# Patient Record
Sex: FEMALE | Race: WHITE | NOT HISPANIC OR LATINO | Employment: OTHER | URBAN - METROPOLITAN AREA
[De-identification: names, ages, dates, MRNs, and addresses within clinical notes are randomized per-mention and may not be internally consistent; named-entity substitution may affect disease eponyms.]

---

## 2019-03-14 ENCOUNTER — TELEPHONE (OUTPATIENT)
Dept: SCHEDULING | Facility: IMAGING CENTER | Age: 84
End: 2019-03-14

## 2019-06-27 ENCOUNTER — OFFICE VISIT (OUTPATIENT)
Dept: MEDICAL GROUP | Facility: MEDICAL CENTER | Age: 84
End: 2019-06-27
Payer: MEDICARE

## 2019-06-27 VITALS
BODY MASS INDEX: 28.53 KG/M2 | HEIGHT: 63 IN | WEIGHT: 161 LBS | HEART RATE: 70 BPM | RESPIRATION RATE: 16 BRPM | DIASTOLIC BLOOD PRESSURE: 80 MMHG | OXYGEN SATURATION: 93 % | TEMPERATURE: 98.1 F | SYSTOLIC BLOOD PRESSURE: 122 MMHG

## 2019-06-27 DIAGNOSIS — Z86.73 HISTORY OF TIA (TRANSIENT ISCHEMIC ATTACK): ICD-10-CM

## 2019-06-27 DIAGNOSIS — H35.00 RETINOPATHY: ICD-10-CM

## 2019-06-27 DIAGNOSIS — M54.41 CHRONIC MIDLINE LOW BACK PAIN WITH RIGHT-SIDED SCIATICA: ICD-10-CM

## 2019-06-27 DIAGNOSIS — I10 ESSENTIAL HYPERTENSION: ICD-10-CM

## 2019-06-27 DIAGNOSIS — Z95.0 PACEMAKER: ICD-10-CM

## 2019-06-27 DIAGNOSIS — G89.29 CHRONIC MIDLINE LOW BACK PAIN WITH RIGHT-SIDED SCIATICA: ICD-10-CM

## 2019-06-27 DIAGNOSIS — R20.2 PARESTHESIAS: ICD-10-CM

## 2019-06-27 DIAGNOSIS — S32.000S COMPRESSION FRACTURE OF LUMBAR VERTEBRA, SEQUELA: ICD-10-CM

## 2019-06-27 DIAGNOSIS — E55.9 VITAMIN D INSUFFICIENCY: ICD-10-CM

## 2019-06-27 DIAGNOSIS — R29.898 WEAKNESS OF BOTH LOWER EXTREMITIES: ICD-10-CM

## 2019-06-27 DIAGNOSIS — R73.03 PRE-DIABETES: ICD-10-CM

## 2019-06-27 DIAGNOSIS — E78.5 DYSLIPIDEMIA: ICD-10-CM

## 2019-06-27 DIAGNOSIS — Z76.89 ENCOUNTER TO ESTABLISH CARE: ICD-10-CM

## 2019-06-27 DIAGNOSIS — M79.89 LEG SWELLING: ICD-10-CM

## 2019-06-27 PROCEDURE — 99204 OFFICE O/P NEW MOD 45 MIN: CPT | Performed by: NURSE PRACTITIONER

## 2019-06-27 RX ORDER — ACETAMINOPHEN 325 MG/1
650 TABLET ORAL EVERY 4 HOURS PRN
Status: ON HOLD | COMMUNITY
End: 2020-02-26

## 2019-06-27 RX ORDER — EZETIMIBE 10 MG/1
10 TABLET ORAL DAILY
COMMUNITY
End: 2019-06-27 | Stop reason: SDUPTHER

## 2019-06-27 RX ORDER — CLOPIDOGREL BISULFATE 75 MG/1
75 TABLET ORAL DAILY
Qty: 90 TAB | Refills: 3 | Status: ON HOLD | OUTPATIENT
Start: 2019-06-27 | End: 2020-02-26 | Stop reason: SDUPTHER

## 2019-06-27 RX ORDER — NEBIVOLOL 5 MG/1
5 TABLET ORAL DAILY
COMMUNITY
End: 2019-06-27 | Stop reason: SDUPTHER

## 2019-06-27 RX ORDER — EZETIMIBE 10 MG/1
10 TABLET ORAL DAILY
Qty: 90 TAB | Refills: 3 | Status: SHIPPED | OUTPATIENT
Start: 2019-06-27 | End: 2019-07-09

## 2019-06-27 RX ORDER — LOSARTAN POTASSIUM 100 MG/1
100 TABLET ORAL DAILY
COMMUNITY
End: 2019-06-27 | Stop reason: SDUPTHER

## 2019-06-27 RX ORDER — CHOLECALCIFEROL (VITAMIN D3) 25 MCG
TABLET,CHEWABLE ORAL
COMMUNITY
End: 2019-07-09

## 2019-06-27 RX ORDER — CLOPIDOGREL BISULFATE 75 MG/1
75 TABLET ORAL DAILY
COMMUNITY
End: 2019-06-27 | Stop reason: SDUPTHER

## 2019-06-27 RX ORDER — FUROSEMIDE 40 MG/1
40 TABLET ORAL DAILY
Status: ON HOLD | COMMUNITY
End: 2020-02-25

## 2019-06-27 RX ORDER — NEBIVOLOL 5 MG/1
5 TABLET ORAL DAILY
Qty: 90 TAB | Refills: 3 | Status: SHIPPED | OUTPATIENT
Start: 2019-06-27 | End: 2019-08-29

## 2019-06-27 RX ORDER — MULTIVIT WITH MINERALS/LUTEIN
1 TABLET ORAL DAILY
COMMUNITY
End: 2020-03-30 | Stop reason: CLARIF

## 2019-06-27 RX ORDER — LOSARTAN POTASSIUM 100 MG/1
100 TABLET ORAL DAILY
Qty: 90 TAB | Refills: 3 | Status: SHIPPED | OUTPATIENT
Start: 2019-06-27 | End: 2019-08-29

## 2019-06-27 NOTE — LETTER
[employer]  DEMAR Castelan.P.R.N.  [address] [facility] [address]  [place] [address]  Fax: [phone]   Authorization for Release/Disclosure of   Protected Health Information   Name: VAN SEWELL : 1933 SSN: [id number]   Address: [address] [place] [address] [address] [place] [address] Phone:    [phone] (home)    I authorize the entity listed below to release/disclose the PHI below to:   [employer]/Joana Hawk A.P.R.N. and DEMAR Castelan.P.R.N.   Provider or Entity Name:  Dr. Hang Whalen   Address   City, State, Zip  [place], [place] Phone:  [phone]    Fax:         Reason for request: continuity of care   Information to be released:    [  ] LAST COLONOSCOPY,  including any PATH REPORT and follow-up  [  ] LAST FIT/COLOGUARD RESULT [  ] LAST DEXA  [  ] LAST MAMMOGRAM  [  ] LAST PAP  [  ] LAST LABS [  ] RETINA EXAM REPORT  [  ] IMMUNIZATION RECORDS  [  x] Release all info      [  ] Check here and initial the line next to each item to release ALL health information INCLUDING  _____ Care and treatment for drug and / or alcohol abuse  _____ HIV testing, infection status, or AIDS  _____ Genetic Testing    DATES OF SERVICE OR TIME PERIOD TO BE DISCLOSED: _____________  I understand and acknowledge that:  * This Authorization may be revoked at any time by you in writing, except if your health information has already been used or disclosed.  * Your health information that will be used or disclosed as a result of you signing this authorization could be re-disclosed by the recipient. If this occurs, your re-disclosed health information may no longer be protected by State or Federal laws.  * You may refuse to sign this Authorization. Your refusal will not affect your ability to obtain treatment.  * This Authorization becomes effective upon signing and will  on (date) __________.      If no date is indicated, this Authorization will  one (1) year from the signature date.    Name:  Krystle Tavarez    Signature:   Date:     6/27/2019       PLEASE FAX REQUESTED RECORDS BACK TO: [phone]

## 2019-06-27 NOTE — LETTER
[facility]  AFSHAN CastelanPEriREriN.  [address] [facility] [address]  [place] [address]  Fax: [phone]   Authorization for Release/Disclosure of   Protected Health Information   Name: VAN SEWELL : 1933 SSN: [id number]   Address: [address] [place] [address] [place] [address] Phone:    [phone] (home)    I authorize the entity listed below to release/disclose the PHI below to:   [facility]/DEMAR Castelan.P.R.YONIS and EREN CastelanRJIA   Provider or Entity Name:  [facility] ([facility]   Address   City, State, [facility]   Phone:      Fax:     Reason for request: continuity of care   Information to be released:    [  ] LAST COLONOSCOPY,  including any PATH REPORT and follow-up  [  ] LAST FIT/COLOGUARD RESULT [  ] LAST DEXA  [  ] LAST MAMMOGRAM  [  ] LAST PAP  [  ] LAST LABS [  ] RETINA EXAM REPORT  [  ] IMMUNIZATION RECORDS  [ x ] Release all info  Hx of pacemaker placed      [  ] Check here and initial the line next to each item to release ALL health information INCLUDING  _____ Care and treatment for drug and / or alcohol abuse  _____ HIV testing, infection status, or AIDS  _____ Genetic Testing    DATES OF SERVICE OR TIME PERIOD TO BE DISCLOSED: _____________  I understand and acknowledge that:  * This Authorization may be revoked at any time by you in writing, except if your health information has already been used or disclosed.  * Your health information that will be used or disclosed as a result of you signing this authorization could be re-disclosed by the recipient. If this occurs, your re-disclosed health information may no longer be protected by State or Federal laws.  * You may refuse to sign this Authorization. Your refusal will not affect your ability to obtain treatment.  * This Authorization becomes effective upon signing and will  on (date) __________.      If no date is indicated, this Authorization will  one (1) year from the signature date.       Name: Krystle Tavarez    Signature:   Date:     6/27/2019       PLEASE FAX REQUESTED RECORDS BACK TO: [phone]

## 2019-06-27 NOTE — LETTER
[employer]  Joana Hawk A.P.R.N.  [address] [place] [facility] [address]  [place] [address]  Fax: [phone]   Authorization for Release/Disclosure of   Protected Health Information   Name: VAN SEWELL : 1933 SSN: [id number]   Address: [address] [place] [address] [address] [place] [address] Phone:    [phone] (home)    I authorize the entity listed below to release/disclose the PHI below to:   [employer]/Joana Hawk, A.P.R.N. and Joana Hawk A.P.R.N.   Provider or Entity Name:  Dr. Taylor (cardiology)   Address   [facility]   Phone:  [phone]    Fax:     Reason for request: continuity of care   Information to be released:    [  ] LAST COLONOSCOPY,  including any PATH REPORT and follow-up  [  ] LAST FIT/COLOGUARD RESULT [  ] LAST DEXA  [  ] LAST MAMMOGRAM  [  ] LAST PAP  [  ] LAST LABS [  ] RETINA EXAM REPORT  [  ] IMMUNIZATION RECORDS  [x  ] Release all info      [  ] Check here and initial the line next to each item to release ALL health information INCLUDING  _____ Care and treatment for drug and / or alcohol abuse  _____ HIV testing, infection status, or AIDS  _____ Genetic Testing    DATES OF SERVICE OR TIME PERIOD TO BE DISCLOSED: _____________  I understand and acknowledge that:  * This Authorization may be revoked at any time by you in writing, except if your health information has already been used or disclosed.  * Your health information that will be used or disclosed as a result of you signing this authorization could be re-disclosed by the recipient. If this occurs, your re-disclosed health information may no longer be protected by State or Federal laws.  * You may refuse to sign this Authorization. Your refusal will not affect your ability to obtain treatment.  * This Authorization becomes effective upon signing and will  on (date) __________.      If no date is indicated, this Authorization will  one (1) year from the signature date.    Name: Van  Daysi    Signature:   Date:     6/27/2019       PLEASE FAX REQUESTED RECORDS BACK TO: [phone]

## 2019-06-27 NOTE — PROGRESS NOTES
"CC: establish care/ back pain      Krystle Tavarez is a 85 y.o. female here to establish care and to discuss the evaluation and management of:    Here with her daughter today.     Patient here today to establish care. Former PCP    Medical history significant for hyperlipidemia, pacemaker, hypertension, retinopathy, pre diabetes       1. Chronic midline low back pain with right-sided sciatica  2. Compression fracture of lumbar vertebra, sequela  3. Weakness of both lower extremities  4. Paresthesias  Has been bothering her for about 5 years. Started after when she started taking her Zetia. Starts nagging aching, something \"stealing her energy.\" States she was told to exercise, this made it worse. Has had cortisone injections about 3 different times. It would help but the pain would come back.  Feels shaky on her feet, \"felt like walking on water.\"  Feels like she is getting weaker. States on March 27, 2019 she woke up and could not stand up.  Was told she had fractures her vertebrae in her back- they gave her liquid cement. Using a walker now and can't stand by her self. Needs to hold on to items in order to stand up. Needs to sit after standing for about 5 minutes. Sitting her back pain is 2/10, laying 2/10, standing it goes to an 8/10. Starts on her right heel and travels upwards. Feels like her leg weighs too much when she walks, feels her right side is weaker than the left. Wants referral to neurosurgeon.     5. Pacemaker  Has [facility], placed August 2015. States [facility] had it checked the end of April-was told she had \"8 more years of life.\"  Mentions that her \"heart stopped for 45 seconds during her knee and hip surgeries.\" Will be establishing with Dr. Sewell.     6. Dyslipidemia  On Zetia for this although feels as if it has some effect on her weakness/energy level.    7. Essential hypertension  Taking Losartan and Bystolic for this.     8. Retinopathy  Followed by " "a eye care specialist. Needs referral.    9. Leg swelling  Taking lasix for this. Mainly in her feet. Not wearing compression stockings.    10. Pre-diabetes  States has been told she is pre-diabetic. Not on any medications.    11. History of TIA   States on Plavix for this.    12. Vitamin D insufficiency    Reports having a Dexa scan about 10 years ago and it was \"normal.\"        ROS:  Denies any Headache, Blurred Vision, Confusion, Chest pain,  Shortness of breath,  Abdominal pain, Changes of bowel or bladder, Hematuria, Hematochezia, Lower ext. edema, Fevers, Nights sweats, Weight Changes, Focal weakness or numbness.  And all other systems are negative. Back pain, ankle swelling, weakness, using walker.      Current Outpatient Medications:   •  furosemide (LASIX) 40 MG Tab, Take 40 mg by mouth every day., Disp: , Rfl:   •  acetaminophen (TYLENOL) 325 MG Tab, Take 650 mg by mouth every four hours as needed., Disp: , Rfl:   •  Ascorbic Acid (VITAMIN C) 1000 MG Tab, Take  by mouth., Disp: , Rfl:   •  nebivolol (BYSTOLIC) 5 MG Tab tablet, Take 1 Tab by mouth every day., Disp: 90 Tab, Rfl: 3  •  losartan (COZAAR) 100 MG Tab, Take 1 Tab by mouth every day., Disp: 90 Tab, Rfl: 3  •  clopidogrel (PLAVIX) 75 MG Tab, Take 1 Tab by mouth every day., Disp: 90 Tab, Rfl: 3  •  hydrochlorothiazide (MICROZIDE) 12.5 MG capsule, Take 1 Cap by mouth every day., Disp: 30 Cap, Rfl: 5    Allergies   Allergen Reactions   • Sulfa Drugs        Past Medical History:   Diagnosis Date   • Hyperlipidemia    • Hypertension    • Pacemaker 2015   • TIA (transient ischemic attack)     on Plavix     Past Surgical History:   Procedure Laterality Date   • PACEMAKER INSERTION  2015   • HIP REPLACEMENT, TOTAL Right 2009   • CATARACT EXTRACTION WITH IOL Bilateral 2003   • BASAL CELL EXCISION      nose and hand   • BUNIONECTOMY Left    • CHOLECYSTECTOMY     • KNEE REPLACEMENT, TOTAL Right      Family History   Problem Relation Age of Onset   • Diabetes " "Mother    • Stroke Mother    • Heart Attack Father 74     Social History     Socioeconomic History   • Marital status:      Spouse name: Not on file   • Number of children: Not on file   • Years of education: Not on file   • Highest education level: Not on file   Occupational History   • Not on file   Social Needs   • Financial resource strain: Not on file   • Food insecurity:     Worry: Not on file     Inability: Not on file   • Transportation needs:     Medical: Not on file     Non-medical: Not on file   Tobacco Use   • Smoking status: Never Smoker   • Smokeless tobacco: Never Used   Substance and Sexual Activity   • Alcohol use: No   • Drug use: No   • Sexual activity: Not on file   Lifestyle   • Physical activity:     Days per week: Not on file     Minutes per session: Not on file   • Stress: Not on file   Relationships   • Social connections:     Talks on phone: Not on file     Gets together: Not on file     Attends [employer] service: Not on file     Active member of club or organization: Not on file     Attends meetings of clubs or organizations: Not on file     Relationship status: Not on file   • Intimate partner violence:     Fear of current or ex partner: Not on file     Emotionally abused: Not on file     Physically abused: Not on file     Forced sexual activity: Not on file   Other Topics Concern   • Not on file   Social History Narrative   • Not on file       Objective:     Vitals: /80   Pulse 70   Temp 36.7 °C (98.1 °F)   Resp 16   Ht 1.6 m (5' 3\")   Wt 73 kg (161 lb)   SpO2 93%   BMI 28.52 kg/m²      General: Alert, pleasant, NAD, using walker  HEENT:  Normocephalic.  Neck supple.  No thyromegaly or masses palpated. No cervical or supraclavicular lymphadenopathy.  Heart:  Regular rate and rhythm.  S1 and S2 normal.  No murmurs appreciated.    CHEST: pacemaker in MARYCARMEN chest.   Respiratory:  Normal respiratory effort.  Clear to auscultation bilaterally.    Skin:  Warm, dry, no " rashes  Musculoskeletal:  Gait is antalgic, kyphotic, using walker, cautioned, Moves all extremities well.  Extremities:  +BLE edema, primarily feet  Neurological: No tremors, LE weakness, using walker  Psych:  Affect/mood is normal, judgement is good, memory is intact, grooming is appropriate.      Assessment and Plan.   85 y.o. female to establish care and discuss the following    1. Chronic midline low back pain with right-sided sciatica  - REFERRAL TO NEUROSURGERY  - CT-LSPINE W/O PLUS RECONS; Future    2. Compression fracture of lumbar vertebra, sequela  - CT-LSPINE W/O PLUS RECONS; Future    3. Weakness of both lower extremities  - REFERRAL TO NEUROSURGERY    4. Paresthesias  - Comp Metabolic Panel; Future  - VITAMIN B12; Future    5. Pacemaker  Has upcoming appointment with cardiology.     6. Dyslipidemia  - Lipid Profile; Future    7. Essential hypertension  Chronic. 122/80 in clinic. No chest pain, shortness or breath or dizziness. +moderate LE edema/feet mainly.  - TSH WITH REFLEX TO FT4; Future  - MICROALBUMIN CREAT RATIO URINE; Future    8. Retinopathy  - REFERRAL TO OPHTHALMOLOGY    9. Leg swelling  - CBC WITHOUT DIFFERENTIAL; Future    10. Pre-diabetes  - HEMOGLOBIN A1C; Future    11. History of TIA (transient ischemic attack)  On plavix for this.     12. Vitamin D insufficiency  - VITAMIN D,25 HYDROXY; Future    13. Encounter to establish care  Requesting records.    Other orders  - furosemide (LASIX) 40 MG Tab; Take 40 mg by mouth every day.  - acetaminophen (TYLENOL) 325 MG Tab; Take 650 mg by mouth every four hours as needed.  - Ascorbic Acid (VITAMIN C) 1000 MG Tab; Take  by mouth.  - nebivolol (BYSTOLIC) 5 MG Tab tablet; Take 1 Tab by mouth every day.  Dispense: 90 Tab; Refill: 3  - losartan (COZAAR) 100 MG Tab; Take 1 Tab by mouth every day.  Dispense: 90 Tab; Refill: 3  - clopidogrel (PLAVIX) 75 MG Tab; Take 1 Tab by mouth every day.  Dispense: 90 Tab; Refill: 3    Patient was seen for a total  of 45 minutes face-to-face, with more than half of the time spent counseling or coordinating care regarding the above mentioned problems.     Return in about 4 weeks (around 7/25/2019).          Joana MCQUEEN.

## 2019-06-27 NOTE — LETTER
[employer]  AFSHAN CastelanP.R.N.  [address] [facility] [address]  [place] [address]  Fax: [phone]   Authorization for Release/Disclosure of   Protected Health Information   Name: VAN SEWELL : 1933 SSN: [id number]   Address: [address] [place] [address]   [place] [address] Phone:    [phone] (home)    I authorize the entity listed below to release/disclose the PHI below to:   [employer]/Joana Hawk A.P.R.N. and DEMAR Castelan.P.R.N.   Provider or Entity Name:  Dr. Hubbard (orthopedic surgeon)   Address   City, State, Zip   Phone:  [phone]    Fax:  [phone]   Reason for request: continuity of care   Information to be released:    [  ] LAST COLONOSCOPY,  including any PATH REPORT and follow-up  [  ] LAST FIT/COLOGUARD RESULT [  ] LAST DEXA  [  ] LAST MAMMOGRAM  [  ] LAST PAP  [  ] LAST LABS [  ] RETINA EXAM REPORT  [  ] IMMUNIZATION RECORDS  [ x ] Release all info      [  ] Check here and initial the line next to each item to release ALL health information INCLUDING  _____ Care and treatment for drug and / or alcohol abuse  _____ HIV testing, infection status, or AIDS  _____ Genetic Testing    DATES OF SERVICE OR TIME PERIOD TO BE DISCLOSED: _____________  I understand and acknowledge that:  * This Authorization may be revoked at any time by you in writing, except if your health information has already been used or disclosed.  * Your health information that will be used or disclosed as a result of you signing this authorization could be re-disclosed by the recipient. If this occurs, your re-disclosed health information may no longer be protected by State or Federal laws.  * You may refuse to sign this Authorization. Your refusal will not affect your ability to obtain treatment.  * This Authorization becomes effective upon signing and will  on (date) __________.      If no date is indicated, this Authorization will  one (1) year from the signature date.       Name: Krystle Tavarez    Signature:   Date:     6/27/2019       PLEASE FAX REQUESTED RECORDS BACK TO: [phone]

## 2019-06-27 NOTE — LETTER
[employer]  DEMAR Castelan.P.R.N.  [address] [place] [facility] [address]  [place] [address]  Fax: [phone]   Authorization for Release/Disclosure of   Protected Health Information   Name: VAN SEWELL : 1933 SSN: [id number]   Address: [address] [place] [address] [address] [place] [address] Phone:    [phone] (home)    I authorize the entity listed below to release/disclose the PHI below to:   [employer]/Joana Hawk A.P.R.N. and DEMAR Castelan.P.R.N.   Provider or Entity Name:  Dr. Kelly (Podiatrist)   Address   [facility]   Phone:  [phone]      Fax:  [phone]     Reason for request: continuity of care   Information to be released:    [  ] LAST COLONOSCOPY,  including any PATH REPORT and follow-up  [  ] LAST FIT/COLOGUARD RESULT [  ] LAST DEXA  [  ] LAST MAMMOGRAM  [  ] LAST PAP  [  ] LAST LABS [  ] RETINA EXAM REPORT  [  ] IMMUNIZATION RECORDS  [ x ] Release all info      [  ] Check here and initial the line next to each item to release ALL health information INCLUDING  _____ Care and treatment for drug and / or alcohol abuse  _____ HIV testing, infection status, or AIDS  _____ Genetic Testing    DATES OF SERVICE OR TIME PERIOD TO BE DISCLOSED: _____________  I understand and acknowledge that:  * This Authorization may be revoked at any time by you in writing, except if your health information has already been used or disclosed.  * Your health information that will be used or disclosed as a result of you signing this authorization could be re-disclosed by the recipient. If this occurs, your re-disclosed health information may no longer be protected by State or Federal laws.  * You may refuse to sign this Authorization. Your refusal will not affect your ability to obtain treatment.  * This Authorization becomes effective upon signing and will  on (date) __________.      If no date is indicated, this Authorization will  one (1) year from the signature date.    Name:  Krystle Tavarez    Signature:   Date:     6/27/2019       PLEASE FAX REQUESTED RECORDS BACK TO: [phone]

## 2019-06-27 NOTE — LETTER
[employer]  Joana Hawk A.P.R.N.  [address] [facility] [address]  [place] [address]  Fax: [phone]   Authorization for Release/Disclosure of   Protected Health Information   Name: VAN SEWELL : 1933 SSN: [id number]   Address: [address] [place] [address] [place] [address] Phone:    [phone] (home)    I authorize the entity listed below to release/disclose the PHI below to:   [employer]/Joana Hawk, A.P.R.N. and Joana Hawk A.P.R.N.   Provider or Entity Name:  Dr. Mehta (neurology)   Address   [facility]   Phone:  [phone]    Fax:     Reason for request: continuity of care   Information to be released:    [  ] LAST COLONOSCOPY,  including any PATH REPORT and follow-up  [  ] LAST FIT/COLOGUARD RESULT [  ] LAST DEXA  [  ] LAST MAMMOGRAM  [  ] LAST PAP  [  ] LAST LABS [  ] RETINA EXAM REPORT  [  ] IMMUNIZATION RECORDS  [  ] Release all info      [  ] Check here and initial the line next to each item to release ALL health information INCLUDING  _____ Care and treatment for drug and / or alcohol abuse  _____ HIV testing, infection status, or AIDS  _____ Genetic Testing    DATES OF SERVICE OR TIME PERIOD TO BE DISCLOSED: _____________  I understand and acknowledge that:  * This Authorization may be revoked at any time by you in writing, except if your health information has already been used or disclosed.  * Your health information that will be used or disclosed as a result of you signing this authorization could be re-disclosed by the recipient. If this occurs, your re-disclosed health information may no longer be protected by State or Federal laws.  * You may refuse to sign this Authorization. Your refusal will not affect your ability to obtain treatment.  * This Authorization becomes effective upon signing and will  on (date) __________.      If no date is indicated, this Authorization will  one (1) year from the signature date.    Name: Van  Daysi    Signature:   Date:     6/27/2019       PLEASE FAX REQUESTED RECORDS BACK TO: [phone]

## 2019-06-27 NOTE — LETTER
[employer]  Joana Hawk A.P.R.N.  [address] [place] [facility] [address]  [place] [address]  Fax: [phone]   Authorization for Release/Disclosure of   Protected Health Information   Name: VAN SEWELL : 1933 SSN: [id number]   Address: [address] [place] [address] [address] [place] [address] Phone:    [phone] (home)    I authorize the entity listed below to release/disclose the PHI below to:   [employer]/Joana Hawk, A.P.R.N. and DEMAR Castelan.P.R.N.   Provider or Entity Name:  DR. Bismark Alvarez (Ophthalmology)   Address   [facility]   Phone:  [phone]    Fax:     Reason for request: continuity of care   Information to be released:    [  ] LAST COLONOSCOPY,  including any PATH REPORT and follow-up  [  ] LAST FIT/COLOGUARD RESULT [  ] LAST DEXA  [  ] LAST MAMMOGRAM  [  ] LAST PAP  [  ] LAST LABS [x  ] RETINA EXAM REPORT  [  ] IMMUNIZATION RECORDS  [ x ] Release all info      [  ] Check here and initial the line next to each item to release ALL health information INCLUDING  _____ Care and treatment for drug and / or alcohol abuse  _____ HIV testing, infection status, or AIDS  _____ Genetic Testing    DATES OF SERVICE OR TIME PERIOD TO BE DISCLOSED: _____________  I understand and acknowledge that:  * This Authorization may be revoked at any time by you in writing, except if your health information has already been used or disclosed.  * Your health information that will be used or disclosed as a result of you signing this authorization could be re-disclosed by the recipient. If this occurs, your re-disclosed health information may no longer be protected by State or Federal laws.  * You may refuse to sign this Authorization. Your refusal will not affect your ability to obtain treatment.  * This Authorization becomes effective upon signing and will  on (date) __________.      If no date is indicated, this Authorization will  one (1) year from the signature date.    Name: Van  Daysi    Signature:   Date:     6/27/2019       PLEASE FAX REQUESTED RECORDS BACK TO: [phone]

## 2019-07-03 ENCOUNTER — TELEPHONE (OUTPATIENT)
Dept: CARDIOLOGY | Facility: MEDICAL CENTER | Age: 84
End: 2019-07-03

## 2019-07-05 ENCOUNTER — HOSPITAL ENCOUNTER (OUTPATIENT)
Dept: LAB | Facility: MEDICAL CENTER | Age: 84
End: 2019-07-05
Attending: NURSE PRACTITIONER
Payer: MEDICARE

## 2019-07-05 ENCOUNTER — HOSPITAL ENCOUNTER (OUTPATIENT)
Dept: RADIOLOGY | Facility: MEDICAL CENTER | Age: 84
End: 2019-07-05
Attending: NURSE PRACTITIONER
Payer: MEDICARE

## 2019-07-05 DIAGNOSIS — G89.29 CHRONIC MIDLINE LOW BACK PAIN WITH RIGHT-SIDED SCIATICA: ICD-10-CM

## 2019-07-05 DIAGNOSIS — M54.41 CHRONIC MIDLINE LOW BACK PAIN WITH RIGHT-SIDED SCIATICA: ICD-10-CM

## 2019-07-05 DIAGNOSIS — R20.2 PARESTHESIAS: ICD-10-CM

## 2019-07-05 DIAGNOSIS — R73.03 PRE-DIABETES: ICD-10-CM

## 2019-07-05 DIAGNOSIS — E55.9 VITAMIN D INSUFFICIENCY: ICD-10-CM

## 2019-07-05 DIAGNOSIS — S32.000S COMPRESSION FRACTURE OF LUMBAR VERTEBRA, SEQUELA: ICD-10-CM

## 2019-07-05 DIAGNOSIS — I10 ESSENTIAL HYPERTENSION: ICD-10-CM

## 2019-07-05 DIAGNOSIS — M79.89 LEG SWELLING: ICD-10-CM

## 2019-07-05 DIAGNOSIS — E78.5 DYSLIPIDEMIA: ICD-10-CM

## 2019-07-05 LAB
25(OH)D3 SERPL-MCNC: 35 NG/ML (ref 30–100)
ALBUMIN SERPL BCP-MCNC: 3.9 G/DL (ref 3.2–4.9)
ALBUMIN/GLOB SERPL: 1.3 G/DL
ALP SERPL-CCNC: 80 U/L (ref 30–99)
ALT SERPL-CCNC: 13 U/L (ref 2–50)
ANION GAP SERPL CALC-SCNC: 6 MMOL/L (ref 0–11.9)
AST SERPL-CCNC: 15 U/L (ref 12–45)
BILIRUB SERPL-MCNC: 1 MG/DL (ref 0.1–1.5)
BUN SERPL-MCNC: 14 MG/DL (ref 8–22)
CALCIUM SERPL-MCNC: 10 MG/DL (ref 8.5–10.5)
CHLORIDE SERPL-SCNC: 97 MMOL/L (ref 96–112)
CHOLEST SERPL-MCNC: 190 MG/DL (ref 100–199)
CO2 SERPL-SCNC: 28 MMOL/L (ref 20–33)
CREAT SERPL-MCNC: 0.65 MG/DL (ref 0.5–1.4)
CREAT UR-MCNC: 41.1 MG/DL
ERYTHROCYTE [DISTWIDTH] IN BLOOD BY AUTOMATED COUNT: 48 FL (ref 35.9–50)
EST. AVERAGE GLUCOSE BLD GHB EST-MCNC: 151 MG/DL
FASTING STATUS PATIENT QL REPORTED: NORMAL
GLOBULIN SER CALC-MCNC: 3.1 G/DL (ref 1.9–3.5)
GLUCOSE SERPL-MCNC: 139 MG/DL (ref 65–99)
HBA1C MFR BLD: 6.9 % (ref 0–5.6)
HCT VFR BLD AUTO: 40.8 % (ref 37–47)
HDLC SERPL-MCNC: 51 MG/DL
HGB BLD-MCNC: 13.7 G/DL (ref 12–16)
LDLC SERPL CALC-MCNC: 114 MG/DL
MCH RBC QN AUTO: 30.9 PG (ref 27–33)
MCHC RBC AUTO-ENTMCNC: 33.6 G/DL (ref 33.6–35)
MCV RBC AUTO: 92.1 FL (ref 81.4–97.8)
MICROALBUMIN UR-MCNC: <0.7 MG/DL
MICROALBUMIN/CREAT UR: NORMAL MG/G (ref 0–30)
PLATELET # BLD AUTO: 265 K/UL (ref 164–446)
PMV BLD AUTO: 9.5 FL (ref 9–12.9)
POTASSIUM SERPL-SCNC: 4.7 MMOL/L (ref 3.6–5.5)
PROT SERPL-MCNC: 7 G/DL (ref 6–8.2)
RBC # BLD AUTO: 4.43 M/UL (ref 4.2–5.4)
SODIUM SERPL-SCNC: 131 MMOL/L (ref 135–145)
TRIGL SERPL-MCNC: 124 MG/DL (ref 0–149)
TSH SERPL DL<=0.005 MIU/L-ACNC: 2.19 UIU/ML (ref 0.38–5.33)
VIT B12 SERPL-MCNC: 1477 PG/ML (ref 211–911)
WBC # BLD AUTO: 7 K/UL (ref 4.8–10.8)

## 2019-07-05 PROCEDURE — 82306 VITAMIN D 25 HYDROXY: CPT

## 2019-07-05 PROCEDURE — 82043 UR ALBUMIN QUANTITATIVE: CPT

## 2019-07-05 PROCEDURE — 83036 HEMOGLOBIN GLYCOSYLATED A1C: CPT | Mod: GA

## 2019-07-05 PROCEDURE — 80053 COMPREHEN METABOLIC PANEL: CPT

## 2019-07-05 PROCEDURE — 82607 VITAMIN B-12: CPT

## 2019-07-05 PROCEDURE — 85027 COMPLETE CBC AUTOMATED: CPT

## 2019-07-05 PROCEDURE — 82570 ASSAY OF URINE CREATININE: CPT

## 2019-07-05 PROCEDURE — 72131 CT LUMBAR SPINE W/O DYE: CPT

## 2019-07-05 PROCEDURE — 36415 COLL VENOUS BLD VENIPUNCTURE: CPT

## 2019-07-05 PROCEDURE — 84443 ASSAY THYROID STIM HORMONE: CPT

## 2019-07-05 PROCEDURE — 80061 LIPID PANEL: CPT

## 2019-07-08 ENCOUNTER — TELEPHONE (OUTPATIENT)
Dept: CARDIOLOGY | Facility: MEDICAL CENTER | Age: 84
End: 2019-07-08

## 2019-07-09 ENCOUNTER — NON-PROVIDER VISIT (OUTPATIENT)
Dept: CARDIOLOGY | Facility: MEDICAL CENTER | Age: 84
End: 2019-07-09
Payer: MEDICARE

## 2019-07-09 ENCOUNTER — OFFICE VISIT (OUTPATIENT)
Dept: CARDIOLOGY | Facility: MEDICAL CENTER | Age: 84
End: 2019-07-09
Payer: MEDICARE

## 2019-07-09 VITALS
OXYGEN SATURATION: 94 % | BODY MASS INDEX: 28.35 KG/M2 | DIASTOLIC BLOOD PRESSURE: 66 MMHG | HEIGHT: 63 IN | SYSTOLIC BLOOD PRESSURE: 150 MMHG | WEIGHT: 160 LBS | HEART RATE: 68 BPM

## 2019-07-09 DIAGNOSIS — E78.2 MIXED HYPERLIPIDEMIA: ICD-10-CM

## 2019-07-09 DIAGNOSIS — I10 ESSENTIAL HYPERTENSION, BENIGN: ICD-10-CM

## 2019-07-09 DIAGNOSIS — G89.29 CHRONIC LOW BACK PAIN WITHOUT SCIATICA, UNSPECIFIED BACK PAIN LATERALITY: ICD-10-CM

## 2019-07-09 DIAGNOSIS — R60.0 LOCALIZED EDEMA: ICD-10-CM

## 2019-07-09 DIAGNOSIS — Z95.0 CARDIAC PACEMAKER IN SITU: ICD-10-CM

## 2019-07-09 DIAGNOSIS — M54.50 CHRONIC LOW BACK PAIN WITHOUT SCIATICA, UNSPECIFIED BACK PAIN LATERALITY: ICD-10-CM

## 2019-07-09 PROBLEM — E78.5 HYPERLIPIDEMIA: Status: ACTIVE | Noted: 2019-07-09

## 2019-07-09 PROCEDURE — 99203 OFFICE O/P NEW LOW 30 MIN: CPT | Mod: 25 | Performed by: INTERNAL MEDICINE

## 2019-07-09 PROCEDURE — 93280 PM DEVICE PROGR EVAL DUAL: CPT | Performed by: INTERNAL MEDICINE

## 2019-07-09 RX ORDER — HYDROCHLOROTHIAZIDE 12.5 MG/1
12.5 CAPSULE, GELATIN COATED ORAL DAILY
Qty: 30 CAP | Refills: 5 | Status: SHIPPED | OUTPATIENT
Start: 2019-07-09 | End: 2019-08-29

## 2019-07-09 ASSESSMENT — ENCOUNTER SYMPTOMS
DIZZINESS: 1
CARDIOVASCULAR NEGATIVE: 1
GASTROINTESTINAL NEGATIVE: 1
RESPIRATORY NEGATIVE: 1
BACK PAIN: 1
DEPRESSION: 0
CONSTITUTIONAL NEGATIVE: 1

## 2019-07-09 NOTE — PROGRESS NOTES
Chief Complaint   Patient presents with   • Establish Care   • Pacemaker Check/Dysfunction     [facility]       Subjective:   Krystle Tavarez is a 85 y.o. female who is self-referred today to establish care.  She has a history of a permanent pacemaker placed about 4 years ago.  The pacemaker was interrogated today and is functioning normally.  She has history of hypertension and hyperlipidemia.  There is no history of known myocardial infarction, exertional chest pain.  She recalls that she had an echo a few months ago by her doctor in [place] and we will try to obtain a copy of that.    Family history: Father  of a heart attack at 74 mother  at 76 from complications of diabetes and stroke.  There is no family history of premature coronary disease.    Social history: , non-smoker, not physically active    Past Medical History:   Diagnosis Date   • Hyperlipidemia    • Hypertension    • Pacemaker    • TIA (transient ischemic attack)     on Plavix     Past Surgical History:   Procedure Laterality Date   • PACEMAKER INSERTION     • HIP REPLACEMENT, TOTAL Right    • CATARACT EXTRACTION WITH IOL Bilateral    • BASAL CELL EXCISION      nose and hand   • BUNIONECTOMY Left    • CHOLECYSTECTOMY     • KNEE REPLACEMENT, TOTAL Right      History reviewed. No pertinent family history.  Social History     Social History   • Marital status:      Spouse name: N/A   • Number of children: N/A   • Years of education: N/A     Occupational History   • Not on file.     Social History Main Topics   • Smoking status: Never Smoker   • Smokeless tobacco: Never Used   • Alcohol use No   • Drug use: No   • Sexual activity: Not on file     Other Topics Concern   • Not on file     Social History Narrative   • No narrative on file     Allergies   Allergen Reactions   • Sulfa Drugs      Outpatient Encounter Prescriptions as of 2019   Medication Sig Dispense Refill   • hydrochlorothiazide (MICROZIDE) 12.5 MG  "capsule Take 1 Cap by mouth every day. 30 Cap 5   • furosemide (LASIX) 40 MG Tab Take 40 mg by mouth every day.     • acetaminophen (TYLENOL) 325 MG Tab Take 650 mg by mouth every four hours as needed.     • Ascorbic Acid (VITAMIN C) 1000 MG Tab Take  by mouth.     • nebivolol (BYSTOLIC) 5 MG Tab tablet Take 1 Tab by mouth every day. 90 Tab 3   • losartan (COZAAR) 100 MG Tab Take 1 Tab by mouth every day. 90 Tab 3   • clopidogrel (PLAVIX) 75 MG Tab Take 1 Tab by mouth every day. 90 Tab 3   • [DISCONTINUED] Cyanocobalamin (B-12) 1000 MCG Cap Take  by mouth.     • [DISCONTINUED] ezetimibe (ZETIA) 10 MG Tab Take 1 Tab by mouth every day. 90 Tab 3     No facility-administered encounter medications on file as of 7/9/2019.      Review of Systems   Constitutional: Negative.    HENT: Negative.    Respiratory: Negative.    Cardiovascular: Negative.    Gastrointestinal: Negative.    Musculoskeletal: Positive for back pain.   Skin: Negative.    Neurological: Positive for dizziness.        Difficulty with balance   Endo/Heme/Allergies: Negative.    Psychiatric/Behavioral: Negative for depression.        Objective:   /66 (BP Location: Left arm, Patient Position: Sitting, BP Cuff Size: Adult)   Pulse 68   Ht 1.6 m (5' 3\")   Wt 72.6 kg (160 lb)   SpO2 94%   BMI 28.34 kg/m²     Physical Exam   Constitutional: She is oriented to person, place, and time. No distress.   Uses a walker.   HENT:   Head: Normocephalic and atraumatic.   Eyes: Pupils are equal, round, and reactive to light. No scleral icterus.   Neck: No JVD present.   Cardiovascular: Normal rate and regular rhythm.    No murmur heard.  Pulmonary/Chest: No respiratory distress. She has no wheezes.   Pacemaker present in left upper chest without evidence   of erosion, drainage,or erythema.   Abdominal: Soft. She exhibits no distension. There is no tenderness.   Musculoskeletal: She exhibits edema.   Kyphosis is present.  Edema of the feet bilaterally "   Neurological: She is alert and oriented to person, place, and time.   Skin: Skin is warm.   Psychiatric: She has a normal mood and affect.       Assessment:     1. Cardiac pacemaker in situ     2. Essential hypertension, benign     3. Mixed hyperlipidemia         Medical Decision Making:  Today's Assessment / Status / Plan:   Status post permanent pacemaker pacemaker is functioning normally.  No PAF.    Hypertension: Not under good control.  Pressure difficult to hear but confirmation by palpation reveals a blood pressure 162 systolic in the left arm.  She also has mild edema.  The patient has quite a bit of her medicine left at home so we will add HCTZ 12.5 mg to her current regimen.  When she runs out of her losartan then could combine it to make losartan/HCTZ for convenience sake.    Also, she has 2 months of Bystolic at home which is causing about 50 some dollars a month.  Would recommend changing this to carvedilol when she runs low on her Bystolic.    Return in about 4 to 6 weeks to reassess her blood pressure and her edema.  At that time if she is tolerating the medications and her pressures controlled change of her losartan and HCTZ to combination for convenience sake and then consider changing her from Bystolic to carvedilol.    She is also on B12 supplement.  Her B12 level is high so she can stop this.  I also request that she stop her Zetia.    Dizziness: Patient episode of dizziness about 3 and half years ago was seen in the ER.  There was concern about a TIA and she was placed on clopidogrel by her primary care physician.  She still complains of continued without balance problems.

## 2019-08-08 ENCOUNTER — OFFICE VISIT (OUTPATIENT)
Dept: MEDICAL GROUP | Facility: MEDICAL CENTER | Age: 84
End: 2019-08-08
Payer: MEDICARE

## 2019-08-08 VITALS
BODY MASS INDEX: 28.88 KG/M2 | WEIGHT: 163 LBS | OXYGEN SATURATION: 91 % | HEART RATE: 70 BPM | HEIGHT: 63 IN | TEMPERATURE: 98.2 F | DIASTOLIC BLOOD PRESSURE: 80 MMHG | SYSTOLIC BLOOD PRESSURE: 112 MMHG | RESPIRATION RATE: 16 BRPM

## 2019-08-08 DIAGNOSIS — R60.0 LOCALIZED EDEMA: ICD-10-CM

## 2019-08-08 DIAGNOSIS — E78.5 DYSLIPIDEMIA: ICD-10-CM

## 2019-08-08 DIAGNOSIS — R73.09 ELEVATED HEMOGLOBIN A1C: ICD-10-CM

## 2019-08-08 DIAGNOSIS — R29.898 WEAKNESS OF BOTH LOWER EXTREMITIES: ICD-10-CM

## 2019-08-08 DIAGNOSIS — M54.41 CHRONIC MIDLINE LOW BACK PAIN WITH RIGHT-SIDED SCIATICA: ICD-10-CM

## 2019-08-08 DIAGNOSIS — I10 ESSENTIAL HYPERTENSION, BENIGN: ICD-10-CM

## 2019-08-08 DIAGNOSIS — G89.29 CHRONIC MIDLINE LOW BACK PAIN WITH RIGHT-SIDED SCIATICA: ICD-10-CM

## 2019-08-08 PROCEDURE — 99214 OFFICE O/P EST MOD 30 MIN: CPT | Performed by: NURSE PRACTITIONER

## 2019-08-08 ASSESSMENT — PATIENT HEALTH QUESTIONNAIRE - PHQ9: CLINICAL INTERPRETATION OF PHQ2 SCORE: 0

## 2019-08-08 NOTE — LETTER
[facility]  AFSHAN CastelanP.R.N.  [address] [facility] [address]  [place] [address]  Fax: [phone]   Authorization for Release/Disclosure of   Protected Health Information   Name: VAN SEWELL : 1933 SSN: [id number]   Address: [address] [place] [address] [address] [place] [address] Phone:    [phone] (home)    I authorize the entity listed below to release/disclose the PHI below to:   [facility]/Joana Hawk A.P.R.N. and DEMAR Castelan.P.R.N.   Provider or Entity Name:  [facility]   City, State, Zip   Phone:      Fax:     Reason for request: continuity of care   Information to be released:    [  ] LAST COLONOSCOPY,  including any PATH REPORT and follow-up  [  ] LAST FIT/COLOGUARD RESULT [  ] LAST DEXA  [  ] LAST MAMMOGRAM  [  ] LAST PAP  [  ] LAST LABS [  ] RETINA EXAM REPORT  [  ] IMMUNIZATION RECORDS  [ x ] Release all info      [  ] Check here and initial the line next to each item to release ALL health information INCLUDING  _____ Care and treatment for drug and / or alcohol abuse  _____ HIV testing, infection status, or AIDS  _____ Genetic Testing    DATES OF SERVICE OR TIME PERIOD TO BE DISCLOSED: _____________  I understand and acknowledge that:  * This Authorization may be revoked at any time by you in writing, except if your health information has already been used or disclosed.  * Your health information that will be used or disclosed as a result of you signing this authorization could be re-disclosed by the recipient. If this occurs, your re-disclosed health information may no longer be protected by State or Federal laws.  * You may refuse to sign this Authorization. Your refusal will not affect your ability to obtain treatment.  * This Authorization becomes effective upon signing and will  on (date) __________.      If no date is indicated, this Authorization will  one (1) year from the signature date.    Name: Van Sewell    Signature:   Date:     8/8/2019       PLEASE FAX REQUESTED RECORDS BACK TO: [phone]

## 2019-08-08 NOTE — PROGRESS NOTES
cc:  Follow up labs/elevated A1c        Subjective:     HPI:     Krystle Tavarez is a 86 y.o. female here to discuss the evaluation and management of:    Elevated A1c   Patient presents today to review her labs.  Patient did have a elevated A1c at 6.9% on her most recent labs.  Have discussed with the patient that based on having A1c over 6.4% would indicate diabetes.  Prior to starting any medications patient is interested in working on dietary modifications.  Patient does have a daughter who is willing to help her with these dietary medications.    Blood pressure/pacemaker  Patient states that she did see cardiology to establish care and discuss blood pressure and her pacemaker.  Her blood pressure was elevated during her visit with cardiology.  Her blood pressure today is 112/80 in the clinic.  Denies any chest pain, shortness of breath or dizziness.  She does have lower extremity edema, primarily in her feet and ankles.  HCTZ was added due to her leg swelling.  Per reviewing cardiology notes if she is tolerating the HCTZ they will likely change her to losartan/HCTZ combination pill.  There is also mention that there is consideration in changing her from Bystolic to carvedilol.    Cholesterol   Patient states that she is able to follow-up with cardiology.  At this time she is no longer taking the Zetia per cardiology.       Back pain  Patient states that she was able to follow up for a preliminary visit with the pain specialist and she was given gabapentin however she stopped taking it as it was making her loopy.        ROS:  Denies any Headache, Blurred Vision, Confusion, Chest pain,  Shortness of breath,  Abdominal pain, Changes of bowel or bladder, Lower ext edema, Fevers, Nights sweats, Weight Changes, Focal weakness or numbness.  And all other systems are negative.leg swelling, back pain, LE weakness        Current Outpatient Medications:   •  hydrochlorothiazide (MICROZIDE) 12.5 MG capsule, Take 1 Cap by mouth  every day., Disp: 30 Cap, Rfl: 5  •  furosemide (LASIX) 40 MG Tab, Take 40 mg by mouth every day., Disp: , Rfl:   •  acetaminophen (TYLENOL) 325 MG Tab, Take 650 mg by mouth every four hours as needed., Disp: , Rfl:   •  Ascorbic Acid (VITAMIN C) 1000 MG Tab, Take  by mouth., Disp: , Rfl:   •  nebivolol (BYSTOLIC) 5 MG Tab tablet, Take 1 Tab by mouth every day., Disp: 90 Tab, Rfl: 3  •  losartan (COZAAR) 100 MG Tab, Take 1 Tab by mouth every day., Disp: 90 Tab, Rfl: 3  •  clopidogrel (PLAVIX) 75 MG Tab, Take 1 Tab by mouth every day., Disp: 90 Tab, Rfl: 3    Allergies   Allergen Reactions   • Sulfa Drugs        Past Medical History:   Diagnosis Date   • Hyperlipidemia    • Hypertension    • Pacemaker 2015   • TIA (transient ischemic attack)     on Plavix     Past Surgical History:   Procedure Laterality Date   • PACEMAKER INSERTION  2015   • HIP REPLACEMENT, TOTAL Right 2009   • CATARACT EXTRACTION WITH IOL Bilateral 2003   • BASAL CELL EXCISION      nose and hand   • BUNIONECTOMY Left    • CHOLECYSTECTOMY     • KNEE REPLACEMENT, TOTAL Right      Family History   Problem Relation Age of Onset   • Diabetes Mother    • Stroke Mother    • Heart Attack Father 74     Social History     Socioeconomic History   • Marital status:      Spouse name: Not on file   • Number of children: Not on file   • Years of education: Not on file   • Highest education level: Not on file   Occupational History   • Not on file   Social Needs   • Financial resource strain: Not on file   • Food insecurity:     Worry: Not on file     Inability: Not on file   • Transportation needs:     Medical: Not on file     Non-medical: Not on file   Tobacco Use   • Smoking status: Never Smoker   • Smokeless tobacco: Never Used   Substance and Sexual Activity   • Alcohol use: No   • Drug use: No   • Sexual activity: Not on file   Lifestyle   • Physical activity:     Days per week: Not on file     Minutes per session: Not on file   • Stress: Not on file  "  Relationships   • Social connections:     Talks on phone: Not on file     Gets together: Not on file     Attends Confucianism service: Not on file     Active member of club or organization: Not on file     Attends meetings of clubs or organizations: Not on file     Relationship status: Not on file   • Intimate partner violence:     Fear of current or ex partner: Not on file     Emotionally abused: Not on file     Physically abused: Not on file     Forced sexual activity: Not on file   Other Topics Concern   • Not on file   Social History Narrative   • Not on file       Objective:     Vitals: /80   Pulse 70   Temp 36.8 °C (98.2 °F)   Resp 16   Ht 1.6 m (5' 3\")   Wt 73.9 kg (163 lb)   SpO2 91%   BMI 28.87 kg/m²    General: Alert, pleasant, NAD, using walker  HEENT: Normocephalic.    Skin: Warm, dry, no rashes.  Extremities: BLE edema  Neurological: No tremors, weakness in LE, kyphotic, using walker.  Psych:  Affect/mood is normal, judgement is good, memory is intact, grooming is appropriate.    Assessment/Plan:     Diagnoses and all orders for this visit:    Elevated hemoglobin A1c  A1c in clinic today 6.9%.  Have discussed with patient and her daughter in detail regarding this number and dietary modifications she can implement in order to help prevent further increase in this number.  We will hold on starting any medications at this time.  Follow-up 3 months repeat A1c.    Essential hypertension, benign  Stable today in clinic.  Denies any chest pain, shortness of breath or dizziness.  Does have moderate lower extremity edema, primarily in her ankles and feet.  Recently started on HCTZ.  Followed by cardiology for this    Localized edema  Chronic.  Recommend DASH diet, elevation of legs and compression stockings.  Recently started on HCTZ.    Dyslipidemia  Last lipid panel total cholesterol 190, 2 glycerides 124, HDL 51, .  Patient was recently stopped on the Zetia per cardiology.  Routine " follow-up.    Chronic midline low back pain with right-sided sciatica  Weakness of both lower extremities  Chronic.  Will be requesting records from the pain specialist.   Did not tolerate gabapentin.  Continue to follow-up with neurosurgery.  USe walker to help prevent fall with injury.      Return in about 3 months (around 11/8/2019).          Joana MCQUEEN.

## 2019-08-13 PROBLEM — R73.09 ELEVATED HEMOGLOBIN A1C: Chronic | Status: ACTIVE | Noted: 2019-08-13

## 2019-08-13 PROBLEM — Z86.73 HISTORY OF TIA (TRANSIENT ISCHEMIC ATTACK): Status: ACTIVE | Noted: 2019-08-13

## 2019-08-13 PROBLEM — M48.56XS: Status: ACTIVE | Noted: 2019-08-13

## 2019-08-13 PROBLEM — R29.898 WEAKNESS OF BOTH LOWER EXTREMITIES: Status: ACTIVE | Noted: 2019-08-13

## 2019-08-13 PROBLEM — M54.41 CHRONIC MIDLINE LOW BACK PAIN WITH RIGHT-SIDED SCIATICA: Chronic | Status: ACTIVE | Noted: 2019-07-09

## 2019-08-13 PROBLEM — G89.29 CHRONIC MIDLINE LOW BACK PAIN WITH RIGHT-SIDED SCIATICA: Chronic | Status: ACTIVE | Noted: 2019-07-09

## 2019-08-13 PROBLEM — R79.89 LOW VITAMIN D LEVEL: Status: ACTIVE | Noted: 2019-08-13

## 2019-08-13 PROBLEM — R73.09 ELEVATED HEMOGLOBIN A1C: Status: ACTIVE | Noted: 2019-08-13

## 2019-08-13 PROBLEM — Z95.0 CARDIAC PACEMAKER IN SITU: Chronic | Status: ACTIVE | Noted: 2019-07-09

## 2019-08-13 PROBLEM — I10 ESSENTIAL HYPERTENSION, BENIGN: Chronic | Status: ACTIVE | Noted: 2019-07-09

## 2019-08-13 PROBLEM — M54.41 CHRONIC MIDLINE LOW BACK PAIN WITH RIGHT-SIDED SCIATICA: Status: ACTIVE | Noted: 2019-07-09

## 2019-08-13 PROBLEM — G89.29 CHRONIC MIDLINE LOW BACK PAIN WITH RIGHT-SIDED SCIATICA: Status: ACTIVE | Noted: 2019-07-09

## 2019-08-13 PROBLEM — H35.00 RETINOPATHY: Status: ACTIVE | Noted: 2019-08-13

## 2019-08-21 NOTE — OR NURSING
Verbal permission received from patient to send pre admit paperwork (for appointment on 8/26/19) with family member. Paperwork and instructions given to Carri Packer.

## 2019-08-23 ENCOUNTER — TELEPHONE (OUTPATIENT)
Dept: CARDIOLOGY | Facility: MEDICAL CENTER | Age: 84
End: 2019-08-23

## 2019-08-23 NOTE — TELEPHONE ENCOUNTER
contact patient   Received: Yesterday   Message Contents   Yajaira Larios, Andrea Ass't  JENNIFER Brewer,   I spoke with this patient this morning when I had to reschedule her appt.  She has some questions in regards to a procedure she is having done - whether it is ok to have the procedure I think.  With Lanette out it looks like you have been assigned [facility]'s patients.     Thank you - I believe you will be talking with her or her daughter.  I rescheduled the appt for next week.   Thank you,   Yajaira LOZADA        S/w pt who explains that she wants to make sure that a myelogram is safe to have with her pacemaker. Explained to pt that myelography uses X-rays and not MRI so it should be safe to proceed. Pt appreciated.

## 2019-08-26 DIAGNOSIS — Z01.812 PRE-OPERATIVE LABORATORY EXAMINATION: ICD-10-CM

## 2019-08-26 LAB
INR PPP: 0.96 (ref 0.87–1.13)
PLATELET # BLD AUTO: 252 K/UL (ref 164–446)
PROTHROMBIN TIME: 13 SEC (ref 12–14.6)

## 2019-08-26 PROCEDURE — 85049 AUTOMATED PLATELET COUNT: CPT

## 2019-08-26 PROCEDURE — 36415 COLL VENOUS BLD VENIPUNCTURE: CPT

## 2019-08-26 PROCEDURE — 85610 PROTHROMBIN TIME: CPT

## 2019-08-29 ENCOUNTER — OFFICE VISIT (OUTPATIENT)
Dept: CARDIOLOGY | Facility: MEDICAL CENTER | Age: 84
End: 2019-08-29
Payer: MEDICARE

## 2019-08-29 VITALS
HEART RATE: 81 BPM | DIASTOLIC BLOOD PRESSURE: 72 MMHG | HEIGHT: 63 IN | SYSTOLIC BLOOD PRESSURE: 156 MMHG | BODY MASS INDEX: 29.02 KG/M2 | OXYGEN SATURATION: 95 % | WEIGHT: 163.8 LBS

## 2019-08-29 DIAGNOSIS — R60.0 LOCALIZED EDEMA: ICD-10-CM

## 2019-08-29 DIAGNOSIS — Z86.73 HISTORY OF TIA (TRANSIENT ISCHEMIC ATTACK): ICD-10-CM

## 2019-08-29 DIAGNOSIS — E78.2 MIXED HYPERLIPIDEMIA: ICD-10-CM

## 2019-08-29 DIAGNOSIS — Z95.0 CARDIAC PACEMAKER IN SITU: Chronic | ICD-10-CM

## 2019-08-29 DIAGNOSIS — I10 ESSENTIAL HYPERTENSION, BENIGN: Chronic | ICD-10-CM

## 2019-08-29 PROCEDURE — 99214 OFFICE O/P EST MOD 30 MIN: CPT | Performed by: NURSE PRACTITIONER

## 2019-08-29 RX ORDER — LOSARTAN POTASSIUM AND HYDROCHLOROTHIAZIDE 25; 100 MG/1; MG/1
1 TABLET ORAL DAILY
Qty: 90 TAB | Refills: 3 | Status: ON HOLD | OUTPATIENT
Start: 2019-08-29 | End: 2020-03-16

## 2019-08-29 RX ORDER — CARVEDILOL 3.12 MG/1
3.12 TABLET ORAL 2 TIMES DAILY WITH MEALS
Qty: 180 TAB | Refills: 3 | Status: SHIPPED | OUTPATIENT
Start: 2019-08-29 | End: 2020-02-06

## 2019-08-29 ASSESSMENT — ENCOUNTER SYMPTOMS
SHORTNESS OF BREATH: 0
MYALGIAS: 0
ABDOMINAL PAIN: 0
FEVER: 0
BACK PAIN: 1
PALPITATIONS: 0
COUGH: 0
PND: 0
CLAUDICATION: 0
ORTHOPNEA: 0

## 2019-08-29 NOTE — PATIENT INSTRUCTIONS
We will change your BP medications to:    1. Losartan-HCTZ (100/25) once in the morning-combination pill from losartan and HCTZ    2.Carvedilol 3.125 mg twice a day (AM and PM)-this is changed from Bystolic    Take your medications after you eat breakfast and dinner.    Stay hydrated.    Use tylenol for pain, if becomes more painful-call Dr. Kimble's office for pain medications.

## 2019-08-29 NOTE — PROGRESS NOTES
Chief Complaint   Patient presents with   • Congestive Heart Failure     Subjective:   Krystle Tavarez is a 86 y.o. female who presents today for 6 week follow up on BP check.    She is a patient of Dr. Sewell in our office.    Hx of HLD (diet controlled), HTN, LE edema, ? TIA now on plavix, PPM placement, and chronic back and leg pain.    She presents today with her daughter. 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    She tells me she continues with back pain and she notices her BP is always up when she is in pain. She is pending a myelogram and then see Dr. Kimble to decide if she needs back surgery or not. She takes tylenol for pain, it helps but not all the time.    She has good readings of her BP when she isn't in pain.    She has moderate amount of LE edema with venous insufficiency in her legs.    She uses a walker for mobility. She gets off balance with her back pain sometimes.    Past Medical History:   Diagnosis Date   • Arthritis    • Breath shortness     with exertion    • Cataract     bilat IOL    • Dental disorder     full upper, partial lower    • Heart burn    • Hemorrhagic disorder (HCC)     on Plavix    • High cholesterol     diet controlled    • Hyperlipidemia    • Hypertension    • Pacemaker 2015   • Pain 08/2019    lower back, right leg   • Pre-diabetes    • TIA (transient ischemic attack)     on Plavix   • Urinary incontinence      Past Surgical History:   Procedure Laterality Date   • PACEMAKER INSERTION  2015   • HIP REPLACEMENT, TOTAL Right 2009   • KNEE REPLACEMENT, TOTAL Right 2004   • CATARACT EXTRACTION WITH IOL Bilateral 2003   • CHOLECYSTECTOMY  2002   • BASAL CELL EXCISION      nose and hand   • BUNIONECTOMY Left      Family History   Problem Relation Age of Onset   • Diabetes Mother    • Stroke Mother    • Heart Attack Father 74     Social History     Socioeconomic History   • Marital status:      Spouse name: Not on file   • Number of children: Not on file   •  Years of education: Not on file   • Highest education level: Not on file   Occupational History   • Not on file   Social Needs   • Financial resource strain: Not on file   • Food insecurity:     Worry: Not on file     Inability: Not on file   • Transportation needs:     Medical: Not on file     Non-medical: Not on file   Tobacco Use   • Smoking status: Never Smoker   • Smokeless tobacco: Never Used   Substance and Sexual Activity   • Alcohol use: No   • Drug use: No   • Sexual activity: Not on file   Lifestyle   • Physical activity:     Days per week: Not on file     Minutes per session: Not on file   • Stress: Not on file   Relationships   • Social connections:     Talks on phone: Not on file     Gets together: Not on file     Attends Zoroastrian service: Not on file     Active member of club or organization: Not on file     Attends meetings of clubs or organizations: Not on file     Relationship status: Not on file   • Intimate partner violence:     Fear of current or ex partner: Not on file     Emotionally abused: Not on file     Physically abused: Not on file     Forced sexual activity: Not on file   Other Topics Concern   • Not on file   Social History Narrative   • Not on file     Allergies   Allergen Reactions   • Sulfa Drugs      Nausea      Outpatient Encounter Medications as of 8/29/2019   Medication Sig Dispense Refill   • losartan-hydrochlorothiazide (HYZAAR) 100-25 MG per tablet Take 1 Tab by mouth every day. 90 Tab 3   • carvedilol (COREG) 3.125 MG Tab Take 1 Tab by mouth 2 times a day, with meals. 180 Tab 3   • furosemide (LASIX) 40 MG Tab Take 40 mg by mouth every day.     • acetaminophen (TYLENOL) 325 MG Tab Take 650 mg by mouth every four hours as needed.     • Ascorbic Acid (VITAMIN C) 1000 MG Tab Take  by mouth.     • clopidogrel (PLAVIX) 75 MG Tab Take 1 Tab by mouth every day. 90 Tab 3   • [DISCONTINUED] hydrochlorothiazide (MICROZIDE) 12.5 MG capsule Take 1 Cap by mouth every day. 30 Cap 5   •  "[DISCONTINUED] nebivolol (BYSTOLIC) 5 MG Tab tablet Take 1 Tab by mouth every day. 90 Tab 3   • [DISCONTINUED] losartan (COZAAR) 100 MG Tab Take 1 Tab by mouth every day. 90 Tab 3     No facility-administered encounter medications on file as of 8/29/2019.      Review of Systems   Constitutional: Negative for fever and malaise/fatigue.   Respiratory: Negative for cough and shortness of breath.    Cardiovascular: Positive for leg swelling. Negative for chest pain, palpitations, orthopnea, claudication and PND.   Gastrointestinal: Negative for abdominal pain.   Musculoskeletal: Positive for back pain. Negative for myalgias.        Objective:   /72 (BP Location: Left arm, Patient Position: Sitting, BP Cuff Size: Adult)   Pulse 81   Ht 1.6 m (5' 3\")   Wt 74.3 kg (163 lb 12.8 oz)   SpO2 95%   BMI 29.02 kg/m²     Physical Exam   Constitutional: She is oriented to person, place, and time. She appears well-developed and well-nourished.   HENT:   Head: Normocephalic and atraumatic.   Eyes: EOM are normal.   Neck: No JVD present.   Cardiovascular: Normal rate, regular rhythm, normal heart sounds and intact distal pulses.   Pulmonary/Chest: Breath sounds normal.   Musculoskeletal: She exhibits edema.   Walker for mobility; LE edema 1+ pitting with venous insufficiency   Neurological: She is alert and oriented to person, place, and time.   Skin: Skin is warm and dry.   Nursing note and vitals reviewed.      Assessment:     1. Cardiac pacemaker in situ     2. Essential hypertension, benign     3. History of TIA (transient ischemic attack)     4. Mixed hyperlipidemia     5. Localized edema       Medical Decision Making:  Today's Assessment / Status / Plan:     1. PPM  -good device checks in the past  -next device check is January 2020  -no mode switching    2. Back pain  -recommend treat pain to improve BP control  -she will call PCP and Dr. Kimble's office to review her pain management    3. HTN  -change from losartan " and HCTZ to combination losartan/HCTZ 100-25 (which is a slight increase in HCTZ)  -change from bystolic to coreg 3.125 mg BID  -follow BP closely at home, 2 hours after AM medications  -call if doesn't remain controlled, SBP goal 110-140's    4. HLD  -off zetia  -follow labs per PCP  -no med management at this time    5. Edema  -increase diuretic slightly as above  -compression stockings to LE, hydrate well, and limit salt in diet, elevate legs at home when able  -call if worsens    FU in clinic in 4 months with RS with PPM check    Patient verbalizes understanding and agrees with the plan of care.     Collaborating MD: Tay KUMAR

## 2019-09-03 ENCOUNTER — TELEPHONE (OUTPATIENT)
Dept: MEDICAL GROUP | Facility: MEDICAL CENTER | Age: 84
End: 2019-09-03

## 2019-09-03 NOTE — TELEPHONE ENCOUNTER
1. Caller Name: April Hopson                      Call Back Number: [phone]    2. Message: Nurse called we need to have pt hold Plavix for 2 days she is having Mylogram tomorrow and should have been instructed to hold it,  Please advise pt , we may need to reschedule if she is not holding.... Please advise pt or Emiliana  If you could.    3. Patient approves office to leave a detailed voicemail/MyChart message: yes

## 2019-09-04 ENCOUNTER — APPOINTMENT (OUTPATIENT)
Dept: RADIOLOGY | Facility: MEDICAL CENTER | Age: 84
End: 2019-09-04
Attending: NEUROLOGICAL SURGERY
Payer: MEDICARE

## 2019-09-04 ENCOUNTER — HOSPITAL ENCOUNTER (OUTPATIENT)
Facility: MEDICAL CENTER | Age: 84
End: 2019-09-04
Attending: NEUROLOGICAL SURGERY | Admitting: NEUROLOGICAL SURGERY
Payer: MEDICARE

## 2019-09-04 VITALS
SYSTOLIC BLOOD PRESSURE: 173 MMHG | RESPIRATION RATE: 18 BRPM | HEART RATE: 66 BPM | HEIGHT: 63 IN | OXYGEN SATURATION: 94 % | WEIGHT: 161.6 LBS | DIASTOLIC BLOOD PRESSURE: 70 MMHG | TEMPERATURE: 97.5 F | BODY MASS INDEX: 28.63 KG/M2

## 2019-09-04 DIAGNOSIS — M51.36 DEGENERATION OF LUMBAR INTERVERTEBRAL DISC: ICD-10-CM

## 2019-09-04 PROCEDURE — 700117 HCHG RX CONTRAST REV CODE 255: Performed by: NEUROLOGICAL SURGERY

## 2019-09-04 PROCEDURE — 160002 HCHG RECOVERY MINUTES (STAT)

## 2019-09-04 PROCEDURE — 72132 CT LUMBAR SPINE W/DYE: CPT

## 2019-09-04 PROCEDURE — 72110 X-RAY EXAM L-2 SPINE 4/>VWS: CPT

## 2019-09-04 PROCEDURE — 62284 INJECTION FOR MYELOGRAM: CPT

## 2019-09-04 RX ADMIN — IOHEXOL 10 ML: 180 INJECTION INTRAVENOUS at 13:00

## 2019-09-04 NOTE — OR NURSING
1421 patient arrived from cath lab s/p myelogram, patient awake, denies pain, denies nausea. Bandaid lower back clean dry intact and soft. bp high will monitor.   1445 patient provided with water tolerating well.  1550 criteria met to discharge patient home.   1600 discharge instructions given to patient, patient verbalize understanding of the orders, reviewed activity, worsening symptoms, medications, follow up.   1600 patient escorted via w/c with all her personal belongings.

## 2019-09-04 NOTE — DISCHARGE INSTRUCTIONS
Myelography is, in general, a safe procedure with only minor side effects. The following instructions have been written in order to assist you in preventing side effects and to improve your safety.     1. Do not drive a motorized vehicle for at least 18 hours following your myelogram. A friend or family member should be available to take you home following your discharge.   2. Resume your usual diet.  3. Drink plenty of fluids.  4. Do not drink alcohol for 24 hours following your myelogram.  5. If headaches develop, a mild analgesic such as Tylenol can be taken.   6. No strenuous activity should be undertaken for at least 24 hours following the myelogram.  7. When you are in bed or laying down at home, your head should be elevated at least 30 degrees (on at least two pillows) for 24 hours following the myelogram.  8. You should spend tonight in the same house with a responsible person in case any difficulties should arise.   9. If any questions arise, call the radiologist at [phone] or your Physicians office. You can also call the [employer] HOTLINE open 24 hours/day, 7 days/week and speak to a nurse at [phone], or toll free at [phone].  10. You should call 911 if you develop problems with breathing or chest pain.    FOR PROBLEMS CALL DHARA Pink AT: [phone]    I acknowledge receipt and understanding of these Home Care instructions.  ACTIVITY: Rest and take it easy for the first 24 hours.  A responsible adult is recommended to remain with you during that time.  It is normal to feel sleepy.  We encourage you to not do anything that requires balance, judgment or coordination.    MILD FLU-LIKE SYMPTOMS ARE NORMAL. YOU MAY EXPERIENCE GENERALIZED MUSCLE ACHES, THROAT IRRITATION, HEADACHE AND/OR SOME NAUSEA.    FOR 24 HOURS DO NOT:  Drive, operate machinery or run household appliances.  Drink beer or alcoholic beverages.   Make important decisions or sign legal documents.    SPECIAL INSTRUCTIONS: Keep  dressing clean dry intact for 24 hours, remove dressing after 24 hours.     DIET: To avoid nausea, slowly advance diet as tolerated, avoiding spicy or greasy foods for the first day.  Add more substantial food to your diet according to your physician's instructions.  Babies can be fed formula or breast milk as soon as they are hungry.  INCREASE FLUIDS AND FIBER TO AVOID CONSTIPATION.    FOLLOW-UP APPOINTMENT:  A follow-up appointment should be arranged with your doctor in [phone]; call to schedule.    You should CALL YOUR PHYSICIAN if you develop:  Fever greater than 101 degrees F.  Pain not relieved by medication, or persistent nausea or vomiting.  Excessive bleeding (blood soaking through dressing) or unexpected drainage from the wound.  Extreme redness or swelling around the incision site, drainage of pus or foul smelling drainage.  Inability to urinate or empty your bladder within 8 hours.  Problems with breathing or chest pain.    You should call 911 if you develop problems with breathing or chest pain.  If you are unable to contact your doctor or surgical center, you should go to the nearest emergency room or urgent care center.  Physician's telephone #: [phone]    If any questions arise, call your doctor.  If your doctor is not available, please feel free to call the Surgical Center at [phone].  The Center is open Monday through Friday from 7AM to 7PM.  You can also call the [facility] HOTLINE open 24 hours/day, 7 days/week and speak to a nurse at [phone], or toll free at [phone].    A registered nurse may call you a few days after your surgery to see how you are doing after your procedure.    MEDICATIONS: Resume taking daily medication.  Take prescribed pain medication with food.  If no medication is prescribed, you may take non-aspirin pain medication if needed.  PAIN MEDICATION CAN BE VERY CONSTIPATING.  Take a stool softener or laxative such as senokot, pericolace, or milk of magnesia if  needed.    If your physician has prescribed pain medication that includes Acetaminophen (Tylenol), do not take additional Acetaminophen (Tylenol) while taking the prescribed medication.    Depression / Suicide Risk    As you are discharged from this [facility] facility, it is important to learn how to keep safe from harming yourself.    Recognize the warning signs:  · Abrupt changes in personality, positive or negative- including increase in energy   · Giving away possessions  · Change in eating patterns- significant weight changes-  positive or negative  · Change in sleeping patterns- unable to sleep or sleeping all the time   · Unwillingness or inability to communicate  · Depression  · Unusual sadness, discouragement and loneliness  · Talk of wanting to die  · Neglect of personal appearance   · Rebelliousness- reckless behavior  · Withdrawal from people/activities they love  · Confusion- inability to concentrate     If you or a loved one observes any of these behaviors or has concerns about self-harm, here's what you can do:  · Talk about it- your feelings and reasons for harming yourself  · Remove any means that you might use to hurt yourself (examples: pills, rope, extension cords, firearm)  · Get professional help from the community (Mental Health, Substance Abuse, psychological counseling)  · Do not be alone:Call your Safe Contact- someone whom you trust who will be there for you.  · Call your local CRISIS HOTLINE 545-3496 or 715-055-5377  · Call your local Children's Mobile Crisis Response Team [place] (031) 653-8095 or www.[employer]  · Call the toll free National Suicide Prevention Hotlines   · National Suicide Prevention Lifeline 245-145-ZXDM (0832)  National Hope Line Network 800-SUICIDE (405-4087)  Myelogram  A myelogram is a test to check problems with your spinal canal, including the spinal cord, nerve roots, and spinal lining. The canal is a tunnel-like structure in your spine that  surrounds your spinal cord. A myelogram uses a dye injected into the spinal canal with the guidance of imaging, usually by fluoroscopy (real time X-ray), X-ray, or computed tomography (CT). Pictures are then taken of your spinal canal.  How do I get ready for a myelogram?  · Don’t eat the morning of the test. But you can drink water or other clear fluids.  · If told to, stop taking medicines before the test.  · Arrange for someone to drive you home.    What to tell the person performing your study:  Tell the healthcare provider if you:  · Are pregnant or think you may be  · Have any bleeding problems  · Take blood thinners (anticoagulants) or other medicines. These include aspirin, certain antipsychotic medicines, and antidepressants. You may be told to stop taking these one or more days before your test.   · Have had back surgery or low-back pain  · Have any allergies  What happens during a myelogram?  · You will change into a hospital gown.  ·    · Your lower back will be cleaned, covered with drapes, and injected with a numbing medicine.  · Your doctor will advance a thin needle under guidance, usually using using x-ray guidance ( fluoroscopy) into your spinal canal space.  · Your doctor will inject contrast fluid (dye) into your spinal canal. The doctor may take out a small amount of spinal fluid.  · Additional X-rays will be taken.  · If you need a CT test, it will follow the X-rays.    What happens after a myelogram?  · Take it easy for the rest of the day, as advised.  · Avoid physical activity, or bending over for 1 to 2 days after the procedure, or as directed by your healthcare provider.  · Lie down with your head raised if you get a headache, or if instructed to do so.  · Drink plenty of water.  · Your provider will discuss the test results with you at a follow-up appointment.    What are the risks of a myelogram?  · Small risks of pain, bleeding or infection at the injection site or within or around the  spinal canal  · Headache  · Injury to a nerve or the spinal cord at the injection site  · X-ray radiation exposure (generally considered to be low risk and safe)    When should I call my healthcare provider?  Call your healthcare provider right away if:  · You have a headache that lasts 2 days or more  · Fever of 100.4 °F (38°C) or chills  · You have lasting pain in your back, or tingling in your groin or legs  · Or, whatever your healthcare provider told you to report based on your medical condition

## 2019-10-15 ENCOUNTER — HOSPITAL ENCOUNTER (OUTPATIENT)
Dept: RADIOLOGY | Facility: MEDICAL CENTER | Age: 84
End: 2019-10-15
Attending: NURSE PRACTITIONER
Payer: MEDICARE

## 2019-10-15 DIAGNOSIS — M25.559 ARTHRALGIA OF HIP, UNSPECIFIED LATERALITY: ICD-10-CM

## 2019-10-15 PROCEDURE — 73522 X-RAY EXAM HIPS BI 3-4 VIEWS: CPT

## 2019-10-23 ENCOUNTER — TELEPHONE (OUTPATIENT)
Dept: CARDIOLOGY | Facility: MEDICAL CENTER | Age: 84
End: 2019-10-23

## 2019-10-23 NOTE — TELEPHONE ENCOUNTER
[facility] [place] [facility] requesting clearance for a Right L5-S1 Transforaminal Epidural Steroid Injection on 11/14/19.  He will need to stop Plavix x 7 days prior to procedure.  Please advise if ok to clear and if she can come off of Plavix.

## 2019-11-12 ENCOUNTER — OFFICE VISIT (OUTPATIENT)
Dept: MEDICAL GROUP | Facility: MEDICAL CENTER | Age: 84
End: 2019-11-12
Payer: MEDICARE

## 2019-11-12 VITALS
HEIGHT: 63 IN | BODY MASS INDEX: 28.88 KG/M2 | WEIGHT: 163 LBS | OXYGEN SATURATION: 93 % | SYSTOLIC BLOOD PRESSURE: 120 MMHG | HEART RATE: 79 BPM | DIASTOLIC BLOOD PRESSURE: 80 MMHG | TEMPERATURE: 98.2 F | RESPIRATION RATE: 16 BRPM

## 2019-11-12 DIAGNOSIS — R10.9 ABDOMINAL DISCOMFORT: ICD-10-CM

## 2019-11-12 DIAGNOSIS — Z23 NEED FOR VACCINATION: ICD-10-CM

## 2019-11-12 DIAGNOSIS — R19.5 CHANGE IN STOOL: ICD-10-CM

## 2019-11-12 DIAGNOSIS — Z78.0 POSTMENOPAUSAL: ICD-10-CM

## 2019-11-12 DIAGNOSIS — M47.816 LUMBAR SPONDYLOSIS: ICD-10-CM

## 2019-11-12 DIAGNOSIS — M48.062 SPINAL STENOSIS OF LUMBAR REGION WITH NEUROGENIC CLAUDICATION: ICD-10-CM

## 2019-11-12 DIAGNOSIS — G89.29 CHRONIC MIDLINE LOW BACK PAIN WITH RIGHT-SIDED SCIATICA: Chronic | ICD-10-CM

## 2019-11-12 DIAGNOSIS — R29.898 WEAKNESS OF BOTH LOWER EXTREMITIES: ICD-10-CM

## 2019-11-12 DIAGNOSIS — M48.061 FORAMINAL STENOSIS OF LUMBAR REGION: ICD-10-CM

## 2019-11-12 DIAGNOSIS — M54.41 CHRONIC MIDLINE LOW BACK PAIN WITH RIGHT-SIDED SCIATICA: Chronic | ICD-10-CM

## 2019-11-12 PROCEDURE — G0008 ADMIN INFLUENZA VIRUS VAC: HCPCS | Performed by: NURSE PRACTITIONER

## 2019-11-12 PROCEDURE — 99214 OFFICE O/P EST MOD 30 MIN: CPT | Mod: 25 | Performed by: NURSE PRACTITIONER

## 2019-11-12 PROCEDURE — 90662 IIV NO PRSV INCREASED AG IM: CPT | Performed by: NURSE PRACTITIONER

## 2019-11-12 RX ORDER — TRAMADOL HYDROCHLORIDE 50 MG/1
50 TABLET ORAL EVERY 8 HOURS PRN
Qty: 60 TAB | Refills: 0 | Status: SHIPPED | OUTPATIENT
Start: 2019-11-12 | End: 2019-12-02

## 2019-11-12 NOTE — PROGRESS NOTES
cc: Follow-up back pain    Subjective:     HPI:     Krystle Tavarez is a 86 y.o. female here to discuss the evaluation and management of:    She is here with her daughter today.    Patient is here today as she is following up regarding her chronic back pain and lower extremity weakness.  She is following up with neurosurgery, Dr. Kimble for this. Had myelogram.  Have reviewed this again with the patient.  She is scheduled on the 20th for an epidural.  She is also scheduled to have a nerve and vein study.  She is unsure of when this will happen and is requesting for us to try and find out the details.  Have reviewed the notes from the last neuro visit.  She states that sometimes Tylenol is not very helpful.  Having difficulties with ambulation as this causes her a great deal of pain.  This is upsetting to her.  She likes to be active.  Gabapentin she did not tolerate.  When she is up and moving around her pain is an 8 to a 10.  She feels it she is getting weaker.  Leaning over is very painful.  Her right leg is more numb than it used to be.      Abdominal discomfort/diarrhea  Patient states his been having some abdominal discomfort.  States when she lays down and applies some pressure is somewhat uncomfortable.  Has had some diarrhea as well.  States that she can eat something that will have to immediately go to the bathroom.  No blood or mucus.  Not watery.  It is loose in nature.  She occasionally is cramps.  Has had some incontinence at times it try to get to the bathroom.  This started at the beginning of this year.     ROS:  Denies any Headache, Blurred Vision, Confusion, Chest pain,  Shortness of breath,  Abdominal pain, Changes of bowel or bladder, Lower ext edema, Fevers, Nights sweats, Weight Changes, Focal weakness or numbness.  And all other systems are negative.        Current Outpatient Medications:   •  tramadol (ULTRAM) 50 MG Tab, Take 1 Tab by mouth every 8 hours as needed for Moderate Pain for up to  20 days., Disp: 60 Tab, Rfl: 0  •  losartan-hydrochlorothiazide (HYZAAR) 100-25 MG per tablet, Take 1 Tab by mouth every day., Disp: 90 Tab, Rfl: 3  •  carvedilol (COREG) 3.125 MG Tab, Take 1 Tab by mouth 2 times a day, with meals., Disp: 180 Tab, Rfl: 3  •  furosemide (LASIX) 40 MG Tab, Take 40 mg by mouth every day., Disp: , Rfl:   •  acetaminophen (TYLENOL) 325 MG Tab, Take 650 mg by mouth every four hours as needed., Disp: , Rfl:   •  Ascorbic Acid (VITAMIN C) 1000 MG Tab, Take  by mouth., Disp: , Rfl:   •  clopidogrel (PLAVIX) 75 MG Tab, Take 1 Tab by mouth every day., Disp: 90 Tab, Rfl: 3    Allergies   Allergen Reactions   • Sulfa Drugs      Nausea        Past Medical History:   Diagnosis Date   • Arthritis    • Breath shortness     with exertion    • Cataract     bilat IOL    • Dental disorder     full upper, partial lower    • Heart burn    • Hemorrhagic disorder (HCC)     on Plavix    • High cholesterol     diet controlled    • Hyperlipidemia    • Hypertension    • Pacemaker 2015   • Pain 08/2019    lower back, right leg   • Pre-diabetes    • TIA (transient ischemic attack)     on Plavix   • Urinary incontinence      Past Surgical History:   Procedure Laterality Date   • PACEMAKER INSERTION  2015   • HIP REPLACEMENT, TOTAL Right 2009   • KNEE REPLACEMENT, TOTAL Right 2004   • CATARACT EXTRACTION WITH IOL Bilateral 2003   • CHOLECYSTECTOMY  2002   • BASAL CELL EXCISION      nose and hand   • BUNIONECTOMY Left      Family History   Problem Relation Age of Onset   • Diabetes Mother    • Stroke Mother    • Heart Attack Father 74     Social History     Socioeconomic History   • Marital status:      Spouse name: Not on file   • Number of children: Not on file   • Years of education: Not on file   • Highest education level: Not on file   Occupational History   • Not on file   Social Needs   • Financial resource strain: Not on file   • Food insecurity:     Worry: Not on file     Inability: Not on file   •  "Transportation needs:     Medical: Not on file     Non-medical: Not on file   Tobacco Use   • Smoking status: Never Smoker   • Smokeless tobacco: Never Used   Substance and Sexual Activity   • Alcohol use: No   • Drug use: No   • Sexual activity: Not on file   Lifestyle   • Physical activity:     Days per week: Not on file     Minutes per session: Not on file   • Stress: Not on file   Relationships   • Social connections:     Talks on phone: Not on file     Gets together: Not on file     Attends [employer] service: Not on file     Active member of club or organization: Not on file     Attends meetings of clubs or organizations: Not on file     Relationship status: Not on file   • Intimate partner violence:     Fear of current or ex partner: Not on file     Emotionally abused: Not on file     Physically abused: Not on file     Forced sexual activity: Not on file   Other Topics Concern   • Not on file   Social History Narrative   • Not on file       Objective:     Vitals: /80   Pulse 79   Temp 36.8 °C (98.2 °F)   Resp 16   Ht 1.6 m (5' 3\")   Wt 73.9 kg (163 lb)   SpO2 93%   BMI 28.87 kg/m²    General: Alert, pleasant, NAD, using a walker  HEENT: Normocephalic.   Skin: Warm, dry, no rashes.  Extremities: Right leg with trace edema no discoloration  Neurological: No tremors, weakness to right lower extremity, reports numbness, using a walker, antalgic gait, uneven.  Psych:  Affect/mood is normal, judgement is good, memory is intact, grooming is appropriate.    Assessment/Plan:     Diagnoses and all orders for this visit:    1. Chronic midline low back pain with right-sided sciatica  Foraminal stenosis of lumbar region  Chronic.  Being followed by neurosurgery for this.  Contemplating surgery.  Has upcoming EMG.  Having difficulty with her pain with walking.  Will trial low-dose tramadol.  Have discussed starting off with half of a tablet.  Have cautioned regarding side effects.  Daughter and patient do " understand risks and benefits.  Have asked medical assistant to call in follow-up regarding patient's upcoming appointments.  Will notify patient.  -     tramadol (ULTRAM) 50 MG Tab; Take 1 Tab by mouth every 8 hours as needed for Moderate Pain for up to 20 days.    2. Foraminal stenosis of lumbar region  3. Spinal stenosis of lumbar region with neurogenic claudication  4. Lumbar spondylosis  Following up with neurosurgery at this time.  Has upcoming diagnostic studies.    5. Weakness of both lower extremities  Likely secondary to the stenosis in her lumbar spine.  Following up with neurology for this.  Using a walker.  No recent falls.    6. Abdominal discomfort  No abdominal pain on palpation.  No weight loss, nausea, vomiting, black or bloody or mucus stools.    7. Change in stool  Have discussed to possible involvement from her disruption in her lumbar spine due to stenosis.  No mucus, no blood.  Having some loose stools.  We will continue to monitor.  Emergent precautions discussed.    8. Postmenopausal  - DS-BONE DENSITY STUDY (DEXA); Future    9. Need for vaccination  - INFLUENZA VACCINE, HIGH DOSE (65+ ONLY)        Return if symptoms worsen or fail to improve.        Joana MCQUEEN.

## 2019-11-18 PROBLEM — M48.062 SPINAL STENOSIS OF LUMBAR REGION WITH NEUROGENIC CLAUDICATION: Status: ACTIVE | Noted: 2019-11-18

## 2019-11-18 PROBLEM — M47.816 LUMBAR SPONDYLOSIS: Status: ACTIVE | Noted: 2019-11-18

## 2019-11-18 PROBLEM — M48.061 FORAMINAL STENOSIS OF LUMBAR REGION: Status: ACTIVE | Noted: 2019-11-18

## 2019-12-02 ENCOUNTER — HOSPITAL ENCOUNTER (OUTPATIENT)
Dept: RADIOLOGY | Facility: MEDICAL CENTER | Age: 84
End: 2019-12-02
Attending: NURSE PRACTITIONER
Payer: MEDICARE

## 2019-12-02 DIAGNOSIS — Z78.0 POSTMENOPAUSAL: ICD-10-CM

## 2019-12-02 PROCEDURE — 77080 DXA BONE DENSITY AXIAL: CPT

## 2019-12-30 DIAGNOSIS — I10 ESSENTIAL HYPERTENSION, BENIGN: ICD-10-CM

## 2019-12-30 DIAGNOSIS — E55.9 VITAMIN D DEFICIENCY: ICD-10-CM

## 2019-12-30 DIAGNOSIS — R73.09 ELEVATED HEMOGLOBIN A1C: ICD-10-CM

## 2020-01-06 ENCOUNTER — HOSPITAL ENCOUNTER (OUTPATIENT)
Dept: LAB | Facility: MEDICAL CENTER | Age: 85
End: 2020-01-06
Attending: NURSE PRACTITIONER
Payer: MEDICARE

## 2020-01-06 DIAGNOSIS — E55.9 VITAMIN D DEFICIENCY: ICD-10-CM

## 2020-01-06 DIAGNOSIS — I10 ESSENTIAL HYPERTENSION, BENIGN: ICD-10-CM

## 2020-01-06 DIAGNOSIS — R73.09 ELEVATED HEMOGLOBIN A1C: ICD-10-CM

## 2020-01-06 LAB
ALBUMIN SERPL BCP-MCNC: 4 G/DL (ref 3.2–4.9)
ALBUMIN/GLOB SERPL: 1.2 G/DL
ALP SERPL-CCNC: 71 U/L (ref 30–99)
ALT SERPL-CCNC: 14 U/L (ref 2–50)
ANION GAP SERPL CALC-SCNC: 12 MMOL/L (ref 0–11.9)
AST SERPL-CCNC: 14 U/L (ref 12–45)
BILIRUB SERPL-MCNC: 0.7 MG/DL (ref 0.1–1.5)
BUN SERPL-MCNC: 24 MG/DL (ref 8–22)
CALCIUM SERPL-MCNC: 9.8 MG/DL (ref 8.5–10.5)
CHLORIDE SERPL-SCNC: 96 MMOL/L (ref 96–112)
CO2 SERPL-SCNC: 27 MMOL/L (ref 20–33)
CREAT SERPL-MCNC: 0.83 MG/DL (ref 0.5–1.4)
CREAT UR-MCNC: 66.6 MG/DL
ERYTHROCYTE [DISTWIDTH] IN BLOOD BY AUTOMATED COUNT: 45.2 FL (ref 35.9–50)
EST. AVERAGE GLUCOSE BLD GHB EST-MCNC: 151 MG/DL
GLOBULIN SER CALC-MCNC: 3.4 G/DL (ref 1.9–3.5)
GLUCOSE SERPL-MCNC: 132 MG/DL (ref 65–99)
HBA1C MFR BLD: 6.9 % (ref 0–5.6)
HCT VFR BLD AUTO: 42.8 % (ref 37–47)
HGB BLD-MCNC: 14.1 G/DL (ref 12–16)
MCH RBC QN AUTO: 31.1 PG (ref 27–33)
MCHC RBC AUTO-ENTMCNC: 32.9 G/DL (ref 33.6–35)
MCV RBC AUTO: 94.3 FL (ref 81.4–97.8)
MICROALBUMIN UR-MCNC: <0.7 MG/DL
MICROALBUMIN/CREAT UR: NORMAL MG/G (ref 0–30)
PLATELET # BLD AUTO: 267 K/UL (ref 164–446)
PMV BLD AUTO: 9.8 FL (ref 9–12.9)
POTASSIUM SERPL-SCNC: 4.1 MMOL/L (ref 3.6–5.5)
PROT SERPL-MCNC: 7.4 G/DL (ref 6–8.2)
RBC # BLD AUTO: 4.54 M/UL (ref 4.2–5.4)
SODIUM SERPL-SCNC: 135 MMOL/L (ref 135–145)
WBC # BLD AUTO: 6.6 K/UL (ref 4.8–10.8)

## 2020-01-06 PROCEDURE — 82306 VITAMIN D 25 HYDROXY: CPT

## 2020-01-06 PROCEDURE — 36415 COLL VENOUS BLD VENIPUNCTURE: CPT

## 2020-01-06 PROCEDURE — 82043 UR ALBUMIN QUANTITATIVE: CPT

## 2020-01-06 PROCEDURE — 83036 HEMOGLOBIN GLYCOSYLATED A1C: CPT

## 2020-01-06 PROCEDURE — 80053 COMPREHEN METABOLIC PANEL: CPT

## 2020-01-06 PROCEDURE — 85027 COMPLETE CBC AUTOMATED: CPT

## 2020-01-06 PROCEDURE — 82570 ASSAY OF URINE CREATININE: CPT

## 2020-01-06 PROCEDURE — 84443 ASSAY THYROID STIM HORMONE: CPT

## 2020-01-07 ENCOUNTER — TELEPHONE (OUTPATIENT)
Dept: CARDIOLOGY | Facility: MEDICAL CENTER | Age: 85
End: 2020-01-07

## 2020-01-07 LAB
25(OH)D3 SERPL-MCNC: 73 NG/ML (ref 30–100)
TSH SERPL DL<=0.005 MIU/L-ACNC: 2.32 UIU/ML (ref 0.38–5.33)

## 2020-01-07 NOTE — TELEPHONE ENCOUNTER
Received clearance request from [facility] for a L5 - S1 laminectomy, medial facetectomy, and foraminotomy surgery.  Surgeon also requesting to hold Plavix 7-9 days prior to surgery.      Patient has PPM.    Routed to KYLIE Harmon for consideration.    Form placed on top shelf of [facility]'s spot (behind telephone).

## 2020-01-08 ENCOUNTER — OFFICE VISIT (OUTPATIENT)
Dept: MEDICAL GROUP | Facility: MEDICAL CENTER | Age: 85
End: 2020-01-08
Payer: MEDICARE

## 2020-01-08 VITALS
HEIGHT: 63 IN | SYSTOLIC BLOOD PRESSURE: 124 MMHG | BODY MASS INDEX: 28.7 KG/M2 | OXYGEN SATURATION: 94 % | HEART RATE: 77 BPM | DIASTOLIC BLOOD PRESSURE: 68 MMHG | WEIGHT: 162 LBS | TEMPERATURE: 97.8 F

## 2020-01-08 DIAGNOSIS — Z01.818 PRE-OPERATIVE CLEARANCE: ICD-10-CM

## 2020-01-08 DIAGNOSIS — M48.062 SPINAL STENOSIS OF LUMBAR REGION WITH NEUROGENIC CLAUDICATION: ICD-10-CM

## 2020-01-08 DIAGNOSIS — Z95.0 CARDIAC PACEMAKER IN SITU: Chronic | ICD-10-CM

## 2020-01-08 DIAGNOSIS — Z00.00 MEDICARE ANNUAL WELLNESS VISIT, INITIAL: ICD-10-CM

## 2020-01-08 DIAGNOSIS — R29.898 WEAKNESS OF BOTH LOWER EXTREMITIES: ICD-10-CM

## 2020-01-08 DIAGNOSIS — E11.9 TYPE 2 DIABETES MELLITUS WITHOUT COMPLICATION, WITHOUT LONG-TERM CURRENT USE OF INSULIN (HCC): ICD-10-CM

## 2020-01-08 DIAGNOSIS — M48.061 FORAMINAL STENOSIS OF LUMBAR REGION: ICD-10-CM

## 2020-01-08 DIAGNOSIS — Z91.81 AT HIGH RISK FOR FALLS: ICD-10-CM

## 2020-01-08 DIAGNOSIS — E78.5 DYSLIPIDEMIA: ICD-10-CM

## 2020-01-08 DIAGNOSIS — G89.29 CHRONIC MIDLINE LOW BACK PAIN WITH RIGHT-SIDED SCIATICA: Chronic | ICD-10-CM

## 2020-01-08 DIAGNOSIS — M54.41 CHRONIC MIDLINE LOW BACK PAIN WITH RIGHT-SIDED SCIATICA: Chronic | ICD-10-CM

## 2020-01-08 DIAGNOSIS — Z86.73 HISTORY OF TIA (TRANSIENT ISCHEMIC ATTACK): ICD-10-CM

## 2020-01-08 DIAGNOSIS — D22.30 ATYPICAL NEVUS OF FACE: ICD-10-CM

## 2020-01-08 DIAGNOSIS — I10 ESSENTIAL HYPERTENSION, BENIGN: Chronic | ICD-10-CM

## 2020-01-08 PROCEDURE — 99213 OFFICE O/P EST LOW 20 MIN: CPT | Mod: 25 | Performed by: NURSE PRACTITIONER

## 2020-01-08 PROCEDURE — G0439 PPPS, SUBSEQ VISIT: HCPCS | Performed by: NURSE PRACTITIONER

## 2020-01-08 ASSESSMENT — ACTIVITIES OF DAILY LIVING (ADL): BATHING_REQUIRES_ASSISTANCE: 1

## 2020-01-08 ASSESSMENT — PATIENT HEALTH QUESTIONNAIRE - PHQ9
CLINICAL INTERPRETATION OF PHQ2 SCORE: 1
SUM OF ALL RESPONSES TO PHQ QUESTIONS 1-9: 1
5. POOR APPETITE OR OVEREATING: 0 - NOT AT ALL

## 2020-01-08 NOTE — TELEPHONE ENCOUNTER
JAYLEN Leon 7 minutes ago (10:48 AM)     Got a little click-happy with the send button.  I will let her know she needs to come in.  Thanks!    Routing comment       You  JAYLEN Leon 8 minutes ago (10:48 AM)     The 10/23 request was for a steroid injection in the same area.  The request received yesterday is for actual surgery.    Routing comment       JAYLEN Leon  You 12 minutes ago (10:44 AM)     It looks like this was done on October 23rd by Marilu Diaz and Dr. Sewell (SEE NOTES). I don't know where the letter is, but she was supposed to see Dr. Sewell in December/January, so this apt needs to be made prior to scheduling. She had some issues last time I saw her. SC      Called Krystle and notified her that she needs to be seen by Dr. Sewell.  Patient verbalized understanding and was transferred to scheduling.

## 2020-01-10 ENCOUNTER — TELEPHONE (OUTPATIENT)
Dept: CARDIOLOGY | Facility: MEDICAL CENTER | Age: 85
End: 2020-01-10

## 2020-01-10 ENCOUNTER — NON-PROVIDER VISIT (OUTPATIENT)
Dept: CARDIOLOGY | Facility: MEDICAL CENTER | Age: 85
End: 2020-01-10
Payer: MEDICARE

## 2020-01-10 DIAGNOSIS — Z95.0 CARDIAC PACEMAKER IN SITU: Chronic | ICD-10-CM

## 2020-01-10 PROCEDURE — 93280 PM DEVICE PROGR EVAL DUAL: CPT | Performed by: INTERNAL MEDICINE

## 2020-01-10 NOTE — TELEPHONE ENCOUNTER
Fyi: routine pmc-wnl  PAF: <1%, longest episode > 7 hours 12-2-2019, on Plavix (recently resumed after epidural)  Has f/u 2-5-2020 w/you for surgical clx  thx JH

## 2020-01-16 PROBLEM — R73.09 ELEVATED HEMOGLOBIN A1C: Chronic | Status: RESOLVED | Noted: 2019-08-13 | Resolved: 2020-01-16

## 2020-01-16 PROBLEM — E78.5 DYSLIPIDEMIA: Chronic | Status: ACTIVE | Noted: 2019-07-09

## 2020-01-16 PROBLEM — E11.9 TYPE 2 DIABETES MELLITUS WITHOUT COMPLICATION, WITHOUT LONG-TERM CURRENT USE OF INSULIN (HCC): Status: ACTIVE | Noted: 2020-01-16

## 2020-01-16 PROBLEM — E11.9 TYPE 2 DIABETES MELLITUS WITHOUT COMPLICATION, WITHOUT LONG-TERM CURRENT USE OF INSULIN (HCC): Chronic | Status: ACTIVE | Noted: 2020-01-16

## 2020-01-16 PROBLEM — Z86.73 HISTORY OF TIA (TRANSIENT ISCHEMIC ATTACK): Chronic | Status: ACTIVE | Noted: 2019-08-13

## 2020-02-05 ENCOUNTER — TELEPHONE (OUTPATIENT)
Dept: CARDIOLOGY | Facility: MEDICAL CENTER | Age: 85
End: 2020-02-05

## 2020-02-05 ENCOUNTER — OFFICE VISIT (OUTPATIENT)
Dept: CARDIOLOGY | Facility: MEDICAL CENTER | Age: 85
End: 2020-02-05
Payer: MEDICARE

## 2020-02-05 VITALS
BODY MASS INDEX: 29.41 KG/M2 | SYSTOLIC BLOOD PRESSURE: 168 MMHG | WEIGHT: 166 LBS | HEIGHT: 63 IN | OXYGEN SATURATION: 95 % | HEART RATE: 76 BPM | DIASTOLIC BLOOD PRESSURE: 80 MMHG

## 2020-02-05 DIAGNOSIS — I10 ESSENTIAL HYPERTENSION, BENIGN: Chronic | ICD-10-CM

## 2020-02-05 DIAGNOSIS — I48.0 PAF (PAROXYSMAL ATRIAL FIBRILLATION) (HCC): ICD-10-CM

## 2020-02-05 DIAGNOSIS — Z95.0 CARDIAC PACEMAKER IN SITU: Chronic | ICD-10-CM

## 2020-02-05 PROCEDURE — 99214 OFFICE O/P EST MOD 30 MIN: CPT | Performed by: INTERNAL MEDICINE

## 2020-02-05 ASSESSMENT — ENCOUNTER SYMPTOMS
PALPITATIONS: 0
RESPIRATORY NEGATIVE: 1
DEPRESSION: 0
DIZZINESS: 1
CARDIOVASCULAR NEGATIVE: 1
CONSTITUTIONAL NEGATIVE: 1
GASTROINTESTINAL NEGATIVE: 1
BACK PAIN: 1

## 2020-02-05 NOTE — OR NURSING
Verbal consent given by patient to send home pre admit paperwork for pre admit appointment on 2/14/2020 with family member.

## 2020-02-06 ENCOUNTER — TELEPHONE (OUTPATIENT)
Dept: CARDIOLOGY | Facility: MEDICAL CENTER | Age: 85
End: 2020-02-06

## 2020-02-06 RX ORDER — CARVEDILOL 6.25 MG/1
6.25 TABLET ORAL 2 TIMES DAILY WITH MEALS
Qty: 180 TAB | Refills: 3 | Status: SHIPPED | OUTPATIENT
Start: 2020-02-06 | End: 2020-02-29

## 2020-02-06 NOTE — TELEPHONE ENCOUNTER
----- Message from Du Sewell M.D. sent at 2/5/2020  5:07 PM PST -----  Regarding: Blood pressure.  The patient was seen in the office yesterday.  We were talking about pacemaker and preoperative clearance.  I neglected to mention that her pressure is still too high.  Recommend increasing her carvedilol from 3.125 mg twice daily to 6.25 mg twice daily.  She is scheduled return in mid March and will check pressure at that time.

## 2020-02-06 NOTE — PROGRESS NOTES
Chief Complaint   Patient presents with   • Hypertension   • Hyperlipidemia       Subjective:   Krystle Tavarez is a 86 y.o. female who presents today for follow-up of her permanent pacemaker and preoperative evaluation prior to anticipated lumbar surgery.  Her pacemaker was interrogated and is functioning normally however she was found to have a 7-hour episode of atrial fibrillation for which she was asymptomatic.  She has not been to physical active because of her back, but denies any chest pain or chest pressure.  She has a history of hypertension that is been under good control.  She is also on furosemide for edema.  When I evaluated her last July HCTZ was added to her regimen.  Last visit with nurse practitioner she was changed from Bystolic to low-dose carvedilol.    Past Medical History:   Diagnosis Date   • Arthritis    • Breath shortness     with exertion    • Cataract     bilat IOL    • Dental disorder     full upper, partial lower    • Heart burn    • Hemorrhagic disorder (HCC)     on Plavix    • High cholesterol     diet controlled    • Hyperlipidemia    • Hypertension    • Pacemaker 2015   • Pain 08/2019    lower back, right leg   • Pre-diabetes    • TIA (transient ischemic attack)     on Plavix   • Urinary incontinence      Past Surgical History:   Procedure Laterality Date   • PACEMAKER INSERTION  2015   • HIP REPLACEMENT, TOTAL Right 2009   • KNEE REPLACEMENT, TOTAL Right 2004   • CATARACT EXTRACTION WITH IOL Bilateral 2003   • CHOLECYSTECTOMY  2002   • BASAL CELL EXCISION      nose and hand   • BUNIONECTOMY Left      Family History   Problem Relation Age of Onset   • Diabetes Mother    • Stroke Mother    • Heart Attack Father 74   • No Known Problems Maternal Grandmother    • No Known Problems Maternal Grandfather    • No Known Problems Paternal Grandmother    • No Known Problems Paternal Grandfather      Social History     Socioeconomic History   • Marital status:      Spouse name: Not on  file   • Number of children: Not on file   • Years of education: Not on file   • Highest education level: Not on file   Occupational History   • Not on file   Social Needs   • Financial resource strain: Not on file   • Food insecurity:     Worry: Not on file     Inability: Not on file   • Transportation needs:     Medical: Not on file     Non-medical: Not on file   Tobacco Use   • Smoking status: Never Smoker   • Smokeless tobacco: Never Used   Substance and Sexual Activity   • Alcohol use: No   • Drug use: No   • Sexual activity: Not on file   Lifestyle   • Physical activity:     Days per week: Not on file     Minutes per session: Not on file   • Stress: Not on file   Relationships   • Social connections:     Talks on phone: Not on file     Gets together: Not on file     Attends [employer] service: Not on file     Active member of club or organization: Not on file     Attends meetings of clubs or organizations: Not on file     Relationship status: Not on file   • Intimate partner violence:     Fear of current or ex partner: Not on file     Emotionally abused: Not on file     Physically abused: Not on file     Forced sexual activity: Not on file   Other Topics Concern   • Not on file   Social History Narrative   • Not on file     Allergies   Allergen Reactions   • Sulfa Drugs      Nausea      Outpatient Encounter Medications as of 2/5/2020   Medication Sig Dispense Refill   • Calcium-Magnesium-Vitamin D (CALCIUM 500 PO) Take  by mouth.     • vitamin D (CHOLECALCIFEROL) 1000 UNIT Tab Take 1,000 Units by mouth.     • losartan-hydrochlorothiazide (HYZAAR) 100-25 MG per tablet Take 1 Tab by mouth every day. 90 Tab 3   • carvedilol (COREG) 3.125 MG Tab Take 1 Tab by mouth 2 times a day, with meals. 180 Tab 3   • furosemide (LASIX) 40 MG Tab Take 40 mg by mouth every day.     • acetaminophen (TYLENOL) 325 MG Tab Take 650 mg by mouth every four hours as needed.     • Ascorbic Acid (VITAMIN C) 1000 MG Tab Take  by mouth.    "  • clopidogrel (PLAVIX) 75 MG Tab Take 1 Tab by mouth every day. 90 Tab 3     No facility-administered encounter medications on file as of 2/5/2020.      Review of Systems   Constitutional: Negative.    HENT: Negative.    Respiratory: Negative.    Cardiovascular: Negative.  Negative for chest pain and palpitations.   Gastrointestinal: Negative.    Musculoskeletal: Positive for back pain.   Skin: Negative.    Neurological: Positive for dizziness.        Difficulty with balance   Endo/Heme/Allergies: Negative.    Psychiatric/Behavioral: Negative for depression.        Objective:   BP (!) 168/80 (BP Location: Left arm, Patient Position: Sitting, BP Cuff Size: Adult)   Pulse 76   Ht 1.6 m (5' 3\")   Wt 75.3 kg (166 lb)   SpO2 95%   BMI 29.41 kg/m²     Physical Exam   Constitutional: She is oriented to person, place, and time. No distress.   Uses a walker.   HENT:   Head: Normocephalic and atraumatic.   Eyes: Pupils are equal, round, and reactive to light. No scleral icterus.   Neck: No JVD present.   Cardiovascular: Normal rate and regular rhythm.   No murmur heard.  Pulmonary/Chest: No respiratory distress. She has no wheezes.   Abdominal: Soft. She exhibits no distension. There is no tenderness.   Musculoskeletal:         General: No edema.      Comments: Kyphosis is present.     Neurological: She is alert and oriented to person, place, and time.   Skin: Skin is warm.   Psychiatric: She has a normal mood and affect.       Assessment:     1. Cardiac pacemaker in situ     2. PAF (paroxysmal atrial fibrillation) ([facility])     3. Essential hypertension, benign         Medical Decision Making:  Today's Assessment / Status / Plan:   Status post permanent pacemaker: Pacemaker is functioning normally.    PAF: Patient has 7-hour episode of atrial fibrillation for which she was asymptomatic.  She is scheduled for back surgery in about 2 weeks.  Once she has recovered surgery would recommend she return the office and be started " on anticoagulation at that time.  This was discussed with both the patient and her daughter today.    Hypertension: Blood pressure mildly elevated.  Recommend increasing carvedilol to 6.25 mg twice daily.  Edema: Resolved on diuretic.    Return mid March and institute anticoagulation at that time provided her back is healed sufficiently.  Recheck blood pressure at that time.

## 2020-02-06 NOTE — TELEPHONE ENCOUNTER
Pt seen in clinic today, per Dr Sewell, please send clearance to Dr Latrell Kimble for pt's upcoming back surgery, low risk, hold Plavix x 7 days prior to procedure.     Clearance letter drafted w/ Dr Sewell recommendations faxed to [facility] Neuro[facility]-Dr Latrell Kimble, F#[phone]

## 2020-02-14 ENCOUNTER — HOSPITAL ENCOUNTER (OUTPATIENT)
Dept: RADIOLOGY | Facility: MEDICAL CENTER | Age: 85
End: 2020-02-14
Attending: NEUROLOGICAL SURGERY
Payer: MEDICARE

## 2020-02-14 DIAGNOSIS — Z01.810 PRE-OPERATIVE CARDIOVASCULAR EXAMINATION: ICD-10-CM

## 2020-02-14 DIAGNOSIS — Z01.811 PRE-OPERATIVE RESPIRATORY EXAMINATION: ICD-10-CM

## 2020-02-14 DIAGNOSIS — Z01.812 PRE-OPERATIVE LABORATORY EXAMINATION: ICD-10-CM

## 2020-02-14 LAB
ANION GAP SERPL CALC-SCNC: 8 MMOL/L (ref 0–11.9)
APPEARANCE UR: CLEAR
APTT PPP: 32.1 SEC (ref 24.7–36)
BASOPHILS # BLD AUTO: 0.4 % (ref 0–1.8)
BASOPHILS # BLD: 0.03 K/UL (ref 0–0.12)
BILIRUB UR QL STRIP.AUTO: NEGATIVE
BUN SERPL-MCNC: 23 MG/DL (ref 8–22)
CALCIUM SERPL-MCNC: 9.8 MG/DL (ref 8.5–10.5)
CHLORIDE SERPL-SCNC: 89 MMOL/L (ref 96–112)
CO2 SERPL-SCNC: 28 MMOL/L (ref 20–33)
COLOR UR: YELLOW
CREAT SERPL-MCNC: 0.8 MG/DL (ref 0.5–1.4)
EKG IMPRESSION: NORMAL
EOSINOPHIL # BLD AUTO: 0.12 K/UL (ref 0–0.51)
EOSINOPHIL NFR BLD: 1.5 % (ref 0–6.9)
ERYTHROCYTE [DISTWIDTH] IN BLOOD BY AUTOMATED COUNT: 42 FL (ref 35.9–50)
GLUCOSE SERPL-MCNC: 107 MG/DL (ref 65–99)
GLUCOSE UR STRIP.AUTO-MCNC: NEGATIVE MG/DL
HCT VFR BLD AUTO: 39.3 % (ref 37–47)
HGB BLD-MCNC: 13.7 G/DL (ref 12–16)
IMM GRANULOCYTES # BLD AUTO: 0.04 K/UL (ref 0–0.11)
IMM GRANULOCYTES NFR BLD AUTO: 0.5 % (ref 0–0.9)
INR PPP: 0.9 (ref 0.87–1.13)
KETONES UR STRIP.AUTO-MCNC: NEGATIVE MG/DL
LEUKOCYTE ESTERASE UR QL STRIP.AUTO: NEGATIVE
LYMPHOCYTES # BLD AUTO: 2.62 K/UL (ref 1–4.8)
LYMPHOCYTES NFR BLD: 32.9 % (ref 22–41)
MCH RBC QN AUTO: 31.1 PG (ref 27–33)
MCHC RBC AUTO-ENTMCNC: 34.9 G/DL (ref 33.6–35)
MCV RBC AUTO: 89.1 FL (ref 81.4–97.8)
MICRO URNS: NORMAL
MONOCYTES # BLD AUTO: 0.74 K/UL (ref 0–0.85)
MONOCYTES NFR BLD AUTO: 9.3 % (ref 0–13.4)
NEUTROPHILS # BLD AUTO: 4.42 K/UL (ref 2–7.15)
NEUTROPHILS NFR BLD: 55.4 % (ref 44–72)
NITRITE UR QL STRIP.AUTO: NEGATIVE
NRBC # BLD AUTO: 0 K/UL
NRBC BLD-RTO: 0 /100 WBC
PH UR STRIP.AUTO: 7.5 [PH] (ref 5–8)
PLATELET # BLD AUTO: 279 K/UL (ref 164–446)
PMV BLD AUTO: 9.4 FL (ref 9–12.9)
POTASSIUM SERPL-SCNC: 4.8 MMOL/L (ref 3.6–5.5)
PROT UR QL STRIP: NEGATIVE MG/DL
PROTHROMBIN TIME: 12.3 SEC (ref 12–14.6)
RBC # BLD AUTO: 4.41 M/UL (ref 4.2–5.4)
RBC UR QL AUTO: NEGATIVE
SODIUM SERPL-SCNC: 125 MMOL/L (ref 135–145)
SP GR UR STRIP.AUTO: 1.02
UROBILINOGEN UR STRIP.AUTO-MCNC: 0.2 MG/DL
WBC # BLD AUTO: 8 K/UL (ref 4.8–10.8)

## 2020-02-14 PROCEDURE — 93010 ELECTROCARDIOGRAM REPORT: CPT | Performed by: INTERNAL MEDICINE

## 2020-02-14 PROCEDURE — 93005 ELECTROCARDIOGRAM TRACING: CPT

## 2020-02-14 PROCEDURE — 81003 URINALYSIS AUTO W/O SCOPE: CPT

## 2020-02-14 PROCEDURE — 36415 COLL VENOUS BLD VENIPUNCTURE: CPT

## 2020-02-14 PROCEDURE — 85610 PROTHROMBIN TIME: CPT

## 2020-02-14 PROCEDURE — 71046 X-RAY EXAM CHEST 2 VIEWS: CPT

## 2020-02-14 PROCEDURE — 85025 COMPLETE CBC W/AUTO DIFF WBC: CPT

## 2020-02-14 PROCEDURE — 80048 BASIC METABOLIC PNL TOTAL CA: CPT

## 2020-02-14 PROCEDURE — 85730 THROMBOPLASTIN TIME PARTIAL: CPT

## 2020-02-18 NOTE — OR NURSING
Preadmit note:  Called Dr. Kimble to update on pt's preop test results 2/18/2020 @ 5307.  Preop test results faxed to Dr. Kimble 2/18/2020 @ 0334.

## 2020-02-25 ENCOUNTER — HOSPITAL ENCOUNTER (OUTPATIENT)
Facility: MEDICAL CENTER | Age: 85
End: 2020-02-26
Attending: NEUROLOGICAL SURGERY | Admitting: NEUROLOGICAL SURGERY
Payer: MEDICARE

## 2020-02-25 ENCOUNTER — ANESTHESIA (OUTPATIENT)
Dept: SURGERY | Facility: MEDICAL CENTER | Age: 85
End: 2020-02-25
Payer: MEDICARE

## 2020-02-25 ENCOUNTER — APPOINTMENT (OUTPATIENT)
Dept: RADIOLOGY | Facility: MEDICAL CENTER | Age: 85
End: 2020-02-25
Attending: NEUROLOGICAL SURGERY
Payer: MEDICARE

## 2020-02-25 ENCOUNTER — ANESTHESIA EVENT (OUTPATIENT)
Dept: SURGERY | Facility: MEDICAL CENTER | Age: 85
End: 2020-02-25
Payer: MEDICARE

## 2020-02-25 DIAGNOSIS — G89.18 ACUTE POSTOPERATIVE PAIN: ICD-10-CM

## 2020-02-25 LAB
APTT PPP: 30.5 SEC (ref 24.7–36)
INR PPP: 0.95 (ref 0.87–1.13)
PROTHROMBIN TIME: 12.9 SEC (ref 12–14.6)

## 2020-02-25 PROCEDURE — 700111 HCHG RX REV CODE 636 W/ 250 OVERRIDE (IP): Performed by: ANESTHESIOLOGY

## 2020-02-25 PROCEDURE — A9270 NON-COVERED ITEM OR SERVICE: HCPCS | Performed by: ANESTHESIOLOGY

## 2020-02-25 PROCEDURE — 160035 HCHG PACU - 1ST 60 MINS PHASE I: Performed by: NEUROLOGICAL SURGERY

## 2020-02-25 PROCEDURE — 85610 PROTHROMBIN TIME: CPT

## 2020-02-25 PROCEDURE — 501838 HCHG SUTURE GENERAL: Performed by: NEUROLOGICAL SURGERY

## 2020-02-25 PROCEDURE — 700101 HCHG RX REV CODE 250: Performed by: NEUROLOGICAL SURGERY

## 2020-02-25 PROCEDURE — 160048 HCHG OR STATISTICAL LEVEL 1-5: Performed by: NEUROLOGICAL SURGERY

## 2020-02-25 PROCEDURE — 110454 HCHG SHELL REV 250: Performed by: NEUROLOGICAL SURGERY

## 2020-02-25 PROCEDURE — 160036 HCHG PACU - EA ADDL 30 MINS PHASE I: Performed by: NEUROLOGICAL SURGERY

## 2020-02-25 PROCEDURE — 700105 HCHG RX REV CODE 258: Performed by: NEUROLOGICAL SURGERY

## 2020-02-25 PROCEDURE — 160002 HCHG RECOVERY MINUTES (STAT): Performed by: NEUROLOGICAL SURGERY

## 2020-02-25 PROCEDURE — 500864 HCHG NEEDLE, SPINAL 18G: Performed by: NEUROLOGICAL SURGERY

## 2020-02-25 PROCEDURE — 700102 HCHG RX REV CODE 250 W/ 637 OVERRIDE(OP): Performed by: ANESTHESIOLOGY

## 2020-02-25 PROCEDURE — 160009 HCHG ANES TIME/MIN: Performed by: NEUROLOGICAL SURGERY

## 2020-02-25 PROCEDURE — 500367 HCHG DRAIN KIT, HEMOVAC: Performed by: NEUROLOGICAL SURGERY

## 2020-02-25 PROCEDURE — 700105 HCHG RX REV CODE 258: Performed by: ANESTHESIOLOGY

## 2020-02-25 PROCEDURE — 160041 HCHG SURGERY MINUTES - EA ADDL 1 MIN LEVEL 4: Performed by: NEUROLOGICAL SURGERY

## 2020-02-25 PROCEDURE — 700101 HCHG RX REV CODE 250: Performed by: ANESTHESIOLOGY

## 2020-02-25 PROCEDURE — 160029 HCHG SURGERY MINUTES - 1ST 30 MINS LEVEL 4: Performed by: NEUROLOGICAL SURGERY

## 2020-02-25 PROCEDURE — 72020 X-RAY EXAM OF SPINE 1 VIEW: CPT

## 2020-02-25 PROCEDURE — 85730 THROMBOPLASTIN TIME PARTIAL: CPT

## 2020-02-25 PROCEDURE — G0378 HOSPITAL OBSERVATION PER HR: HCPCS

## 2020-02-25 DEVICE — GRAFT DURAMATRIX ONLAY PLUS 1IN X 3IN OR 2.75CM X 7.5CM: Type: IMPLANTABLE DEVICE | Status: FUNCTIONAL

## 2020-02-25 RX ORDER — BUPIVACAINE HYDROCHLORIDE AND EPINEPHRINE 2.5; 5 MG/ML; UG/ML
INJECTION, SOLUTION EPIDURAL; INFILTRATION; INTRACAUDAL; PERINEURAL
Status: DISCONTINUED | OUTPATIENT
Start: 2020-02-25 | End: 2020-02-25 | Stop reason: HOSPADM

## 2020-02-25 RX ORDER — CEFAZOLIN SODIUM 2 G/100ML
2 INJECTION, SOLUTION INTRAVENOUS EVERY 8 HOURS
Status: COMPLETED | OUTPATIENT
Start: 2020-02-25 | End: 2020-02-26

## 2020-02-25 RX ORDER — SODIUM CHLORIDE, SODIUM LACTATE, POTASSIUM CHLORIDE, CALCIUM CHLORIDE 600; 310; 30; 20 MG/100ML; MG/100ML; MG/100ML; MG/100ML
INJECTION, SOLUTION INTRAVENOUS CONTINUOUS
Status: ACTIVE | OUTPATIENT
Start: 2020-02-25 | End: 2020-02-26

## 2020-02-25 RX ORDER — HYDROMORPHONE HYDROCHLORIDE 1 MG/ML
0.5 INJECTION, SOLUTION INTRAMUSCULAR; INTRAVENOUS; SUBCUTANEOUS
Status: DISCONTINUED | OUTPATIENT
Start: 2020-02-25 | End: 2020-02-26 | Stop reason: HOSPADM

## 2020-02-25 RX ORDER — DOCUSATE SODIUM 100 MG/1
100 CAPSULE, LIQUID FILLED ORAL 2 TIMES DAILY
Status: DISCONTINUED | OUTPATIENT
Start: 2020-02-25 | End: 2020-02-26 | Stop reason: HOSPADM

## 2020-02-25 RX ORDER — SODIUM CHLORIDE AND POTASSIUM CHLORIDE 150; 900 MG/100ML; MG/100ML
INJECTION, SOLUTION INTRAVENOUS CONTINUOUS
Status: DISCONTINUED | OUTPATIENT
Start: 2020-02-25 | End: 2020-02-26 | Stop reason: HOSPADM

## 2020-02-25 RX ORDER — HYDRALAZINE HYDROCHLORIDE 20 MG/ML
10 INJECTION INTRAMUSCULAR; INTRAVENOUS
Status: DISCONTINUED | OUTPATIENT
Start: 2020-02-25 | End: 2020-02-26 | Stop reason: HOSPADM

## 2020-02-25 RX ORDER — ONDANSETRON 2 MG/ML
4 INJECTION INTRAMUSCULAR; INTRAVENOUS
Status: DISCONTINUED | OUTPATIENT
Start: 2020-02-25 | End: 2020-02-25 | Stop reason: HOSPADM

## 2020-02-25 RX ORDER — LIDOCAINE HYDROCHLORIDE 20 MG/ML
INJECTION, SOLUTION EPIDURAL; INFILTRATION; INTRACAUDAL; PERINEURAL PRN
Status: DISCONTINUED | OUTPATIENT
Start: 2020-02-25 | End: 2020-02-25 | Stop reason: SURG

## 2020-02-25 RX ORDER — DIPHENHYDRAMINE HYDROCHLORIDE 50 MG/ML
12.5 INJECTION INTRAMUSCULAR; INTRAVENOUS
Status: DISCONTINUED | OUTPATIENT
Start: 2020-02-25 | End: 2020-02-25 | Stop reason: HOSPADM

## 2020-02-25 RX ORDER — BISACODYL 10 MG
10 SUPPOSITORY, RECTAL RECTAL
Status: DISCONTINUED | OUTPATIENT
Start: 2020-02-25 | End: 2020-02-26 | Stop reason: HOSPADM

## 2020-02-25 RX ORDER — BACITRACIN 50000 [IU]/1
INJECTION, POWDER, FOR SOLUTION INTRAMUSCULAR
Status: DISCONTINUED | OUTPATIENT
Start: 2020-02-25 | End: 2020-02-25 | Stop reason: HOSPADM

## 2020-02-25 RX ORDER — HYDRALAZINE HYDROCHLORIDE 20 MG/ML
INJECTION INTRAMUSCULAR; INTRAVENOUS PRN
Status: DISCONTINUED | OUTPATIENT
Start: 2020-02-25 | End: 2020-02-25 | Stop reason: SURG

## 2020-02-25 RX ORDER — POLYETHYLENE GLYCOL 3350 17 G/17G
1 POWDER, FOR SOLUTION ORAL 2 TIMES DAILY PRN
Status: DISCONTINUED | OUTPATIENT
Start: 2020-02-25 | End: 2020-02-26 | Stop reason: HOSPADM

## 2020-02-25 RX ORDER — OXYCODONE HCL 5 MG/5 ML
5 SOLUTION, ORAL ORAL
Status: COMPLETED | OUTPATIENT
Start: 2020-02-25 | End: 2020-02-25

## 2020-02-25 RX ORDER — DIPHENHYDRAMINE HCL 25 MG
25 TABLET ORAL EVERY 6 HOURS PRN
Status: DISCONTINUED | OUTPATIENT
Start: 2020-02-25 | End: 2020-02-26 | Stop reason: HOSPADM

## 2020-02-25 RX ORDER — MEPERIDINE HYDROCHLORIDE 25 MG/ML
6.25 INJECTION INTRAMUSCULAR; INTRAVENOUS; SUBCUTANEOUS
Status: DISCONTINUED | OUTPATIENT
Start: 2020-02-25 | End: 2020-02-25 | Stop reason: HOSPADM

## 2020-02-25 RX ORDER — SODIUM CHLORIDE, SODIUM GLUCONATE, SODIUM ACETATE, POTASSIUM CHLORIDE AND MAGNESIUM CHLORIDE 526; 502; 368; 37; 30 MG/100ML; MG/100ML; MG/100ML; MG/100ML; MG/100ML
INJECTION, SOLUTION INTRAVENOUS
Status: DISCONTINUED | OUTPATIENT
Start: 2020-02-25 | End: 2020-02-25 | Stop reason: SURG

## 2020-02-25 RX ORDER — HYDRALAZINE HYDROCHLORIDE 20 MG/ML
5 INJECTION INTRAMUSCULAR; INTRAVENOUS
Status: DISCONTINUED | OUTPATIENT
Start: 2020-02-25 | End: 2020-02-25 | Stop reason: HOSPADM

## 2020-02-25 RX ORDER — SODIUM CHLORIDE, SODIUM LACTATE, POTASSIUM CHLORIDE, CALCIUM CHLORIDE 600; 310; 30; 20 MG/100ML; MG/100ML; MG/100ML; MG/100ML
INJECTION, SOLUTION INTRAVENOUS CONTINUOUS
Status: DISCONTINUED | OUTPATIENT
Start: 2020-02-25 | End: 2020-02-25 | Stop reason: HOSPADM

## 2020-02-25 RX ORDER — ONDANSETRON 2 MG/ML
4 INJECTION INTRAMUSCULAR; INTRAVENOUS EVERY 4 HOURS PRN
Status: DISCONTINUED | OUTPATIENT
Start: 2020-02-25 | End: 2020-02-26 | Stop reason: HOSPADM

## 2020-02-25 RX ORDER — CEFAZOLIN SODIUM 1 G/3ML
INJECTION, POWDER, FOR SOLUTION INTRAMUSCULAR; INTRAVENOUS PRN
Status: DISCONTINUED | OUTPATIENT
Start: 2020-02-25 | End: 2020-02-25 | Stop reason: SURG

## 2020-02-25 RX ORDER — LOSARTAN POTASSIUM 50 MG/1
100 TABLET ORAL DAILY
Status: DISCONTINUED | OUTPATIENT
Start: 2020-02-26 | End: 2020-02-26 | Stop reason: HOSPADM

## 2020-02-25 RX ORDER — HALOPERIDOL 5 MG/ML
1 INJECTION INTRAMUSCULAR
Status: DISCONTINUED | OUTPATIENT
Start: 2020-02-25 | End: 2020-02-25 | Stop reason: HOSPADM

## 2020-02-25 RX ORDER — ENEMA 19; 7 G/133ML; G/133ML
1 ENEMA RECTAL
Status: DISCONTINUED | OUTPATIENT
Start: 2020-02-25 | End: 2020-02-26 | Stop reason: HOSPADM

## 2020-02-25 RX ORDER — ONDANSETRON 2 MG/ML
INJECTION INTRAMUSCULAR; INTRAVENOUS PRN
Status: DISCONTINUED | OUTPATIENT
Start: 2020-02-25 | End: 2020-02-25 | Stop reason: SURG

## 2020-02-25 RX ORDER — LORAZEPAM 2 MG/ML
0.5 INJECTION INTRAMUSCULAR
Status: DISCONTINUED | OUTPATIENT
Start: 2020-02-25 | End: 2020-02-25 | Stop reason: HOSPADM

## 2020-02-25 RX ORDER — HYDROMORPHONE HYDROCHLORIDE 1 MG/ML
0.4 INJECTION, SOLUTION INTRAMUSCULAR; INTRAVENOUS; SUBCUTANEOUS
Status: DISCONTINUED | OUTPATIENT
Start: 2020-02-25 | End: 2020-02-25 | Stop reason: HOSPADM

## 2020-02-25 RX ORDER — DEXAMETHASONE SODIUM PHOSPHATE 4 MG/ML
INJECTION, SOLUTION INTRA-ARTICULAR; INTRALESIONAL; INTRAMUSCULAR; INTRAVENOUS; SOFT TISSUE PRN
Status: DISCONTINUED | OUTPATIENT
Start: 2020-02-25 | End: 2020-02-25 | Stop reason: SURG

## 2020-02-25 RX ORDER — MAGNESIUM SULFATE HEPTAHYDRATE 500 MG/ML
INJECTION, SOLUTION INTRAMUSCULAR; INTRAVENOUS PRN
Status: DISCONTINUED | OUTPATIENT
Start: 2020-02-25 | End: 2020-02-25 | Stop reason: SURG

## 2020-02-25 RX ORDER — DIPHENHYDRAMINE HYDROCHLORIDE 50 MG/ML
25 INJECTION INTRAMUSCULAR; INTRAVENOUS EVERY 6 HOURS PRN
Status: DISCONTINUED | OUTPATIENT
Start: 2020-02-25 | End: 2020-02-26 | Stop reason: HOSPADM

## 2020-02-25 RX ORDER — CARVEDILOL 6.25 MG/1
6.25 TABLET ORAL 2 TIMES DAILY WITH MEALS
Status: DISCONTINUED | OUTPATIENT
Start: 2020-02-26 | End: 2020-02-26 | Stop reason: HOSPADM

## 2020-02-25 RX ORDER — HYDROMORPHONE HYDROCHLORIDE 1 MG/ML
0.5 INJECTION, SOLUTION INTRAMUSCULAR; INTRAVENOUS; SUBCUTANEOUS
Status: DISCONTINUED | OUTPATIENT
Start: 2020-02-25 | End: 2020-02-25 | Stop reason: HOSPADM

## 2020-02-25 RX ORDER — OXYCODONE HYDROCHLORIDE AND ACETAMINOPHEN 5; 325 MG/1; MG/1
1-2 TABLET ORAL EVERY 4 HOURS PRN
Status: DISCONTINUED | OUTPATIENT
Start: 2020-02-25 | End: 2020-02-26 | Stop reason: HOSPADM

## 2020-02-25 RX ORDER — HYDROMORPHONE HYDROCHLORIDE 1 MG/ML
0.2 INJECTION, SOLUTION INTRAMUSCULAR; INTRAVENOUS; SUBCUTANEOUS
Status: DISCONTINUED | OUTPATIENT
Start: 2020-02-25 | End: 2020-02-25 | Stop reason: HOSPADM

## 2020-02-25 RX ORDER — OXYCODONE HCL 5 MG/5 ML
10 SOLUTION, ORAL ORAL
Status: COMPLETED | OUTPATIENT
Start: 2020-02-25 | End: 2020-02-25

## 2020-02-25 RX ORDER — AMOXICILLIN 250 MG
1 CAPSULE ORAL
Status: DISCONTINUED | OUTPATIENT
Start: 2020-02-25 | End: 2020-02-26 | Stop reason: HOSPADM

## 2020-02-25 RX ORDER — TIZANIDINE 4 MG/1
2 TABLET ORAL 3 TIMES DAILY PRN
Status: DISCONTINUED | OUTPATIENT
Start: 2020-02-25 | End: 2020-02-26 | Stop reason: HOSPADM

## 2020-02-25 RX ORDER — DEXMEDETOMIDINE HYDROCHLORIDE 100 UG/ML
INJECTION, SOLUTION INTRAVENOUS PRN
Status: DISCONTINUED | OUTPATIENT
Start: 2020-02-25 | End: 2020-02-25 | Stop reason: SURG

## 2020-02-25 RX ORDER — ONDANSETRON 4 MG/1
4 TABLET, ORALLY DISINTEGRATING ORAL EVERY 4 HOURS PRN
Status: DISCONTINUED | OUTPATIENT
Start: 2020-02-25 | End: 2020-02-26 | Stop reason: HOSPADM

## 2020-02-25 RX ADMIN — LIDOCAINE HYDROCHLORIDE 60 MG: 20 INJECTION, SOLUTION EPIDURAL; INFILTRATION; INTRACAUDAL at 18:33

## 2020-02-25 RX ADMIN — FENTANYL CITRATE 50 MCG: 50 INJECTION, SOLUTION INTRAMUSCULAR; INTRAVENOUS at 18:33

## 2020-02-25 RX ADMIN — PROPOFOL 50 MG: 10 INJECTION, EMULSION INTRAVENOUS at 18:35

## 2020-02-25 RX ADMIN — OXYCODONE HYDROCHLORIDE 10 MG: 5 SOLUTION ORAL at 20:19

## 2020-02-25 RX ADMIN — ONDANSETRON 4 MG: 2 INJECTION INTRAMUSCULAR; INTRAVENOUS at 19:24

## 2020-02-25 RX ADMIN — FENTANYL CITRATE 25 MCG: 0.05 INJECTION, SOLUTION INTRAMUSCULAR; INTRAVENOUS at 20:43

## 2020-02-25 RX ADMIN — DEXMEDETOMIDINE HYDROCHLORIDE 50 MCG: 100 INJECTION, SOLUTION INTRAVENOUS at 18:33

## 2020-02-25 RX ADMIN — SODIUM CHLORIDE, SODIUM GLUCONATE, SODIUM ACETATE, POTASSIUM CHLORIDE AND MAGNESIUM CHLORIDE: 526; 502; 368; 37; 30 INJECTION, SOLUTION INTRAVENOUS at 19:24

## 2020-02-25 RX ADMIN — SUGAMMADEX 200 MG: 100 INJECTION, SOLUTION INTRAVENOUS at 19:24

## 2020-02-25 RX ADMIN — PHENYLEPHRINE HYDROCHLORIDE 20 MCG/MIN: 10 INJECTION INTRAVENOUS at 19:00

## 2020-02-25 RX ADMIN — FENTANYL CITRATE 25 MCG: 0.05 INJECTION, SOLUTION INTRAMUSCULAR; INTRAVENOUS at 20:21

## 2020-02-25 RX ADMIN — FENTANYL CITRATE 50 MCG: 50 INJECTION, SOLUTION INTRAMUSCULAR; INTRAVENOUS at 18:53

## 2020-02-25 RX ADMIN — CEFAZOLIN 2 G: 330 INJECTION, POWDER, FOR SOLUTION INTRAMUSCULAR; INTRAVENOUS at 18:33

## 2020-02-25 RX ADMIN — SODIUM CHLORIDE, POTASSIUM CHLORIDE, SODIUM LACTATE AND CALCIUM CHLORIDE: 600; 310; 30; 20 INJECTION, SOLUTION INTRAVENOUS at 18:30

## 2020-02-25 RX ADMIN — HYDRALAZINE HYDROCHLORIDE 10 MG: 20 INJECTION INTRAMUSCULAR; INTRAVENOUS at 18:52

## 2020-02-25 RX ADMIN — DEXAMETHASONE SODIUM PHOSPHATE 8 MG: 4 INJECTION, SOLUTION INTRA-ARTICULAR; INTRALESIONAL; INTRAMUSCULAR; INTRAVENOUS; SOFT TISSUE at 18:45

## 2020-02-25 RX ADMIN — FENTANYL CITRATE 50 MCG: 50 INJECTION, SOLUTION INTRAMUSCULAR; INTRAVENOUS at 18:36

## 2020-02-25 RX ADMIN — PROPOFOL 100 MG: 10 INJECTION, EMULSION INTRAVENOUS at 18:33

## 2020-02-25 RX ADMIN — MAGNESIUM SULFATE HEPTAHYDRATE 2 G: 500 INJECTION, SOLUTION INTRAMUSCULAR; INTRAVENOUS at 19:03

## 2020-02-25 RX ADMIN — PROPOFOL 50 MG: 10 INJECTION, EMULSION INTRAVENOUS at 18:44

## 2020-02-25 ASSESSMENT — COGNITIVE AND FUNCTIONAL STATUS - GENERAL
DAILY ACTIVITIY SCORE: 22
TOILETING: A LITTLE
MOVING FROM LYING ON BACK TO SITTING ON SIDE OF FLAT BED: A LITTLE
DRESSING REGULAR LOWER BODY CLOTHING: A LITTLE
MOVING TO AND FROM BED TO CHAIR: A LITTLE
MOBILITY SCORE: 18
WALKING IN HOSPITAL ROOM: A LITTLE
SUGGESTED CMS G CODE MODIFIER DAILY ACTIVITY: CJ
SUGGESTED CMS G CODE MODIFIER MOBILITY: CK
CLIMB 3 TO 5 STEPS WITH RAILING: A LITTLE
TURNING FROM BACK TO SIDE WHILE IN FLAT BAD: A LITTLE
STANDING UP FROM CHAIR USING ARMS: A LITTLE

## 2020-02-25 ASSESSMENT — LIFESTYLE VARIABLES
ALCOHOL_USE: NO
TOTAL SCORE: 0
HOW MANY TIMES IN THE PAST YEAR HAVE YOU HAD 5 OR MORE DRINKS IN A DAY: 0
CONSUMPTION TOTAL: NEGATIVE
EVER HAD A DRINK FIRST THING IN THE MORNING TO STEADY YOUR NERVES TO GET RID OF A HANGOVER: NO
HAVE PEOPLE ANNOYED YOU BY CRITICIZING YOUR DRINKING: NO
EVER_SMOKED: NEVER
TOTAL SCORE: 0
AVERAGE NUMBER OF DAYS PER WEEK YOU HAVE A DRINK CONTAINING ALCOHOL: 0
TOTAL SCORE: 0
HAVE YOU EVER FELT YOU SHOULD CUT DOWN ON YOUR DRINKING: NO
EVER FELT BAD OR GUILTY ABOUT YOUR DRINKING: NO
ON A TYPICAL DAY WHEN YOU DRINK ALCOHOL HOW MANY DRINKS DO YOU HAVE: 0
DOES PATIENT WANT TO STOP DRINKING: NO

## 2020-02-25 ASSESSMENT — PAIN SCALES - GENERAL: PAIN_LEVEL: 0

## 2020-02-25 ASSESSMENT — PATIENT HEALTH QUESTIONNAIRE - PHQ9
SUM OF ALL RESPONSES TO PHQ9 QUESTIONS 1 AND 2: 0
1. LITTLE INTEREST OR PLEASURE IN DOING THINGS: NOT AT ALL
2. FEELING DOWN, DEPRESSED, IRRITABLE, OR HOPELESS: NOT AT ALL

## 2020-02-26 VITALS
HEIGHT: 63 IN | WEIGHT: 170.42 LBS | SYSTOLIC BLOOD PRESSURE: 173 MMHG | TEMPERATURE: 97.2 F | OXYGEN SATURATION: 94 % | HEART RATE: 83 BPM | RESPIRATION RATE: 16 BRPM | DIASTOLIC BLOOD PRESSURE: 79 MMHG | BODY MASS INDEX: 30.2 KG/M2

## 2020-02-26 PROCEDURE — 97161 PT EVAL LOW COMPLEX 20 MIN: CPT

## 2020-02-26 PROCEDURE — 700102 HCHG RX REV CODE 250 W/ 637 OVERRIDE(OP): Performed by: NURSE PRACTITIONER

## 2020-02-26 PROCEDURE — 96365 THER/PROPH/DIAG IV INF INIT: CPT

## 2020-02-26 PROCEDURE — 97165 OT EVAL LOW COMPLEX 30 MIN: CPT

## 2020-02-26 PROCEDURE — 700112 HCHG RX REV CODE 229: Performed by: NURSE PRACTITIONER

## 2020-02-26 PROCEDURE — G0378 HOSPITAL OBSERVATION PER HR: HCPCS

## 2020-02-26 PROCEDURE — 700101 HCHG RX REV CODE 250: Performed by: NURSE PRACTITIONER

## 2020-02-26 PROCEDURE — A9270 NON-COVERED ITEM OR SERVICE: HCPCS | Performed by: NURSE PRACTITIONER

## 2020-02-26 PROCEDURE — 700111 HCHG RX REV CODE 636 W/ 250 OVERRIDE (IP): Performed by: NURSE PRACTITIONER

## 2020-02-26 RX ORDER — HYDROCHLOROTHIAZIDE 25 MG/1
25 TABLET ORAL
Status: DISCONTINUED | OUTPATIENT
Start: 2020-02-26 | End: 2020-02-26 | Stop reason: HOSPADM

## 2020-02-26 RX ORDER — TIZANIDINE 2 MG/1
2 TABLET ORAL 3 TIMES DAILY PRN
Qty: 50 TAB | Refills: 0 | Status: SHIPPED | OUTPATIENT
Start: 2020-02-26 | End: 2020-02-29

## 2020-02-26 RX ORDER — OXYCODONE HYDROCHLORIDE AND ACETAMINOPHEN 5; 325 MG/1; MG/1
1-2 TABLET ORAL EVERY 4 HOURS PRN
Qty: 56 TAB | Refills: 0 | Status: SHIPPED | OUTPATIENT
Start: 2020-02-26 | End: 2020-02-29

## 2020-02-26 RX ORDER — CLOPIDOGREL BISULFATE 75 MG/1
75 TABLET ORAL DAILY
Qty: 1 TAB | Refills: 0
Start: 2020-02-26 | End: 2020-02-29

## 2020-02-26 RX ADMIN — CEFAZOLIN 2 G: 10 INJECTION, POWDER, FOR SOLUTION INTRAVENOUS at 08:38

## 2020-02-26 RX ADMIN — LOSARTAN POTASSIUM 100 MG: 50 TABLET, FILM COATED ORAL at 05:21

## 2020-02-26 RX ADMIN — DOCUSATE SODIUM 100 MG: 100 CAPSULE, LIQUID FILLED ORAL at 05:21

## 2020-02-26 RX ADMIN — OXYCODONE HYDROCHLORIDE AND ACETAMINOPHEN 1 TABLET: 5; 325 TABLET ORAL at 14:51

## 2020-02-26 RX ADMIN — POTASSIUM CHLORIDE AND SODIUM CHLORIDE: 900; 150 INJECTION, SOLUTION INTRAVENOUS at 02:00

## 2020-02-26 RX ADMIN — CARVEDILOL 6.25 MG: 6.25 TABLET, FILM COATED ORAL at 08:37

## 2020-02-26 RX ADMIN — CEFAZOLIN 2 G: 10 INJECTION, POWDER, FOR SOLUTION INTRAVENOUS at 02:00

## 2020-02-26 RX ADMIN — HYDROCHLOROTHIAZIDE 25 MG: 25 TABLET ORAL at 05:21

## 2020-02-26 ASSESSMENT — COGNITIVE AND FUNCTIONAL STATUS - GENERAL
MOBILITY SCORE: 18
DRESSING REGULAR LOWER BODY CLOTHING: A LITTLE
CLIMB 3 TO 5 STEPS WITH RAILING: A LITTLE
DAILY ACTIVITIY SCORE: 21
DRESSING REGULAR UPPER BODY CLOTHING: A LITTLE
SUGGESTED CMS G CODE MODIFIER DAILY ACTIVITY: CJ
TURNING FROM BACK TO SIDE WHILE IN FLAT BAD: A LITTLE
SUGGESTED CMS G CODE MODIFIER MOBILITY: CK
MOVING TO AND FROM BED TO CHAIR: A LITTLE
MOVING FROM LYING ON BACK TO SITTING ON SIDE OF FLAT BED: A LITTLE
WALKING IN HOSPITAL ROOM: A LITTLE
STANDING UP FROM CHAIR USING ARMS: A LITTLE
HELP NEEDED FOR BATHING: A LITTLE

## 2020-02-26 ASSESSMENT — ACTIVITIES OF DAILY LIVING (ADL): TOILETING: INDEPENDENT

## 2020-02-26 ASSESSMENT — GAIT ASSESSMENTS
DISTANCE (FEET): 75
ASSISTIVE DEVICE: FRONT WHEEL WALKER
GAIT LEVEL OF ASSIST: SUPERVISED
DEVIATION: TRENDELENBERG

## 2020-02-26 NOTE — DISCHARGE INSTRUCTIONS
Discharge Instructions  Ambulate as much as comfortable   Ok to shower & allow incision to become wet 48 hrs after drain removed, pat surgical dressing dry   Remove surgical dressing 5 days post op & leave open to air- no dressing   No Aspirin for 1 week after surgery   May resume Plavix 7 days post op   No driving for 2 weeks/ No driving while on narcotic medication   Over the counter stool softeners daily while on narcotics   No lifting greater than 15 pounds, no repetitive bending or twisting   Follow up at [facility] 2 weeks after surgery    Discharged to home by car with relative. Discharged via wheelchair, hospital escort: Yes.  Special equipment needed: Not Applicable    Be sure to schedule a follow-up appointment with your primary care doctor or any specialists as instructed.     Discharge Plan:   Influenza Vaccine Indication: Not indicated: Previously immunized this influenza season and > 8 years of age    I understand that a diet low in cholesterol, fat, and sodium is recommended for good health. Unless I have been given specific instructions below for another diet, I accept this instruction as my diet prescription.   Other diet: Regular    Special Instructions: Discharge instructions for the Orthopedic Patient    Follow up with Primary Care Physician within 2 weeks of discharge to home, regarding:  Review of medications and diagnostic testing.  Surveillance for medical complications.  Workup and treatment of osteoporosis, if appropriate.     -Is this a Hip/Knee/Shoulder Joint Replacement patient? No    -Is this patient being discharged with medication to prevent blood clots?  No    · Is patient discharged on Warfarin / Coumadin?   No     Depression / Suicide Risk    As you are discharged from this [facility] facility, it is important to learn how to keep safe from harming yourself.    Recognize the warning signs:  · Abrupt changes in personality, positive or negative- including increase in  energy   · Giving away possessions  · Change in eating patterns- significant weight changes-  positive or negative  · Change in sleeping patterns- unable to sleep or sleeping all the time   · Unwillingness or inability to communicate  · Depression  · Unusual sadness, discouragement and loneliness  · Talk of wanting to die  · Neglect of personal appearance   · Rebelliousness- reckless behavior  · Withdrawal from people/activities they love  · Confusion- inability to concentrate     If you or a loved one observes any of these behaviors or has concerns about self-harm, here's what you can do:  · Talk about it- your feelings and reasons for harming yourself  · Remove any means that you might use to hurt yourself (examples: pills, rope, extension cords, firearm)  · Get professional help from the community (Mental Health, Substance Abuse, psychological counseling)  · Do not be alone:Call your Safe Contact- someone whom you trust who will be there for you.  · Call your local CRISIS HOTLINE 683-5755 or 801-937-2437  · Call your local Children's Mobile Crisis Response Team [place] (717) 856-2929 or [employer]  · Call the toll free National Suicide Prevention Hotlines   · National Suicide Prevention Lifeline 241-190-QKPN (7580)  · National Hope Line Network 800-SUICIDE (635-2808)  Acetaminophen; Oxycodone tablets  What is this medicine?  ACETAMINOPHEN; OXYCODONE (a set a EARLINE juan fen; ox i KOE done) is a pain reliever. It is used to treat moderate to severe pain.  This medicine may be used for other purposes; ask your health care provider or pharmacist if you have questions.  COMMON BRAND NAME(S): Endocet, Magnacet, Narvox, Percocet, Perloxx, Primalev, Primlev, Roxicet, Xolox  What should I tell my health care provider before I take this medicine?  They need to know if you have any of these conditions:  -brain tumor  -Crohn's disease, inflammatory bowel disease, or ulcerative colitis  -drug abuse or  addiction  -head injury  -heart or circulation problems  -if you often drink alcohol  -kidney disease or problems going to the bathroom  -liver disease  -lung disease, asthma, or breathing problems  -an unusual or allergic reaction to acetaminophen, oxycodone, other opioid analgesics, other medicines, foods, dyes, or preservatives  -pregnant or trying to get pregnant  -breast-feeding  How should I use this medicine?  Take this medicine by mouth with a full glass of water. Follow the directions on the prescription label. You can take it with or without food. If it upsets your stomach, take it with food. Take your medicine at regular intervals. Do not take it more often than directed.  A special MedGuide will be given to you by the pharmacist with each prescription and refill. Be sure to read this information carefully each time.  Talk to your pediatrician regarding the use of this medicine in children. Special care may be needed.  Overdosage: If you think you have taken too much of this medicine contact a poison control center or emergency room at once.  NOTE: This medicine is only for you. Do not share this medicine with others.  What if I miss a dose?  If you miss a dose, take it as soon as you can. If it is almost time for your next dose, take only that dose. Do not take double or extra doses.  What may interact with this medicine?  This medicine may interact with the following medications:  -alcohol  -antihistamines for allergy, cough and cold  -antiviral medicines for HIV or AIDS  -atropine  -certain antibiotics like clarithromycin, erythromycin, linezolid, rifampin  -certain medicines for anxiety or sleep  -certain medicines for bladder problems like oxybutynin, tolterodine  -certain medicines for depression like amitriptyline, fluoxetine, sertraline  -certain medicines for fungal infections like ketoconazole, itraconazole, voriconazole  -certain medicines for migraine headache like almotriptan, eletriptan,  frovatriptan, naratriptan, rizatriptan, sumatriptan, zolmitriptan  -certain medicines for nausea or vomiting like dolasetron, ondansetron, palonosetron  -certain medicines for Parkinson's disease like benztropine, trihexyphenidyl  -certain medicines for seizures like phenobarbital, phenytoin, primidone  -certain medicines for stomach problems like dicyclomine, hyoscyamine  -certain medicines for travel sickness like scopolamine  -diuretics  -general anesthetics like halothane, isoflurane, methoxyflurane, propofol  -ipratropium  -local anesthetics like lidocaine, pramoxine, tetracaine  -MAOIs like Carbex, Eldepryl, Marplan, Nardil, and Parnate  -medicines that relax muscles for surgery  -methylene blue  -nilotinib  -other medicines with acetaminophen  -other narcotic medicines for pain or cough  -phenothiazines like chlorpromazine, mesoridazine, prochlorperazine, thioridazine  This list may not describe all possible interactions. Give your health care provider a list of all the medicines, herbs, non-prescription drugs, or dietary supplements you use. Also tell them if you smoke, drink alcohol, or use illegal drugs. Some items may interact with your medicine.  What should I watch for while using this medicine?  Tell your doctor or health care professional if your pain does not go away, if it gets worse, or if you have new or a different type of pain. You may develop tolerance to the medicine. Tolerance means that you will need a higher dose of the medication for pain relief. Tolerance is normal and is expected if you take this medicine for a long time.  Do not suddenly stop taking your medicine because you may develop a severe reaction. Your body becomes used to the medicine. This does NOT mean you are addicted. Addiction is a behavior related to getting and using a drug for a non-medical reason. If you have pain, you have a medical reason to take pain medicine. Your doctor will tell you how much medicine to take. If  your doctor wants you to stop the medicine, the dose will be slowly lowered over time to avoid any side effects.  There are different types of narcotic medicines (opiates). If you take more than one type at the same time or if you are taking another medicine that also causes drowsiness, you may have more side effects. Give your health care provider a list of all medicines you use. Your doctor will tell you how much medicine to take. Do not take more medicine than directed. Call emergency for help if you have problems breathing or unusual sleepiness.  Do not take other medicines that contain acetaminophen with this medicine. Always read labels carefully. If you have questions, ask your doctor or pharmacist.  If you take too much acetaminophen get medical help right away. Too much acetaminophen can be very dangerous and cause liver damage. Even if you do not have symptoms, it is important to get help right away.  You may get drowsy or dizzy. Do not drive, use machinery, or do anything that needs mental alertness until you know how this medicine affects you. Do not stand or sit up quickly, especially if you are an older patient. This reduces the risk of dizzy or fainting spells. Alcohol may interfere with the effect of this medicine. Avoid alcoholic drinks.  The medicine will cause constipation. Try to have a bowel movement at least every 2 to 3 days. If you do not have a bowel movement for 3 days, call your doctor or health care professional.  Your mouth may get dry. Chewing sugarless gum or sucking hard candy, and drinking plenty or water may help. Contact your doctor if the problem does not go away or is severe.  What side effects may I notice from receiving this medicine?  Side effects that you should report to your doctor or health care professional as soon as possible:  -allergic reactions like skin rash, itching or hives, swelling of the face, lips, or tongue  -breathing problems  -confusion  -redness,  blistering, peeling or loosening of the skin, including inside the mouth  -signs and symptoms of liver injury like dark yellow or brown urine; general ill feeling or flu-like symptoms; light-colored stools; loss of appetite; nausea; right upper belly pain; unusually weak or tired; yellowing of the eyes or skin  -signs and symptoms of low blood pressure like dizziness; feeling faint or lightheaded, falls; unusually weak or tired  -trouble passing urine or change in the amount of urine  Side effects that usually do not require medical attention (report to your doctor or health care professional if they continue or are bothersome):  -constipation  -dry mouth  -nausea, vomiting  -tiredness  This list may not describe all possible side effects. Call your doctor for medical advice about side effects. You may report side effects to FDA at 4-470-FDA-6861.  Where should I keep my medicine?  Keep out of the reach of children. This medicine can be abused. Keep your medicine in a safe place to protect it from theft. Do not share this medicine with anyone. Selling or giving away this medicine is dangerous and against the law.  This medicine may cause accidental overdose and death if it taken by other adults, children, or pets. Mix any unused medicine with a substance like cat litter or coffee grounds. Then throw the medicine away in a sealed container like a sealed bag or a coffee can with a lid. Do not use the medicine after the expiration date.  Store at room temperature between 20 and 25 degrees C (68 and 77 degrees F).  NOTE: This sheet is a summary. It may not cover all possible information. If you have questions about this medicine, talk to your doctor, pharmacist, or health care provider.  © 2018 Elsevier/Gold Standard (2016-09-12 21:48:12)    Tizanidine tablets or capsules  What is this medicine?  TIZANIDINE (elizabeth acevedo) helps to relieve muscle spasms. It may be used to help in the treatment of multiple sclerosis and  spinal cord injury.  This medicine may be used for other purposes; ask your health care provider or pharmacist if you have questions.  COMMON BRAND NAME(S): Zanaflex  What should I tell my health care provider before I take this medicine?  They need to know if you have any of these conditions:  -kidney disease  -liver disease  -low blood pressure  -mental disorder  -an unusual or allergic reaction to tizanidine, other medicines, lactose (tablets only), foods, dyes, or preservatives  -pregnant or trying to get pregnant  -breast-feeding  How should I use this medicine?  Take this medicine by mouth with a full glass of water. Take this medicine on an empty stomach, at least 30 minutes before or 2 hours after food. Do not take with food unless you talk with your doctor. Follow the directions on the prescription label. Take your medicine at regular intervals. Do not take your medicine more often than directed. Do not stop taking except on your doctor's advice. Suddenly stopping the medicine can be very dangerous.  Talk to your pediatrician regarding the use of this medicine in children.  Patients over 65 years old may have a stronger reaction and need a smaller dose.  Overdosage: If you think you have taken too much of this medicine contact a poison control center or emergency room at once.  NOTE: This medicine is only for you. Do not share this medicine with others.  What if I miss a dose?  If you miss a dose, take it as soon as you can. If it is almost time for your next dose, take only that dose. Do not take double or extra doses.  What may interact with this medicine?  Do not take this medicine with any of the following medications:  -ciprofloxacin  -cisapride  -dofetilide  -dronedarone  -fluvoxamine  -narcotic medicines for cough  -pimozide  -thiabendazole  -thioridazine  -ziprasidone  This medicine may also interact with the following medications:  -acyclovir  -alcohol  -antihistamines for allergy, cough and  cold  -baclofen  -certain antibiotics like levofloxacin, ofloxacin  -certain medicines for anxiety or sleep  -certain medicines for blood pressure, heart disease, irregular heart beat  -certain medicines for depression like amitriptyline, fluoxetine, sertraline  -certain medicines for seizures like phenobarbital, primidone  -certain medicines for stomach problems like cimetidine, famotidine  -female hormones, like estrogens or progestins and birth control pills, patches, rings, or injections  -general anesthetics like halothane, isoflurane, methoxyflurane, propofol  -local anesthetics like lidocaine, pramoxine, tetracaine  -medicines that relax muscles for surgery  -narcotic medicines for pain  -other medicines that prolong the QT interval (cause an abnormal heart rhythm)  -phenothiazines like chlorpromazine, mesoridazine, prochlorperazine  -ticlopidine  -zileuton  This list may not describe all possible interactions. Give your health care provider a list of all the medicines, herbs, non-prescription drugs, or dietary supplements you use. Also tell them if you smoke, drink alcohol, or use illegal drugs. Some items may interact with your medicine.  What should I watch for while using this medicine?  Tell your doctor or health care professional if your symptoms do not start to get better or if they get worse.  You may get drowsy or dizzy. Do not drive, use machinery, or do anything that needs mental alertness until you know how this medicine affects you. Do not stand or sit up quickly, especially if you are an older patient. This reduces the risk of dizzy or fainting spells. Alcohol may interfere with the effect of this medicine. Avoid alcoholic drinks.  If you are taking another medicine that also causes drowsiness, you may have more side effects. Give your health care provider a list of all medicines you use. Your doctor will tell you how much medicine to take. Do not take more medicine than directed. Call emergency  for help if you have problems breathing or unusual sleepiness.  Your mouth may get dry. Chewing sugarless gum or sucking hard candy, and drinking plenty of water may help. Contact your doctor if the problem does not go away or is severe.  What side effects may I notice from receiving this medicine?  Side effects that you should report to your doctor or health care professional as soon as possible:  -allergic reactions like skin rash, itching or hives, swelling of the face, lips, or tongue  -breathing problems  -hallucinations  -signs and symptoms of liver injury like dark yellow or brown urine; general ill feeling or flu-like symptoms; light-colored stools; loss of appetite; nausea; right upper quadrant belly pain; unusually weak or tired; yellowing of the eyes or skin  -signs and symptoms of low blood pressure like dizziness; feeling faint or lightheaded, falls; unusually weak or tired  -unusually slow heartbeat  -unusually weak or tired  Side effects that usually do not require medical attention (report to your doctor or health care professional if they continue or are bothersome):  -blurred vision  -constipation  -dizziness  -dry mouth  -tiredness  This list may not describe all possible side effects. Call your doctor for medical advice about side effects. You may report side effects to FDA at 0-939-FDA-9223.  Where should I keep my medicine?  Keep out of the reach of children.  Store at room temperature between 15 and 30 degrees C (59 and 86 degrees F). Throw away any unused medicine after the expiration date.  NOTE: This sheet is a summary. It may not cover all possible information. If you have questions about this medicine, talk to your doctor, pharmacist, or health care provider.  © 2018 Elsevier/Gold Standard (2016-09-27 13:52:12)

## 2020-02-26 NOTE — CARE PLAN
Problem: Venous Thromboembolism (VTW)/Deep Vein Thrombosis (DVT) Prevention:  Goal: Patient will participate in Venous Thrombosis (VTE)/Deep Vein Thrombosis (DVT)Prevention Measures  Outcome: PROGRESSING AS EXPECTED    Patient wearing SCDs while in bed and chair and is ambulating short distances with no issues.    Problem: Discharge Barriers/Planning  Goal: Patient's continuum of care needs will be met  Outcome: PROGRESSING AS EXPECTED    Patient  will be discharged today based upon PT/OT evals and if drainage into drain is less than 50mL in 8 hours.

## 2020-02-26 NOTE — ANESTHESIA PROCEDURE NOTES
Airway  Date/Time: 2/25/2020 6:35 PM  Performed by: Amaury Lemus M.D.  Authorized by: Amaury Lemus M.D.     Location:  OR  Urgency:  Elective  Difficult Airway: No    Indications for Airway Management:  Anesthesia  Spontaneous Ventilation: absent    Sedation Level:  Deep  Preoxygenated: Yes    Patient Position:  Sniffing  Mask Difficulty Assessment:  1 - vent by mask  Final Airway Type:  Endotracheal airway  Final Endotracheal Airway:  ETT  Cuffed: Yes    Technique Used for Successful ETT Placement:  Direct laryngoscopy  Insertion Site:  Oral  Blade Type:  Salas  Laryngoscope Blade/Videolaryngoscope Blade Size:  2  ETT Size (mm):  7.0  Measured from:  Lips  ETT to Lips (cm):  20  Placement Verified by: auscultation and capnometry    Cormack-Lehane Classification:  Grade I - full view of glottis  Number of Attempts at Approach:  1

## 2020-02-26 NOTE — OR NURSING
"Pt incontinent of urine. Voided remainder in bedpan. \" my bladder can't wait'  Linen  And gown changed. Pt tolerated well.  "

## 2020-02-26 NOTE — ANESTHESIA TIME REPORT
Anesthesia Start and Stop Event Times     Date Time Event    2/25/2020 1753 Ready for Procedure     1830 Anesthesia Start     1945 Anesthesia Stop        Responsible Staff  02/25/20    Name Role Begin End    Amaury Lemus M.D. Anesth 1830 1945        Preop Diagnosis (Free Text):  Pre-op Diagnosis     SPINAL STENOSIS OF LUMBAR REGION        Preop Diagnosis (Codes):    Post op Diagnosis  Lumbar spinal stenosis  lumbar spinal stenosis, L5-S1    Premium Reason  A. 3PM - 7AM    Comments:

## 2020-02-26 NOTE — ANESTHESIA PREPROCEDURE EVALUATION
Pacemaker for episode of sinus pause vs severe bradycardia during a previous surgery. Pacemaker is stable. Cardiology noted that she has had episodes of afib and will likely start anticoagulation post-operatively. Hx of possible TIA for which she takes Plavix (off for one week). Prior GA without issue. Denies MI/CHF/CVA/smoking/DM/CKD.    Relevant Problems   NEURO   (+) History of TIA (transient ischemic attack)      CARDIAC   (+) Cardiac pacemaker in situ   (+) Essential hypertension, benign   (+) PAF (paroxysmal atrial fibrillation) (HCC)      ENDO   (+) Type 2 diabetes mellitus without complication, without long-term current use of insulin (HCC)       Physical Exam    Airway   Mallampati: II  TM distance: >3 FB  Neck ROM: full       Cardiovascular - normal exam  Rhythm: regular  Rate: normal  (-) murmur     Dental   (+) upper dentures, lower dentures         Pulmonary - normal exam  Breath sounds clear to auscultation     Abdominal    Neurological - normal exam                 Anesthesia Plan    ASA 3   ASA physical status 3 criteria: implanted pacemaker    Plan - general       Airway plan will be ETT        Induction: intravenous    Postoperative Plan: Postoperative administration of opioids is intended.    Pertinent diagnostic labs and testing reviewed    Informed Consent:    Anesthetic plan and risks discussed with patient.    Use of blood products discussed with: patient whom consented to blood products.

## 2020-02-26 NOTE — PROGRESS NOTES
2 RN skin check complete.   Devices in place: nasal cannula, SCDs, and hemovac.  Skin assessed under devices.  Dressing to lower back covering surgical incision. Dressing is clean, dry, and intact. Hemovac in place. Minimal sanguineous drainage in hemovac. Skin otherwise clean, dry, and intact.   The following interventions in place: Waffle cushion, patient turns self, pillows in place for support and positioning.

## 2020-02-26 NOTE — THERAPY
"Physical Therapy Evaluation completed.   Bed Mobility:  Supine to Sit: Minimal Assist (HOB flat, used rail, has rail at home, max VC for log roll)  Transfers: Sit to Stand: Supervised  Gait: Level Of Assist: Supervised with Front-Wheel Walker       Plan of Care: Patient with no further skilled PT needs in the acute care setting at this time  Discharge Recommendations: Equipment: patient has/uses 4WW. Post-acute therapy: Recommend outpatient physical therapy services to address higher level deficits.    See \"Rehab Therapy-Acute\" Patient Summary Report for complete documentation.    Patient is a pleasant 87 YO female s/p L4-5 lami and L4-S1 decompression with Dr. Kimble on 2/26. She ambulated approximately 75ft x2 in unit with FWW and supervision. Provided patient education regarding spinal precautions for pain management, lifting restriction, log roll, standing posture, and safe techniques for sitting given hx of falls when attempting to sit. Patient with some demonstration of understanding; patient's daughter at bedside and able to reinforce education. Recommend outpatient PT following medical clearance. Patient will not be actively followed for physical therapy services at this time, however may be seen if requested by physician for 1 more visit within 30 days to address any discharge or equipment needs.  ?      "

## 2020-02-26 NOTE — PROGRESS NOTES
Neurosurgery Progress Note    Subjective:  Pt doing well, she has not had any pain med, back a bit sore, legs good. Up to br & eating    Exam:  BANDAR HILARIO's strong, dressing c&d  On O2    BP  Min: 103/58  Max: 152/74  Pulse  Av.2  Min: 63  Max: 86  Resp  Av.1  Min: 10  Max: 23  Temp  Av.1 °C (96.9 °F)  Min: 35.8 °C (96.5 °F)  Max: 36.2 °C (97.2 °F)  SpO2  Av.6 %  Min: 92 %  Max: 98 %    No data recorded        Recent Labs     20  0839   SODIUM 130*     Recent Labs     20  1430   APTT 30.5   INR 0.95           Intake/Output       20 0700 - 20 0659 20 0700 - 20 0659      9540-9725 0604-0035 Total 0-0659 Total       Intake    P.O.  --  150 150  --  -- --    P.O. -- 150 150 -- -- --    I.V.  --  1350 1350  --  -- --    Volume (mL) (electrolyte-A (PLASMALYTE-A) infusion) -- 50 50 -- -- --    Volume (mL) (lactated ringers infusion) -- 1300 1300 -- -- --    Other  --  10 10  --  -- --    Medications (PO/Enteral Liquids) -- 10 10 -- -- --    Total Intake -- 1510 1510 -- -- --       Output    Urine  --  -- --  --  -- --    Number of Times Voided -- 5 x 5 x -- -- --    Drains  --  110 110  --  -- --    Output (mL) (Closed/Suction Drain 1 Back Hemovac 10 Fr.) -- 110 110 -- -- --    Blood  --  30 30  --  -- --    Est. Blood Loss -- 30 30 -- -- --    Total Output -- 140 140 -- -- --       Net I/O     -- 1370 1370 -- -- --            Intake/Output Summary (Last 24 hours) at 2020 0801  Last data filed at 2020 0500  Gross per 24 hour   Intake 1510 ml   Output 140 ml   Net 1370 ml            • hydroCHLOROthiazide  25 mg Q DAY   • carvedilol  6.25 mg BID WITH MEALS   • losartan  100 mg DAILY   • Pharmacy Consult Request  1 Each PHARMACY TO DOSE   • MD ALERT...DO NOT ADMINISTER NSAIDS or ASPIRIN unless ORDERED By Neurosurgery  1 Each PRN   • docusate sodium  100 mg BID   • senna-docusate  1 Tab Q24HRS PRN   • polyethylene glycol/lytes  1 Packet BID PRN   •  magnesium hydroxide  30 mL QDAY PRN   • bisacodyl  10 mg Q24HRS PRN   • fleet  1 Each Once PRN   • 0.9 % NaCl with KCl 20 mEq 1,000 mL   Continuous   • HYDROmorphone  0.5 mg Q3HRS PRN   • ceFAZolin  2 g Q8HR   • diphenhydrAMINE  25 mg Q6HRS PRN    Or   • diphenhydrAMINE  25 mg Q6HRS PRN   • ondansetron  4 mg Q4HRS PRN   • ondansetron  4 mg Q4HRS PRN   • tizanidine  2 mg TID PRN   • hydrALAZINE  10 mg Q HOUR PRN   • benzocaine-menthol  1 Lozenge Q2HRS PRN   • oxyCODONE-acetaminophen  1-2 Tab Q4HRS PRN       Assessment and Plan:  POD # 1 L5 Laminectomy/ diskectomy  NM as above  Pt to increase activity today with therapies  Check drain at noon & hopefully dc  Possible dc home later today  Prophylactic anticoagulation: no         Start date/time: no need

## 2020-02-26 NOTE — OR NURSING
Daughter updated.   Prolonged wait for beds. Room availability unknown.   House supervisor is working on it.Pt is stable. Comfortable.    Daughter elects to go home and return in morning.  This nurse will update her on room ( if assigned) and   Patient condition.

## 2020-02-26 NOTE — ANESTHESIA PROCEDURE NOTES
Arterial Line  Performed by: Amaury Lemus M.D.  Authorized by: Amaury Lemus M.D.     Start Time:  2/25/2020 6:39 PM  End Time:  2/25/2020 6:40 PM  Localization: surface landmarks    Patient Location:  OR  Indication: continuous blood pressure monitoring    Catheter Size:  20 G  Seldinger Technique?: Yes    Laterality:  Left  Site:  Radial artery  Line Secured:  Antimicrobial disc, tape and transparent dressing  Events: patient tolerated procedure well with no complications     A-line placed after induction of anesthesia

## 2020-02-26 NOTE — THERAPY
"Occupational Therapy Evaluation completed.   Functional Status:    87 y/o female s/p L5-S1 laminectomy and fusion, no brace. Pt doing well at this time, required Min A to maintain spinal precautions with bed mobility. Once at EOB, demonstrated LB dressing supv, Min A for UB dressing from daughter, STS supv, ambulated to bathroom with FWW and completed toilet xfer supv. Reviewed spinal precautions in relation to ADL with patient, she was receptive. No further acute OT needs at this time.     Plan of Care: Patient with no further skilled OT needs in the acute care setting at this time  Discharge Recommendations:  Equipment: No Equipment Needed. Post-acute therapy Currently anticipate no further skilled therapy needs once patient is discharged from the inpatient setting.    See \"Rehab Therapy-Acute\" Patient Summary Report for complete documentation.    "

## 2020-02-26 NOTE — CARE PLAN
Problem: Communication  Goal: The ability to communicate needs accurately and effectively will improve  Outcome: MET     Pt uses call light as necessary to contact medical staff     Problem: Safety  Goal: Will remain free from injury  Outcome: MET  Goal: Will remain free from falls  Outcome: MET    Bed locked in lowest position with top two siderails up. Call light within reach. Treaded socks on. Educated patient to call for help before getting up

## 2020-02-26 NOTE — PROGRESS NOTES
Patient's Hemovac  Checked and drained. Drain had 20 ml's of sanguinous drainage.  Drain  Reapplied as indicated.  Patient tolerated. No S/S of distress noted.

## 2020-02-26 NOTE — OP REPORT
DATE OF SERVICE:  02/25/2020    PREOPERATIVE DIAGNOSIS:  Lumbar stenosis with lumbar radiculopathy.    POSTOPERATIVE DIAGNOSIS:  Lumbar stenosis with lumbar radiculopathy.    PROCEDURES:  L5, partial L4 laminectomy with medial facetectomy and bilateral   foraminotomy and decompression of L4, L5, and S1 nerve roots.    SURGEON:  Latrell Kimble MD.    ASSISTANT:  KYLIE Velarde.    ANESTHESIA:  General anesthetic.    ANESTHESIOLOGIST:  Amaury Lemus MD.    PREPARATION:  Routine.    INDICATIONS:  Patient has progressive low back pain and radiculopathy, which   is inhibiting her activity.  Physical therapy has made her worse.  The   indications and possible complications of this procedure were explained to the   patient and informed consent was obtained.  Assistant, KYLIE Velarde, was required for keeping the wound clear as we were doing a   decompression of blood, retracting the dura so we could undercut the medial   facets and decompress the severe lateral recess stenosis at L4-L5 and L5-S1,   assisted with placement of retractors, placement of drain, and closure at the   end.    DESCRIPTION OF PROCEDURE:  Patient was brought to the operating room, and   following induction, she was intubated and placed under general anesthetic,   rolled prone onto the operating room table.  All pressure points were padded.    Sequential stockings were in place.  Back was prepped and draped in sterile   fashion.  Proposed incision site was injected with Marcaine 0.5% with   epinephrine.  Midline incision was made and carried down to subcutaneous   tissue.  Subperiosteal dissection was carried out at L4-L5 and L5-S1.    Retractors were placed to maintain exposure.  Spinous processes of L4 and L5   were identified with intraoperative x-ray.  We removed L5 and a portion of L4   down to the lamina and then drilled the junction of the lamina facet to a thin   shelf and removed the center portion of the bone with  Leksell and   subsequently Kerrisons.  L5 and L4 were somewhat subluxed on each other, so we   had to carry our decompression superiorly to the pedicle of L4 bilaterally   and undercut the facet joint, and at L4-L5, there was significant lateral   recess stenosis at the L4-L5 disk space.  Decompression of the L5 nerve root   as it went around the pedicle of L5 with bilateral foraminotomies was   subsequently performed.  We then decompressed the medial facet at L5-S1,   decompressing the S1 nerve root bilaterally with #3 and #4 Kerrisons.  The   area was copiously irrigated.  Meticulous hemostasis was obtained.  A piece of   Duragen was placed over the thecal sac, so the thin thecal sac would not be   irritated by a drain.  We then closed the fascia with #1 Vicryl suture,   subcutaneous tissue with 2-0 Vicryl, and subcuticular with 3-0 Vicryl, skin   with Steri-Strips.  All sponge and needle counts were correct.  Estimated   blood loss was approximately 60 mL.       ____________________________________     AMBAR NIÑO MD    JKM / NTS    DD:  02/25/2020 19:39:15  DT:  02/25/2020 20:57:39    D#:  [id number]  Job#:  [id number]    cc: Joana ESPINAL

## 2020-02-26 NOTE — ANESTHESIA POSTPROCEDURE EVALUATION
Patient: Krystle Tavarez    Procedure Summary     Date:  02/25/20 Room / Location:  [facility] OR 05 / SURGERY [facility]    Anesthesia Start:  1830 Anesthesia Stop:  1945    Procedures:       LAMINECTOMY, SPINE, LUMBAR, WITH DISCECTOMY- L5-S1, MEDIAL FACETECTOMY (N/A )      FORAMINOTOMY, SPINE (N/A ) Diagnosis:  (SPINAL STENOSIS OF LUMBAR REGION)    Surgeon:  Latrell Kimble M.D. Responsible Provider:  Amaury Lemus M.D.    Anesthesia Type:  general ASA Status:  3          Final Anesthesia Type: general  Last vitals  /68       Temp 97.2       Pulse 64      Resp 16       SpO2 97         Anesthesia Post Evaluation    Patient location during evaluation: PACU  Patient participation: complete - patient participated  Level of consciousness: awake and alert  Pain score: 0    Airway patency: patent  Anesthetic complications: no  Cardiovascular status: hemodynamically stable  Respiratory status: acceptable  Hydration status: euvolemic    PONV: none

## 2020-02-26 NOTE — OR SURGEON
Immediate Post OP Note    PreOp Diagnosis: Lumbar stenosis without neurogenic claudication    PostOp Diagnosis: same    Procedure(s):  LAMINECTOMY, SPINE, LUMBAR, WITH DISCECTOMY- L5-S1, MEDIAL FACETECTOMY - Wound Class: Clean with Drain  FORAMINOTOMY, SPINE - Wound Class: Clean with Drain    Surgeon(s):  Latrell Kimble M.D.    Anesthesiologist/Type of Anesthesia:  Anesthesiologist: Amaury Lemus M.D./General    Surgical Staff:  Assistant: JAYLEN Schmitt  Circulator: John Baca R.N.  Scrub Person: Jodie Verdugo    Specimens removed if any:  * No specimens in log *    Estimated Blood Loss: 50cc    Findings: lateral recess stenosis at L45 and L5-S1    Complications: none        2/25/2020 7:39 PM Latrell Kimble M.D.

## 2020-02-26 NOTE — OR NURSING
"Report to Falguni FLORENCE  L5-S1 lumbar laminectomy/discectomy by Dr Kimble.  mepilex to back/ C/D/I. Hemovac. 50 cc bloody output    VSS. 94% 2l NC. BECERRA. Strong. equal. Denies numbness and tingling in all extremities.     Incontinent of urine. States \" my bladder can't wait\"H has voided in PACU. Ambulates w/ walker at home due to poor balance. Fell 2 months ago. Yellow socks on. Moderate fall risk per assessment.    rec'd oxycodone 10 mg PO 2021. Fentanyl 50 mcgs IV. Pain has been the shoulders. Likely from positioning in surgery. Pain tolerable.    PMHX given .18 g Left hand patent.. tolerating water. No nausea.    All questions answered.    O2 tank is full prior transport   "

## 2020-02-29 ENCOUNTER — APPOINTMENT (OUTPATIENT)
Dept: RADIOLOGY | Facility: MEDICAL CENTER | Age: 85
DRG: 552 | End: 2020-02-29
Attending: EMERGENCY MEDICINE
Payer: MEDICARE

## 2020-02-29 ENCOUNTER — HOSPITAL ENCOUNTER (INPATIENT)
Facility: MEDICAL CENTER | Age: 85
LOS: 4 days | DRG: 552 | End: 2020-03-04
Attending: EMERGENCY MEDICINE | Admitting: HOSPITALIST
Payer: MEDICARE

## 2020-02-29 DIAGNOSIS — M54.50 ACUTE EXACERBATION OF CHRONIC LOW BACK PAIN: ICD-10-CM

## 2020-02-29 DIAGNOSIS — G89.29 ACUTE EXACERBATION OF CHRONIC LOW BACK PAIN: ICD-10-CM

## 2020-02-29 DIAGNOSIS — G89.18 ACUTE POST-OPERATIVE PAIN: ICD-10-CM

## 2020-02-29 PROBLEM — K59.00 CN (CONSTIPATION): Status: ACTIVE | Noted: 2020-02-29

## 2020-02-29 LAB
ANION GAP SERPL CALC-SCNC: 7 MMOL/L (ref 0–11.9)
BASOPHILS # BLD AUTO: 0.2 % (ref 0–1.8)
BASOPHILS # BLD: 0.02 K/UL (ref 0–0.12)
BUN SERPL-MCNC: 18 MG/DL (ref 8–22)
CALCIUM SERPL-MCNC: 9 MG/DL (ref 8.5–10.5)
CHLORIDE SERPL-SCNC: 97 MMOL/L (ref 96–112)
CO2 SERPL-SCNC: 29 MMOL/L (ref 20–33)
CREAT SERPL-MCNC: 0.79 MG/DL (ref 0.5–1.4)
EOSINOPHIL # BLD AUTO: 0.18 K/UL (ref 0–0.51)
EOSINOPHIL NFR BLD: 2.2 % (ref 0–6.9)
ERYTHROCYTE [DISTWIDTH] IN BLOOD BY AUTOMATED COUNT: 44.6 FL (ref 35.9–50)
GLUCOSE SERPL-MCNC: 166 MG/DL (ref 65–99)
HCT VFR BLD AUTO: 34.1 % (ref 37–47)
HGB BLD-MCNC: 11.9 G/DL (ref 12–16)
IMM GRANULOCYTES # BLD AUTO: 0.04 K/UL (ref 0–0.11)
IMM GRANULOCYTES NFR BLD AUTO: 0.5 % (ref 0–0.9)
LYMPHOCYTES # BLD AUTO: 1.75 K/UL (ref 1–4.8)
LYMPHOCYTES NFR BLD: 20.9 % (ref 22–41)
MCH RBC QN AUTO: 31.5 PG (ref 27–33)
MCHC RBC AUTO-ENTMCNC: 34.9 G/DL (ref 33.6–35)
MCV RBC AUTO: 90.2 FL (ref 81.4–97.8)
MONOCYTES # BLD AUTO: 0.62 K/UL (ref 0–0.85)
MONOCYTES NFR BLD AUTO: 7.4 % (ref 0–13.4)
NEUTROPHILS # BLD AUTO: 5.76 K/UL (ref 2–7.15)
NEUTROPHILS NFR BLD: 68.8 % (ref 44–72)
NRBC # BLD AUTO: 0 K/UL
NRBC BLD-RTO: 0 /100 WBC
PLATELET # BLD AUTO: 214 K/UL (ref 164–446)
PMV BLD AUTO: 9.2 FL (ref 9–12.9)
POTASSIUM SERPL-SCNC: 3.6 MMOL/L (ref 3.6–5.5)
RBC # BLD AUTO: 3.78 M/UL (ref 4.2–5.4)
SODIUM SERPL-SCNC: 133 MMOL/L (ref 135–145)
WBC # BLD AUTO: 8.4 K/UL (ref 4.8–10.8)

## 2020-02-29 PROCEDURE — 700102 HCHG RX REV CODE 250 W/ 637 OVERRIDE(OP): Performed by: STUDENT IN AN ORGANIZED HEALTH CARE EDUCATION/TRAINING PROGRAM

## 2020-02-29 PROCEDURE — 99285 EMERGENCY DEPT VISIT HI MDM: CPT

## 2020-02-29 PROCEDURE — 36415 COLL VENOUS BLD VENIPUNCTURE: CPT

## 2020-02-29 PROCEDURE — 770006 HCHG ROOM/CARE - MED/SURG/GYN SEMI*

## 2020-02-29 PROCEDURE — 80048 BASIC METABOLIC PNL TOTAL CA: CPT

## 2020-02-29 PROCEDURE — 99223 1ST HOSP IP/OBS HIGH 75: CPT | Mod: GC | Performed by: HOSPITALIST

## 2020-02-29 PROCEDURE — 85025 COMPLETE CBC W/AUTO DIFF WBC: CPT

## 2020-02-29 PROCEDURE — A9270 NON-COVERED ITEM OR SERVICE: HCPCS | Performed by: STUDENT IN AN ORGANIZED HEALTH CARE EDUCATION/TRAINING PROGRAM

## 2020-02-29 PROCEDURE — 72131 CT LUMBAR SPINE W/O DYE: CPT

## 2020-02-29 RX ORDER — IBUPROFEN 200 MG
500 CAPSULE ORAL 2 TIMES DAILY
Status: ON HOLD | COMMUNITY
End: 2020-03-16

## 2020-02-29 RX ORDER — CARVEDILOL 6.25 MG/1
6.25 TABLET ORAL 2 TIMES DAILY WITH MEALS
Status: DISCONTINUED | OUTPATIENT
Start: 2020-02-29 | End: 2020-03-04 | Stop reason: HOSPADM

## 2020-02-29 RX ORDER — TIZANIDINE 2 MG/1
2 TABLET ORAL 3 TIMES DAILY
Status: ON HOLD | COMMUNITY
End: 2020-03-16

## 2020-02-29 RX ORDER — LOSARTAN POTASSIUM 50 MG/1
100 TABLET ORAL
Status: DISCONTINUED | OUTPATIENT
Start: 2020-03-01 | End: 2020-03-04 | Stop reason: HOSPADM

## 2020-02-29 RX ORDER — LABETALOL HYDROCHLORIDE 5 MG/ML
10 INJECTION, SOLUTION INTRAVENOUS EVERY 4 HOURS PRN
Status: DISCONTINUED | OUTPATIENT
Start: 2020-02-29 | End: 2020-03-04 | Stop reason: HOSPADM

## 2020-02-29 RX ORDER — POLYETHYLENE GLYCOL 3350 17 G/17G
1 POWDER, FOR SOLUTION ORAL
Status: DISCONTINUED | OUTPATIENT
Start: 2020-02-29 | End: 2020-03-01

## 2020-02-29 RX ORDER — BISACODYL 10 MG
10 SUPPOSITORY, RECTAL RECTAL
Status: DISCONTINUED | OUTPATIENT
Start: 2020-02-29 | End: 2020-03-01

## 2020-02-29 RX ORDER — CARVEDILOL 6.25 MG/1
6.25 TABLET ORAL 2 TIMES DAILY WITH MEALS
Status: ON HOLD | COMMUNITY
End: 2020-03-16

## 2020-02-29 RX ORDER — IBUPROFEN 200 MG
500 CAPSULE ORAL 2 TIMES DAILY
Status: DISCONTINUED | OUTPATIENT
Start: 2020-02-29 | End: 2020-03-04 | Stop reason: HOSPADM

## 2020-02-29 RX ORDER — ACETAMINOPHEN 325 MG/1
650 TABLET ORAL EVERY 6 HOURS PRN
Status: DISCONTINUED | OUTPATIENT
Start: 2020-02-29 | End: 2020-03-04 | Stop reason: HOSPADM

## 2020-02-29 RX ORDER — VITAMIN B COMPLEX
1000 TABLET ORAL EVERY EVENING
Status: DISCONTINUED | OUTPATIENT
Start: 2020-02-29 | End: 2020-03-04 | Stop reason: HOSPADM

## 2020-02-29 RX ORDER — TIZANIDINE 4 MG/1
2 TABLET ORAL 3 TIMES DAILY
Status: DISCONTINUED | OUTPATIENT
Start: 2020-02-29 | End: 2020-03-04 | Stop reason: HOSPADM

## 2020-02-29 RX ORDER — OXYCODONE HYDROCHLORIDE AND ACETAMINOPHEN 5; 325 MG/1; MG/1
1-2 TABLET ORAL EVERY 4 HOURS PRN
Status: ON HOLD | COMMUNITY
End: 2020-03-16

## 2020-02-29 RX ORDER — CLOPIDOGREL BISULFATE 75 MG/1
75 TABLET ORAL DAILY
Status: DISCONTINUED | OUTPATIENT
Start: 2020-02-29 | End: 2020-02-29

## 2020-02-29 RX ORDER — CLOPIDOGREL BISULFATE 75 MG/1
75 TABLET ORAL DAILY
Status: ON HOLD | COMMUNITY
End: 2020-03-04

## 2020-02-29 RX ORDER — AMOXICILLIN 250 MG
2 CAPSULE ORAL 2 TIMES DAILY
Status: DISCONTINUED | OUTPATIENT
Start: 2020-02-29 | End: 2020-03-01

## 2020-02-29 RX ORDER — OXYCODONE HYDROCHLORIDE AND ACETAMINOPHEN 5; 325 MG/1; MG/1
1-2 TABLET ORAL EVERY 6 HOURS PRN
Status: DISCONTINUED | OUTPATIENT
Start: 2020-02-29 | End: 2020-03-04 | Stop reason: HOSPADM

## 2020-02-29 RX ORDER — LOSARTAN POTASSIUM AND HYDROCHLOROTHIAZIDE 25; 100 MG/1; MG/1
1 TABLET ORAL DAILY
Status: DISCONTINUED | OUTPATIENT
Start: 2020-02-29 | End: 2020-02-29

## 2020-02-29 RX ORDER — ASCORBIC ACID 500 MG
1000 TABLET ORAL DAILY
Status: DISCONTINUED | OUTPATIENT
Start: 2020-02-29 | End: 2020-03-04 | Stop reason: HOSPADM

## 2020-02-29 RX ORDER — HYDROCHLOROTHIAZIDE 25 MG/1
25 TABLET ORAL
Status: DISCONTINUED | OUTPATIENT
Start: 2020-03-01 | End: 2020-03-04 | Stop reason: HOSPADM

## 2020-02-29 RX ADMIN — MELATONIN 1000 UNITS: at 17:33

## 2020-02-29 RX ADMIN — POLYETHYLENE GLYCOL 3350 1 PACKET: 17 POWDER, FOR SOLUTION ORAL at 17:41

## 2020-02-29 RX ADMIN — SENNOSIDES AND DOCUSATE SODIUM 2 TABLET: 8.6; 5 TABLET ORAL at 17:33

## 2020-02-29 RX ADMIN — OXYCODONE HYDROCHLORIDE AND ACETAMINOPHEN 1000 MG: 500 TABLET ORAL at 17:33

## 2020-02-29 RX ADMIN — TIZANIDINE 2 MG: 4 TABLET ORAL at 15:04

## 2020-02-29 RX ADMIN — TIZANIDINE 2 MG: 4 TABLET ORAL at 17:32

## 2020-02-29 RX ADMIN — CALCIUM 500 MG: 500 TABLET ORAL at 17:33

## 2020-02-29 ASSESSMENT — ENCOUNTER SYMPTOMS
SORE THROAT: 0
ORTHOPNEA: 0
CHILLS: 0
DEPRESSION: 0
COUGH: 0
WEAKNESS: 1
FEVER: 0
SENSORY CHANGE: 0
BACK PAIN: 1
NAUSEA: 0
EYE PAIN: 0
FOCAL WEAKNESS: 0
ABDOMINAL PAIN: 0

## 2020-02-29 ASSESSMENT — COGNITIVE AND FUNCTIONAL STATUS - GENERAL
STANDING UP FROM CHAIR USING ARMS: A LITTLE
WALKING IN HOSPITAL ROOM: A LOT
MOVING TO AND FROM BED TO CHAIR: A LITTLE
CLIMB 3 TO 5 STEPS WITH RAILING: A LITTLE
SUGGESTED CMS G CODE MODIFIER DAILY ACTIVITY: CK
DAILY ACTIVITIY SCORE: 18
DRESSING REGULAR LOWER BODY CLOTHING: A LITTLE
SUGGESTED CMS G CODE MODIFIER MOBILITY: CK
MOBILITY SCORE: 17
MOVING FROM LYING ON BACK TO SITTING ON SIDE OF FLAT BED: A LITTLE
PERSONAL GROOMING: A LITTLE
HELP NEEDED FOR BATHING: A LITTLE
DRESSING REGULAR UPPER BODY CLOTHING: A LITTLE
TOILETING: A LOT
TURNING FROM BACK TO SIDE WHILE IN FLAT BAD: A LITTLE

## 2020-02-29 ASSESSMENT — FIBROSIS 4 INDEX
FIB4 SCORE: 1.15
FIB4 SCORE: 1.15

## 2020-02-29 NOTE — ED PROVIDER NOTES
ED Provider Note    Scribed for Suzy Haynes M.D. by Stacy Salmeron. 2/29/2020, 10:54 AM.    Primary care provider: JAYLEN Castelan  Means of arrival: EMS  History obtained from: Patient  History limited by: None     CHIEF COMPLAINT  Chief Complaint   Patient presents with   • Back Pain     Decompression procedure on Tuesday. Dc Wednesday. Patient with increasing back pain        HPI  Krystle Tavarez is a 86 y.o. female who presents to the Emergency Department for back pain onset 3 days ago. The patient had a lumbar decompression procedure done 4 days ago and she was discharged the next day. She endorses worsening back pain since the procedure. The pain is alleviated by laying flat. She reports associated constipation, and extremity weakness. She has difficulty holding herself up and feels like her balance is off. The patient denies fever or vomiting. Her neurosurgeon is Dr. Kimble.       REVIEW OF SYSTEMS  Pertinent positives include back pain, weakness, diarrhea. Pertinent negatives include no fever or vomiting. As above, all other systems reviewed and are negative.   See HPI for further details.     PAST MEDICAL HISTORY  Past Medical History:   Diagnosis Date   • Arthritis     knees, hips   • Breath shortness     with exertion    • Cataract     bilat IOL    • Dental disorder     full upper, partial lower    • Heart burn    • Hemorrhagic disorder (HCC)     on Plavix    • High cholesterol     diet controlled    • Hyperlipidemia    • Hypertension    • Pacemaker 2015   • Pain 08/2019    lower back, right leg   • Pre-diabetes    • TIA (transient ischemic attack)     on Plavix   • Urinary incontinence        SURGICAL HISTORY  Past Surgical History:   Procedure Laterality Date   • PB LAMINOTOMY,LUMBAR DISK,1 INTRSP N/A 2/25/2020    Procedure: LAMINECTOMY, SPINE, LUMBAR, WITH DISCECTOMY- L5-S1, MEDIAL FACETECTOMY;  Surgeon: Latrell Kimble M.D.;  Location: SURGERY [facility];  Service: Neurosurgery  "  • FORAMINOTOMY N/A 2/25/2020    Procedure: FORAMINOTOMY, SPINE;  Surgeon: Latrell Kimble M.D.;  Location: SURGERY [facility];  Service: Neurosurgery   • PACEMAKER INSERTION  2015   • HIP REPLACEMENT, TOTAL Right 2009   • KNEE REPLACEMENT, TOTAL Right 2004   • CATARACT EXTRACTION WITH IOL Bilateral 2003   • CHOLECYSTECTOMY  2002   • BASAL CELL EXCISION      nose and hand   • BUNIONECTOMY Left        SOCIAL HISTORY  Social History     Tobacco Use   • Smoking status: Never Smoker   • Smokeless tobacco: Never Used   Substance Use Topics   • Alcohol use: No   • Drug use: No      Social History     Substance and Sexual Activity   Drug Use No       FAMILY HISTORY  Family History   Problem Relation Age of Onset   • Diabetes Mother    • Stroke Mother    • Heart Attack Father 74   • No Known Problems Maternal Grandmother    • No Known Problems Maternal Grandfather    • No Known Problems Paternal Grandmother    • No Known Problems Paternal Grandfather        CURRENT MEDICATIONS  Home Medications     Reviewed by Harry Medina (Pharmacy Tech) on 02/29/20 at 1129  Med List Status: Complete   Medication Last Dose Status   Ascorbic Acid (VITAMIN C) 1000 MG Tab 2/28/2020 Active   calcium carbonate (OS-MARGARITA 500) 1250 (500 Ca) MG Tab 2/28/2020 Active   carvedilol (COREG) 6.25 MG Tab 2/28/2020 Active   clopidogrel (PLAVIX) 75 MG Tab 2/14/2020 Active   losartan-hydrochlorothiazide (HYZAAR) 100-25 MG per tablet 2/28/2020 Active   oxyCODONE-acetaminophen (PERCOCET) 5-325 MG Tab 2/29/2020 Active   tizanidine (ZANAFLEX) 2 MG tablet 2/28/2020 Active   vitamin D (CHOLECALCIFEROL) 1000 UNIT Tab 2/28/2020 Active                ALLERGIES  Allergies   Allergen Reactions   • Sulfa Drugs Nausea     Nausea        PHYSICAL EXAM  VITAL SIGNS: BP (!) 175/63   Pulse 69   Ht 1.6 m (5' 3\")   Wt 77.1 kg (170 lb)   LMP  (LMP Unknown)   SpO2 96%   BMI 30.11 kg/m²   Vitals reviewed.    Consitutional: Well-developed, well-nourished. Negative " for: distress.  HENT: Normocephalic, right external ear normal, left external ear normal, oropharynx clear and moist.  Eyes: Conjunctivae normal, extraocular movements normal. Negative for: discharge in right and left eye, icterus.  Neck: Range of motion normal, supple. Negative for cervical adenopathy.  Cardiovascular: Normal rate, regular rhythm, heart sounds normal, intact distal pulses. Negative for: murmur, rub, gallop.  Pulmonary/Chest Wall: Effort normal, breath sounds normal. Negative for: respiratory distress, wheezes, rales, rhonchi.   Abdominal: Soft, bowel sounds normal. Negative for: distention, tenderness, rebound, guarding.  Musculoskeletal:Limited evaluation due to pain with touching skin. Normal range of motion. Negative for edema.  Neurological: Alert and oriented x3. No focal deficits. 4/5 strength to bilateral lower extremity EHL EF. Sensation intact to bilateral feet.   Skin: Warm, dry. Negative for rash.  Psych: Anxious, jumpy      DIAGNOSTIC STUDIES / PROCEDURES    RADIOLOGY  CT-LSPINE W/O PLUS RECONS   Final Result         1. No acute fracture appreciated in the lumbar spine      2. Postsurgical changes from posterior decompression L4-5.      3. Mild retrolisthesis of L2-3 and anterolisthesis of L5-S1 are unchanged.      4. Old T12 compression fracture status post cement augmentation, similar to prior.           The radiologist's interpretation of all radiological studies have been reviewed by me.    COURSE & MEDICAL DECISION MAKING  Nursing notes, VS, PMSFHx reviewed in chart.      10:54 AM Patient seen and examined at bedside. The patient presents with back pain following decompression proceudre and the differential diagnosis includes but is not limited to postoperative complication or pain/debility. Ordered CT-Lspine.     11:55 AM - I discussed the patient's case and the above findings with Dr. Matthews ([facility]) who agrees to evaluate the patient for admission.  She may need  rehab/PT OT.    2:51 PM I discussed the patient's case and the above findings with Dr. Head (Neurosurgery) who will have Dr. Kimble evaluate the patient.        DISPOSITION:  Patient will be hospitalized by [facility] Medicine in stable condition.      FINAL IMPRESSION  1. Acute post-operative pain    2. Acute exacerbation of chronic low back pain          Stacy CRAIN (Scribe), am scribing for, and in the presence of, Suzy Haynes M.D..    Electronically signed by: Stacy Salmeron (Scribe), 2/29/2020    ISuzy M.D. personally performed the services described in this documentation, as scribed by Stacy Salmeron in my presence, and it is both accurate and complete.  C  The note accurately reflects work and decisions made by me.  Suzy Haynes M.D.  2/29/2020  4:33 PM

## 2020-02-29 NOTE — ED TRIAGE NOTES
Chief Complaint   Patient presents with   • Back Pain     Decompression procedure on Tuesday. Dc Wednesday. Patient with increasing back pain      Patient SPO2 found to be low PTA. Patient placed on O2.

## 2020-02-29 NOTE — SENIOR ADMIT NOTE
Senior Admit Note    Patient: Krystle Tavarez.  MRN: [id number].                               Chief complaint: post-operative back pain    Problem list:   # lumbar stenosis without neurogenic claudication s/p L5-S1 laminectomy with discectomy - repeat CT noncon L-spine today reviewed by Neurosurgery - no acute findings.    # acute hypoxic respiratory failure secondary - initially saturating 85% on room air.  Now at 97% on 2LNC.    # paroxysmal atrial fibrillation - CHADSVASC - 4-6 (possible history of TIA history, on home Plavix)  # constipation - last bowel movement on 2/23/20, likely secondary to opiate use.  # hypertension  # hyperlipidemia    Plan:   - Admit to Neurology as inpatient.   - Neurosurgery, Dr. Kingsley, on board.  Recommending acute rehab versus extended care facility.    - q4 hours neuro checks.   - Continuous pulse ox.   - PT/OT.  - Consulted Physiatry.  - Patient to follow up with Cardiology outpatient, who are planning on starting patient on anticoagulation.    - Continue or hold home medications as appropriate.  Specifically hold home Plavix until seven days post surgery per Neurosurgery.   - Aggressive bowel regimen.  - SCDs for DVT prophylaxis.    For complete details, please refer to H&P by intern.     Prudence Zhao M.D.

## 2020-02-29 NOTE — ED NOTES
Report called to William. Patient given a turkey sandwich. Updated patient and family on available bed.

## 2020-02-29 NOTE — ASSESSMENT & PLAN NOTE
Pt s/p L5-S1 laminectomy, postop day 7, admitted with complaints of worsening low back pain.  No swelling/ discharge from the surgical site.  No fever/ chills.  Vitals stable on admit.  C/o Generalized lower extremity weakness, worsened by pain.  No focal motor or sensory deficits on examination. CT lumbar spine : No acute abnormalities. Postsurgical changes from posterior decompression L4-5.  -adequate pain control  -fall/aspiration/seizure precautions  -Neuro checks Q4H  -Accepted to rehab.  Bed pending.

## 2020-02-29 NOTE — H&P
"History & Physical Note    Date of Admission: 2/29/2020  Admission Status: Inpatient  [facility]: [facility]  Attending: Dr Marrufo  Senior Resident: Dr. Zhao  Intern: Dr. Cortes  Contact Number: [phone]    Chief Complaint: Increasing back pain     History of Present Illness (HPI):  87 yo F with history of chronic low back pain s/p L5-S1 laminectomy [2/25], POD4 presented with worsening low back pain for the past 2 days.  Patient complains of generalized lower extremity weakness aggravated by back pain. No focal weakness/sensory changes.  No change in bowel /bladder habits.  No fever/ chills.  Denies swelling / purulent discharge from the surgical site.  No history of chest pain /dizziness.  Had transient difficulty breathing this morning. No abdominal pain,  Nausea or  vomiting.     In ER , VITAL SIGNS: BP (!) 175/63   Pulse 69   Ht 1.6 m (5' 3\")   Wt 77.1 kg (170 lb)   LMP  (LMP Unknown)   SpO2 96%   BMI 30.11 kg/m² .  Patient admitted for further evaluation of worsening postop pain.    Review of Systems:   Review of Systems   Constitutional: Negative for chills and fever.   HENT: Negative for ear pain and sore throat.    Eyes: Negative for pain.   Respiratory: Negative for cough.    Cardiovascular: Negative for chest pain and orthopnea.   Gastrointestinal: Negative for abdominal pain and nausea.   Genitourinary: Negative for dysuria and urgency.   Musculoskeletal: Positive for back pain.        Chronic LBP, s/p laminectomy, worsening pain for past 2 days   Skin: Negative for rash.   Neurological: Positive for weakness. Negative for sensory change and focal weakness.        Generalized lower extremity weakness    Psychiatric/Behavioral: Negative for depression.       Past Medical History:   Past Medical History was reviewed with patient.   has a past medical history of Arthritis, Breath shortness, Cataract, Dental disorder, Heart burn, Hemorrhagic disorder (HCC), High cholesterol, " Hyperlipidemia, Hypertension, Pacemaker (2015), Pain (08/2019), Pre-diabetes, TIA (transient ischemic attack), and Urinary incontinence.    Past Surgical History: Past Surgical History was reviewed with patient.   has a past surgical history that includes hip replacement, total (Right, 2009); knee replacement, total (Right, 2004); bunionectomy (Left); basal cell excision; cataract extraction with iol (Bilateral, 2003); pacemaker insertion (2015); cholecystectomy (2002); pr laminotomy,lumbar disk,1 intrsp (N/A, 2/25/2020); and foraminotomy (N/A, 2/25/2020).    Medications: Medications have been reviewed with patient.  Prior to Admission Medications   Prescriptions Last Dose Informant Patient Reported? Taking?   Ascorbic Acid (VITAMIN C) 1000 MG Tab 2/28/2020 at am Family Member Yes No   Sig: Take 1 Tab by mouth every day.   calcium carbonate (OS-MARGARITA 500) 1250 (500 Ca) MG Tab 2/28/2020 at pm Family Member Yes Yes   Sig: Take 500 mg by mouth 2 Times a Day.   carvedilol (COREG) 6.25 MG Tab 2/28/2020 at pm Family Member Yes Yes   Sig: Take 6.25 mg by mouth 2 times a day, with meals.   clopidogrel (PLAVIX) 75 MG Tab 2/14/2020 at pm Family Member Yes Yes   Sig: Take 75 mg by mouth every day.   losartan-hydrochlorothiazide (HYZAAR) 100-25 MG per tablet 2/28/2020 at am Family Member No No   Sig: Take 1 Tab by mouth every day.   oxyCODONE-acetaminophen (PERCOCET) 5-325 MG Tab 2/29/2020 at 0800 Family Member Yes Yes   Sig: Take 1-2 Tabs by mouth every four hours as needed for Severe Pain.   tizanidine (ZANAFLEX) 2 MG tablet 2/28/2020 at pm Family Member Yes Yes   Sig: Take 2 mg by mouth 3 times a day.   vitamin D (CHOLECALCIFEROL) 1000 UNIT Tab 2/28/2020 at pm Family Member Yes No   Sig: Take 1,000 Units by mouth every evening.      Facility-Administered Medications: None        Allergies: Allergies have been not reviewed with patient.  Allergies   Allergen Reactions   • Sulfa Drugs Nausea     Nausea        Family History:    family history includes Diabetes in her mother; Heart Attack (age of onset: 74) in her father; No Known Problems in her maternal grandfather, maternal grandmother, paternal grandfather, and paternal grandmother; Stroke in her mother.  Sister-breast cancer. Brother- lung cancer    Social History:   Tobacco: denies smoking  Alcohol: denies alcohol use   Recreational drugs (illegal and prescription):  none   Employment: none  Activity Level: walks with walker   Living situation:  Lives with daughter  Recent travel:  Travelled from [place] to [place]  Primary Care Provider: not reviewed JAYLEN Castelan  Other (stressors, spirituality, exposures):  none  Physical Exam:   Vitals:  Pulse:  [69-84] 84  BP: (164-176)/(57-64) 172/58  SpO2:  [85 %-96 %] 92 %    Physical Exam  Constitutional:       General: She is not in acute distress.     Appearance: Normal appearance.   HENT:      Head: Normocephalic and atraumatic.      Nose: No rhinorrhea.      Mouth/Throat:      Mouth: Mucous membranes are moist.   Eyes:      Extraocular Movements: Extraocular movements intact.   Neck:      Musculoskeletal: Normal range of motion.   Cardiovascular:      Rate and Rhythm: Normal rate.      Heart sounds: No murmur.   Pulmonary:      Effort: Pulmonary effort is normal.      Breath sounds: No wheezing or rales.   Abdominal:      Palpations: Abdomen is soft.      Tenderness: There is no abdominal tenderness.   Musculoskeletal:         General: No deformity.      Comments: Trace bilateral pedal edema   Skin:     General: Skin is warm.      Comments: Band aid applied over lower lumbar spine, dried blood +, no discharge/ erythema/ swelling   Neurological:      General: No focal deficit present.      Mental Status: She is alert and oriented to person, place, and time.      Comments: Sensation grossly normal  MS 5/5 bilateral upper extremities   bilateral lower extremities: 5/5 -   Psychiatric:         Mood and Affect: Mood normal.            Imaging:   CT-LSPINE W/O PLUS RECONS   Final Result         1. No acute fracture appreciated in the lumbar spine      2. Postsurgical changes from posterior decompression L4-5.      3. Mild retrolisthesis of L2-3 and anterolisthesis of L5-S1 are unchanged.      4. Old T12 compression fracture status post cement augmentation, similar to prior.              Previous Data Review: reviewed    Post-op pain  Assessment & Plan  Pt s/p L5-S1 laminectomy, postop day 4, admitted with complaints of worsening low back pain.  No swelling/ discharge from the surgical site.  No fever/ chills.  Vitals stable on admit.  C/o Generalized lower extremity weakness, worsened by pain.  No focal motor or sensory deficits on examination. CT lumbar spine : No acute abnormalities. Postsurgical changes from posterior decompression L4-5.  -admit to neurology  -adequate pain control  -fall/aspiration/seizure precautions  -Neuro checks Q4H  -PT/OT eval  -physiatry referral    PAF (paroxysmal atrial fibrillation) (HCC)- (present on admission)  Assessment & Plan  Pt has h/o paroxysmal Afib. [id number] score : 6. Patient is not on any anticoagulation due to immediate post op period  - on Coreg 6.25 mg bID  - follow up post discharge    CN (constipation)  Assessment & Plan  Likely drug induced. Last BM 5 days ago  - scheduled bowel regimen       Please note that this dictation was created using voice recognition software. I have made every reasonable attempt to correct obvious errors, but there may be errors of grammar and possibly content that I did not discover before finalizing the note.

## 2020-02-29 NOTE — ASSESSMENT & PLAN NOTE
Pt has h/o paroxysmal Afib. [id number] score : 6. Patient is not on any anticoagulation due to immediate post op period.  - on Coreg 6.25 mg twice daily.    - Patient and Cardiology have plans to follow up anticoagulation talks outpatient.

## 2020-02-29 NOTE — ED NOTES
Pharmacy Medication Reconciliation        Medication reconciliation has been completed by interviewing patient with medication list and consent to do so with family/visitors in the room. Reviewed medication list with patient and returned to patient family at bedside  Allergies were reviewed and updated   No ORAL antibiotics within the past 14 days  [facility]

## 2020-03-01 LAB
ANION GAP SERPL CALC-SCNC: 7 MMOL/L (ref 0–11.9)
BASOPHILS # BLD AUTO: 0.4 % (ref 0–1.8)
BASOPHILS # BLD: 0.03 K/UL (ref 0–0.12)
BUN SERPL-MCNC: 17 MG/DL (ref 8–22)
CALCIUM SERPL-MCNC: 9.1 MG/DL (ref 8.5–10.5)
CHLORIDE SERPL-SCNC: 95 MMOL/L (ref 96–112)
CO2 SERPL-SCNC: 29 MMOL/L (ref 20–33)
CREAT SERPL-MCNC: 0.63 MG/DL (ref 0.5–1.4)
EOSINOPHIL # BLD AUTO: 0.16 K/UL (ref 0–0.51)
EOSINOPHIL NFR BLD: 2 % (ref 0–6.9)
ERYTHROCYTE [DISTWIDTH] IN BLOOD BY AUTOMATED COUNT: 44.6 FL (ref 35.9–50)
GLUCOSE SERPL-MCNC: 155 MG/DL (ref 65–99)
HCT VFR BLD AUTO: 33.9 % (ref 37–47)
HGB BLD-MCNC: 11.4 G/DL (ref 12–16)
IMM GRANULOCYTES # BLD AUTO: 0.05 K/UL (ref 0–0.11)
IMM GRANULOCYTES NFR BLD AUTO: 0.6 % (ref 0–0.9)
LYMPHOCYTES # BLD AUTO: 2.23 K/UL (ref 1–4.8)
LYMPHOCYTES NFR BLD: 28.2 % (ref 22–41)
MCH RBC QN AUTO: 30.6 PG (ref 27–33)
MCHC RBC AUTO-ENTMCNC: 33.6 G/DL (ref 33.6–35)
MCV RBC AUTO: 91.1 FL (ref 81.4–97.8)
MONOCYTES # BLD AUTO: 0.66 K/UL (ref 0–0.85)
MONOCYTES NFR BLD AUTO: 8.3 % (ref 0–13.4)
NEUTROPHILS # BLD AUTO: 4.78 K/UL (ref 2–7.15)
NEUTROPHILS NFR BLD: 60.5 % (ref 44–72)
NRBC # BLD AUTO: 0 K/UL
NRBC BLD-RTO: 0 /100 WBC
PLATELET # BLD AUTO: 224 K/UL (ref 164–446)
PMV BLD AUTO: 9.2 FL (ref 9–12.9)
POTASSIUM SERPL-SCNC: 4.1 MMOL/L (ref 3.6–5.5)
RBC # BLD AUTO: 3.72 M/UL (ref 4.2–5.4)
SODIUM SERPL-SCNC: 131 MMOL/L (ref 135–145)
WBC # BLD AUTO: 7.9 K/UL (ref 4.8–10.8)

## 2020-03-01 PROCEDURE — 99232 SBSQ HOSP IP/OBS MODERATE 35: CPT | Mod: GC | Performed by: HOSPITALIST

## 2020-03-01 PROCEDURE — 97161 PT EVAL LOW COMPLEX 20 MIN: CPT

## 2020-03-01 PROCEDURE — 700102 HCHG RX REV CODE 250 W/ 637 OVERRIDE(OP): Performed by: STUDENT IN AN ORGANIZED HEALTH CARE EDUCATION/TRAINING PROGRAM

## 2020-03-01 PROCEDURE — 700112 HCHG RX REV CODE 229: Performed by: NURSE PRACTITIONER

## 2020-03-01 PROCEDURE — 85025 COMPLETE CBC W/AUTO DIFF WBC: CPT

## 2020-03-01 PROCEDURE — 80048 BASIC METABOLIC PNL TOTAL CA: CPT

## 2020-03-01 PROCEDURE — 36415 COLL VENOUS BLD VENIPUNCTURE: CPT

## 2020-03-01 PROCEDURE — A9270 NON-COVERED ITEM OR SERVICE: HCPCS | Performed by: NURSE PRACTITIONER

## 2020-03-01 PROCEDURE — 770006 HCHG ROOM/CARE - MED/SURG/GYN SEMI*

## 2020-03-01 PROCEDURE — A9270 NON-COVERED ITEM OR SERVICE: HCPCS | Performed by: STUDENT IN AN ORGANIZED HEALTH CARE EDUCATION/TRAINING PROGRAM

## 2020-03-01 PROCEDURE — 97166 OT EVAL MOD COMPLEX 45 MIN: CPT

## 2020-03-01 RX ORDER — AMOXICILLIN 250 MG
2 CAPSULE ORAL 2 TIMES DAILY
Status: DISCONTINUED | OUTPATIENT
Start: 2020-03-01 | End: 2020-03-01

## 2020-03-01 RX ORDER — DOCUSATE SODIUM 100 MG/1
100 CAPSULE, LIQUID FILLED ORAL 2 TIMES DAILY
Status: DISCONTINUED | OUTPATIENT
Start: 2020-03-01 | End: 2020-03-04 | Stop reason: HOSPADM

## 2020-03-01 RX ORDER — POTASSIUM CHLORIDE 20 MEQ/1
40 TABLET, EXTENDED RELEASE ORAL ONCE
Status: COMPLETED | OUTPATIENT
Start: 2020-03-01 | End: 2020-03-01

## 2020-03-01 RX ORDER — POLYETHYLENE GLYCOL 3350 17 G/17G
1 POWDER, FOR SOLUTION ORAL DAILY
Status: DISCONTINUED | OUTPATIENT
Start: 2020-03-02 | End: 2020-03-01

## 2020-03-01 RX ORDER — BISACODYL 10 MG
10 SUPPOSITORY, RECTAL RECTAL
Status: DISCONTINUED | OUTPATIENT
Start: 2020-03-01 | End: 2020-03-04 | Stop reason: HOSPADM

## 2020-03-01 RX ORDER — POLYETHYLENE GLYCOL 3350 17 G/17G
1 POWDER, FOR SOLUTION ORAL DAILY
Status: DISCONTINUED | OUTPATIENT
Start: 2020-03-01 | End: 2020-03-04 | Stop reason: HOSPADM

## 2020-03-01 RX ORDER — ENEMA 19; 7 G/133ML; G/133ML
1 ENEMA RECTAL
Status: DISCONTINUED | OUTPATIENT
Start: 2020-03-01 | End: 2020-03-04 | Stop reason: HOSPADM

## 2020-03-01 RX ORDER — AMOXICILLIN 250 MG
2 CAPSULE ORAL 2 TIMES DAILY
Status: DISCONTINUED | OUTPATIENT
Start: 2020-03-01 | End: 2020-03-04 | Stop reason: HOSPADM

## 2020-03-01 RX ORDER — BISACODYL 10 MG
10 SUPPOSITORY, RECTAL RECTAL
Status: DISCONTINUED | OUTPATIENT
Start: 2020-03-01 | End: 2020-03-01

## 2020-03-01 RX ADMIN — SENNOSIDES AND DOCUSATE SODIUM 2 TABLET: 8.6; 5 TABLET ORAL at 20:19

## 2020-03-01 RX ADMIN — ACETAMINOPHEN 650 MG: 325 TABLET, FILM COATED ORAL at 18:11

## 2020-03-01 RX ADMIN — HYDROCHLOROTHIAZIDE 25 MG: 25 TABLET ORAL at 05:32

## 2020-03-01 RX ADMIN — MAGNESIUM HYDROXIDE 30 ML: 400 SUSPENSION ORAL at 05:35

## 2020-03-01 RX ADMIN — ACETAMINOPHEN 650 MG: 325 TABLET, FILM COATED ORAL at 05:28

## 2020-03-01 RX ADMIN — POTASSIUM CHLORIDE 40 MEQ: 1500 TABLET, EXTENDED RELEASE ORAL at 08:43

## 2020-03-01 RX ADMIN — LOSARTAN POTASSIUM 100 MG: 50 TABLET, FILM COATED ORAL at 05:32

## 2020-03-01 RX ADMIN — TIZANIDINE 2 MG: 4 TABLET ORAL at 05:29

## 2020-03-01 RX ADMIN — CARVEDILOL 6.25 MG: 6.25 TABLET, FILM COATED ORAL at 05:33

## 2020-03-01 RX ADMIN — POLYETHYLENE GLYCOL 3350 1 PACKET: 17 POWDER, FOR SOLUTION ORAL at 05:36

## 2020-03-01 RX ADMIN — MELATONIN 1000 UNITS: at 18:11

## 2020-03-01 RX ADMIN — SENNOSIDES AND DOCUSATE SODIUM 2 TABLET: 8.6; 5 TABLET ORAL at 05:29

## 2020-03-01 RX ADMIN — OXYCODONE HYDROCHLORIDE AND ACETAMINOPHEN 1000 MG: 500 TABLET ORAL at 05:34

## 2020-03-01 RX ADMIN — MAGNESIUM HYDROXIDE 30 ML: 400 SUSPENSION ORAL at 08:43

## 2020-03-01 RX ADMIN — MAGNESIUM HYDROXIDE 30 ML: 400 SUSPENSION ORAL at 20:20

## 2020-03-01 RX ADMIN — CALCIUM 500 MG: 500 TABLET ORAL at 05:31

## 2020-03-01 RX ADMIN — DOCUSATE SODIUM 100 MG: 100 CAPSULE, LIQUID FILLED ORAL at 18:13

## 2020-03-01 RX ADMIN — TIZANIDINE 2 MG: 4 TABLET ORAL at 12:49

## 2020-03-01 RX ADMIN — TIZANIDINE 2 MG: 4 TABLET ORAL at 18:11

## 2020-03-01 RX ADMIN — CALCIUM 500 MG: 500 TABLET ORAL at 18:11

## 2020-03-01 ASSESSMENT — ENCOUNTER SYMPTOMS
FOCAL WEAKNESS: 0
ORTHOPNEA: 0
SENSORY CHANGE: 0
EYE PAIN: 0
COUGH: 0
SHORTNESS OF BREATH: 0
BACK PAIN: 1
CHILLS: 0
ABDOMINAL PAIN: 0
PALPITATIONS: 0
DEPRESSION: 0
FEVER: 0
CONSTIPATION: 1

## 2020-03-01 ASSESSMENT — COGNITIVE AND FUNCTIONAL STATUS - GENERAL
DAILY ACTIVITIY SCORE: 17
STANDING UP FROM CHAIR USING ARMS: A LITTLE
MOVING TO AND FROM BED TO CHAIR: UNABLE
SUGGESTED CMS G CODE MODIFIER MOBILITY: CL
DRESSING REGULAR LOWER BODY CLOTHING: A LOT
TOILETING: A LITTLE
MOBILITY SCORE: 12
MOVING FROM LYING ON BACK TO SITTING ON SIDE OF FLAT BED: UNABLE
CLIMB 3 TO 5 STEPS WITH RAILING: A LITTLE
HELP NEEDED FOR BATHING: A LOT
TURNING FROM BACK TO SIDE WHILE IN FLAT BAD: UNABLE
DRESSING REGULAR UPPER BODY CLOTHING: A LITTLE
PERSONAL GROOMING: A LITTLE
WALKING IN HOSPITAL ROOM: A LITTLE
SUGGESTED CMS G CODE MODIFIER DAILY ACTIVITY: CK

## 2020-03-01 ASSESSMENT — GAIT ASSESSMENTS
GAIT LEVEL OF ASSIST: MINIMAL ASSIST
DEVIATION: BRADYKINETIC
DISTANCE (FEET): 25
ASSISTIVE DEVICE: FRONT WHEEL WALKER

## 2020-03-01 ASSESSMENT — ACTIVITIES OF DAILY LIVING (ADL): TOILETING: INDEPENDENT

## 2020-03-01 NOTE — PROGRESS NOTES
Daily Progress Note:     Date of Service: 3/1/2020  Primary Team: [facility]   Attending: JD Marrufo M.D.   Senior Resident: Dr. Zhao  Intern: Dr. Cortes  Contact:  [phone]    Chief Complaint: Increasing back pain     Subjective:   -No acute events overnight, POD 5  -Pain well-controlled with medications  -No fevers /chills, no discharge/ swelling at the surgical site  -No bowel movement despite trying stool softeners, will try enema if patient continues to have constipation  -No chest pain or shortness of breath  -Oral intake adequate    Medically stable except for constipation  PT/OT eval pending    Consultants/Specialty:  Physiatry    Review of Systems:    Review of Systems   Constitutional: Negative for chills and fever.   HENT: Negative for ear pain.    Eyes: Negative for pain.   Respiratory: Negative for cough and shortness of breath.    Cardiovascular: Negative for chest pain, palpitations and orthopnea.   Gastrointestinal: Positive for constipation. Negative for abdominal pain.   Genitourinary: Negative for dysuria.   Musculoskeletal: Positive for back pain.   Skin: Negative for itching.   Neurological: Negative for sensory change and focal weakness.   Psychiatric/Behavioral: Negative for depression.       Objective Data:   Physical Exam:   Vitals:   Temp:  [36.2 °C (97.2 °F)-37.1 °C (98.7 °F)] 36.8 °C (98.2 °F)  Pulse:  [67-84] 69  Resp:  [15-17] 16  BP: (111-176)/(34-64) 133/57  SpO2:  [92 %-94 %] 94 %    Physical Exam  Constitutional:       General: She is not in acute distress.  HENT:      Head: Normocephalic.      Nose: No rhinorrhea.      Mouth/Throat:      Mouth: Mucous membranes are moist.   Eyes:      Extraocular Movements: Extraocular movements intact.   Neck:      Musculoskeletal: No neck rigidity.   Cardiovascular:      Rate and Rhythm: Normal rate.      Heart sounds: No murmur.   Pulmonary:      Effort: Pulmonary effort is normal. No respiratory distress.      Breath  sounds: No rales.   Abdominal:      General: Bowel sounds are normal.      Tenderness: There is no abdominal tenderness.   Musculoskeletal:         General: No swelling.   Skin:     General: Skin is warm.   Neurological:      General: No focal deficit present.      Mental Status: She is alert and oriented to person, place, and time.      Motor: Weakness present.      Comments: MS bilateral UE 5/5        Bilateral LE 5/5 -           Labs:   Recent Labs     02/29/20  1716 03/01/20  0601   WBC 8.4 7.9   RBC 3.78* 3.72*   HEMOGLOBIN 11.9* 11.4*   HEMATOCRIT 34.1* 33.9*   MCV 90.2 91.1   MCH 31.5 30.6   RDW 44.6 44.6   PLATELETCT 214 224   MPV 9.2 9.2   NEUTSPOLYS 68.80 60.50   LYMPHOCYTES 20.90* 28.20   MONOCYTES 7.40 8.30   EOSINOPHILS 2.20 2.00   BASOPHILS 0.20 0.40      Recent Labs     02/29/20  1716 03/01/20  0601   SODIUM 133* 131*   POTASSIUM 3.6 4.1   CHLORIDE 97 95*   CO2 29 29   GLUCOSE 166* 155*   BUN 18 17      Recent Labs     02/29/20  1716 03/01/20  0601   CREATININE 0.79 0.63        Imaging:   CT-LSPINE W/O PLUS RECONS   Final Result         1. No acute fracture appreciated in the lumbar spine      2. Postsurgical changes from posterior decompression L4-5.      3. Mild retrolisthesis of L2-3 and anterolisthesis of L5-S1 are unchanged.      4. Old T12 compression fracture status post cement augmentation, similar to prior.              Post-op pain  Assessment & Plan  Pt s/p L5-S1 laminectomy, postop day 5, admitted with complaints of worsening low back pain.  No swelling/ discharge from the surgical site.  No fever/ chills.  Vitals stable on admit.  C/o Generalized lower extremity weakness, worsened by pain.  No focal motor or sensory deficits on examination. CT lumbar spine : No acute abnormalities. Postsurgical changes from posterior decompression L4-5.  -adequate pain control  -fall/aspiration/seizure precautions  -Neuro checks Q4H  -PT/OT eval  -physiatry referral    PAF (paroxysmal atrial fibrillation)  (HCC)- (present on admission)  Assessment & Plan  Pt has h/o paroxysmal Afib. [id number] score : 6. Patient is not on any anticoagulation due to immediate post op period  - on Coreg 6.25 mg bID  - follow up post discharge    CN (constipation)  Assessment & Plan  Likely drug induced. Last BM a week ago  - scheduled bowel regimen  - enema if constipation persists      Please note that this dictation was created using voice recognition software. I have made every reasonable attempt to correct obvious errors, but there may be errors of grammar and possibly content that I did not discover before finalizing the note.

## 2020-03-01 NOTE — PROGRESS NOTES
Paged [facility] on call regarding impaction. Unable to give fleet enema.     Per Dr. Cortes, give mineral oil enema.

## 2020-03-01 NOTE — PROGRESS NOTES
Pt arrived on unit via gurney. Transferred to bed, oriented to room. Educated patient on use of call light and need to call for assistance. All questions answered, pt resting in bed with family at bedside.

## 2020-03-01 NOTE — PROGRESS NOTES
2 RN skin check with William    Bilateral pink and blanching heels, mypelix applied  No pink/redness noted on sacrum  Lower back- incision with small amount of dried blood on steri strips

## 2020-03-01 NOTE — THERAPY
"Physical Therapy Evaluation completed.   Bed Mobility:  Supine to Sit: Minimal Assist  Transfers: Sit to Stand: Minimal Assist  Gait: Level Of Assist: Minimal Assist with Front-Wheel Walker       Plan of Care: Will benefit from Physical Therapy 4 times per week  Discharge Recommendations: Equipment: Will Continue to Assess for Equipment Needs. Recommend post-acute placement for continued physical therapy services prior to discharge home. Recommend PMR consult.     See \"Rehab Therapy-Acute\" Patient Summary Report for complete documentation.     Pt is an 85yo female admitted with c/o post-op pain and difficulty with mobility s/p lumbar decompression on 2/25. Pt was DC'd home with daughter. Daughter present during evaluation; reports is able to provide 24/7 assist. Pt required cueing to verbalize and demonstrate post-op spinal precautions. Had increased difficulty with log roll for bed mobility; required min-mod A. Was able to complete STS with min A and ambulate to/from bathroom with min A for safety and FWW. At this time recommend postacute intensive therapy prior to return home to maximize safety and functional independece. Recommend PMR consult. Will continue to follow in acute setting.   "

## 2020-03-01 NOTE — CARE PLAN
Problem: Pain Management  Goal: Pain level will decrease to patient's comfort goal  Outcome: PROGRESSING AS EXPECTED   Pt not complaining of high pain levels. States her pain is around a 2, no intervention required.     Problem: Bowel/Gastric:  Goal: Normal bowel function is maintained or improved  Outcome: PROGRESSING SLOWER THAN EXPECTED  Goal: Will not experience complications related to bowel motility  Outcome: PROGRESSING SLOWER THAN EXPECTED   Pt states she has not had a BM since 2/23. All stool softeners provided.

## 2020-03-01 NOTE — PROGRESS NOTES
Neurosurgery Progress Note    Subjective:  No acute events. Pt c/o feeling bloated and needed to have BM. Pain controlled with medication    Exam:  A&O x3. Fluent Speech.   4 PERRL, EOMI.  Strength:  Bilateral IP, DF, PF 5/5.   Sensation: Intact throughout.   Incision: dressing c/d/i with steristrips intact  Eating, drinking. Denies n/v.  Voiding. -BM. Last reported BM 2020  Pain controlled.   Ambulatory.     BP  Min: 111/34  Max: 176/64  Pulse  Av.6  Min: 67  Max: 84  Resp  Av.2  Min: 15  Max: 17  Temp  Av.6 °C (97.8 °F)  Min: 36.2 °C (97.2 °F)  Max: 37.1 °C (98.7 °F)  SpO2  Av.4 %  Min: 92 %  Max: 94 %    No data recorded    Recent Labs     20  17120  0601   WBC 8.4 7.9   RBC 3.78* 3.72*   HEMOGLOBIN 11.9* 11.4*   HEMATOCRIT 34.1* 33.9*   MCV 90.2 91.1   MCH 31.5 30.6   MCHC 34.9 33.6   RDW 44.6 44.6   PLATELETCT 214 224   MPV 9.2 9.2     Recent Labs     20  17120  0601   SODIUM 133* 131*   POTASSIUM 3.6 4.1   CHLORIDE 97 95*   CO2 29 29   GLUCOSE 166* 155*   BUN 18 17   CREATININE 0.79 0.63   CALCIUM 9.0 9.1               Intake/Output       20 - 2059 20 - 20 0659       Total  Total       Intake    Total Intake -- -- -- -- -- --       Output    Urine  --  -- --  --  -- --    Number of Times Voided -- 2 x 2 x 1 x -- 1 x    Stool  --  -- --  --  -- --    Number of Times Stooled -- -- -- 0 x -- 0 x    Total Output -- -- -- -- -- --       Net I/O     -- -- -- -- -- --          No intake or output data in the 24 hours ending 20 1213         • magnesium hydroxide  30 mL BID    And   • senna-docusate  2 Tab BID    And   • [START ON 3/2/2020] polyethylene glycol/lytes  1 Packet DAILY    And   • bisacodyl  10 mg QDAY PRN   • Fleet Enema  1 Enema Once PRN   • acetaminophen  650 mg Q6HRS PRN   • labetalol  10 mg Q4HRS PRN   • ascorbic acid  1,000 mg DAILY   • calcium carbonate  500 mg BID   •  carvedilol  6.25 mg BID WITH MEALS   • tizanidine  2 mg TID   • vitamin D  1,000 Units Q EVENING   • oxyCODONE-acetaminophen  1-2 Tab Q6HRS PRN   • losartan  100 mg Q DAY   • hydroCHLOROthiazide  25 mg Q DAY       Assessment and Plan:  Hospital day #1  POD #na    Plan:   Stable neuro   PT/OT evals pending  Bowel protocol- escalate as needed  Colace BID added, prune juice, miralax today  Medical management  Possible placement

## 2020-03-01 NOTE — CARE PLAN
Problem: Communication  Goal: The ability to communicate needs accurately and effectively will improve  Outcome: PROGRESSING AS EXPECTED  Note: Encouraged use of call light, assessed needs, encouraged pt to voice feelings.        Problem: Safety  Goal: Will remain free from falls  Outcome: PROGRESSING AS EXPECTED  Note: Bed alarm on, walker out of sight, nonskid socks on, call light within reach, personal belongings within reach, toileting offered.

## 2020-03-02 LAB
ANION GAP SERPL CALC-SCNC: 8 MMOL/L (ref 0–11.9)
BUN SERPL-MCNC: 22 MG/DL (ref 8–22)
CALCIUM SERPL-MCNC: 9.5 MG/DL (ref 8.5–10.5)
CHLORIDE SERPL-SCNC: 95 MMOL/L (ref 96–112)
CO2 SERPL-SCNC: 29 MMOL/L (ref 20–33)
CREAT SERPL-MCNC: 0.71 MG/DL (ref 0.5–1.4)
ERYTHROCYTE [DISTWIDTH] IN BLOOD BY AUTOMATED COUNT: 45.1 FL (ref 35.9–50)
GLUCOSE BLD-MCNC: 210 MG/DL (ref 65–99)
GLUCOSE SERPL-MCNC: 142 MG/DL (ref 65–99)
HCT VFR BLD AUTO: 37.4 % (ref 37–47)
HGB BLD-MCNC: 12.6 G/DL (ref 12–16)
MCH RBC QN AUTO: 30.9 PG (ref 27–33)
MCHC RBC AUTO-ENTMCNC: 33.7 G/DL (ref 33.6–35)
MCV RBC AUTO: 91.7 FL (ref 81.4–97.8)
PLATELET # BLD AUTO: 235 K/UL (ref 164–446)
PMV BLD AUTO: 9.5 FL (ref 9–12.9)
POTASSIUM SERPL-SCNC: 4.6 MMOL/L (ref 3.6–5.5)
RBC # BLD AUTO: 4.08 M/UL (ref 4.2–5.4)
SODIUM SERPL-SCNC: 132 MMOL/L (ref 135–145)
WBC # BLD AUTO: 8 K/UL (ref 4.8–10.8)

## 2020-03-02 PROCEDURE — A9270 NON-COVERED ITEM OR SERVICE: HCPCS | Performed by: STUDENT IN AN ORGANIZED HEALTH CARE EDUCATION/TRAINING PROGRAM

## 2020-03-02 PROCEDURE — 85027 COMPLETE CBC AUTOMATED: CPT

## 2020-03-02 PROCEDURE — 36415 COLL VENOUS BLD VENIPUNCTURE: CPT

## 2020-03-02 PROCEDURE — 99222 1ST HOSP IP/OBS MODERATE 55: CPT | Mod: 25 | Performed by: PHYSICAL MEDICINE & REHABILITATION

## 2020-03-02 PROCEDURE — 99232 SBSQ HOSP IP/OBS MODERATE 35: CPT | Mod: GC | Performed by: HOSPITALIST

## 2020-03-02 PROCEDURE — 82962 GLUCOSE BLOOD TEST: CPT

## 2020-03-02 PROCEDURE — 80048 BASIC METABOLIC PNL TOTAL CA: CPT

## 2020-03-02 PROCEDURE — 99358 PROLONG SERVICE W/O CONTACT: CPT | Performed by: PHYSICAL MEDICINE & REHABILITATION

## 2020-03-02 PROCEDURE — 700102 HCHG RX REV CODE 250 W/ 637 OVERRIDE(OP): Performed by: STUDENT IN AN ORGANIZED HEALTH CARE EDUCATION/TRAINING PROGRAM

## 2020-03-02 PROCEDURE — 770006 HCHG ROOM/CARE - MED/SURG/GYN SEMI*

## 2020-03-02 RX ADMIN — CALCIUM 500 MG: 500 TABLET ORAL at 16:30

## 2020-03-02 RX ADMIN — MELATONIN 1000 UNITS: at 16:27

## 2020-03-02 RX ADMIN — TIZANIDINE 2 MG: 4 TABLET ORAL at 05:29

## 2020-03-02 RX ADMIN — OXYCODONE HYDROCHLORIDE AND ACETAMINOPHEN 1000 MG: 500 TABLET ORAL at 05:29

## 2020-03-02 RX ADMIN — CARVEDILOL 6.25 MG: 6.25 TABLET, FILM COATED ORAL at 07:32

## 2020-03-02 RX ADMIN — TIZANIDINE 2 MG: 4 TABLET ORAL at 12:09

## 2020-03-02 RX ADMIN — HYDROCHLOROTHIAZIDE 25 MG: 25 TABLET ORAL at 05:29

## 2020-03-02 RX ADMIN — CALCIUM 500 MG: 500 TABLET ORAL at 05:29

## 2020-03-02 RX ADMIN — TIZANIDINE 2 MG: 4 TABLET ORAL at 16:27

## 2020-03-02 RX ADMIN — CARVEDILOL 6.25 MG: 6.25 TABLET, FILM COATED ORAL at 16:27

## 2020-03-02 RX ADMIN — LOSARTAN POTASSIUM 100 MG: 50 TABLET, FILM COATED ORAL at 05:29

## 2020-03-02 ASSESSMENT — ENCOUNTER SYMPTOMS
VOMITING: 0
MYALGIAS: 0
CHILLS: 0
SENSORY CHANGE: 0
COUGH: 0
FEVER: 0
ORTHOPNEA: 0
FOCAL WEAKNESS: 0
ABDOMINAL PAIN: 0
DEPRESSION: 0
SHORTNESS OF BREATH: 0
EYE PAIN: 0
PALPITATIONS: 0

## 2020-03-02 NOTE — CARE PLAN
Problem: Communication  Goal: The ability to communicate needs accurately and effectively will improve  Outcome: PROGRESSING AS EXPECTED  Note: Encouraged use of call light, assessed needs, encouraged pt to voice feelings.        Problem: Safety  Goal: Will remain free from falls  Outcome: PROGRESSING AS EXPECTED  Note: Bed alarm on, walker out of sight, nonskid socks on, call light within reach, personal belongings within reach, toileting offered.        Problem: Bowel/Gastric:  Goal: Normal bowel function is maintained or improved  Outcome: PROGRESSING SLOWER THAN EXPECTED  Note: All medications administered as ordered. Providing frequent toileting.

## 2020-03-02 NOTE — PROGRESS NOTES
Pt up and ambulated to bathroom +loose BM. Sitting up in chair for breakfast. On 0.5L NC.  connected. Will wean off.

## 2020-03-02 NOTE — CARE PLAN
Problem: Bowel/Gastric:  Goal: Will not experience complications related to bowel motility  Outcome: PROGRESSING SLOWER THAN EXPECTED  Intervention: Assess baseline bowel pattern  Note: Pt was given an enema by a previous shift due to constipation. Now pt has frequent/urgent diarrhea.      Problem: Pain Management  Goal: Pain level will decrease to patient's comfort goal  Outcome: MET  Intervention: Follow pain managment plan developed in collaboration with patient and Interdisciplinary Team  Note: Pt is not complaining of pain.

## 2020-03-02 NOTE — CONSULTS
Physical Medicine and Rehabilitation Consultation         Initial Consult      Initial Consultation Date: 3/2/2020  Consulting provider: Dr. Suzy Haynes   Reason for consultation: assess for acute inpatient rehab appropriateness  LOS: 2 Day(s)      Chief complaint:        HPI:   The patient is a 86 y.o. left hand dominant female with a past medical history of paroxysmal A. fib, chronic constipation, GERD, hypertension, hyperlipidemia, borderline diabetes on diet control, history of plantar fasciitis, urinary incontinence for about 2 years (gradual worsening);  who presented on 2/29/2020 10:17 AM with worsening pain after laminectomy on February 25, 2020.  Currently pain significantly improved with tizanidine, not requiring any Percocet as needed.      The patient currently reports:  -Feeling much better overall, feels comfortable and safe to return home with daughter and son-in-law, who are available to help anytime during the day; reports that she has very accessible home  -Pain: Tolerating, she likes to stay active despite the pain; because the pain was getting worse before the surgery  -Recalls recently moving from [place] about 10 months ago, because she gave up driving for safety sake  -Bowel: Chronic constipation, multiple bowel movements yesterday with docusate/MiraLAX  -Bladder: Chronic urinary incontinence of 2 years, slightly worsening; reports intact sensation, but more difficulty controlling her voiding  -Weakness: Denies focal weakness, but does have some right upper extremity more than left upper extremity weakness; denies significant lower extremity weakness  -States that there is a have her on nasal cannula oxygen because she desaturates a little bit when she falls asleep, denies using home oxygen  -Denies fever, chills, nausea, vomiting, shortness of breath        ROS:  Pertinent positives are listed in HPI, all other systems reviewed and are negative      Social Hx:  Pre-morbidly, this  patient lived in:   2 story home (lives on the first floor)  With 1 steps to enter  Lives with family, either her daughter or son-in-law is available at all times  She moved from [place] in May 2019, reports being very sociable and keeps an active lifestyle        Current level of function: The patient was evaluated by:  Acute care PT. Currently requiring minimal assist for mobility/transfers.  Acute care OT. Currently requiring moderate assist for ADLs.      PMH:  Past Medical History:   Diagnosis Date   • Arthritis     knees, hips   • Breath shortness     with exertion    • Cataract     bilat IOL    • Dental disorder     full upper, partial lower    • Heart burn    • Hemorrhagic disorder (HCC)     on Plavix    • High cholesterol     diet controlled    • Hyperlipidemia    • Hypertension    • Pacemaker 2015   • Pain 08/2019    lower back, right leg   • Pre-diabetes    • TIA (transient ischemic attack)     on Plavix   • Urinary incontinence          PSH:  Past Surgical History:   Procedure Laterality Date   • PB LAMINOTOMY,LUMBAR DISK,1 INTRSP N/A 2/25/2020    Procedure: LAMINECTOMY, SPINE, LUMBAR, WITH DISCECTOMY- L5-S1, MEDIAL FACETECTOMY;  Surgeon: Latrell Kimble M.D.;  Location: SURGERY [facility];  Service: Neurosurgery   • FORAMINOTOMY N/A 2/25/2020    Procedure: FORAMINOTOMY, SPINE;  Surgeon: Latrell Kimble M.D.;  Location: SURGERY [facility];  Service: Neurosurgery   • PACEMAKER INSERTION  2015   • HIP REPLACEMENT, TOTAL Right 2009   • KNEE REPLACEMENT, TOTAL Right 2004   • CATARACT EXTRACTION WITH IOL Bilateral 2003   • CHOLECYSTECTOMY  2002   • BASAL CELL EXCISION      nose and hand   • BUNIONECTOMY Left          FHX: Reviewed.   Family History   Problem Relation Age of Onset   • Diabetes Mother    • Stroke Mother    • Heart Attack Father 74   • No Known Problems Maternal Grandmother    • No Known Problems Maternal Grandfather    • No Known Problems Paternal Grandmother    • No Known Problems Paternal  "Grandfather          Medications:  Current Facility-Administered Medications   Medication Dose   • magnesium hydroxide (MILK OF MAGNESIA) suspension 30 mL  30 mL    And   • senna-docusate (PERICOLACE or SENOKOT S) 8.6-50 MG per tablet 2 Tab  2 Tab    And   • bisacodyl (DULCOLAX) suppository 10 mg  10 mg   • fleet enema 133 mL  1 Enema   • docusate sodium (COLACE) capsule 100 mg  100 mg   • polyethylene glycol/lytes (MIRALAX) PACKET 1 Packet  1 Packet   • acetaminophen (TYLENOL) tablet 650 mg  650 mg   • labetalol (NORMODYNE/TRANDATE) injection 10 mg  10 mg   • ascorbic acid tablet 1,000 mg  1,000 mg   • calcium carbonate (OS-MARGARITA 500) tablet 500 mg  500 mg   • carvedilol (COREG) tablet 6.25 mg  6.25 mg   • tizanidine (ZANAFLEX) tablet 2 mg  2 mg   • vitamin D (cholecalciferol) tablet 1,000 Units  1,000 Units   • oxyCODONE-acetaminophen (PERCOCET) 5-325 MG per tablet 1-2 Tab  1-2 Tab   • losartan (COZAAR) tablet 100 mg  100 mg   • hydroCHLOROthiazide (HYDRODIURIL) tablet 25 mg  25 mg       Allergies:  Allergies   Allergen Reactions   • Sulfa Drugs Nausea     Nausea          Physical Exam:  Vitals: /41   Pulse 62   Temp 36.4 °C (97.5 °F) (Temporal)   Resp 14   Ht 1.6 m (5' 3\")   Wt 74 kg (163 lb 2.3 oz)   SpO2 93%       Gen: pleasant  Head: no visible head lesions or abrasion  Eyes/ Nose/ Mouth: moist mucous membranes  Cardio: Well perfused extremities, no peripheral edema  Pulm: with normal respiratory effort, on nasal cannula oxygen  Abd: Soft NTND, active bowel sounds  Skin: No visible rashes  Ext: No atrophy of muscles, no joint contractures of extremities   Psych: answers questions appropriately follows commands, calm affect    Neuro:   -Speech: fluent, no aphasia or dysarthria  -Hearing and visual acuity functional and grossly intact  -Face symmetric, tongue protrusion midline  -Shoulder shrugs equal and symmetric       Motor:  -Bilateral upper extremities: 5-/5 with arm abduction, elbow flexion, " elbow extension, wrist extension, finger flexion  -Bilateral lower extremities: 5-/5 with hip flexion, knee extension, ankle dorsiflexion, toe extension, plantarflexion      Sensory:   -intact to light touch in all 4 extremities, except for numbness to light touch over right heel  -Mild hyperalgesia to bilateral lower extremities during muscle testing resistance  -intact to proprioception at bilateral great toes      Reflexes:  -Negative clonus bilateral ankles        Labs: Reviewed and significant for improvement in anemia, overall reassuring  Recent Labs     02/29/20 1716 03/01/20  0601 03/02/20  0508   RBC 3.78* 3.72* 4.08*   HEMOGLOBIN 11.9* 11.4* 12.6   HEMATOCRIT 34.1* 33.9* 37.4   PLATELETCT 214 224 235     Recent Labs     02/29/20 1716 03/01/20 0601 03/02/20  0508   SODIUM 133* 131* 132*   POTASSIUM 3.6 4.1 4.6   CHLORIDE 97 95* 95*   CO2 29 29 29   GLUCOSE 166* 155* 142*   BUN 18 17 22   CREATININE 0.79 0.63 0.71   CALCIUM 9.0 9.1 9.5     Recent Results (from the past 24 hour(s))   Basic Metabolic Panel    Collection Time: 03/02/20  5:08 AM   Result Value Ref Range    Sodium 132 (L) 135 - 145 mmol/L    Potassium 4.6 3.6 - 5.5 mmol/L    Chloride 95 (L) 96 - 112 mmol/L    Co2 29 20 - 33 mmol/L    Glucose 142 (H) 65 - 99 mg/dL    Bun 22 8 - 22 mg/dL    Creatinine 0.71 0.50 - 1.40 mg/dL    Calcium 9.5 8.5 - 10.5 mg/dL    Anion Gap 8.0 0.0 - 11.9   CBC WITHOUT DIFFERENTIAL    Collection Time: 03/02/20  5:08 AM   Result Value Ref Range    WBC 8.0 4.8 - 10.8 K/uL    RBC 4.08 (L) 4.20 - 5.40 M/uL    Hemoglobin 12.6 12.0 - 16.0 g/dL    Hematocrit 37.4 37.0 - 47.0 %    MCV 91.7 81.4 - 97.8 fL    MCH 30.9 27.0 - 33.0 pg    MCHC 33.7 33.6 - 35.0 g/dL    RDW 45.1 35.9 - 50.0 fL    Platelet Count 235 164 - 446 K/uL    MPV 9.5 9.0 - 12.9 fL   ESTIMATED GFR    Collection Time: 03/02/20  5:08 AM   Result Value Ref Range    GFR If African American >60 >60 mL/min/1.73 m 2    GFR If Non African American >60 >60 mL/min/1.73  m 2       IMAGING:  Ct-lspine W/o Plus Recons  Result Date: 2/29/2020 2/29/2020 1:33 PM HISTORY/REASON FOR EXAM:  L/S-spine canal stenosis TECHNIQUE/EXAM DESCRIPTION AND NUMBER OF VIEWS: CT lumbar spine without contrast, with reconstructions. The study was performed on a helical multidetector CT scanner. Thin-section helical scanning was performed from T12-L1 to the sacrum. Sagittal and coronal multiplanar reconstructions were generated from the axial images. Low dose optimization technique was utilized for this CT exam including automated exposure control and adjustment of the mA and/or kV according to patient size. COMPARISON: Lumbar myelogram 9/4/2019. FINDINGS: Mild retrolisthesis of L2-3. Mild anterolisthesis of L5-S1. Old T12 compression fracture status post cement augmentation. Postsurgical change from posterior decompression at L4-5. There is no fracture or dislocation. The thoracolumbar junction appears intact. Vascular calcification. The prevertebral and paraspinous soft tissues are unremarkable. Moderate degenerative change of the lumbar spine. The sacrum and sacroiliac joints show no particular abnormality.     1. No acute fracture appreciated in the lumbar spine 2. Postsurgical changes from posterior decompression L4-5. 3. Mild retrolisthesis of L2-3 and anterolisthesis of L5-S1 are unchanged. 4. Old T12 compression fracture status post cement augmentation, similar to prior.         ASSESSMENT:  Patient is a 86 y.o. female admitted for additional pain management after L5/S1 laminectomy on 2/25/20 for right foot numbness and bilateral lower extremity weakness/balance impairment per ED note, now improving in both pain control and strength/balance with change in medication and additional time. Continued numbness of her right posterior heel and chronic urinary incontinence.      # Rehabilitation: Patient with impaired ADLs and mobility   - Patient is a good candidate for inpatient rehab based on needs for  PT and OT  - Patient will also benefit from family training  - Patient has a good discharge situation which will be home with daughter & son in law   - Barriers to transfer include: bed availability, anticipate bed availability on Wed/Thu   - [facility] Code / Diagnosis to Support: 04.111 Paraplegia, Incomplete      #Neuro: Lumbar stenosis and right lower extremity radiculopathy, causing B/L weakness and right foot numbness, complicated by neurogenic bladder/urinary incontinence, now s/p L5/S1 laminectomy 2/25/20 and admitted to hospital for additional pain control.  History and exam consistent with lumbar radiculopathy, affecting her right lower extremity sensation, bilateral lower extremity strength, neurogenic bladder, and possibly neurogenic bowel. Of note, patient recalls that she had dizziness/shakiness about 1 week after she had a pacemaker placed (about 4-5 years ago).  This episode lasted about 1 hour, went to the ER was told it may have been either vertigo or TIA.  -Reviewed importance of core strengthening       #Neurogenic bowel: Multiple 3/1/2020  -docusate   -miralax      #Neurogenic Bladder: Urinary incontinence, approximately 2 years, reports gradual worsening recently  -Consider timed voids  -Monitor PVR      #CV: Hypertension, hyperlipidemia, paroxysmal A. fib, pacemaker  -coreg  -hctz  -losartan   -plavix reportedly held for spine surgery; restart per primary team when medically cleared (patient said her cardiologist estimated restarting about 12 days post-op)      #DM2: Last A1c 6.9%, currently being managed by diet and exercise      #Pain: Currently well controlled; not using any of the PRN Percocet  -tizanidine      #Supp: Vit D, Vit C      #GI: GERD on Tums       #DVT: SCDs      #Code: DNR/DNI          Discussed with pt, summarized hospitalization and care, options for next step of care  Also discussed with family, summarized hospitalization and care, options for next step of care  Labs reviewed    Imaging personally reviewed.  CT of lumbar spine from 2/29/2020 showing old T12 compression fracture status post cementation and retrolisthesis of L2 on L3 as well as mild anterolisthesis of L5 on S1.  Also with multiple degenerative changes of the lumbar spine, including bony spurs.  Discussed with rehab team about recommendations   Performed chart review on 3/2/20 from 4:03-4:25, then from 4:39-4:53, to look further into her previous medical history:   Cardiology visit, 8/29/19: on Plavix  PCP visit, 11/12/19: on Plavix, planning ongoing NSGY eval/procedure  PCP visit, 1/8/20: DM2, A1C 6.9% diet managed  Neurosurgery H&P, 2/18/20: “pain radiating across her low back… pain radiating down her right posterior lateral thigh and calf, with numbness in the foot… Feels unsteady on her feet.”  History of right hip replacement in 2009.    Thank you for allowing us to participate in the care of this patient.         Discussed with patient about recommendations for and plan for rehabilitation as follows. Patient with multiple co-morbidities(including but not limited to paroxysmal A. Fib on Plavix/PPM, diabetes, hyperlipidemia, hypertension, neurogenic bowel, neurogenic bladder/urinary incontinence, and pain management); with functional deficits in mobility/self-care, and Moderate de-conditioning.     Pre-morbidly, this patient lived in a 2 level home with 1 steps to enter (lives on the first floor, very accessible), with family. The patient was evaluated by acute care Physical Therapy and Occupational Therapy; currently requiring minimal assistance for mobility and moderate assistance for ADLs.    Very Good candidate for an acute inpatient rehabilitation program with a coordinated program of care at an intensity and frequency not available at a lower level of care.     Note: if patient continues to progress while waiting for medical clearance, and no longer requires 2 of of 3 therapy services (PT/OT/SLP) at a CGA/Minimal  assistance level, patient will no longer need acute inpatient rehabilitation.    This recommendation is substantiated by the patient's current medical condition with intervention and assessment of medical issues requiring an acute level of care for patient's safety and maximum outcome. A coordinated program of care will be provided by an interdisciplinary team including physical therapy, occupational therapy, hospitalist, physiatry, rehab nursing and rehab psychology. Rehab goals include improved mobility, self-care management, strength and conditioning/endurance, pain management, bowel and bladder management, mood and affect, and safety with independent home management including caregiver training.     Estimated length of stay is approximately 10 days. Rehab potential: Very Good. Disposition: to pre-morbid independent living setting with supportive care of patient's family. We will continue to follow with you in anticipation of discharge to acute inpatient rehabilitation when medically stable to do so at the [facility] cretion of her  attending physician. Thank you for allowing us to participate in her care. Please call with any questions regarding this recommendation.          Du Troncoso MD  Physical Medicine and Rehabilitation   3/2/2020

## 2020-03-02 NOTE — CARE PLAN
Problem: Communication  Goal: The ability to communicate needs accurately and effectively will improve  Outcome: PROGRESSING AS EXPECTED  Plan of care discussed with pt, questions and concerns addressed   Problem: Safety  Goal: Will remain free from falls  Outcome: PROGRESSING AS EXPECTED  Fall precautions in place, pt calls appropriately, call light within reach     Problem: Infection  Goal: Will remain free from infection  Outcome: PROGRESSING AS EXPECTED  Hand hygiene in use

## 2020-03-02 NOTE — PROGRESS NOTES
Daily Progress Note:     Date of Service: 3/2/2020  Primary Team: [facility] Team   Attending: JD Marrufo M.D.   Senior Resident: Dr. Zhao  Intern: Dr. Cortes  Contact:  [phone]    Chief Complaint:   Worsening low back pain    Patient admitted for evaluation of postop pain, status post L5-S1 laminectomy    Subjective:   -POD 6, no acute overnight events  -Patient had multiple bowel movements yesterday, received stool softeners + enema  -Patient reports significant improvement of back pain  -No abdominal pain, nausea or vomiting  -No fever/ chills.  No chest pain or difficulty breathing  -PT /OT: Recommends postacute placement/PMR consult    Dispo: Medically stable, Rehab referral pending    Consultants/Specialty:  Physiatry    Review of Systems:    Review of Systems   Constitutional: Positive for malaise/fatigue. Negative for chills and fever.   HENT: Negative for ear pain.    Eyes: Negative for pain.   Respiratory: Negative for cough and shortness of breath.    Cardiovascular: Negative for chest pain, palpitations and orthopnea.   Gastrointestinal: Negative for abdominal pain and vomiting.   Genitourinary: Negative for dysuria.   Musculoskeletal: Negative for myalgias.   Neurological: Negative for sensory change and focal weakness.   Psychiatric/Behavioral: Negative for depression.       Objective Data:   Physical Exam:   Vitals:   Temp:  [36.2 °C (97.1 °F)-36.4 °C (97.5 °F)] 36.4 °C (97.5 °F)  Pulse:  [62-65] 62  Resp:  [14-18] 14  BP: (111-155)/(41-72) 111/41  SpO2:  [91 %-94 %] 93 %    Physical Exam  Constitutional:       General: She is not in acute distress.  HENT:      Head: Normocephalic.      Nose: No rhinorrhea.      Mouth/Throat:      Mouth: Mucous membranes are moist.   Eyes:      Extraocular Movements: Extraocular movements intact.   Neck:      Musculoskeletal: Normal range of motion.   Cardiovascular:      Rate and Rhythm: Normal rate.      Heart sounds: No murmur.   Pulmonary:       Effort: Pulmonary effort is normal. No respiratory distress.   Abdominal:      General: Bowel sounds are normal.      Palpations: Abdomen is soft.      Tenderness: There is no abdominal tenderness.   Musculoskeletal:         General: No deformity.   Skin:     General: Skin is warm.   Neurological:      General: No focal deficit present.      Mental Status: She is alert and oriented to person, place, and time.      Comments: MS bilateral UE 5/5        Bilateral LE 5/5 -        Psychiatric:         Mood and Affect: Mood normal.           Labs:   Recent Labs     02/29/20 1716 03/01/20  0601 03/02/20  0508   WBC 8.4 7.9 8.0   RBC 3.78* 3.72* 4.08*   HEMOGLOBIN 11.9* 11.4* 12.6   HEMATOCRIT 34.1* 33.9* 37.4   MCV 90.2 91.1 91.7   MCH 31.5 30.6 30.9   RDW 44.6 44.6 45.1   PLATELETCT 214 224 235   MPV 9.2 9.2 9.5   NEUTSPOLYS 68.80 60.50  --    LYMPHOCYTES 20.90* 28.20  --    MONOCYTES 7.40 8.30  --    EOSINOPHILS 2.20 2.00  --    BASOPHILS 0.20 0.40  --       Recent Labs     02/29/20 1716 03/01/20  0601 03/02/20  0508   SODIUM 133* 131* 132*   POTASSIUM 3.6 4.1 4.6   CHLORIDE 97 95* 95*   CO2 29 29 29   GLUCOSE 166* 155* 142*   BUN 18 17 22      Recent Labs     02/29/20 1716 03/01/20  0601 03/02/20  0508   CREATININE 0.79 0.63 0.71        Imaging:   CT-LSPINE W/O PLUS RECONS   Final Result         1. No acute fracture appreciated in the lumbar spine      2. Postsurgical changes from posterior decompression L4-5.      3. Mild retrolisthesis of L2-3 and anterolisthesis of L5-S1 are unchanged.      4. Old T12 compression fracture status post cement augmentation, similar to prior.              Post-op pain  Assessment & Plan  Pt s/p L5-S1 laminectomy, postop day 5, admitted with complaints of worsening low back pain.  No swelling/ discharge from the surgical site.  No fever/ chills.  Vitals stable on admit.  C/o Generalized lower extremity weakness, worsened by pain.  No focal motor or sensory deficits on examination. CT  lumbar spine : No acute abnormalities. Postsurgical changes from posterior decompression L4-5.  -adequate pain control  -fall/aspiration/seizure precautions  -Neuro checks Q4H  -PT/OT :PT /OT: Recommends postacute placement/PMR consult  -physiatry referral pending    PAF (paroxysmal atrial fibrillation) (HCC)- (present on admission)  Assessment & Plan  Pt has h/o paroxysmal Afib. [id number] score : 6. Patient is not on any anticoagulation due to immediate post op period  - on Coreg 6.25 mg bID  - follow up post discharge    CN (constipation)  Assessment & Plan  Resolved    - scheduled bowel regimen, hold for loose stools        Please note that this dictation was created using voice recognition software. I have made every reasonable attempt to correct obvious errors, but there may be errors of grammar and possibly content that I did not discover before finalizing the note.

## 2020-03-02 NOTE — THERAPY
"Occupational Therapy Evaluation completed.   Functional Status:  Oralia supine>sit, Oralia sit>stand, Oralia toilet txf, Oralia UB dress, ModA LB dress, SPV standing groom at sink, ModA sit>supine via log roll  Plan of Care: Will benefit from Occupational Therapy 4 times per week  Discharge Recommendations:  Equipment: Will Continue to Assess for Equipment Needs. Post-acute therapy Recommend post-acute placement for additional occupational therapy services prior to discharge home.    See \"Rehab Therapy-Acute\" Patient Summary Report for complete documentation.    Pt is 87yo female re-admitted with worsening low back pain after DC to home following L5-S1 laminectomy with no brace ordered, currently POD#5 from surgery. Pt presents to OT eval reporting no pain, pt's daughter explained pt was having trouble getting out of a chair at home and was refusing to mobilize with family. Pt agreeable to OT evaluation, required assist for log roll, cues for body mechanics to stand, and assist with functional transfers. DC to post-acute placement would be best to optimize pts recovery via assist with adherence to spinal precautions as well as therapy to increase pts activity tolerance and functional independence. Acute OT to follow while in-house.     "

## 2020-03-03 ENCOUNTER — APPOINTMENT (OUTPATIENT)
Dept: RADIOLOGY | Facility: MEDICAL CENTER | Age: 85
DRG: 552 | End: 2020-03-03
Attending: STUDENT IN AN ORGANIZED HEALTH CARE EDUCATION/TRAINING PROGRAM
Payer: MEDICARE

## 2020-03-03 PROBLEM — I82.409 DVT (DEEP VENOUS THROMBOSIS) (HCC): Status: ACTIVE | Noted: 2020-03-03

## 2020-03-03 PROCEDURE — A9270 NON-COVERED ITEM OR SERVICE: HCPCS | Performed by: STUDENT IN AN ORGANIZED HEALTH CARE EDUCATION/TRAINING PROGRAM

## 2020-03-03 PROCEDURE — 97116 GAIT TRAINING THERAPY: CPT

## 2020-03-03 PROCEDURE — 700101 HCHG RX REV CODE 250: Performed by: STUDENT IN AN ORGANIZED HEALTH CARE EDUCATION/TRAINING PROGRAM

## 2020-03-03 PROCEDURE — 99232 SBSQ HOSP IP/OBS MODERATE 35: CPT | Mod: GC | Performed by: HOSPITALIST

## 2020-03-03 PROCEDURE — 700102 HCHG RX REV CODE 250 W/ 637 OVERRIDE(OP): Performed by: STUDENT IN AN ORGANIZED HEALTH CARE EDUCATION/TRAINING PROGRAM

## 2020-03-03 PROCEDURE — 97530 THERAPEUTIC ACTIVITIES: CPT

## 2020-03-03 PROCEDURE — 93971 EXTREMITY STUDY: CPT | Mod: RT

## 2020-03-03 PROCEDURE — 770006 HCHG ROOM/CARE - MED/SURG/GYN SEMI*

## 2020-03-03 RX ADMIN — LABETALOL HYDROCHLORIDE 10 MG: 5 INJECTION, SOLUTION INTRAVENOUS at 17:03

## 2020-03-03 RX ADMIN — CALCIUM 500 MG: 500 TABLET ORAL at 17:03

## 2020-03-03 RX ADMIN — OXYCODONE HYDROCHLORIDE AND ACETAMINOPHEN 1000 MG: 500 TABLET ORAL at 05:10

## 2020-03-03 RX ADMIN — TIZANIDINE 2 MG: 4 TABLET ORAL at 05:10

## 2020-03-03 RX ADMIN — CARVEDILOL 6.25 MG: 6.25 TABLET, FILM COATED ORAL at 16:50

## 2020-03-03 RX ADMIN — HYDROCHLOROTHIAZIDE 25 MG: 25 TABLET ORAL at 05:11

## 2020-03-03 RX ADMIN — LOSARTAN POTASSIUM 100 MG: 50 TABLET, FILM COATED ORAL at 05:10

## 2020-03-03 RX ADMIN — SENNOSIDES AND DOCUSATE SODIUM 2 TABLET: 8.6; 5 TABLET ORAL at 05:11

## 2020-03-03 RX ADMIN — MELATONIN 1000 UNITS: at 17:03

## 2020-03-03 RX ADMIN — TIZANIDINE 2 MG: 4 TABLET ORAL at 12:36

## 2020-03-03 RX ADMIN — TIZANIDINE 2 MG: 4 TABLET ORAL at 17:03

## 2020-03-03 RX ADMIN — ACETAMINOPHEN 650 MG: 325 TABLET, FILM COATED ORAL at 15:59

## 2020-03-03 RX ADMIN — CARVEDILOL 6.25 MG: 6.25 TABLET, FILM COATED ORAL at 08:49

## 2020-03-03 RX ADMIN — CALCIUM 500 MG: 500 TABLET ORAL at 05:11

## 2020-03-03 ASSESSMENT — ENCOUNTER SYMPTOMS
ABDOMINAL PAIN: 0
CHILLS: 0
COUGH: 0
SHORTNESS OF BREATH: 0
FOCAL WEAKNESS: 0
PALPITATIONS: 0
FEVER: 0
SENSORY CHANGE: 0
EYE PAIN: 0
DEPRESSION: 0
ORTHOPNEA: 0
BACK PAIN: 1

## 2020-03-03 ASSESSMENT — GAIT ASSESSMENTS
GAIT LEVEL OF ASSIST: MINIMAL ASSIST
ASSISTIVE DEVICE: FRONT WHEEL WALKER
DISTANCE (FEET): 150

## 2020-03-03 ASSESSMENT — COGNITIVE AND FUNCTIONAL STATUS - GENERAL
MOBILITY SCORE: 13
MOVING TO AND FROM BED TO CHAIR: UNABLE
CLIMB 3 TO 5 STEPS WITH RAILING: A LITTLE
STANDING UP FROM CHAIR USING ARMS: A LITTLE
MOVING FROM LYING ON BACK TO SITTING ON SIDE OF FLAT BED: A LOT
TURNING FROM BACK TO SIDE WHILE IN FLAT BAD: UNABLE
WALKING IN HOSPITAL ROOM: A LITTLE
SUGGESTED CMS G CODE MODIFIER MOBILITY: CL

## 2020-03-03 NOTE — ASSESSMENT & PLAN NOTE
- Patient with palpable cord in right lower extremity near popliteal fossa.    - Will investigate with venous Doppler ultrasound.

## 2020-03-03 NOTE — THERAPY
"Physical Therapy Treatment completed.   Bed Mobility:  Supine to Sit: Minimal Assist (HOB flat, with rail, instructed log roll)  Transfers: Sit to Stand: Minimal Assist  Gait: Level Of Assist: Minimal Assist with Front-Wheel Walker       Plan of Care: Will benefit from Physical Therapy 4 times per week  Discharge Recommendations: Equipment: Will Continue to Assess for Equipment Needs. Post-acute therapy: Recommend post-acute placement for continued physical therapy services prior to discharge home.       See \"Rehab Therapy-Acute\" Patient Summary Report for complete documentation.     Patient presented today with impaired cognition and limited ability to adhere to spinal precautions, impaired balance, functional weakness, and decreased activity tolerance. She demonstrated no recall of spinal precautions or log roll despite receiving prior education. She required min A for log roll to get out of bed and once she was up ambulated approximately 150ft x2 with FWW and min A for safety. She demonstrated Trendelenburg gait pattern and forward flexed posture, somewhat improved with verbal cueing but she reverted to prior gait pattern after a few steps. Patient reported burning sensation in LLE that became constant following ambulation, but no exacerbation of symptoms with increased neural tension and no overt tenderness in calf or heat in calf, RN aware. Continue to recommend post acute placement prior to return home as daughter in room reported patient needs to be able to perform mobility at mod I level prior to home. Will continue to follow.  "

## 2020-03-03 NOTE — PROGRESS NOTES
Attempted to wean off of oxygen pt desaturated to 88% so she was placed back on 0.5L NC. Incentive Spirometer was provided to patient and she demonstrated proper use of is. Pt encouraged to use 5-10x per hour.

## 2020-03-03 NOTE — PROGRESS NOTES
Daily Progress Note:     Date of Service: 3/3/2020  Primary Team: [facility] Team   Attending: JD Marrufo M.D.   Senior Resident: Dr. Zhao  Intern: Dr. Cortes  Contact:  [phone]    Interval problems:  Post operative pain and deconditioning requiring rehab placement.   Palpable cord near right popliteal fossa.      Subjective:   No acute events overnight.  POD #7.  No acute complaints this morning.  Patient with palpable cord in right lower extremity near popliteal fossa.  Will investigate with venous Doppler ultrasound.  Otherwise neurologically stable.  Pending transfer to acute care rehab.     Consultants/Specialty:  Physiatry    Review of Systems:    Review of Systems   Constitutional: Negative for chills and fever.   HENT: Negative for ear pain.    Eyes: Negative for pain.   Respiratory: Negative for cough and shortness of breath.    Cardiovascular: Negative for chest pain, palpitations and orthopnea.   Gastrointestinal: Negative for abdominal pain.   Genitourinary: Negative for dysuria.   Musculoskeletal: Positive for back pain (controlled, improving).   Skin: Negative for itching.   Neurological: Negative for sensory change and focal weakness.   Psychiatric/Behavioral: Negative for depression.       Objective Data:   Physical Exam:   Vitals:   Temp:  [36.2 °C (97.1 °F)-36.6 °C (97.9 °F)] 36.2 °C (97.2 °F)  Pulse:  [67-73] 67  Resp:  [16-18] 16  BP: (128-168)/(43-58) 143/57  SpO2:  [91 %-96 %] 91 %    Physical Exam  Constitutional:       General: She is not in acute distress.  HENT:      Head: Normocephalic.      Nose: No rhinorrhea.      Mouth/Throat:      Mouth: Mucous membranes are moist.   Eyes:      Extraocular Movements: Extraocular movements intact.   Neck:      Musculoskeletal: No neck rigidity.   Cardiovascular:      Rate and Rhythm: Normal rate.      Heart sounds: No murmur.   Pulmonary:      Effort: Pulmonary effort is normal. No respiratory distress.      Breath sounds: No  rales.   Abdominal:      General: Bowel sounds are normal.      Tenderness: There is no abdominal tenderness.   Musculoskeletal:         General: No swelling.      Comments: Palpable cord present near right popliteal fossa.    Skin:     General: Skin is warm.   Neurological:      General: No focal deficit present.      Mental Status: She is alert and oriented to person, place, and time.      Motor: Weakness (overall fatigue, subjective) present.      Comments: Strength bilateral UE 5/5, bilateral LE 5/5.  Sensation intact.          Labs:   Recent Labs     02/29/20 1716 03/01/20  0601 03/02/20  0508   WBC 8.4 7.9 8.0   RBC 3.78* 3.72* 4.08*   HEMOGLOBIN 11.9* 11.4* 12.6   HEMATOCRIT 34.1* 33.9* 37.4   MCV 90.2 91.1 91.7   MCH 31.5 30.6 30.9   RDW 44.6 44.6 45.1   PLATELETCT 214 224 235   MPV 9.2 9.2 9.5   NEUTSPOLYS 68.80 60.50  --    LYMPHOCYTES 20.90* 28.20  --    MONOCYTES 7.40 8.30  --    EOSINOPHILS 2.20 2.00  --    BASOPHILS 0.20 0.40  --       Recent Labs     02/29/20 1716 03/01/20  0601 03/02/20  0508   SODIUM 133* 131* 132*   POTASSIUM 3.6 4.1 4.6   CHLORIDE 97 95* 95*   CO2 29 29 29   GLUCOSE 166* 155* 142*   BUN 18 17 22      Recent Labs     02/29/20 1716 03/01/20  0601 03/02/20  0508   CREATININE 0.79 0.63 0.71        Imaging:   CT-LSPINE W/O PLUS RECONS   Final Result         1. No acute fracture appreciated in the lumbar spine      2. Postsurgical changes from posterior decompression L4-5.      3. Mild retrolisthesis of L2-3 and anterolisthesis of L5-S1 are unchanged.      4. Old T12 compression fracture status post cement augmentation, similar to prior.         US-EXTREMITY VENOUS LOWER UNILAT RIGHT    (Results Pending)        Post-op pain  Assessment & Plan  Pt s/p L5-S1 laminectomy, postop day 5, admitted with complaints of worsening low back pain.  No swelling/ discharge from the surgical site.  No fever/ chills.  Vitals stable on admit.  C/o Generalized lower extremity weakness, worsened by  pain.  No focal motor or sensory deficits on examination. CT lumbar spine : No acute abnormalities. Postsurgical changes from posterior decompression L4-5.  -adequate pain control  -fall/aspiration/seizure precautions  -Neuro checks Q4H  -Accepted to rehab.  Bed pending.      PAF (paroxysmal atrial fibrillation) (HCC)- (present on admission)  Assessment & Plan  Pt has h/o paroxysmal Afib. [id number] score : 6. Patient is not on any anticoagulation due to immediate post op period.  - on Coreg 6.25 mg twice daily.    - Patient and Cardiology have plans to follow up anticoagulation talks outpatient.     concern for right lower extremity DVT  Assessment & Plan  - Patient with palpable cord in right lower extremity near popliteal fossa.    - Will investigate with venous Doppler ultrasound.      constipation  Assessment & Plan  Resolved.    Scheduled bowel regimen, hold for loose stools.

## 2020-03-03 NOTE — CARE PLAN
Problem: Safety  Goal: Will remain free from falls  Outcome: PROGRESSING AS EXPECTED  Intervention: Implement fall precautions  Flowsheets (Taken 3/3/2020 1103)  Bed Alarm: Yes - Alarm On  Environmental Precautions:   Treaded Slipper Socks on Patient   Personal Belongings, Wastebasket, Call Bell etc. in Easy Reach   Transferred to Stronger Side   Report Given to Other Health Care Providers Regarding Fall Risk   Bed in Low Position   Communication Sign for Patients & Families   Mobility Assessed & Appropriate Sign Placed  Bedrails: Bedrails Closest to Bathroom Down  Note: Pt calls appropriately     Problem: Knowledge Deficit  Goal: Knowledge of disease process/condition, treatment plan, diagnostic tests, and medications will improve  Outcome: PROGRESSING AS EXPECTED  Intervention: Explain information regarding disease process/condition, treatment plan, diagnostic tests, and medications and document in education  Note: Pt updated on POC

## 2020-03-03 NOTE — PROGRESS NOTES
Neurosurgery Progress Note    Subjective:  No acute events. Pain controlled with medication- mostly to hips and lateral leg.      Exam:  A&O x3. Fluent Speech.   BLE- 5/5, incision- c/d    BP  Min: 128/43  Max: 168/58  Pulse  Av  Min: 67  Max: 73  Resp  Av.5  Min: 16  Max: 18  Temp  Av.4 °C (97.5 °F)  Min: 36.2 °C (97.1 °F)  Max: 36.6 °C (97.9 °F)  SpO2  Av %  Min: 91 %  Max: 96 %    No data recorded    Recent Labs     20  0601 20  0508   WBC 8.4 7.9 8.0   RBC 3.78* 3.72* 4.08*   HEMOGLOBIN 11.9* 11.4* 12.6   HEMATOCRIT 34.1* 33.9* 37.4   MCV 90.2 91.1 91.7   MCH 31.5 30.6 30.9   MCHC 34.9 33.6 33.7   RDW 44.6 44.6 45.1   PLATELETCT 214 224 235   MPV 9.2 9.2 9.5     Recent Labs     20  0601 20  0508   SODIUM 133* 131* 132*   POTASSIUM 3.6 4.1 4.6   CHLORIDE 97 95* 95*   CO2 29 29 29   GLUCOSE 166* 155* 142*   BUN 18 17 22   CREATININE 0.79 0.63 0.71   CALCIUM 9.0 9.1 9.5               Intake/Output       20 - 20 - 20 0659       Total  Total       Intake    Total Intake -- -- -- -- -- --       Output    Urine  --  -- --  --  -- --    Number of Times Voided 2 x 1 x 3 x -- -- --    Stool  --  -- --  --  -- --    Number of Times Stooled -- -- -- 1 x -- 1 x    Total Output -- -- -- -- -- --       Net I/O     -- -- -- -- -- --          No intake or output data in the 24 hours ending 20 0942         • magnesium hydroxide  30 mL BID    And   • senna-docusate  2 Tab BID    And   • bisacodyl  10 mg QDAY PRN   • Fleet Enema  1 Enema Once PRN   • docusate sodium  100 mg BID   • polyethylene glycol/lytes  1 Packet DAILY   • acetaminophen  650 mg Q6HRS PRN   • labetalol  10 mg Q4HRS PRN   • ascorbic acid  1,000 mg DAILY   • calcium carbonate  500 mg BID   • carvedilol  6.25 mg BID WITH MEALS   • tizanidine  2 mg TID   • vitamin D  1,000 Units Q EVENING   •  oxyCODONE-acetaminophen  1-2 Tab Q6HRS PRN   • losartan  100 mg Q DAY   • hydroCHLOROthiazide  25 mg Q DAY       Assessment and Plan:  Hospital day #4  POD #na    Plan:   Stable neuro   PT/OT- likely needs rehab  Dispo per IM- clear from [facility] perspective    Discharge Instructions:  Ambulate as much as comfortable  Ok to shower, pat incision dry, leave open to air- no dressing   Over the counter stool softeners daily while on narcotics  No lifting greater than 15 pounds, no repetitive bending or twisting  Follow up at [facility] 2 weeks after surgery if home

## 2020-03-03 NOTE — PROGRESS NOTES
3063-0571: Aox4. Neuro checks as ordered, see flowsheet. VSS on RA sating 90-95. C/o pain to right posterior knee; slightly warmer than left on assessment; no redness. Awaiting RLE US. Continent b/b, up to the toilet w/ SBA rolling walker. x1 BM this shift. Skin per flowsheet. Safety maintained, bed alarm set. Family at the bedside. WCTM.     1545: Pt c/o pain to right heel. Blanchable redness and boggy integrity noted on assessment. Heel float boots in place bilaterally. Tylenol PRN given. WCTM.    1645: /85. IVP labetolol given as ordered SBP>180. [facility] resident paged ([phone]) WCTM    0407: BP rechecked 171/86. WCTM.

## 2020-03-03 NOTE — CARE PLAN
Problem: Communication  Goal: The ability to communicate needs accurately and effectively will improve  Outcome: PROGRESSING AS EXPECTED  Intervention: [place] patient and significant other/support system to call light to alert staff of needs  Flowsheets (Taken 3/3/2020 0101)  Oriented to:: All of the Following : Location of Bathroom, Visiting Policy, Unit Routine, Call Light and Bedside Controls, Bedside Rail Policy, Smoking Policy, Rights and Responsibilities, Bedside Report, and Patient Education Notebook  Note: Pt A&Ox4, calls appropriately using call light and is able to make needs known to staff. Pt oriented to room and call light. Hourly rounding in place.        Problem: Safety  Goal: Will remain free from falls  Outcome: PROGRESSING AS EXPECTED  Intervention: Implement fall precautions  Flowsheets (Taken 3/2/2020 2000)  Environmental Precautions:   Personal Belongings, Wastebasket, Call Bell etc. in Easy Reach   Treaded Slipper Socks on Patient   Transferred to Stronger Side   Report Given to Other Health Care Providers Regarding Fall Risk   Bed in Low Position   Communication Sign for Patients & Families   Mobility Assessed & Appropriate Sign Placed  Note: Pt is a moderate fall risk. Fall precautions in place. Bed locked and in lowest position. Bed alarm on, call light within reach, non skid socks in place.

## 2020-03-03 NOTE — DISCHARGE PLANNING
Dr. Troncoso is recommending [facility].  Will discuss this case further with the Admissions Team meeting for a possible admission.

## 2020-03-03 NOTE — DISCHARGE PLANNING
Spoke with Krystle and S.I.L. regarding [facility] and they are agreeable.  They are aware that we have requested updated PT/OT evals to assess current function.

## 2020-03-04 ENCOUNTER — APPOINTMENT (OUTPATIENT)
Dept: RADIOLOGY | Facility: REHABILITATION | Age: 85
DRG: 560 | End: 2020-03-04
Attending: PHYSICAL MEDICINE & REHABILITATION
Payer: MEDICARE

## 2020-03-04 ENCOUNTER — HOSPITAL ENCOUNTER (INPATIENT)
Facility: REHABILITATION | Age: 85
LOS: 12 days | DRG: 560 | End: 2020-03-16
Attending: PHYSICAL MEDICINE & REHABILITATION | Admitting: PHYSICAL MEDICINE & REHABILITATION
Payer: MEDICARE

## 2020-03-04 VITALS
TEMPERATURE: 98 F | DIASTOLIC BLOOD PRESSURE: 58 MMHG | SYSTOLIC BLOOD PRESSURE: 144 MMHG | WEIGHT: 163.14 LBS | RESPIRATION RATE: 16 BRPM | BODY MASS INDEX: 28.91 KG/M2 | HEART RATE: 71 BPM | OXYGEN SATURATION: 92 % | HEIGHT: 63 IN

## 2020-03-04 DIAGNOSIS — M79.2 NEUROPATHIC PAIN: ICD-10-CM

## 2020-03-04 DIAGNOSIS — R29.898 WEAKNESS OF BOTH LOWER EXTREMITIES: ICD-10-CM

## 2020-03-04 PROBLEM — I95.1 ORTHOSTATIC HYPOTENSION: Status: ACTIVE | Noted: 2020-03-04

## 2020-03-04 PROBLEM — K59.2 NEUROGENIC BOWEL: Status: ACTIVE | Noted: 2020-03-04

## 2020-03-04 PROBLEM — G82.22 ACUTE INCOMPLETE PARAPLEGIA (HCC): Status: ACTIVE | Noted: 2020-03-04

## 2020-03-04 PROBLEM — N31.9 NEUROGENIC BLADDER: Status: ACTIVE | Noted: 2020-03-04

## 2020-03-04 PROCEDURE — A9270 NON-COVERED ITEM OR SERVICE: HCPCS | Performed by: STUDENT IN AN ORGANIZED HEALTH CARE EDUCATION/TRAINING PROGRAM

## 2020-03-04 PROCEDURE — 770010 HCHG ROOM/CARE - REHAB SEMI PRIVAT*

## 2020-03-04 PROCEDURE — 700111 HCHG RX REV CODE 636 W/ 250 OVERRIDE (IP): Performed by: PHYSICAL MEDICINE & REHABILITATION

## 2020-03-04 PROCEDURE — 99239 HOSP IP/OBS DSCHRG MGMT >30: CPT | Mod: GC | Performed by: HOSPITALIST

## 2020-03-04 PROCEDURE — 94760 N-INVAS EAR/PLS OXIMETRY 1: CPT

## 2020-03-04 PROCEDURE — 700112 HCHG RX REV CODE 229: Performed by: PHYSICAL MEDICINE & REHABILITATION

## 2020-03-04 PROCEDURE — 700102 HCHG RX REV CODE 250 W/ 637 OVERRIDE(OP): Performed by: STUDENT IN AN ORGANIZED HEALTH CARE EDUCATION/TRAINING PROGRAM

## 2020-03-04 PROCEDURE — 99223 1ST HOSP IP/OBS HIGH 75: CPT | Mod: AI | Performed by: PHYSICAL MEDICINE & REHABILITATION

## 2020-03-04 PROCEDURE — 74018 RADEX ABDOMEN 1 VIEW: CPT

## 2020-03-04 PROCEDURE — 700102 HCHG RX REV CODE 250 W/ 637 OVERRIDE(OP): Performed by: PHYSICAL MEDICINE & REHABILITATION

## 2020-03-04 PROCEDURE — A9270 NON-COVERED ITEM OR SERVICE: HCPCS | Performed by: PHYSICAL MEDICINE & REHABILITATION

## 2020-03-04 RX ORDER — ASCORBIC ACID 500 MG
1000 TABLET ORAL DAILY
Status: DISCONTINUED | OUTPATIENT
Start: 2020-03-05 | End: 2020-03-16 | Stop reason: HOSPADM

## 2020-03-04 RX ORDER — DOCUSATE SODIUM 100 MG/1
100 CAPSULE, LIQUID FILLED ORAL 2 TIMES DAILY
Status: CANCELLED | OUTPATIENT
Start: 2020-03-04

## 2020-03-04 RX ORDER — LOSARTAN POTASSIUM 25 MG/1
100 TABLET ORAL
Status: DISCONTINUED | OUTPATIENT
Start: 2020-03-05 | End: 2020-03-16 | Stop reason: HOSPADM

## 2020-03-04 RX ORDER — ALUMINA, MAGNESIA, AND SIMETHICONE 2400; 2400; 240 MG/30ML; MG/30ML; MG/30ML
20 SUSPENSION ORAL
Status: DISCONTINUED | OUTPATIENT
Start: 2020-03-04 | End: 2020-03-16 | Stop reason: HOSPADM

## 2020-03-04 RX ORDER — VITAMIN B COMPLEX
1000 TABLET ORAL EVERY EVENING
Status: CANCELLED | OUTPATIENT
Start: 2020-03-04

## 2020-03-04 RX ORDER — CALCIUM CARBONATE 500 MG/1
500 TABLET, CHEWABLE ORAL 2 TIMES DAILY
Status: DISCONTINUED | OUTPATIENT
Start: 2020-03-04 | End: 2020-03-16 | Stop reason: HOSPADM

## 2020-03-04 RX ORDER — ECHINACEA PURPUREA EXTRACT 125 MG
2 TABLET ORAL PRN
Status: DISCONTINUED | OUTPATIENT
Start: 2020-03-04 | End: 2020-03-16 | Stop reason: HOSPADM

## 2020-03-04 RX ORDER — TRAZODONE HYDROCHLORIDE 50 MG/1
50 TABLET ORAL
Status: DISCONTINUED | OUTPATIENT
Start: 2020-03-04 | End: 2020-03-16 | Stop reason: HOSPADM

## 2020-03-04 RX ORDER — ASCORBIC ACID 500 MG
1000 TABLET ORAL DAILY
Status: CANCELLED | OUTPATIENT
Start: 2020-03-05

## 2020-03-04 RX ORDER — ONDANSETRON 4 MG/1
4 TABLET, ORALLY DISINTEGRATING ORAL 4 TIMES DAILY PRN
Status: DISCONTINUED | OUTPATIENT
Start: 2020-03-04 | End: 2020-03-16 | Stop reason: HOSPADM

## 2020-03-04 RX ORDER — HYDROCHLOROTHIAZIDE 25 MG/1
25 TABLET ORAL
Status: CANCELLED | OUTPATIENT
Start: 2020-03-05

## 2020-03-04 RX ORDER — SENNOSIDES A AND B 8.6 MG/1
17.2 TABLET, FILM COATED ORAL
Status: DISCONTINUED | OUTPATIENT
Start: 2020-03-04 | End: 2020-03-10

## 2020-03-04 RX ORDER — OXYCODONE HYDROCHLORIDE 5 MG/1
5 TABLET ORAL
Status: DISCONTINUED | OUTPATIENT
Start: 2020-03-04 | End: 2020-03-16 | Stop reason: HOSPADM

## 2020-03-04 RX ORDER — AMOXICILLIN 250 MG
2 CAPSULE ORAL 2 TIMES DAILY
Qty: 30 TAB | Refills: 0
Start: 2020-03-04 | End: 2020-03-04

## 2020-03-04 RX ORDER — TIZANIDINE 4 MG/1
2 TABLET ORAL 3 TIMES DAILY
Status: DISCONTINUED | OUTPATIENT
Start: 2020-03-04 | End: 2020-03-11

## 2020-03-04 RX ORDER — POLYVINYL ALCOHOL 14 MG/ML
1 SOLUTION/ DROPS OPHTHALMIC PRN
Status: DISCONTINUED | OUTPATIENT
Start: 2020-03-04 | End: 2020-03-16 | Stop reason: HOSPADM

## 2020-03-04 RX ORDER — CARVEDILOL 3.12 MG/1
6.25 TABLET ORAL 2 TIMES DAILY WITH MEALS
Status: DISCONTINUED | OUTPATIENT
Start: 2020-03-04 | End: 2020-03-16 | Stop reason: HOSPADM

## 2020-03-04 RX ORDER — OXYCODONE HYDROCHLORIDE 10 MG/1
10 TABLET ORAL
Status: DISCONTINUED | OUTPATIENT
Start: 2020-03-04 | End: 2020-03-16 | Stop reason: HOSPADM

## 2020-03-04 RX ORDER — DOCUSATE SODIUM 100 MG/1
200 CAPSULE, LIQUID FILLED ORAL 2 TIMES DAILY
Status: DISCONTINUED | OUTPATIENT
Start: 2020-03-04 | End: 2020-03-10

## 2020-03-04 RX ORDER — HYDROCHLOROTHIAZIDE 25 MG/1
25 TABLET ORAL
Status: DISCONTINUED | OUTPATIENT
Start: 2020-03-05 | End: 2020-03-07

## 2020-03-04 RX ORDER — LOSARTAN POTASSIUM 50 MG/1
100 TABLET ORAL
Status: CANCELLED | OUTPATIENT
Start: 2020-03-05

## 2020-03-04 RX ORDER — POLYETHYLENE GLYCOL 3350 17 G/17G
1 POWDER, FOR SOLUTION ORAL DAILY
Status: CANCELLED | OUTPATIENT
Start: 2020-03-05

## 2020-03-04 RX ORDER — VITAMIN B COMPLEX
2000 TABLET ORAL EVERY EVENING
Status: DISCONTINUED | OUTPATIENT
Start: 2020-03-04 | End: 2020-03-05

## 2020-03-04 RX ORDER — DOCUSATE SODIUM 100 MG/1
100 CAPSULE, LIQUID FILLED ORAL 2 TIMES DAILY
Status: DISCONTINUED | OUTPATIENT
Start: 2020-03-04 | End: 2020-03-04

## 2020-03-04 RX ORDER — BISACODYL 10 MG/1
10 SUPPOSITORY RECTAL
Status: DISCONTINUED | OUTPATIENT
Start: 2020-03-04 | End: 2020-03-04

## 2020-03-04 RX ORDER — ACETAMINOPHEN 500 MG
1000 TABLET ORAL 3 TIMES DAILY
Status: COMPLETED | OUTPATIENT
Start: 2020-03-04 | End: 2020-03-09

## 2020-03-04 RX ORDER — VITAMIN B COMPLEX
1000 TABLET ORAL EVERY EVENING
Status: DISCONTINUED | OUTPATIENT
Start: 2020-03-04 | End: 2020-03-04

## 2020-03-04 RX ORDER — CARVEDILOL 6.25 MG/1
6.25 TABLET ORAL 2 TIMES DAILY WITH MEALS
Status: CANCELLED | OUTPATIENT
Start: 2020-03-04

## 2020-03-04 RX ORDER — ONDANSETRON 2 MG/ML
4 INJECTION INTRAMUSCULAR; INTRAVENOUS 4 TIMES DAILY PRN
Status: DISCONTINUED | OUTPATIENT
Start: 2020-03-04 | End: 2020-03-16 | Stop reason: HOSPADM

## 2020-03-04 RX ORDER — POLYETHYLENE GLYCOL 3350 17 G/17G
1 POWDER, FOR SOLUTION ORAL DAILY
Status: DISCONTINUED | OUTPATIENT
Start: 2020-03-05 | End: 2020-03-16 | Stop reason: HOSPADM

## 2020-03-04 RX ORDER — IBUPROFEN 200 MG
500 CAPSULE ORAL 2 TIMES DAILY
Status: CANCELLED | OUTPATIENT
Start: 2020-03-04

## 2020-03-04 RX ORDER — ACETAMINOPHEN 325 MG/1
650 TABLET ORAL EVERY 4 HOURS PRN
Status: DISCONTINUED | OUTPATIENT
Start: 2020-03-04 | End: 2020-03-16 | Stop reason: HOSPADM

## 2020-03-04 RX ORDER — TIZANIDINE 4 MG/1
2 TABLET ORAL 3 TIMES DAILY
Status: CANCELLED | OUTPATIENT
Start: 2020-03-04

## 2020-03-04 RX ORDER — IBUPROFEN 200 MG
500 CAPSULE ORAL 2 TIMES DAILY
Status: DISCONTINUED | OUTPATIENT
Start: 2020-03-04 | End: 2020-03-04

## 2020-03-04 RX ADMIN — TIZANIDINE 2 MG: 4 TABLET ORAL at 05:07

## 2020-03-04 RX ADMIN — OXYCODONE HYDROCHLORIDE AND ACETAMINOPHEN 1000 MG: 500 TABLET ORAL at 05:06

## 2020-03-04 RX ADMIN — LOSARTAN POTASSIUM 100 MG: 50 TABLET, FILM COATED ORAL at 05:06

## 2020-03-04 RX ADMIN — ACETAMINOPHEN 1000 MG: 500 TABLET, FILM COATED ORAL at 20:10

## 2020-03-04 RX ADMIN — DOCUSATE SODIUM 200 MG: 100 CAPSULE, LIQUID FILLED ORAL at 20:11

## 2020-03-04 RX ADMIN — ACETAMINOPHEN 650 MG: 325 TABLET, FILM COATED ORAL at 01:30

## 2020-03-04 RX ADMIN — TIZANIDINE 2 MG: 4 TABLET ORAL at 14:38

## 2020-03-04 RX ADMIN — TIZANIDINE 2 MG: 4 TABLET ORAL at 20:10

## 2020-03-04 RX ADMIN — ACETAMINOPHEN 1000 MG: 500 TABLET, FILM COATED ORAL at 14:39

## 2020-03-04 RX ADMIN — MELATONIN 2000 UNITS: at 20:11

## 2020-03-04 RX ADMIN — DOCUSATE SODIUM 200 MG: 100 CAPSULE, LIQUID FILLED ORAL at 14:39

## 2020-03-04 RX ADMIN — ACETAMINOPHEN 650 MG: 325 TABLET, FILM COATED ORAL at 07:52

## 2020-03-04 RX ADMIN — STANDARDIZED SENNA CONCENTRATE 17.2 MG: 8.6 TABLET ORAL at 14:39

## 2020-03-04 RX ADMIN — CARVEDILOL 6.25 MG: 6.25 TABLET, FILM COATED ORAL at 07:52

## 2020-03-04 RX ADMIN — CALCIUM CARBONATE (ANTACID) CHEW TAB 500 MG 500 MG: 500 CHEW TAB at 20:11

## 2020-03-04 RX ADMIN — CALCIUM 500 MG: 500 TABLET ORAL at 05:12

## 2020-03-04 RX ADMIN — HYDROCHLOROTHIAZIDE 25 MG: 25 TABLET ORAL at 05:06

## 2020-03-04 ASSESSMENT — PATIENT HEALTH QUESTIONNAIRE - PHQ9
2. FEELING DOWN, DEPRESSED, IRRITABLE, OR HOPELESS: NOT AT ALL
SUM OF ALL RESPONSES TO PHQ9 QUESTIONS 1 AND 2: 0
1. LITTLE INTEREST OR PLEASURE IN DOING THINGS: NOT AT ALL

## 2020-03-04 ASSESSMENT — LIFESTYLE VARIABLES
AVERAGE NUMBER OF DAYS PER WEEK YOU HAVE A DRINK CONTAINING ALCOHOL: 0
TOTAL SCORE: 0
EVER HAD A DRINK FIRST THING IN THE MORNING TO STEADY YOUR NERVES TO GET RID OF A HANGOVER: NO
ALCOHOL_USE: NO
TOTAL SCORE: 0
TOTAL SCORE: 0
HAVE PEOPLE ANNOYED YOU BY CRITICIZING YOUR DRINKING: NO
CONSUMPTION TOTAL: NEGATIVE
HAVE YOU EVER FELT YOU SHOULD CUT DOWN ON YOUR DRINKING: NO
HOW MANY TIMES IN THE PAST YEAR HAVE YOU HAD 5 OR MORE DRINKS IN A DAY: 0
EVER_SMOKED: NEVER
ON A TYPICAL DAY WHEN YOU DRINK ALCOHOL HOW MANY DRINKS DO YOU HAVE: 0
EVER FELT BAD OR GUILTY ABOUT YOUR DRINKING: NO

## 2020-03-04 ASSESSMENT — FIBROSIS 4 INDEX: FIB4 SCORE: 1.37

## 2020-03-04 NOTE — PROGRESS NOTES
2000 Pt requesting to sleep, pt refusing assessment at this time. Pt comfortable and sleeping. Vital signs stable. Denies pain.     0100 Pt ambulated to bathroom. Assessment completed. Pt complains of pain. Medicated per MAR.

## 2020-03-04 NOTE — CARE PLAN
Problem: Safety  Goal: Will remain free from falls  Outcome: PROGRESSING AS EXPECTED  Note: Bed alarm on, wheels locked, in lowest position. Call light within reach. Non skid socks applied.      Problem: Discharge Barriers/Planning  Goal: Patient's continuum of care needs will be met  Outcome: PROGRESSING AS EXPECTED  Note: Pt updated on plan of care. Care coordination involved.

## 2020-03-04 NOTE — FLOWSHEET NOTE
03/04/20 1324   Vital Signs   Pulse 71   Respiration 18   Pulse Oximetry 91 %   $ Pulse Oximetry (Spot Check) Yes   Respiratory Assessment   Level of Consciousness Alert   Oxygen   O2 Delivery Device None - Room Air   Smoking History   Have you ever smoked Never

## 2020-03-04 NOTE — DISCHARGE PLANNING
Anticipated Discharge Disposition:   [facility]    Action:   RN CM prepared COBRA for patient transfer to [facility] at 11 am today.  RN CM notified patient, patient's daughter, patient's nurse, and charge nurse of transfer to Rehab Rehab.    Barriers to Discharge:   None    Plan:   RN DIANA provided contact information to patient.  RN CM placed completed COBRA st front of patient's chart.

## 2020-03-04 NOTE — FLOWSHEET NOTE
03/04/20 1317   Patient History   Pulmonary Diagnosis no   Procedures Relevant to Respiratory Status L5-S1 laminectomy and discectomy   Home O2 No   Nocturnal CPAP No   Home Treatments/Frequency No   Protocol Pathways   Protocol Pathways Hyperinflation Protocol   Hyperinflation Protocol   Hyperinflation Protocol Indications   (incomplete paraplegia)   Hyperinflation Protocol Goals/Outcome   (incomplete paraplegia, follow until discharge)

## 2020-03-04 NOTE — DISCHARGE PLANNING
U/S to R/O DVT was negative.  Will discuss this case further with the Admissions team this am.  Anticipate an admission pending bed availability.

## 2020-03-04 NOTE — PROGRESS NOTES
Updated pt on POC, verbalizes understanding that she would like to go to Rehab. Spoke to Jared. 11 pt will be leaving    Called report at Rehab to Geno

## 2020-03-04 NOTE — DISCHARGE INSTRUCTIONS
Discharge Instructions    Discharged to other by medical transportation with escort. Discharged via wheelchair, hospital escort: Yes.  Special equipment needed: Not Applicable    Be sure to schedule a follow-up appointment with your primary care doctor or any specialists as instructed.     Discharge Plan:   Diet Plan: Discussed  Activity Level: Discussed  Confirmed Follow up Appointment: Appointment Scheduled  Confirmed Symptoms Management: Discussed  Medication Reconciliation Updated: Yes  Influenza Vaccine Indication: Not indicated: Previously immunized this influenza season and > 8 years of age    I understand that a diet low in cholesterol, fat, and sodium is recommended for good health. Unless I have been given specific instructions below for another diet, I accept this instruction as my diet prescription.   Other diet: Regular    Special Instructions: None    · Is patient discharged on Warfarin / Coumadin?   No     Depression / Suicide Risk    As you are [facility] facility, it is important to learn how to keep safe from harming yourself.    Recognize the warning signs:  · Abrupt changes in personality, positive or negative- including increase in energy   · Giving away possessions  · Change in eating patterns- significant weight changes-  positive or negative  · Change in sleeping patterns- unable to sleep or sleeping all the time   · Unwillingness or inability to communicate  · Depression  · Unusual sadness, discouragement and loneliness  · Talk of wanting to die  · Neglect of personal appearance   · Rebelliousness- reckless behavior  · Withdrawal from people/activities they love  · Confusion- inability to concentrate     If you or a loved one observes any of these behaviors or has concerns about self-harm, here's what you can do:  · Talk about it- your feelings and reasons for harming yourself  · Remove any means that you might use to hurt yourself (examples: pills, rope, extension  cords, firearm)  · Get professional help from the community (Mental Health, Substance Abuse, psychological counseling)  · Do not be alone:Call your Safe Contact- someone whom you trust who will be there for you.  · Call your local CRISIS HOTLINE 591-9173 or 111-206-5268  · Call your local Children's Mobile Crisis Response Team [place] (509) 445-3934 or www.[employer]  · Call the toll free National Suicide Prevention Hotlines   · National Suicide Prevention Lifeline 617-209-ZQLF (2289)  · National Hope Line Network 800-SUICIDE (758-5966)      Fall Prevention in Hospitals, Adult  WHAT ARE SOME SAFETY TIPS FOR PREVENTING FALLS?  If you or a loved one has to stay in the hospital, talk with the health care providers about the risk of falling. Find out which medicines or treatments can cause dizziness or affect balance. Make a plan with the health care providers to prevent falls. The plan may include these points:  · Ask for help moving around, especially after surgery or when feeling unwell.  · Have support available when getting up and moving around.  · Wear nonskid footwear.  · Use the safety straps on wheelchairs.  · Ask for help to get objects that are out of reach.  · Wear eyeglasses.  · Remove all clutter from the floor and the sides of the bed.  · Keep equipment and wires securely out of the way.  · Keep the bed locked in the low position.  · Keep the side rails up on the bed.  · Keep the nurse call button within reach.  · Keep the door open when no one else is in the room.  · Have someone stay in the hospital with you or your loved one.  · Ask for a bed alarm if you are not able to stay with your loved one who is at risk for getting up without help.  · Ask if sleeping pills or other medicines that alter mental status are necessary.  WHAT INCREASES THE RISK FOR FALLS?  Certain conditions and treatments may increase a patient's risk for falls in a hospital. These include:  · Being in an unfamiliar  environment.  · Being on bed rest.  · Having surgery.  · Taking certain medicines, such as sleeping pills.  · Having tubes in place, such as IV lines or catheters.  Additional risk factors for falls in a hospital include:  · Having vision problems.  · Having a change in thinking, feeling, or behavior (altered mental status).  · Having trouble with balance.  · Needing to use the toilet frequently.  · Having fallen in the past three months.  · Having low blood pressure when standing up quickly (orthostatic hypotension).  WHAT DOES THE HOSPITAL STAFF DO TO HELP PREVENT ME OR MY LOVED ONE FROM FALLING?  Hospitals have systems in place to prevent falls and accidents. Talk with the hospital staff about:  · Doing an assessment to discuss fall risks and create a personalized plan to prevent falls.  · Checking in regularly to see if help is needed for moving around and to assess any changes in fall risk.  · Knowing where the nurse call button is and how to use it. Use this to call a nursing care provider any time.  · Keeping personal items within reach. This includes eyeglasses, phones, and other electronic devices.  · Following general safety guidelines when moving around.  · Keeping the area around the bed free from clutter.  · Removing unnecessary equipment or tubes to reduce the risk of tripping.  · Using safety equipment, such as:  ¨ Walkers, crutches, and other walking devices for support.  ¨ Safety rails on the bed.  ¨ Safety straps in the bed.  ¨ A bed that can be lowered and locked to prevent movement.  ¨ Handrails in the bathroom.  ¨ Nonskid socks and shoes.  ¨ Locking mechanisms to secure equipment in place.  ¨ Lifting and transfer equipment.  WHAT CAN I DO TO HELP PREVENT A FALL?  · Talk with health care providers about fall prevention.  · Have a personalized fall prevention plan in place.  · Do not try to move around if you feel off balance or ill.  · Change position slowly.  · Sit on the side of the bed before  standing up.  · Sit down and call for help if you feel dizzy or unsteady when standing.  · Keep the hospital room clear of clutter.  WHEN SHOULD I ASK FOR HELP?  Ask for help whenever you:  · Are not sure if you are able to move around safely.  · Feel dizzy or unsteady.  · Are not comfortable helping your loved one move around or use the bathroom.  If you or a loved one falls, tell the hospital staff. This is important.  This information is not intended to replace advice given to you by your health care provider. Make sure you discuss any questions you have with your health care provider.  Document Released: 12/15/2001 Document Revised: 05/16/2017 Document Reviewed: 09/29/2016  Elsevier Interactive Patient Education © 2017 [employer]

## 2020-03-04 NOTE — PROGRESS NOTES
Pt discharged with escort to Rehab. Copy of instructions given to pt, rehab. Personal belongings taken, CNA and RN verified.

## 2020-03-04 NOTE — CARE PLAN
Problem: Safety  Goal: Will remain free from injury  Outcome: PROGRESSING AS EXPECTED  Note: Treaded socks on, bed in lowest position     Problem: Knowledge Deficit  Goal: Knowledge of disease process/condition, treatment plan, diagnostic tests, and medications will improve  Outcome: PROGRESSING AS EXPECTED  Note: Educated on POC, verbalizes understanding

## 2020-03-04 NOTE — H&P
REHABILITATION HISTORY AND PHYSICAL/POST ADMISSION EVALUATION    3/4/2020  Krystle Tavarez  Room/bed info not found  Admission  No admission date for patient encounter.    PRIMARY REHAB DIAGNOSIS:    This patient is a 86 y.o. female admitted for acute inpatient rehabilitation with reason for admission/[facility] code of 04.111 Paraplegia, Incomplete due to the etiologic diagnosis of Acute incomplete paraplegia (HCC).    HPI:  The patient is a 86 y.o. left hand dominant female with a past medical history of paroxysmal atrial fibrillation, right total hip, GERD, hypertension, hyperlipidemia, borderline diabetes, plantar fasciitis who was admitted to [facility] on 2/29 with worsening pain, history of worsening urinary retention and constipation.  Patient is status post L4-L5 laminectomy on 2/25 by Dr. Kimble.  Original surgery was done because of right foot numbness and bilateral lower extremity weakness and balance impairment.    Her pain was managed with addition of tizanidine and Percocet as needed.    Bladder: Patient has history of worsening urinary incontinence.  No documented PVRs.    Bowel: Worsening constipation over the past year, required multiple laxatives and stool softeners to have a BM.    During hospitalization she was requiring 2 L of nasal cannula supplementary oxygen secondary to oxygen desaturation at night.  She was not on oxygen at home.    Patient was evaluated by physiatry and Physical Therapy and Occupational Therapy and determined to be appropriate for acute inpatient rehab and was admitted to [facility] on 3/4    Pre-mobidly, the patient lived in a two level home with family.  With this acute therapeutic intervention, this patient hopes to improve her functional status, and return to independent living with the supportive care of family.        REVIEW OF SYSTEMS:     Gen: No fatigue, confusion, significant weight loss  Eyes: no blurry vision, double vision or  pain in eyes  ENT: no changes in hearing, runny nose, nose bleeds, sinus pain  CV: No CP, palpitations,   Lungs: no shortness of breath, changes in secretions, changes in cough, pain with coughing  Abd: No abd pain, nausea, vomiting, pain with eating  : no blood in urine, suprapubic pain  Ext: No swelling in legs, asymmetric atrophy  Neuro: no changes in strength or sensation  Skin: no new ulcers/skin breakdown appreciated by patient  Mood: No changes in mood today, increase in depression or anxiety  Heme: no bruising, or bleeding  Rest of 14 point review of systems is negative      PMH:  Past Medical History:   Diagnosis Date   • Arthritis     knees, hips   • Breath shortness     with exertion    • Cataract     bilat IOL    • Dental disorder     full upper, partial lower    • Heart burn    • Hemorrhagic disorder (HCC)     on Plavix    • High cholesterol     diet controlled    • Hyperlipidemia    • Hypertension    • Pacemaker 2015   • Pain 08/2019    lower back, right leg   • Pre-diabetes    • TIA (transient ischemic attack)     on Plavix   • Urinary incontinence    Right total hip    PSH:  Past Surgical History:   Procedure Laterality Date   • PB LAMINOTOMY,LUMBAR DISK,1 INTRSP N/A 2/25/2020    Procedure: LAMINECTOMY, SPINE, LUMBAR, WITH DISCECTOMY- L5-S1, MEDIAL FACETECTOMY;  Surgeon: Latrell Kimble M.D.;  Location: SURGERY [facility];  Service: Neurosurgery   • FORAMINOTOMY N/A 2/25/2020    Procedure: FORAMINOTOMY, SPINE;  Surgeon: Latrell Kimble M.D.;  Location: SURGERY [facility];  Service: Neurosurgery   • PACEMAKER INSERTION  2015   • HIP REPLACEMENT, TOTAL Right 2009   • KNEE REPLACEMENT, TOTAL Right 2004   • CATARACT EXTRACTION WITH IOL Bilateral 2003   • CHOLECYSTECTOMY  2002   • BASAL CELL EXCISION      nose and hand   • BUNIONECTOMY Left        FAMILY HISTORY:  Her mother had diabetes, father had an MI at 74 rest of family history is noncontributory    MEDICATIONS:  No current facility-administered  "medications for this encounter.        ALLERGIES:  Sulfa drugs    PSYCHOSOCIAL HISTORY:  Living Site:  Home  Living With:  family  Caregiver's availability:  24/7  Number of stairs:  1 stair to enter, lives on the first floor  Substance use history:  denies    LEVEL OF FUNCTION PRIOR TO DISABILTY:  Independent    LEVEL OF FUNCTION PRIOR TO ADMISSION to [facility]:  Physical Therapy Treatment completed.   Bed Mobility:  Supine to Sit: Minimal Assist (HOB flat, with rail, instructed log roll)  Transfers: Sit to Stand: Minimal Assist  Gait: Level Of Assist: Minimal Assist with Front-Wheel Walker         Occupational Therapy Evaluation completed.   Functional Status:  Oralia supine>sit, Oralia sit>stand, Oralia toilet txf, Oralia UB dress, ModA LB dress, SPV standing groom at sink, ModA sit>supine via log roll  Plan of Care: Will benefit from Occupational Therapy 4 times per week      CURRENT LEVEL OF FUNCTION:   Same as level of function prior to admission to [facility]    PHYSICAL EXAM:     VITAL SIGNS:   height is 1.6 m (5' 3\") and weight is 75.3 kg (166 lb). Her oral temperature is 36.6 °C (97.8 °F). Her blood pressure is 102/48 and her pulse is 70. Her respiration is 18.     GENERAL: No apparent distress  HEENT: Normocephalic/atraumatic, EOMI and PERRL  CARDIAC: Regular rate and rhythm, normal S1, S2   LUNGS: Clear to auscultation bilaterally.  Cough force is strong enough to independently clear secretions.  There is no sign of accessory muscle use.    ABDOMINAL: bowel sounds present, soft, nontender and nondistended    EXTREMITIES: no contractures, spasticity, or edema.  No calf tenderness bilaterally, 2+ bilateral DP/PT pulses.  Tender to palpation over the right peds anserine bursa and medial hamstring    SKIN:  There is no sign of pressure injury or skin breakdown.  The surgical incision is clean, dry and intact, without signs of dehiscence.      NEURO:    Mental status: answers " questions appropriately follows commands  Speech/language: fluent, no aphasia or dysarthria    CRANIAL NERVES:  2,3: visual acuity grossly intact, PERRL  3,4,6: EOMI bilaterally, no nystagmus or diplopia  5: intact in all branches  7: no facial asymmetry  8: hearing grossly intact  9,10: symmetric palate elevation  11: SCM/Trapezius strength 5/5 bilaterally  12: tongue protrudes midline    Motor:   C5 - Elbow flexors:  Bilateral -  5/5  C6 - Wrist extensors:  Bilateral -  5/5  C7 - Elbow extensors:  Bilateral -  5/5  C8 - Finger flexors:  Bilateral -  5/5  T1 -  Finger abductors:  Bilateral -  5/5    Motor Exam Lower Extremities    ? Myotome R L   Hip flexion L2 4 4   Knee extension L3 4 4   Ankle dorsiflexion L4 4 4   Toe extension L5 4 3   Ankle plantarflexion S1 4 4           Sensory: With light touch/pin prick, last normal sensory level is   L2    Anorectal examination: Sensation in the S4-5 dermatomes is present and Voluntary anal contraction is present       DTRs: 1+ in bilateral biceps, brachioradialis, patellar, and Achilles tendons  Babinski: neg bilaterally  Bautista: neg bilaterally    Tone: no spasticity noted    RADIOLOGY:  CT of L-spine 2/29:  IMPRESSION:        1. No acute fracture appreciated in the lumbar spine     2. Postsurgical changes from posterior decompression L4-5.     3. Mild retrolisthesis of L2-3 and anterolisthesis of L5-S1 are unchanged.     4. Old T12 compression fracture status post cement augmentation, similar to prior.    LABS:  Recent Labs     03/02/20  0508   SODIUM 132*   POTASSIUM 4.6   CHLORIDE 95*   CO2 29   GLUCOSE 142*   BUN 22   CREATININE 0.71   CALCIUM 9.5     Recent Labs     03/02/20  0508   WBC 8.0   RBC 4.08*   HEMOGLOBIN 12.6   HEMATOCRIT 37.4   MCV 91.7   MCH 30.9   MCHC 33.7   RDW 45.1   PLATELETCT 235   MPV 9.5             IMPAIRMENTS:   ADLs/IADLs  Mobility      RELEVANT CHANGES SINCE PREADMISSION EVALUATION:    Status unchanged    The patient's rehabilitation  potential is Very Good  The patient's medical prognosis is good    PLAN:   Discussion and Recommendations:   1. The patient requires an acute inpatient rehabilitation program with a coordinated program of care at an intensity and frequency not available at a lower level of care. This recommendation is substantiated by the patient's medical physicians who recommend that the patient's intervention and assessment of medical issues needs to be done at an acute level of care for patient's safety and maximum outcome.   2. A coordinated program of care will be supplied by an interdisciplinary team of physical therapy, occupational therapy, rehab physician, rehab nursing, and, if needed, speech therapy and rehab psychology. Rehab team presents a patient-specific rehabilitation and education program concentrating on prevention of future problems related to accessibility, mobility, skin, bowel, bladder, sexuality, and psychosocial and medical/surgical problems.   3. Need for Rehabilitation Physician: The rehab physician will be evaluating the patient on a multi-weekly basis to help coordinate the program of care. The rehab physician communicates between medical physicians, therapists, and nurses to maximize the patient's potential outcome. Specific areas in which the rehab physician will be providing daily assessment include the following:   A. Assessing the patient's heart rate and blood pressure response (vitals monitoring) to activity and making adjustments in medications or conservative measures as needed.   B. The rehab physician will be assessing the frequency at which the program can be increased to allow the patient to reach optimal functional outcome.   C. The rehab physician will also provide assessments in daily skin care, especially in light of patient's impairments in mobility.   D. The rehab physician will provide special expertise in understanding how to work with functional impairment and recommend appropriate  interventions, compensatory techniques, and education that will facilitate the patient's outcome.   4. Rehab R.N.   The rehab RN will be working with patient to carry over in room mobility and activities of daily living when the patient is not in 3 hours of skilled therapy. Rehab nursing will be working in conjunction with rehab physician to address all the medical issues above and continue to assess laboratory work and discuss abnormalities with the treating physicians, assess vitals, and response to activity, and discuss and report abnormalities with the rehab physician. Rehab RN will also continue daily skin care, supervise bladder/bowel program, instruct in medication administration, and ensure patient safety.     5. Therapies to treat at intensity and frequency of (may change after completion of evaluation by all therapeutic disciplines):       PT:  Physical therapy to address mobility, transfer, gait training and evaluation for adaptive equipment needs 1.5hour/day at least 5 days/week for the duration of the ELOS (see below)       OT:  Occupational therapy to address ADLs, self-care, home management training, functional mobility/transfers and assistive device evaluation, and community re-integration 1.5hour/day at least 5 days/week for the duration of the ELOS (see below).     6. Medical management / Rehabilitation Issues/ Adverse Potential as part of rehabilitation plan       L2 AIS D spinal cord injury: L4-L5 laminectomy by Dr. Kimble on 2/25 to address lumbar stenosis with neurogenic claudication  - The patient will begin a comprehensive interdisciplinary rehabilitation program.  -Throughout the rehabilitation hospital stay, we will continue to work on educating the patient regarding the complications of spinal cord injury including, but not limited to, neurogenic bowel, neurogenic bladder, sexual health, and protection principles, and the long-term sequela of spinal cord injury. We will also work with our  colleagues in physical and occupational therapy on optimal equipment management in anticipation of a community discharge.  -No need for brace at this time  -We will need to follow-up with Dr. Kimble as outpatient      Neurogenic bladder: History of worsening urinary incontinence  -Start voiding trial, if can't void in 6 hours or prn check pvr and if >500cc then ICP,if able to void then check PVR, if PVR is >200cc then ICP  -Educate importance of appropriate management of neurogenic bladder, and appropriate management can result and renal failure    Neurogenic bowel: History significant constipation, worsening over the past year  - Start patient on bowel program with senna 2 tablets q. noon, Colace 200 mg twice daily, Dulcolax suppository, if rectal tone begins to fade will consider transitioning to lower motor neuron bowel program  -MiraLAX daily    Potential for orthostatic hypotension  -Check postural blood pressures, goal systolic blood pressure greater than 100 to maintain good perfusion and improve neurologic recovery    Hypertension:  -Coreg 6.25 mg twice daily  -Hydrochlorothiazide 25 mg daily  -Losartan 100 mg daily    Paroxysmal atrial fibrillation: Plavix was held for spine surgery, cardiology recommending restarting approximately 12 days postop  - Restart Plavix on 3/8    Skin  Due to the sensory impairments following spinal cord injury, skin protection principles are of the utmost importance.     Plan to institute an every two-hour turning pattern overnight while the patient is in bed. When the patient is out of bed, an every 15 minute repositioning or weight shifting pattern will be utilized in order to help train the patient for long-term skin protection. We will also discuss skin monitoring and its importance with the patient and the caregivers.    Pain:  #Neuropathic pain: Hyperalgesia over L3 dermatome  -Start gabapentin 200 mg 3 times a day    #Postoperative pain:  - Tizanidine 2 mg 3 times daily,  monitor for orthostatic hypotension  - Tylenol 1000 mg 3 times daily, given high dose will check LFTs in a.m.  -Oxycodone 5-10 mg every 3 hours as needed pain      Vitamin deficiency  In the posttraumatic setting, there is a high likelihood of vitamin D deficiency. We will plan to check a vitamin D level on admission and supplement as necessary to improve fusion and wound healing.  -Cholecalciferol 2000 units daily    Nutrition  With the assistance of our dietitian team, we will work to optimize nutrition for wound healing and recovery from spinal cord injury. We will also discussed long-term dietary needs following a spinal injury in order to prevent excessive weight gain in the future.  -We will place patient on ADA diet    Mood  Given the abrupt and significant change in the patient's level of function, we will consult our rehabilitation psychologist to evaluate the patient and provide psychological support as necessary. We may also consider the use of an antidepressant medication if needed to help improve the patient's mood.    DVT prophylaxis  In the setting of a traumatic spinal cord injury, the patient is at high risk of deep venous thrombosis. Because of this we have elected to pursue prophylactic anticoagulation utilizing Lovenox 40 mg daily.  This medication may be necessary for up to 3 months depending on the functional status and clinical situation of the patient as the injury evolves.      Estimated discharge: 7-10 days    Verified CODE STATUS as DNR/DNI on admission    I completed medication reconciliation on admission and did not identify any clinically significant medication issues    Admission care coordination time today was 75 min, and entire time spent in face-to-face contact was >50% in counseling and coordination of care as detailed in A/P above.        GOALS/EXPECTED LEVEL OF FUNCTION BASED ON CURRENT MEDICAL AND FUNCTIONAL STATUS (may change based on patient's medical status and rate of  impairment recovery):  Transfers:   Modified Independent  Mobility/Gait:   Modified Independent  ADL's:   Modified Independent    DISPOSITION: Discharge to pre-morbid independent living setting with the supportive care of patient's family.      Jose White D.O.  3/4/2020

## 2020-03-04 NOTE — PREADMISSION SCREENING NOTE
Pre-Admission Screening Form    Patient Information:   Name: Krystle Tavarez     MRN: [id number]       : 1933      Age: 86 y.o.   Gender: female      Race: White [7]       Marital Status:  [5]  Family Contact: Ana M Packer        Relationship: Daughter [2]  Home Phone:            Cell Phone: [phone]  Advanced Directives: Yes  Code Status:  DNAR/DNI  Current Attending Provider: JD Marrufo M.D.  Referring Physician: Dr. Haynes   Physiatrist Consult: Dr. Troncoso    Referral Date: 20  Primary Payor Source:  MEDICARE  Secondary Payor Source:  [facility]    Medical Information:   Date of Admission to Acute Care [place]2020  Room Number: S194/01  Rehabilitation Diagnosis: 04.111 Paraplegia, Incomplete  Immunization History   Administered Date(s) Administered   • Influenza Vaccine Adult HD 2019     Allergies   Allergen Reactions   • Sulfa Drugs Nausea     Nausea      Past Medical History:   Diagnosis Date   • Arthritis     knees, hips   • Breath shortness     with exertion    • Cataract     bilat IOL    • Dental disorder     full upper, partial lower    • Heart burn    • Hemorrhagic disorder (HCC)     on Plavix    • High cholesterol     diet controlled    • Hyperlipidemia    • Hypertension    • Pacemaker    • Pain 2019    lower back, right leg   • Pre-diabetes    • TIA (transient ischemic attack)     on Plavix   • Urinary incontinence      Past Surgical History:   Procedure Laterality Date   • PB LAMINOTOMY,LUMBAR DISK,1 INTRSP N/A 2020    Procedure: LAMINECTOMY, SPINE, LUMBAR, WITH DISCECTOMY- L5-S1, MEDIAL FACETECTOMY;  Surgeon: Latrell Kimble M.D.;  Location: [facility];  Service: Neurosurgery   • FORAMINOTOMY N/A 2020    Procedure: FORAMINOTOMY, SPINE;  Surgeon: Latrell Kimble M.D.;  Location: [facility];  Service: Neurosurgery   • PACEMAKER INSERTION     • HIP REPLACEMENT, TOTAL Right    • KNEE REPLACEMENT, TOTAL Right    •  CATARACT EXTRACTION WITH IOL Bilateral 2003   • CHOLECYSTECTOMY  2002   • BASAL CELL EXCISION      nose and hand   • BUNIONECTOMY Left        History Leading to Admission, Conditions that Caused the Need for Rehab (CMS):     Dr. Zhao H&P:  Chief complaint: post-operative back pain     Problem list:   # lumbar stenosis without neurogenic claudication s/p L5-S1 laminectomy with discectomy - repeat CT noncon L-spine today reviewed by Neurosurgery - no acute findings.    # acute hypoxic respiratory failure secondary - initially saturating 85% on room air.  Now at 97% on 2LNC.    # paroxysmal atrial fibrillation - CHADSVASC - 4-6 (possible history of TIA history, on home Plavix)  # constipation - last bowel movement on 2/23/20, likely secondary to opiate use.  # hypertension  # hyperlipidemia     Plan:   - Admit to Neurology as inpatient.   - Neurosurgery, Dr. Kingsley, on board.  Recommending acute rehab versus extended care facility.    - q4 hours neuro checks.   - Continuous pulse ox.   - PT/OT.  - Consulted Physiatry.  - Patient to follow up with Cardiology outpatient, who are planning on starting patient on anticoagulation.    - Continue or hold home medications as appropriate.  Specifically hold home Plavix until seven days post surgery per Neurosurgery.   - Aggressive bowel regimen.  - SCDs for DVT prophylaxis.    Du Troncoso M.D.   Physician   Physical Medicine & Rehab   Consults   Signed   Date of Service:  3/2/2020  4:54 PM            Consult Orders   IP CONSULT FOR PHYSIATRY [[id number]] ordered by Suzy Haynes M.D. at 03/02/20 1042   IP Consult For Physiatry [[id number]] ordered by Cassie Cortes M.D. at 02/29/20 1539          Expand All Collapse All      []Hide copied text    []Hover for details                                                  Physical Medicine and Rehabilitation Consultation                                                                                      Initial  Consult        Initial Consultation Date: 3/2/2020  Consulting provider: Dr. Suzy Haynes   Reason for consultation: assess for acute inpatient rehab appropriateness  LOS: 2 Day(s)        Chief complaint:         HPI:   The patient is a 86 y.o. left hand dominant female with a past medical history of paroxysmal A. fib, chronic constipation, GERD, hypertension, hyperlipidemia, borderline diabetes on diet control, history of plantar fasciitis, urinary incontinence for about 2 years (gradual worsening);  who presented on 2/29/2020 10:17 AM with worsening pain after laminectomy on February 25, 2020.  Currently pain significantly improved with tizanidine, not requiring any Percocet as needed.        The patient currently reports:  -Feeling much better overall, feels comfortable and safe to return home with daughter and son-in-law, who are available to help anytime during the day; reports that she has very accessible home  -Pain: Tolerating, she likes to stay active despite the pain; because the pain was getting worse before the surgery  -Recalls recently moving from [place] about 10 months ago, because she gave up driving for safety sake  -Bowel: Chronic constipation, multiple bowel movements yesterday with docusate/MiraLAX  -Bladder: Chronic urinary incontinence of 2 years, slightly worsening; reports intact sensation, but more difficulty controlling her voiding  -Weakness: Denies focal weakness, but does have some right upper extremity more than left upper extremity weakness; denies significant lower extremity weakness  -States that there is a have her on nasal cannula oxygen because she desaturates a little bit when she falls asleep, denies using home oxygen  -Denies fever, chills, nausea, vomiting, shortness of breath           ROS:  Pertinent positives are listed in HPI, all other systems reviewed and are negative        Social Hx:  Pre-morbidly, this patient lived in:   2 [place] home (lives on the first  floor)  With 1 steps to enter  Lives with family, either her daughter or son-in-law is available at all times  She moved from [place] in May 2019, reports being very sociable and keeps an active lifestyle           Current level of function: The patient was evaluated by:  Acute care PT. Currently requiring minimal assist for mobility/transfers.  Acute care OT. Currently requiring moderate assist for ADLs.        PMH:  Past Medical History        Past Medical History:   Diagnosis Date   • Arthritis       knees, hips   • Breath shortness       with exertion    • Cataract       bilat IOL    • Dental disorder       full upper, partial lower    • Heart burn     • Hemorrhagic disorder (HCC)       on Plavix    • High cholesterol       diet controlled    • Hyperlipidemia     • Hypertension     • Pacemaker 2015   • Pain 08/2019     lower back, right leg   • Pre-diabetes     • TIA (transient ischemic attack)       on Plavix   • Urinary incontinence                 PSH:  Past Surgical History         Past Surgical History:   Procedure Laterality Date   • PB LAMINOTOMY,LUMBAR DISK,1 INTRSP N/A 2/25/2020     Procedure: LAMINECTOMY, SPINE, LUMBAR, WITH DISCECTOMY- L5-S1, MEDIAL FACETECTOMY;  Surgeon: Latrell Kimble M.D.;  Location: [facility];  Service: Neurosurgery   • FORAMINOTOMY N/A 2/25/2020     Procedure: FORAMINOTOMY, SPINE;  Surgeon: Latrell Kimble M.D.;  Location: [facility];  Service: Neurosurgery   • PACEMAKER INSERTION   2015   • HIP REPLACEMENT, TOTAL Right 2009   • KNEE REPLACEMENT, TOTAL Right 2004   • CATARACT EXTRACTION WITH IOL Bilateral 2003   • CHOLECYSTECTOMY   2002   • BASAL CELL EXCISION         nose and hand   • BUNIONECTOMY Left                 FHX: Reviewed.   Family History         Family History   Problem Relation Age of Onset   • Diabetes Mother     • Stroke Mother     • Heart Attack Father 74   • No Known Problems Maternal Grandmother     • No Known Problems Maternal Grandfather  "    • No Known Problems Paternal Grandmother     • No Known Problems Paternal Grandfather                 Medications:       Current Facility-Administered Medications   Medication Dose   • magnesium hydroxide (MILK OF MAGNESIA) suspension 30 mL  30 mL     And   • senna-docusate (PERICOLACE or SENOKOT S) 8.6-50 MG per tablet 2 Tab  2 Tab     And   • bisacodyl (DULCOLAX) suppository 10 mg  10 mg   • fleet enema 133 mL  1 Enema   • docusate sodium (COLACE) capsule 100 mg  100 mg   • polyethylene glycol/lytes (MIRALAX) PACKET 1 Packet  1 Packet   • acetaminophen (TYLENOL) tablet 650 mg  650 mg   • labetalol (NORMODYNE/TRANDATE) injection 10 mg  10 mg   • ascorbic acid tablet 1,000 mg  1,000 mg   • calcium carbonate (OS-MARGARITA 500) tablet 500 mg  500 mg   • carvedilol (COREG) tablet 6.25 mg  6.25 mg   • tizanidine (ZANAFLEX) tablet 2 mg  2 mg   • vitamin D (cholecalciferol) tablet 1,000 Units  1,000 Units   • oxyCODONE-acetaminophen (PERCOCET) 5-325 MG per tablet 1-2 Tab  1-2 Tab   • losartan (COZAAR) tablet 100 mg  100 mg   • hydroCHLOROthiazide (HYDRODIURIL) tablet 25 mg  25 mg         Allergies:        Allergies   Allergen Reactions   • Sulfa Drugs Nausea       Nausea             Physical Exam:  Vitals: /41   Pulse 62   Temp 36.4 °C (97.5 °F) (Temporal)   Resp 14   Ht 1.6 m (5' 3\")   Wt 74 kg (163 lb 2.3 oz)   SpO2 93%         Gen: pleasant  Head: no visible head lesions or abrasion  Eyes/ Nose/ Mouth: moist mucous membranes  Cardio: Well perfused extremities, no peripheral edema  Pulm: with normal respiratory effort, on nasal cannula oxygen  Abd: Soft NTND, active bowel sounds  Skin: No visible rashes  Ext: No atrophy of muscles, no joint contractures of extremities   Psych: answers questions appropriately follows commands, calm affect     Neuro:   -Speech: fluent, no aphasia or dysarthria  -Hearing and visual acuity functional and grossly intact  -Face symmetric, tongue protrusion midline  -Shoulder shrugs " equal and symmetric         Motor:  -Bilateral upper extremities: 5-/5 with arm abduction, elbow flexion, elbow extension, wrist extension, finger flexion  -Bilateral lower extremities: 5-/5 with hip flexion, knee extension, ankle dorsiflexion, toe extension, plantarflexion        Sensory:   -intact to light touch in all 4 extremities, except for numbness to light touch over right heel  -Mild hyperalgesia to bilateral lower extremities during muscle testing resistance  -intact to proprioception at bilateral great toes        Reflexes:  -Negative clonus bilateral ankles           Labs: Reviewed and significant for improvement in anemia, overall reassuring        Recent Labs     02/29/20 1716 03/01/20  0601 03/02/20  0508   RBC 3.78* 3.72* 4.08*   HEMOGLOBIN 11.9* 11.4* 12.6   HEMATOCRIT 34.1* 33.9* 37.4   PLATELETCT 214 224 235            Recent Labs     02/29/20 1716 03/01/20  0601 03/02/20  0508   SODIUM 133* 131* 132*   POTASSIUM 3.6 4.1 4.6   CHLORIDE 97 95* 95*   CO2 29 29 29   GLUCOSE 166* 155* 142*   BUN 18 17 22   CREATININE 0.79 0.63 0.71   CALCIUM 9.0 9.1 9.5      Recent Results         Recent Results (from the past 24 hour(s))   Basic Metabolic Panel     Collection Time: 03/02/20  5:08 AM   Result Value Ref Range     Sodium 132 (L) 135 - 145 mmol/L     Potassium 4.6 3.6 - 5.5 mmol/L     Chloride 95 (L) 96 - 112 mmol/L     Co2 29 20 - 33 mmol/L     Glucose 142 (H) 65 - 99 mg/dL     Bun 22 8 - 22 mg/dL     Creatinine 0.71 0.50 - 1.40 mg/dL     Calcium 9.5 8.5 - 10.5 mg/dL     Anion Gap 8.0 0.0 - 11.9   CBC WITHOUT DIFFERENTIAL     Collection Time: 03/02/20  5:08 AM   Result Value Ref Range     WBC 8.0 4.8 - 10.8 K/uL     RBC 4.08 (L) 4.20 - 5.40 M/uL     Hemoglobin 12.6 12.0 - 16.0 g/dL     Hematocrit 37.4 37.0 - 47.0 %     MCV 91.7 81.4 - 97.8 fL     MCH 30.9 27.0 - 33.0 pg     MCHC 33.7 33.6 - 35.0 g/dL     RDW 45.1 35.9 - 50.0 fL     Platelet Count 235 164 - 446 K/uL     MPV 9.5 9.0 - 12.9 fL    ESTIMATED GFR     Collection Time: 03/02/20  5:08 AM   Result Value Ref Range     GFR If African American >60 >60 mL/min/1.73 m 2     GFR If Non African American >60 >60 mL/min/1.73 m 2            IMAGING:  Ct-lspine W/o Plus Recons  Result Date: 2/29/2020 2/29/2020 1:33 PM HISTORY/REASON FOR EXAM:  L/S-spine canal stenosis TECHNIQUE/EXAM DESCRIPTION AND NUMBER OF VIEWS: CT lumbar spine without contrast, with reconstructions. The study was performed on a helical multidetector CT scanner. Thin-section helical scanning was performed from T12-L1 to the sacrum. Sagittal and coronal multiplanar reconstructions were generated from the axial images. Low dose optimization technique was utilized for this CT exam including automated exposure control and adjustment of the mA and/or kV according to patient size. COMPARISON: Lumbar myelogram 9/4/2019. FINDINGS: Mild retrolisthesis of L2-3. Mild anterolisthesis of L5-S1. Old T12 compression fracture status post cement augmentation. Postsurgical change from posterior decompression at L4-5. There is no fracture or dislocation. The thoracolumbar junction appears intact. Vascular calcification. The prevertebral and paraspinous soft tissues are unremarkable. Moderate degenerative change of the lumbar spine. The sacrum and sacroiliac joints show no particular abnormality.      1. No acute fracture appreciated in the lumbar spine 2. Postsurgical changes from posterior decompression L4-5. 3. Mild retrolisthesis of L2-3 and anterolisthesis of L5-S1 are unchanged. 4. Old T12 compression fracture status post cement augmentation, similar to prior.            ASSESSMENT:  Patient is a 86 y.o. female admitted for additional pain management after L5/S1 laminectomy on 2/25/20 for right foot numbness and bilateral lower extremity weakness/balance impairment per ED note, now improving in both pain control and strength/balance with change in medication and additional time. Continued numbness of  her right posterior heel and chronic urinary incontinence.        # Rehabilitation: Patient with impaired ADLs and mobility   - Patient is a good candidate for inpatient rehab based on needs for PT and OT  - Patient will also benefit from family training  - Patient has a good discharge situation which will be home with daughter & son in law   - Barriers to transfer include: bed availability, anticipate bed availability on Wed/Thu   - [facility] Code / Diagnosis to Support: 04.111 Paraplegia, Incomplete        #Neuro: Lumbar stenosis and right lower extremity radiculopathy, causing B/L weakness and right foot numbness, complicated by neurogenic bladder/urinary incontinence, now s/p L5/S1 laminectomy 2/25/20 and admitted to hospital for additional pain control.  History and exam consistent with lumbar radiculopathy, affecting her right lower extremity sensation, bilateral lower extremity strength, neurogenic bladder, and possibly neurogenic bowel. Of note, patient recalls that she had dizziness/shakiness about 1 week after she had a pacemaker placed (about 4-5 years ago).  This episode lasted about 1 hour, went to the ER was told it may have been either vertigo or TIA.  -Reviewed importance of core strengthening        #Neurogenic bowel: Multiple 3/1/2020  -docusate   -miralax        #Neurogenic Bladder: Urinary incontinence, approximately 2 years, reports gradual worsening recently  -Consider timed voids  -Monitor PVR        #CV: Hypertension, hyperlipidemia, paroxysmal A. fib, pacemaker  -coreg  -hctz  -losartan   -plavix reportedly held for spine surgery; restart per primary team when medically cleared (patient said her cardiologist estimated restarting about 12 days post-op)        #DM2: Last A1c 6.9%, currently being managed by diet and exercise        #Pain: Currently well controlled; not using any of the PRN Percocet  -tizanidine        #Supp: Vit D, Vit C        #GI: GERD on Tums        #DVT: SCDs       #Code:  DNR/DNI              Discussed with pt, summarized hospitalization and care, options for next step of care  Also discussed with family, summarized hospitalization and care, options for next step of care  Labs reviewed   Imaging personally reviewed.  CT of lumbar spine from 2/29/2020 showing old T12 compression fracture status post cementation and retrolisthesis of L2 on L3 as well as mild anterolisthesis of L5 on S1.  Also with multiple degenerative changes of the lumbar spine, including bony spurs.  Discussed with rehab team about recommendations   Performed chart review on 3/2/20 from 4:03-4:25, then from 4:39-4:53, to look further into her previous medical history:   Cardiology visit, 8/29/19: on Plavix  PCP visit, 11/12/19: on Plavix, planning ongoing NSGY eval/procedure  PCP visit, 1/8/20: DM2, A1C 6.9% diet managed  Neurosurgery H&P, 2/18/20: “pain radiating across her low back… pain radiating down her right posterior lateral thigh and calf, with numbness in the foot… Feels unsteady on her feet.”  History of right hip replacement in 2009.     Thank you for allowing us to participate in the care of this patient.            Discussed with patient about recommendations for and plan for rehabilitation as follows. Patient with multiple co-morbidities(including but not limited to paroxysmal A. Fib on Plavix/PPM, diabetes, hyperlipidemia, hypertension, neurogenic bowel, neurogenic bladder/urinary incontinence, and pain management); with functional deficits in mobility/self-care, and Moderate de-conditioning.      Pre-morbidly, this patient lived in a 2 level home with 1 steps to enter (lives on the first floor, very accessible), with family. The patient was evaluated by acute care Physical Therapy and Occupational Therapy; currently requiring minimal assistance for mobility and moderate assistance for ADLs.     Very Good candidate for an acute inpatient rehabilitation program with a coordinated program of care at an  intensity and frequency not available at a lower level of care.      Note: if patient continues to progress while waiting for medical clearance, and no longer requires 2 of of 3 therapy services (PT/OT/SLP) at a CGA/Minimal assistance level, patient will no longer need acute inpatient rehabilitation.     This recommendation is substantiated by the patient's current medical condition with intervention and assessment of medical issues requiring an acute level of care for patient's safety and maximum outcome. A coordinated program of care will be provided by an interdisciplinary team including physical therapy, occupational therapy, hospitalist, physiatry, rehab nursing and rehab psychology. Rehab goals include improved mobility, self-care management, strength and conditioning/endurance, pain management, bowel and bladder management, mood and affect, and safety with independent home management including caregiver training.      Estimated length of stay is approximately 10 days. Rehab potential: Very Good. Disposition: to pre-morbid independent living setting with supportive care of patient's family. We will continue to follow with you in anticipation of discharge to acute inpatient rehabilitation when medically stable to do so at the [facility] cretion of her  attending physician. Thank you for allowing us to participate in her care. Please call with any questions regarding this recommendation.              Du Troncoso MD  Physical Medicine and Rehabilitation   3/2/2020                 L-Spine CT 02-29-20:     1. No acute fracture appreciated in the lumbar spine     2. Postsurgical changes from posterior decompression L4-5.     3. Mild retrolisthesis of L2-3 and anterolisthesis of L5-S1 are unchanged.     4. Old T12 compression fracture status post cement augmentation, similar to prior.    Prudence Zhao M.D.   Resident   Hospital Medicine   Progress Notes   Attested   Date of Service:  3/3/2020 10:55 AM                Attestation signed by JD Marrufo M.D. at 3/3/2020 11:39 AM   I saw and examined the patient and reviewed the case by the resident physician Dr. Zhao. I reviewed the resident's note and agree with the documented findings and plan of care except as noted in my comments below.     Additional attending comments:  Back pain and strength improving. Planning on IRF when bed available. She does have a cord in the right popliteal fossa -----> r/o DVT. See orders.     JD Marrufo MD, FACP, [facility]  Attending Physician  Professor of Medicine & Academic Hospitalist            []Hide copied text    []Hover for details  Daily Progress Note:      Date of Service: 3/3/2020  Primary Team: [facility] IM Gray Team   Attending: JD Marrufo M.D.   Senior Resident: Dr. Zhao  Intern: Dr. Cortes  Contact:  [phone]     Interval problems:  Post operative pain and deconditioning requiring rehab placement.   Palpable cord near right popliteal fossa.       Subjective:   No acute events overnight.  POD #7.  No acute complaints this morning.  Patient with palpable cord in right lower extremity near popliteal fossa.  Will investigate with venous Doppler ultrasound.  Otherwise neurologically stable.  Pending transfer to acute care rehab.      Consultants/Specialty:  Physiatry     Review of Systems:    Review of Systems   Constitutional: Negative for chills and fever.   HENT: Negative for ear pain.    Eyes: Negative for pain.   Respiratory: Negative for cough and shortness of breath.    Cardiovascular: Negative for chest pain, palpitations and orthopnea.   Gastrointestinal: Negative for abdominal pain.   Genitourinary: Negative for dysuria.   Musculoskeletal: Positive for back pain (controlled, improving).   Skin: Negative for itching.   Neurological: Negative for sensory change and focal weakness.   Psychiatric/Behavioral: Negative for depression.         Objective Data:   Physical Exam:   Vitals:   Temp:  [36.2 °C  (97.1 °F)-36.6 °C (97.9 °F)] 36.2 °C (97.2 °F)  Pulse:  [67-73] 67  Resp:  [16-18] 16  BP: (128-168)/(43-58) 143/57  SpO2:  [91 %-96 %] 91 %     Physical Exam  Constitutional:       General: She is not in acute distress.  HENT:      Head: Normocephalic.      Nose: No rhinorrhea.      Mouth/Throat:      Mouth: Mucous membranes are moist.   Eyes:      Extraocular Movements: Extraocular movements intact.   Neck:      Musculoskeletal: No neck rigidity.   Cardiovascular:      Rate and Rhythm: Normal rate.      Heart sounds: No murmur.   Pulmonary:      Effort: Pulmonary effort is normal. No respiratory distress.      Breath sounds: No rales.   Abdominal:      General: Bowel sounds are normal.      Tenderness: There is no abdominal tenderness.   Musculoskeletal:         General: No swelling.      Comments: Palpable cord present near right popliteal fossa.    Skin:     General: Skin is warm.   Neurological:      General: No focal deficit present.      Mental Status: She is alert and oriented to person, place, and time.      Motor: Weakness (overall fatigue, subjective) present.      Comments: Strength bilateral UE 5/5, bilateral LE 5/5.  Sensation intact.           Labs:   Recent Labs     02/29/20 1716 03/01/20  0601 03/02/20  0508   WBC 8.4 7.9 8.0   RBC 3.78* 3.72* 4.08*   HEMOGLOBIN 11.9* 11.4* 12.6   HEMATOCRIT 34.1* 33.9* 37.4   MCV 90.2 91.1 91.7   MCH 31.5 30.6 30.9   RDW 44.6 44.6 45.1   PLATELETCT 214 224 235   MPV 9.2 9.2 9.5   NEUTSPOLYS 68.80 60.50  --    LYMPHOCYTES 20.90* 28.20  --    MONOCYTES 7.40 8.30  --    EOSINOPHILS 2.20 2.00  --    BASOPHILS 0.20 0.40  --             Recent Labs     02/29/20 1716 03/01/20  0601 03/02/20  0508   SODIUM 133* 131* 132*   POTASSIUM 3.6 4.1 4.6   CHLORIDE 97 95* 95*   CO2 29 29 29   GLUCOSE 166* 155* 142*   BUN 18 17 22            Recent Labs     02/29/20 1716 03/01/20  0601 03/02/20  0508   CREATININE 0.79 0.63 0.71         Imaging:   CT-LSPINE W/O PLUS RECONS   Final  Result           1. No acute fracture appreciated in the lumbar spine       2. Postsurgical changes from posterior decompression L4-5.       3. Mild retrolisthesis of L2-3 and anterolisthesis of L5-S1 are unchanged.       4. Old T12 compression fracture status post cement augmentation, similar to prior.           US-EXTREMITY VENOUS LOWER UNILAT RIGHT    (Results Pending)         Post-op pain  Assessment & Plan  Pt s/p L5-S1 laminectomy, postop day 5, admitted with complaints of worsening low back pain.  No swelling/ discharge from the surgical site.  No fever/ chills.  Vitals stable on admit.  C/o Generalized lower extremity weakness, worsened by pain.  No focal motor or sensory deficits on examination. CT lumbar spine : No acute abnormalities. Postsurgical changes from posterior decompression L4-5.  -adequate pain control  -fall/aspiration/seizure precautions  -Neuro checks Q4H  -Accepted to rehab.  Bed pending.       PAF (paroxysmal atrial fibrillation) ([facility])- (present on admission)  Assessment & Plan  Pt has h/o paroxysmal Afib. [id number] score : 6. Patient is not on any anticoagulation due to immediate post op period.  - on Coreg 6.25 mg twice daily.    - Patient and Cardiology have plans to follow up anticoagulation talks outpatient.      concern for right lower extremity DVT  Assessment & Plan  - Patient with palpable cord in right lower extremity near popliteal fossa.    - Will investigate with venous Doppler ultrasound.       constipation  Assessment & Plan  Resolved.    Scheduled bowel regimen, hold for loose stools.          Cosigned by: JD Marrufo M.D. at 3/3/2020 11:39 AM     Co-morbidities: See PMH  Potential Risk - Complications: Contractures, Deep Vein Thrombosis, Incontinence, Malnutrition, Pain, Paralysis, Pneumonia, Pressure Ulcer and Urinary Tract Infection  Level of Risk: High    Ongoing Medical Management Needed (Medical/Nursing Needs):   Patient Active Problem List    Diagnosis  "Date Noted   • Post-op pain 02/29/2020     Priority: High   • PAF (paroxysmal atrial fibrillation) ([facility]) 02/05/2020     Priority: High   • Cardiac pacemaker in situ 07/09/2019     Priority: High   • concern for right lower extremity DVT 03/03/2020     Priority: Medium   • History of TIA (transient ischemic attack) 08/13/2019     Priority: Medium   • Essential hypertension, benign 07/09/2019     Priority: Medium   • Dyslipidemia 07/09/2019     Priority: Medium   • Localized edema 07/09/2019     Priority: Medium   • constipation 02/29/2020     Priority: Low   • Retinopathy 08/13/2019     Priority: Low   • Low vitamin D level 08/13/2019     Priority: Low   • Weakness of both lower extremities 08/13/2019     Priority: Low   • Compression fracture of lumbar vertebrae, non-traumatic, sequela 08/13/2019     Priority: Low   • Chronic midline low back pain with right-sided sciatica 07/09/2019     Priority: Low   • Type 2 diabetes mellitus without complication, without long-term current use of insulin ([facility]) 01/16/2020   • Foraminal stenosis of lumbar region 11/18/2019   • Spinal stenosis of lumbar region with neurogenic claudication 11/18/2019   • Lumbar spondylosis 11/18/2019     A & O    Current Vital Signs:   Temperature: 36.7 °C (98 °F) Pulse: 71 Respiration: 16 Blood Pressure : 144/58  Weight: 74 kg (163 lb 2.3 oz) Height: 160 cm (5' 3\")  Pulse Oximetry: 92 % O2 (LPM): 0      Completed Laboratory Reports:  Recent Labs     03/02/20  0508 03/02/20  2059   WBC 8.0  --    HEMOGLOBIN 12.6  --    HEMATOCRIT 37.4  --    PLATELETCT 235  --    SODIUM 132*  --    POTASSIUM 4.6  --    BUN 22  --    CREATININE 0.71  --    GLUCOSE 142*  --    POCGLUCOSE  --  210*     Additional Labs: Not Applicable    Prior Living Situation:   Housing / Facility: 2 Story House  Steps Into Home: 0  Steps In Home: 13(stays downstairs)  Lives with - Patient's Self Care Capacity: Adult Children  Equipment Owned: 4-Wheel Walker, Grab Bar(s) In Tub / " Shower, Tub / Shower Seat, Grab Bar(s) By Toilet, Bed Side Commode    Prior Level of Function / Living Situation:   Physical Therapy: Prior Services: None  Housing / Facility: 2 Story House  Steps Into Home: 0  Steps In Home: 13(stays downstairs)  Bathroom Set up: Walk In Shower, Shower Chair, Grab Bars  Equipment Owned: 4-Wheel Walker, Grab Bar(s) In Tub / Shower, Tub / Shower Seat, Grab Bar(s) By Toilet, Bed Side Commode  Lives with - Patient's Self Care Capacity: Adult Children  Bed Mobility: Independent  Transfer Status: Independent  Ambulation: Independent  Distance Ambulation (Feet): (community distances)  Assistive Devices Used: 4-Wheel Walker  Stairs: Independent  Current Level of Function:   Level Of Assist: Minimal Assist  Assistive Device: Front Wheel Walker  Distance (Feet): 150  Deviation: Trendelenberg, Step To, Decreased Heel Strike, Decreased Toe Off  # of Stairs Climbed: 0  Weight Bearing Status: no restrictions  Skilled Intervention: Compensatory Strategies, Sequencing, Tactile Cuing, Verbal Cuing  Comments: patient with forward flexed posture and R pelvic drop with gait, able to temporarily improve with cueing but reverts to prior gait pattern after a few steps  Supine to Sit: Minimal Assist(HOB flat, with rail, instructed log roll)  Sit to Supine: (NT, left in chair)  Scooting: Supervised(seated)  Rolling: Minimum Assist to Lt.  Skilled Intervention: Compensatory Strategies, Facilitation, Sequencing, Tactile Cuing, Verbal Cuing  Comments: patient with no recall of log roll  Sit to Stand: Minimal Assist  Bed, Chair, Wheelchair Transfer: Minimal Assist  Toilet Transfers: Minimal Assist  Transfer Method: Other (Comments)(stand step)  Skilled Intervention: Compensatory Strategies, Facilitation, Sequencing, Tactile Cuing, Verbal Cuing  Sitting in Chair: left in chair  Sitting Edge of Bed: 5 min  Standin min  Occupational Therapy:   Self Feeding: Independent  Grooming / Hygiene:  Independent  Bathing: Independent  Dressing: Independent  Toileting: Independent  Medication Management: Requires Assist  Laundry: Requires Assist  Kitchen Mobility: Independent  Finances: Requires Assist  Home Management: Requires Assist  Shopping: Requires Assist  Prior Level Of Mobility: Supervision With Device in Community, Supervision With Device in Home  Driving / Transportation: Relatives / Others Provide Transportation  Prior Services: None  Housing / Facility: 2 Story House  Occupation (Pre-Hospital Vocational): Retired Due To Age  Current Level of Function:   Upper Body Dressing: Minimal Assist  Lower Body Dressing: Moderate Assist  Toileting: Supervision  Speech Language Pathology:      Rehabilitation Prognosis/Potential: Good  Estimated Length of Stay: 10-14 days    Nursing:   Orientation : Oriented x 4  Continent    Scope/Intensity of Services Recommended:  Physical Therapy: 1.5 hr / day  5 days / week. Therapeutic Interventions Required: Maximize Endurance, Mobility, Strength and Safety  Occupational Therapy: 1.5 hr / day 5 days / week. Therapeutic Interventions Required: Maximize Self Care, ADLs, IADLs and Energy Conservation  Rehabilitation Nursin/7. Therapeutic Interventions Required: Monitor Pain, Skin, Vital Signs, Intake and Output, Labs, Safety and Family Training  Rehabilitation Physician: 3 - 5 days / week. Therapeutic Interventions Required: Medical Management  Respiratory Care: Pulmonary Toileting. Therapeutic Interventions Required: Pulmonary Toileting and O2 Weaning    Rehabilitation Goals and Plan (Expected frequency & duration of treatment in the IRF):   Return to the Community, Modified Independent Level of Care and Family Able to Provide 24/7 Assistance  Anticipated Date of Rehabilitation Admission: 20  Patient/Family oriented IRF level of care/facility/plan: Yes  Patient/Family willing to participate in IRF care/facility/plan: Yes  Patient able to tolerate IRF level of care  proposed: Yes  Patient has potential to benefit IRF level of care proposed: Yes  Comments: Not Applicable    Special Needs or Precautions - Medical Necessity:  Safety Concerns/Precautions:  Fall Risk / High Risk for Falls, Balance and Bed / Chair Alarm  Pain Management  Fall Risk;Spinal / Back Precautions   IV Site: Peripheral  Requires Oxygen  Current Medications:    Current Facility-Administered Medications Ordered in Epic   Medication Dose Route Frequency Provider Last Rate Last Dose   • magnesium hydroxide (MILK OF MAGNESIA) suspension 30 mL  30 mL Oral BID Cassie Cortes M.D.   Stopped at 03/02/20 0600    And   • senna-docusate (PERICOLACE or SENOKOT S) 8.6-50 MG per tablet 2 Tab  2 Tab Oral BID Cassie Cortes M.D.   Stopped at 03/03/20 1800    And   • bisacodyl (DULCOLAX) suppository 10 mg  10 mg Rectal QDAY PRN Cassie Cortes M.D.       • fleet enema 133 mL  1 Enema Rectal Once PRN Cassie Cortes M.D.       • docusate sodium (COLACE) capsule 100 mg  100 mg Oral BID Sylvie Moreno, A.P.R.N.   Stopped at 03/02/20 0600   • polyethylene glycol/lytes (MIRALAX) PACKET 1 Packet  1 Packet Oral DAILY Sylvie Moreno, A.P.R.N.   Stopped at 03/02/20 0600   • acetaminophen (TYLENOL) tablet 650 mg  650 mg Oral Q6HRS PRN Cassie Cortes M.D.   650 mg at 03/04/20 0752   • labetalol (NORMODYNE/TRANDATE) injection 10 mg  10 mg Intravenous Q4HRS PRN Cassie Cortes M.D.   10 mg at 03/03/20 1703   • ascorbic acid tablet 1,000 mg  1,000 mg Oral DAILY Cassie Cortes M.D.   1,000 mg at 03/04/20 0506   • calcium carbonate (OS-MARGARITA 500) tablet 500 mg  500 mg Oral BID Cassie Cortes M.D.   500 mg at 03/04/20 0512   • carvedilol (COREG) tablet 6.25 mg  6.25 mg Oral BID WITH MEALS Cassie Cortes M.D.   6.25 mg at 03/04/20 0752   • tizanidine (ZANAFLEX) tablet 2 mg  2 mg Oral TID Cassie Cortes M.D.   2 mg at 03/04/20 0507   • vitamin D (cholecalciferol) tablet 1,000 Units  1,000 Units Oral Q EVENING Cassie  FELIX Cortes   1,000 Units at 03/03/20 1703   • oxyCODONE-acetaminophen (PERCOCET) 5-325 MG per tablet 1-2 Tab  1-2 Tab Oral Q6HRS PRN Cassie Cortes M.D.       • losartan (COZAAR) tablet 100 mg  100 mg Oral Q DAY Cassie Cortes M.D.   100 mg at 03/04/20 0506   • hydroCHLOROthiazide (HYDRODIURIL) tablet 25 mg  25 mg Oral Q DAY Cassie Cortes M.D.   25 mg at 03/04/20 0506     No current [facility]-ordered outpatient medications on file.     Diet:   DIET ORDERS (From admission to next 24h)     Start     Ordered    03/01/20 1219  Dietary Comment  ONCE     Comments:  Prune juice x 2 to bedside please    03/01/20 1218    02/29/20 1324  Diet Order Regular  ALL MEALS     Question:  Diet:  Answer:  Regular    02/29/20 1324                Anticipated Discharge Destination / Patient/Family Goal:  Destination: Home with Assistance Support System: Adult kids & Grandkids  Anticipated home health services: OT and PT  Previously used HH service/ provider: Not Applicable  Anticipated DME Needs: Oxygen, Walker and Life Line  Outpatient Services: OT and PT  Alternative resources to address additional identified needs:     Pre-Screen Completed: 3/4/2020 9:29 AM Jared Blas L.P.N.

## 2020-03-04 NOTE — FLOWSHEET NOTE
"   03/04/20 1300   Incentive Spirometry Treatment   Height 1.6 m (5' 2.99\")   Predicted Inspiratory Capacity 1650   60% of predicted IS capacity 990 mL   40% of predicted IS capacity 660 mL   Incentive Spirometer Volume 1500 mL   Incentive Spirometer Next Change Date 04/01/20     "

## 2020-03-04 NOTE — DISCHARGE PLANNING
Dr. White has accepted Krystle.  Transport has been arranged for 1100.  Olga (CM) & Margo (BSN) are aware.

## 2020-03-04 NOTE — DISCHARGE SUMMARY
Discharge Summary    CHIEF COMPLAINT ON ADMISSION  Chief Complaint   Patient presents with   • Back Pain     Decompression procedure on Tuesday. Dc Wednesday. Patient with increasing back pain        Reason for Admission  EMS     Admission Date  2/29/2020    CODE STATUS  DNAR/DNI    HPI & HOSPITAL COURSE  This is a 86 y.o. female with past medical history lumbar stenosis who was admitted for pain control and need for rehab placement on postop day 4 after L5-S1 laminectomy with discectomy.   Repeat CT Noncon L-spine showed no acute findings.  Patient evaluated by neurosurgery, Dr. Head, who recommended acute rehab placement.  Patient's pain was controlled on minimal medications.  She did have severe constipation, which significantly improved with bowel regimen.  Patient was evaluated by PT/OT, who recommended acute rehab placement.  Patient was evaluated by physiatry, Dr. Troncoso, who accepted patient to acute rehab.  Patient is discharged in stable condition to [facility].  She is to follow-up with with acute rehab PCP.  She is also to follow-up with her outpatient PCP in 3 weeks.      Of note, patient does have a history of paroxysmal atrial fibrillation - chadsvasc 4-6.  She was discussing plans of starting anticoagulation with her cardiologist, Dr. Sewell, with whom she has a repeat outpatient follow-up on 3/13/2020.      Also of note, patient did have a palpable cord in her right popliteal fossa.  Venous doppler ultrasound revealed no DVT, however.    The patient met 2-midnight criteria for an inpatient stay at the time of discharge.    Discharge Date  3/4/2020    FOLLOW UP ITEMS POST DISCHARGE  - Patient to follow up with acute rehab PCP.    - Patient to follow up with outpatient PCP in 3 weeks.    - Patient to follow up with outpatient Cardiology re: starting anticoagulation for paroxysmal atrial fibrillation.        DISCHARGE DIAGNOSES  Active Problems:    PAF (paroxysmal atrial fibrillation) ([facility]) POA: Yes     Post-op pain POA: Unknown    concern for right lower extremity DVT POA: Unknown    constipation POA: Unknown  Resolved Problems:    * No resolved hospital problems. *      FOLLOW UP  Future Appointments   Date Time Provider Department Center   3/11/2020 11:00 AM Joana Hawk A.P.R.NEri [address]   3/13/2020  3:40 PM Du Sewell M.D. [facility] None     JAYLEN Castelan  [address]  [facility] [address]  [phone]    In 3 weeks        MEDICATIONS ON DISCHARGE     Medication List      CONTINUE taking these medications      Instructions   calcium carbonate 1250 (500 Ca) MG Tabs  Commonly known as:  OS-MARGARITA 500   Take 500 mg by mouth 2 Times a Day.  Dose:  500 mg     carvedilol 6.25 MG Tabs  Commonly known as:  COREG   Take 6.25 mg by mouth 2 times a day, with meals.  Dose:  6.25 mg     losartan-hydrochlorothiazide 100-25 MG per tablet  Commonly known as:  HYZAAR   Take 1 Tab by mouth every day.  Dose:  1 Tab     oxyCODONE-acetaminophen 5-325 MG Tabs  Commonly known as:  PERCOCET   Take 1-2 Tabs by mouth every four hours as needed for Severe Pain.  Dose:  1-2 Tab     tizanidine 2 MG tablet  Commonly known as:  ZANAFLEX   Take 2 mg by mouth 3 times a day.  Dose:  2 mg     Vitamin C 1000 MG Tabs   Take 1 Tab by mouth every day.  Dose:  1 Tab     vitamin D 1000 Unit (25 mcg) Tabs  Commonly known as:  cholecalciferol   Take 1,000 Units by mouth every evening.  Dose:  1,000 Units        STOP taking these medications    clopidogrel 75 MG Tabs  Commonly known as:  PLAVIX            Allergies  Allergies   Allergen Reactions   • Sulfa Drugs Nausea     Nausea        CONSULTATIONS  Neurosurgery - Dr. Head  PT/OT  Physiatry - Dr. Troncoos    LABORATORY  Lab Results   Component Value Date    SODIUM 132 (L) 03/02/2020    POTASSIUM 4.6 03/02/2020    CHLORIDE 95 (L) 03/02/2020    CO2 29 03/02/2020    GLUCOSE 142 (H) 03/02/2020    BUN 22 03/02/2020    CREATININE 0.71 03/02/2020        Lab  Results   Component Value Date    WBC 8.0 03/02/2020    HEMOGLOBIN 12.6 03/02/2020    HEMATOCRIT 37.4 03/02/2020    PLATELETCT 235 03/02/2020        Total time of the discharge: 38 minutes.

## 2020-03-05 LAB
25(OH)D3 SERPL-MCNC: 54 NG/ML (ref 30–100)
ALBUMIN SERPL BCP-MCNC: 3.3 G/DL (ref 3.2–4.9)
ALBUMIN/GLOB SERPL: 1.1 G/DL
ALP SERPL-CCNC: 55 U/L (ref 30–99)
ALT SERPL-CCNC: 9 U/L (ref 2–50)
ANION GAP SERPL CALC-SCNC: 7 MMOL/L (ref 0–11.9)
AST SERPL-CCNC: 13 U/L (ref 12–45)
BASOPHILS # BLD AUTO: 0.4 % (ref 0–1.8)
BASOPHILS # BLD: 0.03 K/UL (ref 0–0.12)
BILIRUB SERPL-MCNC: 0.7 MG/DL (ref 0.1–1.5)
BUN SERPL-MCNC: 22 MG/DL (ref 8–22)
CALCIUM SERPL-MCNC: 9 MG/DL (ref 8.5–10.5)
CHLORIDE SERPL-SCNC: 95 MMOL/L (ref 96–112)
CHOLEST SERPL-MCNC: 173 MG/DL (ref 100–199)
CO2 SERPL-SCNC: 29 MMOL/L (ref 20–33)
CREAT SERPL-MCNC: 0.75 MG/DL (ref 0.5–1.4)
EOSINOPHIL # BLD AUTO: 0.3 K/UL (ref 0–0.51)
EOSINOPHIL NFR BLD: 4.3 % (ref 0–6.9)
ERYTHROCYTE [DISTWIDTH] IN BLOOD BY AUTOMATED COUNT: 44.7 FL (ref 35.9–50)
EST. AVERAGE GLUCOSE BLD GHB EST-MCNC: 157 MG/DL
GLOBULIN SER CALC-MCNC: 3 G/DL (ref 1.9–3.5)
GLUCOSE SERPL-MCNC: 140 MG/DL (ref 65–99)
HBA1C MFR BLD: 7.1 % (ref 0–5.6)
HCT VFR BLD AUTO: 34.4 % (ref 37–47)
HDLC SERPL-MCNC: 43 MG/DL
HGB BLD-MCNC: 11.9 G/DL (ref 12–16)
IMM GRANULOCYTES # BLD AUTO: 0.06 K/UL (ref 0–0.11)
IMM GRANULOCYTES NFR BLD AUTO: 0.9 % (ref 0–0.9)
LDLC SERPL CALC-MCNC: 108 MG/DL
LYMPHOCYTES # BLD AUTO: 2.36 K/UL (ref 1–4.8)
LYMPHOCYTES NFR BLD: 33.6 % (ref 22–41)
MAGNESIUM SERPL-MCNC: 1.9 MG/DL (ref 1.5–2.5)
MCH RBC QN AUTO: 31.6 PG (ref 27–33)
MCHC RBC AUTO-ENTMCNC: 34.6 G/DL (ref 33.6–35)
MCV RBC AUTO: 91.2 FL (ref 81.4–97.8)
MONOCYTES # BLD AUTO: 0.57 K/UL (ref 0–0.85)
MONOCYTES NFR BLD AUTO: 8.1 % (ref 0–13.4)
NEUTROPHILS # BLD AUTO: 3.71 K/UL (ref 2–7.15)
NEUTROPHILS NFR BLD: 52.7 % (ref 44–72)
NRBC # BLD AUTO: 0 K/UL
NRBC BLD-RTO: 0 /100 WBC
PHOSPHATE SERPL-MCNC: 3.6 MG/DL (ref 2.5–4.5)
PLATELET # BLD AUTO: 284 K/UL (ref 164–446)
PMV BLD AUTO: 9.5 FL (ref 9–12.9)
POTASSIUM SERPL-SCNC: 3.5 MMOL/L (ref 3.6–5.5)
PROT SERPL-MCNC: 6.3 G/DL (ref 6–8.2)
RBC # BLD AUTO: 3.77 M/UL (ref 4.2–5.4)
SODIUM SERPL-SCNC: 131 MMOL/L (ref 135–145)
TRIGL SERPL-MCNC: 109 MG/DL (ref 0–149)
TSH SERPL DL<=0.005 MIU/L-ACNC: 1.68 UIU/ML (ref 0.38–5.33)
WBC # BLD AUTO: 7 K/UL (ref 4.8–10.8)

## 2020-03-05 PROCEDURE — 99233 SBSQ HOSP IP/OBS HIGH 50: CPT | Performed by: PHYSICAL MEDICINE & REHABILITATION

## 2020-03-05 PROCEDURE — A9270 NON-COVERED ITEM OR SERVICE: HCPCS | Performed by: PHYSICAL MEDICINE & REHABILITATION

## 2020-03-05 PROCEDURE — 84443 ASSAY THYROID STIM HORMONE: CPT

## 2020-03-05 PROCEDURE — 83735 ASSAY OF MAGNESIUM: CPT

## 2020-03-05 PROCEDURE — 97535 SELF CARE MNGMENT TRAINING: CPT

## 2020-03-05 PROCEDURE — 97530 THERAPEUTIC ACTIVITIES: CPT

## 2020-03-05 PROCEDURE — 770010 HCHG ROOM/CARE - REHAB SEMI PRIVAT*

## 2020-03-05 PROCEDURE — 82306 VITAMIN D 25 HYDROXY: CPT

## 2020-03-05 PROCEDURE — 94760 N-INVAS EAR/PLS OXIMETRY 1: CPT

## 2020-03-05 PROCEDURE — 700102 HCHG RX REV CODE 250 W/ 637 OVERRIDE(OP): Performed by: PHYSICAL MEDICINE & REHABILITATION

## 2020-03-05 PROCEDURE — 36415 COLL VENOUS BLD VENIPUNCTURE: CPT

## 2020-03-05 PROCEDURE — 97166 OT EVAL MOD COMPLEX 45 MIN: CPT

## 2020-03-05 PROCEDURE — 700111 HCHG RX REV CODE 636 W/ 250 OVERRIDE (IP): Performed by: PHYSICAL MEDICINE & REHABILITATION

## 2020-03-05 PROCEDURE — 80053 COMPREHEN METABOLIC PANEL: CPT

## 2020-03-05 PROCEDURE — 97162 PT EVAL MOD COMPLEX 30 MIN: CPT

## 2020-03-05 PROCEDURE — 80061 LIPID PANEL: CPT

## 2020-03-05 PROCEDURE — 83036 HEMOGLOBIN GLYCOSYLATED A1C: CPT

## 2020-03-05 PROCEDURE — 97110 THERAPEUTIC EXERCISES: CPT

## 2020-03-05 PROCEDURE — 85025 COMPLETE CBC W/AUTO DIFF WBC: CPT

## 2020-03-05 PROCEDURE — 84100 ASSAY OF PHOSPHORUS: CPT

## 2020-03-05 RX ORDER — GABAPENTIN 100 MG/1
200 CAPSULE ORAL 2 TIMES DAILY
Status: DISCONTINUED | OUTPATIENT
Start: 2020-03-05 | End: 2020-03-06

## 2020-03-05 RX ADMIN — ACETAMINOPHEN 1000 MG: 500 TABLET, FILM COATED ORAL at 10:09

## 2020-03-05 RX ADMIN — GABAPENTIN 200 MG: 100 CAPSULE ORAL at 12:25

## 2020-03-05 RX ADMIN — GABAPENTIN 200 MG: 100 CAPSULE ORAL at 21:33

## 2020-03-05 RX ADMIN — TIZANIDINE 2 MG: 4 TABLET ORAL at 21:34

## 2020-03-05 RX ADMIN — CALCIUM CARBONATE (ANTACID) CHEW TAB 500 MG 500 MG: 500 CHEW TAB at 08:11

## 2020-03-05 RX ADMIN — ACETAMINOPHEN 1000 MG: 500 TABLET, FILM COATED ORAL at 15:28

## 2020-03-05 RX ADMIN — OXYCODONE HYDROCHLORIDE AND ACETAMINOPHEN 1000 MG: 500 TABLET ORAL at 10:09

## 2020-03-05 RX ADMIN — TIZANIDINE 2 MG: 4 TABLET ORAL at 15:27

## 2020-03-05 RX ADMIN — CARVEDILOL 6.25 MG: 3.12 TABLET, FILM COATED ORAL at 17:44

## 2020-03-05 RX ADMIN — HYDROCHLOROTHIAZIDE 25 MG: 25 TABLET ORAL at 04:02

## 2020-03-05 RX ADMIN — TIZANIDINE 2 MG: 4 TABLET ORAL at 10:11

## 2020-03-05 RX ADMIN — CARVEDILOL 6.25 MG: 3.12 TABLET, FILM COATED ORAL at 10:08

## 2020-03-05 RX ADMIN — ACETAMINOPHEN 1000 MG: 500 TABLET, FILM COATED ORAL at 21:32

## 2020-03-05 RX ADMIN — ENOXAPARIN SODIUM 40 MG: 100 INJECTION SUBCUTANEOUS at 21:35

## 2020-03-05 RX ADMIN — CALCIUM CARBONATE (ANTACID) CHEW TAB 500 MG 500 MG: 500 CHEW TAB at 21:35

## 2020-03-05 RX ADMIN — LOSARTAN POTASSIUM 100 MG: 25 TABLET, FILM COATED ORAL at 04:02

## 2020-03-05 ASSESSMENT — ACTIVITIES OF DAILY LIVING (ADL): TOILETING: INDEPENDENT

## 2020-03-05 ASSESSMENT — BRIEF INTERVIEW FOR MENTAL STATUS (BIMS)
WHAT YEAR IS IT: CORRECT
INITIAL REPETITION OF BED BLUE SOCK - FIRST ATTEMPT: 3
ASKED TO RECALL BED: YES, NO CUE REQUIRED
ASKED TO RECALL SOCK: YES, NO CUE REQUIRED
WHAT MONTH IS IT: ACCURATE WITHIN 5 DAYS
WHAT DAY OF THE WEEK IS IT: CORRECT
ASKED TO RECALL BLUE: YES, NO CUE REQUIRED
BIMS SUMMARY SCORE: 15

## 2020-03-05 NOTE — THERAPY
"Recreational Therapy   Initial Evaluation     Patient Name: Krystle Tavarez  Age:  86 y.o., Sex:  female  Medical Record #: [id number]  Today's Date: 3/5/2020     Subjective    \"Stay at home\" when asked what she does for her leisure time.    Objective       03/05/20 0901   Leisure History   Leisure Interests Reading;Other (Comments)   Leisure Comments scrabble and card games, socializing with peers   Pre-Morbid Leisure Lifestyle Individual;Group;Active;Other (Comments)  (recent move to [place] and is interested in senior activities. )   Prior Living Arrangements   Lives with - Patient's Self Care Capacity Adult Children   Steps Into Home 2   Steps In Home 20   Ambulation Other (Comments)  (limited community distance)   Assistive Devices Used Single Point Cane   Driving / Transportation Relatives / Others Provide Transportation   Functional Ability Status - Physical   Endurance Low   Right  Strong   Left  Strong   Right Arm Strong   Left Arm Strong   Right Leg Weak   Left Leg Weak   Upper Extremity Gross Motor Uses Both Arms / Hands   Lower Extremetiy Gross Motor Uses Both Legs   Fine Motor Manipulates Small Objects   Functional Ability Status - Cognitive   Attention Span Remains on Task   Comprehension Follows Three Step Commands   Judgment Able to Make Independent Decisions   Functional Ability Status - Emotional    Affect Appropriate   Mood Appropriate   Behavior Appropriate   Leisure Competence Measure   Leisure Awareness Independent   Leisure Attitude Independent   Leisure Skills Modified Independent   Cultural / Social Behaviors Independent   Interpersonal Skills Independent   Community Integration Skills Cueing Assist   Social Contact Independent   Community Participation Cueing Assist   Clinical Impression   Clinical Impression Impaired Gross Motor Leisure Functioning;Impaired Endurance;Impaired Group Interaction Skills;Impaired Community Skills;Impaired Relaxation and Coping Skills;Impaired Leisure Skills "   Current Discharge Plan   Current Discharge Plan Return to Prior Living Situation   Benefit    Benefit Patient would Benefit from Inpatient Recreational Therapy to Maximize Independent Leisure Functioning    Interdisciplinary Plan of Care Collaboration   Patient Position at End of Therapy Seated;Other (Comments)  (in main gym for next session)   Treatment Time   Total Time Spent (mins) 30   Procedural Tracking   Procedural Tracking Community Re-Integration;Leisure Skills Awareness;Cognitive Skills Training       Assessment  Patient is 86 y.o. female with a diagnosis of Acute incomplete paraplegia.  Additional factors influencing patient status / progress (ie: cognitive factors, co-morbidities, social support, etc): paroxysmal atrial fibrillation, right total hip, GERD, hypertension, hyperlipidemia, borderline diabetes, plantar fasciitis .      Pt reporting an active leisure lifestyle when she lived in [place] however limited participation in leisure activities following her move to [place]. Pt was provided information on group senior leisure activities in the area upon her discharge.     Plan  Recommend Recreational Therapy 30-60 minutes per day 3-4 days per week for 2 weeks for the following treatments:  Community Re-Integration, Community Skills Development, Leisure Skills Awareness, Leisure Skills Development, Gross Motor Functional Leisure Skills and Group Treatment    Goals:  Long term and short term goals have been discussed with patient and they are in agreement.    Recreation Therapy Problems     Problem: Recreation Therapy     Dates: Start: 03/05/20       Description:     Goal: STG-Within one week, patient will demonstrate leisure problem solving     Dates: Start: 03/05/20       Description: By reporting on two leisure activities she would like to participate in either during session of during her free time and any perceived barriers to her participation.            Goal: STG-Within one week, patient will  demonstrate a standing tolerance     Dates: Start: 03/05/20       Description: By duel tasking while duel tasking at a CGA for 3 minutes x3 without LOB.            Goal: LTG-By discharge, patient will demonstrate leisure problem solving     Dates: Start: 03/05/20       Description: By reporting on two leisure activities she would like to participate in following discharge and any perceived barriers to her participation.            Goal: LTG-By discharge, patient will demonstrate a standing tolerance     Dates: Start: 03/05/20       Description: By duel tasking while duel tasking at a CGA for 5 minutes x3 without LOB.

## 2020-03-05 NOTE — CARE PLAN
Problem: Safety  Goal: Will remain free from injury  Note: Patient uses call light appropriately. Bed locked and in the lowest position. Alarms in place. So far, no impulsivity noted.        Problem: Bowel/Gastric:  Goal: Normal bowel function is maintained or improved  Note: Per report she is continent of bowels. She had a 2 loose bowel movements yesterday. Bowel sounds active in all four quadrants.      Problem: Urinary Elimination:  Goal: Ability to reestablish a normal urinary elimination pattern will improve  Note: Per patient report, she is continent with occasional stress incontinence (she uses a pad).

## 2020-03-05 NOTE — THERAPY
Physical Therapy   Initial Evaluation     Patient Name: Krystle Tavarez  Age:  86 y.o., Sex:  female  Medical Record #: [id number]  Today's Date: 3/5/2020     Subjective    Pt was sitting in w/c upon arrival; agreeable to tx.   Reports decline/ increased pain over past 5 years that has limited movement.  Very supportive family she lives with in [place]     Objective       03/05/20 0900   Prior Living Situation   Prior Services Home-Independent;Other (Comments)  (SPV from daughter for showers , lives with daughter)   Housing / Facility 2 Story House  (pt lives on first floor with bath/ bedroom)   Steps Into Home 2  (platform and then threshold step in to the house)   Steps In Home 20  (10, landing then another 10)   Rail Right Rail  (Steps in Home)   Bathroom Set up Shower Curtain;Shower Glass Doors;Walk In Shower;Grab Bars;Shower Chair   Equipment Owned Single Point Cane;Tub / Shower Seat   Lives with - Patient's Self Care Capacity Adult Children   Comments Live with daughter and son in [place], both are retired and can provided assistance as needed. They own an SPV. Like to be able to prepare simple breakfasts/ lunches; pt does not drive , goes to the beauty shop once/ per week   Prior Level of Functional Mobility   Bed Mobility Independent   Transfer Status Independent   Ambulation   (limited community distances)   Distance Ambulation (Feet)   (limited community distances)   Assistive Devices Used Single Point Cane   Stairs Independent   IRF-ARUL:  Prior Functioning: Everyday Activities   Self Care Independent   Indoor Mobility (Ambulation) Independent   Stairs Independent   Functional Cognition Independent   Prior Device Use   (SPC)   Pain 0 - 10 Group   Therapist Pain Assessment 4;Post Activity Pain Same as Prior to Activity   Cognition    Cognition / Consciousness WDL   Orientation Level Oriented x 4   Level of Consciousness Alert   Passive ROM Lower Body   Passive ROM Lower Body WDL   Active ROM Lower Body    Active  ROM Lower Body  WDL   Strength Lower Body   Lower Body Strength  X   Comments assessed in sitting 4/5 in all muscle groups except for R toe extension 3+/5   Lower Body Muscle Tone   Lower Body Muscle Tone  WDL   Balance Assessment   Sitting Balance (Static) Fair   Sitting Balance (Dynamic) Fair -   Standing Balance (Static) Poor -   Standing Balance (Dynamic) Trace +   Weight Shift Sitting Fair   Weight Shift Standing Poor   Bed Mobility    Supine to Sit Minimal Assist   Sit to Supine Contact Guard Assist   Sit to Stand Minimal Assist   Scooting Stand by Assist   Rolling Minimal Assist to Rt.;Minimum Assist to Lt.   Neurological Concerns   Comments reflexes 3+/5 , marie/ babinski negative   Coordination Lower Body    Comments rapid alternating/ heel to shin intact    IRF-RAUL:  Roll Left and Right   Assistance Needed Physical assistance   Physical Assistance Level Less than 25%   CARE Score 3   Discharge Goal:  Assistance Needed Independent   Discharge Goal:  Physical Assistance Level No physical assistance or only touching/steadying assist   Discharge Goal:  Score 6   IRF-RAUL:  Sit to Lying   Assistance Needed Physical assistance   Physical Assistance Level 25%-49%   CARE Score 3   Discharge Goal:  Assistance Needed Independent   Discharge Goal:  Physical Assistance Level No physical assistance or only touching/steadying assist   Discharge Goal:  Score 6   IRF-RAUL:  Lying to Sitting on Side of Bed   Assistance Needed Physical assistance   Physical Assistance Level Less than 25%   CARE Score 3   Discharge Goal:  Assistance Needed Independent   Discharge Goal:  Physical Assistance Level No physical assistance or only touching/steadying assist   Discharge Goal:  Score 6   IRF-RAUL:  Sit to Stand   Assistance Needed Physical assistance   Physical Assistance Level 25%-49%   CARE Score 3   Discharge Goal:  Assistance Needed Independent   Discharge Goal:  Physical Assistance Level No physical assistance or only  touching/steadying assist   Discahrge Goal:  Score 6   IRF-RAUL:  Chair/Bed-to-Chair Transfer   Assistance Needed Physical assistance;Adaptive equipment   Physical Assistance Level 25%-49%   CARE Score 3   Discharge Goal:  Assistance Needed Independent   Discharge Goal:  Physical Assistance Level No physical assistance or only touching/steadying assist   Discharge Goal:  Score 6   IRF-RAUL:  Car Transfer   Assistance Needed Physical assistance;Adaptive equipment   Physical Assistance Level 25%-49%   CARE Score 3   Discharge Goal:  Assistance Needed Independent;Adaptive equipment   Discharge Goal:  Physical Assistance Level No physical assistance or only touching/steadying assist   Discharge Goal:  Score 6   IRF RAUL:  Walking   Does the Patient Walk? Yes   IRF RAUL:  Walk 10 Feet   Assistance Needed Physical assistance;Adaptive equipment   Physical Assistance Level 25%-49%   CARE Score 3   Discharge Goal:  Assistance Needed Independent;Adaptive equipment   Discharge Goal:  Physical Assistance Level No physical assistance or only touching/steadying assist   Discharge Goal:  Score 6   IRF-RAUL:  Walk 50 Feet with Two Turns   Assistance Needed Physical assistance   Physical Assistance Level 25%-49%   CARE Score 3   Discharge Goal:  Assistance Needed Independent;Adaptive equipment   Discharge Goal:  Physical Assistance Level No physical assistance or only touching/steadying assist   Discharge Goal:  Score 6   IRF-RAUL:  Walk 150 Feet   Reason if not Attempted Safety concerns   CARE Score 88   Discharge Goal:  Assistance Needed Independent;Adaptive equipment   Discharge Goal:  Physical Assistance Level No physical assistance or only touching/steadying assist   Discharge Goal:  Score 6   IRF RAUL:  Walking 10 Feet on Uneven Surfaces   Reason if not Attempted Safety concerns   CARE Score 88   Discharge Goal:  Assistance Needed Independent;Adaptive equipment   Discharge Goal:  Physical Assistance Level No physical assistance or  only touching/steadying assist   Discharge Goal:  Score 6   IRF RAUL:  1 Step (Curb)   Assistance Needed Physical assistance;Adaptive equipment   Physical Assistance Level Less than 25%   CARE Score 3   Discharge Goal:  Assistance Needed Supervision;Adaptive equipment   Discharge Goal:  Physical Assistance Level No physical assistance or only touching/steadying assist   Discharge Goal:  Score 4   IRF-RAUL:  4 Steps   Assistance Needed Physical assistance;Adaptive equipment   Physical Assistance Level 50%-74%   CARE Score 2   Discharge Goal:  Assistance Needed Adaptive equipment;Supervision   Discharge Goal:  Physical Assistance Level No physical assistance or only touching/steadying assist   Discharge Goal:  Score 4   IRF RAUL:  12 Steps   Reason if not Attempted Safety concerns   CARE Score 88   Discharge Goal:  Assistance Needed Supervision;Adaptive equipment   Discharge Goal:  Physical Assistance Level No physical assistance or only touching/steadying assist   Discharge Goal:  Score 4   IRF RAUL:  Picking Up Object   Reason if not Attempted Safety concerns   CARE Score 88   Discharge Goal:  Assistance Needed Independent;Adaptive equipment   Discharge Goal:  Physical Assistance Level No physical assistance or only touching/steadying assist   Discharge Goal:  Score 6   IRF-RAUL:  Wheel 50 Feet with Two Turns   Indicate the Type of Wheelchair or Scooter Used Manual   Assistance Needed Physical assistance   Physical Assistance Level 25%-49%   CARE Score 3   Discharge Goal:  Assistance Needed Independent;Adaptive equipment   Discharge Goal:  Physical Assistance Level No physical assistance or only touching/steadying assist   Discharge Goal:  Score 6   IRF-RAUL:  Wheel 150 Feet   Indicate the Type of Wheelchair or Scooter Used Manual   Assistance Needed Physical assistance   Physical Assistance Level 25%-49%   CARE Score 3   Discharge Goal:  Assistance Needed Independent   Discharge Goal:  Physical Assistance Level No  "physical assistance or only touching/steadying assist   Discharge Goal:  Score 6   Problem List    Problems Pain;Impaired Bed Mobility;Impaired Ambulation   Precautions   Precautions Fall Risk;Spinal / Back Precautions    Current Discharge Plan   Current Discharge Plan Return to Prior Living Situation   Interdisciplinary Plan of Care Collaboration   Patient Position at End of Therapy Seated;Call Light within Reach;Tray Table within Reach;Phone within Reach   Benefit   Therapy Benefit Patient Would Benefit from Inpatient Rehabilitation Physical Therapy to Maximize Functional [place] with ADLs, IADLs and Mobility.   PT Total Time Spent   PT Individual Total Time Spent (Mins) 90   PT Charge Group   PT Therapeutic Activities 3   PT Evaluation PT Evaluation Mod       FIM Bed/Chair/Wheelchair Transfers Score: 4 - Minimal Assistance  Bed/Chair/Wheelchair Transfers Description:  (bed mob: Laury supine to sit; transfer: Laury for stability)    FIM Walking Score:  2 - Max Assistance  Walking Description:  (112ft x 1 FWW Laury for stability)    FIM Wheelchair Score:  2 - Max Assistance  Wheelchair Description:  (65ft x 1 B UE Laury for steering)    FIM Stairs Score:  2 - Max Assistance  Stairs Description:  (5 x 1 6\" B UE modA for stability)    Assessment  Patient is 86 y.o. female with a diagnosis of incomplete paraplegia; status post L4-L5 laminectomy on 2/25 by Dr. Kimble .  Additional factors influencing patient status / progress (ie: cognitive factors, co-morbidities, social support, etc): past medical history of paroxysmal atrial fibrillation, right total hip, GERD, hypertension, hyperlipidemia, borderline diabetes, plantar fasciitis, supportive family, lives with a daughter and son in  who are both retired. .      Plan  Recommend Physical Therapy  minutes per day 5-7 days per week for 7-10 days for the following treatments:  PT Group Therapy, PT Gait Training, PT Therapeutic Exercises, PT Neuro Re-Ed/Balance, " "PT Therapeutic Activity, PT Manual Therapy and PT Evaluation.    Goals:  Long term and short term goals have been discussed with patient and they are in agreement.    Physical Therapy Problems     Problem: Mobility     Dates: Start: 03/05/20       Description:     Goal: STG-Within one week, patient will ambulate community distances     Dates: Start: 03/05/20       Description: 1) Individualized goal: AMB 150ft x 1 SBA FWW  2) Interventions:  PT Group Therapy, PT Gait Training, PT Therapeutic Exercises, PT Neuro Re-Ed/Balance, PT Therapeutic Activity, PT Manual Therapy and PT Evaluation             Goal: STG-Within one week, patient will ambulate up/down a curb     Dates: Start: 03/05/20       Description: 1) Individualized goal:  FWW SBA  2) Interventions:  PT Group Therapy, PT Gait Training, PT Therapeutic Exercises, PT Neuro Re-Ed/Balance, PT Therapeutic Activity, PT Manual Therapy and PT Evaluation               Goal: STG-Within one week, patient will ascend and descend four to six stairs     Dates: Start: 03/05/20       Description: 1) Individualized goal:  8 x 1 6\" steps B HR Laury  2) Interventions:  PT Group Therapy, PT Gait Training, PT Therapeutic Exercises, PT Neuro Re-Ed/Balance, PT Therapeutic Activity, PT Manual Therapy and PT Evaluation                     Problem: Mobility Transfers     Dates: Start: 03/05/20       Description:     Goal: STG-Within one week, patient will perform bed mobility     Dates: Start: 03/05/20       Description: 1) Individualized goal:  SBA HOB flat, bed rail as needed  2) Interventions:  PT Group Therapy, PT Gait Training, PT Therapeutic Exercises, PT Neuro Re-Ed/Balance, PT Therapeutic Activity, PT Manual Therapy and PT Evaluation               Goal: STG-Within one week, patient will transfer bed to chair     Dates: Start: 03/05/20       Description: 1) Individualized goal:  FWW SBA  2) Interventions:  PT Group Therapy, PT Gait Training, PT Therapeutic Exercises, PT Neuro " "Re-Ed/Balance, PT Therapeutic Activity, PT Manual Therapy and PT Evaluation                     Problem: PT-Long Term Goals     Dates: Start: 03/05/20       Description:     Goal: LTG-By discharge, patient will ambulate     Dates: Start: 03/05/20       Description: 1) Individualized goal:  200ft x 1 mod I LRAD  2) Interventions:  PT Group Therapy, PT Gait Training, PT Therapeutic Exercises, PT Neuro Re-Ed/Balance, PT Therapeutic Activity, PT Manual Therapy and PT Evaluation             Goal: LTG-By discharge, patient will transfer one surface to another     Dates: Start: 03/05/20       Description: 1) Individualized goal:  Mod I LRAD  2) Interventions:  PT Group Therapy, PT Gait Training, PT Therapeutic Exercises, PT Neuro Re-Ed/Balance, PT Therapeutic Activity, PT Manual Therapy and PT Evaluation               Goal: LTG-By discharge, patient will perform home exercise program     Dates: Start: 03/05/20       Description: 1) Individualized goal:  B LE strength / balance mod I  2) Interventions:  PT Group Therapy, PT Gait Training, PT Therapeutic Exercises, PT Neuro Re-Ed/Balance, PT Therapeutic Activity, PT Manual Therapy and PT Evaluation               Goal: LTG-By discharge, patient will ambulate up/down flight of stairs     Dates: Start: 03/05/20       Description: 1) Individualized goal:   12 x 1 6\" steps Laury   2) Interventions:  PT Group Therapy, PT Gait Training, PT Therapeutic Exercises, PT Neuro Re-Ed/Balance, PT Therapeutic Activity, PT Manual Therapy and PT Evaluation                         "

## 2020-03-05 NOTE — THERAPY
"Occupational Therapy  Daily Treatment     Patient Name: Krystle Tavarez  Age:  86 y.o., Sex:  female  Medical Record #: [id number]  Today's Date: 3/5/2020     Precautions  Precautions: Fall Risk, Spinal / Back Precautions          Subjective    \"I'd like to use the toilet before we begin\"     Objective       20 1401   Precautions   Precautions Fall Risk;Spinal / Back Precautions    Vitals   O2 Delivery Device None - Room Air   Pain   Intervention Declines   Pain 0 - 10 Group   Therapist Pain Assessment 0   Non Verbal Descriptors   Non Verbal Scale  Calm   Cognition    Level of Consciousness Alert   Sitting Upper Body Exercises   Upper Extremity Bike Level 2 Resistance  (FluidoBike, Level 2, 10 mins, rest break x 2)   Interdisciplinary Plan of Care Collaboration   IDT Collaboration with  Family / Caregiver;Certified Nursing Assistant   Patient Position at End of Therapy Seated;Self Releasing Lap Belt Applied;Call Light within Reach;Tray Table within Reach;Phone within Reach;Family / Friend in Room   OT Total Time Spent   OT Individual Total Time Spent (Mins) 30   OT Charge Group   OT Self Care / ADL 1   OT Therapeutic Exercise  1       FIM Toiletin - Minimal Assistance  Toileting Description:  Grab bar, Increased time, Supervision for safety    FIM Toilet Transfer Score:  4 - Minimal Assistance  Toilet Transfer Description:  Grab bar, Supervision for safety, Set-up of equipment(CGA to transfer to/from manual wc and commode via grab bar w/ VQ's for spinal/back precautions)      Assessment    Pt was alert and cooperative w/ tx.  Therapist reviewed spinal/back precautions, Pt at CGA for commode transfer to/from manual wc via grab bar.  Limiters include decreased functional strength, impaired endurance and standing balance.    Plan    Occupational therapy to address decreased functional strength, endurance, standing balance and education for spinal/back precautions/use of AE and DME/environmental and safety " awareness.

## 2020-03-05 NOTE — PROGRESS NOTES
2 RN skin check completed with Tabitha RODRIGUEZ  Lower back incision appears pink, approximated and with no drainage noted. Steri strips in place.   Pt. Has a right forearm IV. Clean, dry and intact.

## 2020-03-05 NOTE — PROGRESS NOTES
Received patient on bed. Conscious coherent not in cp distress. Patient verbalized no severe pain. No SOB. Dressing at the back dry and intact. No drainaged no bleeding on the dressing. Patient advised for spinal precaution. Safety and fall precaution reinforced.

## 2020-03-05 NOTE — PROGRESS NOTES
"Rehab Progress Note     Encounter Date: 3/5/2020    CC: low back pain, weakness in LE    Interval Events (Subjective)  She reports having a shower this morning, feels better.  She does complain of increasing achy sensation over the back at the site of surgery, no sharp progression, constant, no radiation into the lower extremities around the groin.  She describes it as a 3/10, under good control with current pain medication, she has not required any PRN medication since admission    She denies any dizziness with change in position        ROS:  Gen: No fatigue, confusion, significant weight loss  Eyes: no blurry vision, double vision or pain in eyes  ENT: no changes in hearing, runny nose, nose bleeds, sinus pain  CV: No CP, palpitations,   Lungs: no shortness of breath, changes in secretions, changes in cough, pain with coughing  Abd: No abd pain, nausea, vomiting, pain with eating  : no blood in urine,  suprapubic pain  Ext: No swelling in legs, asymmetric atrophy  Neuro: no changes in strength or sensation  Skin: no new ulcers/skin breakdown appreciated by patient  Mood: No changes in mood today, increase in depression or anxiety  Heme: no bruising, or bleeding  Rest of 14 point review of systems is negative    Objective:  Vitals: /72   Pulse 80   Temp 36.6 °C (97.9 °F) (Temporal)   Resp 18   Ht 1.6 m (5' 2.99\")   Wt 75.3 kg (166 lb)   SpO2 93%      Int BP: 102-150/40 8-72   Katie/Output Summary (Last 24 hours) at 3/5/2020 1106  Last data filed at 3/5/2020 0830  Gross per 24 hour   Intake 360 ml   Output --   Net 360 ml       Gen: NAD, Body mass index is 29.41 kg/m².  HEENT: NC/AT, PERRLA, moist mucous membranes  Cardio: RRR, no mumurs  Pulm: CTAB, with normal respiratory effort  Abd: Soft NTND, active bowel sounds,  Ext: No peripheral edema. No calf tenderness. No clubbing/cyanosis. +dorsal pedalis pulses bilaterally.  Skin: Incision is clean dry and intact, covered with island dressing, no erythema, " no significant drainage    Recent Results (from the past 72 hour(s))   ACCU-CHEK GLUCOSE    Collection Time: 03/02/20  8:59 PM   Result Value Ref Range    Glucose - Accu-Ck 210 (H) 65 - 99 mg/dL   CBC with Differential    Collection Time: 03/05/20  5:09 AM   Result Value Ref Range    WBC 7.0 4.8 - 10.8 K/uL    RBC 3.77 (L) 4.20 - 5.40 M/uL    Hemoglobin 11.9 (L) 12.0 - 16.0 g/dL    Hematocrit 34.4 (L) 37.0 - 47.0 %    MCV 91.2 81.4 - 97.8 fL    MCH 31.6 27.0 - 33.0 pg    MCHC 34.6 33.6 - 35.0 g/dL    RDW 44.7 35.9 - 50.0 fL    Platelet Count 284 164 - 446 K/uL    MPV 9.5 9.0 - 12.9 fL    Neutrophils-Polys 52.70 44.00 - 72.00 %    Lymphocytes 33.60 22.00 - 41.00 %    Monocytes 8.10 0.00 - 13.40 %    Eosinophils 4.30 0.00 - 6.90 %    Basophils 0.40 0.00 - 1.80 %    Immature Granulocytes 0.90 0.00 - 0.90 %    Nucleated RBC 0.00 /100 WBC    Neutrophils (Absolute) 3.71 2.00 - 7.15 K/uL    Lymphs (Absolute) 2.36 1.00 - 4.80 K/uL    Monos (Absolute) 0.57 0.00 - 0.85 K/uL    Eos (Absolute) 0.30 0.00 - 0.51 K/uL    Baso (Absolute) 0.03 0.00 - 0.12 K/uL    Immature Granulocytes (abs) 0.06 0.00 - 0.11 K/uL    NRBC (Absolute) 0.00 K/uL   Comp Metabolic Panel (CMP)    Collection Time: 03/05/20  5:09 AM   Result Value Ref Range    Sodium 131 (L) 135 - 145 mmol/L    Potassium 3.5 (L) 3.6 - 5.5 mmol/L    Chloride 95 (L) 96 - 112 mmol/L    Co2 29 20 - 33 mmol/L    Anion Gap 7.0 0.0 - 11.9    Glucose 140 (H) 65 - 99 mg/dL    Bun 22 8 - 22 mg/dL    Creatinine 0.75 0.50 - 1.40 mg/dL    Calcium 9.0 8.5 - 10.5 mg/dL    AST(SGOT) 13 12 - 45 U/L    ALT(SGPT) 9 2 - 50 U/L    Alkaline Phosphatase 55 30 - 99 U/L    Total Bilirubin 0.7 0.1 - 1.5 mg/dL    Albumin 3.3 3.2 - 4.9 g/dL    Total Protein 6.3 6.0 - 8.2 g/dL    Globulin 3.0 1.9 - 3.5 g/dL    A-G Ratio 1.1 g/dL   Lipid Profile (Lipid Panel)    Collection Time: 03/05/20  5:09 AM   Result Value Ref Range    Cholesterol,Tot 173 100 - 199 mg/dL    Triglycerides 109 0 - 149 mg/dL    HDL 43  >=40 mg/dL     (H) <100 mg/dL   Magnesium    Collection Time: 03/05/20  5:09 AM   Result Value Ref Range    Magnesium 1.9 1.5 - 2.5 mg/dL   Phosphorus    Collection Time: 03/05/20  5:09 AM   Result Value Ref Range    Phosphorus 3.6 2.5 - 4.5 mg/dL   TSH with Reflex to FT4    Collection Time: 03/05/20  5:09 AM   Result Value Ref Range    TSH 1.680 0.380 - 5.330 uIU/mL   Vitamin D, 25-hydroxy (blood)    Collection Time: 03/05/20  5:09 AM   Result Value Ref Range    25-Hydroxy   Vitamin D 25 54 30 - 100 ng/mL   ESTIMATED GFR    Collection Time: 03/05/20  5:09 AM   Result Value Ref Range    GFR If African American >60 >60 mL/min/1.73 m 2    GFR If Non African American >60 >60 mL/min/1.73 m 2       Current Facility-Administered Medications   Medication Frequency   • Pharmacy Consult Request ...Pain Management Review 1 Each PHARMACY TO DOSE   • Respiratory Therapy Consult Continuous RT   • acetaminophen (TYLENOL) tablet 1,000 mg TID   • oxyCODONE immediate-release (ROXICODONE) tablet 5 mg Q3HRS PRN   • oxyCODONE immediate release (ROXICODONE) tablet 10 mg Q3HRS PRN   • acetaminophen (TYLENOL) tablet 650 mg Q4HRS PRN   • enoxaparin (LOVENOX) inj 40 mg QHS   • docusate sodium (COLACE) capsule 200 mg BID    And   • sennosides (SENOKOT) 8.6 MG tablet 17.2 mg DAILY AT NOON    And   • magnesium hydroxide (MILK OF MAGNESIA) suspension 30 mL QDAY PRN   • artificial tears ophthalmic solution 1 Drop PRN   • benzocaine-menthol (CEPACOL) lozenge 1 Lozenge Q2HRS PRN   • mag hydrox-al hydrox-simeth (MAALOX PLUS ES or MYLANTA DS) suspension 20 mL Q2HRS PRN   • ondansetron (ZOFRAN ODT) dispertab 4 mg 4X/DAY PRN    Or   • ondansetron (ZOFRAN) syringe/vial injection 4 mg 4X/DAY PRN   • sodium chloride (OCEAN) 0.65 % nasal spray 2 Spray PRN   • traZODone (DESYREL) tablet 50 mg QHS PRN   • ascorbic acid tablet 1,000 mg DAILY   • carvedilol (COREG) tablet 6.25 mg BID WITH MEALS   • hydroCHLOROthiazide (HYDRODIURIL) tablet 25 mg Q DAY    • losartan (COZAAR) tablet 100 mg Q DAY   • polyethylene glycol/lytes (MIRALAX) PACKET 1 Packet DAILY   • tizanidine (ZANAFLEX) tablet 2 mg TID   • vitamin D (cholecalciferol) tablet 2,000 Units Q EVENING   • calcium carbonate (TUMS) chewable tab 500 mg BID       Orders Placed This Encounter   Procedures   • Diet Order Regular     Standing Status:   Standing     Number of Occurrences:   1     Order Specific Question:   Diet:     Answer:   Regular [1]       Assessment:  Active Hospital Problems    Diagnosis   • *Acute incomplete paraplegia (HCC)   • Post-op pain   • PAF (paroxysmal atrial fibrillation) (Formerly [facility])   • Cardiac pacemaker in situ   • History of TIA (transient ischemic attack)   • Dyslipidemia   • Essential hypertension, benign   • Low vitamin D level   • Retinopathy   • Neurogenic bowel   • Neuropathic pain   • Neurogenic bladder   • Orthostatic hypotension   • 'light-for-dates' infant with signs of fetal malnutrition   • Type 2 diabetes mellitus without complication, without long-term current use of insulin (Former[facility])       Medical Decision Making and Plan:    L2 AIS D spinal cord injury: L4-L5 laminectomy by Dr. Kimble on 2/25 to address lumbar stenosis with neurogenic claudication  -No need for brace at this time  -We will need to follow-up with Dr. Kimble as outpatient  - Continue comprehensive acute inpatient rehabilitation     Hyponatremia: Sodium on admission of 131  - Check BMP in a.m., may be secondary to hydrochlorothiazide.  - P.o. fluid restriction of 1.8 L of free water per day    Hypokalemia: Potassium of 3.5, significant decrease from 3/2  - Check BMP in a.m.    Neurogenic bowel: Discussed importance of daily bowel program  - KUB 3/4 showed no significant stool in the ascending or transverse colon  -Senna 2 tablets q. noon, Colace 200 mg twice daily, MiraLAX daily    Neurogenic bladder: History of worsening urinary incontinence  -Start voiding trial, if can't void in 6 hours or prn check pvr and  if >500cc then ICP,if able to void then check PVR, if PVR is >200cc then ICP        orthostatic hypotension: Worsened by administration of tizanidine 2 mg 3 times daily  -Check postural blood pressures, goal systolic blood pressure greater than 100 to maintain good perfusion and improve neurologic recovery     Hypertension:SBP max of 150  -Coreg 6.25 mg twice daily  -Hydrochlorothiazide 25 mg daily  -Losartan 100 mg daily     Paroxysmal atrial fibrillation: Plavix was held for spine surgery, cardiology recommending restarting approximately 12 days postop  - Restart Plavix on 3/8     Pain:  #Neuropathic pain: Hyperalgesia over L3 dermatome  -Start gabapentin 200 mg 2 times a day     #Postoperative pain:  - Tizanidine 2 mg 3 times daily, monitor for orthostatic hypotension, side effects may include fulminant liver failure, AST of 13 ALT of 9 on admission, should be checked in 2 weeks, 2 months, and every 6 months as long as patient is on tizanidine  - Tylenol 1000 mg 3 times daily.  -Oxycodone 5-10 mg every 3 hours as needed pain        DVT prophylaxis  -Lovenox 40 mg daily     Vitamin D deficiency:Vitamin D level 54 on admission, goal of greater than 30  - DC cholecalciferol, no need for supplementation at this time    Total time:  36 minutes.  I spent greater than 50% of the time for patient care and coordination on this date, including unit/floor time, and face-to-face time with the patient as per assessment and plan above including neuropathic pain, initiation of gabapentin, high risk medication, discussed side effects, hyponatremia, check BMP in a.m., hypokalemia, check BMP in a.m, may require alteration of hydrochlorothiazide dosing and supplementation, postoperative pain being managed with tizanidine, discuss side effects with patient.    Jose White D.O.

## 2020-03-05 NOTE — PROGRESS NOTES
Received shift report and assumed care of patient.  Patient awake, calm and stable, currently positioned shower with therapy; call light within reach.  Denies pain or discomfort at this time.  Will continue to monitor.

## 2020-03-06 LAB
ANION GAP SERPL CALC-SCNC: 8 MMOL/L (ref 0–11.9)
BUN SERPL-MCNC: 30 MG/DL (ref 8–22)
CALCIUM SERPL-MCNC: 9.2 MG/DL (ref 8.5–10.5)
CHLORIDE SERPL-SCNC: 94 MMOL/L (ref 96–112)
CO2 SERPL-SCNC: 29 MMOL/L (ref 20–33)
CREAT SERPL-MCNC: 0.91 MG/DL (ref 0.5–1.4)
GLUCOSE SERPL-MCNC: 126 MG/DL (ref 65–99)
POTASSIUM SERPL-SCNC: 3.7 MMOL/L (ref 3.6–5.5)
SODIUM SERPL-SCNC: 131 MMOL/L (ref 135–145)

## 2020-03-06 PROCEDURE — 770010 HCHG ROOM/CARE - REHAB SEMI PRIVAT*

## 2020-03-06 PROCEDURE — 36415 COLL VENOUS BLD VENIPUNCTURE: CPT

## 2020-03-06 PROCEDURE — 97116 GAIT TRAINING THERAPY: CPT | Mod: CQ

## 2020-03-06 PROCEDURE — 97530 THERAPEUTIC ACTIVITIES: CPT | Mod: CQ

## 2020-03-06 PROCEDURE — 700102 HCHG RX REV CODE 250 W/ 637 OVERRIDE(OP): Performed by: PHYSICAL MEDICINE & REHABILITATION

## 2020-03-06 PROCEDURE — 99233 SBSQ HOSP IP/OBS HIGH 50: CPT | Performed by: PHYSICAL MEDICINE & REHABILITATION

## 2020-03-06 PROCEDURE — 80048 BASIC METABOLIC PNL TOTAL CA: CPT

## 2020-03-06 PROCEDURE — A9270 NON-COVERED ITEM OR SERVICE: HCPCS | Performed by: PHYSICAL MEDICINE & REHABILITATION

## 2020-03-06 PROCEDURE — 94760 N-INVAS EAR/PLS OXIMETRY 1: CPT

## 2020-03-06 PROCEDURE — 97110 THERAPEUTIC EXERCISES: CPT | Mod: CQ

## 2020-03-06 PROCEDURE — 700111 HCHG RX REV CODE 636 W/ 250 OVERRIDE (IP): Performed by: PHYSICAL MEDICINE & REHABILITATION

## 2020-03-06 PROCEDURE — 700112 HCHG RX REV CODE 229: Performed by: PHYSICAL MEDICINE & REHABILITATION

## 2020-03-06 PROCEDURE — 97110 THERAPEUTIC EXERCISES: CPT

## 2020-03-06 PROCEDURE — 97112 NEUROMUSCULAR REEDUCATION: CPT | Mod: CQ

## 2020-03-06 PROCEDURE — 97530 THERAPEUTIC ACTIVITIES: CPT

## 2020-03-06 RX ORDER — CLOPIDOGREL BISULFATE 75 MG/1
75 TABLET ORAL DAILY
Status: DISCONTINUED | OUTPATIENT
Start: 2020-03-08 | End: 2020-03-16 | Stop reason: HOSPADM

## 2020-03-06 RX ORDER — GABAPENTIN 400 MG/1
400 CAPSULE ORAL 2 TIMES DAILY
Status: DISCONTINUED | OUTPATIENT
Start: 2020-03-06 | End: 2020-03-09

## 2020-03-06 RX ADMIN — TIZANIDINE 2 MG: 4 TABLET ORAL at 20:41

## 2020-03-06 RX ADMIN — CALCIUM CARBONATE (ANTACID) CHEW TAB 500 MG 500 MG: 500 CHEW TAB at 08:37

## 2020-03-06 RX ADMIN — GABAPENTIN 200 MG: 100 CAPSULE ORAL at 08:37

## 2020-03-06 RX ADMIN — HYDROCHLOROTHIAZIDE 25 MG: 25 TABLET ORAL at 04:57

## 2020-03-06 RX ADMIN — ACETAMINOPHEN 1000 MG: 500 TABLET, FILM COATED ORAL at 20:40

## 2020-03-06 RX ADMIN — CARVEDILOL 6.25 MG: 3.12 TABLET, FILM COATED ORAL at 08:37

## 2020-03-06 RX ADMIN — ACETAMINOPHEN 1000 MG: 500 TABLET, FILM COATED ORAL at 14:58

## 2020-03-06 RX ADMIN — TIZANIDINE 2 MG: 4 TABLET ORAL at 14:58

## 2020-03-06 RX ADMIN — LOSARTAN POTASSIUM 100 MG: 25 TABLET, FILM COATED ORAL at 04:57

## 2020-03-06 RX ADMIN — ENOXAPARIN SODIUM 40 MG: 100 INJECTION SUBCUTANEOUS at 20:43

## 2020-03-06 RX ADMIN — CALCIUM CARBONATE (ANTACID) CHEW TAB 500 MG 500 MG: 500 CHEW TAB at 20:42

## 2020-03-06 RX ADMIN — DOCUSATE SODIUM 200 MG: 100 CAPSULE, LIQUID FILLED ORAL at 08:38

## 2020-03-06 RX ADMIN — OXYCODONE HYDROCHLORIDE AND ACETAMINOPHEN 1000 MG: 500 TABLET ORAL at 08:36

## 2020-03-06 RX ADMIN — ACETAMINOPHEN 1000 MG: 500 TABLET, FILM COATED ORAL at 08:37

## 2020-03-06 RX ADMIN — GABAPENTIN 400 MG: 400 CAPSULE ORAL at 20:40

## 2020-03-06 RX ADMIN — CARVEDILOL 6.25 MG: 3.12 TABLET, FILM COATED ORAL at 17:56

## 2020-03-06 RX ADMIN — TIZANIDINE 2 MG: 4 TABLET ORAL at 08:37

## 2020-03-06 ASSESSMENT — FIBROSIS 4 INDEX
FIB4 SCORE: 1.31
FIB4 SCORE: 1.31

## 2020-03-06 NOTE — THERAPY
Physical Therapy   Daily Treatment     Patient Name: Krystle Tavarez  Age:  86 y.o., Sex:  female  Medical Record #: [id number]  Today's Date: 3/6/2020     Precautions  Precautions: Fall Risk, Spinal / Back Precautions     Subjective    Pt resting in bed upon arrival-she was agreeable to PT     Objective       03/06/20 1301   Sitting Lower Body Exercises   Other Exercises seated LE motomed    Bed Mobility    Supine to Sit Stand by Assist   Sit to Stand Stand by Assist   Scooting Stand by Assist   Interdisciplinary Plan of Care Collaboration   IDT Collaboration with  Certified Nursing Assistant   Patient Position at End of Therapy Seated;Self Releasing Lap Belt Applied;Call Light within Reach   Collaboration Comments checking vitals   PT Total Time Spent   PT Individual Total Time Spent (Mins) 30   PT Charge Group   PT Therapeutic Exercise 1   PT Therapeutic Activities 1   Supervising Physical Therapist Crystal Sanches     Supine > sit in flat hospital bed-with SBA and verbal cuing for log roll technique and good adherence to back precautions    The patient completed 12 minutes on motomed L2 resistance with short rest break.    Assessment    The patient demos improvement in both bed mobility and transfers with FWW. She is able to adhere to spinal precautions with cues    Plan    STS transfers, LE strengthening, gait with FWW, postural re-ed, initiate core stabs, cont edu on spinal prev

## 2020-03-06 NOTE — THERAPY
"Occupational Therapy  Daily Treatment     Patient Name: Krystle Tavarez  Age:  86 y.o., Sex:  female  Medical Record #: [id number]  Today's Date: 3/6/2020     Precautions  Precautions: (P) Fall Risk, Spinal / Back Precautions          Subjective    \"I have no pain!\"      Objective       03/06/20 1031   Precautions   Precautions Fall Risk;Spinal / Back Precautions    Pain   Intervention Declines   Pain 0 - 10 Group   Pain Rating Scale (NPRS) 0   Therapist Pain Assessment Post Activity Pain Same as Prior to Activity   Cognition    Level of Consciousness Alert   Sitting Upper Body Exercises   Upper Extremity Bike Level 1 Resistance   Comments motomed for 10:18min, completed 1.54 miles, 51 rpm going forward/backward with 2 long rest breaks given throughout at the 3 min and 7.5 min leelee.    Interdisciplinary Plan of Care Collaboration   Patient Position at End of Therapy Seated;Self Releasing Lap Belt Applied;Call Light within Reach;Tray Table within Reach;Phone within Reach   OT Total Time Spent   OT Individual Total Time Spent (Mins) 30   OT Charge Group   OT Therapeutic Exercise  2       FIM Wheelchair Score:  2 - Max Assistance  Wheelchair Description:  Extra time, Verbal cueing(wheeled self ~50 ft from therapy gym to part of the way back to room with increased time and cues for w/c negotiation using BUE )      Assessment    Pt tolerated session fair. Pt completed 10 min on arm bike with increased time for rest breaks- 2 taken 2/2 fatigue. Pt completed w/c mobility from gym to part way to room using BUE and increased time ~50 ft. Pt limited by decreased activity tolerance/strength in BUE this session.     Plan    Occupational therapy to address decreased functional strength, endurance, standing balance and education for spinal/back precautions/use of AE and DME/environmental and safety awareness.          "

## 2020-03-06 NOTE — DISCHARGE PLANNING
CASE MANAGEMENT INITIAL ASSESSMENT    Admit Date:  3/4/2020     I met with pt to discuss role of case management / discharge planning / team conference.  She is pleasant and cooperative proving me with information for assessment.   Patient is a  86 y.o. female transferred from [facility] where she was hospitalized from   S/p  L4-L5 laminectomy on 2/25 by Dr. Kimble.     Accepting MD is .    Diagnosis: 04.111paraplegia, incomplete  Acute incomplete paraplegia (HCC)    Co-morbidities:   Patient Active Problem List    Diagnosis Date Noted   • Post-op pain 02/29/2020     Priority: High   • PAF (paroxysmal atrial fibrillation) (HCC) 02/05/2020     Priority: High   • Cardiac pacemaker in situ 07/09/2019     Priority: High   • concern for right lower extremity DVT 03/03/2020     Priority: Medium   • History of TIA (transient ischemic attack) 08/13/2019     Priority: Medium   • Essential hypertension, benign 07/09/2019     Priority: Medium   • Dyslipidemia 07/09/2019     Priority: Medium   • Localized edema 07/09/2019     Priority: Medium   • constipation 02/29/2020     Priority: Low   • Retinopathy 08/13/2019     Priority: Low   • Low vitamin D level 08/13/2019     Priority: Low   • Weakness of both lower extremities 08/13/2019     Priority: Low   • Compression fracture of lumbar vertebrae, non-traumatic, sequela 08/13/2019     Priority: Low   • Chronic midline low back pain with right-sided sciatica 07/09/2019     Priority: Low   • Acute incomplete paraplegia (HCC) 03/04/2020   • Neurogenic bowel 03/04/2020   • Neuropathic pain 03/04/2020   • Neurogenic bladder 03/04/2020   • Orthostatic hypotension 03/04/2020   • 'light-for-dates' infant with signs of fetal malnutrition 03/04/2020   • Type 2 diabetes mellitus without complication, without long-term current use of insulin (HCC) 01/16/2020   • Foraminal stenosis of lumbar region 11/18/2019   • Spinal stenosis of lumbar region with neurogenic claudication 11/18/2019   •  "Lumbar spondylosis 11/18/2019     Prior Living Situation:  Housing / Facility: 2 Story House(in [place]; 2 steps to enter w/ right railing.   Pt moved from [place] to [place] in May 2019 to live w/ dtr and son in law.    Lives with - Patient's Self Care Capacity: Adult Children Dtr  Ana M Packer  and her  who are both retired.     Prior Level of Function:  Medication Management: Independent  Finances: Independent  Home Management: Independent  Shopping: Requires Assist(family drives )  Prior Level Of Mobility: Independent With Device in Community  Driving / Transportation: PT no longer drivers; family provides transportation.  Pt. Description Of Current ADL Function: I'm feeling so much better...no pain\".     Support Systems:  Primary : Ana M Packer    Other support systems: Son in law   Advance Directives: No  Power of  (Name & Phone): None    Previous Services Utilized:   Equipment Owned: Front-Wheel Walker, 4-Wheel Walker, Single Point Cane, Raised Toilet Seat With Arms, and shower chair  Prior Services: Home-Independent.  PT has used home health and out pt therapy when she lived in [place], but no services utilized in [place].     Other Information:  Occupation (Pre-Hospital Vocational): Retired Due To Age(Retied at age 62)   Primary Payor Source: Medicare A  Secondary Payor Source: ([facility])  Primary Care Practitioner : KYLIE Galvez   Other MDs: Dr. Kimble Neurosurgeon    Future Appointments   Date Time Provider Department Center   3/11/2020 11:00 AM JAYLEN Castelan [address]   3/13/2020  3:40 PM Du Sewell M.D. [facility] None     Patient / Family Goal:  Increase mobility; return home w/ support from family.    Additional Case Management Questions:  Have you ever received case management services for yourself or a family member?  Yes in [place]  Do you feel you have and an understanding of what services  " provide?  Yes  Do you have any additional questions regarding case management?    No      CASE MANAGEMENT PLAN OF CARE   Individualized Goals:   1. Return home w/ support from dtr and son in law.  2. Increase mobility.       Barriers:   1. Decreased mobility.         Plan:  1. Continue to follow patient through hospitalization and provide discharge planning in collaboration with patient, family, physicians and ancillary services.     2. Utilize community resources to ensure a safe discharge.

## 2020-03-06 NOTE — FLOWSHEET NOTE
03/05/20 1813   Vital Signs   Pulse 80   Respiration 18   Pulse Oximetry 94 %   $ Pulse Oximetry (Spot Check) Yes   Breath Sounds   RUL Breath Sounds Clear   RML Breath Sounds Diminished   RLL Breath Sounds Diminished   FARRUKH Breath Sounds Clear   LLL Breath Sounds Clear   Secretions   Cough Non Productive   Oxygen   O2 Delivery Device None - Room Air

## 2020-03-06 NOTE — THERAPY
Occupational Therapy   Initial Evaluation     Patient Name: Krystle Tavarez  Age:  86 y.o., Sex:  female  Medical Record #: [id number]  Today's Date: 3/5/2020     Subjective    Patient agreeable to participate in OT.      Objective     03/05/20 0701   Prior Living Situation   Prior Services Home-Independent;Other (Comments)  (except supervision for showers)   Housing / Facility 2 Story House  (1st floor set-up, full bath on first floor)   Steps Into Home 2   Bathroom Set up Walk In Shower;Grab Bars;Shower Chair;Shower Glass Doors   Equipment Owned Single Point Cane;Tub / Shower Seat;Grab Bar(s) In Tub / Shower   Lives with - Patient's Self Care Capacity Adult Children  (daughter and son-in-law)   Prior Level of ADL Function   Self Feeding Independent   Grooming / Hygiene Independent   Bathing Other (Comments)  (Daughter supervised/wiped down shower after use)   Dressing Independent   Toileting Independent   Prior Level of IADL Function   Medication Management Independent   Laundry Independent   Kitchen Mobility Independent   Finances Independent   Home Management Independent   Shopping Requires Assist  (Daughter typically does grocery shopping)   Prior Level Of Mobility Independent With Device in Community;Independent With Device in Home   Driving / Transportation Relatives / Others Provide Transportation  (Patient reports she gave up driving at 85)   Occupation (Pre-Hospital Vocational) Retired Due To Age  (worked in retail, retired 20 years ago)   Leisure Interests Reading;Other (Comments)  (enjoys going to beauty shop weekly, plays cards/scrabble)   IRF-RAUL:  Prior Functioning: Everyday Activities   Self Care Independent   Indoor Mobility (Ambulation) Independent   Stairs Independent   Functional Cognition Independent   Prior Device Use None of the given options  (SPC)   Cognition    Cognition / Consciousness WDL   Orientation Level Oriented x 4   Level of Consciousness Alert   IRF-RAUL:  Cognitive Pattern  "Assessment   Cognitive Pattern Assessment Used BIMS   IRF-RAUL:  Brief Interview for Mental Status (BIMS)   Repetition of Three Words (First Attempt) 3   Temporal Orientation: Able to Report the Correct Year Correct   Temporal Orientation: Able to Report the Correct Month Accurate within 5 days   Temporal Orientation: Able to Report the Correct Day of the Week Correct   Able to Recall \"Sock\" Yes, no cue required   Able to Recall \"Blue\" Yes, no cue required   Able to Recall \"Bed\" Yes, no cue required   BIMS Summary Score 15   Vision Screen   Vision Not tested  (No vision changes reported, wears bifocals )   Passive ROM Upper Body   Passive ROM Upper Body WDL   Active ROM Upper Body   Active ROM Upper Body  X   Dominant Hand Left   Comments AROM shoulder flexion ~ degrees (premorbid, athritis)    Strength Upper Body   Upper Body Strength  X   Comments decreased R shoulder strength,  otherwise strength grossly WFL    Sensation Upper Body   Upper Extremity Sensation  WDL   Upper Body Muscle Tone   Upper Body Muscle Tone  WDL   Coordination Upper Body   Coordination WDL   Comments Coordination appears intact based on functional observation    IRF-RAUL:  Eating   Assistance Needed Independent   CARE Score 6   Discharge Goal:  Assistance Needed Independent   Discharge Goal:  Score 6   IRF-RAUL:  Oral Hygiene   Assistance Needed Independent   CARE Score 6   Discharge Goal:  Assistance Needed Independent   Discharge Goal:  Score 6   IRF-RAUL:  Shower/Bathe Self   Assistance Needed Physical assistance   Physical Assistance Level Less than 25%   CARE Score 3   Discharge Goal:  Assistance Needed Supervision   Discharge Goal Score 4   IRF-RAUL:  Upper Body Dressing   Assistance Needed Set-up / clean-up   CARE Score 5   Discharge Goal:  Assistance Needed Independent   Dischage Goal:  Score 6   IRF-RAUL:  Lower Body Dressing   Assistance Needed Physical assistance   Physical Assistance Level Less than 25%   CARE Score 3 "   Discharge Goal:  Assistance Needed Independent   Discharge Goal:  Score 6   IRF RAUL:  Putting On/Taking Off Footwear   Assistance Needed Set-up / clean-up;Supervision   CARE Score 4   Discharge Goal:  Assistance Needed Independent   Discharge Goal:  Score 6   IRF-RAUL:  Toileting Hygiene   Assistance Needed Physical assistance   Physical Assistance Level Less than 25%   CARE Score 3   Discharge Goal:  Assistance Needed Independent   Discharge Goal:  Score 6   IRF-RAUL:  Toilet Transfer   Assistance Needed Physical assistance   Physical Assistance Level Less than 25%   CARE Score 3   Discharge Goal:  Assistance Needed Independent   Discahrge Goal:  Score 6   IRF-RAUL:  Hearing, Speech, and Vision   Expression of Ideas and Wants 4   Understanding Verbal and Non-Verbal Content 4   Problem List   Problem List Decreased Active Daily Living Skills;Decreased Homemaking Skills;Decreased Upper Extremity Strength Right;Decreased Upper Extremity AROM Right;Decreased Functional Mobility;Decreased Activity Tolerance;Impaired Posture / Trunk Alignment;Impaired Postural Control / Balance;Limited Knowledge of Post Op Precautions   Precautions   Precautions Fall Risk;Spinal / Back Precautions    Current Discharge Plan   Current Discharge Plan Return to Prior Living Situation   Benefit    Therapy Benefit Patient Would Benefit from Inpatient Rehab Occupational Therapy to Maximize Muse with ADLs, IADLs and Functional Mobility.   Interdisciplinary Plan of Care Collaboration   IDT Collaboration with  Nursing   Patient Position at End of Therapy Seated;Self Releasing Lap Belt Applied  (in bathroom completing grooming routine )   Collaboration Comments RN assessed incision site; notified RN at end of session re: burning sensation in abdominal region   Equipment Needs   Assistive Device / DME Shower Chair;Wheelchair;Grab Bars In Shower / Tub;Grab Bars By Toilet   Adaptive Equipment Other (Comments)  (TBA)   OT Total Time Spent   OT  Individual Total Time Spent (Mins) 60   OT Charge Group   OT Evaluation OT Evaluation Mod     FIM Eating Score:  7 - Independent  Eating Description:       FIM Grooming Score:  7 - Independent  Grooming Description:       FIM Bathing Score:  4 - Minimal Assistance  Bathing Description:  Grab bar, Hand held shower, Increased time, Supervision for safety, Set-up of equipment(Min assist for standing balance and safety )    FIM Upper Body Dressin - Standby Prompting/Supervision or Set-up  Upper Body Dressing Description:  Set-up of equipment(Set-up for donning long sleeve shirt while seated)    FIM Lower Body Dressing Score:  4 - Minimal Assistance  Lower Body Dressing Description:  Increased time, Supervision for safety, Set-up of equipment(Patient donned elastic waist pants with min assist for standing balance and safety. )    FIM Tub/Shower Transfers Score:  4 - Minimal Assistance  Tub/Shower Transfers Description:  Grab bar, Increased time(Min assist for stand step txfr wc<>fold-down shower bench (for balance and safety))      Assessment  Patient is 86 y.o. female with a diagnosis of status post L4-L5 Laminactomy  Additional factors influencing patient status / progress (ie: cognitive factors, co-morbidities, social support, etc): Patient highly motivated to participate in therapy and improve overall function. Per H&P, past medical history of paroxysmal atrial fibrillation, right total hip, GERD, hypertension, hyperlipidemia, borderline diabetes, plantar fasciitis  Overall mod I PLOF (except reports daughter provided supervision for showers. Patient reports her goal is to be able to attend [employer] and resume shopping activities.     Plan  Recommend Occupational Therapy  minutes per day 5-7 days per week for 7 days for the following treatments:  OT Self Care/ADL, OT Community Reintegration, OT Manual Ther Technique, OT Neuro Re-Ed/Balance, OT Sensory Int Techniques, OT Therapeutic Activity, OT Evaluation  and OT Therapeutic Exercise.    Goals:  Long term and short term goals have been discussed with patient and they are in agreement.    Occupational Therapy Goals     Problem: Bathing     Dates: Start: 03/05/20       Description:     Goal: STG-Within one week, patient will bathe     Dates: Start: 03/05/20       Description: 1) Individualized Goal:  SBA with grab bars and AE as needed   2) Interventions:  OT Group Therapy, OT Self Care/ADL, OT Community Reintegration, OT Manual Ther Technique, OT Neuro Re-Ed/Balance, OT Sensory Int Techniques, OT Therapeutic Activity, OT Evaluation and OT Therapeutic Exercise                   Problem: Dressing     Dates: Start: 03/05/20       Description:     Goal: STG-Within one week, patient will dress LB     Dates: Start: 03/05/20       Description: 1) Individualized Goal:  SBA with grab bars and AE as needed   2) Interventions:  OT Group Therapy, OT Self Care/ADL, OT Community Reintegration, OT Manual Ther Technique, OT Neuro Re-Ed/Balance, OT Sensory Int Techniques, OT Therapeutic Activity, OT Evaluation and OT Therapeutic Exercise                 Problem: OT Long Term Goals     Dates: Start: 03/05/20       Description:     Goal: LTG-By discharge, patient will complete basic self care tasks     Dates: Start: 03/05/20       Description: 1) Individualized Goal:  Sup to Mod I (Sup for bathing)     2) Interventions:  OT Group Therapy, OT Self Care/ADL, OT Community Reintegration, OT Manual Ther Technique, OT Neuro Re-Ed/Balance, OT Sensory Int Techniques, OT Therapeutic Activity, OT Evaluation and OT Therapeutic Exercise              Goal: LTG-By discharge, patient will perform bathroom transfers     Dates: Start: 03/05/20       Description: 1) Individualized Goal:  Mod I    2) Interventions:  OT Group Therapy, OT Self Care/ADL, OT Community Reintegration, OT Manual Ther Technique, OT Neuro Re-Ed/Balance, OT Sensory Int Techniques, OT Therapeutic Activity, OT Evaluation and OT  Therapeutic Exercise              Goal: LTG-By discharge, patient will complete basic home management     Dates: Start: 03/05/20       Description: 1) Individualized Goal:  Supervision   2) Interventions:  OT Group Therapy, OT Self Care/ADL, OT Community Reintegration, OT Manual Ther Technique, OT Neuro Re-Ed/Balance, OT Sensory Int Techniques, OT Therapeutic Activity, OT Evaluation and OT Therapeutic Exercise

## 2020-03-06 NOTE — THERAPY
"Occupational Therapy  Daily Treatment     Patient Name: Krystle Tavarez  Age:  86 y.o., Sex:  female  Medical Record #: [id number]  Today's Date: 3/6/2020     Precautions  Precautions: (P) Fall Risk, Spinal / Back Precautions          Subjective    \"I moved here to be closer to my daughter so she could take care of me, it's like living in a 5 star hotel!\"     \"I really want to be able to do things for myself and be as independent as I possibly can be.\"      Objective       03/06/20 1401   Precautions   Precautions Fall Risk;Spinal / Back Precautions    Pain   Intervention Declines   Pain 0 - 10 Group   Pain Rating Scale (NPRS) 0   Therapist Pain Assessment Post Activity Pain Same as Prior to Activity   Cognition    Level of Consciousness Alert   Sitting Upper Body Exercises   Lat Pull 3 sets of 10;Bilateral;Weight (See Comments for lbs)  (15 lbs)   Bilateral Row 3 sets of 10;Bilateral;Weight (See Comments for lbs)  (15 lbs)   Tricep Press 3 sets of 15;Bilateral;Weight (See Comments for lbs)  (forward and 5 sets x 10 backwards w/ 15 lbs)   Balance   Standing Balance (Static) Fair -   Standing Balance (Dynamic) Poor +   Comments Pt engaged in dynamic standing balance task at raised mat with pipe tree PVC pattern. Pt given difficult pattern to recreate- required min cues throughout for finding correct pieces from the correct bin. Pt required CGA for side stepping back and forth across mat with bins placed at either end. Pt requried rest break after ~6 minutes side stepping back and forth- cues needed for erect posture- pt leaning forward throughout. Long rest break taken, CGA For STS from w/c and completed pipe tree pattern in standing with bins next to her tolerated standing ~5 minutes prior to fatigue.    Bed Mobility    Sit to Supine Contact Guard Assist   Scooting Stand by Assist   Comments CGA for Stand pivot xfer from w/c <> EOB with use of bed rail   Interdisciplinary Plan of Care Collaboration   Patient Position " at End of Therapy In Bed;Bed Alarm On;Call Light within Reach;Tray Table within Reach;Phone within Reach   OT Total Time Spent   OT Individual Total Time Spent (Mins) 60   OT Charge Group   OT Therapy Activity 2   OT Therapeutic Exercise  2     Assessment    Pt tolerated session well. Pt very motivated to get stronger and completed various UB strengthening exercises while seated in w/c with short rest breaks between sets. Cues needed for pt to slow down. Pt only tolerated ~15 lbs for each exercise this session. Pt engaged in dynamic standing balance tasks at raised mat, pt only able to tolerate ~6-7 minutes standing at a time prior to fatigue.     Plan    Occupational therapy to address decreased functional strength, endurance, standing balance and education for spinal/back precautions/use of AE and DME/environmental and safety awareness.

## 2020-03-06 NOTE — CARE PLAN
Problem: Safety  Goal: Will remain free from injury  Outcome: PROGRESSING AS EXPECTED  Note: Patient demonstrates good safety technique this shift.  Asks for assistance when needed and does not attempt self transfer.  Able to verbalize needs.  Will continue to monitor.      Problem: Infection  Goal: Will remain free from infection  Outcome: PROGRESSING AS EXPECTED  Note: Patient remains free from s/s infection; afebrile.  Pt has incision on lower back, well approximated and with steri strips and MARCO ANTONIO.

## 2020-03-06 NOTE — CARE PLAN
Problem: Communication  Goal: The ability to communicate needs accurately and effectively will improve  Outcome: PROGRESSING AS EXPECTED     Problem: Safety  Goal: Will remain free from injury  Outcome: PROGRESSING AS EXPECTED     Problem: Venous Thromboembolism (VTW)/Deep Vein Thrombosis (DVT) Prevention:  Goal: Patient will participate in Venous Thrombosis (VTE)/Deep Vein Thrombosis (DVT)Prevention Measures  Outcome: PROGRESSING AS EXPECTED     Problem: Bowel/Gastric:  Goal: Normal bowel function is maintained or improved  Outcome: PROGRESSING AS EXPECTED     Problem: Pain Management  Goal: Pain level will decrease to patient's comfort goal  Outcome: PROGRESSING AS EXPECTED  Intervention: Follow pain managment plan developed in collaboration with patient and Interdisciplinary Team  Note: States pain managed w/scheduled meds at this time.

## 2020-03-06 NOTE — PROGRESS NOTES
"Rehab Progress Note     Encounter Date: 3/6/2020    CC: low back pain, weakness in LE    Interval Events (Subjective)    She reports tingling and burning sensation with touching of anterior thigh and medial knee, L2, L3 dermatome bilaterally, no significant change from yesterday with initiation of gabapentin.  Worse with putting on pants, makes working with therapy difficult because of light pressure to this area.  This is causing her pain.  Pain is intermittent with stimulation.  Not better or worse in the morning or night.  Other associated symptoms  She denies any increased fatigue or fogginess associated with gabapentin        Last BM: 03/05/20  Bladder:  cc, 80 cc    ROS:  Gen: No fatigue, confusion, significant weight loss  Eyes: no blurry vision, double vision or pain in eyes  ENT: no changes in hearing, runny nose, nose bleeds, sinus pain  CV: No CP, palpitations  Lungs: no shortness of breath, changes in secretions, changes in cough, pain with coughing  Abd: No abd pain, nausea, vomiting, pain with eating  : no blood in urine, suprapubic pain  Ext: No swelling in legs, asymmetric atrophy  Neuro: no changes in strength or sensation  Skin: no new ulcers/skin breakdown appreciated by patient  Mood: No changes in mood today, increase in depression or anxiety  Heme: no bruising, or bleeding  Rest of 14 point review of systems is negative    Objective:  Vitals: /60   Pulse 72   Temp 36.7 °C (98.1 °F) (Temporal)   Resp 16   Ht 1.6 m (5' 2.99\")   Wt 74.6 kg (164 lb 7.4 oz)   SpO2 94%   Gen: NAD, Body mass index is 29.14 kg/m².  HEENT: NC/AT, PERRLA, moist mucous membranes  Cardio: RRR, no mumurs  Pulm: CTAB, with normal respiratory effort  Abd: Soft NTND, active bowel sounds,  Ext: No peripheral edema. No calf tenderness. No clubbing/cyanosis. +dorsal pedalis pulses bilaterally.  Hyperesthesia over L2 and L3 dermatome bilateral thighs    Recent Results (from the past 72 hour(s))   CBC with " Differential    Collection Time: 03/05/20  5:09 AM   Result Value Ref Range    WBC 7.0 4.8 - 10.8 K/uL    RBC 3.77 (L) 4.20 - 5.40 M/uL    Hemoglobin 11.9 (L) 12.0 - 16.0 g/dL    Hematocrit 34.4 (L) 37.0 - 47.0 %    MCV 91.2 81.4 - 97.8 fL    MCH 31.6 27.0 - 33.0 pg    MCHC 34.6 33.6 - 35.0 g/dL    RDW 44.7 35.9 - 50.0 fL    Platelet Count 284 164 - 446 K/uL    MPV 9.5 9.0 - 12.9 fL    Neutrophils-Polys 52.70 44.00 - 72.00 %    Lymphocytes 33.60 22.00 - 41.00 %    Monocytes 8.10 0.00 - 13.40 %    Eosinophils 4.30 0.00 - 6.90 %    Basophils 0.40 0.00 - 1.80 %    Immature Granulocytes 0.90 0.00 - 0.90 %    Nucleated RBC 0.00 /100 WBC    Neutrophils (Absolute) 3.71 2.00 - 7.15 K/uL    Lymphs (Absolute) 2.36 1.00 - 4.80 K/uL    Monos (Absolute) 0.57 0.00 - 0.85 K/uL    Eos (Absolute) 0.30 0.00 - 0.51 K/uL    Baso (Absolute) 0.03 0.00 - 0.12 K/uL    Immature Granulocytes (abs) 0.06 0.00 - 0.11 K/uL    NRBC (Absolute) 0.00 K/uL   Comp Metabolic Panel (CMP)    Collection Time: 03/05/20  5:09 AM   Result Value Ref Range    Sodium 131 (L) 135 - 145 mmol/L    Potassium 3.5 (L) 3.6 - 5.5 mmol/L    Chloride 95 (L) 96 - 112 mmol/L    Co2 29 20 - 33 mmol/L    Anion Gap 7.0 0.0 - 11.9    Glucose 140 (H) 65 - 99 mg/dL    Bun 22 8 - 22 mg/dL    Creatinine 0.75 0.50 - 1.40 mg/dL    Calcium 9.0 8.5 - 10.5 mg/dL    AST(SGOT) 13 12 - 45 U/L    ALT(SGPT) 9 2 - 50 U/L    Alkaline Phosphatase 55 30 - 99 U/L    Total Bilirubin 0.7 0.1 - 1.5 mg/dL    Albumin 3.3 3.2 - 4.9 g/dL    Total Protein 6.3 6.0 - 8.2 g/dL    Globulin 3.0 1.9 - 3.5 g/dL    A-G Ratio 1.1 g/dL   HEMOGLOBIN A1C    Collection Time: 03/05/20  5:09 AM   Result Value Ref Range    Glycohemoglobin 7.1 (H) 0.0 - 5.6 %    Est Avg Glucose 157 mg/dL   Lipid Profile (Lipid Panel)    Collection Time: 03/05/20  5:09 AM   Result Value Ref Range    Cholesterol,Tot 173 100 - 199 mg/dL    Triglycerides 109 0 - 149 mg/dL    HDL 43 >=40 mg/dL     (H) <100 mg/dL   Magnesium     Collection Time: 03/05/20  5:09 AM   Result Value Ref Range    Magnesium 1.9 1.5 - 2.5 mg/dL   Phosphorus    Collection Time: 03/05/20  5:09 AM   Result Value Ref Range    Phosphorus 3.6 2.5 - 4.5 mg/dL   TSH with Reflex to FT4    Collection Time: 03/05/20  5:09 AM   Result Value Ref Range    TSH 1.680 0.380 - 5.330 uIU/mL   Vitamin D, 25-hydroxy (blood)    Collection Time: 03/05/20  5:09 AM   Result Value Ref Range    25-Hydroxy   Vitamin D 25 54 30 - 100 ng/mL   ESTIMATED GFR    Collection Time: 03/05/20  5:09 AM   Result Value Ref Range    GFR If African American >60 >60 mL/min/1.73 m 2    GFR If Non African American >60 >60 mL/min/1.73 m 2   Basic Metabolic Panel    Collection Time: 03/06/20  4:48 AM   Result Value Ref Range    Sodium 131 (L) 135 - 145 mmol/L    Potassium 3.7 3.6 - 5.5 mmol/L    Chloride 94 (L) 96 - 112 mmol/L    Co2 29 20 - 33 mmol/L    Glucose 126 (H) 65 - 99 mg/dL    Bun 30 (H) 8 - 22 mg/dL    Creatinine 0.91 0.50 - 1.40 mg/dL    Calcium 9.2 8.5 - 10.5 mg/dL    Anion Gap 8.0 0.0 - 11.9   ESTIMATED GFR    Collection Time: 03/06/20  4:48 AM   Result Value Ref Range    GFR If African American >60 >60 mL/min/1.73 m 2    GFR If Non African American 59 (A) >60 mL/min/1.73 m 2       Current Facility-Administered Medications   Medication Frequency   • gabapentin (NEURONTIN) capsule 200 mg BID   • Pharmacy Consult Request ...Pain Management Review 1 Each PHARMACY TO DOSE   • Respiratory Therapy Consult Continuous RT   • acetaminophen (TYLENOL) tablet 1,000 mg TID   • oxyCODONE immediate-release (ROXICODONE) tablet 5 mg Q3HRS PRN   • oxyCODONE immediate release (ROXICODONE) tablet 10 mg Q3HRS PRN   • acetaminophen (TYLENOL) tablet 650 mg Q4HRS PRN   • enoxaparin (LOVENOX) inj 40 mg QHS   • docusate sodium (COLACE) capsule 200 mg BID    And   • sennosides (SENOKOT) 8.6 MG tablet 17.2 mg DAILY AT NOON    And   • magnesium hydroxide (MILK OF MAGNESIA) suspension 30 mL QDAY PRN   • artificial tears  ophthalmic solution 1 Drop PRN   • benzocaine-menthol (CEPACOL) lozenge 1 Lozenge Q2HRS PRN   • mag hydrox-al hydrox-simeth (MAALOX PLUS ES or MYLANTA DS) suspension 20 mL Q2HRS PRN   • ondansetron (ZOFRAN ODT) dispertab 4 mg 4X/DAY PRN    Or   • ondansetron (ZOFRAN) syringe/vial injection 4 mg 4X/DAY PRN   • sodium chloride (OCEAN) 0.65 % nasal spray 2 Spray PRN   • traZODone (DESYREL) tablet 50 mg QHS PRN   • ascorbic acid tablet 1,000 mg DAILY   • carvedilol (COREG) tablet 6.25 mg BID WITH MEALS   • hydroCHLOROthiazide (HYDRODIURIL) tablet 25 mg Q DAY   • losartan (COZAAR) tablet 100 mg Q DAY   • polyethylene glycol/lytes (MIRALAX) PACKET 1 Packet DAILY   • tizanidine (ZANAFLEX) tablet 2 mg TID   • calcium carbonate (TUMS) chewable tab 500 mg BID       Orders Placed This Encounter   Procedures   • Diet Order Regular     Standing Status:   Standing     Number of Occurrences:   1     Order Specific Question:   Diet:     Answer:   Regular [1]       Assessment:  Active Hospital Problems    Diagnosis   • *Acute incomplete paraplegia (HCC)   • Post-op pain   • PAF (paroxysmal atrial fibrillation) ([facility])   • Cardiac pacemaker in situ   • History of TIA (transient ischemic attack)   • Dyslipidemia   • Essential hypertension, benign   • Low vitamin D level   • Retinopathy   • Neurogenic bowel   • Neuropathic pain   • Neurogenic bladder   • Orthostatic hypotension   • 'light-for-dates' infant with signs of fetal malnutrition   • Type 2 diabetes mellitus without complication, without long-term current use of insulin ([facility])       Medical Decision Making and Plan:    L2 AIS D spinal cord injury: L4-L5 laminectomy by Dr. Kimble on 2/25 to address lumbar stenosis with neurogenic claudication  -No need for brace at this time  -We will need to follow-up with Dr. Kimble as outpatient  -Continue comprehensive acute inpatient rehabilitation     Hyponatremia: Sodium on admission of 131  - Check BMP on monday, may be secondary to  hydrochlorothiazide.  Consult hospitalist  - P.o. fluid restriction of 1.8 L of free water per day    Hypokalemia: Potassium of 3.5, significant decrease from 3/2  -Check BMP on Monday    Neurogenic bowel: Discussed importance of daily bowel program  - KUB 3/4 showed no significant stool in the ascending or transverse colon  -Senna 2 tablets q. noon, Colace 200 mg twice daily, MiraLAX daily    Neurogenic bladder: History of worsening urinary incontinence  - voiding trial, if can't void in 6 hours or prn check pvr and if >500cc then ICP,if able to void then check PVR, if PVR is >200cc then ICP      orthostatic hypotension: Worsened by administration of tizanidine 2 mg 3 times daily  -Check postural blood pressures, goal systolic blood pressure greater than 100 to maintain good perfusion and improve neurologic recovery     Hypertension:SBP max of 150  -Coreg 6.25 mg twice daily  -Hydrochlorothiazide 25 mg daily  -Losartan 100 mg daily     Paroxysmal atrial fibrillation: Plavix was held for spine surgery, cardiology recommending restarting approximately 12 days postop  - Restart Plavix on 3/8     Pain:  #Neuropathic pain: Hyperalgesia over L3 dermatome  -Increase gabapentin to 400 mg twice daily     #Postoperative pain:  - Tizanidine 2 mg 3 times daily, monitor for orthostatic hypotension, side effects may include fulminant liver failure, AST of 13 ALT of 9 on admission, should be checked in 2 weeks, 2 months, and every 6 months as long as patient is on tizanidine  - Tylenol 1000 mg 3 times daily.  -Oxycodone 5-10 mg every 3 hours as needed pain     DVT prophylaxis  -Lovenox 40 mg daily     Vitamin D deficiency:Vitamin D level 54 on admission, goal of greater than 30  - DC cholecalciferol, no need for supplementation at this time    Patient was seen for 36 minutes on unit/floor of which > 50% of time was spent on counseling and coordination of care regarding the above, including prognosis, risk reduction, benefits of  treatment, and options for next stage of care including neuropathic pain, increase dose of gabapentin to 400 mg 2 times a day, high risk medication, discussed side effects, hyponatremia, sodium of 131, consult hospitalist, discussed with hospitalist, may be secondary to blood pressure medication.    Jose White D.O.

## 2020-03-06 NOTE — FLOWSHEET NOTE
03/06/20 0821   Events/Summary/Plan   Events/Summary/Plan IS, SpO2 check   Vital Signs   Pulse 72   Respiration 16   Pulse Oximetry 94 %   $ Pulse Oximetry (Spot Check) Yes   Respiratory Assessment   Level of Consciousness Alert   Breath Sounds   RUL Breath Sounds Clear  (Sitting in wheelchair)   RML Breath Sounds Clear   RLL Breath Sounds Clear   FARRUKH Breath Sounds Clear   LLL Breath Sounds Clear   Secretions   Cough Non Productive   Oxygen   O2 Delivery Device None - Room Air

## 2020-03-06 NOTE — THERAPY
"Physical Therapy   Daily Treatment     Patient Name: Krystle Tavarez  Age:  86 y.o., Sex:  female  Medical Record #: [id number]  Today's Date: 3/6/2020     Precautions  Precautions: (P) Fall Risk, Spinal / Back Precautions     Subjective    Pt agreeable to PT \"I have confidence that you all are going to get me back to my old self\"     Objective       03/06/20 0831   Precautions   Precautions Fall Risk;Spinal / Back Precautions    Sitting Lower Body Exercises   Sitting Lower Body Exercises Yes   Ankle Pumps 2 sets of 10   Hip Abduction 2 sets of 10   Hip Adduction 2 sets of 10   Long Arc Quad 2 sets of 10   Marching 2 sets of 10   Hamstring Curl 2 sets of 10   Bed Mobility    Sit to Stand Contact Guard Assist  (initially Laury-improved to CG)   Neuro-Muscular Treatments   Neuro-Muscular Treatments Sequencing;Tactile Cuing;Verbal Cuing   Comments transfer training from STS > FWW with emphasis on sequencing/hand placement and anterior scooting 1 x 5 reps with good carry over   Interdisciplinary Plan of Care Collaboration   IDT Collaboration with  Nursing   Patient Position at End of Therapy Seated;Self Releasing Lap Belt Applied;Call Light within Reach   Collaboration Comments am meds   PT Total Time Spent   PT Individual Total Time Spent (Mins) 60   PT Charge Group   PT Gait Training 1   PT Therapeutic Exercise 1   PT Neuromuscular Re-Education / Balance 1   PT Therapeutic Activities 1   Supervising Physical Therapist Crystal Sanches       FIM Walking Score:  2 - Max Assistance  Walking Description:  Walker, Extra time, Requires incidental assist, Supervision for safety, Verbal cueing(110' x 3 FWW CGA)    Reviewed spinal precautions in relation to functional mobility and movements    Provided seated LE HEP    Assessment    Pt very pleasant and cooperative with tx session. She was receptive to all education and LE ex HO    Plan    Cont gait training, LE strength, coordination    "

## 2020-03-06 NOTE — FLOWSHEET NOTE
03/05/20 1813   Incentive Spirometry Treatment   Incentive Spirometer Volume 1500 mL  (Good effor and technique)

## 2020-03-07 PROBLEM — E87.1 HYPONATREMIA: Status: ACTIVE | Noted: 2020-03-07

## 2020-03-07 LAB — GLUCOSE BLD-MCNC: 126 MG/DL (ref 65–99)

## 2020-03-07 PROCEDURE — 700111 HCHG RX REV CODE 636 W/ 250 OVERRIDE (IP): Performed by: PHYSICAL MEDICINE & REHABILITATION

## 2020-03-07 PROCEDURE — 97110 THERAPEUTIC EXERCISES: CPT

## 2020-03-07 PROCEDURE — 82962 GLUCOSE BLOOD TEST: CPT | Mod: 91

## 2020-03-07 PROCEDURE — 97116 GAIT TRAINING THERAPY: CPT

## 2020-03-07 PROCEDURE — 97530 THERAPEUTIC ACTIVITIES: CPT

## 2020-03-07 PROCEDURE — 770010 HCHG ROOM/CARE - REHAB SEMI PRIVAT*

## 2020-03-07 PROCEDURE — 700105 HCHG RX REV CODE 258: Performed by: HOSPITALIST

## 2020-03-07 PROCEDURE — 700102 HCHG RX REV CODE 250 W/ 637 OVERRIDE(OP): Performed by: PHYSICAL MEDICINE & REHABILITATION

## 2020-03-07 PROCEDURE — 99222 1ST HOSP IP/OBS MODERATE 55: CPT | Performed by: HOSPITALIST

## 2020-03-07 PROCEDURE — 94760 N-INVAS EAR/PLS OXIMETRY 1: CPT

## 2020-03-07 PROCEDURE — A9270 NON-COVERED ITEM OR SERVICE: HCPCS | Performed by: PHYSICAL MEDICINE & REHABILITATION

## 2020-03-07 RX ORDER — SODIUM CHLORIDE 9 MG/ML
1000 INJECTION, SOLUTION INTRAVENOUS ONCE
Status: COMPLETED | OUTPATIENT
Start: 2020-03-07 | End: 2020-03-07

## 2020-03-07 RX ADMIN — CARVEDILOL 6.25 MG: 3.12 TABLET, FILM COATED ORAL at 17:49

## 2020-03-07 RX ADMIN — TIZANIDINE 2 MG: 4 TABLET ORAL at 14:11

## 2020-03-07 RX ADMIN — BENZOCAINE, MENTHOL 1 LOZENGE: 15; 3.6 LOZENGE ORAL at 05:09

## 2020-03-07 RX ADMIN — TIZANIDINE 2 MG: 4 TABLET ORAL at 08:51

## 2020-03-07 RX ADMIN — ACETAMINOPHEN 1000 MG: 500 TABLET, FILM COATED ORAL at 08:50

## 2020-03-07 RX ADMIN — OXYCODONE HYDROCHLORIDE AND ACETAMINOPHEN 1000 MG: 500 TABLET ORAL at 08:51

## 2020-03-07 RX ADMIN — ACETAMINOPHEN 1000 MG: 500 TABLET, FILM COATED ORAL at 20:31

## 2020-03-07 RX ADMIN — LOSARTAN POTASSIUM 100 MG: 25 TABLET, FILM COATED ORAL at 05:07

## 2020-03-07 RX ADMIN — GABAPENTIN 400 MG: 400 CAPSULE ORAL at 08:50

## 2020-03-07 RX ADMIN — SODIUM CHLORIDE 1000 ML: 9 INJECTION, SOLUTION INTRAVENOUS at 16:58

## 2020-03-07 RX ADMIN — HYDROCHLOROTHIAZIDE 25 MG: 25 TABLET ORAL at 05:08

## 2020-03-07 RX ADMIN — CARVEDILOL 6.25 MG: 3.12 TABLET, FILM COATED ORAL at 08:51

## 2020-03-07 RX ADMIN — CALCIUM CARBONATE (ANTACID) CHEW TAB 500 MG 500 MG: 500 CHEW TAB at 20:32

## 2020-03-07 RX ADMIN — ACETAMINOPHEN 1000 MG: 500 TABLET, FILM COATED ORAL at 14:11

## 2020-03-07 RX ADMIN — TIZANIDINE 2 MG: 4 TABLET ORAL at 20:32

## 2020-03-07 RX ADMIN — ENOXAPARIN SODIUM 40 MG: 100 INJECTION SUBCUTANEOUS at 20:33

## 2020-03-07 RX ADMIN — CALCIUM CARBONATE (ANTACID) CHEW TAB 500 MG 500 MG: 500 CHEW TAB at 08:49

## 2020-03-07 RX ADMIN — GABAPENTIN 400 MG: 400 CAPSULE ORAL at 20:32

## 2020-03-07 ASSESSMENT — ENCOUNTER SYMPTOMS
PALPITATIONS: 0
VOMITING: 0
BACK PAIN: 1
COUGH: 0
NAUSEA: 0
ABDOMINAL PAIN: 0
FEVER: 0
SHORTNESS OF BREATH: 0
POLYDIPSIA: 0
BRUISES/BLEEDS EASILY: 0
EYES NEGATIVE: 1
CHILLS: 0

## 2020-03-07 ASSESSMENT — 10 METER WALK TEST (10METWT)
AVERAGE TIME - SECONDS: 15.33
TRIAL 2: TIME TO WALK 10 METERS: 14
AVERAGE VELOCITY - METERS PER SECOND: .39
TRIAL 1: TIME TO WALK 10 METERS: 17
TRIAL 3: TIME TO WALK 10 METERS: 15

## 2020-03-07 ASSESSMENT — PATIENT HEALTH QUESTIONNAIRE - PHQ9
SUM OF ALL RESPONSES TO PHQ9 QUESTIONS 1 AND 2: 0
2. FEELING DOWN, DEPRESSED, IRRITABLE, OR HOPELESS: NOT AT ALL
1. LITTLE INTEREST OR PLEASURE IN DOING THINGS: NOT AT ALL
SUM OF ALL RESPONSES TO PHQ9 QUESTIONS 1 AND 2: 0
1. LITTLE INTEREST OR PLEASURE IN DOING THINGS: NOT AT ALL

## 2020-03-07 NOTE — THERAPY
"Occupational Therapy  Daily Treatment     Patient Name: Krystle Tavarez  Age:  86 y.o., Sex:  female  Medical Record #: [id number]  Today's Date: 3/7/2020     Precautions  Precautions: Fall Risk, Spinal / Back Precautions     Safety   ADL Safety : (P) Requires Physical Assist for Safety, Requires Supervision for Safety  Comments: (P) Pt not able to identify her precautions. OTR educated pt on spinal precautions using \"BLT\" .    Subjective    \"I was doing exercises with my cane at home.\"    \"I don't know why, but I just can't stand so well. I think I need to do more walking.\"     Objective       03/07/20 1001   Safety    ADL Safety  Requires Physical Assist for Safety;Requires Supervision for Safety   Comments Pt not able to identify her precautions. OTR educated pt on spinal precautions using \"BLT\" .   Pain 0 - 10 Group   Pain Rating Scale (NPRS) 0   Cognition    Level of Consciousness Alert   Sitting Upper Body Exercises   Front Arm Raise 1 set of 10  (2 lb on L; no weight for R)   Internal Shoulder Rotation 1 set of 10;Bilateral  (2 lb)   External Shoulder Rotation 1 set of 10;Bilateral  (2 lb)   Bicep Curls 1 set of 10;Bilateral  (2 lb)   Wrist Flexion / Extension 1 set of 10;Bilateral  (R side 1 lb; L side 2 lb)   Balance   Comments Performed 5 sit to stands varying from CGA to Mod A. Pt requiring prompts to put feet under knees and for using arm rests when sitting down. Pt with tendency to lean backwards limiting ability to perform planned standing activity. Pt required min A to stay standing with FWW.   Bed Mobility    Sit to Supine Moderate Assist   Interdisciplinary Plan of Care Collaboration   IDT Collaboration with  Physical Therapist   Patient Position at End of Therapy In Bed;Call Light within Reach;Tray Table within Reach   Collaboration Comments re: LOB backwards with standing activity   OT Total Time Spent   OT Individual Total Time Spent (Mins) 30   OT Charge Group   OT Therapeutic Exercise  2 "       FIM Bed/Chair/Wheelchair Transfers Score: 3 - Moderate Assistance  Bed/Chair/Wheelchair Transfers Description:  (w/c to bed using bed rail. Pt required min A to stand, CGA while pivoting, and assist for both limbs for sit to supine.)     FIM Comprehension Score:  5 - Stand-by Prompting/Supervision or Set-up  Comprehension Description:       FIM Expression Score:  6 - Modified Independent  Expression Description:       FIM Social Interaction Score:  7 - Independent  Social Interaction Description:       FIM Problem Solving Score:  5 - Standby Prompting/Supervision or Set-up  Problem Solving Description:       FIM Memory Score:  5 - Standby Prompting/Supervision or Set-up  Memory Description:         Assessment    Pt motivated for treatment, but limited by backward lean when standing at mat table. Pt unable to stand for more than a minute at a time and required min A to maintain balance. Performed seated BUE exercises. Pt more limited on R than L.    Plan    Occupational therapy to address decreased functional strength, endurance, standing balance and education for spinal/back precautions/use of AE and DME/environmental and safety awareness.

## 2020-03-07 NOTE — CONSULTS
HOSPITAL MEDICINE CONSULTATION    Requesting Physician:  Dr. White    Reason for Consult:  Atrial Fibrillation, Hypertension, Diabetes, Hyponatremia    History of Present Illness:  The patient is an 86-year-old  female with past medical history significant for paroxysmal atrial fibrillation status post permanent pacemaker (on Plavix), hypertension, and diabetes.  She also has symptomatic lumbar stenosis and underwent L5-S1 laminectomy with diskectomy on 2/25/20 by Dr. Kimble.  The patient was discharged to home but returned to the hospital on 2/29/20 due to pain and debility.  She was admitted for pain control and referred to acute rehab.  Due to her ongoing functional debility, the patient was transferred to [facility] on 3/4/20.  Hospital Medicine consultation is requested to assist in the management of this patient's AFib, HTN, DM, and hyponatremia.    Review of Systems:  Review of Systems   Constitutional: Negative for chills and fever.   HENT: Negative.    Eyes: Negative.    Respiratory: Negative for cough and shortness of breath.    Cardiovascular: Negative for chest pain and palpitations.   Gastrointestinal: Negative for abdominal pain, nausea and vomiting.   Musculoskeletal: Positive for back pain.        Wound pain   Skin: Negative for itching and rash.   Endo/Heme/Allergies: Negative for polydipsia. Does not bruise/bleed easily.   All other systems reviewed and are negative.      Allergies:  Allergies   Allergen Reactions   • Sulfa Drugs Nausea     Nausea        Medications:    Current Facility-Administered Medications:   •  NS infusion 1,000 mL, 1,000 mL, Intravenous, Once, Tonia August M.D.  •  insulin regular (HUMULIN R) injection 2-12 Units, 2-12 Units, Subcutaneous, 4X/DAY ACHS, Tonia August M.D.  •  [START ON 3/8/2020] clopidogrel (PLAVIX) tablet 75 mg, 75 mg, Oral, DAILY, Jose White D.O.  •  gabapentin (NEURONTIN) capsule 400 mg, 400 mg, Oral, BID, Jose White  D.O., 400 mg at 03/07/20 0850  •  Pharmacy Consult Request ...Pain Management Review 1 Each, 1 Each, Other, PHARMACY TO DOSE, Jose White D.O.  •  Respiratory Therapy Consult, , Nebulization, Continuous RT, Jose White D.O.  •  acetaminophen (TYLENOL) tablet 1,000 mg, 1,000 mg, Oral, TID, AMOS Olsen.O., 1,000 mg at 03/07/20 1411  •  oxyCODONE immediate-release (ROXICODONE) tablet 5 mg, 5 mg, Oral, Q3HRS PRN, STELLA OlsenO.  •  oxyCODONE immediate release (ROXICODONE) tablet 10 mg, 10 mg, Oral, Q3HRS PRN, AMOS Olsen.O.  •  acetaminophen (TYLENOL) tablet 650 mg, 650 mg, Oral, Q4HRS PRN, AMOS Olsen.O.  •  enoxaparin (LOVENOX) inj 40 mg, 40 mg, Subcutaneous, QHS, AMOS Olsen.O., 40 mg at 03/06/20 2043  •  docusate sodium (COLACE) capsule 200 mg, 200 mg, Oral, BID, 200 mg at 03/06/20 0838 **AND** sennosides (SENOKOT) 8.6 MG tablet 17.2 mg, 17.2 mg, Oral, DAILY AT NOON, 17.2 mg at 03/04/20 1439 **AND** [DISCONTINUED] bisacodyl (THE MAGIC BULLET) suppository 10 mg, 10 mg, Rectal, QDAY **AND** magnesium hydroxide (MILK OF MAGNESIA) suspension 30 mL, 30 mL, Oral, QDAY PRN, AMOS Olsen.O.  •  artificial tears ophthalmic solution 1 Drop, 1 Drop, Both Eyes, PRN, AMOS Olsen.O.  •  benzocaine-menthol (CEPACOL) lozenge 1 Lozenge, 1 Lozenge, Mouth/Throat, Q2HRS PRN, AMOS Olsen.O., 1 Lozenge at 03/07/20 0509  •  mag hydrox-al hydrox-simeth (MAALOX PLUS ES or MYLANTA DS) suspension 20 mL, 20 mL, Oral, Q2HRS PRN, Jose White D.O.  •  ondansetron (ZOFRAN ODT) dispertab 4 mg, 4 mg, Oral, 4X/DAY PRN **OR** ondansetron (ZOFRAN) syringe/vial injection 4 mg, 4 mg, Intramuscular, 4X/DAY PRN, AMOS Olsen.O.  •  sodium chloride (OCEAN) 0.65 % nasal spray 2 Spray, 2 Spray, Nasal, PRN, Jose White D.O.  •  traZODone (DESYREL) tablet 50 mg, 50 mg, Oral, QHS PRN, Jose White D.O.  •  ascorbic acid tablet 1,000 mg, 1,000 mg, Oral, DAILY,  Jose White, D.O., 1,000 mg at 03/07/20 0851  •  carvedilol (COREG) tablet 6.25 mg, 6.25 mg, Oral, BID WITH MEALS, Jose White D.O., 6.25 mg at 03/07/20 0851  •  losartan (COZAAR) tablet 100 mg, 100 mg, Oral, Q DAY, Jose White D.O., 100 mg at 03/07/20 0507  •  polyethylene glycol/lytes (MIRALAX) PACKET 1 Packet, 1 Packet, Oral, DAILY, Jose White D.O.  •  tizanidine (ZANAFLEX) tablet 2 mg, 2 mg, Oral, TID, Jose White D.O., 2 mg at 03/07/20 1411  •  calcium carbonate (TUMS) chewable tab 500 mg, 500 mg, Oral, BID, Jose White D.O., 500 mg at 03/07/20 0849    Past Medical/Surgical History:  Past Medical History:   Diagnosis Date   • Arthritis     knees, hips   • Breath shortness     with exertion    • Cataract     bilat IOL    • Dental disorder     full upper, partial lower    • Heart burn    • Hemorrhagic disorder (HCC)     on Plavix    • High cholesterol     diet controlled    • Hyperlipidemia    • Hypertension    • Pacemaker 2015   • Pain 08/2019    lower back, right leg   • Pre-diabetes    • TIA (transient ischemic attack)     on Plavix   • Urinary incontinence      Past Surgical History:   Procedure Laterality Date   • PB LAMINOTOMY,LUMBAR DISK,1 INTRSP N/A 2/25/2020    Procedure: LAMINECTOMY, SPINE, LUMBAR, WITH DISCECTOMY- L5-S1, MEDIAL FACETECTOMY;  Surgeon: Latrell Kimble M.D.;  Location: [facility];  Service: Neurosurgery   • FORAMINOTOMY N/A 2/25/2020    Procedure: FORAMINOTOMY, SPINE;  Surgeon: Latrell Kimble M.D.;  Location: [facility];  Service: Neurosurgery   • PACEMAKER INSERTION  2015   • HIP REPLACEMENT, TOTAL Right 2009   • KNEE REPLACEMENT, TOTAL Right 2004   • CATARACT EXTRACTION WITH IOL Bilateral 2003   • CHOLECYSTECTOMY  2002   • BASAL CELL EXCISION      nose and hand   • BUNIONECTOMY Left        Social History:  Social History     Socioeconomic History   • Marital status:      Spouse name: Not on file   • Number of children: Not on  file   • Years of education: Not on file   • Highest education level: Not on file   Occupational History   • Not on file   Social Needs   • Financial resource strain: Not on file   • Food insecurity     Worry: Not on file     Inability: Not on file   • Transportation needs     Medical: Not on file     Non-medical: Not on file   Tobacco Use   • Smoking status: Never Smoker   • Smokeless tobacco: Never Used   Substance and Sexual Activity   • Alcohol use: No   • Drug use: No   • Sexual activity: Not on file   Lifestyle   • Physical activity     Days per week: Not on file     Minutes per session: Not on file   • Stress: Not on file   Relationships   • Social connections     Talks on phone: Not on file     Gets together: Not on file     Attends [employer]ical service: Not on file     Active member of club or organization: Not on file     Attends meetings of clubs or organizations: Not on file     Relationship status: Not on file   • Intimate partner violence     Fear of current or ex partner: Not on file     Emotionally abused: Not on file     Physically abused: Not on file     Forced sexual activity: Not on file   Other Topics Concern   • Not on file   Social History Narrative   • Not on file       Family History  Family History   Problem Relation Age of Onset   • Diabetes Mother    • Stroke Mother    • Heart Attack Father 74   • No Known Problems Maternal Grandmother    • No Known Problems Maternal Grandfather    • No Known Problems Paternal Grandmother    • No Known Problems Paternal Grandfather        Physical Examination:   Vitals:    03/07/20 0500 03/07/20 0507 03/07/20 0700 03/07/20 1305   BP: 143/77 147/77 120/60    Pulse: 77  72 74   Resp:   18 18   Temp:   36.7 °C (98.1 °F)    TempSrc:   Temporal    SpO2:    (!) 83%   Weight:       Height:           Physical Exam   Constitutional: She is oriented to person, place, and time. No distress.   HENT:   Head: Normocephalic and atraumatic.   Right Ear: External ear normal.    Left Ear: External ear normal.   Eyes: Conjunctivae and EOM are normal. Right eye exhibits no discharge. Left eye exhibits no discharge.   Neck: Normal range of motion. Neck supple. No tracheal deviation present.   Cardiovascular: Normal rate and regular rhythm.   Pulmonary/Chest: Effort normal and breath sounds normal. No stridor. No respiratory distress. She has no wheezes.   Abdominal: Soft. Bowel sounds are normal. She exhibits no distension. There is no abdominal tenderness.   Musculoskeletal:         General: No tenderness or edema.   Neurological: She is alert and oriented to person, place, and time.   Skin: Skin is warm and dry. She is not diaphoretic.   Vitals reviewed.      Laboratory Data:  Recent Labs     03/05/20  0509   WBC 7.0   RBC 3.77*   HEMOGLOBIN 11.9*   HEMATOCRIT 34.4*   MCV 91.2   MCH 31.6   MCHC 34.6   RDW 44.7   PLATELETCT 284   MPV 9.5     Recent Labs     03/05/20  0509 03/06/20  0448   SODIUM 131* 131*   POTASSIUM 3.5* 3.7   CHLORIDE 95* 94*   CO2 29 29   GLUCOSE 140* 126*   BUN 22 30*   CREATININE 0.75 0.91   CALCIUM 9.0 9.2         Impressions/Recommendations:  Type 2 diabetes mellitus without complication, without long-term current use of insulin (HCC)  HbA1c 7.1  Start FSBS and SSI  Observe serum glucose trends    PAF (paroxysmal atrial fibrillation) (HCC)  No recent Echo in EPIC  Rate controlled on Coreg  On antiplatelet therapy w/ Plavix?  Outpt Cardiology F/U w/ Dr. Sewell    Essential hypertension, benign  On Coreg, HCTZ, and Losartan  Observe blood pressure trends    Hyponatremia  Suspect 2/2 HCTZ  Will discontinue diuretic and give NS x 1 liter (also for azotemia)  Follow electrolytes    Spinal stenosis of lumbar region with neurogenic claudication  S/P L5-S1 laminectomy w/ diskectomy on 2/25/20 by Dr. Kimble  Wound care and pain control    DNR    Thank you for the opportunity to assist in this patient's care.  We will continue to follow along with you.

## 2020-03-07 NOTE — THERAPY
"Occupational Therapy  Daily Treatment     Patient Name: Krystle Tavarez  Age:  86 y.o., Sex:  female  Medical Record #: [id number]  Today's Date: 3/7/2020     Precautions  Precautions: Fall Risk, Spinal / Back Precautions     Safety   ADL Safety : Requires Physical Assist for Safety, Requires Supervision for Safety  Comments: Pt not able to identify her precautions. OTR educated pt on spinal precautions using \"BLT\" .    Subjective    See collaboration section for information from daughter.    \"I am doing better. I'm trying to listen to the things you all have taught my like standing up straight, looking up, keeping my feet under me, and keeping my walker closer.\"    \"I want to go to [employer], shopping, and the gym. I didn't go to the gym before and now I'm here.\"     Objective       03/07/20 1301   Pain 0 - 10 Group   Location Leg   Location Orientation Right   Pain Rating Scale (NPRS) 5   Description Aching   Comfort Goal Comfort at Rest;Comfort with Movement;Perform Activity   Therapist Pain Assessment During Activity   Non Verbal Descriptors   Non Verbal Scale  Calm   Cognition    Level of Consciousness Alert   Sitting Upper Body Exercises   Comments Rickshaw; 15 lbs 2 sets of 15 facing forward.   Sitting Lower Body Exercises   Sit to Stand Other Resistance (See Comments)  (1 set of 5 in // bars with FWW. )   Balance   Comments Steps in // bars forward and backward. Walked around mat table x3. Pt able to maintain balance without backwards lean. CGA and frequent cues for sit to stand. Standing activity (Rush Hour Game) at mat table. Pt able to stand for 4 minutes 45 sec, break, 3 min, break, and 2 minutes. Limited more by leg pain than by balance or endurance this session.   Interdisciplinary Plan of Care Collaboration   IDT Collaboration with  Family / Caregiver   Patient Position at End of Therapy Seated;Self Releasing Lap Belt Applied;Call Light within Reach;Tray Table within Reach;Family / Friend in Room "   Collaboration Comments re: Pt's daughter unable to provide any physical assist upon return home due to shoulder surgeries. Daughter hopes pt will be able to do self care and things such as getting herself a snack or coffee.   OT Total Time Spent   OT Individual Total Time Spent (Mins) 60   OT Charge Group   OT Therapy Activity 2   OT Therapeutic Exercise  2       Assessment    Pt did far better with balance this session. Pt requiring significant education regarding proper use of walker. Pt has 4WW at home and wants to return to that. Pt did better with walker by end of session and could verbally recall technique taught throughout the day.    Plan    Standing activity tolerance, UE strengthening, functional sit to stand, endurance, balance, ADLs with AE, light meal prep

## 2020-03-07 NOTE — THERAPY
"Physical Therapy   Daily Treatment     Patient Name: Krystle Tavarez  Age:  86 y.o., Sex:  female  Medical Record #: [id number]  Today's Date: 3/7/2020     Precautions  Precautions: (P) Fall Risk, Spinal / Back Precautions     Subjective    Patient reports she is loosing her balance backwards when she stands up today, \"maybe it is the meds?\"; patient asking to get dressed      Objective       03/07/20 0931   Precautions   Precautions Fall Risk;Spinal / Back Precautions    Sitting Lower Body Exercises   Other Exercises LE Motomed 10min #2 for endurance   Interdisciplinary Plan of Care Collaboration   IDT Collaboration with  Occupational Therapist   Collaboration Comments noticed LOB backwards with standing today   PT Total Time Spent   PT Individual Total Time Spent (Mins) 30   PT Charge Group   PT Gait Training 1   PT Therapeutic Activities 1       FIM Walking Score:  2 - Max Assistance  Walking Description:  (amb 100ft FWW min assist for first 10ft for LOB backwards and cues to keep the FWW in contact with ground, after intial 10ft, remaining 90ft of ambulation with FWW CGA)      Assessment    Patient demonstrated 2 LOB backwards with standing requiring min assist to correct and verbal cues to lean forward during stand      Plan    STS work, transfer sequencing; balance assessment (assess LOB backwards with standing), progress gait with FWW, postural education    "

## 2020-03-07 NOTE — REHAB-CM IDT TEAM NOTE
Case Management    DC Planning  DC destination/dispostion:  Two level home, 2 entry steps and 20 interior steps, with support of daughter and son-in-law.     DC Needs: DME TBD. Has Front-Wheel Walker, 4-Wheel Walker, Single Point Cane, Raised Toilet Seat With Arms, and shower chair.   HH vs OP therapies.   MD f/u appointments - PCP, Dr. Kimble.    Barriers to discharge:   Functional deficits.    Strengths:  Motivated. Family support.    Section completed by:  Nancy Will R.N.

## 2020-03-07 NOTE — THERAPY
Physical Therapy   Daily Treatment     Patient Name: Krystle Tavarez  Age:  86 y.o., Sex:  female  Medical Record #: [id number]  Today's Date: 3/7/2020     Precautions  Precautions: Fall Risk, Spinal / Back Precautions     Subjective    Pt reports she is agreeable to walking and exercise       Objective       03/07/20 1501   Sitting Lower Body Exercises   Nustep Resistance Level 2  (12 min, VCs for pacgin, 61 SPM)   Standing Lower Body Exercises   Standing Lower Body Exercises Yes   Hamstring Curl 1 set of 15;Bilateral    Hip Extension 1 set of 15;Bilateral    Hip Abduction 1 set of 15;Bilateral   Marching 1 set of 15   Heel Rise 1 set of 15;Bilateral   Toe Rise 1 set of 15;Bilateral   Mini Squat 1 set of 15;Partial   Other Exercises VCs and demo for form and technique   Lower Extremity Outcome Measures   Lower Extremity Outcome Measures Utilized 5x STS;10 Meter Walk Test   5x STS (Five Times Sit to Stand Test)   Height of Sitting Surface (inches) 17   5x Sit to STand (seconds) 40   Score Definition Fall Risk   Sit to Stand Comments Min A - required BUE support from chair   10 Meter Walk Test   Normal - Trial 1 17 seconds   Normal - Trial 2 14 seconds   Normal - Trial 3 15 seconds   Normal Average Time 15.33 seconds   Normal Average Velocity (m/s) 0.39   Interdisciplinary Plan of Care Collaboration   Patient Position at End of Therapy Seated;Call Light within Reach;Tray Table within Reach;Family / Friend in Room   PT Total Time Spent   PT Individual Total Time Spent (Mins) 60   PT Charge Group   PT Gait Training 2   PT Therapeutic Exercise 2       FIM Walking Score:  4 - Minimal Assistance  Walking Description:  Walker, Extra time, Safety concerns, Verbal cueing, Requires incidental assist(160 ft, FWW, CGA, a few min LOB, able to self correct)      Assessment    Pt nakul improved walking distance this session, and nakul high fall risk via gait speed. Pt required min A to perform 5XSTS     Plan    STS work, transfer  sequencing; balance assessment (assess LOB backwards with standing), progress gait with FWW, postural education

## 2020-03-07 NOTE — CARE PLAN
"  Problem: Communication  Goal: The ability to communicate needs accurately and effectively will improve  Outcome: PROGRESSING AS EXPECTED     Problem: Safety  Goal: Will remain free from injury  Outcome: PROGRESSING AS EXPECTED     Problem: Venous Thromboembolism (VTW)/Deep Vein Thrombosis (DVT) Prevention:  Goal: Patient will participate in Venous Thrombosis (VTE)/Deep Vein Thrombosis (DVT)Prevention Measures  Intervention: Ensure patient wears graduated elastic stockings (VIANNEY hose) and/or SCDs, if ordered, when in bed or chair (Remove at least once per shift for skin check)  Flowsheets (Taken 3/7/2020 0033)  SCDs, Sequential Compression Device: Refused  Note: Pt refused SCD's at hs. States, \"they hurt my legs.\"     Problem: Bowel/Gastric:  Goal: Normal bowel function is maintained or improved  Outcome: PROGRESSING AS EXPECTED     Problem: Urinary Elimination:  Goal: Ability to reestablish a normal urinary elimination pattern will improve  Outcome: PROGRESSING AS EXPECTED     Problem: Pain Management  Goal: Pain level will decrease to patient's comfort goal  Outcome: PROGRESSING AS EXPECTED     "

## 2020-03-07 NOTE — FLOWSHEET NOTE
03/07/20 1305   Events/Summary/Plan   Events/Summary/Plan IS, SpO2 check   Vital Signs   Pulse 74   Respiration 18   Pulse Oximetry (!) 83 %   $ Pulse Oximetry (Spot Check) Yes   Respiratory Assessment   Level of Consciousness Alert   Oxygen   O2 Delivery Device None - Room Air

## 2020-03-07 NOTE — FLOWSHEET NOTE
03/07/20 1305   Incentive Spirometry Treatment   Incentive Spirometer Volume 1500 mL  (Best volume)

## 2020-03-08 ENCOUNTER — APPOINTMENT (OUTPATIENT)
Dept: RADIOLOGY | Facility: REHABILITATION | Age: 85
DRG: 560 | End: 2020-03-08
Attending: HOSPITALIST
Payer: MEDICARE

## 2020-03-08 LAB
ANION GAP SERPL CALC-SCNC: 7 MMOL/L (ref 0–11.9)
BUN SERPL-MCNC: 27 MG/DL (ref 8–22)
CALCIUM SERPL-MCNC: 9.6 MG/DL (ref 8.5–10.5)
CHLORIDE SERPL-SCNC: 100 MMOL/L (ref 96–112)
CO2 SERPL-SCNC: 27 MMOL/L (ref 20–33)
CREAT SERPL-MCNC: 0.82 MG/DL (ref 0.5–1.4)
ERYTHROCYTE [DISTWIDTH] IN BLOOD BY AUTOMATED COUNT: 46.6 FL (ref 35.9–50)
GLUCOSE BLD-MCNC: 125 MG/DL (ref 65–99)
GLUCOSE BLD-MCNC: 128 MG/DL (ref 65–99)
GLUCOSE BLD-MCNC: 139 MG/DL (ref 65–99)
GLUCOSE BLD-MCNC: 144 MG/DL (ref 65–99)
GLUCOSE SERPL-MCNC: 118 MG/DL (ref 65–99)
HCT VFR BLD AUTO: 38 % (ref 37–47)
HGB BLD-MCNC: 12.7 G/DL (ref 12–16)
MCH RBC QN AUTO: 30.6 PG (ref 27–33)
MCHC RBC AUTO-ENTMCNC: 33.4 G/DL (ref 33.6–35)
MCV RBC AUTO: 91.6 FL (ref 81.4–97.8)
PLATELET # BLD AUTO: 279 K/UL (ref 164–446)
PMV BLD AUTO: 10.2 FL (ref 9–12.9)
POTASSIUM SERPL-SCNC: 3.7 MMOL/L (ref 3.6–5.5)
RBC # BLD AUTO: 4.15 M/UL (ref 4.2–5.4)
SODIUM SERPL-SCNC: 134 MMOL/L (ref 135–145)
WBC # BLD AUTO: 5.9 K/UL (ref 4.8–10.8)

## 2020-03-08 PROCEDURE — 36415 COLL VENOUS BLD VENIPUNCTURE: CPT

## 2020-03-08 PROCEDURE — 700112 HCHG RX REV CODE 229: Performed by: PHYSICAL MEDICINE & REHABILITATION

## 2020-03-08 PROCEDURE — 700102 HCHG RX REV CODE 250 W/ 637 OVERRIDE(OP): Performed by: PHYSICAL MEDICINE & REHABILITATION

## 2020-03-08 PROCEDURE — A9270 NON-COVERED ITEM OR SERVICE: HCPCS | Performed by: PHYSICAL MEDICINE & REHABILITATION

## 2020-03-08 PROCEDURE — 99232 SBSQ HOSP IP/OBS MODERATE 35: CPT | Performed by: HOSPITALIST

## 2020-03-08 PROCEDURE — 80048 BASIC METABOLIC PNL TOTAL CA: CPT

## 2020-03-08 PROCEDURE — 82962 GLUCOSE BLOOD TEST: CPT | Mod: 91

## 2020-03-08 PROCEDURE — 94760 N-INVAS EAR/PLS OXIMETRY 1: CPT

## 2020-03-08 PROCEDURE — 700102 HCHG RX REV CODE 250 W/ 637 OVERRIDE(OP): Performed by: HOSPITALIST

## 2020-03-08 PROCEDURE — 700111 HCHG RX REV CODE 636 W/ 250 OVERRIDE (IP): Performed by: PHYSICAL MEDICINE & REHABILITATION

## 2020-03-08 PROCEDURE — 770010 HCHG ROOM/CARE - REHAB SEMI PRIVAT*

## 2020-03-08 PROCEDURE — 74018 RADEX ABDOMEN 1 VIEW: CPT

## 2020-03-08 PROCEDURE — 71045 X-RAY EXAM CHEST 1 VIEW: CPT

## 2020-03-08 PROCEDURE — 85027 COMPLETE CBC AUTOMATED: CPT

## 2020-03-08 RX ADMIN — ACETAMINOPHEN 1000 MG: 500 TABLET, FILM COATED ORAL at 08:14

## 2020-03-08 RX ADMIN — CLOPIDOGREL BISULFATE 75 MG: 75 TABLET, FILM COATED ORAL at 08:15

## 2020-03-08 RX ADMIN — GABAPENTIN 400 MG: 400 CAPSULE ORAL at 20:10

## 2020-03-08 RX ADMIN — OXYCODONE HYDROCHLORIDE AND ACETAMINOPHEN 1000 MG: 500 TABLET ORAL at 08:14

## 2020-03-08 RX ADMIN — INSULIN HUMAN 4 UNITS: 100 INJECTION, SOLUTION PARENTERAL at 17:18

## 2020-03-08 RX ADMIN — POLYETHYLENE GLYCOL 3350 1 PACKET: 17 POWDER, FOR SOLUTION ORAL at 08:16

## 2020-03-08 RX ADMIN — DOCUSATE SODIUM 200 MG: 100 CAPSULE, LIQUID FILLED ORAL at 08:16

## 2020-03-08 RX ADMIN — CALCIUM CARBONATE (ANTACID) CHEW TAB 500 MG 500 MG: 500 CHEW TAB at 20:11

## 2020-03-08 RX ADMIN — CARVEDILOL 6.25 MG: 3.12 TABLET, FILM COATED ORAL at 08:14

## 2020-03-08 RX ADMIN — CALCIUM CARBONATE (ANTACID) CHEW TAB 500 MG 500 MG: 500 CHEW TAB at 08:15

## 2020-03-08 RX ADMIN — CARVEDILOL 6.25 MG: 3.12 TABLET, FILM COATED ORAL at 17:51

## 2020-03-08 RX ADMIN — ACETAMINOPHEN 1000 MG: 500 TABLET, FILM COATED ORAL at 15:24

## 2020-03-08 RX ADMIN — LOSARTAN POTASSIUM 100 MG: 25 TABLET, FILM COATED ORAL at 05:01

## 2020-03-08 RX ADMIN — TIZANIDINE 2 MG: 4 TABLET ORAL at 20:10

## 2020-03-08 RX ADMIN — ACETAMINOPHEN 1000 MG: 500 TABLET, FILM COATED ORAL at 20:09

## 2020-03-08 RX ADMIN — TIZANIDINE 2 MG: 4 TABLET ORAL at 08:15

## 2020-03-08 RX ADMIN — GABAPENTIN 400 MG: 400 CAPSULE ORAL at 08:15

## 2020-03-08 RX ADMIN — DOCUSATE SODIUM 200 MG: 100 CAPSULE, LIQUID FILLED ORAL at 20:10

## 2020-03-08 RX ADMIN — ENOXAPARIN SODIUM 40 MG: 100 INJECTION SUBCUTANEOUS at 20:10

## 2020-03-08 RX ADMIN — TIZANIDINE 2 MG: 4 TABLET ORAL at 15:27

## 2020-03-08 ASSESSMENT — ENCOUNTER SYMPTOMS
BRUISES/BLEEDS EASILY: 0
FEVER: 0
PALPITATIONS: 0
CHILLS: 0
ABDOMINAL PAIN: 0
BLURRED VISION: 0
VOMITING: 0
DOUBLE VISION: 0
SHORTNESS OF BREATH: 0
NAUSEA: 0
COUGH: 1
POLYDIPSIA: 0
BACK PAIN: 1
FOCAL WEAKNESS: 1

## 2020-03-08 ASSESSMENT — FIBROSIS 4 INDEX: FIB4 SCORE: 1.34

## 2020-03-08 NOTE — FLOWSHEET NOTE
"   03/08/20 0909   Incentive Spirometry Treatment   Height 1.6 m (5' 2.99\")   Predicted Inspiratory Capacity Unable to calculate   60% of predicted IS capacity 0 mL   40% of predicted IS capacity 0 mL   Incentive Spirometer Volume 1250 mL  (x3, 1400 x1)     "

## 2020-03-08 NOTE — CARE PLAN
Problem: Urinary Elimination:  Goal: Ability to reestablish a normal urinary elimination pattern will improve  Outcome: PROGRESSING AS EXPECTED  Note: Patient voiding adequate amounts of clear yellow urine.  Denies flank pain or dysuria; afebrile.       Problem: Pain Management  Goal: Pain level will decrease to patient's comfort goal  Outcome: PROGRESSING AS EXPECTED  Note: Pt able to participate in therapies and activities this shift. Patient able to verbalize pain level and verbalize an acceptable level of pain.

## 2020-03-08 NOTE — PROGRESS NOTES
Hospital Medicine Daily Progress Note      Chief Complaint      Interval Problem Update  Pt c/o not feeling well this morning and had low blood pressure.  Has dry cough and abd bloating, will get CXR and KUB.    Review of Systems  Review of Systems   Constitutional: Positive for malaise/fatigue. Negative for chills and fever.   HENT: Negative.    Eyes: Negative for blurred vision and double vision.   Respiratory: Positive for cough. Negative for shortness of breath.    Cardiovascular: Negative for chest pain and palpitations.   Gastrointestinal: Negative for abdominal pain, nausea and vomiting.   Musculoskeletal: Positive for back pain.        Wound pain   Skin: Negative for itching and rash.   Neurological: Positive for focal weakness.   Endo/Heme/Allergies: Negative for polydipsia. Does not bruise/bleed easily.        Physical Exam  Temp:  [36.4 °C (97.5 °F)-37 °C (98.6 °F)] 37 °C (98.6 °F)  Pulse:  [64-88] 71  Resp:  [16-18] 16  BP: ()/(51-74) 98/51  SpO2:  [83 %-93 %] 93 %    Physical Exam  Vitals signs reviewed.   Constitutional:       General: She is not in acute distress.     Appearance: Normal appearance. She is not ill-appearing.   HENT:      Head: Normocephalic and atraumatic.      Right Ear: External ear normal.      Left Ear: External ear normal.      Nose: Nose normal.      Mouth/Throat:      Pharynx: Oropharynx is clear.   Eyes:      General:         Right eye: No discharge.         Left eye: No discharge.      Extraocular Movements: Extraocular movements intact.      Conjunctiva/sclera: Conjunctivae normal.   Neck:      Musculoskeletal: Normal range of motion and neck supple.   Cardiovascular:      Rate and Rhythm: Normal rate and regular rhythm.   Pulmonary:      Effort: Pulmonary effort is normal. No respiratory distress.      Breath sounds: Normal breath sounds. No wheezing.   Abdominal:      General: Bowel sounds are normal. There is distension.      Palpations: Abdomen is soft.    Musculoskeletal:      Right lower leg: No edema.      Left lower leg: No edema.   Skin:     General: Skin is warm and dry.   Neurological:      Mental Status: She is alert and oriented to person, place, and time.         Fluids    Intake/Output Summary (Last 24 hours) at 3/8/2020 1231  Last data filed at 3/8/2020 0800  Gross per 24 hour   Intake 460 ml   Output 250 ml   Net 210 ml       Laboratory  Recent Labs     03/08/20  0520   WBC 5.9   RBC 4.15*   HEMOGLOBIN 12.7   HEMATOCRIT 38.0   MCV 91.6   MCH 30.6   MCHC 33.4*   RDW 46.6   PLATELETCT 279   MPV 10.2     Recent Labs     03/06/20  0448 03/08/20  0520   SODIUM 131* 134*   POTASSIUM 3.7 3.7   CHLORIDE 94* 100   CO2 29 27   GLUCOSE 126* 118*   BUN 30* 27*   CREATININE 0.91 0.82   CALCIUM 9.2 9.6                 Assessment/Plan  PAF (paroxysmal atrial fibrillation) (HCC)- (present on admission)  Assessment & Plan  No recent Echo in Epic  S/P PPM  Rate controlled on Coreg  On antiplatelet therapy w/ Plavix?  Outpt Cardiology F/U w/ Dr. Sewell    Essential hypertension, benign- (present on admission)  Assessment & Plan  Continue Coreg and Losartan  Observe blood pressure trends off HCTZ    Hyponatremia  Assessment & Plan  Most likely 2/2 HCTZ, which was discontinued  Na+ and azotemia both better after NS x 1 liter  Will given another liter of NS due to hypotension    Type 2 diabetes mellitus without complication, without long-term current use of insulin (HCC)- (present on admission)  Assessment & Plan  HbA1c 7.1  Started FSBS and SSI  Observe serum glucose trends    Spinal stenosis of lumbar region with neurogenic claudication- (present on admission)  Assessment & Plan  S/P L5-S1 laminectomy w/ diskectomy on 2/25/20 by Dr. Kimble  Wound care and pain control     DNR    Reviewed w/ pt and RN

## 2020-03-08 NOTE — ASSESSMENT & PLAN NOTE
BP ok  On Core.25 mg bid  On Cozaar: 100 mg daily  On Norvasc: 5 mg daily (3/12) --> 10 mg daily (3/13)  Cont to monitor

## 2020-03-08 NOTE — CARE PLAN
Problem: Communication  Goal: The ability to communicate needs accurately and effectively will improve  Outcome: PROGRESSING AS EXPECTED  Note: Patient able to verbalize needs.  Will continue to monitor.      Problem: Safety  Goal: Will remain free from injury  Outcome: PROGRESSING AS EXPECTED  Note: Pt uses call light consistently and appropriately. Waits for assistance does not attempt self transfer this shift. Able to verbalize needs.       [Dear  ___] : Dear  [unfilled], [Consult Letter:] : I had the pleasure of evaluating your patient, [unfilled]. [Please see my note below.] : Please see my note below. [Sincerely,] : Sincerely, [FreeTextEntry3] : Sangeeta Kennedy PA-C\[facility] [place]\par

## 2020-03-08 NOTE — PROGRESS NOTES
At 0930 pt co nausea and weakness. BP 89/59,HR 60,O2 89% on room air,T 98.6. No co pain. MD ordered chest and abdominal x-ray, 1 L o2 NC and continuous NS 75 ml/hr. At 1030 pt stated nausea had gone down, BP 98/51, O2 93%. Charge nurse notified will continue to monitor.

## 2020-03-08 NOTE — FLOWSHEET NOTE
03/08/20 0908   Events/Summary/Plan   Events/Summary/Plan 02 spot check, IS   Vital Signs   Pulse 71   Respiration 16   Pulse Oximetry 92 %   $ Pulse Oximetry (Spot Check) Yes   Respiratory Assessment   Level of Consciousness Alert   Oxygen   O2 Delivery Device None - Room Air

## 2020-03-08 NOTE — CARE PLAN
Problem: Communication  Goal: The ability to communicate needs accurately and effectively will improve  Outcome: PROGRESSING AS EXPECTED     Problem: Safety  Goal: Will remain free from injury  Outcome: PROGRESSING AS EXPECTED     Problem: Infection  Goal: Will remain free from infection  Outcome: PROGRESSING AS EXPECTED     Problem: Venous Thromboembolism (VTW)/Deep Vein Thrombosis (DVT) Prevention:  Goal: Patient will participate in Venous Thrombosis (VTE)/Deep Vein Thrombosis (DVT)Prevention Measures  Outcome: PROGRESSING AS EXPECTED     Problem: Bowel/Gastric:  Goal: Normal bowel function is maintained or improved  Outcome: PROGRESSING AS EXPECTED     Problem: Urinary Elimination:  Goal: Ability to reestablish a normal urinary elimination pattern will improve  Outcome: PROGRESSING AS EXPECTED

## 2020-03-09 LAB
ANION GAP SERPL CALC-SCNC: 7 MMOL/L (ref 0–11.9)
BUN SERPL-MCNC: 15 MG/DL (ref 8–22)
CALCIUM SERPL-MCNC: 9.3 MG/DL (ref 8.5–10.5)
CHLORIDE SERPL-SCNC: 100 MMOL/L (ref 96–112)
CO2 SERPL-SCNC: 29 MMOL/L (ref 20–33)
CREAT SERPL-MCNC: 0.6 MG/DL (ref 0.5–1.4)
GLUCOSE BLD-MCNC: 133 MG/DL (ref 65–99)
GLUCOSE BLD-MCNC: 207 MG/DL (ref 65–99)
GLUCOSE SERPL-MCNC: 123 MG/DL (ref 65–99)
POTASSIUM SERPL-SCNC: 4.6 MMOL/L (ref 3.6–5.5)
SODIUM SERPL-SCNC: 136 MMOL/L (ref 135–145)

## 2020-03-09 PROCEDURE — 700111 HCHG RX REV CODE 636 W/ 250 OVERRIDE (IP): Performed by: PHYSICAL MEDICINE & REHABILITATION

## 2020-03-09 PROCEDURE — 700112 HCHG RX REV CODE 229: Performed by: PHYSICAL MEDICINE & REHABILITATION

## 2020-03-09 PROCEDURE — 97530 THERAPEUTIC ACTIVITIES: CPT

## 2020-03-09 PROCEDURE — 99233 SBSQ HOSP IP/OBS HIGH 50: CPT | Performed by: PHYSICAL MEDICINE & REHABILITATION

## 2020-03-09 PROCEDURE — 94760 N-INVAS EAR/PLS OXIMETRY 1: CPT

## 2020-03-09 PROCEDURE — 36415 COLL VENOUS BLD VENIPUNCTURE: CPT

## 2020-03-09 PROCEDURE — 80048 BASIC METABOLIC PNL TOTAL CA: CPT

## 2020-03-09 PROCEDURE — 97116 GAIT TRAINING THERAPY: CPT

## 2020-03-09 PROCEDURE — 97535 SELF CARE MNGMENT TRAINING: CPT

## 2020-03-09 PROCEDURE — A9270 NON-COVERED ITEM OR SERVICE: HCPCS | Performed by: PHYSICAL MEDICINE & REHABILITATION

## 2020-03-09 PROCEDURE — 700102 HCHG RX REV CODE 250 W/ 637 OVERRIDE(OP): Performed by: PHYSICAL MEDICINE & REHABILITATION

## 2020-03-09 PROCEDURE — 97110 THERAPEUTIC EXERCISES: CPT

## 2020-03-09 PROCEDURE — 99232 SBSQ HOSP IP/OBS MODERATE 35: CPT | Performed by: HOSPITALIST

## 2020-03-09 PROCEDURE — 770010 HCHG ROOM/CARE - REHAB SEMI PRIVAT*

## 2020-03-09 PROCEDURE — 82962 GLUCOSE BLOOD TEST: CPT

## 2020-03-09 RX ORDER — GABAPENTIN 300 MG/1
600 CAPSULE ORAL 2 TIMES DAILY
Status: DISCONTINUED | OUTPATIENT
Start: 2020-03-09 | End: 2020-03-10

## 2020-03-09 RX ADMIN — TIZANIDINE 2 MG: 4 TABLET ORAL at 16:05

## 2020-03-09 RX ADMIN — TIZANIDINE 2 MG: 4 TABLET ORAL at 08:29

## 2020-03-09 RX ADMIN — TIZANIDINE 2 MG: 4 TABLET ORAL at 20:42

## 2020-03-09 RX ADMIN — POLYETHYLENE GLYCOL 3350 1 PACKET: 17 POWDER, FOR SOLUTION ORAL at 08:32

## 2020-03-09 RX ADMIN — CARVEDILOL 6.25 MG: 3.12 TABLET, FILM COATED ORAL at 18:11

## 2020-03-09 RX ADMIN — GABAPENTIN 600 MG: 300 CAPSULE ORAL at 20:42

## 2020-03-09 RX ADMIN — CALCIUM CARBONATE (ANTACID) CHEW TAB 500 MG 500 MG: 500 CHEW TAB at 20:42

## 2020-03-09 RX ADMIN — CLOPIDOGREL BISULFATE 75 MG: 75 TABLET, FILM COATED ORAL at 08:29

## 2020-03-09 RX ADMIN — GABAPENTIN 400 MG: 400 CAPSULE ORAL at 08:29

## 2020-03-09 RX ADMIN — ENOXAPARIN SODIUM 40 MG: 100 INJECTION SUBCUTANEOUS at 20:43

## 2020-03-09 RX ADMIN — DOCUSATE SODIUM 200 MG: 100 CAPSULE, LIQUID FILLED ORAL at 08:29

## 2020-03-09 RX ADMIN — LOSARTAN POTASSIUM 100 MG: 25 TABLET, FILM COATED ORAL at 05:34

## 2020-03-09 RX ADMIN — CALCIUM CARBONATE (ANTACID) CHEW TAB 500 MG 500 MG: 500 CHEW TAB at 08:30

## 2020-03-09 RX ADMIN — CARVEDILOL 6.25 MG: 3.12 TABLET, FILM COATED ORAL at 08:30

## 2020-03-09 RX ADMIN — OXYCODONE HYDROCHLORIDE AND ACETAMINOPHEN 1000 MG: 500 TABLET ORAL at 08:29

## 2020-03-09 RX ADMIN — ACETAMINOPHEN 1000 MG: 500 TABLET, FILM COATED ORAL at 08:29

## 2020-03-09 ASSESSMENT — ENCOUNTER SYMPTOMS
BACK PAIN: 1
DOUBLE VISION: 0
BLURRED VISION: 0
CHILLS: 0
PALPITATIONS: 0
BRUISES/BLEEDS EASILY: 0
NAUSEA: 0
SHORTNESS OF BREATH: 0
COUGH: 0
FEVER: 0
VOMITING: 0
FOCAL WEAKNESS: 1
ABDOMINAL PAIN: 0
POLYDIPSIA: 0

## 2020-03-09 ASSESSMENT — PATIENT HEALTH QUESTIONNAIRE - PHQ9
SUM OF ALL RESPONSES TO PHQ9 QUESTIONS 1 AND 2: 0
2. FEELING DOWN, DEPRESSED, IRRITABLE, OR HOPELESS: NOT AT ALL
1. LITTLE INTEREST OR PLEASURE IN DOING THINGS: NOT AT ALL

## 2020-03-09 NOTE — THERAPY
Physical Therapy   Daily Treatment     Patient Name: Krystle Tavarez  Age:  86 y.o., Sex:  female  Medical Record #: [id number]  Today's Date: 3/9/2020     Precautions  Precautions: (P) Fall Risk, Spinal / Back Precautions     Subjective    Pt was sitting in w/c upon arrival and agreeable to tx     Objective       03/09/20 1001   Precautions   Precautions Fall Risk;Spinal / Back Precautions    Vitals   O2 (LPM) 1   O2 Delivery Device Nasal Cannula   Interdisciplinary Plan of Care Collaboration   IDT Collaboration with  Family / Caregiver   Patient Position at End of Therapy Seated;Call Light within Reach;Tray Table within Reach;Phone within Reach   Collaboration Comments son in law and daughter present for family training   PT Total Time Spent   PT Individual Total Time Spent (Mins) 60   PT Charge Group   PT Gait Training 2   PT Therapeutic Activities 2       FIM Walking Score:  2 - Max Assistance  Walking Description:  (120ft x 2 FWW initially CGA, utimately SBA with VCs for upright posture)    FIM Bed/Chair/Wheelchair Transfers Score: 5 - Standby Prompting/Supervision or Set-up  Bed/Chair/Wheelchair Transfers Description:  (bed mob: queen size flat bed SPV x 5 with VCs of log roll; transfer: FWW SPT x 4 with SBA)    Curb transfer to mimic home environment x 3 with CGA    Education on car transfer to pt's specific car, improving safety in the home, CLOF, POC prior to dc , education on level of SPV required currently, review of pt and family's short term and long term goals, discussed walker tray, home set up for dining to improve safety/ access    Assessment    Krystle continues to be limited by proximal and extensor strength as well as habitual forward flexed posture. Family feels pt could dc Friday with their support and continued outpatient therapy.     Plan    Issue HEP for core/ extensor strength and postural re-ed, progress gait with FWW, Vector with posterior cues, STS work, progress through passport

## 2020-03-09 NOTE — PROGRESS NOTES
"Rehab Progress Note     Encounter Date: 3/9/2020    CC: low back pain, weakness in LE    Interval Events (Subjective)    She reports improvement in neuropathic pain, burning and tingling sensation on the medial knee bilaterally, pain is constant, worse with light touch, still causing her pain but no longer limiting her ability to participate with therapy.  No other associated symptom.  No symptoms of confusion  She is having difficulty with sleep, associates this with being woken up in the middle the night for vitals or medication    Last BM: 03/07/20      ROS:  Gen: No fatigue, confusion, significant weight loss  Eyes: no blurry vision, double vision or pain in eyes  ENT: no changes in hearing, runny nose, nose bleeds, sinus pain  CV: No CP, palpitations,   Lungs: no shortness of breath, changes in secretions, changes in cough, pain with coughing  Abd: No abd pain, nausea, vomiting, pain with eating  : no blood in urine, suprapubic pain  Ext: No swelling in legs, asymmetric atrophy  Neuro: no changes in strength or sensation  Skin: no new ulcers/skin breakdown appreciated by patient  Mood: No changes in mood today, increase in depression or anxiety  Heme: no bruising, or bleeding  Rest of 14 point review of systems is negative    Objective:  Vitals: /66   Pulse 70   Temp 37.2 °C (98.9 °F) (Oral)   Resp 16   Ht 1.6 m (5' 2.99\")   Wt 76.4 kg (168 lb 8 oz)   SpO2 92%   Gen: NAD, Body mass index is 29.86 kg/m².  HEENT: NC/AT, PERRLA, moist mucous membranes  Cardio: RRR, no mumurs  Pulm: CTAB, with normal respiratory effort  Abd: Soft NTND, active bowel sounds  Ext: No peripheral edema. No calf tenderness. No clubbing/cyanosis. +dorsal pedalis pulses bilaterally.  Neuro to palpation over the medial L2 and L3 dermatomes    Recent Results (from the past 72 hour(s))   ACCU-CHEK GLUCOSE    Collection Time: 03/07/20  5:21 PM   Result Value Ref Range    Glucose - Accu-Ck 126 (H) 65 - 99 mg/dL   ACCU-CHEK GLUCOSE "    Collection Time: 03/07/20  8:29 PM   Result Value Ref Range    Glucose - Accu-Ck 139 (H) 65 - 99 mg/dL   CBC WITHOUT DIFFERENTIAL    Collection Time: 03/08/20  5:20 AM   Result Value Ref Range    WBC 5.9 4.8 - 10.8 K/uL    RBC 4.15 (L) 4.20 - 5.40 M/uL    Hemoglobin 12.7 12.0 - 16.0 g/dL    Hematocrit 38.0 37.0 - 47.0 %    MCV 91.6 81.4 - 97.8 fL    MCH 30.6 27.0 - 33.0 pg    MCHC 33.4 (L) 33.6 - 35.0 g/dL    RDW 46.6 35.9 - 50.0 fL    Platelet Count 279 164 - 446 K/uL    MPV 10.2 9.0 - 12.9 fL   Basic Metabolic Panel    Collection Time: 03/08/20  5:20 AM   Result Value Ref Range    Sodium 134 (L) 135 - 145 mmol/L    Potassium 3.7 3.6 - 5.5 mmol/L    Chloride 100 96 - 112 mmol/L    Co2 27 20 - 33 mmol/L    Glucose 118 (H) 65 - 99 mg/dL    Bun 27 (H) 8 - 22 mg/dL    Creatinine 0.82 0.50 - 1.40 mg/dL    Calcium 9.6 8.5 - 10.5 mg/dL    Anion Gap 7.0 0.0 - 11.9   ESTIMATED GFR    Collection Time: 03/08/20  5:20 AM   Result Value Ref Range    GFR If African American >60 >60 mL/min/1.73 m 2    GFR If Non African American >60 >60 mL/min/1.73 m 2   ACCU-CHEK GLUCOSE    Collection Time: 03/08/20  7:17 AM   Result Value Ref Range    Glucose - Accu-Ck 125 (H) 65 - 99 mg/dL   ACCU-CHEK GLUCOSE    Collection Time: 03/08/20 11:15 AM   Result Value Ref Range    Glucose - Accu-Ck 144 (H) 65 - 99 mg/dL   ACCU-CHEK GLUCOSE    Collection Time: 03/08/20  5:13 PM   Result Value Ref Range    Glucose - Accu-Ck 207 (H) 65 - 99 mg/dL   ACCU-CHEK GLUCOSE    Collection Time: 03/08/20  8:09 PM   Result Value Ref Range    Glucose - Accu-Ck 128 (H) 65 - 99 mg/dL   Basic Metabolic Panel    Collection Time: 03/09/20  5:41 AM   Result Value Ref Range    Sodium 136 135 - 145 mmol/L    Potassium 4.6 3.6 - 5.5 mmol/L    Chloride 100 96 - 112 mmol/L    Co2 29 20 - 33 mmol/L    Glucose 123 (H) 65 - 99 mg/dL    Bun 15 8 - 22 mg/dL    Creatinine 0.60 0.50 - 1.40 mg/dL    Calcium 9.3 8.5 - 10.5 mg/dL    Anion Gap 7.0 0.0 - 11.9   ESTIMATED GFR     Collection Time: 03/09/20  5:41 AM   Result Value Ref Range    GFR If African American >60 >60 mL/min/1.73 m 2    GFR If Non African American >60 >60 mL/min/1.73 m 2   ACCU-CHEK GLUCOSE    Collection Time: 03/09/20  7:15 AM   Result Value Ref Range    Glucose - Accu-Ck 133 (H) 65 - 99 mg/dL       Current Facility-Administered Medications   Medication Frequency   • insulin regular (HUMULIN R) injection 2-12 Units 4X/DAY ACHS   • clopidogrel (PLAVIX) tablet 75 mg DAILY   • gabapentin (NEURONTIN) capsule 400 mg BID   • Pharmacy Consult Request ...Pain Management Review 1 Each PHARMACY TO DOSE   • Respiratory Therapy Consult Continuous RT   • oxyCODONE immediate-release (ROXICODONE) tablet 5 mg Q3HRS PRN   • oxyCODONE immediate release (ROXICODONE) tablet 10 mg Q3HRS PRN   • acetaminophen (TYLENOL) tablet 650 mg Q4HRS PRN   • enoxaparin (LOVENOX) inj 40 mg QHS   • docusate sodium (COLACE) capsule 200 mg BID    And   • sennosides (SENOKOT) 8.6 MG tablet 17.2 mg DAILY AT NOON    And   • magnesium hydroxide (MILK OF MAGNESIA) suspension 30 mL QDAY PRN   • artificial tears ophthalmic solution 1 Drop PRN   • benzocaine-menthol (CEPACOL) lozenge 1 Lozenge Q2HRS PRN   • mag hydrox-al hydrox-simeth (MAALOX PLUS ES or MYLANTA DS) suspension 20 mL Q2HRS PRN   • ondansetron (ZOFRAN ODT) dispertab 4 mg 4X/DAY PRN    Or   • ondansetron (ZOFRAN) syringe/vial injection 4 mg 4X/DAY PRN   • sodium chloride (OCEAN) 0.65 % nasal spray 2 Spray PRN   • traZODone (DESYREL) tablet 50 mg QHS PRN   • ascorbic acid tablet 1,000 mg DAILY   • carvedilol (COREG) tablet 6.25 mg BID WITH MEALS   • losartan (COZAAR) tablet 100 mg Q DAY   • polyethylene glycol/lytes (MIRALAX) PACKET 1 Packet DAILY   • tizanidine (ZANAFLEX) tablet 2 mg TID   • calcium carbonate (TUMS) chewable tab 500 mg BID       Orders Placed This Encounter   Procedures   • Diet Order Regular     Standing Status:   Standing     Number of Occurrences:   1     Order Specific Question:    Diet:     Answer:   Regular [1]       Assessment:  Active Hospital Problems    Diagnosis   • *Acute incomplete paraplegia (HCC)   • Post-op pain   • PAF (paroxysmal atrial fibrillation) ([facility])   • Cardiac pacemaker in situ   • History of TIA (transient ischemic attack)   • Dyslipidemia   • Essential hypertension, benign   • Low vitamin D level   • Retinopathy   • Neurogenic bowel   • Neuropathic pain   • Neurogenic bladder   • Orthostatic hypotension   • 'light-for-dates' infant with signs of fetal malnutrition   • Type 2 diabetes mellitus without complication, without long-term current use of insulin ([facility])       Medical Decision Making and Plan:    L2 AIS D spinal cord injury: L4-L5 laminectomy by Dr. Kimble on 2/25 to address lumbar stenosis with neurogenic claudication  -No need for brace at this time  -We will need to follow-up with Dr. Kimble as outpatient  -Continue comprehensive acute inpatient rehabilitation     Hyponatremia: Sodium on admission of 131, 136 on 3/9  -Appreciate hospitalist input  - P.o. fluid restriction of 1.8 L of free water per day  -DC hydrochlorothiazide in the setting of hyponatremia, with resulting improvement    Hypokalemia: Potassium of 3.5, significant decrease from 3/2, 4.6 on 3/9, monitor given DC of hydrochlorothiazide      Neurogenic bowel: Discussed importance of daily bowel program  - KUB 3/4 showed no significant stool in the ascending or transverse colon  -Senna 2 tablets q. noon, Colace 200 mg twice daily, MiraLAX daily    Neurogenic bladder: History of worsening urinary incontinence  - voiding trial, if can't void in 6 hours or prn check pvr and if >500cc then ICP,if able to void then check PVR, if PVR is >200cc then ICP      orthostatic hypotension: Worsened by administration of tizanidine 2 mg 3 times daily.  Significant hypotensive episode yesterday requiring IV fluid     Hypertension:SBP max of 147  -Coreg 6.25 mg twice daily  -DC'd hydrochlorothiazide 25 mg daily,  secondary to hyponatremia  -Losartan 100 mg daily     Paroxysmal atrial fibrillation: Plavix was held for spine surgery, cardiology recommending restarting approximately 12 days postop  - Restart Plavix on 3/8     Pain:  #Neuropathic pain: Hyperalgesia over L3 dermatome  -Increase gabapentin to 600 mg twice daily     #Postoperative pain:  - Tizanidine 2 mg 3 times daily, monitor for orthostatic hypotension, side effects may include fulminant liver failure, AST of 13 ALT of 9 on admission, should be checked in 2 weeks, 2 months, and every 6 months as long as patient is on tizanidine  - Tylenol 1000 mg 3 times daily.  -Oxycodone 5-10 mg every 3 hours as needed pain     DVT prophylaxis  -Lovenox 40 mg daily     Vitamin D deficiency:Vitamin D level 54 on admission, goal of greater than 30  - DC cholecalciferol, no need for supplementation at this time    IDT Team Meeting 3/9/2020    IJose D.O., was present and led the interdisciplinary team conference on 3/9/2020.  I led the IDT conference and agree with the IDT conference documentation and plan of care as noted below.     RN:  Diet regular   % Meal     Pain Inconsistent    Sleep    Bowel cont   Bladder cont   In's & Out's    Cough and low bp yesterday. She required O2 supplemental. She required 500cc of NS IVF    PT:  Bed Mobility    Transfers    Mobility Progressed to CGA using 4WW   Stairs Mod A     Barriers: pain    OT:  Eating    Grooming    Bathing    UB Dressing Min A   LB Dressing Min A   Toileting    Shower & Transfer      Barrier: balance, pain      Rec:  Active in [place]    CM:  Continues to work on disposition and DME needs.      DC/Disposition:  3/13    Patient was seen for 37 minutes on unit/floor of which > 50% of time was spent on counseling and coordination of care regarding the above, including prognosis, risk reduction, benefits of treatment, and options for next stage of care including neuropathic pain with modification of high risk  medication, gabapentin 600 mg twice daily, hyponatremia with improvement after DC of hydrochlorothiazide, orthostatic hypotension with DC of IV, encouragement of p.o. fluid intake discussion with patient and family about diuretics.      Jose White D.O.

## 2020-03-09 NOTE — RESPIRATORY CARE
Conscious Sedation Respiratory Update       O2 (LPM): 1 (03/09/20 1001)       Events/Summary/Plan: IS done in wheelchair and POx check. (03/09/20 1120)

## 2020-03-09 NOTE — THERAPY
Physical Therapy   Daily Treatment     Patient Name: Krystle Tavarez  Age:  86 y.o., Sex:  female  Medical Record #: [id number]  Today's Date: 3/9/2020     Precautions  Precautions: Fall Risk, Spinal / Back Precautions     Subjective    Pt was lying in bed upon arrival and agreeable to tx     Objective       03/09/20 1231   Precautions   Precautions Fall Risk;Spinal / Back Precautions    Interdisciplinary Plan of Care Collaboration   Patient Position at End of Therapy Seated;Call Light within Reach;Tray Table within Reach;Phone within Reach   PT Total Time Spent   PT Individual Total Time Spent (Mins) 30   PT Charge Group   PT Therapeutic Exercise 1   PT Therapeutic Activities 1     FIM Bed/Chair/Wheelchair Transfers Score: 5 - Standby Prompting/Supervision or Set-up  Bed/Chair/Wheelchair Transfers Description:  (bed mob: mod I; transfer: reach pivot transfer SBA)    Therex/ prescribed HEP:  Supine: 1 x 10 B Ys, Ts, marching while laying on blanket roll for chest expasion  Prone lying 6minutes with 2 x 5 forearm press ups for hip extension ROM/ back extensor strength (TCs to maintain precautions)    Review of prevention of pressure injuries, pressure relief     Assessment    Pt demonstrates flexible kyphosis and improved hip extension post therex. Good carryover of transfer training from morning session.    Plan    STS work, transfer sequencing; balance assessment (assess LOB backwards with standing), progress gait with FWW, postural education

## 2020-03-09 NOTE — REHAB-ACTIVITY IDT TEAM NOTE
ACT   Recreation/Community     Leisure Competence Measure  Leisure Awareness: Independent  Leisure Attitude: Independent  Leisure Skills: Modified Independent  Cultural / Social Behaviors: Independent  Interpersonal Skills: Independent  Community Integration Skills: Cueing Assist  Social Contact: Independent  Community Participation: Cueing Assist    Strengths:  Able to follow instructions, Effective communication skills, Motivated for self care and independence, Pleasant and cooperative and Willingly participates in therapeutic activities  Barriers:  Decreased endurance, Generalized weakness and Other: limited leisure participation  # of short term goals set=2  # of short term goals met=1    Pt reporting the she had been activity in leisure activities when she lived in [place]. She was given information on senior leisure activities provided by the [facility] to include herself in following discharge. Pt sharing willingness to participate and meet new peers. Will work with Pt to have a plan in place to participate in at least two of these activities following discharge.     Recreation Therapy Problems     Problem: Recreation Therapy     Dates: Start: 03/05/20       Description:     Goal: STG-Within one week, patient will demonstrate a standing tolerance     Dates: Start: 03/05/20       Description: By duel tasking while duel tasking at a CGA for 3 minutes x3 without LOB.            Goal: LTG-By discharge, patient will demonstrate leisure problem solving     Dates: Start: 03/05/20       Description: By reporting on two leisure activities she would like to participate in following discharge and any perceived barriers to her participation.            Goal: LTG-By discharge, patient will demonstrate a standing tolerance     Dates: Start: 03/05/20       Description: By duel tasking while duel tasking at a CGA for 5 minutes x3 without LOB.                        Section completed by:  RHONDA WigginsS

## 2020-03-09 NOTE — CARE PLAN
Problem: Mobility  Goal: STG-Within one week, patient will ambulate community distances  Description: 1) Individualized goal: AMB 150ft x 1 SBA FWW  2) Interventions:  PT Group Therapy, PT Gait Training, PT Therapeutic Exercises, PT Neuro Re-Ed/Balance, PT Therapeutic Activity, PT Manual Therapy and PT Evaluation     Outcome: NOT MET  Note: Limited by balance  Goal: STG-Within one week, patient will ambulate up/down a curb  Description: 1) Individualized goal:  FWW SBA  2) Interventions:  PT Group Therapy, PT Gait Training, PT Therapeutic Exercises, PT Neuro Re-Ed/Balance, PT Therapeutic Activity, PT Manual Therapy and PT Evaluation       Outcome: NOT MET  Note: Pt limited by balance at this time     Problem: Mobility Transfers  Goal: STG-Within one week, patient will perform bed mobility  Description: 1) Individualized goal:  SBA HOB flat, bed rail as needed  2) Interventions:  PT Group Therapy, PT Gait Training, PT Therapeutic Exercises, PT Neuro Re-Ed/Balance, PT Therapeutic Activity, PT Manual Therapy and PT Evaluation       Outcome: NOT MET  Note: Pt requiring assist at B LE d/t strength impairments   Goal: STG-Within one week, patient will transfer bed to chair  Description: 1) Individualized goal:  FWW SBA  2) Interventions:  PT Group Therapy, PT Gait Training, PT Therapeutic Exercises, PT Neuro Re-Ed/Balance, PT Therapeutic Activity, PT Manual Therapy and PT Evaluation       Outcome: NOT MET  Note: Pt requires physical assist d/t LOB

## 2020-03-09 NOTE — REHAB-OT IDT TEAM NOTE
Occupational Therapy   Activities of Daily Living  Eating Initial:  7 - Independent  Eating Current:  7 - Independent   Eating Description:     Grooming Initial:  7 - Independent  Grooming Current:  5 - Standby Prompting/Supervision or Set-up   Grooming Description:  (Patient brushed dentures and combed hair independently while seated in w/c )  Bathing Initial:  4 - Minimal Assistance  Bathing Current:  4 - Minimal Assistance   Bathing Description:  Grab bar, Tub bench, Increased time, Supervision for safety, Set-up of equipment(Patient demonstrated ability to wash, rinse and dry all body parts with min assist (for standing balance/safety during washing/rinssing/drying of buttocks pursuits).)  Upper Body Dressing Initial:  5 - Standby Prompting/Supervision or Set-up  Upper Body Dressing Current:  4 - Minimal Assistance   Upper Body Dressing Description:  Supervision for safety, Set-up of equipment, Increased time(Patient donned long sleeve shirt with set-up while seated however required mind assist for donning bra (clasp) following shower. )  Lower Body Dressing Initial:  4 - Minimal Assistance  Lower Body Dressing Current:  4 - Minimal Assistance   Lower Body Dressing Description:  4 - Minimal Assistance  Toileting Initial:  5 - Standby Prompting/Supervision or Set-up  Toileting Current:  4 - Minimal Assistance   Toileting Description:  Supervision for safety, Increased time, Grab bar(CGA for balance and safety during clothing management pursuits. )  Toilet Transfer Initial:  5 - Standby Prompting/Supervision or Set-up  Toilet Transfer Current:  4 - Minimal Assistance   Toilet Transfer Description:  4 - Minimal Assistance  Tub / Shower Transfer Initial:  4 - Minimal Assistance  Tub / Shower Transfer Current:  4 - Minimal Assistance   Tub / Shower Transfer Description:  Grab bar, Increased time, Supervision for safety(CGA for standing balance and safety with use of grab bar.)  IADL:  Not yet addressed, focus has  been on ADLs/functional transfers   Family Training/Education: Not completed   DME/DC Recommendations: Home health OT vs outpatient, shower chair/grab bars (already owns/installed)     Strengths:  Able to follow instructions, Alert and oriented, Effective communication skills, Good insight into deficits/needs, Independent PLOF, Making steady progress towards goals, Manages pain appropriately, Motivated for self care and independence, Pleasant and cooperative, Supportive family and Willingly participates in therapeutic activities  Barriers:  Other: Decreased balance, spinal/back precautions, generalized weakness     # of short term goals set= 2   # of short term goals met= 0  Occupational Therapy Goals     Problem: Bathing     Dates: Start: 03/05/20       Description:     Goal: STG-Within one week, patient will bathe     Dates: Start: 03/05/20       Description: 1) Individualized Goal:  SBA with grab bars and AE as needed   2) Interventions:  OT Group Therapy, OT Self Care/ADL, OT Community Reintegration, OT Manual Ther Technique, OT Neuro Re-Ed/Balance, OT Sensory Int Techniques, OT Therapeutic Activity, OT Evaluation and OT Therapeutic Exercise                   Problem: Dressing     Dates: Start: 03/05/20       Description:     Goal: STG-Within one week, patient will dress LB     Dates: Start: 03/05/20       Description: 1) Individualized Goal:  SBA with grab bars and AE as needed   2) Interventions:  OT Group Therapy, OT Self Care/ADL, OT Community Reintegration, OT Manual Ther Technique, OT Neuro Re-Ed/Balance, OT Sensory Int Techniques, OT Therapeutic Activity, OT Evaluation and OT Therapeutic Exercise                 Problem: OT Long Term Goals     Dates: Start: 03/05/20       Description:     Goal: LTG-By discharge, patient will complete basic self care tasks     Dates: Start: 03/05/20       Description: 1) Individualized Goal:  Sup to Mod I (Sup for bathing)     2) Interventions:  OT Group Therapy, OT Self  Care/ADL, OT Community Reintegration, OT Manual Ther Technique, OT Neuro Re-Ed/Balance, OT Sensory Int Techniques, OT Therapeutic Activity, OT Evaluation and OT Therapeutic Exercise              Goal: LTG-By discharge, patient will perform bathroom transfers     Dates: Start: 03/05/20       Description: 1) Individualized Goal:  Mod I    2) Interventions:  OT Group Therapy, OT Self Care/ADL, OT Community Reintegration, OT Manual Ther Technique, OT Neuro Re-Ed/Balance, OT Sensory Int Techniques, OT Therapeutic Activity, OT Evaluation and OT Therapeutic Exercise              Goal: LTG-By discharge, patient will complete basic home management     Dates: Start: 03/05/20       Description: 1) Individualized Goal:  Supervision   2) Interventions:  OT Group Therapy, OT Self Care/ADL, OT Community Reintegration, OT Manual Ther Technique, OT Neuro Re-Ed/Balance, OT Sensory Int Techniques, OT Therapeutic Activity, OT Evaluation and OT Therapeutic Exercise                          Section completed by:  Kathie Patel MS,OTR/L

## 2020-03-09 NOTE — PROGRESS NOTES
Hospital Medicine Daily Progress Note      Chief Complaint      Interval Problem Update  No more low blood pressures today.  CXR and KUB reviewed, both unremarkable.    Review of Systems  Review of Systems   Constitutional: Negative for chills, fever and malaise/fatigue.   HENT: Negative.    Eyes: Negative for blurred vision and double vision.   Respiratory: Negative for cough and shortness of breath.    Cardiovascular: Negative for chest pain and palpitations.   Gastrointestinal: Negative for abdominal pain, nausea and vomiting.   Musculoskeletal: Positive for back pain.        Wound pain   Skin: Negative for itching and rash.   Neurological: Positive for focal weakness.   Endo/Heme/Allergies: Negative for polydipsia. Does not bruise/bleed easily.        Physical Exam  Temp:  [36.3 °C (97.3 °F)-37.2 °C (98.9 °F)] 37.2 °C (98.9 °F)  Pulse:  [62-75] 70  Resp:  [16] 16  BP: (135-155)/(54-70) 147/66  SpO2:  [92 %-97 %] 92 %    Physical Exam  Vitals signs reviewed.   Constitutional:       General: She is not in acute distress.     Appearance: Normal appearance. She is not ill-appearing.   HENT:      Head: Normocephalic and atraumatic.      Right Ear: External ear normal.      Left Ear: External ear normal.      Nose: Nose normal.      Mouth/Throat:      Pharynx: Oropharynx is clear.   Eyes:      General:         Right eye: No discharge.         Left eye: No discharge.      Extraocular Movements: Extraocular movements intact.      Conjunctiva/sclera: Conjunctivae normal.   Neck:      Musculoskeletal: Normal range of motion and neck supple.   Cardiovascular:      Rate and Rhythm: Normal rate and regular rhythm.   Pulmonary:      Effort: Pulmonary effort is normal. No respiratory distress.      Breath sounds: Normal breath sounds. No wheezing.   Abdominal:      General: Bowel sounds are normal. There is no distension.      Palpations: Abdomen is soft.      Tenderness: There is no abdominal tenderness.   Musculoskeletal:       Right lower leg: No edema.      Left lower leg: No edema.   Skin:     General: Skin is warm and dry.   Neurological:      Mental Status: She is alert and oriented to person, place, and time.         Fluids    Intake/Output Summary (Last 24 hours) at 3/9/2020 1115  Last data filed at 3/9/2020 0800  Gross per 24 hour   Intake 1900 ml   Output --   Net 1900 ml       Laboratory  Recent Labs     03/08/20  0520   WBC 5.9   RBC 4.15*   HEMOGLOBIN 12.7   HEMATOCRIT 38.0   MCV 91.6   MCH 30.6   MCHC 33.4*   RDW 46.6   PLATELETCT 279   MPV 10.2     Recent Labs     03/08/20  0520 03/09/20  0541   SODIUM 134* 136   POTASSIUM 3.7 4.6   CHLORIDE 100 100   CO2 27 29   GLUCOSE 118* 123*   BUN 27* 15   CREATININE 0.82 0.60   CALCIUM 9.6 9.3                 Assessment/Plan  PAF (paroxysmal atrial fibrillation) ([facility])- (present on admission)  Assessment & Plan  No recent Echo in Epic  S/P PPM  Rate controlled on Coreg  On antiplatelet therapy w/ Plavix?  Outpt Cardiology F/U w/ Dr. Sewell    Essential hypertension, benign- (present on admission)  Assessment & Plan  Continue Coreg and Losartan  Loose BP control for now given recent neurosurgery and hypotension yesterday  But consider increase Coreg if blood pressures remain consistently elevated    Hyponatremia  Assessment & Plan  Most likely 2/2 HCTZ, which was discontinued  Na+ and azotemia both better after NS infusion    Type 2 diabetes mellitus without complication, without long-term current use of insulin ([facility])- (present on admission)  Assessment & Plan  HbA1c 7.1  Will discontinue FSBS and SSI as not routinely needing insulin coverage  Continue diet control    Spinal stenosis of lumbar region with neurogenic claudication- (present on admission)  Assessment & Plan  S/P L5-S1 laminectomy w/ diskectomy on 2/25/20 by Dr. Kimble  Wound care and pain control     DNR    Reviewed w/ pt and RN

## 2020-03-09 NOTE — DISCHARGE PLANNING
Met with patient, her daughter and KEIKO following Team Conference to relay progress, anticipated discharge date of 3/13/20. Choice for OP PT [facility] on [address]. Patient has all necessary DME. Patient to discharge Friday and go directly to cardiology appointment. Patient expressed concern about discharge on Friday and asked about getting to appointment if she doesn't discharge. We discussed that if appointment could wait until after discharge, that the cardiology appointment would be rescheduled. Dr. White to direct us on whether to reschedule this appointment or patient to keep appointment, if she does not discharge and requires further stay at rehab.   Patient lives on ground level floor and only has to go up and down 1 step. KEIKO stated they watch TV together on ground level. KEIKO and daughter very supportive.  Opportunity for questions/discussion provided.

## 2020-03-09 NOTE — REHAB-NURSING IDT TEAM NOTE
Nursing   Nursing  Diet/Nutrition:  Diabetic and Thin Liquids  Medication Administration:  Whole with Liquid Wash  % consumed at meals in last 24 hours:  Consumed % of meals per documentation.  Meal/Snack Consumption for the past 24 hrs:   Oral Nutrition   03/08/20 1200 Lunch;Less than 25% Consumed   03/08/20 0800 Breakfast;Between % Consumed     Snack schedule:  None  Supplement:  none  Appetite:  Good  Fluid Intake/Output in past 24 hours: In: 2000 [P.O.:700; I.V.:1300]  Out: 250   Admit Weight:  Weight: 75.3 kg (166 lb)  Weight Last 7 Days   [74.6 kg (164 lb 7.4 oz)-76.4 kg (168 lb 8 oz)] 76.4 kg (168 lb 8 oz) (03/08 1400)    Pain Issues:  Denies    Scheduled pain medications:  gabapentin (NEURONTIN) ;tylenol    PRN pain medications used in last 24 hours:  None   Non Pharmacologic Interventions:  warm blanket, distraction, emotional support, food, relaxation and rest  Effectiveness of pain management plan:  good=patient states acceptable comfort after interventions    Bowel:    Bowel Assist Initial Score:  3 - Moderate Assistance  Bowel Assist Current Score:  4 - Minimal Assistance  Bowl Accidents in last 7 days:     Last bowel movement: 03/07/20  Stool Description: Medium, Soft     Usual bowel pattern:  daily  Scheduled bowel medications:  docusate sodium (COLACE), senna-docusate (PERICOLACE or SENOKOT S)  and polyethylene glycol/lytes (MIRALAX)   PRN bowel medications used in last 24 hours:  None  Nursing Interventions:  Increased time, Scheduled medication, Supervision, Verbal cueing, Set-up, Positioning on commode/toilet, Brief  Effectiveness of bowel program:   good=regular, predictable, controlled emptying of bowel  Bladder:    Bladder Assist Initial Score:  3 - Moderate Assistance  Bladder Assist Current Score:  4 - Minimal Assistance  Bladder Accidents in last 7 days:     PVR range for past 24-48 hours:  [158-226]   Intermittent Catheterization:  0  Medications affecting bladder:  None    Time  void schedule/voiding pattern:  Voiding every 2-4 hours  Interventions:  Increased time, Supervision, Verbal cueing, Brief, Time void patient initiated  Effectiveness of bladder training:  Good=regular, predictable, emptying of bladder, patient initiates time voiding      Diabetes:  Blood Sugar Frequency:  Before meals and at bedtime    Range of BS for last 48 hours:     Recent Labs     03/07/20  1721 03/07/20  2029 03/08/20  0717 03/08/20  1115 03/08/20  1713 03/08/20 2009   POCGLUCOSE 126* 139* 125* 144* 207* 128*     Scheduled diabetic medications:  None  Sliding scale usage in past 24 hours:  Yes  Total Short acting insulin in the past 24 hours:  Humulin R 4 units  Total Long acting insulin in the past 24 hours:  None    Wound:         Patient Lines/Drains/Airways Status    Active Current Wounds     Name: Placement date: Placement time: Site: Days:    Wound 03/04/20 Incision Back Midline;Lower  03/04/20   1707   Back  4                   Interventions:  With steri strip-open to air; clean, dry & intact  Effectiveness of intervention:  wound is improving         Sleep/wake cycle:   Average hours slept:  Sleeps 4-6 hours without waking  Sleep medication usage:  Declines    Patient/Family Training/Education:  Diabetes Management, Diet/Nutrition, Fall Prevention, General Self Care, Medication Management, Pain Management, Respiratory Hygiene, Safe Transfers, Safety and Skin Care  Discharge Supply Recommendations:  Glucometer and Strips and Blood Pressure Monitor  Strengths: Alert and oriented, Willingly participates in therapeutic activities, Able to follow instructions, Supportive family, Pleasant and cooperative, Motivated for self care and independence, Good endurance and Manages pain appropriately   Barriers:   Constipation, Fatigue and Generalized weakness       Nursing Problems     Problem: Bowel/Gastric:     Description:     Goal: Normal bowel function is maintained or improved     Description:            Goal: Will not experience complications related to bowel motility     Description:                 Problem: Communication     Description:     Goal: The ability to communicate needs accurately and effectively will improve     Description:                 Problem: Discharge Barriers/Planning     Description:     Goal: Patient's continuum of care needs will be met     Description:                 Problem: Infection     Description:     Goal: Will remain free from infection     Description:                 Problem: Knowledge Deficit     Description:     Goal: Knowledge of disease process/condition, treatment plan, diagnostic tests, and medications will improve     Description:           Goal: Knowledge of the prescribed therapeutic regimen will improve     Description:                 Problem: Pain Management     Description:     Goal: Pain level will decrease to patient's comfort goal     Description:                 Problem: Safety     Description:     Goal: Will remain free from injury     Description:           Goal: Will remain free from falls     Description:                 Problem: Skin Integrity     Description:     Goal: Risk for impaired skin integrity will decrease     Description:                 Problem: Urinary Elimination:     Description:     Goal: Ability to reestablish a normal urinary elimination pattern will improve     Description:                 Problem: Venous Thromboembolism (VTW)/Deep Vein Thrombosis (DVT) Prevention:     Description:     Goal: Patient will participate in Venous Thrombosis (VTE)/Deep Vein Thrombosis (DVT)Prevention Measures     Description:                        Long Term Goals:   At discharge patient will be able to function safely at home and in the community with support.    Section completed by:  Mary Augustin R.N.

## 2020-03-09 NOTE — THERAPY
"Recreational Therapy  Daily Treatment     Patient Name: Krystle Tavarez  AGE:  86 y.o., SEX:  female  Medical Record #: [id number]  Today's Date: 3/9/2020       Subjective    \"I used to go to a [employer].\" She stated that she is waiting on her pain level to go down so that she can walk in the [employer].      Objective       03/09/20 1431   Functional Ability Status - Cognitive   Attention Span Remains on Task   Comprehension Follows Three Step Commands   Judgment Able to Make Independent Decisions   Functional Ability Status - Emotional    Affect Appropriate   Mood Appropriate   Behavior Appropriate   Skilled Intervention    Skilled Intervention Cognitive Leisure   Skilled Intervention Comments Bananagram activity. Discharge planning   Interdisciplinary Plan of Care Collaboration   Patient Position at End of Therapy Seated;Call Light within Reach;Tray Table within Reach   Treatment Time   Total Time Spent (mins) 30   Procedural Tracking   Procedural Tracking Leisure Skills Development;Cognitive Skills Training;Community Re-Integration       Assessment    Pt open to having a [employer] consult. She was given information on many [employer] run leisure events and activities she could involve herself in following her discharge. She did state the she was interested in going to a [employer] following discharge and her pain level going down.     Plan    Follow up on activities she would like to be involved in other than [employer].   "

## 2020-03-09 NOTE — THERAPY
"Occupational Therapy  Daily Treatment     Patient Name: Krystle Tavarez  Age:  86 y.o., Sex:  female  Medical Record #: [id number]  Today's Date: 3/9/2020     Precautions  Precautions: Fall Risk, Spinal / Back Precautions     Safety   ADL Safety : Requires Physical Assist for Safety  Bathroom Safety: Requires Physical Assist for Safety  Comments: See FIMs for additional ADL performance details     Subjective    \"It's nice to know that I can do it!\" patient stated referring to step-ups     Objective     03/09/20 1301   Precautions   Precautions Fall Risk;Spinal / Back Precautions    Sitting Lower Body Exercises   Nustep Resistance Level 5;Resistance Level 3  (Level 5 x 3 mins, Level 3 x 2 minutes)   Standing Lower Body Exercises   Step Up 2 sets of 10;Right;Left  (with FWW, CGA for balance and safety)   Interdisciplinary Plan of Care Collaboration   Patient Position at End of Therapy Seated;Self Releasing Lap Belt Applied   OT Total Time Spent   OT Individual Total Time Spent (Mins) 30   OT Charge Group   OT Therapy Activity 1   OT Therapeutic Exercise  1     Patient txfrd wc<>Nustep with CGA/FWW (for balance and safety).     Assessment    Patient tolerated OT session well with focus on dynamic standing balance and endurance. No LOB noted during step-ups. Functional performance limited by generalized weakness, decreased balance and decreased endurance.     Plan    Standing activity tolerance, UE strengthening, functional sit to stand, endurance, balance, ADLs with AE, light meal prep    "

## 2020-03-09 NOTE — REHAB-PT IDT TEAM NOTE
"Physical Therapy   Mobility  Bed mobility:   3  Bed /Chair/Wheelchair Transfer Initial:  5 - Standby Prompting/Supervision or Set-up  Bed /Chair/Wheelchair Transfer Current:  3 - Moderate Assistance   Bed/Chair/Wheelchair Transfer Description:  (w/c to bed using bed rail. Pt required min A to stand, CGA while pivoting, and assist for both limbs for sit to supine.)  Walk Initial:  2 - Max Assistance  Walk Current:  4 - Minimal Assistance   Walk Description:  Walker, Extra time, Safety concerns, Verbal cueing, Requires incidental assist(160 ft, FWW, CGA, a few min LOB, able to self correct)  Wheelchair Initial:  2 - Max Assistance  Wheelchair Current:  2 - Max Assistance   Wheelchair Description:  Extra time, Verbal cueing(wheeled self ~50 ft from therapy gym to part of the way back to room with increased time and cues for w/c negotiation using BUE )  Stairs Initial:  2 - Max Assistance  Stairs Current: 2 - Max Assistance   Stairs Description: (5 x 1 6\" B UE modA for stability)  Patient/Family Training/Education:  STS sequencing, bed mobility, stair training, AMB with FWW  DME/DC Recommendations:  Pt owns 4ww, FWW, anticipate need for outpatient PT  Strengths:  Willingly participates in therapeutic activities, family support to drive/ prepare meals, grocery shop  Barriers:   Other: 20 stairs in home, poor core control, impaired balance, posterior LOB with STS , impaired gait speed  # of short term goals set= 5  # of short term goals met= 0  Physical Therapy Problems     Problem: Mobility     Dates: Start: 03/05/20       Description:     Goal: STG-Within one week, patient will ambulate community distances     Dates: Start: 03/05/20       Description: 1) Individualized goal: AMB 150ft x 1 SBA FWW  2) Interventions:  PT Group Therapy, PT Gait Training, PT Therapeutic Exercises, PT Neuro Re-Ed/Balance, PT Therapeutic Activity, PT Manual Therapy and PT Evaluation             Goal: STG-Within one week, patient will ambulate " "up/down a curb     Dates: Start: 03/05/20       Description: 1) Individualized goal:  FWW SBA  2) Interventions:  PT Group Therapy, PT Gait Training, PT Therapeutic Exercises, PT Neuro Re-Ed/Balance, PT Therapeutic Activity, PT Manual Therapy and PT Evaluation               Goal: STG-Within one week, patient will ascend and descend four to six stairs     Dates: Start: 03/05/20       Description: 1) Individualized goal:  8 x 1 6\" steps B HR Laury  2) Interventions:  PT Group Therapy, PT Gait Training, PT Therapeutic Exercises, PT Neuro Re-Ed/Balance, PT Therapeutic Activity, PT Manual Therapy and PT Evaluation                     Problem: Mobility Transfers     Dates: Start: 03/05/20       Description:     Goal: STG-Within one week, patient will perform bed mobility     Dates: Start: 03/05/20       Description: 1) Individualized goal:  SBA HOB flat, bed rail as needed  2) Interventions:  PT Group Therapy, PT Gait Training, PT Therapeutic Exercises, PT Neuro Re-Ed/Balance, PT Therapeutic Activity, PT Manual Therapy and PT Evaluation               Goal: STG-Within one week, patient will transfer bed to chair     Dates: Start: 03/05/20       Description: 1) Individualized goal:  FWW SBA  2) Interventions:  PT Group Therapy, PT Gait Training, PT Therapeutic Exercises, PT Neuro Re-Ed/Balance, PT Therapeutic Activity, PT Manual Therapy and PT Evaluation                     Problem: PT-Long Term Goals     Dates: Start: 03/05/20       Description:     Goal: LTG-By discharge, patient will ambulate     Dates: Start: 03/05/20       Description: 1) Individualized goal:  200ft x 1 mod I LRAD  2) Interventions:  PT Group Therapy, PT Gait Training, PT Therapeutic Exercises, PT Neuro Re-Ed/Balance, PT Therapeutic Activity, PT Manual Therapy and PT Evaluation             Goal: LTG-By discharge, patient will transfer one surface to another     Dates: Start: 03/05/20       Description: 1) Individualized goal:  Mod I LRAD  2) " "Interventions:  PT Group Therapy, PT Gait Training, PT Therapeutic Exercises, PT Neuro Re-Ed/Balance, PT Therapeutic Activity, PT Manual Therapy and PT Evaluation               Goal: LTG-By discharge, patient will perform home exercise program     Dates: Start: 03/05/20       Description: 1) Individualized goal:  B LE strength / balance mod I  2) Interventions:  PT Group Therapy, PT Gait Training, PT Therapeutic Exercises, PT Neuro Re-Ed/Balance, PT Therapeutic Activity, PT Manual Therapy and PT Evaluation               Goal: LTG-By discharge, patient will ambulate up/down flight of stairs     Dates: Start: 03/05/20       Description: 1) Individualized goal:   12 x 1 6\" steps Laury   2) Interventions:  PT Group Therapy, PT Gait Training, PT Therapeutic Exercises, PT Neuro Re-Ed/Balance, PT Therapeutic Activity, PT Manual Therapy and PT Evaluation                             Section completed by:  Crystal Sanches, PT     "

## 2020-03-09 NOTE — REHAB-COLLABORATIVE ONGOING IDT TEAM NOTE
Weekly Interdisciplinary Team Conference Note    Weekly Interdisciplinary Team Conference # 1  Date:  3/9/2020    Clinicians present and reporting at team conference include the following:   MD: Jose White DO   RN:  David Yates RN   PT:   Crystal Sanches PT, DPT, NCS  OT:  Kathie Patel MS, OTR/L   ST:  None  CM:  Nancy Will RN CCM  REC:  Eddie Anglin CTRS  RT:  None   RPh:  Pk Tariq RP  Other:   None  All reporting clinicians have a working knowledge of this patient's plan of care.    Targeted DC Date:  3/13/2020     Medical    Patient Active Problem List    Diagnosis Date Noted   • Post-op pain 02/29/2020     Priority: High   • PAF (paroxysmal atrial fibrillation) (HCC) 02/05/2020     Priority: High   • Cardiac pacemaker in situ 07/09/2019     Priority: High   • concern for right lower extremity DVT 03/03/2020     Priority: Medium   • History of TIA (transient ischemic attack) 08/13/2019     Priority: Medium   • Essential hypertension, benign 07/09/2019     Priority: Medium   • Dyslipidemia 07/09/2019     Priority: Medium   • Localized edema 07/09/2019     Priority: Medium   • constipation 02/29/2020     Priority: Low   • Retinopathy 08/13/2019     Priority: Low   • Low vitamin D level 08/13/2019     Priority: Low   • Weakness of both lower extremities 08/13/2019     Priority: Low   • Compression fracture of lumbar vertebrae, non-traumatic, sequela 08/13/2019     Priority: Low   • Chronic midline low back pain with right-sided sciatica 07/09/2019     Priority: Low   • Hyponatremia 03/07/2020   • Acute incomplete paraplegia (HCC) 03/04/2020   • Neurogenic bowel 03/04/2020   • Neuropathic pain 03/04/2020   • Neurogenic bladder 03/04/2020   • Orthostatic hypotension 03/04/2020   • 'light-for-dates' infant with signs of fetal malnutrition 03/04/2020   • Type 2 diabetes mellitus without complication, without long-term current use of insulin ([facility]) 01/16/2020   • Foraminal stenosis of lumbar region  11/18/2019   • Spinal stenosis of lumbar region with neurogenic claudication 11/18/2019   • Lumbar spondylosis 11/18/2019     Results     Procedure Component Value Ref Range Date/Time    ACCU-CHEK GLUCOSE [[id number]]  (Abnormal) Collected:  03/09/20 0715    Order Status:  Completed Lab Status:  Final result Updated:  03/09/20 0751     Glucose - Accu-Ck 133 65 - 99 mg/dL     Basic Metabolic Panel [[id number]]  (Abnormal) Collected:  03/09/20 0541    Order Status:  Completed Lab Status:  Final result Updated:  03/09/20 0722    Specimen:  Blood      Sodium 136 135 - 145 mmol/L      Potassium 4.6 3.6 - 5.5 mmol/L      Chloride 100 96 - 112 mmol/L      Co2 29 20 - 33 mmol/L      Glucose 123 65 - 99 mg/dL      Bun 15 8 - 22 mg/dL      Creatinine 0.60 0.50 - 1.40 mg/dL      Calcium 9.3 8.5 - 10.5 mg/dL      Anion Gap 7.0 0.0 - 11.9     ESTIMATED GFR [[id number]] Collected:  03/09/20 0541    Order Status:  Completed Lab Status:  Final result Updated:  03/09/20 0722     GFR If African American >60 >60 mL/min/1.73 m 2      GFR If Non African American >60 >60 mL/min/1.73 m 2     ACCU-CHEK GLUCOSE [[id number]]  (Abnormal) Collected:  03/08/20 1713    Order Status:  Completed Lab Status:  Final result Updated:  03/09/20 0030     Glucose - Accu-Ck 207 65 - 99 mg/dL     ACCU-CHEK GLUCOSE [[id number]]  (Abnormal) Collected:  03/08/20 2009    Order Status:  Completed Lab Status:  Final result Updated:  03/08/20 2214     Glucose - Accu-Ck 128 65 - 99 mg/dL     ACCU-CHEK GLUCOSE [[id number]]  (Abnormal) Collected:  03/08/20 1115    Order Status:  Completed Lab Status:  Final result Updated:  03/08/20 1155     Glucose - Accu-Ck 144 65 - 99 mg/dL         Nursing  Diet/Nutrition:  Diabetic and Thin Liquids  Medication Administration:  Whole with Liquid Wash  % consumed at meals in last 24 hours:  Consumed % of meals per documentation.  Meal/Snack Consumption for the past 24 hrs:   Oral Nutrition   03/08/20 1200 Lunch;Less than 25%  Consumed   03/08/20 0800 Breakfast;Between % Consumed     Snack schedule:  None  Supplement:  none  Appetite:  Good  Fluid Intake/Output in past 24 hours: In: 2000 [P.O.:700; I.V.:1300]  Out: 250   Admit Weight:  Weight: 75.3 kg (166 lb)  Weight Last 7 Days   [74.6 kg (164 lb 7.4 oz)-76.4 kg (168 lb 8 oz)] 76.4 kg (168 lb 8 oz) (03/08 1400)    Pain Issues:  Denies    Scheduled pain medications:  gabapentin (NEURONTIN) ;tylenol    PRN pain medications used in last 24 hours:  None   Non Pharmacologic Interventions:  warm blanket, distraction, emotional support, food, relaxation and rest  Effectiveness of pain management plan:  good=patient states acceptable comfort after interventions    Bowel:    Bowel Assist Initial Score:  3 - Moderate Assistance  Bowel Assist Current Score:  4 - Minimal Assistance  Bowl Accidents in last 7 days:     Last bowel movement: 03/07/20  Stool Description: Medium, Soft     Usual bowel pattern:  daily  Scheduled bowel medications:  docusate sodium (COLACE), senna-docusate (PERICOLACE or SENOKOT S)  and polyethylene glycol/lytes (MIRALAX)   PRN bowel medications used in last 24 hours:  None  Nursing Interventions:  Increased time, Scheduled medication, Supervision, Verbal cueing, Set-up, Positioning on commode/toilet, Brief  Effectiveness of bowel program:   good=regular, predictable, controlled emptying of bowel  Bladder:    Bladder Assist Initial Score:  3 - Moderate Assistance  Bladder Assist Current Score:  4 - Minimal Assistance  Bladder Accidents in last 7 days:     PVR range for past 24-48 hours:  [158-226]   Intermittent Catheterization:  0  Medications affecting bladder:  None    Time void schedule/voiding pattern:  Voiding every 2-4 hours  Interventions:  Increased time, Supervision, Verbal cueing, Brief, Time void patient initiated  Effectiveness of bladder training:  Good=regular, predictable, emptying of bladder, patient initiates time voiding      Diabetes:  Blood Sugar  Frequency:  Before meals and at bedtime    Range of BS for last 48 hours:     Recent Labs     03/07/20  1721 03/07/20  2029 03/08/20  0717 03/08/20  1115 03/08/20  1713 03/08/20 2009   POCGLUCOSE 126* 139* 125* 144* 207* 128*     Scheduled diabetic medications:  None  Sliding scale usage in past 24 hours:  Yes  Total Short acting insulin in the past 24 hours:  Humulin R 4 units  Total Long acting insulin in the past 24 hours:  None    Wound:         Patient Lines/Drains/Airways Status    Active Current Wounds     Name: Placement date: Placement time: Site: Days:    Wound 03/04/20 Incision Back Midline;Lower  03/04/20   1707   Back  4                   Interventions:  With steri strip-open to air; clean, dry & intact  Effectiveness of intervention:  wound is improving         Sleep/wake cycle:   Average hours slept:  Sleeps 4-6 hours without waking  Sleep medication usage:  Declines    Patient/Family Training/Education:  Diabetes Management, Diet/Nutrition, Fall Prevention, General Self Care, Medication Management, Pain Management, Respiratory Hygiene, Safe Transfers, Safety and Skin Care  Discharge Supply Recommendations:  Glucometer and Strips and Blood Pressure Monitor  Strengths: Alert and oriented, Willingly participates in therapeutic activities, Able to follow instructions, Supportive family, Pleasant and cooperative, Motivated for self care and independence, Good endurance and Manages pain appropriately   Barriers:   Constipation, Fatigue and Generalized weakness       Nursing Problems     Problem: Bowel/Gastric:     Description:     Goal: Normal bowel function is maintained or improved     Description:           Goal: Will not experience complications related to bowel motility     Description:                 Problem: Communication     Description:     Goal: The ability to communicate needs accurately and effectively will improve     Description:                 Problem: Discharge Barriers/Planning      Description:     Goal: Patient's continuum of care needs will be met     Description:                 Problem: Infection     Description:     Goal: Will remain free from infection     Description:                 Problem: Knowledge Deficit     Description:     Goal: Knowledge of disease process/condition, treatment plan, diagnostic tests, and medications will improve     Description:           Goal: Knowledge of the prescribed therapeutic regimen will improve     Description:                 Problem: Pain Management     Description:     Goal: Pain level will decrease to patient's comfort goal     Description:                 Problem: Safety     Description:     Goal: Will remain free from injury     Description:           Goal: Will remain free from falls     Description:                 Problem: Skin Integrity     Description:     Goal: Risk for impaired skin integrity will decrease     Description:                 Problem: Urinary Elimination:     Description:     Goal: Ability to reestablish a normal urinary elimination pattern will improve     Description:                 Problem: Venous Thromboembolism (VTW)/Deep Vein Thrombosis (DVT) Prevention:     Description:     Goal: Patient will participate in Venous Thrombosis (VTE)/Deep Vein Thrombosis (DVT)Prevention Measures     Description:                        Long Term Goals:   At discharge patient will be able to function safely at home and in the community with support.    Section completed by:  Mary Augustin R.N.           Mobility  Bed mobility:   3  Bed /Chair/Wheelchair Transfer Initial:  5 - Standby Prompting/Supervision or Set-up  Bed /Chair/Wheelchair Transfer Current:  3 - Moderate Assistance   Bed/Chair/Wheelchair Transfer Description:  (w/c to bed using bed rail. Pt required min A to stand, CGA while pivoting, and assist for both limbs for sit to supine.)  Walk Initial:  2 - Max Assistance  Walk Current:  4 - Minimal Assistance   Walk Description:  " Walker, Extra time, Safety concerns, Verbal cueing, Requires incidental assist(160 ft, FWW, CGA, a few min LOB, able to self correct)  Wheelchair Initial:  2 - Max Assistance  Wheelchair Current:  2 - Max Assistance   Wheelchair Description:  Extra time, Verbal cueing(wheeled self ~50 ft from therapy gym to part of the way back to room with increased time and cues for w/c negotiation using BUE )  Stairs Initial:  2 - Max Assistance  Stairs Current: 2 - Max Assistance   Stairs Description: (5 x 1 6\" B UE modA for stability)  Patient/Family Training/Education:  STS sequencing, bed mobility, stair training, AMB with FWW  DME/DC Recommendations:  Pt owns 4ww, FWW, anticipate need for outpatient PT  Strengths:  Willingly participates in therapeutic activities, family support to drive/ prepare meals, grocery shop  Barriers:   Other: 20 stairs in home, poor core control, impaired balance, posterior LOB with STS , impaired gait speed  # of short term goals set= 5  # of short term goals met= 0  Physical Therapy Problems     Problem: Mobility     Dates: Start: 03/05/20       Description:     Goal: STG-Within one week, patient will ambulate community distances     Dates: Start: 03/05/20       Description: 1) Individualized goal: AMB 150ft x 1 SBA FWW  2) Interventions:  PT Group Therapy, PT Gait Training, PT Therapeutic Exercises, PT Neuro Re-Ed/Balance, PT Therapeutic Activity, PT Manual Therapy and PT Evaluation             Goal: STG-Within one week, patient will ambulate up/down a curb     Dates: Start: 03/05/20       Description: 1) Individualized goal:  FWW SBA  2) Interventions:  PT Group Therapy, PT Gait Training, PT Therapeutic Exercises, PT Neuro Re-Ed/Balance, PT Therapeutic Activity, PT Manual Therapy and PT Evaluation               Goal: STG-Within one week, patient will ascend and descend four to six stairs     Dates: Start: 03/05/20       Description: 1) Individualized goal:  8 x 1 6\" steps B HR Laury  2) " Interventions:  PT Group Therapy, PT Gait Training, PT Therapeutic Exercises, PT Neuro Re-Ed/Balance, PT Therapeutic Activity, PT Manual Therapy and PT Evaluation                     Problem: Mobility Transfers     Dates: Start: 03/05/20       Description:     Goal: STG-Within one week, patient will perform bed mobility     Dates: Start: 03/05/20       Description: 1) Individualized goal:  SBA HOB flat, bed rail as needed  2) Interventions:  PT Group Therapy, PT Gait Training, PT Therapeutic Exercises, PT Neuro Re-Ed/Balance, PT Therapeutic Activity, PT Manual Therapy and PT Evaluation               Goal: STG-Within one week, patient will transfer bed to chair     Dates: Start: 03/05/20       Description: 1) Individualized goal:  FWW SBA  2) Interventions:  PT Group Therapy, PT Gait Training, PT Therapeutic Exercises, PT Neuro Re-Ed/Balance, PT Therapeutic Activity, PT Manual Therapy and PT Evaluation                     Problem: PT-Long Term Goals     Dates: Start: 03/05/20       Description:     Goal: LTG-By discharge, patient will ambulate     Dates: Start: 03/05/20       Description: 1) Individualized goal:  200ft x 1 mod I LRAD  2) Interventions:  PT Group Therapy, PT Gait Training, PT Therapeutic Exercises, PT Neuro Re-Ed/Balance, PT Therapeutic Activity, PT Manual Therapy and PT Evaluation             Goal: LTG-By discharge, patient will transfer one surface to another     Dates: Start: 03/05/20       Description: 1) Individualized goal:  Mod I LRAD  2) Interventions:  PT Group Therapy, PT Gait Training, PT Therapeutic Exercises, PT Neuro Re-Ed/Balance, PT Therapeutic Activity, PT Manual Therapy and PT Evaluation               Goal: LTG-By discharge, patient will perform home exercise program     Dates: Start: 03/05/20       Description: 1) Individualized goal:  B LE strength / balance mod I  2) Interventions:  PT Group Therapy, PT Gait Training, PT Therapeutic Exercises, PT Neuro Re-Ed/Balance, PT  "Therapeutic Activity, PT Manual Therapy and PT Evaluation               Goal: LTG-By discharge, patient will ambulate up/down flight of stairs     Dates: Start: 03/05/20       Description: 1) Individualized goal:   12 x 1 6\" steps Laury   2) Interventions:  PT Group Therapy, PT Gait Training, PT Therapeutic Exercises, PT Neuro Re-Ed/Balance, PT Therapeutic Activity, PT Manual Therapy and PT Evaluation                             Section completed by:  Crystal Sanches, PT    Activities of Daily Living  Eating Initial:  7 - Independent  Eating Current:  7 - Independent   Eating Description:     Grooming Initial:  7 - Independent  Grooming Current:  5 - Standby Prompting/Supervision or Set-up   Grooming Description:  (Patient brushed dentures and combed hair independently while seated in w/c )  Bathing Initial:  4 - Minimal Assistance  Bathing Current:  4 - Minimal Assistance   Bathing Description:  Grab bar, Tub bench, Increased time, Supervision for safety, Set-up of equipment(Patient demonstrated ability to wash, rinse and dry all body parts with min assist (for standing balance/safety during washing/rinssing/drying of buttocks pursuits).)  Upper Body Dressing Initial:  5 - Standby Prompting/Supervision or Set-up  Upper Body Dressing Current:  4 - Minimal Assistance   Upper Body Dressing Description:  Supervision for safety, Set-up of equipment, Increased time(Patient donned long sleeve shirt with set-up while seated however required mind assist for donning bra (clasp) following shower. )  Lower Body Dressing Initial:  4 - Minimal Assistance  Lower Body Dressing Current:  4 - Minimal Assistance   Lower Body Dressing Description:  4 - Minimal Assistance  Toileting Initial:  5 - Standby Prompting/Supervision or Set-up  Toileting Current:  4 - Minimal Assistance   Toileting Description:  Supervision for safety, Increased time, Grab bar(CGA for balance and safety during clothing management pursuits. )  Toilet Transfer " Initial:  5 - Standby Prompting/Supervision or Set-up  Toilet Transfer Current:  4 - Minimal Assistance   Toilet Transfer Description:  4 - Minimal Assistance  Tub / Shower Transfer Initial:  4 - Minimal Assistance  Tub / Shower Transfer Current:  4 - Minimal Assistance   Tub / Shower Transfer Description:  Grab bar, Increased time, Supervision for safety(CGA for standing balance and safety with use of grab bar.)  IADL:  Not yet addressed, focus has been on ADLs/functional transfers   Family Training/Education: Not completed   DME/DC Recommendations: Home health OT vs outpatient, shower chair/grab bars (already owns/installed)     Strengths:  Able to follow instructions, Alert and oriented, Effective communication skills, Good insight into deficits/needs, Independent PLOF, Making steady progress towards goals, Manages pain appropriately, Motivated for self care and independence, Pleasant and cooperative, Supportive family and Willingly participates in therapeutic activities  Barriers:  Other: Decreased balance, spinal/back precautions, generalized weakness     # of short term goals set= 2   # of short term goals met= 0  Occupational Therapy Goals     Problem: Bathing     Dates: Start: 03/05/20       Description:     Goal: STG-Within one week, patient will bathe     Dates: Start: 03/05/20       Description: 1) Individualized Goal:  SBA with grab bars and AE as needed   2) Interventions:  OT Group Therapy, OT Self Care/ADL, OT Community Reintegration, OT Manual Ther Technique, OT Neuro Re-Ed/Balance, OT Sensory Int Techniques, OT Therapeutic Activity, OT Evaluation and OT Therapeutic Exercise                   Problem: Dressing     Dates: Start: 03/05/20       Description:     Goal: STG-Within one week, patient will dress LB     Dates: Start: 03/05/20       Description: 1) Individualized Goal:  SBA with grab bars and AE as needed   2) Interventions:  OT Group Therapy, OT Self Care/ADL, OT Community Reintegration, OT  Manual Ther Technique, OT Neuro Re-Ed/Balance, OT Sensory Int Techniques, OT Therapeutic Activity, OT Evaluation and OT Therapeutic Exercise                 Problem: OT Long Term Goals     Dates: Start: 03/05/20       Description:     Goal: LTG-By discharge, patient will complete basic self care tasks     Dates: Start: 03/05/20       Description: 1) Individualized Goal:  Sup to Mod I (Sup for bathing)     2) Interventions:  OT Group Therapy, OT Self Care/ADL, OT Community Reintegration, OT Manual Ther Technique, OT Neuro Re-Ed/Balance, OT Sensory Int Techniques, OT Therapeutic Activity, OT Evaluation and OT Therapeutic Exercise              Goal: LTG-By discharge, patient will perform bathroom transfers     Dates: Start: 03/05/20       Description: 1) Individualized Goal:  Mod I    2) Interventions:  OT Group Therapy, OT Self Care/ADL, OT Community Reintegration, OT Manual Ther Technique, OT Neuro Re-Ed/Balance, OT Sensory Int Techniques, OT Therapeutic Activity, OT Evaluation and OT Therapeutic Exercise              Goal: LTG-By discharge, patient will complete basic home management     Dates: Start: 03/05/20       Description: 1) Individualized Goal:  Supervision   2) Interventions:  OT Group Therapy, OT Self Care/ADL, OT Community Reintegration, OT Manual Ther Technique, OT Neuro Re-Ed/Balance, OT Sensory Int Techniques, OT Therapeutic Activity, OT Evaluation and OT Therapeutic Exercise                          Section completed by:  Kathie Patel MS,OTR/L       Recreation/Community     Leisure Competence Measure  Leisure Awareness: Independent  Leisure Attitude: Independent  Leisure Skills: Modified Independent  Cultural / Social Behaviors: Independent  Interpersonal Skills: Independent  Community Integration Skills: Cueing Assist  Social Contact: Independent  Community Participation: Cueing Assist    Strengths:  Able to follow instructions, Effective communication skills, Motivated for self care and  independence, Pleasant and cooperative and Willingly participates in therapeutic activities  Barriers:  Decreased endurance, Generalized weakness and Other: limited leisure participation  # of short term goals set=2  # of short term goals met=1    Pt reporting the she had been activity in leisure activities when she lived in [place]. She was given information on senior leisure activities provided by the [facility] to include herself in following discharge. Pt sharing willingness to participate and meet new peers. Will work with Pt to have a plan in place to participate in at least two of these activities following discharge.     Recreation Therapy Problems     Problem: Recreation Therapy     Dates: Start: 03/05/20       Description:     Goal: STG-Within one week, patient will demonstrate a standing tolerance     Dates: Start: 03/05/20       Description: By duel tasking while duel tasking at a CGA for 3 minutes x3 without LOB.            Goal: LTG-By discharge, patient will demonstrate leisure problem solving     Dates: Start: 03/05/20       Description: By reporting on two leisure activities she would like to participate in following discharge and any perceived barriers to her participation.            Goal: LTG-By discharge, patient will demonstrate a standing tolerance     Dates: Start: 03/05/20       Description: By duel tasking while duel tasking at a CGA for 5 minutes x3 without LOB.                        Section completed by:  Eddie Anglin, RHONDAS       REHAB-Pharmacy IDT Team Note by Pk Tariq RPH at 3/6/2020 11:58 AM  Version 1 of 1    Author:  Pk Tariq RPH Service:  -- Author Type:  Pharmacist    Filed:  3/6/2020 11:59 AM Date of Service:  3/6/2020 11:58 AM Status:  Signed    :  Pk Tariq RPH (Pharmacist)         Pharmacy   Pharmacy  Antibiotics/Antifungals/Antivirals:  Medication:      Active Orders (From admission, onward)    None        Route:        NA  Stop Date:   NA  Reason:      NA  Antihypertensives/Cardiac:  Medication:    Orders (72h ago, onward)     Start     Ordered    03/05/20 0600  hydroCHLOROthiazide (HYDRODIURIL) tablet 25 mg  Q DAY      03/04/20 1254    03/05/20 0600  losartan (COZAAR) tablet 100 mg  Q DAY      03/04/20 1254    03/04/20 1730  carvedilol (COREG) tablet 6.25 mg  2 TIMES DAILY WITH MEALS      03/04/20 1254              Patient Vitals for the past 24 hrs:   BP Pulse   03/06/20 0821 -- 72   03/06/20 0700 136/60 70   03/06/20 0457 149/61 --   03/06/20 0455 149/61 70   03/05/20 1900 132/59 74   03/05/20 1813 -- 80   03/05/20 1744 122/64 80   03/05/20 1400 131/58 78     Anticoagulation:  Medication: Lovenox, Plavix    Other key medications: A review of the medication list reveals no issues at this time. Patient is currently on antihypertensive(s). Recommend home blood pressure monitoring/recording if antihypertensive(s) regimen(s) continue.    Section completed by: Pk Tariq [facility][[email or web address]]     Attribution Key     AW.1 - Pk Tariq [facility] on 3/6/2020 11:58 AM                  DC Planning  DC destination/dispostion:  Two level home, 2 entry steps and 20 interior steps, with support of daughter and son-in-law.     DC Needs: DME TBD. Has Front-Wheel Walker, 4-Wheel Walker, Single Point Cane, Raised Toilet Seat With Arms, and shower chair.   HH vs OP therapies.   MD f/u appointments - PCP, Dr. Kimble.    Barriers to discharge:   Functional deficits.    Strengths:  Motivated. Family support.    Section completed by:  Nancy Will R.N.      Physician Summary  Jose White DO participated and led team conference discussion.

## 2020-03-09 NOTE — CARE PLAN
Problem: Bathing  Goal: STG-Within one week, patient will bathe  Description: 1) Individualized Goal:  SBA with grab bars and AE as needed   2) Interventions:  OT Group Therapy, OT Self Care/ADL, OT Community Reintegration, OT Manual Ther Technique, OT Neuro Re-Ed/Balance, OT Sensory Int Techniques, OT Therapeutic Activity, OT Evaluation and OT Therapeutic Exercise     Outcome: NOT MET     Problem: Dressing  Goal: STG-Within one week, patient will dress LB  Description: 1) Individualized Goal:  SBA with grab bars and AE as needed   2) Interventions:  OT Group Therapy, OT Self Care/ADL, OT Community Reintegration, OT Manual Ther Technique, OT Neuro Re-Ed/Balance, OT Sensory Int Techniques, OT Therapeutic Activity, OT Evaluation and OT Therapeutic Exercise   Outcome: NOT MET

## 2020-03-09 NOTE — THERAPY
Occupational Therapy  Daily Treatment     Patient Name: Krystle Tavarez  Age:  86 y.o., Sex:  female  Medical Record #: [id number]  Today's Date: 3/9/2020     Precautions  Precautions: (P) Fall Risk, Spinal / Back Precautions     Safety   ADL Safety : (P) Requires Physical Assist for Safety  Bathroom Safety: (P) Requires Physical Assist for Safety  Comments: (P) See FIMs for additional ADL performance details     Subjective    Patient agreeable to participate in OT.      Objective       20 0701   Precautions   Precautions Fall Risk;Spinal / Back Precautions    Safety    ADL Safety  Requires Physical Assist for Safety   Bathroom Safety Requires Physical Assist for Safety   Comments See FIMs for additional ADL performance details    Interdisciplinary Plan of Care Collaboration   IDT Collaboration with  Nursing   Patient Position at End of Therapy Seated;Call Light within Reach;Self Releasing Lap Belt Applied   Collaboration Comments RN checked in with patient at start of session   OT Total Time Spent   OT Individual Total Time Spent (Mins) 60   OT Charge Group   OT Self Care / ADL 4       FIM Grooming Score:  5 - Standby Prompting/Supervision or Set-up  Grooming Description:  (Patient brushed dentures and combed hair independently while seated in w/c )    FIM Bathing Score:  4 - Minimal Assistance  Bathing Description:  Grab bar, Tub bench, Increased time, Supervision for safety, Set-up of equipment(Patient demonstrated ability to wash, rinse and dry all body parts with min assist (for standing balance/safety during washing/rinssing/drying of buttocks pursuits).)    FIM Upper Body Dressin - Minimal Assistance  Upper Body Dressing Description:  Supervision for safety, Set-up of equipment, Increased time(Patient donned long sleeve shirt with set-up while seated however required mind assist for donning bra (clasp) following shower. )    FIM Lower Body Dressing Score:  4 - Minimal Assistance  Lower Body Dressing  Description:  Supervision for safety, Set-up of equipment, Increased time, Dressing stick, Sock aid(Patient donned elastic waist pants and non skid socks with min assist (with use of AE; dresing stick and sock aid). Min to CGA for standing balance and safety during pants over hips pursuit. )    FIM Toiletin - Minimal Assistance  Toileting Description:  Supervision for safety, Increased time, Grab bar(CGA for balance and safety during clothing management pursuits. )    FIM Toilet Transfer Score:  4 - Minimal Assistance  Toilet Transfer Description:  Grab bar, Increased time, Supervision for safety(CGA for balance and safety)    FIM Tub/Shower Transfers Score:  4 - Minimal Assistance  Tub/Shower Transfers Description:  Grab bar, Increased time, Supervision for safety(CGA for standing balance and safety with use of grab bar.)      Assessment    Patient tolerated OT session well with focus on progression with ADLs. Patient requires min assist to perform dressing and bathing tasks (for bra clasp, standing balance and safety). No LOB observed during this session.     Plan    Standing activity tolerance, UE strengthening, functional sit to stand, endurance, balance, ADLs with AE, light meal prep

## 2020-03-09 NOTE — CARE PLAN
Problem: Safety  Goal: Will remain free from injury  Outcome: PROGRESSING AS EXPECTED  Note: Pt uses call light  appropriately. Waits for assistance and does not attempt self transfer this shift. Able to verbalize needs.      Problem: Pain Management  Goal: Pain level will decrease to patient's comfort goal  Outcome: PROGRESSING AS EXPECTED  Note: Pt denies pain or discomfort tonight. She's on scheduled tylenol per MAR. She sleeps good. Will continue to monitor.     Problem: GLYCEMIA IMBALANCE  Intervention: MONITOR BLOOD GLUCOSE LEVELS AS ORDERED  Note: HS [id number]. No insulin coverage per MAR. HS snack offered but refused for now. Will continue to monitor.

## 2020-03-09 NOTE — DISCHARGE PLANNING
Outpatient therapy referral sent to [facility] per choice form.  Awaiting response.

## 2020-03-10 PROBLEM — R05.9 COUGH: Status: ACTIVE | Noted: 2020-03-10

## 2020-03-10 PROCEDURE — 99232 SBSQ HOSP IP/OBS MODERATE 35: CPT | Performed by: PHYSICAL MEDICINE & REHABILITATION

## 2020-03-10 PROCEDURE — 770010 HCHG ROOM/CARE - REHAB SEMI PRIVAT*

## 2020-03-10 PROCEDURE — 94760 N-INVAS EAR/PLS OXIMETRY 1: CPT

## 2020-03-10 PROCEDURE — 97112 NEUROMUSCULAR REEDUCATION: CPT

## 2020-03-10 PROCEDURE — 99232 SBSQ HOSP IP/OBS MODERATE 35: CPT | Performed by: HOSPITALIST

## 2020-03-10 PROCEDURE — 700102 HCHG RX REV CODE 250 W/ 637 OVERRIDE(OP): Performed by: PHYSICAL MEDICINE & REHABILITATION

## 2020-03-10 PROCEDURE — A9270 NON-COVERED ITEM OR SERVICE: HCPCS | Performed by: PHYSICAL MEDICINE & REHABILITATION

## 2020-03-10 PROCEDURE — 97535 SELF CARE MNGMENT TRAINING: CPT

## 2020-03-10 PROCEDURE — 97530 THERAPEUTIC ACTIVITIES: CPT

## 2020-03-10 PROCEDURE — 700111 HCHG RX REV CODE 636 W/ 250 OVERRIDE (IP): Performed by: PHYSICAL MEDICINE & REHABILITATION

## 2020-03-10 PROCEDURE — 97110 THERAPEUTIC EXERCISES: CPT

## 2020-03-10 PROCEDURE — 700112 HCHG RX REV CODE 229: Performed by: PHYSICAL MEDICINE & REHABILITATION

## 2020-03-10 RX ORDER — GABAPENTIN 400 MG/1
800 CAPSULE ORAL 2 TIMES DAILY
Status: DISCONTINUED | OUTPATIENT
Start: 2020-03-10 | End: 2020-03-16 | Stop reason: HOSPADM

## 2020-03-10 RX ORDER — SENNOSIDES A AND B 8.6 MG/1
8.6 TABLET, FILM COATED ORAL
Status: DISCONTINUED | OUTPATIENT
Start: 2020-03-11 | End: 2020-03-16 | Stop reason: HOSPADM

## 2020-03-10 RX ORDER — DEXTROMETHORPHAN POLISTIREX 30 MG/5ML
60 SUSPENSION ORAL EVERY 12 HOURS PRN
Status: DISCONTINUED | OUTPATIENT
Start: 2020-03-10 | End: 2020-03-16 | Stop reason: HOSPADM

## 2020-03-10 RX ADMIN — CLOPIDOGREL BISULFATE 75 MG: 75 TABLET, FILM COATED ORAL at 08:00

## 2020-03-10 RX ADMIN — GABAPENTIN 600 MG: 300 CAPSULE ORAL at 08:01

## 2020-03-10 RX ADMIN — ACETAMINOPHEN 650 MG: 325 TABLET, FILM COATED ORAL at 20:51

## 2020-03-10 RX ADMIN — TIZANIDINE 2 MG: 4 TABLET ORAL at 20:51

## 2020-03-10 RX ADMIN — DOCUSATE SODIUM 200 MG: 100 CAPSULE, LIQUID FILLED ORAL at 08:00

## 2020-03-10 RX ADMIN — LOSARTAN POTASSIUM 100 MG: 25 TABLET, FILM COATED ORAL at 04:56

## 2020-03-10 RX ADMIN — POLYETHYLENE GLYCOL 3350 1 PACKET: 17 POWDER, FOR SOLUTION ORAL at 08:04

## 2020-03-10 RX ADMIN — CALCIUM CARBONATE (ANTACID) CHEW TAB 500 MG 500 MG: 500 CHEW TAB at 20:51

## 2020-03-10 RX ADMIN — ENOXAPARIN SODIUM 40 MG: 100 INJECTION SUBCUTANEOUS at 20:50

## 2020-03-10 RX ADMIN — GABAPENTIN 800 MG: 400 CAPSULE ORAL at 20:50

## 2020-03-10 RX ADMIN — ACETAMINOPHEN 650 MG: 325 TABLET, FILM COATED ORAL at 04:57

## 2020-03-10 RX ADMIN — OXYCODONE HYDROCHLORIDE AND ACETAMINOPHEN 1000 MG: 500 TABLET ORAL at 08:00

## 2020-03-10 RX ADMIN — TIZANIDINE 2 MG: 4 TABLET ORAL at 15:20

## 2020-03-10 RX ADMIN — TIZANIDINE 2 MG: 4 TABLET ORAL at 08:00

## 2020-03-10 RX ADMIN — CARVEDILOL 6.25 MG: 3.12 TABLET, FILM COATED ORAL at 08:01

## 2020-03-10 RX ADMIN — CARVEDILOL 6.25 MG: 3.12 TABLET, FILM COATED ORAL at 16:58

## 2020-03-10 RX ADMIN — BENZOCAINE, MENTHOL 1 LOZENGE: 15; 3.6 LOZENGE ORAL at 10:18

## 2020-03-10 RX ADMIN — CALCIUM CARBONATE (ANTACID) CHEW TAB 500 MG 500 MG: 500 CHEW TAB at 08:00

## 2020-03-10 ASSESSMENT — ENCOUNTER SYMPTOMS
NERVOUS/ANXIOUS: 0
COUGH: 1
CHILLS: 0
ABDOMINAL PAIN: 0
DIARRHEA: 0
SHORTNESS OF BREATH: 0
VOMITING: 0
NAUSEA: 0
FEVER: 0

## 2020-03-10 ASSESSMENT — PATIENT HEALTH QUESTIONNAIRE - PHQ9
SUM OF ALL RESPONSES TO PHQ9 QUESTIONS 1 AND 2: 0
1. LITTLE INTEREST OR PLEASURE IN DOING THINGS: NOT AT ALL
2. FEELING DOWN, DEPRESSED, IRRITABLE, OR HOPELESS: NOT AT ALL
SUM OF ALL RESPONSES TO PHQ9 QUESTIONS 1 AND 2: 0
2. FEELING DOWN, DEPRESSED, IRRITABLE, OR HOPELESS: NOT AT ALL
1. LITTLE INTEREST OR PLEASURE IN DOING THINGS: NOT AT ALL

## 2020-03-10 NOTE — THERAPY
Recreational Therapy  Daily Treatment     Patient Name: Krystle Tavarez  AGE:  86 y.o., SEX:  female  Medical Record #: [id number]  Today's Date: 3/10/2020       Subjective    Pt shared on having bowel issues in the morning limited sleep due to cough. Pt had a bright and positive affect.   Objective       03/10/20 1531   Functional Ability Status - Cognitive   Attention Span Remains on Task   Comprehension Follows Three Step Commands   Judgment Able to Make Independent Decisions   Functional Ability Status - Emotional    Affect Appropriate   Mood Appropriate   Behavior Appropriate   Skilled Intervention    Skilled Intervention Cognitive Leisure;Relaxation / Coping Skills   Skilled Intervention Comments Cognitive leisure namign objects with specific letters.    Interdisciplinary Plan of Care Collaboration   Patient Position at End of Therapy In Bed;Call Light within Reach;Tray Table within Reach   Treatment Time   Total Time Spent (mins) 30   Procedural Tracking   Procedural Tracking Cognitive Skills Training;Leisure Skills Development       Assessment    Pt was given a word game in which she needed to find an object in a card that began with the last letter of the prior card. Pt reported enjoying the activity and required minor verbal cues to complete the activity.     Plan    Cognitive leisure and varied leisure activities.

## 2020-03-10 NOTE — PROGRESS NOTES
Hospital Medicine Daily Progress Note      Chief Complaint:  Hypertension  Afib  Diabetes  Hyponatremia    Interval History:  No significant events or changes since last visit    Review of Systems  Review of Systems   Constitutional: Negative for chills and fever.   Respiratory: Positive for cough. Negative for shortness of breath.    Cardiovascular: Negative for chest pain.   Gastrointestinal: Negative for abdominal pain, diarrhea, nausea and vomiting.   Psychiatric/Behavioral: The patient is not nervous/anxious.         Physical Exam  Temp:  [36.2 °C (97.1 °F)-36.8 °C (98.3 °F)] 36.2 °C (97.1 °F)  Pulse:  [65-95] 81  Resp:  [18-19] 19  BP: (117-165)/(48-65) 165/65  SpO2:  [90 %-95 %] 92 %    Physical Exam  Vitals signs and nursing note reviewed.   Constitutional:       Appearance: Normal appearance.   HENT:      Head: Atraumatic.   Eyes:      Conjunctiva/sclera: Conjunctivae normal.      Pupils: Pupils are equal, round, and reactive to light.   Neck:      Musculoskeletal: Normal range of motion and neck supple.   Cardiovascular:      Rate and Rhythm: Normal rate and regular rhythm.      Heart sounds: No murmur.   Pulmonary:      Effort: Pulmonary effort is normal.      Breath sounds: No stridor. No wheezing or rales.   Abdominal:      General: There is no distension.      Palpations: Abdomen is soft.      Tenderness: There is no abdominal tenderness.   Musculoskeletal:      Right lower leg: No edema.      Left lower leg: No edema.   Skin:     General: Skin is warm and dry.      Findings: No rash.   Neurological:      Mental Status: She is alert and oriented to person, place, and time.   Psychiatric:         Mood and Affect: Mood normal.         Behavior: Behavior normal.         Fluids    Intake/Output Summary (Last 24 hours) at 3/10/2020 0737  Last data filed at 3/10/2020 0459  Gross per 24 hour   Intake 480 ml   Output --   Net 480 ml       Laboratory  Recent Labs     03/08/20  0520   WBC 5.9   RBC 4.15*    HEMOGLOBIN 12.7   HEMATOCRIT 38.0   MCV 91.6   MCH 30.6   MCHC 33.4*   RDW 46.6   PLATELETCT 279   MPV 10.2     Recent Labs     20  0520 20  0541   SODIUM 134* 136   POTASSIUM 3.7 4.6   CHLORIDE 100 100   CO2 27 29   GLUCOSE 118* 123*   BUN 27* 15   CREATININE 0.82 0.60   CALCIUM 9.6 9.3                 Assessment/Plan  PAF (paroxysmal atrial fibrillation) ([facility])- (present on admission)  Assessment & Plan  HR ok  S/P PPM  On Core.25 mg bid  Monitor    Essential hypertension, benign- (present on admission)  Assessment & Plan  BP ok but tends to rise up in the early am  On Core.25 mg bid  On Cozaar: 100 mg daily  Cont to monitor    Cough  Assessment & Plan  Has had a persistent dry cough for 3 days  CXR: unremarkable  ? Bronchitis vs. pneumonia  Will give Zithro & doxycycline x 5 days    Hyponatremia  Assessment & Plan  Resolved  Likely 2nd to HCTZ -- d/c'd  S/P IVF's (for azotemia)    Type 2 diabetes mellitus without complication, without long-term current use of insulin ([facility])- (present on admission)  Assessment & Plan  Hba1c: 7.1  Not on any diabetic meds  Off accuchecks    Spinal stenosis of lumbar region with neurogenic claudication- (present on admission)  Assessment & Plan  S/P L5-S1 laminectomy ()

## 2020-03-10 NOTE — THERAPY
"Occupational Therapy  Daily Treatment     Patient Name: Krystle Tavarez  Age:  86 y.o., Sex:  female  Medical Record #: [id number]  Today's Date: 3/10/2020     Precautions  Precautions: Fall Risk, Spinal / Back Precautions     Safety   ADL Safety : Requires Physical Assist for Safety  Bathroom Safety: Requires Physical Assist for Safety  Comments: See FIMs for additional ADL performance details     Subjective    \"My right leg still feels so numb and weak\"     Objective     03/10/20 1001   Precautions   Precautions Fall Risk;Spinal / Back Precautions    Interdisciplinary Plan of Care Collaboration   Patient Position at End of Therapy Seated;Self Releasing Lap Belt Applied   OT Total Time Spent   OT Individual Total Time Spent (Mins) 60   OT Charge Group   OT Self Care / ADL 2   OT Neuromuscular Re-education / Balance 1   OT Therapeutic Exercise  1       FIM Grooming Score:  7 - Independent  Grooming Description:       FIM Upper Body Dressin - Standby Prompting/Supervision or Set-up  Upper Body Dressing Description:  (Set-up for donning long-sleeve shirt while seated in w/c)    FIM Lower Body Dressing Score:  4 - Minimal Assistance  Lower Body Dressing Description:  Supervision for safety, Set-up of equipment, Increased time(Patient donned pants with CGA for standing balance and safety)    Patient performed STS in // bars with SBA (1 set of 10) and marching in // bars with CGA for safety/balance (1 set of 10).    Patient retrieved grocery items from shelves of varied height and placed items in shopping cart with CGA.     Patient tolerated fluidobike in standing x 1:30 and in sitting x 3:30 (Level 1 resistance).    Assessment    Patient tolerated OT session well with focus on endurance and progression with standing balance/tolerance. Functional performance primarily limited by decreased endurance/fatigue and generalized weakness.     Plan  Standing activity tolerance, UE strengthening, functional sit to stand, " endurance, balance, ADLs with AE, light meal prep

## 2020-03-10 NOTE — FLOWSHEET NOTE
03/10/20 1139   Incentive Spirometry Treatment   Incentive Spirometer Volume 1500 mL  (x 2 then 1250)

## 2020-03-10 NOTE — ASSESSMENT & PLAN NOTE
Has a lingering cough   CXR: unremarkable  S/P mild wheezes (3/12)  ? Bronchitis vs. pneumonia  S/P Zithro & Doxy (3/15)  On Delsym prn cough

## 2020-03-10 NOTE — CARE PLAN
Problem: Bowel/Gastric:  Goal: Normal bowel function is maintained or improved  Outcome: PROGRESSING AS EXPECTED  Note: Pt continent of bowel. On bowel meds. LBM 3/9/20.     Problem: Urinary Elimination:  Goal: Ability to reestablish a normal urinary elimination pattern will improve  Outcome: PROGRESSING AS EXPECTED  Note: Pt continent of bladder. Voiding adequately in the BR. She denies dysuria.

## 2020-03-10 NOTE — FLOWSHEET NOTE
03/10/20 1137   Events/Summary/Plan   Events/Summary/Plan 02 spot check, IS   Vital Signs   Pulse 74   Respiration 18   Pulse Oximetry 90 %  (increased to 93 after deep breathing)   $ Pulse Oximetry (Spot Check) Yes   Respiratory Assessment   Level of Consciousness Alert   Secretions   Cough Non Productive   Oxygen   O2 Delivery Device Room air w/o2 available

## 2020-03-10 NOTE — CARE PLAN
Problem: Communication  Goal: The ability to communicate needs accurately and effectively will improve  Outcome: PROGRESSING AS EXPECTED  Note: Patient able to verbalize needs.  Will continue to monitor.      Problem: Pain Management  Goal: Pain level will decrease to patient's comfort goal  Outcome: PROGRESSING AS EXPECTED  Note: Patient able to verbalize pain level and verbalize an acceptable level of pain.

## 2020-03-10 NOTE — FLOWSHEET NOTE
03/10/20 1137   Events/Summary/Plan   Events/Summary/Plan 02 spot check, IS   Vital Signs   Pulse 74   Respiration 18   Pulse Oximetry 90 %  (increased to 93 after deep breathing)   $ Pulse Oximetry (Spot Check) Yes   Respiratory Assessment   Level of Consciousness Alert   Secretions   Cough Non Productive   Oxygen   O2 Delivery Device Aerosol Mask;Room air w/o2 available

## 2020-03-10 NOTE — THERAPY
"Occupational Therapy  Daily Treatment     Patient Name: Krystle Tavarez  Age:  86 y.o., Sex:  female  Medical Record #: [id number]  Today's Date: 3/10/2020     Precautions  Precautions: (P) Fall Risk, Spinal / Back Precautions     Safety   ADL Safety : Requires Physical Assist for Safety  Bathroom Safety: Requires Physical Assist for Safety  Comments: See FIMs for additional ADL performance details     Subjective    \"I'd like to get better at standing at the sink.\"     Objective       03/10/20 1301   Precautions   Precautions Fall Risk;Spinal / Back Precautions    Interdisciplinary Plan of Care Collaboration   Patient Position at End of Therapy Seated;Self Releasing Lap Belt Applied;Call Light within Reach;Tray Table within Reach   OT Total Time Spent   OT Individual Total Time Spent (Mins) 30   OT Charge Group   OT Therapy Activity 2       SBA/supervised for sit to stand x 2 from w/c.    Static standing tolerance at tall table x 3:30 and 5:45.      Assessment    Patient with low to fair static standing tolerance with use of FWW for support.      Plan    Standing activity tolerance, UE strengthening, functional sit to stand, endurance, balance, ADLs with AE, light meal prep    "

## 2020-03-10 NOTE — THERAPY
Physical Therapy   Daily Treatment     Patient Name: Krystle Tavarez  Age:  86 y.o., Sex:  female  Medical Record #: [id number]  Today's Date: 3/10/2020     Precautions  Precautions: Fall Risk, Spinal / Back Precautions     Subjective  Pt was sitting in w/c upon arrival and agreeable to tx  Pt requested to change clothes, use restroom, perform grooming tasks during initial part of therapy session  Reports having a cough and being unable to sleep well last night - feeling very tired/ weak as a result     Objective       03/10/20 0831   Precautions   Precautions Fall Risk;Spinal / Back Precautions    Interdisciplinary Plan of Care Collaboration   Patient Position at End of Therapy Seated;Chair Alarm On;Self Releasing Lap Belt Applied;Tray Table within Reach;Call Light within Reach;Phone within Reach   PT Total Time Spent   PT Individual Total Time Spent (Mins) 60   PT Charge Group   PT Therapeutic Exercise 1   PT Therapeutic Activities 3     FIM Bed/Chair/Wheelchair Transfers Score: 5 - Standby Prompting/Supervision or Set-up  Bed/Chair/Wheelchair Transfers Description:  (bed mob: mod I HOB flat, bed rail; transfer: SPT SBA FWW)    Dressing: bra, UB with set up assist, pants with reaching and Laury for one pant leg; VCs on adherence to precautions  Pt with extreme bowel incontinence episode requiring maxA for hygiene , modA for changing all clothing post incontience    Grooming: brushing hair/ teeth with set up assist; decline performance from standing position d/t fatigue despite encouragement    Toilet transfer SPV with use of grab bar; VCs for hand placement/ eccentric control       Assessment    Pt encouraged to be dressing/ groomed prior to therapy session so that pt can participate in more walking/ standing balance / postural re-ed activities. Pt fatigued this session and required increased time for performance of functional activities. Pt limited by bowel incontinence this session.  Verified family can provide  this assist as needed.     Plan  STS work, transfer sequencing; balance assessment (assess LOB backwards with standing), progress gait with FWW, postural education

## 2020-03-10 NOTE — PROGRESS NOTES
"Rehab Progress Note     Encounter Date: 3/10/2020    CC: low back pain, weakness in LE    Interval Events (Subjective)  She complains of sore throat cough and diarrhea.  This started over the last 24 hours, she complains of having a cold.  She feels weak, wilted flower    She reports tenderness in the anterior thigh and medial knee is worse today in the setting of her cold.    She reports she was unable to sleep well last night      ROS:  Gen: No fatigue, confusion, significant weight loss  Eyes: no blurry vision, double vision or pain in eyes  ENT: no changes in hearing, runny nose, nose bleeds, sinus pain  CV: No CP, palpitations,   Lungs: no shortness of breath, changes in secretions, changes in cough, pain with coughing  Abd: No abd pain, nausea, vomiting, pain with eating, positive diarrhea  : no blood in urine,  suprapubic pain  Ext: No swelling in legs, asymmetric atrophy  Neuro: no changes in strength or sensation  Skin: no new ulcers/skin breakdown appreciated by patient  Mood: No changes in mood today, increase in depression or anxiety  Heme: no bruising, or bleeding  Rest of 14 point review of systems is negative    Objective:  Vitals: /72   Pulse 74   Temp 37.1 °C (98.8 °F) (Temporal)   Resp 18   Ht 1.6 m (5' 3\")   Wt 76.4 kg (168 lb 8 oz)   SpO2 93%   Gen: NAD, Body mass index is 29.85 kg/m².  HEENT: NC/AT, PERRLA, dry mucous membranes  Cardio: RRR, no mumurs  Pulm: CTAB, with normal respiratory effort  Abd: Soft NTND, hyper active bowel sounds  Ext: No peripheral edema. No calf tenderness. No clubbing/cyanosis. +dorsal pedalis pulses bilaterally.  Increased dysesthesias over the L2 and L3 dermatome    Recent Results (from the past 72 hour(s))   ACCU-CHEK GLUCOSE    Collection Time: 03/07/20  5:21 PM   Result Value Ref Range    Glucose - Accu-Ck 126 (H) 65 - 99 mg/dL   ACCU-CHEK GLUCOSE    Collection Time: 03/07/20  8:29 PM   Result Value Ref Range    Glucose - Accu-Ck 139 (H) 65 - 99 " mg/dL   CBC WITHOUT DIFFERENTIAL    Collection Time: 03/08/20  5:20 AM   Result Value Ref Range    WBC 5.9 4.8 - 10.8 K/uL    RBC 4.15 (L) 4.20 - 5.40 M/uL    Hemoglobin 12.7 12.0 - 16.0 g/dL    Hematocrit 38.0 37.0 - 47.0 %    MCV 91.6 81.4 - 97.8 fL    MCH 30.6 27.0 - 33.0 pg    MCHC 33.4 (L) 33.6 - 35.0 g/dL    RDW 46.6 35.9 - 50.0 fL    Platelet Count 279 164 - 446 K/uL    MPV 10.2 9.0 - 12.9 fL   Basic Metabolic Panel    Collection Time: 03/08/20  5:20 AM   Result Value Ref Range    Sodium 134 (L) 135 - 145 mmol/L    Potassium 3.7 3.6 - 5.5 mmol/L    Chloride 100 96 - 112 mmol/L    Co2 27 20 - 33 mmol/L    Glucose 118 (H) 65 - 99 mg/dL    Bun 27 (H) 8 - 22 mg/dL    Creatinine 0.82 0.50 - 1.40 mg/dL    Calcium 9.6 8.5 - 10.5 mg/dL    Anion Gap 7.0 0.0 - 11.9   ESTIMATED GFR    Collection Time: 03/08/20  5:20 AM   Result Value Ref Range    GFR If African American >60 >60 mL/min/1.73 m 2    GFR If Non African American >60 >60 mL/min/1.73 m 2   ACCU-CHEK GLUCOSE    Collection Time: 03/08/20  7:17 AM   Result Value Ref Range    Glucose - Accu-Ck 125 (H) 65 - 99 mg/dL   ACCU-CHEK GLUCOSE    Collection Time: 03/08/20 11:15 AM   Result Value Ref Range    Glucose - Accu-Ck 144 (H) 65 - 99 mg/dL   ACCU-CHEK GLUCOSE    Collection Time: 03/08/20  5:13 PM   Result Value Ref Range    Glucose - Accu-Ck 207 (H) 65 - 99 mg/dL   ACCU-CHEK GLUCOSE    Collection Time: 03/08/20  8:09 PM   Result Value Ref Range    Glucose - Accu-Ck 128 (H) 65 - 99 mg/dL   Basic Metabolic Panel    Collection Time: 03/09/20  5:41 AM   Result Value Ref Range    Sodium 136 135 - 145 mmol/L    Potassium 4.6 3.6 - 5.5 mmol/L    Chloride 100 96 - 112 mmol/L    Co2 29 20 - 33 mmol/L    Glucose 123 (H) 65 - 99 mg/dL    Bun 15 8 - 22 mg/dL    Creatinine 0.60 0.50 - 1.40 mg/dL    Calcium 9.3 8.5 - 10.5 mg/dL    Anion Gap 7.0 0.0 - 11.9   ESTIMATED GFR    Collection Time: 03/09/20  5:41 AM   Result Value Ref Range    GFR If African American >60 >60 mL/min/1.73  m 2    GFR If Non African American >60 >60 mL/min/1.73 m 2   ACCU-CHEK GLUCOSE    Collection Time: 03/09/20  7:15 AM   Result Value Ref Range    Glucose - Accu-Ck 133 (H) 65 - 99 mg/dL       Current Facility-Administered Medications   Medication Frequency   • gabapentin (NEURONTIN) capsule 600 mg BID   • clopidogrel (PLAVIX) tablet 75 mg DAILY   • Pharmacy Consult Request ...Pain Management Review 1 Each PHARMACY TO DOSE   • Respiratory Therapy Consult Continuous RT   • oxyCODONE immediate-release (ROXICODONE) tablet 5 mg Q3HRS PRN   • oxyCODONE immediate release (ROXICODONE) tablet 10 mg Q3HRS PRN   • acetaminophen (TYLENOL) tablet 650 mg Q4HRS PRN   • enoxaparin (LOVENOX) inj 40 mg QHS   • docusate sodium (COLACE) capsule 200 mg BID    And   • sennosides (SENOKOT) 8.6 MG tablet 17.2 mg DAILY AT NOON    And   • magnesium hydroxide (MILK OF MAGNESIA) suspension 30 mL QDAY PRN   • artificial tears ophthalmic solution 1 Drop PRN   • benzocaine-menthol (CEPACOL) lozenge 1 Lozenge Q2HRS PRN   • mag hydrox-al hydrox-simeth (MAALOX PLUS ES or MYLANTA DS) suspension 20 mL Q2HRS PRN   • ondansetron (ZOFRAN ODT) dispertab 4 mg 4X/DAY PRN    Or   • ondansetron (ZOFRAN) syringe/vial injection 4 mg 4X/DAY PRN   • sodium chloride (OCEAN) 0.65 % nasal spray 2 Spray PRN   • traZODone (DESYREL) tablet 50 mg QHS PRN   • ascorbic acid tablet 1,000 mg DAILY   • carvedilol (COREG) tablet 6.25 mg BID WITH MEALS   • losartan (COZAAR) tablet 100 mg Q DAY   • polyethylene glycol/lytes (MIRALAX) PACKET 1 Packet DAILY   • tizanidine (ZANAFLEX) tablet 2 mg TID   • calcium carbonate (TUMS) chewable tab 500 mg BID       Orders Placed This Encounter   Procedures   • Diet Order Regular     Standing Status:   Standing     Number of Occurrences:   1     Order Specific Question:   Diet:     Answer:   Regular [1]       Assessment:  Active Hospital Problems    Diagnosis   • *Acute incomplete paraplegia (HCC)   • Post-op pain   • PAF (paroxysmal atrial  fibrillation) ([facility])   • Cardiac pacemaker in situ   • History of TIA (transient ischemic attack)   • Dyslipidemia   • Essential hypertension, benign   • Low vitamin D level   • Retinopathy   • Neurogenic bowel   • Neuropathic pain   • Neurogenic bladder   • Orthostatic hypotension   • 'light-for-dates' infant with signs of fetal malnutrition   • Type 2 diabetes mellitus without complication, without long-term current use of insulin ([facility])       Medical Decision Making and Plan:    L2 AIS D spinal cord injury: L4-L5 laminectomy by Dr. Kimble on 2/25 to address lumbar stenosis with neurogenic claudication  -No need for brace at this time  -We will need to follow-up with Dr. Kimble as outpatient  -Continue comprehensive acute inpatient rehabilitation     Upper respiratory infection: Afebrile  -Symptomatic management with throat lozenges, Cepacol  - Trazodone 50 mg as needed    Hyponatremia: Sodium on admission of 131, 136 on 3/9  -Appreciate hospitalist input  - P.o. fluid restriction of 1.8 L of free water per day  -DC hydrochlorothiazide in the setting of hyponatremia, with resulting improvement    Hypokalemia: Potassium of 3.5, significant decrease from 3/2, 4.6 on 3/9, monitor given DC of hydrochlorothiazide  -Check BMP    Neurogenic bowel: Discussed importance of daily bowel program  - KUB 3/4 showed no significant stool in the ascending or transverse colon  -Senna 2 tablets q. noon, Colace 200 mg twice daily, MiraLAX daily    Neurogenic bladder: History of worsening urinary incontinence  - voiding trial, if can't void in 6 hours or prn check pvr and if >500cc then ICP,if able to void then check PVR, if PVR is >200cc then ICP      orthostatic hypotension: Worsened by administration of tizanidine 2 mg 3 times daily.  Significant hypotensive episode yesterday requiring IV fluid     Hypertension:SBP max of 147  -Coreg 6.25 mg twice daily  -DC'd hydrochlorothiazide 25 mg daily, secondary to hyponatremia  -Losartan 100  mg daily     Paroxysmal atrial fibrillation: Plavix was held for spine surgery, cardiology recommending restarting approximately 12 days postop  - Restart Plavix on 3/8     Pain:  #Neuropathic pain: Hyperalgesia over L3 dermatome  -Increase gabapentin to 800 mg 2 times a day     #Postoperative pain:  - Tizanidine 2 mg 3 times daily, monitor for orthostatic hypotension, side effects may include fulminant liver failure, AST of 13 ALT of 9 on admission, should be checked in 2 weeks, 2 months, and every 6 months as long as patient is on tizanidine  - Tylenol 1000 mg 3 times daily.  -Oxycodone 5-10 mg every 3 hours as needed pain     DVT prophylaxis  -Lovenox 40 mg daily     Vitamin D deficiency:Vitamin D level 54 on admission, goal of greater than 30  - DC cholecalciferol, no need for supplementation at this time    Patient was seen for 27 minutes on unit/floor of which > 50% of time was spent on counseling and coordination of care regarding the above, including prognosis, risk reduction, benefits of treatment, and options for next stage of care including upper respiratory infection, neuropathic pain, and increase gabapentin to 800 mg 3 times a day.      Jose White D.O.

## 2020-03-10 NOTE — THERAPY
Physical Therapy   Daily Treatment     Patient Name: Krystle Tavarez  Age:  86 y.o., Sex:  female  Medical Record #: [id number]  Today's Date: 3/10/2020     Precautions  Precautions: (P) Fall Risk, Spinal / Back Precautions     Subjective    Pt was sitting in w/c upon arrival and agreeable to tx  Daughter present     Objective       03/10/20 1231   Precautions   Precautions Fall Risk;Spinal / Back Precautions    Interdisciplinary Plan of Care Collaboration   Patient Position at End of Therapy Seated;Call Light within Reach;Phone within Reach;Chair Alarm On  (with OT)   PT Total Time Spent   PT Individual Total Time Spent (Mins) 30   PT Charge Group   PT Therapeutic Activities 2       Discussed the possibility of taking pt out on appointment with daughter and grandson; request to be cleared for car transfers per Dr. White request; pt and daughter in agreement; car transfer to pt's car with FWW and SPV. Discussed need to take w/c to appointment dependent on endurance of patient/ walking required    Discussed bathroom set up during grooming activities to improve safety.    AMB 75ft x 1 130ft x 1 with FWW and SPV    Toilet transfer: mod I with FWW for hygiene and transfer    Assessment    Pt improved in endurance, safety and energy level compared to yesterday. Daughter demonstrates competency in assisting with car transfer. Pt continues to require VCs for upright posture.     Plan    STS work, transfer sequencing; balance assessment (assess LOB backwards with standing), progress gait with FWW, postural education

## 2020-03-11 LAB
ANION GAP SERPL CALC-SCNC: 5 MMOL/L (ref 0–11.9)
BASOPHILS # BLD AUTO: 0.2 % (ref 0–1.8)
BASOPHILS # BLD: 0.01 K/UL (ref 0–0.12)
BUN SERPL-MCNC: 12 MG/DL (ref 8–22)
CALCIUM SERPL-MCNC: 8.9 MG/DL (ref 8.5–10.5)
CHLORIDE SERPL-SCNC: 101 MMOL/L (ref 96–112)
CO2 SERPL-SCNC: 29 MMOL/L (ref 20–33)
CREAT SERPL-MCNC: 0.61 MG/DL (ref 0.5–1.4)
EOSINOPHIL # BLD AUTO: 0.3 K/UL (ref 0–0.51)
EOSINOPHIL NFR BLD: 5.9 % (ref 0–6.9)
ERYTHROCYTE [DISTWIDTH] IN BLOOD BY AUTOMATED COUNT: 47.8 FL (ref 35.9–50)
GLUCOSE SERPL-MCNC: 113 MG/DL (ref 65–99)
HCT VFR BLD AUTO: 30.7 % (ref 37–47)
HGB BLD-MCNC: 10.4 G/DL (ref 12–16)
IMM GRANULOCYTES # BLD AUTO: 0.02 K/UL (ref 0–0.11)
IMM GRANULOCYTES NFR BLD AUTO: 0.4 % (ref 0–0.9)
LYMPHOCYTES # BLD AUTO: 2.25 K/UL (ref 1–4.8)
LYMPHOCYTES NFR BLD: 44.3 % (ref 22–41)
MCH RBC QN AUTO: 31.2 PG (ref 27–33)
MCHC RBC AUTO-ENTMCNC: 33.9 G/DL (ref 33.6–35)
MCV RBC AUTO: 92.2 FL (ref 81.4–97.8)
MONOCYTES # BLD AUTO: 0.38 K/UL (ref 0–0.85)
MONOCYTES NFR BLD AUTO: 7.5 % (ref 0–13.4)
NEUTROPHILS # BLD AUTO: 2.12 K/UL (ref 2–7.15)
NEUTROPHILS NFR BLD: 41.7 % (ref 44–72)
NRBC # BLD AUTO: 0 K/UL
NRBC BLD-RTO: 0 /100 WBC
PLATELET # BLD AUTO: 234 K/UL (ref 164–446)
PMV BLD AUTO: 9.5 FL (ref 9–12.9)
POTASSIUM SERPL-SCNC: 4 MMOL/L (ref 3.6–5.5)
RBC # BLD AUTO: 3.33 M/UL (ref 4.2–5.4)
SODIUM SERPL-SCNC: 135 MMOL/L (ref 135–145)
WBC # BLD AUTO: 5.1 K/UL (ref 4.8–10.8)

## 2020-03-11 PROCEDURE — 97530 THERAPEUTIC ACTIVITIES: CPT

## 2020-03-11 PROCEDURE — 97116 GAIT TRAINING THERAPY: CPT

## 2020-03-11 PROCEDURE — 36415 COLL VENOUS BLD VENIPUNCTURE: CPT

## 2020-03-11 PROCEDURE — 94760 N-INVAS EAR/PLS OXIMETRY 1: CPT

## 2020-03-11 PROCEDURE — 85025 COMPLETE CBC W/AUTO DIFF WBC: CPT

## 2020-03-11 PROCEDURE — 700102 HCHG RX REV CODE 250 W/ 637 OVERRIDE(OP): Performed by: PHYSICAL MEDICINE & REHABILITATION

## 2020-03-11 PROCEDURE — 97110 THERAPEUTIC EXERCISES: CPT

## 2020-03-11 PROCEDURE — 700111 HCHG RX REV CODE 636 W/ 250 OVERRIDE (IP): Performed by: PHYSICAL MEDICINE & REHABILITATION

## 2020-03-11 PROCEDURE — 99232 SBSQ HOSP IP/OBS MODERATE 35: CPT | Performed by: PHYSICAL MEDICINE & REHABILITATION

## 2020-03-11 PROCEDURE — 99232 SBSQ HOSP IP/OBS MODERATE 35: CPT | Performed by: HOSPITALIST

## 2020-03-11 PROCEDURE — 97535 SELF CARE MNGMENT TRAINING: CPT

## 2020-03-11 PROCEDURE — 80048 BASIC METABOLIC PNL TOTAL CA: CPT

## 2020-03-11 PROCEDURE — 700102 HCHG RX REV CODE 250 W/ 637 OVERRIDE(OP): Performed by: HOSPITALIST

## 2020-03-11 PROCEDURE — A9270 NON-COVERED ITEM OR SERVICE: HCPCS | Performed by: PHYSICAL MEDICINE & REHABILITATION

## 2020-03-11 PROCEDURE — A9270 NON-COVERED ITEM OR SERVICE: HCPCS | Performed by: HOSPITALIST

## 2020-03-11 PROCEDURE — 770010 HCHG ROOM/CARE - REHAB SEMI PRIVAT*

## 2020-03-11 RX ORDER — DOXYCYCLINE HYCLATE 100 MG
100 TABLET ORAL EVERY 12 HOURS
Status: DISCONTINUED | OUTPATIENT
Start: 2020-03-11 | End: 2020-03-11

## 2020-03-11 RX ORDER — TIZANIDINE 4 MG/1
2 TABLET ORAL EVERY 6 HOURS PRN
Status: DISCONTINUED | OUTPATIENT
Start: 2020-03-11 | End: 2020-03-16 | Stop reason: HOSPADM

## 2020-03-11 RX ORDER — DOXYCYCLINE 100 MG/1
100 TABLET ORAL EVERY 12 HOURS
Status: COMPLETED | OUTPATIENT
Start: 2020-03-11 | End: 2020-03-15

## 2020-03-11 RX ORDER — AZITHROMYCIN 250 MG/1
500 TABLET, FILM COATED ORAL DAILY
Status: COMPLETED | OUTPATIENT
Start: 2020-03-11 | End: 2020-03-15

## 2020-03-11 RX ORDER — TIZANIDINE 4 MG/1
2 TABLET ORAL 2 TIMES DAILY
Status: DISCONTINUED | OUTPATIENT
Start: 2020-03-11 | End: 2020-03-12

## 2020-03-11 RX ADMIN — LOSARTAN POTASSIUM 100 MG: 25 TABLET, FILM COATED ORAL at 05:51

## 2020-03-11 RX ADMIN — CALCIUM CARBONATE (ANTACID) CHEW TAB 500 MG 500 MG: 500 CHEW TAB at 08:01

## 2020-03-11 RX ADMIN — BENZOCAINE, MENTHOL 1 LOZENGE: 15; 3.6 LOZENGE ORAL at 21:12

## 2020-03-11 RX ADMIN — CARVEDILOL 6.25 MG: 3.12 TABLET, FILM COATED ORAL at 17:48

## 2020-03-11 RX ADMIN — CARVEDILOL 6.25 MG: 3.12 TABLET, FILM COATED ORAL at 08:01

## 2020-03-11 RX ADMIN — DEXTROMETHORPHAN POLISTIREX 60 MG: 30 SUSPENSION ORAL at 05:50

## 2020-03-11 RX ADMIN — DOXYCYCLINE 100 MG: 100 TABLET, FILM COATED ORAL at 21:10

## 2020-03-11 RX ADMIN — AZITHROMYCIN 500 MG: 250 TABLET, FILM COATED ORAL at 10:52

## 2020-03-11 RX ADMIN — ACETAMINOPHEN 325 MG: 325 TABLET, FILM COATED ORAL at 21:11

## 2020-03-11 RX ADMIN — TIZANIDINE 2 MG: 4 TABLET ORAL at 08:01

## 2020-03-11 RX ADMIN — OXYCODONE HYDROCHLORIDE AND ACETAMINOPHEN 1000 MG: 500 TABLET ORAL at 08:00

## 2020-03-11 RX ADMIN — CALCIUM CARBONATE (ANTACID) CHEW TAB 500 MG 500 MG: 500 CHEW TAB at 21:09

## 2020-03-11 RX ADMIN — CLOPIDOGREL BISULFATE 75 MG: 75 TABLET, FILM COATED ORAL at 08:01

## 2020-03-11 RX ADMIN — GABAPENTIN 800 MG: 400 CAPSULE ORAL at 08:00

## 2020-03-11 RX ADMIN — DOXYCYCLINE 100 MG: 100 TABLET, FILM COATED ORAL at 10:52

## 2020-03-11 RX ADMIN — GABAPENTIN 800 MG: 400 CAPSULE ORAL at 21:10

## 2020-03-11 RX ADMIN — ENOXAPARIN SODIUM 40 MG: 100 INJECTION SUBCUTANEOUS at 21:11

## 2020-03-11 RX ADMIN — TIZANIDINE 2 MG: 4 TABLET ORAL at 21:10

## 2020-03-11 RX ADMIN — DEXTROMETHORPHAN POLISTIREX 60 MG: 30 SUSPENSION ORAL at 21:11

## 2020-03-11 ASSESSMENT — ENCOUNTER SYMPTOMS
WHEEZING: 1
HEADACHES: 0
BLURRED VISION: 0
COUGH: 1
FEVER: 0
NAUSEA: 0
HALLUCINATIONS: 0
PALPITATIONS: 0
VOMITING: 0
SHORTNESS OF BREATH: 0
DIZZINESS: 0

## 2020-03-11 ASSESSMENT — PATIENT HEALTH QUESTIONNAIRE - PHQ9
2. FEELING DOWN, DEPRESSED, IRRITABLE, OR HOPELESS: NOT AT ALL
SUM OF ALL RESPONSES TO PHQ9 QUESTIONS 1 AND 2: 0
SUM OF ALL RESPONSES TO PHQ9 QUESTIONS 1 AND 2: 0
1. LITTLE INTEREST OR PLEASURE IN DOING THINGS: NOT AT ALL
1. LITTLE INTEREST OR PLEASURE IN DOING THINGS: NOT AT ALL
2. FEELING DOWN, DEPRESSED, IRRITABLE, OR HOPELESS: NOT AT ALL

## 2020-03-11 NOTE — THERAPY
Physical Therapy   Daily Treatment     Patient Name: Krystle Tavarez  Age:  86 y.o., Sex:  female  Medical Record #: [id number]  Today's Date: 3/11/2020     Precautions  Precautions: (P) Fall Risk, Spinal / Back Precautions     Subjective    Pt was lying in bed upon arrival; agreeable to tx but requested to use restroom first.      Objective       03/11/20 1501   Precautions   Precautions Fall Risk;Spinal / Back Precautions    Supine Lower Body Exercise   Bridges 3 sets of 10;Two Legged   Pelvic Tilt 3 sets of 10  (10sec holds)   Other Exercises 3 x 10 (clamshells R/L, bent knee fall outs with pink tband, B leg lift 1inch in hooklying); review of postural HEP   Interdisciplinary Plan of Care Collaboration   Patient Position at End of Therapy In Bed;Call Light within Reach;Tray Table within Reach;Phone within Reach   PT Total Time Spent   PT Individual Total Time Spent (Mins) 30   PT Charge Group   PT Therapeutic Exercise 1   PT Therapeutic Activities 1     FIM Walking Score:  5E - Household Exception  Walking Description:  (55ft x 1 FWW mod I)    FIM Toilet Transfer Score:  6 - Modified Independent  Toilet Transfer Description:  (mod I with grab bar)    Education on floor recovery/ improving safety in the home with handout.  Review of HEP    Assessment    Pt able to complete HEP with provided instructions; good safety awareness and safety this session    Plan    Vector with posterior cue, AMB on uneven terrain, AMB with SPC, stair training, platform step training with FWW vs SPC, issue core/ hip strengthening program , prepare for d/c Friday (verify)

## 2020-03-11 NOTE — FLOWSHEET NOTE
03/11/20 1113   Events/Summary/Plan   Events/Summary/Plan IS, 02 spot check, respiratory education started   Vital Signs   Pulse 73   Respiration 18   Pulse Oximetry 93 %   $ Pulse Oximetry (Spot Check) Yes   Respiratory Assessment   Level of Consciousness Alert   Secretions   Cough Congested;Non Productive   Oxygen   O2 Delivery Device Room air w/o2 available

## 2020-03-11 NOTE — PROGRESS NOTES
"Rehab Progress Note     Encounter Date: 3/11/2020    CC: low back pain, weakness in LE    Interval Events (Subjective)    She reports sore throat and nasal congestion is improving in the last 24 hours.  She still has signs of a cold, but this did not keep her up last night.  PRN medication has helped.  She denies any shortness of breath.  She reports having a roommate and the acute hospital that had a bad cold.    Evaluated during ambulation today on the Vector system.  She is aware that she is bending forward, has been utilizing a walker for some time resulting in worsening and worsening anterior lean and kyphotic deformity in the thoracic spine.  She was able to correct posture abnormality when placed in front of mirror.     He denies any diarrhea in the last 12 hours.    She does want to be as strong as possible prior to going home.    ROS:  Gen: No fatigue, confusion, significant weight loss  Eyes: no blurry vision, double vision or pain in eyes  ENT: no changes in hearing, runny nose, nose bleeds, sinus pain  CV: No CP, palpitations,   Lungs: no shortness of breath, changes in secretions, changes in cough, pain with coughing  Abd: No abd pain, nausea, vomiting, pain with eating  : no blood in urine,  suprapubic pain  Ext: No swelling in legs, asymmetric atrophy  Neuro: She reports improving strength  Skin: no new ulcers/skin breakdown appreciated by patient  Mood: No changes in mood today, increase in depression or anxiety  Heme: no bruising, or bleeding  Rest of 14 point review of systems is negative    Objective:  Vitals: /73   Pulse 71   Temp 36.6 °C (97.8 °F) (Oral)   Resp 17   Ht 1.6 m (5' 3\")   Wt 76.4 kg (168 lb 8 oz)   SpO2 91%   Gen: NAD, Body mass index is 29.85 kg/m².  HEENT: NC/AT, PERRLA, moist mucous membranes  Cardio: RRR, no mumurs  Pulm: CTAB, with normal respiratory effort  Abd: Soft NTND, active bowel sounds,  Ext: No peripheral edema. No calf tenderness. No clubbing/cyanosis. " +dorsal pedalis pulses bilaterally.    Significant improvement and bilateral dysesthesias almost completely resolved with light touch    Recent Results (from the past 72 hour(s))   ACCU-CHEK GLUCOSE    Collection Time: 03/08/20 11:15 AM   Result Value Ref Range    Glucose - Accu-Ck 144 (H) 65 - 99 mg/dL   ACCU-CHEK GLUCOSE    Collection Time: 03/08/20  5:13 PM   Result Value Ref Range    Glucose - Accu-Ck 207 (H) 65 - 99 mg/dL   ACCU-CHEK GLUCOSE    Collection Time: 03/08/20  8:09 PM   Result Value Ref Range    Glucose - Accu-Ck 128 (H) 65 - 99 mg/dL   Basic Metabolic Panel    Collection Time: 03/09/20  5:41 AM   Result Value Ref Range    Sodium 136 135 - 145 mmol/L    Potassium 4.6 3.6 - 5.5 mmol/L    Chloride 100 96 - 112 mmol/L    Co2 29 20 - 33 mmol/L    Glucose 123 (H) 65 - 99 mg/dL    Bun 15 8 - 22 mg/dL    Creatinine 0.60 0.50 - 1.40 mg/dL    Calcium 9.3 8.5 - 10.5 mg/dL    Anion Gap 7.0 0.0 - 11.9   ESTIMATED GFR    Collection Time: 03/09/20  5:41 AM   Result Value Ref Range    GFR If African American >60 >60 mL/min/1.73 m 2    GFR If Non African American >60 >60 mL/min/1.73 m 2   ACCU-CHEK GLUCOSE    Collection Time: 03/09/20  7:15 AM   Result Value Ref Range    Glucose - Accu-Ck 133 (H) 65 - 99 mg/dL   Basic Metabolic Panel    Collection Time: 03/11/20  5:24 AM   Result Value Ref Range    Sodium 135 135 - 145 mmol/L    Potassium 4.0 3.6 - 5.5 mmol/L    Chloride 101 96 - 112 mmol/L    Co2 29 20 - 33 mmol/L    Glucose 113 (H) 65 - 99 mg/dL    Bun 12 8 - 22 mg/dL    Creatinine 0.61 0.50 - 1.40 mg/dL    Calcium 8.9 8.5 - 10.5 mg/dL    Anion Gap 5.0 0.0 - 11.9   CBC WITH DIFFERENTIAL    Collection Time: 03/11/20  5:24 AM   Result Value Ref Range    WBC 5.1 4.8 - 10.8 K/uL    RBC 3.33 (L) 4.20 - 5.40 M/uL    Hemoglobin 10.4 (L) 12.0 - 16.0 g/dL    Hematocrit 30.7 (L) 37.0 - 47.0 %    MCV 92.2 81.4 - 97.8 fL    MCH 31.2 27.0 - 33.0 pg    MCHC 33.9 33.6 - 35.0 g/dL    RDW 47.8 35.9 - 50.0 fL    Platelet Count 234  164 - 446 K/uL    MPV 9.5 9.0 - 12.9 fL    Neutrophils-Polys 41.70 (L) 44.00 - 72.00 %    Lymphocytes 44.30 (H) 22.00 - 41.00 %    Monocytes 7.50 0.00 - 13.40 %    Eosinophils 5.90 0.00 - 6.90 %    Basophils 0.20 0.00 - 1.80 %    Immature Granulocytes 0.40 0.00 - 0.90 %    Nucleated RBC 0.00 /100 WBC    Neutrophils (Absolute) 2.12 2.00 - 7.15 K/uL    Lymphs (Absolute) 2.25 1.00 - 4.80 K/uL    Monos (Absolute) 0.38 0.00 - 0.85 K/uL    Eos (Absolute) 0.30 0.00 - 0.51 K/uL    Baso (Absolute) 0.01 0.00 - 0.12 K/uL    Immature Granulocytes (abs) 0.02 0.00 - 0.11 K/uL    NRBC (Absolute) 0.00 K/uL   ESTIMATED GFR    Collection Time: 03/11/20  5:24 AM   Result Value Ref Range    GFR If African American >60 >60 mL/min/1.73 m 2    GFR If Non African American >60 >60 mL/min/1.73 m 2       Current Facility-Administered Medications   Medication Frequency   • azithromycin (ZITHROMAX) tablet 500 mg DAILY   • doxycycline monohydrate (ADOXA) tablet 100 mg Q12HRS   • gabapentin (NEURONTIN) capsule 800 mg BID   • Dextromethorphan Polistirex ER (DELSYM) 30 MG/5ML suspension 60 mg Q12HRS PRN   • sennosides (SENOKOT) 8.6 MG tablet 8.6 mg DAILY AT NOON    And   • magnesium hydroxide (MILK OF MAGNESIA) suspension 30 mL QDAY PRN   • clopidogrel (PLAVIX) tablet 75 mg DAILY   • Pharmacy Consult Request ...Pain Management Review 1 Each PHARMACY TO DOSE   • Respiratory Therapy Consult Continuous RT   • oxyCODONE immediate-release (ROXICODONE) tablet 5 mg Q3HRS PRN   • oxyCODONE immediate release (ROXICODONE) tablet 10 mg Q3HRS PRN   • acetaminophen (TYLENOL) tablet 650 mg Q4HRS PRN   • enoxaparin (LOVENOX) inj 40 mg QHS   • artificial tears ophthalmic solution 1 Drop PRN   • benzocaine-menthol (CEPACOL) lozenge 1 Lozenge Q2HRS PRN   • mag hydrox-al hydrox-simeth (MAALOX PLUS ES or MYLANTA DS) suspension 20 mL Q2HRS PRN   • ondansetron (ZOFRAN ODT) dispertab 4 mg 4X/DAY PRN    Or   • ondansetron (ZOFRAN) syringe/vial injection 4 mg 4X/DAY PRN    • sodium chloride (OCEAN) 0.65 % nasal spray 2 Spray PRN   • traZODone (DESYREL) tablet 50 mg QHS PRN   • ascorbic acid tablet 1,000 mg DAILY   • carvedilol (COREG) tablet 6.25 mg BID WITH MEALS   • losartan (COZAAR) tablet 100 mg Q DAY   • polyethylene glycol/lytes (MIRALAX) PACKET 1 Packet DAILY   • tizanidine (ZANAFLEX) tablet 2 mg TID   • calcium carbonate (TUMS) chewable tab 500 mg BID       Orders Placed This Encounter   Procedures   • Diet Order Regular     Standing Status:   Standing     Number of Occurrences:   1     Order Specific Question:   Diet:     Answer:   Regular [1]       Assessment:  Active Hospital Problems    Diagnosis   • *Acute incomplete paraplegia (HCC)   • Post-op pain   • PAF (paroxysmal atrial fibrillation) ([facility])   • Cardiac pacemaker in situ   • History of TIA (transient ischemic attack)   • Dyslipidemia   • Essential hypertension, benign   • Low vitamin D level   • Retinopathy   • Neurogenic bowel   • Neuropathic pain   • Neurogenic bladder   • Orthostatic hypotension   • 'light-for-dates' infant with signs of fetal malnutrition   • Type 2 diabetes mellitus without complication, without long-term current use of insulin ([facility])       Medical Decision Making and Plan:    L2 AIS D spinal cord injury: L4-L5 laminectomy by Dr. Kimble on 2/25 to address lumbar stenosis with neurogenic claudication  -No need for brace at this time  -We will need to follow-up with Dr. Kimble as outpatient, remove staples on 3/11  -Continue comprehensive acute inpatient rehabilitation     Upper respiratory infection: Afebrile  -Symptomatic management with throat lozenges, Cepacol  -Patient was started on azithromycin 500 mg daily, total of 5 doses,  -Doxycycline 100 mg every 12 hours x5 days   -Appreciate hospitalist input    Hyponatremia: Sodium on admission of 131, 136 on 3/9, 135 on 3/11  -Appreciate hospitalist input  - P.o. fluid restriction of 1.8 L of free water per day  -DC'd hydrochlorothiazide in the  setting of hyponatremia, with resulting improvement    Hypokalemia: Potassium of 3.5, significant decrease from 3/2, 4.6 on 3/9, potassium of 4.0 on 3/11, monitor given DC of hydrochlorothiazide    Neurogenic bowel: Discussed importance of daily bowel program  - KUB 3/4 showed no significant stool in the ascending or transverse colon  -Decrease senna 1 tablets q. noon, DC Colace 200 mg twice daily, continue MiraLAX daily    Neurogenic bladder: History of worsening urinary incontinence  - voiding trial, if can't void in 6 hours or prn check pvr and if >500cc then ICP,if able to void then check PVR, if PVR is >200cc then ICP      orthostatic hypotension: Worsened by administration of tizanidine 2 mg 3 times daily.  Significant hypotensive episode yesterday requiring IV fluid     Hypertension:SBP max of 152  -Coreg 6.25 mg twice daily  -DC'd hydrochlorothiazide 25 mg daily, secondary to hyponatremia  -Losartan 100 mg daily     Paroxysmal atrial fibrillation: Plavix was held for spine surgery, cardiology recommending restarting approximately 12 days postop  - restarted Plavix on 3/8  -Follow-up with cardiology on Friday    Pain:  #Neuropathic pain: Hyperalgesia over L3 dermatome  -gabapentin to 800 mg 2 times a day     #Postoperative pain:  - decrease Tizanidine 2 mg 2 times daily, monitor for orthostatic hypotension, side effects may include fulminant liver failure, AST of 13 ALT of 9 on admission, should be checked in 2 weeks, 2 months, and every 6 months as long as patient is on tizanidine  -Start tizanidine 2 mg every 6 hours as needed muscle cramps  - Tylenol 1000 mg 3 times daily.  -Oxycodone 5-10 mg every 3 hours as needed pain     DVT prophylaxis  -Lovenox 40 mg daily     Vitamin D deficiency:Vitamin D level 54 on admission, goal of greater than 30  - DC cholecalciferol, no need for supplementation at this time    Patient was seen for 29 minutes on unit/floor of which > 50% of time was spent on counseling and  coordination of care regarding the above, including prognosis, risk reduction, benefits of treatment, and options for next stage of care including decreasing tizanidine to 2 mg twice daily and initiation of tizanidine 2 mg every 6 hours as needed muscle cramps, discussed side effects of tizanidine, URI, initiation of azithromycin and doxycycline.      Jose White D.O.

## 2020-03-11 NOTE — THERAPY
Occupational Therapy  Daily Treatment     Patient Name: Krystle Tavarez  Age:  86 y.o., Sex:  female  Medical Record #: [id number]  Today's Date: 3/11/2020     Precautions  Precautions: Fall Risk, Spinal / Back Precautions     Safety   ADL Safety : Requires Physical Assist for Safety  Bathroom Safety: Requires Physical Assist for Safety  Comments: See FIMs for additional ADL performance details     Subjective    Patient agreeable to participate in OT. Daughter present for family training.      Objective     20 1331   Precautions   Precautions Fall Risk;Spinal / Back Precautions    Cognition    Level of Consciousness Alert   Interdisciplinary Plan of Care Collaboration   IDT Collaboration with  Family / Caregiver   Patient Position at End of Therapy Seated;Self Releasing Lap Belt Applied   Collaboration Comments Daughter present for family training    OT Total Time Spent   OT Individual Total Time Spent (Mins) 60   OT Charge Group   OT Self Care / ADL 3   OT Therapeutic Exercise  1       FIM Bathing Score:  5 - Standby Prompting/Supervision or Set-up  Bathing Description:  Grab bar, Hand held shower, Increased time, Supervision for safety, Set-up of equipment       FIM Upper Body Dressin - Standby Prompting/Supervision or Set-up  Upper Body Dressing Description:  Set-up of equipment, Supervision for safety, Increased time(Set-up and SBA for donning bra and sweater while seated)    FIM Lower Body Dressing Score:  4 - Minimal Assistance  Lower Body Dressing Description:  Sock aid, Increased time, Supervision for safety, Verbal cueing, Set-up of equipment(Patient donned pants with CGA to SBA while incorporating sitting and standing with use of counter (for UE support) and required min assist to don socks with use of sock aid. )    FIM Tub/Shower Transfers Score:  5 - Standby Prompting/Supervision or Set-up  Tub/Shower Transfers Description:  Supervision for safety, Grab bar, Increased time    Patient's daughter  (Jackelyn) present for family training. Reviewed safety considerations and adaptive techniques for ADLs. Patient already has shower chair and grab bars in place and daughter will continue to assist/supervise as needed. Patient actively participated in training session and asked appropriate questions.     Initiated education re: towel/dowel exercises and how patient can incorporate these exercises into her daily routine at home (ie in sitting vs supine positions). Handout to be provided.     Assessment    Patient tolerated OT session well with focus on ADLs and family training. Patient demo'd improved functional independence with ADL routine (see FIMs above). Daughter demonstrates ability to assist patient with safely performing ADLs at home.     Plan    Standing activity tolerance, UE strengthening, functional sit to stand, endurance, balance, ADLs with AE, light meal prep

## 2020-03-11 NOTE — THERAPY
"Occupational Therapy  Daily Treatment     Patient Name: Krystle Tavarez  Age:  86 y.o., Sex:  female  Medical Record #: [id number]  Today's Date: 3/11/2020     Precautions  Precautions: Fall Risk, Spinal / Back Precautions     Safety   ADL Safety : Requires Physical Assist for Safety  Bathroom Safety: Requires Physical Assist for Safety  Comments: See FIMs for additional ADL performance details     Subjective    \"I want to get stronger and then maintain my strength. I plan on joining a gym\"     Objective       03/11/20 1031   Sitting Upper Body Exercises   Lat Press 3 sets of 10;Bilateral;Weight (See Comments for lbs)  (30# rickshaw)   Bilateral Row 3 sets of 10;Bilateral;Weight (See Comments for lbs)  (35# equalizer)   Upper Extremity Bike Level 3 Resistance  (motomed BUE 5 min, 0 RB, .44 mile)   Interdisciplinary Plan of Care Collaboration   Patient Position at End of Therapy Seated;Call Light within Reach;Tray Table within Reach   OT Total Time Spent   OT Individual Total Time Spent (Mins) 30   OT Charge Group   OT Therapeutic Exercise  2       Assessment    Pt completed UB therex to the best of their abilities with no complaints of pain    Plan    Cont overall strength/endurance and standing balance to improve ADL's and functional mobility    "

## 2020-03-11 NOTE — PROGRESS NOTES
Spiritual Care Note    Patient Information     Patient's Name: Krystle Tavarez   MRN: [id number]    YOB: 1933   Age and Gender: 86 y.o. female   Service Area: Inpatient    Room (and Bed): [place] [address]   Ethnicity or Nationality:     Primary Language: English   Confucianist/Spiritual preference: [facility]   Place of Residence: [place], [place]   Family/Friends/Others Present: No   Clinical Team Present: No   Medical Diagnosis(-es)/Procedure(s): Paraplegia, incomplete   Code Status: DNAR/DNI    Date of Admission: 3/4/2020   Length of Stay: 7 days        Spiritual Care Provider Information:  Name of Spiritual Care Provider: Debra Milan  Title of Spiritual Care Provider: Associate   Phone Number: [phone]  E-mail: [email or web address]  Total time : 5 minutes    Spiritual Screen Results:    Gen Nursing  Spiritual Screen  Is your spiritual health or inner well-being important to you as you cope with your medical condition?: No  Would you like to receive a visit from our Spiritual Care team or your own Adventist or spiritual leader?: No  Was spiritual care education provided to the patient?: Declined     Palliative Care  PC Confucianist/Spiritual Screening  Was spiritual care education provided to the patient?: Declined      Encounter/Request Information  Encounter/Request Type   Visited With: Patient  Referral From/ Origin of Request: Epic nursing    Religous Needs/Values       Spiritual Assessment     Spiritual Care Encounters    Interaction/Conversation: Pt declined visit.    Plan: Visit Upon Request    Notes:

## 2020-03-11 NOTE — PROGRESS NOTES
Hospital Medicine Daily Progress Note      Chief Complaint:  Hypertension  Afib  Diabetes  Hyponatremia    Interval History:  No significant events or changes since last visit    Review of Systems  Review of Systems   Constitutional: Negative for fever.   Eyes: Negative for blurred vision.   Respiratory: Positive for cough and wheezing. Negative for shortness of breath.    Cardiovascular: Negative for palpitations.   Gastrointestinal: Negative for nausea and vomiting.   Neurological: Negative for dizziness and headaches.   Psychiatric/Behavioral: Negative for hallucinations.        Physical Exam  Temp:  [36.4 °C (97.6 °F)-36.6 °C (97.8 °F)] 36.6 °C (97.8 °F)  Pulse:  [69-74] 71  Resp:  [17-18] 17  BP: (133-157)/(66-79) 133/73  SpO2:  [90 %-91 %] 91 %    Physical Exam  Vitals signs and nursing note reviewed.   Constitutional:       General: She is not in acute distress.  HENT:      Mouth/Throat:      Mouth: Mucous membranes are moist.      Pharynx: Oropharynx is clear.   Eyes:      General: No scleral icterus.  Neck:      Musculoskeletal: No neck rigidity.      Vascular: No JVD.   Cardiovascular:      Rate and Rhythm: Normal rate and regular rhythm.      Heart sounds: Normal heart sounds. No murmur.   Pulmonary:      Effort: Pulmonary effort is normal.      Breath sounds: Normal breath sounds. No stridor.   Abdominal:      General: There is no distension.      Palpations: Abdomen is soft.      Tenderness: There is no abdominal tenderness.   Musculoskeletal:      Right lower leg: No edema.      Left lower leg: No edema.   Skin:     General: Skin is warm and dry.      Findings: No rash.   Neurological:      Mental Status: She is alert and oriented to person, place, and time.   Psychiatric:         Mood and Affect: Mood normal.         Behavior: Behavior normal.         Fluids  No intake or output data in the 24 hours ending 03/11/20 0810    Laboratory  Recent Labs     03/11/20  0524   WBC 5.1   RBC 3.33*   HEMOGLOBIN  10.4*   HEMATOCRIT 30.7*   MCV 92.2   MCH 31.2   MCHC 33.9   RDW 47.8   PLATELETCT 234   MPV 9.5     Recent Labs     20  0541 20  0524   SODIUM 136 135   POTASSIUM 4.6 4.0   CHLORIDE 100 101   CO2 29 29   GLUCOSE 123* 113*   BUN 15 12   CREATININE 0.60 0.61   CALCIUM 9.3 8.9                 Assessment/Plan  PAF (paroxysmal atrial fibrillation) ([facility])- (present on admission)  Assessment & Plan  HR ok  S/P PPM  On Core.25 mg bid  Monitor    Essential hypertension, benign- (present on admission)  Assessment & Plan  BP generally ok  On Core.25 mg bid  On Cozaar: 100 mg daily  Cont to monitor    Cough  Assessment & Plan  Has had a persistent dry cough for 3 days  CXR: unremarkable  Has some mild wheezes  ? Bronchitis vs. pneumonia  On Zithro & Doxy (3/11)  On Delsym prn cough    Hyponatremia  Assessment & Plan  Resolved   Likely 2nd to HCTZ -- d/c'd  S/P IVF's (for azotemia)    Type 2 diabetes mellitus without complication, without long-term current use of insulin ([facility])- (present on admission)  Assessment & Plan  Hba1c: 7.1  Not on any diabetic meds  Off accuchecks    Spinal stenosis of lumbar region with neurogenic claudication- (present on admission)  Assessment & Plan  S/P L5-S1 laminectomy ()

## 2020-03-11 NOTE — CARE PLAN
Problem: Communication  Goal: The ability to communicate needs accurately and effectively will improve  Outcome: PROGRESSING AS EXPECTED     Problem: Safety  Goal: Will remain free from injury  Outcome: PROGRESSING AS EXPECTED  Goal: Will remain free from falls  Outcome: PROGRESSING AS EXPECTED     Problem: Infection  Goal: Will remain free from infection  Outcome: PROGRESSING AS EXPECTED     Problem: Venous Thromboembolism (VTW)/Deep Vein Thrombosis (DVT) Prevention:  Goal: Patient will participate in Venous Thrombosis (VTE)/Deep Vein Thrombosis (DVT)Prevention Measures  Outcome: PROGRESSING AS EXPECTED     Problem: Bowel/Gastric:  Goal: Normal bowel function is maintained or improved  Outcome: PROGRESSING AS EXPECTED  Goal: Will not experience complications related to bowel motility  Outcome: PROGRESSING AS EXPECTED     Problem: Knowledge Deficit  Goal: Knowledge of disease process/condition, treatment plan, diagnostic tests, and medications will improve  Outcome: PROGRESSING AS EXPECTED  Goal: Knowledge of the prescribed therapeutic regimen will improve  Outcome: PROGRESSING AS EXPECTED     Problem: Discharge Barriers/Planning  Goal: Patient's continuum of care needs will be met  Outcome: PROGRESSING AS EXPECTED     Problem: Urinary Elimination:  Goal: Ability to reestablish a normal urinary elimination pattern will improve  Outcome: PROGRESSING AS EXPECTED     Problem: Pain Management  Goal: Pain level will decrease to patient's comfort goal  Outcome: PROGRESSING AS EXPECTED     Problem: Skin Integrity  Goal: Risk for impaired skin integrity will decrease  Outcome: PROGRESSING AS EXPECTED

## 2020-03-11 NOTE — THERAPY
Physical Therapy   Daily Treatment     Patient Name: Krystle Tavarez  Age:  86 y.o., Sex:  female  Medical Record #: [id number]  Today's Date: 3/11/2020     Precautions  Precautions: Fall Risk, Spinal / Back Precautions     Subjective    Pt was sitting in w/c upon arrival and agreeable to tx     Objective       03/11/20 0931   Precautions   Precautions Fall Risk;Spinal / Back Precautions    Interdisciplinary Plan of Care Collaboration   IDT Collaboration with  Physician   Patient Position at End of Therapy Seated;Call Light within Reach;Tray Table within Reach;Phone within Reach   Collaboration Comments re: Dr. White checked in on rehab progress in PT   PT Total Time Spent   PT Individual Total Time Spent (Mins) 60   PT Charge Group   PT Gait Training 4       Vector training with set up/ explanation beginning with 25% unloading progress to 12% unloading throughout session ; Posterior cue AMB with TCs/ visual cues for posture (110ft x 3 with cognitive dual tasking); AMB with tall stick 110ft x 1 to promote upright posture; AMB 40ft x 4 retro AMB with SPC, AMB; 40ft x 4 sidestepping with SPC, 110ft x 1 as fast as possible with arm swing, no AD)    FIM Walking Score:  4 - Minimal Assistance  Walking Description:  (80ft x 2 , 210ft x 1 SPC CGA)      Assessment    Pt with strong resiliency and ability to challenge self which allowed for quick within session improvements seen in dynamic standing balance and upright posturing. Continues to be limited by extensor mm strength    Plan    Vector with posterior cue, AMB on uneven terrain, AMB with SPC, stair training, platform step training with FWW vs SPC, issue core/ hip strengthening program , prepare for d/c Friday (verify)

## 2020-03-11 NOTE — THERAPY
"Recreational Therapy  Daily Treatment     Patient Name: Krystle Tavarez  AGE:  86 y.o., SEX:  female  Medical Record #: [id number]  Today's Date: 3/11/2020       Subjective    \"Sorry I'm late.\" Pt was encouraged that this was alright.      Objective       03/11/20 0831   Functional Ability Status - Cognitive   Attention Span Remains on Task   Comprehension Follows Three Step Commands   Judgment Able to Make Independent Decisions   Functional Ability Status - Emotional    Affect Appropriate   Mood Appropriate   Behavior Appropriate   Skilled Intervention    Skilled Intervention Cognitive Leisure   Skilled Intervention Comments Spot it cognitve leisure activity   Interdisciplinary Plan of Care Collaboration   IDT Collaboration with  Certified Nursing Assistant   Patient Position at End of Therapy Seated;Tray Table within Reach;Call Light within Reach   Collaboration Comments CNA assisting with dressing   Treatment Time   Total Time Spent (mins) 15   Total Time Missed 15   Reasons for Time Missed Medical-Patient With Nursing   Procedural Tracking   Procedural Tracking Cognitive Skills Training       Assessment    Pt was given a cognitive leisure activity after dressing with the CNA. Pt was 100% accurate on the cognitive leisure activity     Plan    Continue cognitive leisure while duel tasking and standing.   "

## 2020-03-12 PROCEDURE — 97116 GAIT TRAINING THERAPY: CPT

## 2020-03-12 PROCEDURE — 770010 HCHG ROOM/CARE - REHAB SEMI PRIVAT*

## 2020-03-12 PROCEDURE — 97530 THERAPEUTIC ACTIVITIES: CPT

## 2020-03-12 PROCEDURE — A9270 NON-COVERED ITEM OR SERVICE: HCPCS | Performed by: HOSPITALIST

## 2020-03-12 PROCEDURE — 99232 SBSQ HOSP IP/OBS MODERATE 35: CPT | Performed by: PHYSICAL MEDICINE & REHABILITATION

## 2020-03-12 PROCEDURE — 99232 SBSQ HOSP IP/OBS MODERATE 35: CPT | Performed by: HOSPITALIST

## 2020-03-12 PROCEDURE — 700102 HCHG RX REV CODE 250 W/ 637 OVERRIDE(OP): Performed by: HOSPITALIST

## 2020-03-12 PROCEDURE — 94760 N-INVAS EAR/PLS OXIMETRY 1: CPT

## 2020-03-12 PROCEDURE — 700102 HCHG RX REV CODE 250 W/ 637 OVERRIDE(OP): Performed by: PHYSICAL MEDICINE & REHABILITATION

## 2020-03-12 PROCEDURE — 97112 NEUROMUSCULAR REEDUCATION: CPT

## 2020-03-12 PROCEDURE — 97535 SELF CARE MNGMENT TRAINING: CPT

## 2020-03-12 PROCEDURE — 700111 HCHG RX REV CODE 636 W/ 250 OVERRIDE (IP): Performed by: PHYSICAL MEDICINE & REHABILITATION

## 2020-03-12 PROCEDURE — A9270 NON-COVERED ITEM OR SERVICE: HCPCS | Performed by: PHYSICAL MEDICINE & REHABILITATION

## 2020-03-12 RX ORDER — AMLODIPINE BESYLATE 5 MG/1
5 TABLET ORAL
Status: DISCONTINUED | OUTPATIENT
Start: 2020-03-12 | End: 2020-03-13

## 2020-03-12 RX ORDER — TIZANIDINE 4 MG/1
2 TABLET ORAL DAILY
Status: DISCONTINUED | OUTPATIENT
Start: 2020-03-13 | End: 2020-03-13

## 2020-03-12 RX ADMIN — OXYCODONE HYDROCHLORIDE AND ACETAMINOPHEN 1000 MG: 500 TABLET ORAL at 09:02

## 2020-03-12 RX ADMIN — TIZANIDINE 2 MG: 4 TABLET ORAL at 09:02

## 2020-03-12 RX ADMIN — DEXTROMETHORPHAN POLISTIREX 60 MG: 30 SUSPENSION ORAL at 20:14

## 2020-03-12 RX ADMIN — DOXYCYCLINE 100 MG: 100 TABLET, FILM COATED ORAL at 09:01

## 2020-03-12 RX ADMIN — AZITHROMYCIN 500 MG: 250 TABLET, FILM COATED ORAL at 09:02

## 2020-03-12 RX ADMIN — CLOPIDOGREL BISULFATE 75 MG: 75 TABLET, FILM COATED ORAL at 09:01

## 2020-03-12 RX ADMIN — BENZOCAINE, MENTHOL 1 LOZENGE: 15; 3.6 LOZENGE ORAL at 09:01

## 2020-03-12 RX ADMIN — CARVEDILOL 6.25 MG: 3.12 TABLET, FILM COATED ORAL at 09:02

## 2020-03-12 RX ADMIN — CALCIUM CARBONATE (ANTACID) CHEW TAB 500 MG 500 MG: 500 CHEW TAB at 20:13

## 2020-03-12 RX ADMIN — ENOXAPARIN SODIUM 40 MG: 100 INJECTION SUBCUTANEOUS at 20:13

## 2020-03-12 RX ADMIN — DOXYCYCLINE 100 MG: 100 TABLET, FILM COATED ORAL at 20:13

## 2020-03-12 RX ADMIN — LOSARTAN POTASSIUM 100 MG: 25 TABLET, FILM COATED ORAL at 05:22

## 2020-03-12 RX ADMIN — AMLODIPINE BESYLATE 5 MG: 5 TABLET ORAL at 09:09

## 2020-03-12 RX ADMIN — GABAPENTIN 800 MG: 400 CAPSULE ORAL at 09:01

## 2020-03-12 RX ADMIN — CARVEDILOL 6.25 MG: 3.12 TABLET, FILM COATED ORAL at 17:59

## 2020-03-12 RX ADMIN — CALCIUM CARBONATE (ANTACID) CHEW TAB 500 MG 500 MG: 500 CHEW TAB at 09:01

## 2020-03-12 RX ADMIN — GABAPENTIN 800 MG: 400 CAPSULE ORAL at 20:13

## 2020-03-12 ASSESSMENT — ENCOUNTER SYMPTOMS
NERVOUS/ANXIOUS: 0
WHEEZING: 1
DIARRHEA: 0
BLURRED VISION: 0
COUGH: 1
DIZZINESS: 0
FEVER: 0

## 2020-03-12 ASSESSMENT — PATIENT HEALTH QUESTIONNAIRE - PHQ9
2. FEELING DOWN, DEPRESSED, IRRITABLE, OR HOPELESS: NOT AT ALL
1. LITTLE INTEREST OR PLEASURE IN DOING THINGS: NOT AT ALL
SUM OF ALL RESPONSES TO PHQ9 QUESTIONS 1 AND 2: 0

## 2020-03-12 NOTE — CARE PLAN
Problem: Safety  Goal: Will remain free from injury  Note: Patient demonstrates good safety technique this shift.  Asks for assistance when needed Pt MOD I in room with FWW self transfer.  Able to verbalize needs.       Problem: Urinary Elimination:  Goal: Ability to reestablish a normal urinary elimination pattern will improve  Outcome: PROGRESSING AS EXPECTED  Note: Patient voiding adequate amounts of clear yellow urine.  Denies flank pain or dysuria; afebrile.  Occasional increased PVRs no ICP needed.

## 2020-03-12 NOTE — THERAPY
"Physical Therapy   Daily Treatment     Patient Name: Krystle Tavarez  Age:  86 y.o., Sex:  female  Medical Record #: [id number]  Today's Date: 3/12/2020     Precautions  Precautions: (P) Fall Risk, Spinal / Back Precautions     Subjective    Pt was sitting in w/c upon arrival and agreeable to tx  Requested to not do Vector today, but would try to tomorrow       Objective       03/12/20 1031   Precautions   Precautions Fall Risk;Spinal / Back Precautions    Interdisciplinary Plan of Care Collaboration   Patient Position at End of Therapy Seated;Call Light within Reach;Tray Table within Reach;Phone within Reach   PT Total Time Spent   PT Individual Total Time Spent (Mins) 60   PT Charge Group   PT Gait Training 4     FIM Bed/Chair/Wheelchair Transfers Score: 5 - Standby Prompting/Supervision or Set-up  Bed/Chair/Wheelchair Transfers Description:  (bed mob: mod I; transfer: SPT 4ww SPV)    FIM Walking Score:  5 - Standby Prompting/Supervision or Set-up  Walking Description:  (190ft x 2 FWW ; 180ft x 1 SPC CGA )    FIM Wheelchair Score:  6 - Modified Independent  Wheelchair Description:  Adaptive equipment(210ft x 1 mod I)    FIM Stairs Score:  2 - Max Assistance  Stairs Description:  (4 x 2 6\" steps 1st rep B UE support, 2nd rep B UE support on unilateral HR, both CGA)    Curb step 6\" with FWW x 3 SBA    Assessment    Pt receptive to education on posture, decreased UE use on FWW and demonstrates emerging competency in correcting indepedently but for limited periods/ endurance    Plan  Vector with posterior cue, AMB on uneven terrain, AMB with SPC, stair training, platform step training with FWW vs SPC, review issued HEP, postural re-ed     "

## 2020-03-12 NOTE — THERAPY
Occupational Therapy  Daily Treatment     Patient Name: Krystle Tavarez  Age:  86 y.o., Sex:  female  Medical Record #: [id number]  Today's Date: 3/12/2020     Precautions  Precautions: (P) Fall Risk, Spinal / Back Precautions     Safety   ADL Safety : Requires Physical Assist for Safety  Bathroom Safety: Requires Physical Assist for Safety  Comments: See FIMs for additional ADL performance details     Subjective    Patient agreeable to participate in OT.      Objective       20 0901   Precautions   Precautions Fall Risk;Spinal / Back Precautions    Cognition    Cognition / Consciousness WDL   Level of Consciousness Alert   Interdisciplinary Plan of Care Collaboration   Patient Position at End of Therapy Seated;Self Releasing Lap Belt Applied;Call Light within Reach   OT Total Time Spent   OT Individual Total Time Spent (Mins) 60   OT Charge Group   OT Self Care / ADL 1   OT Neuromuscular Re-education / Balance 2   OT Therapy Activity 1       FIM Grooming Score:  7 - Independent  Grooming Description:       FIM Toiletin - Standby Prompting/Supervision or Set-up  Toileting Description:  Grab bar, Increased time, Supervision for safety, Set-up of equipment(SBA for all toileting tasks with use of grab bar for balance and safety)    FIM Toilet Transfer Score:  5 - Standby Prompting/Supervision or Set-up  Toilet Transfer Description:  Increased time, Grab bar, Supervision for safety(SBA for wc<>toilet with use of grab bar (for standing balance and safety) )    Fluidobike in standing x 10 minutes (level 1 resistance) with SBA. No LOB noted.     Patient completed bed txfr on regular queen size bed with bed rail at supervision level. Mod I for bed mobility.     Patient performed indoor functional mobility 200' x 2 with FWW/ SBA. No LOB noted.     Therex in supine with 2# dowel:   - 1 set of 10 exercises for shoulder flexion, shoulder circles (clockwise/counter clockwise) and bench press.     Assessment    Patient  tolerated OT session well with focus on ADLs, functional txfrs, endurance and strengthening. Patient performed UB exercises to the best of her ability with improved tolerance in supine position (compared to seated position). Significant improvement noted with endurance and standing tolerance AEB improved fluidobike and functional mobility performance.     Plan  Standing activity tolerance, UE strengthening, functional sit to stand, endurance, balance, ADLs with AE, light meal prep

## 2020-03-12 NOTE — PROGRESS NOTES
"Rehab Progress Note     Encounter Date: 3/12/2020    CC: low back pain, weakness in LE    Interval Events (Subjective)    She reports nasal congestion and sore throat are improved from yesterday.  She still complains of mild congestion and cough.  Pain and sensitivity and anterior medial thigh of almost resolved on current dose of gabapentin.    No episodes of diarrhea in the last 24 hours  She is participating well with therapy    She reports sleeping well last night from 9pm -6am    ROS:  Gen: No fatigue, confusion, significant weight loss  Eyes: no blurry vision, double vision or pain in eyes  ENT: no changes in hearing, runny nose, nose bleeds, sinus pain  CV: No CP, palpitations  Lungs: no shortness of breath, changes in secretions, changes in cough, pain with coughing  Abd: No abd pain, nausea, vomiting, pain with eating  : no blood in urine, suprapubic pain  Ext: No swelling in legs, asymmetric atrophy  Neuro: Improving strength in bilateral lower extremity  Skin: no new ulcers/skin breakdown appreciated by patient  Mood: No changes in mood today, increase in depression or anxiety  Heme: no bruising, or bleeding  Rest of 14 point review of systems is negative    Objective:  Vitals: /73   Pulse 65   Temp 36.6 °C (97.8 °F) (Oral)   Resp 16   Ht 1.6 m (5' 2.99\")   Wt 76.4 kg (168 lb 8 oz)   SpO2 97%      Intake/Output Summary (Last 24 hours) at 3/12/2020 1041  Last data filed at 3/11/2020 1900  Gross per 24 hour   Intake 1240 ml   Output --   Net 1240 ml       Gen: NAD, Body mass index is 29.86 kg/m².  HEENT: NC/AT, PERRLA, moist mucous membranes, improved from previous 2 days  Cardio: RRR, no mumurs  Pulm: CTAB, with normal respiratory effort  Abd: Soft NTND, active bowel sounds,  Ext: No peripheral edema. No calf tenderness. No clubbing/cyanosis. +dorsal pedalis pulses bilaterally.      Recent Results (from the past 72 hour(s))   Basic Metabolic Panel    Collection Time: 03/11/20  5:24 AM "   Result Value Ref Range    Sodium 135 135 - 145 mmol/L    Potassium 4.0 3.6 - 5.5 mmol/L    Chloride 101 96 - 112 mmol/L    Co2 29 20 - 33 mmol/L    Glucose 113 (H) 65 - 99 mg/dL    Bun 12 8 - 22 mg/dL    Creatinine 0.61 0.50 - 1.40 mg/dL    Calcium 8.9 8.5 - 10.5 mg/dL    Anion Gap 5.0 0.0 - 11.9   CBC WITH DIFFERENTIAL    Collection Time: 03/11/20  5:24 AM   Result Value Ref Range    WBC 5.1 4.8 - 10.8 K/uL    RBC 3.33 (L) 4.20 - 5.40 M/uL    Hemoglobin 10.4 (L) 12.0 - 16.0 g/dL    Hematocrit 30.7 (L) 37.0 - 47.0 %    MCV 92.2 81.4 - 97.8 fL    MCH 31.2 27.0 - 33.0 pg    MCHC 33.9 33.6 - 35.0 g/dL    RDW 47.8 35.9 - 50.0 fL    Platelet Count 234 164 - 446 K/uL    MPV 9.5 9.0 - 12.9 fL    Neutrophils-Polys 41.70 (L) 44.00 - 72.00 %    Lymphocytes 44.30 (H) 22.00 - 41.00 %    Monocytes 7.50 0.00 - 13.40 %    Eosinophils 5.90 0.00 - 6.90 %    Basophils 0.20 0.00 - 1.80 %    Immature Granulocytes 0.40 0.00 - 0.90 %    Nucleated RBC 0.00 /100 WBC    Neutrophils (Absolute) 2.12 2.00 - 7.15 K/uL    Lymphs (Absolute) 2.25 1.00 - 4.80 K/uL    Monos (Absolute) 0.38 0.00 - 0.85 K/uL    Eos (Absolute) 0.30 0.00 - 0.51 K/uL    Baso (Absolute) 0.01 0.00 - 0.12 K/uL    Immature Granulocytes (abs) 0.02 0.00 - 0.11 K/uL    NRBC (Absolute) 0.00 K/uL   ESTIMATED GFR    Collection Time: 03/11/20  5:24 AM   Result Value Ref Range    GFR If African American >60 >60 mL/min/1.73 m 2    GFR If Non African American >60 >60 mL/min/1.73 m 2       Current Facility-Administered Medications   Medication Frequency   • amLODIPine (NORVASC) tablet 5 mg Q DAY   • azithromycin (ZITHROMAX) tablet 500 mg DAILY   • doxycycline monohydrate (ADOXA) tablet 100 mg Q12HRS   • tizanidine (ZANAFLEX) tablet 2 mg BID   • tizanidine (ZANAFLEX) tablet 2 mg Q6HRS PRN   • gabapentin (NEURONTIN) capsule 800 mg BID   • Dextromethorphan Polistirex ER (DELSYM) 30 MG/5ML suspension 60 mg Q12HRS PRN   • sennosides (SENOKOT) 8.6 MG tablet 8.6 mg DAILY AT NOON    And   •  magnesium hydroxide (MILK OF MAGNESIA) suspension 30 mL QDAY PRN   • clopidogrel (PLAVIX) tablet 75 mg DAILY   • Pharmacy Consult Request ...Pain Management Review 1 Each PHARMACY TO DOSE   • Respiratory Therapy Consult Continuous RT   • oxyCODONE immediate-release (ROXICODONE) tablet 5 mg Q3HRS PRN   • oxyCODONE immediate release (ROXICODONE) tablet 10 mg Q3HRS PRN   • acetaminophen (TYLENOL) tablet 650 mg Q4HRS PRN   • enoxaparin (LOVENOX) inj 40 mg QHS   • artificial tears ophthalmic solution 1 Drop PRN   • benzocaine-menthol (CEPACOL) lozenge 1 Lozenge Q2HRS PRN   • mag hydrox-al hydrox-simeth (MAALOX PLUS ES or MYLANTA DS) suspension 20 mL Q2HRS PRN   • ondansetron (ZOFRAN ODT) dispertab 4 mg 4X/DAY PRN    Or   • ondansetron (ZOFRAN) syringe/vial injection 4 mg 4X/DAY PRN   • sodium chloride (OCEAN) 0.65 % nasal spray 2 Spray PRN   • traZODone (DESYREL) tablet 50 mg QHS PRN   • ascorbic acid tablet 1,000 mg DAILY   • carvedilol (COREG) tablet 6.25 mg BID WITH MEALS   • losartan (COZAAR) tablet 100 mg Q DAY   • polyethylene glycol/lytes (MIRALAX) PACKET 1 Packet DAILY   • calcium carbonate (TUMS) chewable tab 500 mg BID       Orders Placed This Encounter   Procedures   • Diet Order Regular     Standing Status:   Standing     Number of Occurrences:   1     Order Specific Question:   Diet:     Answer:   Regular [1]       Assessment:  Active Hospital Problems    Diagnosis   • *Acute incomplete paraplegia (HCC)   • Post-op pain   • PAF (paroxysmal atrial fibrillation) ([facility])   • Cardiac pacemaker in situ   • History of TIA (transient ischemic attack)   • Dyslipidemia   • Essential hypertension, benign   • Low vitamin D level   • Retinopathy   • Neurogenic bowel   • Neuropathic pain   • Neurogenic bladder   • Orthostatic hypotension   • 'light-for-dates' infant with signs of fetal malnutrition   • Type 2 diabetes mellitus without complication, without long-term current use of insulin ([facility])       Medical Decision  Making and Plan:    L2 AIS D spinal cord injury: L4-L5 laminectomy by Dr. Kimble on 2/25 to address lumbar stenosis with neurogenic claudication  -No need for brace at this time  -We will need to follow-up with Dr. Kimble as outpatient  -Continue comprehensive acute inpatient rehabilitation     Upper respiratory infection: Afebrile  -Symptomatic management with throat lozenges, Cepacol  -Patient was started on azithromycin 500 mg daily, total of 5 doses,  -Doxycycline 100 mg every 12 hours x5 days   -Appreciate hospitalist input    Hyponatremia: Sodium on admission of 131, 136 on 3/9, 135 on 3/11  -Appreciate hospitalist input  - P.o. fluid restriction of 1.8 L of free water per day  -DC'd hydrochlorothiazide in the setting of hyponatremia, with resulting improvement    Hypokalemia: Potassium of 3.5, significant decrease from 3/2, 4.6 on 3/9, potassium of 4.0 on 3/11, monitor given DC of hydrochlorothiazide    Neurogenic bowel: Discussed importance of daily bowel program  - KUB 3/4 showed no significant stool in the ascending or transverse colon  -Decrease senna 1 tablets q. noon, DC Colace 200 mg twice daily, continue MiraLAX daily    Neurogenic bladder: History of worsening urinary incontinence  - voiding trial, if can't void in 6 hours or prn check pvr and if >500cc then ICP,if able to void then check PVR, if PVR is >200cc then ICP      orthostatic hypotension: Worsened by administration of tizanidine 2 mg 3 times daily.  Significant hypotensive episode yesterday requiring IV fluid  -No dizziness with change in position over the last 48 hours    Hypertension:SBP max of 176, associated with exercise  -Coreg 6.25 mg twice daily  -DC'd hydrochlorothiazide 25 mg daily, secondary to hyponatremia  -Losartan 100 mg daily     Paroxysmal atrial fibrillation: Plavix was held for spine surgery, cardiology recommending restarting approximately 12 days postop  - restarted Plavix on 3/8  -Follow-up with cardiology on Friday,  family has been trained on the car transfers and plans to take patient as therapeutic outing    Pain:  #Neuropathic pain: Hyperalgesia over L3 dermatome  -gabapentin to 800 mg 2 times a day  -Decrease tizanidine to 2 mg daily    #Postoperative pain:  - decrease Tizanidine 2 mg daily, monitor for orthostatic hypotension, side effects may include fulminant liver failure, AST of 13 ALT of 9 on admission, should be checked in 2 weeks, 2 months, and every 6 months as long as patient is on tizanidine  - tizanidine 2 mg every 6 hours as needed muscle cramps  - Tylenol 1000 mg 3 times daily.  -Oxycodone 5-10 mg every 3 hours as needed pain     DVT prophylaxis  -Lovenox 40 mg daily     Vitamin D deficiency:Vitamin D level 54 on admission, goal of greater than 30  - DC cholecalciferol, no need for supplementation at this time    Patient was seen for 26 minutes on unit/floor of which > 50% of time was spent on counseling and coordination of care regarding the above, including prognosis, risk reduction, benefits of treatment, and options for next stage of care including postoperative pain, decrease tizanidine to 2 mg daily, discussed side effects with goal to remove scheduled tizanidine.  Encourage postural changes with ambulation, discussed with physical therapy.      Jose White D.O.

## 2020-03-12 NOTE — FLOWSHEET NOTE
03/12/20 1145   Incentive Spirometry Treatment   Incentive Spirometer Volume 1500 mL  (Good effort)

## 2020-03-12 NOTE — PROGRESS NOTES
Hospital Medicine Daily Progress Note      Chief Complaint:  Hypertension  Afib  Diabetes  Hyponatremia    Interval History:  No significant events or changes since last visit    Review of Systems  Review of Systems   Constitutional: Negative for fever.   Eyes: Negative for blurred vision.   Respiratory: Positive for cough and wheezing.    Cardiovascular: Negative for chest pain.   Gastrointestinal: Negative for diarrhea.   Musculoskeletal: Negative for joint pain.   Neurological: Negative for dizziness.   Psychiatric/Behavioral: The patient is not nervous/anxious.         Physical Exam  Temp:  [36.6 °C (97.8 °F)-37 °C (98.6 °F)] 36.6 °C (97.8 °F)  Pulse:  [65-85] 65  Resp:  [16-18] 16  BP: (144-176)/(73-82) 154/73  SpO2:  [88 %-97 %] 97 %    Physical Exam  Vitals signs and nursing note reviewed.   Constitutional:       Appearance: She is not diaphoretic.   HENT:      Mouth/Throat:      Pharynx: No oropharyngeal exudate or posterior oropharyngeal erythema.   Eyes:      Extraocular Movements: Extraocular movements intact.   Neck:      Vascular: No carotid bruit or JVD.   Cardiovascular:      Rate and Rhythm: Normal rate and regular rhythm.      Heart sounds: Normal heart sounds. No murmur.   Pulmonary:      Effort: Pulmonary effort is normal.      Breath sounds: Normal breath sounds. No stridor.   Abdominal:      General: Bowel sounds are normal.      Palpations: Abdomen is soft.   Musculoskeletal:      Right lower leg: No edema.      Left lower leg: No edema.   Skin:     General: Skin is warm and dry.      Findings: No rash.   Neurological:      Mental Status: She is alert and oriented to person, place, and time.   Psychiatric:         Mood and Affect: Mood normal.         Behavior: Behavior normal.         Fluids    Intake/Output Summary (Last 24 hours) at 3/12/2020 0805  Last data filed at 3/11/2020 1900  Gross per 24 hour   Intake 1480 ml   Output --   Net 1480 ml       Laboratory  Recent Labs     03/11/20  9240    WBC 5.1   RBC 3.33*   HEMOGLOBIN 10.4*   HEMATOCRIT 30.7*   MCV 92.2   MCH 31.2   MCHC 33.9   RDW 47.8   PLATELETCT 234   MPV 9.5     Recent Labs     20  0524   SODIUM 135   POTASSIUM 4.0   CHLORIDE 101   CO2 29   GLUCOSE 113*   BUN 12   CREATININE 0.61   CALCIUM 8.9                 Assessment/Plan  PAF (paroxysmal atrial fibrillation) ([facility])- (present on admission)  Assessment & Plan  HR ok  S/P PPM  On Core.25 mg bid  Monitor    Essential hypertension, benign- (present on admission)  Assessment & Plan  BP more elevated recently  On Core.25 mg bid  On Cozaar: 100 mg daily  Will start Norvasc: 5 mg daily (3/12)  Cont to monitor    Cough  Assessment & Plan  Has had a persistent dry cough for 3 days  CXR: unremarkable  S/P mild wheezes (3/12)  ? Bronchitis vs. pneumonia  On Zithro & Doxy (3/11)  On Delsym prn cough    Hyponatremia  Assessment & Plan  Resolved   Likely 2nd to HCTZ -- d/c'd  S/P IVF's (for azotemia)    Type 2 diabetes mellitus without complication, without long-term current use of insulin ([facility])- (present on admission)  Assessment & Plan  Hba1c: 7.1  Not on any diabetic meds  Off accuchecks    Spinal stenosis of lumbar region with neurogenic claudication- (present on admission)  Assessment & Plan  S/P L5-S1 laminectomy ()

## 2020-03-12 NOTE — PROGRESS NOTES
Change of shift report obtained, blood pressure base line elevated, AM bp meds administered. Medicated x1 w/ acetaminophen for back pain, + cough prn meds given per request. Bladder scan pvr elevated 200+cc, pt got up to toilet to void, will continue to monitor pvrs. No other issues or concerns, pt stated she slept well but still feels fatigued.

## 2020-03-12 NOTE — THERAPY
"Occupational Therapy  Daily Treatment     Patient Name: Krystle Tavarez  Age:  86 y.o., Sex:  female  Medical Record #: [id number]  Today's Date: 3/12/2020     Precautions  Precautions: (P) Fall Risk, Spinal / Back Precautions     Safety   ADL Safety : Requires Physical Assist for Safety  Bathroom Safety: Requires Physical Assist for Safety  Comments: See FIMs for additional ADL performance details     Subjective    Patient agreeable to participate in OT.      Objective       03/12/20 1331   Precautions   Precautions Fall Risk;Spinal / Back Precautions    Interdisciplinary Plan of Care Collaboration   IDT Collaboration with  Family / Caregiver   Patient Position at End of Therapy Seated;Self Releasing Lap Belt Applied   Collaboration Comments Daughter present at beginning of session    OT Total Time Spent   OT Individual Total Time Spent (Mins) 30   OT Charge Group   OT Neuromuscular Re-education / Balance 1   OT Therapy Activity 1       FIM Toilet Transfer Score:  5 - Standby Prompting/Supervision or Set-up  Toilet Transfer Description:  Increased time, Supervision for safety, Adaptive equipment(Patient ambulated bed <> toilet with supervision and FWW. No LOB noted. )    FIM Walking Score:  5 - Standby Prompting/Supervision or Set-up  Walking Description:  Walker, Extra time, Supervision for safety(Patient performed indoor functional mobility 200'x2 with SBA and FWW for safety and balance. No LOB noted. )    Patient performed 2 sets of 10 step ups with 3.5\" step and 2 sets of 10 with 5.5\" step; completed both with SBA and FWW for safety and standing balance.     Assessment    Patient tolerated OT session well with focus on progression with indoor functional mobility, standing balance and endurance. No LOB observed during this session.     Plan    Standing activity tolerance, UE strengthening, functional sit to stand, endurance, balance, ADLs with AE, light meal prep    "

## 2020-03-12 NOTE — THERAPY
Recreational Therapy  Daily Treatment     Patient Name: Krystle Tavarez  AGE:  86 y.o., SEX:  female  Medical Record #: [id number]  Today's Date: 3/12/2020       Subjective    Pt was ready and willing for session.      Objective       03/12/20 1401   Functional Ability Status - Cognitive   Attention Span Remains on Task   Comprehension Follows Three Step Commands   Judgment Able to Make Independent Decisions   Functional Ability Status - Emotional    Affect Appropriate   Mood Appropriate   Behavior Appropriate   Skilled Intervention    Skilled Intervention Cognitive Leisure;Gross Motor Leisure   Skilled Intervention Comments Standing tolerance and balance   Interdisciplinary Plan of Care Collaboration   IDT Collaboration with  Physician   Patient Position at End of Therapy Seated;Tray Table within Reach;Call Light within Reach   Collaboration Comments Speaking with Pt.    Treatment Time   Total Time Spent (mins) 30   Procedural Tracking   Procedural Tracking Leisure Skills Development;Gross Motor Functional Leisure Skills       Assessment    Pt stood for 5 min x1 and 3 minutes x1 Pt was prompted once on posture while standing.     Plan    Standing balance and endurance.

## 2020-03-13 ENCOUNTER — OFFICE VISIT (OUTPATIENT)
Dept: CARDIOLOGY | Facility: MEDICAL CENTER | Age: 85
End: 2020-03-13
Payer: MEDICARE

## 2020-03-13 VITALS
OXYGEN SATURATION: 90 % | BODY MASS INDEX: 29.06 KG/M2 | DIASTOLIC BLOOD PRESSURE: 60 MMHG | WEIGHT: 164 LBS | SYSTOLIC BLOOD PRESSURE: 130 MMHG | HEIGHT: 63 IN | HEART RATE: 68 BPM

## 2020-03-13 DIAGNOSIS — Z95.0 CARDIAC PACEMAKER IN SITU: Chronic | ICD-10-CM

## 2020-03-13 DIAGNOSIS — E78.5 DYSLIPIDEMIA: Chronic | ICD-10-CM

## 2020-03-13 DIAGNOSIS — I10 ESSENTIAL HYPERTENSION, BENIGN: Chronic | ICD-10-CM

## 2020-03-13 DIAGNOSIS — I48.0 PAF (PAROXYSMAL ATRIAL FIBRILLATION) (HCC): ICD-10-CM

## 2020-03-13 DIAGNOSIS — D64.9 ANEMIA, UNSPECIFIED TYPE: ICD-10-CM

## 2020-03-13 PROCEDURE — 97530 THERAPEUTIC ACTIVITIES: CPT | Mod: CQ

## 2020-03-13 PROCEDURE — 99232 SBSQ HOSP IP/OBS MODERATE 35: CPT | Performed by: HOSPITALIST

## 2020-03-13 PROCEDURE — 97110 THERAPEUTIC EXERCISES: CPT

## 2020-03-13 PROCEDURE — 700111 HCHG RX REV CODE 636 W/ 250 OVERRIDE (IP): Performed by: PHYSICAL MEDICINE & REHABILITATION

## 2020-03-13 PROCEDURE — 94760 N-INVAS EAR/PLS OXIMETRY 1: CPT

## 2020-03-13 PROCEDURE — 99214 OFFICE O/P EST MOD 30 MIN: CPT | Performed by: INTERNAL MEDICINE

## 2020-03-13 PROCEDURE — 99232 SBSQ HOSP IP/OBS MODERATE 35: CPT | Performed by: PHYSICAL MEDICINE & REHABILITATION

## 2020-03-13 PROCEDURE — 97530 THERAPEUTIC ACTIVITIES: CPT

## 2020-03-13 PROCEDURE — A9270 NON-COVERED ITEM OR SERVICE: HCPCS | Performed by: PHYSICAL MEDICINE & REHABILITATION

## 2020-03-13 PROCEDURE — 97112 NEUROMUSCULAR REEDUCATION: CPT

## 2020-03-13 PROCEDURE — 700102 HCHG RX REV CODE 250 W/ 637 OVERRIDE(OP): Performed by: PHYSICAL MEDICINE & REHABILITATION

## 2020-03-13 PROCEDURE — A9270 NON-COVERED ITEM OR SERVICE: HCPCS | Performed by: HOSPITALIST

## 2020-03-13 PROCEDURE — 700102 HCHG RX REV CODE 250 W/ 637 OVERRIDE(OP): Performed by: HOSPITALIST

## 2020-03-13 PROCEDURE — 97535 SELF CARE MNGMENT TRAINING: CPT

## 2020-03-13 PROCEDURE — 770010 HCHG ROOM/CARE - REHAB SEMI PRIVAT*

## 2020-03-13 RX ORDER — AMLODIPINE BESYLATE 5 MG/1
10 TABLET ORAL
Status: DISCONTINUED | OUTPATIENT
Start: 2020-03-14 | End: 2020-03-16 | Stop reason: HOSPADM

## 2020-03-13 RX ORDER — AMLODIPINE BESYLATE 5 MG/1
5 TABLET ORAL ONCE
Status: COMPLETED | OUTPATIENT
Start: 2020-03-13 | End: 2020-03-13

## 2020-03-13 RX ADMIN — ENOXAPARIN SODIUM 40 MG: 100 INJECTION SUBCUTANEOUS at 21:07

## 2020-03-13 RX ADMIN — TIZANIDINE 2 MG: 4 TABLET ORAL at 08:42

## 2020-03-13 RX ADMIN — GABAPENTIN 800 MG: 400 CAPSULE ORAL at 08:42

## 2020-03-13 RX ADMIN — OXYCODONE HYDROCHLORIDE AND ACETAMINOPHEN 1000 MG: 500 TABLET ORAL at 08:42

## 2020-03-13 RX ADMIN — DOXYCYCLINE 100 MG: 100 TABLET, FILM COATED ORAL at 21:07

## 2020-03-13 RX ADMIN — CALCIUM CARBONATE (ANTACID) CHEW TAB 500 MG 500 MG: 500 CHEW TAB at 08:43

## 2020-03-13 RX ADMIN — LOSARTAN POTASSIUM 100 MG: 25 TABLET, FILM COATED ORAL at 05:14

## 2020-03-13 RX ADMIN — AMLODIPINE BESYLATE 5 MG: 5 TABLET ORAL at 05:14

## 2020-03-13 RX ADMIN — CARVEDILOL 6.25 MG: 3.12 TABLET, FILM COATED ORAL at 08:42

## 2020-03-13 RX ADMIN — DOXYCYCLINE 100 MG: 100 TABLET, FILM COATED ORAL at 08:43

## 2020-03-13 RX ADMIN — CALCIUM CARBONATE (ANTACID) CHEW TAB 500 MG 500 MG: 500 CHEW TAB at 21:07

## 2020-03-13 RX ADMIN — DEXTROMETHORPHAN POLISTIREX 60 MG: 30 SUSPENSION ORAL at 21:07

## 2020-03-13 RX ADMIN — CLOPIDOGREL BISULFATE 75 MG: 75 TABLET, FILM COATED ORAL at 08:42

## 2020-03-13 RX ADMIN — GABAPENTIN 800 MG: 400 CAPSULE ORAL at 21:07

## 2020-03-13 RX ADMIN — CARVEDILOL 6.25 MG: 3.12 TABLET, FILM COATED ORAL at 17:39

## 2020-03-13 RX ADMIN — AZITHROMYCIN 500 MG: 250 TABLET, FILM COATED ORAL at 08:42

## 2020-03-13 RX ADMIN — AMLODIPINE BESYLATE 5 MG: 5 TABLET ORAL at 08:42

## 2020-03-13 ASSESSMENT — ENCOUNTER SYMPTOMS
CARDIOVASCULAR NEGATIVE: 1
NERVOUS/ANXIOUS: 0
GASTROINTESTINAL NEGATIVE: 1
VOMITING: 0
DIZZINESS: 0
RESPIRATORY NEGATIVE: 1
DIARRHEA: 0
WEAKNESS: 1
SHORTNESS OF BREATH: 0
ABDOMINAL PAIN: 0
FEVER: 0
COUGH: 1
DEPRESSION: 0
NAUSEA: 0
CHILLS: 0
BACK PAIN: 0
PALPITATIONS: 0

## 2020-03-13 ASSESSMENT — FIBROSIS 4 INDEX
FIB4 SCORE: 1.592592592592592593
FIB4 SCORE: 1.592592592592592593

## 2020-03-13 NOTE — FLOWSHEET NOTE
03/13/20 0810   Events/Summary/Plan   Events/Summary/Plan spot check and IS done, pt resting well in the bed at this time    Skin Inspection Respiratory Device Intact   Vital Signs   Pulse 68   Respiration 18   Pulse Oximetry 92 %   $ Pulse Oximetry (Spot Check) Yes   Respiratory Assessment   Level of Consciousness Alert   Breath Sounds   RUL Breath Sounds Clear  (has a wheeze in throat area )   RML Breath Sounds Clear;Diminished   RLL Breath Sounds Clear;Diminished   FARRUKH Breath Sounds Clear   LLL Breath Sounds Expiratory Wheezes   Secretions   Cough Strong;Congested   How Sputum Obtained Spontaneous   Sputum Amount Unable to Evaluate   Sputum Color Unable to Evaluate   Sputum Consistency Unable to Evaluate   Oxygen   O2 (LPM) 1   O2 Delivery Device Nasal Cannula

## 2020-03-13 NOTE — PROGRESS NOTES
Chief Complaint   Patient presents with   • Atrial Fibrillation     Follow up       Subjective:   Krystle Tavarez is a 86 y.o. female who presents today for follow-up of permanent pacemaker, PAF, history of hypertension, and history of TIA treated with clopidogrel.  The patient was seen preoperatively in early February before back surgery pacer interrogation revealed a 7-hour episode of atrial fibrillation.  The patient had an uneventful back surgery but is now back in the rehab hospital for weakness.  In reviewing her labs, her hemoglobin was 13.7 and hematocrit 39% on February 14.  On March 8 H&H was 12.7 and 38 respectively and now on the 11th it is 10.4 and 30.1 respectively.  The patient denies any melena.  She denies any bright red bleeding per rectum.    Past Medical History:   Diagnosis Date   • Arthritis     knees, hips   • Breath shortness     with exertion    • Cataract     bilat IOL    • Dental disorder     full upper, partial lower    • Heart burn    • Hemorrhagic disorder (HCC)     on Plavix    • High cholesterol     diet controlled    • Hyperlipidemia    • Hypertension    • Pacemaker 2015   • Pain 08/2019    lower back, right leg   • Pre-diabetes    • TIA (transient ischemic attack)     on Plavix   • Urinary incontinence      Past Surgical History:   Procedure Laterality Date   • PB LAMINOTOMY,LUMBAR DISK,1 INTRSP N/A 2/25/2020    Procedure: LAMINECTOMY, SPINE, LUMBAR, WITH DISCECTOMY- L5-S1, MEDIAL FACETECTOMY;  Surgeon: Latrell Kimble M.D.;  Location: SURGERY [facility];  Service: Neurosurgery   • FORAMINOTOMY N/A 2/25/2020    Procedure: FORAMINOTOMY, SPINE;  Surgeon: Latrell Kimble M.D.;  Location: SURGERY [facility];  Service: Neurosurgery   • PACEMAKER INSERTION  2015   • HIP REPLACEMENT, TOTAL Right 2009   • KNEE REPLACEMENT, TOTAL Right 2004   • CATARACT EXTRACTION WITH IOL Bilateral 2003   • CHOLECYSTECTOMY  2002   • BASAL CELL EXCISION      nose and hand   • BUNIONECTOMY Left      Family  History   Problem Relation Age of Onset   • Diabetes Mother    • Stroke Mother    • Heart Attack Father 74   • No Known Problems Maternal Grandmother    • No Known Problems Maternal Grandfather    • No Known Problems Paternal Grandmother    • No Known Problems Paternal Grandfather      Social History     Socioeconomic History   • Marital status:      Spouse name: Not on file   • Number of children: Not on file   • Years of education: Not on file   • Highest education level: Not on file   Occupational History   • Not on file   Social Needs   • Financial resource strain: Not on file   • Food insecurity     Worry: Not on file     Inability: Not on file   • Transportation needs     Medical: Not on file     Non-medical: Not on file   Tobacco Use   • Smoking status: Never Smoker   • Smokeless tobacco: Never Used   Substance and Sexual Activity   • Alcohol use: No   • Drug use: No   • Sexual activity: Not on file   Lifestyle   • Physical activity     Days per week: Not on file     Minutes per session: Not on file   • Stress: Not on file   Relationships   • Social connections     Talks on phone: Not on file     Gets together: Not on file     Attends Hoahaoism service: Not on file     Active member of club or organization: Not on file     Attends meetings of clubs or organizations: Not on file     Relationship status: Not on file   • Intimate partner violence     Fear of current or ex partner: Not on file     Emotionally abused: Not on file     Physically abused: Not on file     Forced sexual activity: Not on file   Other Topics Concern   • Not on file   Social History Narrative   • Not on file     Allergies   Allergen Reactions   • Sulfa Drugs Nausea     Nausea      Facility-Administered Encounter Medications as of 3/13/2020   Medication Dose Route Frequency Provider Last Rate Last Dose   • [START ON 3/14/2020] amLODIPine (NORVASC) tablet 10 mg  10 mg Oral Q DAY Tien Lawrence M.D.       • azithromycin (ZITHROMAX)  tablet 500 mg  500 mg Oral DAILY Tien Lawrence M.D.   500 mg at 03/13/20 0842   • doxycycline monohydrate (ADOXA) tablet 100 mg  100 mg Oral Q12HRS Tien Lawrence M.D.   100 mg at 03/13/20 0843   • tizanidine (ZANAFLEX) tablet 2 mg  2 mg Oral Q6HRS PRN Jose White D.O.       • gabapentin (NEURONTIN) capsule 800 mg  800 mg Oral BID Jose White D.O.   800 mg at 03/13/20 0842   • Dextromethorphan Polistirex ER (DELSYM) 30 MG/5ML suspension 60 mg  60 mg Oral Q12HRS PRN AMOS Olsen.O.   60 mg at 03/12/20 2014   • sennosides (SENOKOT) 8.6 MG tablet 8.6 mg  8.6 mg Oral DAILY AT NOON STELLA OlsenO.        And   • magnesium hydroxide (MILK OF MAGNESIA) suspension 30 mL  30 mL Oral QDAY PRN AMOS Olsen.O.       • clopidogrel (PLAVIX) tablet 75 mg  75 mg Oral DAILY AMOS Olsen.O.   75 mg at 03/13/20 0842   • Pharmacy Consult Request ...Pain Management Review 1 Each  1 Each Other PHARMACY TO DOSE AMOS Olsen.SABINA.       • Respiratory Therapy Consult   Nebulization Continuous RT AMOS Olsen.O.       • oxyCODONE immediate-release (ROXICODONE) tablet 5 mg  5 mg Oral Q3HRS PRN AMOS Olsen.O.       • oxyCODONE immediate release (ROXICODONE) tablet 10 mg  10 mg Oral Q3HRS PRN AMOS Olsen.O.       • acetaminophen (TYLENOL) tablet 650 mg  650 mg Oral Q4HRS PRN AMOS Olsen.O.   325 mg at 03/11/20 2111   • enoxaparin (LOVENOX) inj 40 mg  40 mg Subcutaneous QHS AMOS Olsen.O.   40 mg at 03/12/20 2013   • artificial tears ophthalmic solution 1 Drop  1 Drop Both Eyes PRN AMOS Olsen.O.       • benzocaine-menthol (CEPACOL) lozenge 1 Lozenge  1 Lozenge Mouth/Throat Q2HRS PRN STELLA OlsenO.   1 Lozenge at 03/12/20 0901   • mag hydrox-al hydrox-simeth (MAALOX PLUS ES or MYLANTA DS) suspension 20 mL  20 mL Oral Q2HRS PRN AMOS Olsen.O.       • ondansetron (ZOFRAN ODT) dispertab 4 mg  4 mg Oral 4X/DAY PRN AMOS Olsen.O.         Or   • ondansetron (ZOFRAN) syringe/vial injection 4 mg  4 mg Intramuscular 4X/DAY PRN STELLA OlsenO.       • sodium chloride (OCEAN) 0.65 % nasal spray 2 Spray  2 Spray Nasal PRN AMOS Olsen.O.       • traZODone (DESYREL) tablet 50 mg  50 mg Oral QHS PRN AMOS Olsen.O.       • ascorbic acid tablet 1,000 mg  1,000 mg Oral DAILY Jose White D.O.   1,000 mg at 03/13/20 0842   • carvedilol (COREG) tablet 6.25 mg  6.25 mg Oral BID WITH MEALS AMOS Olsen.O.   6.25 mg at 03/13/20 0842   • losartan (COZAAR) tablet 100 mg  100 mg Oral Q DAY Jose White D.O.   100 mg at 03/13/20 0514   • polyethylene glycol/lytes (MIRALAX) PACKET 1 Packet  1 Packet Oral DAILY AMOS Olsen.O.   1 Packet at 03/10/20 0804   • calcium carbonate (TUMS) chewable tab 500 mg  500 mg Oral BID Jose White D.O.   500 mg at 03/13/20 0843     Outpatient Encounter Medications as of 3/13/2020   Medication Sig Dispense Refill   • calcium carbonate (OS-MARGARITA 500) 1250 (500 Ca) MG Tab Take 500 mg by mouth 2 Times a Day.     • carvedilol (COREG) 6.25 MG Tab Take 6.25 mg by mouth 2 times a day, with meals.     • oxyCODONE-acetaminophen (PERCOCET) 5-325 MG Tab Take 1-2 Tabs by mouth every four hours as needed for Severe Pain.     • tizanidine (ZANAFLEX) 2 MG tablet Take 2 mg by mouth 3 times a day.     • vitamin D (CHOLECALCIFEROL) 1000 UNIT Tab Take 1,000 Units by mouth every evening.     • losartan-hydrochlorothiazide (HYZAAR) 100-25 MG per tablet Take 1 Tab by mouth every day. 90 Tab 3   • Ascorbic Acid (VITAMIN C) 1000 MG Tab Take 1 Tab by mouth every day.       Review of Systems   Constitutional: Positive for malaise/fatigue.   HENT: Negative.    Respiratory: Negative.    Cardiovascular: Negative.  Negative for chest pain and palpitations.   Gastrointestinal: Negative.    Musculoskeletal: Negative for back pain.   Skin: Negative.    Neurological: Positive for weakness. Negative for dizziness.         "Difficulty with balance   Endo/Heme/Allergies: Negative.    Psychiatric/Behavioral: Negative for depression.        Objective:   /60 (BP Location: Left arm, Patient Position: Sitting, BP Cuff Size: Adult)   Pulse 68   Ht 1.6 m (5' 3\")   Wt 74.4 kg (164 lb)   LMP  (LMP Unknown)   SpO2 90%   BMI 29.05 kg/m²     Physical Exam   Constitutional: No distress.   Uses a walker   HENT:   Head: Normocephalic and atraumatic.   Eyes: Pupils are equal, round, and reactive to light. Conjunctivae are normal. No scleral icterus.   Cardiovascular: Regular rhythm.   No murmur heard.  Pulmonary/Chest: Breath sounds normal. No respiratory distress. She has no wheezes.   Musculoskeletal:      Comments: 1+ edema of the feet   Skin: Skin is warm.   Psychiatric: She has a normal mood and affect.       Assessment:     1. Cardiac pacemaker in situ  CBC WITH DIFFERENTIAL   2. Anemia, unspecified type  CBC WITH DIFFERENTIAL   3. Dyslipidemia  CBC WITH DIFFERENTIAL   4. Essential hypertension, benign  CBC WITH DIFFERENTIAL   5. PAF (paroxysmal atrial fibrillation) (HCC)         Medical Decision Making:  Today's Assessment / Status / Plan:   Anemia: Etiology is uncertain.  Plan to repeat CBC with reticulocyte count and iron studies on Monday.  Stool guaiac x2 has been ordered.    PAF: Patient had documented PAF noted on most recent pacemaker interrogation.  Her CHADS- Vasc score is 6 based on advanced age, prior TIA, female sex and hypertension.  She should be on anticoagulation, however her progressive anemia precludes this.  Recommend she stop her clopidogrel because of her progressive anemia.    Status post permanent pacemaker: Functioning normally.    Hypertension: Blood pressure may be a bit overcontrolled which may be secondary to her anemia.  Reevaluate 1 week.    Plan:  Reassess anemia with CBC, reticulocyte count and iron studies Monday.  Stool guaiacs over the weekend.    Discontinue clopidogrel because of anemia.    Return " 1 week.

## 2020-03-13 NOTE — PROGRESS NOTES
Hospital Medicine Daily Progress Note      Chief Complaint:  Hypertension  Afib  Diabetes  Hyponatremia    Interval History:  No significant events or changes since last visit    Review of Systems  Review of Systems   Constitutional: Negative for chills and fever.   Respiratory: Positive for cough. Negative for shortness of breath.    Cardiovascular: Negative for chest pain.   Gastrointestinal: Negative for abdominal pain, diarrhea, nausea and vomiting.   Psychiatric/Behavioral: The patient is not nervous/anxious.         Physical Exam  Temp:  [36.4 °C (97.6 °F)-36.8 °C (98.2 °F)] 36.5 °C (97.7 °F)  Pulse:  [64-80] 71  Resp:  [16-18] 18  BP: (147-154)/(74-80) 151/74  SpO2:  [91 %-93 %] 91 %    Physical Exam  Vitals signs and nursing note reviewed.   Constitutional:       Appearance: Normal appearance.   HENT:      Head: Atraumatic.      Mouth/Throat:      Pharynx: No posterior oropharyngeal erythema.   Eyes:      Conjunctiva/sclera: Conjunctivae normal.      Pupils: Pupils are equal, round, and reactive to light.   Neck:      Musculoskeletal: Normal range of motion and neck supple.      Vascular: No JVD.   Cardiovascular:      Rate and Rhythm: Normal rate and regular rhythm.      Heart sounds: Normal heart sounds.   Pulmonary:      Effort: Pulmonary effort is normal.      Breath sounds: Normal breath sounds.   Abdominal:      General: Bowel sounds are normal.      Palpations: Abdomen is soft.   Musculoskeletal:      Right lower leg: No edema.      Left lower leg: No edema.   Skin:     General: Skin is warm and dry.   Neurological:      Mental Status: She is alert and oriented to person, place, and time.   Psychiatric:         Mood and Affect: Mood normal.         Behavior: Behavior normal.         Fluids    Intake/Output Summary (Last 24 hours) at 3/13/2020 0804  Last data filed at 3/12/2020 1900  Gross per 24 hour   Intake 390 ml   Output --   Net 390 ml       Laboratory  Recent Labs     03/11/20  0524   WBC 5.1    RBC 3.33*   HEMOGLOBIN 10.4*   HEMATOCRIT 30.7*   MCV 92.2   MCH 31.2   MCHC 33.9   RDW 47.8   PLATELETCT 234   MPV 9.5     Recent Labs     20  0524   SODIUM 135   POTASSIUM 4.0   CHLORIDE 101   CO2 29   GLUCOSE 113*   BUN 12   CREATININE 0.61   CALCIUM 8.9                 Assessment/Plan  PAF (paroxysmal atrial fibrillation) ([facility])- (present on admission)  Assessment & Plan  HR ok  S/P PPM  On Core.25 mg bid  Monitor    Essential hypertension, benign- (present on admission)  Assessment & Plan  BP still elevated  On Core.25 mg bid  On Cozaar: 100 mg daily  On Norvasc: 5 mg daily (3/12) --> will increase to 10 mg daily (3/13)  Cont to monitor    Cough  Assessment & Plan  Has had a persistent dry cough -- much better recently (3/13)  CXR: unremarkable  S/P mild wheezes (3/12)  ? Bronchitis vs. pneumonia  On Zithro & Doxy (thru 3/15)  On Delsym prn cough    Hyponatremia  Assessment & Plan  Resolved   Likely 2nd to HCTZ -- d/c'd  S/P IVF's (for azotemia)    Type 2 diabetes mellitus without complication, without long-term current use of insulin ([facility])- (present on admission)  Assessment & Plan  Hba1c: 7.1  Not on any diabetic meds  Off accuchecks    Spinal stenosis of lumbar region with neurogenic claudication- (present on admission)  Assessment & Plan  S/P L5-S1 laminectomy ()

## 2020-03-13 NOTE — THERAPY
"Physical Therapy   Daily Treatment     Patient Name: Krystle Tavarez  Age:  86 y.o., Sex:  female  Medical Record #: [id number]  Today's Date: 3/13/2020     Precautions  Precautions: Fall Risk, Spinal / Back Precautions     Subjective    Pt was sitting in w/c upon arrival and agreeable to tx  Reports feeling confident with her mod I in room privileges with FWW.  Requested to not work on anything \"hard\" because of her doctor's appt this afternoon. Ultimately agreeable to motormed for B LE    Objective       03/13/20 1001   Precautions   Precautions Fall Risk;Spinal / Back Precautions    Interdisciplinary Plan of Care Collaboration   Patient Position at End of Therapy Seated;Call Light within Reach;Tray Table within Reach;Phone within Reach   PT Total Time Spent   PT Individual Total Time Spent (Mins) 30   PT Charge Group   PT Therapeutic Exercise 2       Motomed: 16minutes, B LE, level 3, active entire duration    Assessment    Pt fatigued this session d/t all session moved to morning as a result of doctor's appointment. Demonstrates emerging awareness of postural correction during AMB with FWW independently.    Plan   pt to D/c Monday. Collect all d/c Fims/IRF RAUL , assess goals over weekend to prepare for dc home with home health and support of family (pt owns FWW, 4ww, SPC and w/c). Family training has been completed.     Vector with posterior cue, AMB on uneven terrain, AMB with SPC, stair training, platform step training with FWW vs SPC, review issued HEP, postural re-ed     "

## 2020-03-13 NOTE — DISCHARGE PLANNING
Case Management Discharge Instructions        Discharge Location: Home with Outpatient Services     Agency Name / Phone: [facility]: [phone]     Home Health: Physical Therapist     Follow-up Information:     [facility]  [address] [place] [address] [address]  [place] [id number]  [phone]  On 3/19/2020  at 9:00AM     Latrell Kimble M.D.  [address]  [place] [id number] [phone]  The office will call you to schedule appointment

## 2020-03-13 NOTE — THERAPY
Physical Therapy   Daily Treatment     Patient Name: Krystle Tavarez  Age:  86 y.o., Sex:  female  Medical Record #: [id number]  Today's Date: 3/13/2020     Precautions  Precautions: (P) Fall Risk, Spinal / Back Precautions     Subjective    Pt agreeable to PT-fatigued this session     Objective       03/13/20 1031   Precautions   Precautions Fall Risk;Spinal / Back Precautions    Sitting Lower Body Exercises   Ankle Pumps 3 sets of 15   Hip Abduction 3 sets of 15   Hip Adduction 3 sets of 15   Long Arc Quad 3 sets of 15   Marching 3 sets of 15   Hamstring Curl 3 sets of 15   Nustep Resistance Level 4;Time (See Comments)  (B LE/UE x 10' with 1 rest break)   Interdisciplinary Plan of Care Collaboration   IDT Collaboration with  Physical Therapist   Patient Position at End of Therapy Seated;Self Releasing Lap Belt Applied   Collaboration Comments discissed pt fatigue   PT Total Time Spent   PT Individual Total Time Spent (Mins) 60   PT Charge Group   PT Therapeutic Exercise 3   PT Therapeutic Activities 1   Supervising Physical Therapist Crystal Sanches     Assessment    Pt participated in seated LE exercise and nustep, she was fatigued this session from having to complete all therapy before lunch(d/t afternoon appointment)    Plan    Vector with posterior cue, AMB on uneven terrain, AMB with SPC, stair training, platform step training with FWW vs SPC, review issued HEP, postural re-ed

## 2020-03-13 NOTE — PROGRESS NOTES
"Rehab Progress Note     Encounter Date: 3/13/2020    CC: low back pain, weakness in LE    Interval Events (Subjective)  She reports being significantly sore after all of the walking and postural exercises done yesterday and this morning.  This is not preventing her from participating in therapy.  She denies any change with the decrease the dose of tizanidine.   She denies any burning or tingling on the medial thighs or anterior thighs.    Significant improvement and upper respiratory symptoms including sore throat and cough    Slept well last night        ROS:  Gen: No fatigue, confusion, significant weight loss  Eyes: no blurry vision, double vision or pain in eyes  ENT: no changes in hearing, runny nose, nose bleeds, sinus pain  CV: No CP, palpitations,   Lungs: no shortness of breath, changes in secretions, changes in cough, pain with coughing  Abd: No abd pain, nausea, vomiting, pain with eating  : no blood in urine,  suprapubic pain  Ext: No swelling in legs, asymmetric atrophy  Neuro: no changes in strength or sensation  Skin: no new ulcers/skin breakdown appreciated by patient  Mood: No changes in mood today, increase in depression or anxiety  Heme: no bruising, or bleeding  Rest of 14 point review of systems is negative    Objective:  Vitals: /74   Pulse 68   Temp 36.5 °C (97.7 °F) (Oral)   Resp 18   Ht 1.6 m (5' 2.99\")   Wt 75.4 kg (166 lb 3.6 oz)   SpO2 92%   Gen: NAD, Body mass index is 29.45 kg/m².  HEENT:NC/AT, PERRLA, moist mucous membranes  Cardio: RRR, no mumurs  Pulm: CTAB, with normal respiratory effort  Abd: Soft NTND, active bowel sounds  Ext: No peripheral edema. No calf tenderness. No clubbing/cyanosis. +dorsal pedalis pulses bilaterally.        Recent Results (from the past 72 hour(s))   Basic Metabolic Panel    Collection Time: 03/11/20  5:24 AM   Result Value Ref Range    Sodium 135 135 - 145 mmol/L    Potassium 4.0 3.6 - 5.5 mmol/L    Chloride 101 96 - 112 mmol/L    Co2 29 20 " - 33 mmol/L    Glucose 113 (H) 65 - 99 mg/dL    Bun 12 8 - 22 mg/dL    Creatinine 0.61 0.50 - 1.40 mg/dL    Calcium 8.9 8.5 - 10.5 mg/dL    Anion Gap 5.0 0.0 - 11.9   CBC WITH DIFFERENTIAL    Collection Time: 03/11/20  5:24 AM   Result Value Ref Range    WBC 5.1 4.8 - 10.8 K/uL    RBC 3.33 (L) 4.20 - 5.40 M/uL    Hemoglobin 10.4 (L) 12.0 - 16.0 g/dL    Hematocrit 30.7 (L) 37.0 - 47.0 %    MCV 92.2 81.4 - 97.8 fL    MCH 31.2 27.0 - 33.0 pg    MCHC 33.9 33.6 - 35.0 g/dL    RDW 47.8 35.9 - 50.0 fL    Platelet Count 234 164 - 446 K/uL    MPV 9.5 9.0 - 12.9 fL    Neutrophils-Polys 41.70 (L) 44.00 - 72.00 %    Lymphocytes 44.30 (H) 22.00 - 41.00 %    Monocytes 7.50 0.00 - 13.40 %    Eosinophils 5.90 0.00 - 6.90 %    Basophils 0.20 0.00 - 1.80 %    Immature Granulocytes 0.40 0.00 - 0.90 %    Nucleated RBC 0.00 /100 WBC    Neutrophils (Absolute) 2.12 2.00 - 7.15 K/uL    Lymphs (Absolute) 2.25 1.00 - 4.80 K/uL    Monos (Absolute) 0.38 0.00 - 0.85 K/uL    Eos (Absolute) 0.30 0.00 - 0.51 K/uL    Baso (Absolute) 0.01 0.00 - 0.12 K/uL    Immature Granulocytes (abs) 0.02 0.00 - 0.11 K/uL    NRBC (Absolute) 0.00 K/uL   ESTIMATED GFR    Collection Time: 03/11/20  5:24 AM   Result Value Ref Range    GFR If African American >60 >60 mL/min/1.73 m 2    GFR If Non African American >60 >60 mL/min/1.73 m 2       Current Facility-Administered Medications   Medication Frequency   • [START ON 3/14/2020] amLODIPine (NORVASC) tablet 10 mg Q DAY   • tizanidine (ZANAFLEX) tablet 2 mg DAILY   • azithromycin (ZITHROMAX) tablet 500 mg DAILY   • doxycycline monohydrate (ADOXA) tablet 100 mg Q12HRS   • tizanidine (ZANAFLEX) tablet 2 mg Q6HRS PRN   • gabapentin (NEURONTIN) capsule 800 mg BID   • Dextromethorphan Polistirex ER (DELSYM) 30 MG/5ML suspension 60 mg Q12HRS PRN   • sennosides (SENOKOT) 8.6 MG tablet 8.6 mg DAILY AT NOON    And   • magnesium hydroxide (MILK OF MAGNESIA) suspension 30 mL QDAY PRN   • clopidogrel (PLAVIX) tablet 75 mg DAILY    • Pharmacy Consult Request ...Pain Management Review 1 Each PHARMACY TO DOSE   • Respiratory Therapy Consult Continuous RT   • oxyCODONE immediate-release (ROXICODONE) tablet 5 mg Q3HRS PRN   • oxyCODONE immediate release (ROXICODONE) tablet 10 mg Q3HRS PRN   • acetaminophen (TYLENOL) tablet 650 mg Q4HRS PRN   • enoxaparin (LOVENOX) inj 40 mg QHS   • artificial tears ophthalmic solution 1 Drop PRN   • benzocaine-menthol (CEPACOL) lozenge 1 Lozenge Q2HRS PRN   • mag hydrox-al hydrox-simeth (MAALOX PLUS ES or MYLANTA DS) suspension 20 mL Q2HRS PRN   • ondansetron (ZOFRAN ODT) dispertab 4 mg 4X/DAY PRN    Or   • ondansetron (ZOFRAN) syringe/vial injection 4 mg 4X/DAY PRN   • sodium chloride (OCEAN) 0.65 % nasal spray 2 Spray PRN   • traZODone (DESYREL) tablet 50 mg QHS PRN   • ascorbic acid tablet 1,000 mg DAILY   • carvedilol (COREG) tablet 6.25 mg BID WITH MEALS   • losartan (COZAAR) tablet 100 mg Q DAY   • polyethylene glycol/lytes (MIRALAX) PACKET 1 Packet DAILY   • calcium carbonate (TUMS) chewable tab 500 mg BID       Orders Placed This Encounter   Procedures   • Diet Order Regular     Standing Status:   Standing     Number of Occurrences:   1     Order Specific Question:   Diet:     Answer:   Regular [1]       Assessment:  Active Hospital Problems    Diagnosis   • *Acute incomplete paraplegia (HCC)   • Post-op pain   • PAF (paroxysmal atrial fibrillation) ([facility])   • Cardiac pacemaker in situ   • History of TIA (transient ischemic attack)   • Dyslipidemia   • Essential hypertension, benign   • Low vitamin D level   • Retinopathy   • Neurogenic bowel   • Neuropathic pain   • Neurogenic bladder   • Orthostatic hypotension   • 'light-for-dates' infant with signs of fetal malnutrition   • Type 2 diabetes mellitus without complication, without long-term current use of insulin ([facility])       Medical Decision Making and Plan:    L2 AIS D spinal cord injury: L4-L5 laminectomy by Dr. Kimble on 2/25 to address lumbar  stenosis with neurogenic claudication  -No need for brace at this time  -We will need to follow-up with Dr. Kimble as outpatient  -Continue comprehensive acute inpatient rehabilitation     Upper respiratory infection: Afebrile  -Symptomatic management with throat lozenges, Cepacol  -Patient was started on azithromycin 500 mg daily, total of 5 doses,  -Doxycycline 100 mg every 12 hours x5 days   -Appreciate hospitalist input    Hyponatremia: Sodium on admission of 131, 136 on 3/9, 135 on 3/11  -Appreciate hospitalist input  - P.o. fluid restriction of 1.8 L of free water per day  -DC'd hydrochlorothiazide in the setting of hyponatremia, with resulting improvement    Hypokalemia: Potassium of 3.5, significant decrease from 3/2, 4.6 on 3/9, potassium of 4.0 on 3/11, monitor given DC of hydrochlorothiazide    Neurogenic bowel: Discussed importance of daily bowel program  - KUB 3/4 showed no significant stool in the ascending or transverse colon  -Decreased senna 1 tablets q. noon, DC Colace 200 mg twice daily, continue MiraLAX daily    Neurogenic bladder: History of worsening urinary incontinence  - voiding trial, if can't void in 6 hours or prn check pvr and if >500cc then ICP,if able to void then check PVR, if PVR is >200cc then ICP      orthostatic hypotension: Worsened by administration of tizanidine 2 mg 3 times daily.  Significant hypotensive episode yesterday requiring IV fluid  -No dizziness with change in position over the last 48 hours    Hypertension:SBP max of 176, associated with exercise  -Coreg 6.25 mg twice daily  -DC'd hydrochlorothiazide 25 mg daily, secondary to hyponatremia  -Losartan 100 mg daily     Paroxysmal atrial fibrillation: Plavix was held for spine surgery, cardiology recommending restarting approximately 12 days postop  - restarted Plavix on 3/8  -Follow-up with cardiology on Friday, family has been trained on the car transfers and plans to take patient as therapeutic  outing    Pain:  #Neuropathic pain: Hyperalgesia over L3 dermatome  -gabapentin to 800 mg 2 times a day  -DC tizanidine scheduled    #Postoperative pain:  - Discontinue tizanidine scheduled, discussed side effects may include fulminant liver failure, AST of 13 ALT of 9 on admission, should be checked in 2 weeks, 2 months, and every 6 months as long as patient is on tizanidine  - tizanidine 2 mg every 6 hours as needed muscle cramps, not requiring PRN usage  - DC Tylenol 1000 mg 3 times daily.  -Oxycodone 5-10 mg every 3 hours as needed pain     DVT prophylaxis  -Lovenox 40 mg daily     Vitamin D deficiency:Vitamin D level 54 on admission, goal of greater than 30  - DC cholecalciferol, no need for supplementation at this time    Patient was seen for 26 minutes on unit/floor of which > 50% of time was spent on counseling and coordination of care regarding the above, including prognosis, risk reduction, benefits of treatment, and options for next stage of care including neuropathic pain, DC tizanidine, postoperative pain, DC high-dose Tylenol.      Jose White D.O.

## 2020-03-13 NOTE — THERAPY
"Physical Therapy   Daily Treatment     Patient Name: Krystle Tavarez  Age:  86 y.o., Sex:  female  Medical Record #: [id number]  Today's Date: 3/13/2020     Precautions  Precautions: Fall Risk, Spinal / Back Precautions     Subjective    Pt was sitting in w/c upon arrival and agreeable to tx  Reports feeling confident with her mod I in room privileges with FWW.  Requested to not work on anything \"hard\" because of her doctor's appt this afternoon. Ultimately agreeable to motormed for B LE    Objective       03/13/20 1031   Precautions   Precautions Fall Risk;Spinal / Back Precautions    Sitting Lower Body Exercises   Ankle Pumps 3 sets of 15   Hip Abduction 3 sets of 15   Hip Adduction 3 sets of 15   Long Arc Quad 3 sets of 15   Marching 3 sets of 15   Hamstring Curl 3 sets of 15   Nustep Resistance Level 4;Time (See Comments)  (B LE/UE x 10' with 1 rest break)   Interdisciplinary Plan of Care Collaboration   IDT Collaboration with  Physical Therapist   Patient Position at End of Therapy Seated;Self Releasing Lap Belt Applied   Collaboration Comments discissed pt fatigue   PT Total Time Spent   PT Individual Total Time Spent (Mins) 60   PT Charge Group   PT Therapeutic Exercise 3   PT Therapeutic Activities 1   Supervising Physical Therapist Crystal Sanches       Motomed: 16minutes, B LE, level 3, active entire duration    Assessment    Pt fatigued this session d/t all session moved to morning as a result of doctor's appointment. Demonstrates emerging awareness of postural correction during AMB with FWW independently.     Plan    Prepare for dc home today at [facility]    "

## 2020-03-13 NOTE — CARE PLAN
Problem: Pain Management  Goal: Pain level will decrease to patient's comfort goal  Outcome: PROGRESSING AS EXPECTED  Note: Patient able to verbalize needs.  Denies pain or discomfort this shift and no s/s same noted.       Problem: Skin Integrity  Goal: Risk for impaired skin integrity will decrease  Outcome: PROGRESSING AS EXPECTED  Note: Patient's skin remains intact and free from new or accidental injury this shift.  Incision is well approximated and healing.

## 2020-03-13 NOTE — THERAPY
"Occupational Therapy  Daily Treatment     Patient Name: Krystle Tavarez  Age:  86 y.o., Sex:  female  Medical Record #: [id number]  Today's Date: 3/13/2020     Precautions  Precautions: Fall Risk, Spinal / Back Precautions     Safety   ADL Safety : Requires Physical Assist for Safety  Bathroom Safety: Requires Physical Assist for Safety  Comments: See FIMs for ADL performance details     Subjective    \"I am so sore.\"     Objective     03/13/20 1231   Precautions   Precautions Fall Risk;Spinal / Back Precautions    Interdisciplinary Plan of Care Collaboration   Patient Position at End of Therapy Seated;Self Releasing Lap Belt Applied   OT Total Time Spent   OT Individual Total Time Spent (Mins) 30   OT Charge Group   OT Neuromuscular Re-education / Balance 1   OT Therapy Activity 1     FIM Walking Score:  5 - Standby Prompting/Supervision or Set-up  Walking Description:  Walker, Extra time, Supervision for safety(Patient performed indoor functional mobility 250' x 2 however required frequent rest breaks during second bout  of ambulation due to fatigue, decreased endurance and report of feeling sore. )    Fluido bike in standing x 1:45 (level 1 resistance) and in sitting x 3:15 (level 3 resistance).     Assessment    Decreased activity tolerance this session due to fatigue/decreased endurance and soreness (suspect due to heavy morning therapy schedule).     Plan    D/C Monday. DC FIMs/IRF [place] to be completed this weekend.     "

## 2020-03-13 NOTE — THERAPY
Occupational Therapy  Daily Treatment     Patient Name: Krystle Tavarez  Age:  86 y.o., Sex:  female  Medical Record #: [id number]  Today's Date: 3/13/2020     Precautions  Precautions: Fall Risk, Spinal / Back Precautions     Safety   ADL Safety : Requires Physical Assist for Safety  Bathroom Safety: Requires Physical Assist for Safety  Comments: (P) See FIMs for ADL performance details     Subjective    Patient agreeable to participate in OT.     Objective     20 0831   Precautions   Precautions Fall Risk;Spinal / Back Precautions    Safety    Comments See FIMs for ADL performance details    Cognition    Level of Consciousness Alert   Interdisciplinary Plan of Care Collaboration   Patient Position at End of Therapy Seated;Self Releasing Lap Belt Applied;Call Light within Reach   OT Total Time Spent   OT Individual Total Time Spent (Mins) 60   OT Charge Group   OT Self Care / ADL 3   OT Therapeutic Exercise  1       FIM Bathing Score:  5 - Standby Prompting/Supervision or Set-up  Bathing Description:  Hand held shower, Increased time, Supervision for safety, Set-up of equipment, Long handled bath tool(Patient demo'd ability to wash, rinse and dry all body arts at set-up and supervision level while seated on fold-down shower bench )     FIM Upper Body Dressin - Modified Independent  Upper Body Dressing Description:  Increased time(Mod I for donning sweatshirt while seated)    FIM Lower Body Dressing Score:  5 - Standby Prompting/Supervsion or Set-up  Lower Body Dressing Description:  Increased time, Supervision for safety, Set-up of equipment(Patient donned elastic waist pants with SBA -supervision for safety and balance )    FIM Toiletin - Modified Independent  Toileting Description:  Grab bar, Increased time(Mod I for toileting tasks with use of grab bar)    FIM Toilet Transfer Score:  6 - Modified Independent  Toilet Transfer Description:  Increased time, Grab bar    FIM Tub/Shower Transfers Score:   5 - Standby Prompting/Supervision or Set-up  Tub/Shower Transfers Description:       Reviewed HEP with focus on UB strengthening/therex (dowel exercises). Handout provided.     Assessment    Patient tolerated OT session well with focus progression with ADLs and HEP. Patient demonstrates significant functional progress toward d/c goals. Benefits from rest breaks and increased time to perform tasks due to fatigue/decreased endurance.     Plan    D/C Monday. DC FIMs/IRF [place] to be completed this weekend.

## 2020-03-13 NOTE — PROGRESS NOTES
Change of shift report obtained, care resumed, vitals stable, denies pain. Patient rested comfortably and slept w/o issues or concerns.

## 2020-03-13 NOTE — REHAB-PHARMACY IDT TEAM NOTE
Pharmacy   Pharmacy  Antibiotics/Antifungals/Antivirals:  Medication:      Active Orders (From admission, onward)    Ordered     Ordering Provider       Wed Mar 11, 2020  8:27 AM    03/11/20 0827  doxycycline monohydrate (ADOXA) tablet 100 mg  EVERY 12 HOURS      Tien Lawrence M.D.       Wed Mar 11, 2020  8:06 AM    03/11/20 0806  azithromycin (ZITHROMAX) tablet 500 mg  DAILY      Tien Lawrence M.D.        Route:         po  Stop Date:  3/16  Reason: bronchitis vs pneumonia   Antihypertensives/Cardiac:  Medication:    Orders (72h ago, onward)     Start     Ordered    03/14/20 0600  amLODIPine (NORVASC) tablet 10 mg  Q DAY      03/13/20 0805    03/13/20 0830  amLODIPine (NORVASC) tablet 5 mg  ONCE      03/13/20 0805    03/12/20 0830  amLODIPine (NORVASC) tablet 5 mg  Q DAY,   Status:  Discontinued      03/12/20 0806    03/05/20 0600  losartan (COZAAR) tablet 100 mg  Q DAY      03/04/20 1254    03/04/20 1730  carvedilol (COREG) tablet 6.25 mg  2 TIMES DAILY WITH MEALS      03/04/20 1254              Patient Vitals for the past 24 hrs:   BP Pulse   03/13/20 0810 -- 68   03/13/20 0614 151/74 71   03/13/20 0513 147/76 71   03/12/20 1835 153/78 80   03/12/20 1600 154/80 72     Anticoagulation:  Medication:  Clopidogrel, lovenox   INR:      Other key medications: gabapentin, tizanidine  A review of the medication list reveals no issues at this time. Patient is currently on an antihypertensive. Recommend home blood pressure monitoring/recording if antihypertensive regimen continues.  Section completed by:  Lanette Torrez [facility]

## 2020-03-14 LAB — HEMOCCULT SP1 STL QL: NEGATIVE

## 2020-03-14 PROCEDURE — 700102 HCHG RX REV CODE 250 W/ 637 OVERRIDE(OP): Performed by: HOSPITALIST

## 2020-03-14 PROCEDURE — 700101 HCHG RX REV CODE 250: Performed by: PHYSICAL MEDICINE & REHABILITATION

## 2020-03-14 PROCEDURE — A9270 NON-COVERED ITEM OR SERVICE: HCPCS | Performed by: HOSPITALIST

## 2020-03-14 PROCEDURE — 700101 HCHG RX REV CODE 250

## 2020-03-14 PROCEDURE — 770010 HCHG ROOM/CARE - REHAB SEMI PRIVAT*

## 2020-03-14 PROCEDURE — 700102 HCHG RX REV CODE 250 W/ 637 OVERRIDE(OP): Performed by: PHYSICAL MEDICINE & REHABILITATION

## 2020-03-14 PROCEDURE — 97110 THERAPEUTIC EXERCISES: CPT

## 2020-03-14 PROCEDURE — 94640 AIRWAY INHALATION TREATMENT: CPT

## 2020-03-14 PROCEDURE — 700111 HCHG RX REV CODE 636 W/ 250 OVERRIDE (IP): Performed by: PHYSICAL MEDICINE & REHABILITATION

## 2020-03-14 PROCEDURE — 94760 N-INVAS EAR/PLS OXIMETRY 1: CPT

## 2020-03-14 PROCEDURE — 97110 THERAPEUTIC EXERCISES: CPT | Mod: CQ

## 2020-03-14 PROCEDURE — 97116 GAIT TRAINING THERAPY: CPT | Mod: CQ

## 2020-03-14 PROCEDURE — A9270 NON-COVERED ITEM OR SERVICE: HCPCS | Performed by: PHYSICAL MEDICINE & REHABILITATION

## 2020-03-14 PROCEDURE — 99232 SBSQ HOSP IP/OBS MODERATE 35: CPT | Performed by: HOSPITALIST

## 2020-03-14 PROCEDURE — 82270 OCCULT BLOOD FECES: CPT

## 2020-03-14 PROCEDURE — 99232 SBSQ HOSP IP/OBS MODERATE 35: CPT | Performed by: PHYSICAL MEDICINE & REHABILITATION

## 2020-03-14 RX ORDER — IPRATROPIUM BROMIDE AND ALBUTEROL SULFATE 2.5; .5 MG/3ML; MG/3ML
SOLUTION RESPIRATORY (INHALATION)
Status: COMPLETED
Start: 2020-03-14 | End: 2020-03-14

## 2020-03-14 RX ORDER — ALBUTEROL SULFATE 2.5 MG/3ML
SOLUTION RESPIRATORY (INHALATION)
Status: COMPLETED
Start: 2020-03-14 | End: 2020-03-14

## 2020-03-14 RX ADMIN — ALBUTEROL SULFATE 2.5 MG: 2.5 SOLUTION RESPIRATORY (INHALATION) at 09:13

## 2020-03-14 RX ADMIN — GABAPENTIN 800 MG: 400 CAPSULE ORAL at 08:50

## 2020-03-14 RX ADMIN — CARVEDILOL 6.25 MG: 3.12 TABLET, FILM COATED ORAL at 17:38

## 2020-03-14 RX ADMIN — LOSARTAN POTASSIUM 100 MG: 25 TABLET, FILM COATED ORAL at 05:37

## 2020-03-14 RX ADMIN — GABAPENTIN 800 MG: 400 CAPSULE ORAL at 20:43

## 2020-03-14 RX ADMIN — IPRATROPIUM BROMIDE AND ALBUTEROL SULFATE 3 ML: .5; 3 SOLUTION RESPIRATORY (INHALATION) at 20:57

## 2020-03-14 RX ADMIN — ENOXAPARIN SODIUM 40 MG: 100 INJECTION SUBCUTANEOUS at 21:17

## 2020-03-14 RX ADMIN — AZITHROMYCIN 500 MG: 250 TABLET, FILM COATED ORAL at 08:50

## 2020-03-14 RX ADMIN — CALCIUM CARBONATE (ANTACID) CHEW TAB 500 MG 500 MG: 500 CHEW TAB at 08:49

## 2020-03-14 RX ADMIN — CARVEDILOL 6.25 MG: 3.12 TABLET, FILM COATED ORAL at 08:49

## 2020-03-14 RX ADMIN — SENNOSIDES 8.6 MG: 8.6 TABLET, FILM COATED ORAL at 12:06

## 2020-03-14 RX ADMIN — AMLODIPINE BESYLATE 10 MG: 5 TABLET ORAL at 05:37

## 2020-03-14 RX ADMIN — DOXYCYCLINE 100 MG: 100 TABLET, FILM COATED ORAL at 20:43

## 2020-03-14 RX ADMIN — OXYCODONE HYDROCHLORIDE AND ACETAMINOPHEN 1000 MG: 500 TABLET ORAL at 08:49

## 2020-03-14 RX ADMIN — CLOPIDOGREL BISULFATE 75 MG: 75 TABLET, FILM COATED ORAL at 08:50

## 2020-03-14 RX ADMIN — DOXYCYCLINE 100 MG: 100 TABLET, FILM COATED ORAL at 08:49

## 2020-03-14 RX ADMIN — CALCIUM CARBONATE (ANTACID) CHEW TAB 500 MG 500 MG: 500 CHEW TAB at 20:44

## 2020-03-14 ASSESSMENT — ENCOUNTER SYMPTOMS
SHORTNESS OF BREATH: 0
NAUSEA: 0
HALLUCINATIONS: 0
PALPITATIONS: 0
BLURRED VISION: 0
FEVER: 0
DIZZINESS: 0
HEADACHES: 0
VOMITING: 0

## 2020-03-14 ASSESSMENT — LIFESTYLE VARIABLES: EVER_SMOKED: NEVER

## 2020-03-14 NOTE — THERAPY
"Physical Therapy   Daily Treatment     Patient Name: Krystle Tavarez  Age:  86 y.o., Sex:  female  Medical Record #: [id number]  Today's Date: 3/14/2020     Precautions  Precautions: (P) Fall Risk, Spinal / Back Precautions     Subjective    Pt agreeable to PT-\"the doctor said I have anemia\"     Objective       03/14/20 1101   Precautions   Precautions Fall Risk;Spinal / Back Precautions    Sitting Lower Body Exercises   Nustep Resistance Level 4;Time (See Comments)  (B LE/UE x 10')   Interdisciplinary Plan of Care Collaboration   Patient Position at End of Therapy Seated;Self Releasing Lap Belt Applied   PT Total Time Spent   PT Individual Total Time Spent (Mins) 30   PT Charge Group   PT Gait Training 1   PT Therapeutic Exercise 1   Supervising Physical Therapist Crystal Sanches       FIM Walking Score:  5 - Standby Prompting/Supervision or Set-up  Walking Description:  Walker, Extra time, Verbal cueing, Supervision for safety(200' x 2 FWW distant SPV, vc for upright posture/upward gaze)      Assessment    Pt completed gait at SPV level, required vc for upright posture and upward gaze, no LOB or instability    Plan       Vector with posterior cue, AMB on uneven terrain, AMB with SPC, stair training, platform step training with FWW vs SPC, review issued HEP, postural re-ed     "

## 2020-03-14 NOTE — PROGRESS NOTES
"Change of shift report obtained, care resumed. Patient was sleeping and woken up for hs meds, she woke up very confused, asking where her daughter was, she stated ,\"everything felt wrong\", she thought it was Monday morning. Plus ox 83% on RA, 2L NC applied, increased to mid 90s and confusion cleared quickly. Patient tolerated 2L NC the remainder of evening. Denies pain, slept all night w/o waking, no issues or concerns noted.   "

## 2020-03-14 NOTE — PROGRESS NOTES
Hospital Medicine Daily Progress Note      Chief Complaint:  Hypertension  Afib  Diabetes  Hyponatremia    Interval History:  No significant events or changes since last visit    Review of Systems  Review of Systems   Constitutional: Negative for fever.   Eyes: Negative for blurred vision.   Respiratory: Negative for shortness of breath.    Cardiovascular: Negative for palpitations.   Gastrointestinal: Negative for nausea and vomiting.   Neurological: Negative for dizziness and headaches.   Psychiatric/Behavioral: Negative for hallucinations.        Physical Exam  Temp:  [36.4 °C (97.6 °F)-36.7 °C (98.1 °F)] 36.4 °C (97.6 °F)  Pulse:  [67-73] 67  Resp:  [17-18] 17  BP: (113-149)/(54-76) 134/73  SpO2:  [84 %-93 %] 91 %    Physical Exam  Vitals signs and nursing note reviewed.   Constitutional:       General: She is not in acute distress.  HENT:      Mouth/Throat:      Mouth: Mucous membranes are moist.      Pharynx: Oropharynx is clear.   Eyes:      General: No scleral icterus.  Neck:      Musculoskeletal: No neck rigidity.      Vascular: No JVD.   Cardiovascular:      Rate and Rhythm: Normal rate and regular rhythm.      Heart sounds: Normal heart sounds.   Pulmonary:      Effort: Pulmonary effort is normal.      Breath sounds: No wheezing or rales.   Abdominal:      General: Bowel sounds are normal.      Palpations: Abdomen is soft.   Musculoskeletal:      Right lower leg: No edema.      Left lower leg: No edema.   Skin:     General: Skin is warm and dry.   Neurological:      Mental Status: She is alert and oriented to person, place, and time.   Psychiatric:         Mood and Affect: Mood normal.         Behavior: Behavior normal.         Fluids    Intake/Output Summary (Last 24 hours) at 3/14/2020 0804  Last data filed at 3/13/2020 1410  Gross per 24 hour   Intake 710 ml   Output --   Net 710 ml       Laboratory                      Assessment/Plan  PAF (paroxysmal atrial fibrillation) (HCC)- (present on  admission)  Assessment & Plan  HR ok  S/P PPM  On Core.25 mg bid  Monitor    Essential hypertension, benign- (present on admission)  Assessment & Plan  BP better after med adjusted  On Core.25 mg bid  On Cozaar: 100 mg daily  On Norvasc: 5 mg daily (3/12) --> 10 mg daily (3/13)  Cont to monitor    Cough  Assessment & Plan  Has had a persistent dry cough -- almost resolved (3/14)  CXR: unremarkable  S/P mild wheezes (3/12)  ? Bronchitis vs. pneumonia  On Zithro & Doxy (thru 3/15)  On Delsym prn cough    Hyponatremia  Assessment & Plan  Resolved   Likely 2nd to HCTZ -- d/c'd  S/P IVF's (for azotemia)    Type 2 diabetes mellitus without complication, without long-term current use of insulin (HCC)- (present on admission)  Assessment & Plan  Hba1c: 7.1  Not on any diabetic meds  Off accuchecks    Spinal stenosis of lumbar region with neurogenic claudication- (present on admission)  Assessment & Plan  S/P L5-S1 laminectomy ()

## 2020-03-14 NOTE — PROGRESS NOTES
"Weekend coverage - rehab Progress Note     Encounter Date: 3/14/2020    CC: low back pain, weakness in LE        Interval Events (Subjective)  -very pleasant, doing well overall, denies any new issues  -breathing is okay, on room air  -participated in therapy today, no concerns  -pain tolerable      Objective:  Vitals: /73   Pulse 77   Temp 36.4 °C (97.6 °F) (Oral)   Resp 18   Ht 1.6 m (5' 2.99\")   Wt 75.4 kg (166 lb 3.6 oz)   SpO2 98%   Gen: NAD, sitting in wheelchair, Body mass index is 29.06 kg/m².  HEENT:moist mucous membranes  Cardio: Well perfused extremities, no mumurs  Pulm: On room air, with normal respiratory effort  Abd: Soft, no guarding      No results found for this or any previous visit (from the past 72 hour(s)).    Current Facility-Administered Medications   Medication Frequency   • albuterol (PROVENTIL) 2.5mg/0.5ml nebulizer solution 2.5 mg Q4H PRN (RT)   • amLODIPine (NORVASC) tablet 10 mg Q DAY   • azithromycin (ZITHROMAX) tablet 500 mg DAILY   • doxycycline monohydrate (ADOXA) tablet 100 mg Q12HRS   • tizanidine (ZANAFLEX) tablet 2 mg Q6HRS PRN   • gabapentin (NEURONTIN) capsule 800 mg BID   • Dextromethorphan Polistirex ER (DELSYM) 30 MG/5ML suspension 60 mg Q12HRS PRN   • sennosides (SENOKOT) 8.6 MG tablet 8.6 mg DAILY AT NOON    And   • magnesium hydroxide (MILK OF MAGNESIA) suspension 30 mL QDAY PRN   • clopidogrel (PLAVIX) tablet 75 mg DAILY   • Pharmacy Consult Request ...Pain Management Review 1 Each PHARMACY TO DOSE   • Respiratory Therapy Consult Continuous RT   • oxyCODONE immediate-release (ROXICODONE) tablet 5 mg Q3HRS PRN   • oxyCODONE immediate release (ROXICODONE) tablet 10 mg Q3HRS PRN   • acetaminophen (TYLENOL) tablet 650 mg Q4HRS PRN   • enoxaparin (LOVENOX) inj 40 mg QHS   • artificial tears ophthalmic solution 1 Drop PRN   • benzocaine-menthol (CEPACOL) lozenge 1 Lozenge Q2HRS PRN   • mag hydrox-al hydrox-simeth (MAALOX PLUS ES or MYLANTA DS) suspension 20 mL " Q2HRS PRN   • ondansetron (ZOFRAN ODT) dispertab 4 mg 4X/DAY PRN    Or   • ondansetron (ZOFRAN) syringe/vial injection 4 mg 4X/DAY PRN   • sodium chloride (OCEAN) 0.65 % nasal spray 2 Spray PRN   • traZODone (DESYREL) tablet 50 mg QHS PRN   • ascorbic acid tablet 1,000 mg DAILY   • carvedilol (COREG) tablet 6.25 mg BID WITH MEALS   • losartan (COZAAR) tablet 100 mg Q DAY   • polyethylene glycol/lytes (MIRALAX) PACKET 1 Packet DAILY   • calcium carbonate (TUMS) chewable tab 500 mg BID       Orders Placed This Encounter   Procedures   • Diet Order Regular     Standing Status:   Standing     Number of Occurrences:   1     Order Specific Question:   Diet:     Answer:   Regular [1]       Assessment:  Active Hospital Problems    Diagnosis   • *Acute incomplete paraplegia (HCC)   • Post-op pain   • PAF (paroxysmal atrial fibrillation) (Formerly [facility])   • Cardiac pacemaker in situ   • History of TIA (transient ischemic attack)   • Dyslipidemia   • Essential hypertension, benign   • Low vitamin D level   • Retinopathy   • Neurogenic bowel   • Neuropathic pain   • Neurogenic bladder   • Orthostatic hypotension   • 'light-for-dates' infant with signs of fetal malnutrition   • Type 2 diabetes mellitus without complication, without long-term current use of insulin (Former[facility])       Medical Decision Making and Plan:    L2 AIS D spinal cord injury: L4-L5 laminectomy by Dr. Kimble on 2/25 to address lumbar stenosis with neurogenic claudication  -No need for brace at this time  -We will need to follow-up with Dr. Kimble as outpatient  -Continue comprehensive acute inpatient rehabilitation       Upper respiratory infection: Afebrile, bronchitis versus pneumonia  -Azithromycin & doxycycline until 3/15      Hypokalemia: Improved, 3/11/2020 with K4.0  -replace as needed      DVT prophylaxis  -continue Lovenox 40 mg daily        Du Troncoso MD  Physical Medicine and Rehabilitation   3/14/2020

## 2020-03-14 NOTE — CARE PLAN
Problem: Infection  Goal: Will remain free from infection  Outcome: PROGRESSING AS EXPECTED  Note: Pt is able to verbalize s/s of infection: redness of area, fever, pain.  Patient's skin remains intact and free from new or accidental injury this shift.      Problem: Urinary Elimination:  Goal: Ability to reestablish a normal urinary elimination pattern will improve  Outcome: PROGRESSING AS EXPECTED  Note: Patient voiding adequate amounts of clear, yellow urine. Denies dysuria and flank pain: afebrile. Bladder scans under 100 this shift.      Problem: Respiratory:  Goal: Respiratory status will improve  Outcome: PROGRESSING SLOWER THAN EXPECTED  Note: Pt on antibiotics d/t cough, but in am pt had expiratory wheezes with congestion, IS encouraged and pt on RA.

## 2020-03-14 NOTE — THERAPY
Occupational Therapy  Daily Treatment     Patient Name: Krystle Tavarez  Age:  86 y.o., Sex:  female  Medical Record #: [id number]  Today's Date: 3/14/2020     Precautions  Precautions: (P) Fall Risk, Spinal / Back Precautions     Safety   ADL Safety : Requires Physical Assist for Safety  Bathroom Safety: Requires Physical Assist for Safety  Comments: See FIMs for ADL performance details     Subjective    Pt agreeable to OT session.     Objective       03/14/20 1301   Precautions   Precautions Fall Risk;Spinal / Back Precautions    Sitting Upper Body Exercises   Bilateral Row 3 sets of 10;Bilateral;Weight (See Comments for lbs)  (35# weighted pulleys)   Tricep Press 3 sets of 10;Bilateral;Weight (See Comments for lbs)  (Rickshaw facing fwd with 30#)   Sitting Lower Body Exercises   Nustep Resistance Level 4  (10 min with BUE/BLE propulsion, 0 RB, 575 steps)   Interdisciplinary Plan of Care Collaboration   IDT Collaboration with  Family / Caregiver   Patient Position at End of Therapy Seated;Self Releasing Lap Belt Applied;Family / Friend in Room   Collaboration Comments pt's daughter present for session.   OT Total Time Spent   OT Individual Total Time Spent (Mins) 30   OT Charge Group   OT Therapeutic Exercise  2     Functional ambulation from pt room <> therapy gym with FWW, SBA, cues for upright posture.     Assessment    Pt tolerated session well and demonstrating improved endurance and UB strength.     Plan    D/C Monday. DC FIMs/IRF [place] to be completed this weekend.

## 2020-03-14 NOTE — CARE PLAN
Problem: Communication  Goal: The ability to communicate needs accurately and effectively will improve  Outcome: PROGRESSING AS EXPECTED     Problem: Knowledge Deficit  Goal: Knowledge of the prescribed therapeutic regimen will improve  Outcome: PROGRESSING AS EXPECTED

## 2020-03-14 NOTE — CARE PLAN
Problem: Infection  Goal: Will remain free from infection  Outcome: PROGRESSING AS EXPECTED  Note: Pt is able to verbalize s/s of infection: redness of area, fever, pain.  Patient's skin remains intact and free from new or accidental injury this shift.      Problem: Urinary Elimination:  Goal: Ability to reestablish a normal urinary elimination pattern will improve  Outcome: PROGRESSING AS EXPECTED  Note: Patient voiding adequate amounts of clear, yellow urine. Denies dysuria and flank pain: afebrile. Bladder scans under 100 this shift.

## 2020-03-14 NOTE — FLOWSHEET NOTE
03/14/20 0914   Events/Summary/Plan   Events/Summary/Plan prn svn given pt tolerated it well    Vital Signs   Pulse 77   Respiration 18   Pulse Oximetry 98 %   $ Pulse Oximetry (Spot Check) Yes   Respiratory Assessment   Level of Consciousness Alert   Chest Exam   Work Of Breathing / Effort Mild   Breath Sounds   RUL Breath Sounds Expiratory Wheezes   RML Breath Sounds Expiratory Wheezes   RLL Breath Sounds Expiratory Wheezes   FARRUKH Breath Sounds Expiratory Wheezes   LLL Breath Sounds Expiratory Wheezes   Secretions   Cough Strong;Congested   How Sputum Obtained Spontaneous   Sputum Amount Unable to Evaluate   Sputum Color Unable to Evaluate   Sputum Consistency Unable to Evaluate   Oxygen   O2 (LPM) 0   FiO2% 21 %   O2 Delivery Device Room air w/o2 available

## 2020-03-15 LAB
FERRITIN SERPL-MCNC: 96.2 NG/ML (ref 10–291)
FOLATE SERPL-MCNC: 16.6 NG/ML
IRON SATN MFR SERPL: 23 % (ref 15–55)
IRON SERPL-MCNC: 67 UG/DL (ref 40–170)
TIBC SERPL-MCNC: 287 UG/DL (ref 250–450)
VIT B12 SERPL-MCNC: 383 PG/ML (ref 211–911)

## 2020-03-15 PROCEDURE — 770010 HCHG ROOM/CARE - REHAB SEMI PRIVAT*

## 2020-03-15 PROCEDURE — 83540 ASSAY OF IRON: CPT

## 2020-03-15 PROCEDURE — 700111 HCHG RX REV CODE 636 W/ 250 OVERRIDE (IP): Performed by: PHYSICAL MEDICINE & REHABILITATION

## 2020-03-15 PROCEDURE — 94760 N-INVAS EAR/PLS OXIMETRY 1: CPT

## 2020-03-15 PROCEDURE — 99232 SBSQ HOSP IP/OBS MODERATE 35: CPT | Performed by: HOSPITALIST

## 2020-03-15 PROCEDURE — A9270 NON-COVERED ITEM OR SERVICE: HCPCS | Performed by: PHYSICAL MEDICINE & REHABILITATION

## 2020-03-15 PROCEDURE — 36415 COLL VENOUS BLD VENIPUNCTURE: CPT

## 2020-03-15 PROCEDURE — 82728 ASSAY OF FERRITIN: CPT

## 2020-03-15 PROCEDURE — 83550 IRON BINDING TEST: CPT

## 2020-03-15 PROCEDURE — 700102 HCHG RX REV CODE 250 W/ 637 OVERRIDE(OP): Performed by: PHYSICAL MEDICINE & REHABILITATION

## 2020-03-15 PROCEDURE — A9270 NON-COVERED ITEM OR SERVICE: HCPCS | Performed by: HOSPITALIST

## 2020-03-15 PROCEDURE — 82746 ASSAY OF FOLIC ACID SERUM: CPT

## 2020-03-15 PROCEDURE — 82607 VITAMIN B-12: CPT

## 2020-03-15 PROCEDURE — 700102 HCHG RX REV CODE 250 W/ 637 OVERRIDE(OP): Performed by: HOSPITALIST

## 2020-03-15 RX ADMIN — AZITHROMYCIN 500 MG: 250 TABLET, FILM COATED ORAL at 09:00

## 2020-03-15 RX ADMIN — CALCIUM CARBONATE (ANTACID) CHEW TAB 500 MG 500 MG: 500 CHEW TAB at 20:23

## 2020-03-15 RX ADMIN — AMLODIPINE BESYLATE 10 MG: 5 TABLET ORAL at 05:20

## 2020-03-15 RX ADMIN — ENOXAPARIN SODIUM 40 MG: 100 INJECTION SUBCUTANEOUS at 20:27

## 2020-03-15 RX ADMIN — CLOPIDOGREL BISULFATE 75 MG: 75 TABLET, FILM COATED ORAL at 09:00

## 2020-03-15 RX ADMIN — SENNOSIDES 8.6 MG: 8.6 TABLET, FILM COATED ORAL at 12:00

## 2020-03-15 RX ADMIN — CARVEDILOL 6.25 MG: 3.12 TABLET, FILM COATED ORAL at 17:42

## 2020-03-15 RX ADMIN — GABAPENTIN 800 MG: 400 CAPSULE ORAL at 20:23

## 2020-03-15 RX ADMIN — DOXYCYCLINE 100 MG: 100 TABLET, FILM COATED ORAL at 20:23

## 2020-03-15 RX ADMIN — CALCIUM CARBONATE (ANTACID) CHEW TAB 500 MG 500 MG: 500 CHEW TAB at 09:00

## 2020-03-15 RX ADMIN — GABAPENTIN 800 MG: 400 CAPSULE ORAL at 09:00

## 2020-03-15 RX ADMIN — CARVEDILOL 6.25 MG: 3.12 TABLET, FILM COATED ORAL at 08:58

## 2020-03-15 RX ADMIN — DOXYCYCLINE 100 MG: 100 TABLET, FILM COATED ORAL at 09:00

## 2020-03-15 RX ADMIN — OXYCODONE HYDROCHLORIDE AND ACETAMINOPHEN 1000 MG: 500 TABLET ORAL at 09:00

## 2020-03-15 RX ADMIN — LOSARTAN POTASSIUM 100 MG: 25 TABLET, FILM COATED ORAL at 05:19

## 2020-03-15 ASSESSMENT — ENCOUNTER SYMPTOMS
BLURRED VISION: 0
DIARRHEA: 0
DIZZINESS: 0
FEVER: 0
COUGH: 0
NERVOUS/ANXIOUS: 0

## 2020-03-15 ASSESSMENT — FIBROSIS 4 INDEX: FIB4 SCORE: 1.592592592592592593

## 2020-03-15 NOTE — PROGRESS NOTES
Hospital Medicine Daily Progress Note      Chief Complaint:  Hypertension  Afib  Diabetes  Hyponatremia    Interval History:  No significant events or changes since last visit    Review of Systems  Review of Systems   Constitutional: Negative for fever.   Eyes: Negative for blurred vision.   Respiratory: Negative for cough.    Cardiovascular: Negative for chest pain.   Gastrointestinal: Negative for diarrhea.   Musculoskeletal: Negative for joint pain.   Neurological: Negative for dizziness.   Psychiatric/Behavioral: The patient is not nervous/anxious.         Physical Exam  Temp:  [36.5 °C (97.7 °F)-36.7 °C (98.1 °F)] 36.6 °C (97.8 °F)  Pulse:  [70-77] 71  Resp:  [17-18] 17  BP: (125-142)/(60-72) 139/61  SpO2:  [92 %-98 %] 92 %    Physical Exam  Vitals signs and nursing note reviewed.   Constitutional:       Appearance: She is not diaphoretic.   HENT:      Mouth/Throat:      Pharynx: No oropharyngeal exudate or posterior oropharyngeal erythema.   Eyes:      Extraocular Movements: Extraocular movements intact.   Neck:      Vascular: No carotid bruit or JVD.   Cardiovascular:      Rate and Rhythm: Normal rate and regular rhythm.      Heart sounds: Normal heart sounds.   Pulmonary:      Effort: Pulmonary effort is normal.      Breath sounds: No wheezing or rales.   Abdominal:      General: There is no distension.      Palpations: Abdomen is soft.      Tenderness: There is no abdominal tenderness.   Musculoskeletal:      Right lower leg: No edema.      Left lower leg: No edema.   Skin:     General: Skin is warm and dry.   Neurological:      Mental Status: She is alert and oriented to person, place, and time.   Psychiatric:         Mood and Affect: Mood normal.         Behavior: Behavior normal.         Fluids    Intake/Output Summary (Last 24 hours) at 3/15/2020 0806  Last data filed at 3/14/2020 1900  Gross per 24 hour   Intake 950 ml   Output --   Net 950 ml       Laboratory                      Assessment/Plan  PAF  (paroxysmal atrial fibrillation) (HCC)- (present on admission)  Assessment & Plan  HR ok  S/P PPM  On Core.25 mg bid  Monitor    Essential hypertension, benign- (present on admission)  Assessment & Plan  BP ok  On Core.25 mg bid  On Cozaar: 100 mg daily  On Norvasc: 5 mg daily (3/12) --> 10 mg daily (3/13)  Cont to monitor    Anemia- (present on admission)  Assessment & Plan  Has mild anemia with Hb 10.4  Fe: 67, sats 23%  B12: 383  Folate: 16.6    Cough  Assessment & Plan  Has had a persistent dry cough -- almost resolved   CXR: unremarkable  S/P mild wheezes (3/12)  ? Bronchitis vs. pneumonia  S/P Zithro & Doxy (3/15)  On Delsym prn cough    Hyponatremia  Assessment & Plan  Resolved   Likely 2nd to HCTZ -- d/c'd  S/P IVF's (for azotemia)    Type 2 diabetes mellitus without complication, without long-term current use of insulin ([facility])- (present on admission)  Assessment & Plan  Hba1c: 7.1  Not on any diabetic meds  Off accuchecks    Spinal stenosis of lumbar region with neurogenic claudication- (present on admission)  Assessment & Plan  S/P L5-S1 laminectomy ()

## 2020-03-15 NOTE — CARE PLAN
Problem: Safety  Goal: Will remain free from injury  Outcome: PROGRESSING AS EXPECTED  Note: Patient demonstrates good safety technique this shift.  Asks for assistance when needed and does not attempt self transfer.  Able to verbalize needs.      Problem: Bowel/Gastric:  Goal: Normal bowel function is maintained or improved  Outcome: PROGRESSING AS EXPECTED  Note: Pt with evening BTP, pt refused scheduled miralax, but took scheduled senna dose.

## 2020-03-15 NOTE — FLOWSHEET NOTE
03/15/20 0954   Events/Summary/Plan   Events/Summary/Plan 02 spot check, IS, pt also using [employer]   Vital Signs   Pulse 72   Respiration 18   Pulse Oximetry 90 %   $ Pulse Oximetry (Spot Check) Yes   Respiratory Assessment   Level of Consciousness Alert   Secretions   Cough Congested;Non Productive   Oxygen   O2 Delivery Device Room air w/o2 available

## 2020-03-15 NOTE — CARE PLAN
Problem: Safety  Goal: Will remain free from falls  Intervention: Implement fall precautions  Flowsheets (Taken 3/15/2020 0002)  Bed Alarm: Yes - Alarm On  Environmental Precautions:   Treaded Slipper Socks on Patient   Personal Belongings, Wastebasket, Call Bell etc. in Easy Reach   Report Given to Other Health Care Providers Regarding Fall Risk   Bed in Low Position   Communication Sign for Patients & Families   Mobility Assessed & Appropriate Sign Placed  Chair/Bed Strip Alarm: Yes - Alarm On  Note: Safety measures enforced, needs anticipated and attended. Pt free from fall and injury.     Problem: Infection  Goal: Will remain free from infection  Intervention: Give CDC handouts for infection prevention (infection prevention/hand washing, disease specific, and device specific) and document in Education  Note: Lower back incision intact well approximated with derma bond.No s/s of infection noted. V/s stable , afebrile.Deep breathing and coughing exercise  encouraged , compliant able to reach 1,500 - 2,000 ml.

## 2020-03-16 VITALS
BODY MASS INDEX: 28.21 KG/M2 | HEIGHT: 63 IN | DIASTOLIC BLOOD PRESSURE: 69 MMHG | HEART RATE: 68 BPM | SYSTOLIC BLOOD PRESSURE: 132 MMHG | WEIGHT: 159.2 LBS | OXYGEN SATURATION: 91 % | RESPIRATION RATE: 18 BRPM | TEMPERATURE: 98.3 F

## 2020-03-16 PROBLEM — I95.1 ORTHOSTATIC HYPOTENSION: Status: RESOLVED | Noted: 2020-03-04 | Resolved: 2020-03-16

## 2020-03-16 PROBLEM — M48.062 SPINAL STENOSIS OF LUMBAR REGION WITH NEUROGENIC CLAUDICATION: Status: RESOLVED | Noted: 2019-11-18 | Resolved: 2020-03-16

## 2020-03-16 PROBLEM — E87.1 HYPONATREMIA: Status: RESOLVED | Noted: 2020-03-07 | Resolved: 2020-03-16

## 2020-03-16 PROBLEM — Z78.9 IMPAIRED MOBILITY AND ADLS: Status: ACTIVE | Noted: 2020-03-16

## 2020-03-16 PROBLEM — R79.89 LOW VITAMIN D LEVEL: Status: RESOLVED | Noted: 2019-08-13 | Resolved: 2020-03-16

## 2020-03-16 PROBLEM — Z74.09 IMPAIRED MOBILITY AND ADLS: Status: ACTIVE | Noted: 2020-03-16

## 2020-03-16 PROCEDURE — A9270 NON-COVERED ITEM OR SERVICE: HCPCS | Performed by: PHYSICAL MEDICINE & REHABILITATION

## 2020-03-16 PROCEDURE — 97530 THERAPEUTIC ACTIVITIES: CPT

## 2020-03-16 PROCEDURE — 97110 THERAPEUTIC EXERCISES: CPT

## 2020-03-16 PROCEDURE — 99231 SBSQ HOSP IP/OBS SF/LOW 25: CPT | Performed by: HOSPITALIST

## 2020-03-16 PROCEDURE — 97535 SELF CARE MNGMENT TRAINING: CPT

## 2020-03-16 PROCEDURE — 97112 NEUROMUSCULAR REEDUCATION: CPT

## 2020-03-16 PROCEDURE — 94760 N-INVAS EAR/PLS OXIMETRY 1: CPT

## 2020-03-16 PROCEDURE — 700102 HCHG RX REV CODE 250 W/ 637 OVERRIDE(OP): Performed by: PHYSICAL MEDICINE & REHABILITATION

## 2020-03-16 PROCEDURE — A9270 NON-COVERED ITEM OR SERVICE: HCPCS | Performed by: HOSPITALIST

## 2020-03-16 PROCEDURE — 700102 HCHG RX REV CODE 250 W/ 637 OVERRIDE(OP): Performed by: HOSPITALIST

## 2020-03-16 PROCEDURE — 99239 HOSP IP/OBS DSCHRG MGMT >30: CPT | Performed by: PHYSICAL MEDICINE & REHABILITATION

## 2020-03-16 PROCEDURE — 97116 GAIT TRAINING THERAPY: CPT

## 2020-03-16 RX ORDER — GABAPENTIN 400 MG/1
800 CAPSULE ORAL 2 TIMES DAILY
Qty: 120 CAP | Refills: 3 | Status: SHIPPED | OUTPATIENT
Start: 2020-03-16 | End: 2020-03-30

## 2020-03-16 RX ORDER — CARVEDILOL 6.25 MG/1
6.25 TABLET ORAL 2 TIMES DAILY WITH MEALS
Qty: 60 TAB | Refills: 3 | Status: SHIPPED | OUTPATIENT
Start: 2020-03-16 | End: 2020-03-31

## 2020-03-16 RX ORDER — CALCIUM CARBONATE 500 MG/1
500 TABLET, CHEWABLE ORAL 2 TIMES DAILY
Qty: 60 TAB | Refills: 3 | Status: SHIPPED | OUTPATIENT
Start: 2020-03-16 | End: 2020-06-09

## 2020-03-16 RX ORDER — LOSARTAN POTASSIUM 100 MG/1
100 TABLET ORAL DAILY
Qty: 30 TAB | Refills: 3 | Status: SHIPPED | OUTPATIENT
Start: 2020-03-17 | End: 2020-04-06

## 2020-03-16 RX ORDER — POLYETHYLENE GLYCOL 3350 17 G/17G
17 POWDER, FOR SOLUTION ORAL DAILY
Qty: 30 EACH | Refills: 3 | Status: SHIPPED | OUTPATIENT
Start: 2020-03-17 | End: 2020-03-30

## 2020-03-16 RX ORDER — TIZANIDINE 2 MG/1
2 TABLET ORAL EVERY 6 HOURS PRN
Qty: 30 TAB | Refills: 3 | Status: SHIPPED | OUTPATIENT
Start: 2020-03-16 | End: 2020-03-30

## 2020-03-16 RX ORDER — SENNOSIDES A AND B 8.6 MG/1
8.6 TABLET, FILM COATED ORAL DAILY
Qty: 30 TAB | Refills: 3 | Status: SHIPPED | OUTPATIENT
Start: 2020-03-16 | End: 2022-05-01

## 2020-03-16 RX ORDER — AMLODIPINE BESYLATE 10 MG/1
10 TABLET ORAL DAILY
Qty: 30 TAB | Refills: 3 | Status: SHIPPED | OUTPATIENT
Start: 2020-03-17 | End: 2020-03-25

## 2020-03-16 RX ORDER — DEXTROMETHORPHAN POLISTIREX 30 MG/5ML
60 SUSPENSION ORAL EVERY 12 HOURS PRN
Qty: 280 ML | Refills: 3 | Status: SHIPPED | OUTPATIENT
Start: 2020-03-16 | End: 2020-03-30

## 2020-03-16 RX ORDER — CLOPIDOGREL BISULFATE 75 MG/1
75 TABLET ORAL DAILY
Qty: 30 TAB | Refills: 3 | Status: SHIPPED | OUTPATIENT
Start: 2020-03-17 | End: 2020-04-06

## 2020-03-16 RX ADMIN — CARVEDILOL 6.25 MG: 3.12 TABLET, FILM COATED ORAL at 08:05

## 2020-03-16 RX ADMIN — GABAPENTIN 800 MG: 400 CAPSULE ORAL at 08:04

## 2020-03-16 RX ADMIN — OXYCODONE HYDROCHLORIDE AND ACETAMINOPHEN 1000 MG: 500 TABLET ORAL at 08:05

## 2020-03-16 RX ADMIN — LOSARTAN POTASSIUM 100 MG: 25 TABLET, FILM COATED ORAL at 05:45

## 2020-03-16 RX ADMIN — AMLODIPINE BESYLATE 10 MG: 5 TABLET ORAL at 05:46

## 2020-03-16 RX ADMIN — CALCIUM CARBONATE (ANTACID) CHEW TAB 500 MG 500 MG: 500 CHEW TAB at 08:05

## 2020-03-16 RX ADMIN — CLOPIDOGREL BISULFATE 75 MG: 75 TABLET, FILM COATED ORAL at 08:05

## 2020-03-16 ASSESSMENT — ENCOUNTER SYMPTOMS
CHILLS: 0
SHORTNESS OF BREATH: 0
VOMITING: 0
DIARRHEA: 0
NERVOUS/ANXIOUS: 0
NAUSEA: 0
ABDOMINAL PAIN: 0
FEVER: 0

## 2020-03-16 NOTE — REHAB-NURSING IDT TEAM NOTE
Nursing   Nursing  Diet/Nutrition:  Regular  Medication Administration:  Whole with Liquid Wash  % consumed at meals in last 24 hours:  Consumed  -% of meals per documentation.  Meal/Snack Consumption for the past 24 hrs:   Oral Nutrition   03/15/20 0900 Breakfast;Between % Consumed     Snack schedule:  None  Supplement:  none  Appetite:  Good  Fluid Intake/Output in past 24 hours: In: 720 [P.O.:720]  Out: -   Admit Weight:  Weight: 75.3 kg (166 lb)  Weight Last 7 Days   [72.2 kg (159 lb 3.2 oz)-75.4 kg (166 lb 3.6 oz)] 72.2 kg (159 lb 3.2 oz) (03/15 0540)    Pain Issues:    Location:  --  --         Severity:  Mild   Scheduled pain medications:  gabapentin (NEURONTIN)      PRN pain medications used in last 24 hours:  None   Non Pharmacologic Interventions:  repositioned and rest  Effectiveness of pain management plan:  fair=improved comfort with interventions but does not always meet goal    Bowel:    Bowel Assist Initial Score:  3 - Moderate Assistance  Bowel Assist Current Score:  4 - Minimal Assistance  Bowl Accidents in last 7 days:     Last bowel movement: 03/14/20  Stool Description: Medium, Formed, Brown     Usual bowel pattern:  every other day  Scheduled bowel medications:  senna-docusate (PERICOLACE or SENOKOT S)   PRN bowel medications used in last 24 hours:  None  Nursing Interventions:  Increased time, Scheduled medication, Verbal cueing, Supervision  Effectiveness of bowel program:   good=regular, predictable, controlled emptying of bowel  Bladder:    Bladder Assist Initial Score:  3 - Moderate Assistance  Bladder Assist Current Score:  4 - Minimal Assistance  Bladder Accidents in last 7 days:     PVR range for past 24-48 hours:  []     Medications affecting bladder:  None    Interventions:  Increased time, Supervision, Verbal cueing  Effectiveness of bladder training:  Good=regular, predictable, emptying of bladder, patient initiates time voiding    [place]:    Type:     Patient  Lines/Drains/Airways Status    Active Catheter     None              Date placed:          Justification:    Wound:         Patient Lines/Drains/Airways Status    Active Current Wounds     Name: Placement date: Placement time: Site: Days:    Wound 03/04/20 Incision Back Midline;Lower  03/04/20   1707   Back  11                   Interventions: With derma bond  Effectiveness of intervention:  wound is improving     Sleep/wake cycle:   Average hours slept:  Sleeps 4-6 hours without waking  Sleep medication usage:  None    Patient/Family Training/Education:  Diet/Nutrition, Fall Prevention, General Self Care, Medication Management, Pain Management, Respiratory Hygiene, Safe Transfers, Safety and Wound Care  Discharge Supply Recommendations:  Blood Pressure Monitor  Strengths: Alert and oriented, Willingly participates in therapeutic activities, Able to follow instructions, Pleasant and cooperative, Effective communication skills, Motivated for self care and independence, Good endurance and Manages pain appropriately   Barriers:   Fatigue       Nursing Problems     Problem: Bowel/Gastric:     Description:     Goal: Normal bowel function is maintained or improved     Description:           Goal: Will not experience complications related to bowel motility     Description:                 Problem: Communication     Description:     Goal: The ability to communicate needs accurately and effectively will improve     Description:                 Problem: Discharge Barriers/Planning     Description:     Goal: Patient's continuum of care needs will be met     Description:                 Problem: Infection     Description:     Goal: Will remain free from infection     Description:                 Problem: Knowledge Deficit     Description:     Goal: Knowledge of disease process/condition, treatment plan, diagnostic tests, and medications will improve     Description:           Goal: Knowledge of the prescribed therapeutic  regimen will improve     Description:                 Problem: Pain Management     Description:     Goal: Pain level will decrease to patient's comfort goal     Description:                 Problem: Respiratory:     Description:     Goal: Respiratory status will improve     Description:                 Problem: Safety     Description:     Goal: Will remain free from injury     Description:           Goal: Will remain free from falls     Description:                 Problem: Skin Integrity     Description:     Goal: Risk for impaired skin integrity will decrease     Description:                 Problem: Urinary Elimination:     Description:     Goal: Ability to reestablish a normal urinary elimination pattern will improve     Description:                 Problem: Venous Thromboembolism (VTW)/Deep Vein Thrombosis (DVT) Prevention:     Description:     Goal: Patient will participate in Venous Thrombosis (VTE)/Deep Vein Thrombosis (DVT)Prevention Measures     Description:                        Long Term Goals:   At discharge patient will be able to function safely at home and in the community with support.    Section completed by:  Wendy Villela R.N.

## 2020-03-16 NOTE — CARE PLAN
"  Problem: PT-Long Term Goals  Goal: LTG-By discharge, patient will ambulate  Description: 1) Individualized goal:  200ft x 1 mod I LRAD  2) Interventions:  PT Group Therapy, PT Gait Training, PT Therapeutic Exercises, PT Neuro Re-Ed/Balance, PT Therapeutic Activity, PT Manual Therapy and PT Evaluation     Outcome: MET  Goal: LTG-By discharge, patient will transfer one surface to another  Description: 1) Individualized goal:  Mod I LRAD  2) Interventions:  PT Group Therapy, PT Gait Training, PT Therapeutic Exercises, PT Neuro Re-Ed/Balance, PT Therapeutic Activity, PT Manual Therapy and PT Evaluation       Outcome: MET  Goal: LTG-By discharge, patient will perform home exercise program  Description: 1) Individualized goal:  B LE strength / balance mod I  2) Interventions:  PT Group Therapy, PT Gait Training, PT Therapeutic Exercises, PT Neuro Re-Ed/Balance, PT Therapeutic Activity, PT Manual Therapy and PT Evaluation       Outcome: MET     Problem: PT-Long Term Goals  Goal: LTG-By discharge, patient will ambulate up/down flight of stairs  Description: 1) Individualized goal:   12 x 1 6\" steps Laury   2) Interventions:  PT Group Therapy, PT Gait Training, PT Therapeutic Exercises, PT Neuro Re-Ed/Balance, PT Therapeutic Activity, PT Manual Therapy and PT Evaluation       Outcome: DISCHARGED-GOAL NOT MET  Note: Pt only unable to complete 6 steps d/t strength limitations. Does not need to perform at home     "

## 2020-03-16 NOTE — PROGRESS NOTES
Hospital Medicine Daily Progress Note      Chief Complaint:  Hypertension  Afib  Diabetes  Hyponatremia    Interval History:  No significant events or changes since last visit    Review of Systems  Review of Systems   Constitutional: Negative for chills and fever.   Respiratory: Negative for shortness of breath.    Cardiovascular: Negative for chest pain.   Gastrointestinal: Negative for abdominal pain, diarrhea, nausea and vomiting.   Psychiatric/Behavioral: The patient is not nervous/anxious.         Physical Exam  Temp:  [36.2 °C (97.1 °F)-36.8 °C (98.3 °F)] 36.8 °C (98.3 °F)  Pulse:  [67-78] 67  Resp:  [16-18] 18  BP: (106-134)/(56-74) 132/69  SpO2:  [90 %-91 %] 91 %    Physical Exam  Vitals signs and nursing note reviewed.   Constitutional:       Appearance: Normal appearance.   HENT:      Head: Atraumatic.   Eyes:      Conjunctiva/sclera: Conjunctivae normal.      Pupils: Pupils are equal, round, and reactive to light.   Neck:      Musculoskeletal: Normal range of motion and neck supple.   Cardiovascular:      Rate and Rhythm: Normal rate and regular rhythm.      Heart sounds: No murmur.   Pulmonary:      Effort: Pulmonary effort is normal.      Breath sounds: No stridor. No wheezing or rales.   Abdominal:      General: There is no distension.      Palpations: Abdomen is soft.      Tenderness: There is no abdominal tenderness.   Musculoskeletal:      Right lower leg: No edema.      Left lower leg: No edema.   Skin:     General: Skin is warm and dry.      Findings: No rash.   Neurological:      Mental Status: She is alert and oriented to person, place, and time.   Psychiatric:         Mood and Affect: Mood normal.         Behavior: Behavior normal.         Fluids    Intake/Output Summary (Last 24 hours) at 3/16/2020 0801  Last data filed at 3/15/2020 0900  Gross per 24 hour   Intake 460 ml   Output --   Net 460 ml       Laboratory                      Assessment/Plan  PAF (paroxysmal atrial fibrillation) (Formerly [facility])-  (present on admission)  Assessment & Plan  HR ok  S/P PPM  On Core.25 mg bid  Monitor    Essential hypertension, benign- (present on admission)  Assessment & Plan  BP ok  On Core.25 mg bid  On Cozaar: 100 mg daily  On Norvasc: 5 mg daily (3/12) --> 10 mg daily (3/13)  Cont to monitor    Anemia- (present on admission)  Assessment & Plan  Has mild anemia with Hb 10.4  Fe: 67, sats 23%  B12: 383  Folate: 16.6    Cough  Assessment & Plan  Has a lingering cough   CXR: unremarkable  S/P mild wheezes (3/12)  ? Bronchitis vs. pneumonia  S/P Zithro & Doxy (3/15)  On Delsym prn cough    Type 2 diabetes mellitus without complication, without long-term current use of insulin ([facility])- (present on admission)  Assessment & Plan  Hba1c: 7.1  Not on any diabetic meds  Off accuchecks    Spinal stenosis of lumbar region with neurogenic claudication- (present on admission)  Assessment & Plan  S/P L5-S1 laminectomy ()

## 2020-03-16 NOTE — THERAPY
Recreational Therapy  Daily Treatment     Patient Name: Krystle Tavarez  AGE:  86 y.o., SEX:  female  Medical Record #: [id number]  Today's Date: 3/16/2020        03/16/20 1000   Leisure Competence Measure   Leisure Awareness Independent   Leisure Attitude Modified Independent   Leisure Skills Independent   Cultural / Social Behaviors Independent   Interpersonal Skills Independent   Community Integration Skills Modified Independent   Social Contact Independent   Community Participation Modified Independent   Recommended Discharge Equipment   Long Term Goals Met Standing 3 minutes x3 CGA   Long Term Goals Not Met standing 5 minutes x3 CGA.    Reason for Goals Not Met Decreased endurance.    Criteria For Termination of Services Maximum Potential Achieved for Inpatient Rehabilitation   Discharge Instructions    Community Resources Was given community resources for senior activites as she is relitively new to the area.    Discharge Instructions Comments Offered gris support liliana here. Spoke of returnign to [facility] upon discharge and gained independence.

## 2020-03-16 NOTE — DISCHARGE PLANNING
Case management Summary:   Met with patient prior to discharge.     Reviewed all follow up appointments.     Referral made to [facility] and they have accepted referral and are ready to follow.      During hospitalization, I have provided support and education and have been available for questions and information during hours of operation, communicated with therapy team and MD along with providing links/resources  to outside services.    Patient verbalizes agreement with all plans and has an understanding of the next steps within the post acute services.     Individualized Goals:   1. Return home w/ support from dtr and son in law.  2. Increase mobility.        Outcome:   1. Met  2. Met

## 2020-03-16 NOTE — THERAPY
"Recreational Therapy  Daily Treatment     Patient Name: Krystle Tavarez  AGE:  86 y.o., SEX:  female  Medical Record #: [id number]  Today's Date: 3/16/2020       Subjective    \"Today is my last day.\"     Objective       03/16/20 0901   Functional Ability Status - Cognitive   Attention Span Remains on Task with Cueing   Comprehension Follows Three Step Commands   Judgment Able to Make Independent Decisions   Functional Ability Status - Emotional    Affect Appropriate   Mood Appropriate   Behavior Appropriate   Skilled Intervention    Skilled Intervention Gross Motor Leisure   Skilled Intervention Comments Standing while playing and learning a game of dominos.    Interdisciplinary Plan of Care Collaboration   IDT Collaboration with  Physical Therapist   Patient Position at End of Therapy Seated;Other (Comments)   Collaboration Comments PT notified of the location of the Pt in the main gym.    Treatment Time   Total Time Spent (mins) 30   Procedural Tracking   Procedural Tracking Gross Motor Functional Leisure Skills       Assessment    Pt was standing x1 for 4 minutes CGA.     Plan    Discharge Pt.   "

## 2020-03-16 NOTE — THERAPY
Occupational Therapy  Daily Treatment     Patient Name: Krystle Tavarez  Age:  86 y.o., Sex:  female  Medical Record #: [id number]  Today's Date: 3/16/2020     Precautions  Precautions: Fall Risk, Spinal / Back Precautions     Safety   ADL Safety : Requires Physical Assist for Safety  Bathroom Safety: Requires Physical Assist for Safety  Comments: See FIMs for ADL performance details     Subjective    Patient agreeable to participate in OT.      Objective       20 0701   Precautions   Precautions Fall Risk;Spinal / Back Precautions    Cognition    Cognition / Consciousness WDL   Orientation Level Oriented x 4   Interdisciplinary Plan of Care Collaboration   Patient Position at End of Therapy Seated;Self Releasing Lap Belt Applied   OT Total Time Spent   OT Individual Total Time Spent (Mins) 60   OT Charge Group   OT Self Care / ADL 3   OT Neuromuscular Re-education / Balance 1       FIM Grooming Score:  6 - Modified Independent  Grooming Description:  (Combed hair at mod I level while standing at sink with FWW)    FIM Bathing Score:  5 - Standby Prompting/Supervision or Set-up  Bathing Description:  Hand held shower, Increased time, Grab bar, Supervision for safety, Set-up of equipment(Patient demo'd ability to wash, rinse and dry all body parts at supervision to mod I level while incorporating sitting and standing (with use of grab bar for safety/balance))     FIM Upper Body Dressin - Modified Independent  Upper Body Dressing Description:  Increased time(Mod I for retrieval and donning of sweater )    FIM Lower Body Dressing Score:  6 - Modified Independent  Lower Body Dressing Description:  Sock aid, Reacher, Increased time(Patient donned disposable briefs, elastic waist pants and non skid socks at mod I level with use of AE (reacher/sock aid). Used grab bar during pants over hips pursuit (for standing balance and safety))    FIM Tub/Shower Transfers Score:  5 - Standby Prompting/Supervision or  Set-up  Tub/Shower Transfers Description:  Supervision for safety, Increased time, Grab bar(Supervision with FWW )    Patient performed indoor functional mobility ~150' x 2 with FWW/supervision/increased time.    Assessment    Patient tolerated OT session well with focus on ADLs, indoor functional mobility and endurance. Demonstrates ability to perform all ADLs at supervision to Mod I level. Primary barriers to functional performance are decreased endurance/fatigue.     Plan    D/C today with family support

## 2020-03-16 NOTE — PROGRESS NOTES
Patient discharged to home per order.  Discharge instructions reviewed with patient and family; they verbalize understanding and signed copies placed in chart.  Patient has all belongings; signed copy of form in chart.  Patient left facility at 1245 via wheelchair accompanied by rehab staff and family.  Have enjoyed working with this pleasant patient.

## 2020-03-16 NOTE — CARE PLAN
Problem: Safety  Goal: Will remain free from falls  Intervention: Implement fall precautions  Flowsheets (Taken 3/15/2020 7650)  Bed Alarm: Yes - Alarm On  Environmental Precautions:   Treaded Slipper Socks on Patient   Transferred to Stronger Side   Report Given to Other Health Care Providers Regarding Fall Risk   Bed in Low Position   Communication Sign for Patients & Families   Mobility Assessed & Appropriate Sign Placed  Chair/Bed Strip Alarm: Yes - Alarm On  Note: Pt reminded on the importance of calling for assistance at all times kristina when getting of the bed , and to avoid sudden positional changes. Pt 's needs anticipated and attended.     Problem: Urinary Elimination:  Goal: Ability to reestablish a normal urinary elimination pattern will improve  Intervention: Assess and monitor for signs and symptoms of urinary retention  Note: Voiding freely, scan done less than 52.

## 2020-03-16 NOTE — DISCHARGE SUMMARY
Rehab Discharge Summary    Admission Date: 3/4/2020    Discharge Date: 3/16/2020    Attending Provider: Dr Jose White    Admission Diagnosis:   Active Hospital Problems    Diagnosis   • *Acute incomplete paraplegia (HCC)   • Post-op pain   • PAF (paroxysmal atrial fibrillation) (HCC)   • Cardiac pacemaker in situ   • History of TIA (transient ischemic attack)   • Dyslipidemia   • Essential hypertension, benign   • Low vitamin D level   • Retinopathy   • Anemia   • Cough   • Hyponatremia   • Neurogenic bowel   • Neuropathic pain   • Neurogenic bladder   • Orthostatic hypotension   • Type 2 diabetes mellitus without complication, without long-term current use of insulin (HCC)   • Spinal stenosis of lumbar region with neurogenic claudication       Discharge Diagnosis:  Active Hospital Problems    Diagnosis   • *Acute incomplete paraplegia (HCC)   • Post-op pain   • PAF (paroxysmal atrial fibrillation) (HCC)   • Cardiac pacemaker in situ   • History of TIA (transient ischemic attack)   • Dyslipidemia   • Essential hypertension, benign   • Low vitamin D level   • Retinopathy   • Anemia   • Cough   • Hyponatremia   • Neurogenic bowel   • Neuropathic pain   • Neurogenic bladder   • Orthostatic hypotension   • Type 2 diabetes mellitus without complication, without long-term current use of insulin (HCC)   • Spinal stenosis of lumbar region with neurogenic claudication       HPI per H&P:  The patient is a 86 y.o. left hand dominant female with a past medical history of paroxysmal atrial fibrillation, right total hip, GERD, hypertension, hyperlipidemia, borderline diabetes, plantar fasciitis who was admitted to [facility] on 2/29 with worsening pain, history of worsening urinary retention and constipation.  Patient is status post L4-L5 laminectomy on 2/25 by Dr. Kimble.  Original surgery was done because of right foot numbness and bilateral lower extremity weakness and balance impairment.     Her pain  was managed with addition of tizanidine and Percocet as needed.     Bladder: Patient has history of worsening urinary incontinence.  No documented PVRs.     Bowel: Worsening constipation over the past year, required multiple laxatives and stool softeners to have a BM.     During hospitalization she was requiring 2 L of nasal cannula supplementary oxygen secondary to oxygen desaturation at night.  She was not on oxygen at home.     Patient was evaluated by physiatry and Physical Therapy and Occupational Therapy and determined to be appropriate for acute inpatient rehab and was admitted to [facility] on 3/4     Pre-mobidly, the patient lived in a two level home with family.  With this acute therapeutic intervention, this patient hopes to improve her functional status, and return to independent living with the supportive care of family.    Patient was admitted to [facility] on 3/4/2020.     Hospital Course by Problem List:  L2 AIS D spinal cord injury: L4-L5 laminectomy by Dr. Kimble on 2/25 to address lumbar stenosis with neurogenic claudication  -No need for brace at this time  -We will need to follow-up with Dr. Kimble as outpatient  -Completed comprehensive acute inpatient rehabilitation  -Follow-up with outpatient physical therapy  -Follow-up with neuro rehabilitation as outpatient, Dr. Patel     Upper respiratory infection: Afebrile  -Symptomatic management with throat lozenges, Cepacol  -Patient was started on azithromycin 500 mg daily, total of 5 doses,  -Doxycycline 100 mg every 12 hours x5 days   -Appreciate hospitalist input  -If symptoms continue follow-up with primary care physician    Hyponatremia: Sodium on admission of 131, 136 on 3/9, 135 on 3/11  -Appreciate hospitalist input  - P.o. fluid restriction of 1.8 L of free water per day  -DC'd hydrochlorothiazide in the setting of hyponatremia, with resulting improvement     Hypokalemia: Potassium of 3.5, significant  decrease from 3/2, 4.6 on 3/9, potassium of 4.0 on 3/11, monitored given DC of hydrochlorothiazide     Neurogenic bowel: Discussed importance of daily bowel program  - KUB 3/4 showed no significant stool in the ascending or transverse colon  -Decreased senna 1 tablets q. noon, DC Colace 200 mg twice daily, continue MiraLAX daily     Neurogenic bladder: History of worsening urinary incontinence  - Successful voiding trial      orthostatic hypotension: Worsened by administration of tizanidine 2 mg 3 times daily.  Significant hypotensive episode yesterday requiring IV fluid  -No dizziness with change in position over the last 48 hours     Hypertension:SBP max of 176, associated with exercise  -Coreg 6.25 mg twice daily  -DC'd hydrochlorothiazide 25 mg daily, secondary to hyponatremia  -Losartan 100 mg daily  -Patient was restarted on Norvasc 5 mg daily on 3/12, increased to 10 mg daily on 3/13 for control of blood pressure.  -Follow-up with primary care doctor     Paroxysmal atrial fibrillation: Plavix was held for spine surgery, cardiology recommending restarting approximately 12 days postop  - restarted Plavix on 3/8  -Follow-up with cardiology on Friday, family has been trained on the car transfers and plans to take patient as therapeutic outing     Type 2 diabetes: Appreciate hospitalist input  -Hemoglobin A1c 7.1  -Managed with the diet and lifestyle  - Follow-up with PCP as outpatient    Pain:  #Neuropathic pain: Hyperalgesia over L3 dermatome  -gabapentin to 800 mg 2 times a day  -DC tizanidine scheduled     #Postoperative pain:  - Discontinue tizanidine scheduled, discussed side effects may include fulminant liver failure, AST of 13 ALT of 9 on admission, should be checked in 2 weeks, 2 months, and every 6 months as long as patient is on tizanidine  - tizanidine 2 mg every 6 hours as needed muscle cramps, not requiring PRN usage  - DC Tylenol 1000 mg 3 times daily.  -Oxycodone 5-10 mg every 3 hours as needed  "pain     DVT prophylaxis  -Lovenox 40 mg daily, during acute rehabilitation hospitalization, patient's mobility is significantly increased, discussed risks and benefits with patient and at this time will DC Lovenox prior to discharge.  Discussed signs and symptoms of VTE formation, recommendation to contact primary care physician ER or urgent care if any of these symptoms arise.     Vitamin D deficiency:Vitamin D level 54 on admission, goal of greater than 30  - DC cholecalciferol, no need for supplementation at this time    Functional Status at Discharge  Eatin - Independent  Eating Description:     Groomin - Modified Independent  Grooming Description:  (Combed hair at mod I level while standing at sink with FWW)  Bathin - Standby Prompting/Supervision or Set-up  Bathing Description:  Hand held shower, Increased time, Grab bar, Supervision for safety, Set-up of equipment(Patient demo'd ability to wash, rinse and dry all body parts at supervision to mod I level while incorporating sitting and standing (with use of grab bar for safety/balance))  Upper Body Dressin - Modified Independent  Upper Body Dressing Description:  Increased time(Mod I for retrieval and donning of sweater )  Lower Body Dressin - Modified Independent  Lower Body Dressing Description:  6 - Modified Independent     Walk:  5 - Standby Prompting/Supervision or Set-up  Distance Walked:  Walks a minimum of 150 feet  Walk Description:  Walker, Extra time, Verbal cueing, Supervision for safety(200' x 2 FWW distant SPV, vc for upright posture/upward gaze)  Wheelchair:  6 - Modified Independent  Distance Propelled:  Propels a minimum of 150 feet   Wheelchair Description:  Adaptive equipment(210ft x 1 mod I)  Stairs 2 - Max Assistance  Stairs Description(4 x 2 6\" steps 1st rep B UE support, 2nd rep B UE support on unilateral HR, both CGA)     Comprehension Mode:     Comprehension:  5 - Stand-by Prompting/Supervision or " Set-up  Comprehension Description:     Expression Mode:     Expression:  6 - Modified Independent  Expression Description:     Social Interaction:  7 - Independent  Social Interaction Description:     Problem Solvin - Standby Prompting/Supervision or Set-up  Problem Solving Description:     Memory:  5 - Standby Prompting/Supervision or Set-up  Memory Description:          Jose CRAIN D.O., personally performed a complete drug regimen review and no potential clinically significant medication issues were identified.   Discharge Medication:     Medication List      START taking these medications      Instructions   amLODIPine 10 MG Tabs  Start taking on:  2020  Commonly known as:  NORVASC   Take 1 Tab by mouth every day.  Dose:  10 mg     benzocaine-menthol 15-3.6 MG Lozg  Commonly known as:  CEPACOL   Spray 1 Lozenge in mouth/throat every 2 hours as needed.  Dose:  1 Lozenge     calcium carbonate 500 MG Chew  Commonly known as:  TUMS   Take 1 Tab by mouth 2 Times a Day.  Dose:  500 mg     clopidogrel 75 MG Tabs  Start taking on:  2020  Commonly known as:  PLAVIX   Take 1 Tab by mouth every day.  Dose:  75 mg     Dextromethorphan Polistirex ER 30 MG/5ML Suer  Commonly known as:  DELSYM   Take 10 mL by mouth every 12 hours as needed for Cough.  Dose:  60 mg     gabapentin 400 MG Caps  Commonly known as:  NEURONTIN   Take 2 Caps by mouth 2 Times a Day.  Dose:  800 mg     losartan 100 MG Tabs  Start taking on:  2020  Commonly known as:  COZAAR   Take 1 Tab by mouth every day.  Dose:  100 mg     polyethylene glycol/lytes Pack  Start taking on:  2020  Commonly known as:  MIRALAX   Take 1 Packet by mouth every day.  Dose:  17 g     sennosides 8.6 MG Tabs  Commonly known as:  SENOKOT   Take 1 Tab by mouth every day.  Dose:  8.6 mg        CHANGE how you take these medications      Instructions   tizanidine 2 MG tablet  What changed:    · when to take this  · reasons to take  this  Commonly known as:  ZANAFLEX   Take 1 Tab by mouth every 6 hours as needed (muscle cramps).  Dose:  2 mg        CONTINUE taking these medications      Instructions   carvedilol 6.25 MG Tabs  Commonly known as:  COREG   Take 1 Tab by mouth 2 times a day, with meals.  Dose:  6.25 mg     Vitamin C 1000 MG Tabs   Take 1 Tab by mouth every day.  Dose:  1 Tab        STOP taking these medications    calcium carbonate 1250 (500 Ca) MG Tabs  Commonly known as:  OS-MARGARITA 500     losartan-hydrochlorothiazide 100-25 MG per tablet  Commonly known as:  HYZAAR     oxyCODONE-acetaminophen 5-325 MG Tabs  Commonly known as:  PERCOCET     vitamin D 1000 Unit (25 mcg) Tabs  Commonly known as:  cholecalciferol            Discharge Diet:  ADA diet with thin liquids    Discharge Activity:  As tolerated, with front wheel walker versus cane, progressed to minimal assistive device use, educated on postural changes    Disposition:  Patient to discharge home with family support and community resources.     Equipment:  See the discharge physical and Occupational Therapy note    Follow-up & Discharge Instructions:  Follow up with your primary care provider (PCP) within 7-10 days of discharge to review your medications and take over your care.     If you develop chest pain, fever, chills, change in neurologic function (weakness, sensation changes, vision changes), or other concerning sxs, seek immediate medical attention or call 911.      Condition on Discharge:  Good    More than 35 minutes was spent on discharging this patient, including face-to-face time, prescription management, and the dictation of this note.    Jose White D.O.    Date of Service: 3/16/2020

## 2020-03-16 NOTE — REHAB-ACTIVITY IDT TEAM NOTE
ACT   Recreation/Community     Leisure Competence Measure  Leisure Awareness: Independent  Leisure Attitude: Independent  Leisure Skills: Modified Independent  Cultural / Social Behaviors: Independent  Interpersonal Skills: Independent  Community Integration Skills: Cueing Assist  Social Contact: Independent  Community Participation: Cueing Assist    Strengths:  Able to follow instructions, Good balance, Making steady progress towards goals, Manages pain appropriately, Motivated for self care and independence, Pleasant and cooperative and Willingly participates in therapeutic activities  Barriers:  Decreased endurance and Generalized weakness  # of short term goals set=2  # of short term goals met=2    Pt reporting on excitement on discharging. Pt was unable to complete the standing goal of 5 minutes x3 CGA.     Recreation Therapy Problems (Active)      There are no active problems.                Section completed by:  RHONDA WigginsS

## 2020-03-16 NOTE — THERAPY
"Physical Therapy   Daily Treatment     Patient Name: Krystle Tavarez  Age:  86 y.o., Sex:  female  Medical Record #: [id number]  Today's Date: 3/16/2020     Precautions  Precautions: Fall Risk, Spinal / Back Precautions     Subjective    Pt was sitting in w/c upon arrival and agreeable to tx     Objective       03/16/20 0931   Precautions   Precautions Fall Risk;Spinal / Back Precautions    Supine Lower Body Exercise   Bridges 3 sets of 10;Two Legged   Pelvic Tilt 3 sets of 10;Bilateral  (10 sec holds)   Other Exercises 3 x 10 (clamshells R/L, bent knee fall outs with pink tband, B leg lift 1inch in hooklying); verbal review of postural HEP   Interdisciplinary Plan of Care Collaboration   Patient Position at End of Therapy Seated;Call Light within Reach;Tray Table within Reach;Phone within Reach   PT Total Time Spent   PT Individual Total Time Spent (Mins) 60   PT Charge Group   PT Gait Training 1   PT Therapeutic Exercise 2   PT Therapeutic Activities 1       FIM Bed/Chair/Wheelchair Transfers Score: 6 - Modified Independent  Bed/Chair/Wheelchair Transfers Description:  Adaptive equipment(mod I with FWW)    FIM Walking Score:  6 - Modified Independent  Walking Description:  (220ft x 1 FWW mod I)    FIM Wheelchair Score:  6 - Modified Independent  Wheelchair Description:  (B UE 150ft mod I)    FIM Stairs Score:  2 - Max Assistance  Stairs Description:  (8 x 1 6\" B HR step to SPV)      Assessment    Pt feels prepared to d/c home at [facility] with outpatient PT and support of family    Plan    Pt feels prepared to dc at [facility] with support of family     "

## 2020-03-16 NOTE — THERAPY
Physical Therapy Discharge Instructions for Krystle Tavarez    3/16/2020    Level of Assist Required for Ambulation: No Assist on Flat Surfaces, Supervision on Curbs, Supervision on Stairs  Distance Patient May Ambulate: (limited community distances)  Device Recommended for Ambulation: Front-Wheeled Walker  Level of Assist Required for Transfers: Requires No Assist  Device Recommended for Transfers: Front-Wheeled Walker  Home Exercise Program: Refer to Home Exercise Program Handout for Details  It was a pleasure working with you! Best of luck on your continued recovery.  Sincerely,  Crystal Sanches PT, DPT

## 2020-03-16 NOTE — PROGRESS NOTES
Received shift report and assumed care of patient.  Patient awake, calm and stable, currently positioned in bed for comfort and safety; call light within reach.  Denies pain or discomfort at this time.

## 2020-03-19 ENCOUNTER — TELEPHONE (OUTPATIENT)
Dept: MEDICAL GROUP | Facility: MEDICAL CENTER | Age: 85
End: 2020-03-19

## 2020-03-19 NOTE — TELEPHONE ENCOUNTER
"Called and spoke with her daughter \"Cassy\" very concerned about her \"shaking.\"  Had back surgery on 2/25/2020.   Went back to ER for back pain on 2/29/2020.   Spent 12 days in Rehab.  Patient returned home this past Monday.  She denies any pain at this time.  States that she has been doing her physical therapy exercises that she was instructed to do.  She has been waiting on outpatient physical therapy but has not received a return phone call as of yet.  Has not followed up with her surgeon as of yet.  States that she does have some soreness from doing the exercises however she feels the surgery was very successful.  She is concerned regarding taking the gabapentin 800 mg twice daily.  She has tried this medication in the past and did not tolerate it.  She is wondering if the shakiness she is experiencing is from the gabapentin.  Patient states that when she will be holding her coffee she will have some shakiness or a jerk and will spill her coffee.  Slight weakness in her lower extremities ever since coming home from rehab.  After reviewing the chart it looks as if the gabapentin was started in the rehab center.  Have informed the patient if she like to cut the medication in half to 400mg  twice a day and then see if her shakiness improves.  If it does not we have discussed stopping the medication and assessing her symptoms thereafter.  She will call next week with an update or if any other concerns.  "

## 2020-03-22 NOTE — CONSULTS
DATE OF SERVICE:  03/10/2020    BEHAVIORAL MEDICINE EVALUATION    BRIEF HISTORY OF PRESENTING COMPLAINTS:  The patient is an 86-year-old white    female who is referred for a behavioral medicine evaluation by Dr. White.  The patient was transferred to rehab from acute where she was admitted   to [facility] [place] 02/29/2020 with worsening pain and a history of urinary retention   and constipation.  The patient underwent an L4-L5 laminectomy to 02/25/2020.    She was managed postoperatively and stabilized acutely.  She was then sent to   rehab to address her general debility.    PAST MEDICAL HISTORY:  Significant for arthritis of the hips and knees, breath   shortness, cataracts, dental disorder, heartburn, hemorrhagic disorder,   hypercholesterolemia, hypertension, pacemaker, low back pain, prediabetes,   TIA.    PSYCHOLOGICAL STATUS:  MENTAL STATUS EXAMINATION:  The patient is a well-nourished, slightly   overweight female of short stature, who appeared her stated age of 86.  At   presentation, the patient was alert.  She was resting supine in bed when   approached.  The patient oriented fairly well to my presence.  She was kempt   in appearance.  She was dressed casually in street attire that was appropriate   to her age and setting.  The patient's manner of presentation was cooperative   and friendly.    The patient demonstrated no gross cognitive deficits.  She was well oriented   to time, place, and person.  Her language was logical and goal oriented, and   her speech was normal for rate and rhythm.  The patient's concentration and   memory functioning appeared intact.    The patient's affect was well modulated, stable, and mildly intense.  She   related well.  Her mood appeared somewhat anxious, but appropriate to the   context.    There was no evidence of delusional or perceptual disturbance.  Also, the   patient showed no unusual pain or motor behavior during the interview.    SPECIFIC BEHAVIORAL COMPLAINTS:   The patient denied any clinically significant   symptoms of a negative mood.  She reported no strong feelings of depression,   anxiety or anger.  She also said her appetite is good and her energy level is   returning.  She reported some leg pain, but only very mild in intensity.  She   says she is having some problems with sleep, mostly related to the hospital   routine.    The patient denied any interpersonal discord or discomfort, family disharmony   or problems managing her day-to-day stressors.  She also reported no ETOH or   other drug abuse or any use of tobacco.    PSYCHIATRIC HISTORY:  The patient denied any history significant for   psychiatric disturbance or treatment including in or outpatient care.    PSYCHOMETRIC TESTING:  The patient was administered 2 psychometric tests and 2   screening instruments.  The PS/PC-R revealed no clinically significant   symptoms of a negative mood.  Her CDR survey showed no problems with level of   consciousness or attention.  The patient's thinking and perception were within   normal limits.  She also showed no problems with speech or her memory.    Finally, the patient admitted to some deficits in behavioral activation and   sleep disturbance, but she denied any significant pain.    The patient was screened for any elder abuse or risk of suicide.  There is no   strong evidence for either problem.    SOCIAL HISTORY:  The patient is  and retired.  She was living alone in   [place], [place] at the time of her hospitalization.    IMPRESSION:  Minor adjustment difficulties.    RECOMMENDATIONS:  The patient will be followed for status and supportive care.       ____________________________________     YUKO BLANC, PHD    VIN / TRI    DD:  03/22/2020 10:45:27  DT:  03/22/2020 12:47:24    D#:  [id number]  Job#:  [id number]

## 2020-03-24 ENCOUNTER — PHYSICAL THERAPY (OUTPATIENT)
Dept: PHYSICAL THERAPY | Facility: REHABILITATION | Age: 85
End: 2020-03-24
Attending: PHYSICAL MEDICINE & REHABILITATION
Payer: MEDICARE

## 2020-03-24 DIAGNOSIS — Z74.09 OTHER REDUCED MOBILITY: ICD-10-CM

## 2020-03-24 DIAGNOSIS — R53.81 OTHER MALAISE: ICD-10-CM

## 2020-03-24 DIAGNOSIS — M48.062 SPINAL STENOSIS, LUMBAR REGION WITH NEUROGENIC CLAUDICATION: ICD-10-CM

## 2020-03-24 DIAGNOSIS — G89.18 OTHER ACUTE POSTPROCEDURAL PAIN: ICD-10-CM

## 2020-03-24 DIAGNOSIS — R29.898 OTHER SYMPTOMS AND SIGNS INVOLVING THE MUSCULOSKELETAL SYSTEM: ICD-10-CM

## 2020-03-24 PROCEDURE — 97162 PT EVAL MOD COMPLEX 30 MIN: CPT

## 2020-03-24 PROCEDURE — 97110 THERAPEUTIC EXERCISES: CPT

## 2020-03-24 ASSESSMENT — ENCOUNTER SYMPTOMS
PAIN SCALE AT HIGHEST: 5
PAIN TIMING: ON AWAKENING
PAIN SCALE: 5
EXACERBATED BY: WALKING
PAIN SCALE AT LOWEST: 0
EXACERBATED BY: STANDING
PAIN TIMING: WHEN ACTIVE
QUALITY: ACHING

## 2020-03-24 ASSESSMENT — ACTIVITIES OF DAILY LIVING (ADL): POOR_BALANCE: 1

## 2020-03-24 NOTE — OP THERAPY EVALUATION
Outpatient Physical Therapy  INITIAL EVALUATION    [facility] [address], [address]  [place] [address]  Phone:  [phone]    Date of Evaluation: 2020    Patient: Krystle Tavarez  YOB: 1933  MRN: [id number]     Referring Provider: Jose White D.O.  [address] [place] [address], [place] [address]   Referring Diagnosis Other acute postprocedural pain [G89.18];Spinal stenosis, lumbar region with neurogenic claudication [M48.062];Other malaise [R53.81];Other symptoms and signs involving the musculoskeletal system [R29.898];Other reduced mobility [Z74.09]     Time Calculation  Start time: 1330  Stop time: 1430 Time Calculation (min): 60 minutes           Chief Complaint: No chief complaint on file.    Visit Diagnoses     ICD-10-CM   1. Other acute postprocedural pain G89.18   2. Spinal stenosis, lumbar region with neurogenic claudication M48.062   3. Other malaise R53.81   4. Other symptoms and signs involving the musculoskeletal system R29.898   5. Other reduced mobility Z74.09         Subjective:   History of Present Illness:     Mechanism of injury:  The patient is a 86 y.o.was  Patient is status post L4-L5 laminectomy on  by Dr. Kimble.  Original surgery was done because of right foot numbness and bilateral lower extremity weakness and balance impairment. Admitted to acute rehab x 2 weeks.  Discharged from acute rehab last Monday.    PACEMAKER, TIA Right EMILIA and TKA, Compression fracture L2 3/25/2019, HTN  Currently using 4 wheel walker household distances  Problems:  Difficulty standing up for any amount of time.  Currently unable to perform ADLS that require walking >100 feet because of fatigue and back and hip pain.  O2 sats 90-92%  Transfers with raised toilet seat @ Independent  Showers:  Walk in shower with a bench @ Independent  Sedentary, living with family    Pain Med:  Tylenol   Imagin. No acute fracture appreciated in the lumbar  spine     2. Postsurgical changes from posterior decompression L4-5.     3. Mild retrolisthesis of L2-3 and anterolisthesis of L5-S1 are unchanged.     4. Old T12 compression fracture status post cement augmentation, similar to prior       Prior level of function:  Active socially and with houshold acitivities, walking activities.   Sleep disturbance:  Not disrupted  Pain:     Current pain ratin    At best pain ratin    At worst pain ratin    Location:  Central lumbar radiating to bilateral  glutes , left hip    Quality:  Aching    Pain timing:  When active and on awakening    Aggravating factors:  Walking and standing    Pain Comments::  Back pain is keeping her from being active  Social Support:     Lives in:  One-story house (one step in and out of house.)    Lives with: family//sister.  Hand dominance:  Left  Patient Goals:     Patient goals for therapy:  [place] with ADLs/IADLs and improved balance    Other patient goals:  To go shopping and go to [place]       Past Medical History:   Diagnosis Date   • Arthritis     knees, hips   • Breath shortness     with exertion    • Cataract     bilat IOL    • Dental disorder     full upper, partial lower    • Heart burn    • Hemorrhagic disorder (HCC)     on Plavix    • High cholesterol     diet controlled    • Hyperlipidemia    • Hypertension    • Pacemaker    • Pain 2019    lower back, right leg   • Pre-diabetes    • TIA (transient ischemic attack)     on Plavix   • Urinary incontinence      Past Surgical History:   Procedure Laterality Date   • PB LAMINOTOMY,LUMBAR DISK,1 INTRSP N/A 2020    Procedure: LAMINECTOMY, SPINE, LUMBAR, WITH DISCECTOMY- L5-S1, MEDIAL FACETECTOMY;  Surgeon: Latrell Kimble M.D.;  Location: SURGERY [facility];  Service: Neurosurgery   • FORAMINOTOMY N/A 2020    Procedure: FORAMINOTOMY, SPINE;  Surgeon: Latrell Kimble M.D.;  Location: SURGERY [facility];  Service: Neurosurgery   • PACEMAKER INSERTION      • HIP REPLACEMENT, TOTAL Right 2009   • KNEE REPLACEMENT, TOTAL Right 2004   • CATARACT EXTRACTION WITH IOL Bilateral 2003   • CHOLECYSTECTOMY  2002   • BASAL CELL EXCISION      nose and hand   • BUNIONECTOMY Left      Social History     Tobacco Use   • Smoking status: Never Smoker   • Smokeless tobacco: Never Used   Substance Use Topics   • Alcohol use: No     Family and Occupational History     Socioeconomic History   • Marital status:      Spouse name: Not on file   • Number of children: Not on file   • Years of education: Not on file   • Highest education level: Not on file   Occupational History   • Not on file       Objective     Observations   Central spine     Positive for hypolordosis and thoracic kyphosis.    Additional Observation Details  Bilateral LE edema    Postural Observations  Seated posture: poor  Standing posture: poor  Correction of posture: has no consistent effect    Additional Postural Observation Details  Forward flexed posture with gait        Neurological Testing     Reflexes   Left   Patellar (L4): normal (2+)  Achilles (S1): trace (1+)    Right   Patellar (L4): normal (2+)  Achilles (S1): normal (2+)    Myotome testing   Lumbar (left)   L3 (knee extensors): 3+  L4 (ankle dorsiflexors): 4  L5 (great toe extension): 5  S1 (ankle plantar flexors): 4+    Lumbar (right)   L4 (ankle dorsiflexors): 5  L5 (great toe extension): 4  S1 (ankle plantar flexors): 4    Dermatome testing   Lumbar (left)   All left lumbar dermatomes intact    Lumbar (right)   All right lumbar dermatomes intact    Active Range of Motion     Lumbar   Flexion: decreased  Extension: decreased  Left lateral flexion: decreased  Right lateral flexion: decreased  Left rotation: decreased  Right rotation: decreased    Additional Active Range of Motion Details  NT formally secondary to pain and decreased functional mobility    Joint Play     Additional Joint Play Details  Lumbar spine/hips NT formally,     Strength:       Abdominals   Lower abdominals: Unable to initiate contraction    Left Hip   Planes of Motion   Flexion: 3-    Tests     Additional Tests Details  + SLR right increased posterior LE pain @ 40 degrees  Ambulation     Observational Gait   Decreased walking speed and stride length. Stride width: cross over.      Additional Observational Gait Details  Forward flexed posture, poor technique with 4 wheel walker causing excessive forward flexion.  Patient fatigues quickly    Comments   Standing tolerance: < 5 min           Functional Assessment     Comments  Sit to stand @ Independent but requires hand/UE support  Min assist rolling and supine to sit using log roll technique    Activities of Daily Living:   Transfers/Mobility:     Bed/chair transfers: independent    Sit to supine: minimum assist    Toilet transfers: independent       Toilet transfer equipment used: raised toilet seat    Bed mobility: minimum assist        Therapeutic Exercises (CPT 99391):     1. Bridge with hip ER resisted orange tband, x 10    2. Review log roll    3. Sit to stand , x 10    Therapeutic Treatments and Modalities:     1. Gait Training (CPT 59422), Gait training 2 x 100 feet with 4 wheel rollator @ Independent with VC to maintain safe/close distance to 4 wheel walker    Time-based treatments/modalities:          Assessment, Response and Plan:   Impairments: abnormal ADL function, abnormal gait, abnormal or restricted ROM, activity intolerance, impaired functional mobility, impaired balance, impaired physical strength, lacks appropriate home exercise program, limited ADL's, limited mobility, pain with function, safety issue and swelling    Assessment details:  Patient presents with impaired functional mobility, endurance and strength secondary to lumbar Laminectomy 2/25/2020//lumbar stenosis.  Patient is having a difficult time with functional strength and endurance at home since being discharged from inpatient rehab x 1 week ago.   Patient has a 5 min standing tolerance and is only able to walk houshold(150 feet) distances with her 4 wheel walker without needing to sit secondary to back pain and bilateral LE weakness.  Patient is also requiring assist with most ADLS although she is INdependent with raised toilet seat , shower and car transfers.  Patient is also complaining of increased left LE pain and weakness since the surgery.  Recommend continued PT to meet goals stated below.   Barriers to therapy:  Comorbidities  Prognosis: good    Goals:   Short Term Goals:   Independent ambulation 300 feet with AD  Patient able to stand > 5 min for brushing teeth with >50% decreased back pain  Short term goal time span:  2-4 weeks      Long Term Goals:    Patient able to return to community social events with >600 feet of walking with AD  Independent wit HEP  Long term goal time span:  6-8 weeks    Plan:   Therapy options:  Physical therapy treatment to continue  Planned therapy interventions:  Manual Therapy (CPT 85513), Gait Training (CPT 35526), Functional Training, Self Care (CPT 56740), Therapeutic Activities (CPT 32216), Therapeutic Exercise (CPT 66487) and Neuromuscular Re-education (CPT 69048)  Frequency:  2x week  Duration in weeks:  8  Discussed with:  Patient  Plan details:  1-2 x week x 8 weeks      Functional Assessment Used        Referring provider co-signature:  I have reviewed this plan of care and my co-signature certifies the need for services.  Certification Dates:   From 03/24/20   To 05/21/20    Physician Signature: ________________________________ Date: ______________

## 2020-03-25 ENCOUNTER — OFFICE VISIT (OUTPATIENT)
Dept: CARDIOLOGY | Facility: MEDICAL CENTER | Age: 85
End: 2020-03-25
Payer: MEDICARE

## 2020-03-25 VITALS
DIASTOLIC BLOOD PRESSURE: 72 MMHG | SYSTOLIC BLOOD PRESSURE: 134 MMHG | BODY MASS INDEX: 28.88 KG/M2 | WEIGHT: 163 LBS | HEIGHT: 63 IN | HEART RATE: 74 BPM | OXYGEN SATURATION: 96 %

## 2020-03-25 DIAGNOSIS — Z95.0 CARDIAC PACEMAKER IN SITU: Chronic | ICD-10-CM

## 2020-03-25 DIAGNOSIS — R60.0 LOCALIZED EDEMA: ICD-10-CM

## 2020-03-25 DIAGNOSIS — D64.9 ANEMIA, UNSPECIFIED TYPE: ICD-10-CM

## 2020-03-25 DIAGNOSIS — I10 ESSENTIAL HYPERTENSION, BENIGN: Chronic | ICD-10-CM

## 2020-03-25 DIAGNOSIS — Z86.73 HISTORY OF TIA (TRANSIENT ISCHEMIC ATTACK): Chronic | ICD-10-CM

## 2020-03-25 PROCEDURE — 99214 OFFICE O/P EST MOD 30 MIN: CPT | Performed by: INTERNAL MEDICINE

## 2020-03-25 ASSESSMENT — ENCOUNTER SYMPTOMS
RESPIRATORY NEGATIVE: 1
WEAKNESS: 1
CARDIOVASCULAR NEGATIVE: 1
BACK PAIN: 0
PALPITATIONS: 0
DEPRESSION: 0
DIZZINESS: 0
BRUISES/BLEEDS EASILY: 0
BLOOD IN STOOL: 0
GASTROINTESTINAL NEGATIVE: 1

## 2020-03-25 ASSESSMENT — FIBROSIS 4 INDEX: FIB4 SCORE: 1.592592592592592593

## 2020-03-26 ENCOUNTER — TELEPHONE (OUTPATIENT)
Dept: MEDICAL GROUP | Facility: MEDICAL CENTER | Age: 85
End: 2020-03-26

## 2020-03-26 ENCOUNTER — PHYSICAL THERAPY (OUTPATIENT)
Dept: PHYSICAL THERAPY | Facility: REHABILITATION | Age: 85
End: 2020-03-26
Attending: PHYSICAL MEDICINE & REHABILITATION
Payer: MEDICARE

## 2020-03-26 ENCOUNTER — HOME HEALTH ADMISSION (OUTPATIENT)
Dept: HOME HEALTH SERVICES | Facility: HOME HEALTHCARE | Age: 85
End: 2020-03-26
Payer: MEDICARE

## 2020-03-26 ENCOUNTER — HOSPITAL ENCOUNTER (OUTPATIENT)
Dept: LAB | Facility: MEDICAL CENTER | Age: 85
End: 2020-03-26
Attending: INTERNAL MEDICINE
Payer: MEDICARE

## 2020-03-26 DIAGNOSIS — R29.898 WEAKNESS OF BOTH LOWER EXTREMITIES: ICD-10-CM

## 2020-03-26 DIAGNOSIS — G89.18 OTHER ACUTE POSTPROCEDURAL PAIN: ICD-10-CM

## 2020-03-26 DIAGNOSIS — M48.062 SPINAL STENOSIS, LUMBAR REGION WITH NEUROGENIC CLAUDICATION: ICD-10-CM

## 2020-03-26 DIAGNOSIS — Z98.890 S/P LUMBAR LAMINECTOMY: ICD-10-CM

## 2020-03-26 DIAGNOSIS — Z74.09 OTHER REDUCED MOBILITY: ICD-10-CM

## 2020-03-26 DIAGNOSIS — D64.9 ANEMIA, UNSPECIFIED TYPE: ICD-10-CM

## 2020-03-26 LAB
BASOPHILS # BLD AUTO: 0.8 % (ref 0–1.8)
BASOPHILS # BLD: 0.06 K/UL (ref 0–0.12)
EOSINOPHIL # BLD AUTO: 0.27 K/UL (ref 0–0.51)
EOSINOPHIL NFR BLD: 3.5 % (ref 0–6.9)
ERYTHROCYTE [DISTWIDTH] IN BLOOD BY AUTOMATED COUNT: 48.9 FL (ref 35.9–50)
HCT VFR BLD AUTO: 39.8 % (ref 37–47)
HGB BLD-MCNC: 13.3 G/DL (ref 12–16)
HGB RETIC QN AUTO: 35.2 PG/CELL (ref 29–35)
IMM GRANULOCYTES # BLD AUTO: 0.03 K/UL (ref 0–0.11)
IMM GRANULOCYTES NFR BLD AUTO: 0.4 % (ref 0–0.9)
IMM RETICS NFR: 13.5 % (ref 9.3–17.4)
LYMPHOCYTES # BLD AUTO: 2.28 K/UL (ref 1–4.8)
LYMPHOCYTES NFR BLD: 29.4 % (ref 22–41)
MCH RBC QN AUTO: 31.3 PG (ref 27–33)
MCHC RBC AUTO-ENTMCNC: 33.4 G/DL (ref 33.6–35)
MCV RBC AUTO: 93.6 FL (ref 81.4–97.8)
MONOCYTES # BLD AUTO: 0.55 K/UL (ref 0–0.85)
MONOCYTES NFR BLD AUTO: 7.1 % (ref 0–13.4)
NEUTROPHILS # BLD AUTO: 4.57 K/UL (ref 2–7.15)
NEUTROPHILS NFR BLD: 58.8 % (ref 44–72)
NRBC # BLD AUTO: 0 K/UL
NRBC BLD-RTO: 0 /100 WBC
PLATELET # BLD AUTO: 300 K/UL (ref 164–446)
PMV BLD AUTO: 9.8 FL (ref 9–12.9)
RBC # BLD AUTO: 4.25 M/UL (ref 4.2–5.4)
RETICS # AUTO: 0.1 M/UL (ref 0.04–0.06)
RETICS/RBC NFR: 2.3 % (ref 0.8–2.1)
WBC # BLD AUTO: 7.8 K/UL (ref 4.8–10.8)

## 2020-03-26 PROCEDURE — 85046 RETICYTE/HGB CONCENTRATE: CPT

## 2020-03-26 PROCEDURE — 97110 THERAPEUTIC EXERCISES: CPT

## 2020-03-26 PROCEDURE — 36415 COLL VENOUS BLD VENIPUNCTURE: CPT

## 2020-03-26 PROCEDURE — 85025 COMPLETE CBC W/AUTO DIFF WBC: CPT

## 2020-03-26 NOTE — TELEPHONE ENCOUNTER
VOICEMAIL  1. Caller Name: Michael (PT)                      Call Back Number: [phone]    2. Message: Can you please change PT order to Home Health order instead?  PT office is closing tomorrow.    3. Patient approves office to leave a detailed voicemail/MyChart message: N\A

## 2020-03-26 NOTE — PROGRESS NOTES
Chief Complaint   Patient presents with   • Atrial Fibrillation   • Edema       Subjective:   Krystle Tavarez is a 86 y.o. female who presents today for follow-up of permanent pacemaker, hypertension, PAF and edema.  She was seen on March 13.  Pacemaker interrogation demonstrated evidence of PAF.  Because of anemia, she was not started on anticoagulation.  Some lab was drawn at her skilled care facility and iron studies are normal.  They did not however get a repeat CBC.  Stool guaiac was negative x1.  She has had no overt bowel bleeding.  In the skilled care facility, her losartan/HCTZ was discontinued and she was started on amlodipine for blood pressure.  Since starting the amlodipine, she has had worsening problems with foot swelling.   Past Medical History:   Diagnosis Date   • Arthritis     knees, hips   • Breath shortness     with exertion    • Cataract     bilat IOL    • Dental disorder     full upper, partial lower    • Heart burn    • Hemorrhagic disorder (HCC)     on Plavix    • High cholesterol     diet controlled    • Hyperlipidemia    • Hypertension    • Pacemaker 2015   • Pain 08/2019    lower back, right leg   • Pre-diabetes    • TIA (transient ischemic attack)     on Plavix   • Urinary incontinence      Past Surgical History:   Procedure Laterality Date   • PB LAMINOTOMY,LUMBAR DISK,1 INTRSP N/A 2/25/2020    Procedure: LAMINECTOMY, SPINE, LUMBAR, WITH DISCECTOMY- L5-S1, MEDIAL FACETECTOMY;  Surgeon: Latrell Kimble M.D.;  Location: SURGERY [facility];  Service: Neurosurgery   • FORAMINOTOMY N/A 2/25/2020    Procedure: FORAMINOTOMY, SPINE;  Surgeon: Latrell Kimble M.D.;  Location: SURGERY [facility];  Service: Neurosurgery   • PACEMAKER INSERTION  2015   • HIP REPLACEMENT, TOTAL Right 2009   • KNEE REPLACEMENT, TOTAL Right 2004   • CATARACT EXTRACTION WITH IOL Bilateral 2003   • CHOLECYSTECTOMY  2002   • BASAL CELL EXCISION      nose and hand   • BUNIONECTOMY Left      Family History   Problem  Relation Age of Onset   • Diabetes Mother    • Stroke Mother    • Heart Attack Father 74   • No Known Problems Maternal Grandmother    • No Known Problems Maternal Grandfather    • No Known Problems Paternal Grandmother    • No Known Problems Paternal Grandfather      Social History     Socioeconomic History   • Marital status:      Spouse name: Not on file   • Number of children: Not on file   • Years of education: Not on file   • Highest education level: Not on file   Occupational History   • Not on file   Social Needs   • Financial resource strain: Not on file   • Food insecurity     Worry: Not on file     Inability: Not on file   • Transportation needs     Medical: Not on file     Non-medical: Not on file   Tobacco Use   • Smoking status: Never Smoker   • Smokeless tobacco: Never Used   Substance and Sexual Activity   • Alcohol use: No   • Drug use: No   • Sexual activity: Not on file   Lifestyle   • Physical activity     Days per week: Not on file     Minutes per session: Not on file   • Stress: Not on file   Relationships   • Social connections     Talks on phone: Not on file     Gets together: Not on file     Attends Yarsani service: Not on file     Active member of club or organization: Not on file     Attends meetings of clubs or organizations: Not on file     Relationship status: Not on file   • Intimate partner violence     Fear of current or ex partner: Not on file     Emotionally abused: Not on file     Physically abused: Not on file     Forced sexual activity: Not on file   Other Topics Concern   • Not on file   Social History Narrative   • Not on file     Allergies   Allergen Reactions   • Sulfa Drugs Nausea     Nausea      Outpatient Encounter Medications as of 3/25/2020   Medication Sig Dispense Refill   • carvedilol (COREG) 6.25 MG Tab Take 1 Tab by mouth 2 times a day, with meals. 60 Tab 3   • calcium carbonate (TUMS) 500 MG Chew Tab Take 1 Tab by mouth 2 Times a Day. 60 Tab 3   •  "clopidogrel (PLAVIX) 75 MG Tab Take 1 Tab by mouth every day. 30 Tab 3   • Dextromethorphan Polistirex ER (DELSYM) 30 MG/5ML Suspension Extended Release Take 10 mL by mouth every 12 hours as needed for Cough. 280 mL 3   • losartan (COZAAR) 100 MG Tab Take 1 Tab by mouth every day. 30 Tab 3   • Ascorbic Acid (VITAMIN C) 1000 MG Tab Take 1 Tab by mouth every day.     • tizanidine (ZANAFLEX) 2 MG tablet Take 1 Tab by mouth every 6 hours as needed (muscle cramps). (Patient not taking: Reported on 3/25/2020) 30 Tab 3   • benzocaine-menthol (CEPACOL) 15-3.6 MG Lozenge Spray 1 Lozenge in mouth/throat every 2 hours as needed. (Patient not taking: Reported on 3/25/2020) 30 Lozenge 3   • gabapentin (NEURONTIN) 400 MG Cap Take 2 Caps by mouth 2 Times a Day. (Patient not taking: Reported on 3/25/2020) 120 Cap 3   • polyethylene glycol/lytes (MIRALAX) Pack Take 1 Packet by mouth every day. (Patient not taking: Reported on 3/25/2020) 30 Each 3   • sennosides (SENOKOT) 8.6 MG Tab Take 1 Tab by mouth every day. (Patient not taking: Reported on 3/25/2020) 30 Tab 3   • [DISCONTINUED] amLODIPine (NORVASC) 10 MG Tab Take 1 Tab by mouth every day. 30 Tab 3     No facility-administered encounter medications on file as of 3/25/2020.      Review of Systems   Constitutional: Positive for malaise/fatigue.   HENT: Negative.    Respiratory: Negative.    Cardiovascular: Negative.  Negative for chest pain and palpitations.   Gastrointestinal: Negative.  Negative for blood in stool.   Musculoskeletal: Negative for back pain.   Skin: Negative.    Neurological: Positive for weakness. Negative for dizziness.        Difficulty with balance   Endo/Heme/Allergies: Negative.  Does not bruise/bleed easily.   Psychiatric/Behavioral: Negative for depression.        Objective:   /72 (BP Location: Left arm, Patient Position: Sitting, BP Cuff Size: Adult)   Pulse 74   Ht 1.6 m (5' 3\")   Wt 73.9 kg (163 lb)   LMP  (LMP Unknown)   SpO2 96%   BMI " 28.87 kg/m²     Physical Exam   Constitutional: No distress.   Uses a walker  The patient was examined today and her physical examination is unchanged from the prior evaluation of March 13, 2020, except for increased foot edema.   HENT:   Head: Normocephalic and atraumatic.   Eyes: Pupils are equal, round, and reactive to light. Conjunctivae are normal. No scleral icterus.   Cardiovascular: Regular rhythm.   No murmur heard.  Pulmonary/Chest: Breath sounds normal. No respiratory distress. She has no wheezes.   Musculoskeletal:      Comments: 1-2+ edema   Skin: Skin is warm.   Psychiatric: She has a normal mood and affect.       Assessment:     1. Essential hypertension, benign     2. Anemia, unspecified type  CBC WITH DIFFERENTIAL    RETICULOCYTES COUNT   3. Cardiac pacemaker in situ     4. History of TIA (transient ischemic attack)     5. Localized edema         Medical Decision Making:  Today's Assessment / Status / Plan:    Status post permanent pacemaker: Normal function by recent interrogation.    PAF: Asymptomatic as noted on pacemaker interrogation.  Anticoagulation not started because of anemia.    Anemia: Repeat CBC and a reticulocyte count was not obtained as ordered.  Will obtain a CBC and reticulocyte count..  Based on these findings anticoagulation may be started.    Hypertension: Under excellent excellent control on current medications.  However, she is having difficulty with foot swelling on amlodipine.  This will be discontinued and her losartan will be changed back to the losartan/HCTZ which worked well before in controlling her blood pressure and her edema.    Obtain lab and reevaluate in 1 week

## 2020-03-26 NOTE — OP THERAPY DAILY TREATMENT
Outpatient Physical Therapy  DAILY TREATMENT     [facility] Physical Therapy [address], [address]  [place] [address]  Phone:  [phone]    Date: 03/26/2020    Patient: Krystle Tavarez  YOB: 1933  MRN: [id number]     Time Calculation  Start time: 1330  Stop time: 1410 Time Calculation (min): 40 minutes       Chief Complaint: No chief complaint on file.    Visit #: 2    SUBJECTIVE:  Patient fatigued more than usual and having difficulty performing basic ADLs at home including standing > 2-3 min .  Patient's cardiologists is aware and is evaluating symptoms.    OBJECTIVE:  Current objective measures: O2 sats 92% HR 70 during activity.  Patient requires multiple sitting rests between activities          Therapeutic Exercises (CPT 59657):     1. Nu step, L1 2 x 5 min    2. Heel raises standing DL, 2 x 10    3. Sit to stand, 2 x 10 with UE support on 4 wheel walker    4. Bridge , 2 x 10    5. Log roll bed mobility to the left    6. Standing posture re education      Time-based treatments/modalities:  Therapeutic exercise minutes (CPT 51336): 38 minutes       Pain rating before treatment: 2  Pain rating after treatment: 2    ASSESSMENT:   Response to treatment: Discussed patients high risk for COVID 19 secondary to multiple co morbidities.  Recommend that patient be discharged and referred to home health to decrease risk of exposure while continuing to progress functional mobility and strength.  Patient is in agreement with this plan.      PLAN/RECOMMENDATIONS:   Plan for treatment: discharged secondary to patient high risk for COVID 19 due to multiple comorbidities.  Planned interventions for next visit: Discharge and refer to Home Health PT.

## 2020-03-26 NOTE — PROGRESS NOTES
Face to Face Supporting Documentation - Home Health    The encounter with this patient was in whole or in part the primary reason for home health admission.    Date of encounter:   Patient:                    MRN:                       YOB: 2020  Krystle Tavarez  [id number]  7/26/1933     Home health to see patient for:  Physical Therapy evaluation and treatment    Skilled need for:  Surgical Aftercare s/p lumbar laminectomy    Skilled nursing interventions to include:  Comment: PT    Homebound status evidenced by:  Need the aid of supportive devices such as crutches, canes, wheelchairs or walkers. Leaving home requires a considerable and taxing effort. There is a normal inability to leave the home.    Community Physician to provide follow up care: JAYLEN Castelan     Optional Interventions? No      I certify the face to face encounter for this home health care referral meets the CMS requirements and the encounter/clinical assessment with the patient was, in whole, or in part, for the medical condition(s) listed above, which is the primary reason for home health care. Based on my clinical findings: the service(s) are medically necessary, support the need for home health care, and the homebound criteria are met.  I certify that this patient has had a face to face encounter by myself.  RICHAR Castelan. - NPI: [id number]

## 2020-03-26 NOTE — OP THERAPY DISCHARGE SUMMARY
Outpatient Physical Therapy  DISCHARGE SUMMARY NOTE      [facility] [place] [id number], [address]  [place] [address]  Phone:  [phone]    Date of Visit: 03/26/2020    Patient: Krystle Tavarez  YOB: 1933  MRN: [id number]     Referring Provider: Jose White D.O.  [address] [place] [address]   Referring Diagnosis Other acute postprocedural pain [G89.18];Spinal stenosis, lumbar region with neurogenic claudication [M48.062];Other malaise [R53.81];Other symptoms and signs involving the musculoskeletal system [R29.898];Other reduced mobility [Z74.09]     Physical Therapy Occurrence Codes    Date of onset of impairment:  3/24/20   Date physical therapy care plan established or reviewed:  3/24/20   Date physical therapy treatment started:  3/24/20          Functional Assessment Used        Your patient is being discharged from Physical Therapy with the following comments:   · Goals not met    Comments:  Patient seen for initial evaluation and one follow up visit.  Further outpatient PT is not adivsed secondary to patient is high risk for COVID 19 due to multiple co morbidities.   Recommend continuation of PT with Home Health PT to decrease risk and continue care.    Limitations Remaining:  Please see daily treatment notes and initial evaluation    Recommendations:  Discharge from PT    Kaylen Arana PT, MSPT    Date: 3/26/2020

## 2020-03-30 ENCOUNTER — APPOINTMENT (OUTPATIENT)
Dept: PHYSICAL THERAPY | Facility: REHABILITATION | Age: 85
End: 2020-03-30
Attending: PHYSICAL MEDICINE & REHABILITATION
Payer: MEDICARE

## 2020-03-30 ENCOUNTER — HOME CARE VISIT (OUTPATIENT)
Dept: HOME HEALTH SERVICES | Facility: HOME HEALTHCARE | Age: 85
End: 2020-03-30
Payer: MEDICARE

## 2020-03-30 ENCOUNTER — TELEPHONE (OUTPATIENT)
Dept: CARDIOLOGY | Facility: MEDICAL CENTER | Age: 85
End: 2020-03-30

## 2020-03-30 VITALS
TEMPERATURE: 98.5 F | HEART RATE: 65 BPM | OXYGEN SATURATION: 94 % | RESPIRATION RATE: 18 BRPM | DIASTOLIC BLOOD PRESSURE: 52 MMHG | SYSTOLIC BLOOD PRESSURE: 98 MMHG

## 2020-03-30 PROCEDURE — 665999 HH PPS REVENUE DEBIT

## 2020-03-30 PROCEDURE — G0151 HHCP-SERV OF PT,EA 15 MIN: HCPCS

## 2020-03-30 PROCEDURE — 665998 HH PPS REVENUE CREDIT

## 2020-03-30 PROCEDURE — 665001 SOC-HOME HEALTH

## 2020-03-30 ASSESSMENT — ENCOUNTER SYMPTOMS
ABDOMINAL PAIN: 1
HYPERTENSION: 1
APPETITE LEVEL: GOOD
STOOL FREQUENCY: DAILY
CHANGE IN APPETITE: UNCHANGED
LAST BOWEL MOVEMENT: 65467
BOWEL PATTERN NORMAL: 1
DIZZINESS: 1

## 2020-03-30 ASSESSMENT — ACTIVITIES OF DAILY LIVING (ADL)
CURRENT_FUNCTION: STAND BY ASSIST
PHYSICAL TRANSFERS ASSESSED: 1
DRESSING_LB_CURRENT_FUNCTION: MINIMUM ASSIST
OASIS_M1830: 03
AMBULATION ASSISTANCE: STAND BY ASSIST
AMBULATION ASSISTANCE: 1
FEEDING_COMMENTS: NOT ASSESSED
DRESSING_UB_CURRENT_FUNCTION: STAND BY ASSIST
AMBULATION ASSISTANCE ON FLAT SURFACES: 1
GROOMING_COMMENTS: NOT ASSESSED

## 2020-03-30 ASSESSMENT — PATIENT HEALTH QUESTIONNAIRE - PHQ9
1. LITTLE INTEREST OR PLEASURE IN DOING THINGS: 00
2. FEELING DOWN, DEPRESSED, IRRITABLE, OR HOPELESS: 00
CLINICAL INTERPRETATION OF PHQ2 SCORE: 0

## 2020-03-30 NOTE — TELEPHONE ENCOUNTER
JACOB Cuellar from [facility] nurse is calling in regards to patient having low blood pressure she will like a call back at [phone]

## 2020-03-30 NOTE — TELEPHONE ENCOUNTER
Spoke with Alisa. Pt's. BP vfueacfnt=743/52. After walking with Alisa, BP=98/52. Pt. Reported some mild dizziness.  Dr. Sewell's 3-25-20 OV dictation mentioned switching pt. From Amlodipine 10mg QD back to Losartan -25 due to side effect of foot edema.   However, Dr. Sewell never RX'd Losartan HCT. Dr. White ordered Losartan 100mg which pt. Has been taking. She also takes Carvedilol 6.25mg BID.  Also, see CBC and Retic drawn 3-26-20.  To Dr. Sewell.

## 2020-03-31 ENCOUNTER — ANTICOAGULATION MONITORING (OUTPATIENT)
Dept: VASCULAR LAB | Facility: MEDICAL CENTER | Age: 85
End: 2020-03-31

## 2020-03-31 PROCEDURE — 665999 HH PPS REVENUE DEBIT

## 2020-03-31 PROCEDURE — 665998 HH PPS REVENUE CREDIT

## 2020-03-31 RX ORDER — CARVEDILOL 3.12 MG/1
3.12 TABLET ORAL 2 TIMES DAILY WITH MEALS
Qty: 180 TAB | Refills: 3 | Status: SHIPPED | OUTPATIENT
Start: 2020-03-31 | End: 2021-03-26

## 2020-03-31 NOTE — PROGRESS NOTES
Received referral from [facility]. Medications reviewed. No clinically significant interactions noted.         There are a number of medications listed below that are not on her home medication list.  She is also on Eliquis 2.5 mg twice daily which was not on her discharge list.              START taking these medications      Instructions   amLODIPine 10 MG Tabs  Start taking on:  March 17, 2020  Commonly known as:  NORVASC    Take 1 Tab by mouth every day.  Dose:  10 mg      benzocaine-menthol 15-3.6 MG Lozg  Commonly known as:  CEPACOL    Spray 1 Lozenge in mouth/throat every 2 hours as needed.  Dose:  1 Lozenge              Dextromethorphan Polistirex ER 30 MG/5ML Suer  Commonly known as:  DELSYM    Take 10 mL by mouth every 12 hours as needed for Cough.  Dose:  60 mg      gabapentin 400 MG Caps  Commonly known as:  NEURONTIN    Take 2 Caps by mouth 2 Times a Day.  Dose:  800 mg      losartan 100 MG Tabs  Start taking on:  March 17, 2020  Commonly known as:  COZAAR    Take 1 Tab by mouth every day.  Dose:  100 mg      polyethylene glycol/lytes Pack  Start taking on:  March 17, 2020  Commonly known as:  MIRALAX    Take 1 Packet by mouth every day.  Dose:  17 g                      CHANGE how you take these medications      Instructions   tizanidine 2 MG tablet  What changed:    · when to take this  · reasons to take this  Commonly known as:  ZANAFLEX    Take 1 Tab by mouth every 6 hours as needed (muscle cramps).  Dose:  2 mg

## 2020-04-01 ENCOUNTER — HOME CARE VISIT (OUTPATIENT)
Dept: HOME HEALTH SERVICES | Facility: HOME HEALTHCARE | Age: 85
End: 2020-04-01
Payer: MEDICARE

## 2020-04-01 VITALS
OXYGEN SATURATION: 93 % | SYSTOLIC BLOOD PRESSURE: 128 MMHG | HEART RATE: 62 BPM | DIASTOLIC BLOOD PRESSURE: 60 MMHG | TEMPERATURE: 99.5 F | RESPIRATION RATE: 18 BRPM

## 2020-04-01 PROCEDURE — 665998 HH PPS REVENUE CREDIT

## 2020-04-01 PROCEDURE — G0151 HHCP-SERV OF PT,EA 15 MIN: HCPCS

## 2020-04-01 PROCEDURE — 665999 HH PPS REVENUE DEBIT

## 2020-04-01 NOTE — TELEPHONE ENCOUNTER
Patients daughter Ana M has some questions regarding the medication change. She would like a call back at [phone]

## 2020-04-01 NOTE — TELEPHONE ENCOUNTER
Ana M is questioning whether her Mom should be on Eliquis.  The literature states that if the patient has had spinal surgery (which she had in Feb 2020), an Epidural (which she had -2- prior to having the surgery), and a Myelogram (which she had 9/2019), the patient should not be on Eliquis.      Please advise.

## 2020-04-02 ENCOUNTER — ANTICOAGULATION MONITORING (OUTPATIENT)
Dept: MEDICAL GROUP | Facility: PHYSICIAN GROUP | Age: 85
End: 2020-04-02

## 2020-04-02 ENCOUNTER — HOME CARE VISIT (OUTPATIENT)
Dept: HOME HEALTH SERVICES | Facility: HOME HEALTHCARE | Age: 85
End: 2020-04-02
Payer: MEDICARE

## 2020-04-02 ENCOUNTER — APPOINTMENT (OUTPATIENT)
Dept: PHYSICAL THERAPY | Facility: REHABILITATION | Age: 85
End: 2020-04-02
Attending: PHYSICAL MEDICINE & REHABILITATION
Payer: MEDICARE

## 2020-04-02 PROCEDURE — 665998 HH PPS REVENUE CREDIT

## 2020-04-02 PROCEDURE — G0152 HHCP-SERV OF OT,EA 15 MIN: HCPCS

## 2020-04-02 PROCEDURE — 665999 HH PPS REVENUE DEBIT

## 2020-04-02 ASSESSMENT — ACTIVITIES OF DAILY LIVING (ADL)
AMBULATION_DISTANCE/DURATION_TOLERATED: 2 X 40 FT
AMBULATION ASSISTANCE ON FLAT SURFACES: 1

## 2020-04-03 ENCOUNTER — HOME CARE VISIT (OUTPATIENT)
Dept: HOME HEALTH SERVICES | Facility: HOME HEALTHCARE | Age: 85
End: 2020-04-03
Payer: MEDICARE

## 2020-04-03 VITALS
DIASTOLIC BLOOD PRESSURE: 60 MMHG | RESPIRATION RATE: 18 BRPM | SYSTOLIC BLOOD PRESSURE: 102 MMHG | OXYGEN SATURATION: 94 % | HEART RATE: 72 BPM | TEMPERATURE: 98.1 F

## 2020-04-03 PROCEDURE — 665998 HH PPS REVENUE CREDIT

## 2020-04-03 PROCEDURE — 665999 HH PPS REVENUE DEBIT

## 2020-04-03 PROCEDURE — G0151 HHCP-SERV OF PT,EA 15 MIN: HCPCS

## 2020-04-04 PROCEDURE — 665999 HH PPS REVENUE DEBIT

## 2020-04-04 PROCEDURE — 665998 HH PPS REVENUE CREDIT

## 2020-04-05 VITALS
OXYGEN SATURATION: 96 % | HEART RATE: 60 BPM | RESPIRATION RATE: 18 BRPM | TEMPERATURE: 98.3 F | SYSTOLIC BLOOD PRESSURE: 124 MMHG | DIASTOLIC BLOOD PRESSURE: 75 MMHG

## 2020-04-05 PROCEDURE — 665998 HH PPS REVENUE CREDIT

## 2020-04-05 PROCEDURE — 665999 HH PPS REVENUE DEBIT

## 2020-04-05 ASSESSMENT — ACTIVITIES OF DAILY LIVING (ADL)
SHOPPING: DEPENDENT
DRESSING_LB_CURRENT_FUNCTION: STAND BY ASSIST
LIGHT HOUSEKEEPING: NEEDS ASSISTANCE
PHYSICAL TRANSFERS ASSESSED: 1
ORAL_CARE_CURRENT_FUNCTION: INDEPENDENT
DRESSING_UB_CURRENT_FUNCTION: STAND BY ASSIST
CURRENT_FUNCTION: INDEPENDENT
TELEPHONE USE ASSESSED: 1
ORAL_CARE_ASSESSED: 1
GROOMING_CURRENT_FUNCTION: INDEPENDENT
LAUNDRY ASSESSED: 1
GROOMING ASSESSED: 1
TOILETING: INDEPENDENT
FEEDING: INDEPENDENT
USING THE TELPHONE: INDEPENDENT
LAUNDRY: NEEDS ASSISTANCE
PREPARING MEALS: INDEPENDENT
AMBULATION ASSISTANCE: SUPERVISION
SHOPPING ASSESSED: 1
AMBULATION ASSISTANCE: 1
TOILETING: 1
TRANSPORTATION: DEPENDENT
FEEDING ASSESSED: 1
TRANSPORTATION ASSESSED: 1
BATHING_CURRENT_FUNCTION: MINIMUM ASSIST
BATHING ASSESSED: 1
HOUSEKEEPING ASSESSED: 1

## 2020-04-06 ENCOUNTER — HOME CARE VISIT (OUTPATIENT)
Dept: HOME HEALTH SERVICES | Facility: HOME HEALTHCARE | Age: 85
End: 2020-04-06
Payer: MEDICARE

## 2020-04-06 ENCOUNTER — APPOINTMENT (OUTPATIENT)
Dept: PHYSICAL THERAPY | Facility: REHABILITATION | Age: 85
End: 2020-04-06
Attending: PHYSICAL MEDICINE & REHABILITATION
Payer: MEDICARE

## 2020-04-06 ENCOUNTER — OFFICE VISIT (OUTPATIENT)
Dept: MEDICAL GROUP | Facility: MEDICAL CENTER | Age: 85
End: 2020-04-06
Payer: MEDICARE

## 2020-04-06 VITALS
TEMPERATURE: 97.9 F | RESPIRATION RATE: 18 BRPM | HEART RATE: 67 BPM | SYSTOLIC BLOOD PRESSURE: 140 MMHG | DIASTOLIC BLOOD PRESSURE: 62 MMHG | OXYGEN SATURATION: 96 %

## 2020-04-06 DIAGNOSIS — Z74.09 IMPAIRED MOBILITY AND ADLS: ICD-10-CM

## 2020-04-06 DIAGNOSIS — Z78.9 IMPAIRED MOBILITY AND ADLS: ICD-10-CM

## 2020-04-06 DIAGNOSIS — Z98.890 S/P LUMBAR LAMINECTOMY: ICD-10-CM

## 2020-04-06 DIAGNOSIS — I10 ESSENTIAL HYPERTENSION, BENIGN: Chronic | ICD-10-CM

## 2020-04-06 DIAGNOSIS — R29.898 WEAKNESS OF BOTH LOWER EXTREMITIES: ICD-10-CM

## 2020-04-06 PROCEDURE — 99443 PR PHYSICIAN TELEPHONE EVALUATION 21-30 MIN: CPT | Performed by: NURSE PRACTITIONER

## 2020-04-06 PROCEDURE — 665999 HH PPS REVENUE DEBIT

## 2020-04-06 PROCEDURE — G0151 HHCP-SERV OF PT,EA 15 MIN: HCPCS

## 2020-04-06 PROCEDURE — 665998 HH PPS REVENUE CREDIT

## 2020-04-06 RX ORDER — LOSARTAN POTASSIUM AND HYDROCHLOROTHIAZIDE 25; 100 MG/1; MG/1
1 TABLET ORAL DAILY
Qty: 90 TAB | Refills: 3 | COMMUNITY
Start: 2020-04-06 | End: 2020-07-02

## 2020-04-06 SDOH — ECONOMIC STABILITY: HOUSING INSECURITY: HOME SAFETY: BODY MECHANICS AND SPINAL PREC EDUC

## 2020-04-06 NOTE — PROGRESS NOTES
As a means of avoiding spread of COVID-19, this visit is being conducted by telephone. This telephone visit was initiated by the patient and they verbally consented.    Time at start of call: 08:53    Reason for Call:  Follow up after back surgery     1. S/P lumbar laminectomy  S/P L5-S1 Laminectomy with discectomy. Currently working with in-home physical therapy to help with her balance, weakness and ADLs.  States her mobility and her pain have greatly improved.  She is trying to be patient with her recovery process.    2. Impaired mobility and ADLs  3. Weakness of both lower extremities  Patient has home health with physical therapy.  Patient states that physical therapy has been very helpful for her and is appreciative of the services offered at the home.  She states that she has been given at least 4 exercises that she can be doing on her own and physical therapy will come 3 times a week.  Does have some weakness in her right leg and her back hurts a little bit and in her hip area although this is intermittently.  Overall pain has greatly improved since having her surgery.    4. Essential hypertension, benign  Recently stopped the amlodipine due to worsening LE swelling. Started back on her losartan/HCTZ as this was more effective for her.  Patient reports swelling in her legs have improved.      Reports that she had stopped Plavix due to anemia on previous labs. Most recent labs hemodynamically stable.  Now on Eliquis per cardiology.  She needs Eliquis sent to her mail order pharmacy.        /62   Pulse 67   Temp 36.6 °C (97.9 °F)   Resp 18   SpO2 96%      Assessment and plan.         1. S/P lumbar laminectomy  Patient states her pain is improved greatly since having the surgery.  She does have a little bit of back pain in her hip area intermittently.  Continues to work with physical therapy.  No falls in the home since having surgery.    2. Impaired mobility and ADLs  3. Weakness of both lower  extremities  Status post lumbar laminectomy/discectomy.  Now has home health with physical therapy services.  Patient has been successful at this and is trying to be patient with her recovery.  States that her mobility has already improved since having her surgery.  Continue with physical therapy.    4. Essential hypertension, benign  Chronic.  Recently had stopped amlodipine and changed over back to losartan/HCTZ as it was more effective for the patient.  Swelling in her legs have improved.  No chest pain or shortness of breath at this time.  Continues to follow-up with cardiology.    Other orders  Updated medications  - apixaban (ELIQUIS) 2.5mg Tab; Take 1 Tab by mouth 2 Times a Day.  Dispense: 180 Tab; Refill: 3  - losartan-hydrochlorothiazide (HYZAAR) 100-25 MG per tablet; Take 1 Tab by mouth every day.  Dispense: 90 Tab; Refill: 3        Time at end of call: 09:21  Total Time Spent: 21-30 minutes    Joana ESPINAL

## 2020-04-07 PROCEDURE — 665998 HH PPS REVENUE CREDIT

## 2020-04-07 PROCEDURE — 665999 HH PPS REVENUE DEBIT

## 2020-04-08 ENCOUNTER — HOME CARE VISIT (OUTPATIENT)
Dept: HOME HEALTH SERVICES | Facility: HOME HEALTHCARE | Age: 85
End: 2020-04-08
Payer: MEDICARE

## 2020-04-08 VITALS
SYSTOLIC BLOOD PRESSURE: 117 MMHG | DIASTOLIC BLOOD PRESSURE: 60 MMHG | OXYGEN SATURATION: 92 % | TEMPERATURE: 97.9 F | RESPIRATION RATE: 17 BRPM | HEART RATE: 68 BPM

## 2020-04-08 PROCEDURE — 665999 HH PPS REVENUE DEBIT

## 2020-04-08 PROCEDURE — 665998 HH PPS REVENUE CREDIT

## 2020-04-08 PROCEDURE — G0151 HHCP-SERV OF PT,EA 15 MIN: HCPCS

## 2020-04-09 ENCOUNTER — APPOINTMENT (OUTPATIENT)
Dept: PHYSICAL THERAPY | Facility: REHABILITATION | Age: 85
End: 2020-04-09
Attending: PHYSICAL MEDICINE & REHABILITATION
Payer: MEDICARE

## 2020-04-09 VITALS
HEART RATE: 67 BPM | SYSTOLIC BLOOD PRESSURE: 140 MMHG | RESPIRATION RATE: 18 BRPM | DIASTOLIC BLOOD PRESSURE: 62 MMHG | TEMPERATURE: 97.9 F | OXYGEN SATURATION: 96 %

## 2020-04-09 PROBLEM — I82.409 DVT (DEEP VENOUS THROMBOSIS) (HCC): Status: RESOLVED | Noted: 2020-03-03 | Resolved: 2020-04-09

## 2020-04-09 PROBLEM — G82.22 ACUTE INCOMPLETE PARAPLEGIA (HCC): Status: RESOLVED | Noted: 2020-03-04 | Resolved: 2020-04-09

## 2020-04-09 PROBLEM — K59.00 CN (CONSTIPATION): Status: RESOLVED | Noted: 2020-02-29 | Resolved: 2020-04-09

## 2020-04-09 PROBLEM — R05.9 COUGH: Status: RESOLVED | Noted: 2020-03-10 | Resolved: 2020-04-09

## 2020-04-09 PROCEDURE — 665999 HH PPS REVENUE DEBIT

## 2020-04-09 PROCEDURE — 665998 HH PPS REVENUE CREDIT

## 2020-04-10 ENCOUNTER — HOME CARE VISIT (OUTPATIENT)
Dept: HOME HEALTH SERVICES | Facility: HOME HEALTHCARE | Age: 85
End: 2020-04-10
Payer: MEDICARE

## 2020-04-10 PROCEDURE — 665998 HH PPS REVENUE CREDIT

## 2020-04-10 PROCEDURE — 665999 HH PPS REVENUE DEBIT

## 2020-04-10 PROCEDURE — G0151 HHCP-SERV OF PT,EA 15 MIN: HCPCS

## 2020-04-10 PROCEDURE — G0152 HHCP-SERV OF OT,EA 15 MIN: HCPCS

## 2020-04-11 PROCEDURE — 665999 HH PPS REVENUE DEBIT

## 2020-04-11 PROCEDURE — 665998 HH PPS REVENUE CREDIT

## 2020-04-12 PROCEDURE — 665998 HH PPS REVENUE CREDIT

## 2020-04-12 PROCEDURE — 665999 HH PPS REVENUE DEBIT

## 2020-04-13 ENCOUNTER — APPOINTMENT (OUTPATIENT)
Dept: PHYSICAL THERAPY | Facility: REHABILITATION | Age: 85
End: 2020-04-13
Attending: PHYSICAL MEDICINE & REHABILITATION
Payer: MEDICARE

## 2020-04-13 VITALS
DIASTOLIC BLOOD PRESSURE: 65 MMHG | TEMPERATURE: 97.9 F | RESPIRATION RATE: 17 BRPM | OXYGEN SATURATION: 95 % | SYSTOLIC BLOOD PRESSURE: 108 MMHG | HEART RATE: 78 BPM

## 2020-04-13 PROCEDURE — 665999 HH PPS REVENUE DEBIT

## 2020-04-13 PROCEDURE — 665998 HH PPS REVENUE CREDIT

## 2020-04-13 PROCEDURE — G0180 MD CERTIFICATION HHA PATIENT: HCPCS | Performed by: FAMILY MEDICINE

## 2020-04-13 ASSESSMENT — ENCOUNTER SYMPTOMS: POOR JUDGMENT: 1

## 2020-04-14 ENCOUNTER — HOME CARE VISIT (OUTPATIENT)
Dept: HOME HEALTH SERVICES | Facility: HOME HEALTHCARE | Age: 85
End: 2020-04-14
Payer: MEDICARE

## 2020-04-14 PROCEDURE — 665998 HH PPS REVENUE CREDIT

## 2020-04-14 PROCEDURE — G0151 HHCP-SERV OF PT,EA 15 MIN: HCPCS

## 2020-04-14 PROCEDURE — G0152 HHCP-SERV OF OT,EA 15 MIN: HCPCS

## 2020-04-14 PROCEDURE — 665999 HH PPS REVENUE DEBIT

## 2020-04-15 ENCOUNTER — APPOINTMENT (OUTPATIENT)
Dept: PHYSICAL THERAPY | Facility: REHABILITATION | Age: 85
End: 2020-04-15
Attending: PHYSICAL MEDICINE & REHABILITATION
Payer: MEDICARE

## 2020-04-15 ENCOUNTER — TELEMEDICINE (OUTPATIENT)
Dept: CARDIOLOGY | Facility: MEDICAL CENTER | Age: 85
End: 2020-04-15
Payer: MEDICARE

## 2020-04-15 VITALS
HEIGHT: 63 IN | DIASTOLIC BLOOD PRESSURE: 57 MMHG | SYSTOLIC BLOOD PRESSURE: 119 MMHG | BODY MASS INDEX: 28.7 KG/M2 | WEIGHT: 162 LBS | HEART RATE: 66 BPM

## 2020-04-15 VITALS
TEMPERATURE: 98.9 F | HEART RATE: 71 BPM | RESPIRATION RATE: 17 BRPM | SYSTOLIC BLOOD PRESSURE: 118 MMHG | OXYGEN SATURATION: 97 % | DIASTOLIC BLOOD PRESSURE: 64 MMHG

## 2020-04-15 DIAGNOSIS — I10 ESSENTIAL HYPERTENSION, BENIGN: Chronic | ICD-10-CM

## 2020-04-15 DIAGNOSIS — Z79.01 ANTICOAGULATED: ICD-10-CM

## 2020-04-15 DIAGNOSIS — Z95.0 CARDIAC PACEMAKER IN SITU: Chronic | ICD-10-CM

## 2020-04-15 DIAGNOSIS — I48.0 PAF (PAROXYSMAL ATRIAL FIBRILLATION) (HCC): ICD-10-CM

## 2020-04-15 PROCEDURE — 665998 HH PPS REVENUE CREDIT

## 2020-04-15 PROCEDURE — 99999 PR NO CHARGE: CPT | Mod: 95 | Performed by: INTERNAL MEDICINE

## 2020-04-15 PROCEDURE — 665999 HH PPS REVENUE DEBIT

## 2020-04-15 ASSESSMENT — ENCOUNTER SYMPTOMS
BLOOD IN STOOL: 0
DEPRESSION: 0
DIZZINESS: 0
GASTROINTESTINAL NEGATIVE: 1
RESPIRATORY NEGATIVE: 1
BACK PAIN: 1
POOR JUDGMENT: 1
PALPITATIONS: 0
CARDIOVASCULAR NEGATIVE: 1

## 2020-04-15 ASSESSMENT — FIBROSIS 4 INDEX: FIB4 SCORE: 1.24

## 2020-04-15 NOTE — PROGRESS NOTES
Chief Complaint   Patient presents with   • Hypertension       Subjective:   Krystle Tavarez is a 86 y.o. female who presents today for follow-up of recent initiation of anticoagulation, hypertension, documented PAF, pacemaker and edema.  Recent pacemaker interrogation demonstrated periods of PAF and she was started on anticoagulation.  She has had no bleeding problems.  Her edema has gotten better she is back on losartan plus HCTZ.  The patient has been self quarantining and less physically active than usual.  She is still recovering from back surgery and feels as though she is getting stronger.  Overall, she is feeling well and has had no new interval problems.    Past Medical History:   Diagnosis Date   • Arthritis     knees, hips   • Breath shortness     with exertion    • Cataract     bilat IOL    • Dental disorder     full upper, partial lower    • Heart burn    • Hemorrhagic disorder (HCC)     on Plavix    • High cholesterol     diet controlled    • Hyperlipidemia    • Hypertension    • Pacemaker 2015   • Pain 08/2019    lower back, right leg   • Pre-diabetes    • TIA (transient ischemic attack)     on Plavix   • Urinary incontinence      Past Surgical History:   Procedure Laterality Date   • PB LAMINOTOMY,LUMBAR DISK,1 INTRSP N/A 2/25/2020    Procedure: LAMINECTOMY, SPINE, LUMBAR, WITH DISCECTOMY- L5-S1, MEDIAL FACETECTOMY;  Surgeon: Latrell Kimble M.D.;  Location: SURGERY [facility];  Service: Neurosurgery   • FORAMINOTOMY N/A 2/25/2020    Procedure: FORAMINOTOMY, SPINE;  Surgeon: Latrell Kimble M.D.;  Location: SURGERY [facility];  Service: Neurosurgery   • PACEMAKER INSERTION  2015   • HIP REPLACEMENT, TOTAL Right 2009   • KNEE REPLACEMENT, TOTAL Right 2004   • CATARACT EXTRACTION WITH IOL Bilateral 2003   • CHOLECYSTECTOMY  2002   • BASAL CELL EXCISION      nose and hand   • BUNIONECTOMY Left      Family History   Problem Relation Age of Onset   • Diabetes Mother    • Stroke Mother    • Heart Attack  Father 74   • No Known Problems Maternal Grandmother    • No Known Problems Maternal Grandfather    • No Known Problems Paternal Grandmother    • No Known Problems Paternal Grandfather      Social History     Socioeconomic History   • Marital status:      Spouse name: Not on file   • Number of children: Not on file   • Years of education: Not on file   • Highest education level: Not on file   Occupational History   • Not on file   Social Needs   • Financial resource strain: Not on file   • Food insecurity     Worry: Not on file     Inability: Not on file   • Transportation needs     Medical: Not on file     Non-medical: Not on file   Tobacco Use   • Smoking status: Never Smoker   • Smokeless tobacco: Never Used   Substance and Sexual Activity   • Alcohol use: No   • Drug use: No   • Sexual activity: Not on file   Lifestyle   • Physical activity     Days per week: Not on file     Minutes per session: Not on file   • Stress: Not on file   Relationships   • Social connections     Talks on phone: Not on file     Gets together: Not on file     Attends [employer] service: Not on file     Active member of club or organization: Not on file     Attends meetings of clubs or organizations: Not on file     Relationship status: Not on file   • Intimate partner violence     Fear of current or ex partner: Not on file     Emotionally abused: Not on file     Physically abused: Not on file     Forced sexual activity: Not on file   Other Topics Concern   • Not on file   Social History Narrative   • Not on file     Allergies   Allergen Reactions   • Sulfa Drugs Nausea     Nausea      Outpatient Encounter Medications as of 4/15/2020   Medication Sig Dispense Refill   • apixaban (ELIQUIS) 2.5mg Tab Take 1 Tab by mouth 2 Times a Day. 180 Tab 3   • losartan-hydrochlorothiazide (HYZAAR) 100-25 MG per tablet Take 1 Tab by mouth every day. 90 Tab 3   • carvedilol (COREG) 3.125 MG Tab Take 1 Tab by mouth 2 times a day, with meals. 180  "Tab 3   • Ascorbic Acid (VITAMIN C) 500 MG Cap Take 1 Capsule by mouth 2 Times a Day.     • Acetaminophen 500 MG Cap Take 500 mg by mouth 4 times a day as needed (pain). Patient only taking 1-2 tabs a day     • calcium carbonate (TUMS) 500 MG Chew Tab Take 1 Tab by mouth 2 Times a Day. 60 Tab 3   • sennosides (SENOKOT) 8.6 MG Tab Take 1 Tab by mouth every day. (Patient not taking: Reported on 4/15/2020) 30 Tab 3     No facility-administered encounter medications on file as of 4/15/2020.      Review of Systems   Constitutional: Positive for malaise/fatigue.   HENT: Negative.    Respiratory: Negative.    Cardiovascular: Negative.  Negative for chest pain and palpitations.   Gastrointestinal: Negative.  Negative for blood in stool.   Musculoskeletal: Positive for back pain.   Skin: Negative.    Neurological: Negative for dizziness.        Difficulty with balance   Endo/Heme/Allergies: Negative.    Psychiatric/Behavioral: Negative for depression.        Objective:   /57 (BP Location: Left arm, Patient Position: Sitting, BP Cuff Size: Adult)   Pulse 66   Ht 1.6 m (5' 3\")   Wt 73.5 kg (162 lb)   LMP  (LMP Unknown)   BMI 28.70 kg/m²     Physical Exam   Constitutional:   There is no physical examination today as this was a telephone appointment because of the viral pandemic       Assessment:     1. Essential hypertension, benign     2. Cardiac pacemaker in situ     3. PAF (paroxysmal atrial fibrillation) ([facility])     4. Anticoagulated         Medical Decision Making:  Today's Assessment / Status / Plan:   PAF: Documented on pacemaker interrogation.  Remains asymptomatic.    Anticoagulation: She was started on low-dose Eliquis and has had no bleeding problems.    Hypertension: Under good control the blood pressure running in the 117-120 range systolic.  No dizziness.  Edema has largely resolved now that she is back on losartan/HCTZ.    Return 2 months for face-to-face evaluation if possible.  .telemedicineconsent  "

## 2020-04-16 ENCOUNTER — HOME CARE VISIT (OUTPATIENT)
Dept: HOME HEALTH SERVICES | Facility: HOME HEALTHCARE | Age: 85
End: 2020-04-16
Payer: MEDICARE

## 2020-04-16 VITALS
SYSTOLIC BLOOD PRESSURE: 117 MMHG | OXYGEN SATURATION: 95 % | HEART RATE: 73 BPM | RESPIRATION RATE: 18 BRPM | TEMPERATURE: 98.4 F | DIASTOLIC BLOOD PRESSURE: 56 MMHG

## 2020-04-16 PROCEDURE — 665998 HH PPS REVENUE CREDIT

## 2020-04-16 PROCEDURE — G0151 HHCP-SERV OF PT,EA 15 MIN: HCPCS

## 2020-04-16 PROCEDURE — 665999 HH PPS REVENUE DEBIT

## 2020-04-17 PROCEDURE — 665998 HH PPS REVENUE CREDIT

## 2020-04-17 PROCEDURE — 665999 HH PPS REVENUE DEBIT

## 2020-04-18 PROCEDURE — 665998 HH PPS REVENUE CREDIT

## 2020-04-18 PROCEDURE — 665999 HH PPS REVENUE DEBIT

## 2020-04-19 PROCEDURE — 665999 HH PPS REVENUE DEBIT

## 2020-04-19 PROCEDURE — 665998 HH PPS REVENUE CREDIT

## 2020-04-20 ENCOUNTER — HOME CARE VISIT (OUTPATIENT)
Dept: HOME HEALTH SERVICES | Facility: HOME HEALTHCARE | Age: 85
End: 2020-04-20
Payer: MEDICARE

## 2020-04-20 VITALS
OXYGEN SATURATION: 95 % | SYSTOLIC BLOOD PRESSURE: 127 MMHG | TEMPERATURE: 98.5 F | RESPIRATION RATE: 17 BRPM | DIASTOLIC BLOOD PRESSURE: 60 MMHG | HEART RATE: 67 BPM

## 2020-04-20 PROCEDURE — 665998 HH PPS REVENUE CREDIT

## 2020-04-20 PROCEDURE — 665999 HH PPS REVENUE DEBIT

## 2020-04-20 PROCEDURE — G0151 HHCP-SERV OF PT,EA 15 MIN: HCPCS

## 2020-04-21 PROCEDURE — 665999 HH PPS REVENUE DEBIT

## 2020-04-21 PROCEDURE — 665998 HH PPS REVENUE CREDIT

## 2020-04-22 ENCOUNTER — HOME CARE VISIT (OUTPATIENT)
Dept: HOME HEALTH SERVICES | Facility: HOME HEALTHCARE | Age: 85
End: 2020-04-22
Payer: MEDICARE

## 2020-04-22 VITALS
OXYGEN SATURATION: 95 % | SYSTOLIC BLOOD PRESSURE: 122 MMHG | TEMPERATURE: 100.8 F | RESPIRATION RATE: 17 BRPM | DIASTOLIC BLOOD PRESSURE: 60 MMHG | HEART RATE: 71 BPM

## 2020-04-22 PROCEDURE — G0151 HHCP-SERV OF PT,EA 15 MIN: HCPCS

## 2020-04-22 PROCEDURE — 665999 HH PPS REVENUE DEBIT

## 2020-04-22 PROCEDURE — 665998 HH PPS REVENUE CREDIT

## 2020-04-23 PROCEDURE — 665998 HH PPS REVENUE CREDIT

## 2020-04-23 PROCEDURE — 665999 HH PPS REVENUE DEBIT

## 2020-04-24 ENCOUNTER — DOCUMENTATION (OUTPATIENT)
Dept: MEDICAL GROUP | Facility: MEDICAL CENTER | Age: 85
End: 2020-04-24

## 2020-04-24 PROCEDURE — 665999 HH PPS REVENUE DEBIT

## 2020-04-24 PROCEDURE — 665998 HH PPS REVENUE CREDIT

## 2020-04-24 NOTE — PROGRESS NOTES
Called to check in on patient as it was recently reported she had a temperature of 100.2 with facial flushing. Recently started on MEdrol dose pack. States has had similar symptoms in the past. No URI symptoms reported. She reports feeling good today. No temperatures. PT very helpful in her recovery after back surgery. Follow up as needed.

## 2020-04-25 PROCEDURE — 665999 HH PPS REVENUE DEBIT

## 2020-04-25 PROCEDURE — 665998 HH PPS REVENUE CREDIT

## 2020-04-26 PROCEDURE — 665999 HH PPS REVENUE DEBIT

## 2020-04-26 PROCEDURE — 665998 HH PPS REVENUE CREDIT

## 2020-04-27 ENCOUNTER — HOME CARE VISIT (OUTPATIENT)
Dept: HOME HEALTH SERVICES | Facility: HOME HEALTHCARE | Age: 85
End: 2020-04-27
Payer: MEDICARE

## 2020-04-27 VITALS
RESPIRATION RATE: 16 BRPM | HEART RATE: 72 BPM | SYSTOLIC BLOOD PRESSURE: 117 MMHG | DIASTOLIC BLOOD PRESSURE: 57 MMHG | TEMPERATURE: 98.1 F | OXYGEN SATURATION: 92 %

## 2020-04-27 PROCEDURE — G0151 HHCP-SERV OF PT,EA 15 MIN: HCPCS

## 2020-04-27 PROCEDURE — 665998 HH PPS REVENUE CREDIT

## 2020-04-27 PROCEDURE — 665999 HH PPS REVENUE DEBIT

## 2020-04-28 PROCEDURE — 665998 HH PPS REVENUE CREDIT

## 2020-04-28 PROCEDURE — 665999 HH PPS REVENUE DEBIT

## 2020-04-29 ENCOUNTER — HOME CARE VISIT (OUTPATIENT)
Dept: HOME HEALTH SERVICES | Facility: HOME HEALTHCARE | Age: 85
End: 2020-04-29
Payer: MEDICARE

## 2020-04-29 VITALS
TEMPERATURE: 99 F | HEART RATE: 67 BPM | RESPIRATION RATE: 17 BRPM | DIASTOLIC BLOOD PRESSURE: 60 MMHG | OXYGEN SATURATION: 91 % | SYSTOLIC BLOOD PRESSURE: 117 MMHG

## 2020-04-29 PROCEDURE — 665999 HH PPS REVENUE DEBIT

## 2020-04-29 PROCEDURE — 665001 SOC-HOME HEALTH

## 2020-04-29 PROCEDURE — G0151 HHCP-SERV OF PT,EA 15 MIN: HCPCS

## 2020-04-29 PROCEDURE — 665998 HH PPS REVENUE CREDIT

## 2020-04-29 SDOH — ECONOMIC STABILITY: HOUSING INSECURITY
HOME SAFETY: DAUGHTER REPORTS SHE IS OFTEN FRUSTRATED W/ PATIENT'S PASSIVE NATURE, EXPECTING HER TO PROVIDE FULLTIME SUPPORT AND CARE MY DAUGHTER IS A NURSE AND SHE SPOKE W/ HER ABOUT TRYING TO HELP MORE AROUND THE HOUSE, NOT JUST EXPECTING ME TO WAIT ON HER"  D"

## 2020-04-29 SDOH — ECONOMIC STABILITY: HOUSING INSECURITY
HOME SAFETY: TR AGREED TO ACCEPT CG SUPPORT RESOURCES VIA WEBSITE WASHOE COUNTY IF SHE DOESN'T GET BETTER, SHE MIGHT HAVE TO MOVE SOMEWHERE ELSE" - DTR STRONGLY DECLINED NEED FOR MSW EVAL"

## 2020-04-30 PROCEDURE — 665999 HH PPS REVENUE DEBIT

## 2020-04-30 PROCEDURE — 665998 HH PPS REVENUE CREDIT

## 2020-05-01 PROCEDURE — 665999 HH PPS REVENUE DEBIT

## 2020-05-01 PROCEDURE — 665998 HH PPS REVENUE CREDIT

## 2020-05-02 PROCEDURE — 665998 HH PPS REVENUE CREDIT

## 2020-05-02 PROCEDURE — 665999 HH PPS REVENUE DEBIT

## 2020-05-03 PROCEDURE — 665999 HH PPS REVENUE DEBIT

## 2020-05-03 PROCEDURE — 665998 HH PPS REVENUE CREDIT

## 2020-05-04 ENCOUNTER — HOME CARE VISIT (OUTPATIENT)
Dept: HOME HEALTH SERVICES | Facility: HOME HEALTHCARE | Age: 85
End: 2020-05-04
Payer: MEDICARE

## 2020-05-04 VITALS
HEART RATE: 72 BPM | SYSTOLIC BLOOD PRESSURE: 130 MMHG | TEMPERATURE: 97.4 F | OXYGEN SATURATION: 91 % | DIASTOLIC BLOOD PRESSURE: 58 MMHG | RESPIRATION RATE: 18 BRPM

## 2020-05-04 PROCEDURE — 665998 HH PPS REVENUE CREDIT

## 2020-05-04 PROCEDURE — G0151 HHCP-SERV OF PT,EA 15 MIN: HCPCS

## 2020-05-04 PROCEDURE — 665999 HH PPS REVENUE DEBIT

## 2020-05-05 PROCEDURE — 665998 HH PPS REVENUE CREDIT

## 2020-05-05 PROCEDURE — 665999 HH PPS REVENUE DEBIT

## 2020-05-06 ENCOUNTER — HOME CARE VISIT (OUTPATIENT)
Dept: HOME HEALTH SERVICES | Facility: HOME HEALTHCARE | Age: 85
End: 2020-05-06
Payer: MEDICARE

## 2020-05-06 VITALS
TEMPERATURE: 98.1 F | SYSTOLIC BLOOD PRESSURE: 120 MMHG | RESPIRATION RATE: 17 BRPM | DIASTOLIC BLOOD PRESSURE: 57 MMHG | HEART RATE: 72 BPM | OXYGEN SATURATION: 93 %

## 2020-05-06 PROCEDURE — 665998 HH PPS REVENUE CREDIT

## 2020-05-06 PROCEDURE — 665999 HH PPS REVENUE DEBIT

## 2020-05-06 PROCEDURE — G0151 HHCP-SERV OF PT,EA 15 MIN: HCPCS

## 2020-05-06 SDOH — ECONOMIC STABILITY: HOUSING INSECURITY: HOME SAFETY: RECOMMEND OUTSIDE STEP RAIL TO PATIO AREA I PREFER TO GO OUT HERE INSTEAD OF THE FRONT AREA""

## 2020-05-06 ASSESSMENT — ACTIVITIES OF DAILY LIVING (ADL)
HOME_HEALTH_OASIS: 00
OASIS_M1830: 01

## 2020-05-06 ASSESSMENT — PATIENT HEALTH QUESTIONNAIRE - PHQ9: CLINICAL INTERPRETATION OF PHQ2 SCORE: 0

## 2020-05-07 PROCEDURE — 665998 HH PPS REVENUE CREDIT

## 2020-05-07 PROCEDURE — 665999 HH PPS REVENUE DEBIT

## 2020-05-08 PROCEDURE — 665999 HH PPS REVENUE DEBIT

## 2020-05-08 PROCEDURE — 665998 HH PPS REVENUE CREDIT

## 2020-05-09 PROCEDURE — 665999 HH PPS REVENUE DEBIT

## 2020-05-09 PROCEDURE — 665998 HH PPS REVENUE CREDIT

## 2020-05-10 PROCEDURE — 665998 HH PPS REVENUE CREDIT

## 2020-05-10 PROCEDURE — 665999 HH PPS REVENUE DEBIT

## 2020-05-11 PROCEDURE — 665998 HH PPS REVENUE CREDIT

## 2020-05-11 PROCEDURE — 665999 HH PPS REVENUE DEBIT

## 2020-05-12 PROCEDURE — 665998 HH PPS REVENUE CREDIT

## 2020-05-12 PROCEDURE — 665999 HH PPS REVENUE DEBIT

## 2020-05-13 PROCEDURE — 665998 HH PPS REVENUE CREDIT

## 2020-05-13 PROCEDURE — 665999 HH PPS REVENUE DEBIT

## 2020-05-14 PROCEDURE — 665999 HH PPS REVENUE DEBIT

## 2020-05-14 PROCEDURE — 665998 HH PPS REVENUE CREDIT

## 2020-05-15 PROCEDURE — 665999 HH PPS REVENUE DEBIT

## 2020-05-15 PROCEDURE — 665998 HH PPS REVENUE CREDIT

## 2020-05-16 PROCEDURE — 665998 HH PPS REVENUE CREDIT

## 2020-05-16 PROCEDURE — 665999 HH PPS REVENUE DEBIT

## 2020-05-17 PROCEDURE — 665999 HH PPS REVENUE DEBIT

## 2020-05-17 PROCEDURE — 665998 HH PPS REVENUE CREDIT

## 2020-05-18 PROCEDURE — 665998 HH PPS REVENUE CREDIT

## 2020-05-18 PROCEDURE — 665999 HH PPS REVENUE DEBIT

## 2020-05-19 PROCEDURE — 665998 HH PPS REVENUE CREDIT

## 2020-05-19 PROCEDURE — 665999 HH PPS REVENUE DEBIT

## 2020-05-20 PROCEDURE — 665998 HH PPS REVENUE CREDIT

## 2020-05-20 PROCEDURE — 665999 HH PPS REVENUE DEBIT

## 2020-05-21 PROCEDURE — 665999 HH PPS REVENUE DEBIT

## 2020-05-21 PROCEDURE — 665998 HH PPS REVENUE CREDIT

## 2020-05-22 PROCEDURE — 665999 HH PPS REVENUE DEBIT

## 2020-05-22 PROCEDURE — 665998 HH PPS REVENUE CREDIT

## 2020-05-23 PROCEDURE — 665999 HH PPS REVENUE DEBIT

## 2020-05-23 PROCEDURE — 665998 HH PPS REVENUE CREDIT

## 2020-05-24 PROCEDURE — 665998 HH PPS REVENUE CREDIT

## 2020-05-24 PROCEDURE — 665999 HH PPS REVENUE DEBIT

## 2020-05-25 PROCEDURE — 665999 HH PPS REVENUE DEBIT

## 2020-05-25 PROCEDURE — 665998 HH PPS REVENUE CREDIT

## 2020-05-26 PROCEDURE — 665999 HH PPS REVENUE DEBIT

## 2020-05-26 PROCEDURE — 665998 HH PPS REVENUE CREDIT

## 2020-05-27 PROCEDURE — 665998 HH PPS REVENUE CREDIT

## 2020-05-27 PROCEDURE — 665999 HH PPS REVENUE DEBIT

## 2020-06-11 ENCOUNTER — HOSPITAL ENCOUNTER (OUTPATIENT)
Dept: RADIOLOGY | Facility: MEDICAL CENTER | Age: 85
End: 2020-06-11
Attending: NURSE PRACTITIONER
Payer: MEDICARE

## 2020-06-11 DIAGNOSIS — M48.061 SPINAL STENOSIS, LUMBAR REGION, WITHOUT NEUROGENIC CLAUDICATION: ICD-10-CM

## 2020-06-11 PROCEDURE — 72110 X-RAY EXAM L-2 SPINE 4/>VWS: CPT

## 2020-06-14 NOTE — PROGRESS NOTES
COVID-19 Pre-surgery screenin. Do you have an undiagnosed respiratory illness or symptoms such as coughing or sneezing? (No)  a. Onset of Sx  b. Acute vs. chronic respiratory illness      2. Do you have an unexplained fever greater than 100.4 degrees Fahrenheit or 38 degrees Celsius?                     (No)     3. Have you had direct exposure to a patient who tested positive for Covid-19?                          (No)     4. Have you traveled outside [facility] in the last 14 days? (No)     5. Have you had any loss of your sense of taste or smell? Have you had N/V or sore throat? (No)     Patient has been informed of visitor policy and asked to wear a mask upon entering the hospital   (Yes)

## 2020-06-15 ENCOUNTER — APPOINTMENT (OUTPATIENT)
Dept: RADIOLOGY | Facility: MEDICAL CENTER | Age: 85
End: 2020-06-15
Attending: NEUROLOGICAL SURGERY
Payer: MEDICARE

## 2020-06-15 ENCOUNTER — HOSPITAL ENCOUNTER (OUTPATIENT)
Facility: MEDICAL CENTER | Age: 85
End: 2020-06-15
Attending: NEUROLOGICAL SURGERY | Admitting: NEUROLOGICAL SURGERY
Payer: MEDICARE

## 2020-06-15 VITALS
RESPIRATION RATE: 16 BRPM | OXYGEN SATURATION: 91 % | WEIGHT: 165.34 LBS | SYSTOLIC BLOOD PRESSURE: 190 MMHG | HEART RATE: 79 BPM | HEIGHT: 63 IN | BODY MASS INDEX: 29.3 KG/M2 | DIASTOLIC BLOOD PRESSURE: 75 MMHG | TEMPERATURE: 98.3 F

## 2020-06-15 DIAGNOSIS — M48.061 SPINAL STENOSIS, LUMBAR REGION, WITHOUT NEUROGENIC CLAUDICATION: ICD-10-CM

## 2020-06-15 LAB
ERYTHROCYTE [DISTWIDTH] IN BLOOD BY AUTOMATED COUNT: 45.3 FL (ref 35.9–50)
HCT VFR BLD AUTO: 40.5 % (ref 37–47)
HGB BLD-MCNC: 13.6 G/DL (ref 12–16)
INR PPP: 0.96 (ref 0.87–1.13)
MCH RBC QN AUTO: 30.8 PG (ref 27–33)
MCHC RBC AUTO-ENTMCNC: 33.6 G/DL (ref 33.6–35)
MCV RBC AUTO: 91.8 FL (ref 81.4–97.8)
PLATELET # BLD AUTO: 252 K/UL (ref 164–446)
PMV BLD AUTO: 9.2 FL (ref 9–12.9)
PROTHROMBIN TIME: 13 SEC (ref 12–14.6)
RBC # BLD AUTO: 4.41 M/UL (ref 4.2–5.4)
WBC # BLD AUTO: 6.9 K/UL (ref 4.8–10.8)

## 2020-06-15 PROCEDURE — 85027 COMPLETE CBC AUTOMATED: CPT

## 2020-06-15 PROCEDURE — 72132 CT LUMBAR SPINE W/DYE: CPT

## 2020-06-15 PROCEDURE — 85610 PROTHROMBIN TIME: CPT

## 2020-06-15 PROCEDURE — 36415 COLL VENOUS BLD VENIPUNCTURE: CPT

## 2020-06-15 PROCEDURE — 62284 INJECTION FOR MYELOGRAM: CPT

## 2020-06-15 PROCEDURE — 160002 HCHG RECOVERY MINUTES (STAT)

## 2020-06-15 PROCEDURE — 700117 HCHG RX CONTRAST REV CODE 255: Performed by: NEUROLOGICAL SURGERY

## 2020-06-15 RX ADMIN — IOHEXOL 16 ML: 180 INJECTION INTRAVENOUS at 13:00

## 2020-06-15 ASSESSMENT — FIBROSIS 4 INDEX: FIB4 SCORE: 1.24

## 2020-06-15 NOTE — DISCHARGE INSTRUCTIONS
ACTIVITY: Rest and take it easy for the first 24 hours.  A responsible adult is recommended to remain with you during that time.  It is normal to feel sleepy.  We encourage you to not do anything that requires balance, judgment or coordination.    MILD FLU-LIKE SYMPTOMS ARE NORMAL. YOU MAY EXPERIENCE GENERALIZED MUSCLE ACHES, THROAT IRRITATION, HEADACHE AND/OR SOME NAUSEA.    FOR 24 HOURS DO NOT:  Drive, operate machinery or run household appliances.  Drink beer or alcoholic beverages.   Make important decisions or sign legal documents.    SPECIAL INSTRUCTIONS:      Post Lumbar Puncture Instructions     ACTIVITY: Rest and take it easy for the rest of the day.  A responsible adult is recommended to remain with you during that time.  It is normal to feel sleepy.  We encourage you to not do anything that requires balance, judgment or coordination.     SPECIAL INSTRUCTIONS: Lumbar puncture with or without myelography is, in general, a safe procedure with only minor side effects. The following instructions have been written in order to assist you in preventing side effects and to improve your safety.     1.    Do not drive a motorized vehicle for the rest of the day following your lumbar puncture. A friend or family member should be available to take you home following your discharge.  2.    Resume your usual diet.  3.    Drink plenty of fluids.  4.    Do not drink alcohol for 24 hours following your lumbar puncture.  5.    If a mild headache develops, a mild analgesic such as Tylenol or a non-steroidal anti-inflammatory medicine, like Advil, can be taken.  6.    No strenuous activity should be undertaken for the rest of the day following your lumbar puncture.  7.    When you are in bed or laying down at home, your head should be elevated at least 30 degrees (on at least two pillows).       POST-PROCEDURE HEADACHE:  You may develop a headache during the first few hours to days after your lumbar puncture, which may  last up to several days. This happens when the amount of CSF and the CSF pressure are decreased, such as with a CSF leak. The headache may range from mild to severe and may get worse when you sit or stand. You may have neck or back pain, as well.  The following may help ease or prevent a post procedure headache:  ·        Drinking liquids: You may be asked to drink more liquid than usual after your procedure. For most people, the best liquids to drink are caffeinated. Do not drink alcohol. Tell your caregiver if you cannot drink a lot of liquid because of another medical condition, such as a heart or kidney condition.  ·        Lying down: You may need to lie flat for some time after your procedure.  ·        Treatment options: You may have any of the following:  Medicines:   o Caffeine: Drink caffeinated drinks, such as coffee or tea, every 4 to 6 hours. If this does not relieve your headache, call your caregiver.   o  Pain medicine: You may need medicine to relieve or decrease you headache pain. These medicine may include NSAIDS  (non-steroidal anti-inflammatory medicine), such as ibuprofen, acetaminophen, or medicine that your primary caregiver orders for you. Your primary caregiver will decide which medicine is best for you to take for your headache. Follow your primary caregiver's instructions on how to take your medicine.   o   Procedures: You may need to have a blood patch if your headache is not relieved by the treatments above.   o   Blood patch: If your headache is caused by a leakage of CSF from the LP site, a blood patch procedure may be needed. This procedure uses a small amount of your blood, which is taken from a vein, to patch (seal) the leak. The blood is put through a needle into your spinal canal in the same way that the LP was done. You will need to lie in bed for 1 to 2 hours after this procedure. This procedure may need to be repeated if your headache is not relieved.         CALL THE RADIOLOGY  DEPARTMENT -9075 IF:  - You have severe pain in your back or neck that was not present before your procedure, or that is much worse after your procedure.  - You have bleeding or a discharge coming from the area where the needle was put into your back.  - You have questions or concerns about your procedure.     SEEK CARE IMMEDIATELY IF:  - You have a headache that is very bad and does not get better after lying down.  - You have a fever.  - You have a stiff neck or have trouble thinking clearly.  - Your legs, feet, or other parts below the waist feel numb, tingly, or weak and if these are new or worsened symptoms after your procedure.     You should call 911 if you develop problems with breathing or chest pain. If you are unable to contact your doctor or surgical center, you should go to the nearest emergency room or urgent care center.        A registered nurse may call you a few days after your surgery to see how you are doing after your procedure.     MEDICATIONS: Resume taking daily medication.                 DIET: To avoid nausea, slowly advance diet as tolerated, avoiding spicy or greasy foods for the first day.  Add more substantial food to your diet according to your physician's instructions.  Babies can be fed formula or breast milk as soon as they are hungry.  INCREASE FLUIDS AND FIBER TO AVOID CONSTIPATION.    SURGICAL DRESSING/BATHING: Do not submerge in water/hot tub/etc until cleared by your MD.    FOLLOW-UP APPOINTMENT:  A follow-up appointment should be arranged with your doctor in 1-2 weeks; call to schedule.    You should CALL YOUR PHYSICIAN if you develop:  Fever greater than 101 degrees F.  Pain not relieved by medication, or persistent nausea or vomiting.  Excessive bleeding (blood soaking through dressing) or unexpected drainage from the wound.  Extreme redness or swelling around the incision site, drainage of pus or foul smelling drainage.  Inability to urinate or empty your bladder  within 8 hours.  Problems with breathing or chest pain.    You should call 911 if you develop problems with breathing or chest pain.  If you are unable to contact your doctor or surgical center, you should go to the nearest emergency room or urgent care center.  Physician's telephone #: Dr. Hodge: [phone]    If any questions arise, call your doctor.  If your doctor is not available, please feel free to call the Surgical Center at [phone].  The Center is open Monday through Friday from 7AM to 7PM.  You can also call the HEALTH HOTLINE open 24 hours/day, 7 days/week and speak to a nurse at [phone], or toll free at [phone].    A registered nurse may call you a few days after your surgery to see how you are doing after your procedure.    MEDICATIONS: Resume taking daily medication.  Take prescribed pain medication with food.  If no medication is prescribed, you may take non-aspirin pain medication if needed.  PAIN MEDICATION CAN BE VERY CONSTIPATING.  Take a stool softener or laxative such as senokot, pericolace, or milk of magnesia if needed.    Prescription given for N/A.  Last pain medication given at N/A.    If your physician has prescribed pain medication that includes Acetaminophen (Tylenol), do not take additional Acetaminophen (Tylenol) while taking the prescribed medication.    Depression / Suicide Risk    As you are discharged from this [facility] facility, it is important to learn how to keep safe from harming yourself.    Recognize the warning signs:  · Abrupt changes in personality, positive or negative- including increase in energy   · Giving away possessions  · Change in eating patterns- significant weight changes-  positive or negative  · Change in sleeping patterns- unable to sleep or sleeping all the time   · Unwillingness or inability to communicate  · Depression  · Unusual sadness, discouragement and loneliness  · Talk of wanting to die  · Neglect of personal  appearance   · Rebelliousness- reckless behavior  · Withdrawal from people/activities they love  · Confusion- inability to concentrate     If you or a loved one observes any of these behaviors or has concerns about self-harm, here's what you can do:  · Talk about it- your feelings and reasons for harming yourself  · Remove any means that you might use to hurt yourself (examples: pills, rope, extension cords, firearm)  · Get professional help from the community (Mental Health, Substance Abuse, psychological counseling)  · Do not be alone:Call your Safe Contact- someone whom you trust who will be there for you.  · Call your local CRISIS HOTLINE 795-4056 or 881-314-1285  · Call your local Children's Mobile Crisis Response Team [place] (958) 031-9988 or www.[employer]  · Call the toll free National Suicide Prevention Hotlines   · National Suicide Prevention Lifeline 069-372-ZMOV (3016)  · National Hope Line Network 800-SUICIDE (908-7694)

## 2020-06-15 NOTE — OR NURSING
1522 patient arrived from IR s/p myelogram, band aid lower back cdi. Patient awake, denies pain, denies nausea. Vss.   1532 Patient ambulated to bathroom via 1 assist tolerated well.  1538 Criteria met to discharge patient out of pacu   1540 Report given to Marilu FLORENCE stage 2 all questions answered.   1550 Patient transported to room 30 with her personal belongings.

## 2020-06-15 NOTE — PROGRESS NOTES
Pt VSS, pain controlled, tolerating po intake. Pt awake and alert, pt given discharge education/instructions, all questions answered. IV discontinued, site wnl. Surgical site with band aid to site, cdi, wnl. Pt discharged home in stable condition with all belongings.

## 2020-07-02 ENCOUNTER — OFFICE VISIT (OUTPATIENT)
Dept: CARDIOLOGY | Facility: MEDICAL CENTER | Age: 85
End: 2020-07-02
Payer: MEDICARE

## 2020-07-02 ENCOUNTER — TELEPHONE (OUTPATIENT)
Dept: CARDIOLOGY | Facility: MEDICAL CENTER | Age: 85
End: 2020-07-02

## 2020-07-02 VITALS
HEIGHT: 63 IN | OXYGEN SATURATION: 93 % | BODY MASS INDEX: 30.12 KG/M2 | WEIGHT: 170 LBS | DIASTOLIC BLOOD PRESSURE: 80 MMHG | SYSTOLIC BLOOD PRESSURE: 140 MMHG | HEART RATE: 76 BPM

## 2020-07-02 DIAGNOSIS — E78.5 DYSLIPIDEMIA: Chronic | ICD-10-CM

## 2020-07-02 DIAGNOSIS — I48.0 PAF (PAROXYSMAL ATRIAL FIBRILLATION) (HCC): ICD-10-CM

## 2020-07-02 DIAGNOSIS — I10 ESSENTIAL HYPERTENSION, BENIGN: Chronic | ICD-10-CM

## 2020-07-02 DIAGNOSIS — Z95.0 CARDIAC PACEMAKER IN SITU: Chronic | ICD-10-CM

## 2020-07-02 PROCEDURE — 99214 OFFICE O/P EST MOD 30 MIN: CPT | Performed by: INTERNAL MEDICINE

## 2020-07-02 RX ORDER — HYDROCHLOROTHIAZIDE 25 MG/1
25 TABLET ORAL DAILY
Qty: 30 TAB | Refills: 4 | Status: ON HOLD | OUTPATIENT
Start: 2020-07-02 | End: 2021-05-31

## 2020-07-02 ASSESSMENT — ENCOUNTER SYMPTOMS
DIZZINESS: 0
FALLS: 0
BLOOD IN STOOL: 0
BRUISES/BLEEDS EASILY: 0
GASTROINTESTINAL NEGATIVE: 1
PALPITATIONS: 0
BACK PAIN: 1
CARDIOVASCULAR NEGATIVE: 1
DEPRESSION: 0
RESPIRATORY NEGATIVE: 1

## 2020-07-02 ASSESSMENT — FIBROSIS 4 INDEX: FIB4 SCORE: 1.48

## 2020-07-02 NOTE — LETTER
[facility]   [address]  [place] [address]  Phone: [phone]  Fax: [phone]              Krystle Tavarez  7/26/1933    Encounter Date: 7/2/2020    Du Sewell M.D.          PROGRESS NOTE:  Chief Complaint   Patient presents with   • Hypertension     Follow up       Subjective:   Krystle Tavarez is a 86 y.o. female who presents today for follow-up of PAF, permanent pacemaker, anticoagulation and hypertension.  She is due for another pacemaker check in the near future.  She has had no bleeding problems or anticoagulant.    The patient again complains of difficulty with balance.  Recent B12 and folate were normal.  Romberg today is mildly abnormal on exam .    Past Medical History:   Diagnosis Date   • Arthritis     knees, hips   • Breath shortness     with exertion    • Cataract     bilat IOL    • Dental disorder     full upper, partial lower    • Diabetes (HCC)     pre diabetic   • Heart burn    • Hemorrhagic disorder (HCC)     on Eliquis   • High cholesterol     diet controlled    • Hyperlipidemia    • Hypertension    • Pacemaker 2015   • Pain 2020    lower back, right leg   • Pre-diabetes    • TIA (transient ischemic attack)     on Eliquis   • Urinary incontinence      Past Surgical History:   Procedure Laterality Date   • PB LAMINOTOMY,LUMBAR DISK,1 INTRSP N/A 2/25/2020    Procedure: LAMINECTOMY, SPINE, LUMBAR, WITH DISCECTOMY- L5-S1, MEDIAL FACETECTOMY;  Surgeon: Latrell Kimble M.D.;  [facility];  Service: Neurosurgery   • FORAMINOTOMY N/A 2/25/2020    Procedure: FORAMINOTOMY, SPINE;  Surgeon: Latrell Kimble M.D.;  Location: SURGERY [facility];  Service: Neurosurgery   • PACEMAKER INSERTION  2015   • HIP REPLACEMENT, TOTAL Right 2009   • KNEE REPLACEMENT, TOTAL Right 2004   • CATARACT EXTRACTION WITH IOL Bilateral 2003   • CHOLECYSTECTOMY  2002   • BASAL CELL EXCISION      nose and hand   • BUNIONECTOMY Left           Family History   Problem Relation Age of Onset   • Diabetes Mother    • Stroke Mother    • Heart Attack Father 74   • No Known Problems Maternal Grandmother    • No Known Problems Maternal Grandfather    • No Known Problems Paternal Grandmother    • No Known Problems Paternal Grandfather      Social History     Socioeconomic History   • Marital status:      Spouse name: Not on file   • Number of children: Not on file   • Years of education: Not on file   • Highest education level: Not on file   Occupational History   • Not on file   Social Needs   • Financial resource strain: Not on file   • Food insecurity     Worry: Not on file     Inability: Not on file   • Transportation needs     Medical: Not on file     Non-medical: Not on file   Tobacco Use   • Smoking status: Never Smoker   • Smokeless tobacco: Never Used   Substance and Sexual Activity   • Alcohol use: No   • Drug use: No   • Sexual activity: Not on file   Lifestyle   • Physical activity     Days per week: Not on file     Minutes per session: Not on file   • Stress: Not on file   Relationships   • Social connections     Talks on phone: Not on file     Gets together: Not on file     Attends [employer] service: Not on file     Active member of club or organization: Not on file     Attends meetings of clubs or organizations: Not on file     Relationship status: Not on file   • Intimate partner violence     Fear of current or ex partner: Not on file     Emotionally abused: Not on file     Physically abused: Not on file     Forced sexual activity: Not on file   Other Topics Concern   • Not on file   Social History Narrative   • Not on file     Allergies   Allergen Reactions   • Sulfa Drugs Nausea     Nausea      Outpatient Encounter Medications as of 7/2/2020   Medication Sig Dispense Refill   • hydroCHLOROthiazide (HYDRODIURIL) 25 MG Tab Take 1 Tab by mouth every day. 30 Tab 4   • Alum Hydroxide-Mag Carbonate (GAVISCON PO) Take  by mouth as needed.    "  • apixaban (ELIQUIS) 2.5mg Tab Take 1 Tab by mouth 2 Times a Day. 180 Tab 3   • carvedilol (COREG) 3.125 MG Tab Take 1 Tab by mouth 2 times a day, with meals. 180 Tab 3   • Ascorbic Acid (VITAMIN C) 500 MG Cap Take 1 Capsule by mouth 2 Times a Day.     • Acetaminophen 500 MG Cap Take 500 mg by mouth 4 times a day as needed (pain). Patient only taking 1-2 tabs a day     • [DISCONTINUED] losartan-hydrochlorothiazide (HYZAAR) 100-25 MG per tablet Take 1 Tab by mouth every day. 90 Tab 3   • sennosides (SENOKOT) 8.6 MG Tab Take 1 Tab by mouth every day. (Patient not taking: Reported on 4/15/2020) 30 Tab 3     No facility-administered encounter medications on file as of 7/2/2020.      Review of Systems   Constitutional: Positive for malaise/fatigue.   HENT: Positive for hearing loss.    Respiratory: Negative.    Cardiovascular: Negative.  Negative for chest pain and palpitations.   Gastrointestinal: Negative.  Negative for blood in stool.   Musculoskeletal: Positive for back pain. Negative for falls.   Skin: Negative.    Neurological: Negative for dizziness.        Difficulty with balance   Endo/Heme/Allergies: Negative.  Does not bruise/bleed easily.   Psychiatric/Behavioral: Negative for depression.        Objective:   /80 (BP Location: Left arm, Patient Position: Sitting, BP Cuff Size: Adult)   Pulse 76   Ht 1.6 m (5' 3\")   Wt 77.1 kg (170 lb)   LMP  (LMP Unknown)   SpO2 93%   BMI 30.11 kg/m²     Physical Exam   Constitutional: She is oriented to person, place, and time. No distress.   Uses a walker     HENT:   Head: Normocephalic and atraumatic.   Eyes: Pupils are equal, round, and reactive to light. Conjunctivae are normal. No scleral icterus.   Neck: No JVD present.   Cardiovascular: Normal rate and regular rhythm.   No murmur heard.  Pulmonary/Chest: Breath sounds normal. No respiratory distress. She has no wheezes.   Abdominal: Soft. She exhibits no distension. There is no abdominal tenderness. "   Musculoskeletal:         General: No edema.      Comments: 1-2+ edema   Neurological: She is alert and oriented to person, place, and time.   Romberg abnormal.  Heel-to-shin unremarkable.  No tremor   Skin: Skin is warm.   Psychiatric: She has a normal mood and affect.       Assessment:     1. Essential hypertension, benign     2. Dyslipidemia     3. Cardiac pacemaker in situ     4. PAF (paroxysmal atrial fibrillation) ([facility])         Medical Decision Making:  Today's Assessment / Status / Plan:   Permanent pacemaker: She is nearing her next pacer check.  The patient has been functioning normally.    PAF: Asymptomatic.  Noted on pacemaker interrogation.  She has had no sense of palpitations    Anticoagulation: No bleeding problems or anticoagulant    Hypertension: Well-controlled on losartan/HCTZ.  Unfortunately insurance company has changed preferred generic ARB since she will have to change medications.  She has a significant amount of losartan at home, so we will give her a prescription for 25 mg HCTZ tablets to fill by the month until she is up on her losartan.  The daughter will call when she nears the end of her losartan/HCTZ and tells which ARB/HCTZ combination is preferred by her insurance and we will refill it.    Difficulties with balance: Romberg is mildly abnormal.  Heel-to-shin is normal.  She is seen Dr. Diego sagastume in the past and has recommended she go back and see him again for follow-up of this issue.      Lucius Cortez, PharmD  VIA In Basket     Joana Hawk A.P.R.N.  [address] [address]  VIA In Basket

## 2020-07-02 NOTE — TELEPHONE ENCOUNTER
Received clearance request from [facility] for pt's Right L5-S1 transforaminal epidural steroid injection. Procedure date TBD. They would also like clearance for pt to hold Eliquis 3 days prior to procedure and restart 24 hours post procedure.    Hx: HTN, HLD, PPM, PAF, TIA    To RS

## 2020-07-02 NOTE — PROGRESS NOTES
Chief Complaint   Patient presents with   • Hypertension     Follow up       Subjective:   Krystle Tavarez is a 86 y.o. female who presents today for follow-up of PAF, permanent pacemaker, anticoagulation and hypertension.  She is due for another pacemaker check in the near future.  She has had no bleeding problems or anticoagulant.    The patient again complains of difficulty with balance.  Recent B12 and folate were normal.  Romberg today is mildly abnormal on exam .    Past Medical History:   Diagnosis Date   • Arthritis     knees, hips   • Breath shortness     with exertion    • Cataract     bilat IOL    • Dental disorder     full upper, partial lower    • Diabetes (HCC)     pre diabetic   • Heart burn    • Hemorrhagic disorder (HCC)     on Eliquis   • High cholesterol     diet controlled    • Hyperlipidemia    • Hypertension    • Pacemaker 2015   • Pain 2020    lower back, right leg   • Pre-diabetes    • TIA (transient ischemic attack)     on Eliquis   • Urinary incontinence      Past Surgical History:   Procedure Laterality Date   • PB LAMINOTOMY,LUMBAR DISK,1 INTRSP N/A 2/25/2020    Procedure: LAMINECTOMY, SPINE, LUMBAR, WITH DISCECTOMY- L5-S1, MEDIAL FACETECTOMY;  Surgeon: Latrell Kimble M.D.;  Location: SURGERY [facility];  Service: Neurosurgery   • FORAMINOTOMY N/A 2/25/2020    Procedure: FORAMINOTOMY, SPINE;  Surgeon: Latrell Kimble M.D.;  Location: SURGERY [facility];  Service: Neurosurgery   • PACEMAKER INSERTION  2015   • HIP REPLACEMENT, TOTAL Right 2009   • KNEE REPLACEMENT, TOTAL Right 2004   • CATARACT EXTRACTION WITH IOL Bilateral 2003   • CHOLECYSTECTOMY  2002   • BASAL CELL EXCISION      nose and hand   • BUNIONECTOMY Left      Family History   Problem Relation Age of Onset   • Diabetes Mother    • Stroke Mother    • Heart Attack Father 74   • No Known Problems Maternal Grandmother    • No Known Problems Maternal Grandfather    • No Known Problems Paternal Grandmother    • No Known Problems  Paternal Grandfather      Social History     Socioeconomic History   • Marital status:      Spouse name: Not on file   • Number of children: Not on file   • Years of education: Not on file   • Highest education level: Not on file   Occupational History   • Not on file   Social Needs   • Financial resource strain: Not on file   • Food insecurity     Worry: Not on file     Inability: Not on file   • Transportation needs     Medical: Not on file     Non-medical: Not on file   Tobacco Use   • Smoking status: Never Smoker   • Smokeless tobacco: Never Used   Substance and Sexual Activity   • Alcohol use: No   • Drug use: No   • Sexual activity: Not on file   Lifestyle   • Physical activity     Days per week: Not on file     Minutes per session: Not on file   • Stress: Not on file   Relationships   • Social connections     Talks on phone: Not on file     Gets together: Not on file     Attends Nondenominational service: Not on file     Active member of club or organization: Not on file     Attends meetings of clubs or organizations: Not on file     Relationship status: Not on file   • Intimate partner violence     Fear of current or ex partner: Not on file     Emotionally abused: Not on file     Physically abused: Not on file     Forced sexual activity: Not on file   Other Topics Concern   • Not on file   Social History Narrative   • Not on file     Allergies   Allergen Reactions   • Sulfa Drugs Nausea     Nausea      Outpatient Encounter Medications as of 7/2/2020   Medication Sig Dispense Refill   • hydroCHLOROthiazide (HYDRODIURIL) 25 MG Tab Take 1 Tab by mouth every day. 30 Tab 4   • Alum Hydroxide-Mag Carbonate (GAVISCON PO) Take  by mouth as needed.     • apixaban (ELIQUIS) 2.5mg Tab Take 1 Tab by mouth 2 Times a Day. 180 Tab 3   • carvedilol (COREG) 3.125 MG Tab Take 1 Tab by mouth 2 times a day, with meals. 180 Tab 3   • Ascorbic Acid (VITAMIN C) 500 MG Cap Take 1 Capsule by mouth 2 Times a Day.     • Acetaminophen  "500 MG Cap Take 500 mg by mouth 4 times a day as needed (pain). Patient only taking 1-2 tabs a day     • [DISCONTINUED] losartan-hydrochlorothiazide (HYZAAR) 100-25 MG per tablet Take 1 Tab by mouth every day. 90 Tab 3   • sennosides (SENOKOT) 8.6 MG Tab Take 1 Tab by mouth every day. (Patient not taking: Reported on 4/15/2020) 30 Tab 3     No facility-administered encounter medications on file as of 7/2/2020.      Review of Systems   Constitutional: Positive for malaise/fatigue.   HENT: Positive for hearing loss.    Respiratory: Negative.    Cardiovascular: Negative.  Negative for chest pain and palpitations.   Gastrointestinal: Negative.  Negative for blood in stool.   Musculoskeletal: Positive for back pain. Negative for falls.   Skin: Negative.    Neurological: Negative for dizziness.        Difficulty with balance   Endo/Heme/Allergies: Negative.  Does not bruise/bleed easily.   Psychiatric/Behavioral: Negative for depression.        Objective:   /80 (BP Location: Left arm, Patient Position: Sitting, BP Cuff Size: Adult)   Pulse 76   Ht 1.6 m (5' 3\")   Wt 77.1 kg (170 lb)   LMP  (LMP Unknown)   SpO2 93%   BMI 30.11 kg/m²     Physical Exam   Constitutional: She is oriented to person, place, and time. No distress.   Uses a walker     HENT:   Head: Normocephalic and atraumatic.   Eyes: Pupils are equal, round, and reactive to light. Conjunctivae are normal. No scleral icterus.   Neck: No JVD present.   Cardiovascular: Normal rate and regular rhythm.   No murmur heard.  Pulmonary/Chest: Breath sounds normal. No respiratory distress. She has no wheezes.   Abdominal: Soft. She exhibits no distension. There is no abdominal tenderness.   Musculoskeletal:         General: No edema.      Comments: 1-2+ edema   Neurological: She is alert and oriented to person, place, and time.   Romberg abnormal.  Heel-to-shin unremarkable.  No tremor   Skin: Skin is warm.   Psychiatric: She has a normal mood and affect. "       Assessment:     1. Essential hypertension, benign     2. Dyslipidemia     3. Cardiac pacemaker in situ     4. PAF (paroxysmal atrial fibrillation) ([facility])         Medical Decision Making:  Today's Assessment / Status / Plan:   Permanent pacemaker: She is nearing her next pacer check.  The patient has been functioning normally.    PAF: Asymptomatic.  Noted on pacemaker interrogation.  She has had no sense of palpitations    Anticoagulation: No bleeding problems or anticoagulant    Hypertension: Well-controlled on losartan/HCTZ.  Unfortunately insurance company has changed preferred generic ARB since she will have to change medications.  She has a significant amount of losartan at home, so we will give her a prescription for 25 mg HCTZ tablets to fill by the month until she is up on her losartan.  The daughter will call when she nears the end of her losartan/HCTZ and tells which ARB/HCTZ combination is preferred by her insurance and we will refill it.    Difficulties with balance: Romberg is mildly abnormal.  Heel-to-shin is normal.  She is seen Dr. Diego sagastume in the past and has recommended she go back and see him again for follow-up of this issue.

## 2020-07-06 NOTE — TELEPHONE ENCOUNTER
Patient Call       [facility] clearance form faxed to [phone].    Du Sewell M.D.  Denise Conklin R.N. 4 days ago      Agree thank you

## 2020-07-24 ENCOUNTER — NON-PROVIDER VISIT (OUTPATIENT)
Dept: CARDIOLOGY | Facility: MEDICAL CENTER | Age: 85
End: 2020-07-24
Payer: MEDICARE

## 2020-07-24 DIAGNOSIS — Z95.0 CARDIAC PACEMAKER IN SITU: Chronic | ICD-10-CM

## 2020-07-24 PROCEDURE — 93280 PM DEVICE PROGR EVAL DUAL: CPT | Performed by: INTERNAL MEDICINE

## 2020-08-06 ENCOUNTER — TELEPHONE (OUTPATIENT)
Dept: MEDICAL GROUP | Facility: MEDICAL CENTER | Age: 85
End: 2020-08-06

## 2020-08-06 DIAGNOSIS — E11.9 TYPE 2 DIABETES MELLITUS WITHOUT COMPLICATION, WITHOUT LONG-TERM CURRENT USE OF INSULIN (HCC): Chronic | ICD-10-CM

## 2020-08-06 NOTE — TELEPHONE ENCOUNTER
pts dtr (Ana M Packer) called and left vm stating that pt will need her routine 6 mth lab orders ordered and would also like her DMV placard renewal, she states pt recently had back surgery and still recovering from it. Please advise.      Labs ordered.  DMV form signed.

## 2020-08-11 ENCOUNTER — HOSPITAL ENCOUNTER (OUTPATIENT)
Dept: LAB | Facility: MEDICAL CENTER | Age: 85
End: 2020-08-11
Attending: NURSE PRACTITIONER
Payer: MEDICARE

## 2020-08-11 DIAGNOSIS — E11.9 TYPE 2 DIABETES MELLITUS WITHOUT COMPLICATION, WITHOUT LONG-TERM CURRENT USE OF INSULIN (HCC): Chronic | ICD-10-CM

## 2020-08-11 PROCEDURE — 82043 UR ALBUMIN QUANTITATIVE: CPT

## 2020-08-11 PROCEDURE — 82570 ASSAY OF URINE CREATININE: CPT

## 2020-08-11 PROCEDURE — 36415 COLL VENOUS BLD VENIPUNCTURE: CPT | Mod: GA

## 2020-08-11 PROCEDURE — 83036 HEMOGLOBIN GLYCOSYLATED A1C: CPT | Mod: GA

## 2020-08-11 PROCEDURE — 80053 COMPREHEN METABOLIC PANEL: CPT

## 2020-08-12 LAB
ALBUMIN SERPL BCP-MCNC: 4.2 G/DL (ref 3.2–4.9)
ALBUMIN/GLOB SERPL: 1.5 G/DL
ALP SERPL-CCNC: 81 U/L (ref 30–99)
ALT SERPL-CCNC: 10 U/L (ref 2–50)
ANION GAP SERPL CALC-SCNC: 13 MMOL/L (ref 7–16)
AST SERPL-CCNC: 16 U/L (ref 12–45)
BILIRUB SERPL-MCNC: 0.6 MG/DL (ref 0.1–1.5)
BUN SERPL-MCNC: 19 MG/DL (ref 8–22)
CALCIUM SERPL-MCNC: 9.9 MG/DL (ref 8.5–10.5)
CHLORIDE SERPL-SCNC: 94 MMOL/L (ref 96–112)
CO2 SERPL-SCNC: 25 MMOL/L (ref 20–33)
CREAT SERPL-MCNC: 0.78 MG/DL (ref 0.5–1.4)
CREAT UR-MCNC: 44.22 MG/DL
EST. AVERAGE GLUCOSE BLD GHB EST-MCNC: 166 MG/DL
GLOBULIN SER CALC-MCNC: 2.8 G/DL (ref 1.9–3.5)
GLUCOSE SERPL-MCNC: 156 MG/DL (ref 65–99)
HBA1C MFR BLD: 7.4 % (ref 0–5.6)
MICROALBUMIN UR-MCNC: 1.4 MG/DL
MICROALBUMIN/CREAT UR: 32 MG/G (ref 0–30)
POTASSIUM SERPL-SCNC: 4.6 MMOL/L (ref 3.6–5.5)
PROT SERPL-MCNC: 7 G/DL (ref 6–8.2)
SODIUM SERPL-SCNC: 132 MMOL/L (ref 135–145)

## 2020-08-25 ENCOUNTER — OFFICE VISIT (OUTPATIENT)
Dept: MEDICAL GROUP | Facility: MEDICAL CENTER | Age: 85
End: 2020-08-25
Payer: MEDICARE

## 2020-08-25 VITALS
DIASTOLIC BLOOD PRESSURE: 74 MMHG | SYSTOLIC BLOOD PRESSURE: 136 MMHG | HEIGHT: 63 IN | TEMPERATURE: 98 F | RESPIRATION RATE: 16 BRPM | HEART RATE: 78 BPM | WEIGHT: 173 LBS | OXYGEN SATURATION: 93 % | BODY MASS INDEX: 30.65 KG/M2

## 2020-08-25 DIAGNOSIS — I10 ESSENTIAL HYPERTENSION, BENIGN: ICD-10-CM

## 2020-08-25 DIAGNOSIS — D48.9 NEOPLASM OF UNCERTAIN BEHAVIOR: ICD-10-CM

## 2020-08-25 DIAGNOSIS — R29.898 WEAKNESS OF BOTH LOWER EXTREMITIES: ICD-10-CM

## 2020-08-25 DIAGNOSIS — E11.9 TYPE 2 DIABETES MELLITUS WITHOUT COMPLICATION, WITHOUT LONG-TERM CURRENT USE OF INSULIN (HCC): Chronic | ICD-10-CM

## 2020-08-25 DIAGNOSIS — Z98.890 S/P LUMBAR LAMINECTOMY: ICD-10-CM

## 2020-08-25 DIAGNOSIS — G72.9 MYOPATHY: ICD-10-CM

## 2020-08-25 DIAGNOSIS — Z74.09 IMPAIRED MOBILITY AND ADLS: ICD-10-CM

## 2020-08-25 DIAGNOSIS — Z78.9 IMPAIRED MOBILITY AND ADLS: ICD-10-CM

## 2020-08-25 PROBLEM — G89.18 POST-OP PAIN: Status: RESOLVED | Noted: 2020-02-29 | Resolved: 2020-08-25

## 2020-08-25 PROCEDURE — 99214 OFFICE O/P EST MOD 30 MIN: CPT | Performed by: NURSE PRACTITIONER

## 2020-08-25 RX ORDER — LOSARTAN POTASSIUM 100 MG/1
100 TABLET ORAL DAILY
Qty: 100 TAB | Refills: 3 | Status: SHIPPED | OUTPATIENT
Start: 2020-08-25 | End: 2021-08-23

## 2020-08-25 RX ORDER — LOSARTAN POTASSIUM 100 MG/1
100 TABLET ORAL DAILY
COMMUNITY
End: 2020-08-25 | Stop reason: SDUPTHER

## 2020-08-25 ASSESSMENT — FIBROSIS 4 INDEX: FIB4 SCORE: 1.75

## 2020-08-25 NOTE — LETTER
[facility]  DEMAR Castelan.P.R.N.  [address] [facility] [address]  [place] [address]  Fax: [phone]   Authorization for Release/Disclosure of   Protected Health Information   Name: VAN SEWELL : 1933 SSN: [id number]   Address: [address] [place] [address] [address] [place] [address] Phone:    [phone] (home)    I authorize the entity listed below to release/disclose the PHI below to:   [facility]/Joana Hawk A.P.R.N. and DEMAR Castelan.P.R.CANDIDA.   Provider or Entity Name:  Dr. Cortez   Address   City, [facility]   Phone:      Fax:     Reason for request: continuity of care   Information to be released:    [  ] LAST COLONOSCOPY,  including any PATH REPORT and follow-up  [  ] LAST FIT/COLOGUARD RESULT [  ] LAST DEXA  [  ] LAST MAMMOGRAM  [  ] LAST PAP  [  ] LAST LABS [  ] RETINA EXAM REPORT  [  ] IMMUNIZATION RECORDS  [x  ] Release all info      [  ] Check here and initial the line next to each item to release ALL health information INCLUDING  _____ Care and treatment for drug and / or alcohol abuse  _____ HIV testing, infection status, or AIDS  _____ Genetic Testing    DATES OF SERVICE OR TIME PERIOD TO BE DISCLOSED: _____________  I understand and acknowledge that:  * This Authorization may be revoked at any time by you in writing, except if your health information has already been used or disclosed.  * Your health information that will be used or disclosed as a result of you signing this authorization could be re-disclosed by the recipient. If this occurs, your re-disclosed health information may no longer be protected by State or Federal laws.  * You may refuse to sign this Authorization. Your refusal will not affect your ability to obtain treatment.  * This Authorization becomes effective upon signing and will  on (date) __________.      If no date is indicated, this Authorization will  one (1) year from the signature date.    Name: Van Sewell    Signature:    Date:     8/25/2020       PLEASE FAX REQUESTED RECORDS BACK TO: [phone]

## 2020-08-25 NOTE — PROGRESS NOTES
cc:  Follow up/referral needed to dermatology      Subjective:     HPI:     Krystle Tavarez is a 87 y.o. female here to discuss the evaluation and management of:    1. Neoplasm of uncertain behavior  Requesting a referral to dermatology as she does have a growth on her nose as well as the temple on the left side.  Has been present for quite some time, on and off foot flaking and peeling.    2. Impaired mobility and ADLs  3. Weakness of both lower extremities  4. Myopathy  5. S/P lumbar laminectomy  Patient is status post posterior L4-L5 laminectomy on February 25, 2020.  Has been try to work on physical therapy however she is not having any significant improvement with the strength in her lower extremities.  Unable to do an MRI because she does have a pacemaker.  Her pain has improved since surgery however she continues to have progressive weakness in her lower extremities.  States she is essentially cannot move forward with physical therapy as she is not progressing.  States even just walking around the house she becomes very fatigued and is very difficult for her.  There is some suspicion that she might have a compressed nerve however without the MRI this is been difficult.  She would like to follow-up with a neurologist.  She does not want any more surgeries or procedures at this time.      6. Type 2 diabetes mellitus without complication, without long-term current use of insulin ([facility])  Most recent A1c 7.4%.  Patient has had several rounds of steroid Dosepaks status post her laminectomy.  Has been somewhat less mobile due to her pain and weakness.    7. Essential hypertension, benign  Followed by cardiology.  Taking losartan and HCT for this.  No chest pain, shortness of breath or dizziness.  Does have leg swelling this is been chronic.    ROS:  Denies any Headache, Blurred Vision, Confusion, Chest pain,  Shortness of breath,  Abdominal pain, Changes of bowel or bladder, Lower ext edema, Fevers, Nights sweats,  Weight Changes, Focal weakness or numbness.  And all other systems reviewed and are all negative.  Leg weakness.  Skin lesion        Current Outpatient Medications:   •  losartan (COZAAR) 100 MG Tab, Take 1 Tab by mouth every day., Disp: 100 Tab, Rfl: 3  •  hydroCHLOROthiazide (HYDRODIURIL) 25 MG Tab, Take 1 Tab by mouth every day., Disp: 30 Tab, Rfl: 4  •  Alum Hydroxide-Mag Carbonate (GAVISCON PO), Take  by mouth as needed., Disp: , Rfl:   •  apixaban (ELIQUIS) 2.5mg Tab, Take 1 Tab by mouth 2 Times a Day., Disp: 180 Tab, Rfl: 3  •  carvedilol (COREG) 3.125 MG Tab, Take 1 Tab by mouth 2 times a day, with meals., Disp: 180 Tab, Rfl: 3  •  Ascorbic Acid (VITAMIN C) 500 MG Cap, Take 1 Capsule by mouth 2 Times a Day., Disp: , Rfl:   •  Acetaminophen 500 MG Cap, Take 500 mg by mouth 4 times a day as needed (pain). Patient only taking 1-2 tabs a day, Disp: , Rfl:   •  sennosides (SENOKOT) 8.6 MG Tab, Take 1 Tab by mouth every day., Disp: 30 Tab, Rfl: 3    Allergies   Allergen Reactions   • Sulfa Drugs Nausea     Nausea        Past Medical History:   Diagnosis Date   • Arthritis     knees, hips   • Breath shortness     with exertion    • Cataract     bilat IOL    • Dental disorder     full upper, partial lower    • Diabetes (HCC)     pre diabetic   • Heart burn    • Hemorrhagic disorder (HCC)     on Eliquis   • High cholesterol     diet controlled    • Hyperlipidemia    • Hypertension    • Pacemaker 2015   • Pain 2020    lower back, right leg   • Pre-diabetes    • TIA (transient ischemic attack)     on Eliquis   • Urinary incontinence      Past Surgical History:   Procedure Laterality Date   • PB LAMINOTOMY,LUMBAR DISK,1 INTRSP N/A 2/25/2020    Procedure: LAMINECTOMY, SPINE, LUMBAR, WITH DISCECTOMY- L5-S1, MEDIAL FACETECTOMY;  Surgeon: Latrell Kimble M.D.;  Location: SURGERY [facility];  Service: Neurosurgery   • FORAMINOTOMY N/A 2/25/2020    Procedure: FORAMINOTOMY, SPINE;  Surgeon: Latrell Kimble M.D.;  [facility]:  [facility]GERY [facility];  Service: Neurosurgery   • PACEMAKER INSERTION  2015   • HIP REPLACEMENT, TOTAL Right 2009   • KNEE REPLACEMENT, TOTAL Right 2004   • CATARACT EXTRACTION WITH IOL Bilateral 2003   • CHOLECYSTECTOMY  2002   • BASAL CELL EXCISION      nose and hand   • BUNIONECTOMY Left      Family History   Problem Relation Age of Onset   • Diabetes Mother    • Stroke Mother    • Heart Attack Father 74   • No Known Problems Maternal Grandmother    • No Known Problems Maternal Grandfather    • No Known Problems Paternal Grandmother    • No Known Problems Paternal Grandfather      Social History     Socioeconomic History   • Marital status:      Spouse name: Not on file   • Number of children: Not on file   • Years of education: Not on file   • Highest education level: Not on file   Occupational History   • Not on file   Social Needs   • Financial resource strain: Not on file   • Food insecurity     Worry: Not on file     Inability: Not on file   • Transportation needs     Medical: Not on file     Non-medical: Not on file   Tobacco Use   • Smoking status: Never Smoker   • Smokeless tobacco: Never Used   Substance and Sexual Activity   • Alcohol use: No   • Drug use: No   • Sexual activity: Not on file   Lifestyle   • Physical activity     Days per week: Not on file     Minutes per session: Not on file   • Stress: Not on file   Relationships   • Social connections     Talks on phone: Not on file     Gets together: Not on file     Attends Hindu service: Not on file     Active member of club or organization: Not on file     Attends meetings of clubs or organizations: Not on file     Relationship status: Not on file   • Intimate partner violence     Fear of current or ex partner: Not on file     Emotionally abused: Not on file     Physically abused: Not on file     Forced sexual activity: Not on file   Other Topics Concern   • Not on file   Social History Narrative   • Not on file       Objective:  "    Vitals: /74 (BP Location: Right arm, Patient Position: Sitting)   Pulse 78   Temp 36.7 °C (98 °F) (Temporal)   Resp 16   Ht 1.6 m (5' 3\")   Wt 78.5 kg (173 lb)   LMP  (LMP Unknown)   SpO2 93%   BMI 30.65 kg/m²    General: Alert, pleasant, NAD, using a walker  HEENT: Normocephalic.  Neck supple  Skin: Warm, dry, no rashes.  Extremities: Bilateral lower extremity edema.  No discoloration  Neurological: No tremors, antalgic gait  Psych:  Affect/mood is normal, judgement is good, memory is intact, grooming is appropriate.    Assessment/Plan:     Krystle was seen today for referral needed.    Diagnoses and all orders for this visit:    Neoplasm of uncertain behavior  -     REFERRAL TO DERMATOLOGY    Impaired mobility and ADLs  Weakness of both lower extremities  Myopathy  Not improving status post laminectomy.  Worsening strength in bilateral lower extremities.  Referral to neurology for further recommendations.  -     REFERRAL TO NEUROLOGY    S/P lumbar laminectomy  Status post posterior L4-L5 laminectomy on February, 25th, 2020.  Pain improved although her strength has not significantly improved.    Type 2 diabetes mellitus without complication, without long-term current use of insulin (HCC)  Chronic.  Most recent A1c 7.4%.  Suspect increase in her A1c is due to the several rounds of steroids she did have status post laminectomy as well as her immobility.  Continue with current regimen at this time.  Follow back up in 3 months.    Essential hypertension, benign  -     losartan (COZAAR) 100 MG Tab; Take 1 Tab by mouth every day.              Return for Long (40 min).          Joana YORK"

## 2020-10-08 ENCOUNTER — OFFICE VISIT (OUTPATIENT)
Dept: DERMATOLOGY | Facility: IMAGING CENTER | Age: 85
End: 2020-10-08
Payer: MEDICARE

## 2020-10-08 VITALS — HEIGHT: 63 IN | BODY MASS INDEX: 30.12 KG/M2 | WEIGHT: 170 LBS

## 2020-10-08 DIAGNOSIS — L57.0 ACTINIC KERATOSIS: ICD-10-CM

## 2020-10-08 DIAGNOSIS — D49.2 NEOPLASM OF SKIN: ICD-10-CM

## 2020-10-08 PROCEDURE — 11103 TANGNTL BX SKIN EA SEP/ADDL: CPT | Performed by: DERMATOLOGY

## 2020-10-08 PROCEDURE — 11102 TANGNTL BX SKIN SINGLE LES: CPT | Performed by: DERMATOLOGY

## 2020-10-08 PROCEDURE — 17000 DESTRUCT PREMALG LESION: CPT | Mod: 59 | Performed by: DERMATOLOGY

## 2020-10-08 PROCEDURE — 17003 DESTRUCT PREMALG LES 2-14: CPT | Performed by: DERMATOLOGY

## 2020-10-08 ASSESSMENT — FIBROSIS 4 INDEX: FIB4 SCORE: 1.75

## 2020-10-08 NOTE — PROGRESS NOTES
CC: face skin lesions     Subjective: new patient here for skin tags on face.   was seen by dermatology in [place] 2 years ago     HPI: wart , removed 2 years and now has grown back  Location: left temple   Time present: 2 years   Painful lesion: No  Itching lesion: No  Enlarging lesion: No  Anything make it better or worse?no     HPI: non healing skin lesion   Location: left side of nose   Time present: 1 year   Painful lesion: No  Itching lesion: No  Enlarging lesion: no  Anything make it better or worse?no     History of skin cancer: No  History of biopsies:Yes, Details: wart removal   History of blistering/severe sunburns:No  Family history of skin cancer:No  Family history of atypical moles:No    Tobacco use: Never  Alcohol use:denies alcohol consumption  Allergies sulfa     ROS: no fevers/chills. No itch.  No cough  DermPMH: no skin cancer/melanoma  No problem-specific Assessment & Plan notes found for this encounter.    Relevant PMH: mult med comorbidities  Social: never smoker    PE: Gen:WDWN female in NAD.  Skin: focal exam of face:  -scattered thin hyperkeratotic papules on erythematous base on left temple, face.  -pearly papule on left alae, + telangiectasias and hemorrhagic crusting, approx 0.7cm  -1 cm hyperkeratotic papule on inferior left temple  -cutaneous horn, base approx 0.5cm on superior left temple    A/P: Neoplasm NOS: nose. BCC  -consent for bx, including R/B/A. Cleaned with EtOH, anesthesia with lidocaine 1% + epinephrine, shave bx, AlCl3 for hemostasis  -vaseline/bandage and wound care reviewed      Neoplasm NOS: left temple inferior.  R/o scc  -consent for bx, including R/B/A. Cleaned with EtOH, anesthesia with lidocaine 1% + epinephrine, shave bx, AlCl3 for hemostasis  -vaseline/bandage and wound care reviewed      Neoplasm NOS: left temple, superior.  R/o scc  -consent for bx, including R/B/A. Cleaned with EtOH, anesthesia with lidocaine 1% + epinephrine, shave bx, AlCl3 for  hemostasis  -vaseline/bandage and wound care reviewed      AKs: face  -counseled on diagnosis and treatment, including risks/benefits/alternatives of cyrotherapy  -LN2 25 sec X 2 cycles X 8lesions  -f/u if persists at 1 month        I have reviewed medications relevant to my specialty.    Rec HAJA future visit

## 2020-10-22 ENCOUNTER — TELEPHONE (OUTPATIENT)
Dept: DERMATOLOGY | Facility: IMAGING CENTER | Age: 85
End: 2020-10-22

## 2020-10-22 NOTE — TELEPHONE ENCOUNTER
Spoke to patient and daughter (Lynnette) on phone re: skin biopsy results.     Lengthy conversation 17:48 about dx/tx options    BCC, nose - would rec Mohs  SCCIS templeX2 - could consider ED&C (cautery) vs excision (Plastics/head and neck) vs Mohs    Reviewed slow-growing tumors and some treatment may be more than patient/family would prefer, can elect not to treat but risk exists that cancers continue to grow/erode through tissue.     Will plan to think about options and contact clinic in next 1-2 weeks with decision to move forward with referrals/trx/other.

## 2020-10-27 ENCOUNTER — TELEPHONE (OUTPATIENT)
Dept: DERMATOLOGY | Facility: IMAGING CENTER | Age: 85
End: 2020-10-27

## 2020-10-27 NOTE — TELEPHONE ENCOUNTER
Patient contacted office to advise she is requesting referral for Mohs to treat all three areas of face.  Advised patient that Dr. Alicia would be back in office on Thursday and that I would follow up with her obtain referral.  Gave patient contact information for [facility].  Patient agreeable, expressed understanding and had no further questions.

## 2020-10-29 DIAGNOSIS — C44.310 BCC (BASAL CELL CARCINOMA), FACE: ICD-10-CM

## 2020-10-29 DIAGNOSIS — D04.30 SQUAMOUS CELL CARCINOMA IN SITU (SCCIS) OF SKIN OF FACE: ICD-10-CM

## 2020-12-03 ENCOUNTER — APPOINTMENT (RX ONLY)
Dept: URBAN - METROPOLITAN AREA CLINIC 36 | Facility: CLINIC | Age: 85
Setting detail: DERMATOLOGY
End: 2020-12-03

## 2020-12-03 PROBLEM — C44.311 BASAL CELL CARCINOMA OF SKIN OF NOSE: Status: ACTIVE | Noted: 2020-12-03

## 2020-12-03 PROCEDURE — 17312 MOHS ADDL STAGE: CPT

## 2020-12-03 PROCEDURE — ? PRESCRIPTION

## 2020-12-03 PROCEDURE — ? MOHS SURGERY

## 2020-12-03 PROCEDURE — 17311 MOHS 1 STAGE H/N/HF/G: CPT

## 2020-12-03 PROCEDURE — 15740 ISLAND PEDICLE FLAP GRAFT: CPT

## 2020-12-03 RX ADMIN — HYDROCODONE BITARTRATE AND ACETAMINOPHEN 1: 5; 325 TABLET ORAL at 00:00

## 2020-12-03 NOTE — PROCEDURE: MOHS SURGERY
Mohs Case Number: [id number]
Biopsy Photograph Reviewed: No (no photograph available)
Referring Physician (Optional): Marta Muniz
Consent Type: Consent 1 (Standard)
Eye Shield Used: No
Surgeon Performing Repair (Optional): Flaca
Initial Size Of Lesion: 0.7
X Size Of Lesion In Cm (Optional): 0.4
Number Of Stages: 2
Primary Defect Length In Cm (Final Defect Size - Required For Flaps/Grafts): 1.5
Primary Defect Width In Cm (Final Defect Size - Required For Flaps/Grafts): 1.1
Repair Type: Flap
Oculoplastic Surgeon (A): Ron
Oculoplastic Surgeon Procedure Text (A): After obtaining clear surgical margins the patient was sent to oculoplastics for surgical repair.  The patient understands they will receive post-surgical care and follow-up from the referring physician's office.
Otolaryngologist Procedure Text (A): After obtaining clear surgical margins the patient was sent to otolaryngology for surgical repair.  The patient understands they will receive post-surgical care and follow-up from the referring physician's office.
Plastic Surgeon Procedure Text (A): After obtaining clear surgical margins the patient was sent to plastics for surgical repair.  The patient understands they will receive post-surgical care and follow-up from the referring physician's office.
Mid-Level Procedure Text (A): After obtaining clear surgical margins the patient was sent to a mid-level provider for surgical repair.  The patient understands they will receive post-surgical care and follow-up from the mid-level provider.
Provider Procedure Text (A): After obtaining clear surgical margins the defect was repaired by another provider.
Asc Procedure Text (A): After obtaining clear surgical margins the patient was sent to an ASC for surgical repair.  The patient understands they will receive post-surgical care and follow-up from the ASC physician.
Suturegard Retention Suture: 2-0 Nylon
Retention Suture Bite Size: 3 mm
Length To Time In Minutes Device Was In Place: 10
Number Of Hemigard Strips Per Side: 1
Simple / Intermediate / Complex Repair - Final Wound Length In Cm: 0
Undermining Type: Entire Wound
Debridement Text: The wound edges were debrided prior to proceeding with the closure to facilitate wound healing.
Helical Rim Text: The closure involved the helical rim.
Vermilion Border Text: The closure involved the vermilion border.
Nostril Rim Text: The closure involved the nostril rim.
Retention Suture Text: Retention sutures were placed to support the closure and prevent dehiscence.
Flap Type: Island Pedicle Flap- Requiring Identification and Dissection of an Anatomically Named Axial Vessel
Secondary Defect Length In Cm (Required For Flaps): 4
Secondary Defect Width In Cm (Required For Flaps): 1.4
Name Of Vessel Dissected: lateral nasal artery
Include Size Of Lesion In Location Indication Statement: Yes
Area H Indication Text: Tumors in this location are included in Area H (eyelids, eyebrows, nose, lips, chin, ear, pre-auricular, post-auricular, temple, genitalia, hands, feet, ankles and areola).  Tissue conservation is critical in these anatomic locations.
Area M Indication Text: Tumors in this location are included in Area M (cheek, forehead, scalp, neck, jawline and pretibial skin).  Mohs surgery is indicated for tumors in these anatomic locations.
Area L Indication Text: Tumors in this location are included in Area L (trunk and extremities).  Mohs surgery is indicated for larger tumors, or tumors with aggressive histologic features, in these anatomic locations.
Special Stains Stage 1 - Results: Base On Clearance Noted Above
Stage 2: Additional Anesthesia Type: 1% lidocaine with 1:100,000 epinephrine and 408mcg clindamycin/ml and a 1:10 solution of 8.4% sodium bicarbonate
Stage 4: Additional Anesthesia Type: 1% lidocaine with epinephrine
Include Tumor Staging In Mohs Note?: Please Select the Appropriate Response
Staging Info: By selecting yes to the question above you will include information on AJCC 8 tumor staging in your Mohs note. Information on tumor staging will be automatically added for SCCs on the head and neck. AJCC 8 includes tumor size, tumor depth, perineural involvement and bone invasion.
Tumor Depth: Less than 6mm from granular layer and no invasion beyond the subcutaneous fat
Medical Necessity Statement: Based on my medical judgement, Mohs surgery is the most appropriate treatment for this cancer compared to other treatments.
Alternatives Discussed Intro (Do Not Add Period): I discussed alternative treatments to Mohs surgery and specifically discussed the risks and benefits of
Consent 1/Introductory Paragraph: The rationale for Mohs was explained to the patient and consent was obtained. The risks, benefits and alternatives to therapy were discussed in detail. Specifically, the risks of infection, scarring, bleeding, prolonged wound healing, incomplete removal, allergy to anesthesia, nerve injury and recurrence were addressed. Prior to the procedure, the treatment site was clearly identified and confirmed by the patient. All components of Universal Protocol/PAUSE Rule completed.
Consent 2/Introductory Paragraph: Mohs surgery was explained to the patient and consent was obtained. The risks, benefits and alternatives to therapy were discussed in detail. Specifically, the risks of infection, scarring, bleeding, prolonged wound healing, incomplete removal, allergy to anesthesia, nerve injury and recurrence were addressed. Prior to the procedure, the treatment site was clearly identified and confirmed by the patient. All components of Universal Protocol/PAUSE Rule completed.
Consent 3/Introductory Paragraph: I gave the patient a chance to ask questions they had about the procedure.  Following this I explained the Mohs procedure and consent was obtained. The risks, benefits and alternatives to therapy were discussed in detail. Specifically, the risks of infection, scarring, bleeding, prolonged wound healing, incomplete removal, allergy to anesthesia, nerve injury and recurrence were addressed. Prior to the procedure, the treatment site was clearly identified and confirmed by the patient. All components of Universal Protocol/PAUSE Rule completed.
Consent (Temporal Branch)/Introductory Paragraph: The rationale for Mohs was explained to the patient and consent was obtained. The risks, benefits and alternatives to therapy were discussed in detail. Specifically, the risks of damage to the temporal branch of the facial nerve, infection, scarring, bleeding, prolonged wound healing, incomplete removal, allergy to anesthesia, and recurrence were addressed. Prior to the procedure, the treatment site was clearly identified and confirmed by the patient. All components of Universal Protocol/PAUSE Rule completed.
Consent (Marginal Mandibular)/Introductory Paragraph: The rationale for Mohs was explained to the patient and consent was obtained. The risks, benefits and alternatives to therapy were discussed in detail. Specifically, the risks of damage to the marginal mandibular branch of the facial nerve, infection, scarring, bleeding, prolonged wound healing, incomplete removal, allergy to anesthesia, and recurrence were addressed. Prior to the procedure, the treatment site was clearly identified and confirmed by the patient. All components of Universal Protocol/PAUSE Rule completed.
Consent (Spinal Accessory)/Introductory Paragraph: The rationale for Mohs was explained to the patient and consent was obtained. The risks, benefits and alternatives to therapy were discussed in detail. Specifically, the risks of damage to the spinal accessory nerve, infection, scarring, bleeding, prolonged wound healing, incomplete removal, allergy to anesthesia, and recurrence were addressed. Prior to the procedure, the treatment site was clearly identified and confirmed by the patient. All components of Universal Protocol/PAUSE Rule completed.
Consent (Near Eyelid Margin)/Introductory Paragraph: The rationale for Mohs was explained to the patient and consent was obtained. The risks, benefits and alternatives to therapy were discussed in detail. Specifically, the risks of ectropion or eyelid deformity, infection, scarring, bleeding, prolonged wound healing, incomplete removal, allergy to anesthesia, nerve injury and recurrence were addressed. Prior to the procedure, the treatment site was clearly identified and confirmed by the patient. All components of Universal Protocol/PAUSE Rule completed.
Consent (Ear)/Introductory Paragraph: The rationale for Mohs was explained to the patient and consent was obtained. The risks, benefits and alternatives to therapy were discussed in detail. Specifically, the risks of ear deformity, infection, scarring, bleeding, prolonged wound healing, incomplete removal, allergy to anesthesia, nerve injury and recurrence were addressed. Prior to the procedure, the treatment site was clearly identified and confirmed by the patient. All components of Universal Protocol/PAUSE Rule completed.
Consent (Nose)/Introductory Paragraph: The rationale for Mohs was explained to the patient and consent was obtained. The risks, benefits and alternatives to therapy were discussed in detail. Specifically, the risks of nasal deformity, changes in the flow of air through the nose, infection, scarring, bleeding, prolonged wound healing, incomplete removal, allergy to anesthesia, nerve injury and recurrence were addressed. Prior to the procedure, the treatment site was clearly identified and confirmed by the patient. All components of Universal Protocol/PAUSE Rule completed.
Consent (Lip)/Introductory Paragraph: The rationale for Mohs was explained to the patient and consent was obtained. The risks, benefits and alternatives to therapy were discussed in detail. Specifically, the risks of lip deformity, changes in the oral aperture, infection, scarring, bleeding, prolonged wound healing, incomplete removal, allergy to anesthesia, nerve injury and recurrence were addressed. Prior to the procedure, the treatment site was clearly identified and confirmed by the patient. All components of Universal Protocol/PAUSE Rule completed.
Consent (Scalp)/Introductory Paragraph: The rationale for Mohs was explained to the patient and consent was obtained. The risks, benefits and alternatives to therapy were discussed in detail. Specifically, the risks of changes in hair growth pattern secondary to repair, infection, scarring, bleeding, prolonged wound healing, incomplete removal, allergy to anesthesia, nerve injury and recurrence were addressed. Prior to the procedure, the treatment site was clearly identified and confirmed by the patient. All components of Universal Protocol/PAUSE Rule completed.
Detail Level: Detailed
Postop Diagnosis: same
Anesthesia Type: 0.5% lidocaine with 1:200,000 epinephrine and a 1:10 solution of 8.4% sodium bicarbonate and 408mcg clindamycin/ml
Anesthesia Volume In Cc: 6
Hemostasis: Electrocautery
Estimated Blood Loss (Cc): less than 5 cc
Repair Anesthesia Method: local infiltration
Deep Sutures: 5-0 Vicryl
Epidermal Sutures: 5-0 Ethilon
Epidermal Closure: running cuticular
Suturegard Intro: Intraoperative tissue expansion was performed, utilizing the SUTUREGARD device, in order to reduce wound tension.
Suturegard Body: The suture ends were repeatedly re-tightened and re-clamped to achieve the desired tissue expansion.
Hemigard Intro: Due to skin fragility and wound tension, it was decided to use HEMIGARD adhesive retention suture devices to permit a linear closure. The skin was cleaned and dried for a 6cm distance away from the wound. Excessive hair, if present, was removed to allow for adhesion.
Hemigard Postcare Instructions: The HEMIGARD strips are to remain completely dry for at least 5-7 days.
Donor Site Anesthesia Type: same as repair anesthesia
Graft Basting Suture (Optional): 5-0 Fast Absorbing Gut
Graft Donor Site Epidermal Sutures (Optional): 5-0 Ethibond
Epidermal Closure Graft Donor Site (Optional): simple interrupted
Graft Donor Site Bandage (Optional-Leave Blank If You Don't Want In Note): Aquaphor and telefa placed on wound. Pressure dressing applied to donor site
Closure 2 Information: This tab is for additional flaps and grafts, including complex repair and grafts and complex repair and flaps. You can also specify a different location for the additional defect, if the location is the same you do not need to select a new one. We will insert the automated text for the repair you select below just as we do for solitary flaps and grafts. Please note that at this time if you select a location with a different insurance zone you will need to override the ICD10 and CPT if appropriate.
Closure 3 Information: This tab is for additional flaps and grafts above and beyond our usual structured repairs.  Please note if you enter information here it will not currently bill and you will need to add the billing information manually.
Wound Care: Aquaphor
Dressing: dry sterile dressing
Wound Care (No Sutures): Petrolatum
Suture Removal: 7 days
Unna Boot Text: An Unna boot was placed to help immobilize the limb and facilitate more rapid healing.
Home Suture Removal Text: Patient was provided instructions on removing sutures and will remove their sutures at home.  If they have any questions or difficulties they will call the office.
Post-Care Instructions: I reviewed with the patient in detail post-care instructions. Patient is not to engage in any heavy lifting, exercise, or swimming for the next 14 days. Should the patient develop any fevers, chills, bleeding, severe pain patient will contact the office immediately.
Pain Refusal Text: I offered to prescribe pain medication but the patient refused to take this medication.
Mauc Instructions: By selecting yes to the question below the MAUC number will be added into the note.  This will be calculated automatically based on the diagnosis chosen, the size entered, the body zone selected (H,M,L) and the specific indications you chose. You will also have the option to override the Mohs AUC if you disagree with the automatically calculated number and this option is found in the Case Summary tab.
Where Do You Want The Question To Include Opioid Counseling Located?: Case Summary Tab
Eye Protection Verbiage: Before proceeding with the stage, a plastic scleral shield was inserted. The globe was anesthetized with a few drops of 1% lidocaine with 1:100,000 epinephrine. Then, an appropriate sized scleral shield was chosen and coated with lacrilube ointment. The shield was gently inserted and left in place for the duration of each stage. After the stage was completed, the shield was gently removed.
Mohs Method Verbiage: An incision at a 45 degree angle following the standard Mohs approach was done and the specimen was harvested as a microscopic controlled layer.
Surgeon/Pathologist Verbiage (Will Incorporate Name Of Surgeon From Intro If Not Blank): operated in two distinct and integrated capacities as the surgeon and pathologist.
Mohs Histo Method Verbiage: Each section was then chromacoded and processed in the Mohs lab using the Mohs protocol and submitted for frozen section.
Subsequent Stages Histo Method Verbiage: Using a similar technique to that described above, a thin layer of tissue was removed from all areas where tumor was visible on the previous stage.  The tissue was again oriented, mapped, dyed, and processed as above.
Mohs Rapid Report Verbiage: The area of clinically evident tumor was marked with skin marking ink and appropriately hatched.  The initial incision was made following the Mohs approach through the skin.  The specimen was taken to the lab, divided into the necessary number of pieces, chromacoded and processed according to the Mohs protocol.  This was repeated in successive stages until a tumor free defect was achieved.
Complex Repair Preamble Text (Leave Blank If You Do Not Want): Extensive wide undermining was performed at least 2 cm in all directions.
Intermediate Repair Preamble Text (Leave Blank If You Do Not Want): Undermining was performed with blunt dissection.
M-Plasty Complex Repair Preamble Text (Leave Blank If You Do Not Want): Extensive wide undermining was performed.
Non-Graft Cartilage Fenestration Text: The cartilage was fenestrated with a 2mm punch biopsy to help facilitate healing.
Graft Cartilage Fenestration Text: The cartilage was fenestrated with a 2mm punch biopsy to help facilitate graft survival and healing.
Secondary Intention Text (Leave Blank If You Do Not Want): The defect will heal with secondary intention.
No Repair - Repaired With Adjacent Surgical Defect Text (Leave Blank If You Do Not Want): After obtaining clear surgical margins the defect was repaired concurrently with another surgical defect which was in close approximation.
Advancement Flap (Single) Text: The defect edges were debeveled with a #15 scalpel blade.  Given the location of the defect and the proximity to free margins a single advancement flap was deemed most appropriate.  Using a sterile surgical marker, an appropriate advancement flap was drawn incorporating the defect and placing the expected incisions within the relaxed skin tension lines where possible.    The area thus outlined was incised deep to adipose tissue with a #15 scalpel blade.  The skin margins were undermined to an appropriate distance in all directions utilizing iris scissors.
Advancement Flap (Double) Text: The defect edges were debeveled with a #15 scalpel blade.  Given the location of the defect and the proximity to free margins a double advancement flap was deemed most appropriate.  Using a sterile surgical marker, the appropriate advancement flaps were drawn incorporating the defect and placing the expected incisions within the relaxed skin tension lines where possible.    The area thus outlined was incised deep to adipose tissue with a #15 scalpel blade.  The skin margins were undermined to an appropriate distance in all directions utilizing iris scissors.
Burow's Advancement Flap Text: The defect edges were debeveled with a #15 scalpel blade.  Given the location of the defect and the proximity to free margins a Burow's advancement flap was deemed most appropriate.  Using a sterile surgical marker, the appropriate advancement flap was drawn incorporating the defect and placing the expected incisions within the relaxed skin tension lines where possible.    The area thus outlined was incised deep to adipose tissue with a #15 scalpel blade.  The skin margins were undermined to an appropriate distance in all directions utilizing iris scissors.
Chonodrocutaneous Helical Advancement Flap Text: The defect edges were debeveled with a #15 scalpel blade.  Given the location of the defect and the proximity to free margins a chondrocutaneous helical advancement flap was deemed most appropriate.  Using a sterile surgical marker, the appropriate advancement flap was drawn incorporating the defect and placing the expected incisions within the relaxed skin tension lines where possible.    The area thus outlined was incised deep to adipose tissue with a #15 scalpel blade.  The skin margins were undermined to an appropriate distance in all directions utilizing iris scissors.
Crescentic Advancement Flap Text: The defect edges were debeveled with a #15 scalpel blade.  Given the location of the defect and the proximity to free margins a crescentic advancement flap was deemed most appropriate.  Using a sterile surgical marker, the appropriate advancement flap was drawn incorporating the defect and placing the expected incisions within the relaxed skin tension lines where possible.    The area thus outlined was incised deep to adipose tissue with a #15 scalpel blade.  The skin margins were undermined to an appropriate distance in all directions utilizing iris scissors.
A-T Advancement Flap Text: The defect edges were debeveled with a #15 scalpel blade.  Given the location of the defect, shape of the defect and the proximity to free margins an A-T advancement flap was deemed most appropriate.  Using a sterile surgical marker, an appropriate advancement flap was drawn incorporating the defect and placing the expected incisions within the relaxed skin tension lines where possible.    The area thus outlined was incised deep to adipose tissue with a #15 scalpel blade.  The skin margins were undermined to an appropriate distance in all directions utilizing iris scissors.
O-T Advancement Flap Text: The defect edges were debeveled with a #15 scalpel blade.  Given the location of the defect, shape of the defect and the proximity to free margins an O-T advancement flap was deemed most appropriate.  Using a sterile surgical marker, an appropriate advancement flap was drawn incorporating the defect and placing the expected incisions within the relaxed skin tension lines where possible.    The area thus outlined was incised deep to adipose tissue with a #15 scalpel blade.  The skin margins were undermined to an appropriate distance in all directions utilizing iris scissors.
O-L Flap Text: The defect edges were debeveled with a #15 scalpel blade.  Given the location of the defect, shape of the defect and the proximity to free margins an O-L flap was deemed most appropriate.  Using a sterile surgical marker, an appropriate advancement flap was drawn incorporating the defect and placing the expected incisions within the relaxed skin tension lines where possible.    The area thus outlined was incised deep to adipose tissue with a #15 scalpel blade.  The skin margins were undermined to an appropriate distance in all directions utilizing iris scissors.
O-Z Flap Text: The defect edges were debeveled with a #15 scalpel blade.  Given the location of the defect, shape of the defect and the proximity to free margins an O-Z flap was deemed most appropriate.  Using a sterile surgical marker, an appropriate transposition flap was drawn incorporating the defect and placing the expected incisions within the relaxed skin tension lines where possible. The area thus outlined was incised deep to adipose tissue with a #15 scalpel blade.  The skin margins were undermined to an appropriate distance in all directions utilizing iris scissors.
Double O-Z Flap Text: The defect edges were debeveled with a #15 scalpel blade.  Given the location of the defect, shape of the defect and the proximity to free margins a Double O-Z flap was deemed most appropriate.  Using a sterile surgical marker, an appropriate transposition flap was drawn incorporating the defect and placing the expected incisions within the relaxed skin tension lines where possible. The area thus outlined was incised deep to adipose tissue with a #15 scalpel blade.  The skin margins were undermined to an appropriate distance in all directions utilizing iris scissors.
V-Y Flap Text: The defect edges were debeveled with a #15 scalpel blade.  Given the location of the defect, shape of the defect and the proximity to free margins a V-Y flap was deemed most appropriate.  Using a sterile surgical marker, an appropriate advancement flap was drawn incorporating the defect and placing the expected incisions within the relaxed skin tension lines where possible.    The area thus outlined was incised deep to adipose tissue with a #15 scalpel blade.  The skin margins were undermined to an appropriate distance in all directions utilizing iris scissors.
Advancement-Rotation Flap Text: The defect edges were debeveled with a #15 scalpel blade.  Given the location of the defect, shape of the defect and the proximity to free margins an advancement-rotation flap was deemed most appropriate.  Using a sterile surgical marker, an appropriate flap was drawn incorporating the defect and placing the expected incisions within the relaxed skin tension lines where possible. The area thus outlined was incised deep to adipose tissue with a #15 scalpel blade.  The skin margins were undermined to an appropriate distance in all directions utilizing iris scissors.
Mercedes Flap Text: The defect edges were debeveled with a #15 scalpel blade.  Given the location of the defect, shape of the defect and the proximity to free margins a Mercedes flap was deemed most appropriate.  Using a sterile surgical marker, an appropriate advancement flap was drawn incorporating the defect and placing the expected incisions within the relaxed skin tension lines where possible. The area thus outlined was incised deep to adipose tissue with a #15 scalpel blade.  The skin margins were undermined to an appropriate distance in all directions utilizing iris scissors.
Modified Advancement Flap Text: The defect edges were debeveled with a #15 scalpel blade.  Given the location of the defect, shape of the defect and the proximity to free margins a modified advancement flap was deemed most appropriate.  Using a sterile surgical marker, an appropriate advancement flap was drawn incorporating the defect and placing the expected incisions within the relaxed skin tension lines where possible.    The area thus outlined was incised deep to adipose tissue with a #15 scalpel blade.  The skin margins were undermined to an appropriate distance in all directions utilizing iris scissors.
Mucosal Advancement Flap Text: Given the location of the defect, shape of the defect and the proximity to free margins a mucosal advancement flap was deemed most appropriate. Incisions were made with a 15 blade scalpel in the appropriate fashion along the cutaneous vermilion border and the mucosal lip. The remaining actinically damaged mucosal tissue was excised.  The mucosal advancement flap was then elevated to the gingival sulcus with care taken to preserve the neurovascular structures and advanced into the primary defect. Care was taken to ensure that precise realignment of the vermilion border was achieved.
Peng Advancement Flap Text: The defect edges were debeveled with a #15 scalpel blade.  Given the location of the defect, shape of the defect and the proximity to free margins a Peng advancement flap was deemed most appropriate.  Using a sterile surgical marker, an appropriate advancement flap was drawn incorporating the defect and placing the expected incisions within the relaxed skin tension lines where possible. The area thus outlined was incised deep to adipose tissue with a #15 scalpel blade.  The skin margins were undermined to an appropriate distance in all directions utilizing iris scissors.
Hatchet Flap Text: The defect edges were debeveled with a #15 scalpel blade.  Given the location of the defect, shape of the defect and the proximity to free margins a hatchet flap based from the glabella was deemed most appropriate.  Using a sterile surgical marker, an appropriate glabellar hatchet flap was drawn incorporating the defect and placing the expected incisions within the relaxed skin tension lines where possible.    The area thus outlined was incised deep to adipose tissue with a #15 scalpel blade.  The skin margins were undermined to an appropriate distance in all directions utilizing iris scissors.
Rotation Flap Text: The defect edges were debeveled with a #15 scalpel blade.  Given the location of the defect, shape of the defect and the proximity to free margins a rotation flap was deemed most appropriate.  Using a sterile surgical marker, an appropriate rotation flap was drawn incorporating the defect and placing the expected incisions within the relaxed skin tension lines where possible.    The area thus outlined was incised deep to adipose tissue with a #15 scalpel blade.  The skin margins were undermined to an appropriate distance in all directions utilizing iris scissors.
Spiral Flap Text: The defect edges were debeveled with a #15 scalpel blade.  Given the location of the defect, shape of the defect and the proximity to free margins a spiral flap was deemed most appropriate.  Using a sterile surgical marker, an appropriate rotation flap was drawn incorporating the defect and placing the expected incisions within the relaxed skin tension lines where possible. The area thus outlined was incised deep to adipose tissue with a #15 scalpel blade.  The skin margins were undermined to an appropriate distance in all directions utilizing iris scissors.
Star Wedge Flap Text: The defect edges were debeveled with a #15 scalpel blade.  Given the location of the defect, shape of the defect and the proximity to free margins a star wedge flap was deemed most appropriate.  Using a sterile surgical marker, an appropriate rotation flap was drawn incorporating the defect and placing the expected incisions within the relaxed skin tension lines where possible. The area thus outlined was incised deep to adipose tissue with a #15 scalpel blade.  The skin margins were undermined to an appropriate distance in all directions utilizing iris scissors.
Transposition Flap Text: The defect edges were debeveled with a #15 scalpel blade.  Given the location of the defect and the proximity to free margins a transposition flap was deemed most appropriate.  Using a sterile surgical marker, an appropriate transposition flap was drawn incorporating the defect.    The area thus outlined was incised deep to adipose tissue with a #15 scalpel blade.  The skin margins were undermined to an appropriate distance in all directions utilizing iris scissors.
Muscle Hinge Flap Text: The defect edges were debeveled with a #15 scalpel blade.  Given the size, depth and location of the defect and the proximity to free margins a muscle hinge flap was deemed most appropriate.  Using a sterile surgical marker, an appropriate hinge flap was drawn incorporating the defect. The area thus outlined was incised with a #15 scalpel blade.  The skin margins were undermined to an appropriate distance in all directions utilizing iris scissors.
Melolabial Transposition Flap Text: The defect edges were debeveled with a #15 scalpel blade.  Given the location of the defect and the proximity to free margins a melolabial flap was deemed most appropriate.  Using a sterile surgical marker, an appropriate melolabial transposition flap was drawn incorporating the defect.    The area thus outlined was incised deep to adipose tissue with a #15 scalpel blade.  The skin margins were undermined to an appropriate distance in all directions utilizing iris scissors.
Rhombic Flap Text: The defect edges were debeveled with a #15 scalpel blade.  Given the location of the defect and the proximity to free margins a rhombic flap was deemed most appropriate.  Using a sterile surgical marker, an appropriate rhombic flap was drawn incorporating the defect.    The area thus outlined was incised deep to adipose tissue with a #15 scalpel blade.  The skin margins were undermined to an appropriate distance in all directions utilizing iris scissors.
Rhomboid Transposition Flap Text: The defect edges were debeveled with a #15 scalpel blade.  Given the location of the defect and the proximity to free margins a rhomboid transposition flap was deemed most appropriate.  Using a sterile surgical marker, an appropriate rhomboid flap was drawn incorporating the defect.    The area thus outlined was incised deep to adipose tissue with a #15 scalpel blade.  The skin margins were undermined to an appropriate distance in all directions utilizing iris scissors.
Bi-Rhombic Flap Text: The defect edges were debeveled with a #15 scalpel blade.  Given the location of the defect and the proximity to free margins a bi-rhombic flap was deemed most appropriate.  Using a sterile surgical marker, an appropriate rhombic flap was drawn incorporating the defect. The area thus outlined was incised deep to adipose tissue with a #15 scalpel blade.  The skin margins were undermined to an appropriate distance in all directions utilizing iris scissors.
Helical Rim Advancement Flap Text: The defect edges were debeveled with a #15 blade scalpel.  Given the location of the defect and the proximity to free margins (helical rim) a double helical rim advancement flap was deemed most appropriate.  Using a sterile surgical marker, the appropriate advancement flaps were drawn incorporating the defect and placing the expected incisions between the helical rim and antihelix where possible.  The area thus outlined was incised through and through with a #15 scalpel blade.  With a skin hook and iris scissors, the flaps were gently and sharply undermined and freed up.
Bilateral Helical Rim Advancement Flap Text: The defect edges were debeveled with a #15 blade scalpel.  Given the location of the defect and the proximity to free margins (helical rim) a bilateral helical rim advancement flap was deemed most appropriate.  Using a sterile surgical marker, the appropriate advancement flaps were drawn incorporating the defect and placing the expected incisions between the helical rim and antihelix where possible.  The area thus outlined was incised through and through with a #15 scalpel blade.  With a skin hook and iris scissors, the flaps were gently and sharply undermined and freed up.
Ear Star Wedge Flap Text: The defect edges were debeveled with a #15 blade scalpel.  Given the location of the defect and the proximity to free margins (helical rim) an ear star wedge flap was deemed most appropriate.  Using a sterile surgical marker, the appropriate flap was drawn incorporating the defect and placing the expected incisions between the helical rim and antihelix where possible.  The area thus outlined was incised through and through with a #15 scalpel blade.
Banner Transposition Flap Text: The defect edges were debeveled with a #15 scalpel blade.  Given the location of the defect and the proximity to free margins a Banner transposition flap was deemed most appropriate.  Using a sterile surgical marker, an appropriate flap drawn around the defect. The area thus outlined was incised deep to adipose tissue with a #15 scalpel blade.  The skin margins were undermined to an appropriate distance in all directions utilizing iris scissors.
Bilobed Flap Text: The defect edges were debeveled with a #15 scalpel blade.  Given the location of the defect and the proximity to free margins a bilobe flap was deemed most appropriate.  Using a sterile surgical marker, an appropriate bilobe flap drawn around the defect.    The area thus outlined was incised deep to adipose tissue with a #15 scalpel blade.  The skin margins were undermined to an appropriate distance in all directions utilizing iris scissors.
Bilobed Transposition Flap Text: The defect edges were debeveled with a #15 scalpel blade.  Given the location of the defect and the proximity to free margins a bilobed transposition flap was deemed most appropriate.  Using a sterile surgical marker, an appropriate bilobe flap drawn around the defect.    The area thus outlined was incised deep to adipose tissue with a #15 scalpel blade.  The skin margins were undermined to an appropriate distance in all directions utilizing iris scissors.
Trilobed Flap Text: The defect edges were debeveled with a #15 scalpel blade.  Given the location of the defect and the proximity to free margins a trilobed flap was deemed most appropriate.  Using a sterile surgical marker, an appropriate trilobed flap drawn around the defect.    The area thus outlined was incised deep to adipose tissue with a #15 scalpel blade.  The skin margins were undermined to an appropriate distance in all directions utilizing iris scissors.
Dorsal Nasal Flap Text: The defect edges were debeveled with a #15 scalpel blade.  Given the location of the defect and the proximity to free margins a dorsal nasal flap,based upon the glabellar folds, was deemed most appropriate.  Using a sterile surgical marker, an appropriate dorsal nasal flap was drawn around the defect.    The area thus outlined was incised deep to adipose tissue with a #15 scalpel blade.  The skin margins were undermined to an appropriate distance in all directions utilizing iris scissors.
Island Pedicle Flap Text: The defect edges were debeveled with a #15 scalpel blade.  Given the location of the defect, shape of the defect and the proximity to free margins an island pedicle advancement flap was deemed most appropriate.  Using a sterile surgical marker, an appropriate advancement flap was drawn incorporating the defect, outlining the appropriate donor tissue and placing the expected incisions within the relaxed skin tension lines where possible.    The area thus outlined was incised deep to adipose tissue with a #15 scalpel blade.  The skin margins were undermined to an appropriate distance in all directions around the primary defect and laterally outward around the island pedicle utilizing iris scissors.  There was minimal undermining beneath the pedicle flap.
Island Pedicle Flap With Canthal Suspension Text: The defect edges were debeveled with a #15 scalpel blade.  Given the location of the defect, shape of the defect and the proximity to free margins an island pedicle advancement flap was deemed most appropriate.  Using a sterile surgical marker, an appropriate advancement flap was drawn incorporating the defect, outlining the appropriate donor tissue and placing the expected incisions within the relaxed skin tension lines where possible. The area thus outlined was incised deep to adipose tissue with a #15 scalpel blade.  The skin margins were undermined to an appropriate distance in all directions around the primary defect and laterally outward around the island pedicle utilizing iris scissors.  There was minimal undermining beneath the pedicle flap. A suspension suture was placed in the canthal tendon to prevent tension and prevent ectropion.
Alar Island Pedicle Flap Text: The defect edges were debeveled with a #15 scalpel blade.  Given the location of the defect, shape of the defect and the proximity to the alar rim an island pedicle advancement flap was deemed most appropriate.  Using a sterile surgical marker, an appropriate advancement flap was drawn incorporating the defect, outlining the appropriate donor tissue and placing the expected incisions within the nasal ala running parallel to the alar rim. The area thus outlined was incised with a #15 scalpel blade.  The skin margins were undermined minimally to an appropriate distance in all directions around the primary defect and laterally outward around the island pedicle utilizing iris scissors.  There was minimal undermining beneath the pedicle flap.
Double Island Pedicle Flap Text: The defect edges were debeveled with a #15 scalpel blade.  Given the location of the defect, shape of the defect and the proximity to free margins a double island pedicle advancement flap was deemed most appropriate.  Using a sterile surgical marker, an appropriate advancement flap was drawn incorporating the defect, outlining the appropriate donor tissue and placing the expected incisions within the relaxed skin tension lines where possible.    The area thus outlined was incised deep to adipose tissue with a #15 scalpel blade.  The skin margins were undermined to an appropriate distance in all directions around the primary defect and laterally outward around the island pedicle utilizing iris scissors.  There was minimal undermining beneath the pedicle flap.
Island Pedicle Flap-Requiring Vessel Identification Text: The defect edges were debeveled with a #15 scalpel blade.  Given the location of the defect, shape of the defect and the proximity to free margins an island pedicle advancement flap was deemed most appropriate.  Using a sterile surgical marker, an appropriate advancement flap was drawn, based on the axial vessel mentioned above, incorporating the defect, outlining the appropriate donor tissue and placing the expected incisions within the relaxed skin tension lines where possible.    The area thus outlined was incised deep to adipose tissue with a #15 scalpel blade.  The skin margins were undermined to an appropriate distance in all directions around the primary defect and laterally outward around the island pedicle utilizing iris scissors.  There was minimal undermining beneath the pedicle flap.
Keystone Flap Text: The defect edges were debeveled with a #15 scalpel blade.  Given the location of the defect, shape of the defect a keystone flap was deemed most appropriate.  Using a sterile surgical marker, an appropriate keystone flap was drawn incorporating the defect, outlining the appropriate donor tissue and placing the expected incisions within the relaxed skin tension lines where possible. The area thus outlined was incised deep to adipose tissue with a #15 scalpel blade.  The skin margins were undermined to an appropriate distance in all directions around the primary defect and laterally outward around the flap utilizing iris scissors.
O-T Plasty Text: The defect edges were debeveled with a #15 scalpel blade.  Given the location of the defect, shape of the defect and the proximity to free margins an O-T plasty was deemed most appropriate.  Using a sterile surgical marker, an appropriate O-T plasty was drawn incorporating the defect and placing the expected incisions within the relaxed skin tension lines where possible.    The area thus outlined was incised deep to adipose tissue with a #15 scalpel blade.  The skin margins were undermined to an appropriate distance in all directions utilizing iris scissors.
O-Z Plasty Text: The defect edges were debeveled with a #15 scalpel blade.  Given the location of the defect, shape of the defect and the proximity to free margins an O-Z plasty (double transposition flap) was deemed most appropriate.  Using a sterile surgical marker, the appropriate transposition flaps were drawn incorporating the defect and placing the expected incisions within the relaxed skin tension lines where possible.    The area thus outlined was incised deep to adipose tissue with a #15 scalpel blade.  The skin margins were undermined to an appropriate distance in all directions utilizing iris scissors.  Hemostasis was achieved with electrocautery.  The flaps were then transposed into place, one clockwise and the other counterclockwise, and anchored with interrupted buried subcutaneous sutures.
Double O-Z Plasty Text: The defect edges were debeveled with a #15 scalpel blade.  Given the location of the defect, shape of the defect and the proximity to free margins a Double O-Z plasty (double transposition flap) was deemed most appropriate.  Using a sterile surgical marker, the appropriate transposition flaps were drawn incorporating the defect and placing the expected incisions within the relaxed skin tension lines where possible. The area thus outlined was incised deep to adipose tissue with a #15 scalpel blade.  The skin margins were undermined to an appropriate distance in all directions utilizing iris scissors.  Hemostasis was achieved with electrocautery.  The flaps were then transposed into place, one clockwise and the other counterclockwise, and anchored with interrupted buried subcutaneous sutures.
V-Y Plasty Text: The defect edges were debeveled with a #15 scalpel blade.  Given the location of the defect, shape of the defect and the proximity to free margins an V-Y advancement flap was deemed most appropriate.  Using a sterile surgical marker, an appropriate advancement flap was drawn incorporating the defect and placing the expected incisions within the relaxed skin tension lines where possible.    The area thus outlined was incised deep to adipose tissue with a #15 scalpel blade.  The skin margins were undermined to an appropriate distance in all directions utilizing iris scissors.
H Plasty Text: Given the location of the defect, shape of the defect and the proximity to free margins a H-plasty was deemed most appropriate for repair.  Using a sterile surgical marker, the appropriate advancement arms of the H-plasty were drawn incorporating the defect and placing the expected incisions within the relaxed skin tension lines where possible. The area thus outlined was incised deep to adipose tissue with a #15 scalpel blade. The skin margins were undermined to an appropriate distance in all directions utilizing iris scissors.  The opposing advancement arms were then advanced into place in opposite direction and anchored with interrupted buried subcutaneous sutures.
W Plasty Text: The lesion was extirpated to the level of the fat with a #15 scalpel blade.  Given the location of the defect, shape of the defect and the proximity to free margins a W-plasty was deemed most appropriate for repair.  Using a sterile surgical marker, the appropriate transposition arms of the W-plasty were drawn incorporating the defect and placing the expected incisions within the relaxed skin tension lines where possible.    The area thus outlined was incised deep to adipose tissue with a #15 scalpel blade.  The skin margins were undermined to an appropriate distance in all directions utilizing iris scissors.  The opposing transposition arms were then transposed into place in opposite direction and anchored with interrupted buried subcutaneous sutures.
Z Plasty Text: The lesion was extirpated to the level of the fat with a #15 scalpel blade.  Given the location of the defect, shape of the defect and the proximity to free margins a Z-plasty was deemed most appropriate for repair.  Using a sterile surgical marker, the appropriate transposition arms of the Z-plasty were drawn incorporating the defect and placing the expected incisions within the relaxed skin tension lines where possible.    The area thus outlined was incised deep to adipose tissue with a #15 scalpel blade.  The skin margins were undermined to an appropriate distance in all directions utilizing iris scissors.  The opposing transposition arms were then transposed into place in opposite direction and anchored with interrupted buried subcutaneous sutures.
Zygomaticofacial Flap Text: Given the location of the defect, shape of the defect and the proximity to free margins a zygomaticofacial flap was deemed most appropriate for repair.  Using a sterile surgical marker, the appropriate flap was drawn incorporating the defect and placing the expected incisions within the relaxed skin tension lines where possible. The area thus outlined was incised deep to adipose tissue with a #15 scalpel blade with preservation of a vascular pedicle.  The skin margins were undermined to an appropriate distance in all directions utilizing iris scissors.  The flap was then placed into the defect and anchored with interrupted buried subcutaneous sutures.
Cheek Interpolation Flap Text: A decision was made to reconstruct the defect utilizing an interpolation axial flap and a staged reconstruction.  A telfa template was made of the defect.  This telfa template was then used to outline the Cheek Interpolation flap.  The donor area for the pedicle flap was then injected with anesthesia.  The flap was excised through the skin and subcutaneous tissue down to the layer of the underlying musculature.  The interpolation flap was carefully excised within this deep plane to maintain its blood supply.  The edges of the donor site were undermined.   The donor site was closed in a primary fashion.  The pedicle was then rotated into position and sutured.  Once the tube was sutured into place, adequate blood supply was confirmed with blanching and refill.  The pedicle was then wrapped with xeroform gauze and dressed appropriately with a telfa and gauze bandage to ensure continued blood supply and protect the attached pedicle.
Cheek-To-Nose Interpolation Flap Text: A decision was made to reconstruct the defect utilizing an interpolation axial flap and a staged reconstruction.  A telfa template was made of the defect.  This telfa template was then used to outline the Cheek-To-Nose Interpolation flap.  The donor area for the pedicle flap was then injected with anesthesia.  The flap was excised through the skin and subcutaneous tissue down to the layer of the underlying musculature.  The interpolation flap was carefully excised within this deep plane to maintain its blood supply.  The edges of the donor site were undermined.   The donor site was closed in a primary fashion.  The pedicle was then rotated into position and sutured.  Once the tube was sutured into place, adequate blood supply was confirmed with blanching and refill.  The pedicle was then wrapped with xeroform gauze and dressed appropriately with a telfa and gauze bandage to ensure continued blood supply and protect the attached pedicle.
Interpolation Flap Text: A decision was made to reconstruct the defect utilizing an interpolation axial flap and a staged reconstruction.  A telfa template was made of the defect.  This telfa template was then used to outline the interpolation flap.  The donor area for the pedicle flap was then injected with anesthesia.  The flap was excised through the skin and subcutaneous tissue down to the layer of the underlying musculature.  The interpolation flap was carefully excised within this deep plane to maintain its blood supply.  The edges of the donor site were undermined.   The donor site was closed in a primary fashion.  The pedicle was then rotated into position and sutured.  Once the tube was sutured into place, adequate blood supply was confirmed with blanching and refill.  The pedicle was then wrapped with xeroform gauze and dressed appropriately with a telfa and gauze bandage to ensure continued blood supply and protect the attached pedicle.
Melolabial Interpolation Flap Text: A decision was made to reconstruct the defect utilizing an interpolation axial flap and a staged reconstruction.  A telfa template was made of the defect.  This telfa template was then used to outline the melolabial interpolation flap.  The donor area for the pedicle flap was then injected with anesthesia.  The flap was excised through the skin and subcutaneous tissue down to the layer of the underlying musculature.  The pedicle flap was carefully excised within this deep plane to maintain its blood supply.  The edges of the donor site were undermined.   The donor site was closed in a primary fashion.  The pedicle was then rotated into position and sutured.  Once the tube was sutured into place, adequate blood supply was confirmed with blanching and refill.  The pedicle was then wrapped with xeroform gauze and dressed appropriately with a telfa and gauze bandage to ensure continued blood supply and protect the attached pedicle.
Mastoid Interpolation Flap Text: A decision was made to reconstruct the defect utilizing an interpolation axial flap and a staged reconstruction.  A telfa template was made of the defect.  This telfa template was then used to outline the mastoid interpolation flap.  The donor area for the pedicle flap was then injected with anesthesia.  The flap was excised through the skin and subcutaneous tissue down to the layer of the underlying musculature.  The pedicle flap was carefully excised within this deep plane to maintain its blood supply.  The edges of the donor site were undermined.   The donor site was closed in a primary fashion.  The pedicle was then rotated into position and sutured.  Once the tube was sutured into place, adequate blood supply was confirmed with blanching and refill.  The pedicle was then wrapped with xeroform gauze and dressed appropriately with a telfa and gauze bandage to ensure continued blood supply and protect the attached pedicle.
Posterior Auricular Interpolation Flap Text: A decision was made to reconstruct the defect utilizing an interpolation axial flap and a staged reconstruction.  A telfa template was made of the defect.  This telfa template was then used to outline the posterior auricular interpolation flap.  The donor area for the pedicle flap was then injected with anesthesia.  The flap was excised through the skin and subcutaneous tissue down to the layer of the underlying musculature.  The pedicle flap was carefully excised within this deep plane to maintain its blood supply.  The edges of the donor site were undermined.   The donor site was closed in a primary fashion.  The pedicle was then rotated into position and sutured.  Once the tube was sutured into place, adequate blood supply was confirmed with blanching and refill.  The pedicle was then wrapped with xeroform gauze and dressed appropriately with a telfa and gauze bandage to ensure continued blood supply and protect the attached pedicle.
Paramedian Forehead Flap Text: A decision was made to reconstruct the defect utilizing an interpolation axial flap and a staged reconstruction.  A telfa template was made of the defect.  This telfa template was then used to outline the paramedian forehead pedicle flap.  The donor area for the pedicle flap was then injected with anesthesia.  The flap was excised through the skin and subcutaneous tissue down to the layer of the underlying musculature.  The pedicle flap was carefully excised within this deep plane to maintain its blood supply.  The edges of the donor site were undermined.   The donor site was closed in a primary fashion.  The pedicle was then rotated into position and sutured.  Once the tube was sutured into place, adequate blood supply was confirmed with blanching and refill.  The pedicle was then wrapped with xeroform gauze and dressed appropriately with a telfa and gauze bandage to ensure continued blood supply and protect the attached pedicle.
Cheiloplasty (Less Than 50%) Text: A decision was made to reconstruct the defect with a  cheiloplasty.  The defect was undermined extensively.  Additional obicularis oris muscle was excised with a 15 blade scalpel.  The defect was converted into a full thickness wedge, of less than 50% of the vertical height of the lip, to facilite a better cosmetic result.  Small vessels were then tied off with 5-0 monocyrl. The obicularis oris, superficial fascia, adipose and dermis were then reapproximated.  After the deeper layers were approximated the epidermis was reapproximated with particular care given to realign the vermilion border.
Cheiloplasty (Complex) Text: A decision was made to reconstruct the defect with a  cheiloplasty.  The defect was undermined extensively.  Additional obicularis oris muscle was excised with a 15 blade scalpel.  The defect was converted into a full thickness wedge to facilite a better cosmetic result.  Small vessels were then tied off with 5-0 monocyrl. The obicularis oris, superficial fascia, adipose and dermis were then reapproximated.  After the deeper layers were approximated the epidermis was reapproximated with particular care given to realign the vermilion border.
Ear Wedge Repair Text: A wedge excision was completed by carrying down an excision through the full thickness of the ear and cartilage with an inward facing Burow's triangle. The wound was then closed in a layered fashion.
Full Thickness Lip Wedge Repair (Flap) Text: Given the location of the defect and the proximity to free margins a full thickness wedge repair was deemed most appropriate.  Using a sterile surgical marker, the appropriate repair was drawn incorporating the defect and placing the expected incisions perpendicular to the vermilion border.  The vermilion border was also meticulously outlined to ensure appropriate reapproximation during the repair.  The area thus outlined was incised through and through with a #15 scalpel blade.  The muscularis and dermis were reaproximated with deep sutures following hemostasis. Care was taken to realign the vermilion border before proceeding with the superficial closure.  Once the vermilion was realigned the superfical and mucosal closure was finished.
Ftsg Text: The defect edges were debeveled with a #15 scalpel blade.  Given the location of the defect, shape of the defect and the proximity to free margins a full thickness skin graft was deemed most appropriate.  Using a sterile surgical marker, the primary defect shape was transferred to the donor site. The area thus outlined was incised deep to adipose tissue with a #15 scalpel blade.  The harvested graft was then trimmed of adipose tissue until only dermis and epidermis was left.  The skin margins of the secondary defect were undermined to an appropriate distance in all directions utilizing iris scissors.  The secondary defect was closed with interrupted buried subcutaneous sutures.  The skin edges were then re-apposed with running  sutures.  The skin graft was then placed in the primary defect and oriented appropriately.
Split-Thickness Skin Graft Text: The defect edges were debeveled with a #15 scalpel blade.  Given the location of the defect, shape of the defect and the proximity to free margins a split thickness skin graft was deemed most appropriate.  Using a sterile surgical marker, the primary defect shape was transferred to the donor site. The split thickness graft was then harvested.  The skin graft was then placed in the primary defect and oriented appropriately.
Burow's Graft Text: The defect edges were debeveled with a #15 scalpel blade.  Given the location of the defect, shape of the defect, the proximity to free margins and the presence of a standing cone deformity a Burow's skin graft was deemed most appropriate. The standing cone was removed and this tissue was then trimmed to the shape of the primary defect. The adipose tissue was also removed until only dermis and epidermis were left.  The skin margins of the secondary defect were undermined to an appropriate distance in all directions utilizing iris scissors.  The secondary defect was closed with interrupted buried subcutaneous sutures.  The skin edges were then re-apposed with running  sutures.  The skin graft was then placed in the primary defect and oriented appropriately.
Cartilage Graft Text: The defect edges were debeveled with a #15 scalpel blade.  Given the location of the defect, shape of the defect, the fact the defect involved a full thickness cartilage defect a cartilage graft was deemed most appropriate.  An appropriate donor site was identified, cleansed, and anesthetized. The cartilage graft was then harvested and transferred to the recipient site, oriented appropriately and then sutured into place.  The secondary defect was then repaired using a primary closure.
Composite Graft Text: The defect edges were debeveled with a #15 scalpel blade.  Given the location of the defect, shape of the defect, the proximity to free margins and the fact the defect was full thickness a composite graft was deemed most appropriate.  The defect was outline and then transferred to the donor site.  A full thickness graft was then excised from the donor site. The graft was then placed in the primary defect, oriented appropriately and then sutured into place.  The secondary defect was then repaired using a primary closure.
Epidermal Autograft Text: The defect edges were debeveled with a #15 scalpel blade.  Given the location of the defect, shape of the defect and the proximity to free margins an epidermal autograft was deemed most appropriate.  Using a sterile surgical marker, the primary defect shape was transferred to the donor site. The epidermal graft was then harvested.  The skin graft was then placed in the primary defect and oriented appropriately.
Dermal Autograft Text: The defect edges were debeveled with a #15 scalpel blade.  Given the location of the defect, shape of the defect and the proximity to free margins a dermal autograft was deemed most appropriate.  Using a sterile surgical marker, the primary defect shape was transferred to the donor site. The area thus outlined was incised deep to adipose tissue with a #15 scalpel blade.  The harvested graft was then trimmed of adipose and epidermal tissue until only dermis was left.  The skin graft was then placed in the primary defect and oriented appropriately.
Skin Substitute Text: The defect edges were debeveled with a #15 scalpel blade.  Given the location of the defect, shape of the defect and the proximity to free margins a skin substitute graft was deemed most appropriate.  The graft material was trimmed to fit the size of the defect. The graft was then placed in the primary defect and oriented appropriately.
Tissue Cultured Epidermal Autograft Text: The defect edges were debeveled with a #15 scalpel blade.  Given the location of the defect, shape of the defect and the proximity to free margins a tissue cultured epidermal autograft was deemed most appropriate.  The graft was then trimmed to fit the size of the defect.  The graft was then placed in the primary defect and oriented appropriately.
Xenograft Text: The defect edges were debeveled with a #15 scalpel blade.  Given the location of the defect, shape of the defect and the proximity to free margins a xenograft was deemed most appropriate.  The graft was then trimmed to fit the size of the defect.  The graft was then placed in the primary defect and oriented appropriately.
Purse String (Simple) Text: Given the location of the defect and the characteristics of the surrounding skin a purse string closure was deemed most appropriate.  Undermining was performed circumfirentially around the surgical defect.  A purse string suture was then placed and tightened.
Purse String (Intermediate) Text: Given the location of the defect and the characteristics of the surrounding skin a purse string intermediate closure was deemed most appropriate.  Undermining was performed circumfirentially around the surgical defect.  A purse string suture was then placed and tightened.
Partial Purse String (Simple) Text: Given the location of the defect and the characteristics of the surrounding skin a simple purse string closure was deemed most appropriate.  Undermining was performed circumfirentially around the surgical defect.  A purse string suture was then placed and tightened. Wound tension only allowed a partial closure of the circular defect.
Partial Purse String (Intermediate) Text: Given the location of the defect and the characteristics of the surrounding skin an intermediate purse string closure was deemed most appropriate.  Undermining was performed circumfirentially around the surgical defect.  A purse string suture was then placed and tightened. Wound tension only allowed a partial closure of the circular defect.
Localized Dermabrasion With Wire Brush Text: The patient was draped in routine manner.  Localized dermabrasion using 3 x 17 mm wire brush was performed in routine manner to papillary dermis. This spot dermabrasion is being performed to complete skin cancer reconstruction. It also will eliminate the other sun damaged precancerous cells that are known to be part of the regional effect of a lifetime's worth of sun exposure. This localized dermabrasion is therapeutic and should not be considered cosmetic in any regard.
Tarsorrhaphy Text: A tarsorrhaphy was performed using Frost sutures.
Complex Repair And Flap Additional Text (Will Appearing After The Standard Complex Repair Text): The complex repair was not sufficient to completely close the primary defect. The remaining additional defect was repaired with the flap mentioned below.
Complex Repair And Graft Additional Text (Will Appearing After The Standard Complex Repair Text): The complex repair was not sufficient to completely close the primary defect. The remaining additional defect was repaired with the graft mentioned below.
Unique Flap 1 Name: Myocutaneous Island pedicle Flap
Unique Flap 2 Name: Peng Flap
Unique Flap 3 Name: Mercedes Flap
Unique Flap 4 Name: Banner Flap
Unique Flap 5 Name: tunneled myocutaneous flap
Unique Flap 1 Text: A decision was made to reconstruct the defect utilizing a myocutaneous Island pedicle Flap based on the levator labii superioris muscle.  A telfa template was made of the defect.  This telfa template was then used to outline the myocutaneous flap, based along the meilolabial fold.  The donor area for the pedicle flap was then injected with anesthesia.  The flap was excised through the skin and subcutaneous tissue down to the layer of the underlying musculature.  The myocutaneous flap was carefully excised within this deep plane to maintain its blood supply. Based on the muscle. The edges of the donor site were undermined.   The donor site was closed in a primary fashion to the point of transposition.  The pedicle was then transposed into position and sutured.  Once the flap was sutured into place, adequate blood supply was confirmed with blanching and refill.
Unique Flap 2 Text: A decision was made to reconstruct the defect utilizing a Peng Flap (Bilateral Advancement Rotation Flap). Given the location of the defect and the proximity to free margins, this flap was deemed most appropriate.  Using a sterile surgical marker, the appropriate rotation flaps were drawn incorporating the defect and placing the expected incisions within the relaxed skin tension lines where possible.    The area thus outlined was incised deep to adipose tissue with a #15 scalpel blade.  The skin margins were undermined to an appropriate distance in all directions utilizing iris scissors.
Unique Flap 3 Text: The defect edges were debeveled with a #15 scalpel blade.  Given the location of the defect, shape of the defect and the proximity to free margins a Mercedes (double advancement flap) was deemed most appropriate.  Using a sterile surgical marker, the appropriate transposition flaps were drawn incorporating the defect and placing the expected incisions within the relaxed skin tension lines where possible.    The area thus outlined was incised deep to adipose tissue with a #15 scalpel blade.  The skin margins were undermined to an appropriate distance in all directions utilizing iris scissors.  Hemostasis was achieved with electrocautery.  The flaps were then advanced into the defect and anchored with interrupted buried subcutaneous sutures.
Unique Flap 4 Text: The defect edges were debeveled with a #15 scalpel blade.  Given the location of the defect and the proximity to free margins a Banner transposition flap was deemed most appropriate.  Using a sterile surgical marker, an appropriate Banner transposition flap was drawn incorporating the defect.    The area thus outlined was incised deep to adipose tissue with a #15 scalpel blade.  The skin margins were undermined to an appropriate distance in all directions utilizing iris scissors.
Unique Flap 5 Text: A decision was made to reconstruct the defect utilizing a tunneled myocutaneous Island pedicle Flap based on the anterior auricularis muscle.  A telfa template was made of the defect.  This telfa template was then used to outline the myocutaneous flap, based along the preauricular fold.  The donor area for the pedicle flap was then injected with anesthesia.  The flap was excised through the skin and subcutaneous tissue down to the layer of the underlying musculature.  The myocutaneous flap was carefully excised within this deep plane to maintain its blood supply based on the muscle. The edges of the donor site were undermined.   The donor site was closed in a primary fashion to the point of transposition.  The pedicle was then transposed through a tunnel into position and sutured.  Once the flap was sutured into place, adequate blood supply was confirmed with blanching and refill.
Manual Repair Warning Statement: We plan on removing the manually selected variable below in favor of our much easier automatic structured text blocks found in the previous tab. We decided to do this to help make the flow better and give you the full power of structured data. Manual selection is never going to be ideal in our platform and I would encourage you to avoid using manual selection from this point on, especially since I will be sunsetting this feature. It is important that you do one of two things with the customized text below. First, you can save all of the text in a word file so you can have it for future reference. Second, transfer the text to the appropriate area in the Library tab. Lastly, if there is a flap or graft type which we do not have you need to let us know right away so I can add it in before the variable is hidden. No need to panic, we plan to give you roughly 6 months to make the change.
Same Histology In Subsequent Stages Text: The pattern and morphology of the tumor is as described in the first stage.
No Residual Tumor Seen Histology Text: There were no malignant cells seen in the sections examined.
Inflammation Suggestive Of Cancer Camouflage Histology Text: There was a dense lymphocytic infiltrate which prevented adequate histologic evaluation of adjacent structures.
Bcc Histology Text: There were numerous aggregates of basaloid cells.
Bcc Infiltrative Histology Text: There were numerous aggregates of basaloid cells demonstrating an infiltrative pattern.
Mart-1 - Positive Histology Text: MART-1 staining demonstrates areas of higher density and clustering of melanocytes with Pagetoid spread upwards within the epidermis. The surgical margins are positive for tumor cells.
Mart-1 - Negative Histology Text: MART-1 staining demonstrates a normal density and pattern of melanocytes along the dermal-epidermal junction. The surgical margins are negative for tumor cells.
Information: Selecting Yes will display possible errors in your note based on the variables you have selected. This validation is only offered as a suggestion for you. PLEASE NOTE THAT THE VALIDATION TEXT WILL BE REMOVED WHEN YOU FINALIZE YOUR NOTE. IF YOU WANT TO FAX A PRELIMINARY NOTE YOU WILL NEED TO TOGGLE THIS TO 'NO' IF YOU DO NOT WANT IT IN YOUR FAXED NOTE.

## 2020-12-04 RX ORDER — HYDROCODONE BITARTRATE AND ACETAMINOPHEN 5; 325 MG/1; MG/1
1 TABLET ORAL
Qty: 20 | Refills: 0 | COMMUNITY
Start: 2020-12-03

## 2020-12-09 ENCOUNTER — TELEPHONE (OUTPATIENT)
Dept: DERMATOLOGY | Facility: IMAGING CENTER | Age: 85
End: 2020-12-09

## 2020-12-09 NOTE — TELEPHONE ENCOUNTER
LVM for patient to call back at [facility]. Patient is due for a post moh's HAJA in March 2021. Office phone number was provided for patient to schedule.

## 2020-12-10 ENCOUNTER — APPOINTMENT (RX ONLY)
Dept: URBAN - METROPOLITAN AREA CLINIC 36 | Facility: CLINIC | Age: 85
Setting detail: DERMATOLOGY
End: 2020-12-10

## 2020-12-10 DIAGNOSIS — Z48.02 ENCOUNTER FOR REMOVAL OF SUTURES: ICD-10-CM

## 2020-12-10 PROCEDURE — 99024 POSTOP FOLLOW-UP VISIT: CPT

## 2020-12-10 PROCEDURE — ? SUTURE REMOVAL (GLOBAL PERIOD)

## 2020-12-10 ASSESSMENT — LOCATION ZONE DERM: LOCATION ZONE: NOSE

## 2020-12-10 ASSESSMENT — LOCATION DETAILED DESCRIPTION DERM: LOCATION DETAILED: LEFT NASAL ALA

## 2020-12-10 ASSESSMENT — LOCATION SIMPLE DESCRIPTION DERM: LOCATION SIMPLE: LEFT NOSE

## 2020-12-10 NOTE — PROCEDURE: SUTURE REMOVAL (GLOBAL PERIOD)
Detail Level: Detailed
Add 38884 Cpt? (Important Note: In 2017 The Use Of 70470 Is Being Tracked By Cms To Determine Future Global Period Reimbursement For Global Periods): yes

## 2020-12-17 ENCOUNTER — APPOINTMENT (RX ONLY)
Dept: URBAN - METROPOLITAN AREA CLINIC 36 | Facility: CLINIC | Age: 85
Setting detail: DERMATOLOGY
End: 2020-12-17

## 2020-12-17 DIAGNOSIS — Z48.817 ENCOUNTER FOR SURGICAL AFTERCARE FOLLOWING SURGERY ON THE SKIN AND SUBCUTANEOUS TISSUE: ICD-10-CM

## 2020-12-17 PROCEDURE — ? POST-OP WOUND CHECK

## 2020-12-17 PROCEDURE — 99024 POSTOP FOLLOW-UP VISIT: CPT

## 2020-12-17 ASSESSMENT — LOCATION ZONE DERM: LOCATION ZONE: NOSE

## 2020-12-17 ASSESSMENT — LOCATION SIMPLE DESCRIPTION DERM: LOCATION SIMPLE: LEFT NOSE

## 2020-12-17 ASSESSMENT — LOCATION DETAILED DESCRIPTION DERM: LOCATION DETAILED: LEFT NASAL ALA

## 2020-12-17 NOTE — PROCEDURE: POST-OP WOUND CHECK
Detail Level: Generalized
Add 25470 Cpt? (Important Note: In 2017 The Use Of 84937 Is Being Tracked By Cms To Determine Future Global Period Reimbursement For Global Periods): yes

## 2020-12-21 ENCOUNTER — NON-PROVIDER VISIT (OUTPATIENT)
Dept: CARDIOLOGY | Facility: MEDICAL CENTER | Age: 85
End: 2020-12-21
Payer: MEDICARE

## 2020-12-21 ENCOUNTER — OFFICE VISIT (OUTPATIENT)
Dept: CARDIOLOGY | Facility: MEDICAL CENTER | Age: 85
End: 2020-12-21
Payer: MEDICARE

## 2020-12-21 VITALS
RESPIRATION RATE: 16 BRPM | DIASTOLIC BLOOD PRESSURE: 76 MMHG | WEIGHT: 170 LBS | BODY MASS INDEX: 30.12 KG/M2 | OXYGEN SATURATION: 93 % | HEART RATE: 73 BPM | SYSTOLIC BLOOD PRESSURE: 120 MMHG | HEIGHT: 63 IN

## 2020-12-21 DIAGNOSIS — R60.0 LOCALIZED EDEMA: ICD-10-CM

## 2020-12-21 DIAGNOSIS — I44.2 ATRIOVENTRICULAR BLOCK, COMPLETE (HCC): ICD-10-CM

## 2020-12-21 DIAGNOSIS — I10 ESSENTIAL HYPERTENSION, BENIGN: Chronic | ICD-10-CM

## 2020-12-21 DIAGNOSIS — N31.9 NEUROGENIC BLADDER: ICD-10-CM

## 2020-12-21 DIAGNOSIS — I48.0 PAF (PAROXYSMAL ATRIAL FIBRILLATION) (HCC): ICD-10-CM

## 2020-12-21 DIAGNOSIS — Z95.0 CARDIAC PACEMAKER IN SITU: Chronic | ICD-10-CM

## 2020-12-21 DIAGNOSIS — E78.5 DYSLIPIDEMIA: Chronic | ICD-10-CM

## 2020-12-21 DIAGNOSIS — Z79.01 ANTICOAGULATED: ICD-10-CM

## 2020-12-21 PROCEDURE — 93280 PM DEVICE PROGR EVAL DUAL: CPT | Performed by: INTERNAL MEDICINE

## 2020-12-21 PROCEDURE — 99214 OFFICE O/P EST MOD 30 MIN: CPT | Mod: 25 | Performed by: INTERNAL MEDICINE

## 2020-12-21 RX ORDER — HYDROCODONE BITARTRATE AND ACETAMINOPHEN 5; 325 MG/1; MG/1
TABLET ORAL
COMMUNITY
Start: 2020-12-03 | End: 2021-02-24

## 2020-12-21 ASSESSMENT — ENCOUNTER SYMPTOMS
SHORTNESS OF BREATH: 0
COUGH: 0
FALLS: 0
BACK PAIN: 1
BRUISES/BLEEDS EASILY: 0
DIZZINESS: 0
RESPIRATORY NEGATIVE: 1
DEPRESSION: 1
CARDIOVASCULAR NEGATIVE: 1
PALPITATIONS: 0
ABDOMINAL PAIN: 1
BLOOD IN STOOL: 0

## 2020-12-21 ASSESSMENT — FIBROSIS 4 INDEX: FIB4 SCORE: 1.75

## 2020-12-22 ENCOUNTER — OFFICE VISIT (OUTPATIENT)
Dept: MEDICAL GROUP | Facility: MEDICAL CENTER | Age: 85
End: 2020-12-22
Payer: MEDICARE

## 2020-12-22 VITALS
OXYGEN SATURATION: 94 % | SYSTOLIC BLOOD PRESSURE: 120 MMHG | DIASTOLIC BLOOD PRESSURE: 68 MMHG | HEIGHT: 63 IN | HEART RATE: 73 BPM | WEIGHT: 171 LBS | TEMPERATURE: 96.9 F | BODY MASS INDEX: 30.3 KG/M2

## 2020-12-22 DIAGNOSIS — Z85.828 HISTORY OF BASAL CELL CARCINOMA: ICD-10-CM

## 2020-12-22 DIAGNOSIS — Z78.9 IMPAIRED MOBILITY AND ADLS: ICD-10-CM

## 2020-12-22 DIAGNOSIS — R30.0 DYSURIA: ICD-10-CM

## 2020-12-22 DIAGNOSIS — Z74.09 IMPAIRED MOBILITY AND ADLS: ICD-10-CM

## 2020-12-22 DIAGNOSIS — R29.898 WEAKNESS OF BOTH LOWER EXTREMITIES: ICD-10-CM

## 2020-12-22 DIAGNOSIS — E11.9 TYPE 2 DIABETES MELLITUS WITHOUT COMPLICATION, WITHOUT LONG-TERM CURRENT USE OF INSULIN (HCC): ICD-10-CM

## 2020-12-22 DIAGNOSIS — R10.11 RIGHT UPPER QUADRANT ABDOMINAL PAIN: ICD-10-CM

## 2020-12-22 DIAGNOSIS — E78.5 DYSLIPIDEMIA: Chronic | ICD-10-CM

## 2020-12-22 PROCEDURE — 99214 OFFICE O/P EST MOD 30 MIN: CPT | Performed by: NURSE PRACTITIONER

## 2020-12-22 ASSESSMENT — FIBROSIS 4 INDEX: FIB4 SCORE: 1.75

## 2020-12-22 NOTE — PROGRESS NOTES
Chief Complaint   Patient presents with   • Hypertension   • Dyslipidemia   • Atrial Fibrillation       Subjective:   Krystle Tavarez is a 87 y.o. female who presents today for follow-up of permanent pacemaker, PAF, anticoagulation and hypertension.  Her pacemaker was checked today and is functioning normally.  She had 2 hours of atrial fib since last interrogation and was asymptomatic for this.  Today, she is accompanied by her daughter as usual.  The patient is in a wheelchair because of weakness and it is difficult for her to walk from the parking garage on the way to the office.  She complains of fatigue and lethargy and not feeling well.  No specific dysuria no chest pain.    Past Medical History:   Diagnosis Date   • Arthritis     knees, hips   • Breath shortness     with exertion    • Cataract     bilat IOL    • Dental disorder     full upper, partial lower    • Diabetes (HCC)     pre diabetic   • Heart burn    • Hemorrhagic disorder (HCC)     on Eliquis   • High cholesterol     diet controlled    • Hyperlipidemia    • Hypertension    • Pacemaker 2015   • Pain 2020    lower back, right leg   • Pre-diabetes    • TIA (transient ischemic attack)     on Eliquis   • Urinary incontinence      Past Surgical History:   Procedure Laterality Date   • PB LAMINOTOMY,LUMBAR DISK,1 INTRSP N/A 2/25/2020    Procedure: LAMINECTOMY, SPINE, LUMBAR, WITH DISCECTOMY- L5-S1, MEDIAL FACETECTOMY;  Surgeon: Latrell Kimble M.D.;  Location: SURGERY [facility];  Service: Neurosurgery   • FORAMINOTOMY N/A 2/25/2020    Procedure: FORAMINOTOMY, SPINE;  Surgeon: Latrell Kimble M.D.;  Location: SURGERY [facility];  Service: Neurosurgery   • PACEMAKER INSERTION  2015   • HIP REPLACEMENT, TOTAL Right 2009   • KNEE REPLACEMENT, TOTAL Right 2004   • CATARACT EXTRACTION WITH IOL Bilateral 2003   • CHOLECYSTECTOMY  2002   • BASAL CELL EXCISION      nose and hand   • BUNIONECTOMY Left      Family History   Problem Relation Age of Onset   •  Diabetes Mother    • Stroke Mother    • Heart Attack Father 74   • No Known Problems Maternal Grandmother    • No Known Problems Maternal Grandfather    • No Known Problems Paternal Grandmother    • No Known Problems Paternal Grandfather      Social History     Socioeconomic History   • Marital status:      Spouse name: Not on file   • Number of children: Not on file   • Years of education: Not on file   • Highest education level: Not on file   Occupational History   • Not on file   Social Needs   • Financial resource strain: Not on file   • Food insecurity     Worry: Not on file     Inability: Not on file   • Transportation needs     Medical: Not on file     Non-medical: Not on file   Tobacco Use   • Smoking status: Never Smoker   • Smokeless tobacco: Never Used   Substance and Sexual Activity   • Alcohol use: No   • Drug use: No   • Sexual activity: Not on file   Lifestyle   • Physical activity     Days per week: Not on file     Minutes per session: Not on file   • Stress: Not on file   Relationships   • Social connections     Talks on phone: Not on file     Gets together: Not on file     Attends Holiness service: Not on file     Active member of club or organization: Not on file     Attends meetings of clubs or organizations: Not on file     Relationship status: Not on file   • Intimate partner violence     Fear of current or ex partner: Not on file     Emotionally abused: Not on file     Physically abused: Not on file     Forced sexual activity: Not on file   Other Topics Concern   • Not on file   Social History Narrative   • Not on file     Allergies   Allergen Reactions   • Sulfa Drugs Nausea     Nausea      Outpatient Encounter Medications as of 12/21/2020   Medication Sig Dispense Refill   • losartan (COZAAR) 100 MG Tab Take 1 Tab by mouth every day. 100 Tab 3   • hydroCHLOROthiazide (HYDRODIURIL) 25 MG Tab Take 1 Tab by mouth every day. 30 Tab 4   • apixaban (ELIQUIS) 2.5mg Tab Take 1 Tab by mouth 2  "Times a Day. 180 Tab 3   • carvedilol (COREG) 3.125 MG Tab Take 1 Tab by mouth 2 times a day, with meals. 180 Tab 3   • Ascorbic Acid (VITAMIN C) 500 MG Cap Take 1 Capsule by mouth 2 Times a Day.     • Acetaminophen 500 MG Cap Take 500 mg by mouth 4 times a day as needed (pain). Patient only taking 1-2 tabs a day     • sennosides (SENOKOT) 8.6 MG Tab Take 1 Tab by mouth every day. 30 Tab 3   • HYDROcodone-acetaminophen (NORCO) 5-325 MG Tab per tablet TK 1 T PO Q 4-6 H PRN P     • Alum Hydroxide-Mag Carbonate (GAVISCON PO) Take  by mouth as needed.       No facility-administered encounter medications on file as of 12/21/2020.      Review of Systems   Constitutional: Positive for malaise/fatigue.   HENT: Positive for hearing loss.    Respiratory: Negative.  Negative for cough and shortness of breath.    Cardiovascular: Negative.  Negative for chest pain and palpitations.   Gastrointestinal: Positive for abdominal pain. Negative for blood in stool.   Genitourinary: Positive for frequency.   Musculoskeletal: Positive for back pain. Negative for falls.   Skin: Negative.    Neurological: Negative for dizziness.        Difficulty with balance   Endo/Heme/Allergies: Negative.  Does not bruise/bleed easily.   Psychiatric/Behavioral: Positive for depression.        Objective:   /76 (BP Location: Left arm, Patient Position: Sitting, BP Cuff Size: Adult)   Pulse 73   Resp 16   Ht 1.6 m (5' 3\")   Wt 77.1 kg (170 lb)   LMP  (LMP Unknown)   SpO2 93%   BMI 30.11 kg/m²     Physical Exam   Constitutional: She is oriented to person, place, and time. No distress.   The patient is in a wheelchair today because of the distance from the parking facility to the office.     HENT:   Head: Normocephalic and atraumatic.   Eyes: Pupils are equal, round, and reactive to light. Conjunctivae are normal. No scleral icterus.   Neck: No JVD present.   Cardiovascular: Normal rate and regular rhythm.   No murmur heard.  Pulmonary/Chest: " Breath sounds normal. No respiratory distress. She has no wheezes.   Abdominal: Soft. She exhibits no distension. There is no abdominal tenderness.   Mild right lower quadrant pain to palpation without rebound   Musculoskeletal:         General: No edema.   Neurological: She is alert and oriented to person, place, and time.   Romberg abnormal.  Heel-to-shin unremarkable.  No tremor   Skin: Skin is warm.   Psychiatric: She has a normal mood and affect.       Assessment:     1. Anticoagulated  URINALYSIS,CULTURE IF INDICATED   2. Cardiac pacemaker in situ     3. Dyslipidemia  URINALYSIS,CULTURE IF INDICATED   4. Essential hypertension, benign     5. Localized edema     6. PAF (paroxysmal atrial fibrillation) ([facility])     7. Neurogenic bladder  URINALYSIS,CULTURE IF INDICATED       Medical Decision Making:  Today's Assessment / Status / Plan:   Status post permanent pacemaker: Normal function with good battery life and good thresholds.    PAF: Again noted on today's pacemaker interrogation.  Episodes are brief and asymptomatic    Anticoagulation: No bleeding problems    Fatigue: Patient does not feel well and feels fatigued.  Reviewing her labs her B12 and folate were recently checked as was thyroid.  She does have a neurogenic bladder may have a low level UTI.  Lab sent for urinalysis and culture if indicated was given to patient today.  She is scheduled see her primary care tomorrow.  We discussed depression as a possible cause the patient confirms that she has some issues with this.

## 2020-12-22 NOTE — PROGRESS NOTES
Chief Complaint   Patient presents with   • GI Problem     after eating always feel bloated, nauseaous, lightheaded x 1 mth       Subjective:     HPI:     Krystle Tavarez is a 87 y.o. female here to discuss the evaluation and management of:    Here with her daughter today.    Having abdominal pain for some time. States has noticed after breakfast she will feel bloated and nauseated.  States she will have to go lay down for about 30 to 60 minutes.  Denies any particular foods that have been aggravating this.  States that sitting and laying down does not cause any discomfort.  Reports that her mid to right upper quadrant is sore.  Once in a while she will have diarrhea.  No blood or mucus in her stool.  History of cholecystectomy.    Patient continues to report having lower extremity weakness, difficulty with her ADLs.  Using a walker.  Unable to tolerate prolonged periods of standing.  Right foot numbness.  She did have a surgery in February.  Continues to be frustrated about the fact that her ability to tolerate any activity continues to become progressively worse.  This is very bothersome for her.  Unable to tolerate any sort of exercise.  Daughter is trying to help motivate the patient as well as try to encourage activity however patient gets frustrated by this because she is in pain.      S/p basal cell on her left nostril. Will be following up Dermatology January.   Dr. Polanco.     Complains of having mild dysuria.  Concerned about a UTI.  No fevers that, flank pain.    ROS:  Denies any Headache, Blurred Vision, Confusion, Chest pain,  Shortness of breath,  Abdominal pain, Changes of bowel or bladder, Lower ext edema, Fevers, Nights sweats, Weight Changes, Focal weakness or numbness.  And all other systems reviewed and are all negative. POSITIVE FOR abdominal discomfort,        Current Outpatient Medications:   •  HYDROcodone-acetaminophen (NORCO) 5-325 MG Tab per tablet, TK 1 T PO Q 4-6 H PRN P, Disp: , Rfl:    •  losartan (COZAAR) 100 MG Tab, Take 1 Tab by mouth every day., Disp: 100 Tab, Rfl: 3  •  hydroCHLOROthiazide (HYDRODIURIL) 25 MG Tab, Take 1 Tab by mouth every day., Disp: 30 Tab, Rfl: 4  •  Alum Hydroxide-Mag Carbonate (GAVISCON PO), Take  by mouth as needed., Disp: , Rfl:   •  apixaban (ELIQUIS) 2.5mg Tab, Take 1 Tab by mouth 2 Times a Day., Disp: 180 Tab, Rfl: 3  •  carvedilol (COREG) 3.125 MG Tab, Take 1 Tab by mouth 2 times a day, with meals., Disp: 180 Tab, Rfl: 3  •  Ascorbic Acid (VITAMIN C) 500 MG Cap, Take 1 Capsule by mouth 2 Times a Day., Disp: , Rfl:   •  Acetaminophen 500 MG Cap, Take 500 mg by mouth 4 times a day as needed (pain). Patient only taking 1-2 tabs a day, Disp: , Rfl:   •  sennosides (SENOKOT) 8.6 MG Tab, Take 1 Tab by mouth every day., Disp: 30 Tab, Rfl: 3  •  metFORMIN ER (GLUCOPHAGE XR) 500 MG TABLET SR 24 HR, Take 1 Tab by mouth every day., Disp: 90 Tab, Rfl: 1    Allergies   Allergen Reactions   • Sulfa Drugs Nausea     Nausea        Past Medical History:   Diagnosis Date   • Arthritis     knees, hips   • Breath shortness     with exertion    • Cataract     bilat IOL    • Dental disorder     full upper, partial lower    • Diabetes (HCC)     pre diabetic   • Heart burn    • Hemorrhagic disorder (HCC)     on Eliquis   • High cholesterol     diet controlled    • Hyperlipidemia    • Hypertension    • Pacemaker 2015   • Pain 2020    lower back, right leg   • Pre-diabetes    • TIA (transient ischemic attack)     on Eliquis   • Urinary incontinence      Past Surgical History:   Procedure Laterality Date   • PB LAMINOTOMY,LUMBAR DISK,1 INTRSP N/A 2/25/2020    Procedure: LAMINECTOMY, SPINE, LUMBAR, WITH DISCECTOMY- L5-S1, MEDIAL FACETECTOMY;  Surgeon: Latrell Kimble M.D.;  Location: SURGERY [facility];  Service: Neurosurgery   • FORAMINOTOMY N/A 2/25/2020    Procedure: FORAMINOTOMY, SPINE;  Surgeon: Latrell Kimble M.D.;  Location: SURGERY [facility];  Service: Neurosurgery   • PACEMAKER  INSERTION  2015   • HIP REPLACEMENT, TOTAL Right 2009   • KNEE REPLACEMENT, TOTAL Right 2004   • CATARACT EXTRACTION WITH IOL Bilateral 2003   • CHOLECYSTECTOMY  2002   • BASAL CELL EXCISION      nose and hand   • BUNIONECTOMY Left      Family History   Problem Relation Age of Onset   • Diabetes Mother    • Stroke Mother    • Heart Attack Father 74   • No Known Problems Maternal Grandmother    • No Known Problems Maternal Grandfather    • No Known Problems Paternal Grandmother    • No Known Problems Paternal Grandfather      Social History     Socioeconomic History   • Marital status:      Spouse name: Not on file   • Number of children: Not on file   • Years of education: Not on file   • Highest education level: Not on file   Occupational History   • Not on file   Social Needs   • Financial resource strain: Not on file   • Food insecurity     Worry: Not on file     Inability: Not on file   • Transportation needs     Medical: Not on file     Non-medical: Not on file   Tobacco Use   • Smoking status: Never Smoker   • Smokeless tobacco: Never Used   Substance and Sexual Activity   • Alcohol use: No   • Drug use: No   • Sexual activity: Not on file   Lifestyle   • Physical activity     Days per week: Not on file     Minutes per session: Not on file   • Stress: Not on file   Relationships   • Social connections     Talks on phone: Not on file     Gets together: Not on file     Attends Oriental orthodox service: Not on file     Active member of club or organization: Not on file     Attends meetings of clubs or organizations: Not on file     Relationship status: Not on file   • Intimate partner violence     Fear of current or ex partner: Not on file     Emotionally abused: Not on file     Physically abused: Not on file     Forced sexual activity: Not on file   Other Topics Concern   • Not on file   Social History Narrative   • Not on file       Objective:     Vitals: /68 (BP Location: Right arm, Patient Position:  "Sitting, BP Cuff Size: Adult)   Pulse 73   Temp 36.1 °C (96.9 °F) (Temporal)   Ht 1.6 m (5' 3\")   Wt 77.6 kg (171 lb)   LMP  (LMP Unknown)   SpO2 94%   BMI 30.29 kg/m²    General: Alert, pleasant, NAD  HEENT: Normocephalic.  Neck supple.  No thyromegaly or masses palpated. No cervical or supraclavicular lymphadenopathy.  Heart: Regular rate and rhythm.  S1 and S2 normal.  No murmurs appreciated.  Respiratory: Normal respiratory effort.  Clear to auscultation bilaterally. No wheezing  Skin: Warm, dry, no rashes.  Extremities: No leg edema. No discoloration  Neurological: No tremors  Psych:  Affect/mood is normal, judgement is good, memory is intact, grooming is appropriate.    Assessment/Plan:     Krystle was seen today for gi problem.    Diagnoses and all orders for this visit:    Right upper quadrant abdominal pain  New problem.  History of cholecystectomy.  Have discussed possible etiologies.  No acute distress at this time.  Emergent precautions discussed.  Tenderness with mid right abdominal palpation.  Ultrasound ordered.  Labs ordered.  -     US-ABDOMEN COMPLETE SURVEY; Future  -     LIPASE; Future  -     Comp Metabolic Panel; Future    Impaired mobility and ADLs  Has been a chronic problem for the patient.  Does not want any further surgeries at this time.  Referral to neurology for further recommendations.  -     REFERRAL TO NEUROLOGY    Weakness of both lower extremities  Chronic problem for the patient.  Status post lumbar laminectomy with discectomy in February 2020.  Was not progressing with PT therefore this was stopped.  Recommend neurology follow-up for further recommendations.  -     REFERRAL TO NEUROLOGY    Type 2 diabetes mellitus without complication, without long-term current use of insulin ([facility])  Check A1c.  Has been slowly increasing.  Follow-up as patient may need medical intervention at this time.  More sedentary.  -     HEMOGLOBIN A1C; Future    Dyslipidemia  Due for lipid panel.  -     " Lipid Profile; Future    Dysuria  Have ordered urinalysis with culture.  Patient is unable to complete in the clinic-she will take the order and follow-up at the lab.  -     URINALYSIS,CULTURE IF INDICATED; Future    History of basal cell carcinoma  Status post excision on left nostril.  Will have follow-up with her dermatologist for this.            No follow-ups on file.          Joana MCQUEEN.

## 2020-12-24 ENCOUNTER — HOSPITAL ENCOUNTER (OUTPATIENT)
Dept: LAB | Facility: MEDICAL CENTER | Age: 85
End: 2020-12-24
Attending: NURSE PRACTITIONER
Payer: MEDICARE

## 2020-12-24 DIAGNOSIS — R10.11 RIGHT UPPER QUADRANT ABDOMINAL PAIN: ICD-10-CM

## 2020-12-24 DIAGNOSIS — E11.9 TYPE 2 DIABETES MELLITUS WITHOUT COMPLICATION, WITHOUT LONG-TERM CURRENT USE OF INSULIN (HCC): ICD-10-CM

## 2020-12-24 DIAGNOSIS — R30.0 DYSURIA: ICD-10-CM

## 2020-12-24 DIAGNOSIS — E78.5 DYSLIPIDEMIA: Chronic | ICD-10-CM

## 2020-12-24 LAB
ALBUMIN SERPL BCP-MCNC: 4 G/DL (ref 3.2–4.9)
ALBUMIN/GLOB SERPL: 1.3 G/DL
ALP SERPL-CCNC: 88 U/L (ref 30–99)
ALT SERPL-CCNC: 13 U/L (ref 2–50)
ANION GAP SERPL CALC-SCNC: 11 MMOL/L (ref 7–16)
APPEARANCE UR: ABNORMAL
AST SERPL-CCNC: 17 U/L (ref 12–45)
BACTERIA #/AREA URNS HPF: ABNORMAL /HPF
BILIRUB SERPL-MCNC: 0.9 MG/DL (ref 0.1–1.5)
BILIRUB UR QL STRIP.AUTO: NEGATIVE
BUN SERPL-MCNC: 21 MG/DL (ref 8–22)
CALCIUM SERPL-MCNC: 9.8 MG/DL (ref 8.5–10.5)
CHLORIDE SERPL-SCNC: 100 MMOL/L (ref 96–112)
CHOLEST SERPL-MCNC: 195 MG/DL (ref 100–199)
CO2 SERPL-SCNC: 24 MMOL/L (ref 20–33)
COLOR UR: YELLOW
CREAT SERPL-MCNC: 0.78 MG/DL (ref 0.5–1.4)
EPI CELLS #/AREA URNS HPF: ABNORMAL /HPF
EST. AVERAGE GLUCOSE BLD GHB EST-MCNC: 160 MG/DL
FASTING STATUS PATIENT QL REPORTED: NORMAL
GLOBULIN SER CALC-MCNC: 3 G/DL (ref 1.9–3.5)
GLUCOSE SERPL-MCNC: 146 MG/DL (ref 65–99)
GLUCOSE UR STRIP.AUTO-MCNC: NEGATIVE MG/DL
HBA1C MFR BLD: 7.2 % (ref 0–5.6)
HDLC SERPL-MCNC: 45 MG/DL
KETONES UR STRIP.AUTO-MCNC: NEGATIVE MG/DL
LDLC SERPL CALC-MCNC: 115 MG/DL
LEUKOCYTE ESTERASE UR QL STRIP.AUTO: ABNORMAL
LIPASE SERPL-CCNC: 43 U/L (ref 11–82)
MICRO URNS: ABNORMAL
NITRITE UR QL STRIP.AUTO: NEGATIVE
PH UR STRIP.AUTO: 6.5 [PH] (ref 5–8)
POTASSIUM SERPL-SCNC: 4.9 MMOL/L (ref 3.6–5.5)
PROT SERPL-MCNC: 7 G/DL (ref 6–8.2)
PROT UR QL STRIP: 30 MG/DL
RBC # URNS HPF: ABNORMAL /HPF
RBC UR QL AUTO: NEGATIVE
SODIUM SERPL-SCNC: 135 MMOL/L (ref 135–145)
SP GR UR STRIP.AUTO: 1.02
TRIGL SERPL-MCNC: 177 MG/DL (ref 0–149)
UROBILINOGEN UR STRIP.AUTO-MCNC: 0.2 MG/DL
WBC #/AREA URNS HPF: ABNORMAL /HPF

## 2020-12-24 PROCEDURE — 80061 LIPID PANEL: CPT

## 2020-12-24 PROCEDURE — 80053 COMPREHEN METABOLIC PANEL: CPT

## 2020-12-24 PROCEDURE — 83036 HEMOGLOBIN GLYCOSYLATED A1C: CPT | Mod: GA

## 2020-12-24 PROCEDURE — 87086 URINE CULTURE/COLONY COUNT: CPT

## 2020-12-24 PROCEDURE — 83690 ASSAY OF LIPASE: CPT

## 2020-12-24 PROCEDURE — 81001 URINALYSIS AUTO W/SCOPE: CPT

## 2020-12-24 PROCEDURE — 36415 COLL VENOUS BLD VENIPUNCTURE: CPT | Mod: GA

## 2020-12-27 LAB
BACTERIA UR CULT: NORMAL
SIGNIFICANT IND 70042: NORMAL
SITE SITE: NORMAL
SOURCE SOURCE: NORMAL

## 2020-12-28 ENCOUNTER — TELEPHONE (OUTPATIENT)
Dept: MEDICAL GROUP | Facility: MEDICAL CENTER | Age: 85
End: 2020-12-28

## 2020-12-28 NOTE — TELEPHONE ENCOUNTER
1. Caller Name: Krystle Tavarez                        Call Back Number: [phone] (home)       How would the patient prefer to be contacted with a response: Phone call do NOT leave a detailed message    Pt called and stated that she has been having trouble connecting through [employer]. I advised her of her lab resluts and have agreed to start the oral medication for her elevated A1C. She informed us that she will be getting her US-Abdomen soon and has also set up an appt to see Neuro on 2/21/2021. Please advise, pt is informed that PCP will be sending the medication to her pharmacy.

## 2020-12-29 RX ORDER — METFORMIN HYDROCHLORIDE 500 MG/1
500 TABLET, EXTENDED RELEASE ORAL DAILY
Qty: 90 TAB | Refills: 1 | Status: SHIPPED | OUTPATIENT
Start: 2020-12-29 | End: 2021-04-07 | Stop reason: SDUPTHER

## 2021-01-05 ENCOUNTER — HOSPITAL ENCOUNTER (OUTPATIENT)
Dept: RADIOLOGY | Facility: MEDICAL CENTER | Age: 86
End: 2021-01-05
Attending: NURSE PRACTITIONER
Payer: MEDICARE

## 2021-01-05 DIAGNOSIS — R10.11 RIGHT UPPER QUADRANT ABDOMINAL PAIN: ICD-10-CM

## 2021-01-05 PROCEDURE — 76700 US EXAM ABDOM COMPLETE: CPT

## 2021-01-11 DIAGNOSIS — Z23 NEED FOR VACCINATION: ICD-10-CM

## 2021-01-13 NOTE — TELEPHONE ENCOUNTER
Requested records for patient.    [facility]  Dr. Taylor  Fax: [phone]  Phone: [phone]   Area L Indication Text: Tumors in this location are included in Area L (trunk and extremities).  Mohs surgery is indicated for larger tumors, or tumors with aggressive histologic features, in these anatomic locations.

## 2021-01-19 ENCOUNTER — APPOINTMENT (RX ONLY)
Dept: URBAN - METROPOLITAN AREA CLINIC 36 | Facility: CLINIC | Age: 86
Setting detail: DERMATOLOGY
End: 2021-01-19

## 2021-01-19 DIAGNOSIS — Z48.817 ENCOUNTER FOR SURGICAL AFTERCARE FOLLOWING SURGERY ON THE SKIN AND SUBCUTANEOUS TISSUE: ICD-10-CM

## 2021-01-19 PROCEDURE — ? DRESSING CHANGE (GLOBAL PERIOD)

## 2021-01-19 PROCEDURE — 99024 POSTOP FOLLOW-UP VISIT: CPT

## 2021-01-19 ASSESSMENT — LOCATION ZONE DERM: LOCATION ZONE: NOSE

## 2021-01-19 ASSESSMENT — LOCATION DETAILED DESCRIPTION DERM: LOCATION DETAILED: LEFT NASAL ALA

## 2021-01-19 ASSESSMENT — LOCATION SIMPLE DESCRIPTION DERM: LOCATION SIMPLE: LEFT NOSE

## 2021-01-19 NOTE — PROCEDURE: DRESSING CHANGE (GLOBAL PERIOD)
Detail Level: Detailed
Add 62818 Cpt? (Important Note: In 2017 The Use Of 03810 Is Being Tracked By Cms To Determine Future Global Period Reimbursement For Global Periods): yes

## 2021-02-09 ENCOUNTER — APPOINTMENT (RX ONLY)
Dept: URBAN - METROPOLITAN AREA CLINIC 36 | Facility: CLINIC | Age: 86
Setting detail: DERMATOLOGY
End: 2021-02-09

## 2021-02-09 DIAGNOSIS — L91.0 HYPERTROPHIC SCAR: ICD-10-CM

## 2021-02-09 PROCEDURE — 13151 CMPLX RPR E/N/E/L 1.1-2.5 CM: CPT | Mod: 79

## 2021-02-09 PROCEDURE — ? REPAIR NOTE

## 2021-02-09 ASSESSMENT — LOCATION DETAILED DESCRIPTION DERM: LOCATION DETAILED: LEFT NASAL ALA

## 2021-02-09 ASSESSMENT — LOCATION ZONE DERM: LOCATION ZONE: NOSE

## 2021-02-09 ASSESSMENT — LOCATION SIMPLE DESCRIPTION DERM: LOCATION SIMPLE: LEFT NOSE

## 2021-02-09 NOTE — PROCEDURE: REPAIR NOTE
Anesthesia Volume In Cc: 6
Did You Provide Opioid Counseling: No
Repair Type: Complex Repair
Suturegard Retention Suture: 2-0 Nylon
Retention Suture Bite Size: 3 mm
Length To Time In Minutes Device Was In Place: 10
Number Of Hemigard Strips Per Side: 1
Simple / Intermediate / Complex Repair - Final Wound Length In Cm: 1.4
Complex/Intermediate Repair Variations: Crescentic
Undermining Type: Entire Wound
Complex Requirements: Debridement Of Wound Edges?: Yes
Debridement Text: The wound edges were debrided prior to proceeding with the closure to facilitate wound healing.
Complex Requirements: Involvement Of Free Margin?: Nostril Rim
Helical Rim Text: The closure involved the helical rim.
Vermilion Border Text: The closure involved the vermilion border.
Nostril Rim Text: The closure involved the nostril rim.
Retention Suture Text: Retention sutures were placed to support the closure and prevent dehiscence.
Primary Defect Length (In Cm): 1.5
Primary Defect Width (In Cm): 0.1
Secondary Defect Length (In Cm): 0
Skin Substitute: EpiFix Micronized
Suture Removal: 7 days
Type Of Previous Surgery (Optional- Ie Mohs Surgery): mohs
Detail Level: Detailed
Anesthesia Type: 0.5% lidocaine with 1:200,000 epinephrine and a 1:10 solution of 8.4% sodium bicarbonate and 408mcg clindamycin/ml
Hemostasis: Electrocautery
Estimated Blood Loss (Cc): minimal
Undermining Location (Optional): in the deep fat
Brow Lift Text: A midfrontal incision was made medially to the defect to allow access to the tissues just superior to the left eyebrow. Following careful dissection inferiorly in a supraperiosteal plane to the level of the left eyebrow, several 3-0 monocryl sutures were used to resuspend the eyebrow orbicularis oculi muscular unit to the superior frontal bone periosteum. This resulted in an appropriate reapproximation of static eyebrow symmetry and correction of the left brow ptosis.
Epidermal Sutures: 5-0 Surgipro
Epidermal Closure: epicutaneous horizontal mattress
Wound Care: No ointment
Dressing: dry sterile dressing
Unna Boot Text: An Unna boot was placed to help immobilize the limb and facilitate more rapid healing.
Suturegard Intro: Intraoperative tissue expansion was performed, utilizing the SUTUREGARD device, in order to reduce wound tension.
Suturegard Body: The suture ends were repeatedly re-tightened and re-clamped to achieve the desired tissue expansion.
Hemigard Intro: Due to skin fragility and wound tension, it was decided to use HEMIGARD adhesive retention suture devices to permit a linear closure. The skin was cleaned and dried for a 6cm distance away from the wound. Excessive hair, if present, was removed to allow for adhesion.
Hemigard Postcare Instructions: The HEMIGARD strips are to remain completely dry for at least 5-7 days.
Epidermal Closure Graft Donor Site (Optional): simple interrupted
Graft Donor Site Bandage (Optional-Leave Blank If You Don't Want In Note): Aquaphor, Telfa, and a pressure bandage were applied to the donor site.
Closure 2 Information: This tab is for additional flaps and grafts, including complex repair and grafts and complex repair and flaps. You can also specify a different location for the additional defect, if the location is the same you do not need to select a new one. We will insert the automated text for the repair you select below just as we do for solitary flaps and grafts. Please note that at this time if you select a location with a different insurance zone you will need to override the ICD10 and CPT if appropriate.
Closure 3 Information: This tab is for additional flaps and grafts above and beyond our usual structured repairs.  Please note if you enter information here it will not currently bill and you will need to add the billing information manually.
Complex Repair Preamble Text (Leave Blank If You Do Not Want): Extensive wide undermining was performed.
Intermediate Repair Preamble Text (Leave Blank If You Do Not Want): Undermining was performed with blunt dissection.
Non-Graft Cartilage Fenestration Text: The cartilage was fenestrated with a 2mm punch biopsy to help facilitate healing.
Graft Cartilage Fenestration Text: The cartilage was fenestrated with a 2mm punch biopsy to help facilitate graft survival and healing.
Preparation Of Recipient Site - Flap: The eschar was removed surgically with sharp dissection to facilitate appropriate wound healing of the following adjacent tissue rearrangement.
Preparation Of Recipient Site - Graft: The eschar was removed surgically with sharp dissection to facilitate appropriate survival of the following graft.
Preparation Of Recipient Site - Flap Takedown: The eschar and granulation tissue was removed surgically with sharp dissection to facilitate appropriate healing after division and inset of the proximal and distal interpolation flap.
Secondary Intention Text (Leave Blank If You Do Not Want): The defect will heal with secondary intention.
No Repair - Repaired With Adjacent Surgical Defect Text (Leave Blank If You Do Not Want): After obtaining clear surgical margins the defect was repaired concurrently with another surgical defect which was in close approximation.
Referred To Oculoplastics For Closure Text (Leave Blank If You Do Not Want): After obtaining clear surgical margins the patient was sent to oculoplastics for surgical repair.  The patient understands they will receive post-surgical care and follow-up from the referring physician's office.
Referred To Otolaryngology For Closure Text (Leave Blank If You Do Not Want): After obtaining clear surgical margins the patient was sent to otolaryngology for surgical repair.  The patient understands they will receive post-surgical care and follow-up from the referring physician's office.
Referred To Plastics For Closure Text (Leave Blank If You Do Not Want): After obtaining clear surgical margins the patient was sent to plastics for surgical repair.  The patient understands they will receive post-surgical care and follow-up from the referring physician's office.
Referred To Asc For Closure Text (Leave Blank If You Do Not Want): After obtaining clear surgical margins the patient was sent to an ASC for surgical repair.  The patient understands they will receive post-surgical care and follow-up from the ASC physician.
Referred To Mid-Level For Closure Text (Leave Blank If You Do Not Want): After obtaining clear surgical margins the patient was sent to a mid-level provider for surgical repair.  The patient understands they will receive post-surgical care and follow-up from the mid-level provider.
Repair Performed By Another Provider Text (Leave Blank If You Do Not Want): After obtaining clear surgical margins the defect was repaired by another provider.
Advancement Flap (Single) Text: The defect edges were debeveled with a #15 scalpel blade.  Given the location of the defect and the proximity to free margins a single advancement flap was deemed most appropriate.  Using a sterile surgical marker, an appropriate advancement flap was drawn incorporating the defect and placing the expected incisions within the relaxed skin tension lines where possible.    The area thus outlined was incised deep to adipose tissue with a #15 scalpel blade.  The skin margins were undermined to an appropriate distance in all directions utilizing iris scissors.
Advancement Flap (Double) Text: The defect edges were debeveled with a #15 scalpel blade.  Given the location of the defect and the proximity to free margins a double advancement flap was deemed most appropriate.  Using a sterile surgical marker, the appropriate advancement flaps were drawn incorporating the defect and placing the expected incisions within the relaxed skin tension lines where possible.    The area thus outlined was incised deep to adipose tissue with a #15 scalpel blade.  The skin margins were undermined to an appropriate distance in all directions utilizing iris scissors.
Burow's Advancement Flap Text: The defect edges were debeveled with a #15 scalpel blade.  Given the location of the defect and the proximity to free margins a Burow's advancement flap was deemed most appropriate.  Using a sterile surgical marker, the appropriate advancement flap was drawn incorporating the defect and placing the expected incisions within the relaxed skin tension lines where possible.    The area thus outlined was incised deep to adipose tissue with a #15 scalpel blade.  The skin margins were undermined to an appropriate distance in all directions utilizing iris scissors.
Chonodrocutaneous Helical Advancement Flap Text: The defect edges were debeveled with a #15 scalpel blade.  Given the location of the defect and the proximity to free margins a chondrocutaneous helical advancement flap was deemed most appropriate.  Using a sterile surgical marker, the appropriate advancement flap was drawn incorporating the defect and placing the expected incisions within the relaxed skin tension lines where possible.    The area thus outlined was incised deep to adipose tissue with a #15 scalpel blade.  The skin margins were undermined to an appropriate distance in all directions utilizing iris scissors.
Crescentic Advancement Flap Text: The defect edges were debeveled with a #15 scalpel blade.  Given the location of the defect and the proximity to free margins a crescentic advancement flap was deemed most appropriate.  Using a sterile surgical marker, the appropriate advancement flap was drawn incorporating the defect and placing the expected incisions within the relaxed skin tension lines where possible.    The area thus outlined was incised deep to adipose tissue with a #15 scalpel blade.  The skin margins were undermined to an appropriate distance in all directions utilizing iris scissors.
A-T Advancement Flap Text: The defect edges were debeveled with a #15 scalpel blade.  Given the location of the defect, shape of the defect and the proximity to free margins an A-T advancement flap was deemed most appropriate.  Using a sterile surgical marker, an appropriate advancement flap was drawn incorporating the defect and placing the expected incisions within the relaxed skin tension lines where possible.    The area thus outlined was incised deep to adipose tissue with a #15 scalpel blade.  The skin margins were undermined to an appropriate distance in all directions utilizing iris scissors.
O-T Advancement Flap Text: The defect edges were debeveled with a #15 scalpel blade.  Given the location of the defect, shape of the defect and the proximity to free margins an O-T advancement flap was deemed most appropriate.  Using a sterile surgical marker, an appropriate advancement flap was drawn incorporating the defect and placing the expected incisions within the relaxed skin tension lines where possible.    The area thus outlined was incised deep to adipose tissue with a #15 scalpel blade.  The skin margins were undermined to an appropriate distance in all directions utilizing iris scissors.
O-L Flap Text: The defect edges were debeveled with a #15 scalpel blade.  Given the location of the defect, shape of the defect and the proximity to free margins an O-L flap was deemed most appropriate.  Using a sterile surgical marker, an appropriate advancement flap was drawn incorporating the defect and placing the expected incisions within the relaxed skin tension lines where possible.    The area thus outlined was incised deep to adipose tissue with a #15 scalpel blade.  The skin margins were undermined to an appropriate distance in all directions utilizing iris scissors.
O-Z Flap Text: The defect edges were debeveled with a #15 scalpel blade.  Given the location of the defect, shape of the defect and the proximity to free margins an O-Z flap was deemed most appropriate.  Using a sterile surgical marker, an appropriate transposition flap was drawn incorporating the defect and placing the expected incisions within the relaxed skin tension lines where possible. The area thus outlined was incised deep to adipose tissue with a #15 scalpel blade.  The skin margins were undermined to an appropriate distance in all directions utilizing iris scissors.
Double O-Z Flap Text: The defect edges were debeveled with a #15 scalpel blade.  Given the location of the defect, shape of the defect and the proximity to free margins a Double O-Z flap was deemed most appropriate.  Using a sterile surgical marker, an appropriate transposition flap was drawn incorporating the defect and placing the expected incisions within the relaxed skin tension lines where possible. The area thus outlined was incised deep to adipose tissue with a #15 scalpel blade.  The skin margins were undermined to an appropriate distance in all directions utilizing iris scissors.
V-Y Flap Text: The defect edges were debeveled with a #15 scalpel blade.  Given the location of the defect, shape of the defect and the proximity to free margins a V-Y flap was deemed most appropriate.  Using a sterile surgical marker, an appropriate advancement flap was drawn incorporating the defect and placing the expected incisions within the relaxed skin tension lines where possible.    The area thus outlined was incised deep to adipose tissue with a #15 scalpel blade.  The skin margins were undermined to an appropriate distance in all directions utilizing iris scissors.
Advancement-Rotation Flap Text: The defect edges were debeveled with a #15 scalpel blade.  Given the location of the defect, shape of the defect and the proximity to free margins an advancement-rotation flap was deemed most appropriate.  Using a sterile surgical marker, an appropriate flap was drawn incorporating the defect and placing the expected incisions within the relaxed skin tension lines where possible. The area thus outlined was incised deep to adipose tissue with a #15 scalpel blade.  The skin margins were undermined to an appropriate distance in all directions utilizing iris scissors.
Mercedes Flap Text: The defect edges were debeveled with a #15 scalpel blade.  Given the location of the defect, shape of the defect and the proximity to free margins a Mercedes flap was deemed most appropriate.  Using a sterile surgical marker, an appropriate advancement flap was drawn incorporating the defect and placing the expected incisions within the relaxed skin tension lines where possible. The area thus outlined was incised deep to adipose tissue with a #15 scalpel blade.  The skin margins were undermined to an appropriate distance in all directions utilizing iris scissors.
Modified Advancement Flap Text: The defect edges were debeveled with a #15 scalpel blade.  Given the location of the defect, shape of the defect and the proximity to free margins a modified advancement flap was deemed most appropriate.  Using a sterile surgical marker, an appropriate advancement flap was drawn incorporating the defect and placing the expected incisions within the relaxed skin tension lines where possible.    The area thus outlined was incised deep to adipose tissue with a #15 scalpel blade.  The skin margins were undermined to an appropriate distance in all directions utilizing iris scissors.
Mucosal Advancement Flap Text: Given the location of the defect, shape of the defect and the proximity to free margins a mucosal advancement flap was deemed most appropriate. Incisions were made with a 15 blade scalpel in the appropriate fashion along the cutaneous vermilion border and the mucosal lip. The remaining actinically damaged mucosal tissue was excised.  The mucosal advancement flap was then elevated to the gingival sulcus with care taken to preserve the neurovascular structures and advanced into the primary defect. Care was taken to ensure that precise realignment of the vermilion border was achieved.
Peng Advancement Flap Text: The defect edges were debeveled with a #15 scalpel blade.  Given the location of the defect, shape of the defect and the proximity to free margins a Peng advancement flap was deemed most appropriate.  Using a sterile surgical marker, an appropriate advancement flap was drawn incorporating the defect and placing the expected incisions within the relaxed skin tension lines where possible. The area thus outlined was incised deep to adipose tissue with a #15 scalpel blade.  The skin margins were undermined to an appropriate distance in all directions utilizing iris scissors.
Hatchet Flap Text: The defect edges were debeveled with a #15 scalpel blade.  Given the location of the defect, shape of the defect and the proximity to free margins a hatchet flap was deemed most appropriate.  Using a sterile surgical marker, an appropriate hatchet flap was drawn incorporating the defect and placing the expected incisions within the relaxed skin tension lines where possible.    The area thus outlined was incised deep to adipose tissue with a #15 scalpel blade.  The skin margins were undermined to an appropriate distance in all directions utilizing iris scissors.
Rotation Flap Text: The defect edges were debeveled with a #15 scalpel blade.  Given the location of the defect, shape of the defect and the proximity to free margins a rotation flap was deemed most appropriate.  Using a sterile surgical marker, an appropriate rotation flap was drawn incorporating the defect and placing the expected incisions within the relaxed skin tension lines where possible.    The area thus outlined was incised deep to adipose tissue with a #15 scalpel blade.  The skin margins were undermined to an appropriate distance in all directions utilizing iris scissors.
Spiral Flap Text: The defect edges were debeveled with a #15 scalpel blade.  Given the location of the defect, shape of the defect and the proximity to free margins a spiral flap was deemed most appropriate.  Using a sterile surgical marker, an appropriate rotation flap was drawn incorporating the defect and placing the expected incisions within the relaxed skin tension lines where possible. The area thus outlined was incised deep to adipose tissue with a #15 scalpel blade.  The skin margins were undermined to an appropriate distance in all directions utilizing iris scissors.
Star Wedge Flap Text: The defect edges were debeveled with a #15 scalpel blade.  Given the location of the defect, shape of the defect and the proximity to free margins a star wedge flap was deemed most appropriate.  Using a sterile surgical marker, an appropriate rotation flap was drawn incorporating the defect and placing the expected incisions within the relaxed skin tension lines where possible. The area thus outlined was incised deep to adipose tissue with a #15 scalpel blade.  The skin margins were undermined to an appropriate distance in all directions utilizing iris scissors.
Transposition Flap Text: The defect edges were debeveled with a #15 scalpel blade.  Given the location of the defect and the proximity to free margins a transposition flap was deemed most appropriate.  Using a sterile surgical marker, an appropriate transposition flap was drawn incorporating the defect.    The area thus outlined was incised deep to adipose tissue with a #15 scalpel blade.  The skin margins were undermined to an appropriate distance in all directions utilizing iris scissors.
Muscle Hinge Flap Text: The defect edges were debeveled with a #15 scalpel blade.  Given the size, depth and location of the defect and the proximity to free margins a muscle hinge flap was deemed most appropriate.  Using a sterile surgical marker, an appropriate hinge flap was drawn incorporating the defect. The area thus outlined was incised with a #15 scalpel blade.  The skin margins were undermined to an appropriate distance in all directions utilizing iris scissors.
Nasal Turnover Hinge Flap Text: The defect edges were debeveled with a #15 scalpel blade.  Given the size, depth, location of the defect and the defect being full thickness a nasal turnover hinge flap was deemed most appropriate.  Using a sterile surgical marker, an appropriate hinge flap was drawn incorporating the defect. The area thus outlined was incised with a #15 scalpel blade. The flap was designed to recreate the nasal mucosal lining and the alar rim. The skin margins were undermined to an appropriate distance in all directions utilizing iris scissors.
Nasalis-Muscle-Based Myocutaneous Island Pedicle Flap Text: Using a #15 blade, an incision was made around the donor flap to the level of the nasalis muscle. Wide lateral undermining was then performed in both the subcutaneous plane above the nasalis muscle, and in a submuscular plane just above periosteum. This allowed the formation of a free nasalis muscle axial pedicle (based on the angular artery) which was still attached to the actual cutaneous flap, increasing its mobility and vascular viability. Hemostasis was obtained with pinpoint electrocoagulation. The flap was mobilized into position and the pivotal anchor points positioned and stabilized with buried interrupted sutures. Subcutaneous and dermal tissues were closed in a multilayered fashion with sutures. Tissue redundancies were excised, and the epidermal edges were apposed without significant tension and sutured with sutures.
Orbicularis Oris Muscle Flap Text: The defect edges were debeveled with a #15 scalpel blade.  Given that the defect affected the competency of the oral sphincter an obicularis oris muscle flap was deemed most appropriate to restore this competency and normal muscle function.  Using a sterile surgical marker, an appropriate flap was drawn incorporating the defect. The area thus outlined was incised with a #15 scalpel blade.
Melolabial Transposition Flap Text: The defect edges were debeveled with a #15 scalpel blade.  Given the location of the defect and the proximity to free margins a melolabial flap was deemed most appropriate.  Using a sterile surgical marker, an appropriate melolabial transposition flap was drawn incorporating the defect.    The area thus outlined was incised deep to adipose tissue with a #15 scalpel blade.  The skin margins were undermined to an appropriate distance in all directions utilizing iris scissors.
Rhombic Flap Text: The defect edges were debeveled with a #15 scalpel blade.  Given the location of the defect and the proximity to free margins a rhombic flap was deemed most appropriate.  Using a sterile surgical marker, an appropriate rhombic flap was drawn incorporating the defect.    The area thus outlined was incised deep to adipose tissue with a #15 scalpel blade.  The skin margins were undermined to an appropriate distance in all directions utilizing iris scissors.
Rhomboid Transposition Flap Text: The defect edges were debeveled with a #15 scalpel blade.  Given the location of the defect and the proximity to free margins a rhomboid transposition flap was deemed most appropriate.  Using a sterile surgical marker, an appropriate rhomboid flap was drawn incorporating the defect.    The area thus outlined was incised deep to adipose tissue with a #15 scalpel blade.  The skin margins were undermined to an appropriate distance in all directions utilizing iris scissors.
Bi-Rhombic Flap Text: The defect edges were debeveled with a #15 scalpel blade.  Given the location of the defect and the proximity to free margins a bi-rhombic flap was deemed most appropriate.  Using a sterile surgical marker, an appropriate rhombic flap was drawn incorporating the defect. The area thus outlined was incised deep to adipose tissue with a #15 scalpel blade.  The skin margins were undermined to an appropriate distance in all directions utilizing iris scissors.
Helical Rim Advancement Flap Text: The defect edges were debeveled with a #15 blade scalpel.  Given the location of the defect and the proximity to free margins (helical rim) a double helical rim advancement flap was deemed most appropriate.  Using a sterile surgical marker, the appropriate advancement flaps were drawn incorporating the defect and placing the expected incisions between the helical rim and antihelix where possible.  The area thus outlined was incised through and through with a #15 scalpel blade.  With a skin hook and iris scissors, the flaps were gently and sharply undermined and freed up.
Bilateral Helical Rim Advancement Flap Text: The defect edges were debeveled with a #15 blade scalpel.  Given the location of the defect and the proximity to free margins (helical rim) a bilateral helical rim advancement flap was deemed most appropriate.  Using a sterile surgical marker, the appropriate advancement flaps were drawn incorporating the defect and placing the expected incisions between the helical rim and antihelix where possible.  The area thus outlined was incised through and through with a #15 scalpel blade.  With a skin hook and iris scissors, the flaps were gently and sharply undermined and freed up.
Ear Star Wedge Flap Text: The defect edges were debeveled with a #15 blade scalpel.  Given the location of the defect and the proximity to free margins (helical rim) an ear star wedge flap was deemed most appropriate.  Using a sterile surgical marker, the appropriate flap was drawn incorporating the defect and placing the expected incisions between the helical rim and antihelix where possible.  The area thus outlined was incised through and through with a #15 scalpel blade.
Banner Transposition Flap Text: The defect edges were debeveled with a #15 scalpel blade.  Given the location of the defect and the proximity to free margins a Banner transposition flap was deemed most appropriate.  Using a sterile surgical marker, an appropriate flap drawn around the defect. The area thus outlined was incised deep to adipose tissue with a #15 scalpel blade.  The skin margins were undermined to an appropriate distance in all directions utilizing iris scissors.
Bilobed Flap Text: The defect edges were debeveled with a #15 scalpel blade.  Given the location of the defect and the proximity to free margins a bilobe flap was deemed most appropriate.  Using a sterile surgical marker, an appropriate bilobe flap drawn around the defect.    The area thus outlined was incised deep to adipose tissue with a #15 scalpel blade.  The skin margins were undermined to an appropriate distance in all directions utilizing iris scissors.
Bilobed Transposition Flap Text: The defect edges were debeveled with a #15 scalpel blade.  Given the location of the defect and the proximity to free margins a bilobed transposition flap was deemed most appropriate.  Using a sterile surgical marker, an appropriate bilobe flap drawn around the defect.    The area thus outlined was incised deep to adipose tissue with a #15 scalpel blade.  The skin margins were undermined to an appropriate distance in all directions utilizing iris scissors.
Trilobed Flap Text: The defect edges were debeveled with a #15 scalpel blade.  Given the location of the defect and the proximity to free margins a trilobed flap was deemed most appropriate.  Using a sterile surgical marker, an appropriate trilobed flap drawn around the defect.    The area thus outlined was incised deep to adipose tissue with a #15 scalpel blade.  The skin margins were undermined to an appropriate distance in all directions utilizing iris scissors.
Dorsal Nasal Flap Text: The defect edges were debeveled with a #15 scalpel blade.  Given the location of the defect and the proximity to free margins a dorsal nasal flap was deemed most appropriate.  Using a sterile surgical marker, an appropriate dorsal nasal flap was drawn around the defect.    The area thus outlined was incised deep to adipose tissue with a #15 scalpel blade.  The skin margins were undermined to an appropriate distance in all directions utilizing iris scissors.
Island Pedicle Flap Text: The defect edges were debeveled with a #15 scalpel blade.  Given the location of the defect, shape of the defect and the proximity to free margins an island pedicle advancement flap was deemed most appropriate.  Using a sterile surgical marker, an appropriate advancement flap was drawn incorporating the defect, outlining the appropriate donor tissue and placing the expected incisions within the relaxed skin tension lines where possible.    The area thus outlined was incised deep to adipose tissue with a #15 scalpel blade.  The skin margins were undermined to an appropriate distance in all directions around the primary defect and laterally outward around the island pedicle utilizing iris scissors.  There was minimal undermining beneath the pedicle flap.
Island Pedicle Flap With Canthal Suspension Text: The defect edges were debeveled with a #15 scalpel blade.  Given the location of the defect, shape of the defect and the proximity to free margins an island pedicle advancement flap was deemed most appropriate.  Using a sterile surgical marker, an appropriate advancement flap was drawn incorporating the defect, outlining the appropriate donor tissue and placing the expected incisions within the relaxed skin tension lines where possible. The area thus outlined was incised deep to adipose tissue with a #15 scalpel blade.  The skin margins were undermined to an appropriate distance in all directions around the primary defect and laterally outward around the island pedicle utilizing iris scissors.  There was minimal undermining beneath the pedicle flap. A suspension suture was placed in the canthal tendon to prevent tension and prevent ectropion.
Alar Island Pedicle Flap Text: The defect edges were debeveled with a #15 scalpel blade.  Given the location of the defect, shape of the defect and the proximity to the alar rim an island pedicle advancement flap was deemed most appropriate.  Using a sterile surgical marker, an appropriate advancement flap was drawn incorporating the defect, outlining the appropriate donor tissue and placing the expected incisions within the nasal ala running parallel to the alar rim. The area thus outlined was incised with a #15 scalpel blade.  The skin margins were undermined minimally to an appropriate distance in all directions around the primary defect and laterally outward around the island pedicle utilizing iris scissors.  There was minimal undermining beneath the pedicle flap.
Double Island Pedicle Flap Text: The defect edges were debeveled with a #15 scalpel blade.  Given the location of the defect, shape of the defect and the proximity to free margins a double island pedicle advancement flap was deemed most appropriate.  Using a sterile surgical marker, an appropriate advancement flap was drawn incorporating the defect, outlining the appropriate donor tissue and placing the expected incisions within the relaxed skin tension lines where possible.    The area thus outlined was incised deep to adipose tissue with a #15 scalpel blade.  The skin margins were undermined to an appropriate distance in all directions around the primary defect and laterally outward around the island pedicle utilizing iris scissors.  There was minimal undermining beneath the pedicle flap.
Island Pedicle Flap-Requiring Vessel Identification Text: The defect edges were debeveled with a #15 scalpel blade.  Given the location of the defect, shape of the defect and the proximity to free margins an island pedicle advancement flap was deemed most appropriate.  Using a sterile surgical marker, an appropriate advancement flap was drawn, based on the axial vessel mentioned above, incorporating the defect, outlining the appropriate donor tissue and placing the expected incisions within the relaxed skin tension lines where possible.    The area thus outlined was incised deep to adipose tissue with a #15 scalpel blade.  The skin margins were undermined to an appropriate distance in all directions around the primary defect and laterally outward around the island pedicle utilizing iris scissors.  There was minimal undermining beneath the pedicle flap.
Keystone Flap Text: The defect edges were debeveled with a #15 scalpel blade.  Given the location of the defect, shape of the defect a keystone flap was deemed most appropriate.  Using a sterile surgical marker, an appropriate keystone flap was drawn incorporating the defect, outlining the appropriate donor tissue and placing the expected incisions within the relaxed skin tension lines where possible. The area thus outlined was incised deep to adipose tissue with a #15 scalpel blade.  The skin margins were undermined to an appropriate distance in all directions around the primary defect and laterally outward around the flap utilizing iris scissors.
O-T Plasty Text: The defect edges were debeveled with a #15 scalpel blade.  Given the location of the defect, shape of the defect and the proximity to free margins an O-T plasty was deemed most appropriate.  Using a sterile surgical marker, an appropriate O-T plasty was drawn incorporating the defect and placing the expected incisions within the relaxed skin tension lines where possible.    The area thus outlined was incised deep to adipose tissue with a #15 scalpel blade.  The skin margins were undermined to an appropriate distance in all directions utilizing iris scissors.
O-Z Plasty Text: The defect edges were debeveled with a #15 scalpel blade.  Given the location of the defect, shape of the defect and the proximity to free margins an O-Z plasty (double transposition flap) was deemed most appropriate.  Using a sterile surgical marker, the appropriate transposition flaps were drawn incorporating the defect and placing the expected incisions within the relaxed skin tension lines where possible.    The area thus outlined was incised deep to adipose tissue with a #15 scalpel blade.  The skin margins were undermined to an appropriate distance in all directions utilizing iris scissors.  Hemostasis was achieved with electrocautery.  The flaps were then transposed into place, one clockwise and the other counterclockwise, and anchored with interrupted buried subcutaneous sutures.
Double O-Z Plasty Text: The defect edges were debeveled with a #15 scalpel blade.  Given the location of the defect, shape of the defect and the proximity to free margins a Double O-Z plasty (double transposition flap) was deemed most appropriate.  Using a sterile surgical marker, the appropriate transposition flaps were drawn incorporating the defect and placing the expected incisions within the relaxed skin tension lines where possible. The area thus outlined was incised deep to adipose tissue with a #15 scalpel blade.  The skin margins were undermined to an appropriate distance in all directions utilizing iris scissors.  Hemostasis was achieved with electrocautery.  The flaps were then transposed into place, one clockwise and the other counterclockwise, and anchored with interrupted buried subcutaneous sutures.
V-Y Plasty Text: The defect edges were debeveled with a #15 scalpel blade.  Given the location of the defect, shape of the defect and the proximity to free margins an V-Y advancement flap was deemed most appropriate.  Using a sterile surgical marker, an appropriate advancement flap was drawn incorporating the defect and placing the expected incisions within the relaxed skin tension lines where possible.    The area thus outlined was incised deep to adipose tissue with a #15 scalpel blade.  The skin margins were undermined to an appropriate distance in all directions utilizing iris scissors.
H Plasty Text: Given the location of the defect, shape of the defect and the proximity to free margins a H-plasty was deemed most appropriate for repair.  Using a sterile surgical marker, the appropriate advancement arms of the H-plasty were drawn incorporating the defect and placing the expected incisions within the relaxed skin tension lines where possible. The area thus outlined was incised deep to adipose tissue with a #15 scalpel blade. The skin margins were undermined to an appropriate distance in all directions utilizing iris scissors.  The opposing advancement arms were then advanced into place in opposite direction and anchored with interrupted buried subcutaneous sutures.
W Plasty Text: The lesion was extirpated to the level of the fat with a #15 scalpel blade.  Given the location of the defect, shape of the defect and the proximity to free margins a W-plasty was deemed most appropriate for repair.  Using a sterile surgical marker, the appropriate transposition arms of the W-plasty were drawn incorporating the defect and placing the expected incisions within the relaxed skin tension lines where possible.    The area thus outlined was incised deep to adipose tissue with a #15 scalpel blade.  The skin margins were undermined to an appropriate distance in all directions utilizing iris scissors.  The opposing transposition arms were then transposed into place in opposite direction and anchored with interrupted buried subcutaneous sutures.
Z Plasty Text: The lesion was extirpated to the level of the fat with a #15 scalpel blade.  Given the location of the defect, shape of the defect and the proximity to free margins a Z-plasty was deemed most appropriate for repair.  Using a sterile surgical marker, the appropriate transposition arms of the Z-plasty were drawn incorporating the defect and placing the expected incisions within the relaxed skin tension lines where possible.    The area thus outlined was incised deep to adipose tissue with a #15 scalpel blade.  The skin margins were undermined to an appropriate distance in all directions utilizing iris scissors.  The opposing transposition arms were then transposed into place in opposite direction and anchored with interrupted buried subcutaneous sutures.
Zygomaticofacial Flap Text: Given the location of the defect, shape of the defect and the proximity to free margins a zygomaticofacial flap was deemed most appropriate for repair.  Using a sterile surgical marker, the appropriate flap was drawn incorporating the defect and placing the expected incisions within the relaxed skin tension lines where possible. The area thus outlined was incised deep to adipose tissue with a #15 scalpel blade with preservation of a vascular pedicle.  The skin margins were undermined to an appropriate distance in all directions utilizing iris scissors.  The flap was then placed into the defect and anchored with interrupted buried subcutaneous sutures.
Cheek Interpolation Flap Text: A decision was made to reconstruct the defect utilizing an interpolation axial flap and a staged reconstruction.  A telfa template was made of the defect.  This telfa template was then used to outline the Cheek Interpolation flap.  The donor area for the pedicle flap was then injected with anesthesia.  The flap was excised through the skin and subcutaneous tissue down to the layer of the underlying musculature.  The interpolation flap was carefully excised within this deep plane to maintain its blood supply.  The edges of the donor site were undermined.   The donor site was closed in a primary fashion.  The pedicle was then rotated into position and sutured.  Once the tube was sutured into place, adequate blood supply was confirmed with blanching and refill.  The pedicle was then wrapped with xeroform gauze and dressed appropriately with a telfa and gauze bandage to ensure continued blood supply and protect the attached pedicle.
Cheek-To-Nose Interpolation Flap Text: A decision was made to reconstruct the defect utilizing an interpolation axial flap and a staged reconstruction.  A telfa template was made of the defect.  This telfa template was then used to outline the Cheek-To-Nose Interpolation flap.  The donor area for the pedicle flap was then injected with anesthesia.  The flap was excised through the skin and subcutaneous tissue down to the layer of the underlying musculature.  The interpolation flap was carefully excised within this deep plane to maintain its blood supply.  The edges of the donor site were undermined.   The donor site was closed in a primary fashion.  The pedicle was then rotated into position and sutured.  Once the tube was sutured into place, adequate blood supply was confirmed with blanching and refill.  The pedicle was then wrapped with xeroform gauze and dressed appropriately with a telfa and gauze bandage to ensure continued blood supply and protect the attached pedicle.
Interpolation Flap Text: A decision was made to reconstruct the defect utilizing an interpolation axial flap and a staged reconstruction.  A telfa template was made of the defect.  This telfa template was then used to outline the interpolation flap.  The donor area for the pedicle flap was then injected with anesthesia.  The flap was excised through the skin and subcutaneous tissue down to the layer of the underlying musculature.  The interpolation flap was carefully excised within this deep plane to maintain its blood supply.  The edges of the donor site were undermined.   The donor site was closed in a primary fashion.  The pedicle was then rotated into position and sutured.  Once the tube was sutured into place, adequate blood supply was confirmed with blanching and refill.  The pedicle was then wrapped with xeroform gauze and dressed appropriately with a telfa and gauze bandage to ensure continued blood supply and protect the attached pedicle.
Melolabial Interpolation Flap Text: A decision was made to reconstruct the defect utilizing an interpolation axial flap and a staged reconstruction.  A telfa template was made of the defect.  This telfa template was then used to outline the melolabial interpolation flap.  The donor area for the pedicle flap was then injected with anesthesia.  The flap was excised through the skin and subcutaneous tissue down to the layer of the underlying musculature.  The pedicle flap was carefully excised within this deep plane to maintain its blood supply.  The edges of the donor site were undermined.   The donor site was closed in a primary fashion.  The pedicle was then rotated into position and sutured.  Once the tube was sutured into place, adequate blood supply was confirmed with blanching and refill.  The pedicle was then wrapped with xeroform gauze and dressed appropriately with a telfa and gauze bandage to ensure continued blood supply and protect the attached pedicle.
Mastoid Interpolation Flap Text: A decision was made to reconstruct the defect utilizing an interpolation axial flap and a staged reconstruction.  A telfa template was made of the defect.  This telfa template was then used to outline the mastoid interpolation flap.  The donor area for the pedicle flap was then injected with anesthesia.  The flap was excised through the skin and subcutaneous tissue down to the layer of the underlying musculature.  The pedicle flap was carefully excised within this deep plane to maintain its blood supply.  The edges of the donor site were undermined.   The donor site was closed in a primary fashion.  The pedicle was then rotated into position and sutured.  Once the tube was sutured into place, adequate blood supply was confirmed with blanching and refill.  The pedicle was then wrapped with xeroform gauze and dressed appropriately with a telfa and gauze bandage to ensure continued blood supply and protect the attached pedicle.
Posterior Auricular Interpolation Flap Text: A decision was made to reconstruct the defect utilizing an interpolation axial flap and a staged reconstruction.  A telfa template was made of the defect.  This telfa template was then used to outline the posterior auricular interpolation flap.  The donor area for the pedicle flap was then injected with anesthesia.  The flap was excised through the skin and subcutaneous tissue down to the layer of the underlying musculature.  The pedicle flap was carefully excised within this deep plane to maintain its blood supply.  The edges of the donor site were undermined.   The donor site was closed in a primary fashion.  The pedicle was then rotated into position and sutured.  Once the tube was sutured into place, adequate blood supply was confirmed with blanching and refill.  The pedicle was then wrapped with xeroform gauze and dressed appropriately with a telfa and gauze bandage to ensure continued blood supply and protect the attached pedicle.
Paramedian Forehead Flap Text: A decision was made to reconstruct the defect utilizing an interpolation axial flap and a staged reconstruction.  A telfa template was made of the defect.  This telfa template was then used to outline the paramedian forehead pedicle flap.  The donor area for the pedicle flap was then injected with anesthesia.  The flap was excised through the skin and subcutaneous tissue down to the layer of the underlying musculature.  The pedicle flap was carefully excised within this deep plane to maintain its blood supply.  The edges of the donor site were undermined.   The donor site was closed in a primary fashion.  The pedicle was then rotated into position and sutured.  Once the tube was sutured into place, adequate blood supply was confirmed with blanching and refill.  The pedicle was then wrapped with xeroform gauze and dressed appropriately with a telfa and gauze bandage to ensure continued blood supply and protect the attached pedicle.
Cheiloplasty (Less Than 50%) Text: A decision was made to reconstruct the defect with a  cheiloplasty.  The defect was undermined extensively.  Additional obicularis oris muscle was excised with a 15 blade scalpel.  The defect was converted into a full thickness wedge, of less than 50% of the vertical height of the lip, to facilite a better cosmetic result.  Small vessels were then tied off with 5-0 monocyrl. The obicularis oris, superficial fascia, adipose and dermis were then reapproximated.  After the deeper layers were approximated the epidermis was reapproximated with particular care given to realign the vermilion border.
Cheiloplasty (Complex) Text: A decision was made to reconstruct the defect with a  cheiloplasty.  The defect was undermined extensively.  Additional obicularis oris muscle was excised with a 15 blade scalpel.  The defect was converted into a full thickness wedge to facilite a better cosmetic result.  Small vessels were then tied off with 5-0 monocyrl. The obicularis oris, superficial fascia, adipose and dermis were then reapproximated.  After the deeper layers were approximated the epidermis was reapproximated with particular care given to realign the vermilion border.
Ear Wedge Repair Text: A wedge excision was completed by carrying down an excision through the full thickness of the ear and cartilage with an inward facing Burow's triangle. The wound was then closed in a layered fashion.
Full Thickness Lip Wedge Repair (Flap) Text: Given the location of the defect and the proximity to free margins a full thickness wedge repair was deemed most appropriate.  Using a sterile surgical marker, the appropriate repair was drawn incorporating the defect and placing the expected incisions perpendicular to the vermilion border.  The vermilion border was also meticulously outlined to ensure appropriate reapproximation during the repair.  The area thus outlined was incised through and through with a #15 scalpel blade.  The muscularis and dermis were reaproximated with deep sutures following hemostasis. Care was taken to realign the vermilion border before proceeding with the superficial closure.  Once the vermilion was realigned the superfical and mucosal closure was finished.
Ftsg Text: The defect edges were debeveled with a #15 scalpel blade.  Given the location of the defect, shape of the defect and the proximity to free margins a full thickness skin graft was deemed most appropriate.  Using a sterile surgical marker, the primary defect shape was transferred to the donor site. The area thus outlined was incised deep to adipose tissue with a #15 scalpel blade.  The harvested graft was then trimmed of adipose tissue until only dermis and epidermis was left.  The skin margins of the secondary defect were undermined to an appropriate distance in all directions utilizing iris scissors.  The secondary defect was closed with interrupted buried subcutaneous sutures.  The skin edges were then re-apposed with running  sutures.  The skin graft was then placed in the primary defect and oriented appropriately.
Split-Thickness Skin Graft Text: The defect edges were debeveled with a #15 scalpel blade.  Given the location of the defect, shape of the defect and the proximity to free margins a split thickness skin graft was deemed most appropriate.  Using a sterile surgical marker, the primary defect shape was transferred to the donor site. The split thickness graft was then harvested.  The skin graft was then placed in the primary defect and oriented appropriately.
Burow's Graft Text: The defect edges were debeveled with a #15 scalpel blade.  Given the location of the defect, shape of the defect, the proximity to free margins and the presence of a standing cone deformity a Burow's skin graft was deemed most appropriate. The standing cone was removed and this tissue was then trimmed to the shape of the primary defect. The adipose tissue was also removed until only dermis and epidermis were left.  The skin margins of the secondary defect were undermined to an appropriate distance in all directions utilizing iris scissors.  The secondary defect was closed with interrupted buried subcutaneous sutures.  The skin edges were then re-apposed with running  sutures.  The skin graft was then placed in the primary defect and oriented appropriately.
Cartilage Graft Text: The defect edges were debeveled with a #15 scalpel blade.  Given the location of the defect, shape of the defect, the fact the defect involved a full thickness cartilage defect a cartilage graft was deemed most appropriate.  An appropriate donor site was identified, cleansed, and anesthetized. The cartilage graft was then harvested and transferred to the recipient site, oriented appropriately and then sutured into place.  The secondary defect was then repaired using a primary closure.
Composite Graft Text: The defect edges were debeveled with a #15 scalpel blade.  Given the location of the defect, shape of the defect, the proximity to free margins and the fact the defect was full thickness a composite graft was deemed most appropriate.  The defect was outline and then transferred to the donor site.  A full thickness graft was then excised from the donor site. The graft was then placed in the primary defect, oriented appropriately and then sutured into place.  The secondary defect was then repaired using a primary closure.
Epidermal Autograft Text: The defect edges were debeveled with a #15 scalpel blade.  Given the location of the defect, shape of the defect and the proximity to free margins an epidermal autograft was deemed most appropriate.  Using a sterile surgical marker, the primary defect shape was transferred to the donor site. The epidermal graft was then harvested.  The skin graft was then placed in the primary defect and oriented appropriately.
Dermal Autograft Text: The defect edges were debeveled with a #15 scalpel blade.  Given the location of the defect, shape of the defect and the proximity to free margins a dermal autograft was deemed most appropriate.  Using a sterile surgical marker, the primary defect shape was transferred to the donor site. The area thus outlined was incised deep to adipose tissue with a #15 scalpel blade.  The harvested graft was then trimmed of adipose and epidermal tissue until only dermis was left.  The skin graft was then placed in the primary defect and oriented appropriately.
Skin Substitute Text: The defect edges were debeveled with a #15 scalpel blade.  Given the location of the defect, shape of the defect and the proximity to free margins a skin substitute graft was deemed most appropriate.  The graft material was trimmed to fit the size of the defect. The graft was then placed in the primary defect and oriented appropriately.
Skin Substitute Paste Text: The defect edges were debeveled with a #15 scalpel blade.  Given the location of the defect, shape of the defect and the proximity to free margins a skin substitute micronized graft was deemed most appropriate.  The entire vial contents were admixed with 0.5ccs of sterile saline, formed into a paste and then evenly spread over the entire wound bed.
Skin Substitute Injection Text: The defect edges were debeveled with a #15 scalpel blade.  Given the location of the defect, shape of the defect and the proximity to free margins a skin substitute micronized graft was deemed most appropriate.  The entire vial contents were admixed with 3.0ccs of sterile saline and then injected subcutaneously throughout the entire wound bed.
Tissue Cultured Epidermal Autograft Text: The defect edges were debeveled with a #15 scalpel blade.  Given the location of the defect, shape of the defect and the proximity to free margins a tissue cultured epidermal autograft was deemed most appropriate.  The graft was then trimmed to fit the size of the defect.  The graft was then placed in the primary defect and oriented appropriately.
Xenograft Text: The defect edges were debeveled with a #15 scalpel blade.  Given the location of the defect, shape of the defect and the proximity to free margins a xenograft was deemed most appropriate.  The graft was then trimmed to fit the size of the defect.  The graft was then placed in the primary defect and oriented appropriately.
Purse String (Simple) Text: Given the location of the defect and the characteristics of the surrounding skin a purse string closure was deemed most appropriate.  Undermining was performed circumfirentially around the surgical defect.  A purse string suture was then placed and tightened.
Purse String (Intermediate) Text: Given the location of the defect and the characteristics of the surrounding skin a purse string intermediate closure was deemed most appropriate.  Undermining was performed circumfirentially around the surgical defect.  A purse string suture was then placed and tightened.
Partial Purse String (Simple) Text: Given the location of the defect and the characteristics of the surrounding skin a simple purse string closure was deemed most appropriate.  Undermining was performed circumfirentially around the surgical defect.  A purse string suture was then placed and tightened. Wound tension only allowed a partial closure of the circular defect.
Partial Purse String (Intermediate) Text: Given the location of the defect and the characteristics of the surrounding skin an intermediate purse string closure was deemed most appropriate.  Undermining was performed circumfirentially around the surgical defect.  A purse string suture was then placed and tightened. Wound tension only allowed a partial closure of the circular defect.
Localized Dermabrasion With Wire Brush Text: The patient was draped in routine manner.  Localized dermabrasion using 3 x 17 mm wire brush was performed in routine manner to papillary dermis. This spot dermabrasion is being performed to complete skin cancer reconstruction. It also will eliminate the other sun damaged precancerous cells that are known to be part of the regional effect of a lifetime's worth of sun exposure. This localized dermabrasion is therapeutic and should not be considered cosmetic in any regard.
Tarsorrhaphy Text: A tarsorrhaphy was performed using Frost sutures.
Complex Repair And Flap Additional Text (Will Appearing After The Standard Complex Repair Text): The complex repair was not sufficient to completely close the primary defect. The remaining additional defect was repaired with the flap mentioned below.
Complex Repair And Graft Additional Text (Will Appearing After The Standard Complex Repair Text): The complex repair was not sufficient to completely close the primary defect. The remaining additional defect was repaired with the graft mentioned below.
Cheek Interpolation Flap Division And Inset Text: Division and inset of the cheek interpolation flap was performed to achieve optimal aesthetic result, restore normal anatomic appearance and avoid distortion of normal anatomy, expedite and facilitate wound healing, achieve optimal functional result and because linear closure either not possible or would produce suboptimal result. The patient was prepped and draped in the usual manner. The pedicle was infiltrated with local anesthesia. The pedicle was sectioned with a #15 blade. The pedicle was de-bulked and trimmed to match the shape of the defect. Hemostasis was achieved. The flap donor site and free margin of the flap were secured with deep buried sutures and the wound edges were re-approximated.
Cheek To Nose Interpolation Flap Division And Inset Text: Division and inset of the cheek to nose interpolation flap was performed to achieve optimal aesthetic result, restore normal anatomic appearance and avoid distortion of normal anatomy, expedite and facilitate wound healing, achieve optimal functional result and because linear closure either not possible or would produce suboptimal result. The patient was prepped and draped in the usual manner. The pedicle was infiltrated with local anesthesia. The pedicle was sectioned with a #15 blade. The pedicle was de-bulked and trimmed to match the shape of the defect. Hemostasis was achieved. The flap donor site and free margin of the flap were secured with deep buried sutures and the wound edges were re-approximated.
Melolabial Interpolation Flap Division And Inset Text: Division and inset of the melolabial interpolation flap was performed to achieve optimal aesthetic result, restore normal anatomic appearance and avoid distortion of normal anatomy, expedite and facilitate wound healing, achieve optimal functional result and because linear closure either not possible or would produce suboptimal result. The patient was prepped and draped in the usual manner. The pedicle was infiltrated with local anesthesia. The pedicle was sectioned with a #15 blade. The pedicle was de-bulked and trimmed to match the shape of the defect. Hemostasis was achieved. The flap donor site and free margin of the flap were secured with deep buried sutures and the wound edges were re-approximated.
Mastoid Interpolation Flap Division And Inset Text: Division and inset of the mastoid interpolation flap was performed to achieve optimal aesthetic result, restore normal anatomic appearance and avoid distortion of normal anatomy, expedite and facilitate wound healing, achieve optimal functional result and because linear closure either not possible or would produce suboptimal result. The patient was prepped and draped in the usual manner. The pedicle was infiltrated with local anesthesia. The pedicle was sectioned with a #15 blade. The pedicle was de-bulked and trimmed to match the shape of the defect. Hemostasis was achieved. The flap donor site and free margin of the flap were secured with deep buried sutures and the wound edges were re-approximated.
Paramedian Forehead Flap Division And Inset Text: Division and inset of the paramedian forehead flap was performed to achieve optimal aesthetic result, restore normal anatomic appearance and avoid distortion of normal anatomy, expedite and facilitate wound healing, achieve optimal functional result and because linear closure either not possible or would produce suboptimal result. The patient was prepped and draped in the usual manner. The pedicle was infiltrated with local anesthesia. The pedicle was sectioned with a #15 blade. The pedicle was de-bulked and trimmed to match the shape of the defect. Hemostasis was achieved. The flap donor site and free margin of the flap were secured with deep buried sutures and the wound edges were re-approximated.
Posterior Auricular Interpolation Flap Division And Inset Text: Division and inset of the posterior auricular interpolation flap was performed to achieve optimal aesthetic result, restore normal anatomic appearance and avoid distortion of normal anatomy, expedite and facilitate wound healing, achieve optimal functional result and because linear closure either not possible or would produce suboptimal result. The patient was prepped and draped in the usual manner. The pedicle was infiltrated with local anesthesia. The pedicle was sectioned with a #15 blade. The pedicle was de-bulked and trimmed to match the shape of the defect. Hemostasis was achieved. The flap donor site and free margin of the flap were secured with deep buried sutures and the wound edges were re-approximated.
Manual Repair Warning Statement: We plan on removing the manually selected variable below in favor of our much easier automatic structured text blocks found in the previous tab. We decided to do this to help make the flow better and give you the full power of structured data. Manual selection is never going to be ideal in our platform and I would encourage you to avoid using manual selection from this point on, especially since I will be sunsetting this feature. It is important that you do one of two things with the customized text below. First, you can save all of the text in a word file so you can have it for future reference. Second, transfer the text to the appropriate area in the Library tab. Lastly, if there is a flap or graft type which we do not have you need to let us know right away so I can add it in before the variable is hidden. No need to panic, we plan to give you roughly 6 months to make the change.
Consent: The rationale for the repair was explained to the patient and consent was obtained. The risks, benefits and alternatives to therapy were discussed in detail. Specifically, the risks of infection, scarring, bleeding, prolonged wound healing, incomplete removal, allergy to anesthesia, nerve injury and recurrence were addressed. Prior to the procedure, the treatment site was clearly identified and confirmed by the patient. All components of Universal Protocol/PAUSE Rule completed.
Post-Care Instructions: I reviewed with the patient in detail post-care instructions. Patient is not to engage in any heavy lifting, exercise, or swimming for the next 14 days. Should the patient develop any fevers, chills, bleeding, severe pain patient will contact the office immediately.
Pain Refusal Text: I offered to prescribe pain medication but the patient refused to take this medication.
Where Do You Want The Question To Include Opioid Counseling Located?: Case Summary Tab
Information: Selecting Yes will display possible errors in your note based on the variables you have selected. This validation is only offered as a suggestion for you. PLEASE NOTE THAT THE VALIDATION TEXT WILL BE REMOVED WHEN YOU FINALIZE YOUR NOTE. IF YOU WANT TO FAX A PRELIMINARY NOTE YOU WILL NEED TO TOGGLE THIS TO 'NO' IF YOU DO NOT WANT IT IN YOUR FAXED NOTE.

## 2021-02-16 ENCOUNTER — APPOINTMENT (RX ONLY)
Dept: URBAN - METROPOLITAN AREA CLINIC 36 | Facility: CLINIC | Age: 86
Setting detail: DERMATOLOGY
End: 2021-02-16

## 2021-02-16 DIAGNOSIS — Z48.02 ENCOUNTER FOR REMOVAL OF SUTURES: ICD-10-CM

## 2021-02-16 PROCEDURE — ? ADDITIONAL NOTES

## 2021-02-16 PROCEDURE — ? COUNSELING

## 2021-02-16 PROCEDURE — ? SUTURE REMOVAL (GLOBAL PERIOD)

## 2021-02-16 ASSESSMENT — LOCATION DETAILED DESCRIPTION DERM: LOCATION DETAILED: LEFT NASAL ALA

## 2021-02-16 ASSESSMENT — LOCATION SIMPLE DESCRIPTION DERM: LOCATION SIMPLE: LEFT NOSE

## 2021-02-16 ASSESSMENT — LOCATION ZONE DERM: LOCATION ZONE: NOSE

## 2021-02-16 NOTE — PROCEDURE: ADDITIONAL NOTES
Additional Notes: Seen and evaluated by Dr. Thayer:\\nWill remove sutures today and have patient return in 1 week for wound check and CO2RE laser.
Render Risk Assessment In Note?: no
Detail Level: Detailed

## 2021-02-16 NOTE — PROCEDURE: SUTURE REMOVAL (GLOBAL PERIOD)
Detail Level: Detailed
Add 13388 Cpt? (Important Note: In 2017 The Use Of 92605 Is Being Tracked By Cms To Determine Future Global Period Reimbursement For Global Periods): no

## 2021-02-22 ENCOUNTER — APPOINTMENT (RX ONLY)
Dept: URBAN - METROPOLITAN AREA CLINIC 36 | Facility: CLINIC | Age: 86
Setting detail: DERMATOLOGY
End: 2021-02-22

## 2021-02-22 DIAGNOSIS — Z48.817 ENCOUNTER FOR SURGICAL AFTERCARE FOLLOWING SURGERY ON THE SKIN AND SUBCUTANEOUS TISSUE: ICD-10-CM

## 2021-02-22 PROCEDURE — ? LASER RESURFACING

## 2021-02-22 ASSESSMENT — LOCATION DETAILED DESCRIPTION DERM: LOCATION DETAILED: INFERIOR MID FOREHEAD

## 2021-02-22 ASSESSMENT — LOCATION ZONE DERM: LOCATION ZONE: FACE

## 2021-02-22 ASSESSMENT — LOCATION SIMPLE DESCRIPTION DERM: LOCATION SIMPLE: INFERIOR FOREHEAD

## 2021-02-22 NOTE — PROCEDURE: LASER RESURFACING
Laser Type: CO2 laser
Price (Use Numbers Only, No Special Characters Or $): 0
Consent: Written consent obtained, risks reviewed including but not limited to crusting, scabbing, blistering, scarring, darker or lighter pigmentary change, incomplete improvement of dyschromia, wrinkles, prolonged erythema and facial swelling, infection and bleeding.
Anesthesia Volume In Cc: 4
Post-Care Instructions: I reviewed with the patient in detail post-care instructions. Patient should avoid sun until area fully healed. Pt should apply vaseline to treated areas, and remove crusts gently with water-vinegar soaks.
Number Of Passes: 5
Power (Reina): 18
Anesthesia Type: 1% lidocaine with 1:100,000 epinephrine and 408mcg clindamycin/ml and a 1:10 solution of 8.4% sodium bicarbonate
Detail Level: Zone
Treatment Number: 1
Wavelength: 10,600nm

## 2021-02-22 NOTE — PROGRESS NOTES
"Chief Complaint   Patient presents with   • Annual Wellness Visit     1. Medicare annual wellness visit, initial  Here for her Medicare annual wellness visit.    2. Pre-operative clearance  Patient is planning on having upcoming neurosurgery.  She is planning: General anesthesia for \"L5-S1 laminectomy medial facetectomy and foraminotomies\" surgery with Dr. Kimble at at TBD date.  Patient will be following up with cardiology for her pacemaker to be read this Friday.  She is also due for an EKG, and chest x-ray preop.  She denies any difficulties with anesthesia.  Denies any head or neck problems.  Most recent A1c was 6.9%.  Had been awaiting to confirm diagnosis of diabetes type 2.  Currently does not want to start any medications at this time.  Would like to work on dietary management.  Last A1c prior to the most recent was 6.9%.  Has been static.    Patient presents today for preoperative evaluation for L5-S1 laminectomy medial facetectomy and foraminotomies\"   Surgery is tentatively planned for [facility]    Any chest pain or breathlessness when climbing up 2 flights of stairs at normal speed? UTD  Do you have kidney disease? no  Has anyone in your family (blood relatives) had any problems following an anesthetic? no  Have you ever had a heart attack? no  Have you ever been diagnosed with an irregular heartbeat?  Pacemaker in situ  Have you ever had a stroke? TIA-on Plavix  If you have been put to sleep for an operation where there are any anesthetic problems?no  Do suffer from epilepsy or seizures? no  Do you have any problems with pain, stiffness or arthritis in your neck or jaw? no  Do have a thyroid disease? no  Do you suffer from angina? no  Do you have a liver disease? no   Have you ever been diagnosed with heart failure? no  You suffer from asthma? no  Do you have diabetes that requires insulin? no  Do you have diabetes that requires tablets only? Recent A1 confirmed type 2 with Awc of 6.9%. not on medications. " PATIENT:   Subjective   Patient ID: Hilda is a 70 year old female.    CHIEF COMPLAINT:  Chief Complaint   Patient presents with   • Back Pain     Pt c/o mid back spasm x 5 days       HPI:  This is 70 years old female patient who looks much younger than her age came in complaining of pain in the lower back mainly in the morning.  Patient states once she gets going the pain is improved.  Patient denies any trauma no urinary symptoms.  Patient's vital signs are stable patient does not take any medicines patient is retired.  Patient has no other complaints pulse oximetry at room air is 96%.  Patient is ambulatory patient does line dancing.  Patient is allergic to penicillin.  Patient is due for Covid vaccine which has been ordered patient recently had a blood work done which is stable      ROS:  Review of Systems   Constitutional: Negative.    HENT: Negative.    Eyes: Negative.    Respiratory: Negative.    Cardiovascular: Negative.    Gastrointestinal: Negative.    Endocrine: Negative.    Genitourinary: Negative.    Musculoskeletal:        Chronic low back pain pain radiates to the buttocks no tingling no numbness patient has been wearing a back brace and she burned herself with heating pad patient has first-degree burns on the back   Skin:        First-degree burn on the back secondary to heating pad   Neurological: Negative.    Psychiatric/Behavioral: Negative.    All other systems reviewed and are negative.      PROBLEM LIST:  Patient Active Problem List   Diagnosis   • Back pain   • Dermatitis   • Eczema   • Tinnitus of both ears   • Breast cancer screening        MEDICAL HISTORY:  History reviewed. No pertinent past medical history.     SURGICAL HISTORY:   has a past surgical history that includes Appendectomy;  section, classic; and Cataract extraction, bilateral (Bilateral).     FAMILY MEDICAL HISTORY:  family history includes Cancer, Ovarian in her maternal grandmother; Coronary Artery Disease in an    Do suffer from bronchitis? no    Patient is at  intermediate risk for surgery.     (High risk: Emergency surgery, anticipated increased blood loss, aortic or peripheral vascular surgery)    (Intermediate risk: Abdominal or thoracic surgery, head and neck surgery, carotid endarterectomy, orthopedic surgery, prostate surgery.)    (Low risk: Breast surgery, cataract surgery, superficial surgery, endoscopy)    Indications of increased surgical risk for pulmonary complications: >65, limited mobility    Cough, dyspnea, pulmonary disease, smoking, obesity, abdominal or thoracic surgery, FEV1 less than 2 L,MV V<50 percent of predicted value, PEF <100 L or 50 percent of predicted value, PCO2 greater than or equal to 45 mmHg, PO2 less than or equal to 50 mmHg.      Joana Kenn ESPINAL        Lab Results   Component Value Date/Time    SODIUM 135 01/06/2020 11:50 AM    POTASSIUM 4.1 01/06/2020 11:50 AM    CHLORIDE 96 01/06/2020 11:50 AM    CO2 27 01/06/2020 11:50 AM    GLUCOSE 132 (H) 01/06/2020 11:50 AM    BUN 24 (H) 01/06/2020 11:50 AM    CREATININE 0.83 01/06/2020 11:50 AM      Lab Results   Component Value Date/Time    PROTHROMBTM 13.0 08/26/2019 03:10 PM    INR 0.96 08/26/2019 03:10 PM      Lab Results   Component Value Date/Time    WBC 6.6 01/06/2020 11:50 AM    RBC 4.54 01/06/2020 11:50 AM    HEMOGLOBIN 14.1 01/06/2020 11:50 AM    HEMATOCRIT 42.8 01/06/2020 11:50 AM    MCV 94.3 01/06/2020 11:50 AM    MCH 31.1 01/06/2020 11:50 AM    MCHC 32.9 (L) 01/06/2020 11:50 AM    MPV 9.8 01/06/2020 11:50 AM        3. Atypical nevus of face  Patient does have a large area on the left side of her temple that is flaky, red, peeling.  Needs referral to dermatology for this.    4. Type 2 diabetes mellitus without complication, without long-term current use of insulin (HCC)  Have reviewed most recent labs.  A1c 6.9%.  Prior to this her A1c was 6.9% as well.  Patient would like to continue to work on dietary management prior to  other family member; Glaucoma in an other family member; Stroke in her mother and another family member.    SOCIAL HISTORY:  Social History     Tobacco Use   • Smoking status: Never Smoker   • Smokeless tobacco: Never Used   Substance Use Topics   • Alcohol use: Never     Frequency: Never   • Drug use: Never       MEDICATIONS:  No current outpatient medications on file.     No current facility-administered medications for this visit.        ALLERGIES:  is allergic to penicillins.    VITAL SIGNS THIS VISIT:  Blood pressure 115/78, pulse 78, temperature 96.6 °F (35.9 °C), resp. rate 16, height 5' 5.5\" (1.664 m), weight 81.8 kg (180 lb 6.4 oz), SpO2 96 %.     PHYSICAL EXAM:  Objective    Physical Exam   Constitutional: She is oriented to person, place, and time. She appears well-developed and well-nourished.   Comfortable alert oriented complains of pain in the lower back worse when she first gets up once she starts walking the pain is improved   HENT:   Head: Normocephalic and atraumatic.   Eyes: Pupils are equal, round, and reactive to light. Conjunctivae are normal.   Neck: Normal range of motion. Neck supple.   Cardiovascular: Normal rate and regular rhythm.   Pulmonary/Chest: Effort normal and breath sounds normal.   Abdominal: Soft. Bowel sounds are normal.   Musculoskeletal:      Comments: Chronic low back pain pain radiates to the buttocks no tingling no numbness no trauma ambulatory no urinary symptoms pain is reproducible first-degree burn on the back from the heating pad no infection   Neurological: She is alert and oriented to person, place, and time.   Skin: Skin is warm.   First-degree burn from the heating pad on the back   Nursing note and vitals reviewed.      IMMUNIZATIONS:  Immunization History   Administered Date(s) Administered   • Influenza, high dose seasonal, preservative-free 09/15/2016   • PPD 05/20/2009   • Pneumococcal polysaccharide, adult, 23 valent 09/15/2020   • Tdap 04/24/2015   •  Zoster Shingles 04/24/2015        ORDERS:  No orders of the defined types were placed in this encounter.        ASSESSMENT:  Problem List Items Addressed This Visit     None      Low back pain  First-degree burn    PLAN:  Patient advised to keep the area dry and clean  X-ray lumbar spine has been ordered  Tizanidine 2 mg at bedtime informed patient about the effects of the medicines  Tramadol 50 mg at bedtime as needed for pain inform patient about the effects of the medicines  Lose weight exercise  Follow-up with us as needed or in 2 weeks       starting medications.  Patient is well-informed regarding diabetes pharmacological management.      HPI:  Krystle is a 86 y.o. here for Medicare Annual Wellness Visit         Patient Active Problem List    Diagnosis Date Noted   • History of TIA (transient ischemic attack) 08/13/2019     Priority: Medium   • Cardiac pacemaker in situ 07/09/2019     Priority: Medium   • Essential hypertension, benign 07/09/2019     Priority: Medium   • Dyslipidemia 07/09/2019     Priority: Medium   • Localized edema 07/09/2019     Priority: Medium   • Retinopathy 08/13/2019     Priority: Low   • Low vitamin D level 08/13/2019     Priority: Low   • Weakness of both lower extremities 08/13/2019     Priority: Low   • Compression fracture of lumbar vertebrae, non-traumatic, sequela 08/13/2019     Priority: Low   • Chronic midline low back pain with right-sided sciatica 07/09/2019     Priority: Low   • Type 2 diabetes mellitus without complication, without long-term current use of insulin ([facility]) 01/16/2020   • Foraminal stenosis of lumbar region 11/18/2019   • Spinal stenosis of lumbar region with neurogenic claudication 11/18/2019   • Lumbar spondylosis 11/18/2019       Current Outpatient Medications   Medication Sig Dispense Refill   • vitamin D (CHOLECALCIFEROL) 1000 UNIT Tab Take 1,000 Units by mouth.     • losartan-hydrochlorothiazide (HYZAAR) 100-25 MG per tablet Take 1 Tab by mouth every day. 90 Tab 3   • carvedilol (COREG) 3.125 MG Tab Take 1 Tab by mouth 2 times a day, with meals. 180 Tab 3   • furosemide (LASIX) 40 MG Tab Take 40 mg by mouth every day.     • acetaminophen (TYLENOL) 325 MG Tab Take 650 mg by mouth every four hours as needed.     • Ascorbic Acid (VITAMIN C) 1000 MG Tab Take  by mouth.     • clopidogrel (PLAVIX) 75 MG Tab Take 1 Tab by mouth every day. 90 Tab 3     No current facility-administered medications for this visit.         Patient is taking medications as noted in medication list.  Current supplements as per  medication list.     Allergies: Sulfa drugs    Current social contact/activities: PT states she lives with family and she likes to read.    Is patient current with immunizations? No, due for PREVNAR (PCV13) . Patient is interested in receiving NONE today.    She  reports that she has never smoked. She has never used smokeless tobacco. She reports that she does not drink alcohol or use drugs.  Counseling given: Not Answered        DPA/Advanced directive: Patient does not have an Advanced Directive.  A packet and workshop information was given on Advanced Directives.    ROS:    Gait: Uses a walker and cane if needed  Ostomy: No   Other tubes: No   Amputations: No   Chronic oxygen use No   Last eye exam - 11/2019  Wears hearing aids: No   : interferes daily          Depression Screening    Little interest or pleasure in doing things?  0 - not at all  Feeling down, depressed, or hopeless? 1 - several days  Patient Health Questionnaire Score: 1    If depressive symptoms identified deferred to follow up visit unless specifically addressed in assessment and plan.    Interpretation of PHQ-9 Total Score   Score Severity   1-4 No Depression   5-9 Mild Depression   10-14 Moderate Depression   15-19 Moderately Severe Depression   20-27 Severe Depression    Screening for Cognitive Impairment    Three Minute Recall (village, kitchen, baby)  3/3    Will clock face with all 12 numbers and set the hands to show 10 past 10.  Yes 5/5  If cognitive concerns identified, deferred for follow up unless specifically addressed in assessment and plan.    Fall Risk Assessment    Has the patient had two or more falls in the last year or any fall with injury in the last year?  Yes  If fall risk identified, deferred for follow up unless specifically addressed in assessment and plan.    Safety Assessment    Throw rugs on floor.  No  Handrails on all stairs.  Yes  Good lighting in all hallways.  Yes  Difficulty hearing.  No  Patient counseled  about all safety risks that were identified.    Functional Assessment ADLs    Are there any barriers preventing you from cooking for yourself or meeting nutritional needs?  No.    Are there any barriers preventing you from driving safely or obtaining transportation?  Yes. Friend drives and they live together  Are there any barriers preventing you from using a telephone or calling for help?  No.    Are there any barriers preventing you from shopping?  No.    Are there any barriers preventing you from taking care of your own finances?  No.    Are there any barriers preventing you from managing your medications?  No.    Are there any barriers preventing you from showering, bathing or dressing yourself?  Yes. Friend helps with stand by  Are you currently engaging in any exercise or physical activity?  Yes.  Walking  What is your perception of your health? Pretty good      Health Maintenance Summary                Annual Wellness Visit Overdue 7/26/1933     IMM PNEUMOCOCCAL VACCINE: 65+ Years Overdue 7/26/1998     IMM DTaP/Tdap/Td Vaccine Postponed 6/27/2020 Originally 7/26/1944. Insurance/Financial    IMM ZOSTER VACCINES Postponed 6/27/2020 Originally 7/26/1983. Insurance/Financial    BONE DENSITY Next Due 12/2/2024      Done 12/2/2019 DS-BONE DENSITY STUDY (DEXA)          Patient Care Team:  JAYLEN Castelan as PCP - General (Nurse Practitioner)  Aristeo Sher M.D. as Consulting Physician (Anesthesia)  Berry Turcios M.D. as Consulting Physician (Ophthalmology)    Social History     Tobacco Use   • Smoking status: Never Smoker   • Smokeless tobacco: Never Used   Substance Use Topics   • Alcohol use: No   • Drug use: No     Family History   Problem Relation Age of Onset   • Diabetes Mother    • Stroke Mother    • Heart Attack Father 74   • No Known Problems Maternal Grandmother    • No Known Problems Maternal Grandfather    • No Known Problems Paternal Grandmother    • No Known Problems Paternal  "Grandfather      She  has a past medical history of Arthritis, Breath shortness, Cataract, Dental disorder, Heart burn, Hemorrhagic disorder (HCC), High cholesterol, Hyperlipidemia, Hypertension, Pacemaker (2015), Pain (08/2019), Pre-diabetes, TIA (transient ischemic attack), and Urinary incontinence.   Past Surgical History:   Procedure Laterality Date   • PACEMAKER INSERTION  2015   • HIP REPLACEMENT, TOTAL Right 2009   • KNEE REPLACEMENT, TOTAL Right 2004   • CATARACT EXTRACTION WITH IOL Bilateral 2003   • CHOLECYSTECTOMY  2002   • BASAL CELL EXCISION      nose and hand   • BUNIONECTOMY Left        Exam:     /68 (BP Location: Right arm, Patient Position: Sitting, BP Cuff Size: Adult)   Pulse 77   Temp 36.6 °C (97.8 °F) (Temporal)   Ht 1.6 m (5' 3\")   Wt 73.5 kg (162 lb)   SpO2 94%  Body mass index is 28.7 kg/m².    Hearing good.    Dentition upper and lower dentures  Alert, oriented in no acute distress.  Eye contact is good, speech goal directed, affect calm      Assessment and Plan. The following treatment and monitoring plan is recommended:    1. Medicare annual wellness visit, initial   Recommended screenings immunizations reviewed. Initial Annual Wellness Visit - Includes PPPS ()   2. Pre-operative clearance   Patient is at relatively intermediate risk for surgery.  She will be following up with cardiology for pacemaker check prior to surgery.  She will also have upcoming labs and EKG.    3. Atypical nevus of face   Referral placed to dermatology for this. REFERRAL TO DERMATOLOGY   4. Type 2 diabetes mellitus without complication, without long-term current use of insulin ([facility])   Has been stable.  Not on pharmacological treatment recent. A1c 6.9%.  Has been stable for quite some time.  Not any medication at this time.  Patient would like to continue with dietary management for this.  Have discussed risk of uncontrolled diabetes.    5. Chronic midline low back pain with right-sided sciatica "   This is chronic and has progressively worsened.  She is planning to have a upcoming surgery to see if this could help alleviate some of her symptoms. Initial Annual Wellness Visit - Includes PPPS ()   6. Spinal stenosis of lumbar region with neurogenic claudication   Chronic.  Followed by neurosurgery.  Planning upcoming surgery. Initial Annual Wellness Visit - Includes PPPS ()   7. Foraminal stenosis of lumbar region   Chronic.  Worsening symptoms.  Being followed by neurosurgery for this. Initial Annual Wellness Visit - Includes PPPS ()   8. History of TIA (transient ischemic attack)   History of.  On Plavix for this.  Continue current regimen. Initial Annual Wellness Visit - Includes PPPS ()   9. Cardiac pacemaker in situ   Chronic.  Followed by cardiology for this.  Will be having pacemaker check this Friday.  Continue to follow-up with cardiology. Initial Annual Wellness Visit - Includes PPPS ()   10. Essential hypertension, benign Chronic.  She is being followed by cardiology for this.  She does have leg swelling. Initial Annual Wellness Visit - Includes PPPS ()   11. Dyslipidemia   Stable.  Not on any medications at this time.  Recently had stopped the Zetia.  Routine labs.  Followed by cardiology as well. Initial Annual Wellness Visit - Includes PPPS ()   12.  At risk for falls  Chronic.  Have discussed with patient and her daughter to continue to be cautious around the house with throw rugs, handrails, well lit rooms.  Continue use her walker.  Should be having upcoming spinal surgery so precaution will be needed. Initial Annual Wellness Visit - Includes PPPS ()   13. Weakness of both lower extremities   Chronic.  At high risk for falls.  Using a walker.  Currently residing with her daughter who helps her. Initial Annual Wellness Visit - Includes PPPS ()         Services suggested: No services needed at this time  Health Care Screening recommendations as per  orders if indicated.  Referrals offered: PT/OT/Nutrition counseling/Behavioral Health/Smoking cessation as per orders if indicated.    Discussion today about general wellness and lifestyle habits:    · Prevent falls and reduce trip hazards; Cautioned about securing or removing rugs.  · Have a working fire alarm and carbon monoxide detector;   · Engage in regular physical activity and social activities       Follow-up: No follow-ups on file.

## 2021-02-24 ENCOUNTER — OFFICE VISIT (OUTPATIENT)
Dept: NEUROLOGY | Facility: MEDICAL CENTER | Age: 86
End: 2021-02-24
Attending: PSYCHIATRY & NEUROLOGY
Payer: MEDICARE

## 2021-02-24 VITALS
WEIGHT: 172 LBS | DIASTOLIC BLOOD PRESSURE: 72 MMHG | TEMPERATURE: 98.2 F | BODY MASS INDEX: 30.47 KG/M2 | SYSTOLIC BLOOD PRESSURE: 118 MMHG | RESPIRATION RATE: 15 BRPM | OXYGEN SATURATION: 94 % | HEART RATE: 70 BPM

## 2021-02-24 DIAGNOSIS — M79.2 NEUROPATHIC PAIN: ICD-10-CM

## 2021-02-24 DIAGNOSIS — R29.898 WEAKNESS OF BOTH LOWER EXTREMITIES: ICD-10-CM

## 2021-02-24 PROCEDURE — 99205 OFFICE O/P NEW HI 60 MIN: CPT | Performed by: PSYCHIATRY & NEUROLOGY

## 2021-02-24 PROCEDURE — 99212 OFFICE O/P EST SF 10 MIN: CPT | Performed by: PSYCHIATRY & NEUROLOGY

## 2021-02-24 ASSESSMENT — FIBROSIS 4 INDEX: FIB4 SCORE: 1.63

## 2021-02-24 ASSESSMENT — ENCOUNTER SYMPTOMS
BACK PAIN: 1
FALLS: 0
SEIZURES: 0
MEMORY LOSS: 0
TINGLING: 1
FOCAL WEAKNESS: 1
TREMORS: 0
MYALGIAS: 0
SENSORY CHANGE: 1
LOSS OF CONSCIOUSNESS: 0
NECK PAIN: 0

## 2021-02-24 ASSESSMENT — PATIENT HEALTH QUESTIONNAIRE - PHQ9
SUM OF ALL RESPONSES TO PHQ QUESTIONS 1-9: 5
5. POOR APPETITE OR OVEREATING: 0 - NOT AT ALL
CLINICAL INTERPRETATION OF PHQ2 SCORE: 2

## 2021-02-25 NOTE — PROGRESS NOTES
Subjective:      Krystle Tavarez is a 87 y.o. female who presents with her daughter Ana M, from the office of KYLIE Galvez, for consultation, with a history of persistent lower extremity pain, weakness and dysesthesias following lumbosacral decompressive surgery back on February 25, 2020.     SARBJIT Dalton is a very pleasant, though unfortunate, 87-year-old right-handed woman whose back pain nor currently symptoms started several years prior to her undergoing surgery.  Things had gotten to the point where she was no longer capable of weightbearing for any meaningful interval of time.  She had undergone EMG/NCV studies prior, these evidently were reportedly unremarkable.  She underwent CT imaging of the back which revealed multiple level degenerative changes, most significant at the L5/S1 level with both central and lateral narrowing.    She underwent decompressive surgery on March 25, 2020, postoperative CT scan from February 29, 2020 revealed the postoperative changes and improvement in the narrowing.  She underwent rather extensive physical therapy, and by the time she was discharged months later, she was walking with a walker, overall quality of life have improved.    Still, she had significant pain which limited her ability to walk.  Things seem to have worsened slightly, but certainly if not improved further.  There is now a constant numbness of the right foot and ankle with some burning dysesthesias and skin hyperpathia.  There was weakness with the foot as well, but it is mostly the pain that is generated simply by standing for 10 minutes before she has to sit down for relief.  Endurance has begun to worsen.  She has tried to continue with the exercises given when she was at inpatient rehabilitation, but this is simply become a frustrating exercise in futility.    The pain she has is mostly in the joints, both hips, knees as well as the ankles.  The muscles are nontender.  She denies any radicular  pain radiating from the back into either of the lower extremities.  She even describes pain in both shoulders and hands.    She denies any changes in bowel or bladder function, these have actually improved over the difficulty she had postoperatively.  She denies any constrictive sensations around the lower back, loss of sensation in the perianal and perigenital regions, and there is no loss of feeling or analgesia in the feet.    She did undergo two repeat epidural steroid injections by Dr. Sher at Dr. Kimble's office.  She did follow-up with Dr. Kimble who felt that if there is to be any change or improvement she would require surgery again, something she flat out refused, and something he was reluctant to do.  She now presents.  She has not been started on any type of pharmacologic pain regimen.    She does have a history of recently diagnosed diabetes, though she states she was probably in a prediabetic state for a while, she cannot be more specific.  She also has PAF, hypertension and rather significant advanced DJD.  She has no history of diagnosed neuropathy, neurodegenerative disease, MS, malignancy, thyroid disease, autoimmune disease, blood dyscrasia, liver or kidney disease, CVA, PVD, CAD, or thyroid disease.  The only other surgical history of note is a pacemaker placement.    Her mother  from complications of stroke and diabetes, her father had heart disease.  Including her siblings, no one in the family has a history of neuropathy, ALS or other neurodegenerative disease.    She drinks alcohol only socially, does not smoke cigarettes.  She presently lives with her daughter.  She has become quite frustrated with the loss of independence and the chronic pain that she is dealing with.    She is on Tylenol, Eliquis 2.5 mg twice daily, Coreg, HCTZ, Glucophage 500 g daily and Senokot.    Review of Systems   Constitutional: Positive for malaise/fatigue.   Musculoskeletal: Positive for back pain and joint  pain. Negative for falls, myalgias and neck pain.   Neurological: Positive for tingling, sensory change and focal weakness. Negative for tremors, seizures and loss of consciousness.   Psychiatric/Behavioral: Negative for memory loss.   All other systems reviewed and are negative.       Objective:     /72 (BP Location: Left arm, Patient Position: Sitting)   Pulse 70   Temp 36.8 °C (98.2 °F) (Temporal)   Resp 15   Wt 78 kg (172 lb)   LMP  (LMP Unknown)   SpO2 94%   BMI 30.47 kg/m²      Physical Exam    She appears in no acute distress, she presents in a wheelchair.  Vital signs are stable.  There is no malar rash, temporal or jaw tenderness, or jaw claudication.  The neck is supple.  Cardiac evaluation is unremarkable.  There is significant tenderness of both shoulder joints with the diminished range of motion passively.  Significant arthritic changes seen in the hands bilaterally.  Straight leg raising is negative bilaterally in the lower extremities.  There is tenderness at the knees and ankles, especially with the right ankle, with simple palpation.  Distal pulses are intact though they are diminished.    Fully oriented, there is no aphasia, apraxia, or inattention.    PERRLA/EOMI, visual fields are full, facial movements are symmetric, there is no bulbar dysfunction.  The tongue and uvula are midline, sensory exam is intact to temperature, light touch and pinprick bilaterally.  Shoulder shrug and head rotation are intact and symmetric.    Musculoskeletal exam reveals normal tone bilaterally.  There is no tremor or drift.  Strength is intact in the upper extremities, there is some proximal IP weakness in both lower extremities at 4+/5, but otherwise hip extension, abduction and adduction, knee flexion extension and dorsiflexion/plantar flexion/lateral movements in both feet are all intact.    Reflexes are absent bilaterally at the ankles, diminished at the knees but still symmetric.  They are easily  elicited in the upper extremities without asymmetry.  Both toes are downgoing.    I did not stand her to walk because of complaints of pain.  Repetitive movements with the feet are actually well-maintained and symmetric.  The same for movements with the hands though there is some constraint because of extensive arthritic change.  There is no appendicular dystaxia with any of the extremities.    Sensory exam does reveal a stocking pattern decrement of vibration below the knees, joint position sense is intact.  There is a slight hyperpathia in an L5 distribution of the right lower extremity over the lateral calf and lateral/dorsal foot.  Sensory modalities are intact in the hands.     Assessment/Plan:     1. Weakness of both lower extremities  I think this is more of an antalgic process, I doubt that we are dealing with a primary an underlying progressive neuropathic process.  Still, if she is a diabetic, and may have been for quite a while, the possibility of a diabetic polyneuropathy that is superimposed on top of this remains to be ruled out.  EMG/NCV studies will be critical in this regard.    I think for her the biggest issue will get in with a good pain specialist to begin appropriate treatment.  I agree with Dr. Kimble that a second surgery may not provide meaningful benefit overall, and given her age and lack of meaningful benefit following the initial surgery, it probably should be considered a last ditch effort were conservative treatments to prove ineffective.    I also spent some time making sure she understood that this may be her new baseline.  Realistic expectations are critical if she is to do well. If she can improve her quality of life with pain control, this would allow her to do more.    - REFERRAL TO NEURODIAGNOSTICS (EEG,EP,EMG/NCS/DBS)    2. Neuropathic pain  Again, EMG/NCV studies will be done to rule out an additional superimposed neuropathic process separate from the lumbosacral spine issues.   We will call her with results of the EMG/NCV studies if they warrant this.  In the meantime, I recommended she follow-up with a pain specialist (Dr. Arvind Garcia MD, at [facility]) for further recommendations for long-term treatment.  We will follow-up after her diagnostics are completed for last time evaluation and review.    - REFERRAL TO NEURODIAGNOSTICS (EEG,EP,EMG/NCS/DBS)  - REFERRAL TO PAIN MANAGEMENT    Time: 60-minute spent face-to-face for exam, review, discussion, and education, of this over 50% of the time spent counseling and coordinating care.

## 2021-03-11 ENCOUNTER — NON-PROVIDER VISIT (OUTPATIENT)
Dept: NEUROLOGY | Facility: MEDICAL CENTER | Age: 86
End: 2021-03-11
Attending: PSYCHIATRY & NEUROLOGY
Payer: MEDICARE

## 2021-03-11 DIAGNOSIS — R29.898 WEAKNESS OF BOTH LOWER EXTREMITIES: ICD-10-CM

## 2021-03-11 PROCEDURE — 95912 NRV CNDJ TEST 11-12 STUDIES: CPT | Mod: 26 | Performed by: PSYCHIATRY & NEUROLOGY

## 2021-03-11 PROCEDURE — 95886 MUSC TEST DONE W/N TEST COMP: CPT | Mod: 26 | Performed by: PSYCHIATRY & NEUROLOGY

## 2021-03-11 PROCEDURE — 95912 NRV CNDJ TEST 11-12 STUDIES: CPT | Performed by: PSYCHIATRY & NEUROLOGY

## 2021-03-11 PROCEDURE — 95886 MUSC TEST DONE W/N TEST COMP: CPT | Performed by: PSYCHIATRY & NEUROLOGY

## 2021-03-11 NOTE — PROCEDURES
"NERVE CONDUCTION STUDIES AND ELECTROMYOGRAPHY REPORT  [facility]  03/11/21          IMPRESSION:  This is an abnormal electrodiagnostic study due to chronic inactive reinnervation changes in the bilateral L5-S1 myotome which can be seen in bilateral L5/S1 radiculopathy/lumbar spinal stenosis at that level. Differential includes bilateral sciatic neuropathy and superimposed length dependent sensorimotor axonal polyneuropathy.  EMG study otherwise limited due to patient positioning/anticoagulation. Recommend clinical correlation.    Charis Henao MD  Neurology - Neurophysiology  [facility]         REASON FOR REFERRAL:  Ms. Krystle Tavarez 87 y.o. referred by Dr. Mir Lugo for evaluation of pain and weakness in the legs affecting her ability to ambulate.  Patient has numbness in the bilateral feet. She does have a history of degenerative disc disease of the lower back status post decompressive surgery in March 2020.  She has a chronic history of prediabetes with recent diagnosis of diabetes.  Patient is anticoagulated on Eliquis.    Height: 5'3\"  Weight: 170 lbs    Symptom focused neurological exam shows limited strength testing due to pain in the lower extremities.  There is decreased light touch in the feet.  Reflexes are unobtainable in the legs.      ELECTRODIAGNOSTIC EXAMINATION:  Nerve conduction studies (NCS) and electromyography (EMG) are utilized to evaluate direct or indirect damage to the peripheral nervous system. NCS are performed to measure the nerve(s) response(s) to electrostimulation across a given nerve segment. EMG evaluates the passive and active electrical activity of the muscle(s) in question.  Muscles are innervated by specific peripheral nerves and roots. Often times, several nerves the muscle to be examined in order to determine the presence or absence of the disease process. Furthermore, nerves and muscles may need to be tested in a [id number] " comparison, as well as in additional extremities, as this may be crucial in characterizing the extent of the disease process, which may be diffuse or isolated and of varying degree of severity. The extent of the neurodiagnostic exam is justified as it may help arrive to a proper diagnosis, which ultimately may contribute to better management of the patient. Therefore, the nerves to muscles examined during the study were medically necessary.    Unless otherwise noted, temperature of the extremity(s) study was monitored before and during the examination and remained between 32 and 36 degrees C for the upper extremities, and between 30 and 36 degrees C for the lower extremities. The patient tolerated testing well, without any complications.       NERVE CONDUCTION STUDY SUMMARY:  Selected nerves of the bilateral lower extremity and left upper are studied.    Normal left ulnar sensory and motor responses.  Normal left median sensory response.  Mildly abnormal left median motor response at the APB due to mild slowing of uncertain etiology.  Unobtainable bilateral sural sensory responses.  Unobtainable bilateral common peroneal motor responses at the extensor digitorum brevis.  Unobtainable bilateral tibial motor responses at the abductor pollicis brevis.  Abnormal bilateral common peroneal motor responses at the tibialis anterior due to low amplitude.      NEEDLE EMG SUMMARY:  Concentric needle study of selected bilateral lower extremity muscles is performed.     Insertion activity is normal in all muscles sampled.   Large amplitude prolonged duration motor unit potentials are appreciated in the bilateral tibialis anterior, right medial gastrocnemius, bilateral extensor hallucis longus.  There was difficulty assessing motor units in the right gluteus medius.  Otherwise with activation, there are normal morphology (amplitude/duration) motor unit action potentials firing with normal recruitment in remaining muscles tested.         PATIENT DATA TABLES  Nerve Conduction Studies     Stim Site NR Onset (ms) Norm Onset (ms) O-P Amp (µV) Norm O-P Amp Site1 Site2 Delta-P (ms) Dist (cm) Jason (m/s) Norm Jason (m/s)   Left Median Anti Sensory (2nd Digit)   Wrist    2.4 <3.8 11.0 >10 Wrist 2nd Digit 3.8 14.0 *37 >50   Left Sural Anti Sensory (Lat Mall)   Calf *NR  <4.6  >3 Calf Lat Mall  14.0  >40   Right Sural Anti Sensory (Lat Mall)   Calf *NR  <4.6  >3 Calf Lat Mall  14.0  >40   Left Ulnar Anti Sensory (5th Digit)   Wrist    2.8 <3.2 6.0 >5 Wrist 5th Digit 3.3 14.0 *42 >50        Stim Site NR Onset (ms) Norm Onset (ms) O-P Amp (mV) Norm O-P Amp Site1 Site2 Delta-0 (ms) Dist (cm) Jason (m/s) Norm Jason (m/s)   Left Median Motor (Abd Poll Brev)   Wrist    3.2 <4 6.3 >5 Elbow Wrist 5.3 24.0 *45 >50   Elbow    8.5  5.3          Left Peroneal EDB Motor (Ext Dig Brev)   Ankle *NR  <6  >2.5 B Fib Ankle  0.0  >40   B Fib *NR     Poplt B Fib  0.0     Poplt *NR             Right Peroneal EDB Motor (Ext Dig Brev)   Ankle *NR  <6  >2.5 B Fib Ankle  0.0  >40   B Fib *NR     Poplt B Fib  0.0     Poplt *NR             Left Peroneal TA Motor (AntTibialis)   Fib Head    3.2 <4.5 *2.8 >3 Poplit Fib Head 0.9 10.0 111 >40   Poplit    4.1  2.6          Right Peroneal TA Motor (AntTibialis)   Fib Head    3.8 <4.5 *2.2 >3 Poplit Fib Head 1.3 10.0 77 >40   Poplit    5.1  1.3          Left Tibial Motor (Abd Sosa Brev)   Ankle *NR  <6  >4 Knee Ankle  0.0  >40   Knee *NR             Right Tibial Motor (Abd Sosa Brev)   Ankle *NR  <6  >4 Knee Ankle  0.0  >40   Knee *NR             Left Ulnar Motor (Abd Dig Min)   Wrist    3.0 <3.1 8.7 >7 B Elbow Wrist 3.4 17.0 50 >50   B Elbow    6.4  8.3  A Elbow B Elbow 1.8 10.0 56    A Elbow    8.2  7.3                                        Electromyography     Side Muscle Nerve Root Ins Act Fibs Psw Amp Dur Poly Recrt Int Pat Comment   Left AntTibialis Dp Br Fibular L4-5 Nml Nml Nml *Incr *>12ms 0 *Reduced *75%    Left Gastroc Tibial S1-2  Nml Nml Nml Nml Nml 0 Nml Nml    Left VastusLat Femoral L2-4 Nml Nml Nml Nml Nml 0 Nml Nml    Left GluteusMed SupGluteal L5-S1 Nml Nml Nml Nml Nml 0 Nml Nml    Left ExtHallLong Dp Br Fibular L5, S1 Nml Nml Nml *Incr *>12ms 0 *Reduced *75%    Right AntTibialis Dp Br Fibular L4-5 Nml Nml Nml *Incr *>12ms 0 *Reduced *75%    Right Gastroc Tibial S1-2 Nml Nml Nml *Incr *>12ms 0 *Reduced *25%    Right VastusLat Femoral L2-4 Nml Nml Nml Nml Nml 0 Nml Nml    Right GluteusMed SupGluteal L5-S1 Nml Nml Nml      difficulty activating   Right ExtHallLong Dp Br Fibular L5, S1 Nml Nml Nml *Incr *>12ms 0 *Reduced *50%

## 2021-03-17 ENCOUNTER — OFFICE VISIT (OUTPATIENT)
Dept: MEDICAL GROUP | Facility: MEDICAL CENTER | Age: 86
End: 2021-03-17
Payer: MEDICARE

## 2021-03-17 ENCOUNTER — HOSPITAL ENCOUNTER (OUTPATIENT)
Facility: MEDICAL CENTER | Age: 86
End: 2021-03-17
Attending: NURSE PRACTITIONER
Payer: MEDICARE

## 2021-03-17 VITALS
TEMPERATURE: 97.5 F | DIASTOLIC BLOOD PRESSURE: 60 MMHG | OXYGEN SATURATION: 94 % | HEIGHT: 63 IN | WEIGHT: 164 LBS | BODY MASS INDEX: 29.06 KG/M2 | HEART RATE: 80 BPM | SYSTOLIC BLOOD PRESSURE: 114 MMHG

## 2021-03-17 DIAGNOSIS — R30.0 BURNING WITH URINATION: ICD-10-CM

## 2021-03-17 DIAGNOSIS — R35.0 URINARY FREQUENCY: ICD-10-CM

## 2021-03-17 LAB
APPEARANCE UR: ABNORMAL
BILIRUB UR STRIP-MCNC: NEGATIVE MG/DL
COLOR UR AUTO: YELLOW
GLUCOSE UR STRIP.AUTO-MCNC: NEGATIVE MG/DL
KETONES UR STRIP.AUTO-MCNC: NEGATIVE MG/DL
LEUKOCYTE ESTERASE UR QL STRIP.AUTO: ABNORMAL
NITRITE UR QL STRIP.AUTO: POSITIVE
PH UR STRIP.AUTO: 6 [PH] (ref 5–8)
PROT UR QL STRIP: 300 MG/DL
RBC UR QL AUTO: ABNORMAL
SP GR UR STRIP.AUTO: 1.03
UROBILINOGEN UR STRIP-MCNC: 0.2 MG/DL

## 2021-03-17 PROCEDURE — 99000 SPECIMEN HANDLING OFFICE-LAB: CPT | Performed by: NURSE PRACTITIONER

## 2021-03-17 PROCEDURE — 99214 OFFICE O/P EST MOD 30 MIN: CPT | Performed by: NURSE PRACTITIONER

## 2021-03-17 PROCEDURE — 87077 CULTURE AEROBIC IDENTIFY: CPT

## 2021-03-17 PROCEDURE — 87186 SC STD MICRODIL/AGAR DIL: CPT

## 2021-03-17 PROCEDURE — 81002 URINALYSIS NONAUTO W/O SCOPE: CPT | Performed by: NURSE PRACTITIONER

## 2021-03-17 PROCEDURE — 87086 URINE CULTURE/COLONY COUNT: CPT

## 2021-03-17 RX ORDER — ESTRADIOL 0.1 MG/G
0.5 CREAM VAGINAL
Qty: 43 G | Refills: 0 | Status: SHIPPED | OUTPATIENT
Start: 2021-03-17 | End: 2021-04-06 | Stop reason: SDUPTHER

## 2021-03-17 RX ORDER — CLOTRIMAZOLE AND BETAMETHASONE DIPROPIONATE 10; .64 MG/G; MG/G
1 CREAM TOPICAL 2 TIMES DAILY
Qty: 45 G | Refills: 0 | Status: ON HOLD | OUTPATIENT
Start: 2021-03-17 | End: 2021-06-02

## 2021-03-17 RX ORDER — NITROFURANTOIN 25; 75 MG/1; MG/1
100 CAPSULE ORAL 2 TIMES DAILY
Qty: 14 CAPSULE | Refills: 0 | Status: SHIPPED | OUTPATIENT
Start: 2021-03-17 | End: 2021-03-24

## 2021-03-17 ASSESSMENT — FIBROSIS 4 INDEX: FIB4 SCORE: 1.63

## 2021-03-17 NOTE — PROGRESS NOTES
Chief Complaint   Patient presents with   • Urinary Frequency     burning, back side pain, lower abdominal pain,  x 1 wk       Subjective:     HPI:     Krystle Tavarez is a 87 y.o. female here to discuss the evaluation and management of:    1. Burning with urination  Patient complains of having dysuria and urinary frequency.  Denies any vaginal discharge.  Has had some diarrhea lately.  She is primarily sedentary and does admit to not drinking enough water.  Her daughter does confirm this.  She lives with her daughter.  No fevers or flank pain.        ROS:  Denies any Headache, Blurred Vision, Confusion, Chest pain,  Shortness of breath,  Abdominal pain, Changes of bowel or bladder, Lower ext edema, Fevers, Nights sweats, Weight Changes, Focal weakness or numbness.  And all other systems reviewed and are all negative. POSITIVE FOR see above        Current Outpatient Medications:   •  clotrimazole-betamethasone (LOTRISONE) 1-0.05 % Cream, Apply 1 Application topically 2 times a day., Disp: 45 g, Rfl: 0  •  estradiol (ESTRACE) 0.1 MG/GM vaginal cream, Insert 0.5 g into the vagina every 72 hours., Disp: 43 g, Rfl: 0  •  carvedilol (COREG) 3.125 MG Tab, TAKE 1 TABLET TWICE A DAY WITH MEALS, Disp: 180 tablet, Rfl: 3  •  metFORMIN ER (GLUCOPHAGE XR) 500 MG TABLET SR 24 HR, Take 1 Tab by mouth every day., Disp: 90 Tab, Rfl: 1  •  losartan (COZAAR) 100 MG Tab, Take 1 Tab by mouth every day., Disp: 100 Tab, Rfl: 3  •  hydroCHLOROthiazide (HYDRODIURIL) 25 MG Tab, Take 1 Tab by mouth every day., Disp: 30 Tab, Rfl: 4  •  Alum Hydroxide-Mag Carbonate (GAVISCON PO), Take  by mouth as needed., Disp: , Rfl:   •  apixaban (ELIQUIS) 2.5mg Tab, Take 1 Tab by mouth 2 Times a Day., Disp: 180 Tab, Rfl: 3  •  Ascorbic Acid (VITAMIN C) 500 MG Cap, Take 1 Capsule by mouth 2 Times a Day., Disp: , Rfl:   •  Acetaminophen 500 MG Cap, Take 500 mg by mouth 4 times a day as needed (pain). Patient only taking 1-2 tabs a day, Disp: , Rfl:   •   sennosides (SENOKOT) 8.6 MG Tab, Take 1 Tab by mouth every day., Disp: 30 Tab, Rfl: 3    Allergies   Allergen Reactions   • Sulfa Drugs Nausea     Nausea        Past Medical History:   Diagnosis Date   • Arthritis     knees, hips   • Breath shortness     with exertion    • Cataract     bilat IOL    • Dental disorder     full upper, partial lower    • Diabetes (HCC)     pre diabetic   • Heart burn    • Hemorrhagic disorder (HCC)     on Eliquis   • High cholesterol     diet controlled    • Hyperlipidemia    • Hypertension    • Pacemaker 2015   • Pain 2020    lower back, right leg   • Pre-diabetes    • TIA (transient ischemic attack)     on Eliquis   • Urinary incontinence      Past Surgical History:   Procedure Laterality Date   • PB LAMINOTOMY,LUMBAR DISK,1 INTRSP N/A 2/25/2020    Procedure: LAMINECTOMY, SPINE, LUMBAR, WITH DISCECTOMY- L5-S1, MEDIAL FACETECTOMY;  Surgeon: Latrell Kimble M.D.;  Location: SURGERY [facility];  Service: Neurosurgery   • FORAMINOTOMY N/A 2/25/2020    Procedure: FORAMINOTOMY, SPINE;  Surgeon: Latrell Kimble M.D.;  Location: SURGERY [facility];  Service: Neurosurgery   • PACEMAKER INSERTION  2015   • HIP REPLACEMENT, TOTAL Right 2009   • KNEE REPLACEMENT, TOTAL Right 2004   • CATARACT EXTRACTION WITH IOL Bilateral 2003   • CHOLECYSTECTOMY  2002   • BASAL CELL EXCISION      nose and hand   • BUNIONECTOMY Left      Family History   Problem Relation Age of Onset   • Diabetes Mother    • Stroke Mother    • Heart Attack Father 74   • No Known Problems Maternal Grandmother    • No Known Problems Maternal Grandfather    • No Known Problems Paternal Grandmother    • No Known Problems Paternal Grandfather      Social History     Socioeconomic History   • Marital status:      Spouse name: Not on file   • Number of children: Not on file   • Years of education: Not on file   • Highest education level: Not on file   Occupational History   • Not on file   Tobacco Use   • Smoking status: Never  "Smoker   • Smokeless tobacco: Never Used   Substance and Sexual Activity   • Alcohol use: Yes     Comment: Rare   • Drug use: No   • Sexual activity: Not Currently     Partners: Male   Other Topics Concern   • Not on file   Social History Narrative   • Not on file     Social Determinants of Health     Financial Resource Strain:    • Difficulty of Paying Living Expenses:    Food Insecurity:    • Worried About Running Out of Food in the Last Year:    • Ran Out of Food in the Last Year:    Transportation Needs:    • Lack of Transportation (Medical):    • Lack of Transportation (Non-Medical):    Physical Activity:    • Days of Exercise per Week:    • Minutes of Exercise per Session:    Stress:    • Feeling of Stress :    Social Connections:    • Frequency of Communication with Friends and Family:    • Frequency of Social Gatherings with Friends and Family:    • Attends [employer] Services:    • Active Member of Clubs or Organizations:    • Attends Club or Organization Meetings:    • Marital Status:    Intimate Partner Violence:    • Fear of Current or Ex-Partner:    • Emotionally Abused:    • Physically Abused:    • Sexually Abused:        Objective:     Vitals: /60 (BP Location: Right arm, Patient Position: Sitting, BP Cuff Size: Adult)   Pulse 80   Temp 36.4 °C (97.5 °F) (Temporal)   Ht 1.6 m (5' 3\")   Wt 74.4 kg (164 lb)   LMP  (LMP Unknown)   SpO2 94%   BMI 29.05 kg/m²    General: Alert, pleasant, NAD  HEENT: Normocephalic.    Skin: Warm, dry, no rashes.  Extremities: No leg edema. No discoloration  Neurological: No tremors  Psych:  Affect/mood is normal, judgement is good, memory is intact, grooming is appropriate.    Assessment/Plan:     Krystle was seen today for urinary frequency.    Diagnoses and all orders for this visit:    Urinary frequency  Start on Macrobid.  Await culture.  Have also sent Lotrisone she may try on her vaginal area.  Have also sent Estrace cream to also trial if her urine culture " does not come back positive for acute cystitis.  Have also discussed atrophic vaginitis.    -     URINE CULTURE(NEW); Future    Burning with urination  -     POCT Urinalysis    -     clotrimazole-betamethasone (LOTRISONE) 1-0.05 % Cream; Apply 1 Application topically 2 times a day.  -     estradiol (ESTRACE) 0.1 MG/GM vaginal cream; Insert 0.5 g into the vagina every 72 hours.  -     nitrofurantoin (MACROBID) 100 MG Cap; Take 1 capsule by mouth 2 times a day for 7 days.              Return if symptoms worsen or fail to improve.    {I have placed the above orders and discussed them with an approved delegating provider.  The MA is performing the below orders under the direction of Dr. Flor YORK

## 2021-03-20 LAB
BACTERIA UR CULT: ABNORMAL
BACTERIA UR CULT: ABNORMAL
SIGNIFICANT IND 70042: ABNORMAL
SITE SITE: ABNORMAL
SOURCE SOURCE: ABNORMAL

## 2021-03-26 DIAGNOSIS — E78.5 DYSLIPIDEMIA: ICD-10-CM

## 2021-03-26 DIAGNOSIS — I10 ESSENTIAL HYPERTENSION, BENIGN: ICD-10-CM

## 2021-03-26 RX ORDER — CARVEDILOL 3.12 MG/1
TABLET ORAL
Qty: 180 TABLET | Refills: 3 | Status: SHIPPED | OUTPATIENT
Start: 2021-03-26 | End: 2021-12-16 | Stop reason: SDUPTHER

## 2021-04-07 DIAGNOSIS — E11.9 TYPE 2 DIABETES MELLITUS WITHOUT COMPLICATION, WITHOUT LONG-TERM CURRENT USE OF INSULIN (HCC): ICD-10-CM

## 2021-04-07 RX ORDER — ESTRADIOL 0.1 MG/G
0.5 CREAM VAGINAL
Qty: 43 G | Refills: 3 | Status: ON HOLD | OUTPATIENT
Start: 2021-04-07 | End: 2021-06-02

## 2021-04-07 RX ORDER — METFORMIN HYDROCHLORIDE 500 MG/1
500 TABLET, EXTENDED RELEASE ORAL DAILY
Qty: 90 TABLET | Refills: 3 | Status: SHIPPED | OUTPATIENT
Start: 2021-04-07 | End: 2021-04-29 | Stop reason: SDUPTHER

## 2021-04-07 NOTE — TELEPHONE ENCOUNTER
Pt called and lvm stating that she would like the Rx Metformin sent to [facility] mail order. Please advise.

## 2021-04-26 ENCOUNTER — APPOINTMENT (RX ONLY)
Dept: URBAN - METROPOLITAN AREA CLINIC 36 | Facility: CLINIC | Age: 86
Setting detail: DERMATOLOGY
End: 2021-04-26

## 2021-04-26 DIAGNOSIS — Z48.817 ENCOUNTER FOR SURGICAL AFTERCARE FOLLOWING SURGERY ON THE SKIN AND SUBCUTANEOUS TISSUE: ICD-10-CM

## 2021-04-26 DIAGNOSIS — L90.5 SCAR CONDITIONS AND FIBROSIS OF SKIN: ICD-10-CM

## 2021-04-26 DIAGNOSIS — L57.0 ACTINIC KERATOSIS: ICD-10-CM

## 2021-04-26 PROCEDURE — ? LIQUID NITROGEN

## 2021-04-26 PROCEDURE — ? LASER RESURFACING

## 2021-04-26 PROCEDURE — 17000 DESTRUCT PREMALG LESION: CPT

## 2021-04-26 PROCEDURE — 17003 DESTRUCT PREMALG LES 2-14: CPT

## 2021-04-26 PROCEDURE — ? INTRALESIONAL KENALOG

## 2021-04-26 ASSESSMENT — LOCATION DETAILED DESCRIPTION DERM
LOCATION DETAILED: LEFT INFERIOR MEDIAL MALAR CHEEK
LOCATION DETAILED: LEFT SUPERIOR LATERAL MALAR CHEEK
LOCATION DETAILED: INFERIOR MID FOREHEAD
LOCATION DETAILED: RIGHT INFERIOR LATERAL MALAR CHEEK
LOCATION DETAILED: LEFT LATERAL MALAR CHEEK

## 2021-04-26 ASSESSMENT — LOCATION SIMPLE DESCRIPTION DERM
LOCATION SIMPLE: RIGHT CHEEK
LOCATION SIMPLE: LEFT CHEEK
LOCATION SIMPLE: INFERIOR FOREHEAD

## 2021-04-26 ASSESSMENT — LOCATION ZONE DERM: LOCATION ZONE: FACE

## 2021-04-26 NOTE — PROCEDURE: LASER RESURFACING
Power (Reina): 18
Anesthesia Type: 1% lidocaine with 1:100,000 epinephrine and 408mcg clindamycin/ml and a 1:10 solution of 8.4% sodium bicarbonate
Price (Use Numbers Only, No Special Characters Or $): 0
External Cooling Fan Speed: 5
Treatment Number: 1
Detail Level: Zone
Post-Care Instructions: I reviewed with the patient in detail post-care instructions. Patient should avoid sun until area fully healed. Pt should apply vaseline to treated areas, and remove crusts gently with water-vinegar soaks.
Consent: Written consent obtained, risks reviewed including but not limited to crusting, scabbing, blistering, scarring, darker or lighter pigmentary change, incomplete improvement of dyschromia, wrinkles, prolonged erythema and facial swelling, infection and bleeding.
Laser Type: CO2 laser
Anesthesia Volume In Cc: 4
Wavelength: 10,600nm

## 2021-04-26 NOTE — PROCEDURE: INTRALESIONAL KENALOG
Detail Level: Detailed
X Size Of Lesion In Cm (Optional): 0
Detail Level: Generalized
Medical Necessity Clause: This procedure was medically necessary because the lesions that were treated were: draining
Concentration Of Kenalog Solution Injected (Mg/Ml): 40.0
Total Volume (Ccs): 0.3
Consent: The risks of atrophy were reviewed with the patient.
Include Z78.9 (Other Specified Conditions Influencing Health Status) As An Associated Diagnosis?: No
Kenalog Preparation: Kenalog
Validate Note Data When Using Inventory: Yes
Detail Level: Detailed
Treatment Number (Optional): 2

## 2021-04-26 NOTE — PROCEDURE: LIQUID NITROGEN
Consent: The patient's consent was obtained including but not limited to risks of crusting, scabbing, blistering, scarring, darker or lighter pigmentary change, recurrence, incomplete removal and infection.
Post-Care Instructions: I reviewed with the patient in detail post-care instructions. Patient is to wear sunprotection, and avoid picking at any of the treated lesions. Pt may apply Vaseline to crusted or scabbing areas.
Render Post-Care Instructions In Note?: no
Total Number Of Aks Treated: 3
Detail Level: Zone
Duration Of Freeze Thaw-Cycle (Seconds): 8
Number Of Freeze-Thaw Cycles: 3 freeze-thaw cycles

## 2021-04-29 ENCOUNTER — OFFICE VISIT (OUTPATIENT)
Dept: MEDICAL GROUP | Facility: MEDICAL CENTER | Age: 86
End: 2021-04-29
Payer: MEDICARE

## 2021-04-29 VITALS
HEIGHT: 63 IN | BODY MASS INDEX: 28.4 KG/M2 | HEART RATE: 65 BPM | SYSTOLIC BLOOD PRESSURE: 162 MMHG | OXYGEN SATURATION: 95 % | DIASTOLIC BLOOD PRESSURE: 70 MMHG | WEIGHT: 160.27 LBS | TEMPERATURE: 97.9 F

## 2021-04-29 DIAGNOSIS — E11.9 TYPE 2 DIABETES MELLITUS WITHOUT COMPLICATION, WITHOUT LONG-TERM CURRENT USE OF INSULIN (HCC): ICD-10-CM

## 2021-04-29 DIAGNOSIS — R32 URINARY INCONTINENCE, UNSPECIFIED TYPE: ICD-10-CM

## 2021-04-29 DIAGNOSIS — M25.80 SESAMOIDITIS: ICD-10-CM

## 2021-04-29 PROCEDURE — 99213 OFFICE O/P EST LOW 20 MIN: CPT | Performed by: NURSE PRACTITIONER

## 2021-04-29 RX ORDER — TRAMADOL HYDROCHLORIDE 50 MG/1
50 TABLET ORAL EVERY 8 HOURS PRN
Status: ON HOLD | COMMUNITY
Start: 2021-04-14 | End: 2021-05-31

## 2021-04-29 RX ORDER — METFORMIN HYDROCHLORIDE 500 MG/1
500 TABLET, EXTENDED RELEASE ORAL DAILY
Qty: 90 TABLET | Refills: 3 | Status: ON HOLD | OUTPATIENT
Start: 2021-04-29 | End: 2022-05-05

## 2021-04-29 ASSESSMENT — FIBROSIS 4 INDEX: FIB4 SCORE: 1.63

## 2021-04-29 NOTE — PROGRESS NOTES
Chief Complaint   Patient presents with   • Foot Problem     poss gout   • Referral Needed     Urology; Dx: Reocurring UTI & Bladder problems       Subjective:     HPI:     Krystle Tavarez is a 87 y.o. female here to discuss the evaluation and management of:    Here with her daughter today.    Patient states that when she walks barefoot it feels like she is walking on rocks.  States that it is very painful on plantar aspect of her foot near the sesamoid bones.  States when she is wearing shoes she does not feel this pain.  There are no sores on her feet, there is no swelling.  We have discussed avoiding going barefoot at this time.    Patient reports when she is laying in bed she had urinated without having the urge to go.  States that this is new for her.  There is no dysuria, no hematuria described.  She does mention that she did have a facet joint injection yesterday in her L5-S1 area.  Not sure if there is any correlation to this.     She also mentions that she had a Kenalog shot in her nose this past Tuesday as she was having delayed healing from the excision of the basal cell.,      ROS:  Denies any Headache, Blurred Vision, Confusion, Chest pain,  Shortness of breath,  Abdominal pain, Changes of bowel or bladder, Lower ext edema, Fevers, Nights sweats, Weight Changes, Focal weakness or numbness.  And all other systems reviewed and are all negative. POSITIVE FOR see above        Current Outpatient Medications:   •  traMADol (ULTRAM) 50 MG Tab, Take 50 mg by mouth every 8 hours as needed. Indications: Pain, Disp: , Rfl:   •  metFORMIN ER (GLUCOPHAGE XR) 500 MG TABLET SR 24 HR, Take 1 tablet by mouth every day., Disp: 90 tablet, Rfl: 3  •  apixaban (ELIQUIS) 2.5mg Tab, Take 1 tablet by mouth 2 times a day., Disp: 180 tablet, Rfl: 3  •  estradiol (ESTRACE) 0.1 MG/GM vaginal cream, Insert 0.5 g into the vagina every 72 hours., Disp: 43 g, Rfl: 3  •  carvedilol (COREG) 3.125 MG Tab, TAKE 1 TABLET TWICE A DAY WITH  MEALS, Disp: 180 tablet, Rfl: 3  •  clotrimazole-betamethasone (LOTRISONE) 1-0.05 % Cream, Apply 1 Application topically 2 times a day., Disp: 45 g, Rfl: 0  •  losartan (COZAAR) 100 MG Tab, Take 1 Tab by mouth every day., Disp: 100 Tab, Rfl: 3  •  hydroCHLOROthiazide (HYDRODIURIL) 25 MG Tab, Take 1 Tab by mouth every day., Disp: 30 Tab, Rfl: 4  •  Alum Hydroxide-Mag Carbonate (GAVISCON PO), Take  by mouth as needed., Disp: , Rfl:   •  Ascorbic Acid (VITAMIN C) 500 MG Cap, Take 1 Capsule by mouth 2 Times a Day., Disp: , Rfl:   •  Acetaminophen 500 MG Cap, Take 500 mg by mouth 4 times a day as needed (pain). Patient only taking 1-2 tabs a day, Disp: , Rfl:   •  sennosides (SENOKOT) 8.6 MG Tab, Take 1 Tab by mouth every day., Disp: 30 Tab, Rfl: 3    Allergies   Allergen Reactions   • Sulfa Drugs Nausea     Nausea        Past Medical History:   Diagnosis Date   • Arthritis     knees, hips   • Breath shortness     with exertion    • Cataract     bilat IOL    • Dental disorder     full upper, partial lower    • Diabetes (HCC)     pre diabetic   • Heart burn    • Hemorrhagic disorder (HCC)     on Eliquis   • High cholesterol     diet controlled    • Hyperlipidemia    • Hypertension    • Pacemaker 2015   • Pain 2020    lower back, right leg   • Pre-diabetes    • TIA (transient ischemic attack)     on Eliquis   • Urinary incontinence      Past Surgical History:   Procedure Laterality Date   • PB LAMINOTOMY,LUMBAR DISK,1 INTRSP N/A 2/25/2020    Procedure: LAMINECTOMY, SPINE, LUMBAR, WITH DISCECTOMY- L5-S1, MEDIAL FACETECTOMY;  Surgeon: Latrell Kimble M.D.;  Location: SURGERY [facility];  Service: Neurosurgery   • FORAMINOTOMY N/A 2/25/2020    Procedure: FORAMINOTOMY, SPINE;  Surgeon: Latrell Kimble M.D.;  Location: SURGERY [facility];  Service: Neurosurgery   • PACEMAKER INSERTION  2015   • HIP REPLACEMENT, TOTAL Right 2009   • KNEE REPLACEMENT, TOTAL Right 2004   • CATARACT EXTRACTION WITH IOL Bilateral 2003   •  CHOLECYSTECTOMY  2002   • BASAL CELL EXCISION      nose and hand   • BUNIONECTOMY Left      Family History   Problem Relation Age of Onset   • Diabetes Mother    • Stroke Mother    • Heart Attack Father 74   • No Known Problems Maternal Grandmother    • No Known Problems Maternal Grandfather    • No Known Problems Paternal Grandmother    • No Known Problems Paternal Grandfather      Social History     Socioeconomic History   • Marital status:      Spouse name: Not on file   • Number of children: Not on file   • Years of education: Not on file   • Highest education level: Not on file   Occupational History   • Not on file   Tobacco Use   • Smoking status: Never Smoker   • Smokeless tobacco: Never Used   Substance and Sexual Activity   • Alcohol use: Yes     Comment: Rare   • Drug use: No   • Sexual activity: Not Currently     Partners: Male   Other Topics Concern   • Not on file   Social History Narrative   • Not on file     Social Determinants of Health     Financial Resource Strain:    • Difficulty of Paying Living Expenses:    Food Insecurity:    • Worried About Running Out of Food in the Last Year:    • Ran Out of Food in the Last Year:    Transportation Needs:    • Lack of Transportation (Medical):    • Lack of Transportation (Non-Medical):    Physical Activity:    • Days of Exercise per Week:    • Minutes of Exercise per Session:    Stress:    • Feeling of Stress :    Social Connections:    • Frequency of Communication with Friends and Family:    • Frequency of Social Gatherings with Friends and Family:    • Attends Rastafarian Services:    • Active Member of Clubs or Organizations:    • Attends Club or Organization Meetings:    • Marital Status:    Intimate Partner Violence:    • Fear of Current or Ex-Partner:    • Emotionally Abused:    • Physically Abused:    • Sexually Abused:        Objective:     Vitals: BP (!) 162/70 (BP Location: Right arm, Patient Position: Sitting, BP Cuff Size: Adult)   Pulse  "65   Temp 36.6 °C (97.9 °F) (Temporal)   Ht 1.6 m (5' 3\")   Wt 72.7 kg (160 lb 4.4 oz)   LMP  (LMP Unknown)   SpO2 95%   BMI 28.39 kg/m²    General: Alert, pleasant, NAD  HEENT: Normocephalic.    Skin: Warm, dry, no rashes.  Extremities: No leg edema. No discoloration  Neurological: No tremors  Psych:  Affect/mood is normal, judgement is good, memory is intact, grooming is appropriate.    Assessment/Plan:     Krystle was seen today for foot problem and referral needed.    Diagnoses and all orders for this visit:    Sesamoiditis  New problem to me.  She does have tenderness with palpation.  There is no swelling, erythema or palpable mass.  Wear shoes or slippers instead of going barefoot.  Follow-up if symptoms persist or worsen.    Urinary incontinence, unspecified type  Recommend further work-up with urology.  Unknown certain if her recent facet joint injection may have contributed.  -     REFERRAL TO UROLOGY    Type 2 diabetes mellitus without complication, without long-term current use of insulin (HCC)  Stable.  Continue with Metformin.  -     metFORMIN ER (GLUCOPHAGE XR) 500 MG TABLET SR 24 HR; Take 1 tablet by mouth every day.        No follow-ups on file.          Joana MCQUEEN.  "

## 2021-05-24 ENCOUNTER — TELEPHONE (OUTPATIENT)
Dept: MEDICAL GROUP | Facility: MEDICAL CENTER | Age: 86
End: 2021-05-24

## 2021-05-24 DIAGNOSIS — M79.671 FOOT PAIN, BILATERAL: ICD-10-CM

## 2021-05-24 DIAGNOSIS — M79.672 FOOT PAIN, BILATERAL: ICD-10-CM

## 2021-05-24 NOTE — TELEPHONE ENCOUNTER
1. Caller Name: Krystle Tavarez                        Call Back Number: [phone] (home)       How would the patient prefer to be contacted with a response: Phone call OK to leave a detailed message    Pts dtr called and stated that pts foot issue is progressively getting worse and has been going to PT and PT told them that its recommended for pt to go see Podiatry for a further eval. Please adivse.

## 2021-05-25 DIAGNOSIS — M25.80 SESAMOIDITIS: ICD-10-CM

## 2021-05-25 DIAGNOSIS — M79.673 PAIN OF FOOT, UNSPECIFIED LATERALITY: ICD-10-CM

## 2021-05-26 NOTE — TELEPHONE ENCOUNTER
Called pt yesterday and spoke to both dtr and pt letting them know that the ref to podiatry has been placed and to get the X-rays done before they see the specialists. Both understood.

## 2021-05-29 ENCOUNTER — APPOINTMENT (OUTPATIENT)
Dept: RADIOLOGY | Facility: MEDICAL CENTER | Age: 86
DRG: 644 | End: 2021-05-29
Attending: EMERGENCY MEDICINE
Payer: MEDICARE

## 2021-05-29 ENCOUNTER — HOSPITAL ENCOUNTER (INPATIENT)
Facility: MEDICAL CENTER | Age: 86
LOS: 2 days | DRG: 644 | End: 2021-05-31
Attending: EMERGENCY MEDICINE | Admitting: STUDENT IN AN ORGANIZED HEALTH CARE EDUCATION/TRAINING PROGRAM
Payer: MEDICARE

## 2021-05-29 DIAGNOSIS — R41.0 DELIRIUM: ICD-10-CM

## 2021-05-29 DIAGNOSIS — N28.89 RENAL MASS, RIGHT: ICD-10-CM

## 2021-05-29 DIAGNOSIS — K59.03 DRUG-INDUCED CONSTIPATION: ICD-10-CM

## 2021-05-29 DIAGNOSIS — E87.1 HYPONATREMIA: ICD-10-CM

## 2021-05-29 LAB
ALBUMIN SERPL BCP-MCNC: 3.9 G/DL (ref 3.2–4.9)
ALBUMIN/GLOB SERPL: 1.3 G/DL
ALP SERPL-CCNC: 77 U/L (ref 30–99)
ALT SERPL-CCNC: 14 U/L (ref 2–50)
ANION GAP SERPL CALC-SCNC: 8 MMOL/L (ref 7–16)
APPEARANCE UR: CLEAR
AST SERPL-CCNC: 15 U/L (ref 12–45)
BASOPHILS # BLD AUTO: 0.3 % (ref 0–1.8)
BASOPHILS # BLD: 0.03 K/UL (ref 0–0.12)
BILIRUB SERPL-MCNC: 0.7 MG/DL (ref 0.1–1.5)
BILIRUB UR QL STRIP.AUTO: NEGATIVE
BUN SERPL-MCNC: 32 MG/DL (ref 8–22)
CALCIUM SERPL-MCNC: 9.6 MG/DL (ref 8.5–10.5)
CHLORIDE SERPL-SCNC: 90 MMOL/L (ref 96–112)
CO2 SERPL-SCNC: 30 MMOL/L (ref 20–33)
COLOR UR: YELLOW
CREAT SERPL-MCNC: 1.16 MG/DL (ref 0.5–1.4)
CREAT UR-MCNC: 52.1 MG/DL
EKG IMPRESSION: NORMAL
EOSINOPHIL # BLD AUTO: 0.07 K/UL (ref 0–0.51)
EOSINOPHIL NFR BLD: 0.8 % (ref 0–6.9)
ERYTHROCYTE [DISTWIDTH] IN BLOOD BY AUTOMATED COUNT: 43.8 FL (ref 35.9–50)
GLOBULIN SER CALC-MCNC: 3 G/DL (ref 1.9–3.5)
GLUCOSE SERPL-MCNC: 151 MG/DL (ref 65–99)
GLUCOSE UR STRIP.AUTO-MCNC: NEGATIVE MG/DL
HCT VFR BLD AUTO: 39.9 % (ref 37–47)
HGB BLD-MCNC: 13.3 G/DL (ref 12–16)
IMM GRANULOCYTES # BLD AUTO: 0.04 K/UL (ref 0–0.11)
IMM GRANULOCYTES NFR BLD AUTO: 0.5 % (ref 0–0.9)
KETONES UR STRIP.AUTO-MCNC: NEGATIVE MG/DL
LEUKOCYTE ESTERASE UR QL STRIP.AUTO: NEGATIVE
LIPASE SERPL-CCNC: 41 U/L (ref 11–82)
LYMPHOCYTES # BLD AUTO: 3.22 K/UL (ref 1–4.8)
LYMPHOCYTES NFR BLD: 36.6 % (ref 22–41)
MCH RBC QN AUTO: 30.7 PG (ref 27–33)
MCHC RBC AUTO-ENTMCNC: 33.3 G/DL (ref 33.6–35)
MCV RBC AUTO: 92.1 FL (ref 81.4–97.8)
MICRO URNS: NORMAL
MONOCYTES # BLD AUTO: 0.74 K/UL (ref 0–0.85)
MONOCYTES NFR BLD AUTO: 8.4 % (ref 0–13.4)
NEUTROPHILS # BLD AUTO: 4.69 K/UL (ref 2–7.15)
NEUTROPHILS NFR BLD: 53.4 % (ref 44–72)
NITRITE UR QL STRIP.AUTO: NEGATIVE
NRBC # BLD AUTO: 0 K/UL
NRBC BLD-RTO: 0 /100 WBC
OSMOLALITY SERPL: 283 MOSM/KG H2O (ref 278–298)
OSMOLALITY UR: 436 MOSM/KG H2O (ref 300–900)
PH UR STRIP.AUTO: 7 [PH] (ref 5–8)
PLATELET # BLD AUTO: 277 K/UL (ref 164–446)
PMV BLD AUTO: 9.4 FL (ref 9–12.9)
POTASSIUM SERPL-SCNC: 4.3 MMOL/L (ref 3.6–5.5)
PROT SERPL-MCNC: 6.9 G/DL (ref 6–8.2)
PROT UR QL STRIP: NEGATIVE MG/DL
RBC # BLD AUTO: 4.33 M/UL (ref 4.2–5.4)
RBC UR QL AUTO: NEGATIVE
SODIUM SERPL-SCNC: 128 MMOL/L (ref 135–145)
SODIUM SERPL-SCNC: 129 MMOL/L (ref 135–145)
SODIUM UR-SCNC: 78 MMOL/L
SP GR UR STRIP.AUTO: 1.01
TROPONIN T SERPL-MCNC: 24 NG/L (ref 6–19)
UROBILINOGEN UR STRIP.AUTO-MCNC: 0.2 MG/DL
WBC # BLD AUTO: 8.8 K/UL (ref 4.8–10.8)

## 2021-05-29 PROCEDURE — 84300 ASSAY OF URINE SODIUM: CPT

## 2021-05-29 PROCEDURE — 81003 URINALYSIS AUTO W/O SCOPE: CPT

## 2021-05-29 PROCEDURE — 700105 HCHG RX REV CODE 258: Performed by: EMERGENCY MEDICINE

## 2021-05-29 PROCEDURE — 700105 HCHG RX REV CODE 258: Performed by: STUDENT IN AN ORGANIZED HEALTH CARE EDUCATION/TRAINING PROGRAM

## 2021-05-29 PROCEDURE — 770006 HCHG ROOM/CARE - MED/SURG/GYN SEMI*

## 2021-05-29 PROCEDURE — 83930 ASSAY OF BLOOD OSMOLALITY: CPT

## 2021-05-29 PROCEDURE — 74177 CT ABD & PELVIS W/CONTRAST: CPT | Mod: MG

## 2021-05-29 PROCEDURE — 36415 COLL VENOUS BLD VENIPUNCTURE: CPT

## 2021-05-29 PROCEDURE — 80053 COMPREHEN METABOLIC PANEL: CPT

## 2021-05-29 PROCEDURE — 84484 ASSAY OF TROPONIN QUANT: CPT

## 2021-05-29 PROCEDURE — 700117 HCHG RX CONTRAST REV CODE 255: Performed by: EMERGENCY MEDICINE

## 2021-05-29 PROCEDURE — 82570 ASSAY OF URINE CREATININE: CPT

## 2021-05-29 PROCEDURE — 85025 COMPLETE CBC W/AUTO DIFF WBC: CPT

## 2021-05-29 PROCEDURE — A9270 NON-COVERED ITEM OR SERVICE: HCPCS | Performed by: STUDENT IN AN ORGANIZED HEALTH CARE EDUCATION/TRAINING PROGRAM

## 2021-05-29 PROCEDURE — 700102 HCHG RX REV CODE 250 W/ 637 OVERRIDE(OP): Performed by: STUDENT IN AN ORGANIZED HEALTH CARE EDUCATION/TRAINING PROGRAM

## 2021-05-29 PROCEDURE — 83935 ASSAY OF URINE OSMOLALITY: CPT

## 2021-05-29 PROCEDURE — 84295 ASSAY OF SERUM SODIUM: CPT

## 2021-05-29 PROCEDURE — C9803 HOPD COVID-19 SPEC COLLECT: HCPCS | Performed by: EMERGENCY MEDICINE

## 2021-05-29 PROCEDURE — 93005 ELECTROCARDIOGRAM TRACING: CPT | Performed by: EMERGENCY MEDICINE

## 2021-05-29 PROCEDURE — 70450 CT HEAD/BRAIN W/O DYE: CPT | Mod: MG

## 2021-05-29 PROCEDURE — 83690 ASSAY OF LIPASE: CPT

## 2021-05-29 PROCEDURE — 99223 1ST HOSP IP/OBS HIGH 75: CPT | Performed by: STUDENT IN AN ORGANIZED HEALTH CARE EDUCATION/TRAINING PROGRAM

## 2021-05-29 PROCEDURE — 0240U HCHG SARS-COV-2 COVID-19 NFCT DS RESP RNA 3 TRGT MIC: CPT

## 2021-05-29 PROCEDURE — 99285 EMERGENCY DEPT VISIT HI MDM: CPT

## 2021-05-29 PROCEDURE — 700101 HCHG RX REV CODE 250: Performed by: STUDENT IN AN ORGANIZED HEALTH CARE EDUCATION/TRAINING PROGRAM

## 2021-05-29 RX ORDER — ACETAMINOPHEN 325 MG/1
650 TABLET ORAL EVERY 6 HOURS PRN
Status: DISCONTINUED | OUTPATIENT
Start: 2021-05-29 | End: 2021-05-31 | Stop reason: HOSPADM

## 2021-05-29 RX ORDER — LIDOCAINE 50 MG/G
1 PATCH TOPICAL EVERY 24 HOURS
Status: DISCONTINUED | OUTPATIENT
Start: 2021-05-29 | End: 2021-05-31 | Stop reason: HOSPADM

## 2021-05-29 RX ORDER — CARVEDILOL 6.25 MG/1
3.12 TABLET ORAL 2 TIMES DAILY WITH MEALS
Status: DISCONTINUED | OUTPATIENT
Start: 2021-05-29 | End: 2021-05-31 | Stop reason: HOSPADM

## 2021-05-29 RX ORDER — POLYETHYLENE GLYCOL 3350 17 G/17G
1 POWDER, FOR SOLUTION ORAL
Status: DISCONTINUED | OUTPATIENT
Start: 2021-05-29 | End: 2021-05-31 | Stop reason: HOSPADM

## 2021-05-29 RX ORDER — ASCORBIC ACID 500 MG
500 TABLET ORAL DAILY
Status: DISCONTINUED | OUTPATIENT
Start: 2021-05-30 | End: 2021-05-31 | Stop reason: HOSPADM

## 2021-05-29 RX ORDER — METFORMIN HYDROCHLORIDE 500 MG/1
500 TABLET, EXTENDED RELEASE ORAL DAILY
Status: DISCONTINUED | OUTPATIENT
Start: 2021-05-30 | End: 2021-05-31 | Stop reason: HOSPADM

## 2021-05-29 RX ORDER — DEXTROSE MONOHYDRATE 25 G/50ML
50 INJECTION, SOLUTION INTRAVENOUS
Status: DISCONTINUED | OUTPATIENT
Start: 2021-05-29 | End: 2021-05-31 | Stop reason: HOSPADM

## 2021-05-29 RX ORDER — SENNOSIDES A AND B 8.6 MG/1
8.6 TABLET, FILM COATED ORAL DAILY
Status: DISCONTINUED | OUTPATIENT
Start: 2021-05-30 | End: 2021-05-29

## 2021-05-29 RX ORDER — SODIUM CHLORIDE 9 MG/ML
INJECTION, SOLUTION INTRAVENOUS CONTINUOUS
Status: DISCONTINUED | OUTPATIENT
Start: 2021-05-29 | End: 2021-05-30

## 2021-05-29 RX ORDER — AMOXICILLIN 250 MG
2 CAPSULE ORAL 2 TIMES DAILY
Status: DISCONTINUED | OUTPATIENT
Start: 2021-05-30 | End: 2021-05-31 | Stop reason: HOSPADM

## 2021-05-29 RX ORDER — SODIUM CHLORIDE 9 MG/ML
500 INJECTION, SOLUTION INTRAVENOUS CONTINUOUS
Status: ACTIVE | OUTPATIENT
Start: 2021-05-29 | End: 2021-05-29

## 2021-05-29 RX ORDER — BISACODYL 10 MG
10 SUPPOSITORY, RECTAL RECTAL
Status: DISCONTINUED | OUTPATIENT
Start: 2021-05-29 | End: 2021-05-31 | Stop reason: HOSPADM

## 2021-05-29 RX ORDER — LOSARTAN POTASSIUM 50 MG/1
100 TABLET ORAL DAILY
Refills: 3 | Status: DISCONTINUED | OUTPATIENT
Start: 2021-05-30 | End: 2021-05-31 | Stop reason: HOSPADM

## 2021-05-29 RX ADMIN — SODIUM CHLORIDE: 9 INJECTION, SOLUTION INTRAVENOUS at 20:54

## 2021-05-29 RX ADMIN — APIXABAN 2.5 MG: 2.5 TABLET, FILM COATED ORAL at 20:54

## 2021-05-29 RX ADMIN — SODIUM CHLORIDE 500 ML: 9 INJECTION, SOLUTION INTRAVENOUS at 18:38

## 2021-05-29 RX ADMIN — CARVEDILOL 3.12 MG: 6.25 TABLET, FILM COATED ORAL at 20:54

## 2021-05-29 RX ADMIN — IOHEXOL 100 ML: 350 INJECTION, SOLUTION INTRAVENOUS at 18:23

## 2021-05-29 RX ADMIN — LIDOCAINE 1 PATCH: 50 PATCH TOPICAL at 20:54

## 2021-05-29 ASSESSMENT — FIBROSIS 4 INDEX: FIB4 SCORE: 1.63

## 2021-05-29 ASSESSMENT — LIFESTYLE VARIABLES
TOTAL SCORE: 0
TOTAL SCORE: 0
EVER FELT BAD OR GUILTY ABOUT YOUR DRINKING: NO
ON A TYPICAL DAY WHEN YOU DRINK ALCOHOL HOW MANY DRINKS DO YOU HAVE: 0
AVERAGE NUMBER OF DAYS PER WEEK YOU HAVE A DRINK CONTAINING ALCOHOL: 0
ALCOHOL_USE: NO
HAVE PEOPLE ANNOYED YOU BY CRITICIZING YOUR DRINKING: NO
HOW MANY TIMES IN THE PAST YEAR HAVE YOU HAD 5 OR MORE DRINKS IN A DAY: 0
HAVE YOU EVER FELT YOU SHOULD CUT DOWN ON YOUR DRINKING: NO
CONSUMPTION TOTAL: NEGATIVE
TOTAL SCORE: 0
EVER HAD A DRINK FIRST THING IN THE MORNING TO STEADY YOUR NERVES TO GET RID OF A HANGOVER: NO
SUBSTANCE_ABUSE: 0

## 2021-05-29 ASSESSMENT — COGNITIVE AND FUNCTIONAL STATUS - GENERAL
WALKING IN HOSPITAL ROOM: A LOT
SUGGESTED CMS G CODE MODIFIER MOBILITY: CK
TURNING FROM BACK TO SIDE WHILE IN FLAT BAD: A LITTLE
MOVING FROM LYING ON BACK TO SITTING ON SIDE OF FLAT BED: A LITTLE
SUGGESTED CMS G CODE MODIFIER DAILY ACTIVITY: CJ
MOVING TO AND FROM BED TO CHAIR: A LITTLE
CLIMB 3 TO 5 STEPS WITH RAILING: A LOT
TOILETING: A LITTLE
STANDING UP FROM CHAIR USING ARMS: A LOT
HELP NEEDED FOR BATHING: A LITTLE
DRESSING REGULAR LOWER BODY CLOTHING: A LITTLE
DRESSING REGULAR UPPER BODY CLOTHING: A LITTLE
MOBILITY SCORE: 15
DAILY ACTIVITIY SCORE: 20

## 2021-05-29 ASSESSMENT — ENCOUNTER SYMPTOMS
FALLS: 0
DIZZINESS: 1
FOCAL WEAKNESS: 0
NAUSEA: 0
COUGH: 0
CONSTIPATION: 1
FEVER: 0
DOUBLE VISION: 0
DIARRHEA: 0
SENSORY CHANGE: 0
ABDOMINAL PAIN: 0
HEADACHES: 0
CHILLS: 0
BLURRED VISION: 1
WEAKNESS: 1
MYALGIAS: 0
VOMITING: 0
SHORTNESS OF BREATH: 0
SORE THROAT: 0
NERVOUS/ANXIOUS: 0
PALPITATIONS: 0
MEMORY LOSS: 0
TINGLING: 0
BACK PAIN: 1

## 2021-05-29 ASSESSMENT — CHA2DS2 SCORE
HYPERTENSION: YES
CHA2DS2 VASC SCORE: 6
PRIOR STROKE OR TIA OR THROMBOEMBOLISM: NO
DIABETES: YES
CHF OR LEFT VENTRICULAR DYSFUNCTION: NO
AGE 65 TO 74: NO
AGE 75 OR GREATER: YES
VASCULAR DISEASE: YES
SEX: FEMALE

## 2021-05-29 ASSESSMENT — PAIN DESCRIPTION - PAIN TYPE: TYPE: CHRONIC PAIN

## 2021-05-29 NOTE — ED PROVIDER NOTES
ED Provider Note    Scribed for Mamta Chavez M.D. by Josue Cortez-Reyes. 5/29/2021  3:51 PM    Primary care provider: JAYLEN Castelan  Means of arrival: EMS  History obtained from: Patient  History limited by: None    CHIEF COMPLAINT  Chief Complaint   Patient presents with    Abdominal Pain     right side, right flank discomfort. Denies difficulty with urine, no blood noted.        HPI  Krystle Tavarez is a 87 y.o. female who presents to the Emergency Department for evaluation of abdominal pain onset several days ago. The patient's family reports that they saw the patient attempting to walk into a wall continuously. The patient declares that she remembers walking towards the bathroom; adding that the next thing she remembered was being helped up by her family. She declares that she has additional chronic back pain, right sided weakness, mild constipation, blurry vision and cough, but denies any diarrhea, constipation, diarrhea, hematuria, headache, sore throat, or fever. She declares that she broke her back about 2 years ago adding that she had surgery performed by . The patient declares that she is currently on Tramadol medication which is a new medication for her this last two weeks.     REVIEW OF SYSTEMS  HEENT:  No headache, or sore throat.  EYES: Positive for blurry vision  PULMONARY:  Positive for cough  GI: Positive for mild constipation, no diarrhea, constipation,   Musculoskeletal: Positive for chronic back pain and right sided weakness  Endocrine: no fevers    See history of present illness. All other systems are negative. C.    PAST MEDICAL HISTORY   has a past medical history of Arthritis, Breath shortness, Cataract, Dental disorder, Diabetes (HCC), Heart burn, Hemorrhagic disorder (HCC), High cholesterol, Hyperlipidemia, Hypertension, Pacemaker (2015), Pain (2020), Pre-diabetes, TIA (transient ischemic attack), and Urinary incontinence.    SURGICAL HISTORY   has a past  surgical history that includes hip replacement, total (Right, 2009); knee replacement, total (Right, 2004); bunionectomy (Left); basal cell excision; cataract extraction with iol (Bilateral, 2003); pacemaker insertion (2015); cholecystectomy (2002); laminotomy,lumbar disk,1 intrsp (N/A, 2/25/2020); and foraminotomy (N/A, 2/25/2020).    SOCIAL HISTORY  Social History     Tobacco Use    Smoking status: Never Smoker    Smokeless tobacco: Never Used   Vaping Use    Vaping Use: Never used   Substance Use Topics    Alcohol use: Yes     Comment: Rare    Drug use: No      Social History     Substance and Sexual Activity   Drug Use No       FAMILY HISTORY  Family History   Problem Relation Age of Onset    Diabetes Mother     Stroke Mother     Heart Attack Father 74    No Known Problems Maternal Grandmother     No Known Problems Maternal Grandfather     No Known Problems Paternal Grandmother     No Known Problems Paternal Grandfather        CURRENT MEDICATIONS  Current Outpatient Medications   Medication Instructions    Acetaminophen 500 mg, Oral, 4 TIMES DAILY PRN, Patient only taking 1-2 tabs a day    Alum Hydroxide-Mag Carbonate (GAVISCON PO) Oral, PRN    apixaban (ELIQUIS) 2.5 mg, Oral, 2 TIMES DAILY    Ascorbic Acid (VITAMIN C) 500 MG Cap 1 Capsule , Oral, 2 TIMES DAILY    carvedilol (COREG) 3.125 MG Tab TAKE 1 TABLET TWICE A DAY WITH MEALS    clotrimazole-betamethasone (LOTRISONE) 1-0.05 % Cream 1 Application, Topical, 2 TIMES DAILY    estradiol (ESTRACE) 0.5 g, Vaginal, EVERY 72 HOURS    hydroCHLOROthiazide (HYDRODIURIL) 25 mg, Oral, DAILY    losartan (COZAAR) 100 mg, Oral, DAILY    metFORMIN ER (GLUCOPHAGE XR) 500 mg, Oral, DAILY    sennosides (SENOKOT) 8.6 mg, Oral, DAILY    traMADol (ULTRAM) 50 mg, Oral, EVERY 8 HOURS PRN       ALLERGIES  Allergies   Allergen Reactions    Sulfa Drugs Nausea     Nausea        PHYSICAL EXAM  VITAL SIGNS: /66   Pulse 76   Temp 36.7 °C (98 °F) (Temporal)   Resp 18   Ht 1.676 m  "(5' 6\")   Wt 70 kg (154 lb 5.2 oz)   LMP  (LMP Unknown)   SpO2 98%   BMI 24.91 kg/m²     Constitutional: Well developed, Well nourished, No acute distress, Non-toxic appearance.   HEENT: Normocephalic, Atraumatic,  external ears normal, pharynx pink,  Mucous  Membranes moist, No rhinorrhea or mucosal edema  Eyes: PERRL, EOMI, Conjunctiva normal, No discharge.   Neck: Normal range of motion, No tenderness, Supple, No stridor.   Lymphatic: No lymphadenopathy    Cardiovascular: Regular Rate and Rhythm, No murmurs,  rubs, or gallops.   Thorax & Lungs: Lungs clear to auscultation bilaterally, No respiratory distress, No wheezes, rhales or rhonchi, No chest wall tenderness.   Abdomen: Bowel sounds normal, Soft, right upper quadrant abdominal pain, non distended,  No pulsatile masses., no rebound guarding or peritoneal signs.   Skin: Warm, Dry, No erythema, No rash,   Back:  No CVA tenderness,  No spinal tenderness, bony crepitance, step offs, or instability.   Neurologic: Alert & oriented clear speech no focal deficits  Extremities: Equal, intact distal pulses, No cyanosis, clubbing or edema,  No tenderness.   Musculoskeletal: Good range of motion in all major joints. No tenderness to palpation or major deformities noted.       DIAGNOSTIC STUDIES / PROCEDURES    LABS  Results for orders placed or performed during the hospital encounter of 05/29/21   CBC WITH DIFFERENTIAL   Result Value Ref Range    WBC 8.8 4.8 - 10.8 K/uL    RBC 4.33 4.20 - 5.40 M/uL    Hemoglobin 13.3 12.0 - 16.0 g/dL    Hematocrit 39.9 37.0 - 47.0 %    MCV 92.1 81.4 - 97.8 fL    MCH 30.7 27.0 - 33.0 pg    MCHC 33.3 (L) 33.6 - 35.0 g/dL    RDW 43.8 35.9 - 50.0 fL    Platelet Count 277 164 - 446 K/uL    MPV 9.4 9.0 - 12.9 fL    Neutrophils-Polys 53.40 44.00 - 72.00 %    Lymphocytes 36.60 22.00 - 41.00 %    Monocytes 8.40 0.00 - 13.40 %    Eosinophils 0.80 0.00 - 6.90 %    Basophils 0.30 0.00 - 1.80 %    Immature Granulocytes 0.50 0.00 - 0.90 %    " Nucleated RBC 0.00 /100 WBC    Neutrophils (Absolute) 4.69 2.00 - 7.15 K/uL    Lymphs (Absolute) 3.22 1.00 - 4.80 K/uL    Monos (Absolute) 0.74 0.00 - 0.85 K/uL    Eos (Absolute) 0.07 0.00 - 0.51 K/uL    Baso (Absolute) 0.03 0.00 - 0.12 K/uL    Immature Granulocytes (abs) 0.04 0.00 - 0.11 K/uL    NRBC (Absolute) 0.00 K/uL   COMP METABOLIC PANEL   Result Value Ref Range    Sodium 128 (L) 135 - 145 mmol/L    Potassium 4.3 3.6 - 5.5 mmol/L    Chloride 90 (L) 96 - 112 mmol/L    Co2 30 20 - 33 mmol/L    Anion Gap 8.0 7.0 - 16.0    Glucose 151 (H) 65 - 99 mg/dL    Bun 32 (H) 8 - 22 mg/dL    Creatinine 1.16 0.50 - 1.40 mg/dL    Calcium 9.6 8.5 - 10.5 mg/dL    AST(SGOT) 15 12 - 45 U/L    ALT(SGPT) 14 2 - 50 U/L    Alkaline Phosphatase 77 30 - 99 U/L    Total Bilirubin 0.7 0.1 - 1.5 mg/dL    Albumin 3.9 3.2 - 4.9 g/dL    Total Protein 6.9 6.0 - 8.2 g/dL    Globulin 3.0 1.9 - 3.5 g/dL    A-G Ratio 1.3 g/dL   LIPASE   Result Value Ref Range    Lipase 41 11 - 82 U/L   URINALYSIS CULTURE, IF INDICATED    Specimen: Urine   Result Value Ref Range    Color Yellow     Character Clear     Specific Gravity 1.012 <1.035    Ph 7.0 5.0 - 8.0    Glucose Negative Negative mg/dL    Ketones Negative Negative mg/dL    Protein Negative Negative mg/dL    Bilirubin Negative Negative    Urobilinogen, Urine 0.2 Negative    Nitrite Negative Negative    Leukocyte Esterase Negative Negative    Occult Blood Negative Negative    Micro Urine Req see below    TROPONIN   Result Value Ref Range    Troponin T 24 (H) 6 - 19 ng/L   ESTIMATED GFR   Result Value Ref Range    GFR If  53 (A) >60 mL/min/1.73 m 2    GFR If Non  44 (A) >60 mL/min/1.73 m 2   CoV-2 and Flu A/B by PCR (24 hour In-House): Collect NP swab in VTM    Specimen: Nasopharyngeal; Respirate   Result Value Ref Range    SARS-CoV-2 Source NP Swab    OSMOLALITY SERUM   Result Value Ref Range    Osmolality Serum 283 278 - 298 mOsm/kg H2O   OSMOLALITY URINE   Result  Value Ref Range    Osmolality Urine 436 300 - 900 mOsm/kg H2O   EKG (NOW)   Result Value Ref Range    Report       [facility] Emergency Dept.    Test Date:  2021  Pt Name:    VAN SEWELL                 Department: ER  MRN:        [id number]                      Room:       RD 09  Gender:     Female                       Technician: [id number]  :        [id number]                   Requested By:DAY ROBIN  Order #:    [id number]                    Reading MD: DAY ROBIN MD    Measurements  Intervals                                Axis  Rate:       76                           P:          83  MD:         200                          QRS:        103  QRSD:       146                          T:          -48  QT:         436  QTc:        491    Interpretive Statements  ATRIAL-SENSED VENTRICULAR-PACED RHYTHM  NO FURTHER ANALYSIS ATTEMPTED DUE TO PACED RHYTHM  Compared to ECG 2020 14:30:57  Intraventricular conduction delay no longer present  ST (T wave) deviation no longer present  Electronically Signed On 2021 18:40:23 PDT by DAY ROBIN MD         All labs reviewed by me.    EKG  12 lead EKG; Interpreted by emergency department physician    RADIOLOGY  CT-HEAD W/O   Final Result         1.  No acute intracranial process.      2. Diffuse atrophy and periventricular white matter changes consistent with chronic small vessel disease.      CT-ABDOMEN-PELVIS WITH   Final Result      1.  No acute intra-abdominal findings.      2.  Moderate to large amount of colonic stool.      3.  Dilated common bile duct may be related to prior cholecystectomy. There is high clinical suspicion for biliary obstruction, consider MRCP.      4.  Diverticulosis.      5.  3.1 cm indeterminate right renal mass likely represents a proteinaceous or hemorrhagic cyst. If clinically indicated, follow-up renal protocol CT or MRI could be performed for further evaluation.        The radiologist's interpretation of  all radiological studies have been reviewed by me.    COURSE & MEDICAL DECISION MAKING  Nursing notes, VS, PMSFHx reviewed in chart.    3:51 PM Patient seen and examined at bedside. Ordered Ct-Abdomen, US-RUQ, Troponin, CBC with differential, CMP, Lipase, Urinalysis, and EKG to evaluate her symptoms. The differential diagnoses include but are not limited to: Medication reaction, UTI, and constipation.     4:47 PM - US-RUQ was cancelled as the patient no longer has a gallbladder. The ultrasound was initially ordered as the patient did not recall that she had a cholecystomy performed in the past.    5:44 PM - Septic work up was initiated at this time.    6:47 PM - I reevaluated the patient at bedside. I informed the patient's family that a cyst was seen on the patient's right kidney. I also informed them that the patient's generalized confusion may be secondary to her Tramadol medication. I informed them of my plan to admit the patient to the hospital in order to continue monitoring and evaluating her symptoms. Patient's family verbalize understanding and agreement to this plan of care.    7:13 PM - Paged Hospitalist.    7:32 PM I discussed the patient's case and the above findings with Dr. Ricketts (Hosptialist) who agrees to evaluate the patient for hospitalization.    CRITICAL CARE  The very real possibility of a deterioration of this patient's condition required the highest level of my preparedness for sudden, emergent intervention.  I provided critical care services, which included medication orders, frequent reevaluations of the patient's condition and response to treatment, ordering and reviewing test results, and discussing the case with various consultants.  The critical care time associated with the care of the patient was 40 minutes. Review chart for interventions. This time is exclusive of any other billable procedures.       HYDRATION: Based on the patient's presentation of Low sodium levels the patient was  given IV fluids. IV Hydration was used because oral hydration was not adequate alone. Upon recheck following hydration, the patient was well improved.    DISPOSITION:  Patient will be hospitalized by Dr. Ricketts in guarded condition.      FINAL IMPRESSION  1. Delirium    2. Hyponatremia    3. Renal mass, right    4. Drug-induced constipation          I, Josue Cortez-Reyes (Scribe), am scribing for, and in the presence of, Mamta Chavez M.D..    Electronically signed by: Josue Cortez-Reyes (Scribe), 5/29/2021    Mamta CRAIN M.D. personally performed the services described in this documentation, as scribed by Josue Cortez-Reyes in my presence, and it is both accurate and complete. C.    The note accurately reflects work and decisions made by me.  Mamta Chavez M.D.  5/29/2021  9:16 PM

## 2021-05-29 NOTE — ED TRIAGE NOTES
Pt presents to ED via EMS with complaint of right sided abdominal discomfort for several days. Per EMS pt family reports seeing pt attempting to ambulate with walker into wall continuously without changing to avoid wall. Pt states chronic low back pain. Denies painful urination. Denies blood in urine. Speaking without signs of distress.

## 2021-05-30 PROBLEM — M54.42 CHRONIC LEFT-SIDED LOW BACK PAIN WITH BILATERAL SCIATICA: Status: ACTIVE | Noted: 2019-07-09

## 2021-05-30 PROBLEM — N28.1 BENIGN CYST OF RIGHT KIDNEY: Status: ACTIVE | Noted: 2021-05-30

## 2021-05-30 PROBLEM — K83.8 COMMON BILE DUCT DILATION: Status: ACTIVE | Noted: 2021-05-30

## 2021-05-30 PROBLEM — Z66 DNR (DO NOT RESUSCITATE): Status: ACTIVE | Noted: 2021-05-30

## 2021-05-30 PROBLEM — R79.89 ELEVATED TROPONIN: Status: ACTIVE | Noted: 2021-05-30

## 2021-05-30 PROBLEM — R79.89 ELEVATED SERUM CREATININE: Status: ACTIVE | Noted: 2021-05-30

## 2021-05-30 PROBLEM — R53.1 GENERALIZED WEAKNESS: Chronic | Status: ACTIVE | Noted: 2021-05-30

## 2021-05-30 PROBLEM — N17.9 ACUTE KIDNEY INJURY (HCC): Status: ACTIVE | Noted: 2021-05-30

## 2021-05-30 LAB
ALBUMIN SERPL BCP-MCNC: 3.5 G/DL (ref 3.2–4.9)
ALBUMIN/GLOB SERPL: 1.3 G/DL
ALP SERPL-CCNC: 70 U/L (ref 30–99)
ALT SERPL-CCNC: 13 U/L (ref 2–50)
ANION GAP SERPL CALC-SCNC: 8 MMOL/L (ref 7–16)
AST SERPL-CCNC: 16 U/L (ref 12–45)
BASOPHILS # BLD AUTO: 0.3 % (ref 0–1.8)
BASOPHILS # BLD: 0.03 K/UL (ref 0–0.12)
BILIRUB SERPL-MCNC: 0.7 MG/DL (ref 0.1–1.5)
BUN SERPL-MCNC: 24 MG/DL (ref 8–22)
CALCIUM SERPL-MCNC: 9 MG/DL (ref 8.5–10.5)
CHLORIDE SERPL-SCNC: 96 MMOL/L (ref 96–112)
CO2 SERPL-SCNC: 27 MMOL/L (ref 20–33)
CREAT SERPL-MCNC: 0.77 MG/DL (ref 0.5–1.4)
EOSINOPHIL # BLD AUTO: 0.07 K/UL (ref 0–0.51)
EOSINOPHIL NFR BLD: 0.7 % (ref 0–6.9)
ERYTHROCYTE [DISTWIDTH] IN BLOOD BY AUTOMATED COUNT: 43.3 FL (ref 35.9–50)
GLOBULIN SER CALC-MCNC: 2.7 G/DL (ref 1.9–3.5)
GLUCOSE SERPL-MCNC: 136 MG/DL (ref 65–99)
HCT VFR BLD AUTO: 37.4 % (ref 37–47)
HGB BLD-MCNC: 12.6 G/DL (ref 12–16)
IMM GRANULOCYTES # BLD AUTO: 0.03 K/UL (ref 0–0.11)
IMM GRANULOCYTES NFR BLD AUTO: 0.3 % (ref 0–0.9)
LYMPHOCYTES # BLD AUTO: 3.44 K/UL (ref 1–4.8)
LYMPHOCYTES NFR BLD: 36.2 % (ref 22–41)
MAGNESIUM SERPL-MCNC: 1.9 MG/DL (ref 1.5–2.5)
MCH RBC QN AUTO: 30.8 PG (ref 27–33)
MCHC RBC AUTO-ENTMCNC: 33.7 G/DL (ref 33.6–35)
MCV RBC AUTO: 91.4 FL (ref 81.4–97.8)
MONOCYTES # BLD AUTO: 0.84 K/UL (ref 0–0.85)
MONOCYTES NFR BLD AUTO: 8.9 % (ref 0–13.4)
NEUTROPHILS # BLD AUTO: 5.08 K/UL (ref 2–7.15)
NEUTROPHILS NFR BLD: 53.6 % (ref 44–72)
NRBC # BLD AUTO: 0 K/UL
NRBC BLD-RTO: 0 /100 WBC
PLATELET # BLD AUTO: 247 K/UL (ref 164–446)
PMV BLD AUTO: 9.4 FL (ref 9–12.9)
POTASSIUM SERPL-SCNC: 3.8 MMOL/L (ref 3.6–5.5)
PROT SERPL-MCNC: 6.2 G/DL (ref 6–8.2)
RBC # BLD AUTO: 4.09 M/UL (ref 4.2–5.4)
SODIUM SERPL-SCNC: 131 MMOL/L (ref 135–145)
SODIUM SERPL-SCNC: 132 MMOL/L (ref 135–145)
SODIUM SERPL-SCNC: 132 MMOL/L (ref 135–145)
TROPONIN T SERPL-MCNC: 44 NG/L (ref 6–19)
TROPONIN T SERPL-MCNC: 62 NG/L (ref 6–19)
TROPONIN T SERPL-MCNC: 64 NG/L (ref 6–19)
TROPONIN T SERPL-MCNC: 68 NG/L (ref 6–19)
WBC # BLD AUTO: 9.5 K/UL (ref 4.8–10.8)

## 2021-05-30 PROCEDURE — 84295 ASSAY OF SERUM SODIUM: CPT

## 2021-05-30 PROCEDURE — 93005 ELECTROCARDIOGRAM TRACING: CPT | Performed by: STUDENT IN AN ORGANIZED HEALTH CARE EDUCATION/TRAINING PROGRAM

## 2021-05-30 PROCEDURE — 97161 PT EVAL LOW COMPLEX 20 MIN: CPT

## 2021-05-30 PROCEDURE — 97166 OT EVAL MOD COMPLEX 45 MIN: CPT

## 2021-05-30 PROCEDURE — 83735 ASSAY OF MAGNESIUM: CPT

## 2021-05-30 PROCEDURE — 36415 COLL VENOUS BLD VENIPUNCTURE: CPT

## 2021-05-30 PROCEDURE — 700102 HCHG RX REV CODE 250 W/ 637 OVERRIDE(OP): Performed by: STUDENT IN AN ORGANIZED HEALTH CARE EDUCATION/TRAINING PROGRAM

## 2021-05-30 PROCEDURE — 84484 ASSAY OF TROPONIN QUANT: CPT | Mod: 91

## 2021-05-30 PROCEDURE — 85025 COMPLETE CBC W/AUTO DIFF WBC: CPT

## 2021-05-30 PROCEDURE — 99233 SBSQ HOSP IP/OBS HIGH 50: CPT | Mod: GC | Performed by: INTERNAL MEDICINE

## 2021-05-30 PROCEDURE — A9270 NON-COVERED ITEM OR SERVICE: HCPCS | Performed by: STUDENT IN AN ORGANIZED HEALTH CARE EDUCATION/TRAINING PROGRAM

## 2021-05-30 PROCEDURE — 770006 HCHG ROOM/CARE - MED/SURG/GYN SEMI*

## 2021-05-30 PROCEDURE — 80053 COMPREHEN METABOLIC PANEL: CPT

## 2021-05-30 PROCEDURE — 700101 HCHG RX REV CODE 250: Performed by: STUDENT IN AN ORGANIZED HEALTH CARE EDUCATION/TRAINING PROGRAM

## 2021-05-30 RX ORDER — POTASSIUM CHLORIDE 20 MEQ/1
40 TABLET, EXTENDED RELEASE ORAL ONCE
Status: COMPLETED | OUTPATIENT
Start: 2021-05-30 | End: 2021-05-30

## 2021-05-30 RX ORDER — POLYETHYLENE GLYCOL 3350 17 G/17G
1 POWDER, FOR SOLUTION ORAL DAILY
Status: DISCONTINUED | OUTPATIENT
Start: 2021-05-30 | End: 2021-05-31 | Stop reason: HOSPADM

## 2021-05-30 RX ADMIN — LOSARTAN POTASSIUM 100 MG: 50 TABLET, FILM COATED ORAL at 05:25

## 2021-05-30 RX ADMIN — APIXABAN 2.5 MG: 2.5 TABLET, FILM COATED ORAL at 05:26

## 2021-05-30 RX ADMIN — DOCUSATE SODIUM 50 MG AND SENNOSIDES 8.6 MG 2 TABLET: 8.6; 5 TABLET, FILM COATED ORAL at 05:26

## 2021-05-30 RX ADMIN — DOCUSATE SODIUM 50 MG AND SENNOSIDES 8.6 MG 2 TABLET: 8.6; 5 TABLET, FILM COATED ORAL at 17:49

## 2021-05-30 RX ADMIN — CARVEDILOL 3.12 MG: 6.25 TABLET, FILM COATED ORAL at 17:49

## 2021-05-30 RX ADMIN — POLYETHYLENE GLYCOL 3350 1 PACKET: 17 POWDER, FOR SOLUTION ORAL at 05:26

## 2021-05-30 RX ADMIN — LIDOCAINE 1 PATCH: 50 PATCH TOPICAL at 19:58

## 2021-05-30 RX ADMIN — CARVEDILOL 3.12 MG: 6.25 TABLET, FILM COATED ORAL at 08:43

## 2021-05-30 RX ADMIN — OXYCODONE HYDROCHLORIDE AND ACETAMINOPHEN 500 MG: 500 TABLET ORAL at 05:25

## 2021-05-30 RX ADMIN — POTASSIUM CHLORIDE 40 MEQ: 1500 TABLET, EXTENDED RELEASE ORAL at 05:25

## 2021-05-30 RX ADMIN — MAGNESIUM HYDROXIDE 30 ML: 400 SUSPENSION ORAL at 01:47

## 2021-05-30 RX ADMIN — APIXABAN 2.5 MG: 2.5 TABLET, FILM COATED ORAL at 17:49

## 2021-05-30 RX ADMIN — ACETAMINOPHEN 650 MG: 325 TABLET, FILM COATED ORAL at 01:46

## 2021-05-30 ASSESSMENT — GAIT ASSESSMENTS
GAIT LEVEL OF ASSIST: MINIMAL ASSIST
DISTANCE (FEET): 50
ASSISTIVE DEVICE: FRONT WHEEL WALKER

## 2021-05-30 ASSESSMENT — ACTIVITIES OF DAILY LIVING (ADL): TOILETING: INDEPENDENT

## 2021-05-30 ASSESSMENT — ENCOUNTER SYMPTOMS
NAUSEA: 0
CHILLS: 0
HALLUCINATIONS: 0
BACK PAIN: 1
HEARTBURN: 0
MYALGIAS: 0
DOUBLE VISION: 0
ABDOMINAL PAIN: 1
DIZZINESS: 0
COUGH: 0
FEVER: 0
SENSORY CHANGE: 0
BLURRED VISION: 0
PALPITATIONS: 0
SORE THROAT: 0
INSOMNIA: 0
SHORTNESS OF BREATH: 0
VOMITING: 0
HEADACHES: 0
BLOOD IN STOOL: 0

## 2021-05-30 ASSESSMENT — COGNITIVE AND FUNCTIONAL STATUS - GENERAL
WALKING IN HOSPITAL ROOM: A LITTLE
STANDING UP FROM CHAIR USING ARMS: A LITTLE
SUGGESTED CMS G CODE MODIFIER MOBILITY: CK
HELP NEEDED FOR BATHING: A LITTLE
TURNING FROM BACK TO SIDE WHILE IN FLAT BAD: A LITTLE
MOVING TO AND FROM BED TO CHAIR: A LITTLE
SUGGESTED CMS G CODE MODIFIER DAILY ACTIVITY: CJ
MOVING FROM LYING ON BACK TO SITTING ON SIDE OF FLAT BED: A LITTLE
DAILY ACTIVITIY SCORE: 22
CLIMB 3 TO 5 STEPS WITH RAILING: A LOT
MOBILITY SCORE: 17
DRESSING REGULAR LOWER BODY CLOTHING: A LITTLE

## 2021-05-30 ASSESSMENT — PAIN DESCRIPTION - PAIN TYPE
TYPE: ACUTE PAIN

## 2021-05-30 NOTE — H&P
[facility] Medicine History & Physical Note    Date of Service  5/29/2021    Primary Care Physician  JAYLEN Castelan    Consultants      Code Status  Full Code    Chief Complaint  Chief Complaint   Patient presents with   • Abdominal Pain     right side, right flank discomfort. Denies difficulty with urine, no blood noted.        History of Presenting Illness  87 y.o. female who with a history of diabetes mellitus type 2 on Metformin, GERD, paroxysmal atrial fibrillation on apixaban, sick sinus syndrome status post pacemaker, hyperlipidemia, hypertension, chronic low back pain, whopresented 5/29/2021 with gait disturbance and right-sided abdominal pain in setting of hyponatremia.  Patient is accompanied by her daughter who assisted with providing some of the history.  Patient reports that she has been feeling generalized weaker with worsening of her low back pain in the past 5 years.  She has been evaluated by Dr. Santana in the past last 2 months ago including an EMG.  They do not know the final results of that.  Patient reports that she has been going to physical therapy and exercising but continues to feel weaker.  She has lost balance and fallen at times in the past several years.  In February 2020, patient underwent a L4-5 laminectomy for symptoms including urinary retention and bilateral lower extremity weakness with radiculopathy into the heels.  Patient reports transient improvement but now continues to have chronic low back pain as well as radiation to the right heel and sometimes the left heel.  She recently started seeing Dr. Garcia for pain management and underwent 2 injections with partial improvement of her low back pain.  Per Dr. Garcia, patient recently started tramadol, which she feels is not really helping with the pain but it is making her feel more loopy.  Patient reports that she has not been able to socialize very much since moving to [place] from [place] and after COVID-19  precaution restrictions.    Patient denies any fevers or chills.  She denies any numbness or tingling.  Her main complaint is her low back pain more on the left than the right.  She reports that the pain is a 9/10 which is worse with walking and improved to 8/10 with laying down.  She has intermittent radiation to the right leg and also the left leg to the heels as per above.  He characterizes the pain as dull.  He denies any fevers or changes.  She continues to have urinary incontinence that is unchanged.  She complains of constipation since taking the tramadol has not had a bowel movement for 2 days.  She does take a stool softener daily.  He denies any new focal weakness.  She does have some new blurry vision without diplopia.    The daughter reports that this afternoon, she noticed her mother driving her walker into the wall as she was going to the bathroom.  She was noted to be confused.  Patient was also complaining of some lightheadedness and dizziness prior to this event.  Due to concern of her change in mental state, EMS was called.    In the ER, patient was noted to have a sodium of 126 with mild renal insufficiency with elevated creatinine of 1.16 (baseline of 0.78).  Urinalysis was negative.    Review of Systems  Review of Systems   Constitutional: Positive for malaise/fatigue. Negative for chills and fever.   HENT: Negative for ear pain and sore throat.    Eyes: Positive for blurred vision. Negative for double vision.   Respiratory: Negative for cough and shortness of breath.    Cardiovascular: Negative for chest pain and palpitations.   Gastrointestinal: Positive for constipation. Negative for abdominal pain, diarrhea, nausea and vomiting.   Genitourinary: Negative for dysuria and frequency.   Musculoskeletal: Positive for back pain (Chronic low back pain more prominent on the left). Negative for falls, joint pain and myalgias.   Skin: Negative for itching and rash.   Neurological: Positive for  dizziness and weakness. Negative for tingling, sensory change (), focal weakness and headaches.   Psychiatric/Behavioral: Negative for memory loss and substance abuse. The patient is not nervous/anxious.        Past Medical History   has a past medical history of Arthritis, Breath shortness, Cataract, Dental disorder, Diabetes (HCC), Heart burn, Hemorrhagic disorder (HCC), High cholesterol, Hyperlipidemia, Hypertension, Pacemaker (2015), Pain (2020), Pre-diabetes, TIA (transient ischemic attack), and Urinary incontinence.    Surgical History   has a past surgical history that includes hip replacement, total (Right, 2009); knee replacement, total (Right, 2004); bunionectomy (Left); basal cell excision; cataract extraction with iol (Bilateral, 2003); pacemaker insertion (2015); cholecystectomy (2002); pr laminotomy,lumbar disk,1 intrsp (N/A, 2/25/2020); and foraminotomy (N/A, 2/25/2020).     Family History  family history includes Diabetes in her mother; Heart Attack (age of onset: 74) in her father; No Known Problems in her maternal grandfather, maternal grandmother, paternal grandfather, and paternal grandmother; Stroke in her mother.     Social History   reports that she has never smoked. She has never used smokeless tobacco. She reports current alcohol use. She reports that she does not use drugs.    Allergies  Allergies   Allergen Reactions   • Sulfa Drugs Nausea     Nausea        Medications  Prior to Admission Medications   Prescriptions Last Dose Informant Patient Reported? Taking?   Ascorbic Acid (VITAMIN C) 500 MG Cap 5/29/2021 at 0730 Patient Yes No   Sig: Take 1 Capsule by mouth 2 Times a Day.   apixaban (ELIQUIS) 2.5mg Tab 5/29/2021 at 0730 Patient No No   Sig: Take 1 tablet by mouth 2 times a day.   carvedilol (COREG) 3.125 MG Tab 5/29/2021 at 0730 Patient No No   Sig: TAKE 1 TABLET TWICE A DAY WITH MEALS   clotrimazole-betamethasone (LOTRISONE) 1-0.05 % Cream Not Taking at Unknown time Patient No No    Sig: Apply 1 Application topically 2 times a day.   Patient not taking: Reported on 5/29/2021   estradiol (ESTRACE) 0.1 MG/GM vaginal cream Not Taking at Unknown time Patient No No   Sig: Insert 0.5 g into the vagina every 72 hours.   Patient not taking: Reported on 5/29/2021   hydroCHLOROthiazide (HYDRODIURIL) 25 MG Tab 5/29/2021 at 0730 Patient No No   Sig: Take 1 Tab by mouth every day.   losartan (COZAAR) 100 MG Tab 5/29/2021 at 0730 Patient No No   Sig: Take 1 Tab by mouth every day.   metFORMIN ER (GLUCOPHAGE XR) 500 MG TABLET SR 24 HR 5/29/2021 at 0730 Patient No No   Sig: Take 1 tablet by mouth every day.   sennosides (SENOKOT) 8.6 MG Tab 5/29/2021 at 0730 Patient No No   Sig: Take 1 Tab by mouth every day.   traMADol (ULTRAM) 50 MG Tab 5/29/2021 at last dose Patient Yes No   Sig: Take 50 mg by mouth every 8 hours as needed for Mild Pain. Indications: Pain      Facility-Administered Medications: None       Physical Exam  Temp:  [36.7 °C (98 °F)] 36.7 °C (98 °F)  Pulse:  [74-76] 74  Resp:  [18] 18  BP: (160-179)/(66-71) 179/71  SpO2:  [97 %-98 %] 97 %    Physical Exam  Vitals and nursing note reviewed.   Constitutional:       General: She is not in acute distress.     Appearance: Normal appearance. She is well-developed. She is not diaphoretic.   HENT:      Head: Normocephalic and atraumatic.      Right Ear: External ear normal.      Left Ear: External ear normal.      Nose: Nose normal.      Mouth/Throat:      Pharynx: Oropharynx is clear. No oropharyngeal exudate or posterior oropharyngeal erythema.   Eyes:      General: No scleral icterus.        Right eye: No discharge.         Left eye: No discharge.      Conjunctiva/sclera: Conjunctivae normal.      Pupils: Pupils are equal, round, and reactive to light.   Neck:      Thyroid: No thyromegaly.      Vascular: No JVD.      Trachea: No tracheal deviation.   Cardiovascular:      Rate and Rhythm: Normal rate and regular rhythm.      Pulses: Normal pulses.       Heart sounds: Normal heart sounds. No murmur heard.   No friction rub. No gallop.    Pulmonary:      Effort: Pulmonary effort is normal. No respiratory distress.      Breath sounds: Normal breath sounds. No stridor. No wheezing or rales.   Abdominal:      General: Bowel sounds are normal. There is no distension.      Palpations: Abdomen is soft. There is no mass.      Tenderness: There is no abdominal tenderness. There is no guarding or rebound.   Musculoskeletal:         General: No tenderness or deformity.      Cervical back: Neck supple.      Right lower leg: No edema.      Left lower leg: No edema.      Comments: Tenderness to palpation of the left paraspinous muscle in the lower back around L4-5.   Lymphadenopathy:      Cervical: No cervical adenopathy.   Skin:     General: Skin is warm and dry.      Capillary Refill: Capillary refill takes less than 2 seconds.      Coloration: Skin is not pale.      Findings: No erythema or rash.   Neurological:      Mental Status: She is alert and oriented to person, place, and time.      Sensory: No sensory deficit.      Motor: No weakness or abnormal muscle tone.      Deep Tendon Reflexes: Reflexes abnormal (  Diminished reflexes in the bilateral extremities).      Comments: Patient has full strength in both the upper lower extremities.  She has sensation to light touch in both upper and lower extremities.  There is diminished reflexes in the patella.  She does have a history of right knee replacement.  Achilles reflexes is absent bilaterally.   Psychiatric:         Behavior: Behavior normal.         Thought Content: Thought content normal.         Judgment: Judgment normal.         Laboratory:  Recent Labs     05/29/21  1645   WBC 8.8   RBC 4.33   HEMOGLOBIN 13.3   HEMATOCRIT 39.9   MCV 92.1   MCH 30.7   MCHC 33.3*   RDW 43.8   PLATELETCT 277   MPV 9.4     Recent Labs     05/29/21  1645   SODIUM 128*   POTASSIUM 4.3   CHLORIDE 90*   CO2 30   GLUCOSE 151*   BUN 32*    CREATININE 1.16   CALCIUM 9.6     Recent Labs     05/29/21  1645   ALTSGPT 14   ASTSGOT 15   ALKPHOSPHAT 77   TBILIRUBIN 0.7   LIPASE 41   GLUCOSE 151*         No results for input(s): NTPROBNP in the last 72 hours.      Recent Labs     05/29/21  1645   TROPONINT 24*       Imaging:  CT-HEAD W/O   Final Result         1.  No acute intracranial process.      2. Diffuse atrophy and periventricular white matter changes consistent with chronic small vessel disease.      CT-ABDOMEN-PELVIS WITH   Final Result      1.  No acute intra-abdominal findings.      2.  Moderate to large amount of colonic stool.      3.  Dilated common bile duct may be related to prior cholecystectomy. There is high clinical suspicion for biliary obstruction, consider MRCP.      4.  Diverticulosis.      5.  3.1 cm indeterminate right renal mass likely represents a proteinaceous or hemorrhagic cyst. If clinically indicated, follow-up renal protocol CT or MRI could be performed for further evaluation.        Head CT per my read shows no intracranial hemorrhages, no masses, mild hydrocephalus ex vacuo.            I have personally reviewed the patient's CT scan of the abdomen and pelvis.  There is a indeterminate cystic-appearing mass on the top of the right kidney.  There is also significant stool burden particularly within the rectum.      EKG per my read paced rhythm with heart rate of 76, borderline prolonged QTC of 491, ST depression present on V4, V5, and V6 with no change compared to prior EKG February 2020.      Assessment/Plan:  I anticipate this patient will require at least two midnights for appropriate medical management, necessitating inpatient admission.    * Hyponatremia- (present on admission)  Assessment & Plan  I discussed the case with the ER physician, Dr. Chavez, given the patient's degree of hyponatremia, this appears that it is contributing to her confusion as well as dizziness and gait instability.  She does have underlying  generalized weakness, which is subjective per my exam.  Likely it is related to diuretic use, hydrochlorothiazide.    Admit to neurology.  Check serum osmolality, urine osmolality, urine creatine, and urine sodium.  Start normal saline 75 mils per hour.  Discontinue hydrochlorothiazide.  Neurochecks every 4 hours.  Sodium checks every 4 hours.    Elevated troponin- (present on admission)  Assessment & Plan  Mild elevation of troponin in setting of decreased GFR.  No active chest pain.  EKG unremarkable.    Continue to trend.    Elevated serum creatinine- (present on admission)  Assessment & Plan  Slight elevation in creatinine not meeting criteria for acute kidney injury.  Likely contribution to hydrochlorothiazide and dehydration.    Discontinue hydrochlorothiazide.  Gentle IV fluids with normal saline at 75 mils per hour.    Generalized weakness- (present on admission)  Assessment & Plan  Appears to be a chronic issue and may be more per subjective.  No focal deficits per my exam.    PT and OT consults.    Confusion- (present on admission)  Assessment & Plan  Secondary to hyponatremia and possibly tramadol contributing.      Stop diuretic and correct sodium with normal saline as per above.  Discontinue tramadol.    Drug-induced constipation- (present on admission)  Assessment & Plan  Related to tramadol.    Discontinue tramadol.   Milk of magnesia now and then start MiraLAX daily.    Chronic left-sided low back pain with bilateral sciatica- (present on admission)  Assessment & Plan  As per history.  Transient improvement with L4-5 laminectomy in 2020 by Dr. Kimble.  No improvement with recent tramadol.    Discontinue tramadol.  Trial of lidocaine patch.  Resume Tylenol as needed for pain.  Patient counseled on redirective therapies such as meditation, breathing exercises, and pleasurable physical activities.    PAF (paroxysmal atrial fibrillation) (HCC)- (present on admission)  Assessment & Plan  - [id number]  score 7 which is moderate-high risk of stroke of 11.2% per year according to the [place] Atrial Fibrillation Cohort Study  - currently on: apixaban 2.5 mg Standard BID Times  - currently on: (carvedilol 6.25 mg) 3.125 mg q12h    Continue home apixaban and carvedilol.    Type 2 diabetes mellitus without complication, without long-term current use of insulin (HCC)- (present on admission)  Assessment & Plan  - most recent hemoglobin A1c: 7.2 % (12/24/2020 9:41:00)  - blood glucose since admission controlled (range <180 mg/dL)    Continue home Metformin.    History of TIA (transient ischemic attack)- (present on admission)  Assessment & Plan  As per history.    Continue apixaban.    Essential hypertension, benign- (present on admission)  Assessment & Plan  As per history.  Currently uncontrolled.  Blood pressure goal less than 150 given her age group.    Continue home losartan.  Discontinue hydrochlorothiazide given high risk of falls with beers list.    Cardiac pacemaker in situ- (present on admission)  Assessment & Plan  As per history.  Reports having significant pauses of 45 seconds intraoperatively during her orthopedic surgery over 10 years ago.  Followed by [facility] cardiology.    Continue outpatient follow-up.    DNR (do not resuscitate)- (present on admission)  Assessment & Plan  Discussed with patient with daughter present.  Patient wishes to be DNR/DNI.

## 2021-05-30 NOTE — ASSESSMENT & PLAN NOTE
Appears to be a chronic issue and may be more per subjective.  No focal deficits per my exam.    PT and OT consults.

## 2021-05-30 NOTE — THERAPY
"Physical Therapy   Initial Evaluation     Patient Name: Krystle Tavarez  Age:  87 y.o., Sex:  female  Medical Record #: [id number]  Today's Date: 5/30/2021     Precautions: Fall Risk    Assessment  Patient is 87 y.o. female that presented to acute with complaints of abdominal pain for several days; chart indicates family observed patient attempting to walk into a wall repeatedly. PMHx significant for DM2, GERD, pAfib, sick sinus syndrome and PPM, HLD, HTN, chronic LBP. She presented to PT with decreased activity tolerance however appears to be near baseline functional mobility. She self limited ambulation distance due to fatigue and reported feeling \"weak.\" She reported she typically ambulates in home with 4WW several times throughout the day and makes community outings with family. She reported daughter and KEIKO very supportive and encourage her to walk more and drink more water. (Note hyponatremia in chart; question if patient drinking too much water?) Will continue to follow to progress to PLOF and address stairs prior to DC. Anticipate she will be able to return home following medical DC.    Plan    Recommend Physical Therapy 1-2 more tx for the following treatments:  Bed Mobility, Community Re-integration, Equipment, Gait Training, Manual Therapy, Neuro Re-Education / Balance, Self Care/Home Evaluation, Stair Training, Therapeutic Activities and Therapeutic Exercises    DC Equipment Recommendations: None (patient has/uses 4WW at home)  Discharge Recommendations: Recommend outpatient physical therapy services to address higher level deficits       Subjective    \"I'd rather go to a clinic for therapy. I get more out of it that way.\"     Objective       05/30/21 0839   Total Time Spent   Total Time Spent (Mins) 20   Charge Group   PT Evaluation PT Evaluation Low   Initial Contact Note    Initial Contact Note Order Received and Verified, Physical Therapy Evaluation in Progress with Full Report to Follow.   Precautions " "  Precautions Fall Risk   Vitals   O2 (LPM) 0   O2 Delivery Device None - Room Air   Pain 0 - 10 Group   Location Foot;Back   Location Orientation Right   Therapist Pain Assessment During Activity;Post Activity Pain Same as Prior to Activity;Nurse Notified   Prior Living Situation   Prior Services Home With Outpatient Therapy   Housing / Facility 2 Story House   Steps Into Home 2   Steps In Home   (FOS but patient lives on first floor)   Equipment Owned 4-Wheel Walker   Lives with - Patient's Self Care Capacity Adult Children   Comments Patient reported she moved to [place] May 2019 and lives with her daughter and KEIKO who are home \"most of the time\" and able to assist as needed   Prior Level of Functional Mobility   Bed Mobility Independent   Transfer Status Independent   Ambulation Independent   Distance Ambulation (Feet)   (community)   Assistive Devices Used 4-Wheel Walker   Stairs Independent   Comments Patient reported she goes to OP PT, the hair salon, MD appointments. She likes to go shopping   History of Falls   History of Falls Yes  (reported several falls in [place], 2x in [place])   Cognition    Cognition / Consciousness WDL   Level of Consciousness Alert   Comments pleasant, cooperative, appropriate throughout; slight delay but WFL for age   Passive ROM Lower Body   Passive ROM Lower Body WDL   Comments not formally tested, WFL for mobility   Active ROM Lower Body    Active ROM Lower Body  WDL   Comments as above   Strength Lower Body   Lower Body Strength  WDL   Comments as above; lacks endurance   Sensation Lower Body   Lower Extremity Sensation   Not Tested   Comments patient reported numbness and hypersensitivity in R heel   Lower Body Muscle Tone   Lower Body Muscle Tone  WDL   Coordination Lower Body    Coordination Lower Body  WDL   Balance Assessment   Sitting Balance (Static) Fair   Sitting Balance (Dynamic) Fair   Standing Balance (Static) Fair   Standing Balance (Dynamic) Fair   Weight Shift Sitting Fair "   Weight Shift Standing Fair   Comments with FWW, no overt LOB   Gait Analysis   Gait Level Of Assist Minimal Assist  (for safety, no physical assist required)   Assistive Device Front Wheel Walker   Distance (Feet) 50   # of Times Distance was Traveled 1   Deviation   (slight trendenlenberg; decreased raymond, forward flexed)   # of Stairs Climbed 0   Weight Bearing Status no restrictions   Vision Deficits Impacting Mobility NT   Comments patient self limited distance due to fatigue/weakness   Bed Mobility    Supine to Sit   (NT, up with OT on entry)   Sit to Supine   (NT, left in chair)   Functional Mobility   Sit to Stand Minimal Assist  (for safety)   Bed, Chair, Wheelchair Transfer Minimal Assist  (cueing for safe technique)   Transfer Method Stand Step   How much difficulty does the patient currently have...   Turning over in bed (including adjusting bedclothes, sheets and blankets)? 3   Sitting down on and standing up from a chair with arms (e.g., wheelchair, bedside commode, etc.) 3   Moving from lying on back to sitting on the side of the bed? 3   How much help from another person does the patient currently need...   Moving to and from a bed to a chair (including a wheelchair)? 3   Need to walk in a hospital room? 3   Climbing 3-5 steps with a railing? 2   6 clicks Mobility Score 17   Activity Tolerance   Sitting in Chair left in chair   Sitting Edge of Bed NT   Standing 10 min   Comments limited by fatigue and reported weakness   Edema / Skin Assessment   Edema / Skin  Not Assessed   Patient / Family Goals    Patient / Family Goal #1 go home   Short Term Goals    Short Term Goal # 1 Patient will move supine<>sitting EOB with supervision within 6tx in order to get in/out of bed   Short Term Goal # 2 Patient will ambulate 150ft with supervision within 6tx in order to access environment   Short Term Goal # 3 Patient will ascend/descend 2 steps with supervision within 6tx in order to enter/exit home    Education Group   Education Provided Role of Physical Therapist   Role of Physical Therapist Patient Response Patient;Acceptance;Explanation;Verbal Demonstration   Problem List    Problems Pain;Impaired Ambulation;Decreased Activity Tolerance;Functional Strength Deficit   Anticipated Discharge Equipment and Recommendations   DC Equipment Recommendations None  (patient has/uses 4WW at home)   Discharge Recommendations Recommend outpatient physical therapy services to address higher level deficits   Interdisciplinary Plan of Care Collaboration   IDT Collaboration with  Nursing;Occupational Therapist   Patient Position at End of Therapy Seated;Chair Alarm On;Call Light within Reach;Tray Table within Reach;Phone within Reach   Collaboration Comments RN aware of visit, response   Session Information   Date / Session Number  5/30-1 (1/3 total)   Priority   (.)

## 2021-05-30 NOTE — ASSESSMENT & PLAN NOTE
As per history.  Transient improvement with L4-5 laminectomy in 2020 by Dr. Kimble.  No improvement with recent tramadol.    Discontinue tramadol.  Trial of lidocaine patch.  Resume Tylenol as needed for pain.  Patient counseled on redirective therapies such as meditation, breathing exercises, and pleasurable physical activities.

## 2021-05-30 NOTE — THERAPY
Occupational Therapy   Initial Evaluation     Patient Name: Krystle Tavarez  Age:  87 y.o., Sex:  female  Medical Record #: [id number]  Today's Date: 5/30/2021     Precautions  Precautions: Fall Risk    Assessment  Patient is 87 y.o. female with a diagnosis of abdo pain, confusion, generalized weakness.  Additional factors influencing patient status / progress: good family support at home. Will benefit from OT to focus on ADLS, transfers, functional endurance.      Plan    Recommend Occupational Therapy 2 times per week until therapy goals are met for the following treatments:  Adaptive Equipment, Self Care/Activities of Daily Living, Therapeutic Activities and Therapeutic Exercises.    DC Equipment Recommendations: (P) None (all necessary DME appears to be in place)  Discharge Recommendations: (P) Anticipate that the patient will have no further occupational therapy needs after discharge from the hospital     Subjective    Pleasant and cooperative. Motivated for progress     Objective       05/30/21 0831   Total Time Spent   Total Time Spent (Mins)    Charge Group   OT Evaluation OT Evaluation Mod   Initial Contact Note    Initial Contact Note Order Received and Verified, Occupational Therapy Evaluation in Progress with Full Report to Follow.   Prior Living Situation   Prior Services Home With Outpatient Therapy   Housing / Facility 2 Story House   Steps Into Home 2   Steps In Home   (lives on ground floor)   Elevator No   Bathroom Set up Walk In Shower;Shower Chair;Grab Bars   Equipment Owned 4-Wheel Walker;Tub / Shower Seat;Grab Bar(s) In Tub / Shower   Lives with - Patient's Self Care Capacity Adult Children   Comments lives with daughter and son in law, who are home most of the day and can assist as needed   Prior Level of ADL Function   Self Feeding Independent   Grooming / Hygiene Independent   Bathing Independent   Dressing Independent   Toileting Independent   Prior Level of IADL Function   Medication  Management Independent   Laundry Requires Assist   Kitchen Mobility Independent   Finances Requires Assist   Home Management Requires Assist   Shopping Requires Assist   Prior Level Of Mobility Independent With Device in Home   Driving / Transportation Relatives / Others Provide Transportation   History of Falls   History of Falls Yes   Precautions   Precautions Fall Risk   Vitals   O2 Delivery Device None - Room Air   Pain 0 - 10 Group   Therapist Pain Assessment During Activity;Post Activity Pain Same as Prior to Activity;Nurse Notified;0   Cognition    Cognition / Consciousness WDL   Level of Consciousness Alert   Comments pleasant, cooperative, mild STM deficit noted   Passive ROM Upper Body   Passive ROM Upper Body WDL   Active ROM Upper Body   Active ROM Upper Body  WDL   Dominant Hand Right   Strength Upper Body   Upper Body Strength  WDL   Sensation Upper Body   Upper Extremity Sensation  WDL   Upper Body Muscle Tone   Upper Body Muscle Tone  WDL   Coordination Upper Body   Coordination WDL   Balance Assessment   Sitting Balance (Static) Fair   Sitting Balance (Dynamic) Fair   Standing Balance (Static) Fair   Standing Balance (Dynamic) Fair   Weight Shift Sitting Fair   Weight Shift Standing Fair   Comments with FWW   Bed Mobility    Supine to Sit Supervised   Sit to Supine   (left seated on bedside chair)   Scooting Supervised   Rolling Supervised   ADL Assessment   Eating Supervision   Grooming Supervision;Standing;Seated   Bathing   (NT)   Upper Body Dressing Supervision   Lower Body Dressing Minimal Assist   Toileting Supervision   How much help from another person does the patient currently need...   Putting on and taking off regular lower body clothing? 3   Bathing (including washing, rinsing, and drying)? 3   Toileting, which includes using a toilet, bedpan, or urinal? 4   Putting on and taking off regular upper body clothing? 4   Taking care of personal grooming such as brushing teeth? 4   Eating  meals? 4   6 Clicks Daily Activity Score 22   Functional Mobility   Sit to Stand   (CG)   Bed, Chair, Wheelchair Transfer   (CG)   Toilet Transfers   (SBA)   Transfer Method Stand Pivot   Visual Perception   Visual Perception  WDL   Activity Tolerance   Sitting in Chair ad teena   Sitting Edge of Bed 5 mins   Standing 10 mins   Comments fatigues quickly with sustained activity   Patient / Family Goals   Patient / Family Goal #1 gt stronger   Short Term Goals   Short Term Goal # 1 mod I toileting tasks   Short Term Goal # 2 distant supervision for necessary functional transfers   Short Term Goal # 3 supervised level for dressingt asks, use of AE as appropriate   Short Term Goal # 4 tolerate 12 minutes od sustained functional activity, prior to resting, no CALLOWAY observed   Education Group   Role of Occupational Therapist Patient Response Patient;Acceptance;Explanation;Demonstration;Verbal Demonstration;Reinforcement Needed   Problem List   Problem List Decreased Active Daily Living Skills;Decreased Upper Extremity Strength Right;Decreased Upper Extremity Strength Left;Decreased Functional Mobility;Decreased Activity Tolerance   Anticipated Discharge Equipment and Recommendations   DC Equipment Recommendations None  (all necessary DME appears to be in place)   Discharge Recommendations Anticipate that the patient will have no further occupational therapy needs after discharge from the hospital   Interdisciplinary Plan of Care Collaboration   IDT Collaboration with  Nursing;Physical Therapist   Patient Position at End of Therapy Seated;Chair Alarm On;Call Light within Reach;Tray Table within Reach;Phone within Reach   Collaboration Comments report given   Session Information   Date / Session Number  5/30,1 ( 1/2,6/5)   Priority 2

## 2021-05-30 NOTE — ASSESSMENT & PLAN NOTE
Secondary to hyponatremia and possibly tramadol contributing.      Stop diuretic and correct sodium with normal saline as per above.  Discontinue tramadol.

## 2021-05-30 NOTE — PROGRESS NOTES
Daily Progress Note    Date of Service: 5/30/2021  Primary Team: [employer] Purple Team   Attending: Vijay Gómez M.D.   Senior Resident: Dr. Palacios  Intern: Dr. Avendano  Contact:  [phone]    ID: 87 year-old with history of hypertension on hctz presented with altered mental status and weakness, admitted for symptomatic hyponatremia.    Chief Complaint: abd pain    Subjective:  Interval Updates: n/a  Ms. Tavarez feels much better today, although still with the chronic back pain. She was able to have a bowel movement overnight and ambulated with assistance without issues to the bathroom. Her thinking feels more clear and the confusion has cleared up. Denies chest pain or palpitations    Consultants/Specialty:  n/a    Review of Systems:   Review of Systems   Constitutional: Negative for chills and fever.   HENT: Negative for hearing loss and sore throat.    Eyes: Negative for blurred vision and double vision.   Respiratory: Negative for cough and shortness of breath.    Cardiovascular: Negative for chest pain and palpitations.   Gastrointestinal: Positive for abdominal pain. Negative for blood in stool, heartburn, melena, nausea and vomiting.   Genitourinary: Negative for dysuria and frequency.   Musculoskeletal: Positive for back pain and joint pain. Negative for myalgias.   Neurological: Negative for dizziness, sensory change and headaches.   Psychiatric/Behavioral: Negative for hallucinations. The patient does not have insomnia.        Objective Data:   Physical Exam:   Vitals:   Temp:  [36.2 °C (97.1 °F)-36.7 °C (98.1 °F)] 36.4 °C (97.5 °F)  Pulse:  [74-94] 80  Resp:  [16-18] 16  BP: (113-185)/(62-85) 113/85  SpO2:  [90 %-98 %] 92 %    Physical Exam  Constitutional:       Appearance: Normal appearance.   HENT:      Head: Normocephalic and atraumatic.      Right Ear: External ear normal.      Left Ear: External ear normal.      Nose: Nose normal. No congestion.      Mouth/Throat:      Mouth: Mucous membranes are  moist.      Pharynx: Oropharynx is clear.   Eyes:      General: No scleral icterus.     Extraocular Movements: Extraocular movements intact.      Pupils: Pupils are equal, round, and reactive to light.   Cardiovascular:      Rate and Rhythm: Normal rate and regular rhythm.      Pulses: Normal pulses.      Heart sounds: No murmur heard.     Pulmonary:      Effort: Pulmonary effort is normal. No respiratory distress.      Breath sounds: Normal breath sounds.   Abdominal:      General: Abdomen is flat. There is no distension.      Palpations: Abdomen is soft.   Musculoskeletal:      Cervical back: Normal range of motion and neck supple.      Right lower leg: Edema present.      Left lower leg: Edema present.      Comments: Bilateral legs with mild edema, tender to palpations   Skin:     General: Skin is warm and dry.      Capillary Refill: Capillary refill takes less than 2 seconds.   Neurological:      General: No focal deficit present.      Mental Status: She is alert and oriented to person, place, and time.      Cranial Nerves: No cranial nerve deficit.   Psychiatric:         Mood and Affect: Mood normal.         Behavior: Behavior normal.         Labs:   Recent Labs     05/29/21  1645 05/29/21  1645 05/29/21  2205 05/30/21  0132 05/30/21  0611   SODIUM 128*   < > 129* 131* 132*   POTASSIUM 4.3  --   --  3.8  --    CHLORIDE 90*  --   --  96  --    CO2 30  --   --  27  --    GLUCOSE 151*  --   --  136*  --    BUN 32*  --   --  24*  --     < > = values in this interval not displayed.      Recent Labs     05/29/21 1645 05/30/21 0132   ALBUMIN 3.9 3.5   TBILIRUBIN 0.7 0.7   ALKPHOSPHAT 77 70   TOTPROTEIN 6.9 6.2   ALTSGPT 14 13   ASTSGOT 15 16   CREATININE 1.16 0.77      Recent Labs     05/29/21 1645 05/30/21  0132   WBC 8.8 9.5   RBC 4.33 4.09*   HEMOGLOBIN 13.3 12.6   HEMATOCRIT 39.9 37.4   MCV 92.1 91.4   MCH 30.7 30.8   RDW 43.8 43.3   PLATELETCT 277 247   MPV 9.4 9.4   NEUTSPOLYS 53.40 53.60   LYMPHOCYTES 36.60  36.20   MONOCYTES 8.40 8.90   EOSINOPHILS 0.80 0.70   BASOPHILS 0.30 0.30        Imaging:   CT-HEAD W/O   Final Result         1.  No acute intracranial process.      2. Diffuse atrophy and periventricular white matter changes consistent with chronic small vessel disease.      CT-ABDOMEN-PELVIS WITH   Final Result      1.  No acute intra-abdominal findings.      2.  Moderate to large amount of colonic stool.      3.  Dilated common bile duct may be related to prior cholecystectomy. There is high clinical suspicion for biliary obstruction, consider MRCP.      4.  Diverticulosis.      5.  3.1 cm indeterminate right renal mass likely represents a proteinaceous or hemorrhagic cyst. If clinically indicated, follow-up renal protocol CT or MRI could be performed for further evaluation.           Problem Representation:     * Hyponatremia- (present on admission)  Assessment & Plan  Her hyponatremia likely contributed to her confusion as well as dizziness and gait instability, in addition to tramadol. She does have subjective underlying generalized weakness. The hyponatremia itself is likely due to volume depletion and hydrochlorothiazide.  Resolving, trended 126 -> 132, fluids were then stopped  -Fluids discontinued for now  -Discontinue hctz  -Encourage PO intake  -PT/OT    Common bile duct dilation  Assessment & Plan  Likely sequelae of post-cholecystecomy. There is no elevation of WBC, alk phos, no specific symptoms.  -No concern for obstruction    Benign cyst of right kidney  Assessment & Plan  Discussed w/ radiologist regarding hemorrhagic appearing R kidney cyst. Likely stable and not newly hemorrhagic as there is no fat stranding and no interval increase in size compared to previous ultrasound in January. Further ultrasound would not be of utility. Not likely cause of pain.  -No further imaging needed    Acute kidney injury (HCC)  Assessment & Plan  Cr trended from 1.16 to 0.77 with fluids. Likely prerenal due to  poor oral intake and hctz  -Discontinue hctz  -Fluids now discontinued  -Continue to monitor, avoid nephrotoxins, renally dose meds    DNR (do not resuscitate)- (present on admission)  Assessment & Plan  Discussed with patient with daughter present.  Patient wishes to be DNR/DNI.    Elevated troponin- (present on admission)  Assessment & Plan  Mild elevation of troponin in setting of decreased GFR. Continue to deny any chest pain or palpitations.  EKG unremarkable and unchanged from previous EKGs.   -Continue to trend  -May need echo    Generalized weakness- (present on admission)  Assessment & Plan  Appears to be a chronic issue and may be more per subjective.  No focal deficits per my exam.    PT and OT consults.    Confusion- (present on admission)  Assessment & Plan  Secondary to hyponatremia and possibly tramadol contributing.      Stop diuretic and correct sodium with normal saline as per above.  Discontinue tramadol.    Drug-induced constipation- (present on admission)  Assessment & Plan  Resolved, had a bowel movement  Related to tramadol.  Milk of magnesia now and then start MiraLAX daily.    PAF (paroxysmal atrial fibrillation) ([facility])- (present on admission)  Assessment & Plan  - [id number] score 7 which is moderate-high risk of stroke of 11.2% per year according to the [place] Atrial Fibrillation Cohort Study  - currently on apixaban 2.5 mg BID  - currently on 3.125 mg q12h  Continue home apixaban and carvedilol.    Type 2 diabetes mellitus without complication, without long-term current use of insulin ([facility])- (present on admission)  Assessment & Plan  - most recent hemoglobin A1c: 7.2 % (12/24/2020 9:41:00)  - blood glucose since admission controlled (range <180 mg/dL)    Continue home Metformin.    History of TIA (transient ischemic attack)- (present on admission)  Assessment & Plan  As per history.    Continue apixaban.    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  Transient improvement with L4-5 laminectomy in 2020 by Dr. Kimble.  No improvement with recent tramadol.    Discontinue tramadol.  Trial of lidocaine patch.  Resume Tylenol as needed for pain.  Patient counseled on redirective therapies such as meditation, breathing exercises, and pleasurable physical activities.    Essential hypertension, benign- (present on admission)  Assessment & Plan  As per history.  Currently uncontrolled.  Blood pressure goal less than 150 given her age group.    Continue home losartan.  Discontinue hydrochlorothiazide given high risk of falls with beers list.    Cardiac pacemaker in situ- (present on admission)  Assessment & Plan  As per history.  Reports having significant pauses of 45 seconds intraoperatively during her orthopedic surgery over 10 years ago.  Followed by [facility] cardiology.    Continue outpatient follow-up.      Code Status: DNR  Diet: consistent carb  Lines/Tubes/Drains:  IVF: n/a  Prophylaxis:  DVT: on apixaban  GI: n/a  Disposition: remain admitted for monitoring

## 2021-05-30 NOTE — ASSESSMENT & PLAN NOTE
Mild elevation of troponin in setting of decreased GFR. Continue to deny any chest pain or palpitations.  EKG unremarkable and unchanged from previous EKGs.   -Continue to trend  -May need echo

## 2021-05-30 NOTE — ASSESSMENT & PLAN NOTE
Slight elevation in creatinine not meeting criteria for acute kidney injury.  Likely contribution to hydrochlorothiazide and dehydration.    Discontinue hydrochlorothiazide.  Gentle IV fluids with normal saline at 75 mils per hour.

## 2021-05-30 NOTE — ASSESSMENT & PLAN NOTE
Her hyponatremia likely contributed to her confusion as well as dizziness and gait instability, in addition to tramadol. She does have subjective underlying generalized weakness. The hyponatremia itself is likely due to volume depletion and hydrochlorothiazide.  Resolving, trended 126 -> 132, fluids were then stopped  -Fluids discontinued for now  -Discontinue hctz  -Encourage PO intake  -PT/OT

## 2021-05-30 NOTE — ASSESSMENT & PLAN NOTE
As per history.  Reports having significant pauses of 45 seconds intraoperatively during her orthopedic surgery over 10 years ago.  Followed by [facility] cardiology.    Continue outpatient follow-up.

## 2021-05-30 NOTE — CARE PLAN
The patient is Stable - Low risk of patient condition declining or worsening    Shift Goals  Clinical Goals: Raise Na levels  Patient Goals: Rest/comfort  Family Goals: Safety    Progress made toward(s) clinical / shift goals:  NS @ 75 running, blankets/pillows for comfort, bed alarm is on    Patient is not progressing towards the following goals:    Problem: Pain - Standard  Goal: Alleviation of pain or a reduction in pain to the patient’s comfort goal  Outcome: Not Progressing  Education provided on the pain scale and non-pharmacological pain interventions. Pain medications administered as ordered. Hourly rounding in place.

## 2021-05-30 NOTE — ASSESSMENT & PLAN NOTE
- most recent hemoglobin A1c: 7.2 % (12/24/2020 9:41:00)  - blood glucose since admission controlled (range <180 mg/dL)    Continue home Metformin.

## 2021-05-30 NOTE — ASSESSMENT & PLAN NOTE
Cr trended from 1.16 to 0.77 with fluids. Likely prerenal due to poor oral intake and hctz  -Discontinue hctz  -Fluids now discontinued  -Continue to monitor, avoid nephrotoxins, renally dose meds

## 2021-05-30 NOTE — ASSESSMENT & PLAN NOTE
- [id number] score 7 which is moderate-high risk of stroke of 11.2% per year according to the [place] Atrial Fibrillation Cohort Study  - currently on apixaban 2.5 mg BID  - currently on 3.125 mg q12h  Continue home apixaban and carvedilol.

## 2021-05-30 NOTE — ASSESSMENT & PLAN NOTE
As per history.  Currently uncontrolled.  Blood pressure goal less than 150 given her age group.    Continue home losartan.  Discontinue hydrochlorothiazide given high risk of falls with beers list.

## 2021-05-30 NOTE — PROGRESS NOTES
Patient arrived to unit at 2030. BP on arrival was 185/62. No PRNs for BP ordered at this time. Admitting MD paged. Orders received from Dr. Mayo to give scheduled carvedilol and monitor BP.

## 2021-05-30 NOTE — ASSESSMENT & PLAN NOTE
Likely sequelae of post-cholecystecomy. There is no elevation of WBC, alk phos, no specific symptoms.  -No concern for obstruction

## 2021-05-30 NOTE — PROGRESS NOTES
2 RN skin check completed with NAMAN Abraham  Gen integ is dry/fragile/intact.  Callous to L foot.  No other wounds or pressure injuries noted at this time.

## 2021-05-30 NOTE — ASSESSMENT & PLAN NOTE
Discussed w/ radiologist regarding hemorrhagic appearing R kidney cyst. Likely stable and not newly hemorrhagic as there is no fat stranding and no interval increase in size compared to previous ultrasound in January. Further ultrasound would not be of utility. Not likely cause of pain.  -No further imaging needed

## 2021-05-30 NOTE — ASSESSMENT & PLAN NOTE
Resolved, had a bowel movement  Related to tramadol.  Milk of magnesia now and then start MiraLAX daily.

## 2021-05-31 ENCOUNTER — APPOINTMENT (OUTPATIENT)
Dept: CARDIOLOGY | Facility: MEDICAL CENTER | Age: 86
DRG: 644 | End: 2021-05-31
Attending: INTERNAL MEDICINE
Payer: MEDICARE

## 2021-05-31 ENCOUNTER — APPOINTMENT (OUTPATIENT)
Dept: RADIOLOGY | Facility: MEDICAL CENTER | Age: 86
End: 2021-05-31
Attending: EMERGENCY MEDICINE
Payer: MEDICARE

## 2021-05-31 ENCOUNTER — HOSPITAL ENCOUNTER (OUTPATIENT)
Facility: MEDICAL CENTER | Age: 86
End: 2021-06-02
Attending: EMERGENCY MEDICINE | Admitting: STUDENT IN AN ORGANIZED HEALTH CARE EDUCATION/TRAINING PROGRAM
Payer: MEDICARE

## 2021-05-31 VITALS
TEMPERATURE: 98.3 F | DIASTOLIC BLOOD PRESSURE: 70 MMHG | OXYGEN SATURATION: 95 % | BODY MASS INDEX: 25.65 KG/M2 | SYSTOLIC BLOOD PRESSURE: 118 MMHG | HEART RATE: 77 BPM | HEIGHT: 66 IN | RESPIRATION RATE: 18 BRPM | WEIGHT: 159.61 LBS

## 2021-05-31 DIAGNOSIS — I15.9 SECONDARY HYPERTENSION: ICD-10-CM

## 2021-05-31 DIAGNOSIS — Z86.73 HISTORY OF TIA (TRANSIENT ISCHEMIC ATTACK): ICD-10-CM

## 2021-05-31 DIAGNOSIS — R47.9 SPEECH DISTURBANCE, UNSPECIFIED TYPE: ICD-10-CM

## 2021-05-31 DIAGNOSIS — E78.5 DYSLIPIDEMIA: ICD-10-CM

## 2021-05-31 DIAGNOSIS — I10 ESSENTIAL HYPERTENSION, BENIGN: ICD-10-CM

## 2021-05-31 PROBLEM — G45.9 TIA (TRANSIENT ISCHEMIC ATTACK): Status: ACTIVE | Noted: 2021-05-31

## 2021-05-31 LAB
ABO + RH BLD: NORMAL
ABO GROUP BLD: NORMAL
ALBUMIN SERPL BCP-MCNC: 3.5 G/DL (ref 3.2–4.9)
ALBUMIN SERPL BCP-MCNC: 3.7 G/DL (ref 3.2–4.9)
ALBUMIN/GLOB SERPL: 1.1 G/DL
ALBUMIN/GLOB SERPL: 1.3 G/DL
ALP SERPL-CCNC: 71 U/L (ref 30–99)
ALP SERPL-CCNC: 82 U/L (ref 30–99)
ALT SERPL-CCNC: 15 U/L (ref 2–50)
ALT SERPL-CCNC: 15 U/L (ref 2–50)
ANION GAP SERPL CALC-SCNC: 10 MMOL/L (ref 7–16)
ANION GAP SERPL CALC-SCNC: 9 MMOL/L (ref 7–16)
APTT PPP: 30.4 SEC (ref 24.7–36)
AST SERPL-CCNC: 21 U/L (ref 12–45)
AST SERPL-CCNC: 24 U/L (ref 12–45)
BASOPHILS # BLD AUTO: 0.4 % (ref 0–1.8)
BASOPHILS # BLD AUTO: 0.6 % (ref 0–1.8)
BASOPHILS # BLD: 0.04 K/UL (ref 0–0.12)
BASOPHILS # BLD: 0.05 K/UL (ref 0–0.12)
BILIRUB SERPL-MCNC: 0.8 MG/DL (ref 0.1–1.5)
BILIRUB SERPL-MCNC: 0.8 MG/DL (ref 0.1–1.5)
BLD GP AB SCN SERPL QL: NORMAL
BUN SERPL-MCNC: 16 MG/DL (ref 8–22)
BUN SERPL-MCNC: 17 MG/DL (ref 8–22)
CALCIUM SERPL-MCNC: 9.1 MG/DL (ref 8.5–10.5)
CALCIUM SERPL-MCNC: 9.3 MG/DL (ref 8.5–10.5)
CHLORIDE SERPL-SCNC: 95 MMOL/L (ref 96–112)
CHLORIDE SERPL-SCNC: 96 MMOL/L (ref 96–112)
CO2 SERPL-SCNC: 25 MMOL/L (ref 20–33)
CO2 SERPL-SCNC: 26 MMOL/L (ref 20–33)
CREAT SERPL-MCNC: 0.54 MG/DL (ref 0.5–1.4)
CREAT SERPL-MCNC: 0.61 MG/DL (ref 0.5–1.4)
EKG IMPRESSION: NORMAL
EOSINOPHIL # BLD AUTO: 0.05 K/UL (ref 0–0.51)
EOSINOPHIL # BLD AUTO: 0.09 K/UL (ref 0–0.51)
EOSINOPHIL NFR BLD: 0.5 % (ref 0–6.9)
EOSINOPHIL NFR BLD: 1 % (ref 0–6.9)
ERYTHROCYTE [DISTWIDTH] IN BLOOD BY AUTOMATED COUNT: 43.3 FL (ref 35.9–50)
ERYTHROCYTE [DISTWIDTH] IN BLOOD BY AUTOMATED COUNT: 43.5 FL (ref 35.9–50)
EST. AVERAGE GLUCOSE BLD GHB EST-MCNC: 160 MG/DL
FLUAV RNA SPEC QL NAA+PROBE: NEGATIVE
FLUBV RNA SPEC QL NAA+PROBE: NEGATIVE
GLOBULIN SER CALC-MCNC: 2.8 G/DL (ref 1.9–3.5)
GLOBULIN SER CALC-MCNC: 3.3 G/DL (ref 1.9–3.5)
GLUCOSE SERPL-MCNC: 147 MG/DL (ref 65–99)
GLUCOSE SERPL-MCNC: 224 MG/DL (ref 65–99)
HBA1C MFR BLD: 7.2 % (ref 4–5.6)
HCT VFR BLD AUTO: 38 % (ref 37–47)
HCT VFR BLD AUTO: 41.5 % (ref 37–47)
HGB BLD-MCNC: 13 G/DL (ref 12–16)
HGB BLD-MCNC: 14.1 G/DL (ref 12–16)
IMM GRANULOCYTES # BLD AUTO: 0.02 K/UL (ref 0–0.11)
IMM GRANULOCYTES # BLD AUTO: 0.03 K/UL (ref 0–0.11)
IMM GRANULOCYTES NFR BLD AUTO: 0.2 % (ref 0–0.9)
IMM GRANULOCYTES NFR BLD AUTO: 0.3 % (ref 0–0.9)
INR PPP: 0.99 (ref 0.87–1.13)
LV EJECT FRACT  99904: 65
LV EJECT FRACT MOD 2C 99903: 62.06
LV EJECT FRACT MOD 4C 99902: 75.09
LV EJECT FRACT MOD BP 99901: 69.74
LYMPHOCYTES # BLD AUTO: 3.42 K/UL (ref 1–4.8)
LYMPHOCYTES # BLD AUTO: 3.63 K/UL (ref 1–4.8)
LYMPHOCYTES NFR BLD: 36.7 % (ref 22–41)
LYMPHOCYTES NFR BLD: 41 % (ref 22–41)
MCH RBC QN AUTO: 31 PG (ref 27–33)
MCH RBC QN AUTO: 31.3 PG (ref 27–33)
MCHC RBC AUTO-ENTMCNC: 34 G/DL (ref 33.6–35)
MCHC RBC AUTO-ENTMCNC: 34.2 G/DL (ref 33.6–35)
MCV RBC AUTO: 91.2 FL (ref 81.4–97.8)
MCV RBC AUTO: 91.6 FL (ref 81.4–97.8)
MONOCYTES # BLD AUTO: 0.7 K/UL (ref 0–0.85)
MONOCYTES # BLD AUTO: 0.72 K/UL (ref 0–0.85)
MONOCYTES NFR BLD AUTO: 7.5 % (ref 0–13.4)
MONOCYTES NFR BLD AUTO: 8.1 % (ref 0–13.4)
NEUTROPHILS # BLD AUTO: 4.35 K/UL (ref 2–7.15)
NEUTROPHILS # BLD AUTO: 5.07 K/UL (ref 2–7.15)
NEUTROPHILS NFR BLD: 49.1 % (ref 44–72)
NEUTROPHILS NFR BLD: 54.6 % (ref 44–72)
NRBC # BLD AUTO: 0 K/UL
NRBC # BLD AUTO: 0 K/UL
NRBC BLD-RTO: 0 /100 WBC
NRBC BLD-RTO: 0 /100 WBC
PLATELET # BLD AUTO: 257 K/UL (ref 164–446)
PLATELET # BLD AUTO: 273 K/UL (ref 164–446)
PMV BLD AUTO: 9.1 FL (ref 9–12.9)
PMV BLD AUTO: 9.6 FL (ref 9–12.9)
POTASSIUM SERPL-SCNC: 4 MMOL/L (ref 3.6–5.5)
POTASSIUM SERPL-SCNC: 4.3 MMOL/L (ref 3.6–5.5)
PROT SERPL-MCNC: 6.3 G/DL (ref 6–8.2)
PROT SERPL-MCNC: 7 G/DL (ref 6–8.2)
PROTHROMBIN TIME: 13.4 SEC (ref 12–14.6)
RBC # BLD AUTO: 4.15 M/UL (ref 4.2–5.4)
RBC # BLD AUTO: 4.55 M/UL (ref 4.2–5.4)
RH BLD: NORMAL
SARS-COV-2 RNA RESP QL NAA+PROBE: NOTDETECTED
SODIUM SERPL-SCNC: 129 MMOL/L (ref 135–145)
SODIUM SERPL-SCNC: 130 MMOL/L (ref 135–145)
SODIUM SERPL-SCNC: 131 MMOL/L (ref 135–145)
SPECIMEN SOURCE: NORMAL
TROPONIN T SERPL-MCNC: 54 NG/L (ref 6–19)
TROPONIN T SERPL-MCNC: 59 NG/L (ref 6–19)
WBC # BLD AUTO: 8.9 K/UL (ref 4.8–10.8)
WBC # BLD AUTO: 9.3 K/UL (ref 4.8–10.8)

## 2021-05-31 PROCEDURE — 85610 PROTHROMBIN TIME: CPT

## 2021-05-31 PROCEDURE — 71045 X-RAY EXAM CHEST 1 VIEW: CPT

## 2021-05-31 PROCEDURE — 700102 HCHG RX REV CODE 250 W/ 637 OVERRIDE(OP): Performed by: STUDENT IN AN ORGANIZED HEALTH CARE EDUCATION/TRAINING PROGRAM

## 2021-05-31 PROCEDURE — 93010 ELECTROCARDIOGRAM REPORT: CPT | Performed by: INTERNAL MEDICINE

## 2021-05-31 PROCEDURE — 86900 BLOOD TYPING SEROLOGIC ABO: CPT

## 2021-05-31 PROCEDURE — 85025 COMPLETE CBC W/AUTO DIFF WBC: CPT | Mod: 91

## 2021-05-31 PROCEDURE — G0378 HOSPITAL OBSERVATION PER HR: HCPCS

## 2021-05-31 PROCEDURE — 70496 CT ANGIOGRAPHY HEAD: CPT | Mod: MG

## 2021-05-31 PROCEDURE — 99215 OFFICE O/P EST HI 40 MIN: CPT | Performed by: PSYCHIATRY & NEUROLOGY

## 2021-05-31 PROCEDURE — 80053 COMPREHEN METABOLIC PANEL: CPT | Mod: 91

## 2021-05-31 PROCEDURE — 93306 TTE W/DOPPLER COMPLETE: CPT | Mod: 26 | Performed by: INTERNAL MEDICINE

## 2021-05-31 PROCEDURE — 0042T CT-CEREBRAL PERFUSION ANALYSIS: CPT

## 2021-05-31 PROCEDURE — 99239 HOSP IP/OBS DSCHRG MGMT >30: CPT | Mod: GC | Performed by: INTERNAL MEDICINE

## 2021-05-31 PROCEDURE — U0005 INFEC AGEN DETEC AMPLI PROBE: HCPCS

## 2021-05-31 PROCEDURE — 93306 TTE W/DOPPLER COMPLETE: CPT

## 2021-05-31 PROCEDURE — 70450 CT HEAD/BRAIN W/O DYE: CPT | Mod: MG

## 2021-05-31 PROCEDURE — 700117 HCHG RX CONTRAST REV CODE 255: Performed by: EMERGENCY MEDICINE

## 2021-05-31 PROCEDURE — U0003 INFECTIOUS AGENT DETECTION BY NUCLEIC ACID (DNA OR RNA); SEVERE ACUTE RESPIRATORY SYNDROME CORONAVIRUS 2 (SARS-COV-2) (CORONAVIRUS DISEASE [COVID-19]), AMPLIFIED PROBE TECHNIQUE, MAKING USE OF HIGH THROUGHPUT TECHNOLOGIES AS DESCRIBED BY CMS-2020-01-R: HCPCS

## 2021-05-31 PROCEDURE — 86850 RBC ANTIBODY SCREEN: CPT

## 2021-05-31 PROCEDURE — 94760 N-INVAS EAR/PLS OXIMETRY 1: CPT

## 2021-05-31 PROCEDURE — 84484 ASSAY OF TROPONIN QUANT: CPT

## 2021-05-31 PROCEDURE — 36415 COLL VENOUS BLD VENIPUNCTURE: CPT

## 2021-05-31 PROCEDURE — 99285 EMERGENCY DEPT VISIT HI MDM: CPT

## 2021-05-31 PROCEDURE — 86901 BLOOD TYPING SEROLOGIC RH(D): CPT

## 2021-05-31 PROCEDURE — 93005 ELECTROCARDIOGRAM TRACING: CPT | Performed by: EMERGENCY MEDICINE

## 2021-05-31 PROCEDURE — 83036 HEMOGLOBIN GLYCOSYLATED A1C: CPT

## 2021-05-31 PROCEDURE — A9270 NON-COVERED ITEM OR SERVICE: HCPCS | Performed by: STUDENT IN AN ORGANIZED HEALTH CARE EDUCATION/TRAINING PROGRAM

## 2021-05-31 PROCEDURE — 85730 THROMBOPLASTIN TIME PARTIAL: CPT

## 2021-05-31 PROCEDURE — 80053 COMPREHEN METABOLIC PANEL: CPT

## 2021-05-31 PROCEDURE — 85025 COMPLETE CBC W/AUTO DIFF WBC: CPT

## 2021-05-31 PROCEDURE — 99220 PR INITIAL OBSERVATION CARE,LEVL III: CPT | Performed by: STUDENT IN AN ORGANIZED HEALTH CARE EDUCATION/TRAINING PROGRAM

## 2021-05-31 PROCEDURE — 70498 CT ANGIOGRAPHY NECK: CPT | Mod: MG

## 2021-05-31 PROCEDURE — 700105 HCHG RX REV CODE 258: Performed by: STUDENT IN AN ORGANIZED HEALTH CARE EDUCATION/TRAINING PROGRAM

## 2021-05-31 RX ORDER — ASPIRIN 300 MG/1
300 SUPPOSITORY RECTAL DAILY
Status: DISCONTINUED | OUTPATIENT
Start: 2021-05-31 | End: 2021-06-01

## 2021-05-31 RX ORDER — ASPIRIN 325 MG
325 TABLET ORAL DAILY
Status: DISCONTINUED | OUTPATIENT
Start: 2021-05-31 | End: 2021-06-01

## 2021-05-31 RX ORDER — LIDOCAINE 50 MG/G
1 PATCH TOPICAL EVERY 24 HOURS
Qty: 90 EACH | Refills: 0 | Status: SHIPPED | OUTPATIENT
Start: 2021-05-31 | End: 2021-12-16

## 2021-05-31 RX ORDER — ASPIRIN 81 MG/1
324 TABLET, CHEWABLE ORAL DAILY
Status: DISCONTINUED | OUTPATIENT
Start: 2021-05-31 | End: 2021-06-01

## 2021-05-31 RX ORDER — SODIUM CHLORIDE 9 MG/ML
500 INJECTION, SOLUTION INTRAVENOUS ONCE
Status: COMPLETED | OUTPATIENT
Start: 2021-05-31 | End: 2021-05-31

## 2021-05-31 RX ORDER — ACETAMINOPHEN 325 MG/1
650 TABLET ORAL EVERY 6 HOURS PRN
Qty: 100 TABLET | Refills: 0 | Status: SHIPPED | OUTPATIENT
Start: 2021-05-31 | End: 2022-05-01

## 2021-05-31 RX ORDER — ATORVASTATIN CALCIUM 40 MG/1
40 TABLET, FILM COATED ORAL EVERY EVENING
Status: DISCONTINUED | OUTPATIENT
Start: 2021-05-31 | End: 2021-06-02 | Stop reason: HOSPADM

## 2021-05-31 RX ADMIN — OXYCODONE HYDROCHLORIDE AND ACETAMINOPHEN 500 MG: 500 TABLET ORAL at 04:08

## 2021-05-31 RX ADMIN — DOCUSATE SODIUM 50 MG AND SENNOSIDES 8.6 MG 2 TABLET: 8.6; 5 TABLET, FILM COATED ORAL at 04:08

## 2021-05-31 RX ADMIN — ASPIRIN 300 MG: 300 SUPPOSITORY RECTAL at 21:17

## 2021-05-31 RX ADMIN — SODIUM CHLORIDE 500 ML: 9 INJECTION, SOLUTION INTRAVENOUS at 06:30

## 2021-05-31 RX ADMIN — IOHEXOL 50 ML: 350 INJECTION, SOLUTION INTRAVENOUS at 17:15

## 2021-05-31 RX ADMIN — LOSARTAN POTASSIUM 100 MG: 50 TABLET, FILM COATED ORAL at 04:08

## 2021-05-31 RX ADMIN — CARVEDILOL 3.12 MG: 6.25 TABLET, FILM COATED ORAL at 08:08

## 2021-05-31 RX ADMIN — IOHEXOL 100 ML: 350 INJECTION, SOLUTION INTRAVENOUS at 17:15

## 2021-05-31 RX ADMIN — POLYETHYLENE GLYCOL 3350 1 PACKET: 17 POWDER, FOR SOLUTION ORAL at 04:08

## 2021-05-31 RX ADMIN — APIXABAN 2.5 MG: 2.5 TABLET, FILM COATED ORAL at 04:08

## 2021-05-31 ASSESSMENT — ENCOUNTER SYMPTOMS
NERVOUS/ANXIOUS: 0
SINUS PAIN: 0
NAUSEA: 0
DIARRHEA: 0
NECK PAIN: 0
BACK PAIN: 0
DIAPHORESIS: 0
COUGH: 0
MYALGIAS: 0
DIZZINESS: 0
EYE DISCHARGE: 0
TINGLING: 0
INSOMNIA: 0
STRIDOR: 0
SHORTNESS OF BREATH: 0
BRUISES/BLEEDS EASILY: 0
SORE THROAT: 0
EYE REDNESS: 0
PALPITATIONS: 0
FEVER: 0
POLYDIPSIA: 0
DOUBLE VISION: 0
BLOOD IN STOOL: 0
WHEEZING: 0
HEADACHES: 0
VOMITING: 0
SEIZURES: 0
EYE PAIN: 0
WEAKNESS: 0
WEIGHT LOSS: 0
SPEECH CHANGE: 1
MEMORY LOSS: 0
FLANK PAIN: 0
CHILLS: 0
SPUTUM PRODUCTION: 0

## 2021-05-31 ASSESSMENT — FIBROSIS 4 INDEX
FIB4 SCORE: 1.84
FIB4 SCORE: 1.84

## 2021-05-31 NOTE — CARE PLAN
Problem: Knowledge Deficit - Standard  Goal: Patient and family/care givers will demonstrate understanding of plan of care, disease process/condition, diagnostic tests and medications  5/30/2021 1928 by Rosemarie Bennett R.N.  Outcome: Progressing  5/30/2021 1927 by Rosemarie Bennett R.N.  Outcome: Progressing     Problem: Pain - Standard  Goal: Alleviation of pain or a reduction in pain to the patient’s comfort goal  5/30/2021 1928 by Rosemarie Bennett R.N.  Outcome: Progressing  5/30/2021 1927 by Rosemarie Bennett R.N.  Outcome: Progressing     Problem: Fall Risk  Goal: Patient will remain free from falls  5/30/2021 1928 by Rosemarie Bennett R.N.  Outcome: Progressing  5/30/2021 1927 by PARISH TorresN.  Outcome: Progressing   The patient is Stable - Low risk of patient condition declining or worsening    Shift Goals  Clinical Goals: Correct sodium level  Patient Goals: Rest  Family Goals: Safety    Progress made toward(s) clinical / shift goals: Sodium level trending appropriately.     *Primary MD notified that troponin continues to trend higher, pt asymptomatic with no complaints of chest discomfort.     Patient is not progressing towards the following goals:

## 2021-05-31 NOTE — ED PROVIDER NOTES
"  ED Provider Note    Scribed for Zully Swift M.D. by Ahmet Valdez. 5/31/2021  4:30 PM    Primary care provider: JAYLEN Castelan  Means of arrival: EMS  History obtained from: EMS  History limited by: Altered mental status    CHIEF COMPLAINT  Chief Complaint   Patient presents with   • Possible Stroke     d/c'd from here earlier today. while sitting in recliner at home her grandson noticed she was altered and had word salad. LKW: 1520.     HPI  Krystle Tavarez is a 87 y.o. female who presents to the ER by EMS as a code stroke for evaluation of \"word salad\". Per EMS, the patient was last seen normal at 3:25 PM by her family.  Family reported to EMS that patient speech was abnormal and she is exhibiting \".  Salad.\"  Patient was discharged earlier today from [facility] for hyponatremia and confusion. She is unable to recognize objects but is otherwise normal. Patient is unsure of her name. She admits to associated symptoms of confusion, but denies headache, chest pain, shortness of breath, numbness, vomiting, diarrhea, or changes in vision. Patient believes she is fine.  No numbness, tingling or weakness of extremities.  No alleviating factors were reported. Further history limited secondary to mild confusion      REVIEW OF SYSTEMS  Pertinent positives: confusion, difficulty speaking  Pertinent negatives: No headache, chest pain, shortness of breath, numbness, vomiting, diarrhea, or changes in vision.  See HPI for further details. All other systems are negative.    PAST MEDICAL HISTORY  Past Medical History:   Diagnosis Date   • Arthritis     knees, hips   • Breath shortness     with exertion    • Cataract     bilat IOL    • Dental disorder     full upper, partial lower    • Diabetes (HCC)     pre diabetic   • Heart burn    • Hemorrhagic disorder (HCC)     on Eliquis   • High cholesterol     diet controlled    • Hyperlipidemia    • Hypertension    • Pacemaker 2015   • Pain 2020    " lower back, right leg   • Pre-diabetes    • TIA (transient ischemic attack)     on Eliquis   • Urinary incontinence        FAMILY HISTORY  Family History   Problem Relation Age of Onset   • Diabetes Mother    • Stroke Mother    • Heart Attack Father 74   • No Known Problems Maternal Grandmother    • No Known Problems Maternal Grandfather    • No Known Problems Paternal Grandmother    • No Known Problems Paternal Grandfather        SOCIAL HISTORY  Social History     Socioeconomic History   • Marital status:      Spouse name: Not noted   • Number of children: Not noted   • Years of education: Not noted   • Highest education level: Not noted   Occupational History   • Not on file   Tobacco Use   • Smoking status: Never Smoker   • Smokeless tobacco: Never Used   Vaping Use   • Vaping Use: Never used   Substance and Sexual Activity   • Alcohol use: Yes     Comment: Rare   • Drug use: No   • Sexual activity: Not Currently     Partners: Male   Other Topics Concern   • Not noted   Social History Narrative   • Not noted     Social Determinants of Health     Financial Resource Strain:    • Difficulty of Paying Living Expenses:    Food Insecurity:    • Worried About Running Out of Food in the Last Year:    • Ran Out of Food in the Last Year:    Transportation Needs:    • Lack of Transportation (Medical):    • Lack of Transportation (Non-Medical):    Physical Activity:    • Days of Exercise per Week:    • Minutes of Exercise per Session:    Stress:    • Feeling of Stress :    Social Connections:    • Frequency of Communication with Friends and Family:    • Frequency of Social Gatherings with Friends and Family:    • Attends Yarsanism Services:    • Active Member of Clubs or Organizations:    • Attends Club or Organization Meetings:    • Marital Status:    Intimate Partner Violence:    • Fear of Current or Ex-Partner:    • Emotionally Abused:    • Physically Abused:    • Sexually Abused:        SURGICAL HISTORY  Past  "Surgical History:   Procedure Laterality Date   • PB LAMINOTOMY,LUMBAR DISK,1 INTRSP N/A 2/25/2020    Procedure: LAMINECTOMY, SPINE, LUMBAR, WITH DISCECTOMY- L5-S1, MEDIAL FACETECTOMY;  Surgeon: Latrell Kimble M.D.;  Location: SURGERY [facility];  Service: Neurosurgery   • FORAMINOTOMY N/A 2/25/2020    Procedure: FORAMINOTOMY, SPINE;  Surgeon: Latrell Kimble M.D.;  Location: SURGERY [facility];  Service: Neurosurgery   • PACEMAKER INSERTION  2015   • HIP REPLACEMENT, TOTAL Right 2009   • KNEE REPLACEMENT, TOTAL Right 2004   • CATARACT EXTRACTION WITH IOL Bilateral 2003   • CHOLECYSTECTOMY  2002   • BASAL CELL EXCISION      nose and hand   • BUNIONECTOMY Left        CURRENT MEDICATIONS  Home Medications     Reviewed by Harry Omalley (Pharmacy Tech) on 05/31/21 at 1804  Med List Status: Complete   Medication Last Dose Status   acetaminophen (TYLENOL) 325 MG Tab  Active   apixaban (ELIQUIS) 2.5mg Tab  Active   Ascorbic Acid (VITAMIN C) 500 MG Cap  Active   carvedilol (COREG) 3.125 MG Tab  Active   clotrimazole-betamethasone (LOTRISONE) 1-0.05 % Cream  Active   estradiol (ESTRACE) 0.1 MG/GM vaginal cream  Active   lidocaine (LIDODERM) 5 % Patch  Active   losartan (COZAAR) 100 MG Tab  Active   metFORMIN ER (GLUCOPHAGE XR) 500 MG TABLET SR 24 HR  Active   sennosides (SENOKOT) 8.6 MG Tab  Active                ALLERGIES  Allergies   Allergen Reactions   • Sulfa Drugs Nausea     Nausea        PHYSICAL EXAM  VITAL SIGNS: BP (!) 198/85   Pulse 90   Temp 36.4 °C (97.6 °F) (Oral)   Resp 15   Ht 1.676 m (5' 6\")   Wt 72.1 kg (159 lb)   LMP  (LMP Unknown)   SpO2 92%   BMI 25.66 kg/m²      Constitutional: Well developed, well nourished; No acute distress; Non-toxic appearance.   HENT: Normocephalic, atraumatic; Bilateral external ears normal; Oropharyngeal exam deferred to COVID-19 outbreak and lack of oropharyngeal complaints.  Eyes: PERRL, EOMI, Conjunctiva normal. No discharge.   Neck:  Supple, nontender " "midline; No stridor; No nuchal rigidity.   Lymphatic: No cervical lymphadenopathy noted.   Cardiovascular: Regular rate and rhythm without murmurs, rubs, or gallop.   Thorax & Lungs: No respiratory distress, breath sounds clear to auscultation bilaterally without wheezing, rales or rhonchi. Nontender chest wall. No crepitus or subcutaneous air  Abdomen: Soft, nontender, bowel sounds normal. No obvious masses; No pulsatile masses; no rebound, guarding, or peritoneal signs.   Skin: Good color; warm and dry without rash or petechia.  Back: Nontender, No CVA tenderness.   Extremities: Distal radial, dorsalis pedis, posterior tibial pulses are equal bilaterally; No edema; Nontender calves or saphenous, No cyanosis, No clubbing.   Musculoskeletal: Good range of motion in all major joints. No tenderness to palpation or major deformities noted.  Neurologic: Alert & oriented x 4, clear speech, no drift of upper or lower extremities.  Cranial nerves II through XII are intact.  Speech fluid.  She appears to be a little confused.  She does not know her name.  She cannot identify a pen and when attempting to identify the pen, the word she did use was a made up word.  When asked to lift her legs she says \"my leg feels family.\" NIH 2    EKG  12 lead EKG interpreted by myself, see below.    LABS/RADIOLOGY/PROCEDURES  Results for orders placed or performed during the hospital encounter of 05/31/21   CBC WITH DIFFERENTIAL   Result Value Ref Range    WBC 9.3 4.8 - 10.8 K/uL    RBC 4.55 4.20 - 5.40 M/uL    Hemoglobin 14.1 12.0 - 16.0 g/dL    Hematocrit 41.5 37.0 - 47.0 %    MCV 91.2 81.4 - 97.8 fL    MCH 31.0 27.0 - 33.0 pg    MCHC 34.0 33.6 - 35.0 g/dL    RDW 43.5 35.9 - 50.0 fL    Platelet Count 273 164 - 446 K/uL    MPV 9.1 9.0 - 12.9 fL    Neutrophils-Polys 54.60 44.00 - 72.00 %    Lymphocytes 36.70 22.00 - 41.00 %    Monocytes 7.50 0.00 - 13.40 %    Eosinophils 0.50 0.00 - 6.90 %    Basophils 0.40 0.00 - 1.80 %    Immature " Granulocytes 0.30 0.00 - 0.90 %    Nucleated RBC 0.00 /100 WBC    Neutrophils (Absolute) 5.07 2.00 - 7.15 K/uL    Lymphs (Absolute) 3.42 1.00 - 4.80 K/uL    Monos (Absolute) 0.70 0.00 - 0.85 K/uL    Eos (Absolute) 0.05 0.00 - 0.51 K/uL    Baso (Absolute) 0.04 0.00 - 0.12 K/uL    Immature Granulocytes (abs) 0.03 0.00 - 0.11 K/uL    NRBC (Absolute) 0.00 K/uL   COMP METABOLIC PANEL   Result Value Ref Range    Sodium 131 (L) 135 - 145 mmol/L    Potassium 4.0 3.6 - 5.5 mmol/L    Chloride 96 96 - 112 mmol/L    Co2 25 20 - 33 mmol/L    Anion Gap 10.0 7.0 - 16.0    Glucose 224 (H) 65 - 99 mg/dL    Bun 17 8 - 22 mg/dL    Creatinine 0.61 0.50 - 1.40 mg/dL    Calcium 9.3 8.5 - 10.5 mg/dL    AST(SGOT) 24 12 - 45 U/L    ALT(SGPT) 15 2 - 50 U/L    Alkaline Phosphatase 82 30 - 99 U/L    Total Bilirubin 0.8 0.1 - 1.5 mg/dL    Albumin 3.7 3.2 - 4.9 g/dL    Total Protein 7.0 6.0 - 8.2 g/dL    Globulin 3.3 1.9 - 3.5 g/dL    A-G Ratio 1.1 g/dL   PROTHROMBIN TIME   Result Value Ref Range    PT 13.4 12.0 - 14.6 sec    INR 0.99 0.87 - 1.13   APTT   Result Value Ref Range    APTT 30.4 24.7 - 36.0 sec   COD (ADULT)   Result Value Ref Range    ABO Grouping Only A     Rh Grouping Only NEG     Antibody Screen-Cod NEG    TROPONIN   Result Value Ref Range    Troponin T 59 (H) 6 - 19 ng/L   ABO Rh Confirm   Result Value Ref Range    ABO Rh Confirm A NEG    ESTIMATED GFR   Result Value Ref Range    GFR If African American >60 >60 mL/min/1.73 m 2    GFR If Non African American >60 >60 mL/min/1.73 m 2   SARS-CoV-2 PCR (24 hour In-House): Collect NP swab in [facility]    Specimen: Nasopharyngeal; Respirate   Result Value Ref Range    SARS-CoV-2 Source NP Swab    Hemoglobin A1C   Result Value Ref Range    Glycohemoglobin 7.2 (H) 4.0 - 5.6 %    Est Avg Glucose 160 mg/dL   EKG (NOW)   Result Value Ref Range    Report       [facility]    Test Date:  2021-05-31  Pt Name:    VAN SEWELL                 Department: ER  MRN:         [id number]                      Room:       15  Gender:     Female                       Technician: [id number]  :        [id number]                   Requested By:ALFREDA SWIFT  Order #:    [id number]                    Reading MD: Alfreda Swift    Measurements  Intervals                                Axis  Rate:       91                           P:          55  NM:         170                          QRS:        100  QRSD:       140                          T:          -56  QT:         412  QTc:        508    Interpretive Statements  ATRIAL-SENSED VENTRICULAR-PACED COMPLEXES  NO FURTHER RHYTHM ANALYSIS ATTEMPTED DUE TO PACED RHYTHM  NONSPECIFIC INTRAVENTRICULAR CONDUCTION DELAY  MINIMAL ST DEPRESSION, DIFFUSE LEADS  Compared to ECG 2021 20:15:48  Sinus rhythm no longer present  Myocardial infarct finding no longer present  ST (T wave) deviati on still present  Electronically Signed On 2021 0:12:01 PDT by Alfreda Swift         DX-CHEST-PORTABLE (1 VIEW)   Final Result      Stable chest without acute/new abnormality.      CT-CEREBRAL PERFUSION ANALYSIS   Final Result      1.  Cerebral blood flow less than 30% likely representing completed infarct = 0 mL.      2.  T Max more than 6 seconds likely representing combination of completed infarct and ischemia = 7 mL.      3.  Mismatched volume likely representing ischemic brain/penumbra = 7 milliliters      4.  Please note that the cerebral perfusion was performed on the limited brain tissue around the basal ganglia region. Infarct/ischemia outside the CT perfusion sections can be missed in this study.      CT-CTA NECK WITH & W/O-POST PROCESSING   Final Result      No hemodynamically significant stenosis of the neck arteries is visualized.      Study limitations as above.      CT-CTA HEAD WITH & W/O-POST PROCESS   Final Result      1.  No acute large vessel occlusion.   2.  Multifocal mild to moderate stenoses likely related to atherosclerosis.      CT-HEAD W/O    Final Result      1.  No acute intracranial abnormality is identified.   2.  Focal hypodensities in the basal ganglia are likely related to prior lacunar infarcts.   3.  There are periventricular and subcortical white matter changes present.  This finding is nonspecific and could be from previous small vessel ischemia, demyelination, or gliosis.   4.  Atrophy   5.  Small amount of fluid in the mastoid air cells on the right.      MRI would be more sensitive for further evaluation.      MR-BRAIN-W/O    (Results Pending)     Radiologist's interpretation reviewed by me.    COURSE & MEDICAL DECISION MAKING  Pertinent Labs & Imaging studies reviewed. (See chart for details)    PPE Note: I personally donned full PPE for all patient encounters during this visit, including wearing an N95 respirator mask, gloves, gown, and goggles.    Scribe remained outside the patient's room and did not have any contact with the patient for the duration of patient encounter.     I verified that the patient was wearing a mask and I was wearing appropriate PPE every time I entered the room. The patient's mask was on the patient at all times during my encounter.    Reviewed patient's old medical records which showed that the patient was discharged from [facility]'s internal medicine for hyponatremia and confusion. It was suspected to be related to tramadol. Hyponatremia was suspected to be related to hydrochlorothiazide. Confusion was reported last night and the patient did not sleep. Patient took her Apixaban today. Patient received a CT-Head during her hospitalization but no MRI.    4:30 PM - Patient seen and examined at bedside. Patient was unable to identify objects at bedside. Neurologist was present on arrival. Ordered for labs and imaging to evaluate her symptoms.     6:17 PM - Paged Hospitalist.    6:33 PM - Re-paged Hospitalist    6:38 PM - I discussed the patient's case and the above findings with Dr. Carnes (Hospitalist) who agrees to  evaluate the patient.      Patient presents to the ER with word salad and some mild confusion.  She was last seen normal at 1535 today by family.  Patient symptoms seem to improve a little bit upon arrival to the ER.  By the time she was admitted by the hospitalist, her symptoms had improved quite a bit.  Patient had an NIH score of 2.  She is on Eliquis therefore she is not a TPA candidate.  Patient presented as a code stroke.  She was seen and evaluated by myself along with the neurology team at the charge desk upon arrival.  She immediately went to CT scanner for CT/CTA.  She has some mild to moderate stenosis seen in the vessels on the brain CTA.  She also had some old lacunar infarct seen.  Blood pressure was high but we allowed for permissive hypertension since we are concerned about stroke.  Patient has no complaint of headache.  No chest pain.  No abdominal pain.  She was just discharged from the hospital for hyponatremia and confusion earlier today.  It was thought her confusion was secondary to tramadol and her hyponatremia secondary to hydrochlorothiazide use.  Patient will need to be readmitted for rule out TIA versus stroke.  I spoke with Dr. Kothari, hospitalist on-call, and he will kindly evaluate the patient for hospitalization.      DISPOSITION:  Patient will be hospitalized by Dr. Carnes in guarded condition.    FINAL IMPRESSION  1. History of TIA (transient ischemic attack)    2. Speech disturbance, unspecified type Acute   3. Secondary hypertension Acute        This dictation has been created using voice recognition software. The accuracy of the dictation is limited by the abilities of the software. I expect there may be some errors of grammar and possibly content. I made every attempt to manually correct the errors within my dictation. However, errors related to voice recognition software may still exist and should be interpreted within the appropriate context.    This dictation has been  created using voice recognition software. The accuracy of the dictation is limited by the abilities of the software. I expect there may be some errors of grammar and possibly content. I made every attempt to manually correct the errors within my dictation. However, errors related to voice recognition software may still exist and should be interpreted within the appropriate context.     IAhmet (Nicolasa), am scribing for, and in the presence of, Zully Swift M.D..    Electronically signed by: Ahmet Wu), 5/31/2021    IZully M.D. personally performed the services described in this documentation, as scribed by Ahmet Valdez in my presence, and it is both accurate and complete. C     The note accurately reflects work and decisions made by me.  Zully Swift M.D.  6/1/2021  12:06 AM

## 2021-05-31 NOTE — DISCHARGE SUMMARY
"Discharge Summary    Date of Admission: 5/29/2021  Date of Discharge: 5/31/2021  Discharging Attending: Vijay Gómez M.D.   Discharging Senior Resident: Dr. Palacios  Discharging Intern: Dr. Avendano    CHIEF COMPLAINT ON ADMISSION  Chief Complaint   Patient presents with   • Abdominal Pain     right side, right flank discomfort. Denies difficulty with urine, no blood noted.        Reason for Admission  Altered mental status    Admission Date  5/29/2021    CODE STATUS  DNAR/DNI    HPI & HOSPITAL COURSE  This is a 87 y.o. female with history of T2DM, GERD, paroxysmal AF on apixaban, sick sinus syndrome s/p pacemaker, and chronic pain here with altered mental status. She was reportedly confused and walking her walker into a wall. She was found to be with symptomatic hyponatremia to 126 and admitted.    SYMPTOMATIC HYPONATREMIA  ALTERED MENTAL STATUS  Her previous medications include hydrochlorothiazide and tramadol and her hyponatremia and altered status were attributed to this. She did say that the hctz caused a large diuretic effect on her and in addition, the tramadol made her \"loopy\" even when she only took a half pill. Those medications were discontinued, and she was started on 75mL/hr of normal saline, with improvement of her sodium from 126 to 132 in 1 day. Her sodium remained stable in the 130s off fluids afterward, with improvement of mentation to baseline. She was advised to check her blood pressure and follow up with her cardiologist as planned, in addition following up with her pain doctor for her planned ablation surgery.    ELEVATED TROPONIN  Trended to high of 68 then downtrended. Could include component of acute renal failure. No chest pain or palpitations at any point. Echo done on day of discharge, follow-up with cardiology agreed upon w/ patient as she did not wish to stay and wait for interpretation, understanding of risks.    ACUTE KIDNEY INJURY  Intrinsic per Fena of 1.4%, likely due to volume " status and diuretics. Resolved to baseline with fluids.     WEAKNESS  Reported weakness at home, seems to have resolved with correction of sodium and holding of tramadol. PT/OT evaluated patient and determined her to have no additional needs.    CONSTIPATION  Resolved while inpatient. Tramadol will be held.    CHRONIC BACK PAIN  Tylenol/lidocaine patches prescribed. Advised patient to continue to follow-up with pain physician for further treatment.         Therefore, she is discharged in fair and stable condition to home with close outpatient follow-up.    The patient recovered much more quickly than anticipated on admission.    PHYSICAL EXAM ON DISCHARGE  Temp:  [36.2 °C (97.2 °F)-36.5 °C (97.7 °F)] 36.2 °C (97.2 °F)  Pulse:  [77-85] 77  Resp:  [16-17] 16  BP: (135-197)/(65-96) 166/65  SpO2:  [91 %-95 %] 95 %    Physical Exam  Constitutional:       Appearance: Normal appearance.   HENT:      Head: Normocephalic and atraumatic.      Right Ear: External ear normal.      Left Ear: External ear normal.      Nose: Nose normal. No congestion.      Mouth/Throat:      Mouth: Mucous membranes are moist.      Pharynx: Oropharynx is clear.   Eyes:      General: No scleral icterus.     Extraocular Movements: Extraocular movements intact.      Pupils: Pupils are equal, round, and reactive to light.   Cardiovascular:      Rate and Rhythm: Normal rate and regular rhythm.      Pulses: Normal pulses.      Heart sounds: No murmur heard.     Pulmonary:      Effort: Pulmonary effort is normal. No respiratory distress.      Breath sounds: Normal breath sounds.   Abdominal:      General: Abdomen is flat. There is no distension.      Palpations: Abdomen is soft.   Musculoskeletal:      Cervical back: Normal range of motion and neck supple.      Right lower leg: Edema present.      Left lower leg: Edema present.      Comments: Bilateral legs with mild edema, tender to palpations   Skin:     General: Skin is warm and dry.      Capillary  Refill: Capillary refill takes less than 2 seconds.   Neurological:      General: No focal deficit present.      Mental Status: She is alert and oriented to person, place, and time.      Cranial Nerves: No cranial nerve deficit.   Psychiatric:         Mood and Affect: Mood normal.         Behavior: Behavior normal.     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 POA: Yes    History of TIA (transient ischemic attack) (Chronic) POA: Yes    Type 2 diabetes mellitus without complication, without long-term current use of insulin (HCC) (Chronic) POA: Yes    PAF (paroxysmal atrial fibrillation) (HCC) POA: Yes    Drug-induced constipation POA: Yes    Confusion POA: Yes    Generalized weakness (Chronic) POA: Yes    Elevated serum creatinine POA: Yes    Elevated troponin POA: Yes    DNR (do not resuscitate) POA: Yes    Acute kidney injury (HCC) POA: Unknown    Benign cyst of right kidney POA: Unknown    Common bile duct dilation POA: Unknown  Resolved Problems:    * No resolved hospital problems. *      FOLLOW UP  Future Appointments   Date Time Provider Department Center   6/1/2021  2:15 PM S OBEDAN DX 1 [facility]   6/1/2021  2:30 PM S MCCARRAN DX 1 [facility]   6/8/2021  1:15 PM PACER CHECK-CAM B [facility] None   6/8/2021  1:40 PM Du Sewell M.D. [facility] None   6/11/2021  3:45 PM PACER CHECK-CAM B [facility] None   6/11/2021  4:00 PM Du Sewell M.D. [facility] None     Joana Hawk, [email or web address]  [address] [address]  [phone]    Schedule an appointment as soon as possible for a visit in 1 week      Cardiologist      review  echocardiogram and blood pressure meds      MEDICATIONS ON DISCHARGE     Medication List      START taking these medications      Instructions   acetaminophen 325 MG Tabs  Commonly known as: Tylenol   Take 2 Tablets by mouth every 6 hours as needed (Mild Pain; (Pain scale 1-3); Temp greater than 100.5 F).  Dose: 650 mg     lidocaine 5 % Ptch  Commonly known as: LIDODERM   Place 1 Patch on the skin every 24 hours.  Dose: 1 Patch        CONTINUE taking these medications      Instructions   apixaban 2.5mg Tabs  Commonly known as: ELIQUIS   Take 1 tablet by mouth 2 times a day.  Dose: 2.5 mg     carvedilol 3.125 MG Tabs  Commonly known as: COREG   TAKE 1 TABLET TWICE A DAY WITH MEALS     clotrimazole-betamethasone 1-0.05 % Crea  Commonly known as: LOTRISONE   Apply 1 Application topically 2 times a day.  Dose: 1 Application     estradiol 0.1 MG/GM vaginal cream  Commonly known as: ESTRACE   Insert 0.5 g into the vagina every 72 hours.  Dose: 0.5 g     losartan 100 MG Tabs  Commonly known as: COZAAR   Take 1 Tab by mouth every day.  Dose: 100 mg     metFORMIN  MG Tb24  Commonly known as: GLUCOPHAGE XR   Take 1 tablet by mouth every day.  Dose: 500 mg     sennosides 8.6 MG Tabs  Commonly known as: SENOKOT   Take 1 Tab by mouth every day.  Dose: 8.6 mg     Vitamin C 500 MG Caps   Take 1 Capsule by mouth 2 Times a Day.  Dose: 1 Capsule         STOP taking these medications    hydroCHLOROthiazide 25 MG Tabs  Commonly known as: HYDRODIURIL     traMADol 50 MG Tabs  Commonly known as: ULTRAM            Allergies  Allergies   Allergen Reactions   • Sulfa Drugs Nausea     Nausea        DIET  Orders Placed This Encounter   Procedures   • Diet Order Diet: Consistent CHO (Diabetic)     Standing Status:   Standing     Number of Occurrences:   1     Order Specific Question:   Diet:     Answer:   Consistent CHO (Diabetic) [4]       ACTIVITY  As tolerated.  Weight bearing as tolerated    CONSULTATIONS  n/a    PROCEDURES    Echo:  pending

## 2021-05-31 NOTE — CONSULTS
"Hospital Neurology Stroke Consult:    Referring Physician: Zully Swift M.D.    Reason for consultation: Word salad. Suspected stroke      TPA Decision: No TPA due to Apixaban    HPI: Krystle Tavarez is a 87 y.o. female with history of chronic back pain, HTN, HLD, sick sinus rhythm w/ a pacemaker presenting to the hospital for word salad and consulted for stroke. Patient had been admitted for hyponatremia and D/C'd earlier today. She went home and was noted by grandson to be acting strangely on the recliner. EMS was called, on arrival noted some confusion and \"word salad\" and stroke alert was called. CT Head W/O CST w/o evidence of acute infarct or hemorrhage. No TPA given due to Apixanab    ROS:     As above. All other systems reviewed and are negative.    Past Medical History:    has a past medical history of Arthritis, Breath shortness, Cataract, Dental disorder, Diabetes (HCC), Heart burn, Hemorrhagic disorder (HCC), High cholesterol, Hyperlipidemia, Hypertension, Pacemaker (2015), Pain (2020), Pre-diabetes, TIA (transient ischemic attack), and Urinary incontinence.    FHx:  family history includes Diabetes in her mother; Heart Attack (age of onset: 74) in her father; No Known Problems in her maternal grandfather, maternal grandmother, paternal grandfather, and paternal grandmother; Stroke in her mother.    SHx:   reports that she has never smoked. She has never used smokeless tobacco. She reports current alcohol use. She reports that she does not use drugs.    Allergies:  Allergies   Allergen Reactions   • Sulfa Drugs Nausea     Nausea        Medications:  No current facility-administered medications for this encounter.    Current Outpatient Medications:   •  acetaminophen (TYLENOL) 325 MG Tab, Take 2 Tablets by mouth every 6 hours as needed (Mild Pain; (Pain scale 1-3); Temp greater than 100.5 F)., Disp: 100 tablet, Rfl: 0  •  lidocaine (LIDODERM) 5 % Patch, Place 1 Patch on the skin every 24 hours., Disp: " "90 Each, Rfl: 0  •  metFORMIN ER (GLUCOPHAGE XR) 500 MG TABLET SR 24 HR, Take 1 tablet by mouth every day., Disp: 90 tablet, Rfl: 3  •  apixaban (ELIQUIS) 2.5mg Tab, Take 1 tablet by mouth 2 times a day., Disp: 180 tablet, Rfl: 3  •  estradiol (ESTRACE) 0.1 MG/GM vaginal cream, Insert 0.5 g into the vagina every 72 hours. (Patient not taking: Reported on 5/29/2021), Disp: 43 g, Rfl: 3  •  carvedilol (COREG) 3.125 MG Tab, TAKE 1 TABLET TWICE A DAY WITH MEALS, Disp: 180 tablet, Rfl: 3  •  clotrimazole-betamethasone (LOTRISONE) 1-0.05 % Cream, Apply 1 Application topically 2 times a day. (Patient not taking: Reported on 5/29/2021), Disp: 45 g, Rfl: 0  •  losartan (COZAAR) 100 MG Tab, Take 1 Tab by mouth every day., Disp: 100 Tab, Rfl: 3  •  Ascorbic Acid (VITAMIN C) 500 MG Cap, Take 1 Capsule by mouth 2 Times a Day., Disp: , Rfl:   •  sennosides (SENOKOT) 8.6 MG Tab, Take 1 Tab by mouth every day., Disp: 30 Tab, Rfl: 3    Vitals:   Vitals:    05/31/21 1637 05/31/21 1639   BP:  (!) 178/84   Pulse:  83   Resp:  18   SpO2:  95%   Weight: 72.1 kg (159 lb)    Height: 1.676 m (5' 6\")        Labs:  Lab Results   Component Value Date/Time    PROTHROMBTM 13.0 06/15/2020 12:02 PM    INR 0.96 06/15/2020 12:02 PM      Lab Results   Component Value Date/Time    WBC 9.3 05/31/2021 04:28 PM    RBC 4.55 05/31/2021 04:28 PM    HEMOGLOBIN 14.1 05/31/2021 04:28 PM    HEMATOCRIT 41.5 05/31/2021 04:28 PM    MCV 91.2 05/31/2021 04:28 PM    MCH 31.0 05/31/2021 04:28 PM    MCHC 34.0 05/31/2021 04:28 PM    MPV 9.1 05/31/2021 04:28 PM    NEUTSPOLYS 54.60 05/31/2021 04:28 PM    LYMPHOCYTES 36.70 05/31/2021 04:28 PM    MONOCYTES 7.50 05/31/2021 04:28 PM    EOSINOPHILS 0.50 05/31/2021 04:28 PM    BASOPHILS 0.40 05/31/2021 04:28 PM      Lab Results   Component Value Date/Time    SODIUM 130 (L) 05/31/2021 01:58 AM    POTASSIUM 4.3 05/31/2021 01:58 AM    CHLORIDE 95 (L) 05/31/2021 01:58 AM    CO2 26 05/31/2021 01:58 AM    GLUCOSE 147 (H) 05/31/2021 " 01:58 AM    BUN 16 05/31/2021 01:58 AM    CREATININE 0.54 05/31/2021 01:58 AM      Lab Results   Component Value Date/Time    CHOLSTRLTOT 195 12/24/2020 09:41 AM     (H) 12/24/2020 09:41 AM    HDL 45 12/24/2020 09:41 AM    TRIGLYCERIDE 177 (H) 12/24/2020 09:41 AM       Lab Results   Component Value Date/Time    ALKPHOSPHAT 71 05/31/2021 01:58 AM    ASTSGOT 21 05/31/2021 01:58 AM    ALTSGPT 15 05/31/2021 01:58 AM    TBILIRUBIN 0.8 05/31/2021 01:58 AM        Imaging:  CT Head W/O CST personally reviewed w/o evidence of acute ischemia or hemorrhage.     CTA Head/Neck reviewed in chart.     CTP reviewed in chart.     Physical Exam:     General: 86 y/o female in bed in NAD  Cardio: Normal S1/S2. No peripheral edema.   Pulm: CTAX2. No respiratory distress.   Skin: Warm, dry, no rashes or lesions   Psychiatric: Appropriate affect. No active psychosis.  HEENT: Atraumatic head, normal sclera and conjunctiva, moist oral mucosa. No lid lag.  Abdomen: Soft, non tender. No masses or hepatosplenomegaly.    Physical Exam:    NIH Stroke Scale:    1a. Level of Consciousness (Alert, drowsy, etc): 0= Alert    1b. LOC Questions (Month, age): 1= Answers one correctly    1c. LOC Commands (Open/close eyes make fist/let go): 0= Obeys both correctly    2.   Best Gaze (Eyes open - patient follows examiner's finger on face): 0= Normal    3.   Visual Fields (introduce visual stimulus/threat to patient's field quadrants): 0= No visual loss  4.   Facial Paresis (Show teeth, raise eyebrows and squeeze eyes shut): 0= Normal     5a. Motor Arm - Left (Elevate arm to 90 degrees if patient is sitting, 45 degrees if  supine): 0= No drift    5b. Motor Arm - Right (Elevate arm to 90 degrees if patient is sitting, 45 degrees if supine): 0= No drift    6a. Motor Leg - Left (Elevate leg 30 degrees with patient supine): 0= No drift    6b. Motor Leg - Right  (Elevate leg 30 degrees with patient supine): 0= No drift    7.   Limb Ataxia (Finger-nose,  heel down shin): 0= No ataxia    8.   Sensory (Pin prick to face, arm, trunk and leg - compare side to side): 0= Normal    9.  Best Language (Name item, describe a picture and read sentences): 1= Mild to moderate aphasia    10. Dysarthria (Evaluate speech clarity by patient repeating listed words): 0= Normal articulation    11. Extinction and Inattention (Use information from prior testing to identify neglect or  double simultaneous stimuli testing): 0= No neglect    Total NIH Score: 2    Assessment/Plan:    Krystle Tavarez is a 87 y.o. female with history of chronic back pain, HTN, HLD, sick sinus rhythm w/ a pacemaker presenting to the hospital for word salad and consulted for stroke. Patient presented w/ some confusion and word salad. NIHSS is 2 due to confusion and mild aphasia. Unclear etiology of symptoms, she certainly had a hypertensive compensatory response however it's unclear if this is secondary to CNS ischemia or the cause of her neurologic symptoms. Regardless, she is on Apixaban so she is ineligible for TPA. Recently had a thiazide diuretic D/C'd due to hyponatremia. She certainly has atrophy on her MRI which likely implies a lower cognitive reserve. Due to her pacemaker MRI is unlikely to be possible however low suspicion for ischemic cause of symptoms.     Of note, patient has some mild to moderate intracranial stenoses in the bilateral MCAs, unclear if these are symptomatic at the moment however if other medical issues are excluded then may have to do antiplatelet + Anticoagulation ans DAPT + anticoagulation is likely too high a risk of bleeding.     Plan:   -Gentle decrease in blood pressure, 150-180 initial target.   -MRI Brain W/O CST if possible, if not OK to defer.   -Obtain CBC/CMP/TSH/LDL/Hgb A1C  -Goal LDL < 70  -Goal Hgb A1C <7  -Goal outpt   -TTE done today.   -Initiate smoking cessation/stroke education.  -Schedule evaluation with PT/OT/ST  -Plan discussed with consulting  physician and patient's nurse      José Miguel Morley M.D., Diplomat of the American Board of Psychiatry and Neurology  Diplomat of [facility]   Assistant Clinical Professor, [facility] Neurology [facility]

## 2021-05-31 NOTE — PROGRESS NOTES
Pt discharged home with daughter. PIV removed, dressing applied. Discharge education provided to pt and daughter. All belonging accounted for at time of discharge. Instructed to follow up with PCP and Cardiology. ECHO results not read at time of discharge, per Dr. Avendano pt is to discuss with cardiologist at next weeks appointment.

## 2021-05-31 NOTE — ED TRIAGE NOTES
"Chief Complaint   Patient presents with   • Possible Stroke     d/c'd from here earlier today. while sitting in recliner at home her grandson noticed she was altered and had word salad. LKW: 1520.     BS: 243.  Pt was admitted for hyponatremia.   Paced on monitor. Takes apixaban.    BP (!) 178/84   Pulse 83   Resp 18   Ht 1.676 m (5' 6\")   Wt 72.1 kg (159 lb)   LMP  (LMP Unknown)   SpO2 95%   BMI 25.66 kg/m²     "

## 2021-05-31 NOTE — DISCHARGE INSTRUCTIONS
Discharge Instructions    Discharged to home by car with relative. Discharged via wheelchair, hospital escort: Yes.  Special equipment needed: Not Applicable    Be sure to schedule a follow-up appointment with your primary care doctor or any specialists as instructed.     Discharge Plan:   Diet Plan: Discussed  Activity Level: Discussed  Confirmed Follow up Appointment: Patient to Call and Schedule Appointment  Confirmed Symptoms Management: Discussed  Medication Reconciliation Updated: Yes    I understand that a diet low in cholesterol, fat, and sodium is recommended for good health. Unless I have been given specific instructions below for another diet, I accept this instruction as my diet prescription.   Other diet: Regular    Special Instructions: None    · Is patient discharged on Warfarin / Coumadin?   No     Depression / Suicide Risk    As you are discharged from this [facility] facility, it is important to learn how to keep safe from harming yourself.    Recognize the warning signs:  · Abrupt changes in personality, positive or negative- including increase in energy   · Giving away possessions  · Change in eating patterns- significant weight changes-  positive or negative  · Change in sleeping patterns- unable to sleep or sleeping all the time   · Unwillingness or inability to communicate  · Depression  · Unusual sadness, discouragement and loneliness  · Talk of wanting to die  · Neglect of personal appearance   · Rebelliousness- reckless behavior  · Withdrawal from people/activities they love  · Confusion- inability to concentrate     If you or a loved one observes any of these behaviors or has concerns about self-harm, here's what you can do:  · Talk about it- your feelings and reasons for harming yourself  · Remove any means that you might use to hurt yourself (examples: pills, rope, extension cords, firearm)  · Get professional help from the community (Mental Health, Substance Abuse, psychological  counseling)  · Do not be alone:Call your Safe Contact- someone whom you trust who will be there for you.  · Call your local CRISIS HOTLINE 751-8770 or 113-823-7895  · Call your local Children's Mobile Crisis Response Team [place] (581) 801-4396 or www.[employer]  · Call the toll free National Suicide Prevention Hotlines   · National Suicide Prevention Lifeline 622-242-OKBR (5264)  · [employer] Line Network 800-SUICIDE (448-5739)        Hyponatremia  Hyponatremia is when the amount of salt (sodium) in your blood is too low. When salt levels are low, your body may take in extra water. This can cause swelling throughout the body. The swelling often affects the brain.  What are the causes?  This condition may be caused by:  · Certain medical problems or conditions.  · Vomiting a lot.  · Having watery poop (diarrhea) often.  · Certain medicines or illegal drugs.  · Not having enough water in the body (dehydration).  · Drinking too much water.  · Eating a diet that is low in salt.  · Large burns on your body.  · Too much sweating.  What increases the risk?  You are more likely to get this condition if you:  · Have long-term (chronic) kidney disease.  · Have heart failure.  · Have a medical condition that causes you to have watery poop often.  · Do very hard exercises.  · Take medicines that affect the amount of salt is in your blood.  What are the signs or symptoms?  Symptoms of this condition include:  · Headache.  · Feeling like you may vomit (nausea).  · Vomiting.  · Being very tired (lethargic).  · Muscle weakness and cramps.  · Not wanting to eat as much as normal (loss of appetite).  · Feeling weak or light-headed.  Severe symptoms of this condition include:  · Confusion.  · Feeling restless (agitation).  · Having a fast heart rate.  · Passing out (fainting).  · Seizures.  · Coma.  How is this treated?  Treatment for this condition depends on the cause. Treatment may include:  · Getting fluids through  an IV tube that is put into one of your veins.  · Taking medicines to fix the salt levels in your blood. If medicines are causing the problem, your medicines will need to be changed.  · Limiting how much water or fluid you take in.  · Monitoring in the hospital to watch your symptoms.  Follow these instructions at home:    · Take over-the-counter and prescription medicines only as told by your doctor. Many medicines can make this condition worse. Talk with your doctor about any medicines that you are taking.  · Eat and drink exactly as you are told by your doctor.  ? Eat only the foods you are told to eat.  ? Limit how much fluid you take.  · Do not drink alcohol.  · Keep all follow-up visits as told by your doctor. This is important.  Contact a doctor if:  · You feel more like you may vomit.  · You feel more tired.  · Your headache gets worse.  · You feel more confused.  · You feel weaker.  · Your symptoms go away and then they come back.  · You have trouble following the diet instructions.  Get help right away if:  · You have a seizure.  · You pass out.  · You keep having watery poop.  · You keep vomiting.  Summary  · Hyponatremia is when the amount of salt in your blood is too low.  · When salt levels are low, you can have swelling throughout the body. The swelling mostly affects the brain.  · Treatment depends on the cause. Treatment may include getting IV fluids, medicines, or not drinking as much fluid.  This information is not intended to replace advice given to you by your health care provider. Make sure you discuss any questions you have with your health care provider.  Document Released: 08/29/2012 Document Revised: 03/05/2020 Document Reviewed: 11/21/2019  Elsevier Patient Education © 2020 [employer]

## 2021-05-31 NOTE — CARE PLAN
The patient is Stable - Low risk of patient condition declining or worsening    Shift Goals  Clinical Goals: perform ADLs indpendently  Patient Goals: pain control/rest  Family Goals: Safety    Progress made toward(s) clinical / shift goals: encouraging independence, blankets/pillows in place for comfort, pain medications given as ordered.     Patient is not progressing towards the following goals:  N/a

## 2021-06-01 ENCOUNTER — APPOINTMENT (OUTPATIENT)
Dept: RADIOLOGY | Facility: MEDICAL CENTER | Age: 86
End: 2021-06-01
Attending: STUDENT IN AN ORGANIZED HEALTH CARE EDUCATION/TRAINING PROGRAM
Payer: MEDICARE

## 2021-06-01 ENCOUNTER — APPOINTMENT (OUTPATIENT)
Dept: RADIOLOGY | Facility: MEDICAL CENTER | Age: 86
End: 2021-06-01
Attending: NURSE PRACTITIONER
Payer: MEDICARE

## 2021-06-01 LAB
CHOLEST SERPL-MCNC: 187 MG/DL (ref 100–199)
EKG IMPRESSION: NORMAL
GLUCOSE BLD-MCNC: 136 MG/DL (ref 65–99)
GLUCOSE BLD-MCNC: 154 MG/DL (ref 65–99)
GLUCOSE BLD-MCNC: 188 MG/DL (ref 65–99)
HDLC SERPL-MCNC: 51 MG/DL
LDLC SERPL CALC-MCNC: 124 MG/DL
SARS-COV-2 RNA RESP QL NAA+PROBE: NOTDETECTED
SPECIMEN SOURCE: NORMAL
TRIGL SERPL-MCNC: 61 MG/DL (ref 0–149)

## 2021-06-01 PROCEDURE — 99213 OFFICE O/P EST LOW 20 MIN: CPT | Performed by: PSYCHIATRY & NEUROLOGY

## 2021-06-01 PROCEDURE — 73630 X-RAY EXAM OF FOOT: CPT | Mod: RT

## 2021-06-01 PROCEDURE — G0378 HOSPITAL OBSERVATION PER HR: HCPCS

## 2021-06-01 PROCEDURE — 80061 LIPID PANEL: CPT

## 2021-06-01 PROCEDURE — 700102 HCHG RX REV CODE 250 W/ 637 OVERRIDE(OP): Performed by: STUDENT IN AN ORGANIZED HEALTH CARE EDUCATION/TRAINING PROGRAM

## 2021-06-01 PROCEDURE — 97166 OT EVAL MOD COMPLEX 45 MIN: CPT

## 2021-06-01 PROCEDURE — A9270 NON-COVERED ITEM OR SERVICE: HCPCS | Performed by: STUDENT IN AN ORGANIZED HEALTH CARE EDUCATION/TRAINING PROGRAM

## 2021-06-01 PROCEDURE — 92610 EVALUATE SWALLOWING FUNCTION: CPT

## 2021-06-01 PROCEDURE — 99226 PR SUBSEQUENT OBSERVATION CARE,LEVEL III: CPT | Performed by: STUDENT IN AN ORGANIZED HEALTH CARE EDUCATION/TRAINING PROGRAM

## 2021-06-01 PROCEDURE — 36415 COLL VENOUS BLD VENIPUNCTURE: CPT

## 2021-06-01 PROCEDURE — 700105 HCHG RX REV CODE 258: Performed by: STUDENT IN AN ORGANIZED HEALTH CARE EDUCATION/TRAINING PROGRAM

## 2021-06-01 PROCEDURE — 82962 GLUCOSE BLOOD TEST: CPT | Mod: 91

## 2021-06-01 PROCEDURE — 73630 X-RAY EXAM OF FOOT: CPT | Mod: LT

## 2021-06-01 PROCEDURE — 96372 THER/PROPH/DIAG INJ SC/IM: CPT

## 2021-06-01 PROCEDURE — 97161 PT EVAL LOW COMPLEX 20 MIN: CPT

## 2021-06-01 RX ORDER — LOSARTAN POTASSIUM 50 MG/1
100 TABLET ORAL
Status: DISCONTINUED | OUTPATIENT
Start: 2021-06-02 | End: 2021-06-02 | Stop reason: HOSPADM

## 2021-06-01 RX ORDER — ACETAMINOPHEN 325 MG/1
650 TABLET ORAL EVERY 6 HOURS PRN
Status: DISCONTINUED | OUTPATIENT
Start: 2021-06-01 | End: 2021-06-02 | Stop reason: HOSPADM

## 2021-06-01 RX ORDER — DEXTROSE MONOHYDRATE 25 G/50ML
50 INJECTION, SOLUTION INTRAVENOUS
Status: DISCONTINUED | OUTPATIENT
Start: 2021-06-01 | End: 2021-06-02 | Stop reason: HOSPADM

## 2021-06-01 RX ORDER — CARVEDILOL 3.12 MG/1
3.12 TABLET ORAL 2 TIMES DAILY WITH MEALS
Status: DISCONTINUED | OUTPATIENT
Start: 2021-06-01 | End: 2021-06-02 | Stop reason: HOSPADM

## 2021-06-01 RX ORDER — SODIUM CHLORIDE, SODIUM LACTATE, POTASSIUM CHLORIDE, CALCIUM CHLORIDE 600; 310; 30; 20 MG/100ML; MG/100ML; MG/100ML; MG/100ML
INJECTION, SOLUTION INTRAVENOUS CONTINUOUS
Status: DISCONTINUED | OUTPATIENT
Start: 2021-06-01 | End: 2021-06-01

## 2021-06-01 RX ADMIN — CARVEDILOL 3.12 MG: 3.12 TABLET, FILM COATED ORAL at 17:33

## 2021-06-01 RX ADMIN — ATORVASTATIN CALCIUM 40 MG: 40 TABLET, FILM COATED ORAL at 17:33

## 2021-06-01 RX ADMIN — SODIUM CHLORIDE, POTASSIUM CHLORIDE, SODIUM LACTATE AND CALCIUM CHLORIDE: 600; 310; 30; 20 INJECTION, SOLUTION INTRAVENOUS at 00:26

## 2021-06-01 RX ADMIN — ASPIRIN 81 MG: 81 TABLET, COATED ORAL at 14:24

## 2021-06-01 RX ADMIN — SODIUM CHLORIDE, POTASSIUM CHLORIDE, SODIUM LACTATE AND CALCIUM CHLORIDE: 600; 310; 30; 20 INJECTION, SOLUTION INTRAVENOUS at 12:40

## 2021-06-01 RX ADMIN — INSULIN HUMAN 1 UNITS: 100 INJECTION, SOLUTION PARENTERAL at 22:22

## 2021-06-01 ASSESSMENT — COGNITIVE AND FUNCTIONAL STATUS - GENERAL
SUGGESTED CMS G CODE MODIFIER DAILY ACTIVITY: CK
TOILETING: A LITTLE
TURNING FROM BACK TO SIDE WHILE IN FLAT BAD: A LITTLE
CLIMB 3 TO 5 STEPS WITH RAILING: A LITTLE
EATING MEALS: A LITTLE
TURNING FROM BACK TO SIDE WHILE IN FLAT BAD: A LITTLE
CLIMB 3 TO 5 STEPS WITH RAILING: A LITTLE
WALKING IN HOSPITAL ROOM: A LITTLE
PERSONAL GROOMING: A LITTLE
HELP NEEDED FOR BATHING: A LITTLE
DAILY ACTIVITIY SCORE: 18
SUGGESTED CMS G CODE MODIFIER MOBILITY: CI
DRESSING REGULAR LOWER BODY CLOTHING: A LITTLE
DAILY ACTIVITIY SCORE: 18
CLIMB 3 TO 5 STEPS WITH RAILING: A LITTLE
MOBILITY SCORE: 18
TOILETING: A LITTLE
MOVING TO AND FROM BED TO CHAIR: A LITTLE
STANDING UP FROM CHAIR USING ARMS: A LITTLE
MOBILITY SCORE: 23
SUGGESTED CMS G CODE MODIFIER DAILY ACTIVITY: CK
DRESSING REGULAR LOWER BODY CLOTHING: A LITTLE
HELP NEEDED FOR BATHING: A LITTLE
MOVING FROM LYING ON BACK TO SITTING ON SIDE OF FLAT BED: A LITTLE
SUGGESTED CMS G CODE MODIFIER MOBILITY: CK
PERSONAL GROOMING: A LITTLE
DRESSING REGULAR UPPER BODY CLOTHING: A LITTLE
MOVING FROM LYING ON BACK TO SITTING ON SIDE OF FLAT BED: A LITTLE
MOVING TO AND FROM BED TO CHAIR: A LITTLE
WALKING IN HOSPITAL ROOM: A LITTLE
DRESSING REGULAR UPPER BODY CLOTHING: A LITTLE
SUGGESTED CMS G CODE MODIFIER DAILY ACTIVITY: CK
DAILY ACTIVITIY SCORE: 18
HELP NEEDED FOR BATHING: A LITTLE
EATING MEALS: A LITTLE
DRESSING REGULAR UPPER BODY CLOTHING: A LITTLE
TOILETING: A LITTLE
PERSONAL GROOMING: A LITTLE
EATING MEALS: A LITTLE
DRESSING REGULAR LOWER BODY CLOTHING: A LITTLE

## 2021-06-01 ASSESSMENT — ENCOUNTER SYMPTOMS
PALPITATIONS: 0
DOUBLE VISION: 0
NAUSEA: 0
PSYCHIATRIC NEGATIVE: 1
CHILLS: 0
MUSCULOSKELETAL NEGATIVE: 1
CARDIOVASCULAR NEGATIVE: 1
WEIGHT LOSS: 0
CONSTITUTIONAL NEGATIVE: 1
DEPRESSION: 0
FOCAL WEAKNESS: 0
WEAKNESS: 0
GASTROINTESTINAL NEGATIVE: 1
EYES NEGATIVE: 1
SPUTUM PRODUCTION: 0
FEVER: 0
SPEECH CHANGE: 0
RESPIRATORY NEGATIVE: 1
BLURRED VISION: 0
VOMITING: 0
COUGH: 0
NEUROLOGICAL NEGATIVE: 1
SHORTNESS OF BREATH: 0
HEADACHES: 0
MYALGIAS: 0
HEARTBURN: 0
BACK PAIN: 0
PHOTOPHOBIA: 0
NECK PAIN: 0

## 2021-06-01 ASSESSMENT — LIFESTYLE VARIABLES
CONSUMPTION TOTAL: NEGATIVE
TOTAL SCORE: 0
TOTAL SCORE: 0
AVERAGE NUMBER OF DAYS PER WEEK YOU HAVE A DRINK CONTAINING ALCOHOL: 0
EVER FELT BAD OR GUILTY ABOUT YOUR DRINKING: NO
HAVE YOU EVER FELT YOU SHOULD CUT DOWN ON YOUR DRINKING: NO
TOTAL SCORE: 0
HAVE PEOPLE ANNOYED YOU BY CRITICIZING YOUR DRINKING: NO
HOW MANY TIMES IN THE PAST YEAR HAVE YOU HAD 5 OR MORE DRINKS IN A DAY: 0
ON A TYPICAL DAY WHEN YOU DRINK ALCOHOL HOW MANY DRINKS DO YOU HAVE: 0
ALCOHOL_USE: NO
EVER HAD A DRINK FIRST THING IN THE MORNING TO STEADY YOUR NERVES TO GET RID OF A HANGOVER: NO
SUBSTANCE_ABUSE: 0

## 2021-06-01 ASSESSMENT — PAIN DESCRIPTION - PAIN TYPE: TYPE: ACUTE PAIN

## 2021-06-01 ASSESSMENT — GAIT ASSESSMENTS
ASSISTIVE DEVICE: FRONT WHEEL WALKER
GAIT LEVEL OF ASSIST: SUPERVISED
DISTANCE (FEET): 125
DEVIATION: ANTALGIC

## 2021-06-01 ASSESSMENT — FIBROSIS 4 INDEX: FIB4 SCORE: 1.97

## 2021-06-01 ASSESSMENT — ACTIVITIES OF DAILY LIVING (ADL): TOILETING: INDEPENDENT

## 2021-06-01 NOTE — ED NOTES
Admitting MD at bedside with patient. MRI informed this RN that patient's pacemaker is not compatible with MRI

## 2021-06-01 NOTE — THERAPY
Speech Language Pathology   Initial Assessment     Patient Name: Krystle aTvarez  AGE:  87 y.o., SEX:  female  Medical Record #: [id number]  Today's Date: 6/1/2021     Precautions  Precautions: Fall Risk, Swallow Precautions ( See Comments)    Assessment    Pt is an 87 y.o. female who presented 5/31/2021 with TIA. PMHx includes SOB, diabetes, HTN, HLD, sick sinus rhythm w/ a pacemaker. CTH shows no evidence of intraparenchymal, intraventricular and extra-axial hemorrhage. MRI results pending.    Clinical swallow evaluation was completed at bedside. Pt was A&Ox4 and cooperative. Pt reports no difficulty with swallowing at baseline. Pt reports having upper dentures and lower partials. Oral care was provided. Oral mech exam was WFL with the exception of a trace L facial droop which did not impact swallow function.  Pt was administered PO trials of ice chips, thins via tsp/cup, liquidized, pureed, soft and bite sized, and regular easy to chew solid textures. Oral phase appeared functional for mastication with no oral residue post swallow. Delayed dry cough occurred following thin liquid rinse, though vocal quality remained clear and pt reported she felt phlegm in her throat. Recommend a regular easy to chew diet with thin liquids with monitoring during meals. Pills whole with thin liquids. Please hold PO with any change in status. SLP will follow up 1-2x to ensure diet tolerance.    Plan    Regular easy to chew diet with thin liquids with monitoring during meals.   Pills whole with thin liquids.  Please hold PO with any change in status.   SLP will follow up 1-2x to ensure diet tolerance.      Recommend Speech Therapy 3 times per week until therapy goals are met for the following treatments:  Dysphagia Training and Patient / Family / Caregiver Education.    Discharge Recommendations: Anticipate that the patient will have no further speech therapy needs after discharge from the hospital       Objective     06/01/21 0917    Oral Motor Eval    Is Patient Able to Complete Oral Motor Eval Yes but Impaired   Labial Function   Labial Structure At Rest Minimal  (slight L droop)   Labial Vowel Production / I /, / U / Within Functional Limits   Labial Sequence / I /, / U / Within Functional Limits   Frown, Pucker Within Functional Limits   Lingual Function   Lingual Protrude Within Functional Limits   Lingual Retract Within Functional Limits   Elevate In Mouth Within Functional Limits   Elevate Outside Mouth Within Functional Limits   Lick Lips (Circular) Minimal  (difficulty with coordination)   / Pa / 5X's Within Functional Limits   / Ta / 5X's Within Functional Limits   / Ka / 5X's Within Functional Limits   / [place] / 5X's Within Functional Limits   Jaw   Jaw Structure At Rest Within Functional Limits   Bite ([place]) Within Functional Limits   Chew ([place]) Within Functional Limits   Jaw Open / Resist Within Functional Limits   Jaw Close / Resist Within Functional Limits   Velar Function   Velar Structure At Rest Within Functional Limits   / A / Prolonged Within Functional Limits   Laryngeal Function   Voice Quality Within Functional Limits   Volutional Cough Within Functional Limits   Excursion Upon Swallow Complete   Oral Food Presentation   Ice Chips Within Functional Limits   Single Swallow Mildly Thick (2) - (Nectar Thick)  Within Functional Limits   Single Swallow Thin (0) Within Functional Limits  (delayed dry cough following thin liquid rinse)   Liquidised (3) Within Functional Limits   Pureed (4) Within Functional Limits  (delayed cough)   Soft & Bite-Sized (6) - (Dysphagia III) Within Functional Limits   Regular-Easy to Chew (7) Within Functional Limits   Self Feeding Independent   Tracheostomy   Tracheostomy  No   Dysphagia Strategies / Recommendations   Strategies / Interventions Recommended (Yes / No) Yes   Compensatory Strategies Single Sips / Bites;Monitor During Meals;Head of Bed 90 Degrees During Eating / Drinking  "  Diet / Liquid Recommendation Regular - Easy to Chew (7);Thin (0)   Medication Administration  Whole with Liquid Wash   Dysphagia Rating   Nutritional Liquid Intake Rating Scale Non thickened beverages   Nutritional Food Intake Rating Scale Total oral diet with no restrictions   Patient / Family Goals   Patient / Family Goal #1 \"coffee with cream and sugar\"   Short Term Goals   Short Term Goal # 1 Patient will consume meals of regular easy to chew with thin liquids with no s/sx of aspiration given monitoring during meals.         "

## 2021-06-01 NOTE — H&P
Hospital Medicine History & Physical Note    Date of Service  5/31/2021    Primary Care Physician  RICHAR Castelan.    Consultants  Neurology Dr. Morley    Code Status  Prior    Chief Complaint  Chief Complaint   Patient presents with   • Possible Stroke     d/c'd from here earlier today. while sitting in recliner at home her grandson noticed she was altered and had word salad. LKW: 1520.       History of Presenting Illness  87 y.o. female who presented 5/31/2021 with TIA patient is 87-year-old  female with a significant history of hyperlipidemia, sick sinus syndrome status post pacemaker placement, chronic anticoagulation in the form of Eliquis hypertension who was in the hospital earlier today for hyponatremia for which she had medications modified and 70 was subsequently discharged.  Apparently while patient was at home son and daughter noticed that patient had suddenly developed word finding difficulties and word salad.  They began concerned and brought emergently to the emergency department further evaluation    Upon evaluation emergency department patient does have some elements of expressive aphasia without gross motor focality.  Patient was not a candidate for TPA because of usage of Eliquis.  Imaging essentially was negative for large vascular occlusions.  Patient did have compensatory hypertension upon evaluation.  Presently patient is agreeable denies any acute complaints.  Patient does have mild word finding difficulties.  Patient denies subjective fevers or chills denies any nausea vomiting.      Review of Systems  Review of Systems   Constitutional: Negative for chills, diaphoresis, fever, malaise/fatigue and weight loss.   HENT: Negative for congestion, nosebleeds, sinus pain and sore throat.    Eyes: Negative for double vision, pain, discharge and redness.   Respiratory: Negative for cough, sputum production, shortness of breath, wheezing and stridor.    Cardiovascular:  Negative for chest pain, palpitations and leg swelling.   Gastrointestinal: Negative for blood in stool, diarrhea, nausea and vomiting.   Genitourinary: Negative for flank pain, frequency and urgency.   Musculoskeletal: Negative for back pain, joint pain, myalgias and neck pain.   Skin: Negative for itching and rash.   Neurological: Positive for speech change. Negative for dizziness, tingling, seizures, weakness and headaches.   Endo/Heme/Allergies: Negative for polydipsia. Does not bruise/bleed easily.   Psychiatric/Behavioral: Negative for memory loss. The patient is not nervous/anxious and does not have insomnia.        Past Medical History   has a past medical history of Arthritis, Breath shortness, Cataract, Dental disorder, Diabetes (HCC), Heart burn, Hemorrhagic disorder (HCC), High cholesterol, Hyperlipidemia, Hypertension, Pacemaker (2015), Pain (2020), Pre-diabetes, TIA (transient ischemic attack), and Urinary incontinence.    Surgical History   has a past surgical history that includes hip replacement, total (Right, 2009); knee replacement, total (Right, 2004); bunionectomy (Left); basal cell excision; cataract extraction with iol (Bilateral, 2003); pacemaker insertion (2015); cholecystectomy (2002); pr laminotomy,lumbar disk,1 intrsp (N/A, 2/25/2020); and foraminotomy (N/A, 2/25/2020).     Family History  family history includes Diabetes in her mother; Heart Attack (age of onset: 74) in her father; No Known Problems in her maternal grandfather, maternal grandmother, paternal grandfather, and paternal grandmother; Stroke in her mother.     Social History   reports that she has never smoked. She has never used smokeless tobacco. She reports current alcohol use. She reports that she does not use drugs.    Allergies  Allergies   Allergen Reactions   • Sulfa Drugs Nausea     Nausea        Medications  Prior to Admission Medications   Prescriptions Last Dose Informant Patient Reported? Taking?   Ascorbic Acid  (VITAMIN C) 500 MG Cap  Patient Yes No   Sig: Take 1 Capsule by mouth 2 Times a Day.   acetaminophen (TYLENOL) 325 MG Tab   No No   Sig: Take 2 Tablets by mouth every 6 hours as needed (Mild Pain; (Pain scale 1-3); Temp greater than 100.5 F).   apixaban (ELIQUIS) 2.5mg Tab  Patient No No   Sig: Take 1 tablet by mouth 2 times a day.   carvedilol (COREG) 3.125 MG Tab  Patient No No   Sig: TAKE 1 TABLET TWICE A DAY WITH MEALS   clotrimazole-betamethasone (LOTRISONE) 1-0.05 % Cream  Patient No No   Sig: Apply 1 Application topically 2 times a day.   Patient not taking: Reported on 5/29/2021   estradiol (ESTRACE) 0.1 MG/GM vaginal cream  Patient No No   Sig: Insert 0.5 g into the vagina every 72 hours.   Patient not taking: Reported on 5/29/2021   lidocaine (LIDODERM) 5 % Patch   No No   Sig: Place 1 Patch on the skin every 24 hours.   losartan (COZAAR) 100 MG Tab  Patient No No   Sig: Take 1 Tab by mouth every day.   metFORMIN ER (GLUCOPHAGE XR) 500 MG TABLET SR 24 HR  Patient No No   Sig: Take 1 tablet by mouth every day.   sennosides (SENOKOT) 8.6 MG Tab  Patient No No   Sig: Take 1 Tab by mouth every day.      Facility-Administered Medications: None       Physical Exam  Temp:  [36.4 °C (97.6 °F)-36.8 °C (98.2 °F)] 36.8 °C (98.2 °F)  Pulse:  [81-96] 81  Resp:  [15-27] 17  BP: (178-226)/(79-97) 192/79  SpO2:  [90 %-98 %] 98 %    Physical Exam  Vitals reviewed.   Constitutional:       General: She is not in acute distress.     Appearance: Normal appearance. She is not ill-appearing.   HENT:      Head: Normocephalic and atraumatic.      Right Ear: External ear normal.      Left Ear: External ear normal.      Nose: No congestion or rhinorrhea.      Mouth/Throat:      Pharynx: No oropharyngeal exudate or posterior oropharyngeal erythema.   Eyes:      General: No scleral icterus.        Right eye: No discharge.         Left eye: No discharge.      Extraocular Movements: Extraocular movements intact.      Conjunctiva/sclera:  Conjunctivae normal.      Pupils: Pupils are equal, round, and reactive to light.   Cardiovascular:      Rate and Rhythm: Normal rate and regular rhythm.      Pulses: Normal pulses.      Heart sounds: Normal heart sounds. No murmur heard.   No friction rub.   Pulmonary:      Effort: Pulmonary effort is normal.      Breath sounds: Normal breath sounds. No stridor. No wheezing, rhonchi or rales.   Abdominal:      General: Bowel sounds are normal. There is no distension.      Palpations: Abdomen is soft.      Tenderness: There is no abdominal tenderness. There is no guarding or rebound.   Musculoskeletal:         General: No swelling, tenderness, deformity or signs of injury. Normal range of motion.      Cervical back: Normal range of motion and neck supple. No rigidity. No muscular tenderness.   Skin:     General: Skin is warm and dry.      Coloration: Skin is not jaundiced or pale.      Findings: No bruising or erythema.   Neurological:      General: No focal deficit present.      Mental Status: She is alert and oriented to person, place, and time.      Cranial Nerves: No cranial nerve deficit.      Sensory: No sensory deficit.      Motor: No weakness.      Coordination: Coordination normal.   Psychiatric:         Mood and Affect: Mood normal.         Behavior: Behavior normal.         Thought Content: Thought content normal.         Laboratory:  Recent Labs     05/30/21  0132 05/31/21  0158 05/31/21  1628   WBC 9.5 8.9 9.3   RBC 4.09* 4.15* 4.55   HEMOGLOBIN 12.6 13.0 14.1   HEMATOCRIT 37.4 38.0 41.5   MCV 91.4 91.6 91.2   MCH 30.8 31.3 31.0   MCHC 33.7 34.2 34.0   RDW 43.3 43.3 43.5   PLATELETCT 247 257 273   MPV 9.4 9.6 9.1     Recent Labs     05/30/21  0132 05/30/21  0611 05/30/21  2215 05/31/21  0158 05/31/21  1628   SODIUM 131*   < > 129* 130* 131*   POTASSIUM 3.8  --   --  4.3 4.0   CHLORIDE 96  --   --  95* 96   CO2 27  --   --  26 25   GLUCOSE 136*  --   --  147* 224*   BUN 24*  --   --  16 17   CREATININE  0.77  --   --  0.54 0.61   CALCIUM 9.0  --   --  9.1 9.3    < > = values in this interval not displayed.     Recent Labs     05/29/21  1645 05/29/21  1645 05/30/21  0132 05/31/21  0158 05/31/21  1628   ALTSGPT 14   < > 13 15 15   ASTSGOT 15   < > 16 21 24   ALKPHOSPHAT 77   < > 70 71 82   TBILIRUBIN 0.7   < > 0.7 0.8 0.8   LIPASE 41  --   --   --   --    GLUCOSE 151*   < > 136* 147* 224*    < > = values in this interval not displayed.     Recent Labs     05/31/21  1628   APTT 30.4   INR 0.99     No results for input(s): NTPROBNP in the last 72 hours.      Recent Labs     05/30/21  1652 05/30/21  2215 05/31/21  1628   TROPONINT 68* 54* 59*       Imaging:  DX-CHEST-PORTABLE (1 VIEW)   Final Result      Stable chest without acute/new abnormality.      CT-CEREBRAL PERFUSION ANALYSIS   Final Result      1.  Cerebral blood flow less than 30% likely representing completed infarct = 0 mL.      2.  T Max more than 6 seconds likely representing combination of completed infarct and ischemia = 7 mL.      3.  Mismatched volume likely representing ischemic brain/penumbra = 7 milliliters      4.  Please note that the cerebral perfusion was performed on the limited brain tissue around the basal ganglia region. Infarct/ischemia outside the CT perfusion sections can be missed in this study.      CT-CTA NECK WITH & W/O-POST PROCESSING   Final Result      No hemodynamically significant stenosis of the neck arteries is visualized.      Study limitations as above.      CT-CTA HEAD WITH & W/O-POST PROCESS   Final Result      1.  No acute large vessel occlusion.   2.  Multifocal mild to moderate stenoses likely related to atherosclerosis.      CT-HEAD W/O   Final Result      1.  No acute intracranial abnormality is identified.   2.  Focal hypodensities in the basal ganglia are likely related to prior lacunar infarcts.   3.  There are periventricular and subcortical white matter changes present.  This finding is nonspecific and could be  from previous small vessel ischemia, demyelination, or gliosis.   4.  Atrophy   5.  Small amount of fluid in the mastoid air cells on the right.      MRI would be more sensitive for further evaluation.      MR-BRAIN-W/O    (Results Pending)         Assessment/Plan:  I anticipate this patient is appropriate for observation status at this time.    TIA (transient ischemic attack)  Assessment & Plan  2Patient does have findings suggestive of a TIA she does have significant cerebral atrophy neurology recommendations are appreciated.  Patient not a candidate for TPA again because of patient's long-term use of chemical anticoagulation in the form of apixaban secondary to paroxysmal atrial fibrillation.  Regardless patient does have ABCD2 risk score of at least 5.  Plan:  1.  Patient had TTE already performed.  2.  Aggressive statin management goal for LDL less than 70  3.  Gradual reduction of blood pressure after the first 4 hours of permissive hypertension.  Permissive hypertension goal is less than 220 systolic.  Further neurology recommendations are appreciated in this regard.  4.  Physical therapy, Occupational Therapy, speech-language therapy.  5.  Initiate aspirin 325 mg.  At present we will hold off on Plavix as patient is also been on Eliquis and there is increased objective risk for bleeding.  This can be further evaluated as clinical course unfolds.  Neurology recommendations appreciated.    PAF (paroxysmal atrial fibrillation) (HCC)- (present on admission)  Assessment & Plan  Presently we will hold chemical anticoagulation given patient's recent TIA and increased chance of bleeding hemorrhagic conversion.  Resumption can be further entertained on outpatient basis if possible.    Type 2 diabetes mellitus without complication, without long-term current use of insulin (HCC)- (present on admission)  Assessment & Plan  Patient with most recent hemoglobin A1c is 7.2 continue her modification.  Medication as  needed.    Dyslipidemia- (present on admission)  Assessment & Plan  High intensity statin given patient's recent TIA and cerebrovascular disease.  Atorvastatin 40  VT prophylaxis sequential pressure devices only no chemical anticoagulation.

## 2021-06-01 NOTE — THERAPY
Physical Therapy   Initial Evaluation     Patient Name: Krystle Tavarez  Age:  87 y.o., Sex:  female  Medical Record #: [id number]  Today's Date: 6/1/2021     Precautions: Fall Risk, Swallow Precautions ( See Comments)    Assessment  Patient is 87 y.o. female with history of chronic back pain, HTN, HLD, sick sinus rhythm w/ a pacemaker presenting to the hospital for word salad, now resolved and pt appears to be at baseline function. See details below, supervised for OOB, transfers and ambulation using FWW. Pt has a very supportive family who will be home to assist as needed.      Plan    Recommend Physical Therapy for Evaluation only  DC Equipment Recommendations: None  Discharge Recommendations: Recommend outpatient physical therapy services to address higher level deficits (continue with outpt PT, pt has been going 2x/week.)    Patient will not be actively followed for physical therapy services at this time, however may be seen if requested by physician for 1 more visit within 30 days to address any discharge or equipment needs          Objective       06/01/21 1114   Prior Living Situation   Prior Services None  (family assists as needed)   Housing / Facility [address]   Steps Into Home 1  (family always with her when doing stairs. )   Steps In Home 1   Rail None   Elevator No   Equipment Owned 4-Wheel Walker   Lives with - Patient's Self Care Capacity Adult Children  (dghtr and KEIKO, always someone home. )   Prior Level of Functional Mobility   Bed Mobility Independent   Transfer Status Independent   Ambulation Independent   Distance Ambulation (Feet)   (household, max walking 3 min before pain onset. )   Assistive Devices Used 4-Wheel Walker   Stairs Required Assist   Comments pt reports spending most of her time in the recliner in livingroom.    Gait Analysis   Gait Level Of Assist Supervised   Assistive Device Front Wheel Walker   Distance (Feet) 125   Deviation Antalgic  (needed seated rest when done due to  back pain. )   Bed Mobility    Supine to Sit Supervised   Sit to Supine Supervised   Scooting Supervised   Rolling Supervised   Functional Mobility   Sit to Stand Supervised   Bed, Chair, Wheelchair Transfer Supervised   Anticipated Discharge Equipment and Recommendations   DC Equipment Recommendations None   Discharge Recommendations Recommend outpatient physical therapy services to address higher level deficits  (continue with outpt PT, pt has been going 2x/week.)

## 2021-06-01 NOTE — PROGRESS NOTES
Hospital Neurology Progress Note:     Interval History: No acute events overnight. No complaints today. Speech improved/back to baseline.     Objective:   Vitals:    06/01/21 0029 06/01/21 0205 06/01/21 0433 06/01/21 0742   BP: 159/79 134/77 123/75 120/72   Pulse:   64 70   Resp:   18 18   Temp:   36.7 °C (98.1 °F) 37 °C (98.6 °F)   TempSrc:   Temporal Temporal   SpO2:   92% 93%   Weight:       Height:           Labs:     Lab Results   Component Value Date/Time    PROTHROMBTM 13.4 05/31/2021 04:28 PM    INR 0.99 05/31/2021 04:28 PM      Lab Results   Component Value Date/Time    WBC 9.3 05/31/2021 04:28 PM    RBC 4.55 05/31/2021 04:28 PM    HEMOGLOBIN 14.1 05/31/2021 04:28 PM    HEMATOCRIT 41.5 05/31/2021 04:28 PM    MCV 91.2 05/31/2021 04:28 PM    MCH 31.0 05/31/2021 04:28 PM    MCHC 34.0 05/31/2021 04:28 PM    MPV 9.1 05/31/2021 04:28 PM    NEUTSPOLYS 54.60 05/31/2021 04:28 PM    LYMPHOCYTES 36.70 05/31/2021 04:28 PM    MONOCYTES 7.50 05/31/2021 04:28 PM    EOSINOPHILS 0.50 05/31/2021 04:28 PM    BASOPHILS 0.40 05/31/2021 04:28 PM      Lab Results   Component Value Date/Time    SODIUM 131 (L) 05/31/2021 04:28 PM    POTASSIUM 4.0 05/31/2021 04:28 PM    CHLORIDE 96 05/31/2021 04:28 PM    CO2 25 05/31/2021 04:28 PM    GLUCOSE 224 (H) 05/31/2021 04:28 PM    BUN 17 05/31/2021 04:28 PM    CREATININE 0.61 05/31/2021 04:28 PM      Lab Results   Component Value Date/Time    CHOLSTRLTOT 187 06/01/2021 12:33 AM     (H) 06/01/2021 12:33 AM    HDL 51 06/01/2021 12:33 AM    TRIGLYCERIDE 61 06/01/2021 12:33 AM       Lab Results   Component Value Date/Time    ALKPHOSPHAT 82 05/31/2021 04:28 PM    ASTSGOT 24 05/31/2021 04:28 PM    ALTSGPT 15 05/31/2021 04:28 PM    TBILIRUBIN 0.8 05/31/2021 04:28 PM        Imaging/Testing:   No new imaging to review.     Physical Exam:     General: 88 y/o female in bed in NAD  Cardio: Normal S1/S2. No peripheral edema.   Pulm: CTAX2. No respiratory distress.   Skin: Warm, dry, no rashes or  lesions   Psychiatric: Appropriate affect. No active psychosis.  HEENT: Atraumatic head, normal sclera and conjunctiva, moist oral mucosa. No lid lag.  Abdomen: Soft, non tender. No masses or hepatosplenomegaly.    1a. Level of Consciousness (Alert, drowsy, etc): 0= Alert    1b. LOC Questions (Month, age): 0= Answers both correctly    1c. LOC Commands (Open/close eyes make fist/let go): 0= Obeys both correctly    2.   Best Gaze (Eyes open - patient follows examiner's finger on face): 0= Normal    3.   Visual Fields (introduce visual stimulus/threat to patient's field quadrants): 0= No visual loss  4.   Facial Paresis (Show teeth, raise eyebrows and squeeze eyes shut): 0= Normal     5a. Motor Arm - Left (Elevate arm to 90 degrees if patient is sitting, 45 degrees if  supine): 0= No drift    5b. Motor Arm - Right (Elevate arm to 90 degrees if patient is sitting, 45 degrees if supine): 0= No drift    6a. Motor Leg - Left (Elevate leg 30 degrees with patient supine): 0= No drift    6b. Motor Leg - Right  (Elevate leg 30 degrees with patient supine): 0= No drift    7.   Limb Ataxia (Finger-nose, heel down shin): 0= No ataxia    8.   Sensory (Pin prick to face, arm, trunk and leg - compare side to side): 0= Normal    9.  Best Language (Name item, describe a picture and read sentences): 0= No aphasia    10. Dysarthria (Evaluate speech clarity by patient repeating listed words): 0= Normal articulation    11. Extinction and Inattention (Use information from prior testing to identify neglect or  double simultaneous stimuli testing): 0= No neglect    Total NIH Score: 0    Assessment/Plan:    Krystle Tavarez is a 87 y.o. female with history of chronic back pain, HTN, HLD, sick sinus rhythm w/ a pacemaker presenting to the hospital for word salad and consulted for stroke. Patient presented w/ some confusion and word salad.  Patient was quite hypertensive at the time of initial evaluation with systolic blood pressures over 200.   Her symptoms have resolved completely.  Given that she has tolerated normotension without return of symptoms I do not believe that this is a hemodynamic process secondary to her mild intracranial atherosclerosis.  It is likely that the episode of speech difficulties on 5/31/2021 was secondary to hypertensive encephalopathy.    Plan:  1.  Continue normotension  2.  Goal LDL less than 70  3.  Goal hemoglobin A1c less than 7  4.  Outpatient systolic blood pressure goal 130  5.  Episode likely secondary to hypertensive encephalopathy.  6.  Patient currently at baseline.  No further recommendations.  Signing off.  Please call with any further questions or concerns.  7.  Plan discussed with consulting physician and patient's nurse.     José Miguel Morley M.D., Diplomat of the American Board of Psychiatry and Neurology  Diplomat of [employer] Epilepsy Subspecialty   Assistant Clinical Professor, [facility] Neurology Consultant

## 2021-06-01 NOTE — ED NOTES
"Med Rec completed: per Historical, pt was discharged this afternoon from [facility].     Med rec completed per med rec tech on date of admission: 5/29/21     \"Med rec updated and complete. Allergies reviewed. Pt denies antibiotic use in last 14 days.          [place] [facility] [phone].        [facility] [phone]\"        "

## 2021-06-01 NOTE — CARE PLAN
Problem: Knowledge Deficit - Standard  Goal: Patient and family/care givers will demonstrate understanding of plan of care, disease process/condition, diagnostic tests and medications  Outcome: Progressing     Problem: Fall Risk  Goal: Patient will remain free from falls  Outcome: Progressing   The patient is Watcher - Medium risk of patient condition declining or worsening    Progress made toward(s) clinical / shift goals: Mentation improvement, and close monitoring of Neuro. Pt A&Ox2, disoriented to place and time. Pt easily arousabile, following commands. Close monitoring continued.

## 2021-06-01 NOTE — ASSESSMENT & PLAN NOTE
Patient suggestive of a TIA vs hypertensive emergency. She does have significant cerebral atrophy neurology recommendations are appreciated.  Patient not a candidate for TPA again because of patient's long-term use of chemical anticoagulation in the form of apixaban secondary to paroxysmal atrial fibrillation.  Regardless patient does have ABCD2 risk score of at least 5.  Plan:  1.  Patient had TTE already performed.  2.  Aggressive statin management goal for LDL less than 70  3.  Gradual reduction of blood pressure after the first 24 hours of permissive hypertension.  Permissive hypertension goal is less than 220 systolic.  Further neurology recommendations are appreciated in this regard.  4.  Physical therapy, Occupational Therapy, speech-language therapy.  5.  Continue ASA, neurology does not recommend plavix at this time

## 2021-06-01 NOTE — THERAPY
Occupational Therapy   Initial Evaluation     Patient Name: Krystle Tavarez  Age:  87 y.o., Sex:  female  Medical Record #: [id number]  Today's Date: 6/1/2021     Precautions  Precautions: Fall Risk, Swallow Precautions ( See Comments)    Assessment  Patient is 87 y.o. female with a diagnosis of possible CVA.  Additional factors influencing patient status / progress: good family support available at home, all necessary DME in place. Appears to be at/ close to functional baseline. No further skilled OT warranted at this time. DC needs only.      Plan    Recommend Occupational Therapy for Evaluation only for the following treatments:  NA.    DC Equipment Recommendations: (P) None (all necessary DME in place)  Discharge Recommendations: (P) Other - (home with family, return to OP PT)     Subjective    Pleasant, cooperative, motivated     Objective       06/01/21 0750   Total Time Spent   Total Time Spent (Mins)    Charge Group   OT Evaluation OT Evaluation Mod   Initial Contact Note    Initial Contact Note Order Received and Verified, Evaluation Only - Patient Does Not Require Further Acute Occupational Therapy at this Time.  However, May Benefit from Post Acute Therapy for Higher Level Functional Deficits.   Prior Living Situation   Prior Services Home With Outpatient Therapy  (out patient PT 2 x weekly)   Housing / Facility 1 Story House   Steps Into Home 2   Steps In Home 0   Bathroom Set up Walk In Shower;Grab Bars;Shower Chair   Equipment Owned 4-Wheel Walker;Grab Bar(s) In Tub / Shower;Tub / Shower Seat   Lives with - Patient's Self Care Capacity Adult Children   Prior Level of ADL Function   Self Feeding Independent   Grooming / Hygiene Independent   Bathing Independent  (makes sure daughter is in the house, but no direct sup.)   Dressing Independent   Toileting Independent   Prior Level of IADL Function   Medication Management Requires Assist   Laundry Requires Assist   Kitchen Mobility Independent   Finances  Requires Assist   Home Management Requires Assist   Shopping Requires Assist   Prior Level Of Mobility Supervision With Device in Home   Driving / Transportation Relatives / Others Provide Transportation   Occupation (Pre-Hospital Vocational) Retired Due To Age   Precautions   Precautions Fall Risk;Swallow Precautions ( See Comments)   Vitals   O2 Delivery Device None - Room Air   Pain 0 - 10 Group   Therapist Pain Assessment During Activity;Post Activity Pain Same as Prior to Activity;Nurse Notified;0   Cognition    Orientation Level Oriented x 4   Level of Consciousness Alert   Comments mild forgetfulness, but appears to be at baseline   Passive ROM Upper Body   Passive ROM Upper Body WDL   Active ROM Upper Body   Active ROM Upper Body  WDL   Dominant Hand Right   Strength Upper Body   Upper Body Strength  WDL   Sensation Upper Body   Upper Extremity Sensation  WDL   Upper Body Muscle Tone   Upper Body Muscle Tone  WDL   Coordination Upper Body   Coordination WDL   Comments arthritic changes bilaterally, but well WFL for necessary activity   Balance Assessment   Sitting Balance (Static) Fair +   Sitting Balance (Dynamic) Fair   Standing Balance (Static) Fair -   Standing Balance (Dynamic) Fair -   Weight Shift Sitting Fair   Weight Shift Standing Fair   Bed Mobility    Supine to Sit Supervised   Sit to Supine Supervised   Scooting Supervised   Rolling Supervised   ADL Assessment   Eating Supervision   Grooming Supervision;Standing;Seated   Bathing   (NT)   Upper Body Dressing Supervision   Lower Body Dressing Minimal Assist   Toileting Supervision   How much help from another person does the patient currently need...   Putting on and taking off regular lower body clothing? 3   Bathing (including washing, rinsing, and drying)? 3   Toileting, which includes using a toilet, bedpan, or urinal? 3   Putting on and taking off regular upper body clothing? 3   Taking care of personal grooming such as brushing teeth? 3    Eating meals? 3   6 Clicks Daily Activity Score 18   Functional Mobility   Sit to Stand   (SBA)   Bed, Chair, Wheelchair Transfer   (SBA)   Toilet Transfers   (CG)   Transfer Method Stand Step   Visual Perception   Visual Perception  WDL   Activity Tolerance   Sitting Edge of Bed 10 mins   Standing 4 mins   Education Group   Education Provided Home Safety   Role of Occupational Therapist Patient Response Patient;Family;Acceptance;Explanation;Demonstration;Verbal Demonstration;Action Demonstration   Home Safety Patient Response Patient;Family;Acceptance;Explanation;Demonstration;Verbal Demonstration;Action Demonstration   Anticipated Discharge Equipment and Recommendations   DC Equipment Recommendations None  (all necessary DME in place)   Discharge Recommendations Other -  (home with family, return to OP PT)   Interdisciplinary Plan of Care Collaboration   IDT Collaboration with  Nursing;Family / Caregiver   Patient Position at End of Therapy In Bed;Bed Alarm On;Call Light within Reach;Tray Table within Reach;Phone within Reach;Family / Friend in Room   Collaboration Comments report given   Session Information   Date / Session Number  6/1,1/1   Priority 0

## 2021-06-01 NOTE — ED NOTES
Bedside report given to tele RN Henna. Patient placed on her monitor for transport. All belongings with patient. Family at bedside

## 2021-06-01 NOTE — PROGRESS NOTES
Paged On Call MD regarding Pts SBP of 150-130. Pt remains A&Ox2, disoriented to place and time. Pt is arousalible, and pleasant, no neurological changes seen.     Spoke with MD Hernandez and updated on pts SBP per order to notif MD if below 170 SBP.   Per MD no new orders at this time, to continue monitoring neuro for any changes. Will continue to monitor.

## 2021-06-01 NOTE — PROGRESS NOTES
[facility] Medicine Daily Progress Note    Date of Service  6/1/2021    Chief Complaint  87 y.o. female admitted 5/31/2021 with possible stroke    Hospital Course  87 y.o. female who presented 5/31/2021 with TIA patient is 87-year-old  female with a significant history of hyperlipidemia, sick sinus syndrome status post pacemaker placement, chronic anticoagulation in the form of Eliquis hypertension who was in the hospital earlier today for hyponatremia for which she had medications modified and 70 was subsequently discharged.  Apparently while patient was at home son and daughter noticed that patient had suddenly developed word finding difficulties and word salad.  They began concerned and brought emergently to the emergency department further evaluation     Upon evaluation emergency department patient does have some elements of expressive aphasia without gross motor focality.  Patient was not a candidate for TPA because of usage of Eliquis.  Imaging essentially was negative for large vascular occlusions. Patients BP was noted to be 192/79 on presentation.    Interval Problem Update  Patient appears back to baseline. Mental status improved, speech normal, no weakness as confirmed by patient's daughter. Neuro following case, TIA vs Hypertensive emergency.    Consultants/Specialty  Neurology    Code Status  Prior    Disposition  Likely home with family    Review of Systems  Review of Systems   Constitutional: Negative.  Negative for chills, fever and weight loss.   HENT: Negative.  Negative for ear pain, hearing loss and tinnitus.    Eyes: Negative.  Negative for blurred vision, double vision and photophobia.   Respiratory: Negative.  Negative for cough, sputum production and shortness of breath.    Cardiovascular: Negative.  Negative for chest pain and palpitations.   Gastrointestinal: Negative.  Negative for heartburn, nausea and vomiting.   Genitourinary: Negative.  Negative for dysuria, frequency and  urgency.   Musculoskeletal: Negative.  Negative for back pain, myalgias and neck pain.   Skin: Negative.  Negative for rash.   Neurological: Negative.  Negative for speech change, focal weakness, weakness and headaches.   Psychiatric/Behavioral: Negative.  Negative for depression, substance abuse and suicidal ideas.        Physical Exam  Temp:  [36.7 °C (98.1 °F)-37.1 °C (98.7 °F)] 37 °C (98.6 °F)  Pulse:  [64-90] 80  Resp:  [15-20] 18  BP: (120-218)/(72-97) 136/79  SpO2:  [90 %-98 %] 93 %    Physical Exam  Constitutional:       Appearance: She is not ill-appearing.   HENT:      Head: Normocephalic and atraumatic.      Nose: Nose normal.      Mouth/Throat:      Mouth: Mucous membranes are moist.      Pharynx: Oropharynx is clear.   Eyes:      Conjunctiva/sclera: Conjunctivae normal.      Pupils: Pupils are equal, round, and reactive to light.   Cardiovascular:      Rate and Rhythm: Normal rate.   Pulmonary:      Effort: Pulmonary effort is normal.      Breath sounds: Normal breath sounds.   Abdominal:      General: There is no distension.      Palpations: Abdomen is soft.      Tenderness: There is no abdominal tenderness. There is no guarding or rebound.   Musculoskeletal:         General: Normal range of motion.      Cervical back: Normal range of motion. No rigidity.      Right lower leg: No edema.      Left lower leg: No edema.   Skin:     General: Skin is warm and dry.      Capillary Refill: Capillary refill takes less than 2 seconds.   Neurological:      General: No focal deficit present.      Mental Status: She is alert and oriented to person, place, and time.   Psychiatric:         Mood and Affect: Mood normal.         Behavior: Behavior normal.         Fluids  No intake or output data in the 24 hours ending 06/01/21 1710    Laboratory  Recent Labs     05/30/21  0132 05/31/21  0158 05/31/21  1628   WBC 9.5 8.9 9.3   RBC 4.09* 4.15* 4.55   HEMOGLOBIN 12.6 13.0 14.1   HEMATOCRIT 37.4 38.0 41.5   MCV 91.4 91.6  91.2   MCH 30.8 31.3 31.0   MCHC 33.7 34.2 34.0   RDW 43.3 43.3 43.5   PLATELETCT 247 257 273   MPV 9.4 9.6 9.1     Recent Labs     05/30/21  0132 05/30/21  0611 05/30/21  2215 05/31/21  0158 05/31/21  1628   SODIUM 131*   < > 129* 130* 131*   POTASSIUM 3.8  --   --  4.3 4.0   CHLORIDE 96  --   --  95* 96   CO2 27  --   --  26 25   GLUCOSE 136*  --   --  147* 224*   BUN 24*  --   --  16 17   CREATININE 0.77  --   --  0.54 0.61   CALCIUM 9.0  --   --  9.1 9.3    < > = values in this interval not displayed.     Recent Labs     05/31/21  1628   APTT 30.4   INR 0.99         Recent Labs     06/01/21  0033   TRIGLYCERIDE 61   HDL 51   *       Imaging  DX-FOOT-COMPLETE 3+ RIGHT   Final Result      No acute osseous abnormality.      Mild degenerative changes in the forefoot.      DX-FOOT-COMPLETE 3+ LEFT   Final Result      1.  No fracture or dislocation of LEFT foot.   2.  Moderate osteoarthritis.   3.  Prior bunionectomy.      DX-CHEST-PORTABLE (1 VIEW)   Final Result      Stable chest without acute/new abnormality.      CT-CEREBRAL PERFUSION ANALYSIS   Final Result      1.  Cerebral blood flow less than 30% likely representing completed infarct = 0 mL.      2.  T Max more than 6 seconds likely representing combination of completed infarct and ischemia = 7 mL.      3.  Mismatched volume likely representing ischemic brain/penumbra = 7 milliliters      4.  Please note that the cerebral perfusion was performed on the limited brain tissue around the basal ganglia region. Infarct/ischemia outside the CT perfusion sections can be missed in this study.      CT-CTA NECK WITH & W/O-POST PROCESSING   Final Result      No hemodynamically significant stenosis of the neck arteries is visualized.      Study limitations as above.      CT-CTA HEAD WITH & W/O-POST PROCESS   Final Result      1.  No acute large vessel occlusion.   2.  Multifocal mild to moderate stenoses likely related to atherosclerosis.      CT-HEAD W/O   Final  Result      1.  No acute intracranial abnormality is identified.   2.  Focal hypodensities in the basal ganglia are likely related to prior lacunar infarcts.   3.  There are periventricular and subcortical white matter changes present.  This finding is nonspecific and could be from previous small vessel ischemia, demyelination, or gliosis.   4.  Atrophy   5.  Small amount of fluid in the mastoid air cells on the right.      MRI would be more sensitive for further evaluation.      MR-BRAIN-W/O    (Results Pending)        Assessment/Plan  TIA (transient ischemic attack)  Assessment & Plan  Patient suggestive of a TIA vs hypertensive emergency. She does have significant cerebral atrophy neurology recommendations are appreciated.  Patient not a candidate for TPA again because of patient's long-term use of chemical anticoagulation in the form of apixaban secondary to paroxysmal atrial fibrillation.  Regardless patient does have ABCD2 risk score of at least 5.  Plan:  1.  Patient had TTE already performed.  2.  Aggressive statin management goal for LDL less than 70  3.  Gradual reduction of blood pressure after the first 24 hours of permissive hypertension.  Permissive hypertension goal is less than 220 systolic.  Further neurology recommendations are appreciated in this regard.  4.  Physical therapy, Occupational Therapy, speech-language therapy.  5.  Continue ASA, neurology does not recommend plavix at this time    PAF (paroxysmal atrial fibrillation) (HCC)- (present on admission)  Assessment & Plan  Presently we will hold chemical anticoagulation given patient's recent TIA and increased chance of bleeding hemorrhagic conversion.  Resumption can be further entertained on outpatient basis if possible.    Type 2 diabetes mellitus without complication, without long-term current use of insulin (HCC)- (present on admission)  Assessment & Plan  Patient with most recent hemoglobin A1c is 7.2  On metformin at home  Insulin  Sliding Scale for now    Dyslipidemia- (present on admission)  Assessment & Plan  High intensity statin given patient's recent TIA and cerebrovascular disease.  Atorvastatin 40       VTE prophylaxis: SCDs

## 2021-06-01 NOTE — ASSESSMENT & PLAN NOTE
Presently we will hold chemical anticoagulation given patient's recent TIA and increased chance of bleeding hemorrhagic conversion.  Resumption can be further entertained on outpatient basis if possible.

## 2021-06-01 NOTE — CARE PLAN
Problem: Knowledge Deficit - Stroke Education  Goal: Patient's knowledge of stroke and risk factors will improve  Outcome: Progressing  Note: Plan of care discussed-pt verbalizes understanding     Problem: Urinary Elimination  Goal: Establish and maintain regular urinary output  Outcome: Progressing  Note: Purewick in place          Patient is not progressing towards the following goals:

## 2021-06-01 NOTE — PROGRESS NOTES
Bedside report received from NAMAN Aguillon. Call light and belongings within reach. Bed locked and in lowest position. Alarm and fall precautions in place.

## 2021-06-01 NOTE — PROGRESS NOTES
Bedside report received from Kerri FLORENCE in ED. Pt A&Ox2, disoriented to place and time. Family present at bedside. Pt to unit with ACLS RN on zoll. All belongings with family. Tele monitor placed, monitor room notified. Pt paced 75bpm. Pt NIHSS score of 1 with expressive aphasia. Pt oriented to room and use of call light. POC discussed with pt and family. NPO status discussed. Family member stated pt was having difficulties earlier that day with swallowing. Pt strict NPO until speech eval. Fall precautions in place. Will continue to monitor.

## 2021-06-01 NOTE — ASSESSMENT & PLAN NOTE
Patient with most recent hemoglobin A1c is 7.2  On metformin at home  Insulin Sliding Scale for now

## 2021-06-01 NOTE — PROGRESS NOTES
Contacted on call MD regarding orders for pt, as pt is strict NPO and requesting water.  Spoke with MD Carnes. Continue Strict NPO until seen by speech. Pt started on continuous fluids.

## 2021-06-01 NOTE — DISCHARGE PLANNING
[facility] Transitional [facility] Coordination    Referral from:  Dr. Chet Carnes  Insurance Provider on Facesheet:  Medicare  Potential Rehab Diagnosis:   R/O Stroke    Chart review indicates patient has limited on going medical management,  may therapy needs     D/C support:  Daughter and KEIKO     Physiatry consultation pended while waiting for additional information.  Would welcome PT/OT evals as clinically appropriate.  TCC will follow.  Please reach out sooner if PMR consult requested for medical management.      Last Covid test:  5/31/2021 not detected     Thank you for the referral.

## 2021-06-01 NOTE — PROGRESS NOTES
2 RN skin check completed with NAMAN Alex  Devices in place: PIV, purewick, glasses, tele monitor  Skin assessed under devices yes.  Confirmed pressure ulcers found on N/A.  New potential ulcers noted on N/A.  Wound consult placed N/A.  The following interventions in place: mepilex to bilateral heels, glasses removed while sleeping, pillows, barrier cream, pt encouraged to move    Bilat heels: boggy, red, blanching - mepilex in place  Bilat elbows: red/blanching  Bilat ears: red/blanching  Sacrum: red, blanching - barrier cream used, no mepilex due to incontinence  Callus to left sole of foot

## 2021-06-02 VITALS
RESPIRATION RATE: 16 BRPM | SYSTOLIC BLOOD PRESSURE: 169 MMHG | BODY MASS INDEX: 26.33 KG/M2 | HEIGHT: 66 IN | HEART RATE: 69 BPM | OXYGEN SATURATION: 92 % | WEIGHT: 163.8 LBS | TEMPERATURE: 98.8 F | DIASTOLIC BLOOD PRESSURE: 86 MMHG

## 2021-06-02 PROBLEM — I67.4 HYPERTENSIVE ENCEPHALOPATHY: Status: RESOLVED | Noted: 2021-05-31 | Resolved: 2021-06-02

## 2021-06-02 PROBLEM — I67.4 HYPERTENSIVE ENCEPHALOPATHY: Status: ACTIVE | Noted: 2021-05-31

## 2021-06-02 LAB
ANION GAP SERPL CALC-SCNC: 10 MMOL/L (ref 7–16)
BUN SERPL-MCNC: 18 MG/DL (ref 8–22)
CALCIUM SERPL-MCNC: 8.9 MG/DL (ref 8.5–10.5)
CHLORIDE SERPL-SCNC: 98 MMOL/L (ref 96–112)
CO2 SERPL-SCNC: 24 MMOL/L (ref 20–33)
CREAT SERPL-MCNC: 0.67 MG/DL (ref 0.5–1.4)
ERYTHROCYTE [DISTWIDTH] IN BLOOD BY AUTOMATED COUNT: 45.2 FL (ref 35.9–50)
GLUCOSE BLD-MCNC: 143 MG/DL (ref 65–99)
GLUCOSE BLD-MCNC: 183 MG/DL (ref 65–99)
GLUCOSE SERPL-MCNC: 144 MG/DL (ref 65–99)
HCT VFR BLD AUTO: 37.3 % (ref 37–47)
HGB BLD-MCNC: 12.3 G/DL (ref 12–16)
MAGNESIUM SERPL-MCNC: 2 MG/DL (ref 1.5–2.5)
MCH RBC QN AUTO: 30.8 PG (ref 27–33)
MCHC RBC AUTO-ENTMCNC: 33 G/DL (ref 33.6–35)
MCV RBC AUTO: 93.3 FL (ref 81.4–97.8)
PLATELET # BLD AUTO: 239 K/UL (ref 164–446)
PMV BLD AUTO: 10 FL (ref 9–12.9)
POTASSIUM SERPL-SCNC: 3.8 MMOL/L (ref 3.6–5.5)
RBC # BLD AUTO: 4 M/UL (ref 4.2–5.4)
SODIUM SERPL-SCNC: 132 MMOL/L (ref 135–145)
WBC # BLD AUTO: 7.7 K/UL (ref 4.8–10.8)

## 2021-06-02 PROCEDURE — 700102 HCHG RX REV CODE 250 W/ 637 OVERRIDE(OP)

## 2021-06-02 PROCEDURE — A9270 NON-COVERED ITEM OR SERVICE: HCPCS

## 2021-06-02 PROCEDURE — G0378 HOSPITAL OBSERVATION PER HR: HCPCS

## 2021-06-02 PROCEDURE — 99217 PR OBSERVATION CARE DISCHARGE: CPT | Performed by: STUDENT IN AN ORGANIZED HEALTH CARE EDUCATION/TRAINING PROGRAM

## 2021-06-02 PROCEDURE — 82962 GLUCOSE BLOOD TEST: CPT

## 2021-06-02 PROCEDURE — 96372 THER/PROPH/DIAG INJ SC/IM: CPT

## 2021-06-02 PROCEDURE — A9270 NON-COVERED ITEM OR SERVICE: HCPCS | Performed by: STUDENT IN AN ORGANIZED HEALTH CARE EDUCATION/TRAINING PROGRAM

## 2021-06-02 PROCEDURE — 36415 COLL VENOUS BLD VENIPUNCTURE: CPT

## 2021-06-02 PROCEDURE — 83735 ASSAY OF MAGNESIUM: CPT

## 2021-06-02 PROCEDURE — 80048 BASIC METABOLIC PNL TOTAL CA: CPT

## 2021-06-02 PROCEDURE — 700102 HCHG RX REV CODE 250 W/ 637 OVERRIDE(OP): Performed by: STUDENT IN AN ORGANIZED HEALTH CARE EDUCATION/TRAINING PROGRAM

## 2021-06-02 PROCEDURE — 85027 COMPLETE CBC AUTOMATED: CPT

## 2021-06-02 RX ORDER — ATORVASTATIN CALCIUM 40 MG/1
40 TABLET, FILM COATED ORAL EVERY EVENING
Qty: 30 TABLET | Refills: 3 | Status: SHIPPED | OUTPATIENT
Start: 2021-06-02 | End: 2021-10-22 | Stop reason: SDUPTHER

## 2021-06-02 RX ORDER — ASPIRIN 81 MG/1
81 TABLET ORAL DAILY
Qty: 30 TABLET | Refills: 3 | Status: SHIPPED | OUTPATIENT
Start: 2021-06-03 | End: 2022-05-01

## 2021-06-02 RX ORDER — LOSARTAN POTASSIUM 50 MG/1
TABLET ORAL
Status: COMPLETED
Start: 2021-06-02 | End: 2021-06-02

## 2021-06-02 RX ADMIN — LOSARTAN POTASSIUM 100 MG: 50 TABLET ORAL at 05:29

## 2021-06-02 RX ADMIN — ASPIRIN 81 MG: 81 TABLET, COATED ORAL at 05:29

## 2021-06-02 RX ADMIN — INSULIN HUMAN 1 UNITS: 100 INJECTION, SOLUTION PARENTERAL at 11:56

## 2021-06-02 RX ADMIN — CARVEDILOL 3.12 MG: 3.12 TABLET, FILM COATED ORAL at 05:31

## 2021-06-02 RX ADMIN — LOSARTAN POTASSIUM 100 MG: 50 TABLET, FILM COATED ORAL at 05:29

## 2021-06-02 NOTE — PROGRESS NOTES
Received evening report from day RN. Pt is asking for food tray, which was delivered, and pt's family at bedside asking questions and polite conversation, engaged in pt's care. Bed is locked in low position with call light and belongings within reach. POC discussed and all questions answered. All needs and requirements met at this time.

## 2021-06-02 NOTE — CARE PLAN
Problem: Psychosocial - Patient Condition  Goal: Patient's ability to verbalize feelings about condition will improve  Outcome: Progressing  Goal: Patient's ability to re-evaluate and adapt role responsibilities will improve  Outcome: Progressing     Problem: Neuro Status  Goal: Neuro status will remain stable or improve  Outcome: Progressing     The patient is Watcher - Medium risk of patient condition declining or worsening         Progress made toward(s) clinical / shift goals:  Pt education about POC, appropriate response to steps necessary for completion. Pt accepting to proper safety mechanisms in place to prevent falls. Pt educated about Purwik application, and pt able to adequately determine if in the right spot and working.

## 2021-06-02 NOTE — CARE PLAN
Problem: Optimal Care of the Stroke Patient  Goal: Optimal emergency care for the stroke patient  6/2/2021 1244 by Leticia nAdrews R.N.  Outcome: Met  6/2/2021 1131 by Leticia Andrews R.N.  Outcome: Progressing  Goal: Optimal acute care for the stroke patient  Outcome: Met     Problem: Knowledge Deficit - Stroke Education  Goal: Patient's knowledge of stroke and risk factors will improve  6/2/2021 1244 by Leticia Andrews R.N.  Outcome: Met  6/2/2021 1131 by Leticia Andrews R.N.  Outcome: Progressing     Problem: Psychosocial - Patient Condition  Goal: Patient's ability to verbalize feelings about condition will improve  Outcome: Met  Goal: Patient's ability to re-evaluate and adapt role responsibilities will improve  Outcome: Met     Problem: Discharge Planning - Stroke  Goal: Ensure Stroke Core Measures are met prior to discharge  6/2/2021 1244 by Leticia Andrews R.N.  Outcome: Met  6/2/2021 1131 by PARISH MalaveNEri  Outcome: Progressing  Goal: Patient’s continuum of care needs will be met  Outcome: Met     Problem: Neuro Status  Goal: Neuro status will remain stable or improve  Outcome: Met     Problem: Hemodynamic Monitoring  Goal: Patient's hemodynamics, fluid balance and neurologic status will be stable or improve  Outcome: Met     Problem: Respiratory - Stroke Patient  Goal: Patient will achieve/maintain optimum respiratory rate/effort  Outcome: Met     Problem: Dysphagia  Goal: Dysphagia will improve  Outcome: Met     Problem: Risk for Aspiration  Goal: Patient's risk for aspiration will be absent or decrease  Outcome: Met     Problem: Urinary Elimination  Goal: Establish and maintain regular urinary output  Outcome: Met     Problem: Bowel Elimination  Goal: Establish and maintain regular bowel function  Outcome: Met     Problem: Mobility - Stroke  Goal: Patient's capacity to carry out activities will improve  Outcome: Met  Goal: Spasticity will be prevented or  improved  Outcome: Met  Goal: Subluxation will be prevented or improved  Outcome: Met     Problem: Self Care  Goal: Patient will have the ability to perform ADLs independently or with assistance (bathe, groom, dress, toilet and feed)  Outcome: Met     Problem: Knowledge Deficit - Standard  Goal: Patient and family/care givers will demonstrate understanding of plan of care, disease process/condition, diagnostic tests and medications  Outcome: Met     Problem: Skin Integrity  Goal: Skin integrity is maintained or improved  Outcome: Met     Problem: Fall Risk  Goal: Patient will remain free from falls  Outcome: Met

## 2021-06-02 NOTE — CARE PLAN
Problem: Optimal Care of the Stroke Patient  Goal: Optimal emergency care for the stroke patient  Outcome: Progressing     Problem: Knowledge Deficit - Stroke Education  Goal: Patient's knowledge of stroke and risk factors will improve  Outcome: Progressing     Problem: Discharge Planning - Stroke  Goal: Ensure Stroke Core Measures are met prior to discharge  Outcome: Progressing

## 2021-06-02 NOTE — DISCHARGE INSTRUCTIONS
"Transient Ischemic Attack    A transient ischemic attack (TIA) is a \"warning stroke\" that causes stroke-like symptoms that go away quickly. A TIA does not cause lasting damage to the brain. But having a TIA is a sign that you may be at risk for a stroke. Lifestyle changes and medical treatments can help prevent a stroke.  It is important to know the symptoms of a TIA and what to do. Get help right away, even if your symptoms go away. The symptoms of a TIA are the same as those of a stroke. They can happen fast, and they usually go away within minutes or hours. They can include:  · Weakness or loss of feeling in your face, arm, or leg. This often happens on one side of your body.  · Trouble walking.  · Trouble moving your arms or legs.  · Trouble talking or understanding what people are saying.  · Trouble seeing.  · Seeing two of one object (double vision).  · Feeling dizzy.  · Feeling confused.  · Loss of balance or coordination.  · Feeling sick to your stomach (nauseous) and throwing up (vomiting).  · A very bad headache for no reason.  What increases the risk?  Certain things may make you more likely to have a TIA. Some of these are things that you can change, such as:  · Being very overweight (obese).  · Using products that contain nicotine or tobacco, such as cigarettes and e-cigarettes.  · Taking birth control pills.  · Not being active.  · Drinking too much alcohol.  · Using drugs.  Other risk factors include:  · Having an irregular heartbeat (atrial fibrillation).  · Being  or .  · Having had blood clots, stroke, TIA, or heart attack in the past.  · Being a woman with a history of high blood pressure in pregnancy (preeclampsia).  · Being over the age of 60.  · Being male.  · Having family history of stroke.  · Having the following diseases or conditions:  ? High blood pressure.  ? High cholesterol.  ? Diabetes.  ? Heart disease.  ? Sickle cell disease.  ? Sleep apnea.  ? Migraine " "headache.  ? Long-term (chronic) diseases that cause soreness and swelling (inflammation).  ? Disorders that affect how your blood clots.  Follow these instructions at home:  Medicines    · Take over-the-counter and prescription medicines only as told by your doctor.  · If you were told to take aspirin or another medicine to thin your blood, take it exactly as told by your doctor.  ? Taking too much of the medicine can cause bleeding.  ? Taking too little of the medicine may not work to treat the problem.  Eating and drinking    · Eat 5 or more servings of fruits and vegetables each day.  · Follow instructions from your doctor about your diet. You may need to follow a certain diet to help lower your risk of having a stroke. You may need to:  ? Eat a diet that is low in fat and salt.  ? Eat foods that contain a lot of fiber.  ? Limit the amount of carbohydrates and sugar in your diet.  · Limit alcohol intake to 1 drink a day for nonpregnant women and 2 drinks a day for men. One drink equals 12 oz of beer, 5 oz of wine, or 1½ oz of hard liquor.  General instructions  · Keep a healthy weight.  · Stay active. Try to get at least 30 minutes of activity on all or most days.  · Find out if you have a condition called sleep apnea. Get treatment if needed.  · Do not use any products that contain nicotine or tobacco, such as cigarettes and e-cigarettes. If you need help quitting, ask your doctor.  · Do not abuse drugs.  · Keep all follow-up visits as told by your doctor. This is important.  Get help right away if:  · You have any signs of stroke. \"BE FAST\" is an easy way to remember the main warning signs:  ? B - Balance. Signs are dizziness, sudden trouble walking, or loss of balance.  ? E - Eyes. Signs are trouble seeing or a sudden change in how you see.  ? F - Face. Signs are sudden weakness or loss of feeling of the face, or the face or eyelid drooping on one side.  ? A - Arms. Signs are weakness or loss of feeling in an " "arm. This happens suddenly and usually on one side of the body.  ? S - Speech. Signs are sudden trouble speaking, slurred speech, or trouble understanding what people say.  ? T - Time. Time to call emergency services. Write down what time symptoms started.  · You have other signs of stroke, such as:  ? A sudden, very bad headache with no known cause.  ? Feeling sick to your stomach (nausea).  ? Throwing up (vomiting).  ? Jerky movements that you cannot control (seizure).  These symptoms may be an emergency. Do not wait to see if the symptoms will go away. Get medical help right away. Call your local emergency services (911 in the [place] Do not drive yourself to the hospital.  Summary  · A transient ischemic attack (TIA) is a \"warning stroke\" that causes stroke-like symptoms that go away quickly.  · A TIA is a medical emergency. Get help right away, even if your symptoms go away.  · A TIA does not cause lasting damage to the brain.  · Having a TIA is a sign that you may be at risk for a stroke. Lifestyle changes and medical treatments can help prevent a stroke.  This information is not intended to replace advice given to you by your health care provider. Make sure you discuss any questions you have with your health care provider.  Document Released: 09/26/2009 Document Revised: 09/13/2019 Document Reviewed: 03/21/2018  [employer] Patient Education © 2020 [employer]  Hypertension, Adult  Hypertension is another name for high blood pressure. High blood pressure forces your heart to work harder to pump blood. This can cause problems over time.  There are two numbers in a blood pressure reading. There is a top number (systolic) over a bottom number (diastolic). It is best to have a blood pressure that is below 120/80. Healthy choices can help lower your blood pressure, or you may need medicine to help lower it.  What are the causes?  The cause of this condition is not known. Some conditions may be related to high blood " pressure.  What increases the risk?  · Smoking.  · Having type 2 diabetes mellitus, high cholesterol, or both.  · Not getting enough exercise or physical activity.  · Being overweight.  · Having too much fat, sugar, calories, or salt (sodium) in your diet.  · Drinking too much alcohol.  · Having long-term (chronic) kidney disease.  · Having a family history of high blood pressure.  · Age. Risk increases with age.  · Race. You may be at higher risk if you are .  · Gender. Men are at higher risk than women before age 45. After age 65, women are at higher risk than men.  · Having obstructive sleep apnea.  · Stress.  What are the signs or symptoms?  · High blood pressure may not cause symptoms. Very high blood pressure (hypertensive crisis) may cause:  ? Headache.  ? Feelings of worry or nervousness (anxiety).  ? Shortness of breath.  ? Nosebleed.  ? A feeling of being sick to your stomach (nausea).  ? Throwing up (vomiting).  ? Changes in how you see.  ? Very bad chest pain.  ? Seizures.  How is this treated?  · This condition is treated by making healthy lifestyle changes, such as:  ? Eating healthy foods.  ? Exercising more.  ? Drinking less alcohol.  · Your health care provider may prescribe medicine if lifestyle changes are not enough to get your blood pressure under control, and if:  ? Your top number is above 130.  ? Your bottom number is above 80.  · Your personal target blood pressure may vary.  Follow these instructions at home:  Eating and drinking    · If told, follow the DASH eating plan. To follow this plan:  ? Fill one half of your plate at each meal with fruits and vegetables.  ? Fill one fourth of your plate at each meal with whole grains. Whole grains include whole-wheat pasta, brown rice, and whole-grain bread.  ? Eat or drink low-fat dairy products, such as skim milk or low-fat yogurt.  ? Fill one fourth of your plate at each meal with low-fat (lean) proteins. Low-fat proteins include  fish, chicken without skin, eggs, beans, and tofu.  ? Avoid fatty meat, cured and processed meat, or chicken with skin.  ? Avoid pre-made or processed food.  · Eat less than 1,500 mg of salt each day.  · Do not drink alcohol if:  ? Your doctor tells you not to drink.  ? You are pregnant, may be pregnant, or are planning to become pregnant.  · If you drink alcohol:  ? Limit how much you use to:  § 0-1 drink a day for women.  § 0-2 drinks a day for men.  ? Be aware of how much alcohol is in your drink. In the [place] one drink equals one 12 oz bottle of beer (355 mL), one 5 oz glass of wine (148 mL), or one 1½ oz glass of hard liquor (44 mL).  Lifestyle    · Work with your doctor to stay at a healthy weight or to lose weight. Ask your doctor what the best weight is for you.  · Get at least 30 minutes of exercise most days of the week. This may include walking, swimming, or biking.  · Get at least 30 minutes of exercise that strengthens your muscles (resistance exercise) at least 3 days a week. This may include lifting weights or doing Pilates.  · Do not use any products that contain nicotine or tobacco, such as cigarettes, e-cigarettes, and chewing tobacco. If you need help quitting, ask your doctor.  · Check your blood pressure at home as told by your doctor.  · Keep all follow-up visits as told by your doctor. This is important.  Medicines  · Take over-the-counter and prescription medicines only as told by your doctor. Follow directions carefully.  · Do not skip doses of blood pressure medicine. The medicine does not work as well if you skip doses. Skipping doses also puts you at risk for problems.  · Ask your doctor about side effects or reactions to medicines that you should watch for.  Contact a doctor if you:  · Think you are having a reaction to the medicine you are taking.  · Have headaches that keep coming back (recurring).  · Feel dizzy.  · Have swelling in your ankles.  · Have trouble with your vision.  Get  help right away if you:  · Get a very bad headache.  · Start to feel mixed up (confused).  · Feel weak or numb.  · Feel faint.  · Have very bad pain in your:  ? Chest.  ? Belly (abdomen).  · Throw up more than once.  · Have trouble breathing.  Summary  · Hypertension is another name for high blood pressure.  · High blood pressure forces your heart to work harder to pump blood.  · For most people, a normal blood pressure is less than 120/80.  · Making healthy choices can help lower blood pressure. If your blood pressure does not get lower with healthy choices, you may need to take medicine.  This information is not intended to replace advice given to you by your health care provider. Make sure you discuss any questions you have with your health care provider.  Document Released: 06/05/2009 Document Revised: 08/28/2019 Document Reviewed: 08/28/2019  [employer] Patient Education © 2020 [employer]        Discharge Instructions    Discharged to home by car with relative. Discharged via wheelchair, hospital escort: Yes.  Special equipment needed: Not Applicable    Be sure to schedule a follow-up appointment with your primary care doctor or any specialists as instructed.     Discharge Plan:   Diet Plan: Discussed  Activity Level: Discussed  Confirmed Follow up Appointment: Appointment Scheduled  Confirmed Symptoms Management: Discussed  Medication Reconciliation Updated: Yes    I understand that a diet low in cholesterol, fat, and sodium is recommended for good health. Unless I have been given specific instructions below for another diet, I accept this instruction as my diet prescription.   Other diet: Cardiac  · Is patient discharged on Warfarin / Coumadin?   No     Depression / Suicide Risk    As you are discharged from this [facility] facility, it is important to learn how to keep safe from harming yourself.    Recognize the warning signs:  · Abrupt changes in personality, positive or negative- including increase in  energy   · Giving away possessions  · Change in eating patterns- significant weight changes-  positive or negative  · Change in sleeping patterns- unable to sleep or sleeping all the time   · Unwillingness or inability to communicate  · Depression  · Unusual sadness, discouragement and loneliness  · Talk of wanting to die  · Neglect of personal appearance   · Rebelliousness- reckless behavior  · Withdrawal from people/activities they love  · Confusion- inability to concentrate     If you or a loved one observes any of these behaviors or has concerns about self-harm, here's what you can do:  · Talk about it- your feelings and reasons for harming yourself  · Remove any means that you might use to hurt yourself (examples: pills, rope, extension cords, firearm)  · Get professional help from the community (Mental Health, Substance Abuse, psychological counseling)  · Do not be alone:Call your Safe Contact- someone whom you trust who will be there for you.  · Call your local CRISIS HOTLINE 492-8292 or 714-659-2185  · Call your local Children's Mobile Crisis Response Team [place] (903) 687-9662 or www.Fliqq  · Call the toll free National Suicide Prevention Hotlines   · National Suicide Prevention Lifeline 754-883-ZZUH (3498)  · National Hope Line Network 800-SUICIDE (263-7897)

## 2021-06-02 NOTE — DISCHARGE PLANNING
[facility]    Follow up for Rehab.  Current documentation does not reflect ongoing acute therapy need in 2 of 3 therapy disciplines meeting CMS criteria for IRF level care.  PMR referral will not be forwarded for consult.  If there are interval changes, please reach out to Rehab [facility] [phone].  Please reach out if PMR consult requested for medical management.

## 2021-06-03 ENCOUNTER — PATIENT OUTREACH (OUTPATIENT)
Dept: MEDICAL GROUP | Facility: MEDICAL CENTER | Age: 86
End: 2021-06-03

## 2021-06-08 ENCOUNTER — OFFICE VISIT (OUTPATIENT)
Dept: CARDIOLOGY | Facility: MEDICAL CENTER | Age: 86
End: 2021-06-08

## 2021-06-08 ENCOUNTER — NON-PROVIDER VISIT (OUTPATIENT)
Dept: CARDIOLOGY | Facility: MEDICAL CENTER | Age: 86
End: 2021-06-08
Payer: MEDICARE

## 2021-06-08 VITALS
HEART RATE: 72 BPM | DIASTOLIC BLOOD PRESSURE: 72 MMHG | HEIGHT: 63 IN | SYSTOLIC BLOOD PRESSURE: 128 MMHG | WEIGHT: 162 LBS | OXYGEN SATURATION: 92 % | BODY MASS INDEX: 28.7 KG/M2

## 2021-06-08 DIAGNOSIS — Z79.01 ANTICOAGULATED: ICD-10-CM

## 2021-06-08 DIAGNOSIS — I48.0 PAF (PAROXYSMAL ATRIAL FIBRILLATION) (HCC): ICD-10-CM

## 2021-06-08 DIAGNOSIS — Z95.0 CARDIAC PACEMAKER IN SITU: Chronic | ICD-10-CM

## 2021-06-08 DIAGNOSIS — I44.2 AV BLOCK, 3RD DEGREE (HCC): ICD-10-CM

## 2021-06-08 DIAGNOSIS — R60.0 LOCALIZED EDEMA: ICD-10-CM

## 2021-06-08 PROCEDURE — 93280 PM DEVICE PROGR EVAL DUAL: CPT | Performed by: INTERNAL MEDICINE

## 2021-06-08 PROCEDURE — 99214 OFFICE O/P EST MOD 30 MIN: CPT | Mod: 25 | Performed by: INTERNAL MEDICINE

## 2021-06-08 RX ORDER — ALPRAZOLAM 0.5 MG/1
TABLET ORAL
COMMUNITY
Start: 2021-05-19 | End: 2021-06-08

## 2021-06-08 RX ORDER — OXYCODONE HYDROCHLORIDE 10 MG/1
TABLET ORAL
COMMUNITY
Start: 2021-05-19 | End: 2021-06-08

## 2021-06-08 RX ORDER — BUMETANIDE 0.5 MG/1
1 TABLET ORAL
Qty: 15 TABLET | Refills: 2 | Status: SHIPPED | OUTPATIENT
Start: 2021-06-08 | End: 2021-08-20 | Stop reason: SDUPTHER

## 2021-06-08 ASSESSMENT — ENCOUNTER SYMPTOMS
SPEECH CHANGE: 1
BRUISES/BLEEDS EASILY: 0
CARDIOVASCULAR NEGATIVE: 1
CONSTITUTIONAL NEGATIVE: 1
GASTROINTESTINAL NEGATIVE: 1
RESPIRATORY NEGATIVE: 1
MUSCULOSKELETAL NEGATIVE: 1

## 2021-06-08 ASSESSMENT — FIBROSIS 4 INDEX: FIB4 SCORE: 2.26

## 2021-06-08 NOTE — PROGRESS NOTES
"Chief Complaint   Patient presents with   • HTN (Controlled)   • Dyslipidemia   • Atrial Fibrillation       Subjective:   Krystle Tavarez is a 87 y.o. female accompanied by her daughter, who presents today for follow-up of permanent pacemaker, chronic anticoagulation, hypertension and PAF.  She has had 2 recent hospitalizations states that she is a \"hot mess\".  She was seen in the ER on May 29, who presented the ER with multiple complaints. She was found to be hyponatremic with a sodium of 120 which was not dramatically lower than prior sodiums. She was taken off her hydrochlorothiazide appropriately and discharged on May 31.    She was rehospitalized later the same day after family noticed her confused with food all over her shirt. Her grandson, who is a critical care nurse, noting \"word salad\" she was taken back to the hospital. She was evaluated by neurology. Neurology thought that her event may have been due to hypertension but nothing definite was found.  Since being home, she has had no more events.  In reviewing her history, the patient's daughter was encouraging her to drink fluids at home prior to admission. Also she has been on low-dose apixaban at 2-1/2 mg twice daily because of her advanced age. She has had no bleeding problems on this dose.    Past Medical History:   Diagnosis Date   • Arthritis     knees, hips   • Breath shortness     with exertion    • Cataract     bilat IOL    • Dental disorder     full upper, partial lower    • Diabetes (HCC)     pre diabetic   • Heart burn    • Hemorrhagic disorder (HCC)     on Eliquis   • High cholesterol     diet controlled    • Hyperlipidemia    • Hypertension    • Pacemaker 2015   • Pain 2020    lower back, right leg   • Pre-diabetes    • TIA (transient ischemic attack)     on Eliquis   • Urinary incontinence      Past Surgical History:   Procedure Laterality Date   • PB LAMINOTOMY,LUMBAR DISK,1 INTRSP N/A 2/25/2020    Procedure: LAMINECTOMY, SPINE, LUMBAR, " WITH DISCECTOMY- L5-S1, MEDIAL FACETECTOMY;  Surgeon: Latrell Kimble M.D.;  Location: SURGERY [facility];  Service: Neurosurgery   • FORAMINOTOMY N/A 2/25/2020    Procedure: FORAMINOTOMY, SPINE;  Surgeon: Latrell Kimble M.D.;  Location: SURGERY [facility];  Service: Neurosurgery   • PACEMAKER INSERTION  2015   • HIP REPLACEMENT, TOTAL Right 2009   • KNEE REPLACEMENT, TOTAL Right 2004   • CATARACT EXTRACTION WITH IOL Bilateral 2003   • CHOLECYSTECTOMY  2002   • BASAL CELL EXCISION      nose and hand   • BUNIONECTOMY Left      Family History   Problem Relation Age of Onset   • Diabetes Mother    • Stroke Mother    • Heart Attack Father 74   • No Known Problems Maternal Grandmother    • No Known Problems Maternal Grandfather    • No Known Problems Paternal Grandmother    • No Known Problems Paternal Grandfather      Social History     Socioeconomic History   • Marital status:      Spouse name: Not on file   • Number of children: Not on file   • Years of education: Not on file   • Highest education level: Not on file   Occupational History   • Not on file   Tobacco Use   • Smoking status: Never Smoker   • Smokeless tobacco: Never Used   Vaping Use   • Vaping Use: Never used   Substance and Sexual Activity   • Alcohol use: Yes     Comment: Rare   • Drug use: No   • Sexual activity: Not Currently     Partners: Male   Other Topics Concern   • Not on file   Social History Narrative   • Not on file     Social Determinants of Health     Financial Resource Strain:    • Difficulty of Paying Living Expenses:    Food Insecurity:    • Worried About Running Out of Food in the Last Year:    • Ran Out of Food in the Last Year:    Transportation Needs:    • Lack of Transportation (Medical):    • Lack of Transportation (Non-Medical):    Physical Activity:    • Days of Exercise per Week:    • Minutes of Exercise per Session:    Stress:    • Feeling of Stress :    Social Connections:    • Frequency of Communication with  Friends and Family:    • Frequency of Social Gatherings with Friends and Family:    • Attends Faith Services:    • Active Member of Clubs or Organizations:    • Attends Club or Organization Meetings:    • Marital Status:    Intimate Partner Violence:    • Fear of Current or Ex-Partner:    • Emotionally Abused:    • Physically Abused:    • Sexually Abused:      Allergies   Allergen Reactions   • Sulfa Drugs Nausea     Nausea      Outpatient Encounter Medications as of 6/8/2021   Medication Sig Dispense Refill   • bumetanide (BUMEX) 0.5 MG Tab Take 2 Tablets by mouth 1 time a day as needed (edema). 15 tablet 2   • aspirin EC 81 MG EC tablet Take 1 tablet by mouth every day. 30 tablet 3   • atorvastatin (LIPITOR) 40 MG Tab Take 1 tablet by mouth every evening. 30 tablet 3   • acetaminophen (TYLENOL) 325 MG Tab Take 2 Tablets by mouth every 6 hours as needed (Mild Pain; (Pain scale 1-3); Temp greater than 100.5 F). 100 tablet 0   • lidocaine (LIDODERM) 5 % Patch Place 1 Patch on the skin every 24 hours. 90 Each 0   • metFORMIN ER (GLUCOPHAGE XR) 500 MG TABLET SR 24 HR Take 1 tablet by mouth every day. 90 tablet 3   • apixaban (ELIQUIS) 2.5mg Tab Take 1 tablet by mouth 2 times a day. 180 tablet 3   • carvedilol (COREG) 3.125 MG Tab TAKE 1 TABLET TWICE A DAY WITH MEALS 180 tablet 3   • losartan (COZAAR) 100 MG Tab Take 1 Tab by mouth every day. 100 Tab 3   • Ascorbic Acid (VITAMIN C) 500 MG Cap Take 1 Capsule by mouth 2 Times a Day.     • sennosides (SENOKOT) 8.6 MG Tab Take 1 Tab by mouth every day. 30 Tab 3   • ALPRAZolam (XANAX) 0.5 MG Tab TAKE 1 TABLET BY MOUTH 45 MINUTES PRIOR TO PROCEDURE (Patient not taking: Reported on 6/8/2021)     • oxyCODONE immediate release (ROXICODONE) 10 MG immediate release tablet TAKE 1 TABLET BY MOUTH 45 MINUTES PRIOR TO PROCEDURE (Patient not taking: Reported on 6/8/2021)       No facility-administered encounter medications on file as of 6/8/2021.     Review of Systems  "  Constitutional: Negative.    HENT: Negative.    Respiratory: Negative.    Cardiovascular: Negative.    Gastrointestinal: Negative.    Musculoskeletal: Negative.    Skin: Negative.    Neurological: Positive for speech change.        Episode of confusion   Endo/Heme/Allergies: Negative.  Does not bruise/bleed easily.        Objective:   /72 (BP Location: Left arm, Patient Position: Sitting, BP Cuff Size: Adult)   Pulse 72   Ht 1.6 m (5' 3\")   Wt 73.5 kg (162 lb)   LMP  (LMP Unknown)   SpO2 92%   BMI 28.70 kg/m²     Physical Exam   Constitutional: She is oriented to person, place, and time. No distress.   HENT:   Head: Normocephalic and atraumatic.   Eyes: Pupils are equal, round, and reactive to light. No scleral icterus.   Neck: No JVD present.   Cardiovascular: Normal rate and regular rhythm.   No murmur heard.  Pulmonary/Chest: No respiratory distress. She has no wheezes.   Abdominal: Soft. She exhibits no distension.   Musculoskeletal:      Right lower le+ Pitting Edema present.      Left lower le+ Pitting Edema present.   Neurological: She is alert and oriented to person, place, and time.   Skin: Skin is warm.       Assessment:     1. Anticoagulated     2. Cardiac pacemaker in situ     3. Localized edema     4. PAF (paroxysmal atrial fibrillation) ([facility])         Medical Decision Making:  Today's Assessment / Status / Plan:   Status post permanent pacemaker: Pacemaker is functioning normally by most recent interrogation    History of PAF: No sense of palpitations no dyspnea    Anticoagulation: No bleeding problems on her low-dose apixaban.    Episode of confusion and presumed TIA: I reviewed the history of the patient's daughter who accompanies her today. The patient was confused and was found to have food on her close. Her grandson noted \"word salad\" and this prompted these return to the hospital on May 31.  She certainly could have had a TIA secondary to PAF. One consideration would be to " increase her apixaban to 5 mg a day if she has any further recurrences. For the moment, would keep medications the same. Her blood pressure is under excellent control today.    Edema: Patient has mild edema today. She had previously been on HCTZ in the past but this was stopped due to hyponatremia. I discussed the fact that her daughter is not encouraging her to drink extra fluid and she was given a prescription for bumetanide 0.5 mg take only on days her swelling is significant.    She will return for reevaluation in about 4 to 6 weeks.

## 2021-06-14 RX ORDER — ESTRADIOL 0.1 MG/G
CREAM VAGINAL
Qty: 42.5 G | Refills: 3 | Status: SHIPPED | OUTPATIENT
Start: 2021-06-14 | End: 2021-06-17

## 2021-06-15 ENCOUNTER — OFFICE VISIT (OUTPATIENT)
Dept: MEDICAL GROUP | Facility: MEDICAL CENTER | Age: 86
End: 2021-06-15
Payer: MEDICARE

## 2021-06-15 VITALS
OXYGEN SATURATION: 95 % | HEART RATE: 71 BPM | SYSTOLIC BLOOD PRESSURE: 136 MMHG | DIASTOLIC BLOOD PRESSURE: 62 MMHG | TEMPERATURE: 97.8 F

## 2021-06-15 DIAGNOSIS — E87.1 HYPONATREMIA: ICD-10-CM

## 2021-06-15 DIAGNOSIS — Z09 HOSPITAL DISCHARGE FOLLOW-UP: ICD-10-CM

## 2021-06-15 DIAGNOSIS — Z95.0 CARDIAC PACEMAKER IN SITU: Chronic | ICD-10-CM

## 2021-06-15 DIAGNOSIS — I67.4 HYPERTENSIVE ENCEPHALOPATHY: ICD-10-CM

## 2021-06-15 DIAGNOSIS — I48.0 PAF (PAROXYSMAL ATRIAL FIBRILLATION) (HCC): ICD-10-CM

## 2021-06-15 DIAGNOSIS — R41.82 ALTERED MENTAL STATUS, UNSPECIFIED ALTERED MENTAL STATUS TYPE: ICD-10-CM

## 2021-06-15 PROBLEM — N17.9 ACUTE KIDNEY INJURY (HCC): Status: RESOLVED | Noted: 2021-05-30 | Resolved: 2021-06-15

## 2021-06-15 PROCEDURE — 99495 TRANSJ CARE MGMT MOD F2F 14D: CPT | Performed by: NURSE PRACTITIONER

## 2021-06-15 NOTE — PROGRESS NOTES
"Subjective:     Krystle Tavarez is a 87 y.o. female who presents for Hospital Follow-up.      POST DISCHARGE CALL:  Discharge Date:6/2/2021   Date of Outreach Call: 6/3/2021 10:11 AM  Now that you're home, how are you doing? Good  Comment:Reports feeling better. Denies any current  problems. Hasn't monitored B/P yet today.  Do you have questions about your medications? No    Did you fill your medications? Yes    Do you have a follow-up appointment scheduled?Yes    Discharging Department: Telemetry 8    Number of Attempts: 1  Current or previous attempts completed within two business days of discharge? Yes  Provided education regarding treatment plan, medication, self-management, ADLs? Yes  Has patient completed Advance Directive? If yes, advise them to bring to appointment. No  Care Manager phone number provided? *  Is there anything else I can help you with? No      HPI:   Recently hospitalized for hyponatremia and altered mental status on 5/29/2021.   Sodium level upon admission was 126. U/A negative for acute infection. Her HCTZ and tramadol were discontinued due to presumed contribution to her hyponatremia and her altered mental status as she had mentioned that the tramadol made her a bit loopy.  Patient was discharged and subsequently readmitted later that day for difficulty with word finding.  Her grandson had expressed that it was \" word salad.\"   While patient was in the emergency room for her work-up which it was reported that she had some elements of expressive aphasia.  Her blood pressure was elevated during admission however had normalized at discharge.  Neurology was consulted and the episode of speech difficulty was likely secondary to hypertensive encephalopathy.  She was discharged with Coreg and losartan.  She was also started on aspirin and will continue on her Eliquis for her A. fib.    Lipid panel 6/2021  Patient had previously been on statin therapy and on her discharge she was resumed on " "statin therapy.    Her A1c was 7.2%.    Patient has already had her follow-up with cardiology for pacer check. No new changes to her medication.     Sodium level on discharge is 132.    Feeling \"100% better\"   Going to PT.     For her back pain she will have an ablation done.   Will add Tramadol as an allergy so as not to administer to patient's again.  Patient and daughter are agreeable to this.    Current medicines (including reconciliation performed today)  Current Outpatient Medications   Medication Sig Dispense Refill   • bumetanide (BUMEX) 0.5 MG Tab Take 2 Tablets by mouth 1 time a day as needed (edema). 15 tablet 2   • aspirin EC 81 MG EC tablet Take 1 tablet by mouth every day. 30 tablet 3   • atorvastatin (LIPITOR) 40 MG Tab Take 1 tablet by mouth every evening. 30 tablet 3   • acetaminophen (TYLENOL) 325 MG Tab Take 2 Tablets by mouth every 6 hours as needed (Mild Pain; (Pain scale 1-3); Temp greater than 100.5 F). 100 tablet 0   • lidocaine (LIDODERM) 5 % Patch Place 1 Patch on the skin every 24 hours. 90 Each 0   • metFORMIN ER (GLUCOPHAGE XR) 500 MG TABLET SR 24 HR Take 1 tablet by mouth every day. 90 tablet 3   • apixaban (ELIQUIS) 2.5mg Tab Take 1 tablet by mouth 2 times a day. 180 tablet 3   • carvedilol (COREG) 3.125 MG Tab TAKE 1 TABLET TWICE A DAY WITH MEALS 180 tablet 3   • losartan (COZAAR) 100 MG Tab Take 1 Tab by mouth every day. 100 Tab 3   • Ascorbic Acid (VITAMIN C) 500 MG Cap Take 1 Capsule by mouth 2 Times a Day.     • sennosides (SENOKOT) 8.6 MG Tab Take 1 Tab by mouth every day. 30 Tab 3   • estradiol (ESTRACE) 0.1 MG/GM vaginal cream INSERT 0.5 GRAM VAGINALLY EVERY 72 HOURS (Patient not taking: Reported on 6/15/2021) 42.5 g 3     No current facility-administered medications for this visit.       Allergies:   Sulfa drugs    Social History:  Social History     Socioeconomic History   • Marital status:      Spouse name: Not on file   • Number of children: Not on file   • Years of " education: Not on file   • Highest education level: Not on file   Occupational History   • Not on file   Tobacco Use   • Smoking status: Never Smoker   • Smokeless tobacco: Never Used   Vaping Use   • Vaping Use: Never used   Substance and Sexual Activity   • Alcohol use: Yes     Comment: Rare   • Drug use: No   • Sexual activity: Not Currently     Partners: Male   Other Topics Concern   • Not on file   Social History Narrative   • Not on file     Social Determinants of Health     Financial Resource Strain:    • Difficulty of Paying Living Expenses:    Food Insecurity:    • Worried About Running Out of Food in the Last Year:    • Ran Out of Food in the Last Year:    Transportation Needs:    • Lack of Transportation (Medical):    • Lack of Transportation (Non-Medical):    Physical Activity:    • Days of Exercise per Week:    • Minutes of Exercise per Session:    Stress:    • Feeling of Stress :    Social Connections:    • Frequency of Communication with Friends and Family:    • Frequency of Social Gatherings with Friends and Family:    • Attends [employer] Services:    • Active Member of Clubs or Organizations:    • Attends Club or Organization Meetings:    • Marital Status:    Intimate Partner Violence:    • Fear of Current or Ex-Partner:    • Emotionally Abused:    • Physically Abused:    • Sexually Abused:          ROS:  No fever, chest pain, shortness of breath or abdominal pain.       Objective:     /62 (BP Location: Left arm, Patient Position: Sitting, BP Cuff Size: Adult)   Pulse 71   Temp 36.6 °C (97.8 °F) (Temporal)   SpO2 95%   There is no height or weight on file to calculate BMI.    Physical Exam:    Vitals: /62 (BP Location: Left arm, Patient Position: Sitting, BP Cuff Size: Adult)   Pulse 71   Temp 36.6 °C (97.8 °F) (Temporal)   LMP  (LMP Unknown)   SpO2 95%    General: Alert, pleasant, NAD  HEENT: Normocephalic.  PERRL, EOMI, no icterus or pallor.  Conjunctivae and lids normal.    Skin: Warm, dry, no rashes.  Musculoskeletal: Gait is at baseline.  Currently in a wheelchair.  Moves all extremities well.  Extremities: No leg edema. No discoloration  Neurological: No tremors, sensation is grossly intact, patient is in a wheelchair.  Her gait is at baseline.  Psych:  Affect/mood is normal, judgement is good, memory is intact, grooming is appropriate.      Assessment and Plan:     1. Hospital discharge follow-up    2. Hyponatremia  Down to baseline.  Presumed due to HCTZ.    3. Altered mental status, unspecified altered mental status type  Resolved.  Presumed due side effect of tramadol.    4. Hypertensive encephalopathy  Symptoms have resolved.    5. PAF (paroxysmal atrial fibrillation) (HCC)  Asymptomatic at this time.  Continue follow-up with cardiology for this.    6. Cardiac pacemaker in situ  Followed by cardiology.  Most recent pacer check without any abnormalities.    - Chart and discharge summary were reviewed.   - Hospitalization and results reviewed with patient.   - Medications reviewed including instructions regarding high risk medications, dosing and side effects.  - Recommended Services: No services needed at this time  - Advance directive/POLST on file?  No     Follow-up:Return in about 4 months (around 10/15/2021).    Face-to-face transitional care management services with MODERATE (today's visit is at least 14 days post discharge & LACE+ score of 28-58) medical decision complexity were provided.     LACE+ Historical Score Over Time (0-28: Low, 29-58: Medium, 59+: High): 74    Coding guide:   56086        - face-to-face within 14 day        - moderate decision complexity (LACE+ score of 28-58)  66899       - face-to-face within 7 days       - high medical decision complexity (LACE+ score 59+)

## 2021-06-29 ENCOUNTER — APPOINTMENT (RX ONLY)
Dept: URBAN - METROPOLITAN AREA CLINIC 36 | Facility: CLINIC | Age: 86
Setting detail: DERMATOLOGY
End: 2021-06-29

## 2021-06-29 DIAGNOSIS — Z48.817 ENCOUNTER FOR SURGICAL AFTERCARE FOLLOWING SURGERY ON THE SKIN AND SUBCUTANEOUS TISSUE: ICD-10-CM

## 2021-06-29 PROCEDURE — 99024 POSTOP FOLLOW-UP VISIT: CPT

## 2021-06-29 PROCEDURE — ? POST-OP WOUND CHECK

## 2021-06-29 ASSESSMENT — LOCATION ZONE DERM: LOCATION ZONE: FACE

## 2021-06-29 ASSESSMENT — LOCATION DETAILED DESCRIPTION DERM: LOCATION DETAILED: LEFT INFERIOR MEDIAL MALAR CHEEK

## 2021-06-29 ASSESSMENT — LOCATION SIMPLE DESCRIPTION DERM: LOCATION SIMPLE: LEFT CHEEK

## 2021-06-29 NOTE — PROCEDURE: POST-OP WOUND CHECK
Detail Level: Generalized
Add 89708 Cpt? (Important Note: In 2017 The Use Of 54047 Is Being Tracked By Cms To Determine Future Global Period Reimbursement For Global Periods): yes

## 2021-08-02 ENCOUNTER — APPOINTMENT (RX ONLY)
Dept: URBAN - METROPOLITAN AREA CLINIC 36 | Facility: CLINIC | Age: 86
Setting detail: DERMATOLOGY
End: 2021-08-02

## 2021-08-02 DIAGNOSIS — Z48.817 ENCOUNTER FOR SURGICAL AFTERCARE FOLLOWING SURGERY ON THE SKIN AND SUBCUTANEOUS TISSUE: ICD-10-CM

## 2021-08-02 PROCEDURE — ? ADDITIONAL NOTES

## 2021-08-02 NOTE — PROCEDURE: ADDITIONAL NOTES
Render Risk Assessment In Note?: no
Detail Level: Simple
Additional Notes: D/C’ed suture\\nReassured

## 2021-08-14 NOTE — DISCHARGE INSTRUCTIONS
[facility] NURSING DISCHARGE INSTRUCTIONS    Blood Pressure : 132/69  Weight: 72.2 kg (159 lb 3.2 oz)  Nursing recommendations for Krystle Tavarez at time of discharge are as follows:  Client verbalized understanding of all discharge instructions and prescriptions.     Review all your home medications and newly ordered medications with your doctor and/or pharmacist. Follow medication instructions as directed by your doctor and/or pharmacist.    Pain Management:   Discharge Pain Medication Instructions:  Comfort Goal: Comfort at Rest, Comfort with Movement, Perform Activity, Stay Alert  Notify your primary care provider if pain is unrelieved with these measures, if the pain is new, or increased in intensity.    Discharge Skin Characteristics:    Discharge Skin Exam:    Wound 03/04/20 Incision Back Midline;Lower (Active)   Wound Image   3/7/2020  8:27 PM   Site Assessment Clean;Dry;Intact;Pink 3/15/2020  7:56 PM   Periwound Assessment Dry;Intact 3/15/2020  7:56 PM   Margins Attached edges 3/15/2020  7:56 PM   Closure Approximated;Closure strips 3/15/2020  7:56 PM   Drainage Amount None 3/15/2020  7:56 PM   Dressing Options Open to Air 3/15/2020  7:56 PM   Dressing Status Open to Air 3/15/2020  7:56 PM   NEXT Weekly Photo (Inpatient Only) 03/14/20 3/11/2020  8:00 AM     Skin / Wound Care Instructions: Please contact your primary care physician for any change in skin integrity. Neck incision     If You Have Surgical Incisions / Wounds:  Monitor surgical site(s) for signs of increased swelling, redness or symptoms of drainage from the site or fever as this could indicate signs and symptoms of infection. If these symptoms are noted, notifiy your primary care provider.      Discharge Safety Instructions:       Discharge Safety Concerns:    The interdisciplinary team has made recommendation that you should not be left alone  in the house due to weakness and unsteady gait  Anti-embolic stockings are  required during the day and off at night to increase circulation to the lower extremities.    Discharge Diet:       Discharge Liquids:    Discharge Bowel Function:    Please contact your primary care physician for any changes in bowel habits.  Discharge Bowel Program:    Discharge Bladder Function:    Discharge Urinary Devices:        Nursing Discharge Plan:   Influenza Vaccine Indication: Not indicated: Previously immunized this influenza season and > 8 years of age    Case Management Discharge Instructions:   Discharge Location: Home with Outpatient Services  Agency Name/Address/Phone: [phone]  Home Health: Physical Therapist  Outpatient Services:    DME Provider/Phone:    Medical Equipment Ordered:    Prescription Faxed to:        Discharge Medication Instructions:  Below are the medications your physician expects you to take upon discharge:    Fall Prevention in the Home  Falls can cause injuries and can affect people from all age groups. There are many simple things that you can do to make your home safe and to help prevent falls.  What can I do on the outside of my home?  · Regularly repair the edges of walkways and driveways and fix any cracks.  · Remove high doorway thresholds.  · Trim any shrubbery on the main path into your home.  · Use bright outdoor lighting.  · Clear walkways of debris and clutter, including tools and rocks.  · Regularly check that handrails are securely fastened and in good repair. Both sides of any steps should have handrails.  · Install guardrails along the edges of any raised decks or porches.  · Have leaves, snow, and ice cleared regularly.  · Use sand or salt on walkways during winter months.  · In the garage, clean up any spills right away, including grease or oil spills.  What can I do in the bathroom?  · Use night lights.  · Install grab bars by the toilet and in the tub and shower. Do not use towel bars as grab bars.  · Use non-skid mats or decals on the  floor of the tub or shower.  · If you need to sit down while you are in the shower, use a plastic, non-slip stool.  · Keep the floor dry. Immediately clean up any water that spills on the floor.  · Remove soap buildup in the tub or shower on a regular basis.  · Attach bath mats securely with double-sided non-slip rug tape.  · Remove throw rugs and other tripping hazards from the floor.  What can I do in the bedroom?  · Use night lights.  · Make sure that a bedside light is easy to reach.  · Do not use oversized bedding that drapes onto the floor.  · Have a firm chair that has side arms to use for getting dressed.  · Remove throw rugs and other tripping hazards from the floor.  What can I do in the kitchen?  · Clean up any spills right away.  · Avoid walking on wet floors.  · Place frequently used items in easy-to-reach places.  · If you need to reach for something above you, use a sturdy step stool that has a grab bar.  · Keep electrical cables out of the way.  · Do not use floor polish or wax that makes floors slippery. If you have to use wax, make sure that it is non-skid floor wax.  · Remove throw rugs and other tripping hazards from the floor.  What can I do in the stairways?  · Do not leave any items on the stairs.  · Make sure that there are handrails on both sides of the stairs. Fix handrails that are broken or loose. Make sure that handrails are as long as the stairways.  · Check any carpeting to make sure that it is firmly attached to the stairs. Fix any carpet that is loose or worn.  · Avoid having throw rugs at the top or bottom of stairways, or secure the rugs with carpet tape to prevent them from moving.  · Make sure that you have a light switch at the top of the stairs and the bottom of the stairs. If you do not have them, have them installed.  What are some other fall prevention tips?  · Wear closed-toe shoes that fit well and support your feet. Wear shoes that have rubber soles or low heels.  · When  you use a stepladder, make sure that it is completely opened and that the sides are firmly locked. Have someone hold the ladder while you are using it. Do not climb a closed stepladder.  · Add color or contrast paint or tape to grab bars and handrails in your home. Place contrasting color strips on the first and last steps.  · Use mobility aids as needed, such as canes, walkers, scooters, and crutches.  · Turn on lights if it is dark. Replace any light bulbs that burn out.  · Set up furniture so that there are clear paths. Keep the furniture in the same spot.  · Fix any uneven floor surfaces.  · Choose a carpet design that does not hide the edge of steps of a stairway.  · Be aware of any and all pets.  · Review your medicines with your healthcare provider. Some medicines can cause dizziness or changes in blood pressure, which increase your risk of falling.  Talk with your health care provider about other ways that you can decrease your risk of falls. This may include working with a physical therapist or  to improve your strength, balance, and endurance.  This information is not intended to replace advice given to you by your health care provider. Make sure you discuss any questions you have with your health care provider.  Document Released: 12/08/2003 Document Revised: 05/16/2017 Document Reviewed: 01/22/2016  [employer] Interactive Patient Education © 2017 [employer]    Depression / Suicide Risk    As you are discharged from this [facility] facility, it is important to learn how to keep safe from harming yourself.    Recognize the warning signs:  · Abrupt changes in personality, positive or negative- including increase in energy   · Giving away possessions  · Change in eating patterns- significant weight changes-  positive or negative  · Change in sleeping patterns- unable to sleep or sleeping all the time   · Unwillingness or inability to communicate  · Depression  · Unusual sadness, discouragement and  loneliness  · Talk of wanting to die  · Neglect of personal appearance   · Rebelliousness- reckless behavior  · Withdrawal from people/activities they love  · Confusion- inability to concentrate     If you or a loved one observes any of these behaviors or has concerns about self-harm, here's what you can do:  · Talk about it- your feelings and reasons for harming yourself  · Remove any means that you might use to hurt yourself (examples: pills, rope, extension cords, firearm)  · Get professional help from the community (Mental Health, Substance Abuse, psychological counseling)  · Do not be alone:Call your Safe Contact- someone whom you trust who will be there for you.  · Call your local CRISIS HOTLINE 541-2611 or 954-343-9693  · Call your local Children's Mobile Crisis Response Team [place] (071) 142-0106 or www.SocialStay  · Call the toll free National Suicide Prevention Hotlines   · National Suicide Prevention Lifeline 150-451-GTBI (4475)  · National Hope Line Network 800-SUICIDE (953-6180)       Private car

## 2021-08-20 DIAGNOSIS — I10 ESSENTIAL HYPERTENSION, BENIGN: ICD-10-CM

## 2021-08-20 RX ORDER — BUMETANIDE 0.5 MG/1
1 TABLET ORAL
Qty: 90 TABLET | Refills: 3 | Status: SHIPPED | OUTPATIENT
Start: 2021-08-20 | End: 2021-12-16 | Stop reason: SDUPTHER

## 2021-08-23 DIAGNOSIS — I10 ESSENTIAL HYPERTENSION, BENIGN: ICD-10-CM

## 2021-08-23 RX ORDER — LOSARTAN POTASSIUM 100 MG/1
TABLET ORAL
Qty: 100 TABLET | Refills: 3 | Status: SHIPPED | OUTPATIENT
Start: 2021-08-23 | End: 2021-12-16 | Stop reason: SDUPTHER

## 2021-09-15 ENCOUNTER — OFFICE VISIT (OUTPATIENT)
Dept: CARDIOLOGY | Facility: MEDICAL CENTER | Age: 86
End: 2021-09-15
Payer: MEDICARE

## 2021-09-15 VITALS
HEIGHT: 63 IN | DIASTOLIC BLOOD PRESSURE: 60 MMHG | OXYGEN SATURATION: 92 % | BODY MASS INDEX: 28.7 KG/M2 | SYSTOLIC BLOOD PRESSURE: 106 MMHG | HEART RATE: 70 BPM | RESPIRATION RATE: 14 BRPM

## 2021-09-15 DIAGNOSIS — E78.5 DYSLIPIDEMIA: ICD-10-CM

## 2021-09-15 DIAGNOSIS — Z86.73 HISTORY OF TIA (TRANSIENT ISCHEMIC ATTACK): ICD-10-CM

## 2021-09-15 DIAGNOSIS — Z79.01 ANTICOAGULATED: ICD-10-CM

## 2021-09-15 DIAGNOSIS — Z95.0 CARDIAC PACEMAKER IN SITU: ICD-10-CM

## 2021-09-15 DIAGNOSIS — I48.0 PAF (PAROXYSMAL ATRIAL FIBRILLATION) (HCC): ICD-10-CM

## 2021-09-15 DIAGNOSIS — I10 ESSENTIAL HYPERTENSION, BENIGN: ICD-10-CM

## 2021-09-15 DIAGNOSIS — I44.2 AV BLOCK, 3RD DEGREE (HCC): ICD-10-CM

## 2021-09-15 PROBLEM — M51.36 DEGENERATION OF LUMBAR INTERVERTEBRAL DISC: Status: ACTIVE | Noted: 2019-08-06

## 2021-09-15 PROBLEM — M51.9 DISORDER OF INTERVERTEBRAL DISC: Status: ACTIVE | Noted: 2019-11-20

## 2021-09-15 PROBLEM — M51.369 DEGENERATION OF LUMBAR INTERVERTEBRAL DISC: Status: ACTIVE | Noted: 2019-08-06

## 2021-09-15 PROCEDURE — 99214 OFFICE O/P EST MOD 30 MIN: CPT | Performed by: NURSE PRACTITIONER

## 2021-09-15 RX ORDER — CARVEDILOL 3.12 MG/1
TABLET ORAL
COMMUNITY
End: 2021-09-15

## 2021-09-15 RX ORDER — LOSARTAN POTASSIUM AND HYDROCHLOROTHIAZIDE 25; 100 MG/1; MG/1
TABLET ORAL
COMMUNITY
End: 2021-09-15

## 2021-09-15 RX ORDER — ACETAMINOPHEN 500 MG
TABLET ORAL
COMMUNITY
End: 2021-09-15

## 2021-09-15 ASSESSMENT — ENCOUNTER SYMPTOMS
NAUSEA: 0
PND: 0
FEVER: 0
VOMITING: 0
HEMOPTYSIS: 0
ORTHOPNEA: 0
SPUTUM PRODUCTION: 0
CHILLS: 0
HEADACHES: 0
CLAUDICATION: 0
PALPITATIONS: 0
SHORTNESS OF BREATH: 0
DIZZINESS: 0
WHEEZING: 0
COUGH: 0

## 2021-09-15 NOTE — PROGRESS NOTES
"Chief Complaint   Patient presents with   • Atrial Fibrillation     F/V Dx: PAF (paroxysmal atrial fibrillation) (HCC)   • Hypertension     F/V Dx: Essential hypertension, benign   • Anticoagulation       Subjective     Krystle Tavarez is a 88 y.o. female who presents today for permanent pacemaker, chronic anticoagulation, hypertension and PAF.  She has had 2 recent hospitalizations states that she is a \"hot mess\".  She was seen in the ER on May 29, who presented the ER with multiple complaints. She was found to be hyponatremic with a sodium of 120 which was not dramatically lower than prior sodiums. She was taken off her hydrochlorothiazide appropriately and discharged on May 31.  She was last seen by Dr. Sewell 6/8/2021.      She was rehospitalized later the same day after family noticed her confused with food all over her shirt. Her grandson, who is a critical care nurse, noting \"word salad\" she was taken back to the hospital. She was evaluated by neurology. Neurology thought that her event may have been due to hypertension but nothing definite was found.  Since being home, she has had no more events.  In reviewing her history, the patient's daughter was encouraging her to drink fluids at home prior to admission. Also she has been on low-dose apixaban at 2-1/2 mg twice daily because of her advanced age. She has had no bleeding problems on this dose.    Since 6/8/2021, she has been doing well.  She is compliant with ASA 81 mg daily, apixaban 2.5 mg twice daily, carvedilol 3.125 mg twice daily, losartan 100 mg daily, and Bumex 0.5 mg as needed.  She was previously on HCTZ but this was discontinued due to hyponatremia.  Ultimately she is here to get established with Dr. Mota's care team.    Past Medical History:   Diagnosis Date   • Arthritis     knees, hips   • Breath shortness     with exertion    • Cataract     bilat IOL    • Dental disorder     full upper, partial lower    • Diabetes (HCC)     pre diabetic "   • Heart burn    • Hemorrhagic disorder (HCC)     on Eliquis   • High cholesterol     diet controlled    • Hyperlipidemia    • Hypertension    • Pacemaker 2015   • Pain 2020    lower back, right leg   • Pre-diabetes    • TIA (transient ischemic attack)     on Eliquis   • Urinary incontinence      Past Surgical History:   Procedure Laterality Date   • PB LAMINOTOMY,LUMBAR DISK,1 INTRSP N/A 2/25/2020    Procedure: LAMINECTOMY, SPINE, LUMBAR, WITH DISCECTOMY- L5-S1, MEDIAL FACETECTOMY;  Surgeon: Latrell Kimble M.D.;  Location: SURGERY [facility];  Service: Neurosurgery   • FORAMINOTOMY N/A 2/25/2020    Procedure: FORAMINOTOMY, SPINE;  Surgeon: Latrell Kimble M.D.;  Location: SURGERY [facility];  Service: Neurosurgery   • PACEMAKER INSERTION  2015   • HIP REPLACEMENT, TOTAL Right 2009   • KNEE REPLACEMENT, TOTAL Right 2004   • CATARACT EXTRACTION WITH IOL Bilateral 2003   • CHOLECYSTECTOMY  2002   • BASAL CELL EXCISION      nose and hand   • BUNIONECTOMY Left      Family History   Problem Relation Age of Onset   • Diabetes Mother    • Stroke Mother    • Heart Attack Father 74   • No Known Problems Maternal Grandmother    • No Known Problems Maternal Grandfather    • No Known Problems Paternal Grandmother    • No Known Problems Paternal Grandfather      Social History     Socioeconomic History   • Marital status:      Spouse name: Not on file   • Number of children: Not on file   • Years of education: Not on file   • Highest education level: Not on file   Occupational History   • Not on file   Tobacco Use   • Smoking status: Never Smoker   • Smokeless tobacco: Never Used   Vaping Use   • Vaping Use: Never used   Substance and Sexual Activity   • Alcohol use: Not Currently   • Drug use: No   • Sexual activity: Not Currently     Partners: Male   Other Topics Concern   • Not on file   Social History Narrative   • Not on file     Social Determinants of Health     Financial Resource Strain:    • Difficulty of  Paying Living Expenses:    Food Insecurity:    • Worried About Running Out of Food in the Last Year:    • Ran Out of Food in the Last Year:    Transportation Needs:    • Lack of Transportation (Medical):    • Lack of Transportation (Non-Medical):    Physical Activity:    • Days of Exercise per Week:    • Minutes of Exercise per Session:    Stress:    • Feeling of Stress :    Social Connections:    • Frequency of Communication with Friends and Family:    • Frequency of Social Gatherings with Friends and Family:    • Attends Caodaism Services:    • Active Member of Clubs or Organizations:    • Attends Club or Organization Meetings:    • Marital Status:    Intimate Partner Violence:    • Fear of Current or Ex-Partner:    • Emotionally Abused:    • Physically Abused:    • Sexually Abused:      Allergies   Allergen Reactions   • Sulfa Drugs Nausea     Nausea    • Gabapentin      Altered mental status   • Tramadol      Altered mentation     Outpatient Encounter Medications as of 9/15/2021   Medication Sig Dispense Refill   • losartan (COZAAR) 100 MG Tab TAKE 1 TABLET DAILY 100 Tablet 3   • bumetanide (BUMEX) 0.5 MG Tab Take 2 Tablets by mouth 1 time a day as needed (edema). 90 Tablet 3   • aspirin EC 81 MG EC tablet Take 1 tablet by mouth every day. 30 tablet 3   • atorvastatin (LIPITOR) 40 MG Tab Take 1 tablet by mouth every evening. 30 tablet 3   • acetaminophen (TYLENOL) 325 MG Tab Take 2 Tablets by mouth every 6 hours as needed (Mild Pain; (Pain scale 1-3); Temp greater than 100.5 F). 100 tablet 0   • lidocaine (LIDODERM) 5 % Patch Place 1 Patch on the skin every 24 hours. 90 Each 0   • metFORMIN ER (GLUCOPHAGE XR) 500 MG TABLET SR 24 HR Take 1 tablet by mouth every day. 90 tablet 3   • apixaban (ELIQUIS) 2.5mg Tab Take 1 tablet by mouth 2 times a day. 180 tablet 3   • carvedilol (COREG) 3.125 MG Tab TAKE 1 TABLET TWICE A DAY WITH MEALS 180 tablet 3   • Ascorbic Acid (VITAMIN C) 500 MG Cap Take 1 Capsule by mouth 2  "Times a Day.     • sennosides (SENOKOT) 8.6 MG Tab Take 1 Tab by mouth every day. 30 Tab 3   • [DISCONTINUED] carvedilol (COREG) 3.125 MG Tab carvedilol 3.125 mg tablet   Take 1 tablet twice a day by oral route. (Patient not taking: Reported on 9/15/2021)     • [DISCONTINUED] apixaban (ELIQUIS) 2.5mg Tab Eliquis 2.5 mg tablet   Take 1 tablet twice a day by oral route. (Patient not taking: Reported on 9/15/2021)     • [DISCONTINUED] acetaminophen (TYLENOL) 500 MG Tab Tylenol Extra Strength 500 mg tablet   Take 1 tablet twice a day by oral route. (Patient not taking: Reported on 9/15/2021)     • [DISCONTINUED] losartan-hydrochlorothiazide (HYZAAR) 100-25 MG per tablet losartan 100 mg-hydrochlorothiazide 25 mg tablet   Take 1 tablet every day by oral route in the morning. (Patient not taking: Reported on 9/15/2021)       No facility-administered encounter medications on file as of 9/15/2021.     Review of Systems   Constitutional: Negative for chills and fever.   Respiratory: Negative for cough, hemoptysis, sputum production, shortness of breath and wheezing.    Cardiovascular: Positive for leg swelling. Negative for chest pain, palpitations, orthopnea, claudication and PND.   Gastrointestinal: Negative for nausea and vomiting.   Neurological: Negative for dizziness and headaches.   All other systems reviewed and are negative.             Objective     /60 (BP Location: Left arm, Patient Position: Sitting, BP Cuff Size: Adult)   Pulse 70   Resp 14   Ht 1.6 m (5' 3\")   LMP  (LMP Unknown)   SpO2 92%   BMI 28.70 kg/m²     Physical Exam  Vitals and nursing note reviewed.   Constitutional:       Appearance: She is well-developed.   Neck:      Vascular: No JVD.   Cardiovascular:      Rate and Rhythm: Normal rate and regular rhythm.      Heart sounds: Normal heart sounds. No murmur heard.     Pulmonary:      Effort: Pulmonary effort is normal.      Breath sounds: Examination of the right-lower field reveals rales. " Examination of the left-lower field reveals rales. Rales present.   Abdominal:      Palpations: Abdomen is soft.   Musculoskeletal:      Cervical back: Neck supple.   Skin:     General: Skin is warm and dry.   Neurological:      Comments: Cranial nerves II-XII WNL   Psychiatric:         Behavior: Behavior normal.         Thought Content: Thought content normal.         Judgment: Judgment normal.                Assessment & Plan     1. PAF (paroxysmal atrial fibrillation) (HCC)     2. Anticoagulated     3. 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 Cardiac pacemaker in situ     5. Dyslipidemia     6. Essential hypertension, benign     7. History of TIA (transient ischemic attack)         Medical Decision Making: Today's Assessment/Status/Plan:   Continue current medications as prescribed.  Patient is already scheduled to follow-up in December with Dr. Mota.

## 2021-10-11 ENCOUNTER — APPOINTMENT (RX ONLY)
Dept: URBAN - METROPOLITAN AREA CLINIC 36 | Facility: CLINIC | Age: 86
Setting detail: DERMATOLOGY
End: 2021-10-11

## 2021-10-11 DIAGNOSIS — Z48.817 ENCOUNTER FOR SURGICAL AFTERCARE FOLLOWING SURGERY ON THE SKIN AND SUBCUTANEOUS TISSUE: ICD-10-CM

## 2021-10-11 PROCEDURE — ? POST-OP WOUND CHECK

## 2021-10-11 PROCEDURE — 99024 POSTOP FOLLOW-UP VISIT: CPT

## 2021-10-11 ASSESSMENT — LOCATION SIMPLE DESCRIPTION DERM: LOCATION SIMPLE: LEFT FOREHEAD

## 2021-10-11 ASSESSMENT — LOCATION ZONE DERM: LOCATION ZONE: FACE

## 2021-10-11 ASSESSMENT — LOCATION DETAILED DESCRIPTION DERM: LOCATION DETAILED: LEFT INFERIOR MEDIAL FOREHEAD

## 2021-10-11 NOTE — PROCEDURE: POST-OP WOUND CHECK
----- Message from Didier Krishnamurthy MD sent at 5/17/2018  2:03 PM EDT -----  Call pt:  PAP is negative.  
Left message to return call  
Additional Comments: Patient to follow up with PMD and PRN with me
Add 06053 Cpt? (Important Note: In 2017 The Use Of 21249 Is Being Tracked By Cms To Determine Future Global Period Reimbursement For Global Periods): yes
Detail Level: Generalized

## 2021-10-22 ENCOUNTER — OFFICE VISIT (OUTPATIENT)
Dept: MEDICAL GROUP | Facility: MEDICAL CENTER | Age: 86
End: 2021-10-22
Payer: MEDICARE

## 2021-10-22 VITALS
BODY MASS INDEX: 26.36 KG/M2 | OXYGEN SATURATION: 94 % | WEIGHT: 148.81 LBS | HEART RATE: 67 BPM | TEMPERATURE: 97.2 F | SYSTOLIC BLOOD PRESSURE: 108 MMHG | DIASTOLIC BLOOD PRESSURE: 60 MMHG

## 2021-10-22 DIAGNOSIS — L21.9 SEBORRHEIC DERMATITIS OF SCALP: ICD-10-CM

## 2021-10-22 DIAGNOSIS — I10 ESSENTIAL HYPERTENSION, BENIGN: ICD-10-CM

## 2021-10-22 DIAGNOSIS — L65.9 HAIR LOSS: ICD-10-CM

## 2021-10-22 DIAGNOSIS — F43.21 ADJUSTMENT DISORDER WITH DEPRESSED MOOD: ICD-10-CM

## 2021-10-22 DIAGNOSIS — E78.5 DYSLIPIDEMIA: ICD-10-CM

## 2021-10-22 DIAGNOSIS — E11.9 TYPE 2 DIABETES MELLITUS WITHOUT COMPLICATION, WITHOUT LONG-TERM CURRENT USE OF INSULIN (HCC): ICD-10-CM

## 2021-10-22 PROBLEM — Z78.9 IMPAIRED MOBILITY AND ADLS: Chronic | Status: ACTIVE | Noted: 2020-03-16

## 2021-10-22 PROBLEM — I48.0 PAF (PAROXYSMAL ATRIAL FIBRILLATION) (HCC): Chronic | Status: ACTIVE | Noted: 2020-02-05

## 2021-10-22 PROBLEM — R29.898 WEAKNESS OF BOTH LOWER EXTREMITIES: Chronic | Status: ACTIVE | Noted: 2019-08-13

## 2021-10-22 PROBLEM — Z74.09 IMPAIRED MOBILITY AND ADLS: Chronic | Status: ACTIVE | Noted: 2020-03-16

## 2021-10-22 PROBLEM — R41.0 CONFUSION: Status: RESOLVED | Noted: 2021-05-29 | Resolved: 2021-10-22

## 2021-10-22 PROCEDURE — 99214 OFFICE O/P EST MOD 30 MIN: CPT | Performed by: NURSE PRACTITIONER

## 2021-10-22 RX ORDER — KETOCONAZOLE 20 MG/ML
SHAMPOO TOPICAL
Qty: 120 ML | Refills: 1 | Status: SHIPPED | OUTPATIENT
Start: 2021-10-22 | End: 2022-08-29

## 2021-10-22 RX ORDER — ATORVASTATIN CALCIUM 40 MG/1
40 TABLET, FILM COATED ORAL EVERY EVENING
Qty: 90 TABLET | Refills: 3 | Status: SHIPPED | OUTPATIENT
Start: 2021-10-22 | End: 2021-12-16 | Stop reason: SDUPTHER

## 2021-10-22 RX ORDER — SERTRALINE HYDROCHLORIDE 25 MG/1
25 TABLET, FILM COATED ORAL DAILY
Qty: 30 TABLET | Refills: 3 | Status: SHIPPED | OUTPATIENT
Start: 2021-10-22 | End: 2022-05-01

## 2021-10-22 ASSESSMENT — FIBROSIS 4 INDEX: FIB4 SCORE: 2.28

## 2021-10-22 NOTE — PROGRESS NOTES
Chief Complaint   Patient presents with   • Hair Loss     poss order lab work       Subjective:     HPI:     Krystle Tavarez is a 88 y.o. female here to discuss the evaluation and management of:    Presents with her daughter today.     Hair loss  Noticed her hair has started to thin about one month ago.   She gets her hair washed and set weekly, colored once a month.  noticed this as well. She also reports that her scalp is itching. No new hair products that she is aware of. She will inquire with her  later today as she does have an appt.     Adjustment disorder with depressed mood  Not wanting to do anything lately, feels down.  Ever since she has moved here a few years ago she has become more sedentary and less social. She is not interested in the [employer] or any local Zoroastrian groups. Does not have her own social group. She lives with her daughter and son in law.     Type 2 diabetes mellitus without complication, without long-term current use of insulin (HCC)  Taking Metformin for this. Denies GI side effects from this.     Dyslipidemia  Taking Atorvastatin for this. Denies myalgias.     Essential hypertension, benign  Taking Losartan for this. No CP, SOB. Has chronic BLE edema although stable. She has slight pitting in her ankles today. Taking Bumetanide for this. She wonders if she can stop this. Recommend continued use so her edema does not worsen.       ROS:  Denies any Headache, Blurred Vision, Confusion, Chest pain,  Shortness of breath,  Abdominal pain, Changes of bowel or bladder, Lower ext edema, Fevers, Nights sweats, Weight Changes, Focal weakness or numbness.  And all other systems reviewed and are all negative. POSITIVE FOR : see above        Current Outpatient Medications:   •  atorvastatin (LIPITOR) 40 MG Tab, Take 1 Tablet by mouth every evening., Disp: 90 Tablet, Rfl: 3  •  sertraline (ZOLOFT) 25 MG tablet, Take 1 Tablet by mouth every day., Disp: 30 Tablet, Rfl: 3  •   ketoconazole (NIZORAL) 2 % shampoo, Apply 5 to 10 mL to wet scalp, lather, leave on 3 to 5 minutes, and rinse; apply twice weekly for 2 to 4 weeks., Disp: 120 mL, Rfl: 1  •  losartan (COZAAR) 100 MG Tab, TAKE 1 TABLET DAILY, Disp: 100 Tablet, Rfl: 3  •  bumetanide (BUMEX) 0.5 MG Tab, Take 2 Tablets by mouth 1 time a day as needed (edema)., Disp: 90 Tablet, Rfl: 3  •  aspirin EC 81 MG EC tablet, Take 1 tablet by mouth every day., Disp: 30 tablet, Rfl: 3  •  acetaminophen (TYLENOL) 325 MG Tab, Take 2 Tablets by mouth every 6 hours as needed (Mild Pain; (Pain scale 1-3); Temp greater than 100.5 F)., Disp: 100 tablet, Rfl: 0  •  lidocaine (LIDODERM) 5 % Patch, Place 1 Patch on the skin every 24 hours., Disp: 90 Each, Rfl: 0  •  metFORMIN ER (GLUCOPHAGE XR) 500 MG TABLET SR 24 HR, Take 1 tablet by mouth every day., Disp: 90 tablet, Rfl: 3  •  apixaban (ELIQUIS) 2.5mg Tab, Take 1 tablet by mouth 2 times a day., Disp: 180 tablet, Rfl: 3  •  carvedilol (COREG) 3.125 MG Tab, TAKE 1 TABLET TWICE A DAY WITH MEALS, Disp: 180 tablet, Rfl: 3  •  Ascorbic Acid (VITAMIN C) 500 MG Cap, Take 1 Capsule by mouth 2 Times a Day., Disp: , Rfl:   •  sennosides (SENOKOT) 8.6 MG Tab, Take 1 Tab by mouth every day., Disp: 30 Tab, Rfl: 3    Allergies   Allergen Reactions   • Sulfa Drugs Nausea     Nausea    • Gabapentin      Altered mental status   • Tramadol      Altered mentation       Past Medical History:   Diagnosis Date   • Arthritis     knees, hips   • Breath shortness     with exertion    • Cataract     bilat IOL    • Dental disorder     full upper, partial lower    • Diabetes (HCC)     pre diabetic   • Heart burn    • Hemorrhagic disorder (HCC)     on Eliquis   • High cholesterol     diet controlled    • Hyperlipidemia    • Hypertension    • Pacemaker 2015   • Pain 2020    lower back, right leg   • Pre-diabetes    • TIA (transient ischemic attack)     on Eliquis   • Urinary incontinence      Past Surgical History:   Procedure  Laterality Date   • PB LAMINOTOMY,LUMBAR DISK,1 INTRSP N/A 2/25/2020    Procedure: LAMINECTOMY, SPINE, LUMBAR, WITH DISCECTOMY- L5-S1, MEDIAL FACETECTOMY;  Surgeon: Latrell Kimble M.D.;  Location: SURGERY [facility];  Service: Neurosurgery   • FORAMINOTOMY N/A 2/25/2020    Procedure: FORAMINOTOMY, SPINE;  Surgeon: Latrell Kimble M.D.;  Location: SURGERY [facility];  Service: Neurosurgery   • PACEMAKER INSERTION  2015   • HIP REPLACEMENT, TOTAL Right 2009   • KNEE REPLACEMENT, TOTAL Right 2004   • CATARACT EXTRACTION WITH IOL Bilateral 2003   • CHOLECYSTECTOMY  2002   • BASAL CELL EXCISION      nose and hand   • BUNIONECTOMY Left      Family History   Problem Relation Age of Onset   • Diabetes Mother    • Stroke Mother    • Heart Attack Father 74   • No Known Problems Maternal Grandmother    • No Known Problems Maternal Grandfather    • No Known Problems Paternal Grandmother    • No Known Problems Paternal Grandfather      Social History     Socioeconomic History   • Marital status:      Spouse name: Not on file   • Number of children: Not on file   • Years of education: Not on file   • Highest education level: Not on file   Occupational History   • Not on file   Tobacco Use   • Smoking status: Never Smoker   • Smokeless tobacco: Never Used   Vaping Use   • Vaping Use: Never used   Substance and Sexual Activity   • Alcohol use: Not Currently   • Drug use: No   • Sexual activity: Not Currently     Partners: Male   Other Topics Concern   • Not on file   Social History Narrative   • Not on file     Social Determinants of Health     Financial Resource Strain:    • Difficulty of Paying Living Expenses:    Food Insecurity:    • Worried About Running Out of Food in the Last Year:    • Ran Out of Food in the Last Year:    Transportation Needs:    • Lack of Transportation (Medical):    • Lack of Transportation (Non-Medical):    Physical Activity:    • Days of Exercise per Week:    • Minutes of Exercise per Session:     Stress:    • Feeling of Stress :    Social Connections:    • Frequency of Communication with Friends and Family:    • Frequency of Social Gatherings with Friends and Family:    • Attends Hindu Services:    • Active Member of Clubs or Organizations:    • Attends Club or Organization Meetings:    • Marital Status:    Intimate Partner Violence:    • Fear of Current or Ex-Partner:    • Emotionally Abused:    • Physically Abused:    • Sexually Abused:        Objective:     Vitals: /60 (BP Location: Right arm, Patient Position: Sitting, BP Cuff Size: Adult)   Pulse 67   Temp 36.2 °C (97.2 °F) (Temporal)   Wt 67.5 kg (148 lb 13 oz) Comment: Pt wheelchair  LMP  (LMP Unknown)   SpO2 94%   BMI 26.36 kg/m²    General: Alert, pleasant, NAD  HEENT: Normocephalic.  Neck supple.  Skin: Warm, dry, no rashes. Dry, slightly erythematous scalp.   Extremities: No leg edema. No discoloration  Neurological: No tremors  Psych:  Affect/mood is normal, judgement is good, memory is intact, grooming is appropriate.    Assessment/Plan:     Krystle was seen today for hair loss.    Diagnoses and all orders for this visit:    Hair loss  New problem, thinning is more apparent. Check labs. Although more likely related to natural progression of hair loss, recent TIA, possible hair products. Recommend scalp conditioning as she has very dry, and slightly erythematous scalp.   -     FERRITIN; Future  -     TSH WITH REFLEX TO FT4; Future  -     IRON/TOTAL IRON BIND; Future    Seborrheic dermatitis of scalp  Not well controlled. Recommend trial of Ketoconazole shampoo. May trial Coal tar shampoo as well.   -     ketoconazole (NIZORAL) 2 % shampoo; Apply 5 to 10 mL to wet scalp, lather, leave on 3 to 5 minutes, and rinse; apply twice weekly for 2 to 4 weeks.    Adjustment disorder with depressed mood  Not well controlled. Has been experiencing more depressive symptoms. Has low motivation to even perform physical activity.Recommend reaching  out to local Protestant, senior group to try and build new relationships within her age group. She is also amenable to start on Sertraline. Start with 1/2 tablet for a few weeks then increase to full tablet thereafter.    -     sertraline (ZOLOFT) 25 MG tablet; Take 1 Tablet by mouth every day.    Type 2 diabetes mellitus without complication, without long-term current use of insulin (HCC)  Chronic. Tolerating Metformin. Check feet daily. GFR >60. Continue current regimen. Check A1c.  -     HEMOGLOBIN A1C; Future    Dyslipidemia  Tolerating statin therapy. Continue.   -     atorvastatin (LIPITOR) 40 MG Tab; Take 1 Tablet by mouth every evening.    Essential hypertension, benign  Stable on current regimen. Continue.   -     MICROALBUMIN CREAT RATIO URINE; Future  -     Comp Metabolic Panel; Future      Return in about 3 months (around 1/22/2022).          Joana MCQUEEN.

## 2021-10-26 ENCOUNTER — HOSPITAL ENCOUNTER (OUTPATIENT)
Dept: LAB | Facility: MEDICAL CENTER | Age: 86
End: 2021-10-26
Attending: NURSE PRACTITIONER
Payer: MEDICARE

## 2021-10-26 DIAGNOSIS — I10 ESSENTIAL HYPERTENSION, BENIGN: ICD-10-CM

## 2021-10-26 DIAGNOSIS — E11.9 TYPE 2 DIABETES MELLITUS WITHOUT COMPLICATION, WITHOUT LONG-TERM CURRENT USE OF INSULIN (HCC): ICD-10-CM

## 2021-10-26 DIAGNOSIS — L65.9 HAIR LOSS: ICD-10-CM

## 2021-10-26 LAB
ALBUMIN SERPL BCP-MCNC: 4.3 G/DL (ref 3.2–4.9)
ALBUMIN/GLOB SERPL: 1.6 G/DL
ALP SERPL-CCNC: 73 U/L (ref 30–99)
ALT SERPL-CCNC: 10 U/L (ref 2–50)
ANION GAP SERPL CALC-SCNC: 10 MMOL/L (ref 7–16)
AST SERPL-CCNC: 14 U/L (ref 12–45)
BILIRUB SERPL-MCNC: 0.6 MG/DL (ref 0.1–1.5)
BUN SERPL-MCNC: 20 MG/DL (ref 8–22)
CALCIUM SERPL-MCNC: 10 MG/DL (ref 8.5–10.5)
CHLORIDE SERPL-SCNC: 100 MMOL/L (ref 96–112)
CO2 SERPL-SCNC: 26 MMOL/L (ref 20–33)
CREAT SERPL-MCNC: 0.74 MG/DL (ref 0.5–1.4)
CREAT UR-MCNC: 37.88 MG/DL
EST. AVERAGE GLUCOSE BLD GHB EST-MCNC: 148 MG/DL
FASTING STATUS PATIENT QL REPORTED: NORMAL
FERRITIN SERPL-MCNC: 157 NG/ML (ref 10–291)
GLOBULIN SER CALC-MCNC: 2.7 G/DL (ref 1.9–3.5)
GLUCOSE SERPL-MCNC: 154 MG/DL (ref 65–99)
HBA1C MFR BLD: 6.8 % (ref 4–5.6)
IRON SATN MFR SERPL: 43 % (ref 15–55)
IRON SERPL-MCNC: 111 UG/DL (ref 40–170)
MICROALBUMIN UR-MCNC: <1.2 MG/DL
MICROALBUMIN/CREAT UR: NORMAL MG/G (ref 0–30)
POTASSIUM SERPL-SCNC: 4.9 MMOL/L (ref 3.6–5.5)
PROT SERPL-MCNC: 7 G/DL (ref 6–8.2)
SODIUM SERPL-SCNC: 136 MMOL/L (ref 135–145)
TIBC SERPL-MCNC: 259 UG/DL (ref 250–450)
TSH SERPL DL<=0.005 MIU/L-ACNC: 2.99 UIU/ML (ref 0.38–5.33)
UIBC SERPL-MCNC: 148 UG/DL (ref 110–370)

## 2021-10-26 PROCEDURE — 83540 ASSAY OF IRON: CPT

## 2021-10-26 PROCEDURE — 80053 COMPREHEN METABOLIC PANEL: CPT

## 2021-10-26 PROCEDURE — 84443 ASSAY THYROID STIM HORMONE: CPT

## 2021-10-26 PROCEDURE — 82043 UR ALBUMIN QUANTITATIVE: CPT

## 2021-10-26 PROCEDURE — 36415 COLL VENOUS BLD VENIPUNCTURE: CPT

## 2021-10-26 PROCEDURE — 83550 IRON BINDING TEST: CPT

## 2021-10-26 PROCEDURE — 82728 ASSAY OF FERRITIN: CPT

## 2021-10-26 PROCEDURE — 82570 ASSAY OF URINE CREATININE: CPT

## 2021-10-26 PROCEDURE — 83036 HEMOGLOBIN GLYCOSYLATED A1C: CPT

## 2021-11-22 ENCOUNTER — TELEPHONE (OUTPATIENT)
Dept: MEDICAL GROUP | Facility: MEDICAL CENTER | Age: 86
End: 2021-11-22

## 2021-11-22 NOTE — TELEPHONE ENCOUNTER
Called pt no answer LVM check to see if we were switching pharmacy's. Medication for Zoloft was original sent to [facility], however, the [facility]. Wanted to double check with pt before switching over.

## 2021-11-22 NOTE — TELEPHONE ENCOUNTER
Called pt back and stated that she no longer is taking the zoloft as she stated it didn't go well when she was taking it.

## 2021-11-23 ENCOUNTER — TELEPHONE (OUTPATIENT)
Dept: MEDICAL GROUP | Facility: MEDICAL CENTER | Age: 86
End: 2021-11-23

## 2021-11-23 NOTE — TELEPHONE ENCOUNTER
"Phone Number Called: [phone]    Call outcome: Spoke to patient regarding message below.    Message: Called to follow up with patient regarding message below. Patient stated that the symptoms she was experiencing with Zoloft are very similar to what she experienced when she was taking her tramadol before she had her TIA and was in the hospital. Patient stated that is just made her feel \"weird\" and have hallucinations. Patient is no longer taking the medication.             ----- Message from JAYLEN Castelan sent at 11/23/2021  5:19 AM PST -----  Can you please f/u with this patient to see how she is tolerating the Sertraline ? Please.   Thank you.   Cat      "

## 2021-12-14 ENCOUNTER — NON-PROVIDER VISIT (OUTPATIENT)
Dept: CARDIOLOGY | Facility: MEDICAL CENTER | Age: 86
End: 2021-12-14
Payer: MEDICARE

## 2021-12-14 PROCEDURE — 99999 PR NO CHARGE: CPT | Performed by: INTERNAL MEDICINE

## 2021-12-15 NOTE — PROGRESS NOTES
"Subjective:   Chief Complaint:   Chief Complaint   Patient presents with   • Atrial Fibrillation     F/V Dx: PAF (paroxysmal atrial fibrillation) ([facility])   • Transient Ischemic Attack   • Hypertension     F/V Dx: Essential hypertension, benign       Krystle Tavarez is a 88 y.o. female who returns for PAF, anticoagulation, HTN, HLP, heart block requiring pacemaker, TIA, DM2.    Previously followed Dr. Sewell.    Had pacemaker placed for high-grade block around .  Pacemaker check 2021  A paced 5%, V paced 100%, mode switch 2.4%.  Normal EF in .    Has asymptomatic paroxysmal atrial fibrillation.    On anticoagulation for chads 2 vascular score of 6 including prior TIA.  No bleeding problems.    Has hypertension, controlled.  She was previously on HCTZ but this was discontinued due to hyponatremia.  They think she is still taking it but now not sure.    Has hyperlipidemia, on statin, prior .    Has type 2 diabetes, controlled.    Chronic LE edema, on diuretic, probably mild HFpEF.  Has chronic LE edema, not changing, worse in summer/hot days.    TIA was May 2021, was \"out of it,\" EMS brought her in, overnight was tough on her.  Not really sure what it was, then one day later, happened again, very confused.  Came back again, eval was normal.  She had not missed any doses of Eliquis so I think that is why aspirin 81 mg was added.    Father  of a heart attack at 74, mother  at 76 from complications of diabetes and stroke.    Limited by back pain, can use a walker in the home, wc for distance.  Severe back pain can make her lightheaded, worse in cold weather.  Not limited by chest pain, pressure or tightness with activity.   No significant dyspnea on exertion, orthopnea.  Has chronic LE edema, not changing, worse in summer/hot days.  No significant palpitations, lightheadedness, or presyncope/syncope.   No new stroke/TIA like symptoms.    No prior smoking history.  No history of autoimmune " disease such as lupus or rheumatoid arthritis.  No chronic kidney disease.  No ETOH overuse.  No caffeine overuse.  No recreation substance use.  No hx asthma.    .  Lives in [place] with her daughter Lubna, with her today  Ana M's children work at [employer], [place] is lead CIC RN, Becky works in IT.  Has chosen to be DNR status.    DATA REVIEWED by me:  ECG (my personal interpretation) 6/1/2021  Sinus, rate 91 ventricular paced rhythm    Pacemaker check 12/14/2021  A paced 5%, V paced 100%, mode switch 2.4%.    Echo 5/31/2021  No prior study is available for comparison.   Normal left ventricular systolic function.  Left ventricular ejection fraction is visually estimated to be 65%.  Mild mitral regurgitation.  Mild tricuspid regurgitation.  Right ventricular systolic pressure is estimated to be 40 mmHg.  Mild pulmonic insufficiency.    Most recent labs:       Lab Results   Component Value Date/Time    WBC 7.7 06/02/2021 04:48 AM    HEMOGLOBIN 12.3 06/02/2021 04:48 AM    HEMATOCRIT 37.3 06/02/2021 04:48 AM    MCV 93.3 06/02/2021 04:48 AM    INR 0.99 05/31/2021 04:28 PM      Lab Results   Component Value Date/Time    SODIUM 136 10/26/2021 09:51 AM    POTASSIUM 4.9 10/26/2021 09:51 AM    CHLORIDE 100 10/26/2021 09:51 AM    CO2 26 10/26/2021 09:51 AM    GLUCOSE 154 (H) 10/26/2021 09:51 AM    BUN 20 10/26/2021 09:51 AM    CREATININE 0.74 10/26/2021 09:51 AM      Lab Results   Component Value Date/Time    ASTSGOT 14 10/26/2021 09:51 AM    ALTSGPT 10 10/26/2021 09:51 AM    ALBUMIN 4.3 10/26/2021 09:51 AM      Lab Results   Component Value Date/Time    CHOLSTRLTOT 187 06/01/2021 12:33 AM     (H) 06/01/2021 12:33 AM    HDL 51 06/01/2021 12:33 AM    TRIGLYCERIDE 61 06/01/2021 12:33 AM     No results for input(s): NTPROBNP, TROPONINT in the last 72 hours.      Past Medical History:   Diagnosis Date   • Arthritis     knees, hips   • Breath shortness     with exertion    • Cataract     bilat IOL    • Dental disorder      full upper, partial lower    • Diabetes (HCC)     pre diabetic   • Heart burn    • Hemorrhagic disorder (HCC)     on Eliquis   • High cholesterol     diet controlled    • Hyperlipidemia    • Hypertension    • Pacemaker 2015   • Pain 2020    lower back, right leg   • Pre-diabetes    • TIA (transient ischemic attack)     on Eliquis   • Urinary incontinence      Past Surgical History:   Procedure Laterality Date   • PB LAMINOTOMY,LUMBAR DISK,1 INTRSP N/A 2/25/2020    Procedure: LAMINECTOMY, SPINE, LUMBAR, WITH DISCECTOMY- L5-S1, MEDIAL FACETECTOMY;  Surgeon: Latrell Kimble M.D.;  Location: SURGERY [facility];  Service: Neurosurgery   • FORAMINOTOMY N/A 2/25/2020    Procedure: FORAMINOTOMY, SPINE;  Surgeon: Latrell Kimble M.D.;  Location: SURGERY [facility];  Service: Neurosurgery   • PACEMAKER INSERTION  2015   • HIP REPLACEMENT, TOTAL Right 2009   • KNEE REPLACEMENT, TOTAL Right 2004   • CATARACT EXTRACTION WITH IOL Bilateral 2003   • CHOLECYSTECTOMY  2002   • BASAL CELL EXCISION      nose and hand   • BUNIONECTOMY Left      Family History   Problem Relation Age of Onset   • Diabetes Mother    • Stroke Mother    • Heart Attack Father 74   • No Known Problems Maternal Grandmother    • No Known Problems Maternal Grandfather    • No Known Problems Paternal Grandmother    • No Known Problems Paternal Grandfather      Social History     Socioeconomic History   • Marital status:      Spouse name: Not on file   • Number of children: Not on file   • Years of education: Not on file   • Highest education level: Not on file   Occupational History   • Not on file   Tobacco Use   • Smoking status: Never Smoker   • Smokeless tobacco: Never Used   Vaping Use   • Vaping Use: Never used   Substance and Sexual Activity   • Alcohol use: Not Currently   • Drug use: No   • Sexual activity: Not Currently     Partners: Male   Other Topics Concern   • Not on file   Social History Narrative   • Not on file     Social Determinants  of Health     Financial Resource Strain:    • Difficulty of Paying Living Expenses: Not on file   Food Insecurity:    • Worried About Running Out of Food in the Last Year: Not on file   • Ran Out of Food in the Last Year: Not on file   Transportation Needs:    • Lack of Transportation (Medical): Not on file   • Lack of Transportation (Non-Medical): Not on file   Physical Activity:    • Days of Exercise per Week: Not on file   • Minutes of Exercise per Session: Not on file   Stress:    • Feeling of Stress : Not on file   Social Connections:    • Frequency of Communication with Friends and Family: Not on file   • Frequency of Social Gatherings with Friends and Family: Not on file   • Attends [employer] Services: Not on file   • Active Member of Clubs or Organizations: Not on file   • Attends Club or Organization Meetings: Not on file   • Marital Status: Not on file   Intimate Partner Violence:    • Fear of Current or Ex-Partner: Not on file   • Emotionally Abused: Not on file   • Physically Abused: Not on file   • Sexually Abused: Not on file   Housing Stability:    • Unable to Pay for Housing in the Last Year: Not on file   • Number of Places Lived in the Last Year: Not on file   • Unstable Housing in the Last Year: Not on file     Allergies   Allergen Reactions   • Sulfa Drugs Nausea     Nausea    • Gabapentin      Altered mental status   • Tramadol      Altered mentation       Current Outpatient Medications   Medication Sig Dispense Refill   • atorvastatin (LIPITOR) 40 MG Tab Take 1 Tablet by mouth every evening. 90 Tablet 7   • bumetanide (BUMEX) 0.5 MG Tab Take 2 Tablets by mouth 1 time a day as needed (edema). 90 Tablet 7   • carvedilol (COREG) 3.125 MG Tab Take 1 Tablet by mouth 2 times a day with meals. 180 Tablet 7   • losartan (COZAAR) 100 MG Tab Take 1 Tablet by mouth every day. 90 Tablet 7   • apixaban (ELIQUIS) 2.5mg Tab Take 1 Tablet by mouth 2 times a day. 180 Tablet 7   • sertraline (ZOLOFT) 25 MG  "tablet Take 1 Tablet by mouth every day. 30 Tablet 3   • ketoconazole (NIZORAL) 2 % shampoo Apply 5 to 10 mL to wet scalp, lather, leave on 3 to 5 minutes, and rinse; apply twice weekly for 2 to 4 weeks. 120 mL 1   • aspirin EC 81 MG EC tablet Take 1 tablet by mouth every day. 30 tablet 3   • acetaminophen (TYLENOL) 325 MG Tab Take 2 Tablets by mouth every 6 hours as needed (Mild Pain; (Pain scale 1-3); Temp greater than 100.5 F). 100 tablet 0   • metFORMIN ER (GLUCOPHAGE XR) 500 MG TABLET SR 24 HR Take 1 tablet by mouth every day. 90 tablet 3   • Ascorbic Acid (VITAMIN C) 500 MG Cap Take 1 Capsule by mouth 2 Times a Day.     • sennosides (SENOKOT) 8.6 MG Tab Take 1 Tab by mouth every day. 30 Tab 3     No current facility-administered medications for this visit.       ROS  All others systems reviewed and negative.     Objective:     /60 (BP Location: Left arm, Patient Position: Sitting, BP Cuff Size: Adult)   Pulse 72   Resp 14   Ht 1.6 m (5' 3\")   Wt 67.1 kg (148 lb)   SpO2 94%  Body mass index is 26.22 kg/m².    General: No acute distress. Well nourished.  HEENT: EOM grossly intact, no scleral icterus, no pharyngeal erythema.   Neck:  No JVD at 90, no bruits, trachea midline  CVS: RRR. Normal S1, S2. No christi M/R/G. Trace to 1+ ankle LE edema.  2+ radial pulses, 2+ PT pulses, pacemaker pocket intact  Resp: CTAB. No wheezing or crackles/rhonchi. Normal respiratory effort.  Abdomen: Soft, NT, no christi hepatomegaly.  MSK/Ext: No clubbing or cyanosis.  Skin: Warm and dry, no rashes.  Neurological: CN III-XII grossly intact. No focal deficits.  In W/C  Psych: A&O x 3, appropriate affect, good judgement      Assessment:     1. PAF (paroxysmal atrial fibrillation) (HCC)     2. Anticoagulated     3. Cardiac pacemaker in situ     4. 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 Essential hypertension, benign  bumetanide (BUMEX) 0.5 MG Tab    carvedilol (COREG) 3.125 MG Tab    losartan (COZAAR) 100 MG Tab   6. History of TIA " (transient ischemic attack)     7. Pure hypercholesterolemia     8. Type 2 diabetes mellitus without complication, without long-term current use of insulin (HCC)     9. DNR (do not resuscitate)     10. Dyslipidemia  atorvastatin (LIPITOR) 40 MG Tab    carvedilol (COREG) 3.125 MG Tab       Medical Decision Making:  Today's Assessment / Status / Plan:     -Watch LV function given she is 100% V paced, normal LV function May 2021  -Continue blood pressure control  -Continue secondary prevention statin given her TIA  -Continue anticoagulation for chads 2 vascular score of 6 including prior TIA  -EP as needed for pacemaker issues should they arise  -LDL not quite to goal for primary prevention but I am not going to escalate unless she has a cardiovascular event  -She is really limited by back pain, not planning to have procedures but if she does, would be okay to disrupt anticoagulation for 48 hours but would need bridging if longer than 48 hours  -No HCTZ since prior low Na  -OK to use CBD oil or medical marijuana for pain from cardiology standpoint  -Return to clinic 6 months MD/PM check      Written instructions given today:    -If anyone is planning to do any type of work around your spine which could include injections, they typically want your Eliquis and aspirin held for 5 to 7 days which is an extended period of time.  We may want to have a discussion if we want to do Lovenox bridging which comes with its own set of problems and possible serious bleeding problems.  In general, I would probably say that we should not do the Lovenox and we should simply take her chances with disrupting the Eliquis.  We will cross that bridge when we come to it.    -Low threshold to discontinue aspirin if she has any serious bleeding problems    -If we think she is going to have trouble tolerating Eliquis or if it is going to be disrupted for multiple spine procedures, there is a heart plug called the watchman left atrial appendage  closure device that we could consider having placed so she could come off of blood thinner.  This would probably be a last resort.  My partner Dr. Alexis Correa can place these, he is an EP doctor, AKA electrophysiologist.    -I would not keep her on hydrochlorothiazide at this point.    -OK to use CBD oil or medical marijuana for pain from cardiology standpoint    Return in about 6 months (around 6/16/2022).    It is my pleasure to participate in the care of Ms. Tavarez.  Please do not hesitate to contact me with questions or concerns.    Kacey Mota MD, [facility]  Cardiologist [facility]    Please note that this dictation was created using voice recognition software. I have made every reasonable attempt to correct obvious errors, but it is possible there are errors of grammar and possibly content that I did not discover before finalizing the note.

## 2021-12-16 ENCOUNTER — OFFICE VISIT (OUTPATIENT)
Dept: CARDIOLOGY | Facility: MEDICAL CENTER | Age: 86
End: 2021-12-16
Payer: MEDICARE

## 2021-12-16 VITALS
WEIGHT: 148 LBS | OXYGEN SATURATION: 94 % | DIASTOLIC BLOOD PRESSURE: 60 MMHG | HEART RATE: 72 BPM | BODY MASS INDEX: 26.22 KG/M2 | RESPIRATION RATE: 14 BRPM | HEIGHT: 63 IN | SYSTOLIC BLOOD PRESSURE: 120 MMHG

## 2021-12-16 DIAGNOSIS — Z95.0 CARDIAC PACEMAKER IN SITU: ICD-10-CM

## 2021-12-16 DIAGNOSIS — E78.5 DYSLIPIDEMIA: ICD-10-CM

## 2021-12-16 DIAGNOSIS — I10 ESSENTIAL HYPERTENSION, BENIGN: ICD-10-CM

## 2021-12-16 DIAGNOSIS — I44.2 AV BLOCK, 3RD DEGREE (HCC): ICD-10-CM

## 2021-12-16 DIAGNOSIS — E11.9 TYPE 2 DIABETES MELLITUS WITHOUT COMPLICATION, WITHOUT LONG-TERM CURRENT USE OF INSULIN (HCC): Chronic | ICD-10-CM

## 2021-12-16 DIAGNOSIS — Z79.01 ANTICOAGULATED: ICD-10-CM

## 2021-12-16 DIAGNOSIS — E78.00 PURE HYPERCHOLESTEROLEMIA: ICD-10-CM

## 2021-12-16 DIAGNOSIS — Z86.73 HISTORY OF TIA (TRANSIENT ISCHEMIC ATTACK): ICD-10-CM

## 2021-12-16 DIAGNOSIS — I48.0 PAF (PAROXYSMAL ATRIAL FIBRILLATION) (HCC): ICD-10-CM

## 2021-12-16 DIAGNOSIS — Z66 DNR (DO NOT RESUSCITATE): ICD-10-CM

## 2021-12-16 PROCEDURE — 99214 OFFICE O/P EST MOD 30 MIN: CPT | Performed by: INTERNAL MEDICINE

## 2021-12-16 RX ORDER — ATORVASTATIN CALCIUM 40 MG/1
40 TABLET, FILM COATED ORAL EVERY EVENING
Qty: 90 TABLET | Refills: 7 | Status: SHIPPED | OUTPATIENT
Start: 2021-12-16 | End: 2023-03-06

## 2021-12-16 RX ORDER — BUMETANIDE 0.5 MG/1
1 TABLET ORAL
Qty: 90 TABLET | Refills: 7 | Status: SHIPPED | OUTPATIENT
Start: 2021-12-16 | End: 2023-02-17 | Stop reason: SDUPTHER

## 2021-12-16 RX ORDER — LOSARTAN POTASSIUM 100 MG/1
100 TABLET ORAL DAILY
Qty: 90 TABLET | Refills: 7 | Status: SHIPPED | OUTPATIENT
Start: 2021-12-16 | End: 2023-01-04

## 2021-12-16 RX ORDER — CARVEDILOL 3.12 MG/1
3.12 TABLET ORAL 2 TIMES DAILY WITH MEALS
Qty: 180 TABLET | Refills: 7 | Status: SHIPPED | OUTPATIENT
Start: 2021-12-16 | End: 2023-01-23

## 2021-12-16 ASSESSMENT — FIBROSIS 4 INDEX: FIB4 SCORE: 1.63

## 2021-12-16 NOTE — LETTER
"     [facility]   [address]  [place] [address]  Phone: [phone]  Fax: [phone]              Krystle Tavarez  1933    Encounter Date: 2021    Kacey Mota M.D.          PROGRESS NOTE:  Subjective:   Chief Complaint:   Chief Complaint   Patient presents with   • Atrial Fibrillation     F/V Dx: PAF (paroxysmal atrial fibrillation) (HCC)   • Transient Ischemic Attack   • Hypertension     F/V Dx: Essential hypertension, benign       Krystle Tavarez is a 88 y.o. female who returns for PAF, anticoagulation, HTN, HLP, heart block requiring pacemaker, TIA, DM2.    Previously followed Dr. Sewell.    Had pacemaker placed for high-grade block around .  Pacemaker check 2021  A paced 5%, V paced 100%, mode switch 2.4%.  Normal EF in .    Has asymptomatic paroxysmal atrial fibrillation.    On anticoagulation for chads 2 vascular score of 6 including prior TIA.  No bleeding problems.    Has hypertension, controlled.  She was previously on HCTZ but this was discontinued due to hyponatremia.  They think she is still taking it but now not sure.    Has hyperlipidemia, on statin, prior .    Has type 2 diabetes, controlled.    Chronic LE edema, on diuretic, probably mild HFpEF.  Has chronic LE edema, not changing, worse in summer/hot days.    TIA was May 2021, was \"out of it,\" EMS brought her in, overnight was tough on her.  Not really sure what it was, then one day later, happened again, very confused.  Came back again, eval was normal.  She had not missed any doses of Eliquis so I think that is why aspirin 81 mg was added.    Father  of a heart attack at 74, mother  at 76 from complications of diabetes and stroke.    Limited by back pain, can use a walker in the home, wc for distance.  Severe back pain can make her lightheaded, worse in cold weather.  Not limited by chest pain, pressure or tightness with activity.   No " significant dyspnea on exertion, orthopnea.  Has chronic LE edema, not changing, worse in summer/hot days.  No significant palpitations, lightheadedness, or presyncope/syncope.   No new stroke/TIA like symptoms.    No prior smoking history.  No history of autoimmune disease such as lupus or rheumatoid arthritis.  No chronic kidney disease.  No ETOH overuse.  No caffeine overuse.  No recreation substance use.  No hx asthma.    .  Lives in [place] with her daughter Lubna, with her today  Ana M's children work at [employer] Dallas is lead CIC RN, Becky works in IT.  Has chosen to be DNR status.    DATA REVIEWED by me:  ECG (my personal interpretation) 6/1/2021  Sinus, rate 91 ventricular paced rhythm    Pacemaker check 12/14/2021  A paced 5%, V paced 100%, mode switch 2.4%.    Echo 5/31/2021  No prior study is available for comparison.   Normal left ventricular systolic function.  Left ventricular ejection fraction is visually estimated to be 65%.  Mild mitral regurgitation.  Mild tricuspid regurgitation.  Right ventricular systolic pressure is estimated to be 40 mmHg.  Mild pulmonic insufficiency.    Most recent labs:       Lab Results   Component Value Date/Time    WBC 7.7 06/02/2021 04:48 AM    HEMOGLOBIN 12.3 06/02/2021 04:48 AM    HEMATOCRIT 37.3 06/02/2021 04:48 AM    MCV 93.3 06/02/2021 04:48 AM    INR 0.99 05/31/2021 04:28 PM      Lab Results   Component Value Date/Time    SODIUM 136 10/26/2021 09:51 AM    POTASSIUM 4.9 10/26/2021 09:51 AM    CHLORIDE 100 10/26/2021 09:51 AM    CO2 26 10/26/2021 09:51 AM    GLUCOSE 154 (H) 10/26/2021 09:51 AM    BUN 20 10/26/2021 09:51 AM    CREATININE 0.74 10/26/2021 09:51 AM      Lab Results   Component Value Date/Time    ASTSGOT 14 10/26/2021 09:51 AM    ALTSGPT 10 10/26/2021 09:51 AM    ALBUMIN 4.3 10/26/2021 09:51 AM      Lab Results   Component Value Date/Time    CHOLSTRLTOT 187 06/01/2021 12:33 AM     (H) 06/01/2021 12:33 AM    HDL 51 06/01/2021 12:33 AM     TRIGLYCERIDE 61 06/01/2021 12:33 AM     No results for input(s): NTPROBNP, TROPONINT in the last 72 hours.      Past Medical History:   Diagnosis Date   • Arthritis     knees, hips   • Breath shortness     with exertion    • Cataract     bilat IOL    • Dental disorder     full upper, partial lower    • Diabetes (HCC)     pre diabetic   • Heart burn    • Hemorrhagic disorder (HCC)     on Eliquis   • High cholesterol     diet controlled    • Hyperlipidemia    • Hypertension    • Pacemaker 2015   • Pain 2020    lower back, right leg   • Pre-diabetes    • TIA (transient ischemic attack)     on Eliquis   • Urinary incontinence      Past Surgical History:   Procedure Laterality Date   • PB LAMINOTOMY,LUMBAR DISK,1 INTRSP N/A 2/25/2020    Procedure: LAMINECTOMY, SPINE, LUMBAR, WITH DISCECTOMY- L5-S1, MEDIAL FACETECTOMY;  Surgeon: Latrell Kimble M.D.;  Location: SURGERY [facility];  Service: Neurosurgery   • FORAMINOTOMY N/A 2/25/2020    Procedure: FORAMINOTOMY, SPINE;  Surgeon: Latrell Kimble M.D.;  Location: SURGERY [facility];  Service: Neurosurgery   • PACEMAKER INSERTION  2015   • HIP REPLACEMENT, TOTAL Right 2009   • KNEE REPLACEMENT, TOTAL Right 2004   • CATARACT EXTRACTION WITH IOL Bilateral 2003   • CHOLECYSTECTOMY  2002   • BASAL CELL EXCISION      nose and hand   • BUNIONECTOMY Left      Family History   Problem Relation Age of Onset   • Diabetes Mother    • Stroke Mother    • Heart Attack Father 74   • No Known Problems Maternal Grandmother    • No Known Problems Maternal Grandfather    • No Known Problems Paternal Grandmother    • No Known Problems Paternal Grandfather      Social History     Socioeconomic History   • Marital status:      Spouse name: Not on file   • Number of children: Not on file   • Years of education: Not on file   • Highest education level: Not on file   Occupational History   • Not on file   Tobacco Use   • Smoking status: Never Smoker   • Smokeless tobacco: Never Used   Vaping  Use   • Vaping Use: Never used   Substance and Sexual Activity   • Alcohol use: Not Currently   • Drug use: No   • Sexual activity: Not Currently     Partners: Male   Other Topics Concern   • Not on file   Social History Narrative   • Not on file     Social Determinants of Health     Financial Resource Strain:    • Difficulty of Paying Living Expenses: Not on file   Food Insecurity:    • Worried About Running Out of Food in the Last Year: Not on file   • Ran Out of Food in the Last Year: Not on file   Transportation Needs:    • Lack of Transportation (Medical): Not on file   • Lack of Transportation (Non-Medical): Not on file   Physical Activity:    • Days of Exercise per Week: Not on file   • Minutes of Exercise per Session: Not on file   Stress:    • Feeling of Stress : Not on file   Social Connections:    • Frequency of Communication with Friends and Family: Not on file   • Frequency of Social Gatherings with Friends and Family: Not on file   • Attends Holiness Services: Not on file   • Active Member of Clubs or Organizations: Not on file   • Attends Club or Organization Meetings: Not on file   • Marital Status: Not on file   Intimate Partner Violence:    • Fear of Current or Ex-Partner: Not on file   • Emotionally Abused: Not on file   • Physically Abused: Not on file   • Sexually Abused: Not on file   Housing Stability:    • Unable to Pay for Housing in the Last Year: Not on file   • Number of Places Lived in the Last Year: Not on file   • Unstable Housing in the Last Year: Not on file     Allergies   Allergen Reactions   • Sulfa Drugs Nausea     Nausea    • Gabapentin      Altered mental status   • Tramadol      Altered mentation       Current Outpatient Medications   Medication Sig Dispense Refill   • atorvastatin (LIPITOR) 40 MG Tab Take 1 Tablet by mouth every evening. 90 Tablet 7   • bumetanide (BUMEX) 0.5 MG Tab Take 2 Tablets by mouth 1 time a day as needed (edema). 90 Tablet 7   • carvedilol (COREG)  "3.125 MG Tab Take 1 Tablet by mouth 2 times a day with meals. 180 Tablet 7   • losartan (COZAAR) 100 MG Tab Take 1 Tablet by mouth every day. 90 Tablet 7   • apixaban (ELIQUIS) 2.5mg Tab Take 1 Tablet by mouth 2 times a day. 180 Tablet 7   • sertraline (ZOLOFT) 25 MG tablet Take 1 Tablet by mouth every day. 30 Tablet 3   • ketoconazole (NIZORAL) 2 % shampoo Apply 5 to 10 mL to wet scalp, lather, leave on 3 to 5 minutes, and rinse; apply twice weekly for 2 to 4 weeks. 120 mL 1   • aspirin EC 81 MG EC tablet Take 1 tablet by mouth every day. 30 tablet 3   • acetaminophen (TYLENOL) 325 MG Tab Take 2 Tablets by mouth every 6 hours as needed (Mild Pain; (Pain scale 1-3); Temp greater than 100.5 F). 100 tablet 0   • metFORMIN ER (GLUCOPHAGE XR) 500 MG TABLET SR 24 HR Take 1 tablet by mouth every day. 90 tablet 3   • Ascorbic Acid (VITAMIN C) 500 MG Cap Take 1 Capsule by mouth 2 Times a Day.     • sennosides (SENOKOT) 8.6 MG Tab Take 1 Tab by mouth every day. 30 Tab 3     No current facility-administered medications for this visit.       ROS  All others systems reviewed and negative.     Objective:     /60 (BP Location: Left arm, Patient Position: Sitting, BP Cuff Size: Adult)   Pulse 72   Resp 14   Ht 1.6 m (5' 3\")   Wt 67.1 kg (148 lb)   SpO2 94%  Body mass index is 26.22 kg/m².    General: No acute distress. Well nourished.  HEENT: EOM grossly intact, no scleral icterus, no pharyngeal erythema.   Neck:  No JVD at 90, no bruits, trachea midline  CVS: RRR. Normal S1, S2. No christi M/R/G. Trace to 1+ ankle LE edema.  2+ radial pulses, 2+ PT pulses, pacemaker pocket intact  Resp: CTAB. No wheezing or crackles/rhonchi. Normal respiratory effort.  Abdomen: Soft, NT, no christi hepatomegaly.  MSK/Ext: No clubbing or cyanosis.  Skin: Warm and dry, no rashes.  Neurological: CN III-XII grossly intact. No focal deficits.  In W/C  Psych: A&O x 3, appropriate affect, good judgement      Assessment:     1. PAF (paroxysmal " atrial fibrillation) (HCC)     2. Anticoagulated     3. Cardiac pacemaker in situ     4. 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 Essential hypertension, benign  bumetanide (BUMEX) 0.5 MG Tab    carvedilol (COREG) 3.125 MG Tab    losartan (COZAAR) 100 MG Tab   6. History of TIA (transient ischemic attack)     7. Pure hypercholesterolemia     8. Type 2 diabetes mellitus without complication, without long-term current use of insulin (HCC)     9. DNR (do not resuscitate)     10. Dyslipidemia  atorvastatin (LIPITOR) 40 MG Tab    carvedilol (COREG) 3.125 MG Tab       Medical Decision Making:  Today's Assessment / Status / Plan:     -Watch LV function given she is 100% V paced, normal LV function May 2021  -Continue blood pressure control  -Continue secondary prevention statin given her TIA  -Continue anticoagulation for chads 2 vascular score of 6 including prior TIA  -EP as needed for pacemaker issues should they arise  -LDL not quite to goal for primary prevention but I am not going to escalate unless she has a cardiovascular event  -She is really limited by back pain, not planning to have procedures but if she does, would be okay to disrupt anticoagulation for 48 hours but would need bridging if longer than 48 hours  -No HCTZ since prior low Na  -OK to use CBD oil or medical marijuana for pain from cardiology standpoint  -Return to clinic 6 months MD/PM check      Written instructions given today:    -If anyone is planning to do any type of work around your spine which could include injections, they typically want your Eliquis and aspirin held for 5 to 7 days which is an extended period of time.  We may want to have a discussion if we want to do Lovenox bridging which comes with its own set of problems and possible serious bleeding problems.  In general, I would probably say that we should not do the Lovenox and we should simply take her chances with disrupting the Eliquis.  We will cross that bridge when we come to  it.    -Low threshold to discontinue aspirin if she has any serious bleeding problems    -If we think she is going to have trouble tolerating Eliquis or if it is going to be disrupted for multiple spine procedures, there is a heart plug called the watchman left atrial appendage closure device that we could consider having placed so she could come off of blood thinner.  This would probably be a last resort.  My partner Dr. Alexis Correa can place these, he is an EP doctor, AKA electrophysiologist.    -I would not keep her on hydrochlorothiazide at this point.    -OK to use CBD oil or medical marijuana for pain from cardiology standpoint    Return in about 6 months (around 6/16/2022).    It is my pleasure to participate in the care of Ms. Tavarez.  Please do not hesitate to contact me with questions or concerns.    Kacey Mota MD, [facility]  Cardiologist [facility]    Please note that this dictation was created using voice recognition software. I have made every reasonable attempt to correct obvious errors, but it is possible there are errors of grammar and possibly content that I did not discover before finalizing the note.        Joana Hawk, DEMAR.P.R.N.  [address]  [place] [address]  Via In Basket

## 2021-12-16 NOTE — PATIENT INSTRUCTIONS
-If anyone is planning to do any type of work around your spine which could include injections, they typically want your Eliquis and aspirin held for 5 to 7 days which is an extended period of time.  We may want to have a discussion if we want to do Lovenox bridging which comes with its own set of problems and possible serious bleeding problems.  In general, I would probably say that we should not do the Lovenox and we should simply take her chances with disrupting the Eliquis.  We will cross that bridge when we come to it.    -Low threshold to discontinue aspirin if she has any serious bleeding problems    -If we think she is going to have trouble tolerating Eliquis or if it is going to be disrupted for multiple spine procedures, there is a heart plug called the watchman left atrial appendage closure device that we could consider having placed so she could come off of blood thinner.  This would probably be a last resort.  My partner Dr. Alexis Correa can place these, he is an EP doctor, AKA electrophysiologist.    -I would not keep her on hydrochlorothiazide at this point.    -OK to use CBD oil or medical marijuana for pain from cardiology standpoint

## 2022-01-31 ENCOUNTER — TELEPHONE (OUTPATIENT)
Dept: CARDIOLOGY | Facility: MEDICAL CENTER | Age: 87
End: 2022-01-31

## 2022-01-31 NOTE — TELEPHONE ENCOUNTER
RAMIREZ Verma,    This patients daughter called and stated while at the eye doctor he noticed she has a heart murmur and wanted to have them contact us to advise. Can you please call them back at [phone].      Thank you,    AVERY

## 2022-01-31 NOTE — TELEPHONE ENCOUNTER
Returned call, s/w pt. She states she has no s/s except for when she lays down at night she can hear her heartbeat through her ears. However, she states this is not new, she has had it since prior to her PM placement. Pt does not take BP/HR at home. Advised pt to call us regarding any new s/s and to monitor BP/HR at home, she does have equipment. Informed pt that I would let LS know and see if she has any concerns or recommendations.     To LS, please advise    Pt also states that she received a monitor from [employer] for her PM. She believes her grandson set it up for her but she does not know how to send a transmission. Informed her that I would ask one of our device clinic staff to call her to walk her through how to send transmissions.     To device clinic, can you please call pt to help her w/ transmissions?

## 2022-01-31 NOTE — TELEPHONE ENCOUNTER
"Please let them know \"heart murmur\" just means a heart sound. Most people have them at one time or another. We follow echocardiograms and examine her in clinic. She has mildly leaking heart valves which often progress to moderate over time/aging and we track this. I am not concerned at all.    Thank you for passing her info onto the device team.  LS  "

## 2022-03-16 NOTE — REHAB-PHARMACY IDT TEAM NOTE
Pharmacy   Pharmacy  Antibiotics/Antifungals/Antivirals:  Medication:      Active Orders (From admission, onward)    None        Route:        NA  Stop Date:  NA  Reason:      NA  Antihypertensives/Cardiac:  Medication:    Orders (72h ago, onward)     Start     Ordered    03/05/20 0600  hydroCHLOROthiazide (HYDRODIURIL) tablet 25 mg  Q DAY      03/04/20 1254    03/05/20 0600  losartan (COZAAR) tablet 100 mg  Q DAY      03/04/20 1254    03/04/20 1730  carvedilol (COREG) tablet 6.25 mg  2 TIMES DAILY WITH MEALS      03/04/20 1254              Patient Vitals for the past 24 hrs:   BP Pulse   03/06/20 0821 -- 72   03/06/20 0700 136/60 70   03/06/20 0457 149/61 --   03/06/20 0455 149/61 70   03/05/20 1900 132/59 74   03/05/20 1813 -- 80   03/05/20 1744 122/64 80   03/05/20 1400 131/58 78     Anticoagulation:  Medication: Lovenox, Plavix    Other key medications: A review of the medication list reveals no issues at this time. Patient is currently on antihypertensive(s). Recommend home blood pressure monitoring/recording if antihypertensive(s) regimen(s) continue.    Section completed by: Pk Tariq [facility]   Calm

## 2022-04-10 ENCOUNTER — APPOINTMENT (OUTPATIENT)
Dept: RADIOLOGY | Facility: MEDICAL CENTER | Age: 87
End: 2022-04-10
Attending: EMERGENCY MEDICINE
Payer: MEDICARE

## 2022-04-10 ENCOUNTER — HOSPITAL ENCOUNTER (EMERGENCY)
Facility: MEDICAL CENTER | Age: 87
End: 2022-04-10
Attending: EMERGENCY MEDICINE
Payer: MEDICARE

## 2022-04-10 VITALS
RESPIRATION RATE: 22 BRPM | DIASTOLIC BLOOD PRESSURE: 87 MMHG | BODY MASS INDEX: 26.22 KG/M2 | HEART RATE: 75 BPM | WEIGHT: 148 LBS | TEMPERATURE: 98.5 F | HEIGHT: 63 IN | SYSTOLIC BLOOD PRESSURE: 198 MMHG | OXYGEN SATURATION: 94 %

## 2022-04-10 DIAGNOSIS — R42 DIZZINESS: ICD-10-CM

## 2022-04-10 LAB
ALBUMIN SERPL BCP-MCNC: 4.2 G/DL (ref 3.2–4.9)
ALBUMIN/GLOB SERPL: 1.5 G/DL
ALP SERPL-CCNC: 78 U/L (ref 30–99)
ALT SERPL-CCNC: 13 U/L (ref 2–50)
ANION GAP SERPL CALC-SCNC: 12 MMOL/L (ref 7–16)
APPEARANCE UR: CLEAR
AST SERPL-CCNC: 17 U/L (ref 12–45)
BASOPHILS # BLD AUTO: 0.6 % (ref 0–1.8)
BASOPHILS # BLD: 0.03 K/UL (ref 0–0.12)
BILIRUB SERPL-MCNC: 0.9 MG/DL (ref 0.1–1.5)
BILIRUB UR QL STRIP.AUTO: NEGATIVE
BUN SERPL-MCNC: 18 MG/DL (ref 8–22)
CALCIUM SERPL-MCNC: 10 MG/DL (ref 8.5–10.5)
CHLORIDE SERPL-SCNC: 101 MMOL/L (ref 96–112)
CO2 SERPL-SCNC: 27 MMOL/L (ref 20–33)
COLOR UR: YELLOW
CREAT SERPL-MCNC: 0.58 MG/DL (ref 0.5–1.4)
EKG IMPRESSION: NORMAL
EOSINOPHIL # BLD AUTO: 0.12 K/UL (ref 0–0.51)
EOSINOPHIL NFR BLD: 2.6 % (ref 0–6.9)
ERYTHROCYTE [DISTWIDTH] IN BLOOD BY AUTOMATED COUNT: 48 FL (ref 35.9–50)
GFR SERPLBLD CREATININE-BSD FMLA CKD-EPI: 87 ML/MIN/1.73 M 2
GLOBULIN SER CALC-MCNC: 2.8 G/DL (ref 1.9–3.5)
GLUCOSE SERPL-MCNC: 145 MG/DL (ref 65–99)
GLUCOSE UR STRIP.AUTO-MCNC: NEGATIVE MG/DL
HCT VFR BLD AUTO: 42.6 % (ref 37–47)
HGB BLD-MCNC: 14.2 G/DL (ref 12–16)
IMM GRANULOCYTES # BLD AUTO: 0.01 K/UL (ref 0–0.11)
IMM GRANULOCYTES NFR BLD AUTO: 0.2 % (ref 0–0.9)
KETONES UR STRIP.AUTO-MCNC: NEGATIVE MG/DL
LACTATE BLD-SCNC: 1.3 MMOL/L (ref 0.5–2)
LEUKOCYTE ESTERASE UR QL STRIP.AUTO: NEGATIVE
LYMPHOCYTES # BLD AUTO: 2.09 K/UL (ref 1–4.8)
LYMPHOCYTES NFR BLD: 45.2 % (ref 22–41)
MCH RBC QN AUTO: 32 PG (ref 27–33)
MCHC RBC AUTO-ENTMCNC: 33.3 G/DL (ref 33.6–35)
MCV RBC AUTO: 95.9 FL (ref 81.4–97.8)
MICRO URNS: NORMAL
MONOCYTES # BLD AUTO: 0.34 K/UL (ref 0–0.85)
MONOCYTES NFR BLD AUTO: 7.4 % (ref 0–13.4)
NEUTROPHILS # BLD AUTO: 2.03 K/UL (ref 2–7.15)
NEUTROPHILS NFR BLD: 44 % (ref 44–72)
NITRITE UR QL STRIP.AUTO: NEGATIVE
NRBC # BLD AUTO: 0 K/UL
NRBC BLD-RTO: 0 /100 WBC
PH UR STRIP.AUTO: 6.5 [PH] (ref 5–8)
PLATELET # BLD AUTO: 211 K/UL (ref 164–446)
PMV BLD AUTO: 9.9 FL (ref 9–12.9)
POTASSIUM SERPL-SCNC: 4 MMOL/L (ref 3.6–5.5)
PROT SERPL-MCNC: 7 G/DL (ref 6–8.2)
PROT UR QL STRIP: NEGATIVE MG/DL
RBC # BLD AUTO: 4.44 M/UL (ref 4.2–5.4)
RBC UR QL AUTO: NEGATIVE
SODIUM SERPL-SCNC: 140 MMOL/L (ref 135–145)
SP GR UR STRIP.AUTO: 1.02
TROPONIN T SERPL-MCNC: 17 NG/L (ref 6–19)
UROBILINOGEN UR STRIP.AUTO-MCNC: 1 MG/DL
WBC # BLD AUTO: 4.6 K/UL (ref 4.8–10.8)

## 2022-04-10 PROCEDURE — 85025 COMPLETE CBC W/AUTO DIFF WBC: CPT

## 2022-04-10 PROCEDURE — 700117 HCHG RX CONTRAST REV CODE 255: Performed by: EMERGENCY MEDICINE

## 2022-04-10 PROCEDURE — A9270 NON-COVERED ITEM OR SERVICE: HCPCS | Performed by: EMERGENCY MEDICINE

## 2022-04-10 PROCEDURE — 700105 HCHG RX REV CODE 258: Performed by: EMERGENCY MEDICINE

## 2022-04-10 PROCEDURE — 99285 EMERGENCY DEPT VISIT HI MDM: CPT

## 2022-04-10 PROCEDURE — 700102 HCHG RX REV CODE 250 W/ 637 OVERRIDE(OP): Performed by: EMERGENCY MEDICINE

## 2022-04-10 PROCEDURE — 83605 ASSAY OF LACTIC ACID: CPT

## 2022-04-10 PROCEDURE — 93005 ELECTROCARDIOGRAM TRACING: CPT | Performed by: EMERGENCY MEDICINE

## 2022-04-10 PROCEDURE — 84484 ASSAY OF TROPONIN QUANT: CPT

## 2022-04-10 PROCEDURE — 93005 ELECTROCARDIOGRAM TRACING: CPT

## 2022-04-10 PROCEDURE — 81003 URINALYSIS AUTO W/O SCOPE: CPT

## 2022-04-10 PROCEDURE — 80053 COMPREHEN METABOLIC PANEL: CPT

## 2022-04-10 PROCEDURE — 74177 CT ABD & PELVIS W/CONTRAST: CPT | Mod: ME

## 2022-04-10 PROCEDURE — 70450 CT HEAD/BRAIN W/O DYE: CPT | Mod: ME

## 2022-04-10 RX ORDER — SODIUM CHLORIDE 9 MG/ML
1000 INJECTION, SOLUTION INTRAVENOUS ONCE
Status: COMPLETED | OUTPATIENT
Start: 2022-04-10 | End: 2022-04-10

## 2022-04-10 RX ORDER — LOSARTAN POTASSIUM 50 MG/1
100 TABLET ORAL ONCE
Status: COMPLETED | OUTPATIENT
Start: 2022-04-10 | End: 2022-04-10

## 2022-04-10 RX ORDER — CARVEDILOL 3.12 MG/1
3.12 TABLET ORAL ONCE
Status: COMPLETED | OUTPATIENT
Start: 2022-04-10 | End: 2022-04-10

## 2022-04-10 RX ADMIN — CARVEDILOL 3.12 MG: 3.12 TABLET, FILM COATED ORAL at 09:36

## 2022-04-10 RX ADMIN — IOHEXOL 80 ML: 350 INJECTION, SOLUTION INTRAVENOUS at 11:11

## 2022-04-10 RX ADMIN — LOSARTAN POTASSIUM 100 MG: 50 TABLET, FILM COATED ORAL at 09:36

## 2022-04-10 RX ADMIN — SODIUM CHLORIDE 1000 ML: 9 INJECTION, SOLUTION INTRAVENOUS at 11:14

## 2022-04-10 ASSESSMENT — LIFESTYLE VARIABLES
DO YOU DRINK ALCOHOL: NO
TOTAL SCORE: 0
TOTAL SCORE: 0
HAVE PEOPLE ANNOYED YOU BY CRITICIZING YOUR DRINKING: NO
ON A TYPICAL DAY WHEN YOU DRINK ALCOHOL HOW MANY DRINKS DO YOU HAVE: 0
EVER FELT BAD OR GUILTY ABOUT YOUR DRINKING: NO
EVER HAD A DRINK FIRST THING IN THE MORNING TO STEADY YOUR NERVES TO GET RID OF A HANGOVER: NO
HAVE YOU EVER FELT YOU SHOULD CUT DOWN ON YOUR DRINKING: NO
AVERAGE NUMBER OF DAYS PER WEEK YOU HAVE A DRINK CONTAINING ALCOHOL: 0
HOW MANY TIMES IN THE PAST YEAR HAVE YOU HAD 5 OR MORE DRINKS IN A DAY: 0
TOTAL SCORE: 0
CONSUMPTION TOTAL: NEGATIVE

## 2022-04-10 ASSESSMENT — FIBROSIS 4 INDEX: FIB4 SCORE: 1.63

## 2022-04-10 NOTE — ED NOTES
Assist RN: Pt provided discharge instructions. Pt verbalized understanding. Pt leaving ER in stable condition

## 2022-04-10 NOTE — ED PROVIDER NOTES
"ED Provider Note    Scribed for Shalonda Duff M.D. by Elaine Delaney. 4/10/2022, 8:46 AM.    Primary care provider: JAYLEN Castelan  Means of arrival: EMS  History obtained from: patient   History limited by: none    CHIEF COMPLAINT  Chief Complaint   Patient presents with   • Dizziness     With waking, \"I feel like I'm falling and it wakes me up and I nearly fall out of bed\", pt denies any falls, hx pacemaker but pt is uncertain why she has it, states hx of these sx and was told by MD it was TIA vs vertigo but was never given specific dx. Pt denies any output/urinary changes or dysuria.        HPI  Krystle Tavarez is a 88 y.o. female who presents to the Emergency Department for dizziness onset this morning. Patient describes when she woke up this morning she felt like she was going to fall out of bed. Her daughter states she was unable to get out of bed this morning secondary to her dizziness. She describes her dizziness as her head spinning and it is resolved at this time. Patient endorses associated weakness, dysuria, and fatigue. Patient adds that she has had a decreased appetite secondary to nausea and abdominal cramping. Denies fevers or pain. She has a past medical history of hypertension and has not yet taken her medication. Patient has a pacemaker as well as a history of a fib and takes eliquis.    REVIEW OF SYSTEMS  Pertinent positives include dizziness, fatigue, dysuria, nausea, decreased appetite, abdominal cramping, and weakness. Pertinent negatives include no fevers or pain.  All other systems reviewed and negative.    PAST MEDICAL HISTORY   has a past medical history of Arthritis, Breath shortness, Cataract, Dental disorder, Diabetes (HCC), Heart burn, Hemorrhagic disorder (HCC), High cholesterol, Hyperlipidemia, Hypertension, Pacemaker (2015), Pain (2020), Pre-diabetes, TIA (transient ischemic attack), and Urinary incontinence.    SURGICAL HISTORY   has a past surgical history that " "includes hip replacement, total (Right, 2009); knee replacement, total (Right, 2004); bunionectomy (Left); basal cell excision; cataract extraction with iol (Bilateral, 2003); pacemaker insertion (2015); cholecystectomy (2002); laminotomy,lumbar disk,1 intrsp (N/A, 2/25/2020); and foraminotomy (N/A, 2/25/2020).    SOCIAL HISTORY  Social History     Tobacco Use   • Smoking status: Never Smoker   • Smokeless tobacco: Never Used   Vaping Use   • Vaping Use: Never used   Substance Use Topics   • Alcohol use: Not Currently   • Drug use: No      Social History     Substance and Sexual Activity   Drug Use No       FAMILY HISTORY  Family History   Problem Relation Age of Onset   • Diabetes Mother    • Stroke Mother    • Heart Attack Father 74   • No Known Problems Maternal Grandmother    • No Known Problems Maternal Grandfather    • No Known Problems Paternal Grandmother    • No Known Problems Paternal Grandfather        CURRENT MEDICATIONS  Home Medications     Reviewed by Ty Roman R.N. (Registered Nurse) on 04/10/22 at 0835  Med List Status: <None>   Medication Last Dose Status   acetaminophen (TYLENOL) 325 MG Tab  Active   apixaban (ELIQUIS) 2.5mg Tab  Active   Ascorbic Acid (VITAMIN C) 500 MG Cap  Active   aspirin EC 81 MG EC tablet  Active   atorvastatin (LIPITOR) 40 MG Tab  Active   bumetanide (BUMEX) 0.5 MG Tab  Active   carvedilol (COREG) 3.125 MG Tab  Active   ketoconazole (NIZORAL) 2 % shampoo  Active   losartan (COZAAR) 100 MG Tab  Active   metFORMIN ER (GLUCOPHAGE XR) 500 MG TABLET SR 24 HR  Active   sennosides (SENOKOT) 8.6 MG Tab  Active   sertraline (ZOLOFT) 25 MG tablet  Active                ALLERGIES  Allergies   Allergen Reactions   • Sulfa Drugs Nausea     Nausea    • Gabapentin      Altered mental status   • Tramadol      Altered mentation       PHYSICAL EXAM  VITAL SIGNS: BP (!) 169/98   Pulse 74   Temp 36.6 °C (97.8 °F) (Temporal)   Resp 19   Ht 1.6 m (5' 3\")   Wt 67.1 kg (148 lb)   LMP  " (LMP Unknown)   SpO2 90%   BMI 26.22 kg/m²   Constitutional: Alert in no apparent distress.  HENT: No signs of trauma, Bilateral external ears normal, Nose normal.   Eyes: Pupils are equal and reactive, Conjunctiva normal, Non-icteric.  Neck: Normal range of motion, No tenderness, Supple, No stridor.   Cardiovascular: Regular rate and rhythm, no murmurs.   Thorax & Lungs: Normal breath sounds, No respiratory distress, No wheezing, No chest tenderness.   Abdomen: Bowel sounds normal, Soft, right sided abdominal tenderness, No masses, No peritoneal signs.  Skin: Warm, Dry, No erythema, No rash.   Musculoskeletal:  No major deformities noted.  Neurologic: Alert, moving all extremities without difficulty, no focal deficits. No nystagmus. Normal finger to nose.     LABS  Labs Reviewed   CBC WITH DIFFERENTIAL - Abnormal; Notable for the following components:       Result Value    WBC 4.6 (*)     MCHC 33.3 (*)     Lymphocytes 45.20 (*)     All other components within normal limits   COMP METABOLIC PANEL - Abnormal; Notable for the following components:    Glucose 145 (*)     All other components within normal limits   TROPONIN   URINALYSIS   LACTIC ACID   ESTIMATED GFR     All labs reviewed by me.    EKG  12 Lead EKG interpreted by me as below  Results for orders placed or performed during the hospital encounter of 04/10/22   EKG   Result Value Ref Range    Report       [facility] Emergency Dept.    Test Date:  2022-04-10  Pt Name:    VAN SEWELL                 Department: ER  MRN:        [id number]                      Room:       [facility]  Gender:     Female                       Technician: [id number]  :        [id number]                   Requested By:ER TRIAGE PROTOCOL  Order #:    [id number]                    Reading MD: PERLA HANKINS    Measurements  Intervals                                Axis  Rate:       73                           P:          76  OK:         195                          QRS:         103  QRSD:       150                          T:          -46  QT:         482  QTc:        532    Interpretive Statements  Atrial-sensed ventricular-paced complexes  No further analysis attempted due to paced rhythm  Compared to ECG 05/31/2021 17:05:47  Intraventricular conduction delay no longer present  ST (T wave) deviation no longer present  Impression: A sensed V paced complexes without obvious ischemia.  Electronically Sign ed On 4- 12:52:21 PDT by PERLA HANKINS         COURSE & MEDICAL DECISION MAKING  Pertinent Labs & Imaging studies reviewed. (See chart for details)    Reviewed patient's old medical records which showed she was seen by cardiology in December and has a history of hyponatremia. She takes carvedilol, cozaar, Lipitor, and eliquis.    Differential diagnoses include but are not limited to: UTI, intraabdominal process, electrolyte abnormality.    8:46 AM - Patient seen and examined at bedside. I discussed that we will obtain labs to further evaluate for a cause of her symptoms. Ordered UA, CBC w/ diff, CMP, lactic acid, troponin, and EKG to evaluate her symptoms.     10:17 AM - Reviewed patients labs. Ordered CT head and CT abdomen pelvis. She will be treated with IV fluids for decreased oral intake.    11:40 AM - Reviewed imaging. Paged cardiology.     11:57 AM - I discussed the patient's case and the above findings with Dr. Amezquita (cardiology) who does not think an echo is necessary, but recommended interrogating her pacemaker.    12:49 PM - Interrogation of the patients face maker shows an episode of a flutter 5 days ago, but no other abnormalities.     12:54 PM - Patient was reevaluated at bedside. Discussed lab and radiology results with the patient and informed them that there are no concerning abnormalities. Discussed return precautions. Patient will be discharged at this time. She verbalizes agreement with discharge and plan of care.        HYDRATION: Based on the patient's  presentation of decreased oral fluids the patient was given IV fluids. IV Hydration was used because oral hydration was not adequate alone. Upon recheck following hydration, the patient was improved.     Decision Making:  This is a 88 y.o. year old female who presents with dizziness.  This occurred this morning she currently has no symptoms.  It is not triggered by movement at this time.  Patient does report this burning of her urine and right-sided abdominal pain that she has had intermittently.  Given this work-up was done her CMP does not show any significant abnormalities.  Her urine does not show any evidence of infection.  Her lactate is normal.  CBC is also within normal limits no evidence of anemia.  She is not hyponatremic.  CT of the head did not show any abnormalities and CT of the abdomen did not show any acute abnormalities either.  Patient has continued to not have any symptoms.  Her blood pressure is improving with her regular medications.  At this point it is unclear what caused her dizziness this morning however I do not think this is central in origin as her symptoms have completely gone away and not recurred.  I do think therefore she can be discharged home with outpatient follow-up.  She is agreeable to this plan.     The patient will return for new or worsening symptoms and is stable at the time of discharge. Patient was given return precautions. Patient and/or family member verbalizes understanding and will comply.    DISPOSITION:  Patient will be discharged home in stable condition.    FOLLOW UP:  Joana Hawk A.P.R.N.  [facility] [address] [place] [id number] [phone]    Schedule an appointment as soon as possible for a visit       [facility], Emergency Dept  [address] [facility] [address] [phone]    Return to the emergency department for worsening dizziness, chest pain, weakness or other concerns.      FINAL IMPRESSION  1. Dizziness            This dictation has been created using voice recognition software and/or scribes. The accuracy of the dictation is limited by the abilities of the software and the expertise of the scribes. I expect there may be some errors of grammar and possibly content. I made every attempt to manually correct the errors within my dictation. However, errors related to voice recognition software and/or scribes may still exist and should be interpreted within the appropriate context.     I, Elaine Delaney (Alineibe), am scribing for, and in the presence of, Shalonda Duff M.D..    Electronically signed by: Elaine Delaney (Alineibterry), 4/10/2022    I, Shalonda Duff M.D. personally performed the services described in this documentation, as scribed by Elaine Delaney in my presence, and it is both accurate and complete.    The note accurately reflects work and decisions made by me.  Shalonda Duff M.D.  4/10/2022  2:10 PM

## 2022-04-10 NOTE — ED TRIAGE NOTES
"Chief Complaint   Patient presents with   • Dizziness     With waking, \"I feel like I'm falling and it wakes me up and I nearly fall out of bed\", pt denies any falls, hx pacemaker but pt is uncertain why she has it, states hx of these sx and was told by MD it was TIA vs vertigo but was never given specific dx. Pt denies any output/urinary changes or dysuria.      Pt BIB EMS for above complaint, VSS on RA, GCS 15, NAD.    Denies all s/sx of covid, denies recent travel, denies fevers.      BP (!) 169/98   Pulse 74   Temp 36.6 °C (97.8 °F) (Temporal)   Resp 19   Ht 1.6 m (5' 3\")   Wt 67.1 kg (148 lb)   LMP  (LMP Unknown)   SpO2 90%   BMI 26.22 kg/m²     "

## 2022-04-10 NOTE — ED NOTES
[employer] [facility] pacemaker interrogated, per phone call from tech there are no problems with the pacemaker, leads in place and functioning properly, pt did have one reported episode of atrial flutter on 4/5 for approx 3 minutes but no other episodes on interrogation. Pt resting in bed, VSS on RA, GCS 15, NAD, daughter at bedside.

## 2022-04-22 ENCOUNTER — OFFICE VISIT (OUTPATIENT)
Dept: MEDICAL GROUP | Facility: MEDICAL CENTER | Age: 87
End: 2022-04-22
Payer: MEDICARE

## 2022-04-22 VITALS
HEART RATE: 70 BPM | BODY MASS INDEX: 26.02 KG/M2 | HEIGHT: 63 IN | DIASTOLIC BLOOD PRESSURE: 70 MMHG | WEIGHT: 146.83 LBS | SYSTOLIC BLOOD PRESSURE: 134 MMHG | OXYGEN SATURATION: 95 % | TEMPERATURE: 97.2 F

## 2022-04-22 DIAGNOSIS — M54.50 CHRONIC BILATERAL LOW BACK PAIN, UNSPECIFIED WHETHER SCIATICA PRESENT: ICD-10-CM

## 2022-04-22 DIAGNOSIS — Z09 HOSPITAL DISCHARGE FOLLOW-UP: ICD-10-CM

## 2022-04-22 DIAGNOSIS — R42 DIZZINESS: ICD-10-CM

## 2022-04-22 DIAGNOSIS — E11.9 TYPE 2 DIABETES MELLITUS WITHOUT COMPLICATION, WITHOUT LONG-TERM CURRENT USE OF INSULIN (HCC): ICD-10-CM

## 2022-04-22 DIAGNOSIS — E78.5 DYSLIPIDEMIA: ICD-10-CM

## 2022-04-22 DIAGNOSIS — M48.061 FORAMINAL STENOSIS OF LUMBAR REGION: ICD-10-CM

## 2022-04-22 DIAGNOSIS — M51.36 DEGENERATION OF LUMBAR INTERVERTEBRAL DISC: ICD-10-CM

## 2022-04-22 DIAGNOSIS — G89.29 CHRONIC BILATERAL LOW BACK PAIN, UNSPECIFIED WHETHER SCIATICA PRESENT: ICD-10-CM

## 2022-04-22 DIAGNOSIS — M47.816 LUMBAR SPONDYLOSIS: ICD-10-CM

## 2022-04-22 DIAGNOSIS — I10 ESSENTIAL HYPERTENSION, BENIGN: ICD-10-CM

## 2022-04-22 PROCEDURE — 99214 OFFICE O/P EST MOD 30 MIN: CPT | Performed by: NURSE PRACTITIONER

## 2022-04-22 RX ORDER — ACETAMINOPHEN AND CODEINE PHOSPHATE 300; 60 MG/1; MG/1
1 TABLET ORAL
Qty: 42 TABLET | Status: CANCELLED | OUTPATIENT
Start: 2022-04-22

## 2022-04-22 ASSESSMENT — PATIENT HEALTH QUESTIONNAIRE - PHQ9: CLINICAL INTERPRETATION OF PHQ2 SCORE: 0

## 2022-04-22 ASSESSMENT — FIBROSIS 4 INDEX: FIB4 SCORE: 1.97

## 2022-04-22 NOTE — PROGRESS NOTES
Chief Complaint   Patient presents with   • Hospital Follow-up       Subjective:     HPI:     Krystle Tavarez is a 88 y.o. female   here to discuss the evaluation and management of:     Hospital Follow Up  Date 4/01/2022 for Dizziness    Home blood pressure readings:  136/73, 134/74, 192/84, 180/82, 110/56, 181/77, 132/65, 152/74. No syncopal episodes at home. Compliant with BP medications.     Follow up from hospital visit for dizziness.    She had R sided abdominal pain and dysuria.  Her urine does not show any evidence of infection.  Her lactate is normal.  CBC is also within normal limits no evidence of anemia.  She is not hyponatremic.  CT of the head did not show any abnormalities and CT of the abdomen did not show any acute abnormalities either.     She endorses chronic back pain and limited mobility. She is narcotic naive and did not tolerate Tramadol, Norco or Oxycodone in the past. Has been taking Tylenol.       ROS:  Denies any Headache, Blurred Vision, Confusion, Chest pain,  Shortness of breath,  Abdominal pain, Changes of bowel or bladder, Lower ext edema, Fevers, Nights sweats, Weight Changes, Focal weakness or numbness.  And all other systems reviewed and are all negative. POSITIVE FOR : see above        Current Outpatient Medications:   •  Cyanocobalamin (B-12) 1000 MCG Tab, Take 2 tablets  (2,000mcg) by mouth every day., Disp: 60 Tablet, Rfl: 0  •  lidocaine (LIDODERM) 5 % Patch, Place 1 Patch on the skin every 24 hours., Disp: 15 Patch, Rfl: 0  •  nitrofurantoin (MACROBID) 100 MG Cap, Take 1 Capsule by mouth 2 times a day with meals., Disp: 7 Capsule, Rfl: 0  •  docusate sodium (COLACE) 100 MG Cap, Take 100 mg by mouth every day., Disp: , Rfl:   •  atorvastatin (LIPITOR) 40 MG Tab, Take 1 Tablet by mouth every evening., Disp: 90 Tablet, Rfl: 7  •  bumetanide (BUMEX) 0.5 MG Tab, Take 2 Tablets by mouth 1 time a day as needed (edema)., Disp: 90 Tablet, Rfl: 7  •  carvedilol (COREG) 3.125 MG Tab,  Take 1 Tablet by mouth 2 times a day with meals., Disp: 180 Tablet, Rfl: 7  •  losartan (COZAAR) 100 MG Tab, Take 1 Tablet by mouth every day., Disp: 90 Tablet, Rfl: 7  •  apixaban (ELIQUIS) 2.5mg Tab, Take 1 Tablet by mouth 2 times a day., Disp: 180 Tablet, Rfl: 7  •  ketoconazole (NIZORAL) 2 % shampoo, Apply 5 to 10 mL to wet scalp, lather, leave on 3 to 5 minutes, and rinse; apply twice weekly for 2 to 4 weeks., Disp: 120 mL, Rfl: 1  •  Ascorbic Acid (VITAMIN C) 500 MG Cap, Take 1 Capsule by mouth 2 Times a Day., Disp: , Rfl:     Allergies   Allergen Reactions   • Sulfa Drugs Nausea     Nausea    • Gabapentin      Altered mental status   • Tramadol      Altered mentation       Past Medical History:   Diagnosis Date   • Arthritis     knees, hips   • Breath shortness     with exertion    • Cataract     bilat IOL    • Dental disorder     full upper, partial lower    • Diabetes (HCC)     pre diabetic   • Heart burn    • Hemorrhagic disorder (HCC)     on Eliquis   • High cholesterol     diet controlled    • Hyperlipidemia    • Hypertension    • Pacemaker 2015   • Pain 2020    lower back, right leg   • Pre-diabetes    • TIA (transient ischemic attack)     on Eliquis   • Urinary incontinence      Past Surgical History:   Procedure Laterality Date   • PB LAMINOTOMY,LUMBAR DISK,1 INTRSP N/A 2/25/2020    Procedure: LAMINECTOMY, SPINE, LUMBAR, WITH DISCECTOMY- L5-S1, MEDIAL FACETECTOMY;  Surgeon: Latrell Kimble M.D.;  Location: SURGERY [facility];  Service: Neurosurgery   • FORAMINOTOMY N/A 2/25/2020    Procedure: FORAMINOTOMY, SPINE;  Surgeon: Latrell Kimble M.D.;  Location: SURGERY [facility];  Service: Neurosurgery   • PACEMAKER INSERTION  2015   • HIP REPLACEMENT, TOTAL Right 2009   • KNEE REPLACEMENT, TOTAL Right 2004   • CATARACT EXTRACTION WITH IOL Bilateral 2003   • CHOLECYSTECTOMY  2002   • BASAL CELL EXCISION      nose and hand   • BUNIONECTOMY Left      Family History   Problem Relation Age of Onset   •  "Diabetes Mother    • Stroke Mother    • Heart Attack Father 74   • No Known Problems Maternal Grandmother    • No Known Problems Maternal Grandfather    • No Known Problems Paternal Grandmother    • No Known Problems Paternal Grandfather      Social History     Socioeconomic History   • Marital status:      Spouse name: Not on file   • Number of children: Not on file   • Years of education: Not on file   • Highest education level: Not on file   Occupational History   • Not on file   Tobacco Use   • Smoking status: Never Smoker   • Smokeless tobacco: Never Used   Vaping Use   • Vaping Use: Never used   Substance and Sexual Activity   • Alcohol use: Not Currently   • Drug use: No   • Sexual activity: Not Currently     Partners: Male   Other Topics Concern   • Not on file   Social History Narrative   • Not on file     Social Determinants of Health     Financial Resource Strain: Not on file   Food Insecurity: Not on file   Transportation Needs: Not on file   Physical Activity: Not on file   Stress: Not on file   Social Connections: Not on file   Intimate Partner Violence: Not on file   Housing Stability: Not on file       Objective:     Vitals: /70 (BP Location: Right arm, Patient Position: Sitting, BP Cuff Size: Adult)   Pulse 70   Temp 36.2 °C (97.2 °F) (Temporal)   Ht 1.6 m (5' 3\")   Wt 66.6 kg (146 lb 13.2 oz)   LMP  (LMP Unknown)   SpO2 95%   BMI 26.01 kg/m²    General: Alert, pleasant, NAD  HEENT: Normocephalic.  Neck supple.   Respiratory: no distress, no audible wheezing, RR -WNL  Skin: Warm, dry, no rashes.  Extremities: No leg edema. No discoloration  Neurological: No tremors  Psych:  Affect/mood is normal, judgement is good, memory is intact, grooming is appropriate.    Assessment/Plan:     Krystle was seen today for hospital follow-up.    Diagnoses and all orders for this visit:    Hospital discharge follow-up  Have reviewed chart notes, imaging and labs.     Dizziness  Mostly resolved since " ER visit. Encourage hydration, monitor postural changes. Continue to check BP at home.    Chronic bilateral low back pain, unspecified whether sciatica present  Degeneration of lumbar intervertebral disc  Foraminal stenosis of lumbar region  Lumbar spondylosis  Chronic problem. May find benefit with Tylenol #4 since she is narcotic naive. Start with 1/2 tablet PRN.  checked. Cautioned regarding SE's.   -     acetaminophen-codeine #4 (TYLENOL #4) 300-60 MG Tab; Take 1 Tablet by mouth every four hours as needed for Moderate Pain for up to 14 days.    Type 2 diabetes mellitus without complication, without long-term current use of insulin ([facility])  Labs prior to next visit.   -     HEMOGLOBIN A1C; Future    Dyslipidemia  Labs prior to next visit.   -     Lipid Profile; Future    Essential hypertension, benign  Stable home readings, continue to check. encourage hydration. Labs prior to next visit.   -     MICROALBUMIN CREAT RATIO URINE; Future  -     TSH WITH REFLEX TO FT4; Future  -     Comp Metabolic Panel; Future      Return in about 3 months (around 7/22/2022) for Lab results.          Joana MCQUEEN.

## 2022-05-01 ENCOUNTER — APPOINTMENT (OUTPATIENT)
Dept: RADIOLOGY | Facility: MEDICAL CENTER | Age: 87
DRG: 091 | End: 2022-05-01
Attending: EMERGENCY MEDICINE
Payer: MEDICARE

## 2022-05-01 ENCOUNTER — HOSPITAL ENCOUNTER (INPATIENT)
Facility: MEDICAL CENTER | Age: 87
LOS: 4 days | DRG: 091 | End: 2022-05-05
Attending: EMERGENCY MEDICINE | Admitting: HOSPITALIST
Payer: MEDICARE

## 2022-05-01 DIAGNOSIS — R41.0 DELIRIUM: ICD-10-CM

## 2022-05-01 DIAGNOSIS — G92.9 TOXIC ENCEPHALOPATHY: ICD-10-CM

## 2022-05-01 DIAGNOSIS — R53.1 WEAKNESS: ICD-10-CM

## 2022-05-01 DIAGNOSIS — R09.02 HYPOXIA: ICD-10-CM

## 2022-05-01 LAB
ALBUMIN SERPL BCP-MCNC: 3.2 G/DL (ref 3.2–4.9)
ALBUMIN/GLOB SERPL: 1.3 G/DL
ALP SERPL-CCNC: 78 U/L (ref 30–99)
ALT SERPL-CCNC: 18 U/L (ref 2–50)
ANION GAP SERPL CALC-SCNC: 11 MMOL/L (ref 7–16)
APPEARANCE UR: CLEAR
APTT PPP: 35.5 SEC (ref 24.7–36)
AST SERPL-CCNC: 20 U/L (ref 12–45)
BACTERIA #/AREA URNS HPF: NEGATIVE /HPF
BASOPHILS # BLD AUTO: 0.5 % (ref 0–1.8)
BASOPHILS # BLD: 0.03 K/UL (ref 0–0.12)
BILIRUB SERPL-MCNC: 0.8 MG/DL (ref 0.1–1.5)
BILIRUB UR QL STRIP.AUTO: NEGATIVE
BUN SERPL-MCNC: 18 MG/DL (ref 8–22)
CALCIUM SERPL-MCNC: 8 MG/DL (ref 8.5–10.5)
CHLORIDE SERPL-SCNC: 104 MMOL/L (ref 96–112)
CO2 SERPL-SCNC: 25 MMOL/L (ref 20–33)
COLOR UR: YELLOW
CREAT SERPL-MCNC: 0.57 MG/DL (ref 0.5–1.4)
D DIMER PPP IA.FEU-MCNC: 0.92 UG/ML (FEU) (ref 0–0.5)
EKG IMPRESSION: NORMAL
EOSINOPHIL # BLD AUTO: 0.14 K/UL (ref 0–0.51)
EOSINOPHIL NFR BLD: 2.2 % (ref 0–6.9)
EPI CELLS #/AREA URNS HPF: ABNORMAL /HPF
ERYTHROCYTE [DISTWIDTH] IN BLOOD BY AUTOMATED COUNT: 47.2 FL (ref 35.9–50)
FLUAV RNA SPEC QL NAA+PROBE: NEGATIVE
FLUBV RNA SPEC QL NAA+PROBE: NEGATIVE
GFR SERPLBLD CREATININE-BSD FMLA CKD-EPI: 87 ML/MIN/1.73 M 2
GLOBULIN SER CALC-MCNC: 2.5 G/DL (ref 1.9–3.5)
GLUCOSE SERPL-MCNC: 103 MG/DL (ref 65–99)
GLUCOSE UR STRIP.AUTO-MCNC: NEGATIVE MG/DL
HCT VFR BLD AUTO: 32.4 % (ref 37–47)
HGB BLD-MCNC: 10.7 G/DL (ref 12–16)
HYALINE CASTS #/AREA URNS LPF: ABNORMAL /LPF
IMM GRANULOCYTES # BLD AUTO: 0.02 K/UL (ref 0–0.11)
IMM GRANULOCYTES NFR BLD AUTO: 0.3 % (ref 0–0.9)
INR PPP: 1.28 (ref 0.87–1.13)
KETONES UR STRIP.AUTO-MCNC: ABNORMAL MG/DL
LEUKOCYTE ESTERASE UR QL STRIP.AUTO: ABNORMAL
LIPASE SERPL-CCNC: 22 U/L (ref 11–82)
LYMPHOCYTES # BLD AUTO: 2.06 K/UL (ref 1–4.8)
LYMPHOCYTES NFR BLD: 32.3 % (ref 22–41)
MCH RBC QN AUTO: 31.8 PG (ref 27–33)
MCHC RBC AUTO-ENTMCNC: 33 G/DL (ref 33.6–35)
MCV RBC AUTO: 96.1 FL (ref 81.4–97.8)
MICRO URNS: ABNORMAL
MONOCYTES # BLD AUTO: 0.44 K/UL (ref 0–0.85)
MONOCYTES NFR BLD AUTO: 6.9 % (ref 0–13.4)
NEUTROPHILS # BLD AUTO: 3.69 K/UL (ref 2–7.15)
NEUTROPHILS NFR BLD: 57.8 % (ref 44–72)
NITRITE UR QL STRIP.AUTO: NEGATIVE
NRBC # BLD AUTO: 0 K/UL
NRBC BLD-RTO: 0 /100 WBC
PH UR STRIP.AUTO: 6 [PH] (ref 5–8)
PLATELET # BLD AUTO: 179 K/UL (ref 164–446)
PMV BLD AUTO: 9.9 FL (ref 9–12.9)
POTASSIUM SERPL-SCNC: 3.5 MMOL/L (ref 3.6–5.5)
PROT SERPL-MCNC: 5.7 G/DL (ref 6–8.2)
PROT UR QL STRIP: NEGATIVE MG/DL
PROTHROMBIN TIME: 15.6 SEC (ref 12–14.6)
RBC # BLD AUTO: 3.37 M/UL (ref 4.2–5.4)
RBC # URNS HPF: ABNORMAL /HPF
RBC UR QL AUTO: NEGATIVE
RSV RNA SPEC QL NAA+PROBE: NEGATIVE
SARS-COV-2 RNA RESP QL NAA+PROBE: NOTDETECTED
SODIUM SERPL-SCNC: 140 MMOL/L (ref 135–145)
SP GR UR STRIP.AUTO: 1.01
SPECIMEN SOURCE: NORMAL
TROPONIN T SERPL-MCNC: 25 NG/L (ref 6–19)
TROPONIN T SERPL-MCNC: 28 NG/L (ref 6–19)
UROBILINOGEN UR STRIP.AUTO-MCNC: 0.2 MG/DL
WBC # BLD AUTO: 6.4 K/UL (ref 4.8–10.8)
WBC #/AREA URNS HPF: ABNORMAL /HPF

## 2022-05-01 PROCEDURE — 81001 URINALYSIS AUTO W/SCOPE: CPT

## 2022-05-01 PROCEDURE — 770006 HCHG ROOM/CARE - MED/SURG/GYN SEMI*

## 2022-05-01 PROCEDURE — A9270 NON-COVERED ITEM OR SERVICE: HCPCS | Performed by: HOSPITALIST

## 2022-05-01 PROCEDURE — 87086 URINE CULTURE/COLONY COUNT: CPT

## 2022-05-01 PROCEDURE — 36415 COLL VENOUS BLD VENIPUNCTURE: CPT

## 2022-05-01 PROCEDURE — 93005 ELECTROCARDIOGRAM TRACING: CPT | Performed by: EMERGENCY MEDICINE

## 2022-05-01 PROCEDURE — 85025 COMPLETE CBC W/AUTO DIFF WBC: CPT

## 2022-05-01 PROCEDURE — 700117 HCHG RX CONTRAST REV CODE 255: Performed by: EMERGENCY MEDICINE

## 2022-05-01 PROCEDURE — 71275 CT ANGIOGRAPHY CHEST: CPT | Mod: ME

## 2022-05-01 PROCEDURE — 0241U HCHG SARS-COV-2 COVID-19 NFCT DS RESP RNA 4 TRGT MIC: CPT

## 2022-05-01 PROCEDURE — 70450 CT HEAD/BRAIN W/O DYE: CPT | Mod: ME

## 2022-05-01 PROCEDURE — 85730 THROMBOPLASTIN TIME PARTIAL: CPT

## 2022-05-01 PROCEDURE — 80053 COMPREHEN METABOLIC PANEL: CPT

## 2022-05-01 PROCEDURE — 84484 ASSAY OF TROPONIN QUANT: CPT

## 2022-05-01 PROCEDURE — 99285 EMERGENCY DEPT VISIT HI MDM: CPT

## 2022-05-01 PROCEDURE — 71045 X-RAY EXAM CHEST 1 VIEW: CPT

## 2022-05-01 PROCEDURE — 700102 HCHG RX REV CODE 250 W/ 637 OVERRIDE(OP): Performed by: HOSPITALIST

## 2022-05-01 PROCEDURE — 85610 PROTHROMBIN TIME: CPT

## 2022-05-01 PROCEDURE — 85379 FIBRIN DEGRADATION QUANT: CPT

## 2022-05-01 PROCEDURE — C9803 HOPD COVID-19 SPEC COLLECT: HCPCS | Performed by: EMERGENCY MEDICINE

## 2022-05-01 PROCEDURE — 83690 ASSAY OF LIPASE: CPT

## 2022-05-01 PROCEDURE — 99223 1ST HOSP IP/OBS HIGH 75: CPT | Mod: AI | Performed by: HOSPITALIST

## 2022-05-01 RX ORDER — ONDANSETRON 4 MG/1
4 TABLET, ORALLY DISINTEGRATING ORAL EVERY 4 HOURS PRN
Status: DISCONTINUED | OUTPATIENT
Start: 2022-05-01 | End: 2022-05-05 | Stop reason: HOSPADM

## 2022-05-01 RX ORDER — ACETAMINOPHEN 325 MG/1
650 TABLET ORAL EVERY 6 HOURS PRN
Status: DISCONTINUED | OUTPATIENT
Start: 2022-05-01 | End: 2022-05-05 | Stop reason: HOSPADM

## 2022-05-01 RX ORDER — AMOXICILLIN 250 MG
2 CAPSULE ORAL 2 TIMES DAILY
Status: DISCONTINUED | OUTPATIENT
Start: 2022-05-02 | End: 2022-05-02

## 2022-05-01 RX ORDER — DOCUSATE SODIUM 100 MG/1
100 CAPSULE, LIQUID FILLED ORAL
COMMUNITY

## 2022-05-01 RX ORDER — POLYETHYLENE GLYCOL 3350 17 G/17G
1 POWDER, FOR SOLUTION ORAL
Status: DISCONTINUED | OUTPATIENT
Start: 2022-05-01 | End: 2022-05-02

## 2022-05-01 RX ORDER — LOSARTAN POTASSIUM 50 MG/1
100 TABLET ORAL DAILY
Status: DISCONTINUED | OUTPATIENT
Start: 2022-05-02 | End: 2022-05-05 | Stop reason: HOSPADM

## 2022-05-01 RX ORDER — BISACODYL 10 MG
10 SUPPOSITORY, RECTAL RECTAL
Status: DISCONTINUED | OUTPATIENT
Start: 2022-05-01 | End: 2022-05-02

## 2022-05-01 RX ORDER — CARVEDILOL 3.12 MG/1
3.12 TABLET ORAL 2 TIMES DAILY WITH MEALS
Status: DISCONTINUED | OUTPATIENT
Start: 2022-05-01 | End: 2022-05-03

## 2022-05-01 RX ORDER — ONDANSETRON 2 MG/ML
4 INJECTION INTRAMUSCULAR; INTRAVENOUS EVERY 4 HOURS PRN
Status: DISCONTINUED | OUTPATIENT
Start: 2022-05-01 | End: 2022-05-05 | Stop reason: HOSPADM

## 2022-05-01 RX ORDER — METFORMIN HYDROCHLORIDE 500 MG/1
500 TABLET, EXTENDED RELEASE ORAL DAILY
Status: DISCONTINUED | OUTPATIENT
Start: 2022-05-02 | End: 2022-05-04

## 2022-05-01 RX ORDER — ATORVASTATIN CALCIUM 20 MG/1
40 TABLET, FILM COATED ORAL EVERY EVENING
Status: DISCONTINUED | OUTPATIENT
Start: 2022-05-01 | End: 2022-05-05 | Stop reason: HOSPADM

## 2022-05-01 RX ADMIN — IOHEXOL 60 ML: 350 INJECTION, SOLUTION INTRAVENOUS at 14:16

## 2022-05-01 RX ADMIN — APIXABAN 2.5 MG: 5 TABLET, FILM COATED ORAL at 19:27

## 2022-05-01 RX ADMIN — CARVEDILOL 3.12 MG: 3.12 TABLET, FILM COATED ORAL at 18:03

## 2022-05-01 RX ADMIN — ATORVASTATIN CALCIUM 40 MG: 20 TABLET, FILM COATED ORAL at 18:03

## 2022-05-01 ASSESSMENT — FIBROSIS 4 INDEX
FIB4 SCORE: 2.32
FIB4 SCORE: 1.97

## 2022-05-01 ASSESSMENT — LIFESTYLE VARIABLES
EVER HAD A DRINK FIRST THING IN THE MORNING TO STEADY YOUR NERVES TO GET RID OF A HANGOVER: NO
HAVE PEOPLE ANNOYED YOU BY CRITICIZING YOUR DRINKING: NO
TOTAL SCORE: 0
HAVE YOU EVER FELT YOU SHOULD CUT DOWN ON YOUR DRINKING: NO
ON A TYPICAL DAY WHEN YOU DRINK ALCOHOL HOW MANY DRINKS DO YOU HAVE: 0
DOES PATIENT WANT TO STOP DRINKING: NO
CONSUMPTION TOTAL: NEGATIVE
TOTAL SCORE: 0
HOW MANY TIMES IN THE PAST YEAR HAVE YOU HAD 5 OR MORE DRINKS IN A DAY: 0
ALCOHOL_USE: NO
TOTAL SCORE: 0
AVERAGE NUMBER OF DAYS PER WEEK YOU HAVE A DRINK CONTAINING ALCOHOL: 0
EVER FELT BAD OR GUILTY ABOUT YOUR DRINKING: NO

## 2022-05-01 ASSESSMENT — COGNITIVE AND FUNCTIONAL STATUS - GENERAL
SUGGESTED CMS G CODE MODIFIER DAILY ACTIVITY: CH
DAILY ACTIVITIY SCORE: 24
SUGGESTED CMS G CODE MODIFIER MOBILITY: CK
CLIMB 3 TO 5 STEPS WITH RAILING: A LOT
STANDING UP FROM CHAIR USING ARMS: A LITTLE
MOVING FROM LYING ON BACK TO SITTING ON SIDE OF FLAT BED: A LITTLE
WALKING IN HOSPITAL ROOM: A LITTLE
MOBILITY SCORE: 18
MOVING TO AND FROM BED TO CHAIR: A LITTLE

## 2022-05-01 ASSESSMENT — PATIENT HEALTH QUESTIONNAIRE - PHQ9
1. LITTLE INTEREST OR PLEASURE IN DOING THINGS: NOT AT ALL
2. FEELING DOWN, DEPRESSED, IRRITABLE, OR HOPELESS: NOT AT ALL
SUM OF ALL RESPONSES TO PHQ9 QUESTIONS 1 AND 2: 0

## 2022-05-01 ASSESSMENT — PAIN DESCRIPTION - PAIN TYPE: TYPE: ACUTE PAIN

## 2022-05-01 ASSESSMENT — CHA2DS2 SCORE
CHF OR LEFT VENTRICULAR DYSFUNCTION: NO
AGE 75 OR GREATER: YES
AGE 65 TO 74: NO
HYPERTENSION: YES
DIABETES: YES
PRIOR STROKE OR TIA OR THROMBOEMBOLISM: YES
SEX: FEMALE
CHA2DS2 VASC SCORE: 7
VASCULAR DISEASE: NO

## 2022-05-01 NOTE — ED PROVIDER NOTES
ED Provider Note    Scribed for Mamta Chavez M.D. by Josue Cortez-Reyes. 5/1/2022  12:19 PM    Primary care provider: JAYLEN Castelan  Means of arrival: EMS  History obtained from: Patient  History limited by: None    CHIEF COMPLAINT  Chief Complaint   Patient presents with    Weakness       HPI  Krystle Tavarez is a 88 y.o. female who presents to the Emergency Department for evaluation of generalized weakness onset 2 weeks ago. Per the patient's family the patient has had decreased PO intake and intermittent confusion during this time. She endorses additional dysuria and decreased urine output but denies any diarrhea. Per EMS report the patient's oxygen saturation on arrival was 84% on room air. The patient notes that she uses a walker to get around. The patient is DNR/DNI as of 5/31/21    REVIEW OF SYSTEMS  GI: Decreased appetite, no diarrhea  : Dysuria and decreased urine output  Neuro:Generalized weakness    See history of present illness. All other systems are negative. C.    PAST MEDICAL HISTORY   has a past medical history of Arthritis, Breath shortness, Cataract, Dental disorder, Diabetes (HCC), Heart burn, Hemorrhagic disorder (HCC), High cholesterol, Hyperlipidemia, Hypertension, Pacemaker (2015), Pain (2020), Pre-diabetes, TIA (transient ischemic attack), and Urinary incontinence.    SURGICAL HISTORY   has a past surgical history that includes hip replacement, total (Right, 2009); knee replacement, total (Right, 2004); bunionectomy (Left); basal cell excision; cataract extraction with iol (Bilateral, 2003); pacemaker insertion (2015); cholecystectomy (2002); laminotomy,lumbar disk,1 intrsp (N/A, 2/25/2020); and foraminotomy (N/A, 2/25/2020).    SOCIAL HISTORY  Social History     Tobacco Use    Smoking status: Never Smoker    Smokeless tobacco: Never Used   Vaping Use    Vaping Use: Never used   Substance Use Topics    Alcohol use: Not Currently    Drug use: No      Social History  "    Substance and Sexual Activity   Drug Use No       FAMILY HISTORY  Family History   Problem Relation Age of Onset    Diabetes Mother     Stroke Mother     Heart Attack Father 74    No Known Problems Maternal Grandmother     No Known Problems Maternal Grandfather     No Known Problems Paternal Grandmother     No Known Problems Paternal Grandfather        CURRENT MEDICATIONS  Home Medications       Reviewed by Jana Mariscal R.N. (Registered Nurse) on 05/01/22 at 1214  Med List Status: <None>     Medication Last Dose Status   acetaminophen (TYLENOL) 325 MG Tab  Active   acetaminophen-codeine #4 (TYLENOL #4) 300-60 MG Tab  Active   apixaban (ELIQUIS) 2.5mg Tab  Active   Ascorbic Acid (VITAMIN C) 500 MG Cap  Active   aspirin EC 81 MG EC tablet  Active   atorvastatin (LIPITOR) 40 MG Tab  Active   bumetanide (BUMEX) 0.5 MG Tab  Active   carvedilol (COREG) 3.125 MG Tab  Active   ketoconazole (NIZORAL) 2 % shampoo  Active   losartan (COZAAR) 100 MG Tab  Active   metFORMIN ER (GLUCOPHAGE XR) 500 MG TABLET SR 24 HR  Active   sennosides (SENOKOT) 8.6 MG Tab  Active   sertraline (ZOLOFT) 25 MG tablet  Active                    ALLERGIES  Allergies   Allergen Reactions    Sulfa Drugs Nausea     Nausea     Gabapentin      Altered mental status    Tramadol      Altered mentation       PHYSICAL EXAM  VITAL SIGNS: BP (!) 204/81   Pulse 72   Temp 36.4 °C (97.5 °F) (Temporal)   Ht 1.6 m (5' 3\")   Wt 65.8 kg (145 lb)   LMP  (LMP Unknown)   SpO2 (S) 88% Comment: pl placed on 2L NC  BMI 25.69 kg/m²     Constitutional: Well developed, Well nourished, No acute distress, Non-toxic appearance.   HEENT: Normocephalic, Atraumatic,  external ears normal, pharynx pink,  Mucous  Membranes moist, No rhinorrhea or mucosal edema  Eyes: PERRL, EOMI, Conjunctiva normal, No discharge.   Neck: Normal range of motion, No tenderness, Supple, No stridor.   Lymphatic: No lymphadenopathy    Cardiovascular: Regular Rate and Rhythm, No murmurs,  rubs, " or gallops.   Thorax & Lungs: Lungs clear to auscultation bilaterally, No respiratory distress, No wheezes, rhales or rhonchi, No chest wall tenderness.   Abdomen: Bowel sounds normal, Soft, Diffuse tenderness to palpation, non distended,  No pulsatile masses., no rebound guarding or peritoneal signs.   Skin: Warm, Dry, No erythema, No rash,   Back:  No CVA tenderness,  No spinal tenderness, bony crepitance, step offs, or instability.   Neurologic: Alert & oriented clear speech no focal deficits  Extremities: Equal, intact distal pulses, No cyanosis or clubbing, trace edema,  No tenderness.   Musculoskeletal: Good range of motion in all major joints. No tenderness to palpation or major deformities noted.       DIAGNOSTIC STUDIES / PROCEDURES    LABS  Results for orders placed or performed during the hospital encounter of 05/01/22   CBC WITH DIFFERENTIAL   Result Value Ref Range    WBC 6.4 4.8 - 10.8 K/uL    RBC 3.37 (L) 4.20 - 5.40 M/uL    Hemoglobin 10.7 (L) 12.0 - 16.0 g/dL    Hematocrit 32.4 (L) 37.0 - 47.0 %    MCV 96.1 81.4 - 97.8 fL    MCH 31.8 27.0 - 33.0 pg    MCHC 33.0 (L) 33.6 - 35.0 g/dL    RDW 47.2 35.9 - 50.0 fL    Platelet Count 179 164 - 446 K/uL    MPV 9.9 9.0 - 12.9 fL    Neutrophils-Polys 57.80 44.00 - 72.00 %    Lymphocytes 32.30 22.00 - 41.00 %    Monocytes 6.90 0.00 - 13.40 %    Eosinophils 2.20 0.00 - 6.90 %    Basophils 0.50 0.00 - 1.80 %    Immature Granulocytes 0.30 0.00 - 0.90 %    Nucleated RBC 0.00 /100 WBC    Neutrophils (Absolute) 3.69 2.00 - 7.15 K/uL    Lymphs (Absolute) 2.06 1.00 - 4.80 K/uL    Monos (Absolute) 0.44 0.00 - 0.85 K/uL    Eos (Absolute) 0.14 0.00 - 0.51 K/uL    Baso (Absolute) 0.03 0.00 - 0.12 K/uL    Immature Granulocytes (abs) 0.02 0.00 - 0.11 K/uL    NRBC (Absolute) 0.00 K/uL   COMP METABOLIC PANEL   Result Value Ref Range    Sodium 140 135 - 145 mmol/L    Potassium 3.5 (L) 3.6 - 5.5 mmol/L    Chloride 104 96 - 112 mmol/L    Co2 25 20 - 33 mmol/L    Anion Gap 11.0  7.0 - 16.0    Glucose 103 (H) 65 - 99 mg/dL    Bun 18 8 - 22 mg/dL    Creatinine 0.57 0.50 - 1.40 mg/dL    Calcium 8.0 (L) 8.5 - 10.5 mg/dL    AST(SGOT) 20 12 - 45 U/L    ALT(SGPT) 18 2 - 50 U/L    Alkaline Phosphatase 78 30 - 99 U/L    Total Bilirubin 0.8 0.1 - 1.5 mg/dL    Albumin 3.2 3.2 - 4.9 g/dL    Total Protein 5.7 (L) 6.0 - 8.2 g/dL    Globulin 2.5 1.9 - 3.5 g/dL    A-G Ratio 1.3 g/dL   LIPASE   Result Value Ref Range    Lipase 22 11 - 82 U/L   TROPONIN   Result Value Ref Range    Troponin T 25 (H) 6 - 19 ng/L   URINALYSIS,CULTURE IF INDICATED    Specimen: Urine, Clean Catch   Result Value Ref Range    Color Yellow     Character Clear     Specific Gravity 1.011 <1.035    Ph 6.0 5.0 - 8.0    Glucose Negative Negative mg/dL    Ketones Trace (A) Negative mg/dL    Protein Negative Negative mg/dL    Bilirubin Negative Negative    Urobilinogen, Urine 0.2 Negative    Nitrite Negative Negative    Leukocyte Esterase Trace (A) Negative    Occult Blood Negative Negative    Micro Urine Req Microscopic    CoV-2, FLU A/B, and RSV by PCR (2-4 Hours CEPHEID) : Collect NP swab in VTM    Specimen: Respirate   Result Value Ref Range    Influenza virus A RNA Negative Negative    Influenza virus B, PCR Negative Negative    RSV, PCR Negative Negative    SARS-CoV-2 by PCR NotDetected     SARS-CoV-2 Source NP Swab    APTT   Result Value Ref Range    APTT 35.5 24.7 - 36.0 sec   Prothrombin Time   Result Value Ref Range    PT 15.6 (H) 12.0 - 14.6 sec    INR 1.28 (H) 0.87 - 1.13   D-DIMER   Result Value Ref Range    D-Dimer Screen 0.92 (H) 0.00 - 0.50 ug/mL (FEU)   URINE MICROSCOPIC (W/UA)   Result Value Ref Range    WBC 10-20 (A) /hpf    RBC 0-2 /hpf    Bacteria Negative None /hpf    Epithelial Cells Few /hpf    Hyaline Cast 3-5 (A) /lpf   ESTIMATED GFR   Result Value Ref Range    GFR (CKD-EPI) 87 >60 mL/min/1.73 m 2   URINE CULTURE(NEW)    Specimen: Urine   Result Value Ref Range    Significant Indicator NEG     Source UR     Site -      Culture Result -    EKG (NOW)   Result Value Ref Range    Report       [facility]    Test Date:  2022  Pt Name:    VAN SEWELL                 Department: ER  MRN:        [id number]                      Room:       RD 11  Gender:     Female                       Technician: [id number]  :        [id number]                   Requested By:DAY ROBIN  Order #:    [id number]                    Reading MD: DAY ROBIN MD    Measurements  Intervals                                Axis  Rate:       67                           P:          84  OH:         208                          QRS:        84  QRSD:       144                          T:          -38  QT:         460  QTc:        486    Interpretive Statements  ATRIAL-SENSED VENTRICULAR-PACED COMPLEXES  NO FURTHER ANALYSIS ATTEMPTED DUE TO PACED RHYTHM  BASELINE WANDER IN LEAD(S) I  Compared to ECG 04/10/2022 08:35:23  No significant changes  Electronically Signed On 2022 12:35:54 PDT by DAY ROBIN MD         All labs reviewed by me.    EKG  12 lead EKG; Interpreted by emergency department physician as shown above    RADIOLOGY  CT-CTA CHEST PULMONARY ARTERY W/ RECONS   Final Result         1.  No evidence of pulmonary embolism.   2.  Cardiomegaly.   3.  Biapical scarring and mild bibasilar atelectasis/scarring. No significant consolidation.      Fleischner Society pulmonary nodule recommendations:   Not applicable      DX-CHEST-PORTABLE (1 VIEW)   Final Result      Stable chest without acute/new abnormality.      CT-HEAD W/O   Final Result      1.  Chronic ischemic changes.   2.  No acute intracranial abnormality.         EC-ECHOCARDIOGRAM COMPLETE W/O CONT    (Results Pending)     The radiologist's interpretation of all radiological studies have been reviewed by me.    COURSE & MEDICAL DECISION MAKING  Nursing notes, VS, PMSFHx reviewed in chart.    12:19 PM Patient seen and examined at bedside. Ordered CT-CTA Chest  Pulmonary artery w/, DX-Chest, CT-Head w/o, Urine microscopic, CoV-2 , FLU A/B, and RSV by PCR, APTT, Prothrombin time, D-Dimer, Estimated GFR, CBC with differential, CMP, Lipase, Troponin, UA culture if indicated, and EKG to evaluate her symptoms. I informed the patient of my plan to obtain the above labs and imaging. Patient verbalizes understanding and agreement to this plan of care. The differential diagnoses include but are not limited to: PE, Pneumonia, COVID, Cardiac Ischemia, Stroke, Dehydration, Renal failure, and UTI.     1:39 PM - I reviewed the patient's labs results noted above. Ordered CT-CTA Chest Pulmonary to further evaluate her symptoms.     3:05 PM - Paged Hospitalist.    3:24 PM - I discussed the patient's case and the above findings with Dr. Sky (Hospitalist) who agrees to evaluate the patient for hospitalization. Patient care will be transferred at this time.     3:26 PM - I reevaluated the patient at bedside. I updated her on the lab and imaging results noted above. I also dicussed my plan to have her evaluated for hospitalization. Patient verbalizes understanding and agreement to this plan of care.     DISPOSITION:  Patient will be hospitalized by Dr. Sky in guarded condition.      FINAL IMPRESSION  1. Hypoxia    2. Weakness    3. Delirium          I, Josue Cortez-Reyes (Scribe), am scribing for, and in the presence of, Mamta Chavez M.D..    Electronically signed by: Josue Cortez-Reyes (Scribe), 5/1/2022    Mamta CRAIN M.D. personally performed the services described in this documentation, as scribed by Josue Cortez-Reyes in my presence, and it is both accurate and complete. C.    The note accurately reflects work and decisions made by me.  Mamta Chavez M.D.  5/1/2022  5:33 PM

## 2022-05-01 NOTE — ED TRIAGE NOTES
"Chief Complaint   Patient presents with   • Weakness     Pt to ED from home c/o generalized weakness x2 weeks. Per family pt has had decreased PO intake and intermittent confusion at home.  Upon arrival of EMS pt O2 sat 84% placed pt on 2L NC. Pt denies pain with urination but endorses decreased urine output. EMS established 20g PIV to L FA and gave 250 NSS.      BP (!) 204/81   Pulse 72   Temp 36.4 °C (97.5 °F) (Temporal)   Ht 1.6 m (5' 3\")   Wt 65.8 kg (145 lb)   LMP  (LMP Unknown)   SpO2 (S) 88% Comment: pl placed on 2L NC  BMI 25.69 kg/m²       "

## 2022-05-02 ENCOUNTER — APPOINTMENT (OUTPATIENT)
Dept: CARDIOLOGY | Facility: MEDICAL CENTER | Age: 87
DRG: 091 | End: 2022-05-02
Attending: HOSPITALIST
Payer: MEDICARE

## 2022-05-02 PROBLEM — M54.50 LOW BACK PAIN: Chronic | Status: ACTIVE | Noted: 2019-07-09

## 2022-05-02 PROBLEM — R53.1 WEAKNESS: Status: ACTIVE | Noted: 2021-05-30

## 2022-05-02 LAB
ALBUMIN SERPL BCP-MCNC: 3.3 G/DL (ref 3.2–4.9)
ALBUMIN/GLOB SERPL: 1.2 G/DL
ALP SERPL-CCNC: 83 U/L (ref 30–99)
ALT SERPL-CCNC: 20 U/L (ref 2–50)
ANION GAP SERPL CALC-SCNC: 9 MMOL/L (ref 7–16)
AST SERPL-CCNC: 22 U/L (ref 12–45)
BASOPHILS # BLD AUTO: 0.4 % (ref 0–1.8)
BASOPHILS # BLD: 0.03 K/UL (ref 0–0.12)
BILIRUB SERPL-MCNC: 0.6 MG/DL (ref 0.1–1.5)
BUN SERPL-MCNC: 19 MG/DL (ref 8–22)
CALCIUM SERPL-MCNC: 9.1 MG/DL (ref 8.5–10.5)
CHLORIDE SERPL-SCNC: 101 MMOL/L (ref 96–112)
CO2 SERPL-SCNC: 28 MMOL/L (ref 20–33)
CREAT SERPL-MCNC: 0.68 MG/DL (ref 0.5–1.4)
EKG IMPRESSION: NORMAL
EOSINOPHIL # BLD AUTO: 0.12 K/UL (ref 0–0.51)
EOSINOPHIL NFR BLD: 1.8 % (ref 0–6.9)
ERYTHROCYTE [DISTWIDTH] IN BLOOD BY AUTOMATED COUNT: 46.6 FL (ref 35.9–50)
EST. AVERAGE GLUCOSE BLD GHB EST-MCNC: 140 MG/DL
GFR SERPLBLD CREATININE-BSD FMLA CKD-EPI: 83 ML/MIN/1.73 M 2
GLOBULIN SER CALC-MCNC: 2.8 G/DL (ref 1.9–3.5)
GLUCOSE SERPL-MCNC: 131 MG/DL (ref 65–99)
HBA1C MFR BLD: 6.5 % (ref 4–5.6)
HCT VFR BLD AUTO: 36.6 % (ref 37–47)
HGB BLD-MCNC: 12.3 G/DL (ref 12–16)
IMM GRANULOCYTES # BLD AUTO: 0.01 K/UL (ref 0–0.11)
IMM GRANULOCYTES NFR BLD AUTO: 0.1 % (ref 0–0.9)
LV EJECT FRACT  99904: 60
LV EJECT FRACT MOD 2C 99903: 47.43
LV EJECT FRACT MOD 4C 99902: 62.82
LV EJECT FRACT MOD BP 99901: 53.52
LYMPHOCYTES # BLD AUTO: 2.85 K/UL (ref 1–4.8)
LYMPHOCYTES NFR BLD: 42.1 % (ref 22–41)
MCH RBC QN AUTO: 32.3 PG (ref 27–33)
MCHC RBC AUTO-ENTMCNC: 33.6 G/DL (ref 33.6–35)
MCV RBC AUTO: 96.1 FL (ref 81.4–97.8)
MONOCYTES # BLD AUTO: 0.57 K/UL (ref 0–0.85)
MONOCYTES NFR BLD AUTO: 8.4 % (ref 0–13.4)
NEUTROPHILS # BLD AUTO: 3.19 K/UL (ref 2–7.15)
NEUTROPHILS NFR BLD: 47.2 % (ref 44–72)
NRBC # BLD AUTO: 0 K/UL
NRBC BLD-RTO: 0 /100 WBC
PLATELET # BLD AUTO: 215 K/UL (ref 164–446)
PMV BLD AUTO: 10 FL (ref 9–12.9)
POTASSIUM SERPL-SCNC: 3.8 MMOL/L (ref 3.6–5.5)
PROT SERPL-MCNC: 6.1 G/DL (ref 6–8.2)
RBC # BLD AUTO: 3.81 M/UL (ref 4.2–5.4)
SODIUM SERPL-SCNC: 138 MMOL/L (ref 135–145)
TSH SERPL DL<=0.005 MIU/L-ACNC: 0.59 UIU/ML (ref 0.38–5.33)
VIT B12 SERPL-MCNC: 354 PG/ML (ref 211–911)
WBC # BLD AUTO: 6.8 K/UL (ref 4.8–10.8)

## 2022-05-02 PROCEDURE — 93306 TTE W/DOPPLER COMPLETE: CPT | Mod: 26 | Performed by: INTERNAL MEDICINE

## 2022-05-02 PROCEDURE — 700102 HCHG RX REV CODE 250 W/ 637 OVERRIDE(OP): Performed by: STUDENT IN AN ORGANIZED HEALTH CARE EDUCATION/TRAINING PROGRAM

## 2022-05-02 PROCEDURE — 93005 ELECTROCARDIOGRAM TRACING: CPT | Performed by: STUDENT IN AN ORGANIZED HEALTH CARE EDUCATION/TRAINING PROGRAM

## 2022-05-02 PROCEDURE — 93306 TTE W/DOPPLER COMPLETE: CPT

## 2022-05-02 PROCEDURE — 700101 HCHG RX REV CODE 250: Performed by: STUDENT IN AN ORGANIZED HEALTH CARE EDUCATION/TRAINING PROGRAM

## 2022-05-02 PROCEDURE — 83036 HEMOGLOBIN GLYCOSYLATED A1C: CPT

## 2022-05-02 PROCEDURE — 85025 COMPLETE CBC W/AUTO DIFF WBC: CPT

## 2022-05-02 PROCEDURE — A9270 NON-COVERED ITEM OR SERVICE: HCPCS | Performed by: HOSPITALIST

## 2022-05-02 PROCEDURE — 770006 HCHG ROOM/CARE - MED/SURG/GYN SEMI*

## 2022-05-02 PROCEDURE — 36415 COLL VENOUS BLD VENIPUNCTURE: CPT

## 2022-05-02 PROCEDURE — 82607 VITAMIN B-12: CPT

## 2022-05-02 PROCEDURE — 80053 COMPREHEN METABOLIC PANEL: CPT

## 2022-05-02 PROCEDURE — 99233 SBSQ HOSP IP/OBS HIGH 50: CPT | Mod: GC | Performed by: INTERNAL MEDICINE

## 2022-05-02 PROCEDURE — 93010 ELECTROCARDIOGRAM REPORT: CPT | Performed by: INTERNAL MEDICINE

## 2022-05-02 PROCEDURE — 700102 HCHG RX REV CODE 250 W/ 637 OVERRIDE(OP): Performed by: HOSPITALIST

## 2022-05-02 PROCEDURE — 84443 ASSAY THYROID STIM HORMONE: CPT

## 2022-05-02 PROCEDURE — 97161 PT EVAL LOW COMPLEX 20 MIN: CPT

## 2022-05-02 PROCEDURE — A9270 NON-COVERED ITEM OR SERVICE: HCPCS | Performed by: STUDENT IN AN ORGANIZED HEALTH CARE EDUCATION/TRAINING PROGRAM

## 2022-05-02 PROCEDURE — 700105 HCHG RX REV CODE 258: Performed by: STUDENT IN AN ORGANIZED HEALTH CARE EDUCATION/TRAINING PROGRAM

## 2022-05-02 PROCEDURE — 302151 K-PAD 3X20: Performed by: STUDENT IN AN ORGANIZED HEALTH CARE EDUCATION/TRAINING PROGRAM

## 2022-05-02 RX ORDER — AMOXICILLIN 250 MG
2 CAPSULE ORAL 2 TIMES DAILY
Status: DISCONTINUED | OUTPATIENT
Start: 2022-05-02 | End: 2022-05-05 | Stop reason: HOSPADM

## 2022-05-02 RX ORDER — BISACODYL 10 MG
10 SUPPOSITORY, RECTAL RECTAL
Status: DISCONTINUED | OUTPATIENT
Start: 2022-05-02 | End: 2022-05-05 | Stop reason: HOSPADM

## 2022-05-02 RX ORDER — LIDOCAINE 50 MG/G
1 PATCH TOPICAL EVERY 24 HOURS
Status: DISCONTINUED | OUTPATIENT
Start: 2022-05-02 | End: 2022-05-05 | Stop reason: HOSPADM

## 2022-05-02 RX ORDER — POTASSIUM CHLORIDE 20 MEQ/1
20 TABLET, EXTENDED RELEASE ORAL ONCE
Status: COMPLETED | OUTPATIENT
Start: 2022-05-02 | End: 2022-05-02

## 2022-05-02 RX ORDER — SODIUM CHLORIDE, SODIUM LACTATE, POTASSIUM CHLORIDE, CALCIUM CHLORIDE 600; 310; 30; 20 MG/100ML; MG/100ML; MG/100ML; MG/100ML
INJECTION, SOLUTION INTRAVENOUS CONTINUOUS
Status: DISCONTINUED | OUTPATIENT
Start: 2022-05-02 | End: 2022-05-03

## 2022-05-02 RX ORDER — CHOLECALCIFEROL (VITAMIN D3) 125 MCG
2000 CAPSULE ORAL DAILY
Status: DISCONTINUED | OUTPATIENT
Start: 2022-05-03 | End: 2022-05-05 | Stop reason: HOSPADM

## 2022-05-02 RX ORDER — POLYETHYLENE GLYCOL 3350 17 G/17G
1 POWDER, FOR SOLUTION ORAL DAILY
Status: DISCONTINUED | OUTPATIENT
Start: 2022-05-03 | End: 2022-05-05 | Stop reason: HOSPADM

## 2022-05-02 RX ADMIN — APIXABAN 2.5 MG: 5 TABLET, FILM COATED ORAL at 18:20

## 2022-05-02 RX ADMIN — SODIUM CHLORIDE, POTASSIUM CHLORIDE, SODIUM LACTATE AND CALCIUM CHLORIDE: 600; 310; 30; 20 INJECTION, SOLUTION INTRAVENOUS at 11:27

## 2022-05-02 RX ADMIN — LOSARTAN POTASSIUM 100 MG: 50 TABLET, FILM COATED ORAL at 08:58

## 2022-05-02 RX ADMIN — APIXABAN 2.5 MG: 5 TABLET, FILM COATED ORAL at 08:00

## 2022-05-02 RX ADMIN — CARVEDILOL 3.12 MG: 3.12 TABLET, FILM COATED ORAL at 08:57

## 2022-05-02 RX ADMIN — ATORVASTATIN CALCIUM 40 MG: 20 TABLET, FILM COATED ORAL at 18:20

## 2022-05-02 RX ADMIN — LIDOCAINE 1 PATCH: 50 PATCH TOPICAL at 14:12

## 2022-05-02 RX ADMIN — SENNOSIDES AND DOCUSATE SODIUM 2 TABLET: 50; 8.6 TABLET ORAL at 18:20

## 2022-05-02 RX ADMIN — CARVEDILOL 3.12 MG: 3.12 TABLET, FILM COATED ORAL at 18:20

## 2022-05-02 RX ADMIN — SENNOSIDES AND DOCUSATE SODIUM 2 TABLET: 50; 8.6 TABLET ORAL at 08:57

## 2022-05-02 RX ADMIN — POTASSIUM CHLORIDE 20 MEQ: 20 TABLET, EXTENDED RELEASE ORAL at 14:13

## 2022-05-02 ASSESSMENT — COGNITIVE AND FUNCTIONAL STATUS - GENERAL
MOBILITY SCORE: 23
SUGGESTED CMS G CODE MODIFIER MOBILITY: CI
CLIMB 3 TO 5 STEPS WITH RAILING: A LITTLE

## 2022-05-02 ASSESSMENT — ENCOUNTER SYMPTOMS
BLOOD IN STOOL: 0
DIZZINESS: 1
NAUSEA: 0
COUGH: 0
LOSS OF CONSCIOUSNESS: 0
ABDOMINAL PAIN: 0
HEADACHES: 0
NERVOUS/ANXIOUS: 1
DIARRHEA: 0
WEAKNESS: 1
CHILLS: 0
ABDOMINAL PAIN: 1
VOMITING: 0
BLURRED VISION: 0
WHEEZING: 0
FEVER: 0
BACK PAIN: 1
FALLS: 1
PALPITATIONS: 0
SHORTNESS OF BREATH: 0
CONSTIPATION: 1

## 2022-05-02 ASSESSMENT — PAIN DESCRIPTION - PAIN TYPE
TYPE: ACUTE PAIN
TYPE: ACUTE PAIN

## 2022-05-02 ASSESSMENT — GAIT ASSESSMENTS
ASSISTIVE DEVICE: FRONT WHEEL WALKER
GAIT LEVEL OF ASSIST: SUPERVISED
DISTANCE (FEET): 150

## 2022-05-02 NOTE — ASSESSMENT & PLAN NOTE
SpO2 84% on ambient air on admission, required ~2LNC for SpO2 90s  Not on oxygen at baseline, never smoker  CTA negative on admission  Consider secondary to ?narcotic use or deconditioning  -No history smoking, considering atelectasis on CXR  -Echo LVEF 60%, grade II diastolic dysfunction without wall motion abnormalities, moderate AI, RSVP 45mmHg  -IS  -Supplemental oxygen, as needed. Wean off as tolerated  -SpO2 92% on room air, pending walking O2 test determine need for supplemental oxygen at home

## 2022-05-02 NOTE — ASSESSMENT & PLAN NOTE
Patient notes weakness x 3 weeks, poor oral intake, dizziness upon standing  Recently started on Tylenol#4 around 1 week ago  -Consider multifactorial cause to weakness: poor oral intake, medication-induced  -Encouraged oral intake  -Mobilize patient, up out of bed as tolerated  -PT/OT consulted  -Pending TSH, Vitamin B12

## 2022-05-02 NOTE — PROGRESS NOTES
Alert and oriented x4. Offered fluids. Safety precautions in placed. Bed in lowest position. Upper side rails up. Treaded socks on. Reinforced the use of call light when needing assistance. Bed alarm is on

## 2022-05-02 NOTE — CARE PLAN
The patient is Stable - Low risk of patient condition declining or worsening    Shift Goals  Clinical Goals: comfort  Patient Goals: rest and sleep    Progress made toward(s) clinical / shift goals:  continue to reinforce using the call light and fall precautions. BP improving. Bed alarm engaged and bedside commode in use.      Problem: Skin Integrity  Goal: Skin integrity is maintained or improved  Outcome: Progressing     Problem: Fall Risk  Goal: Patient will remain free from falls  Outcome: Progressing     Problem: Knowledge Deficit - Standard  Goal: Patient and family/care givers will demonstrate understanding of plan of care, disease process/condition, diagnostic tests and medications  Outcome: Progressing       Patient is not progressing towards the following goals:

## 2022-05-02 NOTE — ASSESSMENT & PLAN NOTE
-Continue home losartan 100mg daily, carvedilol 3.125mg twice daily. Consider dose adjustment if with persistently elevated BP  -On Bumex at home, continue to hold for now

## 2022-05-02 NOTE — CARE PLAN
The patient is Stable - Low risk of patient condition declining or worsening         Progress made toward(s) clinical / shift goals:  comfort - pt will be able to rest and sleep comfortably    Patient is not progressing towards the following goals:

## 2022-05-02 NOTE — PROGRESS NOTES
Daily Progress Note:     Date of Service: 5/2/2022  Primary Team: [facility] IM Green Team   Attending: Vijay Gómez M.D.   Senior Resident: Dr. Cortes  Intern: Dr. Mo  Contact:  [phone]    Chief Complaint: weakness     ID: Krystle Tavarez is an 88 year-old female with past medical history of diabetes, hypertension, hyperlipidemia, paroxysmal atrial fibrillation (on Eliquis), pacemaker (2015), history of TIA, and chronic back pain who presented due to progressive weakness x3 weeks and notable confusion per family. Found to be hypoxic on admission.    Interval events: Patient was noted to be confused overnight and refused to take medications.     Subjective:  This morning, patient reported that she has been progressively weaker but unsure why. She has had a few falls in the last year but denied head injuries or loss of consciousness. Currently denies shortness of breath, chest pain, or palpitations, but mentioned that when she sits up she feels lightheaded. Notable for poor oral intake, stating she only drinks around 32oz of water daily. Spoke with daughter who states she was recently started on Tylenol #4 per her PCP for her back pain - unable to tolerate gabapentin or tramadol in the past.    -Troponin elevated, stable. EKG pending  -Will hold metformin for now, get repeat A1c  -TSH/B12 ordered and pending  -Orthostatic vitals  -Will start mIVF and encourage oral intake      Consultants/Specialty:  Physical therapy  Occupational therapy    Review of Systems:   Review of Systems   Constitutional: Negative for chills and fever.   Eyes: Negative for blurred vision.   Respiratory: Negative for cough, shortness of breath and wheezing.    Cardiovascular: Negative for chest pain, palpitations and leg swelling.   Gastrointestinal: Positive for constipation. Negative for abdominal pain, diarrhea, nausea and vomiting.   Genitourinary: Negative for dysuria, frequency and urgency.   Musculoskeletal: Positive for back  pain (chronic).   Neurological: Positive for dizziness (when sitting upright) and weakness. Negative for headaches.       Objective Data:   Physical Exam:   Vitals:   Temp:  [36.4 °C (97.5 °F)-37.2 °C (99 °F)] 36.9 °C (98.5 °F)  Pulse:  [66-78] 78  Resp:  [16-22] 16  BP: (141-204)/(56-81) 173/59  SpO2:  [88 %-97 %] 94 %     Physical Exam  Constitutional:       General: She is not in acute distress.     Appearance: She is not toxic-appearing.   HENT:      Head: Normocephalic and atraumatic.      Right Ear: External ear normal.      Left Ear: External ear normal.      Nose: Nose normal. No rhinorrhea.      Mouth/Throat:      Mouth: Mucous membranes are dry.   Eyes:      Extraocular Movements: Extraocular movements intact.      Conjunctiva/sclera: Conjunctivae normal.   Cardiovascular:      Rate and Rhythm: Normal rate. Rhythm irregular.      Pulses: Normal pulses.      Heart sounds: Normal heart sounds. No murmur heard.  Pulmonary:      Effort: Pulmonary effort is normal. No respiratory distress.      Breath sounds: Normal breath sounds. No wheezing.   Abdominal:      General: Abdomen is flat.      Palpations: Abdomen is soft.      Tenderness: There is abdominal tenderness (mild tenderness to palpation over RLQ). There is no guarding or rebound.   Musculoskeletal:         General: No swelling or deformity. Normal range of motion.      Cervical back: Normal range of motion and neck supple.   Skin:     General: Skin is warm and dry.   Neurological:      General: No focal deficit present.      Mental Status: She is alert and oriented to person, place, and time.      Sensory: No sensory deficit.      Comments: Strength 4/5 bilateral lower extremities, 4/5 bilateral upper extremities         Labs:   HEMATOLOGY/ ONCOLOGY/ID:            Recent Labs     05/01/22  1224 05/02/22  0148   WBC 6.4 6.8   RBC 3.37* 3.81*   HEMOGLOBIN 10.7* 12.3   HEMATOCRIT 32.4* 36.6*   MCV 96.1 96.1   MCH 31.8 32.3   RDW 47.2 46.6   PLATELETCT 179  215   MPV 9.9 10.0   NEUTSPOLYS 57.80 47.20   LYMPHOCYTES 32.30 42.10*   MONOCYTES 6.90 8.40   EOSINOPHILS 2.20 1.80   BASOPHILS 0.50 0.40     Lab Results   Component Value Date    WXISTVIL00 383 03/15/2020    JUIXMPDG63 1477 (H) 07/05/2019    FOLATE 16.6 03/15/2020    FERRITIN 157.0 10/26/2021    FERRITIN 96.2 03/15/2020    IRON 111 10/26/2021    IRON 67 03/15/2020    TOTIRONBC 259 10/26/2021    TOTIRONBC 287 03/15/2020       RENAL:        Estimated GFR/CRCL = Estimated Creatinine Clearance: 51.8 mL/min (by C-G formula based on SCr of 0.68 mg/dL).  Recent Labs     05/01/22  1224 05/02/22  0148   SODIUM 140 138   POTASSIUM 3.5* 3.8   CHLORIDE 104 101   CO2 25 28   GLUCOSE 103* 131*   BUN 18 19   CREATININE 0.57 0.68   CALCIUM 8.0* 9.1   ALBUMIN 3.2 3.3       GASTROINTESTINAL/ HEPATIC:          Recent Labs     05/01/22  1224 05/02/22  0148   ALTSGPT 18 20   ASTSGOT 20 22   ALKPHOSPHAT 78 83   TBILIRUBIN 0.8 0.6   LIPASE 22  --    ALBUMIN 3.2 3.3   GLOBULIN 2.5 2.8   INR 1.28*  --      No results found for: AMMONIA    ENDOCRINE:              Recent Labs     05/01/22  1224 05/02/22  0148   GLUCOSE 103* 131*     Lab Results   Component Value Date    HBA1C 6.8 (H) 10/26/2021    HBA1C 7.2 (H) 05/31/2021    HBA1C 7.2 (H) 12/24/2020       Imaging:   CT-CTA CHEST PULMONARY ARTERY W/ RECONS   Final Result         1.  No evidence of pulmonary embolism.   2.  Cardiomegaly.   3.  Biapical scarring and mild bibasilar atelectasis/scarring. No significant consolidation.      Fleischner Society pulmonary nodule recommendations:   Not applicable      DX-CHEST-PORTABLE (1 VIEW)   Final Result      Stable chest without acute/new abnormality.      CT-HEAD W/O   Final Result      1.  Chronic ischemic changes.   2.  No acute intracranial abnormality.         EC-ECHOCARDIOGRAM COMPLETE W/O CONT    (Results Pending)       Problem Representation:     * Weakness  Assessment & Plan  Patient notes weakness x 3 weeks, poor oral intake, dizziness  upon standing  Recently started on Tylenol#4 around 1 week ago  -Consider multifactorial cause to weakness: poor oral intake, medication-induced  -Hold Tylenol#4 and any other sedating medications for back pain  -Encouraged oral intake  -Mobilize patient, up out of bed as tolerated  -Pending TSH, Vitamin B12  -Will start maintenance IVF  -PT/OT consulted      Hypoxia- (present on admission)  Assessment & Plan  SpO2 84% on ambient air on admission, required ~2LNC for SpO2 90s  Not on oxygen at baseline, never smoker  CTA negative on admission  -No history smoking, considering atelectasis on CXR  -Supplemental oxygen, as needed. Wean off as tolerated  -Echo pending    Elevated troponin  Assessment & Plan  -Patient denies chest pain  -Troponin x2 stable  -Repeat EKG pending    PAF (paroxysmal atrial fibrillation) (HCC)- (present on admission)  Assessment & Plan  -Rate controlled  -Continue home carvedilol 3.125mg twice daily, Eliquis 2.5mg twice daily    Low back pain- (present on admission)  Assessment & Plan  -Recently started on Tylenol#4, per PCP  -Per daughter, has previously tried gabapentin and tramadol, but patient did not tolerate  -Hold Tylenol-codeine while inpatient as it may be contributing to symptoms  -Cannot use NSAIDs as patient is on Eliquis for afib  -Heating pads and lidocaine patches as needed  -Scheduled bowel regimen as codeine can cause constipation      Type 2 diabetes mellitus without complication, without long-term current use of insulin (HCC)- (present on admission)  Assessment & Plan  -On metformin at home  -Hold for now, will get repeat A1c to assess for continuing need. Glucose acceptable    History of TIA (transient ischemic attack)- (present on admission)  Assessment & Plan  -Continue Lipitor 40mg at night  -PT/OT    Dyslipidemia- (present on admission)  Assessment & Plan  -Continue home Lipitor 40mg at night    Essential hypertension, benign- (present on admission)  Assessment &  Plan  -Continue home losartan 100mg daily, carvedilol 3.125mg twice daily  -On Bumex at home, continue to hold for now    Cardiac pacemaker in situ- (present on admission)  Assessment & Plan  Noted

## 2022-05-02 NOTE — ASSESSMENT & PLAN NOTE
Patient notes weakness x 3 weeks, poor oral intake, dizziness upon standing  Recently started on Tylenol#4 around 1 week ago  -Consider multifactorial cause to weakness: poor oral intake, medication-induced  -Hold Tylenol#4 and any other sedating medications for back pain  -Encouraged oral intake  -Mobilize patient, up out of bed as tolerated  -Fall precautions  -PT/OT consulted

## 2022-05-02 NOTE — H&P
Hospital Medicine History & Physical Note    Date of Service  5/1/2022    PCP: JAYLEN Castelan    CC:  Increasing weakness    HPI:   This is a 88 y.o. female brought in by her family for increasing weakness x 2-3 weeks and increased confusion. In the ED her O2 sats were low and needing supp O2. Also with poor PO intake. She appears comfortable on my exam, no chest pain, minimal SOB, difficult history taking. Per notes no complaints of GI or  symptoms.     ERP did a pretty subtantial workup and CTA was negative for PE, overall not much findings on imaging or labs. INR was up and Laylaqumadiha is on her MAR. Troponin also slightly elevated but no chest pain as mentioned.     I discussed this patient's case with Dr Chavez of the emergency department.     Consultants:   None    ROS: As above. The remainder of a complete review of systems is negative in all systems except as noted.    PMHx:   has a past medical history of Arthritis, Breath shortness, Cataract, Dental disorder, Diabetes (HCC), Heart burn, Hemorrhagic disorder (HCC), High cholesterol, Hyperlipidemia, Hypertension, Pacemaker (2015), Pain (2020), Pre-diabetes, TIA (transient ischemic attack), and Urinary incontinence.    PSHx:   has a past surgical history that includes hip replacement, total (Right, 2009); knee replacement, total (Right, 2004); bunionectomy (Left); basal cell excision; cataract extraction with iol (Bilateral, 2003); pacemaker insertion (2015); cholecystectomy (2002); pr laminotomy,lumbar disk,1 intrsp (N/A, 2/25/2020); and foraminotomy (N/A, 2/25/2020).    SHx:   reports that she has never smoked. She has never used smokeless tobacco. She reports previous alcohol use. She reports that she does not use drugs.    FHx:  Reviewed with patient, no pertinent family history noted.    Allergies:  Allergies   Allergen Reactions   • Sulfa Drugs Nausea     Nausea    • Gabapentin      Altered mental status   • Tramadol      Altered  mentation       Medications:  No current facility-administered medications on file prior to encounter.     Current Outpatient Medications on File Prior to Encounter   Medication Sig Dispense Refill   • docusate sodium (COLACE) 100 MG Cap Take 100 mg by mouth every day.     • acetaminophen-codeine #4 (TYLENOL #4) 300-60 MG Tab Take 1 Tablet by mouth every four hours as needed for Moderate Pain for up to 14 days. 60 Tablet 0   • atorvastatin (LIPITOR) 40 MG Tab Take 1 Tablet by mouth every evening. 90 Tablet 7   • bumetanide (BUMEX) 0.5 MG Tab Take 2 Tablets by mouth 1 time a day as needed (edema). 90 Tablet 7   • carvedilol (COREG) 3.125 MG Tab Take 1 Tablet by mouth 2 times a day with meals. 180 Tablet 7   • losartan (COZAAR) 100 MG Tab Take 1 Tablet by mouth every day. 90 Tablet 7   • apixaban (ELIQUIS) 2.5mg Tab Take 1 Tablet by mouth 2 times a day. 180 Tablet 7   • ketoconazole (NIZORAL) 2 % shampoo Apply 5 to 10 mL to wet scalp, lather, leave on 3 to 5 minutes, and rinse; apply twice weekly for 2 to 4 weeks. 120 mL 1   • metFORMIN ER (GLUCOPHAGE XR) 500 MG TABLET SR 24 HR Take 1 tablet by mouth every day. 90 tablet 3   • Ascorbic Acid (VITAMIN C) 500 MG Cap Take 1 Capsule by mouth 2 Times a Day.         Objective Exam:  Vitals:    05/01/22 1224 05/01/22 1351 05/01/22 1401 05/01/22 1700   BP: (!) 181/74  (!) 177/79 (!) 181/56   Pulse: 66  71 74   Resp:  (!) 22     Temp:       TempSrc:       SpO2: 97%  92% 97%   Weight:       Height:           Physical Exam  Vitals and nursing note reviewed.   Constitutional:       General: She is not in acute distress.     Appearance: She is not diaphoretic.   HENT:      Head: Normocephalic.      Mouth/Throat:      Mouth: Mucous membranes are dry.      Comments: NC in place  Eyes:      General: No scleral icterus.     Conjunctiva/sclera: Conjunctivae normal.   Cardiovascular:      Heart sounds: Normal heart sounds.   Pulmonary:      Effort: Pulmonary effort is normal. No  respiratory distress.      Breath sounds: No stridor. No wheezing.   Abdominal:      General: There is no distension.      Palpations: Abdomen is soft.      Tenderness: There is no guarding.   Musculoskeletal:         General: No tenderness or deformity.      Cervical back: Normal range of motion.      Right lower leg: No edema.      Left lower leg: No edema.   Skin:     General: Skin is warm and dry.      Findings: No erythema or rash.   Neurological:      Mental Status: She is alert.         Laboratory--reviewed personally and are as follows:  Lab Results   Component Value Date/Time    WBC 6.4 05/01/2022 12:24 PM    RBC 3.37 (L) 05/01/2022 12:24 PM    HEMOGLOBIN 10.7 (L) 05/01/2022 12:24 PM    HEMATOCRIT 32.4 (L) 05/01/2022 12:24 PM    MCV 96.1 05/01/2022 12:24 PM    MCH 31.8 05/01/2022 12:24 PM    MCHC 33.0 (L) 05/01/2022 12:24 PM    MPV 9.9 05/01/2022 12:24 PM    NEUTSPOLYS 57.80 05/01/2022 12:24 PM    LYMPHOCYTES 32.30 05/01/2022 12:24 PM    MONOCYTES 6.90 05/01/2022 12:24 PM    EOSINOPHILS 2.20 05/01/2022 12:24 PM    BASOPHILS 0.50 05/01/2022 12:24 PM      Lab Results   Component Value Date/Time    SODIUM 140 05/01/2022 12:24 PM    POTASSIUM 3.5 (L) 05/01/2022 12:24 PM    CHLORIDE 104 05/01/2022 12:24 PM    CO2 25 05/01/2022 12:24 PM    GLUCOSE 103 (H) 05/01/2022 12:24 PM    BUN 18 05/01/2022 12:24 PM    CREATININE 0.57 05/01/2022 12:24 PM      Lab Results   Component Value Date/Time    PROTHROMBTM 15.6 (H) 05/01/2022 12:24 PM    INR 1.28 (H) 05/01/2022 12:24 PM        Radiology  CT-CTA CHEST PULMONARY ARTERY W/ RECONS   Final Result         1.  No evidence of pulmonary embolism.   2.  Cardiomegaly.   3.  Biapical scarring and mild bibasilar atelectasis/scarring. No significant consolidation.      Fleischner Society pulmonary nodule recommendations:   Not applicable      DX-CHEST-PORTABLE (1 VIEW)   Final Result      Stable chest without acute/new abnormality.      CT-HEAD W/O   Final Result      1.  Chronic  ischemic changes.   2.  No acute intracranial abnormality.         EC-ECHOCARDIOGRAM COMPLETE W/O CONT    (Results Pending)       Assessment/Plan:  * Hypoxia- (present on admission)  Assessment & Plan  New diagnosis for patient, CTA negative and so far workup unrevealing  Continue O2, wean as able  RT consult  Check ECHO    PAF (paroxysmal atrial fibrillation) (HCC)- (present on admission)  Assessment & Plan  Currently on BB and eliquis    History of TIA (transient ischemic attack)- (present on admission)  Assessment & Plan  Hx of  PT OT ordered    Essential hypertension, benign- (present on admission)  Assessment & Plan  Well controlled, continuing home medications    Cardiac pacemaker in situ- (present on admission)  Assessment & Plan  Noted    It is expected patient will stay beyond 2 midnights.    VTE prophylaxis: Eliquis

## 2022-05-02 NOTE — NON-PROVIDER
"Daily Progress Note:   Date of Service: 5/2/2022  Primary Team: [facility] Team  Attending: Vijay Gómez M.D.   Senior Resident: Dr. Cortes   Intern: Dr. Iglesias  Medical Student: Delvin Khoury    Patient:  Krystle Tavarez - 88 y.o. female  PMD: JAYLEN Castelan  Hospital Day # Hospital Day: 2    Chief Complaint:   88 y.o. female with history of OA, DM, HLD, HTN, TIA, atrial fibrillation on apixiban  Weakness x several week  Admitted for hypoxia     Subjective:  Patient has been increasingly more weak and confused x 2 weeks per daughter report. She recently added codeine last week by her PCP due to poor control of her arthritis pain.      Currently, pt endorses she does not feel well. Her main complaint was her brain stating she feels \"weak and not sure what is going on\".  Additionally  she reports dizziness when getting up from a seated position.  She denies falls or lost of consciousness.  She denies chest pain or dyspnea. She complains of right sided abdominal pain and constipation. She denies dysuria, but has increased urinary frequency. She ambulates by walker at home, lives with daughter.  She does use oxygen at home.     Consultants/Specialty:  PT/OT    Review of Systems:   Review of Systems   Constitutional: Negative for chills and fever.        Poor appetite, reported weight loss over year   HENT: Negative for tinnitus.    Eyes: Negative for blurred vision.   Respiratory: Negative for cough and shortness of breath.    Cardiovascular: Negative for chest pain and palpitations.   Gastrointestinal: Positive for abdominal pain and constipation. Negative for blood in stool and diarrhea.   Genitourinary: Positive for frequency. Negative for dysuria.   Musculoskeletal: Positive for back pain and falls.   Skin: Negative for rash.   Neurological: Positive for dizziness. Negative for loss of consciousness.        Dizziness, unsteadiness and poor balance   Psychiatric/Behavioral: The patient is nervous/anxious.  "       Objective Data:   Physical Exam:   Vitals:   Temp:  [36.4 °C (97.5 °F)-37.2 °C (99 °F)] 37.2 °C (99 °F)  Pulse:  [66-74] 69  Resp:  [17-22] 17  BP: (141-204)/(56-81) 141/59  SpO2:  [88 %-97 %] 95 %     Physical Exam  HENT:      Head: Normocephalic and atraumatic.      Mouth/Throat:      Mouth: Mucous membranes are dry.   Eyes:      Extraocular Movements: Extraocular movements intact.   Cardiovascular:      Rate and Rhythm: Normal rate and regular rhythm.      Heart sounds: No murmur heard.  Pulmonary:      Effort: Pulmonary effort is normal. No respiratory distress.      Breath sounds: Normal breath sounds. No wheezing.   Abdominal:      Tenderness: There is abdominal tenderness.      Comments: Mild RLQ tenderness on palpation   Musculoskeletal:      Cervical back: Normal range of motion. No rigidity.      Right lower leg: No edema.      Left lower leg: No edema.   Skin:     General: Skin is warm and dry.   Neurological:      General: No focal deficit present.      Mental Status: She is alert.      Comments: Alert, orientated to situation, place, person  Mild confusion with most likely baseline mild cognitive impairment, baseline per chart review AOx3   Psychiatric:         Mood and Affect: Mood normal.         Behavior: Behavior normal.       Labs:   Recent Labs     05/01/22  1224 05/02/22  0148   WBC 6.4 6.8   RBC 3.37* 3.81*   HEMOGLOBIN 10.7* 12.3   HEMATOCRIT 32.4* 36.6*   MCV 96.1 96.1   MCH 31.8 32.3   RDW 47.2 46.6   PLATELETCT 179 215   MPV 9.9 10.0   NEUTSPOLYS 57.80 47.20   LYMPHOCYTES 32.30 42.10*   MONOCYTES 6.90 8.40   EOSINOPHILS 2.20 1.80   BASOPHILS 0.50 0.40     Recent Labs     05/01/22  1224 05/02/22  0148   SODIUM 140 138   POTASSIUM 3.5* 3.8   CHLORIDE 104 101   CO2 25 28   GLUCOSE 103* 131*   BUN 18 19      Ref. Range 5/1/2022 12:24 5/1/2022 19:24   Troponin T Latest Ref Range: 6 - 19 ng/L 25 (H) 28 (H)      Ref. Range 5/1/2022 12:24   PT Latest Ref Range: 12.0 - 14.6 sec 15.6 (H)   INR  Latest Ref Range: 0.87 - 1.13  1.28 (H)   APTT Latest Ref Range: 24.7 - 36.0 sec 35.5   D-Dimer Screen Latest Ref Range: 0.00 - 0.50 ug/mL (FEU) 0.92 (H)       Imagin/1/22 CT Chest PE IMPRESSION:        1.  No evidence of pulmonary embolism.  2.  Cardiomegaly.  3.  Biapical scarring and mild bibasilar atelectasis/scarring. No significant consolidation.  22 CT Head NC    IMPRESSION:     1.  Chronic ischemic changes.  2.  No acute intracranial abnormality.    Problem Representation:    88 year old female with history of HTN, DM, TIA, HLD, atrial fibrillation on apixiban,  OA with poor pain control presents with 3 weeks of increasing weakness and confusion, found to be hypoxic admitted for respiratory failure and weakness    1. Generalized weakness  Confusion  Subacute onset for several weeks. Patient recently trial on hydrocodone-acetaminophen for her pain this week by PCP. Timeline of confusion and weakness may correspond. CT NC has been negative for acute event, but does show chronic ischemic changes.  However cannot exclude other etiologies such as cardiac, metabolic, primary CNS, toxic.   - PT/OT evaluation  - Orthostatic blood pressure  - obtain thyroid panel, B12, magnesium    2. Hypoxia  3. Elevated troponin  Presented with saturation in 80's. This is acute onset. CT PE negative. Patient currently without oxygen requirement.   - RT protocol  - Echocardiogram pending  - Bedside eval, IS, and mobilization  - trend ECG and troponin    4. Paroxysmal atrial fibrillation on chronic anticoagulation  5. Cardiac pacemaker  - stable, patient on Eliqus and beta blocker    7. HTN  8. HLD  - stable, no acute issues  - on home meds including atorvastatin, losartan    9. DM2  - on Metformin at home  - hold while inpatient. Will recheck Hgb A1c    10. Constipation  Was on hydrocodone-acetaminophen  Give senna-docusate, Miralax, dulcolax, and milk of magnesia ordered    11. Hypokalemia  K3.8 will replete orally      Delvin To,   [facility]

## 2022-05-02 NOTE — ASSESSMENT & PLAN NOTE
-On metformin at home  -A1c 6.5%, no hyperglycemia noted  -Will discontinue metformin, for outpatient follow-up with PCP to reassess need

## 2022-05-02 NOTE — PROGRESS NOTES
4 Eyes Skin Assessment Completed by NAMAN Walter and NAMAN Webb.    Head WDL  Ears Redness and Blanching  Nose WDL  Mouth WDL  Neck WDL  Breast/Chest WDL  Shoulder Blades WDL  Spine WDL  (R) Arm/Elbow/Hand WDL  (L) Arm/Elbow/Hand WDL  Abdomen WDL  Groin WDL  Scrotum/Coccyx/Buttocks WDL  (R) Leg WDL  (L) Leg WDL  (R) Heel/Foot/Toe WDL/ bunion/callous  (L) Heel/Foot/Toe WDL          Devices In Places Nasal Cannula      Interventions In Place Gray Ear Foams    Possible Skin Injury No    Pictures Uploaded Into Epic N/A  Wound Consult Placed N/A  RN Wound Prevention Protocol Ordered No

## 2022-05-02 NOTE — ASSESSMENT & PLAN NOTE
-Recently started on Tylenol#4, per PCP  -Per daughter, has previously tried gabapentin and tramadol, but patient did not tolerate  -Hold Tylenol-codeine while inpatient as it may be contributing to symptoms  -Cannot use NSAIDs as patient is on Eliquis for afib  -Heating pads and lidocaine patches as needed  -Scheduled bowel regimen as codeine can cause constipation

## 2022-05-02 NOTE — PROGRESS NOTES
"Pt offered due morning medications. Pt was upset and said \" I dont want to take medications from you., because you were a liar\". Pt was told that no one is lying to her, asked what's wrong. Pt said \"I thought my daughter annie will be here and take me home. I want her to be here before I take my medications You're hiding her, she was just on the other bed.\" Pt was told that there is other patient in other bed, and this RN is not hiding her daughter. Offered to call daughter. Pt agreed. Tried calling daughter for 3 times but not answering. Pt got disappointed. Explained to pt regarding visitation hours and early morning daughter cant be here but she has medications to take. Still, pt refused. Due meds will be move to 0800 and update the morning shift regarding this.  "

## 2022-05-02 NOTE — THERAPY
"Physical Therapy   Initial Evaluation     Patient Name: Krystle Tavarez  Age:  88 y.o., Sex:  female  Medical Record #: [id number]  Today's Date: 5/2/2022          Assessment  Patient is 88 y.o. female admitted w/ weakness for several weeks, w/ increased confusion and poor PO intake.  Hx of TIA and pacer.  She is rec'd alert, in bed, agreeable to work w/ PT.  She is confused, perseverating on her daughter, \"where is my daughter, have you seen my daughter, is my daughter going to visit\".  She was unable to tell me where she was or anything about her home set up.  Fortunately, however she was able to mobilize at spv level, able to get in/out of bed w/o assist, stand and ambulate w/ a fww.  No loss of balance or need of assist.  Anticipate she is mobilizing at her PLOF, however will need spv due to her confusion.  PT for d/c needs.  Plan    Recommend Physical Therapy for Evaluation only      DC Equipment Recommendations: Unable to determine at this time (fww if she doesn't already have one)  Discharge Recommendations:  (spv due to impaired cognition)         Objective       05/02/22 0800   Prior Living Situation   Housing / Facility Unable To Determine At This Time   Lives with - Patient's Self Care Capacity Unable To Determine At This Time   Prior Level of Functional Mobility   Bed Mobility Unable To Determine At This Time   Transfer Status Unable To Determine At This Time   Ambulation Unable To Determine At This Time   Balance Assessment   Sitting Balance (Static) Fair   Sitting Balance (Dynamic) Fair   Standing Balance (Static) Fair   Standing Balance (Dynamic) Fair   Weight Shift Sitting Good   Weight Shift Standing Good   Gait Analysis   Gait Level Of Assist Supervised   Assistive Device Front Wheel Walker   Distance (Feet) 150   Bed Mobility    Supine to Sit Supervised   Sit to Supine Supervised   Functional Mobility   Sit to Stand Supervised   Anticipated Discharge Equipment and Recommendations   DC Equipment " Recommendations Unable to determine at this time  (fww if she doesn't already have one)   Discharge Recommendations   (spv due to impaired cognition)

## 2022-05-02 NOTE — ASSESSMENT & PLAN NOTE
SpO2 84% on ambient air on admission, required ~2LNC for SpO2 90s  CTA negative on admission  -No history smoking, considering atelectasis on CXR  -Supplemental oxygen, as needed. Wean off as tolerated  -Echo pending

## 2022-05-02 NOTE — PROGRESS NOTES
"Received report from Jon FLORENCE. Patient alert in bed. She expressed some concerns regarding her plan of care. She is confused to the situation stating her \"daughter is supposed to be here at 0700 to pick her up.\" I attempted to reorient the patient saying we can call her daughter but visiting hours are at 0800. The doctor will need to evaluate her before discharging her. She stated \"the doctor wants to keep her here to make money,\" and that she came for a simple procedure before and \"the same thing happened\" where she \"got weaker and weaker after we gave her medication.\"   Will reach out to patient's daughter to assist with reorienting.   "

## 2022-05-03 PROBLEM — J96.01 ACUTE RESPIRATORY FAILURE WITH HYPOXIA (HCC): Status: ACTIVE | Noted: 2022-05-01

## 2022-05-03 PROBLEM — G92.9 TOXIC ENCEPHALOPATHY: Status: ACTIVE | Noted: 2022-05-03

## 2022-05-03 LAB
ALBUMIN SERPL BCP-MCNC: 3.5 G/DL (ref 3.2–4.9)
ALBUMIN/GLOB SERPL: 1.3 G/DL
ALP SERPL-CCNC: 82 U/L (ref 30–99)
ALT SERPL-CCNC: 14 U/L (ref 2–50)
ANION GAP SERPL CALC-SCNC: 9 MMOL/L (ref 7–16)
APPEARANCE UR: ABNORMAL
AST SERPL-CCNC: 21 U/L (ref 12–45)
BACTERIA #/AREA URNS HPF: ABNORMAL /HPF
BACTERIA UR CULT: NORMAL
BILIRUB SERPL-MCNC: 0.9 MG/DL (ref 0.1–1.5)
BILIRUB UR QL STRIP.AUTO: NEGATIVE
BUN SERPL-MCNC: 13 MG/DL (ref 8–22)
CALCIUM SERPL-MCNC: 9.3 MG/DL (ref 8.5–10.5)
CHLORIDE SERPL-SCNC: 101 MMOL/L (ref 96–112)
CO2 SERPL-SCNC: 28 MMOL/L (ref 20–33)
COLOR UR: YELLOW
CREAT SERPL-MCNC: 0.6 MG/DL (ref 0.5–1.4)
EPI CELLS #/AREA URNS HPF: ABNORMAL /HPF
ERYTHROCYTE [DISTWIDTH] IN BLOOD BY AUTOMATED COUNT: 45.6 FL (ref 35.9–50)
FERRITIN SERPL-MCNC: 149 NG/ML (ref 10–291)
GFR SERPLBLD CREATININE-BSD FMLA CKD-EPI: 86 ML/MIN/1.73 M 2
GLOBULIN SER CALC-MCNC: 2.6 G/DL (ref 1.9–3.5)
GLUCOSE SERPL-MCNC: 183 MG/DL (ref 65–99)
GLUCOSE UR STRIP.AUTO-MCNC: NEGATIVE MG/DL
HCT VFR BLD AUTO: 37 % (ref 37–47)
HGB BLD-MCNC: 12.6 G/DL (ref 12–16)
HGB RETIC QN AUTO: 33.7 PG/CELL (ref 29–35)
HYALINE CASTS #/AREA URNS LPF: ABNORMAL /LPF
IMM RETICS NFR: 10.3 % (ref 9.3–17.4)
KETONES UR STRIP.AUTO-MCNC: NEGATIVE MG/DL
LEUKOCYTE ESTERASE UR QL STRIP.AUTO: ABNORMAL
MAGNESIUM SERPL-MCNC: 1.7 MG/DL (ref 1.5–2.5)
MCH RBC QN AUTO: 32.1 PG (ref 27–33)
MCHC RBC AUTO-ENTMCNC: 34.1 G/DL (ref 33.6–35)
MCV RBC AUTO: 94.1 FL (ref 81.4–97.8)
MICRO URNS: ABNORMAL
NITRITE UR QL STRIP.AUTO: POSITIVE
PH UR STRIP.AUTO: 7.5 [PH] (ref 5–8)
PLATELET # BLD AUTO: 207 K/UL (ref 164–446)
PMV BLD AUTO: 9.9 FL (ref 9–12.9)
POTASSIUM SERPL-SCNC: 3.7 MMOL/L (ref 3.6–5.5)
PROT SERPL-MCNC: 6.1 G/DL (ref 6–8.2)
PROT UR QL STRIP: 100 MG/DL
RBC # BLD AUTO: 3.93 M/UL (ref 4.2–5.4)
RBC # URNS HPF: ABNORMAL /HPF
RBC UR QL AUTO: ABNORMAL
RETICS # AUTO: 0.06 M/UL (ref 0.04–0.06)
RETICS/RBC NFR: 1.6 % (ref 0.8–2.1)
SIGNIFICANT IND 70042: NORMAL
SITE SITE: NORMAL
SODIUM SERPL-SCNC: 138 MMOL/L (ref 135–145)
SOURCE SOURCE: NORMAL
SP GR UR STRIP.AUTO: 1.01
UROBILINOGEN UR STRIP.AUTO-MCNC: 0.2 MG/DL
WBC # BLD AUTO: 8.6 K/UL (ref 4.8–10.8)
WBC #/AREA URNS HPF: ABNORMAL /HPF

## 2022-05-03 PROCEDURE — A9270 NON-COVERED ITEM OR SERVICE: HCPCS | Performed by: INTERNAL MEDICINE

## 2022-05-03 PROCEDURE — 81001 URINALYSIS AUTO W/SCOPE: CPT

## 2022-05-03 PROCEDURE — 700105 HCHG RX REV CODE 258: Performed by: STUDENT IN AN ORGANIZED HEALTH CARE EDUCATION/TRAINING PROGRAM

## 2022-05-03 PROCEDURE — 700101 HCHG RX REV CODE 250: Performed by: STUDENT IN AN ORGANIZED HEALTH CARE EDUCATION/TRAINING PROGRAM

## 2022-05-03 PROCEDURE — A9270 NON-COVERED ITEM OR SERVICE: HCPCS | Performed by: HOSPITALIST

## 2022-05-03 PROCEDURE — A9270 NON-COVERED ITEM OR SERVICE: HCPCS | Performed by: STUDENT IN AN ORGANIZED HEALTH CARE EDUCATION/TRAINING PROGRAM

## 2022-05-03 PROCEDURE — 700102 HCHG RX REV CODE 250 W/ 637 OVERRIDE(OP): Performed by: INTERNAL MEDICINE

## 2022-05-03 PROCEDURE — 82728 ASSAY OF FERRITIN: CPT

## 2022-05-03 PROCEDURE — 80053 COMPREHEN METABOLIC PANEL: CPT

## 2022-05-03 PROCEDURE — 770006 HCHG ROOM/CARE - MED/SURG/GYN SEMI*

## 2022-05-03 PROCEDURE — 700102 HCHG RX REV CODE 250 W/ 637 OVERRIDE(OP): Performed by: STUDENT IN AN ORGANIZED HEALTH CARE EDUCATION/TRAINING PROGRAM

## 2022-05-03 PROCEDURE — 700102 HCHG RX REV CODE 250 W/ 637 OVERRIDE(OP): Performed by: HOSPITALIST

## 2022-05-03 PROCEDURE — 83735 ASSAY OF MAGNESIUM: CPT

## 2022-05-03 PROCEDURE — 36415 COLL VENOUS BLD VENIPUNCTURE: CPT

## 2022-05-03 PROCEDURE — 85046 RETICYTE/HGB CONCENTRATE: CPT

## 2022-05-03 PROCEDURE — 85027 COMPLETE CBC AUTOMATED: CPT

## 2022-05-03 PROCEDURE — 99232 SBSQ HOSP IP/OBS MODERATE 35: CPT | Mod: GC | Performed by: INTERNAL MEDICINE

## 2022-05-03 RX ORDER — CARVEDILOL 6.25 MG/1
6.25 TABLET ORAL 2 TIMES DAILY WITH MEALS
Status: DISCONTINUED | OUTPATIENT
Start: 2022-05-04 | End: 2022-05-05 | Stop reason: HOSPADM

## 2022-05-03 RX ADMIN — ATORVASTATIN CALCIUM 40 MG: 20 TABLET, FILM COATED ORAL at 17:47

## 2022-05-03 RX ADMIN — SENNOSIDES AND DOCUSATE SODIUM 2 TABLET: 50; 8.6 TABLET ORAL at 06:14

## 2022-05-03 RX ADMIN — LOSARTAN POTASSIUM 100 MG: 50 TABLET, FILM COATED ORAL at 06:14

## 2022-05-03 RX ADMIN — APIXABAN 2.5 MG: 5 TABLET, FILM COATED ORAL at 17:47

## 2022-05-03 RX ADMIN — CARVEDILOL 3.12 MG: 3.12 TABLET, FILM COATED ORAL at 07:25

## 2022-05-03 RX ADMIN — SODIUM CHLORIDE, POTASSIUM CHLORIDE, SODIUM LACTATE AND CALCIUM CHLORIDE: 600; 310; 30; 20 INJECTION, SOLUTION INTRAVENOUS at 00:52

## 2022-05-03 RX ADMIN — CARVEDILOL 3.12 MG: 3.12 TABLET, FILM COATED ORAL at 17:49

## 2022-05-03 RX ADMIN — LIDOCAINE 1 PATCH: 50 PATCH TOPICAL at 11:33

## 2022-05-03 RX ADMIN — CYANOCOBALAMIN TAB 500 MCG 2000 MCG: 500 TAB at 06:14

## 2022-05-03 RX ADMIN — APIXABAN 2.5 MG: 5 TABLET, FILM COATED ORAL at 06:14

## 2022-05-03 ASSESSMENT — ENCOUNTER SYMPTOMS
VOMITING: 0
HEADACHES: 0
DIARRHEA: 0
PALPITATIONS: 0
COUGH: 0
DIZZINESS: 0
WEAKNESS: 1
FEVER: 0
WHEEZING: 0
CONSTIPATION: 1
ABDOMINAL PAIN: 0
SHORTNESS OF BREATH: 0
CHILLS: 0
BACK PAIN: 1
NAUSEA: 0

## 2022-05-03 ASSESSMENT — PAIN DESCRIPTION - PAIN TYPE: TYPE: ACUTE PAIN

## 2022-05-03 NOTE — CARE PLAN
The patient is Watcher - Medium risk of patient condition declining or worsening    Shift Goals  Clinical Goals: patient will be free from falls  Patient Goals: rest and sleep    Progress made toward(s) clinical / shift goals:  fall precautions in place, bed locked and in lowest position. Educated patient on level of risk.     Patient is not progressing towards the following goals:

## 2022-05-03 NOTE — CARE PLAN
The patient is Stable - Low risk of patient condition declining or worsening    Shift Goals  Clinical Goals: maintain safety  Patient Goals: rest    Progress made toward(s) clinical / shift goals:  No falls this shift. Ambulated in [place] with FWW today.     Patient is not progressing towards the following goals:

## 2022-05-03 NOTE — PROGRESS NOTES
Received report and assumed care at shift change. Patient Awake, alert and oriented 3-4 at shift change. Patient started becoming very confused after her daughter left, thinking she is at her daughters house, looking for some important papers. Patient refused lab draw this AM. No complain of pain or distress. Fall precautions in place. Needs attended to.

## 2022-05-03 NOTE — DOCUMENTATION QUERY
[facility]                                                                       Query Response Note      PATIENT:               VAN SEWELL  ACCT #:                  [id number]  MRN:                     [id number]  :                      1933  ADMIT DATE:       2022 12:07 PM  DISCH DATE:          RESPONDING  PROVIDER #:        [id number]           QUERY TEXT:    Respiratory failure is documented in the Medical Record. You assistance with validation of the diagnosis is requested. Please clarify if you are treating the patient for possible or suspected:    NOTE:  If the appropriate response is not listed below, please respond with a new note.    Thank you.    The patient's Clinical Indicators include:  88 y.o. female brought in by her family for increasing weakness x 2-3 weeks and increased confusion. In the ED her O2 sats were low and needing supplemental O2. - Dr. Sky, H&P, 22    Impression: respiratory failure, COPD?, likely atelectasis. Found to be hypoxic on admission. SpO2 84% on ambient air on admission. Not on oxygen at baseline. - Dr. Mo, Hospital Medicine Note, 22    Hypoxic, weakness, delirium. - Dr. Chavez, ED Note, 22    O2 sat 88% on RA. - Vitals 22    Risk factor: hypoxia, atelectasis    Treatment: supplemental oxygen, IS    Thank you,  Chinmay Ortiz  Clinical   Connect via [email or web address]  Options provided:   -- Acute respiratory failure   -- Chronic respiratory failure   -- Acute on chronic respiratory failure   -- Hypoxia   -- Unable to Determine      Query created by: Chinmay Ortiz on 5/3/2022 11:24 AM    RESPONSE TEXT:    Acute respiratory failure       QUERY TEXT:    Based on your medical judgment, can you please provide a diagnosis, if any, associated with the above findings.     NOTE:  If an appropriate response is not listed below, please respond with a new  note.    Thank you.    The patient's Clinical Indicators include:  88 y.o. female brought in by her family for increasing weakness x 2-3 weeks and increased confusion. Improved off meds- Dr. Sky, H&P, 5/1/22    Confusion, recently started narcotics for back pain/OA. Multifactorial cause to weakness: poor oral intake, medication-induced. - Dr. Mo, Hospital Medicine Note, 5/2/22    Admitted with weakness for several weeks, increased confusion and poor PO intake. - PT Note, 5/2/22    Hypoxic, weakness, delirium. - Dr. Chavez, ED Note, 5/1/22    Risk factors: narcotics, poor PO intake    Treatment: discontinued narcotics, frequent neuro check    Thank you,  Chinmay Ortiz  Clinical   Connect via [email or web address]  Options provided:   -- Toxic encephalopathy   -- Metabolic encephalopathy   -- Toxic-metabolic encephalopathy   -- Other type of encephalopathy   -- Unable to determine      Query created by: Chinmay Ortiz on 5/3/2022 11:25 AM    RESPONSE TEXT:    Toxic encephalopathy          Electronically signed by:  JULIO NAM MD 5/3/2022 1:24 PM

## 2022-05-03 NOTE — ASSESSMENT & PLAN NOTE
Consider confusion multifactorial:   Recently started on narcotics for back pain/OA, poor oral intake and weakness  Can also consider delirium  -TSH and Vit B12 wnl  -UA ordered, patient recently noted dysuria and malodorous urine  -Delirium precautions   Keep room bright during the day   Keep room dark at night   Avoid night time awakenings   Prevent naps throughout the day   Reorient patient as needed  -It appears that patient is confused when waking up in the morning, could also be due to unfamiliar environment such as being in the hospital.  May also consider ?sleep apnea given oxygen requirements at night

## 2022-05-03 NOTE — PROGRESS NOTES
Daily Progress Note:     Date of Service: 5/3/2022  Primary Team: [facility] Green Team   Attending: Vijay Gómez M.D.   Senior Resident: Dr. Cortes  Intern: Dr. Mo  Contact:  [phone]    ID: Krystle Tavarez is an 88 year-old female with past medical history of diabetes, hypertension, hyperlipidemia, paroxysmal atrial fibrillation (on Eliquis), pacemaker (2015), history of TIA, and chronic back pain who presented due to progressive weakness x3 weeks and notable confusion per family. Found to be hypoxic on admission.    Interval events: Patient refused lab draws this morning and with episodes of noted confusion.  -UA ordered, patient complained of dysuria and noted malodorous urine  -A1c 6.5%, acceptable for age  -TSH/B12 wnl  -Noted to be hypertensive, stopped IVF and encouraged PO intake. Will consider increasing BP meds if uncontrolled in AM   -Encouraged to be up out of bed as tolerated    Subjective: Patient states that she still feels weak, but notes improvement of her back pain with the topical treatments. She does not have any acute concerns at this time.    Consultants/Specialty:  N/A    Review of Systems:   Review of Systems   Constitutional: Positive for malaise/fatigue. Negative for chills and fever.   Respiratory: Negative for cough, shortness of breath and wheezing.    Cardiovascular: Negative for chest pain, palpitations and leg swelling.   Gastrointestinal: Positive for constipation. Negative for abdominal pain, diarrhea, nausea and vomiting.   Genitourinary: Positive for dysuria. Negative for frequency and urgency.   Musculoskeletal: Positive for back pain (chronic).   Neurological: Positive for weakness. Negative for dizziness (when sitting upright) and headaches.       Objective Data:   Physical Exam:   Vitals:   Temp:  [36.6 °C (97.9 °F)-36.9 °C (98.4 °F)] 36.7 °C (98.1 °F)  Pulse:  [76-79] 78  Resp:  [16-17] 16  BP: (159-178)/(64-79) 159/75  SpO2:  [91 %-94 %] 93 %     Physical  Exam  Constitutional:       General: She is not in acute distress.     Appearance: She is not toxic-appearing.   HENT:      Head: Normocephalic and atraumatic.      Right Ear: External ear normal.      Left Ear: External ear normal.      Nose: Nose normal. No rhinorrhea.   Eyes:      Extraocular Movements: Extraocular movements intact.      Conjunctiva/sclera: Conjunctivae normal.   Cardiovascular:      Rate and Rhythm: Normal rate. Rhythm irregular.      Pulses: Normal pulses.      Heart sounds: Normal heart sounds. No murmur heard.  Pulmonary:      Effort: Pulmonary effort is normal. No respiratory distress.      Breath sounds: Normal breath sounds. No wheezing.   Abdominal:      General: Abdomen is flat.      Palpations: Abdomen is soft.      Tenderness: There is no abdominal tenderness (mild tenderness to palpation over RLQ). There is no guarding or rebound.   Musculoskeletal:         General: No swelling or deformity. Normal range of motion.      Cervical back: Normal range of motion and neck supple.   Skin:     General: Skin is warm and dry.   Neurological:      General: No focal deficit present.      Mental Status: She is alert and oriented to person, place, and time.      Sensory: No sensory deficit.      Comments: Strength 4/5 bilateral lower extremities, 4/5 bilateral upper extremities   Psychiatric:         Behavior: Behavior normal.           Labs:   HEMATOLOGY/ ONCOLOGY/ID:            Recent Labs     05/01/22  1224 05/02/22  0148 05/03/22  0640   WBC 6.4 6.8 8.6   RBC 3.37* 3.81* 3.93*   HEMOGLOBIN 10.7* 12.3 12.6   HEMATOCRIT 32.4* 36.6* 37.0   MCV 96.1 96.1 94.1   MCH 31.8 32.3 32.1   RDW 47.2 46.6 45.6   PLATELETCT 179 215 207   MPV 9.9 10.0 9.9   NEUTSPOLYS 57.80 47.20  --    LYMPHOCYTES 32.30 42.10*  --    MONOCYTES 6.90 8.40  --    EOSINOPHILS 2.20 1.80  --    BASOPHILS 0.50 0.40  --      Lab Results   Component Value Date    MDUQGKRK92 354 05/02/2022    HYVXOYEQ14 383 03/15/2020    ETOMOQMG10  1477 (H) 07/05/2019    FOLATE 16.6 03/15/2020    FERRITIN 149.0 05/03/2022    FERRITIN 157.0 10/26/2021    FERRITIN 96.2 03/15/2020    IRON 111 10/26/2021    IRON 67 03/15/2020    TOTIRONBC 259 10/26/2021    [facility] [place] 03/15/2020       RENAL:        Estimated GFR/CRCL = Estimated Creatinine Clearance: 58.7 mL/min (by C-G formula based on SCr of 0.6 mg/dL).  Recent Labs     05/01/22  1224 05/02/22  0148 05/03/22  0640   SODIUM 140 138 138   POTASSIUM 3.5* 3.8 3.7   CHLORIDE 104 101 101   CO2 25 28 28   GLUCOSE 103* 131* 183*   BUN 18 19 13   CREATININE 0.57 0.68 0.60   CALCIUM 8.0* 9.1 9.3   MAGNESIUM  --   --  1.7   ALBUMIN 3.2 3.3 3.5       GASTROINTESTINAL/ HEPATIC:          Recent Labs     05/01/22  1224 05/02/22  0148 05/03/22  0640   ALTSGPT 18 20 14   ASTSGOT 20 22 21   ALKPHOSPHAT 78 83 82   TBILIRUBIN 0.8 0.6 0.9   LIPASE 22  --   --    ALBUMIN 3.2 3.3 3.5   GLOBULIN 2.5 2.8 2.6   INR 1.28*  --   --      No results found for: AMMONIA    ENDOCRINE:              Recent Labs     05/01/22  1224 05/02/22  0148 05/03/22  0640   GLUCOSE 103* 131* 183*     Lab Results   Component Value Date    HBA1C 6.5 (H) 05/02/2022    HBA1C 6.8 (H) 10/26/2021    HBA1C 7.2 (H) 05/31/2021       Imaging:   EC-ECHOCARDIOGRAM COMPLETE W/O CONT   Final Result      CT-CTA CHEST PULMONARY ARTERY W/ RECONS   Final Result         1.  No evidence of pulmonary embolism.   2.  Cardiomegaly.   3.  Biapical scarring and mild bibasilar atelectasis/scarring. No significant consolidation.      Fleischner Society pulmonary nodule recommendations:   Not applicable      DX-CHEST-PORTABLE (1 VIEW)   Final Result      Stable chest without acute/new abnormality.      CT-HEAD W/O   Final Result      1.  Chronic ischemic changes.   2.  No acute intracranial abnormality.             Problem Representation:     * Toxic encephalopathy- (present on admission)  Assessment & Plan  Consider confusion multifactorial:   Recently started on narcotics for back  pain/OA, poor oral intake and weakness  Can also consider delirium  -TSH and Vit B12 wnl  -UA ordered, patient recently noted dysuria and malodorous urine  -Delirium precautions   Keep room bright during the day   Keep room dark at night   Avoid night time awakenings   Prevent naps throughout the day   Reorient patient as needed        Weakness  Assessment & Plan  Patient notes weakness x 3 weeks, poor oral intake, dizziness upon standing  Recently started on Tylenol#4 around 1 week ago  -Consider multifactorial cause to weakness: poor oral intake, medication-induced  -Hold Tylenol#4 and any other sedating medications for back pain  -Encouraged oral intake  -Mobilize patient, up out of bed as tolerated  -Fall precautions  -PT/OT consulted      Acute respiratory failure with hypoxia (HCC)- (present on admission)  Assessment & Plan  SpO2 84% on ambient air on admission, required ~2LNC for SpO2 90s  Not on oxygen at baseline, never smoker  CTA negative on admission  Consider secondary to ?narcotic use or deconditioning  -No history smoking, considering atelectasis on CXR  -IS  -Supplemental oxygen, as needed. Wean off as tolerated  -Echo LVEF 60%, grade II diastolic dysfunction without wall motion abnormalities, moderate AI, RSVP 45mmHg    Elevated troponin  Assessment & Plan  -Patient denies chest pain  -Troponin x2 stable  -Repeat EKG normal    PAF (paroxysmal atrial fibrillation) (HCC)- (present on admission)  Assessment & Plan  -Rate controlled  -Continue home carvedilol 3.125mg twice daily, Eliquis 2.5mg twice daily    Low back pain- (present on admission)  Assessment & Plan  -Recently started on Tylenol#4, per PCP  -Per daughter, has previously tried gabapentin and tramadol, but patient did not tolerate  -Hold Tylenol-codeine while inpatient as it may be contributing to symptoms  -Cannot use NSAIDs as patient is on Eliquis for afib  -Heating pads and lidocaine patches as needed  -Scheduled bowel regimen as  codeine can cause constipation      Type 2 diabetes mellitus without complication, without long-term current use of insulin (HCC)- (present on admission)  Assessment & Plan  -On metformin at home  -A1c 6.5%, no hyperglycemia noted. Will continue to hold metformin    History of TIA (transient ischemic attack)- (present on admission)  Assessment & Plan  -Continue Lipitor 40mg at night  -PT/OT    Dyslipidemia- (present on admission)  Assessment & Plan  -Continue home Lipitor 40mg at night    Essential hypertension, benign- (present on admission)  Assessment & Plan  -Continue home losartan 100mg daily, carvedilol 3.125mg twice daily. Consider dose adjustment if with persistently elevated BP  -On Bumex at home, continue to hold for now    Cardiac pacemaker in situ- (present on admission)  Assessment & Plan  Noted

## 2022-05-04 PROBLEM — R82.71 ASYMPTOMATIC BACTERIURIA: Status: ACTIVE | Noted: 2022-05-04

## 2022-05-04 LAB
ALBUMIN SERPL BCP-MCNC: 3.2 G/DL (ref 3.2–4.9)
ALBUMIN/GLOB SERPL: 1.3 G/DL
ALP SERPL-CCNC: 75 U/L (ref 30–99)
ALT SERPL-CCNC: 10 U/L (ref 2–50)
ANION GAP SERPL CALC-SCNC: 9 MMOL/L (ref 7–16)
AST SERPL-CCNC: 15 U/L (ref 12–45)
BILIRUB SERPL-MCNC: 0.7 MG/DL (ref 0.1–1.5)
BUN SERPL-MCNC: 11 MG/DL (ref 8–22)
CALCIUM SERPL-MCNC: 9 MG/DL (ref 8.5–10.5)
CHLORIDE SERPL-SCNC: 102 MMOL/L (ref 96–112)
CO2 SERPL-SCNC: 29 MMOL/L (ref 20–33)
CREAT SERPL-MCNC: 0.76 MG/DL (ref 0.5–1.4)
GFR SERPLBLD CREATININE-BSD FMLA CKD-EPI: 75 ML/MIN/1.73 M 2
GLOBULIN SER CALC-MCNC: 2.4 G/DL (ref 1.9–3.5)
GLUCOSE SERPL-MCNC: 212 MG/DL (ref 65–99)
MAGNESIUM SERPL-MCNC: 1.7 MG/DL (ref 1.5–2.5)
POTASSIUM SERPL-SCNC: 3.5 MMOL/L (ref 3.6–5.5)
PROT SERPL-MCNC: 5.6 G/DL (ref 6–8.2)
SODIUM SERPL-SCNC: 140 MMOL/L (ref 135–145)

## 2022-05-04 PROCEDURE — 700102 HCHG RX REV CODE 250 W/ 637 OVERRIDE(OP): Performed by: INTERNAL MEDICINE

## 2022-05-04 PROCEDURE — A9270 NON-COVERED ITEM OR SERVICE: HCPCS | Performed by: STUDENT IN AN ORGANIZED HEALTH CARE EDUCATION/TRAINING PROGRAM

## 2022-05-04 PROCEDURE — 770006 HCHG ROOM/CARE - MED/SURG/GYN SEMI*

## 2022-05-04 PROCEDURE — 36415 COLL VENOUS BLD VENIPUNCTURE: CPT

## 2022-05-04 PROCEDURE — 700102 HCHG RX REV CODE 250 W/ 637 OVERRIDE(OP): Performed by: STUDENT IN AN ORGANIZED HEALTH CARE EDUCATION/TRAINING PROGRAM

## 2022-05-04 PROCEDURE — 80053 COMPREHEN METABOLIC PANEL: CPT

## 2022-05-04 PROCEDURE — 700102 HCHG RX REV CODE 250 W/ 637 OVERRIDE(OP): Performed by: HOSPITALIST

## 2022-05-04 PROCEDURE — A9270 NON-COVERED ITEM OR SERVICE: HCPCS | Performed by: HOSPITALIST

## 2022-05-04 PROCEDURE — A9270 NON-COVERED ITEM OR SERVICE: HCPCS | Performed by: INTERNAL MEDICINE

## 2022-05-04 PROCEDURE — 83735 ASSAY OF MAGNESIUM: CPT

## 2022-05-04 PROCEDURE — 99232 SBSQ HOSP IP/OBS MODERATE 35: CPT | Mod: GC | Performed by: INTERNAL MEDICINE

## 2022-05-04 PROCEDURE — 700101 HCHG RX REV CODE 250: Performed by: STUDENT IN AN ORGANIZED HEALTH CARE EDUCATION/TRAINING PROGRAM

## 2022-05-04 RX ORDER — NITROFURANTOIN 25; 75 MG/1; MG/1
100 CAPSULE ORAL 2 TIMES DAILY WITH MEALS
Status: DISCONTINUED | OUTPATIENT
Start: 2022-05-04 | End: 2022-05-05 | Stop reason: HOSPADM

## 2022-05-04 RX ORDER — POTASSIUM CHLORIDE 20 MEQ/1
40 TABLET, EXTENDED RELEASE ORAL ONCE
Status: COMPLETED | OUTPATIENT
Start: 2022-05-04 | End: 2022-05-04

## 2022-05-04 RX ADMIN — NITROFURANTOIN MONOHYDRATE/MACROCRYSTALLINE 100 MG: 25; 75 CAPSULE ORAL at 17:22

## 2022-05-04 RX ADMIN — APIXABAN 2.5 MG: 5 TABLET, FILM COATED ORAL at 04:29

## 2022-05-04 RX ADMIN — CARVEDILOL 6.25 MG: 6.25 TABLET, FILM COATED ORAL at 09:03

## 2022-05-04 RX ADMIN — CYANOCOBALAMIN TAB 500 MCG 2000 MCG: 500 TAB at 04:29

## 2022-05-04 RX ADMIN — LIDOCAINE 1 PATCH: 50 PATCH TOPICAL at 09:03

## 2022-05-04 RX ADMIN — NITROFURANTOIN MONOHYDRATE/MACROCRYSTALLINE 100 MG: 25; 75 CAPSULE ORAL at 12:33

## 2022-05-04 RX ADMIN — LOSARTAN POTASSIUM 100 MG: 50 TABLET, FILM COATED ORAL at 04:29

## 2022-05-04 RX ADMIN — CARVEDILOL 6.25 MG: 6.25 TABLET, FILM COATED ORAL at 17:23

## 2022-05-04 RX ADMIN — ATORVASTATIN CALCIUM 40 MG: 20 TABLET, FILM COATED ORAL at 17:23

## 2022-05-04 RX ADMIN — POTASSIUM CHLORIDE 40 MEQ: 1500 TABLET, EXTENDED RELEASE ORAL at 15:58

## 2022-05-04 RX ADMIN — SENNOSIDES AND DOCUSATE SODIUM 2 TABLET: 50; 8.6 TABLET ORAL at 17:22

## 2022-05-04 RX ADMIN — APIXABAN 2.5 MG: 5 TABLET, FILM COATED ORAL at 17:23

## 2022-05-04 ASSESSMENT — ENCOUNTER SYMPTOMS
CONSTIPATION: 0
NERVOUS/ANXIOUS: 0
CHILLS: 0
HEADACHES: 0
COUGH: 0
WEAKNESS: 1
FEVER: 0
SHORTNESS OF BREATH: 0
VOMITING: 0
PALPITATIONS: 0
DEPRESSION: 0
WHEEZING: 0
ABDOMINAL PAIN: 0
NAUSEA: 0
DIARRHEA: 0
DIZZINESS: 0
BACK PAIN: 1

## 2022-05-04 ASSESSMENT — PAIN DESCRIPTION - PAIN TYPE: TYPE: ACUTE PAIN

## 2022-05-04 NOTE — PROGRESS NOTES
Daily Progress Note:     Date of Service: 5/4/2022  Primary Team: [facility] Green Team   Attending: Vijay Gómez M.D.   Senior Resident: Dr. Cortes  Intern: Dr. Mo  Contact:  [phone]    ID: Krystle Tavarez is an 88 year-old female with past medical history of diabetes, hypertension, hyperlipidemia, paroxysmal atrial fibrillation (on Eliquis), pacemaker (2015), history of TIA, and chronic back pain who presented due to progressive weakness x3 weeks and notable confusion per family. Found to be hypoxic on admission.    Interval events: No acute events overnight  -Was noted to be confused this morning, AAOx1, later in the morning was AAOx3.  Readily reoriented   -Encouraged to be up out of bed as tolerated, avoid naps during the day, and to use incentive spirometer  -UA revealed positive nitrite, large leukocyte esterase, packed WBC, many bacteria; culture negative.  Will start nitrofurantoin for 5 days  -Trial of room air revealed SpO2 92%, pending walking O2 test    Subjective: Patient states that she feels better today compared to yesterday, still tolerating topical treatments for her back pain.  Denies lightheadedness/dizziness, headache, chest pain, palpitations, shortness of breath, abdominal pain, nausea, vomiting, dysuria or other urinary symptoms.    Consultants/Specialty:  N/A    Review of Systems:   Review of Systems   Constitutional: Positive for malaise/fatigue (improving). Negative for chills and fever.   Respiratory: Negative for cough, shortness of breath and wheezing.    Cardiovascular: Negative for chest pain, palpitations and leg swelling.   Gastrointestinal: Negative for abdominal pain, constipation, diarrhea, nausea and vomiting.   Genitourinary: Negative for dysuria, frequency and urgency.   Musculoskeletal: Positive for back pain (chronic, improving).   Neurological: Positive for weakness. Negative for dizziness (when sitting upright) and headaches.   Psychiatric/Behavioral: Negative  for depression. The patient is not nervous/anxious.        Objective Data:   Physical Exam:   Vitals:    Temp:  [36.5 °C (97.7 °F)-36.9 °C (98.5 °F)] 36.9 °C (98.4 °F)  Pulse:  [71-77] 74  Resp:  [16-19] 19  BP: ()/(39-90) 144/68  SpO2:  [91 %-92 %] 92 %     Physical Exam  Constitutional:       General: She is not in acute distress.     Appearance: She is not toxic-appearing.   HENT:      Head: Normocephalic and atraumatic.      Right Ear: External ear normal.      Left Ear: External ear normal.      Nose: Nose normal. No rhinorrhea.      Mouth/Throat:      Mouth: Mucous membranes are moist.   Eyes:      General: No scleral icterus.     Extraocular Movements: Extraocular movements intact.      Conjunctiva/sclera: Conjunctivae normal.   Cardiovascular:      Rate and Rhythm: Normal rate. Rhythm irregular.      Pulses: Normal pulses.      Heart sounds: Normal heart sounds. No murmur heard.  Pulmonary:      Effort: Pulmonary effort is normal. No respiratory distress.      Breath sounds: Normal breath sounds. No wheezing.   Abdominal:      General: Abdomen is flat.      Palpations: Abdomen is soft.      Tenderness: There is no abdominal tenderness (mild tenderness to palpation over RLQ). There is no guarding or rebound.   Musculoskeletal:         General: No swelling or deformity. Normal range of motion.      Cervical back: Normal range of motion and neck supple.   Skin:     General: Skin is warm and dry.   Neurological:      General: No focal deficit present.      Mental Status: She is alert and oriented to person, place, and time.      Sensory: No sensory deficit.   Psychiatric:         Mood and Affect: Mood normal.         Behavior: Behavior normal.         Thought Content: Thought content normal.         Judgment: Judgment normal.           Labs:   HEMATOLOGY/ ONCOLOGY/ID:            Recent Labs     05/02/22  0148 05/03/22  0640   WBC 6.8 8.6   RBC 3.81* 3.93*   HEMOGLOBIN 12.3 12.6   HEMATOCRIT 36.6* 37.0    MCV 96.1 94.1   MCH 32.3 32.1   RDW 46.6 45.6   PLATELETCT 215 207   MPV 10.0 9.9   NEUTSPOLYS 47.20  --    LYMPHOCYTES 42.10*  --    MONOCYTES 8.40  --    EOSINOPHILS 1.80  --    BASOPHILS 0.40  --      Lab Results   Component Value Date    EEZGFADR96 354 05/02/2022    AGCZEDET56 383 03/15/2020    EMDMMDCY36 1477 (H) 07/05/2019    FOLATE 16.6 03/15/2020    FERRITIN 149.0 05/03/2022    FERRITIN 157.0 10/26/2021    FERRITIN 96.2 03/15/2020    IRON 111 10/26/2021    IRON 67 03/15/2020    TOTIRONBC 259 10/26/2021    TOTIRONBC 287 03/15/2020       RENAL:        Estimated GFR/CRCL = Estimated Creatinine Clearance: 46.4 mL/min (by C-G formula based on SCr of 0.76 mg/dL).  Recent Labs     05/02/22  0148 05/03/22  0640 05/04/22  1134   SODIUM 138 138 140   POTASSIUM 3.8 3.7 3.5*   CHLORIDE 101 101 102   CO2 28 28 29   GLUCOSE 131* 183* 212*   BUN 19 13 11   CREATININE 0.68 0.60 0.76   CALCIUM 9.1 9.3 9.0   MAGNESIUM  --  1.7 1.7   ALBUMIN 3.3 3.5 3.2       GASTROINTESTINAL/ HEPATIC:          Recent Labs     05/02/22  0148 05/03/22  0640 05/04/22  1134   ALTSGPT 20 14 10   ASTSGOT 22 21 15   ALKPHOSPHAT 83 82 75   TBILIRUBIN 0.6 0.9 0.7   ALBUMIN 3.3 3.5 3.2   GLOBULIN 2.8 2.6 2.4     No results found for: AMMONIA    ENDOCRINE:              Recent Labs     05/02/22  0148 05/03/22  0640 05/04/22  1134   GLUCOSE 131* 183* 212*     Lab Results   Component Value Date    HBA1C 6.5 (H) 05/02/2022    HBA1C 6.8 (H) 10/26/2021    HBA1C 7.2 (H) 05/31/2021       Imaging:   EC-ECHOCARDIOGRAM COMPLETE W/O CONT   Final Result      CT-CTA CHEST PULMONARY ARTERY W/ RECONS   Final Result         1.  No evidence of pulmonary embolism.   2.  Cardiomegaly.   3.  Biapical scarring and mild bibasilar atelectasis/scarring. No significant consolidation.      Fleischner Society pulmonary nodule recommendations:   Not applicable      DX-CHEST-PORTABLE (1 VIEW)   Final Result      Stable chest without acute/new abnormality.      CT-HEAD W/O   Final  Result      1.  Chronic ischemic changes.   2.  No acute intracranial abnormality.             Problem Representation:     * Toxic encephalopathy- (present on admission)  Assessment & Plan  Consider confusion multifactorial:   Recently started on narcotics for back pain/OA, poor oral intake and weakness  Can also consider delirium  -TSH and Vit B12 wnl  -UA ordered, patient recently noted dysuria and malodorous urine  -Delirium precautions   Keep room bright during the day   Keep room dark at night   Avoid night time awakenings   Prevent naps throughout the day   Reorient patient as needed  -It appears that patient is confused when waking up in the morning, could also be due to unfamiliar environment such as being in the hospital.  May also consider ?sleep apnea given oxygen requirements at night        Acute respiratory failure with hypoxia (HCC)- (present on admission)  Assessment & Plan  SpO2 84% on ambient air on admission, required ~2LNC for SpO2 90s  Not on oxygen at baseline, never smoker  CTA negative on admission  Consider secondary to ?narcotic use or deconditioning  -No history smoking, considering atelectasis on CXR  -Echo LVEF 60%, grade II diastolic dysfunction without wall motion abnormalities, moderate AI, RSVP 45mmHg  -IS  -Supplemental oxygen, as needed. Wean off as tolerated  -SpO2 92% on room air, pending walking O2 test determine need for supplemental oxygen at home    Weakness  Assessment & Plan  Patient notes weakness x 3 weeks, poor oral intake, dizziness upon standing  Recently started on Tylenol#4 around 1 week ago  -Consider multifactorial cause to weakness: poor oral intake, medication-induced  -Hold Tylenol#4 and any other sedating medications for back pain  -Encouraged oral intake  -Mobilize patient, up out of bed as tolerated  -Fall precautions  -PT/OT consulted      Asymptomatic bacteriuria  Assessment & Plan  Unlikely contributing to altered mentation  -UA revealed positive nitrite,  large leukocyte esterase, packed WBC, many bacteria; culture negative  -Will start nitrofurantoin for 3 days    Elevated troponin  Assessment & Plan  -Patient denies chest pain  -Troponin x2 stable  -Repeat EKG normal    PAF (paroxysmal atrial fibrillation) ([facility])- (present on admission)  Assessment & Plan  -Rate controlled  -Continue home carvedilol 3.125mg twice daily, Eliquis 2.5mg twice daily    Low back pain- (present on admission)  Assessment & Plan  -Recently started on Tylenol#4, per PCP  -Per daughter, has previously tried gabapentin and tramadol, but patient did not tolerate  -Hold Tylenol-codeine while inpatient as it may be contributing to symptoms  -Cannot use NSAIDs as patient is on Eliquis for afib  -Heating pads and lidocaine patches as needed  -Scheduled bowel regimen as codeine can cause constipation      Type 2 diabetes mellitus without complication, without long-term current use of insulin ([facility])- (present on admission)  Assessment & Plan  -On metformin at home  -A1c 6.5%, no hyperglycemia noted  -Will discontinue metformin, for outpatient follow-up with PCP to reassess need    History of TIA (transient ischemic attack)- (present on admission)  Assessment & Plan  -Continue Lipitor 40mg at night  -PT/OT    Dyslipidemia- (present on admission)  Assessment & Plan  -Continue home Lipitor 40mg at night    Essential hypertension, benign- (present on admission)  Assessment & Plan  -Continue home losartan 100mg daily, carvedilol 3.125mg twice daily. Consider dose adjustment if with persistently elevated BP  -On Bumex at home, continue to hold for now    Cardiac pacemaker in situ- (present on admission)  Assessment & Plan  Noted

## 2022-05-04 NOTE — FACE TO FACE
"Face to Face Note  -  Durable Medical Equipment    Henna Mo M.D. - NPI: [id number]  I certify that this patient is under my care and that they had a durable medical equipment(DME)face to face encounter by myself that meets the physician DME face-to-face encounter requirements with this patient on:    Date of encounter:   Patient:                    MRN:                       YOB: 2022  Krystle Tavarez  [id number]  7/26/1933     The encounter with the patient was in whole, or in part, for the following medical condition, which is the primary reason for durable medical equipment:  Other - Hypoxia likely secondary to atelectasis    I certify that, based on my findings, the following durable medical equipment is medically necessary:  Oxygen.    HOME O2 Saturation Measurements:(Values must be present for Home Oxygen orders)  Room air sat at rest: 87  Room air sat with amb: 85  With liters of O2: 1, O2 sat at rest with O2: 94  With Liters of O2: 1, O2 sat with amb with O2 : 95  Is the patient mobile?: Yes    My Clinical findings support the need for the above equipment due to:  Hypoxia    Supporting Symptoms: The patient requires supplemental oxygen, as the following interventions have been tried with limited or no improvement: \"Incentive spirometry    If patient feels more short of breath, they can go up to 6 liters per minute and contact healthcare provider.  "

## 2022-05-04 NOTE — CARE PLAN
The patient is Stable - Low risk of patient condition declining or worsening    Shift Goals  Clinical Goals: Safety  Patient Goals: Rest    Progress made toward(s) clinical / shift goals:  VSS on 1L NC. Denies need for PRN pain medication. Lidocaine patch on back. Bed alarm on. Call light within reach.    Problem: Knowledge Deficit - Standard  Goal: Patient and family/care givers will demonstrate understanding of plan of care, disease process/condition, diagnostic tests and medications  Outcome: Progressing     Problem: Fall Risk  Goal: Patient will remain free from falls  Outcome: Progressing     Problem: Pain - Standard  Goal: Alleviation of pain or a reduction in pain to the patient’s comfort goal  Outcome: Progressing       Patient is not progressing towards the following goals:

## 2022-05-04 NOTE — PROGRESS NOTES
Aox3 most of the night, very confused when waking up this morning, aox1. VSS on 1L NC. Denied need for prn medication for back pain. Still need stool sample. Pt refused morning senna and miralax. Bed alarm on. Possible DC home today.

## 2022-05-04 NOTE — DISCHARGE PLANNING
Agency[facility]Facility Name: [facility]  Spoke To: Emelina  Outcome: DPA called to see if Hypoxia likely secondary to atelectasis is a qualifying diagnosis.  Per Emelina, atelectasis is a temporary lung condition and would not qualify.  It must be a chronic lung condition.       DREW Mauricio notified via Teams.

## 2022-05-04 NOTE — DISCHARGE PLANNING
Medical Social Work  Volt to Dr Mo that order and face to face can not have Hypoxia as the reason for needing O2

## 2022-05-04 NOTE — ASSESSMENT & PLAN NOTE
Unlikely contributing to altered mentation  -UA revealed positive nitrite, large leukocyte esterase, packed WBC, many bacteria; culture negative  -Will treat with nitrofurantoin for 5 days

## 2022-05-05 VITALS
WEIGHT: 142.86 LBS | TEMPERATURE: 97.9 F | HEART RATE: 72 BPM | HEIGHT: 63 IN | SYSTOLIC BLOOD PRESSURE: 151 MMHG | OXYGEN SATURATION: 90 % | DIASTOLIC BLOOD PRESSURE: 66 MMHG | BODY MASS INDEX: 25.31 KG/M2 | RESPIRATION RATE: 18 BRPM

## 2022-05-05 PROBLEM — G92.9 TOXIC ENCEPHALOPATHY: Status: RESOLVED | Noted: 2022-05-03 | Resolved: 2022-05-05

## 2022-05-05 LAB
ALBUMIN SERPL BCP-MCNC: 3.4 G/DL (ref 3.2–4.9)
ALBUMIN/GLOB SERPL: 1.3 G/DL
ALP SERPL-CCNC: 80 U/L (ref 30–99)
ALT SERPL-CCNC: 10 U/L (ref 2–50)
ANION GAP SERPL CALC-SCNC: 10 MMOL/L (ref 7–16)
AST SERPL-CCNC: 12 U/L (ref 12–45)
BILIRUB SERPL-MCNC: 0.9 MG/DL (ref 0.1–1.5)
BUN SERPL-MCNC: 14 MG/DL (ref 8–22)
CALCIUM SERPL-MCNC: 9.1 MG/DL (ref 8.5–10.5)
CHLORIDE SERPL-SCNC: 102 MMOL/L (ref 96–112)
CO2 SERPL-SCNC: 27 MMOL/L (ref 20–33)
CREAT SERPL-MCNC: 0.69 MG/DL (ref 0.5–1.4)
GFR SERPLBLD CREATININE-BSD FMLA CKD-EPI: 83 ML/MIN/1.73 M 2
GLOBULIN SER CALC-MCNC: 2.6 G/DL (ref 1.9–3.5)
GLUCOSE SERPL-MCNC: 179 MG/DL (ref 65–99)
HEMOCCULT STL QL: NEGATIVE
POTASSIUM SERPL-SCNC: 4.2 MMOL/L (ref 3.6–5.5)
PROT SERPL-MCNC: 6 G/DL (ref 6–8.2)
SODIUM SERPL-SCNC: 139 MMOL/L (ref 135–145)

## 2022-05-05 PROCEDURE — A9270 NON-COVERED ITEM OR SERVICE: HCPCS | Performed by: HOSPITALIST

## 2022-05-05 PROCEDURE — 700102 HCHG RX REV CODE 250 W/ 637 OVERRIDE(OP): Performed by: INTERNAL MEDICINE

## 2022-05-05 PROCEDURE — 80053 COMPREHEN METABOLIC PANEL: CPT

## 2022-05-05 PROCEDURE — 36415 COLL VENOUS BLD VENIPUNCTURE: CPT

## 2022-05-05 PROCEDURE — A9270 NON-COVERED ITEM OR SERVICE: HCPCS | Performed by: INTERNAL MEDICINE

## 2022-05-05 PROCEDURE — A9270 NON-COVERED ITEM OR SERVICE: HCPCS | Performed by: STUDENT IN AN ORGANIZED HEALTH CARE EDUCATION/TRAINING PROGRAM

## 2022-05-05 PROCEDURE — 82272 OCCULT BLD FECES 1-3 TESTS: CPT

## 2022-05-05 PROCEDURE — 700102 HCHG RX REV CODE 250 W/ 637 OVERRIDE(OP): Performed by: HOSPITALIST

## 2022-05-05 PROCEDURE — 99238 HOSP IP/OBS DSCHRG MGMT 30/<: CPT | Mod: GC | Performed by: INTERNAL MEDICINE

## 2022-05-05 PROCEDURE — 700101 HCHG RX REV CODE 250: Performed by: STUDENT IN AN ORGANIZED HEALTH CARE EDUCATION/TRAINING PROGRAM

## 2022-05-05 PROCEDURE — 700102 HCHG RX REV CODE 250 W/ 637 OVERRIDE(OP): Performed by: STUDENT IN AN ORGANIZED HEALTH CARE EDUCATION/TRAINING PROGRAM

## 2022-05-05 RX ORDER — LIDOCAINE 50 MG/G
1 PATCH TOPICAL EVERY 24 HOURS
Qty: 15 PATCH | Refills: 0 | Status: SHIPPED | OUTPATIENT
Start: 2022-05-06 | End: 2022-05-05

## 2022-05-05 RX ORDER — LIDOCAINE 50 MG/G
1 PATCH TOPICAL EVERY 24 HOURS
Qty: 15 PATCH | Refills: 0 | Status: SHIPPED | OUTPATIENT
Start: 2022-05-06 | End: 2023-07-18

## 2022-05-05 RX ORDER — NITROFURANTOIN 25; 75 MG/1; MG/1
100 CAPSULE ORAL 2 TIMES DAILY WITH MEALS
Qty: 7 CAPSULE | Refills: 0 | Status: SHIPPED | OUTPATIENT
Start: 2022-05-05 | End: 2022-05-05

## 2022-05-05 RX ORDER — CYANOCOBALAMIN (VITAMIN B-12) 1000 MCG
2000 TABLET ORAL DAILY
Qty: 60 TABLET | Refills: 0 | Status: SHIPPED | OUTPATIENT
Start: 2022-05-06 | End: 2023-07-18

## 2022-05-05 RX ORDER — NITROFURANTOIN 25; 75 MG/1; MG/1
100 CAPSULE ORAL 2 TIMES DAILY WITH MEALS
Qty: 7 CAPSULE | Refills: 0 | Status: SHIPPED | OUTPATIENT
Start: 2022-05-05 | End: 2022-06-08

## 2022-05-05 RX ORDER — CYANOCOBALAMIN (VITAMIN B-12) 1000 MCG
2000 TABLET ORAL DAILY
Qty: 60 TABLET | Refills: 3 | Status: SHIPPED | OUTPATIENT
Start: 2022-05-06 | End: 2022-05-05

## 2022-05-05 RX ADMIN — CARVEDILOL 6.25 MG: 6.25 TABLET, FILM COATED ORAL at 07:58

## 2022-05-05 RX ADMIN — LOSARTAN POTASSIUM 100 MG: 50 TABLET, FILM COATED ORAL at 04:19

## 2022-05-05 RX ADMIN — NITROFURANTOIN MONOHYDRATE/MACROCRYSTALLINE 100 MG: 25; 75 CAPSULE ORAL at 07:58

## 2022-05-05 RX ADMIN — LIDOCAINE 1 PATCH: 50 PATCH TOPICAL at 07:58

## 2022-05-05 RX ADMIN — CYANOCOBALAMIN TAB 500 MCG 2000 MCG: 500 TAB at 04:20

## 2022-05-05 RX ADMIN — APIXABAN 2.5 MG: 5 TABLET, FILM COATED ORAL at 04:19

## 2022-05-05 ASSESSMENT — ENCOUNTER SYMPTOMS
WEAKNESS: 0
COUGH: 0
DIARRHEA: 0
DEPRESSION: 0
PALPITATIONS: 0
ORTHOPNEA: 0
ABDOMINAL PAIN: 0
FEVER: 0
VOMITING: 0
CHILLS: 0
SHORTNESS OF BREATH: 0
BACK PAIN: 1
NAUSEA: 0
TINGLING: 1
HEADACHES: 0
NERVOUS/ANXIOUS: 0
WHEEZING: 0
CONSTIPATION: 0
PND: 0
DIZZINESS: 0

## 2022-05-05 ASSESSMENT — PAIN DESCRIPTION - PAIN TYPE: TYPE: ACUTE PAIN

## 2022-05-05 NOTE — DISCHARGE PLANNING
Received Choice form at 1150  Agency/Facility Name: [facility]  Referral sent per Choice form @ 1330     1410-  Agency/Facility Name: [facility]  Spoke To: Ale  Outcome: Referral received however is in processing, DPA to call DME provider around 1500 to obtain ETA for delivery.     1500-  Agency/Facility Name: [facility]  Spoke To: Dori  Outcome: Order is still being processed, DPA to callback in 45 minutes for ETA.

## 2022-05-05 NOTE — CARE PLAN
The patient is Stable - Low risk of patient condition declining or worsening    Shift Goals  Clinical Goals: Patient will remain free from falls  Patient Goals: Get up to go to the BR    Progress made toward(s) clinical / shift goals:    Problem: Fall Risk  Goal: Patient will remain free from falls  Outcome: Progressing   Pt high fall risk, call appropriately, bed alarm on, room not near nursing station, pt has not been impulsive for me.

## 2022-05-05 NOTE — FACE TO FACE
Face to Face Note  -  Durable Medical Equipment    Henna Mo M.D. - NPI: [id number]  I certify that this patient is under my care and that they had a durable medical equipment(DME)face to face encounter by myself that meets the physician DME face-to-face encounter requirements with this patient on:    Date of encounter:   Patient:                    MRN:                       YOB: 2022  Krystle Tavarez  [id number]  7/26/1933     The encounter with the patient was in whole, or in part, for the following medical condition, which is the primary reason for durable medical equipment:  Other - Hypoxia at night, likely due to sleep apnea    I certify that, based on my findings, the following durable medical equipment is medically necessary:  Oxygen.    HOME O2 Saturation Measurements:(Values must be present for Home Oxygen orders)  Room air sat at rest: 87  Room air sat with amb: 85  With liters of O2: 1, O2 sat at rest with O2: 94  With Liters of O2: 1, O2 sat with amb with O2 : 95  Is the patient mobile?: Yes    My Clinical findings support the need for the above equipment due to:  Other - Hypoxia at night, likely due to sleep apnea    Supporting Symptoms: Low oxygen saturation at night with notable confusion up waking in the morning    If patient feels more short of breath, they can go up to 6 liters per minute and contact healthcare provider.

## 2022-05-05 NOTE — CARE PLAN
The patient is Stable - Low risk of patient condition declining or worsening    Shift Goals  Clinical Goals: Safety  Patient Goals: DC    Progress made toward(s) clinical / shift goals:  VSS on RA. Pt's daughter at bedside, all questions answered before she went home. Bed alarm on. Pt aox4 at this time.    Problem: Knowledge Deficit - Standard  Goal: Patient and family/care givers will demonstrate understanding of plan of care, disease process/condition, diagnostic tests and medications  Outcome: Progressing     Problem: Fall Risk  Goal: Patient will remain free from falls  Outcome: Progressing     Problem: Pain - Standard  Goal: Alleviation of pain or a reduction in pain to the patient’s comfort goal  Outcome: Progressing       Patient is not progressing towards the following goals:

## 2022-05-05 NOTE — PROGRESS NOTES
AOx4 last night and this morning. VSS. 1L NC overnight, sats 88% on RA and 93% with the 1L. Denied any pain. Lidocaine patch removed. Still need stool sample, refused morning senna and miralax. SBA to BSC. Bed alarm on. AM labs pending. Plan for DC today.

## 2022-05-05 NOTE — DISCHARGE SUMMARY
Discharge Summary    CHIEF COMPLAINT ON ADMISSION  Chief Complaint   Patient presents with   • Weakness       Reason for Admission  ems     Admission Date  5/1/2022    CODE STATUS  DNAR/DNI    HPI & HOSPITAL COURSE  This is a 88 y.o. female with a past medical history of diabetes, hypertension, hyperlipidemia, paroxysmal atrial fibrillation (on Eliquis), pacemaker (2015), and history of TIA and chronic back pain who presented due to toxic encephalopathy likely secondary to sedative narcotic use, along with weakness, poor oral intake, and new onset hypoxemia. Throughout her hospital stay, patient's mentation was noted to wax and wane, with more confusion noted in the morning upon waking but was readily reoriented. TSH and vitamin B12 ordered, which were normal. CT head at time of admission revealed chronic ischemic changes, but no acute abnormalities. Tylenol #4 (Tylenol-codeine) was stopped and topical treatments such as heating pads and lidocaine patches were encouraged to address her back pain, which patient noted relief. CTA and chest x-ray were negative and unrevealing in terms of cause of hypoxemia. Echocardiogram done showed LVEF 60%, grade 2 diastolic dysfunction without wall motion abnormalities.  Oxygen saturations were noted to drop at nighttime and supplemental oxygen was provided, likely related to sleep apnea. She was given IV fluids and encouraged to increase oral intake for adequate nutrition and hydration. Patient was noted to have an episode of dysuria and malodorous urine, urinalysis showed positive nitrite, large leukocyte esterase. Urine culture negative. She was started on nitrofurantoin, which is advised to take and complete for a total of 5 days. Physical therapy evaluated patient and was noted to be at her functional baseline and was not requiring extra assistance or need for acute rehabilitation. Patient improved with therapies provided and mentation returned to baseline. Therefore, she is  "discharged in good and stable condition to home with close outpatient follow-up.    The patient met 2-midnight criteria for an inpatient stay at the time of discharge.     Review of Systems   Constitutional: Negative for chills, fever and malaise/fatigue (improving).   Respiratory: Negative for cough, shortness of breath and wheezing.    Cardiovascular: Negative for chest pain, palpitations, orthopnea, leg swelling and PND.   Gastrointestinal: Negative for abdominal pain, constipation, diarrhea, nausea and vomiting.   Genitourinary: Negative for dysuria, frequency and urgency.   Musculoskeletal: Positive for back pain (chronic, improving).   Neurological: Positive for tingling (of lower extremities, bilaterally). Negative for dizziness, weakness and headaches.   Psychiatric/Behavioral: Negative for depression. The patient is not nervous/anxious.      /66   Pulse 72   Temp 36.6 °C (97.9 °F) (Temporal)   Resp 18   Ht 1.6 m (5' 3\")   Wt 64.8 kg (142 lb 13.7 oz)   LMP  (LMP Unknown)   SpO2 90%   BMI 25.31 kg/m²   Physical Exam  Constitutional:       General: She is not in acute distress.     Appearance: Normal appearance. She is not ill-appearing or toxic-appearing.   HENT:      Head: Normocephalic and atraumatic.      Right Ear: External ear normal.      Left Ear: External ear normal.      Nose: Nose normal. No rhinorrhea.      Mouth/Throat:      Mouth: Mucous membranes are moist.      Pharynx: Oropharynx is clear.   Eyes:      General: No scleral icterus.        Right eye: No discharge.         Left eye: No discharge.      Extraocular Movements: Extraocular movements intact.      Conjunctiva/sclera: Conjunctivae normal.      Pupils: Pupils are equal, round, and reactive to light.   Cardiovascular:      Rate and Rhythm: Normal rate. Rhythm irregular.      Pulses: Normal pulses.      Heart sounds: Normal heart sounds. No murmur heard.  Pulmonary:      Effort: Pulmonary effort is normal. No respiratory " distress.      Breath sounds: Normal breath sounds. No wheezing or rhonchi.   Abdominal:      General: There is no distension.      Palpations: Abdomen is soft.      Tenderness: There is no abdominal tenderness. There is no guarding or rebound.   Musculoskeletal:         General: No swelling, tenderness or deformity. Normal range of motion.      Cervical back: Normal range of motion and neck supple.   Skin:     General: Skin is warm and dry.   Neurological:      General: No focal deficit present.      Mental Status: She is alert and oriented to person, place, and time. Mental status is at baseline.      Motor: No weakness.   Psychiatric:         Mood and Affect: Mood normal.         Behavior: Behavior normal.         Thought Content: Thought content normal.         Judgment: Judgment normal.       Discharge Date  5/5/2022    FOLLOW UP ITEMS POST DISCHARGE  1.  Discontinued Tylenol #4. Avoid sedative narcotics as this may have likely contributed to patient's toxic encephalopathy.  Patient did mention that she has tried gabapentin and tramadol which caused altered mentation.  Continue topical therapy such as Lidoderm, Voltaren gel, and heating pads for pain control.  Avoid oral NSAIDs given patient is on anticoagulation  2. A1c 6.5%, metformin discontinued.  Patient to follow-up with primary care provider to reassess further need of medication. Patient did note some neuropathy of her feet bilaterally, advised to address this with her primary care provider  3.  Patient requiring around 1 LNC at night, considering sleep apnea.  Consider outpatient sleep study as necessary    DISCHARGE DIAGNOSES  Principal Problem (Resolved):    Toxic encephalopathy POA: Yes  Active Problems:    Weakness POA: Unknown    Acute respiratory failure with hypoxia (HCC) POA: Yes    Low back pain (Chronic) POA: Yes    PAF (paroxysmal atrial fibrillation) (HCC) (Chronic) POA: Yes    Elevated troponin POA: Unknown    Asymptomatic bacteriuria POA:  "Unknown    Anticoagulated POA: Yes    Cardiac pacemaker in situ (Chronic) POA: Yes      Overview: \"[employer] Pacemaker\"    Essential hypertension, benign (Chronic) POA: Yes    Dyslipidemia (Chronic) POA: Yes    History of TIA (transient ischemic attack) (Chronic) POA: Yes    Type 2 diabetes mellitus without complication, without long-term current use of insulin (HCC) (Chronic) POA: Yes      FOLLOW UP  Future Appointments   Date Time Provider Department Center   6/7/2022  3:45 PM PACER CHECK-[facility] None   6/8/2022  1:40 PM Scott Garcia M.D. [facility] None         In 1 week  As needed, If symptoms worsen    Joana Hawk A.P.R.NEri  [address]  [facility] [address]  [facility] [address]  [phone]    In 1 week  As needed, If symptoms worsen      MEDICATIONS ON DISCHARGE     Medication List      START taking these medications      Instructions   B-12 1000 MCG Tabs  Start taking on: May 6, 2022   Take 2 tablets  (2,000mcg) by mouth every day.  Dose: 2,000 mcg     lidocaine 5 % Ptch  Start taking on: May 6, 2022  Commonly known as: LIDODERM   Place 1 Patch on the skin every 24 hours.  Dose: 1 Patch     nitrofurantoin 100 MG Caps  Commonly known as: MACROBID   Doctor's comments: For total of 5 days  Take 1 Capsule by mouth 2 times a day with meals.  Dose: 100 mg        CONTINUE taking these medications      Instructions   apixaban 2.5mg Tabs  Commonly known as: ELIQUIS   Take 1 Tablet by mouth 2 times a day.  Dose: 2.5 mg     atorvastatin 40 MG Tabs  Commonly known as: LIPITOR   Take 1 Tablet by mouth every evening.  Dose: 40 mg     bumetanide 0.5 MG Tabs  Commonly known as: BUMEX   Take 2 Tablets by mouth 1 time a day as needed (edema).  Dose: 1 mg     carvedilol 3.125 MG Tabs  Commonly known as: COREG   Take 1 Tablet by mouth 2 times a day with meals.  Dose: 3.125 mg     docusate sodium 100 MG Caps  Commonly known as: COLACE   Take 100 mg by mouth every day.  Dose: 100 mg     ketoconazole 2 % shampoo  Commonly known as: " "ANGELIKA   Doctor's comments: Please also inform patient she may trial \"[place]\" shampoo  Apply 5 to 10 mL to wet scalp, lather, leave on 3 to 5 minutes, and rinse; apply twice weekly for 2 to 4 weeks.     losartan 100 MG Tabs  Commonly known as: COZAAR   Take 1 Tablet by mouth every day.  Dose: 100 mg     Vitamin C 500 MG Caps   Take 1 Capsule by mouth 2 Times a Day.  Dose: 1 Capsule         STOP taking these medications    acetaminophen-codeine #4 300-60 MG Tabs  Commonly known as: TYLENOL #4     metFORMIN  MG Tb24  Commonly known as: GLUCOPHAGE XR            Allergies  Allergies   Allergen Reactions   • Sulfa Drugs Nausea     Nausea    • Gabapentin      Altered mental status   • Tramadol      Altered mentation       DIET  Orders Placed This Encounter   Procedures   • Diet Order Diet: Cardiac     Standing Status:   Standing     Number of Occurrences:   1     Order Specific Question:   Diet:     Answer:   Cardiac [6]       ACTIVITY  As tolerated.  Weight bearing as tolerated    CONSULTATIONS  N/A    PROCEDURES  N/A    LABORATORY  Lab Results   Component Value Date    SODIUM 139 05/05/2022    POTASSIUM 4.2 05/05/2022    CHLORIDE 102 05/05/2022    CO2 27 05/05/2022    GLUCOSE 179 (H) 05/05/2022    BUN 14 05/05/2022    CREATININE 0.69 05/05/2022        Lab Results   Component Value Date    WBC 8.6 05/03/2022    HEMOGLOBIN 12.6 05/03/2022    HEMATOCRIT 37.0 05/03/2022    PLATELETCT 207 05/03/2022        Total time of the discharge process exceeds 30 minutes.  " no

## 2022-05-05 NOTE — DISCHARGE INSTRUCTIONS
Discharge Instructions    Discharged to home by car with relative. Discharged via wheelchair, hospital escort: Yes.  Special equipment needed: Not Applicable    Be sure to schedule a follow-up appointment with your primary care doctor or any specialists as instructed.     Discharge Plan:   Diet Plan: Discussed  Activity Level: Discussed  Confirmed Follow up Appointment: Patient to Call and Schedule Appointment  Confirmed Symptoms Management: Discussed  Medication Reconciliation Updated: Yes    I understand that a diet low in cholesterol, fat, and sodium is recommended for good health. Unless I have been given specific instructions below for another diet, I accept this instruction as my diet prescription.   Other diet: Cardiac    Special Instructions: None    · Is patient discharged on Warfarin / Coumadin?   No     Depression / Suicide Risk    As you are discharged from this [facility] facility, it is important to learn how to keep safe from harming yourself.    Recognize the warning signs:  · Abrupt changes in personality, positive or negative- including increase in energy   · Giving away possessions  · Change in eating patterns- significant weight changes-  positive or negative  · Change in sleeping patterns- unable to sleep or sleeping all the time   · Unwillingness or inability to communicate  · Depression  · Unusual sadness, discouragement and loneliness  · Talk of wanting to die  · Neglect of personal appearance   · Rebelliousness- reckless behavior  · Withdrawal from people/activities they love  · Confusion- inability to concentrate     If you or a loved one observes any of these behaviors or has concerns about self-harm, here's what you can do:  · Talk about it- your feelings and reasons for harming yourself  · Remove any means that you might use to hurt yourself (examples: pills, rope, extension cords, firearm)  · Get professional help from the community (Mental Health, Substance Abuse, psychological  counseling)  · Do not be alone:Call your Safe Contact- someone whom you trust who will be there for you.  · Call your local CRISIS HOTLINE 017-2049 or 922-775-3348  · Call your local Children's Mobile Crisis Response Team [place] (152) 518-9106 or www.[employer]  · Call the toll free National Suicide Prevention Hotlines   · National Suicide Prevention Lifeline 469-401-XMTR (3998)  · [place] [employer] Line Network 800-SUICIDE (959-7122)        Please do not take Tylenol-codeine. Use lidocaine patches as needed for back pain  Please follow up with your primary care provider regarding necessity of metformin use

## 2022-05-06 ENCOUNTER — PATIENT OUTREACH (OUTPATIENT)
Dept: MEDICAL GROUP | Facility: MEDICAL CENTER | Age: 87
End: 2022-05-06
Payer: MEDICARE

## 2022-05-06 NOTE — PROGRESS NOTES
RN Transitional Care Management discharge follow-up call completed. Patient did not have any questions regarding medications, did however provide name and phone number for oxygen supply company.  Discharge follow-up appointment scheduled with PCP on 5/12/22. Provided CCM RN contact number for any additional questions/concerns. Please route chart back to RN if additional follow up is needed/requested.     Sincerely,     NAMAN Huntley Care Coordinator  Chronic Care Management [phone]

## 2022-05-11 DIAGNOSIS — E11.9 TYPE 2 DIABETES MELLITUS WITHOUT COMPLICATION, WITHOUT LONG-TERM CURRENT USE OF INSULIN (HCC): ICD-10-CM

## 2022-05-11 RX ORDER — METFORMIN HYDROCHLORIDE 500 MG/1
TABLET, EXTENDED RELEASE ORAL
Qty: 90 TABLET | Refills: 3 | Status: SHIPPED | OUTPATIENT
Start: 2022-05-11 | End: 2022-06-08

## 2022-05-19 ENCOUNTER — OFFICE VISIT (OUTPATIENT)
Dept: MEDICAL GROUP | Facility: MEDICAL CENTER | Age: 87
End: 2022-05-19
Payer: MEDICARE

## 2022-05-19 ENCOUNTER — PATIENT OUTREACH (OUTPATIENT)
Dept: HEALTH INFORMATION MANAGEMENT | Facility: OTHER | Age: 87
End: 2022-05-19
Payer: MEDICARE

## 2022-05-19 VITALS
OXYGEN SATURATION: 91 % | HEART RATE: 89 BPM | HEIGHT: 63 IN | WEIGHT: 142.64 LBS | DIASTOLIC BLOOD PRESSURE: 54 MMHG | TEMPERATURE: 98.4 F | SYSTOLIC BLOOD PRESSURE: 106 MMHG | BODY MASS INDEX: 25.27 KG/M2

## 2022-05-19 DIAGNOSIS — Z78.9 IMPAIRED MOBILITY AND ADLS: ICD-10-CM

## 2022-05-19 DIAGNOSIS — G47.34 NOCTURNAL HYPOXIA: ICD-10-CM

## 2022-05-19 DIAGNOSIS — R53.83 OTHER FATIGUE: ICD-10-CM

## 2022-05-19 DIAGNOSIS — R53.1 GENERAL WEAKNESS: ICD-10-CM

## 2022-05-19 DIAGNOSIS — R82.71 ASYMPTOMATIC BACTERIURIA: ICD-10-CM

## 2022-05-19 DIAGNOSIS — E11.9 TYPE 2 DIABETES MELLITUS WITHOUT COMPLICATION, WITHOUT LONG-TERM CURRENT USE OF INSULIN (HCC): ICD-10-CM

## 2022-05-19 DIAGNOSIS — J96.01 ACUTE RESPIRATORY FAILURE WITH HYPOXIA (HCC): ICD-10-CM

## 2022-05-19 DIAGNOSIS — R53.81 PHYSICAL DECONDITIONING: ICD-10-CM

## 2022-05-19 DIAGNOSIS — Z09 HOSPITAL DISCHARGE FOLLOW-UP: ICD-10-CM

## 2022-05-19 DIAGNOSIS — Z74.09 IMPAIRED MOBILITY AND ADLS: ICD-10-CM

## 2022-05-19 PROCEDURE — 99439 CHRNC CARE MGMT STAF EA ADDL: CPT | Performed by: NURSE PRACTITIONER

## 2022-05-19 PROCEDURE — 99490 CHRNC CARE MGMT STAFF 1ST 20: CPT | Performed by: NURSE PRACTITIONER

## 2022-05-19 PROCEDURE — 99214 OFFICE O/P EST MOD 30 MIN: CPT | Performed by: NURSE PRACTITIONER

## 2022-05-19 SDOH — HEALTH STABILITY: PHYSICAL HEALTH: ON AVERAGE, HOW MANY DAYS PER WEEK DO YOU ENGAGE IN MODERATE TO STRENUOUS EXERCISE (LIKE A BRISK WALK)?: 0 DAYS

## 2022-05-19 SDOH — HEALTH STABILITY: PHYSICAL HEALTH: ON AVERAGE, HOW MANY MINUTES DO YOU ENGAGE IN EXERCISE AT THIS LEVEL?: 10 MIN

## 2022-05-19 SDOH — ECONOMIC STABILITY: HOUSING INSECURITY
IN THE LAST 12 MONTHS, WAS THERE A TIME WHEN YOU DID NOT HAVE A STEADY PLACE TO SLEEP OR SLEPT IN A SHELTER (INCLUDING NOW)?: NO

## 2022-05-19 SDOH — ECONOMIC STABILITY: TRANSPORTATION INSECURITY
IN THE PAST 12 MONTHS, HAS THE LACK OF TRANSPORTATION KEPT YOU FROM MEDICAL APPOINTMENTS OR FROM GETTING MEDICATIONS?: NO

## 2022-05-19 SDOH — ECONOMIC STABILITY: INCOME INSECURITY: IN THE LAST 12 MONTHS, WAS THERE A TIME WHEN YOU WERE NOT ABLE TO PAY THE MORTGAGE OR RENT ON TIME?: NO

## 2022-05-19 SDOH — ECONOMIC STABILITY: TRANSPORTATION INSECURITY
IN THE PAST 12 MONTHS, HAS LACK OF TRANSPORTATION KEPT YOU FROM MEETINGS, WORK, OR FROM GETTING THINGS NEEDED FOR DAILY LIVING?: NO

## 2022-05-19 ASSESSMENT — PATIENT HEALTH QUESTIONNAIRE - PHQ9: CLINICAL INTERPRETATION OF PHQ2 SCORE: 0

## 2022-05-19 ASSESSMENT — LIFESTYLE VARIABLES
HOW OFTEN DO YOU HAVE A DRINK CONTAINING ALCOHOL: NEVER
HOW OFTEN DO YOU HAVE SIX OR MORE DRINKS ON ONE OCCASION: NEVER
SKIP TO QUESTIONS 9-10: 1
HOW MANY STANDARD DRINKS CONTAINING ALCOHOL DO YOU HAVE ON A TYPICAL DAY: PATIENT DOES NOT DRINK
AUDIT-C TOTAL SCORE: 0

## 2022-05-19 ASSESSMENT — FIBROSIS 4 INDEX: FIB4 SCORE: 1.61

## 2022-05-19 NOTE — PROGRESS NOTES
Chief Complaint   Patient presents with   • Hospital Follow-up       Subjective:     HPI:     Krystle Tavarez is a 88 y.o. female   here to discuss the evaluation and management of:     Patient presents today to follow-up from recent hospital visit.    Generalized weakness, fatigue, altered mentation.  Thought due to use of Tylenol No. 4 however patient had only taken 5 tablets of this since she had received the prescription.  Although do not suspect this is the sole reason for the events leading up to her admission we will avoid any narcotics in the future for the patient.    Work-up in the hospital included lab work as well as a CT scan and echocardiogram without any evidence of any acute changes .    Asymptomatic bacteria  Abnormal urinalysis however urine culture was negative.  She was started on nitrofurantoin and she did complete the 5-day course.    Nocturnal hypoxemia  Acute respiratory failure with hypoxia  During her hospital stay her oxygen dropped below 88%.   She tells me that she was recommended to wear 1 L of oxygen at nighttime while she was an inpatient however was not provided any oxygen from the [employer] company on discharge.  She is feeling very weak.    Impaired mobility  Physical deconditioning   Generalized weakness  Feels very weak.  We have discussed physical therapy.  Daughter states it would be best if she was to go to a facility instead of having an home.  Patient states amenable to participating in physical therapy to help with improving her strength.  Her mobility has become very limited.  Have discussed options of assisted living.    Diabetes  Notes patient was stopped on metformin for some reason.  Last A1c 6.5%.  Patient was tolerate medication well.    ROS:  Denies any Headache, Blurred Vision, Confusion, Chest pain,  Shortness of breath,  Abdominal pain, Changes of bowel or bladder, Lower ext edema, Fevers, Nights sweats, Weight Changes, Focal weakness or numbness.  And all other  systems reviewed and are all negative. POSITIVE FOR : see above        Current Outpatient Medications:   •  metFORMIN ER (GLUCOPHAGE XR) 500 MG TABLET SR 24 HR, TAKE 1 TABLET DAILY (Patient not taking: Reported on 5/19/2022), Disp: 90 Tablet, Rfl: 3  •  Cyanocobalamin (B-12) 1000 MCG Tab, Take 2 tablets  (2,000mcg) by mouth every day., Disp: 60 Tablet, Rfl: 0  •  lidocaine (LIDODERM) 5 % Patch, Place 1 Patch on the skin every 24 hours., Disp: 15 Patch, Rfl: 0  •  nitrofurantoin (MACROBID) 100 MG Cap, Take 1 Capsule by mouth 2 times a day with meals. (Patient not taking: Reported on 5/19/2022), Disp: 7 Capsule, Rfl: 0  •  docusate sodium (COLACE) 100 MG Cap, Take 100 mg by mouth every day., Disp: , Rfl:   •  atorvastatin (LIPITOR) 40 MG Tab, Take 1 Tablet by mouth every evening., Disp: 90 Tablet, Rfl: 7  •  bumetanide (BUMEX) 0.5 MG Tab, Take 2 Tablets by mouth 1 time a day as needed (edema)., Disp: 90 Tablet, Rfl: 7  •  carvedilol (COREG) 3.125 MG Tab, Take 1 Tablet by mouth 2 times a day with meals., Disp: 180 Tablet, Rfl: 7  •  losartan (COZAAR) 100 MG Tab, Take 1 Tablet by mouth every day., Disp: 90 Tablet, Rfl: 7  •  apixaban (ELIQUIS) 2.5mg Tab, Take 1 Tablet by mouth 2 times a day., Disp: 180 Tablet, Rfl: 7  •  ketoconazole (NIZORAL) 2 % shampoo, Apply 5 to 10 mL to wet scalp, lather, leave on 3 to 5 minutes, and rinse; apply twice weekly for 2 to 4 weeks., Disp: 120 mL, Rfl: 1  •  Ascorbic Acid (VITAMIN C) 500 MG Cap, Take 1 Capsule by mouth 2 Times a Day., Disp: , Rfl:     Allergies   Allergen Reactions   • Sulfa Drugs Nausea     Nausea    • Gabapentin      Altered mental status   • Tramadol      Altered mentation   • Tylenol With Codeine #3 [Acetaminophen-Codeine] Unspecified     Dizziness, hallucinations       Past Medical History:   Diagnosis Date   • Arthritis     knees, hips   • Breath shortness     with exertion    • Cataract     bilat IOL    • Dental disorder     full upper, partial lower    •  Diabetes (HCC)     pre diabetic   • Heart burn    • Hemorrhagic disorder (HCC)     on Eliquis   • High cholesterol     diet controlled    • Hyperlipidemia    • Hypertension    • Pacemaker 2015   • Pain 2020    lower back, right leg   • Pre-diabetes    • TIA (transient ischemic attack)     on Eliquis   • Urinary incontinence      Past Surgical History:   Procedure Laterality Date   • PB LAMINOTOMY,LUMBAR DISK,1 INTRSP N/A 2/25/2020    Procedure: LAMINECTOMY, SPINE, LUMBAR, WITH DISCECTOMY- L5-S1, MEDIAL FACETECTOMY;  Surgeon: Latrell Kimble M.D.;  Location: SURGERY [facility];  Service: Neurosurgery   • FORAMINOTOMY N/A 2/25/2020    Procedure: FORAMINOTOMY, SPINE;  Surgeon: Latrell Kimble M.D.;  Location: SURGERY [facility];  Service: Neurosurgery   • PACEMAKER INSERTION  2015   • HIP REPLACEMENT, TOTAL Right 2009   • KNEE REPLACEMENT, TOTAL Right 2004   • CATARACT EXTRACTION WITH IOL Bilateral 2003   • CHOLECYSTECTOMY  2002   • BASAL CELL EXCISION      nose and hand   • BUNIONECTOMY Left      Family History   Problem Relation Age of Onset   • Diabetes Mother    • Stroke Mother    • Heart Attack Father 74   • No Known Problems Maternal Grandmother    • No Known Problems Maternal Grandfather    • No Known Problems Paternal Grandmother    • No Known Problems Paternal Grandfather      Social History     Socioeconomic History   • Marital status:      Spouse name: Not on file   • Number of children: Not on file   • Years of education: Not on file   • Highest education level: Not on file   Occupational History   • Not on file   Tobacco Use   • Smoking status: Never Smoker   • Smokeless tobacco: Never Used   Vaping Use   • Vaping Use: Never used   Substance and Sexual Activity   • Alcohol use: Not Currently   • Drug use: No   • Sexual activity: Not Currently     Partners: Male   Other Topics Concern   • Not on file   Social History Narrative   • Not on file     Social Determinants of Health     Financial Resource  "Strain: Not on file   Food Insecurity: Not on file   Transportation Needs: No Transportation Needs   • Lack of Transportation (Medical): No   • Lack of Transportation (Non-Medical): No   Physical Activity: Inactive   • Days of Exercise per Week: 0 days   • Minutes of Exercise per Session: 10 min   Stress: No Stress Concern Present   • Feeling of Stress : Only a little   Social Connections: Not on file   Intimate Partner Violence: Not on file   Housing Stability: Unknown   • Unable to Pay for Housing in the Last Year: No   • Number of Places Lived in the Last Year: Not on file   • Unstable Housing in the Last Year: No       Objective:     Vitals: /54 (BP Location: Right arm, Patient Position: Sitting, BP Cuff Size: Adult)   Pulse 89   Temp 36.9 °C (98.4 °F) (Temporal)   Ht 1.6 m (5' 3\")   Wt 64.7 kg (142 lb 10.2 oz)   LMP  (LMP Unknown)   SpO2 91%   BMI 25.27 kg/m²    General: Alert, pleasant, NAD  HEENT: Normocephalic.  Neck supple.   Respiratory: no distress, no audible wheezing, RR -WNL  Skin: Warm, dry, no rashes.  Extremities: No leg edema. No discoloration  Neurological: No tremors  Psych:  Affect/mood is normal, judgement is good, memory is intact, grooming is appropriate.    Assessment/Plan:     Krystle was seen today for hospital follow-up.    Diagnoses and all orders for this visit:    Hospital discharge follow-up  Have reviewed hospital admission, diagnostics, labs and consultations.    Nocturnal hypoxia  Present on hospital admission however need formal studies.  Have ordered this.  -     Nocturnal Oximetry Study    Acute respiratory failure with hypoxia (HCC)  Patient needing overnight pulse oximetry prior to obtaining appropriate DME for nighttime oxygen.  It appears that this was not completed to its entirety during her inpatient hospital stay.     Asymptomatic bacteriuria  Asymptomatic at this time.  Has completed course of antibiotics.  Have continue to encourage adequate " hydration.    Other fatigue  Need overnight pulse oximetry study in order for patient to obtain nocturnal oxygen.  -     Nocturnal Oximetry Study    Impaired mobility and ADLs  Physical deconditioning  General weakness  Patient would greatly benefit from physical therapy to help with her impaired mobility and strength.  -     Referral to Physical Therapy    Type 2 diabetes mellitus without complication without long-term current use of insulin  Last A1c 6.5%.  Consider restarting as metformin was helpful in reducing her A1c to below 7.  We will address at next visit and check A1c.    Return in about 3 months (around 8/19/2022), or if symptoms worsen or fail to improve.          Joana MCQUEEN.

## 2022-05-19 NOTE — PROGRESS NOTES
"INITIAL CARE MANAGEMENT CARE PLAN/ASSESEMENT     This is a 88-year-old female that came in for her scheduled PCP visit. Spoke with patient and introduced the CCM program. Patient verified full name,  and patient verbally consented to the CCM program. She qualifies for the program due to her current risk score of 10 and a diagnosis of DM + other chronic illnesses. Patient currently lives with family members who ensure someone is home with her to assist with ADLs. She does utilize a walker when mobilizing throughout the house, but uses a wheelchair/\"transfer chair\" when going places such as PCP appointments and the grocery store. Patient's family members take her to and from places with their own personal cars. Patient has no financial needs at this time. Patient has no barriers to getting her medications as she uses express scripts. She takes all her medication when/how she should and her daughter does keep a list of her medications with the instructions. Spoke with patient and her daughter about patient's healthcare goals.  Patient would like to strengthen her mobility as she does currently have quite limited mobility. She does, however, try to walk around or exercise for at least 10 minutes a day. Patient and provider spoke about PT therapy. This RN and patient spoke about exercise and obtaining different interventions such as a stationary bike she can use when sitting down. Spoke with patient regarding diet and limiting carbohydrate intake. Daughter stated they are working on decreasing carbs and sugars. Patient does state that occasionally she does feel down, but it is usually due to life events. At this time, she is feeling good. Patient and family having some thoughts about setting patient up in an assisted living facility. Patient advised that once she enrolls there our services will stop, but we can assist her until that time.     Medication Self-Management Goals:     Reviewed medications listed below with " patient.      Current Outpatient Medications:   •  Cyanocobalamin (B-12) 1000 MCG Tab, Take 2 tablets  (2,000mcg) by mouth every day., Disp: 60 Tablet, Rfl: 0  •  lidocaine (LIDODERM) 5 % Patch, Place 1 Patch on the skin every 24 hours., Disp: 15 Patch, Rfl: 0  •  docusate sodium (COLACE) 100 MG Cap, Take 100 mg by mouth every day., Disp: , Rfl:   •  atorvastatin (LIPITOR) 40 MG Tab, Take 1 Tablet by mouth every evening., Disp: 90 Tablet, Rfl: 7  •  bumetanide (BUMEX) 0.5 MG Tab, Take 2 Tablets by mouth 1 time a day as needed (edema)., Disp: 90 Tablet, Rfl: 7  •  carvedilol (COREG) 3.125 MG Tab, Take 1 Tablet by mouth 2 times a day with meals., Disp: 180 Tablet, Rfl: 7  •  losartan (COZAAR) 100 MG Tab, Take 1 Tablet by mouth every day., Disp: 90 Tablet, Rfl: 7  •  apixaban (ELIQUIS) 2.5mg Tab, Take 1 Tablet by mouth 2 times a day., Disp: 180 Tablet, Rfl: 7  •  ketoconazole (NIZORAL) 2 % shampoo, Apply 5 to 10 mL to wet scalp, lather, leave on 3 to 5 minutes, and rinse; apply twice weekly for 2 to 4 weeks., Disp: 120 mL, Rfl: 1  •  Ascorbic Acid (VITAMIN C) 500 MG Cap, Take 1 Capsule by mouth 2 Times a Day., Disp: , Rfl:   •  metFORMIN ER (GLUCOPHAGE XR) 500 MG TABLET SR 24 HR, TAKE 1 TABLET DAILY (Patient not taking: Reported on 5/19/2022), Disp: 90 Tablet, Rfl: 3  •  nitrofurantoin (MACROBID) 100 MG Cap, Take 1 Capsule by mouth 2 times a day with meals. (Patient not taking: Reported on 5/19/2022), Disp: 7 Capsule, Rfl: 0         Goal: Continue to adhere to medication regimen.        Physical/Functional/Environmental Status:        One or more falls in the last year: No  Advised to use a cane or walker to get around safely: Yes  Feels unsteady when walking: No  Steadies self on furniture while walking at home: No  Worried about falling: Yes  Needs to push with hands when rising from a chair: Yes  Has trouble stepping up onto a curb / using stairs: Yes  Often has to rush to the toilet: No  Has lost some feeling in  feet: No  Takes medicine that makes him/her feel lightheaded or more tired than usual: No  Takes medicine to sleep or improve mood: No  Often feels sad or depressed: Yes  STEADI Risk for Falling Score: 6       Goal: Improvement mobility + prevent future falls     Financial Status:      No financial issues at this time.      Goal: N/A     Transportation Status:    Family currently takes patient to appointments and they own a personal car.    Goal: N/A    Mental/Behavioral/Psychosocial Status:    Depression Screen (PHQ-2/PHQ-9) 4/22/2022 5/1/2022 5/19/2022   PHQ-2 Total Score - 0 -   PHQ-2 Total Score 0 - 0   PHQ-9 Total Score - - -       Interpretation of PHQ-9 Total Score   Score Severity   1-4 No Depression   5-9 Mild Depression   10-14 Moderate Depression   15-19 Moderately Severe Depression   20-27 Severe Depression       Goal:  N/A      Chronic Care Management Care Plan         Goal:  Decrease carbohydrate intake  Barriers: Easy access to carbohydrate dense foods  Interventions: Education on nutrition, Encouragement to make healthy choices, resources for healthy meals    Start Date: 05/19/2022  End Date:      Goal:  Increase mobility/strength + prevent further falls  Barriers: General weakness, edema, chronic back pain, patient will have oxygen starting soon   Interventions: Educate on oxygen tube safety, PT therapy, proper walker education     Start Date: 05/19/2022  End Date:            Discussion: Conversations with patient and daughter about goals.    Goals: Created.      Next Scheduled patient outreach: 06/01/2022 @ 1400

## 2022-06-01 ENCOUNTER — PATIENT OUTREACH (OUTPATIENT)
Dept: HEALTH INFORMATION MANAGEMENT | Facility: OTHER | Age: 87
End: 2022-06-01
Payer: MEDICARE

## 2022-06-01 DIAGNOSIS — E11.9 TYPE 2 DIABETES MELLITUS WITHOUT COMPLICATION, WITHOUT LONG-TERM CURRENT USE OF INSULIN (HCC): ICD-10-CM

## 2022-06-01 PROCEDURE — 99999 PR NO CHARGE: CPT | Performed by: NURSE PRACTITIONER

## 2022-06-01 NOTE — PROGRESS NOTES
"Assessment    2 week CCM follow-up call completed. Patient said she is doing well. In regards to mobility, she is still walking around 10 minutes per day, but is now doing it outside. PT referral has not been authorized yet. This RN educated patient that referrals can take up to 7-10 business days to be authorized. Once that is authorized, she will be able to get scheduled and really start working on her mobility. This RN and patient also discussed her NOC oximetry study she needs to complete. Per patient, no one has reached out to her from the DME company. Nothing scanned into media. Will reach out to PCP/MA to inquire on where the order went so we can get the process of ordering NOC oxygen going. Patient and daughter spoke about assisted living at last appointment. Per patient, they haven't pursed this option as it is the \"last resort.\" Patient states her diet has been good and they are still making healthy choices.     Education    If patient feels steady and is not tired, she can increase her walking from 10-15 minutes. Patient advised that physical therapy is what is really going to help her mobility. Patient educated on NOC oximetry study.     Care Plan    Continue with plan of care    Progress:    On-track    Next outreach: July 6th @ 1400                              "

## 2022-06-07 ENCOUNTER — NON-PROVIDER VISIT (OUTPATIENT)
Dept: CARDIOLOGY | Facility: MEDICAL CENTER | Age: 87
End: 2022-06-07
Payer: MEDICARE

## 2022-06-07 DIAGNOSIS — Z95.0 CARDIAC PACEMAKER IN SITU: Chronic | ICD-10-CM

## 2022-06-07 DIAGNOSIS — I44.2 AV BLOCK, 3RD DEGREE (HCC): ICD-10-CM

## 2022-06-07 PROCEDURE — 93280 PM DEVICE PROGR EVAL DUAL: CPT | Performed by: INTERNAL MEDICINE

## 2022-06-08 ENCOUNTER — OFFICE VISIT (OUTPATIENT)
Dept: CARDIOLOGY | Facility: MEDICAL CENTER | Age: 87
End: 2022-06-08
Payer: MEDICARE

## 2022-06-08 VITALS
BODY MASS INDEX: 25.16 KG/M2 | HEART RATE: 70 BPM | OXYGEN SATURATION: 92 % | WEIGHT: 142 LBS | SYSTOLIC BLOOD PRESSURE: 118 MMHG | HEIGHT: 63 IN | DIASTOLIC BLOOD PRESSURE: 62 MMHG | RESPIRATION RATE: 12 BRPM

## 2022-06-08 DIAGNOSIS — I48.0 PAF (PAROXYSMAL ATRIAL FIBRILLATION) (HCC): ICD-10-CM

## 2022-06-08 DIAGNOSIS — Z79.01 CHRONIC ANTICOAGULATION: ICD-10-CM

## 2022-06-08 DIAGNOSIS — R60.0 BILATERAL LOWER EXTREMITY EDEMA: Primary | ICD-10-CM

## 2022-06-08 PROCEDURE — 99213 OFFICE O/P EST LOW 20 MIN: CPT | Performed by: INTERNAL MEDICINE

## 2022-06-08 ASSESSMENT — FIBROSIS 4 INDEX: FIB4 SCORE: 1.61

## 2022-06-08 NOTE — PROGRESS NOTES
CARDIOLOGY Follow Up PATIENT:    PCP: JAYLEN Castelan    1. Bilateral lower extremity edema    2. Chronic anticoagulation    3. PAF (paroxysmal atrial fibrillation) ([facility])        Krystle Tavarez is here for follow-up.  She used to see Dr. Kacey Mota in our clinic, last seen by her on 12/16/2021.    Chief Complaint   Patient presents with   • Atrial Fibrillation     F/V Dx: PAF (paroxysmal atrial fibrillation) (HCC)   • AICD Check/Dysfunction     F/V Dx: PAF (paroxysmal atrial fibrillation) (HCC)   • Hypertension       History: Krystle Tavarez is a 88 y.o. 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  She was recently hospitalized from 5/1/2020 to for toxic encephalopathy secondary to sedative narcotic use.  CT head on admission revealed chronic ischemic changes but no acute abnormalities.  PE was excluded.  Echocardiogram was obtained that showed LVEF 60% with grade 2 diastolic dysfunction and no obvious wall motion abnormalities.    Except for her ongoing chronic lower back pain, she admits to be doing well but with ongoing MICHAELA, on Bumex 1mg BID (mostly uses it prn for MICHAELA).    Denies any chest pain, shortness of breath, orthopnea, PND, palpitations, claudication.  She admits to stable occasional orthostatic dizziness/lightheadedness without any syncope or presyncope.    No bleeding concerns on apixaban 2.5 mg twice daily.  She is not sure what she is on the reduced dose of apixaban.  Never had kidney problems as questional, and she has never been less than 60 kg as far she can remember.    Her device was interrogated yesterday:  100% V  "paced  9.2 years on the battery  Less than 2% mode switches, low A. fib burden  Underlying complete heart block    TTE 22:  Compared to the images of the prior study 2021, there has been no   significant change.   Normal left ventricular systolic function.  The left ventricular ejection fraction is visually estimated to be 60%.  Grade II diastolic dysfunction.  Normal right ventricular systolic function.  Moderate aortic insufficiency.  Mild tricuspid regurgitation.  Right ventricular systolic pressure is estimated to be 45 mmHg.    [id number] score of 6 (history of TIA).    Father  of a heart attack at 74, mother  at 76 from complications of diabetes and stroke.    Patient lives with her daughter who accompanies her today      PE:  /62 (BP Location: Left arm, Patient Position: Sitting, BP Cuff Size: Adult)   Pulse 70   Resp 12   Ht 1.6 m (5' 3\")   Wt 64.4 kg (142 lb)   LMP  (LMP Unknown)   SpO2 92%   BMI 25.15 kg/m²     Gen: no acute distress  HEENT: Symmetric face. Anicteric sclerae. Moist mucus membranes  NECK: No JVD. No lymphadenopathy  CARDIAC: Regular, Normal S1, S2, No murmur  VASCULATURE: carotids are normal bilaterally without bruit  RESP: Clear to auscultation bilaterally  ABD: Soft, non-tender, non-distended  EXT: 2+ lower extremity edema, no clubbing or cyanosis  SKIN: Warm and dry  NEURO: No gross deficits  PSYCH: Appropriate affect, participates in conversation    The ASCVD Risk score (Sil BAN Jr, et al., 2013) failed to calculate.    Past Medical History:   Diagnosis Date   • Arthritis     knees, hips   • Breath shortness     with exertion    • Cataract     bilat IOL    • Dental disorder     full upper, partial lower    • Diabetes (HCC)     pre diabetic   • Heart burn    • Hemorrhagic disorder (HCC)     on Eliquis   • High cholesterol     diet controlled    • Hyperlipidemia    • Hypertension    • Pacemaker    • Pain 2020    lower back, right leg   • Pre-diabetes  "   • TIA (transient ischemic attack)     on Eliquis   • Urinary incontinence      Past Surgical History:   Procedure Laterality Date   • PB LAMINOTOMY,LUMBAR DISK,1 INTRSP N/A 2/25/2020    Procedure: LAMINECTOMY, SPINE, LUMBAR, WITH DISCECTOMY- L5-S1, MEDIAL FACETECTOMY;  Surgeon: Latrell Kimble M.D.;  Location: [facility];  Service: Neurosurgery   • FORAMINOTOMY N/A 2/25/2020    Procedure: FORAMINOTOMY, SPINE;  Surgeon: Latrell Kimble M.D.;  Location: [facility];  Service: Neurosurgery   • PACEMAKER INSERTION  2015   • HIP REPLACEMENT, TOTAL Right 2009   • KNEE REPLACEMENT, TOTAL Right 2004   • CATARACT EXTRACTION WITH IOL Bilateral 2003   • CHOLECYSTECTOMY  2002   • BASAL CELL EXCISION      nose and hand   • BUNIONECTOMY Left      Allergies   Allergen Reactions   • Sulfa Drugs Nausea     Nausea    • Gabapentin      Altered mental status   • Tramadol      Altered mentation   • Tylenol With Codeine #3 [Acetaminophen-Codeine] Unspecified     Dizziness, hallucinations     Outpatient Encounter Medications as of 6/8/2022   Medication Sig Dispense Refill   • Cyanocobalamin (B-12) 1000 MCG Tab Take 2 tablets  (2,000mcg) by mouth every day. 60 Tablet 0   • lidocaine (LIDODERM) 5 % Patch Place 1 Patch on the skin every 24 hours. 15 Patch 0   • docusate sodium (COLACE) 100 MG Cap Take 100 mg by mouth every day.     • atorvastatin (LIPITOR) 40 MG Tab Take 1 Tablet by mouth every evening. 90 Tablet 7   • bumetanide (BUMEX) 0.5 MG Tab Take 2 Tablets by mouth 1 time a day as needed (edema). 90 Tablet 7   • carvedilol (COREG) 3.125 MG Tab Take 1 Tablet by mouth 2 times a day with meals. 180 Tablet 7   • losartan (COZAAR) 100 MG Tab Take 1 Tablet by mouth every day. 90 Tablet 7   • apixaban (ELIQUIS) 2.5mg Tab Take 1 Tablet by mouth 2 times a day. 180 Tablet 7   • ketoconazole (NIZORAL) 2 % shampoo Apply 5 to 10 mL to wet scalp, lather, leave on 3 to 5 minutes, and rinse; apply twice weekly for 2 to 4 weeks. 120  mL 1   • Ascorbic Acid (VITAMIN C) 500 MG Cap Take 1 Capsule by mouth 2 Times a Day.     • [DISCONTINUED] metFORMIN ER (GLUCOPHAGE XR) 500 MG TABLET SR 24 HR TAKE 1 TABLET DAILY (Patient not taking: No sig reported) 90 Tablet 3   • [DISCONTINUED] nitrofurantoin (MACROBID) 100 MG Cap Take 1 Capsule by mouth 2 times a day with meals. (Patient not taking: No sig reported) 7 Capsule 0     No facility-administered encounter medications on file as of 6/8/2022.     Social History     Socioeconomic History   • Marital status:      Spouse name: Not on file   • Number of children: Not on file   • Years of education: Not on file   • Highest education level: Not on file   Occupational History   • Not on file   Tobacco Use   • Smoking status: Never Smoker   • Smokeless tobacco: Never Used   Vaping Use   • Vaping Use: Never used   Substance and Sexual Activity   • Alcohol use: Not Currently   • Drug use: No   • Sexual activity: Not Currently     Partners: Male   Other Topics Concern   • Not on file   Social History Narrative   • Not on file     Social Determinants of Health     Financial Resource Strain: Not on file   Food Insecurity: Not on file   Transportation Needs: No Transportation Needs   • Lack of Transportation (Medical): No   • Lack of Transportation (Non-Medical): No   Physical Activity: Inactive   • Days of Exercise per Week: 0 days   • Minutes of Exercise per Session: 10 min   Stress: No Stress Concern Present   • Feeling of Stress : Only a little   Social Connections: Not on file   Intimate Partner Violence: Not on file   Housing Stability: Unknown   • Unable to Pay for Housing in the Last Year: No   • Number of Places Lived in the Last Year: Not on file   • Unstable Housing in the Last Year: No     Family History   Problem Relation Age of Onset   • Diabetes Mother    • Stroke Mother    • Heart Attack Father 74   • No Known Problems Maternal Grandmother    • No Known Problems Maternal Grandfather    • No  Known Problems Paternal Grandmother    • No Known Problems Paternal Grandfather          Studies    Lab Results   Component Value Date/Time    TSHULTRASEN 0.590 05/02/2022 1317      No results found for: FREET4   Lab Results   Component Value Date/Time    HBA1C 6.5 (H) 05/02/2022 01:17 PM     Lab Results   Component Value Date/Time    CHOLSTRLTOT 187 06/01/2021 12:33 AM     (H) 06/01/2021 12:33 AM    HDL 51 06/01/2021 12:33 AM    TRIGLYCERIDE 61 06/01/2021 12:33 AM       Lab Results   Component Value Date/Time    SODIUM 139 05/05/2022 06:17 AM    POTASSIUM 4.2 05/05/2022 06:17 AM    CHLORIDE 102 05/05/2022 06:17 AM    CO2 27 05/05/2022 06:17 AM    GLUCOSE 179 (H) 05/05/2022 06:17 AM    BUN 14 05/05/2022 06:17 AM    CREATININE 0.69 05/05/2022 06:17 AM     Lab Results   Component Value Date/Time    ALKPHOSPHAT 80 05/05/2022 06:17 AM    ASTSGOT 12 05/05/2022 06:17 AM    ALTSGPT 10 05/05/2022 06:17 AM    TBILIRUBIN 0.9 05/05/2022 06:17 AM        Echocardiogram:  No results found for this or any previous visit.    Assessment and Recommendations:    Problem List Items Addressed This Visit     PAF (paroxysmal atrial fibrillation) (HCC) (Chronic)      Other Visit Diagnoses     Bilateral lower extremity edema    -  Primary    Chronic anticoagulation            Mrs. Crooks seems to be doing well from a cardiovascular standpoint.  It is tough to assess her exertional symptoms given she is limited from her chronic lower back pain which is very frustrating to her.  She reports that historically she was unable to obtain an MRI of her back given her pacemaker, and after I checked today with the pacemaker nurse, it seems that her device is not MRI conditional but an MRI can be performed as long as the device is programmed accordingly after the procedure but it designated EP rep. Suly Vandana needs to be notified to coordinate.    I also sent a message to her prior cardiologist and to the Pharm.D. on our team to inquire  about her reduced dose of apixaban before increasing it to 5 mg twice daily, to make sure I am not missing any other reason for her reduced dose.  Her weight is> 60kg with normal kidney function. We will plan to increase the dose to 5mg PO BID.    I advised her to check her daily morning weight and to use the Bumex 1 mg twice a day if she gains more than 3 pounds in a day or 5 pounds in a week rather than just relying on her lower extremity edema.  Since she is taking it now, I advised her to keep taking it as long as her weight remains stable within 5 pounds difference.  I also advised her to wear compression socks at least 8 hours in a day while awake and to make sure she lifts her legs while sitting down.    Thank you for the opportunity to be involved in Krystle Tavarez 's care; and please reach out with any questions or concerns.    Return in about 6 months (around 12/8/2022).  With device interrogation.    Scott Garcia MD, MPH [facility]  Interventional Cardiologist  [facility]   of Clinical Internal Medicine - [facility]    ~ Portions of this note were completed using voice recognition software (Dragon Naturally speaking software) . Occasional transcription errors may have escaped proof reading. I have made every reasonable attempt to correct obvious errors, but I expect that there are errors of grammar and possibly content that I did not discover before finalizing the note. ~

## 2022-06-08 NOTE — PATIENT INSTRUCTIONS
Try to wear compression socks daily for at least 8 hours a day while awake.  Check your daily morning weight: take the Bumex 1mg twice a day if you gain > 3lbs a day or > 5lbs a week.  Lift your legs as long as you are sitting down.

## 2022-06-10 ENCOUNTER — TELEPHONE (OUTPATIENT)
Dept: CARDIOLOGY | Facility: MEDICAL CENTER | Age: 87
End: 2022-06-10
Payer: MEDICARE

## 2022-06-10 DIAGNOSIS — I48.0 PAF (PAROXYSMAL ATRIAL FIBRILLATION) (HCC): ICD-10-CM

## 2022-06-10 NOTE — TELEPHONE ENCOUNTER
FW: Apixaban dosing  Received: Today  Scott Garcia M.D.  Carly Bray R.N.  Good morning Carly and Happy Friday!     I tried to connect with Ana M (her daughter) for the past 2 days after our clinic visit to update them on 2 things and kept referring me to voicemail. I left her messages. Can you kindly try calling them again today to let them of the following:     - Apixaban dose is changed to 5mg BID instead of 2.5mg   - the pacemaker is MRI non-conditional, meaning we CAN do an MRI as long as we have a rep / device RN present during the study which can be arranged with Suly Ross. Her spine doc has held off doing an MRI for her back pain due to her PM.     Thank you!       Attempted to call pt's daughter Ana M, no answer, LM for call back.  Also called and LMs for pt's home and mobile phone on file. Will await call back.

## 2022-06-13 NOTE — TELEPHONE ENCOUNTER
Called back pt's daughter Ana M. She confirmed she got AL's VM previously however she didn't think to call back as the message she received didn't indicate that a call back was needed. She confirmed she understood AL's message previously. Pt at this time is still taking Eliquis 2.5mg BID. She confirmed pt's preferred pharmacy is [facility] and she said it usually takes about 4 days for pt to receive her supply one a script is sent. Advised will send a short script to pt's local [facility] so she can get started on new dose ASAP.    Regarding MRI, she said pt has opted out of getting it scheduled. Did advise of AL's recommendations should pt want to get an MRI in the future. She verbalized understanding and was appreciative of call back.    Short script for 7 days Eliquis sent to pt's [facility] pharmacy on file while pt waits for her supply from [facility].

## 2022-06-13 NOTE — TELEPHONE ENCOUNTER
AL      Caller: Ana M Packer, pt's daughter    Topic/issue: Ana M was calling back regarding missing your call.     Callback Number: [phone]    Thank you,  Yesenia LUGO

## 2022-06-27 ENCOUNTER — TELEPHONE (OUTPATIENT)
Dept: MEDICAL GROUP | Facility: MEDICAL CENTER | Age: 87
End: 2022-06-27
Payer: MEDICARE

## 2022-06-27 NOTE — TELEPHONE ENCOUNTER
Nocturnal Oximetry Study order, Recent Office Visit Notes, ID/Insurance Cards, and Face Sheet has been sent to the following:    [address]. [place] [address] . [phone] Fax. [phone] or [phone]    Patient informed? N/A

## 2022-07-06 ENCOUNTER — PATIENT OUTREACH (OUTPATIENT)
Dept: HEALTH INFORMATION MANAGEMENT | Facility: OTHER | Age: 87
End: 2022-07-06
Payer: MEDICARE

## 2022-07-06 DIAGNOSIS — E11.9 TYPE 2 DIABETES MELLITUS WITHOUT COMPLICATION, WITHOUT LONG-TERM CURRENT USE OF INSULIN (HCC): ICD-10-CM

## 2022-07-06 DIAGNOSIS — M47.816 LUMBAR SPONDYLOSIS: ICD-10-CM

## 2022-07-06 DIAGNOSIS — M51.36 DEGENERATION OF LUMBAR INTERVERTEBRAL DISC: ICD-10-CM

## 2022-07-06 DIAGNOSIS — G89.29 CHRONIC BILATERAL LOW BACK PAIN, UNSPECIFIED WHETHER SCIATICA PRESENT: ICD-10-CM

## 2022-07-06 DIAGNOSIS — M54.50 CHRONIC BILATERAL LOW BACK PAIN, UNSPECIFIED WHETHER SCIATICA PRESENT: ICD-10-CM

## 2022-07-06 PROCEDURE — 99490 CHRNC CARE MGMT STAFF 1ST 20: CPT | Performed by: NURSE PRACTITIONER

## 2022-07-06 NOTE — PROGRESS NOTES
"07/06/2022:    1357: Attempt x1 to reach out to patient for monthly CCM call. [facility] with contact information.     07/07/2022:     1014: Attempt x2 to reach out to patient for monthyl CCM call. [facility] with contact information.     1115:     Assessment    Patient called this RN back to complete monthly CCM call. Patient said she is doing \"okay\" today and has been \"doing about the same\" since the last time we spoke. Patient has recently seen her cardiologist and they increased her Apixaban from 2.5mg to 5mg. Patient is wondering why they increased her dose. Patient told that per her Cardiologist note, since her weight is> 60kg with normal kidney function she is able to increase to the more standard dose of 5mg. This RN inquired why the patient does not want to receive a MRI for her chronic back pain. Patient stated that every doctor since she had her pacemaker placed has informed her that she cannot get an MRI. Patient does not want to mess with that. This RN took a look at her imaging and noticed she has not gotten any imaging done on her back since 2020. Patient stated that the pain has gotten worse since then and now her mobility is being affected. She stated she has more difficulty getting around and cannot walk as far. Patient stated she had gotten a letter in the mail from one of her providers encouraging her to attend a [employer] seminar for patient management. Patient unsure if she would like to go, but this RN encouraged it as she may learn a lot from it. Patient stated that her diet is fine, she is still losing a little bit of weight. But, her daughter has been making sure she is eating all her meals.     Education    Medications and pain management/imaging     Care Plan    Continue with current care plan    Progress:    On-track with nutrition, regressed with mobility    Next outreach: August 8th @ 1400                               "

## 2022-07-15 NOTE — PROGRESS NOTES
"Phone Number Called: [phone]    Call outcome: Spoke to patient regarding message below.    Message: Called to inform patient that per Joana, \"Given her history of her back pain and surgery I would either send her back to neurosurgery or even physiatry with Dr. Pradhan to discuss other options. We can certainly update bone density.\"  Patient is interested in a referral to Dr. Pradhan.       "

## 2022-07-19 ENCOUNTER — NON-PROVIDER VISIT (OUTPATIENT)
Dept: CARDIOLOGY | Facility: MEDICAL CENTER | Age: 87
End: 2022-07-19

## 2022-07-19 ENCOUNTER — OFFICE VISIT (OUTPATIENT)
Dept: MEDICAL GROUP | Facility: MEDICAL CENTER | Age: 87
End: 2022-07-19
Payer: MEDICARE

## 2022-07-19 VITALS
HEART RATE: 73 BPM | HEIGHT: 63 IN | OXYGEN SATURATION: 94 % | TEMPERATURE: 96.9 F | SYSTOLIC BLOOD PRESSURE: 112 MMHG | WEIGHT: 143.4 LBS | BODY MASS INDEX: 25.41 KG/M2 | DIASTOLIC BLOOD PRESSURE: 68 MMHG

## 2022-07-19 DIAGNOSIS — B37.9 CANDIDIASIS: ICD-10-CM

## 2022-07-19 PROCEDURE — 93294 REM INTERROG EVL PM/LDLS PM: CPT | Performed by: INTERNAL MEDICINE

## 2022-07-19 PROCEDURE — 99213 OFFICE O/P EST LOW 20 MIN: CPT | Performed by: FAMILY MEDICINE

## 2022-07-19 RX ORDER — NYSTATIN 100000 [USP'U]/G
1 POWDER TOPICAL 2 TIMES DAILY
Qty: 30 G | Refills: 0 | Status: SHIPPED | OUTPATIENT
Start: 2022-07-19 | End: 2023-07-18

## 2022-07-19 ASSESSMENT — FIBROSIS 4 INDEX: FIB4 SCORE: 1.61

## 2022-07-19 NOTE — PROGRESS NOTES
"Subjective:     CC: \"Burning rash\"    HPI:   Krystle presents today with:    Burning started last night  Under right breast  irriated red skin  No fluid-filled vesicles  Did have shingles 20 years ago was concerned about that  No fevers, chills, body aches, shortness of breath, GI upset    No problems updated.    Current Outpatient Medications Ordered in Epic   Medication Sig Dispense Refill   • nystatin (MYCOSTATIN) powder Apply 1 g topically 2 times a day. 30 g 0   • apixaban (ELIQUIS) 5mg Tab Take 1 Tablet by mouth 2 times a day. 120 Tablet 3   • Cyanocobalamin (B-12) 1000 MCG Tab Take 2 tablets  (2,000mcg) by mouth every day. 60 Tablet 0   • lidocaine (LIDODERM) 5 % Patch Place 1 Patch on the skin every 24 hours. 15 Patch 0   • docusate sodium (COLACE) 100 MG Cap Take 100 mg by mouth every day.     • atorvastatin (LIPITOR) 40 MG Tab Take 1 Tablet by mouth every evening. 90 Tablet 7   • bumetanide (BUMEX) 0.5 MG Tab Take 2 Tablets by mouth 1 time a day as needed (edema). 90 Tablet 7   • carvedilol (COREG) 3.125 MG Tab Take 1 Tablet by mouth 2 times a day with meals. 180 Tablet 7   • losartan (COZAAR) 100 MG Tab Take 1 Tablet by mouth every day. 90 Tablet 7   • ketoconazole (NIZORAL) 2 % shampoo Apply 5 to 10 mL to wet scalp, lather, leave on 3 to 5 minutes, and rinse; apply twice weekly for 2 to 4 weeks. 120 mL 1   • Ascorbic Acid (VITAMIN C) 500 MG Cap Take 1 Capsule by mouth 2 Times a Day.       No current [facility]-ordered facility-administered medications on file.       Health Maintenance: Acute visit    ROS:  ROS see HPI    Objective:     Exam:  /68   Pulse 73   Temp 36.1 °C (96.9 °F) (Temporal)   Ht 1.6 m (5' 3\")   Wt 65 kg (143 lb 6.4 oz)   LMP  (LMP Unknown)   SpO2 94%   BMI 25.40 kg/m²  Body mass index is 25.4 kg/m².    Physical Exam  Vitals reviewed.   Constitutional:       Appearance: Normal appearance.   HENT:      Head: Normocephalic and atraumatic.   Pulmonary:      Effort: Pulmonary effort is " normal.   Skin:     General: Skin is warm and dry.      Findings: Erythema, lesion and rash present.      Comments: Small erythematous patch in crevice underneath right breast, no fluid-filled vesicles, does not appear to be cellulitis or consistent with candidiasis.  Patient also has some scattered nevi on the upper abdomen and chest that she wants to have looked at.   Neurological:      Mental Status: She is alert. Mental status is at baseline.      Gait: Gait (uses wheel chair) normal.         A chaperone was offered to the patient during today's exam. Chaperone name: SUSAN Glover was present.      Assessment & Plan:     88 y.o. female with the following -     Problem List Items Addressed This Visit    None     Visit Diagnoses     Candidiasis      Appears compatible with diagnosis of cutaneous candidiasis this is an a full area underneath her breast.  We will try to treat with nystatin powder to see if it helps dry out the area as well.  Patient to contact office if the powder is not a good fit for her and we can switch to ointment.  Patient has dermatologist already instructed to see them about the other lesions related to appear benign at this time.            Return if symptoms worsen or fail to improve.    Please note that this dictation was created using voice recognition software. I have made every reasonable attempt to correct obvious errors, but I expect that there are errors of grammar and possibly content that I did not discover before finalizing the note.

## 2022-07-19 NOTE — CARDIAC REMOTE MONITOR - SCAN
Device transmission reviewed. Device demonstrated appropriate function.       Electronically Signed by: Diego Irvin M.D.    7/27/2022  9:17 AM

## 2022-08-03 ENCOUNTER — OFFICE VISIT (OUTPATIENT)
Dept: PHYSICAL MEDICINE AND REHAB | Facility: MEDICAL CENTER | Age: 87
End: 2022-08-03
Payer: MEDICARE

## 2022-08-03 VITALS
BODY MASS INDEX: 25.21 KG/M2 | DIASTOLIC BLOOD PRESSURE: 60 MMHG | SYSTOLIC BLOOD PRESSURE: 134 MMHG | WEIGHT: 137 LBS | HEIGHT: 62 IN | TEMPERATURE: 97 F | OXYGEN SATURATION: 91 % | HEART RATE: 69 BPM

## 2022-08-03 DIAGNOSIS — M54.50 LUMBAR PAIN: ICD-10-CM

## 2022-08-03 DIAGNOSIS — M54.2 CERVICALGIA: ICD-10-CM

## 2022-08-03 DIAGNOSIS — M54.6 THORACIC SPINE PAIN: ICD-10-CM

## 2022-08-03 DIAGNOSIS — M79.10 MYALGIA: ICD-10-CM

## 2022-08-03 DIAGNOSIS — Z79.01 CHRONIC ANTICOAGULATION: ICD-10-CM

## 2022-08-03 PROCEDURE — 99214 OFFICE O/P EST MOD 30 MIN: CPT | Performed by: PHYSICAL MEDICINE & REHABILITATION

## 2022-08-03 ASSESSMENT — PATIENT HEALTH QUESTIONNAIRE - PHQ9
CLINICAL INTERPRETATION OF PHQ2 SCORE: 6
5. POOR APPETITE OR OVEREATING: 0 - NOT AT ALL
SUM OF ALL RESPONSES TO PHQ QUESTIONS 1-9: 12

## 2022-08-03 ASSESSMENT — PAIN SCALES - GENERAL: PAINLEVEL: 8=MODERATE-SEVERE PAIN

## 2022-08-03 ASSESSMENT — FIBROSIS 4 INDEX: FIB4 SCORE: 1.63

## 2022-08-03 NOTE — PROGRESS NOTES
New patient note    Physiatry (physical medicine and  Rehabilitation), interventional spine and sports medicine    Date of Service: 8/3/2022    Chief complaint:   Chief Complaint   Patient presents with   • New Patient     Low back pain       HISTORY    HPI: Krystle Tavarez 89 y.o. female who presents today for evaluation of low back pain.  She is here with her daughter, Ana M Packer.  The patient relocated from [place] about 3 years ago to live with her daughter.    Her low back pain has been gradually worsening over the years.  She had been getting treatment in [place]. This involved epidural steroid injections that did provide relief, but then stopped helping.  She also saw a chiropractor there.    About 3 months prior to moving, she was found to have a compression fracture and underwent kyphoplasty.    She reports that she had some treatment when she got here with Dr. Kimble.  She had lumbar laminectomy and foraminotomy with Dr. Kimble on 02/25/2020.    Overall, she has been to see Dr. Sher and had an injection.  Additionally, she saw Dr. Garcia and had an ablation.    Over time, she feels like her neck and shoulder pain has been worse.  Pain is aching in her low back.  Denies paresthesias in the arms or legs.  Difficulty standing and walking for very long.  Leaning over the walker at home.  Using a manual wheelchair for community mobility.    Medical records review:  I reviewed the note from the referring provider Joana Hawk* dated 05/19/2022    Previous treatments:    Physical Therapy: Yes    Medications the patient is tried: gabapentin, tylenol with codeine, tramadol patient had side effects to all of these medications    Previous interventions: none    Previous surgeries to relieve the above pain:  none      ROS:   PMH and problem list reviewed  Red Flags ROS:   Fever, Chills, Sweats: Denies  Involuntary Weight Loss: Denies  Bladder Incontinence: Denies  Bowel Incontinence: Denies  Saddle  Anesthesia: Denies    All other systems reviewed and negative.       PMHx:   Past Medical History:   Diagnosis Date   • Arthritis     knees, hips   • Breath shortness     with exertion    • Cataract     bilat IOL    • Dental disorder     full upper, partial lower    • Diabetes (HCC)     pre diabetic   • Heart burn    • Hemorrhagic disorder (HCC)     on Eliquis   • High cholesterol     diet controlled    • Hyperlipidemia    • Hypertension    • Pacemaker 2015   • Pain 2020    lower back, right leg   • Pre-diabetes    • TIA (transient ischemic attack)     on Eliquis   • Urinary incontinence        PSHx:   Past Surgical History:   Procedure Laterality Date   • PB LAMINOTOMY,LUMBAR DISK,1 INTRSP N/A 2/25/2020    Procedure: LAMINECTOMY, SPINE, LUMBAR, WITH DISCECTOMY- L5-S1, MEDIAL FACETECTOMY;  Surgeon: Latrell Kimble M.D.;  Location: SURGERY [facility];  Service: Neurosurgery   • FORAMINOTOMY N/A 2/25/2020    Procedure: FORAMINOTOMY, SPINE;  Surgeon: Latrell Kimble M.D.;  Location: SURGERY [facility];  Service: Neurosurgery   • PACEMAKER INSERTION  2015   • HIP REPLACEMENT, TOTAL Right 2009   • KNEE REPLACEMENT, TOTAL Right 2004   • CATARACT EXTRACTION WITH IOL Bilateral 2003   • CHOLECYSTECTOMY  2002   • BASAL CELL EXCISION      nose and hand   • BUNIONECTOMY Left        Family history   Family History   Problem Relation Age of Onset   • Diabetes Mother    • Stroke Mother    • Heart Attack Father 74   • No Known Problems Maternal Grandmother    • No Known Problems Maternal Grandfather    • No Known Problems Paternal Grandmother    • No Known Problems Paternal Grandfather          Medications:   Current Outpatient Medications   Medication   • nystatin (MYCOSTATIN) powder   • apixaban (ELIQUIS) 5mg Tab   • Cyanocobalamin (B-12) 1000 MCG Tab   • lidocaine (LIDODERM) 5 % Patch   • docusate sodium (COLACE) 100 MG Cap   • atorvastatin (LIPITOR) 40 MG Tab   • bumetanide (BUMEX) 0.5 MG Tab   • carvedilol (COREG) 3.125 MG  "Tab   • losartan (COZAAR) 100 MG Tab   • ketoconazole (NIZORAL) 2 % shampoo   • Ascorbic Acid (VITAMIN C) 500 MG Cap     No current facility-administered medications for this visit.       Allergies:   Allergies   Allergen Reactions   • Sulfa Drugs Nausea     Nausea    • Gabapentin      Altered mental status   • Tramadol      Altered mentation   • Tylenol With Codeine #3 [Acetaminophen-Codeine] Unspecified     Dizziness, hallucinations       Social Hx:   Social History     Socioeconomic History   • Marital status:      Spouse name: Not on file   • Number of children: Not on file   • Years of education: Not on file   • Highest education level: Not on file   Occupational History   • Not on file   Tobacco Use   • Smoking status: Never Smoker   • Smokeless tobacco: Never Used   Vaping Use   • Vaping Use: Never used   Substance and Sexual Activity   • Alcohol use: Not Currently   • Drug use: No   • Sexual activity: Not Currently     Partners: Male   Other Topics Concern   • Not on file   Social History Narrative   • Not on file     Social Determinants of Health     Financial Resource Strain: Not on file   Food Insecurity: Not on file   Transportation Needs: No Transportation Needs   • Lack of Transportation (Medical): No   • Lack of Transportation (Non-Medical): No   Physical Activity: Inactive   • Days of Exercise per Week: 0 days   • Minutes of Exercise per Session: 10 min   Stress: No Stress Concern Present   • Feeling of Stress : Only a little   Social Connections: Not on file   Intimate Partner Violence: Not on file   Housing Stability: Unknown   • Unable to Pay for Housing in the Last Year: No   • Number of Places Lived in the Last Year: Not on file   • Unstable Housing in the Last Year: No         EXAMINATION     Physical Exam:   Vitals: /60 (BP Location: Right arm, Patient Position: Sitting, BP Cuff Size: Adult long)   Pulse 69   Temp 36.1 °C (97 °F) (Temporal)   Ht 1.575 m (5' 2\")   Wt 62.1 kg " (137 lb)   SpO2 91%     Constitutional:   Body Habitus: Body mass index is 25.06 kg/m².  Cooperation: Fully cooperates with exam  Appearance: Well-groomed, well-nourished, not disheveled, in no acute distress    Eyes: No scleral icterus, no proptosis     ENT -no obvious auditory deficits, wearing a face mask    Skin -no rashes or lesions noted     Respiratory-  breathing comfortable on room air, no audible wheezing    Cardiovascular- capillary refills less than 2 seconds. No lower extremity edema is noted.     Psychiatric- alert and oriented ×3. Normal affect.     Gait - gait incompletely assessed.  Patient in manual wheelchair.  Able to go from sit to stand with assist    Musculoskeletal -   Cervical spine   Inspection: No deformities of the skin over the cervical spine. No rashes or lesions.    Decreased A/P ROM in all directions, without pain      Thoracic/Lumbar Spine/Sacral Spine/Hips   Inspection: No evidence of atrophy in bilateral lower extremities throughout     ROM: decreased AROM with flexion, extension, lateral flexion, and rotation bilaterally, with pain.  There is note of increased thoracic kyphosis    Palpation:   Mild tenderness to palpation over the upper/mid thoracic spine over the midline.  Mild tenderness over the left and right thoracic paraspinals.  No tenderness to palpation in midline at T1-T12 levels. No tenderness to palpation in the left and right of the midline T1-L5  palpation over SI joint: negative bilaterally    palpation over buttock: negative bilaterally    palpation in hip or over the greater trochanters: negative bilaterally      Lumbar spine Special tests  Neuro tension  Seated straight leg test negative bilaterally      Neuro     Key points for the international standards for neurological classification of spinal cord injury (ISNCSCI) to light touch.     Dermatome R L   L2 2 2   L3 2 2   L4 2 2   L5 2 2   S1 2 2   S2 2 2       Motor Exam Lower Extremities    ? Myotome R L   Hip  flexion L2 5 5   Knee extension L3 5 5   Ankle dorsiflexion L4 5 5   Toe extension L5 5 5   Ankle plantarflexion S1 5 5         Bautista’s sign negative bilaterally   Babinski sign negative bilaterally   Clonus of the ankle negative bilaterally     Reflexes  ?  R L   Patella  1+ 1+   Achilles   1+ 1+       MEDICAL DECISION MAKING    Medical records review: see under HPI section.     DATA    Labs:   Lab Results   Component Value Date/Time    SODIUM 139 05/05/2022 06:17 AM    POTASSIUM 4.2 05/05/2022 06:17 AM    CHLORIDE 102 05/05/2022 06:17 AM    CO2 27 05/05/2022 06:17 AM    ANION 10.0 05/05/2022 06:17 AM    GLUCOSE 179 (H) 05/05/2022 06:17 AM    BUN 14 05/05/2022 06:17 AM    CREATININE 0.69 05/05/2022 06:17 AM    CALCIUM 9.1 05/05/2022 06:17 AM    ASTSGOT 12 05/05/2022 06:17 AM    ALTSGPT 10 05/05/2022 06:17 AM    TBILIRUBIN 0.9 05/05/2022 06:17 AM    ALBUMIN 3.4 05/05/2022 06:17 AM    TOTPROTEIN 6.0 05/05/2022 06:17 AM    GLOBULIN 2.6 05/05/2022 06:17 AM    AGRATIO 1.3 05/05/2022 06:17 AM       Lab Results   Component Value Date/Time    PROTHROMBTM 15.6 (H) 05/01/2022 12:24 PM    INR 1.28 (H) 05/01/2022 12:24 PM        Lab Results   Component Value Date/Time    WBC 8.6 05/03/2022 06:40 AM    RBC 3.93 (L) 05/03/2022 06:40 AM    HEMOGLOBIN 12.6 05/03/2022 06:40 AM    HEMATOCRIT 37.0 05/03/2022 06:40 AM    MCV 94.1 05/03/2022 06:40 AM    MCH 32.1 05/03/2022 06:40 AM    MCHC 34.1 05/03/2022 06:40 AM    MPV 9.9 05/03/2022 06:40 AM    NEUTSPOLYS 47.20 05/02/2022 01:48 AM    LYMPHOCYTES 42.10 (H) 05/02/2022 01:48 AM    MONOCYTES 8.40 05/02/2022 01:48 AM    EOSINOPHILS 1.80 05/02/2022 01:48 AM    BASOPHILS 0.40 05/02/2022 01:48 AM        Lab Results   Component Value Date/Time    HBA1C 6.5 (H) 05/02/2022 01:17 PM        Imaging: I personally reviewed following images, these are my reads  Xray lumbar spine 06/11/2020  There is multilevel degenerative disc disease, osteophytes and facet arthropathy.  There is note of T12  vertebral augmentation.  Postsurgical changes at L5-S1  No instability on flexion and extension films    IMAGING radiology reads. I reviewed the following radiology reads                                            Results for orders placed during the hospital encounter of 06/11/20    DX-LUMBAR SPINE-4+ VIEWS    Impression  1.  Multilevel vertebral body compression with severe osteopenia    2.  Status post T12 vertebral augmentation.    3.  Minimal retrolisthesis at L2-3.    4.  Severe multilevel degenerative disc disease and facet arthropathy.                        Diagnosis   Visit Diagnoses     ICD-10-CM   1. Lumbar pain  M54.50   2. Myalgia  M79.10   3. Thoracic spine pain  M54.6   4. Cervicalgia  M54.2   5. Chronic anticoagulation  Z79.01           ASSESSMENT:  Krystle Tavarez 89 y.o. female seen for above     Krystle was seen today for new patient.    Diagnoses and all orders for this visit:    Lumbar pain  -     DX-LUMBAR SPINE-2 OR 3 VIEWS; Future    Myalgia  -     CRP QUANTITIVE (NON-CARDIAC); Future  -     Sed Rate; Future    Thoracic spine pain  -     DX-THORACIC SPINE-WITH SWIMMERS VIEW; Future    Cervicalgia  -     DX-CERVICAL SPINE-2 OR 3 VIEWS; Future    Chronic anticoagulation      1. Discussed that she has had multiple injections and lumbar spine surgery without resolution of low back pain.  We discussed getting records from previous treatments.  Additionally, her pain is more diffuse involving the axial spine.  Xrays of the cervical, thoracic and lumbar spine ordered to reassess and also check for new compression fractures.  2. Check CRP and Sed rate for complaint of diffuse muscle pain.  3. We discussed that KYLIE Galvez placed an order for physical therapy.  Encouraged patient and daughter to contact to reopen that referral to pursue physical therapy to help with her mobility and work on deconditioning.  4. Discussed that she has not had good success with medications for pain including  gabapentin, tramadol and tylenol with codeine.  Discussed the possibility of a low dose trial of duloxetine, which might help with her musculoskeletal pain as well as the mild depression that has developed with her chronic pain.  Will reach out to patient's cardiologist to see if they have any concerns with starting this medication.      Outside records requested:  The patient signed outside records request form for her outside records including outside images.      Follow-up: Return in about 4 weeks (around 8/31/2022).      Thank you very much for asking me to participate in Krystle Tavarez's care.  Please contact me with any questions or concerns.    Please note that this dictation was created using voice recognition software. I have made every reasonable attempt to correct obvious errors but there may be errors of grammar and content that I may have overlooked prior to finalization of this note.      Papi Pradhan MD  Physical Medicine and Rehabilitation  Interventional Spine and Sports Physiatry  [facility]           Joana Campos

## 2022-08-05 ENCOUNTER — OFFICE VISIT (OUTPATIENT)
Dept: DERMATOLOGY | Facility: IMAGING CENTER | Age: 87
End: 2022-08-05
Payer: MEDICARE

## 2022-08-05 DIAGNOSIS — L57.0 ACTINIC KERATOSIS: ICD-10-CM

## 2022-08-05 DIAGNOSIS — L82.1 SEBORRHEIC KERATOSIS: ICD-10-CM

## 2022-08-05 DIAGNOSIS — Z85.828 HX OF NONMELANOMA SKIN CANCER: ICD-10-CM

## 2022-08-05 PROCEDURE — 17000 DESTRUCT PREMALG LESION: CPT | Performed by: DERMATOLOGY

## 2022-08-05 PROCEDURE — 17003 DESTRUCT PREMALG LES 2-14: CPT | Performed by: DERMATOLOGY

## 2022-08-05 PROCEDURE — 99213 OFFICE O/P EST LOW 20 MIN: CPT | Mod: 25 | Performed by: DERMATOLOGY

## 2022-08-05 NOTE — PROGRESS NOTES
CC:  HAJA     Subjective: prev seen patient here for skin spot check/exam after Mohs.    Reports rough spots on face and back.    Hx of red rash, itchy, under breasts. Lessened with nystatin powder    History of skin cancer: BCC nose , SCC temple  Mohs done on all three areas 01/2021  History of biopsies:Yes, Details: wart removal   History of blistering/severe sunburns:No  Family history of skin cancer:No  Family history of atypical moles:No    Tobacco use: Never  Alcohol use:denies alcohol consumption  Allergies sulfa     ROS: no fevers/chills. No itch.  No cough  Relevant PMH: mult med comorbidities  Social: never smoker    PE: Gen:WDWN female in NAD.  Skin: focal exam of skin:  -thin/moderate HK papules on left temple and nose.  Scattered waxy papules on face and back, legs, appearing benign. Chest/breast without rashes    A/P: AKs: face  -counseled on diagnosis and treatment, including risks/benefits/alternatives of cyrotherapy  -LN2 25 sec X 2 cycles X 2lesions  -f/u if persists at 1 month    Hx of skin cancer:  -cont'd sunprotection and skin cancer surveillance  -Q 6mo-annual exam recommended; f/u suspicious lesions PRN    SK, face, legs, back:  -b/r    Hx of intertrigo/breast region:   -reviewed dx/tx  -cont nystatin powder prn   -air dry low setting  I have reviewed medications relevant to my specialty.    Rec HAJA future visit

## 2022-08-08 ENCOUNTER — PATIENT OUTREACH (OUTPATIENT)
Dept: HEALTH INFORMATION MANAGEMENT | Facility: OTHER | Age: 87
End: 2022-08-08
Payer: MEDICARE

## 2022-08-08 DIAGNOSIS — E11.9 TYPE 2 DIABETES MELLITUS WITHOUT COMPLICATION, WITHOUT LONG-TERM CURRENT USE OF INSULIN (HCC): ICD-10-CM

## 2022-08-08 PROCEDURE — 99999 PR NO CHARGE: CPT | Performed by: NURSE PRACTITIONER

## 2022-08-08 NOTE — PROGRESS NOTES
Assessment    Reached out to patient for monthly CCM call. Per patient, she is doing okay. She recently saw Dr. Pradhan with physiatry and she stated her appointment went well. Patient to completed x-rays and lab work prior to next appointment with Dr. Pradhan on 09/06/2022. Dr. Pradhan also recommended PT again. Although patient referral currently closed, per Kiara with referrals she does have 90 days to reopen her referral. Phone number given to patient to schedule PT services.     Education    X-rays and PT     Care Plan    Continue with current care plan    Progress:    On-track    Next outreach: 09/08/2022 @ 1500

## 2022-08-09 ENCOUNTER — HOSPITAL ENCOUNTER (OUTPATIENT)
Dept: RADIOLOGY | Facility: MEDICAL CENTER | Age: 87
End: 2022-08-09
Attending: PHYSICAL MEDICINE & REHABILITATION
Payer: MEDICARE

## 2022-08-09 ENCOUNTER — TELEPHONE (OUTPATIENT)
Dept: PHYSICAL MEDICINE AND REHAB | Facility: MEDICAL CENTER | Age: 87
End: 2022-08-09

## 2022-08-09 ENCOUNTER — HOSPITAL ENCOUNTER (OUTPATIENT)
Dept: LAB | Facility: MEDICAL CENTER | Age: 87
End: 2022-08-09
Attending: PHYSICAL MEDICINE & REHABILITATION
Payer: MEDICARE

## 2022-08-09 DIAGNOSIS — M54.2 CERVICALGIA: ICD-10-CM

## 2022-08-09 DIAGNOSIS — M79.10 MYALGIA: ICD-10-CM

## 2022-08-09 DIAGNOSIS — M54.50 LUMBAR PAIN: ICD-10-CM

## 2022-08-09 DIAGNOSIS — M54.6 THORACIC SPINE PAIN: ICD-10-CM

## 2022-08-09 LAB
CRP SERPL HS-MCNC: 0.33 MG/DL (ref 0–0.75)
ERYTHROCYTE [SEDIMENTATION RATE] IN BLOOD BY WESTERGREN METHOD: 23 MM/HOUR (ref 0–25)

## 2022-08-09 PROCEDURE — 86140 C-REACTIVE PROTEIN: CPT

## 2022-08-09 PROCEDURE — 72040 X-RAY EXAM NECK SPINE 2-3 VW: CPT

## 2022-08-09 PROCEDURE — 72100 X-RAY EXAM L-S SPINE 2/3 VWS: CPT

## 2022-08-09 PROCEDURE — 85652 RBC SED RATE AUTOMATED: CPT

## 2022-08-09 PROCEDURE — 36415 COLL VENOUS BLD VENIPUNCTURE: CPT

## 2022-08-09 PROCEDURE — 72072 X-RAY EXAM THORAC SPINE 3VWS: CPT

## 2022-08-26 DIAGNOSIS — L21.9 SEBORRHEIC DERMATITIS OF SCALP: ICD-10-CM

## 2022-08-29 RX ORDER — KETOCONAZOLE 20 MG/ML
SHAMPOO TOPICAL
Qty: 120 ML | Refills: 6 | Status: SHIPPED | OUTPATIENT
Start: 2022-08-29 | End: 2023-07-18

## 2022-09-06 ENCOUNTER — OFFICE VISIT (OUTPATIENT)
Dept: PHYSICAL MEDICINE AND REHAB | Facility: MEDICAL CENTER | Age: 87
End: 2022-09-06
Payer: MEDICARE

## 2022-09-06 VITALS
TEMPERATURE: 97.7 F | HEART RATE: 68 BPM | OXYGEN SATURATION: 94 % | HEIGHT: 63 IN | DIASTOLIC BLOOD PRESSURE: 68 MMHG | SYSTOLIC BLOOD PRESSURE: 118 MMHG | BODY MASS INDEX: 23.92 KG/M2 | WEIGHT: 135 LBS

## 2022-09-06 DIAGNOSIS — Z74.09 IMPAIRED MOBILITY AND ADLS: ICD-10-CM

## 2022-09-06 DIAGNOSIS — R53.81 PHYSICAL DECONDITIONING: ICD-10-CM

## 2022-09-06 DIAGNOSIS — M47.816 LUMBAR SPONDYLOSIS: ICD-10-CM

## 2022-09-06 DIAGNOSIS — M79.18 CHRONIC MUSCULOSKELETAL PAIN: ICD-10-CM

## 2022-09-06 DIAGNOSIS — M47.814 THORACIC SPONDYLOSIS: ICD-10-CM

## 2022-09-06 DIAGNOSIS — Z78.9 IMPAIRED MOBILITY AND ADLS: ICD-10-CM

## 2022-09-06 DIAGNOSIS — G89.29 CHRONIC MUSCULOSKELETAL PAIN: ICD-10-CM

## 2022-09-06 DIAGNOSIS — M51.36 DDD (DEGENERATIVE DISC DISEASE), LUMBAR: ICD-10-CM

## 2022-09-06 DIAGNOSIS — R53.1 GENERAL WEAKNESS: ICD-10-CM

## 2022-09-06 PROCEDURE — 99214 OFFICE O/P EST MOD 30 MIN: CPT | Performed by: PHYSICAL MEDICINE & REHABILITATION

## 2022-09-06 RX ORDER — DULOXETIN HYDROCHLORIDE 30 MG/1
30 CAPSULE, DELAYED RELEASE ORAL DAILY
Qty: 30 CAPSULE | Refills: 1 | Status: SHIPPED | OUTPATIENT
Start: 2022-09-06 | End: 2022-10-18

## 2022-09-06 ASSESSMENT — PATIENT HEALTH QUESTIONNAIRE - PHQ9
5. POOR APPETITE OR OVEREATING: 0 - NOT AT ALL
CLINICAL INTERPRETATION OF PHQ2 SCORE: 2
SUM OF ALL RESPONSES TO PHQ QUESTIONS 1-9: 7

## 2022-09-06 ASSESSMENT — FIBROSIS 4 INDEX: FIB4 SCORE: 1.63

## 2022-09-06 ASSESSMENT — PAIN SCALES - GENERAL: PAINLEVEL: 7=MODERATE-SEVERE PAIN

## 2022-09-06 NOTE — PROGRESS NOTES
Follow-up patient note    Physiatry (physical medicine and  Rehabilitation), interventional spine and sports medicine    Date of Service: 09/06/2022    Chief complaint:   Chief Complaint   Patient presents with    Follow-Up     Back, neck and hips pain         HISTORY    HPI: Krsytle Tavarez 89 y.o. female who presents today for evaluation of low back pain.  She has a history of kyphoplasty at T12.   She had lumbar laminectomy and foraminotomy with Dr. Kimble on 02/25/2020.    She is here with her daughter, Ana M Packer.  The patient relocated from [place] to live with her daughter.    Krystle returns for follow-up.  She has not been able to start physical therapy yet.    She is here to review x-rays.  No significant changes to her symptoms since last visit.    Injections have not been helpful in the past.    Pain is worse when she is walking.  She requires the use of a walker and leans heavily on it.  Continues to use manual wheelchair for community mobility.      Medical records review:  I reviewed the note from the referring provider Joana Hawk* dated 05/19/2022    Previous treatments:    Physical Therapy: Yes    Medications the patient is tried: gabapentin, tylenol with codeine, tramadol patient had side effects to all of these medications    Previous interventions: none    Previous surgeries to relieve the above pain:  none      ROS:   PMH and problem list reviewed  Red Flags ROS:   Fever, Chills, Sweats: Denies  Involuntary Weight Loss: Denies  Bladder Incontinence: Denies  Bowel Incontinence: Denies  Saddle Anesthesia: Denies    All other systems reviewed and negative.       PMHx:   Past Medical History:   Diagnosis Date    Arthritis     knees, hips    Breath shortness     with exertion     Cataract     bilat IOL     Dental disorder     full upper, partial lower     Diabetes (HCC)     pre diabetic    Heart burn     Hemorrhagic disorder (HCC)     on Eliquis    High cholesterol     diet controlled      Hyperlipidemia     Hypertension     Pacemaker 2015    Pain 2020    lower back, right leg    Pre-diabetes     TIA (transient ischemic attack)     on Eliquis    Urinary incontinence        PSHx:   Past Surgical History:   Procedure Laterality Date    PB LAMINOTOMY,LUMBAR DISK,1 INTRSP N/A 2/25/2020    Procedure: LAMINECTOMY, SPINE, LUMBAR, WITH DISCECTOMY- L5-S1, MEDIAL FACETECTOMY;  Surgeon: Latrell Kimble M.D.;  Location: [facility];  Service: Neurosurgery    FORAMINOTOMY N/A 2/25/2020    Procedure: FORAMINOTOMY, SPINE;  Surgeon: Latrell Kimble M.D.;  Location: [facility];  Service: Neurosurgery    PACEMAKER INSERTION  2015    HIP REPLACEMENT, TOTAL Right 2009    KNEE REPLACEMENT, TOTAL Right 2004    CATARACT EXTRACTION WITH IOL Bilateral 2003    CHOLECYSTECTOMY  2002    BASAL CELL EXCISION      nose and hand    BUNIONECTOMY Left        Family history   Family History   Problem Relation Age of Onset    Diabetes Mother     Stroke Mother     Heart Attack Father 74    No Known Problems Maternal Grandmother     No Known Problems Maternal Grandfather     No Known Problems Paternal Grandmother     No Known Problems Paternal Grandfather          Medications:   Current Outpatient Medications   Medication    DULoxetine (CYMBALTA) 30 MG Cap DR Particles    ketoconazole (NIZORAL) 2 % shampoo    nystatin (MYCOSTATIN) powder    apixaban (ELIQUIS) 5mg Tab    Cyanocobalamin (B-12) 1000 MCG Tab    lidocaine (LIDODERM) 5 % Patch    docusate sodium (COLACE) 100 MG Cap    atorvastatin (LIPITOR) 40 MG Tab    bumetanide (BUMEX) 0.5 MG Tab    carvedilol (COREG) 3.125 MG Tab    losartan (COZAAR) 100 MG Tab    Ascorbic Acid (VITAMIN C) 500 MG Cap     No current facility-administered medications for this visit.       Allergies:   Allergies   Allergen Reactions    Sulfa Drugs Nausea     Nausea     Gabapentin      Altered mental status    Tramadol      Altered mentation    Tylenol With Codeine #3 [Acetaminophen-Codeine]  "Unspecified     Dizziness, hallucinations       Social Hx:   Social History     Socioeconomic History    Marital status:      Spouse name: Not on file    Number of children: Not on file    Years of education: Not on file    Highest education level: Not on file   Occupational History    Not on file   Tobacco Use    Smoking status: Never    Smokeless tobacco: Never   Vaping Use    Vaping Use: Never used   Substance and Sexual Activity    Alcohol use: Not Currently    Drug use: No    Sexual activity: Not Currently     Partners: Male   Other Topics Concern    Not on file   Social History Narrative    Not on file     Social Determinants of Health     Financial Resource Strain: Not on file   Food Insecurity: Not on file   Transportation Needs: No Transportation Needs    Lack of Transportation (Medical): No    Lack of Transportation (Non-Medical): No   Physical Activity: Inactive    Days of Exercise per Week: 0 days    Minutes of Exercise per Session: 10 min   Stress: No Stress Concern Present    Feeling of Stress : Only a little   Social Connections: Not on file   Intimate Partner Violence: Not on file   Housing Stability: Unknown    Unable to Pay for Housing in the Last Year: No    Number of Places Lived in the Last Year: Not on file    Unstable Housing in the Last Year: No         EXAMINATION     Physical Exam:   Vitals: /68 (BP Location: Right arm, Patient Position: Sitting, BP Cuff Size: Adult long)   Pulse 68   Temp 36.5 °C (97.7 °F) (Temporal)   Ht 1.6 m (5' 3\")   Wt 61.2 kg (135 lb)   SpO2 94%     Constitutional:   Body Habitus: Body mass index is 23.91 kg/m².  Cooperation: Fully cooperates with exam  Appearance: Well-groomed, well-nourished, not disheveled, in no acute distress    Eyes: No scleral icterus, no proptosis     ENT -no obvious auditory deficits, wearing a face mask    Skin -no rashes or lesions noted     Respiratory-  breathing comfortable on room air, no audible " wheezing    Cardiovascular-  No lower extremity edema is noted.     Psychiatric- alert and oriented ×3. Normal affect.     Gait - gait incompletely assessed.  Patient in manual wheelchair.      Musculoskeletal -   Cervical spine   Inspection: No deformities of the skin over the cervical spine. No rashes or lesions.    Decreased A/P ROM in all directions, without pain      Thoracic/Lumbar Spine/Sacral Spine/Hips   Inspection: No evidence of atrophy in bilateral lower extremities throughout     ROM: decreased AROM with flexion, extension, lateral flexion, and rotation bilaterally, with pain.  There is note of increased thoracic kyphosis    Palpation:   Mild tenderness to palpation over the upper/mid thoracic spine over the midline.  Mild tenderness over the left and right thoracic paraspinals.  No tenderness to palpation in midline at T1-T12 levels. No tenderness to palpation in the left and right of the midline T1-L5    Lumbar spine Special tests  Neuro tension  Seated straight leg test negative bilaterally      Neuro     Key points for the international standards for neurological classification of spinal cord injury (ISNCSCI) to light touch.     Dermatome R L   L2 2 2   L3 2 2   L4 2 2   L5 2 2   S1 2 2   S2 2 2       Motor Exam Lower Extremities    ? Myotome R L   Hip flexion L2 5 5   Knee extension L3 5 5   Ankle dorsiflexion L4 5 5   Toe extension L5 5 5   Ankle plantarflexion S1 5 5       Reflexes  ?  R L   Patella  1+ 1+   Achilles   1+ 1+       MEDICAL DECISION MAKING    Medical records review: see under HPI section.     DATA    Labs:   Lab Results   Component Value Date/Time    SODIUM 139 05/05/2022 06:17 AM    POTASSIUM 4.2 05/05/2022 06:17 AM    CHLORIDE 102 05/05/2022 06:17 AM    CO2 27 05/05/2022 06:17 AM    ANION 10.0 05/05/2022 06:17 AM    GLUCOSE 179 (H) 05/05/2022 06:17 AM    BUN 14 05/05/2022 06:17 AM    CREATININE 0.69 05/05/2022 06:17 AM    CALCIUM 9.1 05/05/2022 06:17 AM    ASTSGOT 12 05/05/2022  06:17 AM    ALTSGPT 10 05/05/2022 06:17 AM    TBILIRUBIN 0.9 05/05/2022 06:17 AM    ALBUMIN 3.4 05/05/2022 06:17 AM    TOTPROTEIN 6.0 05/05/2022 06:17 AM    GLOBULIN 2.6 05/05/2022 06:17 AM    AGRATIO 1.3 05/05/2022 06:17 AM       Lab Results   Component Value Date/Time    PROTHROMBTM 15.6 (H) 05/01/2022 12:24 PM    INR 1.28 (H) 05/01/2022 12:24 PM        Lab Results   Component Value Date/Time    WBC 8.6 05/03/2022 06:40 AM    RBC 3.93 (L) 05/03/2022 06:40 AM    HEMOGLOBIN 12.6 05/03/2022 06:40 AM    HEMATOCRIT 37.0 05/03/2022 06:40 AM    MCV 94.1 05/03/2022 06:40 AM    MCH 32.1 05/03/2022 06:40 AM    MCHC 34.1 05/03/2022 06:40 AM    MPV 9.9 05/03/2022 06:40 AM    NEUTSPOLYS 47.20 05/02/2022 01:48 AM    LYMPHOCYTES 42.10 (H) 05/02/2022 01:48 AM    MONOCYTES 8.40 05/02/2022 01:48 AM    EOSINOPHILS 1.80 05/02/2022 01:48 AM    BASOPHILS 0.40 05/02/2022 01:48 AM        Lab Results   Component Value Date/Time    HBA1C 6.5 (H) 05/02/2022 01:17 PM        Imaging: I personally reviewed following images, these are my reads  Xray cervical 08/09/2022  Limited evaluation of the cervical spine.  There is grade 1 anterolisthesis of C3 on C4.  Mild multilevel spondylosis    Xray thoracic 08/09/2022  Multilevel spondylosis.  There is kyphoplasty material noted at T12.  Cardiac pacing device is also present    Xray lumbar spine 08/09/2022  Multilevel degenerative disc disease and multilevel spondylosis and facet arthropathy.  No evidence of acute fracture.  Kyphoplasty material at T12    Xray lumbar spine 06/11/2020  There is multilevel degenerative disc disease, osteophytes and facet arthropathy.  There is note of T12 vertebral augmentation.  Postsurgical changes at L5-S1  No instability on flexion and extension films    IMAGING radiology reads. I reviewed the following radiology reads     Xray cervical 08/09/2022  IMPRESSION:     1.  Limited evaluation of the patient is unable to stand. Images were performed with patient in  wheelchair. The cervical spine is visualized to the C6 vertebral body  2.  Multilevel spondylosis, worst at C4-C5.  3.  There is anterolisthesis of C3 on C4.    Xray thoracic 08/09/2022  IMPRESSION:     1.  No acute vertebral body height loss.  2.  Multilevel spondylosis.    Xray lumbar spine 08/09/2022     1.  Multilevel spondylosis and lower lumbar prominent facet arthropathy.  2.  Diffuse, decreased bone mineral density.  3.  Kyphoplasty material is seen in T12.                                           Results for orders placed during the hospital encounter of 06/11/20    DX-LUMBAR SPINE-4+ VIEWS    Impression  1.  Multilevel vertebral body compression with severe osteopenia    2.  Status post T12 vertebral augmentation.    3.  Minimal retrolisthesis at L2-3.    4.  Severe multilevel degenerative disc disease and facet arthropathy.                        Diagnosis   Visit Diagnoses     ICD-10-CM   1. Chronic musculoskeletal pain  M79.18    G89.29   2. Impaired mobility and ADLs  Z74.09    Z78.9   3. Physical deconditioning  R53.81   4. General weakness  R53.1   5. DDD (degenerative disc disease), lumbar  M51.36   6. Lumbar spondylosis  M47.816   7. Thoracic spondylosis  M47.814             ASSESSMENT:  Krystle Tavarez 89 y.o. female seen for above     Krystle was seen today for follow-up.    Diagnoses and all orders for this visit:    Chronic musculoskeletal pain  -     DULoxetine (CYMBALTA) 30 MG Cap DR Particles; Take 1 Capsule by mouth every day for 60 days.    Impaired mobility and ADLs  -     Referral to Physical Therapy    Physical deconditioning  -     Referral to Physical Therapy    General weakness  -     Referral to Physical Therapy    DDD (degenerative disc disease), lumbar  -     DULoxetine (CYMBALTA) 30 MG Cap DR Particles; Take 1 Capsule by mouth every day for 60 days.    Lumbar spondylosis  -     DULoxetine (CYMBALTA) 30 MG Cap DR Particles; Take 1 Capsule by mouth every day for 60 days.    Thoracic  spondylosis  -     DULoxetine (CYMBALTA) 30 MG Cap DR Particles; Take 1 Capsule by mouth every day for 60 days.      1.  Discussed plan to pursue physical therapy as schedule permits.  I have reordered physical therapy.  2.  Reviewed findings on x-rays of the cervical, thoracic and lumbar spine.  There is does not appear to be any new compression fractures with note that there is some limitation to visualization of the cervical spine.  Lab work is within normal limits for CRP and sed rate.  3. Reviewed the procedures and medications that she has tried without relief.  I have communicated with her cardiologist, Dr. Garcia to let him know that I am going to trial duloxetine.  He agrees that it is reasonable to proceed with this trial and not contraindicated due to her apixaban.        Outside records requested:  The patient signed outside records request form for her outside records including outside images.      Follow-up: Return in about 6 weeks (around 10/18/2022).      Thank you very much for asking me to participate in Krystle Tavarez's care.  Please contact me with any questions or concerns.    Please note that this dictation was created using voice recognition software. I have made every reasonable attempt to correct obvious errors but there may be errors of grammar and content that I may have overlooked prior to finalization of this note.      Papi Pradhan MD  Physical Medicine and Rehabilitation  Interventional Spine and Sports Physiatry  [facility]           Joana Campos

## 2022-09-07 ENCOUNTER — TELEPHONE (OUTPATIENT)
Dept: CARDIOLOGY | Facility: MEDICAL CENTER | Age: 87
End: 2022-09-07
Payer: MEDICARE

## 2022-09-07 DIAGNOSIS — I48.0 PAF (PAROXYSMAL ATRIAL FIBRILLATION) (HCC): ICD-10-CM

## 2022-09-07 NOTE — TELEPHONE ENCOUNTER
AL    Caller: Ana M    Medication Name and Dosage:     apixaban (ELIQUIS) 5mg Tab    Did patient contact pharmacy (If no, please call pharmacy first): Yes    Medication amount left: 2 weeks    Preferred Pharmacy: [facility]    Other questions (Topic):  Pharmacy told them they 180 tablets for 90 days.    Callback Number (Will only call for issues): [phone]

## 2022-09-08 ENCOUNTER — PATIENT OUTREACH (OUTPATIENT)
Dept: HEALTH INFORMATION MANAGEMENT | Facility: OTHER | Age: 87
End: 2022-09-08
Payer: MEDICARE

## 2022-09-08 DIAGNOSIS — E11.9 TYPE 2 DIABETES MELLITUS WITHOUT COMPLICATION, WITHOUT LONG-TERM CURRENT USE OF INSULIN (HCC): ICD-10-CM

## 2022-09-08 DIAGNOSIS — I10 ESSENTIAL HYPERTENSION, BENIGN: ICD-10-CM

## 2022-09-08 PROCEDURE — 99490 CHRNC CARE MGMT STAFF 1ST 20: CPT | Performed by: NURSE PRACTITIONER

## 2022-09-08 NOTE — PROGRESS NOTES
Spoke to patient for monthly outreach call. Pt reports she is doing so so. She had follow up with Dr. Pradhan and was started on duloxetine, which she has picked up and started taking. Discussed follow through with new PT referral. Pt reports she has been eating well. Her daughter is on a diet and has been cooking health meals for both of them. No needs at this time. Follow up call in 1 month.

## 2022-09-21 ENCOUNTER — TELEPHONE (OUTPATIENT)
Dept: CARDIOLOGY | Facility: MEDICAL CENTER | Age: 87
End: 2022-09-21
Payer: MEDICARE

## 2022-09-21 NOTE — TELEPHONE ENCOUNTER
Called pt's daughter Ana M. She confirmed pt had a supply of HCTZ and she was actually wondering if she would still need to take this medication. She had an AVS from pt's other [facility] provider and the med list did not include HCTZ. Advised per current MAR, HCTZ is not an active medication that she is prescribed. Confirmed Bumex PRN per MAR (also stated in AL's last OV notes). She verbalized understanding. She requested to disregard the RX request for HCTZ. Appreciative of call and confirmation.

## 2022-09-21 NOTE — TELEPHONE ENCOUNTER
AL    Caller: Ana M    Medication Name and Dosage:  hydrochlorothiazide  25 mgs     Did patient contact pharmacy (If no, please call pharmacy first): yes    Medication amount left: 2    Preferred Pharmacy: [facility]     Other questions (Topic): N/A    Callback Number (Will only call for issues): [phone]    Thank you,   Fidelina TIRADO

## 2022-09-29 ENCOUNTER — PHYSICAL THERAPY (OUTPATIENT)
Dept: PHYSICAL THERAPY | Facility: REHABILITATION | Age: 87
End: 2022-09-29
Attending: PHYSICAL MEDICINE & REHABILITATION
Payer: MEDICARE

## 2022-09-29 DIAGNOSIS — Z78.9 IMPAIRED MOBILITY AND ADLS: ICD-10-CM

## 2022-09-29 DIAGNOSIS — Z74.09 IMPAIRED MOBILITY AND ADLS: ICD-10-CM

## 2022-09-29 DIAGNOSIS — R53.81 PHYSICAL DECONDITIONING: ICD-10-CM

## 2022-09-29 DIAGNOSIS — R53.1 GENERAL WEAKNESS: ICD-10-CM

## 2022-09-29 PROCEDURE — 97162 PT EVAL MOD COMPLEX 30 MIN: CPT

## 2022-09-29 PROCEDURE — 97110 THERAPEUTIC EXERCISES: CPT

## 2022-09-29 ASSESSMENT — ACTIVITIES OF DAILY LIVING (ADL): POOR_BALANCE: 1

## 2022-09-29 NOTE — OP THERAPY EVALUATION
Outpatient Physical Therapy  INITIAL EVALUATION    [facility]  [address], [address]  [place] [address]  Phone:  [phone]    Date of Evaluation: 09/29/2022    Patient: Krystle Tavarez  YOB: 1933  MRN: [id number]     Referring Provider: Papi Pradhan M.D.  [address]  [address]  [place] [address]   Referring Diagnosis Impaired mobility and ADLs [Z74.09, Z78.9];Physical deconditioning [R53.81];General weakness [R53.1]     Time Calculation                 Chief Complaint: No chief complaint on file.    Visit Diagnoses     ICD-10-CM   1. Impaired mobility and ADLs  Z74.09    Z78.9   2. Physical deconditioning  R53.81   3. General weakness  R53.1       Date of onset of impairment: No data found    Subjective:   History of Present Illness:     Mechanism of injury:  Pt indicates she fell about 3.5 years and she notes that she has had a lot of pain since then that has stopped her from being independent and standing has limited. She notes that she is on a walker at home now and that this pain stops her from doing things.     CC: pain that limits her ability to stand or be on her feet. Secondary notes that she is weak and getting weaker.    Sxs: burning back pain and B LE pain    Aggs: 2 min max to stand has to sit due to back and leg pain    Recreation/profression: she notes she is semi indep with ADLs so she has some issues     Goals: shop more and not be in the W/C, be able to make her bed, do more tasks in standing for IADLs/ADLs    Past Medical History:   Diagnosis Date    Arthritis     knees, hips    Breath shortness     with exertion     Cataract     bilat IOL     Dental disorder     full upper, partial lower     Diabetes (HCC)     pre diabetic    Heart burn     Hemorrhagic disorder (HCC)     on Eliquis    High cholesterol     diet controlled     Hyperlipidemia     Hypertension     Pacemaker 2015    Pain 2020    lower back, right leg    Pre-diabetes      "TIA (transient ischemic attack)     on Eliquis    Urinary incontinence      Past Surgical History:   Procedure Laterality Date    PB LAMINOTOMY,LUMBAR DISK,1 INTRSP N/A 2/25/2020    Procedure: LAMINECTOMY, SPINE, LUMBAR, WITH DISCECTOMY- L5-S1, MEDIAL FACETECTOMY;  Surgeon: Latrell Kimble M.D.;  Location: SURGERY [facility];  Service: Neurosurgery    FORAMINOTOMY N/A 2/25/2020    Procedure: FORAMINOTOMY, SPINE;  Surgeon: Latrell Kimble M.D.;  Location: SURGERY [facility];  Service: Neurosurgery    PACEMAKER INSERTION  2015    HIP REPLACEMENT, TOTAL Right 2009    KNEE REPLACEMENT, TOTAL Right 2004    CATARACT EXTRACTION WITH IOL Bilateral 2003    CHOLECYSTECTOMY  2002    BASAL CELL EXCISION      nose and hand    BUNIONECTOMY Left      Social History     Tobacco Use    Smoking status: Never    Smokeless tobacco: Never   Substance Use Topics    Alcohol use: Not Currently     Family and Occupational History     Socioeconomic History    Marital status:      Spouse name: Not on file    Number of children: Not on file    Years of education: Not on file    Highest education level: Not on file   Occupational History    Not on file       Objective     General Comments     Spine Comments   55 sec standing (weak/shaky legs)  5 rep STS from elevated surface (22 inches) 29 seconds without hands (cheats with leg presses)  At least 3/5 all major mm groups MMT  15 to fatigue PTB leg press on each side            Therapeutic Exercises (CPT 57048):     1. UPOC 11/29/22      Therapeutic Exercise Summary: HEP  Leg press PTB 2x10  STS 2x5 (fatigues at 5)  Seated scap sq 3x30\"ea    Time-based treatments/modalities:           Assessment, Response and Plan:   Impairments: abnormal gait, impaired functional mobility, impaired balance, impaired physical strength, lacks appropriate home exercise program, limited ADL's, limited mobility, pain with function and weight-bearing intolerance    Assessment details:  PT finds s/sx " consistent with referral of decreased function and overall physical deconditioning. She has a 5 rep STS with inability to transfer without use of her hands indicative below her peers as well as an increased fall risk. Her standing tolerance today is 55 seconds in which she has to stop secondary to fatigue as well as weakness. (Often reports on her own because of pain). She can benefit from skilled PT care in order to address the above factors and improve her overall function.   Barriers to therapy:  Age and comorbidities  Prognosis: fair    Goals:   Short Term Goals:   -pt meets MCID for 5 rep STS in order to improve her ability to safely transfer with a lower fall risk  -pt is able to stand for 2-3 min to improve ability to do ADLs with moderate pain at most that calms once seated  -pt is able to walk for 2-3 min spurts with FWW with low fall risk to improve home ambulation  Short term goal time span:  2-4 weeks      Long Term Goals:    -pt meets MCID for 5 rep STS in order to improve her ability to safely transfer with a lower fall risk; she can also stand from a normal chair without hands to improve fxl strength and dec fall risk with transfers  -pt is able to stand for 5-10 min to improve ability to do ADLs with moderate pain at most that calms once seated  -pt is able to walk for 5+ min spurts with FWW with low fall risk to improve home ambulation and community access  -pt meets  6MWT of at least 300 feet in this timeframe to improve goal of community ambulation  Long term goal time span:  6-8 weeks    Plan:   Therapy options:  Physical therapy treatment to continue  Planned therapy interventions:  E Stim Attended (CPT 22486), E Stim Unattended (CPT 08416), Hot or Cold Pack Therapy (CPT 07619), Manual Therapy (CPT 18958), Neuromuscular Re-education (CPT 10544), Therapeutic Activities (CPT 51452) and Therapeutic Exercise (CPT 95326)  Frequency:  2x week  Duration in weeks:  8  Duration in visits:  16  Discussed  with:  Patient    Functional Assessment Used        Referring provider co-signature:  I have reviewed this plan of care and my co-signature certifies the need for services.    Certification Period: 09/29/2022 to  12/01/22    Physician Signature: ________________________________ Date: ______________

## 2022-10-05 ENCOUNTER — PHYSICAL THERAPY (OUTPATIENT)
Dept: PHYSICAL THERAPY | Facility: REHABILITATION | Age: 87
End: 2022-10-05
Attending: PHYSICAL MEDICINE & REHABILITATION
Payer: MEDICARE

## 2022-10-05 DIAGNOSIS — R53.1 GENERAL WEAKNESS: ICD-10-CM

## 2022-10-05 DIAGNOSIS — Z78.9 IMPAIRED MOBILITY AND ADLS: ICD-10-CM

## 2022-10-05 DIAGNOSIS — Z74.09 IMPAIRED MOBILITY AND ADLS: ICD-10-CM

## 2022-10-05 DIAGNOSIS — R53.81 PHYSICAL DECONDITIONING: ICD-10-CM

## 2022-10-05 PROCEDURE — 97110 THERAPEUTIC EXERCISES: CPT

## 2022-10-05 PROCEDURE — 97530 THERAPEUTIC ACTIVITIES: CPT

## 2022-10-05 NOTE — OP THERAPY DAILY TREATMENT
"  Outpatient Physical Therapy  DAILY TREATMENT     [facility] [place] [id number], [address]  [place] [address]  Phone:  [phone]    Date: 10/05/2022  Patient: Krystle Tavarez  YOB: 1933  MRN: [id number]   Time Calculation  Start time: 0930  Stop time: 1010 Time Calculation (min): 40 minutes   Chief Complaint: No chief complaint on file.  Visit #: 2    SUBJECTIVE:  Feels legs are stronger. Back/BLE pain standing still.    Sxs: burning back pain and B LE pain/weakness     Aggs: 2 min max to stand has to sit due to back and leg pain    OBJECTIVE:  Current objective measures: none taken below  55 sec standing (weak/shaky legs)  5 rep STS from elevated surface (22 inches) 29 seconds without hands (cheats with leg presses)  15 to fatigue PTB leg press on each side        Therapeutic Exercises (CPT 82491):     1. 1. UPOC 10/29/22      Therapeutic Exercise Summary:   Leg press PTB 2x10  LAQ x10ea  Seated calf raises x30  Aps x20  Seated scap sq 3x30\"ea  SAQ 5# 2x10 (pn L hip)  DKTC HS curls 2x10    Therapeutic Treatments and Modalities:     1. Therapeutic Activities (CPT 66935), stanidng 4x1', step ups 3x6ea, STS x6, x5, x4 to fatigue, all working on improving transfer capacity; edu on transfer safety with hand placement and reviewed this for chair to bed/bed to chair  Time-based treatments/modalities:  Physical Therapy Timed Treatment Charges  Therapeutic activity minutes (CPT 76688): 30 minutes  Therapeutic exercise minutes (CPT 57585): 10 minutes  ASSESSMENT:   Response to treatment: pt with similar Impairments from eval. Slight improved transfer capacity. Cont to progress as able.    PLAN/RECOMMENDATIONS:   Plan for treatment: therapy treatment to continue next visit.  Planned interventions for next visit: continue with current treatment.         "

## 2022-10-10 NOTE — OP THERAPY DAILY TREATMENT
"  Outpatient Physical Therapy  DAILY TREATMENT     [facility] Outpatient Physical Therapy [address], [address]  [place] [address]  Phone:  [phone]    Date: 10/11/2022  Patient: Krystle Tavarez  YOB: 1933  MRN: [id number]   Time Calculation  Start time: 1330  Stop time: 1415 Time Calculation (min): 45 minutes   Chief Complaint: No chief complaint on file.  Visit #: 3    SUBJECTIVE:   Black is bugging more than usual. Other than this doing okay.    Sxs: burning back pain and B LE pain/weakness     Aggs: 2 min max to stand has to sit due to back and leg pain    OBJECTIVE:   Current objective measures:   1 min 10 sec standing (weak/shaky legs and LBP4)  5 rep STS from elevated surface (22 inches) 21 seconds without hands (cheats with leg presses)    none taken below  15 to fatigue PTB leg press on each side        Therapeutic Exercises (CPT 58990):     1. 1. UPOC 10/29/22      Therapeutic Exercise Summary:   Leg press PTB 2x10  LAQ x10ea  Seated calf raises x30  Aps x20  Seated scap sq 3x30\"ea  SAQ 5# 2x10 (pn L hip)  DKTC HS curls 2x10    Therapeutic Treatments and Modalities:     1. Therapeutic Activities (CPT 47630), step ups 2x10 onto foam, STS 2x10, x5, gait 3x40ft, standing with // bars 2x2', working on transfer strength, gait capacity, standing tolerance to improve IADL ability/ADL ability  Time-based treatments/modalities:  Physical Therapy Timed Treatment Charges  Therapeutic activity minutes (CPT 39644): 15 minutes  Therapeutic exercise minutes (CPT 17547): 30 minutes  ASSESSMENT:   Response to treatment: pt with improved functional strength as measured by her 5 rep STS and doubled standing tolerance time to date. Cont to address with functional strength and endurance based ex.    PLAN/RECOMMENDATIONS:   Plan for treatment: therapy treatment to continue next visit.  Planned interventions for next visit: continue with current treatment.         "

## 2022-10-11 ENCOUNTER — PHYSICAL THERAPY (OUTPATIENT)
Dept: PHYSICAL THERAPY | Facility: REHABILITATION | Age: 87
End: 2022-10-11
Attending: PHYSICAL MEDICINE & REHABILITATION
Payer: MEDICARE

## 2022-10-11 DIAGNOSIS — R53.1 GENERAL WEAKNESS: ICD-10-CM

## 2022-10-11 DIAGNOSIS — Z74.09 IMPAIRED MOBILITY AND ADLS: ICD-10-CM

## 2022-10-11 DIAGNOSIS — Z78.9 IMPAIRED MOBILITY AND ADLS: ICD-10-CM

## 2022-10-11 DIAGNOSIS — R53.81 PHYSICAL DECONDITIONING: ICD-10-CM

## 2022-10-11 PROCEDURE — 97110 THERAPEUTIC EXERCISES: CPT

## 2022-10-11 PROCEDURE — 97530 THERAPEUTIC ACTIVITIES: CPT

## 2022-10-12 NOTE — OP THERAPY DAILY TREATMENT
"  Outpatient Physical Therapy  DAILY TREATMENT     [facility] Outpatient Physical Therapy [address], [address]  [place] [address]  Phone:  [phone]    Date: 10/13/2022  Patient: Krystle Tavarez  YOB: 1933  MRN: [id number]   Time Calculation  Start time: 1115  Stop time: 1155 Time Calculation (min): 40 minutes   Chief Complaint: No chief complaint on file.  Visit #: 4    SUBJECTIVE: back continues to be sore. More sore today. Burning in the foot. Has a hx here and was treated for plantar fasciitis with mixed success.    Sxs: burning back pain and B LE pain/weakness     Aggs: 2 min max to stand has to sit due to back and leg pain    OBJECTIVE:   None taken below  Current objective measures:   1 min 10 sec standing (weak/shaky legs and LBP4)  5 rep STS from elevated surface (22 inches) 21 seconds without hands (cheats with leg presses)    none taken below  15 to fatigue PTB leg press on each side        Therapeutic Exercises (CPT 49674):     1. 1. UPOC 10/29/22      Therapeutic Exercise Summary:   Leg press PTB 2x10  Hip ABD seated clam PTB x30  LAQ x10ea    Seated scap sq 3x30\"ea NT  Seated rows PTB x30  SAQ 5# 2x10 (pn L hip) NT  DKTC HS curls 2x10  Hamstring on/off stretch 2x1'    Therapeutic Treatments and Modalities:     1. Therapeutic Activities (CPT 63697), STS x10, x5, x5, addition of 5# DB, Nu-step working on fxl endurance fatigued at 3 min, working on transfer strength, gait capacity, standing tolerance to improve IADL ability/ADL ability  Time-based treatments/modalities:  Physical Therapy Timed Treatment Charges  Therapeutic activity minutes (CPT 07770): 10 minutes  Therapeutic exercise minutes (CPT 37938): 30 minutes  ASSESSMENT: pt with cont weakness and functional strength and endurance deficits. Was able to improve her STS ability today with addition of weight. Cont to progress as able.    PLAN/RECOMMENDATIONS:   Plan for treatment: therapy treatment to continue next " visit.  Planned interventions for next visit: continue with current treatment.

## 2022-10-13 ENCOUNTER — PHYSICAL THERAPY (OUTPATIENT)
Dept: PHYSICAL THERAPY | Facility: REHABILITATION | Age: 87
End: 2022-10-13
Attending: PHYSICAL MEDICINE & REHABILITATION
Payer: MEDICARE

## 2022-10-13 DIAGNOSIS — R53.1 GENERAL WEAKNESS: ICD-10-CM

## 2022-10-13 DIAGNOSIS — Z78.9 IMPAIRED MOBILITY AND ADLS: ICD-10-CM

## 2022-10-13 DIAGNOSIS — Z74.09 IMPAIRED MOBILITY AND ADLS: ICD-10-CM

## 2022-10-13 DIAGNOSIS — R53.81 PHYSICAL DECONDITIONING: ICD-10-CM

## 2022-10-13 PROCEDURE — 97530 THERAPEUTIC ACTIVITIES: CPT

## 2022-10-13 PROCEDURE — 97110 THERAPEUTIC EXERCISES: CPT

## 2022-10-18 ENCOUNTER — NON-PROVIDER VISIT (OUTPATIENT)
Dept: CARDIOLOGY | Facility: MEDICAL CENTER | Age: 87
End: 2022-10-18

## 2022-10-18 ENCOUNTER — PATIENT OUTREACH (OUTPATIENT)
Dept: HEALTH INFORMATION MANAGEMENT | Facility: OTHER | Age: 87
End: 2022-10-18

## 2022-10-18 ENCOUNTER — OFFICE VISIT (OUTPATIENT)
Dept: PHYSICAL MEDICINE AND REHAB | Facility: MEDICAL CENTER | Age: 87
End: 2022-10-18
Payer: MEDICARE

## 2022-10-18 VITALS
TEMPERATURE: 97 F | DIASTOLIC BLOOD PRESSURE: 68 MMHG | HEART RATE: 69 BPM | OXYGEN SATURATION: 95 % | BODY MASS INDEX: 26.58 KG/M2 | HEIGHT: 63 IN | SYSTOLIC BLOOD PRESSURE: 118 MMHG | WEIGHT: 150 LBS

## 2022-10-18 DIAGNOSIS — G89.29 CHRONIC MUSCULOSKELETAL PAIN: ICD-10-CM

## 2022-10-18 DIAGNOSIS — Z78.9 IMPAIRED MOBILITY AND ADLS: ICD-10-CM

## 2022-10-18 DIAGNOSIS — Z74.09 IMPAIRED MOBILITY AND ADLS: ICD-10-CM

## 2022-10-18 DIAGNOSIS — M47.816 LUMBAR SPONDYLOSIS: ICD-10-CM

## 2022-10-18 DIAGNOSIS — I10 ESSENTIAL HYPERTENSION, BENIGN: ICD-10-CM

## 2022-10-18 DIAGNOSIS — M51.36 DDD (DEGENERATIVE DISC DISEASE), LUMBAR: ICD-10-CM

## 2022-10-18 DIAGNOSIS — M79.18 CHRONIC MUSCULOSKELETAL PAIN: ICD-10-CM

## 2022-10-18 DIAGNOSIS — E11.9 TYPE 2 DIABETES MELLITUS WITHOUT COMPLICATION, WITHOUT LONG-TERM CURRENT USE OF INSULIN (HCC): ICD-10-CM

## 2022-10-18 DIAGNOSIS — Z79.01 CHRONIC ANTICOAGULATION: ICD-10-CM

## 2022-10-18 PROCEDURE — 99214 OFFICE O/P EST MOD 30 MIN: CPT | Performed by: PHYSICAL MEDICINE & REHABILITATION

## 2022-10-18 PROCEDURE — 99999 PR NO CHARGE: CPT | Performed by: NURSE PRACTITIONER

## 2022-10-18 PROCEDURE — 93294 REM INTERROG EVL PM/LDLS PM: CPT | Performed by: INTERNAL MEDICINE

## 2022-10-18 ASSESSMENT — FIBROSIS 4 INDEX: FIB4 SCORE: 1.63

## 2022-10-18 ASSESSMENT — PAIN SCALES - GENERAL: PAINLEVEL: 5=MODERATE PAIN

## 2022-10-18 ASSESSMENT — PATIENT HEALTH QUESTIONNAIRE - PHQ9: CLINICAL INTERPRETATION OF PHQ2 SCORE: 0

## 2022-10-18 NOTE — CARDIAC REMOTE MONITOR - SCAN
Device transmission reviewed. Device demonstrated appropriate function.       Electronically Signed by: Alexis Correa M.D.    10/19/2022  8:12 AM

## 2022-10-18 NOTE — OP THERAPY DAILY TREATMENT
Outpatient Physical Therapy  DAILY TREATMENT     [facility] Outpatient Physical Therapy [address] [facility], [address]  [place] [address]  Phone:  [phone]    Date: 10/19/2022  Patient: Krystle Tavarez  YOB: 1933  MRN: [id number]   Time Calculation  Start time: 1305  Stop time: 1345 Time Calculation (min): 40 minutes   Chief Complaint: No chief complaint on file.  Visit #: 5    SUBJECTIVE: reports similar pains but does feel she is getting stronger. Saw physiatrist last week.    Sxs: burning back pain and B LE pain/weakness     Aggs: 2 min max to stand has to sit due to back and leg pain    OBJECTIVE:   None taken below  Current objective measures:   1 min 10 sec standing (weak/shaky legs and LBP4)  5 rep STS from elevated surface (22 inches) 21 seconds without hands (cheats with leg presses)    none taken below  15 to fatigue PTB leg press on each side        Therapeutic Exercises (CPT 66905):     1. 1. UPOC 10/29/22      Therapeutic Exercise Summary: Leg press PTB 2x10  Hip ABD seated clam PTB x30  LAQ x10ea    Seated rows PTB x30      Therapeutic Treatments and Modalities:     1. Therapeutic Activities (CPT 40740), STS 3x8; push ups railing, step ups/calf raises to improve transfer/step safety, working on transfer strength, gait capacity, standing tolerance to improve IADL ability/ADL ability  Time-based treatments/modalities:  Physical Therapy Timed Treatment Charges  Therapeutic activity minutes (CPT 47211): 25 minutes  Therapeutic exercise minutes (CPT 25113): 15 minutes  ASSESSMENT: pt making slow but steady progress in endurance and overall activity tolerance today. Plan to re-assess and update HEP at next visit to progress her current functional status and check in on her pain.    PLAN/RECOMMENDATIONS:   Plan for treatment: therapy treatment to continue next visit.  Planned interventions for next visit: continue with current treatment.

## 2022-10-18 NOTE — PROGRESS NOTES
10/18/2022:     1328: Attempt x1 to reach patient for monthly CCM follow up call. No answer. M with contact information.      10/19/2022:    0840: Attempt x2 to reach patient for monthly CCM follow up call. No answer. [facility] with contact information.     10/20/2022:    1020:     Assessment    Reached out to patient for monthly CCM follow up call. Per patient, she is doing okay. She went to PT yesterday and stated it was a good session. They worked on standing, balance, and walking from room to room. She said that it has made her more confident in her mobility. Krystle stated at times her back pain is fine, but after her session it can get to a 6/7. This RN advised patient that she might be sore or have an increase in pain as she is uses muscles she has not used in a while and exercising her back. She saw Dr. Pradhan recently and stopped taking her antidepressant as it made her feel funny. No other needs at this time.     Education    PT    Care Plan    Continue with current plan of care     Progress:    On-track    Next outreach: 11/18/2022 @ 1600.

## 2022-10-18 NOTE — PROGRESS NOTES
Follow-up patient note    Physiatry (physical medicine and  Rehabilitation), interventional spine and sports medicine    Date of Service: 10/18/2022    Chief complaint:   Chief Complaint   Patient presents with    Follow-Up     Lower back pain       HISTORY    HPI: Krystle Tavarez 89 y.o. female who presents today for evaluation of low back pain.  She has a history of kyphoplasty at T12.   She had lumbar laminectomy and foraminotomy with Dr. Kimble on 02/25/2020.    She is here with her daughter, Ana M Packer.  The patient relocated from [place] to live with her daughter.    Since the last visit, Krystle has started with physical therapy.  She has been doing about twice a week.  This seems to be positive.  She is starting to to home program.  She is able to stand for 2 minutes now.    Pain is 5/10 on the NRS.    She stopped the duloxetine.  Took this for about 3-5 days and felt like she had side effects.  Felt like she had hallucinations with this medication as well and discontinued.  Unclear her daughter does not seem to indicate this, but the patient has had difficulty tolerating multiple medications that resulted in mental status changes including gabapentin, tramadol, Tylenol 3    She requires the use of a walker and leans heavily on it.  Continues to use manual wheelchair for community mobility.      Medical records review:  I reviewed the note from the referring provider Joana Hawk* dated 05/19/2022    Previous treatments:    Physical Therapy: Yes    Medications the patient is tried: gabapentin, tylenol with codeine, tramadol patient had side effects to all of these medications    Previous interventions: none    Previous surgeries to relieve the above pain:  none      ROS:   PMH and problem list reviewed  Red Flags ROS:   Fever, Chills, Sweats: Denies  Involuntary Weight Loss: Denies  Bladder Incontinence: Denies  Bowel Incontinence: Denies  Saddle Anesthesia: Denies    All other systems reviewed  and negative.       PMHx:   Past Medical History:   Diagnosis Date    Arthritis     knees, hips    Breath shortness     with exertion     Cataract     bilat IOL     Dental disorder     full upper, partial lower     Diabetes (HCC)     pre diabetic    Heart burn     Hemorrhagic disorder (HCC)     on Eliquis    High cholesterol     diet controlled     Hyperlipidemia     Hypertension     Pacemaker 2015    Pain 2020    lower back, right leg    Pre-diabetes     TIA (transient ischemic attack)     on Eliquis    Urinary incontinence        PSHx:   Past Surgical History:   Procedure Laterality Date    PB LAMINOTOMY,LUMBAR DISK,1 INTRSP N/A 2/25/2020    Procedure: LAMINECTOMY, SPINE, LUMBAR, WITH DISCECTOMY- L5-S1, MEDIAL FACETECTOMY;  Surgeon: Latrell Kimble M.D.;  Location: SURGERY [facility];  Service: Neurosurgery    FORAMINOTOMY N/A 2/25/2020    Procedure: FORAMINOTOMY, SPINE;  Surgeon: Latrell Kimble M.D.;  Location: SURGERY [facility];  Service: Neurosurgery    PACEMAKER INSERTION  2015    HIP REPLACEMENT, TOTAL Right 2009    KNEE REPLACEMENT, TOTAL Right 2004    CATARACT EXTRACTION WITH IOL Bilateral 2003    CHOLECYSTECTOMY  2002    BASAL CELL EXCISION      nose and hand    BUNIONECTOMY Left        Family history   Family History   Problem Relation Age of Onset    Diabetes Mother     Stroke Mother     Heart Attack Father 74    No Known Problems Maternal Grandmother     No Known Problems Maternal Grandfather     No Known Problems Paternal Grandmother     No Known Problems Paternal Grandfather          Medications:   Current Outpatient Medications   Medication    apixaban (ELIQUIS) 5mg Tab    ketoconazole (NIZORAL) 2 % shampoo    nystatin (MYCOSTATIN) powder    Cyanocobalamin (B-12) 1000 MCG Tab    lidocaine (LIDODERM) 5 % Patch    docusate sodium (COLACE) 100 MG Cap    atorvastatin (LIPITOR) 40 MG Tab    bumetanide (BUMEX) 0.5 MG Tab    carvedilol (COREG) 3.125 MG Tab    losartan (COZAAR) 100 MG Tab    Ascorbic  "Acid (VITAMIN C) 500 MG Cap     No current facility-administered medications for this visit.       Allergies:   Allergies   Allergen Reactions    Sulfa Drugs Nausea     Nausea     Gabapentin      Altered mental status    Tramadol      Altered mentation    Tylenol With Codeine #3 [Acetaminophen-Codeine] Unspecified     Dizziness, hallucinations       Social Hx:   Social History     Socioeconomic History    Marital status:      Spouse name: Not on file    Number of children: Not on file    Years of education: Not on file    Highest education level: Not on file   Occupational History    Not on file   Tobacco Use    Smoking status: Never    Smokeless tobacco: Never   Vaping Use    Vaping Use: Never used   Substance and Sexual Activity    Alcohol use: Not Currently    Drug use: No    Sexual activity: Not Currently     Partners: Male   Other Topics Concern    Not on file   Social History Narrative    Not on file     Social Determinants of Health     Financial Resource Strain: Not on file   Food Insecurity: Not on file   Transportation Needs: No Transportation Needs    Lack of Transportation (Medical): No    Lack of Transportation (Non-Medical): No   Physical Activity: Inactive    Days of Exercise per Week: 0 days    Minutes of Exercise per Session: 10 min   Stress: No Stress Concern Present    Feeling of Stress : Only a little   Social Connections: Not on file   Intimate Partner Violence: Not on file   Housing Stability: Unknown    Unable to Pay for Housing in the Last Year: No    Number of Places Lived in the Last Year: Not on file    Unstable Housing in the Last Year: No         EXAMINATION     Physical Exam:   Vitals: /68 (BP Location: Right arm, Patient Position: Sitting, BP Cuff Size: Adult long)   Pulse 69   Temp 36.1 °C (97 °F) (Temporal)   Ht 1.6 m (5' 3\")   Wt 68 kg (150 lb)   SpO2 95%     Constitutional:   Body Habitus: Body mass index is 26.57 kg/m².  Cooperation: Fully cooperates with " exam  Appearance: Well-groomed, well-nourished, not disheveled, in no acute distress    Eyes: No scleral icterus, no proptosis     ENT -no obvious auditory deficits, wearing a face mask    Skin -no rashes or lesions noted     Respiratory-  breathing comfortable on room air, no audible wheezing    Cardiovascular-  No lower extremity edema is noted.     Psychiatric- alert and oriented ×3. Normal affect.     Gait -Patient in manual wheelchair.      Musculoskeletal -   Cervical spine   Inspection: No deformities of the skin over the cervical spine. No rashes or lesions.    Decreased A/P ROM in all directions, without pain      Thoracic/Lumbar Spine/Sacral Spine/Hips   Inspection: No evidence of atrophy in bilateral lower extremities throughout     Lumbar spine Special tests  Neuro tension  Seated straight leg test negative bilaterally      Neuro     Key points for the international standards for neurological classification of spinal cord injury (ISNCSCI) to light touch.     Dermatome R L   L2 2 2   L3 2 2   L4 2 2   L5 2 2   S1 2 2   S2 2 2       Motor Exam Lower Extremities    ? Myotome R L   Hip flexion L2 5 5   Knee extension L3 5 5   Ankle dorsiflexion L4 5 5   Toe extension L5 5 5   Ankle plantarflexion S1 5 5       Reflexes  ?  R L   Patella  1+ 1+   Achilles   1+ 1+       MEDICAL DECISION MAKING    Medical records review: see under HPI section.     DATA    Labs:   Lab Results   Component Value Date/Time    SODIUM 139 05/05/2022 06:17 AM    POTASSIUM 4.2 05/05/2022 06:17 AM    CHLORIDE 102 05/05/2022 06:17 AM    CO2 27 05/05/2022 06:17 AM    ANION 10.0 05/05/2022 06:17 AM    GLUCOSE 179 (H) 05/05/2022 06:17 AM    BUN 14 05/05/2022 06:17 AM    CREATININE 0.69 05/05/2022 06:17 AM    CALCIUM 9.1 05/05/2022 06:17 AM    ASTSGOT 12 05/05/2022 06:17 AM    ALTSGPT 10 05/05/2022 06:17 AM    TBILIRUBIN 0.9 05/05/2022 06:17 AM    ALBUMIN 3.4 05/05/2022 06:17 AM    TOTPROTEIN 6.0 05/05/2022 06:17 AM    GLOBULIN 2.6 05/05/2022  06:17 AM    AGRATIO 1.3 05/05/2022 06:17 AM       Lab Results   Component Value Date/Time    PROTHROMBTM 15.6 (H) 05/01/2022 12:24 PM    INR 1.28 (H) 05/01/2022 12:24 PM        Lab Results   Component Value Date/Time    WBC 8.6 05/03/2022 06:40 AM    RBC 3.93 (L) 05/03/2022 06:40 AM    HEMOGLOBIN 12.6 05/03/2022 06:40 AM    HEMATOCRIT 37.0 05/03/2022 06:40 AM    MCV 94.1 05/03/2022 06:40 AM    MCH 32.1 05/03/2022 06:40 AM    MCHC 34.1 05/03/2022 06:40 AM    MPV 9.9 05/03/2022 06:40 AM    NEUTSPOLYS 47.20 05/02/2022 01:48 AM    LYMPHOCYTES 42.10 (H) 05/02/2022 01:48 AM    MONOCYTES 8.40 05/02/2022 01:48 AM    EOSINOPHILS 1.80 05/02/2022 01:48 AM    BASOPHILS 0.40 05/02/2022 01:48 AM        Lab Results   Component Value Date/Time    HBA1C 6.5 (H) 05/02/2022 01:17 PM        Imaging: I personally reviewed following images, these are my reads  Xray cervical 08/09/2022  Limited evaluation of the cervical spine.  There is grade 1 anterolisthesis of C3 on C4.  Mild multilevel spondylosis    Xray thoracic 08/09/2022  Multilevel spondylosis.  There is kyphoplasty material noted at T12.  Cardiac pacing device is also present    Xray lumbar spine 08/09/2022  Multilevel degenerative disc disease and multilevel spondylosis and facet arthropathy.  No evidence of acute fracture.  Kyphoplasty material at T12    Xray lumbar spine 06/11/2020  There is multilevel degenerative disc disease, osteophytes and facet arthropathy.  There is note of T12 vertebral augmentation.  Postsurgical changes at L5-S1  No instability on flexion and extension films    IMAGING radiology reads. I reviewed the following radiology reads     Xray cervical 08/09/2022  IMPRESSION:     1.  Limited evaluation of the patient is unable to stand. Images were performed with patient in wheelchair. The cervical spine is visualized to the C6 vertebral body  2.  Multilevel spondylosis, worst at C4-C5.  3.  There is anterolisthesis of C3 on C4.    Xray thoracic  08/09/2022  IMPRESSION:     1.  No acute vertebral body height loss.  2.  Multilevel spondylosis.    Xray lumbar spine 08/09/2022     1.  Multilevel spondylosis and lower lumbar prominent facet arthropathy.  2.  Diffuse, decreased bone mineral density.  3.  Kyphoplasty material is seen in T12.                                           Results for orders placed during the hospital encounter of 06/11/20    DX-LUMBAR SPINE-4+ VIEWS    Impression  1.  Multilevel vertebral body compression with severe osteopenia    2.  Status post T12 vertebral augmentation.    3.  Minimal retrolisthesis at L2-3.    4.  Severe multilevel degenerative disc disease and facet arthropathy.                        Diagnosis   Visit Diagnoses     ICD-10-CM   1. Chronic musculoskeletal pain  M79.18    G89.29   2. Impaired mobility and ADLs  Z74.09    Z78.9   3. DDD (degenerative disc disease), lumbar  M51.36   4. Lumbar spondylosis  M47.816   5. Chronic anticoagulation  Z79.01               ASSESSMENT:  Krystle Jensen Daysi 89 y.o. female seen for above     Krystle was seen today for follow-up.    Diagnoses and all orders for this visit:    Chronic musculoskeletal pain    Impaired mobility and ADLs    DDD (degenerative disc disease), lumbar    Lumbar spondylosis    Chronic anticoagulation        Discontinued duloxetine.  Continue home exercise program and working with PT. she has tried and failed epidurals and radiofrequency ablation in the past.  Reports that these did not help.  No interventional procedures were offered today.  Also noted her chronic anticoagulation status  Discussed with her daughter that the patient is now getting out into the community about 3-4 times in a week.  She is feeling good about this.  Continue caution with mobility, continue with wheelchair and use of walker at home.  I advised that her goal be to improve her mobility as safely as possible and this would include using a walker.  She reported a goal of walking with a  cane but I have advised her that I think this is not a smart or safe goal.  Given that she has limited standing tolerance, I have encouraged her to continue with PT and work on increasing his tolerance, improving her strength improving mobility with a walker provided that is safe.  Discussed caution with home program, consider [employer] for possible home program.  We cautioned about doing any exercise more vigorously or in larger movement than she feels is comfortable.  We watched some short video segments together and pointed out modifications  Encouraged her to follow-up with her PCP.  If symptoms worsen, she will contact the office.      Outside records requested:  The patient signed outside records request form for her outside records including outside images.      Follow-up: Return if symptoms worsen or fail to improve.      Thank you very much for asking me to participate in Krystle Tavarez's care.  Please contact me with any questions or concerns.    Please note that this dictation was created using voice recognition software. I have made every reasonable attempt to correct obvious errors but there may be errors of grammar and content that I may have overlooked prior to finalization of this note.      Papi Pradhan MD  Physical Medicine and Rehabilitation  Interventional Spine and Sports Physiatry  [facility]            Joana Hawk

## 2022-10-19 ENCOUNTER — PHYSICAL THERAPY (OUTPATIENT)
Dept: PHYSICAL THERAPY | Facility: REHABILITATION | Age: 87
End: 2022-10-19
Attending: PHYSICAL MEDICINE & REHABILITATION
Payer: MEDICARE

## 2022-10-19 DIAGNOSIS — R53.1 GENERAL WEAKNESS: ICD-10-CM

## 2022-10-19 DIAGNOSIS — Z78.9 IMPAIRED MOBILITY AND ADLS: ICD-10-CM

## 2022-10-19 DIAGNOSIS — R53.81 PHYSICAL DECONDITIONING: ICD-10-CM

## 2022-10-19 DIAGNOSIS — Z74.09 IMPAIRED MOBILITY AND ADLS: ICD-10-CM

## 2022-10-19 PROCEDURE — 97110 THERAPEUTIC EXERCISES: CPT

## 2022-10-19 PROCEDURE — 97530 THERAPEUTIC ACTIVITIES: CPT

## 2022-10-20 NOTE — OP THERAPY DAILY TREATMENT
Outpatient Physical Therapy  DAILY TREATMENT     [facility] Outpatient Physical Therapy [address] [facility], [address]  [place] [address]  Phone:  [phone]    Date: 10/21/2022  Patient: Krystle Tavarez  YOB: 1933  MRN: [id number]   Time Calculation  Start time: 1100  Stop time: 1145 Time Calculation (min): 45 minutes   Chief Complaint: No chief complaint on file.  Visit #: 6  SUBJECTIVE:**PN NEXT VISIT** reports B UT soreness today. Thinks from banded exercises. Standing more at home.    Sxs: burning back pain and B LE pain/weakness     Aggs: 2 min max to stand has to sit due to back and leg pain    OBJECTIVE:   None taken below    Current objective measures:   4 min 10sec (PT stopped test at this time)  3 min walk test 248 feet; minorly tired    5 rep STS from elevated surface (22 inches) 21 seconds without hands (cheats with leg presses)    none taken below  15 to fatigue PTB leg press on each side        Therapeutic Exercises (CPT 69443):     1. 1. UPOC 10/29/22      Therapeutic Exercise Summary: Leg press PTB 2x15  Hip ABD seated clam PTB x230  LAQ x10ea not today  HS stretch x1'ea    Seated rows PTB x30 not today      Therapeutic Treatments and Modalities:     1. Therapeutic Activities (CPT 42195), STS 3x8 with 2 # ball; foam step ups, gait with weighted walker, calf raises all aimed at improving functional strength and transfer/gait safety  Time-based treatments/modalities:  Physical Therapy Timed Treatment Charges  Therapeutic activity minutes (CPT 12127): 25 minutes  Therapeutic exercise minutes (CPT 35055): 15 minutes  ASSESSMENT: pt cont to make progress; with significant improvement in standing tolerance as well as with functional strength. Cont to address as able.     PLAN/RECOMMENDATIONS:   Plan for treatment: therapy treatment to continue next visit.  Planned interventions for next visit: continue with current treatment.

## 2022-10-21 ENCOUNTER — PHYSICAL THERAPY (OUTPATIENT)
Dept: PHYSICAL THERAPY | Facility: REHABILITATION | Age: 87
End: 2022-10-21
Attending: PHYSICAL MEDICINE & REHABILITATION
Payer: MEDICARE

## 2022-10-21 DIAGNOSIS — Z74.09 IMPAIRED MOBILITY AND ADLS: ICD-10-CM

## 2022-10-21 DIAGNOSIS — R53.1 GENERAL WEAKNESS: ICD-10-CM

## 2022-10-21 DIAGNOSIS — Z78.9 IMPAIRED MOBILITY AND ADLS: ICD-10-CM

## 2022-10-21 DIAGNOSIS — R53.81 PHYSICAL DECONDITIONING: ICD-10-CM

## 2022-10-21 PROCEDURE — 97110 THERAPEUTIC EXERCISES: CPT

## 2022-10-21 PROCEDURE — 97530 THERAPEUTIC ACTIVITIES: CPT

## 2022-10-26 NOTE — OP THERAPY DAILY TREATMENT
Outpatient Physical Therapy  DAILY TREATMENT     [facility]patient Physical Therapy [address] [facility], [address]  [place] [address]  Phone:  [phone]    Date: 10/27/2022  Patient: Krystle Tavarez  YOB: 1933  MRN: [id number]   Time Calculation  Start time: 1115  Stop time: 1200 Time Calculation (min): 45 minutes   Chief Complaint: No chief complaint on file.  Visit #: 7  SUBJECTIVE: Reports her back is sore today; cold weather she thinks is increasing her arthritis.     Sxs: burning back pain and B LE pain/weakness     Aggs: -5+ stand has to sit due to back and leg pain    OBJECTIVE:   None taken below    Current objective measures:   4 min 10sec (PT stopped test at this time)  3 min walk test 248 feet; minorly tired  5 rep STS from elevated surface (22 inches) 21 seconds without hands (cheats with leg presses)    none taken below  15 to fatigue PTB leg press on each side        Therapeutic Exercises (CPT 84566):     1. 1. UPOC 10/29/22      Therapeutic Exercise Summary: Leg press PTB 2x15  Hip ABD seated clam PTB x230  Scap squeezes 2x20      Therapeutic Treatments and Modalities:     1. Therapeutic Activities (CPT 27972), STS 2x8; step ups 3x8, marching, standing on foam weight shifts, counter push ups, Nu-Step x3' to fatigue all to improve gait capacity and transfer capacity  Time-based treatments/modalities:  Physical Therapy Timed Treatment Charges  Therapeutic activity minutes (CPT 88469): 30 minutes  Therapeutic exercise minutes (CPT 04777): 15 minutes  ASSESSMENT: Pt has been seen for a month with significant improvement in standing and walking tolerance with slighlty less LBP (more limited by LE weakness) along with improvements in LE strength as measured by objective section today 10/27/22. She can continue to benefit from skilled PT care in order to address the above findings and cont to promote LE strength, standing tolerance to improve IADL, ADL, and community access  capability.    PLAN/RECOMMENDATIONS:   Plan for treatment: therapy treatment to continue next visit.  Planned interventions for next visit: continue with current treatment.

## 2022-10-27 ENCOUNTER — PHYSICAL THERAPY (OUTPATIENT)
Dept: PHYSICAL THERAPY | Facility: REHABILITATION | Age: 87
End: 2022-10-27
Attending: PHYSICAL MEDICINE & REHABILITATION
Payer: MEDICARE

## 2022-10-27 DIAGNOSIS — Z78.9 IMPAIRED MOBILITY AND ADLS: ICD-10-CM

## 2022-10-27 DIAGNOSIS — R53.1 GENERAL WEAKNESS: ICD-10-CM

## 2022-10-27 DIAGNOSIS — Z74.09 IMPAIRED MOBILITY AND ADLS: ICD-10-CM

## 2022-10-27 DIAGNOSIS — R53.81 PHYSICAL DECONDITIONING: ICD-10-CM

## 2022-10-27 PROCEDURE — 97530 THERAPEUTIC ACTIVITIES: CPT

## 2022-10-27 PROCEDURE — 97110 THERAPEUTIC EXERCISES: CPT

## 2022-10-27 NOTE — OP THERAPY PROGRESS SUMMARY
Outpatient Physical Therapy  PROGRESS SUMMARY NOTE      [facility]  [address], [address]  [place] [address]  Phone:  [phone]    Date of Visit: 10/27/2022    Patient: Krystle Tavarez  YOB: 1933  MRN: [id number]     Referring Provider: Papi Pradhan M.D.  [address]  [address]  [place] [address]   Referring Diagnosis Impaired mobility and ADLs [Z74.09, Z78.9];Physical deconditioning [R53.81];General weakness [R53.1]     Short Term Goals:   -pt meets MCID for 5 rep STS in order to improve her ability to safely transfer with a lower fall risk  -pt is able to stand for 2-3 min to improve ability to do ADLs with moderate pain at most that calms once seated  -pt is able to walk for 2-3 min spurts with FWW with low fall risk to improve home ambulation  Short term goal time span:  2-4 weeks      Long Term Goals:    -pt meets MCID for 5 rep STS in order to improve her ability to safely transfer with a lower fall risk; she can also stand from a normal chair without hands to improve fxl strength and dec fall risk with transfers  -pt is able to stand for 5-10 min to improve ability to do ADLs with moderate pain at most that calms once seated  -pt is able to walk for 5+ min spurts with FWW with low fall risk to improve home ambulation and community access  -pt meets  6MWT of at least 300 feet in this timeframe to improve goal of community ambulation    Rehab Potential: good    Progress Report Period: 9/29/22-10/27/22    Functional Assessment Used          Objective Findings and Assessment:   Patient progression towards goals: Pt has been seen for a month with significant improvement in standing and walking tolerance with slighlty less LBP (more limited by LE weakness) along with improvements in LE strength as measured by objective section today 10/27/22. She can continue to benefit from skilled PT care in order to address the above findings and cont to promote LE  strength, standing tolerance to improve IADL, ADL, and community access capability.    Objective findings and assessment details: Current objective measures:   4 min 10sec (PT stopped test at this time)  3 min walk test 248 feet; minorly tired  5 rep STS from elevated surface (22 inches) 20 seconds without hands (no leg pressing today)  29 reps L, 19 R to fatigue PTB leg press on each side    Goals:   Short Term Goals:   -pt meets MCID for 5 rep STS in order to improve her ability to safely transfer with a lower fall risk (time not met but no longer uses legs to help stand against back of chair)  -pt is able to stand for 2-3 min to improve ability to do ADLs with moderate pain at most that calms once seated (MET)  -pt is able to walk for 2-3 min spurts with FWW with low fall risk to improve home ambulation (MET)  Short term goal time span:  2-4 weeks      Long Term Goals:      Long Term Goals:    -pt meets MCID for 5 rep STS in order to improve her ability to safely transfer with a lower fall risk; she can also stand from a normal chair without hands to improve fxl strength and dec fall risk with transfers  -pt is able to stand for 5-10 min to improve ability to do ADLs with moderate pain at most that calms once seated  -pt is able to walk for 5+ min spurts with FWW with low fall risk to improve home ambulation and community access  -pt meets  6MWT of at least 450 feet in this timeframe to improve goal of community ambulation    Long term goal time span:  6-8 weeks    Plan:   Planned therapy interventions:  Neuromuscular Re-education (CPT 48632), Therapeutic Exercise (CPT 55533), E Stim Attended (CPT 10428), E Stim Unattended (CPT 16222), Manual Therapy (CPT 76548), Hot or Cold Pack Therapy (CPT 91926) and Therapeutic Activities (CPT 49949)  Frequency:  2x week  Duration in weeks:  8  Duration in visits:  12    Referring provider co-signature:  I have reviewed this plan of care and my co-signature certifies the need  for services.     Certification Period: 10/27/2022 to 12/29/22    Physician Signature: ________________________________ Date: ______________

## 2022-11-02 NOTE — OP THERAPY DAILY TREATMENT
"  Outpatient Physical Therapy  DAILY TREATMENT     [facility] Outpatient Physical Therapy [place] [address], [address]  [place] [address]  Phone:  [phone]    Date: 11/03/2022  Patient: Krystle Tavarez  YOB: 1933  MRN: [id number]   Time Calculation  Start time: 0815  Stop time: 0855 Time Calculation (min): 40 minutes   Chief Complaint: No chief complaint on file.  Visit #: 8  SUBJECTIVE: reports burning in foot and calf still giving her issues with her plantar fasciitis. She reports she hasn't done structured walk at home. Does get up to walk to the front door.    Sxs: burning back pain and B LE pain/weakness     Aggs: -5+ stand has to sit due to back and leg pain    OBJECTIVE:   Check in on checklist for walking, getting out of the chair    none taken below  Current objective measures:   4 min 10sec (PT stopped test at this time)  3 min walk test 248 feet; minorly tired  5 rep STS from elevated surface (22 inches) 21 seconds without hands (cheats with leg presses)      17, 27 to fatigue PTB leg press on each side        Therapeutic Exercises (CPT 17621):     1. 1. UPOC 10/29/22      Therapeutic Exercise Summary: Leg press PTB 2x15  Hip ABD seated clam PTB x230  Scap squeezes 2x20    EVERY MINUTE ON THE MINUTE x 3 ROUNDS  BANDED PULL APARTS STANDING FOAM   STEP UPS 4\"  STS USE OF HANDS SLIGHTLY  REST FOR TWO MINUTES        Therapeutic Treatments and Modalities:     1. Therapeutic Activities (CPT 74822), STS 2x8; step ups 3x8, marching, standing on foam weight shifts, counter push ups, Nu-Step x3' to fatigue all to improve gait capacity and transfer capacity  Time-based treatments/modalities:  Physical Therapy Timed Treatment Charges  Therapeutic exercise minutes (CPT 74907): 40 minutes  ASSESSMENT: pt reached moderate fatigue with EMOM style workout today. Cont to recommend HEP progressions with functional strength and progressing walking distance as able to improve ability to stand and access " community. Making slow steady progress in these areas.    PLAN/RECOMMENDATIONS:   Plan for treatment: therapy treatment to continue next visit.  Planned interventions for next visit: continue with current treatment.

## 2022-11-03 ENCOUNTER — PHYSICAL THERAPY (OUTPATIENT)
Dept: PHYSICAL THERAPY | Facility: REHABILITATION | Age: 87
End: 2022-11-03
Attending: PHYSICAL MEDICINE & REHABILITATION
Payer: MEDICARE

## 2022-11-03 DIAGNOSIS — R53.81 PHYSICAL DECONDITIONING: ICD-10-CM

## 2022-11-03 DIAGNOSIS — Z74.09 IMPAIRED MOBILITY AND ADLS: ICD-10-CM

## 2022-11-03 DIAGNOSIS — Z78.9 IMPAIRED MOBILITY AND ADLS: ICD-10-CM

## 2022-11-03 DIAGNOSIS — R53.1 GENERAL WEAKNESS: ICD-10-CM

## 2022-11-03 PROCEDURE — 97110 THERAPEUTIC EXERCISES: CPT

## 2022-11-07 NOTE — OP THERAPY DAILY TREATMENT
"  Outpatient Physical Therapy  DAILY TREATMENT     [facility] Outpatient Physical Therapy [place] [address] [facility], [address]  [place] [address]  Phone:  [phone]    Date: 11/08/2022  Patient: Krystle Tavarez  YOB: 1933  MRN: [id number]   Time Calculation           Chief Complaint: No chief complaint on file.  Visit #: 9  SUBJECTIVE: *** reports burning in foot and calf still giving her issues with her plantar fasciitis. She reports she hasn't done structured walk at home. Does get up to walk to the front door.    Sxs: burning back pain and B LE pain/weakness     Aggs: -5+ stand has to sit due to back and leg pain    OBJECTIVE: ***   Check in on checklist for walking, getting out of the chair    none taken below  Current objective measures:   4 min 10sec (PT stopped test at this time)  3 min walk test 248 feet; minorly tired  5 rep STS from elevated surface (22 inches) 21 seconds without hands (cheats with leg presses)      17, 27 to fatigue PTB leg press on each side        Therapeutic Exercises (CPT 63431):     1. 1. UPOC 10/29/22      Therapeutic Exercise Summary: Leg press PTB 2x15  Hip ABD seated clam PTB x230  Scap squeezes 2x20    EVERY MINUTE ON THE MINUTE x 3 ROUNDS  BANDED PULL APARTS STANDING FOAM   STEP UPS 4\"  STS USE OF HANDS SLIGHTLY  REST FOR TWO MINUTES        Therapeutic Treatments and Modalities:     1. Therapeutic Activities (CPT 60747), STS 2x8; step ups 3x8, marching, standing on foam weight shifts, counter push ups, Nu-Step x3' to fatigue all to improve gait capacity and transfer capacity  Time-based treatments/modalities:     ASSESSMENT: *** pt reached moderate fatigue with EMOM style workout today. Cont to recommend HEP progressions with functional strength and progressing walking distance as able to improve ability to stand and access community. Making slow steady progress in these areas.    PLAN/RECOMMENDATIONS:   Plan for treatment: therapy treatment to continue next " visit.  Planned interventions for next visit: continue with current treatment.

## 2022-11-08 ENCOUNTER — APPOINTMENT (OUTPATIENT)
Dept: PHYSICAL THERAPY | Facility: REHABILITATION | Age: 87
End: 2022-11-08
Attending: PHYSICAL MEDICINE & REHABILITATION
Payer: MEDICARE

## 2022-11-09 NOTE — OP THERAPY DAILY TREATMENT
"  Outpatient Physical Therapy  DAILY TREATMENT     [facility] Outpatient Physical Therapy [place] [address] [facility], [address]  [place] [address]  Phone:  [phone]    Date: 11/10/2022  Patient: Krystle Tavarez  YOB: 1933  MRN: [id number]   Time Calculation  Start time: 1415  Stop time: 1500 Time Calculation (min): 45 minutes   Chief Complaint: No chief complaint on file.  Visit #: 9  SUBJECTIVE: pt reports burning in back of the leg up to the thigh. She thinks the weather has made this much worse. Heel feels off. This pain has been going on for a long time.     Sxs: burning back pain and B LE pain/weakness     Aggs: -5+ stand has to sit due to back and leg pain    OBJECTIVE:   (+) SLUMP RLE burning foot/calf; better with side glide and self stretching (LSB and flexion with LSB)  Check in on checklist for walking, getting out of the chair    none taken below  Current objective measures:   4 min 10sec (PT stopped test at this time)  3 min walk test 248 feet; minorly tired  5 rep STS from elevated surface (22 inches) 21 seconds without hands (cheats with leg presses)      17, 27 to fatigue PTB leg press on each side        Therapeutic Exercises (CPT 40918):     1. 1. UPOC 11/27/22      Therapeutic Exercise Summary: Gait with FWW [id number]  STS 3x10  Marching 3x30\"  Flexion with LSB on BSB 2x15  LSB 2x10    Not below  EVERY MINUTE ON THE MINUTE x 3 ROUNDS  BANDED PULL APARTS STANDING FOAM   STEP UPS 4\"  STS USE OF HANDS SLIGHTLY  REST FOR TWO MINUTES    Leg press PTB 2x15  Hip ABD seated clam PTB x230  Scap squeezes 2x20        Therapeutic Treatments and Modalities:     1. Therapeutic Activities (CPT 48013), STS 2x8; step ups 3x8, marching, standing on foam weight shifts, counter push ups, Nu-Step x3' to fatigue all to improve gait capacity and transfer capacity, not today    2. Neuromuscular Re-education (CPT 24743), standing on dynadiscs (poor ability without hands) 2x1', standing on foam pad 2x1', foam taps " 2x10 working on RLE stability and balance with cues to avoid RSB/compression to this side  Time-based treatments/modalities:  Physical Therapy Timed Treatment Charges  Neuromusc re-ed, balance, coor, post minutes (CPT 82485): 10 minutes  Therapeutic exercise minutes (CPT 26924): 30 minutes  ASSESSMENT: PT finds pt pain reduced with repetitive stretching into flexion/LSB. Added to HEP. She cont to make slight progress with skilled PT care addressing her overall endurance and functional strength.    PLAN/RECOMMENDATIONS:   Plan for treatment: therapy treatment to continue next visit.  Planned interventions for next visit: continue with current treatment.

## 2022-11-10 ENCOUNTER — PHYSICAL THERAPY (OUTPATIENT)
Dept: PHYSICAL THERAPY | Facility: REHABILITATION | Age: 87
End: 2022-11-10
Attending: PHYSICAL MEDICINE & REHABILITATION
Payer: MEDICARE

## 2022-11-10 DIAGNOSIS — R53.1 GENERAL WEAKNESS: ICD-10-CM

## 2022-11-10 DIAGNOSIS — R53.81 PHYSICAL DECONDITIONING: ICD-10-CM

## 2022-11-10 DIAGNOSIS — Z74.09 IMPAIRED MOBILITY AND ADLS: ICD-10-CM

## 2022-11-10 DIAGNOSIS — Z78.9 IMPAIRED MOBILITY AND ADLS: ICD-10-CM

## 2022-11-10 PROCEDURE — 97110 THERAPEUTIC EXERCISES: CPT

## 2022-11-10 PROCEDURE — 97112 NEUROMUSCULAR REEDUCATION: CPT

## 2022-11-14 ENCOUNTER — PHYSICAL THERAPY (OUTPATIENT)
Dept: PHYSICAL THERAPY | Facility: REHABILITATION | Age: 87
End: 2022-11-14
Attending: PHYSICAL MEDICINE & REHABILITATION
Payer: MEDICARE

## 2022-11-14 DIAGNOSIS — Z78.9 IMPAIRED MOBILITY AND ADLS: ICD-10-CM

## 2022-11-14 DIAGNOSIS — R53.81 PHYSICAL DECONDITIONING: ICD-10-CM

## 2022-11-14 DIAGNOSIS — R53.1 GENERAL WEAKNESS: ICD-10-CM

## 2022-11-14 DIAGNOSIS — Z74.09 IMPAIRED MOBILITY AND ADLS: ICD-10-CM

## 2022-11-14 PROCEDURE — 97110 THERAPEUTIC EXERCISES: CPT

## 2022-11-14 PROCEDURE — 97112 NEUROMUSCULAR REEDUCATION: CPT

## 2022-11-18 ENCOUNTER — PATIENT OUTREACH (OUTPATIENT)
Dept: HEALTH INFORMATION MANAGEMENT | Facility: OTHER | Age: 87
End: 2022-11-18
Payer: MEDICARE

## 2022-11-18 DIAGNOSIS — I10 ESSENTIAL HYPERTENSION, BENIGN: Chronic | ICD-10-CM

## 2022-11-18 DIAGNOSIS — E11.9 TYPE 2 DIABETES MELLITUS WITHOUT COMPLICATION, WITHOUT LONG-TERM CURRENT USE OF INSULIN (HCC): Chronic | ICD-10-CM

## 2022-11-18 PROCEDURE — 99490 CHRNC CARE MGMT STAFF 1ST 20: CPT | Performed by: NURSE PRACTITIONER

## 2022-11-18 NOTE — PROGRESS NOTES
11/18/2022:    1553: Attempt x1 to reach patient for monthly CCM follow up call. No answer. LVM with contact information.     11/21/2022:     0914: Reached out to patient for monthly CCM call. Ana M answered and stated patient is still in bed. However, she knows I called and will be calling me back later today.     1350:     Assessment    Patient called this RN back for monthly CCM. Per patient, she is doing decently. She stated that the weather is flaring her arthritis, so it is quite painful. Patient stated that nothing helps her to relieve that pain. Patient stated PT has been going great. She feels like she has come a long way and is moving around better. Patient stated that overall, she is feeling much better. Patient is still eating a healthy diet. Patient has no other needs or questions at this time.    Education    General check-in    Care Plan    Continue with current plan of care    Progress:    On-track    Next outreach: 12/19/2022 @ 1600.

## 2022-11-30 ENCOUNTER — APPOINTMENT (OUTPATIENT)
Dept: PHYSICAL THERAPY | Facility: REHABILITATION | Age: 87
End: 2022-11-30
Attending: PHYSICAL MEDICINE & REHABILITATION
Payer: MEDICARE

## 2022-12-15 ENCOUNTER — PHYSICAL THERAPY (OUTPATIENT)
Dept: PHYSICAL THERAPY | Facility: REHABILITATION | Age: 87
End: 2022-12-15
Attending: PHYSICAL MEDICINE & REHABILITATION
Payer: MEDICARE

## 2022-12-15 DIAGNOSIS — Z78.9 IMPAIRED MOBILITY AND ADLS: ICD-10-CM

## 2022-12-15 DIAGNOSIS — R53.81 PHYSICAL DECONDITIONING: ICD-10-CM

## 2022-12-15 DIAGNOSIS — Z74.09 IMPAIRED MOBILITY AND ADLS: ICD-10-CM

## 2022-12-15 DIAGNOSIS — R53.1 GENERAL WEAKNESS: ICD-10-CM

## 2022-12-15 PROCEDURE — 97110 THERAPEUTIC EXERCISES: CPT

## 2022-12-15 NOTE — OP THERAPY DAILY TREATMENT
"  Outpatient Physical Therapy  DAILY TREATMENT     [facility] Outpatient Physical Therapy [place] [address] [facility], [address]  [place] [address]  Phone:  [phone]    Date: 12/15/2022  Patient: Krystle Tavarez  YOB: 1933  MRN: [id number]   Time Calculation  Start time: 1500  Stop time: 1540 Time Calculation (min): 40 minutes   Chief Complaint: No chief complaint on file.  Visit #: 11    SUBJECTIVE: Pt with cont reports of pain at home; it has been worse the last couple weeks. She attributes this to the weather.     Sxs: burning back pain and B LE pain/weakness     Aggs: -5+ stand has to sit due to back and leg pain    OBJECTIVE:   none taken below  (-) SLUMP;   Check in on checklist for walking, getting out of the chair    Current objective measures:   4 min 10sec (PT stopped test at this time); 2 min 5 sec 12/15/22  3 min walk test 248 feet; minorly tired; 197 feet moderately tired   5 rep STS from elevated surface (22 inches) 17 seconds without hands (cheats with leg presses)      17, 27 to fatigue PTB leg press on each side        Therapeutic Exercises (CPT 50222):     1. 1. UPOC 11/27/22      Therapeutic Exercise Summary: Gait with FWW x200 ft  STS x5 from elevated chair   Flexion with LSB on BSB 2x15  LSB 2x10  LAQ 3x5  Seated clams PTB x30    Leg press PTB 2x15  Hip ABD seated clam PTB 2x30  Seated lumbar flexion with OH press x10    Not below  EVERY MINUTE ON THE MINUTE x 3 ROUNDS  BANDED PULL APARTS STANDING FOAM   STEP UPS 4\"  STS USE OF HANDS SLIGHTLY  REST FOR TWO MINUTES    Signed contract walking 2 min 12/19; goal of 6-8 weeks walking 6 min; 45 more sec ea week  STS 15, goal of 30 in 6 weeks; 3 more ea week        Therapeutic Treatments and Modalities:     1. Therapeutic Activities (CPT 30325), STS 2x8; step ups 3x8, marching, standing on foam weight shifts, counter push ups, Nu-Step x3' to fatigue all to improve gait capacity and transfer capacity, not today    2. Neuromuscular Re-education " (CPT 36690), standing semi tandem on foam, NBOS ball punches challening A-P direction reactions, step ups with focus to take away UE support (Sara gives out third round), x3 rounds each  Time-based treatments/modalities:  Physical Therapy Timed Treatment Charges  Therapeutic exercise minutes (CPT 59105): 40 minutes    ASSESSMENT: patient with cont fatigue limiting her standing/walking capacity. We went over an HEP plan today with the goal to improve her overall endurance due to her lack of ability to progress from a couple measures today. Pt in agreement to this planned HEP    PLAN/RECOMMENDATIONS:   Plan for treatment: therapy treatment to continue next visit.  Planned interventions for next visit: continue with current treatment.

## 2022-12-16 ENCOUNTER — APPOINTMENT (OUTPATIENT)
Dept: CARDIOLOGY | Facility: MEDICAL CENTER | Age: 87
End: 2022-12-16
Payer: MEDICARE

## 2022-12-19 ENCOUNTER — PATIENT OUTREACH (OUTPATIENT)
Dept: HEALTH INFORMATION MANAGEMENT | Facility: OTHER | Age: 87
End: 2022-12-19
Payer: MEDICARE

## 2022-12-19 DIAGNOSIS — E11.9 TYPE 2 DIABETES MELLITUS WITHOUT COMPLICATION, WITHOUT LONG-TERM CURRENT USE OF INSULIN (HCC): Chronic | ICD-10-CM

## 2022-12-19 PROCEDURE — 99999 PR NO CHARGE: CPT | Performed by: NURSE PRACTITIONER

## 2022-12-20 NOTE — OP THERAPY DAILY TREATMENT
"  Outpatient Physical Therapy  DAILY TREATMENT     [facility] Outpatient Physical Therapy [place] [address] [facility], [address]  [place] [address]  Phone:  [phone]    Date: 12/21/2022  Patient: Krystle Tavarez  YOB: 1933  MRN: [id number]   Time Calculation  Start time: 0330  Stop time: 0410 Time Calculation (min): 40 minutes   Chief Complaint: No chief complaint on file.    Visit #: 12    SUBJECTIVE: pt reports pains/neuropathy at home. Made it hard to walk. Did do some walking however which she felt was helpful.    Sxs: burning back pain and B LE pain/weakness     Aggs: -5+ stand has to sit due to back and leg pain    OBJECTIVE:   (-) SLUMP;   Check in on checklist for walking, getting out of the chair    Current objective measures:   4 min 10sec (PT stopped test at this time); 2 min 5 sec 12/15/22  3 min walk test 248 feet; minorly tired; 197 feet moderately tired   5 rep STS from elevated surface (22 inches) 17 seconds without hands (cheats with leg presses)    17, 27 to fatigue PTB leg press on each side        Therapeutic Exercises (CPT 29720):     1. 1. UPOC 11/27/22      Therapeutic Exercise Summary: Gait with FWW 3min 30 sec  STS x15 from elevated chair   Flexion with LSB on BSB 2x15 NT  LSB 2x10 NT  Seated clams PTB x30    Leg press PTB 2x15  Hip ABD seated clam PTB 2x30  Seated lumbar flexion with modified OH press 3x10    Step ups foam 2x10  Marching 2x30\"  Calf raises DL 2x15    Not below  EVERY MINUTE ON THE MINUTE x 3 ROUNDS  BANDED PULL APARTS STANDING FOAM   STEP UPS 4\"  STS USE OF HANDS SLIGHTLY  REST FOR TWO MINUTES    Signed contract walking 2 min 12/19; goal of 6-8 weeks walking 6 min; 45 more sec ea week  STS 15, goal of 30 in 6 weeks; 3 more ea week        Therapeutic Treatments and Modalities:     1. Therapeutic Activities (CPT 37628), STS 2x8; step ups 3x8, marching, standing on foam weight shifts, counter push ups, Nu-Step x3' to fatigue all to improve gait capacity and transfer " capacity, not today    2. Neuromuscular Re-education (CPT 54317), standing semi tandem on foam, NBOS ball punches challening A-P direction reactions, step ups with focus to take away UE support (Sara gives out third round), x3 rounds each  Time-based treatments/modalities:  Physical Therapy Timed Treatment Charges  Therapeutic exercise minutes (CPT 81843): 40 minutes    ASSESSMENT: pt with limited standing tolerance; still with decreased overall strength functionally. Is doing better with an HEP and plan to improve her overall walking/standing capacity for improved safety and balance in home, community access, and overall function.    PLAN/RECOMMENDATIONS:   Plan for treatment: therapy treatment to continue next visit.  Planned interventions for next visit: continue with current treatment.

## 2022-12-20 NOTE — PROGRESS NOTES
12/19/2022:     1605: Attempt x1 to reach patient for monthly CCM follow up call. No answer. LVM with contact information.     12/30/2022:    0800: Attempt x2 to reach patient for monthly CCM follow up call. Per Ana M, patient is still in bed. Patient will be giving this RN a call back around 11.    01/03/2023:     1143:     Assessment    Reached out to patient for monthly CCM follow up call. Per patient, she is doing well. She states her callus on her foot is really bothering her. She went to her podiatrist and had it shaved, but stated the pain is getting worse. She relates the pain more towards her arthritis pain. She states there is not drainage coming from the callus, but it is hot to the touch. This RN recommending contacting her podiatrist to be seen. Patient stated she needs to get her nails trimmed soon anyway, so she will be calling today. Per patient, PT is still helpful for her.     Education    Callus on foot     Care Plan    Continue on with current plan of care    Progress:    On-track     Next outreach: 01/30/2023 @ 1100.

## 2022-12-21 ENCOUNTER — PHYSICAL THERAPY (OUTPATIENT)
Dept: PHYSICAL THERAPY | Facility: REHABILITATION | Age: 87
End: 2022-12-21
Attending: PHYSICAL MEDICINE & REHABILITATION
Payer: MEDICARE

## 2022-12-21 DIAGNOSIS — Z78.9 IMPAIRED MOBILITY AND ADLS: ICD-10-CM

## 2022-12-21 DIAGNOSIS — R53.1 GENERAL WEAKNESS: ICD-10-CM

## 2022-12-21 DIAGNOSIS — Z74.09 IMPAIRED MOBILITY AND ADLS: ICD-10-CM

## 2022-12-21 DIAGNOSIS — R53.81 PHYSICAL DECONDITIONING: ICD-10-CM

## 2022-12-21 PROCEDURE — 97110 THERAPEUTIC EXERCISES: CPT

## 2022-12-22 NOTE — OP THERAPY PROGRESS SUMMARY
Outpatient Physical Therapy  PROGRESS SUMMARY NOTE      [facility] [place]  [address] [facility], [address]  [place] [address]  Phone:  [phone]    Date of Visit: 12/21/2022    Patient: Krystle Tavarez  YOB: 1933  MRN: [id number]     Referring Provider: Papi Pradhan M.D.  [address]  [address]  [place] [address]   Referring Diagnosis Other reduced mobility [Z74.09];Other specified health status [Z78.9];Other malaise [R53.81]     Visit Diagnoses     ICD-10-CM   1. Impaired mobility and ADLs  Z74.09    Z78.9   2. Physical deconditioning  R53.81   3. General weakness  R53.1       Rehab Potential: good    Progress Report Period: 11/20-12/21/22    Functional Assessment Used          Objective Findings and Assessment:   Patient progression towards goals: Pt has been seen off/on with gap due to ability to get to clinic with improving standing and gait capacity, improved safety with transfers and overall balance, as well as functional strength to get in and out of a clinic. She is making progress towards her goals and can cont to benefit from skilled PT care in order to address continued decreased functional endurance/strength, LBP when upright, and mitigate her fall risk.    Objective findings and assessment details: Current objective measures:   4 min 10sec (PT stopped test at this time); 2 min 5 sec 12/15/22  3 min walk test 248 feet; minorly tired; 197 feet moderately tired   5 rep STS from elevated surface (22 inches) 17 seconds without hands (cheats with leg presses)    Goals:   Short Term Goals:   Short Term Goals:   -pt meets MCID for 5 rep STS in order to improve her ability to safely transfer with a lower fall risk (MET)  -pt is able to stand for 2-3 min to improve ability to do ADLs with moderate pain at most that calms once seated (MET)  -pt is able to walk for 2-3 min spurts with FWW with low fall risk to improve home ambulation (MET)  Short term goal time span:   2-4 weeks          Long Term Goals:      Long Term Goals:    -pt meets MCID for 5 rep STS in order to improve her ability to safely transfer with a lower fall risk; she can also stand from a normal chair without hands to improve fxl strength and dec fall risk with transfers (25% MET)  -pt is able to stand for 5-10 min to improve ability to do ADLs with moderate pain at most that calms once seated (60% MET)  -pt is able to walk for 5+ min spurts with FWW with low fall risk to improve home ambulation and community access (50% MET)  -pt meets  6MWT of at least 300 feet in this timeframe to improve goal of community ambulation (70% MET)      Plan:   Planned therapy interventions:  Neuromuscular Re-education (CPT 21095), Therapeutic Activities (CPT 85712), Therapeutic Exercise (CPT 00354), Hot or Cold Pack Therapy (CPT 39999), E Stim Attended (CPT 19079), E Stim Unattended (CPT 84826) and Manual Therapy (CPT 71290)  Frequency:  1x week  Duration in weeks:  8  Duration in visits:  8    Referring provider co-signature:  I have reviewed this plan of care and my co-signature certifies the need for services.     Certification Period: 12/21/2022 to 02/22/23    Physician Signature: ________________________________ Date: ______________

## 2022-12-28 ENCOUNTER — APPOINTMENT (OUTPATIENT)
Dept: PHYSICAL THERAPY | Facility: REHABILITATION | Age: 87
End: 2022-12-28
Attending: PHYSICAL MEDICINE & REHABILITATION
Payer: MEDICARE

## 2022-12-28 NOTE — OP THERAPY DAILY TREATMENT
"  Outpatient Physical Therapy  DAILY TREATMENT     [facility] Therapy [place] [address] [facility], [address]  [place] [address]  Phone:  [phone]    Date: 12/29/2022  Patient: Krystle Tavarez  YOB: 1933  MRN: [id number]   Time Calculation           Chief Complaint: No chief complaint on file.    Visit #: 13    SUBJECTIVE: ***pt reports pains/neuropathy at home. Made it hard to walk. Did do some walking however which she felt was helpful.    Sxs: burning back pain and B LE pain/weakness     Aggs: -5+ stand has to sit due to back and leg pain    OBJECTIVE: ***  (-) SLUMP;   Check in on checklist for walking, getting out of the chair    Current objective measures:   4 min 10sec (PT stopped test at this time); 2 min 5 sec 12/15/22  3 min walk test 248 feet; minorly tired; 197 feet moderately tired   5 rep STS from elevated surface (22 inches) 17 seconds without hands (cheats with leg presses)    17, 27 to fatigue PTB leg press on each side        Therapeutic Exercises (CPT 47488):     1. UPOC 1/20/23      Therapeutic Exercise Summary: Gait with FWW 3min 30 sec  STS x15 from elevated chair   Flexion with LSB on BSB 2x15 NT  LSB 2x10 NT  Seated clams PTB x30    Leg press PTB 2x15  Hip ABD seated clam PTB 2x30  Seated lumbar flexion with modified OH press 3x10    Step ups foam 2x10  Marching 2x30\"  Calf raises DL 2x15    Not below  EVERY MINUTE ON THE MINUTE x 3 ROUNDS  BANDED PULL APARTS STANDING FOAM   STEP UPS 4\"  STS USE OF HANDS SLIGHTLY  REST FOR TWO MINUTES    Signed contract walking 2 min 12/19; goal of 6-8 weeks walking 6 min; 45 more sec ea week  STS 15, goal of 30 in 6 weeks; 3 more ea week        Therapeutic Treatments and Modalities:     1. Therapeutic Activities (CPT 97989), STS 2x8; step ups 3x8, marching, standing on foam weight shifts, counter push ups, Nu-Step x3' to fatigue all to improve gait capacity and transfer capacity, not today    2. Neuromuscular Re-education (CPT " 48301), standing semi tandem on foam, NBOS ball punches challening A-P direction reactions, step ups with focus to take away UE support (Sara gives out third round), x3 rounds each  Time-based treatments/modalities:     ASSESSMENT: ***pt with limited standing tolerance; still with decreased overall strength functionally. Is doing better with an HEP and plan to improve her overall walking/standing capacity for improved safety and balance in home, community access, and overall function.    PLAN/RECOMMENDATIONS:   Plan for treatment: therapy treatment to continue next visit.  Planned interventions for next visit: continue with current treatment.

## 2022-12-29 ENCOUNTER — APPOINTMENT (OUTPATIENT)
Dept: PHYSICAL THERAPY | Facility: REHABILITATION | Age: 87
End: 2022-12-29
Attending: PHYSICAL MEDICINE & REHABILITATION
Payer: MEDICARE

## 2023-01-03 NOTE — OP THERAPY DAILY TREATMENT
"  Outpatient Physical Therapy  DAILY TREATMENT     [facility] Outpatient Physical Therapy [place] [address] [facility], [address]  [place] [address]  Phone:  [phone]    Date: 01/04/2023  Patient: Krystle Tavarez  YOB: 1933  MRN: [id number]   Time Calculation  Start time: 0330  Stop time: 0410 Time Calculation (min): 40 minutes   Chief Complaint: No chief complaint on file.    Visit #: 13    SUBJECTIVE: pt reports she has a hot spot on the dorsal foot. No wound/significant looks like something is infected here. She talked to PCP about it and plans to see podiatrist. This has restricted er walking some and compliance with her HEP.    Sxs: burning back pain and B LE pain/weakness     Aggs: -5+ stand has to sit due to back and leg pain    OBJECTIVE:   (-) SLUMP; none taken below  Check in on checklist for walking, getting out of the chair    Current objective measures:   4 min 10sec (PT stopped test at this time); 2 min 5 sec 12/15/22  3 min walk test 248 feet; minorly tired; 197 feet moderately tired   5 rep STS from elevated surface (22 inches) 17 seconds without hands (cheats with leg presses)    17, 27 to fatigue PTB leg press on each side        Therapeutic Exercises (CPT 56432):     1. UPOC 1/20/23      Therapeutic Exercise Summary: Gait with FWW 3min 30 sec not today  STS 3x10 from elevated chair     [id number]  Seated [id number]    Seated clams PTB 2x30  Seated rows PTB 3x15    Leg press PTB 2x15  Seated lumbar flexion with modified OH press 3x10    Marching 2x30\"  Calf raises DL 2x15    Not below  EVERY MINUTE ON THE MINUTE x 3 ROUNDS  BANDED PULL APARTS STANDING FOAM   STEP UPS 4\"  STS USE OF HANDS SLIGHTLY  REST FOR TWO MINUTES    Signed contract walking 2 min 12/19; goal of 6-8 weeks walking 6 min; 45 more sec ea week  STS 15, goal of 30 in 6 weeks; 3 more ea week    Not today  Flexion with LSB on BSB 2x15 NT  LSB 2x10 NT        Therapeutic Treatments and Modalities:     1. Therapeutic Activities (CPT " 93549), STS 2x8; step ups 3x8, marching, standing on foam weight shifts, counter push ups, Nu-Step x3' to fatigue all to improve gait capacity and transfer capacity, not today    2. Neuromuscular Re-education (CPT 58427), see below, x3 rounds each    Therapeutic Treatment and Modalities Summary: Foam step ups, tandem foam, NBOS foam, marching foam   Time-based treatments/modalities:  Physical Therapy Timed Treatment Charges  Neuromusc re-ed, balance, coor, post minutes (CPT 07191): 15 minutes  Therapeutic exercise minutes (CPT 71902): 25 minutes  ASSESSMENT: pt with improved standing tolerance today; did not walk with foot issue. She cont to need frequent rest breaks and cont with some deficits in her overall strength that we cont to improve.    PLAN/RECOMMENDATIONS:   Plan for treatment: therapy treatment to continue next visit.  Planned interventions for next visit: continue with current treatment.

## 2023-01-04 ENCOUNTER — PHYSICAL THERAPY (OUTPATIENT)
Dept: PHYSICAL THERAPY | Facility: REHABILITATION | Age: 88
End: 2023-01-04
Attending: PHYSICAL MEDICINE & REHABILITATION
Payer: MEDICARE

## 2023-01-04 DIAGNOSIS — R53.1 GENERAL WEAKNESS: ICD-10-CM

## 2023-01-04 DIAGNOSIS — Z78.9 IMPAIRED MOBILITY AND ADLS: ICD-10-CM

## 2023-01-04 DIAGNOSIS — R53.81 PHYSICAL DECONDITIONING: ICD-10-CM

## 2023-01-04 DIAGNOSIS — Z74.09 IMPAIRED MOBILITY AND ADLS: ICD-10-CM

## 2023-01-04 PROCEDURE — 97110 THERAPEUTIC EXERCISES: CPT

## 2023-01-04 PROCEDURE — 97112 NEUROMUSCULAR REEDUCATION: CPT

## 2023-01-10 NOTE — OP THERAPY DAILY TREATMENT
"  Outpatient Physical Therapy  DAILY TREATMENT     [facility] Out[facility] Therapy [place] [address] [facility], [address]  [place] [address]  Phone:  [phone]    Date: 01/11/2023  Patient: Krystle Tavarez  YOB: 1933  MRN: [id number]   Time Calculation           Chief Complaint: No chief complaint on file.  Visit #: 14    SUBJECTIVE: *** pt reports she has a hot spot on the dorsal foot. No wound/significant looks like something is infected here. She talked to PCP about it and plans to see podiatrist. This has restricted er walking some and compliance with her HEP.    Sxs: burning back pain and B LE pain/weakness     Aggs: -5+ stand has to sit due to back and leg pain    OBJECTIVE: ***  (-) SLUMP; none taken below  Check in on checklist for walking, getting out of the chair    Current objective measures:   4 min 10sec (PT stopped test at this time); 2 min 5 sec 12/15/22  3 min walk test 248 feet; minorly tired; 197 feet moderately tired   5 rep STS from elevated surface (22 inches) 17 seconds without hands (cheats with leg presses)    17, 27 to fatigue PTB leg press on each side        Therapeutic Exercises (CPT 68904):     1. UPOC 1/20/23      Therapeutic Exercise Summary: Gait with FWW 3min 30 sec not today  STS 3x10 from elevated chair     [id number]  Seated [id number]    Seated clams PTB 2x30  Seated rows PTB 3x15    Leg press PTB 2x15  Seated lumbar flexion with modified OH press 3x10    Marching 2x30\"  Calf raises DL 2x15    Not below  EVERY MINUTE ON THE MINUTE x 3 ROUNDS  BANDED PULL APARTS STANDING FOAM   STEP UPS 4\"  STS USE OF HANDS SLIGHTLY  REST FOR TWO MINUTES    Signed contract walking 2 min 12/19; goal of 6-8 weeks walking 6 min; 45 more sec ea week  STS 15, goal of 30 in 6 weeks; 3 more ea week    Not today  Flexion with LSB on BSB 2x15 NT  LSB 2x10 NT        Therapeutic Treatments and Modalities:     1. Therapeutic Activities (CPT 65091), STS 2x8; step ups 3x8, marching, standing on foam " weight shifts, counter push ups, Nu-Step x3' to fatigue all to improve gait capacity and transfer capacity, not today    2. Neuromuscular Re-education (CPT 34649), see below, x3 rounds each    Therapeutic Treatment and Modalities Summary: Foam step ups, tandem foam, NBOS foam, marching foam   Time-based treatments/modalities:     ASSESSMENT: *** pt with improved standing tolerance today; did not walk with foot issue. She cont to need frequent rest breaks and cont with some deficits in her overall strength that we cont to improve.    PLAN/RECOMMENDATIONS:   Plan for treatment: therapy treatment to continue next visit.  Planned interventions for next visit: continue with current treatment.

## 2023-01-11 ENCOUNTER — APPOINTMENT (OUTPATIENT)
Dept: PHYSICAL THERAPY | Facility: REHABILITATION | Age: 88
End: 2023-01-11
Attending: PHYSICAL MEDICINE & REHABILITATION
Payer: MEDICARE

## 2023-01-17 ENCOUNTER — NON-PROVIDER VISIT (OUTPATIENT)
Dept: CARDIOLOGY | Facility: MEDICAL CENTER | Age: 88
End: 2023-01-17
Payer: MEDICARE

## 2023-01-17 PROCEDURE — 93294 REM INTERROG EVL PM/LDLS PM: CPT | Performed by: INTERNAL MEDICINE

## 2023-01-17 NOTE — CARDIAC REMOTE MONITOR - SCAN
Device transmission reviewed. Device demonstrated appropriate function.       Electronically Signed by: Alexis Correa M.D.    1/17/2023  12:43 PM

## 2023-01-26 NOTE — OP THERAPY DAILY TREATMENT
"  Outpatient Physical Therapy  DAILY TREATMENT     [facility] Outpatient Physical Therapy [place] [address] [facility], [address]  [place] [address]  Phone:  [phone]    Date: 01/27/2023  Patient: Krystle Tavarez  YOB: 1933  MRN: [id number]   Time Calculation  Start time: 0930  Stop time: 1010 Time Calculation (min): 40 minutes   Chief Complaint: No chief complaint on file.  Visit #: 14    SUBJECTIVE: pt had COVID so she reports she has been hurting all over and more fatigued. She did indicate she got over it over a week ago and has been trying to walk and do her sit to stand exercises.    Sxs: burning back pain and B LE pain/weakness     Aggs: -5+ stand has to sit due to back and leg pain    OBJECTIVE:   (-) SLUMP  Check in on checklist for walking, getting out of the chair    Standing tolerance >5 min prior to sitting  2 min 5 sec 12/15/22 3 min walk test 1/27/23 168 feet 2 min 30 sec  5 rep STS from elevated surface (22 inches) 15 seconds without hands (cheats with leg presses)          Therapeutic Exercises (CPT 61004):     1. UPOC 2/27/23      Therapeutic Exercise Summary: STS 3x10 from elevated chair     [id number]  Seated [id number]  Seated hip add x15    Seated clams PTB 2x30  Seated rows PTB 3x15 not today    Leg press PTB 3x15  Seated lumbar flexion with modified OH press 3x10 4#    Gait [id number]  3 minute walk test  Step ups 3x10    Not below  EVERY MINUTE ON THE MINUTE x 3 ROUNDS  BANDED PULL APARTS STANDING FOAM   STEP UPS 4\"  STS USE OF HANDS SLIGHTLY  REST FOR TWO MINUTES    Signed contract walking 2 min 12/19; goal of 6-8 weeks walking 6 min; 45 more sec ea week  STS 15, goal of 30 in 6 weeks; 3 more ea week    Not today  Flexion with LSB on BSB 2x15 NT  LSB 2x10 NT        Therapeutic Treatments and Modalities:     1. Therapeutic Activities (CPT 67958), STS 2x8; step ups 3x8, marching, standing on foam weight shifts, counter push ups, Nu-Step x3' to fatigue all to improve gait capacity and transfer " capacity, not today    2. Neuromuscular Re-education (CPT 17397), see below, x3 rounds each    Therapeutic Treatment and Modalities Summary: NBOS foam, ball holds trunk standing,   Time-based treatments/modalities:  Physical Therapy Timed Treatment Charges  Neuromusc re-ed, balance, coor, post minutes (CPT 22700): 10 minutes  Therapeutic exercise minutes (CPT 48795): 30 minutes  ASSESSMENT: pt with regression potentially related to recent viral infection; she was more fatigued walking today than in prior sessions. She can cont to benefit from skilled PT care in order to progress her overall functional strength, balance, and decrease/mitigate her balance or functional decline. PT feels her decreased endurance may be related to illness rather than lack of progress. She is stronger with transfers and sit to stands. She can cont to benefit from skilled PT care.    PLAN/RECOMMENDATIONS:   Plan for treatment: therapy treatment to continue next visit.  Planned interventions for next visit: continue with current treatment.

## 2023-01-27 ENCOUNTER — PHYSICAL THERAPY (OUTPATIENT)
Dept: PHYSICAL THERAPY | Facility: REHABILITATION | Age: 88
End: 2023-01-27
Attending: PHYSICAL MEDICINE & REHABILITATION
Payer: MEDICARE

## 2023-01-27 DIAGNOSIS — R53.81 PHYSICAL DECONDITIONING: ICD-10-CM

## 2023-01-27 DIAGNOSIS — R53.1 GENERAL WEAKNESS: ICD-10-CM

## 2023-01-27 DIAGNOSIS — Z78.9 IMPAIRED MOBILITY AND ADLS: ICD-10-CM

## 2023-01-27 DIAGNOSIS — Z74.09 IMPAIRED MOBILITY AND ADLS: ICD-10-CM

## 2023-01-27 PROCEDURE — 97110 THERAPEUTIC EXERCISES: CPT

## 2023-01-27 PROCEDURE — 97112 NEUROMUSCULAR REEDUCATION: CPT

## 2023-01-27 NOTE — OP THERAPY PROGRESS SUMMARY
Outpatient Physical Therapy  PROGRESS SUMMARY NOTE      [facility] [place]  [address], [address]  [place] [address]  Phone:  [phone]    Date of Visit: 01/27/2023    Patient: Krystle Tavarez  YOB: 1933  MRN: [id number]     Referring Provider: Papi Pradhan M.D.  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 Impaired mobility and ADLs  Z74.09    Z78.9   2. Physical deconditioning  R53.81   3. General weakness  R53.1       Rehab Potential: fair    Progress Report Period: 12/27/23-1/27/32    Functional Assessment Used; 3 minute walk test          Objective Findings and Assessment:   Patient progression towards goals: pt with regression potentially related to recent viral infection; she was more fatigued walking today than in prior sessions. She can cont to benefit from skilled PT care in order to progress her overall functional strength, balance, and decrease/mitigate her balance or functional decline. PT feels her decreased endurance may be related to illness rather than lack of progress. She is stronger with transfers and sit to stands. She can cont to benefit from skilled PT care.    Objective findings and assessment details: See daily note 1/27/23    Goals:   Short Term Goals:     -pt meets MCID for 5 rep STS in order to improve her ability to safely transfer with a lower fall risk (MET)  -pt is able to stand for 2-3 min to improve ability to do ADLs with moderate pain at most that calms once seated (MET)  -pt is able to walk for 2-3 min spurts with FWW with low fall risk to improve home ambulation (MET)    Short term goal time span:  2-4 weeks      Long Term Goals:       -pt meets MCID for 5 rep STS in order to improve her ability to safely transfer with a lower fall risk; she can also stand from a normal chair without hands to improve fxl strength and dec fall risk with transfers (50% MET)  -pt is able to stand for 5-10 min to improve ability to do ADLs with moderate pain at most that calms once seated (60% MET)  -pt is able to walk for 5+ min spurts with FWW with low fall risk to improve home ambulation and community access (60% MET)  -pt meets  6MWT of at least 300 feet in this timeframe to improve goal of community ambulation (50% MET)       Long term goal time span:  6-8 weeks    Plan:   Planned therapy interventions:  E Stim Attended (CPT 59543), E Stim Unattended (CPT 01368), Hot or Cold Pack Therapy (CPT 21171), Manual Therapy (CPT 89814), Mechanical Traction (CPT 61789), Neuromuscular Re-education (CPT 21348), Therapeutic Activities (CPT 12283) and Therapeutic Exercise (CPT 76947)  Frequency:  1x week  Duration in weeks:  8  Duration in visits:  8    Referring provider co-signature:  I have reviewed this plan of care and my co-signature certifies the need for services.     Certification Period: 01/27/2023 to 03/31/23    Physician Signature: ________________________________ Date: ______________

## 2023-01-30 ENCOUNTER — PATIENT OUTREACH (OUTPATIENT)
Dept: HEALTH INFORMATION MANAGEMENT | Facility: OTHER | Age: 88
End: 2023-01-30
Payer: MEDICARE

## 2023-01-30 DIAGNOSIS — E11.9 TYPE 2 DIABETES MELLITUS WITHOUT COMPLICATION, WITHOUT LONG-TERM CURRENT USE OF INSULIN (HCC): Chronic | ICD-10-CM

## 2023-01-30 DIAGNOSIS — I10 ESSENTIAL HYPERTENSION, BENIGN: Chronic | ICD-10-CM

## 2023-01-30 PROCEDURE — 99490 CHRNC CARE MGMT STAFF 1ST 20: CPT | Performed by: NURSE PRACTITIONER

## 2023-01-30 NOTE — PROGRESS NOTES
01/30/2023:    1003: Attempt x1 to reach patient for monthly CCM follow up call. No answer. M with contact information.      1430:    Assessment    Reached out to patient for monthly CCM follow up call. Per patient she is doing okay. She states she had COVID two weeks ago and is feeling better as the days go on. She states the cold weather is really affecting her arthritis. She states PT is still going well for her and she is quite pleased with the progress she's made. Her callus on her foot has almost resolved and is no longer bothering her. She has not made an appointment with her podiatrist for her toe nails. She states she will call when they need to be trimmed.     Care Plan    Continue on with current plan of care    Progress:    On-track    Next outreach: 02/22/2023 @ 1230.

## 2023-02-02 ENCOUNTER — PHYSICAL THERAPY (OUTPATIENT)
Dept: PHYSICAL THERAPY | Facility: REHABILITATION | Age: 88
End: 2023-02-02
Attending: PHYSICAL MEDICINE & REHABILITATION
Payer: MEDICARE

## 2023-02-02 DIAGNOSIS — R53.1 GENERAL WEAKNESS: ICD-10-CM

## 2023-02-02 DIAGNOSIS — Z74.09 IMPAIRED MOBILITY AND ADLS: ICD-10-CM

## 2023-02-02 DIAGNOSIS — R53.81 PHYSICAL DECONDITIONING: ICD-10-CM

## 2023-02-02 DIAGNOSIS — Z78.9 IMPAIRED MOBILITY AND ADLS: ICD-10-CM

## 2023-02-02 PROCEDURE — 97110 THERAPEUTIC EXERCISES: CPT

## 2023-02-02 NOTE — OP THERAPY DAILY TREATMENT
"  Outpatient Physical Therapy  DAILY TREATMENT     [facility] Outpatient Physical Therapy [place] [email or web address] [facility], [address]  [place] [address]  Phone:  [phone]    Date: 02/02/2023  Patient: Krystle Tavarez  YOB: 1933  MRN: [id number]   Time Calculation  Start time: 1330  Stop time: 1415 Time Calculation (min): 45 minutes   Chief Complaint: No chief complaint on file.  Visit #: 15    SUBJECTIVE: pt reports she hurts a lot all over still. Particularly on her R outer ankle and foot at this point vs her back. Endorses pain here when walking/sometimes at rest.    Sxs: burning back pain and B LE pain/weakness     Aggs: -5+min stand has to sit due to back and leg pain    OBJECTIVE:   (-) sxs L/S ext seated  (-) SLUMP  Check in on checklist for walking, getting out of the chair    Standing tolerance >5 min prior to sitting  2 min 5 sec 12/15/22 3 min walk test 1/27/23 168 feet 2 min 30 sec  5 rep STS from elevated surface (22 inches) 15 seconds without hands (cheats with leg presses)          Therapeutic Exercises (CPT 14433):     1. UPOC 2/27/23      Therapeutic Exercise Summary: STS 3x10 from elevated chair     [id number]  Seated [id number]  Seated hip add x15  Seated clams PTB 2x30  Seated rows PTB 3x15     Leg press PTB 3x15  Seated lumbar flexion with modified OH chest press 3x10 4#    Gait [id number]    Step ups x15 4\"    Not below  3 minute walk test not today  EVERY MINUTE ON THE MINUTE x 3 ROUNDS  BANDED PULL APARTS STANDING FOAM   STEP UPS 4\"  STS USE OF HANDS SLIGHTLY  REST FOR TWO MINUTES    Signed contract walking 2 min 12/19; goal of 6-8 weeks walking 6 min; 45 more sec ea week  STS 15, goal of 30 in 6 weeks; 3 more ea week    Not today  Flexion with LSB on BSB 2x15 NT  LSB 2x10 NT        Therapeutic Treatments and Modalities:     1. Therapeutic Activities (CPT 03482), STS 2x8; step ups 3x8, marching, standing on foam weight shifts, counter push ups, Nu-Step x3' to fatigue all to improve gait capacity " and transfer capacity, not today    2. Neuromuscular Re-education (CPT 92951), see below, x3 rounds each    Therapeutic Treatment and Modalities Summary: NBOS foam, ball holds trunk standing,   Time-based treatments/modalities:  Physical Therapy Timed Treatment Charges  Therapeutic exercise minutes (CPT 75625): 45 minutes  ASSESSMENT: Pt making minor progressions in her overall functional strength; tolerated session with 6/10 on modified RPE scale. Her foot pain is alleviated after repeated flexion and calf stretching. I encouraged her to do this as part of HEP and cont to promote goal to walk slightly further on better days to improve her functional capacity and overall endurance.    PLAN/RECOMMENDATIONS:   Plan for treatment: therapy treatment to continue next visit.  Planned interventions for next visit: continue with current treatment.

## 2023-02-06 ENCOUNTER — APPOINTMENT (OUTPATIENT)
Dept: CARDIOLOGY | Facility: MEDICAL CENTER | Age: 88
End: 2023-02-06
Payer: MEDICARE

## 2023-02-06 ENCOUNTER — NON-PROVIDER VISIT (OUTPATIENT)
Dept: CARDIOLOGY | Facility: MEDICAL CENTER | Age: 88
End: 2023-02-06
Payer: MEDICARE

## 2023-02-06 DIAGNOSIS — Z95.0 CARDIAC PACEMAKER IN SITU: Chronic | ICD-10-CM

## 2023-02-06 DIAGNOSIS — I44.2 AV BLOCK, 3RD DEGREE (HCC): ICD-10-CM

## 2023-02-06 PROCEDURE — 93280 PM DEVICE PROGR EVAL DUAL: CPT | Performed by: INTERNAL MEDICINE

## 2023-02-07 NOTE — OP THERAPY DAILY TREATMENT
"  Outpatient Physical Therapy  DAILY TREATMENT     [facility] Outpatient Physical Therapy [place] [id number] [facility], [address]  [place] [address]  Phone:  [phone]    Date: 02/09/2023  Patient: Krystle Tavarez  YOB: 1933  MRN: [id number]   Time Calculation  Start time: 0130  Stop time: 0210 Time Calculation (min): 40 minutes   Chief Complaint: No chief complaint on file.  Visit #: 16    SUBJECTIVE: Pt with reports of LE pain; notes more edema which is not uncommon here. Is seeing PCP soon about this. She notes that she is doing some of her HEP walking but the pains sometimes stop her. Hoping for better weather to increase this.     Sxs: burning back pain and B LE pain/weakness     Aggs: -5+min stand has to sit due to back and leg pain    OBJECTIVE:   (-) sxs L/S ext seated  (-) SLUMP  Check in on checklist for walking, getting out of the chair as part of her HEP    Standing tolerance >5 min prior to sitting  2 min 5 sec 12/15/22 3 min walk test 1/27/23 168 feet 2 min 30 sec  5 rep STS from elevated surface (22 inches) 15 seconds without hands (cheats with leg presses)          Therapeutic Exercises (CPT 53380):     1. UPOC 2/27/23      Therapeutic Exercise Summary: STS 3x10 from elevated chair     LAQ 2x10 4# 2x15  Seated [id number]  Seated hip add x15 not today  Seated rows PTB 3x15 not today    Standing flexion/mini hip hinge with SBA to her thighs  Step ups 4x10 4\"    Gait 3x150 feet; goal to increase time  Leg press PTB 3x15  Seated clams PTB 2x30    Not below today:  3 minute walk test not today  EVERY MINUTE ON THE MINUTE x 3 ROUNDS  BANDED PULL APARTS STANDING FOAM   STEP UPS 4\"  STS USE OF HANDS SLIGHTLY  REST FOR TWO MINUTES    Signed contract walking 2 min 12/19; goal of 6-8 weeks walking 6 min; 45 more sec ea week  STS 15, goal of 30 in 6 weeks; 3 more ea week    Not today  Flexion with LSB on BSB 2x15 NT  LSB 2x10 NT        Therapeutic Treatments and Modalities:     1. Therapeutic Activities (CPT " 75345), STS 2x8; step ups 3x8, marching, standing on foam weight shifts, counter push ups, Nu-Step x3' to fatigue all to improve gait capacity and transfer capacity, not today    2. Neuromuscular Re-education (CPT 34908), see below, not today    Therapeutic Treatment and Modalities Summary: NBOS foam, ball holds trunk standing,   Time-based treatments/modalities:  Physical Therapy Timed Treatment Charges  Therapeutic exercise minutes (CPT 22556): 40 minutes  ASSESSMENT: pt with moderate 6/10 fatigue by end of session. She was fatigued to the point of needing to sit by the end of session with standing activity. Goal to continue to increase standing tolerance (in clinic rarely limited here by pain more weakness) along with functional mobility.     PLAN/RECOMMENDATIONS:   Plan for treatment: therapy treatment to continue next visit.  Planned interventions for next visit: continue with current treatment.

## 2023-02-09 ENCOUNTER — PHYSICAL THERAPY (OUTPATIENT)
Dept: PHYSICAL THERAPY | Facility: REHABILITATION | Age: 88
End: 2023-02-09
Attending: PHYSICAL MEDICINE & REHABILITATION
Payer: MEDICARE

## 2023-02-09 DIAGNOSIS — Z78.9 IMPAIRED MOBILITY AND ADLS: ICD-10-CM

## 2023-02-09 DIAGNOSIS — R53.81 PHYSICAL DECONDITIONING: ICD-10-CM

## 2023-02-09 DIAGNOSIS — Z74.09 IMPAIRED MOBILITY AND ADLS: ICD-10-CM

## 2023-02-09 DIAGNOSIS — R53.1 GENERAL WEAKNESS: ICD-10-CM

## 2023-02-09 PROCEDURE — 97110 THERAPEUTIC EXERCISES: CPT

## 2023-02-16 ENCOUNTER — PHYSICAL THERAPY (OUTPATIENT)
Dept: PHYSICAL THERAPY | Facility: REHABILITATION | Age: 88
End: 2023-02-16
Attending: PHYSICAL MEDICINE & REHABILITATION
Payer: MEDICARE

## 2023-02-16 DIAGNOSIS — Z78.9 IMPAIRED MOBILITY AND ADLS: ICD-10-CM

## 2023-02-16 DIAGNOSIS — R53.81 PHYSICAL DECONDITIONING: ICD-10-CM

## 2023-02-16 DIAGNOSIS — Z74.09 IMPAIRED MOBILITY AND ADLS: ICD-10-CM

## 2023-02-16 PROCEDURE — 97110 THERAPEUTIC EXERCISES: CPT

## 2023-02-16 PROCEDURE — 97140 MANUAL THERAPY 1/> REGIONS: CPT

## 2023-02-16 PROCEDURE — 97530 THERAPEUTIC ACTIVITIES: CPT

## 2023-02-16 NOTE — OP THERAPY DAILY TREATMENT
"  Outpatient Physical Therapy  DAILY TREATMENT     [facility] Outpatient Physical Therapy [place] [address] [facility], [address]  [place] [address]  Phone:  [phone]    Date: 02/16/2023    Patient: Krystle Tavarez  YOB: 1933  MRN: [id number]     Time Calculation    Start time: 1335  Stop time: 1414 Time Calculation (min): 39 minutes         Chief Complaint: Foot Problem and Difficulty Walking    Visit #: 17    SUBJECTIVE:  Patient complains of right foot and calf pain for the past month in conjunction with RLE swelling. Needs to schedule a follow up with PCP to discuss. States that she feels like the cold weather has resulted in increased fatigue/joint pain and, therefore, overall decline in function. States that when she started PT in September that she was able to ambulate with a cane. She presents today in transport wheelchair.     OBJECTIVE:  Current objective measures:           Therapeutic Exercises (CPT 75221):     1. UPOC 2/27/23    2. Standing calf stretch, 2 x 30\" on R    3. STS, x 10    6. Standing marching, x 10    7. Standing hip abduction, x 10    8. Standing heel/toe raises, x 10    Therapeutic Treatments and Modalities:     1. Therapeutic Activities (CPT 50442), Gait for endurance 3 x 150 feet    2. Manual Therapy (CPT 31602), Assessment for presence of DVT due to onset of swelling and R calf pain, No redness and negative Homans sign, STM to R gastroc and plantar fascia, Reports decrease in pain following manual techniques and more comfortable during standing, gait.  Time-based treatments/modalities:    Physical Therapy Timed Treatment Charges  Manual therapy minutes (CPT 78589): 10 minutes  Therapeutic activity minutes (CPT 95566): 10 minutes  Therapeutic exercise minutes (CPT 66637): 19 minutes    ASSESSMENT:   Response to treatment: Due to lack of redness and severe pain with palpation, right calf pain unlikely to be a result of DVT. However, encouraged patient to discuss onset of pain and " swelling with PCP. Patient does continue to fatigue quickly, requiring prolonged rest breaks between standing activities. At most, able to stand for a total of 3 minutes. Will complete progress report testing next session.     PLAN/RECOMMENDATIONS:   Plan for treatment: therapy treatment to continue next visit. Progress note needed next visit   Planned interventions for next visit: continue with current treatment.

## 2023-02-17 ENCOUNTER — TELEPHONE (OUTPATIENT)
Dept: CARDIOLOGY | Facility: MEDICAL CENTER | Age: 88
End: 2023-02-17
Payer: MEDICARE

## 2023-02-17 DIAGNOSIS — R60.0 BILATERAL LOWER EXTREMITY EDEMA: ICD-10-CM

## 2023-02-17 DIAGNOSIS — I10 ESSENTIAL HYPERTENSION, BENIGN: ICD-10-CM

## 2023-02-17 NOTE — TELEPHONE ENCOUNTER
Lab orders placed per medication refill protocol. Lab order mailed to patient.    AL: Please verify RX sig and approve if appropriate. Last OV 6/8/22 noted:   use the Bumex 1 mg twice a day if she gains more than 3 pounds in a day or 5 pounds in a week  Thank you!

## 2023-02-17 NOTE — TELEPHONE ENCOUNTER
AL     Caller: Ana M - patients daughter     Medication Name and Dosage:  bumetanide (BUMEX) 0.5 MG Tab [[id number]    Medication amount left: 0    Preferred Pharmacy: [facility] on file     Other questions (Topic): N/A    Callback Number (Will only call for issues): [phone] (home)     Thank you,   Alem ORDOÑEZ

## 2023-02-22 ENCOUNTER — PATIENT OUTREACH (OUTPATIENT)
Dept: HEALTH INFORMATION MANAGEMENT | Facility: OTHER | Age: 88
End: 2023-02-22
Payer: MEDICARE

## 2023-02-22 DIAGNOSIS — E11.9 TYPE 2 DIABETES MELLITUS WITHOUT COMPLICATION, WITHOUT LONG-TERM CURRENT USE OF INSULIN (HCC): Chronic | ICD-10-CM

## 2023-02-22 DIAGNOSIS — I10 ESSENTIAL HYPERTENSION, BENIGN: Chronic | ICD-10-CM

## 2023-02-22 PROCEDURE — 99999 PR NO CHARGE: CPT | Performed by: NURSE PRACTITIONER

## 2023-02-22 RX ORDER — BUMETANIDE 0.5 MG/1
1 TABLET ORAL
Qty: 90 TABLET | Refills: 1 | Status: SHIPPED | OUTPATIENT
Start: 2023-02-22 | End: 2023-03-02

## 2023-02-22 NOTE — PROGRESS NOTES
Assessment    Reached out to patient for monthly [facility] follow up call and quarterly assessment. Per patient, she does have a cold currently. This RN advised patient to rest, hydrate, and come into office if it does not resolve. Ana M would like to know if Joana wants to order any updated lab work. Will send PCP a message.      We completed her quarterly assessment:    - Increase mobility and prevent falls: Patient has worked with PT for quite some time now and has made great progress. She states her mobility has improved and she cannot wait to walk more once the weather warms up. Patient has not had any recent falls. Goal completed.     - Decrease carb intake: Patient states her daughter Jackelyn has been making their meals and they have been eating more of a plant-based diet. She is watching her carb intake and is eating quite healthy. Goal completed.    Patient is taking her medications consistently, her and her family feel confident in her health, her last A1C was 6.5%, and we have completed both the goals she created. She is overdue for her A1C and annual wellness vist, so this RN got patient scheduled for an annual with her provider. At this time, patient is stable for discharge from [facility] to General Care Management with JAMAL Blank.      Care Plan    Completed.     Progress:    Graduated. Referral sent to JAMAL Blank.

## 2023-02-23 ENCOUNTER — PATIENT OUTREACH (OUTPATIENT)
Dept: HEALTH INFORMATION MANAGEMENT | Facility: OTHER | Age: 88
End: 2023-02-23
Payer: MEDICARE

## 2023-02-23 ENCOUNTER — APPOINTMENT (OUTPATIENT)
Dept: PHYSICAL THERAPY | Facility: REHABILITATION | Age: 88
End: 2023-02-23
Attending: PHYSICAL MEDICINE & REHABILITATION
Payer: MEDICARE

## 2023-02-23 RX ORDER — BUMETANIDE 0.5 MG/1
1 TABLET ORAL DAILY
Qty: 14 TABLET | Refills: 0 | Status: SHIPPED | OUTPATIENT
Start: 2023-02-23 | End: 2023-03-02

## 2023-02-23 NOTE — TELEPHONE ENCOUNTER
AL    Caller: Ana M (Daughter)    Medication Name and Dosage:     bumetanide (BUMEX) 0.5 MG Tab    Medication amount left: 0    Preferred Pharmacy:     [facility]   [address] [place] [address]   Phone:  [phone]  Fax:  [phone]   MAURICE #:  [id number]    Other questions (Topic): Ana M stated that [facility] just received the script yesterday and will take another week to get out to the pt. She is asking for just a 2 week supply be sent over to her local pharmacy above.    Callback Number (Will only call for issues): [phone]

## 2023-02-24 ENCOUNTER — TELEPHONE (OUTPATIENT)
Dept: CARDIOLOGY | Facility: MEDICAL CENTER | Age: 88
End: 2023-02-24
Payer: MEDICARE

## 2023-02-24 NOTE — TELEPHONE ENCOUNTER
2 week supply sent to pharmacy to get patient thru until mail order arrives.   Called and spoke to Ana M and advised. Appreciative of call.

## 2023-02-24 NOTE — PROGRESS NOTES
CHW Abhay and Kenia called the pt to introduce the CHW to the pt and introduce her to general care management step down after graduating CCM with [facility]. The CHW encouraged the pt to reach out for any needs that may come up. The CHW will put pt on follow up call list for two weeks out.

## 2023-02-24 NOTE — TELEPHONE ENCOUNTER
Returned call, Spoke to Yani (pharmacist) who wanted to confirm RX sig and see if okay to to edit sig to include the number of tablets next to the mg to ensure it is clear for the patient. Ex. Take 1mg (2 tablets). Appreciative of call.

## 2023-02-24 NOTE — TELEPHONE ENCOUNTER
AL    Caller: Ismael Lomeli - [facility]    Topic/issue:   Has questions on directions for:   bumetanide (BUMEX) 0.5 MG Tab (Order #[id number]) on 2/23/23    Callback Number: - [phone]    Thank you,    Fidelina TIRADO

## 2023-02-28 ENCOUNTER — APPOINTMENT (OUTPATIENT)
Dept: PHYSICAL THERAPY | Facility: REHABILITATION | Age: 88
End: 2023-02-28
Attending: PHYSICAL MEDICINE & REHABILITATION
Payer: MEDICARE

## 2023-03-02 ENCOUNTER — OFFICE VISIT (OUTPATIENT)
Dept: CARDIOLOGY | Facility: MEDICAL CENTER | Age: 88
End: 2023-03-02
Payer: MEDICARE

## 2023-03-02 VITALS
SYSTOLIC BLOOD PRESSURE: 136 MMHG | RESPIRATION RATE: 12 BRPM | HEART RATE: 77 BPM | HEIGHT: 63 IN | WEIGHT: 155 LBS | BODY MASS INDEX: 27.46 KG/M2 | DIASTOLIC BLOOD PRESSURE: 76 MMHG | OXYGEN SATURATION: 93 %

## 2023-03-02 DIAGNOSIS — E78.5 DYSLIPIDEMIA: ICD-10-CM

## 2023-03-02 DIAGNOSIS — I48.0 PAF (PAROXYSMAL ATRIAL FIBRILLATION) (HCC): Primary | ICD-10-CM

## 2023-03-02 DIAGNOSIS — Z79.01 CHRONIC ANTICOAGULATION: ICD-10-CM

## 2023-03-02 DIAGNOSIS — I10 ESSENTIAL HYPERTENSION, BENIGN: ICD-10-CM

## 2023-03-02 DIAGNOSIS — R60.0 BILATERAL LOWER EXTREMITY EDEMA: ICD-10-CM

## 2023-03-02 DIAGNOSIS — E11.9 TYPE 2 DIABETES MELLITUS WITHOUT COMPLICATION, WITHOUT LONG-TERM CURRENT USE OF INSULIN (HCC): ICD-10-CM

## 2023-03-02 PROCEDURE — 99214 OFFICE O/P EST MOD 30 MIN: CPT | Performed by: INTERNAL MEDICINE

## 2023-03-02 RX ORDER — BUMETANIDE 0.5 MG/1
0.5 TABLET ORAL 2 TIMES DAILY
Qty: 180 TABLET | Refills: 3
Start: 2023-03-02 | End: 2023-03-14

## 2023-03-02 ASSESSMENT — FIBROSIS 4 INDEX: FIB4 SCORE: 1.63

## 2023-03-02 NOTE — PATIENT INSTRUCTIONS
Try to wear compressions sock (20-30mmHg) every day when you are awake  Put your legs up while sitting down  Keep track of your morning daily weight and if you gain >=3lbs/day or >=5lbs/week, take Bumex 1mg twice a day and let me know when this happens.

## 2023-03-02 NOTE — PROGRESS NOTES
CARDIOLOGY established PATIENT:    PCP: JAYLEN Castelan    1. PAF (paroxysmal atrial fibrillation) (HCC)    2. Essential hypertension, benign    3. Bilateral lower extremity edema    4. Chronic anticoagulation    5. Dyslipidemia    6. Type 2 diabetes mellitus without complication, without long-term current use of insulin (HCC)        Krystle Tavarez is here for follow-up for atrial fibrillation, HFpEF,  and lower extremity edema.    Chief Complaint   Patient presents with    Follow-Up     FV Dx: Bilateral lower extremity edema       History: Krystle Tavarez is a 89 y.o. female with history of paroxysmal atrial fibrillation on chronic anticoagulation, hypertension, dyslipidemia, pulmonary hypertension, moderate aortic insufficiency, HFpEF, advanced AV block with permanent pacemaker since 2015 ([facility], MRI non conditional, 100% , 2.5 yrs left on battery as of 2/2023, <1% mode switches), TIA, type 2 diabetes and chronic lower back pain is here for follow-up.  After our recent visit 6/2022, we increased her apixaban to 5 mg twice daily for appropriate dosing.  I also advised her to keep a daily weight to help manage her Bumex administration but she has not been.    [id number] score of 6 (history of TIA).    Lipid panel 6/1/21:      Denies any chest pain, shortness of breath, orthopnea, PND, palpitations, claudication.  She admits to stable occasional orthostatic dizziness/lightheadedness without any syncope or presyncope. Feels tired. Activity limited by chronic LBP. No bleeding concerns. She only drinks 2 cups of coffee and few sips of water with her medications. Been feeling more LH over last 6 months. Does not feel that she gained rossi. Sleeps on one pillow, no orthopnea or PND. Denies palpitations, syncope, presyncope., No bleeding concern. Usually noticed MICHAELA during warm weather but this winter she noticed recurrent MICHAELA.    Wt 142 lbs 6/2022, 155lbs today (not weighed, by report).  "Occasional BP : 130-140/80's mmHg. Her daughter thinks her weight is more close to 140-145lbs.    She has been taking the Bumex only as needed rarely needing it last 6 months); but since 2023 she started noticing more MICHAELA and resumed Bumex 0.5mg BID for the last week. Not wearing her compression socks.    TTE 22:  Compared to the images of the prior study 2021, there has been no   significant change.   Normal left ventricular systolic function.  The left ventricular ejection fraction is visually estimated to be 60%.  Grade II diastolic dysfunction.  Normal right ventricular systolic function.  Moderate aortic insufficiency.  Mild tricuspid regurgitation.  Right ventricular systolic pressure is estimated to be 45 mmHg.    Father  of a heart attack at 74, mother  at 76 from complications of diabetes and stroke.     Patient lives with her daughter who accompanies her today      PE:  /76 (BP Location: Left arm, Patient Position: Sitting, BP Cuff Size: Adult)   Pulse 77   Resp 12   Ht 1.6 m (5' 3\")   Wt 70.3 kg (155 lb)   LMP  (LMP Unknown)   SpO2 93%   BMI 27.46 kg/m²     Gen: no acute distress  HEENT: Symmetric face. Anicteric sclerae. Moist mucus membranes  NECK: No JVD.   CARDIAC: Regular, Normal S1, S2, No murmur  VASCULATURE: carotids are normal bilaterally without bruit  RESP: Clear to auscultation bilaterally  ABD: Soft, non-tender, non-distended  EXT: 2+-3+ ankle / feet lower extremity edema  SKIN: Warm and dry  NEURO: No gross deficits  PSYCH: Appropriate affect, participates in conversation    The ASCVD Risk score ([place] DK, et al., 2019) failed to calculate.    Past Medical History:   Diagnosis Date    Arthritis     knees, hips    Breath shortness     with exertion     Cataract     bilat IOL     Dental disorder     full upper, partial lower     Diabetes (HCC)     pre diabetic    Heart burn     Hemorrhagic disorder (HCC)     on Eliquis    High cholesterol     diet " controlled     Hyperlipidemia     Hypertension     Pacemaker 2015    Pain 2020    lower back, right leg    Pre-diabetes     TIA (transient ischemic attack)     on Eliquis    Urinary incontinence      Past Surgical History:   Procedure Laterality Date    PB LAMINOTOMY,LUMBAR DISK,1 INTRSP N/A 2/25/2020    Procedure: LAMINECTOMY, SPINE, LUMBAR, WITH DISCECTOMY- L5-S1, MEDIAL FACETECTOMY;  Surgeon: Latrell Kimble M.D.;  Location: SURGERY [facility];  Service: Neurosurgery    FORAMINOTOMY N/A 2/25/2020    Procedure: FORAMINOTOMY, SPINE;  Surgeon: Latrell Kimble M.D.;  Location: SURGERY [facility];  Service: Neurosurgery    PACEMAKER INSERTION  2015    HIP REPLACEMENT, TOTAL Right 2009    KNEE REPLACEMENT, TOTAL Right 2004    CATARACT EXTRACTION WITH IOL Bilateral 2003    CHOLECYSTECTOMY  2002    BASAL CELL EXCISION      nose and hand    BUNIONECTOMY Left      Allergies   Allergen Reactions    Sulfa Drugs Nausea     Nausea     Gabapentin      Altered mental status    Tramadol      Altered mentation    Tylenol With Codeine #3 [Acetaminophen-Codeine] Unspecified     Dizziness, hallucinations     Outpatient Encounter Medications as of 3/2/2023   Medication Sig Dispense Refill    bumetanide (BUMEX) 0.5 MG Tab Take 1 Tablet by mouth 2 times a day. Take 1mg twice daily if gain more than 3lbs in a day or 5lbs in a week. 180 Tablet 3    carvedilol (COREG) 3.125 MG Tab TAKE 1 TABLET TWICE A DAY WITH MEALS 180 Tablet 1    losartan (COZAAR) 100 MG Tab TAKE 1 TABLET DAILY 90 Tablet 1    apixaban (ELIQUIS) 5mg Tab Take 1 Tablet by mouth 2 times a day. 180 Tablet 2    ketoconazole (NIZORAL) 2 % shampoo APPLY 5 TO 10ML TO WET SCALP, LATHER, LEAVE ON 3 TO 5 MINUTES AND RINSE. APPLY TWICE WEEKLY FOR 2 TO 4 WEEKS 120 mL 6    nystatin (MYCOSTATIN) powder Apply 1 g topically 2 times a day. 30 g 0    Cyanocobalamin (B-12) 1000 MCG Tab Take 2 tablets  (2,000mcg) by mouth every day. 60 Tablet 0    lidocaine (LIDODERM) 5 % Patch Place 1  Patch on the skin every 24 hours. 15 Patch 0    docusate sodium (COLACE) 100 MG Cap Take 100 mg by mouth every day.      atorvastatin (LIPITOR) 40 MG Tab Take 1 Tablet by mouth every evening. 90 Tablet 7    Ascorbic Acid (VITAMIN C) 500 MG Cap Take 1 Capsule by mouth 2 Times a Day.      [DISCONTINUED] bumetanide (BUMEX) 0.5 MG Tab Take 2 Tablets by mouth every day. Take 2 Tablets by mouth 1 time a day as needed (edema). Take 1mg twice daily if gain more than 3lbs in a day or 5lbs in a week. (Patient not taking: Reported on 3/2/2023) 14 Tablet 0    [DISCONTINUED] bumetanide (BUMEX) 0.5 MG Tab Take 2 Tablets by mouth 1 time a day as needed (edema). Take 1mg twice daily if gain more than 3lbs in a day or 5lbs in a week. 90 Tablet 1     No facility-administered encounter medications on file as of 3/2/2023.     Social History     Socioeconomic History    Marital status:      Spouse name: Not on file    Number of children: Not on file    Years of education: Not on file    Highest education level: Not on file   Occupational History    Not on file   Tobacco Use    Smoking status: Never    Smokeless tobacco: Never   Vaping Use    Vaping Use: Never used   Substance and Sexual Activity    Alcohol use: Not Currently    Drug use: No    Sexual activity: Not Currently     Partners: Male   Other Topics Concern    Not on file   Social History Narrative    Not on file     Social Determinants of Health     Financial Resource Strain: Not on file   Food Insecurity: Not on file   Transportation Needs: No Transportation Needs    Lack of Transportation (Medical): No    Lack of Transportation (Non-Medical): No   Physical Activity: Inactive    Days of Exercise per Week: 0 days    Minutes of Exercise per Session: 10 min   Stress: No Stress Concern Present    Feeling of Stress : Only a little   Social Connections: Not on file   Intimate Partner Violence: Not on file   Housing Stability: Unknown    Unable to Pay for Housing in the Last  Year: No    Number of Places Lived in the Last Year: Not on file    Unstable Housing in the Last Year: No     Family History   Problem Relation Age of Onset    Diabetes Mother     Stroke Mother     Heart Attack Father 74    No Known Problems Maternal Grandmother     No Known Problems Maternal Grandfather     No Known Problems Paternal Grandmother     No Known Problems Paternal Grandfather          Studies    Lab Results   Component Value Date/Time    TSHULTRASEN 0.590 05/02/2022 1317      No results found for: FREET4   Lab Results   Component Value Date/Time    HBA1C 6.5 (H) 05/02/2022 01:17 PM     Lab Results   Component Value Date/Time    CHOLSTRLTOT 187 06/01/2021 12:33 AM     (H) 06/01/2021 12:33 AM    HDL 51 06/01/2021 12:33 AM    TRIGLYCERIDE 61 06/01/2021 12:33 AM       Lab Results   Component Value Date/Time    SODIUM 139 05/05/2022 06:17 AM    POTASSIUM 4.2 05/05/2022 06:17 AM    CHLORIDE 102 05/05/2022 06:17 AM    CO2 27 05/05/2022 06:17 AM    GLUCOSE 179 (H) 05/05/2022 06:17 AM    BUN 14 05/05/2022 06:17 AM    CREATININE 0.69 05/05/2022 06:17 AM     Lab Results   Component Value Date/Time    ALKPHOSPHAT 80 05/05/2022 06:17 AM    ASTSGOT 12 05/05/2022 06:17 AM    ALTSGPT 10 05/05/2022 06:17 AM    TBILIRUBIN 0.9 05/05/2022 06:17 AM        Echocardiogram:  No results found for this or any previous visit.      Assessment and Recommendations:    Problem List Items Addressed This Visit       PAF (paroxysmal atrial fibrillation) (HCC) - Primary (Chronic)    Relevant Medications    bumetanide (BUMEX) 0.5 MG Tab    Essential hypertension, benign (Chronic)    Relevant Medications    bumetanide (BUMEX) 0.5 MG Tab    Dyslipidemia (Chronic)    Relevant Orders    Lipid Profile    Type 2 diabetes mellitus without complication, without long-term current use of insulin (HCC) (Chronic)    Relevant Orders    HEMOGLOBIN A1C (Glycohemoglobin GHB Total/A1C with MBG Estimate)     Other Visit Diagnoses       Bilateral  lower extremity edema        Relevant Medications    bumetanide (BUMEX) 0.5 MG Tab    Chronic anticoagulation        Relevant Orders    CBC WITH DIFFERENTIAL          Ms. Tavarez is stable from a cardiovascular standpoint and chronic HFpEF, with recent worsening in her lower extremity edema, resumed Bumex, without cardiorespiratory complaints.    Her most which is on her recent device interrogation with less than 1%, however she is 100% paced which can contribute to her lower extremity edema.  I advised her to keep a daily track of her morning weight and blood pressure and to start taking Bumex 1 mg twice a day instead of 0.5 mg twice a day if she gains more than or equal to 3 pounds in a day or more than or equal to 5 pounds in a week and to let me know when that happens.  Based on her progress, we will decide if we need to repeat a limited echocardiogram to evaluate her LV systolic function.  LVEF preserved as of May 2022 with signs of diastolic dysfunction.  No audible murmur on exam hence less suggestive of worsening aortic regurgitation.    We will plan to check several blood test soon given her chronic anticoagulation and on potassium wasting diuretic: CBC, CMP, magnesium.  And given her history of type 2 diabetes of medications and dyslipidemia on atorvastatin 40 mg, we will plan to also check her A1c and lipid panel.  My hope is to eventually stop her atorvastatin given her advanced age as long as her LDL remains stable compared to prior labs.      Her type 2 diabetes seems to be previously controlled by diet, managed by her PCP.    Thank you for the opportunity to be involved in Krystle Tavarez 's care; and please reach out with any questions or concerns.    Return in about 6 months (around 9/2/2023).    Scott Garcia MD, MPH [facility]  Interventional Cardiologist  [facility]   of Clinical Internal Medicine - [facility] Axel SCHULTZ    ~  Portions of this note were completed using voice recognition software (Dragon Naturally speaking cVidya) . Occasional transcription errors may have escaped proof reading. I have made every reasonable attempt to correct obvious errors, but I expect that there are errors of grammar and possibly content that I did not discover before finalizing the note. ~

## 2023-03-03 ENCOUNTER — HOSPITAL ENCOUNTER (OUTPATIENT)
Dept: LAB | Facility: MEDICAL CENTER | Age: 88
End: 2023-03-03
Attending: INTERNAL MEDICINE
Payer: MEDICARE

## 2023-03-03 ENCOUNTER — TELEPHONE (OUTPATIENT)
Dept: CARDIOLOGY | Facility: MEDICAL CENTER | Age: 88
End: 2023-03-03
Payer: MEDICARE

## 2023-03-03 DIAGNOSIS — E78.5 DYSLIPIDEMIA: ICD-10-CM

## 2023-03-03 DIAGNOSIS — I10 ESSENTIAL HYPERTENSION, BENIGN: ICD-10-CM

## 2023-03-03 DIAGNOSIS — Z79.01 CHRONIC ANTICOAGULATION: ICD-10-CM

## 2023-03-03 DIAGNOSIS — E11.9 TYPE 2 DIABETES MELLITUS WITHOUT COMPLICATION, WITHOUT LONG-TERM CURRENT USE OF INSULIN (HCC): ICD-10-CM

## 2023-03-03 LAB
ALBUMIN SERPL BCP-MCNC: 4.1 G/DL (ref 3.2–4.9)
ALBUMIN/GLOB SERPL: 1.5 G/DL
ALP SERPL-CCNC: 69 U/L (ref 30–99)
ALT SERPL-CCNC: 8 U/L (ref 2–50)
ANION GAP SERPL CALC-SCNC: 10 MMOL/L (ref 7–16)
AST SERPL-CCNC: 10 U/L (ref 12–45)
BASOPHILS # BLD AUTO: 0.7 % (ref 0–1.8)
BASOPHILS # BLD: 0.04 K/UL (ref 0–0.12)
BILIRUB SERPL-MCNC: 1 MG/DL (ref 0.1–1.5)
BUN SERPL-MCNC: 27 MG/DL (ref 8–22)
CALCIUM ALBUM COR SERPL-MCNC: 9.9 MG/DL (ref 8.5–10.5)
CALCIUM SERPL-MCNC: 10 MG/DL (ref 8.5–10.5)
CHLORIDE SERPL-SCNC: 103 MMOL/L (ref 96–112)
CHOLEST SERPL-MCNC: 135 MG/DL (ref 100–199)
CO2 SERPL-SCNC: 29 MMOL/L (ref 20–33)
CREAT SERPL-MCNC: 0.85 MG/DL (ref 0.5–1.4)
EOSINOPHIL # BLD AUTO: 0.18 K/UL (ref 0–0.51)
EOSINOPHIL NFR BLD: 3.3 % (ref 0–6.9)
ERYTHROCYTE [DISTWIDTH] IN BLOOD BY AUTOMATED COUNT: 50.4 FL (ref 35.9–50)
EST. AVERAGE GLUCOSE BLD GHB EST-MCNC: 151 MG/DL
FASTING STATUS PATIENT QL REPORTED: NORMAL
GFR SERPLBLD CREATININE-BSD FMLA CKD-EPI: 65 ML/MIN/1.73 M 2
GLOBULIN SER CALC-MCNC: 2.8 G/DL (ref 1.9–3.5)
GLUCOSE SERPL-MCNC: 157 MG/DL (ref 65–99)
HBA1C MFR BLD: 6.9 % (ref 4–5.6)
HCT VFR BLD AUTO: 42.1 % (ref 37–47)
HDLC SERPL-MCNC: 49 MG/DL
HGB BLD-MCNC: 14.1 G/DL (ref 12–16)
IMM GRANULOCYTES # BLD AUTO: 0.01 K/UL (ref 0–0.11)
IMM GRANULOCYTES NFR BLD AUTO: 0.2 % (ref 0–0.9)
LDLC SERPL CALC-MCNC: 62 MG/DL
LYMPHOCYTES # BLD AUTO: 2.24 K/UL (ref 1–4.8)
LYMPHOCYTES NFR BLD: 41.2 % (ref 22–41)
MAGNESIUM SERPL-MCNC: 2.2 MG/DL (ref 1.5–2.5)
MCH RBC QN AUTO: 32 PG (ref 27–33)
MCHC RBC AUTO-ENTMCNC: 33.5 G/DL (ref 33.6–35)
MCV RBC AUTO: 95.7 FL (ref 81.4–97.8)
MONOCYTES # BLD AUTO: 0.4 K/UL (ref 0–0.85)
MONOCYTES NFR BLD AUTO: 7.4 % (ref 0–13.4)
NEUTROPHILS # BLD AUTO: 2.57 K/UL (ref 2–7.15)
NEUTROPHILS NFR BLD: 47.2 % (ref 44–72)
NRBC # BLD AUTO: 0 K/UL
NRBC BLD-RTO: 0 /100 WBC
PLATELET # BLD AUTO: 205 K/UL (ref 164–446)
PMV BLD AUTO: 9.8 FL (ref 9–12.9)
POTASSIUM SERPL-SCNC: 4.7 MMOL/L (ref 3.6–5.5)
PROT SERPL-MCNC: 6.9 G/DL (ref 6–8.2)
RBC # BLD AUTO: 4.4 M/UL (ref 4.2–5.4)
SODIUM SERPL-SCNC: 142 MMOL/L (ref 135–145)
TRIGL SERPL-MCNC: 120 MG/DL (ref 0–149)
WBC # BLD AUTO: 5.4 K/UL (ref 4.8–10.8)

## 2023-03-03 PROCEDURE — 80053 COMPREHEN METABOLIC PANEL: CPT

## 2023-03-03 PROCEDURE — 80061 LIPID PANEL: CPT

## 2023-03-03 PROCEDURE — 83735 ASSAY OF MAGNESIUM: CPT

## 2023-03-03 PROCEDURE — 36415 COLL VENOUS BLD VENIPUNCTURE: CPT

## 2023-03-03 PROCEDURE — 83036 HEMOGLOBIN GLYCOSYLATED A1C: CPT

## 2023-03-03 PROCEDURE — 85025 COMPLETE CBC W/AUTO DIFF WBC: CPT

## 2023-03-03 NOTE — TELEPHONE ENCOUNTER
----- Message from Scott Garcia M.D. sent at 3/3/2023  1:10 PM PST -----  Can you please let her know that the blood count came back normal. Other tests still pending. If we get the remaining results by end of the day we will let them know of the findings. Thank you Eloisa.

## 2023-03-05 DIAGNOSIS — E78.5 DYSLIPIDEMIA: ICD-10-CM

## 2023-03-06 RX ORDER — ATORVASTATIN CALCIUM 40 MG/1
TABLET, FILM COATED ORAL
Qty: 90 TABLET | Refills: 3 | Status: SHIPPED | OUTPATIENT
Start: 2023-03-06 | End: 2024-02-07

## 2023-03-07 NOTE — TELEPHONE ENCOUNTER
Is the patient due for a refill? Yes    Was the patient seen the past year? Yes    Date of last office visit: 3/2/2023    Does the patient have an upcoming appointment?  Yes   If yes, When? 9/14/2023    Provider to refill:AL    Does the patients insurance require a 100 day supply?  No

## 2023-03-10 ENCOUNTER — PATIENT OUTREACH (OUTPATIENT)
Dept: HEALTH INFORMATION MANAGEMENT | Facility: OTHER | Age: 88
End: 2023-03-10
Payer: MEDICARE

## 2023-03-10 NOTE — PROGRESS NOTES
CHW Abhay called the pt to check in with her and see how she is doing. Pt did not answer and this CHW left the pt VM. This CHW will follow up in 2 weeks.

## 2023-03-21 ENCOUNTER — OFFICE VISIT (OUTPATIENT)
Dept: MEDICAL GROUP | Facility: MEDICAL CENTER | Age: 88
End: 2023-03-21
Payer: MEDICARE

## 2023-03-21 VITALS
HEART RATE: 70 BPM | WEIGHT: 142 LBS | SYSTOLIC BLOOD PRESSURE: 112 MMHG | HEIGHT: 63 IN | OXYGEN SATURATION: 95 % | TEMPERATURE: 97.6 F | BODY MASS INDEX: 25.16 KG/M2 | DIASTOLIC BLOOD PRESSURE: 58 MMHG

## 2023-03-21 DIAGNOSIS — Z78.9 IMPAIRED MOBILITY AND ADLS: Chronic | ICD-10-CM

## 2023-03-21 DIAGNOSIS — Z78.9 POLST (PHYSICIAN ORDERS FOR LIFE-SUSTAINING TREATMENT): ICD-10-CM

## 2023-03-21 DIAGNOSIS — R60.0 BILATERAL LOWER EXTREMITY EDEMA: ICD-10-CM

## 2023-03-21 DIAGNOSIS — E78.5 DYSLIPIDEMIA: Chronic | ICD-10-CM

## 2023-03-21 DIAGNOSIS — Z95.0 CARDIAC PACEMAKER IN SITU: Chronic | ICD-10-CM

## 2023-03-21 DIAGNOSIS — I10 ESSENTIAL HYPERTENSION, BENIGN: ICD-10-CM

## 2023-03-21 DIAGNOSIS — Z74.09 IMPAIRED MOBILITY AND ADLS: Chronic | ICD-10-CM

## 2023-03-21 DIAGNOSIS — M48.061 FORAMINAL STENOSIS OF LUMBAR REGION: ICD-10-CM

## 2023-03-21 DIAGNOSIS — E11.9 TYPE 2 DIABETES MELLITUS WITHOUT COMPLICATION, WITHOUT LONG-TERM CURRENT USE OF INSULIN (HCC): ICD-10-CM

## 2023-03-21 DIAGNOSIS — Z00.00 MEDICARE ANNUAL WELLNESS VISIT, SUBSEQUENT: Primary | ICD-10-CM

## 2023-03-21 DIAGNOSIS — Z23 NEED FOR VACCINATION: ICD-10-CM

## 2023-03-21 DIAGNOSIS — I48.0 PAF (PAROXYSMAL ATRIAL FIBRILLATION) (HCC): Chronic | ICD-10-CM

## 2023-03-21 PROCEDURE — 90662 IIV NO PRSV INCREASED AG IM: CPT | Performed by: NURSE PRACTITIONER

## 2023-03-21 PROCEDURE — G0008 ADMIN INFLUENZA VIRUS VAC: HCPCS | Performed by: NURSE PRACTITIONER

## 2023-03-21 PROCEDURE — G0009 ADMIN PNEUMOCOCCAL VACCINE: HCPCS | Performed by: NURSE PRACTITIONER

## 2023-03-21 PROCEDURE — G0439 PPPS, SUBSEQ VISIT: HCPCS | Performed by: NURSE PRACTITIONER

## 2023-03-21 PROCEDURE — 90677 PCV20 VACCINE IM: CPT | Performed by: NURSE PRACTITIONER

## 2023-03-21 RX ORDER — BUMETANIDE 0.5 MG/1
TABLET ORAL
Qty: 180 TABLET | Refills: 3 | Status: SHIPPED | OUTPATIENT
Start: 2023-03-21 | End: 2023-07-18

## 2023-03-21 ASSESSMENT — FIBROSIS 4 INDEX: FIB4 SCORE: 1.53

## 2023-03-21 ASSESSMENT — ACTIVITIES OF DAILY LIVING (ADL): BATHING_REQUIRES_ASSISTANCE: 0

## 2023-03-21 ASSESSMENT — PATIENT HEALTH QUESTIONNAIRE - PHQ9: CLINICAL INTERPRETATION OF PHQ2 SCORE: 0

## 2023-03-21 ASSESSMENT — ENCOUNTER SYMPTOMS: GENERAL WELL-BEING: GOOD

## 2023-03-21 NOTE — OP THERAPY DAILY TREATMENT
"  Outpatient Physical Therapy  DAILY TREATMENT     [facility] Therapy [place] [address], [address]  [place] [address]  Phone:  [phone]    Date: 03/23/2023  Patient: Krystle Tavarez  YOB: 1933  MRN: [id number]   Time Calculation           Chief Complaint: No chief complaint on file.  Visit #: Visit count could not be calculated. Make sure you are using a visit which is associated with an episode.    SUBJECTIVE: pt cont to have some reports of RLE pain; she states this limits her overall walking ability. She saw her PCP who wants her to continue with skilled PT and saw a cardiologist who recommended she go back on a water pill. It feels less swollen now. Also recommended stockings    OBJECTIVE:   5 rep STS 18 sec  SPPB 7/12  3 minute walk test 1/27 168 feet 2 min 30 seconds    L/S extension**  Slump**  Calf stretch**  Current objective measures:         Therapeutic Exercises (CPT 58343):     1. UPOC 2/27/23    2. Standing calf stretch, 2 x 30\" on R    3. STS, x 10    6. Standing marching, x 10    7. Standing hip abduction, x 10    8. Standing heel/toe raises, x 10    Therapeutic Treatments and Modalities:     1. Therapeutic Activities (CPT 90309), Gait for endurance 3 x 150 feet    2. Manual Therapy (CPT 37120), Assessment for presence of DVT due to onset of swelling and R calf pain, No redness and negative Homans sign, STM to R gastroc and plantar fascia, Reports decrease in pain following manual techniques and more comfortable during standing, gait.  Time-based treatments/modalities:     ASSESSMENT: Pt continues to struggle with gait and overall endurance/strength secondary to both weakness and RLE pains. She seems to have more positive findings of neural tension screens causing pain as compared to her prior history which is manifesting in dec R step length, pain limiting her walking capacity (to 150 feet) and with decreased strength through her R leg. We discussed cont need for " skilled PT care and with very minor improvement after stretching today. We also discussed pot follow up with an MD in regards to these issues. She can benefit from skilled PT care to continue to address her overall functional weakness, endurance deficits, and RLE pain related to the above measures as well as mitigate functional decline.    PLAN/RECOMMENDATIONS:   Plan for treatment: therapy treatment to continue next visit.   Planned interventions for next visit: continue with current treatment.

## 2023-03-21 NOTE — PROGRESS NOTES
Chief Complaint   Patient presents with    Annual Wellness Visit       HPI:  Krystle is a 89 y.o. here for Medicare Annual Wellness Visit    Daughter accompanies patient today    Patient Active Problem List    Diagnosis Date Noted    POLST (Physician Orders for Life-Sustaining Treatment) 04/03/2023    Acute respiratory failure with hypoxia ([facility]) 05/01/2022    DNR (do not resuscitate) 05/30/2021    Benign cyst of right kidney 05/30/2021    Common bile duct dilation 05/30/2021    Impaired mobility and ADLs 03/16/2020    Anemia 03/13/2020    PAF (paroxysmal atrial fibrillation) ([facility]) 02/05/2020    Type 2 diabetes mellitus without complication, without long-term current use of insulin ([facility]) 01/16/2020    Foraminal stenosis of lumbar region 11/18/2019    Lumbar spondylosis 11/18/2019    History of TIA (transient ischemic attack) 08/13/2019    Retinopathy 08/13/2019    Weakness of both lower extremities 08/13/2019    Compression fracture of lumbar vertebrae, non-traumatic, sequela 08/13/2019    Degeneration of lumbar intervertebral disc 08/06/2019    Cardiac pacemaker in situ 07/09/2019    Essential hypertension, benign 07/09/2019    Dyslipidemia 07/09/2019    Localized edema 07/09/2019       Current Outpatient Medications   Medication Sig Dispense Refill    bumetanide (BUMEX) 0.5 MG Tab TAKE 2 TABLETS BY MOUTH ONCE DAILY AS NEEDED FOR EDEMA, IF GAINED MORE THAN 3 POUNDS IN A DAY OR 5 POUNDS IN A WEEK TAKE 2 TABLETS TWICE DAILY 180 Tablet 3    atorvastatin (LIPITOR) 40 MG Tab TAKE 1 TABLET EVERY EVENING 90 Tablet 3    carvedilol (COREG) 3.125 MG Tab TAKE 1 TABLET TWICE A DAY WITH MEALS 180 Tablet 1    losartan (COZAAR) 100 MG Tab TAKE 1 TABLET DAILY 90 Tablet 1    apixaban (ELIQUIS) 5mg Tab Take 1 Tablet by mouth 2 times a day. 180 Tablet 2    ketoconazole (NIZORAL) 2 % shampoo APPLY 5 TO 10ML TO WET SCALP, LATHER, LEAVE ON 3 TO 5 MINUTES AND RINSE. APPLY TWICE WEEKLY FOR 2 TO 4 WEEKS 120 mL 6    nystatin (MYCOSTATIN) powder  Apply 1 g topically 2 times a day. 30 g 0    Cyanocobalamin (B-12) 1000 MCG Tab Take 2 tablets  (2,000mcg) by mouth every day. 60 Tablet 0    lidocaine (LIDODERM) 5 % Patch Place 1 Patch on the skin every 24 hours. 15 Patch 0    docusate sodium (COLACE) 100 MG Cap Take 100 mg by mouth every day.      Ascorbic Acid (VITAMIN C) 500 MG Cap Take 1 Capsule by mouth 2 Times a Day.       No current facility-administered medications for this visit.        Patient is taking medications as noted in medication list.  Current supplements as per medication list.     Allergies: Sulfa drugs, Gabapentin, Tramadol, and Tylenol with codeine #3 [acetaminophen-codeine]    Current social contact/activities: visiting with family     Is patient current with immunizations? No, due for FLU and PREVNAR (PCV20) . Patient is interested in receiving FLU and PREVNAR (PCV20)  today.    She  reports that she has never smoked. She has never used smokeless tobacco. She reports that she does not currently use alcohol. She reports that she does not use drugs.  Counseling given: Not Answered      ROS:    Gait: Uses a walker   Ostomy: No   Other tubes: No   Amputations: No   Chronic oxygen use No   Last eye exam 1/2023   Wears hearing aids: No   : Reports urinary leakage during the last 6 months that has not interfered at all with their daily activities or sleep.    Screening:    Depression Screening  Little interest or pleasure in doing things?  0 - not at all  Feeling down, depressed, or hopeless? 0 - not at all  Patient Health Questionnaire Score: 0    If depressive symptoms identified deferred to follow up visit unless specifically addressed in assessment and plan.    Interpretation of PHQ-9 Total Score   Score Severity   1-4 No Depression   5-9 Mild Depression   10-14 Moderate Depression   15-19 Moderately Severe Depression   20-27 Severe Depression    Screening for Cognitive Impairment  Three Minute Recall (daughter, heaven, mountain)  2/3     Will clock face with all 12 numbers and set the hands to show 10 past 11.  No    If cognitive concerns identified, deferred for follow up unless specifically addressed in assessment and plan.    Fall Risk Assessment  Has the patient had two or more falls in the last year or any fall with injury in the last year?  No  If fall risk identified, deferred for follow up unless specifically addressed in assessment and plan.    Safety Assessment  Throw rugs on floor.  No  Handrails on all stairs.  Yes  Good lighting in all hallways.  Yes  Difficulty hearing.  Yes  Patient counseled about all safety risks that were identified.    Functional Assessment ADLs  Are there any barriers preventing you from cooking for yourself or meeting nutritional needs?  Yes.    Are there any barriers preventing you from driving safely or obtaining transportation?  Yes.    Are there any barriers preventing you from using a telephone or calling for help?  No.    Are there any barriers preventing you from shopping?  Yes.    Are there any barriers preventing you from taking care of your own finances?  No.    Are there any barriers preventing you from managing your medications?  No.    Are there any barriers preventing you from showering, bathing or dressing yourself?  No.    Are you currently engaging in any exercise or physical activity?  Yes.     What is your perception of your health?  Good.    Advance Care Planning  Do you have an Advance Directive, Living Will, Durable Power of , or POLST?                 Health Maintenance Summary            Overdue - IMM DTaP/Tdap/Td Vaccine (1 - Tdap) Overdue - never done      No completion history exists for this topic.              Overdue - COVID-19 Vaccine (4 - Booster for Moderna series) Overdue since 4/1/2022 02/04/2022  Imm Admin: MODERNA SARS-COV-2 VACCINE (12+)    03/05/2021  Imm Admin: MODERNA SARS-COV-2 VACCINE (12+)    02/05/2021  Imm Admin: MODERNA SARS-COV-2 VACCINE (12+)               Ordered - URINE ACR / MICROALBUMIN (Yearly) Ordered on 4/22/2022      10/26/2021  MICROALBUMIN CREAT RATIO URINE    08/11/2020  MICROALBUMIN CREAT RATIO URINE    01/06/2020  MICROALBUMIN CREAT RATIO URINE    07/05/2019  MICROALBUMIN CREAT RATIO URINE              Postponed - IMM ZOSTER VACCINES (1 of 2) Postponed until 4/22/2023      No completion history exists for this topic.              A1C SCREENING (Every 6 Months) Next due on 9/3/2023      03/03/2023  HEMOGLOBIN A1C (Glycohemoglobin GHB Total/A1C with MBG Estimate)    05/02/2022  HEMOGLOBIN A1C    10/26/2021  HEMOGLOBIN A1C    05/31/2021  Hemoglobin A1C    12/24/2020  HEMOGLOBIN A1C    Only the first 5 history entries have been loaded, but more history exists.              RETINAL SCREENING (Yearly) Next due on 2/14/2024 02/14/2023  AMB EXTERNAL RETINAL SCREENING RESULTS    08/12/2022  Referral for Retinal Screening Exam    08/12/2022  REFERRAL FOR RETINAL SCREENING EXAM    01/28/2022  Referral for Retinal Screening Exam    09/21/2021  Referral for Retinal Screening Exam    Only the first 5 history entries have been loaded, but more history exists.              FASTING LIPID PROFILE (Yearly) Next due on 3/3/2024      03/03/2023  Lipid Profile    06/01/2021  Lipid Profile    12/24/2020  Lipid Profile    03/05/2020  Lipid Profile (Lipid Panel)    07/05/2019  Lipid Profile              SERUM CREATININE (Yearly) Next due on 3/3/2024      03/03/2023  Comp Metabolic Panel    05/05/2022  Comp Metabolic Panel    05/04/2022  Comp Metabolic Panel    05/03/2022  Comp Metabolic Panel    05/02/2022  Comp Metabolic Panel (CMP)    Only the first 5 history entries have been loaded, but more history exists.              Annual Wellness Visit (Every 366 Days) Next due on 3/21/2024      03/21/2023  Subsequent Annual Wellness Visit - Includes PPPS ()    03/21/2023  Level of Service: ANNUAL WELLNESS VISIT-INCLUDES PPPS SUBSEQUE*    03/21/2023  Visit Dx: Medicare  annual wellness visit, subsequent    01/16/2020  Initial Annual Wellness Visit - Includes PPPS ()    01/08/2020  Visit Dx: Medicare annual wellness visit, initial              DIABETES MONOFILAMENT / LE EXAM (Yearly) Next due on 3/21/2024      03/21/2023  Diabetic Monofilament LE Exam              BONE DENSITY (Every 5 Years) Next due on 12/2/2024 12/02/2019  DS-BONE DENSITY STUDY (DEXA)              IMM INFLUENZA (Series Information) Completed      03/21/2023  Imm Admin: Influenza Vaccine Adult HD    11/02/2020  Imm Admin: Influenza Vaccine Adult HD    11/12/2019  Imm Admin: Influenza Vaccine Adult HD              IMM PNEUMOCOCCAL VACCINE: 65+ Years (Series Information) Completed      03/21/2023  Imm Admin: Pneumococcal Conjugate Vaccine (PCV20)              IMM HEP B VACCINE (Series Information) Aged Out      No completion history exists for this topic.              IMM MENINGOCOCCAL ACWY VACCINE (Series Information) Aged Out      No completion history exists for this topic.                    Patient Care Team:  JAYLEN Castelan as PCP - General (Nurse Practitioner)  Aristeo Sher M.D. as Consulting Physician (Anesthesiology)  Berry Turcios M.D. as Consulting Physician (Ophthalmology)  [facility] as Home Health Provider  Anastasia Acuna R.N. as Chronic Care Manager (Registered Nurse)  Orville Mora PT as Physical Therapist (Physical Therapy)  Berry Trevino as Community Health Worker    Social History     Tobacco Use    Smoking status: Never    Smokeless tobacco: Never   Vaping Use    Vaping Use: Never used   Substance Use Topics    Alcohol use: Not Currently    Drug use: No     Family History   Problem Relation Age of Onset    Diabetes Mother     Stroke Mother     Heart Attack Father 74    No Known Problems Maternal Grandmother     No Known Problems Maternal Grandfather     No Known Problems Paternal Grandmother     No Known Problems Paternal Grandfather      She  has a  "past medical history of Arthritis, Breath shortness, Cataract, Dental disorder, Diabetes (HCC), Heart burn, Hemorrhagic disorder (HCC), High cholesterol, Hyperlipidemia, Hypertension, Pacemaker (2015), Pain (2020), Pre-diabetes, TIA (transient ischemic attack), and Urinary incontinence.   Past Surgical History:   Procedure Laterality Date    PB LAMINOTOMY,LUMBAR DISK,1 INTRSP N/A 2/25/2020    Procedure: LAMINECTOMY, SPINE, LUMBAR, WITH DISCECTOMY- L5-S1, MEDIAL FACETECTOMY;  Surgeon: Latrell Kimble M.D.;  Location: SURGERY [facility];  Service: Neurosurgery    FORAMINOTOMY N/A 2/25/2020    Procedure: FORAMINOTOMY, SPINE;  Surgeon: Latrell Kimble M.D.;  Location: SURGERY [facility];  Service: Neurosurgery    PACEMAKER INSERTION  2015    HIP REPLACEMENT, TOTAL Right 2009    KNEE REPLACEMENT, TOTAL Right 2004    CATARACT EXTRACTION WITH IOL Bilateral 2003    CHOLECYSTECTOMY  2002    BASAL CELL EXCISION      nose and hand    BUNIONECTOMY Left        Exam:   /58 (BP Location: Right arm, Patient Position: Sitting, BP Cuff Size: Adult)   Pulse 70   Temp 36.4 °C (97.6 °F) (Temporal)   Ht 1.6 m (5' 3\")   Wt 64.4 kg (142 lb)   SpO2 95%  Body mass index is 25.15 kg/m².    Hearing good.    Dentition upper and lower dentures  Alert, oriented in no acute distress.  Eye contact is good, speech goal directed, affect calm      Assessment and Plan. The following treatment and monitoring plan is recommended:      1. Medicare annual wellness visit, subsequent  Have discussed recommended screenings and immunizations  - Subsequent Annual Wellness Visit - Includes PPPS ()    2. Type 2 diabetes mellitus without complication, without long-term current use of insulin ([facility])  Chronic, stable at this time.  A1c 6.9%.  Continue to work on dietary modification.  - Diabetic Monofilament LE Exam  - Subsequent Annual Wellness Visit - Includes PPPS ()    3. PAF (paroxysmal atrial fibrillation) ([facility])  Chronic, stable.  " Continue Eliquis and carvedilol.  Followed by cardiology.  - Subsequent Annual Wellness Visit - Includes PPPS ()    4. Cardiac pacemaker in situ  Stable.  Up-to-date on interrogation per cardiology.  100% paced.  Followed by cardiology.  - Subsequent Annual Wellness Visit - Includes PPPS ()    5. Essential hypertension, benign  Chronic problem, stable.  Continue losartan.  Continue follow-up with cardiology.  - Subsequent Annual Wellness Visit - Includes PPPS ()  - bumetanide (BUMEX) 0.5 MG Tab; TAKE 2 TABLETS BY MOUTH ONCE DAILY AS NEEDED FOR EDEMA, IF GAINED MORE THAN 3 POUNDS IN A DAY OR 5 POUNDS IN A WEEK TAKE 2 TABLETS TWICE DAILY  Dispense: 180 Tablet; Refill: 3    6. Bilateral lower extremity edema  Chronic problem.  Continue Bumex, elevate legs, reduce salt.  Followed by cardiology.  - Subsequent Annual Wellness Visit - Includes PPPS ()  - bumetanide (BUMEX) 0.5 MG Tab; TAKE 2 TABLETS BY MOUTH ONCE DAILY AS NEEDED FOR EDEMA, IF GAINED MORE THAN 3 POUNDS IN A DAY OR 5 POUNDS IN A WEEK TAKE 2 TABLETS TWICE DAILY  Dispense: 180 Tablet; Refill: 3    7. Impaired mobility and ADLs  Chronic problem.  High fall risk.  Continue to use front wheel walker.  Continue to try to stay active.  - Subsequent Annual Wellness Visit - Includes PPPS ()    8. Foraminal stenosis of lumbar region  Chronic problem.  Intolerance to multiple pain medications as she is narcotic naïve.  Would recommend continued physical therapy. Monitor.   - Subsequent Annual Wellness Visit - Includes PPPS ()    9. Dyslipidemia  Chronic, stable.  Continue atorvastatin 40 mg.  - Subsequent Annual Wellness Visit - Includes PPPS ()    10. POLST (Physician Orders for Life-Sustaining Treatment)  Completed POLST form in clinic today-scanned in chart.  - Subsequent Annual Wellness Visit - Includes PPPS ()    11. Need for vaccination  - Pneumococcal Conjugate Vaccine 20-Valent (19 yrs+)  - Influenza Vaccine, High Dose  (65+ Only)  - Subsequent Annual Wellness Visit - Includes PPPS ()   No services needed at this time    Services suggested:   Health Care Screening recommendations as per orders if indicated.  Referrals offered: PT/OT/Nutrition counseling/Behavioral Health/Smoking cessation as per orders if indicated.    Discussion today about general wellness and lifestyle habits:    Prevent falls and reduce trip hazards; Cautioned about securing or removing rugs.  Have a working fire alarm and carbon monoxide detector;   Engage in regular physical activity and social activities     Follow-up: No follow-ups on file.    I have placed the below orders and discussed them with an approved delegating provider.  The MA is performing the below orders under the direction of Dr. Fong.     Joana ESPINAL

## 2023-03-23 ENCOUNTER — PHYSICAL THERAPY (OUTPATIENT)
Dept: PHYSICAL THERAPY | Facility: REHABILITATION | Age: 88
End: 2023-03-23
Attending: PHYSICAL MEDICINE & REHABILITATION
Payer: MEDICARE

## 2023-03-23 DIAGNOSIS — Z78.9 IMPAIRED MOBILITY AND ADLS: ICD-10-CM

## 2023-03-23 DIAGNOSIS — Z74.09 IMPAIRED MOBILITY AND ADLS: ICD-10-CM

## 2023-03-23 DIAGNOSIS — R53.1 GENERAL WEAKNESS: ICD-10-CM

## 2023-03-23 DIAGNOSIS — R53.81 PHYSICAL DECONDITIONING: ICD-10-CM

## 2023-03-23 DIAGNOSIS — M48.061 FORAMINAL STENOSIS OF LUMBAR REGION: ICD-10-CM

## 2023-03-23 PROCEDURE — 97110 THERAPEUTIC EXERCISES: CPT

## 2023-03-23 NOTE — OP THERAPY PROGRESS SUMMARY
Outpatient Physical Therapy  PROGRESS SUMMARY NOTE      [facility] [place]  [address], [address]  [place] [address]  Phone:  [phone]    Date of Visit: 03/23/2023    Patient: Krystle Tavarez  YOB: 1933  MRN: [id number]     Referring Provider: Papi Pradhan M.D.  [address]  [place] [address]   Referring Diagnosis Other acute postprocedural pain [G89.18];Spinal stenosis, lumbar region with neurogenic claudication [M48.062];Other malaise [R53.81];Other symptoms and signs involving the musculoskeletal system [R29.898];Other reduced mobility [Z74.09]         Rehab Potential: fair    Progress Report Period: 2/27-3/23/23    Functional Assessment Used          Objective Findings and Assessment:   Patient progression towards goals: Pt continues to struggle with gait and overall endurance/strength secondary to both weakness and RLE pains. She seems to have more positive findings of neural tension screens causing pain as compared to her prior history which is manifesting in dec R step length, pain limiting her walking capacity (to 150 feet) and with decreased strength through her R leg. We discussed cont need for skilled PT care and with very minor improvement after stretching today. We also discussed pot follow up with an MD in regards to these issues. She can benefit from skilled PT care to continue to address her overall functional weakness, endurance deficits, and RLE pain related to the above measures as well as mitigate functional decline.    Objective findings and assessment details: See daily note 3/23/23 for details    Goals:   Short Term Goals:     -pt meets MCID for 5 rep STS in order to improve her ability to safely transfer with a lower fall risk (MET)  -pt is able to stand for 2-3 min to improve ability to do ADLs with moderate pain at most that calms once seated (MET)  -pt is able to walk for 2-3 min spurts with FWW with low fall risk to  improve home ambulation (MET)           Short term goal time span:  2-4 weeks      Long Term Goals:      -pt meets MCID for 5 rep STS in order to improve her ability to safely transfer with a lower fall risk; she can also stand from a normal chair without hands to improve fxl strength and dec fall risk with transfers (50% MET)    -pt is able to stand for 5-10 min to improve ability to do ADLs with moderate pain at most that calms once seated (75% MET)    -pt is able to walk for 5+ min spurts with FWW with low fall risk to improve home ambulation and community access (60% MET)    -pt meets  6MWT of at least 300 feet in this timeframe to improve goal of community ambulation (50% MET)    -pt has negative slump and painful measures of RLE with neural stretch to improve her ability to walk with less pain (0% MET)       Long term goal time span:  6-8 weeks    Plan:   Planned therapy interventions:  E Stim Attended (CPT 72065), E Stim Unattended (CPT 05292), Hot or Cold Pack Therapy (CPT 57539), Manual Therapy (CPT 64934), Neuromuscular Re-education (CPT 42335), Mechanical Traction (CPT 03273), Therapeutic Activities (CPT 71753) and Therapeutic Exercise (CPT 66040)  Frequency:  2x week  Duration in weeks:  8  Duration in visits:  16    Referring provider co-signature:  I have reviewed this plan of care and my co-signature certifies the need for services.     Certification Period: 03/23/2023 to 05/25/23    Physician Signature: ________________________________ Date: ______________

## 2023-03-24 ENCOUNTER — PATIENT OUTREACH (OUTPATIENT)
Dept: HEALTH INFORMATION MANAGEMENT | Facility: OTHER | Age: 88
End: 2023-03-24
Payer: MEDICARE

## 2023-03-24 NOTE — PROGRESS NOTES
CHW Abhay called and spoke with the pt to see how she was and doa quick check in with her. Pt states she is still doing well and has no new needs at this time. This CHW will put the pt on the follow up list for 1 month out and encouraged the pt to reach out if anythoing comes up. This CHW will complete her program then if she is good.

## 2023-03-29 NOTE — OP THERAPY DAILY TREATMENT
"  Outpatient Physical Therapy  DAILY TREATMENT     [facility] [place] [address], [address]  [place] [address]  Phone:  [phone]    Date: 03/30/2023  Patient: Krystle Tavarez  YOB: 1933  MRN: [id number]   Time Calculation  Start time: 0215  Stop time: 0300 Time Calculation (min): 45 minutes   Chief Complaint: No chief complaint on file.  Visit #: 19    SUBJECTIVE: Pt with reports of similar pain in he foot. If she did her HEP it hurt in AM; was better in PM. Up and down mobility on her own. She doesn note the pain is more in her foot only.     OBJECTIVE:     L/S extension** (local)  Slump negative today  Calf stretch negative today    None below:  5 rep STS 18 sec  SPPB 7/12  3 minute walk test 1/27 168 feet 2 min 30 seconds  Current objective measures:         Therapeutic Exercises (CPT 42474):     1. UPOC 2/27/23    2. sciatic n gliders, 2x2' seated    3. STS, x15, forward, arms crossed 24\" chair    4. ball flexion, x2' L/S seated    8. Standing heel/toe raises, 2x15    9. step ups, 3x10    10. deadlifts 7# ball, 2x10 no pain reported    Therapeutic Treatments and Modalities:     1. Therapeutic Activities (CPT 24086), gait for endurance 4x150 feet; working on transfer safety and education  Time-based treatments/modalities:  Physical Therapy Timed Treatment Charges  Therapeutic activity minutes (CPT 08961): 10 minutes  Therapeutic exercise minutes (CPT 93376): 30 minutes  ASSESSMENT: Pt with cont deficits in her gait and function due to combination of weakness or pain. Improves with neural gliding; again added HEP emphasis here. She was around a 6/10 on RPE today in which she was able to do slightly more volume of exercise today kristina with addition of seated neural glides. Will cont to progress as able.     PLAN/RECOMMENDATIONS:   Plan for treatment: therapy treatment to continue next visit.   Planned interventions for next visit: continue with current treatment.         "

## 2023-03-30 ENCOUNTER — PHYSICAL THERAPY (OUTPATIENT)
Dept: PHYSICAL THERAPY | Facility: REHABILITATION | Age: 88
End: 2023-03-30
Attending: PHYSICAL MEDICINE & REHABILITATION
Payer: MEDICARE

## 2023-03-30 DIAGNOSIS — Z74.09 IMPAIRED MOBILITY AND ADLS: ICD-10-CM

## 2023-03-30 DIAGNOSIS — M48.061 FORAMINAL STENOSIS OF LUMBAR REGION: ICD-10-CM

## 2023-03-30 DIAGNOSIS — R53.81 PHYSICAL DECONDITIONING: ICD-10-CM

## 2023-03-30 DIAGNOSIS — Z78.9 IMPAIRED MOBILITY AND ADLS: ICD-10-CM

## 2023-03-30 DIAGNOSIS — R53.1 GENERAL WEAKNESS: ICD-10-CM

## 2023-03-30 PROCEDURE — 97110 THERAPEUTIC EXERCISES: CPT

## 2023-03-30 PROCEDURE — 97530 THERAPEUTIC ACTIVITIES: CPT

## 2023-04-03 PROBLEM — K59.03 DRUG-INDUCED CONSTIPATION: Status: RESOLVED | Noted: 2020-02-29 | Resolved: 2023-04-03

## 2023-04-03 PROBLEM — R82.71 ASYMPTOMATIC BACTERIURIA: Status: RESOLVED | Noted: 2022-05-04 | Resolved: 2023-04-03

## 2023-04-03 PROBLEM — R53.1 WEAKNESS: Status: RESOLVED | Noted: 2021-05-30 | Resolved: 2023-04-03

## 2023-04-03 PROBLEM — E87.1 HYPONATREMIA: Status: RESOLVED | Noted: 2020-03-07 | Resolved: 2023-04-03

## 2023-04-03 PROBLEM — Z78.9 POLST (PHYSICIAN ORDERS FOR LIFE-SUSTAINING TREATMENT): Status: ACTIVE | Noted: 2023-04-03

## 2023-04-03 PROBLEM — R79.89 ELEVATED SERUM CREATININE: Status: RESOLVED | Noted: 2021-05-30 | Resolved: 2023-04-03

## 2023-04-03 PROBLEM — Z66 DNR (DO NOT RESUSCITATE): Chronic | Status: ACTIVE | Noted: 2021-05-30

## 2023-04-03 PROBLEM — M51.9 DISORDER OF INTERVERTEBRAL DISC: Status: RESOLVED | Noted: 2019-11-20 | Resolved: 2023-04-03

## 2023-04-03 PROBLEM — R79.89 ELEVATED TROPONIN: Status: RESOLVED | Noted: 2021-05-30 | Resolved: 2023-04-03

## 2023-04-03 PROBLEM — Z79.01 ANTICOAGULATED: Status: RESOLVED | Noted: 2020-04-15 | Resolved: 2023-04-03

## 2023-04-04 NOTE — OP THERAPY DAILY TREATMENT
"  Outpatient Physical Therapy  DAILY TREATMENT     [facility] Outpatient Physical Therapy [place] [id number] [facility], [address]  [place] [address]  Phone:  [phone]    Date: 04/06/2023  Patient: Krystle Tavarez  YOB: 1933  MRN: [id number]   Time Calculation           Chief Complaint: No chief complaint on file.  Visit #: 20    SUBJECTIVE: pt reports pain in foot and hip still. Doing her HEP.    OBJECTIVE:   L/S extension** (local)  Slump (+) today  Calf stretch negative today    None below:  5 rep STS 18 sec  SPPB 7/12  3 minute walk test 1/27 168 feet 2 min 30 seconds  Current objective measures:         Therapeutic Exercises (CPT 80881):     1. UPOC 2/27/23    2. sciatic n gliders, 2x2' seated    3. STS, x15, forward, arms crossed 24\" chair    4. ball flexion, x2' L/S seated    8. Standing heel/toe raises, 2x15, not today    9. step ups, 3x10, not today    10. deadlifts 7# ball, 3x10 no sxs    Therapeutic Treatments and Modalities:     1. Therapeutic Activities (CPT 30988), gait for endurance 4x250 feet; working on transfer safety and education  Time-based treatments/modalities:     ASSESSMENT: pt does cont to have diminished pain once moving during session globally vs specific interventions. Cont to recommend \"movement snacks\" or more frequent mobility throughout the day.     PLAN/RECOMMENDATIONS:   Plan for treatment: therapy treatment to continue next visit.   Planned interventions for next visit: continue with current treatment.         "

## 2023-04-06 ENCOUNTER — PHYSICAL THERAPY (OUTPATIENT)
Dept: PHYSICAL THERAPY | Facility: REHABILITATION | Age: 88
End: 2023-04-06
Attending: PHYSICAL MEDICINE & REHABILITATION
Payer: MEDICARE

## 2023-04-06 DIAGNOSIS — R53.81 PHYSICAL DECONDITIONING: ICD-10-CM

## 2023-04-06 DIAGNOSIS — Z74.09 IMPAIRED MOBILITY AND ADLS: ICD-10-CM

## 2023-04-06 DIAGNOSIS — M48.061 FORAMINAL STENOSIS OF LUMBAR REGION: ICD-10-CM

## 2023-04-06 DIAGNOSIS — Z78.9 IMPAIRED MOBILITY AND ADLS: ICD-10-CM

## 2023-04-06 DIAGNOSIS — R53.1 GENERAL WEAKNESS: ICD-10-CM

## 2023-04-06 PROBLEM — M79.2 NEUROPATHIC PAIN: Status: RESOLVED | Noted: 2020-03-04 | Resolved: 2023-04-06

## 2023-04-06 PROBLEM — N31.9 NEUROGENIC BLADDER: Status: RESOLVED | Noted: 2020-03-04 | Resolved: 2023-04-06

## 2023-04-06 PROBLEM — M54.50 LOW BACK PAIN: Chronic | Status: RESOLVED | Noted: 2019-07-09 | Resolved: 2023-04-06

## 2023-04-06 PROBLEM — K59.2 NEUROGENIC BOWEL: Status: RESOLVED | Noted: 2020-03-04 | Resolved: 2023-04-06

## 2023-04-06 PROCEDURE — 97530 THERAPEUTIC ACTIVITIES: CPT

## 2023-04-06 PROCEDURE — 97110 THERAPEUTIC EXERCISES: CPT

## 2023-04-13 ENCOUNTER — PHYSICAL THERAPY (OUTPATIENT)
Dept: PHYSICAL THERAPY | Facility: REHABILITATION | Age: 88
End: 2023-04-13
Attending: PHYSICAL MEDICINE & REHABILITATION
Payer: MEDICARE

## 2023-04-13 DIAGNOSIS — M48.061 FORAMINAL STENOSIS OF LUMBAR REGION: ICD-10-CM

## 2023-04-13 DIAGNOSIS — Z78.9 IMPAIRED MOBILITY AND ADLS: ICD-10-CM

## 2023-04-13 DIAGNOSIS — R53.1 GENERAL WEAKNESS: ICD-10-CM

## 2023-04-13 DIAGNOSIS — R53.81 PHYSICAL DECONDITIONING: ICD-10-CM

## 2023-04-13 DIAGNOSIS — Z74.09 IMPAIRED MOBILITY AND ADLS: ICD-10-CM

## 2023-04-13 PROCEDURE — 97110 THERAPEUTIC EXERCISES: CPT

## 2023-04-13 NOTE — OP THERAPY DAILY TREATMENT
"  Outpatient Physical Therapy  DAILY TREATMENT     [facility] Outpatient Physical Therapy [place]  [address] [facility], [address]  [place] [address]  Phone:  [phone]    Date: 04/13/2023  Patient: Krystle Tavarez  YOB: 1933  MRN: [id number]   Time Calculation  Start time: 0130  Stop time: 0215 Time Calculation (min): 45 minutes   Chief Complaint: No chief complaint on file.  Visit #: 21    SUBJECTIVE: Pt indicates that her foot and heel hurt and radiates up her posterior leg to just above the knee. She isn't sure if stretching seems to change her pain one way or another.     OBJECTIVE:   L/S extension** (local)  Slump (+) today  Calf stretch negative today    3 minute walk test 4/13 180 feet    None below:  5 rep STS 18 sec  SPPB 7/12  Current objective measures:         Therapeutic Exercises (CPT 13608):     1. UPOC 2/27/23    2. sciatic n gliders, 2x2' seated    3. STS, x15, forward, arms crossed 24\" chair    4. ball flexion, x2' L/S seated    5. calf stretch, x1'    8. Standing heel/toe raises, 2x15, not today    9. step ups, 3x10, not today    10. 7# ball, 2x10 punch outs/curls    11. Nu step, x4', 6/10 faitgue      Therapeutic Exercise Summary: Gait [id number], 2x200 feet (fatigued, R heel pain)    Therapeutic Treatments and Modalities:     1. Therapeutic Activities (CPT 78565), gait for endurance 4x250 feet; working on transfer safety and education  Time-based treatments/modalities:  Physical Therapy Timed Treatment Charges  Therapeutic exercise minutes (CPT 50542): 40 minutes  ASSESSMENT: PT finds pt limited more by endurance than pain today; we worked on improving her overall functional strength and endurance vs pain/balance today. Hoping to cont to improve her overall endurance to improve community access still.     PLAN/RECOMMENDATIONS:   Plan for treatment: therapy treatment to continue next visit.   Planned interventions for next visit: continue with current treatment.         "

## 2023-04-18 ENCOUNTER — NON-PROVIDER VISIT (OUTPATIENT)
Dept: CARDIOLOGY | Facility: MEDICAL CENTER | Age: 88
End: 2023-04-18
Payer: MEDICARE

## 2023-04-18 ENCOUNTER — PHYSICAL THERAPY (OUTPATIENT)
Dept: PHYSICAL THERAPY | Facility: REHABILITATION | Age: 88
End: 2023-04-18
Attending: PHYSICAL MEDICINE & REHABILITATION
Payer: MEDICARE

## 2023-04-18 DIAGNOSIS — Z78.9 IMPAIRED MOBILITY AND ADLS: ICD-10-CM

## 2023-04-18 DIAGNOSIS — R53.1 GENERAL WEAKNESS: ICD-10-CM

## 2023-04-18 DIAGNOSIS — R53.81 PHYSICAL DECONDITIONING: ICD-10-CM

## 2023-04-18 DIAGNOSIS — Z74.09 IMPAIRED MOBILITY AND ADLS: ICD-10-CM

## 2023-04-18 DIAGNOSIS — M48.061 FORAMINAL STENOSIS OF LUMBAR REGION: ICD-10-CM

## 2023-04-18 PROCEDURE — 97110 THERAPEUTIC EXERCISES: CPT

## 2023-04-18 PROCEDURE — 97112 NEUROMUSCULAR REEDUCATION: CPT

## 2023-04-18 PROCEDURE — 93294 REM INTERROG EVL PM/LDLS PM: CPT | Performed by: INTERNAL MEDICINE

## 2023-04-18 NOTE — CARDIAC REMOTE MONITOR - SCAN
Device transmission reviewed. Device demonstrated appropriate function.       Electronically Signed by: Diego Irvin M.D.    4/18/2023  12:49 PM

## 2023-04-18 NOTE — OP THERAPY DAILY TREATMENT
"  Outpatient Physical Therapy  DAILY TREATMENT     [facility] Outpatient Physical Therapy [place] [address] [facility], [address]  [place] [address]  Phone:  [phone]    Date: 04/18/2023  Patient: Krystle Tavarez  YOB: 1933  MRN: [id number]   Time Calculation  Start time: 0345  Stop time: 0430 Time Calculation (min): 45 minutes   Chief Complaint: No chief complaint on file.  Visit #: 22    SUBJECTIVE: Pt notes her biggest issue remains burning pain in her heel up her leg. She has been having an easier time getting out of chairs at home. Would like to walk more,     OBJECTIVE: none below today:    L/S extension** (local)  Slump (+) today  Calf stretch negative today    3 minute walk test 4/13 180 feet    None below:  5 rep STS 18 sec  SPPB 7/12  Current objective measures:         Therapeutic Exercises (CPT 36543):     1. UPOC 2/27/23    2. sciatic n gliders, 2x2' seated    3. STS, 2x15; with ball 5# x10, forward, arms crossed 24\" chair    4. seated, LAQ, marching, hip ER PTB x20ea    5. calf stretch, x1', not today    8. Standing heel/toe raises, 2x15, not today    9. step ups, 2x20, foam    11. Nu step, x5' L0, 6/10 faitgue      Therapeutic Exercise Summary: Gait 2x300 feet, x150 feet (fatigued, R heel pain)    Therapeutic Treatments and Modalities:     1. Neuromuscular Re-education (CPT 34179), foam banded pull aparts, foam stand ups/march, step ups foam, working on RLE stability and overall balance  Time-based treatments/modalities:  Physical Therapy Timed Treatment Charges  Neuromusc re-ed, balance, coor, post minutes (CPT 01078): 10 minutes  Therapeutic exercise minutes (CPT 67020): 30 minutes  ASSESSMENT: PT finds pt cont to have issues with overall endurance and strength; she is improving in her ability to tolerate longer volume of PT sessions; goal to translate this to improved gait and community access capacity without a need for frequent breaks.      PLAN/RECOMMENDATIONS:   Plan for treatment: therapy " treatment to continue next visit.   Planned interventions for next visit: continue with current treatment.

## 2023-04-21 ENCOUNTER — PATIENT OUTREACH (OUTPATIENT)
Dept: HEALTH INFORMATION MANAGEMENT | Facility: OTHER | Age: 88
End: 2023-04-21
Payer: MEDICARE

## 2023-04-21 NOTE — PROGRESS NOTES
JAMAL Blank tried calling the pt to check in with her and see how she is. This CHW left a VM requesting a return call. Alesha BERRY will put the pt on the follow up call list for 1 month out and discuss graduating the [facility] program.

## 2023-05-01 ENCOUNTER — OFFICE VISIT (OUTPATIENT)
Dept: MEDICAL GROUP | Facility: MEDICAL CENTER | Age: 88
End: 2023-05-01
Payer: MEDICARE

## 2023-05-01 VITALS
BODY MASS INDEX: 26.93 KG/M2 | TEMPERATURE: 97.2 F | HEART RATE: 71 BPM | WEIGHT: 152 LBS | SYSTOLIC BLOOD PRESSURE: 128 MMHG | DIASTOLIC BLOOD PRESSURE: 54 MMHG | OXYGEN SATURATION: 93 % | HEIGHT: 63 IN

## 2023-05-01 DIAGNOSIS — N30.00 ACUTE CYSTITIS WITHOUT HEMATURIA: ICD-10-CM

## 2023-05-01 DIAGNOSIS — R35.0 URINARY FREQUENCY: ICD-10-CM

## 2023-05-01 DIAGNOSIS — E11.9 TYPE 2 DIABETES MELLITUS WITHOUT COMPLICATION, WITHOUT LONG-TERM CURRENT USE OF INSULIN (HCC): ICD-10-CM

## 2023-05-01 DIAGNOSIS — R39.15 URINARY URGENCY: ICD-10-CM

## 2023-05-01 PROCEDURE — 99214 OFFICE O/P EST MOD 30 MIN: CPT

## 2023-05-01 RX ORDER — CEFDINIR 300 MG/1
300 CAPSULE ORAL EVERY 12 HOURS
Qty: 10 CAPSULE | Refills: 0 | Status: SHIPPED | OUTPATIENT
Start: 2023-05-01 | End: 2023-05-06

## 2023-05-01 ASSESSMENT — FIBROSIS 4 INDEX: FIB4 SCORE: 1.53

## 2023-05-01 NOTE — PROGRESS NOTES
"Chief Complaint   Patient presents with    UTI     Dizziness, frequent and burning urination, strong foul smell x 1 week     Paperwork     [employer] duty paperwork        HPI:  Symptom onset: 4 days ago   Current symptoms: Painful, urgent, frequent voids. No blood noted in urine.  Since onset symptoms are: worsening  Treatments tried: none  Associated symptoms: Negative for fever, nausea and vomiting, vaginal discharge, pelvic pain.  History is negative for frequent UTI.       Diabetes: Patient has been controlling her diabetes through her diet.  Last A1c was 6.9.  Discussed with her and her daughter ways to help manage it without medication.    ROS:  Denies fever, chills, vomiting or abdominal pain.     OBJECTIVE:  /54 (BP Location: Right arm, Patient Position: Sitting, BP Cuff Size: Adult)   Pulse 71   Temp 36.2 °C (97.2 °F) (Temporal)   Ht 1.6 m (5' 3\")   Wt 68.9 kg (152 lb)   SpO2 93%   Gen: Alert, NAD.  Chest: Lungs clear to auscultation, CV RRR.  Abdomen: Soft, tender in suprapubic region. No CVAT. Normal bowel sounds.     Lab Results   Component Value Date    POCCOLOR Yellow 03/17/2021    POCAPPEAR Cloudy 03/17/2021    POCLEUKEST Large 03/17/2021    POCNITRITE Positive 03/17/2021    POCUROBILIGE 0.2 03/17/2021    POCPROTEIN 300 03/17/2021    POCURPH 6.0 03/17/2021    POCBLOOD Large 03/17/2021    POCSPGRV 1.030 03/17/2021    POCKETONES Negative 03/17/2021    POCBILIRUBIN Negative 03/17/2021    POCGLUCUA Negative 03/17/2021          ASSESSMENT/PLAN:     1. Acute cystitis without hematuria    2. Urinary frequency    3. Urinary urgency    4. Type 2 diabetes mellitus without complication, without long-term current use of insulin (HCC)          1.  Patient unable to provide urine sample in office today.  Outpatient lab ordered, instructed daughter to collect the sample at home prior to starting antibiotics.  Symptoms consistent with UTI, start antibiotics.  2. Provided education to drink plenty of fluids, " wipe front to back every void and bowel movement.   3. Return to clinic if symptoms not improving within 3-4 days or in case of vomiting, fever, increasing pain.  4. Microalbumin creatinine ratio

## 2023-05-02 ENCOUNTER — HOSPITAL ENCOUNTER (OUTPATIENT)
Facility: MEDICAL CENTER | Age: 88
End: 2023-05-02
Payer: MEDICARE

## 2023-05-02 DIAGNOSIS — E11.9 TYPE 2 DIABETES MELLITUS WITHOUT COMPLICATION, WITHOUT LONG-TERM CURRENT USE OF INSULIN (HCC): ICD-10-CM

## 2023-05-02 DIAGNOSIS — R35.0 URINARY FREQUENCY: ICD-10-CM

## 2023-05-02 LAB
CREAT UR-MCNC: 32.2 MG/DL
MICROALBUMIN UR-MCNC: 20 MG/DL
MICROALBUMIN/CREAT UR: 621 MG/G (ref 0–30)

## 2023-05-02 PROCEDURE — 82043 UR ALBUMIN QUANTITATIVE: CPT

## 2023-05-02 PROCEDURE — 81001 URINALYSIS AUTO W/SCOPE: CPT

## 2023-05-02 PROCEDURE — 87086 URINE CULTURE/COLONY COUNT: CPT

## 2023-05-02 PROCEDURE — 82570 ASSAY OF URINE CREATININE: CPT

## 2023-05-03 LAB
APPEARANCE UR: ABNORMAL
BACTERIA #/AREA URNS HPF: ABNORMAL /HPF
BILIRUB UR QL STRIP.AUTO: NEGATIVE
COLOR UR: YELLOW
EPI CELLS #/AREA URNS HPF: NEGATIVE /HPF
GLUCOSE UR STRIP.AUTO-MCNC: NEGATIVE MG/DL
HYALINE CASTS #/AREA URNS LPF: ABNORMAL /LPF
KETONES UR STRIP.AUTO-MCNC: NEGATIVE MG/DL
LEUKOCYTE ESTERASE UR QL STRIP.AUTO: ABNORMAL
MICRO URNS: ABNORMAL
NITRITE UR QL STRIP.AUTO: NEGATIVE
PH UR STRIP.AUTO: 6 [PH] (ref 5–8)
PROT UR QL STRIP: 100 MG/DL
RBC # URNS HPF: ABNORMAL /HPF
RBC UR QL AUTO: ABNORMAL
SP GR UR STRIP.AUTO: 1.01
UROBILINOGEN UR STRIP.AUTO-MCNC: 0.2 MG/DL
WBC #/AREA URNS HPF: ABNORMAL /HPF

## 2023-05-04 LAB
BACTERIA UR CULT: NORMAL
SIGNIFICANT IND 70042: NORMAL
SITE SITE: NORMAL
SOURCE SOURCE: NORMAL

## 2023-05-09 NOTE — OP THERAPY DAILY TREATMENT
"  Outpatient Physical Therapy  DAILY TREATMENT     [facility] Outpatient Physical Therapy [place] [address] [facility], [address]  [place] [address]  Phone:  [phone]    Date: 05/10/2023  Patient: Krystle Tavarez  YOB: 1933  MRN: [id number]   Time Calculation           Chief Complaint: No chief complaint on file.  Visit #: 23    SUBJECTIVE:   Pt reports not doing well today with increase pain on the R LE and c/o pain on low back during gait.    OBJECTIVE:     L/S extension** (local)  Slump (-) today  Calf stretch negative today    3 minute walk test 143 ft today 5/10/23 and unable to complete 3 minutes due to increase low back pain    30 sec STS 6 reps    SPPb 7/12          Therapeutic Exercises (CPT 45545):     2. sciatic n gliders, 2x2' seated, not today    3. STS, with ball 5# 2x10, forward, arms crossed 24\" chair    4. seated, LAQ, marching, hip ER PTB x20ea, not today    5. calf stretch, x1', not today    8. Standing heel/toe raises, 2x15, not today    9. step ups, 2x20, on foam, not today    11. Nu step, x4' L0    13. DKTC with swiss ball, x10    14. pririformis stretch, x30\"    15. LTR, x10    16. supine bridge, x10    17. seated rows pink band, 2x10      Therapeutic Exercise Summary: Gait x143 ft, 2x300 feet, x150 feet (pain on low back first round, fatigue second and third round)    Therapeutic Treatments and Modalities:     1. Neuromuscular Re-education (CPT 88000), foam banded pull aparts, foam stand ups/march, step ups foam, working on RLE stability and overall balance    Time-based treatments/modalities:     ASSESSMENT:  Pt demo continued tolerance to longer PT sessions, but requires rest breaks secondary to decrease endurance and c/o pain low back/R LE extremity. Goal is to have Pt increase tolerance to longer ambulation for community level.    PLAN/RECOMMENDATIONS:   Plan for treatment: therapy treatment to continue next visit.   Planned interventions for next visit: continue with current " treatment.

## 2023-05-10 ENCOUNTER — PHYSICAL THERAPY (OUTPATIENT)
Dept: PHYSICAL THERAPY | Facility: REHABILITATION | Age: 88
End: 2023-05-10
Attending: PHYSICAL MEDICINE & REHABILITATION
Payer: MEDICARE

## 2023-05-10 DIAGNOSIS — R53.81 PHYSICAL DECONDITIONING: ICD-10-CM

## 2023-05-10 DIAGNOSIS — R53.1 GENERAL WEAKNESS: ICD-10-CM

## 2023-05-10 DIAGNOSIS — Z74.09 IMPAIRED MOBILITY AND ADLS: ICD-10-CM

## 2023-05-10 DIAGNOSIS — Z78.9 IMPAIRED MOBILITY AND ADLS: ICD-10-CM

## 2023-05-10 DIAGNOSIS — M48.061 FORAMINAL STENOSIS OF LUMBAR REGION: ICD-10-CM

## 2023-05-10 PROCEDURE — 97110 THERAPEUTIC EXERCISES: CPT

## 2023-05-11 NOTE — OP THERAPY PROGRESS SUMMARY
Outpatient Physical Therapy  PROGRESS SUMMARY NOTE      [facility] [place]  [address], [address]  [place] [address]  Phone:  [phone]    Date of Visit: 05/10/2023    Patient: Krystle Tavarez  YOB: 1933  MRN: [id number]     Referring Provider: Papi Pradhan M.D.  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 Impaired mobility and ADLs  Z74.09    Z78.9   2. Physical deconditioning  R53.81   3. General weakness  R53.1   4. Foraminal stenosis of lumbar region  M48.061       Rehab Potential: good    Progress Report Period: 3/23-5/10/23    Functional Assessment Used          Objective Findings and Assessment:   Patient progression towards goals: Pt demo continued tolerance to longer PT sessions, but requires rest breaks secondary to decrease endurance and c/o pain low back/R LE extremity. Goal is to have Pt increase tolerance to longer ambulation for community level; she has been up and down with PT but making progress in her gait compared to her last PN in terms of distance and endurance as well as with her overall tolerance to exercise. Cont to recommend PT to ameliorate decline in function, standing tolerance for ADLs, and for continued improved gait.     Objective findings and assessment details: See daily note 5/10/23    Goals:   Short Term Goals:   -pt meets MCID for 5 rep STS in order to improve her ability to safely transfer with a lower fall risk (MET)  -pt is able to stand for 2-3 min to improve ability to do ADLs with moderate pain at most that calms once seated (MET)  -pt is able to walk for 2-3 min spurts with FWW with low fall risk to improve home ambulation  (MET)           Short term goal time span:  2-4 weeks      Long Term Goals:      -pt meets MCID for 5 rep STS in order to improve her ability to safely transfer with a lower fall risk; she can also stand from a normal chair without hands to improve fxl strength and dec fall risk with transfers (50% MET)     -pt is able to stand for 5-10 min to improve ability to do ADLs with moderate pain at most that calms once seated (90% MET)     -pt is able to walk for 5+ min spurts with FWW with low fall risk to improve home ambulation and community access (70% MET)     -pt meets  6MWT of at least 300 feet in this timeframe to improve goal of community ambulation (60% MET)     -pt has negative slump and painful measures of RLE with neural stretch to improve her ability to walk with less pain (50% MET)       Long term goal time span:  6-8 weeks    Plan:   Planned therapy interventions:  E Stim Attended (CPT 09943), E Stim Unattended (CPT 62606), Hot or Cold Pack Therapy (CPT 38434), Manual Therapy (CPT 08527), Mechanical Traction (CPT 40803), Neuromuscular Re-education (CPT 01573), Therapeutic Activities (CPT 77036) and Therapeutic Exercise (CPT 20454)  Frequency:  2x week  Duration in weeks:  8  Duration in visits:  16      Referring provider co-signature:  I have reviewed this plan of care and my co-signature certifies the need for services.     Certification Period: 05/10/2023 to 07/12/23    Physician Signature: ________________________________ Date: ______________

## 2023-05-17 NOTE — OP THERAPY DAILY TREATMENT
"  Outpatient Physical Therapy  DAILY TREATMENT     [facility] Outpatient Physical Therapy [place] [address] [facility], [address]  [place] [address]  Phone:  [phone]    Date: 05/18/2023  Patient: Krystle Tavarez  YOB: 1933  MRN: [id number]   Time Calculation  Start time: 0130  Stop time: 0215 Time Calculation (min): 45 minutes   Chief Complaint: No chief complaint on file.  Visit #: 24    SUBJECTIVE: pt reports tired today; had an appt and found to have a UTI.     OBJECTIVE:   NT below   L/S extension** (local)  Slump (-) today  Calf stretch negative today    3 minute walk test 143 ft today 5/10/23 and unable to complete 3 minutes due to increase low back pain    30 sec STS 6 reps    SPPb 7/12        Therapeutic Exercises (CPT 38015):     1. UPOC 6/10/23    2. sciatic n gliders, 2x2' seated, not today    3. STS, with ball 5# 2x10, forward, arms crossed 24\" chair    4. seated, LAQ, marching, hip ER PTB x20ea    5. calf stretch, x1', not today    7. semi tandem, x30\"ea    8. Standing heel/toe raises, 2x15    9. step ups, 2x20    11. Nu step, x5' L0 mod fatigue    13. DKTC with swiss ball, x10, not today    14. pririformis stretch, x30\", not today    15. LTR, x10, not today    16. supine bridge, x10, not today    17. seated rows pink band, 2x10      Therapeutic Exercise Summary: Gait x143 ft, 2x300 feet, x150 feet (pain on low back first round, fatigue second and third round)    Gait [id number]    Therapeutic Treatments and Modalities:     1. Neuromuscular Re-education (CPT 25831), foam banded pull aparts, foam stand ups/march, step ups foam, working on RLE stability and overall balance    Time-based treatments/modalities:     ASSESSMENT:   Pt demo continued tolerance to longer PT sessions, but requires rest breaks secondary to decrease endurance and c/o pain low back/R LE extremity. Goal is to have Pt increase tolerance to longer ambulation for community level.    PLAN/RECOMMENDATIONS:   Plan for treatment: therapy " treatment to continue next visit.   Planned interventions for next visit: continue with current treatment.

## 2023-05-18 ENCOUNTER — APPOINTMENT (OUTPATIENT)
Dept: MEDICAL GROUP | Facility: MEDICAL CENTER | Age: 88
End: 2023-05-18
Payer: MEDICARE

## 2023-05-18 ENCOUNTER — OFFICE VISIT (OUTPATIENT)
Dept: MEDICAL GROUP | Facility: MEDICAL CENTER | Age: 88
End: 2023-05-18
Payer: MEDICARE

## 2023-05-18 ENCOUNTER — HOSPITAL ENCOUNTER (OUTPATIENT)
Facility: MEDICAL CENTER | Age: 88
End: 2023-05-18
Payer: MEDICARE

## 2023-05-18 ENCOUNTER — PHYSICAL THERAPY (OUTPATIENT)
Dept: PHYSICAL THERAPY | Facility: REHABILITATION | Age: 88
End: 2023-05-18
Attending: PHYSICAL MEDICINE & REHABILITATION
Payer: MEDICARE

## 2023-05-18 VITALS
SYSTOLIC BLOOD PRESSURE: 126 MMHG | BODY MASS INDEX: 27.29 KG/M2 | WEIGHT: 154 LBS | HEART RATE: 72 BPM | HEIGHT: 63 IN | OXYGEN SATURATION: 92 % | TEMPERATURE: 97.7 F | DIASTOLIC BLOOD PRESSURE: 54 MMHG

## 2023-05-18 DIAGNOSIS — R39.15 URINARY URGENCY: ICD-10-CM

## 2023-05-18 DIAGNOSIS — R53.1 GENERAL WEAKNESS: ICD-10-CM

## 2023-05-18 DIAGNOSIS — Z78.9 IMPAIRED MOBILITY AND ADLS: ICD-10-CM

## 2023-05-18 DIAGNOSIS — M48.061 FORAMINAL STENOSIS OF LUMBAR REGION: ICD-10-CM

## 2023-05-18 DIAGNOSIS — Z74.09 IMPAIRED MOBILITY AND ADLS: ICD-10-CM

## 2023-05-18 DIAGNOSIS — R53.81 PHYSICAL DECONDITIONING: ICD-10-CM

## 2023-05-18 LAB
APPEARANCE UR: CLEAR
BILIRUB UR STRIP-MCNC: NORMAL MG/DL
COLOR UR AUTO: YELLOW
GLUCOSE UR STRIP.AUTO-MCNC: NORMAL MG/DL
KETONES UR STRIP.AUTO-MCNC: NORMAL MG/DL
LEUKOCYTE ESTERASE UR QL STRIP.AUTO: NORMAL
NITRITE UR QL STRIP.AUTO: NORMAL
PH UR STRIP.AUTO: 6 [PH] (ref 5–8)
PROT UR QL STRIP: NORMAL MG/DL
RBC UR QL AUTO: NORMAL
SP GR UR STRIP.AUTO: 1.01
UROBILINOGEN UR STRIP-MCNC: 0.2 MG/DL

## 2023-05-18 PROCEDURE — 81002 URINALYSIS NONAUTO W/O SCOPE: CPT

## 2023-05-18 PROCEDURE — 3074F SYST BP LT 130 MM HG: CPT

## 2023-05-18 PROCEDURE — 87077 CULTURE AEROBIC IDENTIFY: CPT

## 2023-05-18 PROCEDURE — 87086 URINE CULTURE/COLONY COUNT: CPT

## 2023-05-18 PROCEDURE — 3078F DIAST BP <80 MM HG: CPT

## 2023-05-18 PROCEDURE — 99213 OFFICE O/P EST LOW 20 MIN: CPT

## 2023-05-18 PROCEDURE — 97110 THERAPEUTIC EXERCISES: CPT

## 2023-05-18 RX ORDER — NITROFURANTOIN 25; 75 MG/1; MG/1
100 CAPSULE ORAL EVERY 12 HOURS
Qty: 10 CAPSULE | Refills: 0 | Status: SHIPPED | OUTPATIENT
Start: 2023-05-18 | End: 2023-05-23

## 2023-05-18 ASSESSMENT — FIBROSIS 4 INDEX: FIB4 SCORE: 1.53

## 2023-05-18 NOTE — PATIENT INSTRUCTIONS
Urinary Tract Infection, Adult  A urinary tract infection (UTI) is an infection of any part of the urinary tract. The urinary tract includes:  The kidneys.  The ureters.  The bladder.  The urethra.  These organs make, store, and get rid of pee (urine) in the body.  What are the causes?  This is caused by germs (bacteria) in your genital area. These germs grow and cause swelling (inflammation) of your urinary tract.  What increases the risk?  You are more likely to develop this condition if:  You have a small, thin tube (catheter) to drain pee.  You cannot control when you pee or poop (incontinence).  You are female, and:  You use these methods to prevent pregnancy:  A medicine that kills sperm (spermicide).  A device that blocks sperm (diaphragm).  You have low levels of a female hormone (estrogen).  You are pregnant.  You have genes that add to your risk.  You are sexually active.  You take antibiotic medicines.  You have trouble peeing because of:  A prostate that is bigger than normal, if you are male.  A blockage in the part of your body that drains pee from the bladder (urethra).  A kidney stone.  A nerve condition that affects your bladder (neurogenic bladder).  Not getting enough to drink.  Not peeing often enough.  You have other conditions, such as:  Diabetes.  A weak disease-fighting system (immune system).  Sickle cell disease.  Gout.  Injury of the spine.  What are the signs or symptoms?  Symptoms of this condition include:  Needing to pee right away (urgently).  Peeing often.  Peeing small amounts often.  Pain or burning when peeing.  Blood in the pee.  Pee that smells bad or not like normal.  Trouble peeing.  Pee that is cloudy.  Fluid coming from the vagina, if you are female.  Pain in the belly or lower back.  Other symptoms include:  Throwing up (vomiting).  No urge to eat.  Feeling mixed up (confused).  Being tired and grouchy (irritable).  A fever.  Watery poop (diarrhea).  How is this  treated?  This condition may be treated with:  Antibiotic medicine.  Other medicines.  Drinking enough water.  Follow these instructions at home:    Medicines  Take over-the-counter and prescription medicines only as told by your doctor.  If you were prescribed an antibiotic medicine, take it as told by your doctor. Do not stop taking it even if you start to feel better.  General instructions  Make sure you:  Pee until your bladder is empty.  Do not hold pee for a long time.  Empty your bladder after sex.  Wipe from front to back after pooping if you are a female. Use each tissue one time when you wipe.  Drink enough fluid to keep your pee pale yellow.  Keep all follow-up visits as told by your doctor. This is important.  Contact a doctor if:  You do not get better after 1-2 days.  Your symptoms go away and then come back.  Get help right away if:  You have very bad back pain.  You have very bad pain in your lower belly.  You have a fever.  You are sick to your stomach (nauseous).  You are throwing up.  Summary  A urinary tract infection (UTI) is an infection of any part of the urinary tract.  This condition is caused by germs in your genital area.  There are many risk factors for a UTI. These include having a small, thin tube to drain pee and not being able to control when you pee or poop.  Treatment includes antibiotic medicines for germs.  Drink enough fluid to keep your pee pale yellow.  This information is not intended to replace advice given to you by your health care provider. Make sure you discuss any questions you have with your health care provider.  Document Released: 06/05/2009 Document Revised: 12/05/2019 Document Reviewed: 06/27/2019  [employer] Patient Education © 2020 [employer]

## 2023-05-18 NOTE — PROGRESS NOTES
"Chief Complaint   Patient presents with    UTI     Burning sensation, frequent urination, foul smell x 2 weeks symptoms did not improve        HPI:  Symptom onset: 1 month ago. Had improvement with antibiotic treatment 3 weeks ago. Symptoms returned after completion of antibiotics.  Current symptoms: burning, urgent voids. No blood noted in urine.  Since onset symptoms are: Unchanged  Treatments tried: cranberry juice  Associated symptoms: Negative for fever, flank pain, nausea and vomiting, vaginal discharge, pelvic pain.  History is positive for frequent UTI.     ROS:  Denies fever, chills, vomiting or abdominal pain.     OBJECTIVE:  /54 (BP Location: Right arm, Patient Position: Sitting, BP Cuff Size: Adult)   Pulse 72   Temp 36.5 °C (97.7 °F) (Temporal)   Ht 1.6 m (5' 3\")   Wt 69.9 kg (154 lb)   SpO2 92%   Gen: Alert, NAD.  Chest: Lungs clear to auscultation, CV RRR.  Abdomen: Soft, tender in suprapubic region. No CVAT. Normal bowel sounds.     Lab Results   Component Value Date    POCCOLOR yellow 05/18/2023    POCAPPEAR clear 05/18/2023    POCLEUKEST small 05/18/2023    POCNITRITE neg 05/18/2023    POCUROBILIGE 0.2 05/18/2023    POCPROTEIN trace 05/18/2023    POCURPH 6.0 05/18/2023    POCBLOOD moderate 05/18/2023    POCSPGRV 1.015 05/18/2023    POCKETONES neg 05/18/2023    POCBILIRUBIN neg 05/18/2023    POCGLUCUA neg 05/18/2023          ASSESSMENT/PLAN:     1. Urinary urgency          1. Abnormal urine dipstick in office. Urine sent for culture. Start antibiotics.  2. Provided education to drink plenty of fluids, wipe front to back every void and bowel movement.   3. Return to clinic if symptoms not improving within 3-4 days or in case of vomiting, fever, increasing pain.    "

## 2023-05-21 LAB
BACTERIA UR CULT: NORMAL
SIGNIFICANT IND 70042: NORMAL
SITE SITE: NORMAL
SOURCE SOURCE: NORMAL

## 2023-05-25 ENCOUNTER — PATIENT OUTREACH (OUTPATIENT)
Dept: HEALTH INFORMATION MANAGEMENT | Facility: OTHER | Age: 88
End: 2023-05-25
Payer: MEDICARE

## 2023-05-25 ENCOUNTER — PHYSICAL THERAPY (OUTPATIENT)
Dept: PHYSICAL THERAPY | Facility: REHABILITATION | Age: 88
End: 2023-05-25
Attending: PHYSICAL MEDICINE & REHABILITATION
Payer: MEDICARE

## 2023-05-25 DIAGNOSIS — M48.061 FORAMINAL STENOSIS OF LUMBAR REGION: ICD-10-CM

## 2023-05-25 DIAGNOSIS — Z78.9 IMPAIRED MOBILITY AND ADLS: ICD-10-CM

## 2023-05-25 DIAGNOSIS — Z74.09 IMPAIRED MOBILITY AND ADLS: ICD-10-CM

## 2023-05-25 DIAGNOSIS — R53.81 PHYSICAL DECONDITIONING: ICD-10-CM

## 2023-05-25 DIAGNOSIS — R53.1 GENERAL WEAKNESS: ICD-10-CM

## 2023-05-25 PROCEDURE — 97112 NEUROMUSCULAR REEDUCATION: CPT

## 2023-05-25 PROCEDURE — 97110 THERAPEUTIC EXERCISES: CPT

## 2023-05-25 NOTE — OP THERAPY DAILY TREATMENT
"  Outpatient Physical Therapy  DAILY TREATMENT     [facility] Outpatient Physical Therapy [place]  [address] [facility], [address]  [place] [address]  Phone:  [phone]    Date: 05/25/2023  Patient: Krystle Taavrez  YOB: 1933  MRN: [id number]   Time Calculation           Chief Complaint: No chief complaint on file.  Visit #: 25    SUBJECTIVE: Pt indicates that she is having pain still burning. This has been limiting her standing.     OBJECTIVE:   NT below   L/S extension** (local)  Slump (-) today  Calf stretch negative today    3 minute walk test 143 ft today 5/10/23 and unable to complete 3 minutes due to increase low back pain    30 sec STS 6 reps    SPPb 7/12        Therapeutic Exercises (CPT 98422):     1. UPOC 6/10/23    2. sciatic n gliders, 2x2' seated, not today    3. STS, with ball 5# 2x10, forward, arms crossed 24\" chair    4. seated, LAQ, marching, hip ER PTB x20ea, leg press PTB 2x20    5. calf stretch, x1', not today    8. standing heel/toe raises    9. step ups, 2x20    11. Nu step, x5' L0 mod fatigue, not today    13. DKTC with swiss ball, x10, not today    14. pririformis stretch, x30\", not today    15. LTR, x10, not today    16. supine bridge, x10, not today    17. seated rows pink band, 2x10      Therapeutic Exercise Summary: 2x350 feet, x150 feet (pain on low back first round, fatigue second and third round) gait        Therapeutic Treatments and Modalities:     1. Neuromuscular Re-education (CPT 15808), see below, working on RLE stability and overall balance    Therapeutic Treatment and Modalities Summary: foam banded pull aparts, foam stand ups/march, step ups foam, ball toss    Time-based treatments/modalities:     ASSESSMENT: pt cont to require occ rest breaks; was able to walk further distances today during PT session. Cont to progress standing endurance and gait distance.     PLAN/RECOMMENDATIONS:   Plan for treatment: therapy treatment to continue next visit.   Planned interventions for " next visit: continue with current treatment.

## 2023-05-25 NOTE — PROGRESS NOTES
CHW Abhay called the pt to check in with her and to see how she is. Pt did not answer and this CHW left a VM requesting a return call. This CHW will follow up in 1 week and discuss releasing her from [facility] if the pt has no needs.

## 2023-06-01 ENCOUNTER — APPOINTMENT (OUTPATIENT)
Dept: PHYSICAL THERAPY | Facility: REHABILITATION | Age: 88
End: 2023-06-01
Attending: PHYSICAL MEDICINE & REHABILITATION
Payer: MEDICARE

## 2023-06-01 ENCOUNTER — PATIENT OUTREACH (OUTPATIENT)
Dept: HEALTH INFORMATION MANAGEMENT | Facility: OTHER | Age: 88
End: 2023-06-01
Payer: MEDICARE

## 2023-06-01 NOTE — PROGRESS NOTES
CHW amairani tried calling the pt to check in and discuss releasing the pt from [facility]. Pt did not answer and this CHW will pt the pt out 1 month to follow up.

## 2023-06-01 NOTE — OP THERAPY DAILY TREATMENT
"  Outpatient Physical Therapy  DAILY TREATMENT     [facility] Outpatient Physical Therapy [place] [address] [facility], [address]  [place] [address]  Phone:  [phone]    Date: 06/01/2023  Patient: Krystle Tavarez  YOB: 1933  MRN: [id number]   Time Calculation           Chief Complaint: No chief complaint on file.  Visit #: 26    SUBJECTIVE: *** Pt indicates that she is having pain still burning. This has been limiting her standing.     OBJECTIVE: ***  NT below   L/S extension** (local)  Slump (-) today  Calf stretch negative today    3 minute walk test 143 ft today 5/10/23 and unable to complete 3 minutes due to increase low back pain    30 sec STS 6 reps    SPPb 7/12        Therapeutic Exercises (CPT 69848):     1. UPOC 6/10/23    2. sciatic n gliders, 2x2' seated, not today    3. STS, with ball 5# 2x10, forward, arms crossed 24\" chair    4. seated, LAQ, marching, hip ER PTB x20ea, leg press PTB 2x20    5. calf stretch, x1', not today    8. standing heel/toe raises    9. step ups, 2x20    11. Nu step, x5' L0 mod fatigue, not today    13. DKTC with swiss ball, x10, not today    14. pririformis stretch, x30\", not today    15. LTR, x10, not today    16. supine bridge, x10, not today    17. seated rows pink band, 2x10      Therapeutic Exercise Summary: 2x350 feet, x150 feet (pain on low back first round, fatigue second and third round) gait        Therapeutic Treatments and Modalities:     1. Neuromuscular Re-education (CPT 48028), see below, working on RLE stability and overall balance    Therapeutic Treatment and Modalities Summary: foam banded pull aparts, foam stand ups/march, step ups foam, ball toss    Time-based treatments/modalities:     ASSESSMENT: *** pt cont to require occ rest breaks; was able to walk further distances today during PT session. Cont to progress standing endurance and gait distance.     PLAN/RECOMMENDATIONS:   Plan for treatment: therapy treatment to continue next visit.   Planned " interventions for next visit: continue with current treatment.

## 2023-06-06 NOTE — OP THERAPY DAILY TREATMENT
"  Outpatient Physical Therapy  DAILY TREATMENT     [facility] Outpatient Physical Therapy [place] [address] [facility], [address]  [place] [address]  Phone:  [phone]    Date: 06/07/2023  Patient: Krystle Tavarez  YOB: 1933  MRN: [id number]   Time Calculation  Start time: 0930  Stop time: 1015 Time Calculation (min): 45 minutes   Chief Complaint: No chief complaint on file.  Visit #: 26    SUBJECTIVE: Pt notes that she has some LBP today. Getting outside some.    OBJECTIVE:   NT below   L/S extension** (local); better after stretching  Slump (-) today  Calf stretch negative today    3 minute walk test 143 ft today 5/10/23 and unable to complete 3 minutes due to increase low back pain    30 sec STS 6 reps    SPPb 7/12        Therapeutic Exercises (CPT 14132):     1. UPOC 6/10/23    2. sciatic n gliders, 2x2' seated, not today    3. STS, with ball 5# 2x10, forward, arms crossed 24\" chair    4. seated, LAQ, marching, hip ER PTB x20ea, leg press PTB 2x20    5. calf stretch, x1', not today    8. standing heel/toe raises    9. step ups, x20 ea, on foam    11. Nu step, x2', x5' L0 mod fatigue    13. DKTC with swiss ball, x10    14. pririformis stretch, x30\"    15. LTR, x10    16. supine bridge, x10    17. seated rows pink band, 2x10, not today      Therapeutic Exercise Summary: 2x350 feet, x150 feet (pain on low back first round, fatigue second and third round) gait        Therapeutic Treatments and Modalities:     1. Neuromuscular Re-education (CPT 90708), see below, none today    Therapeutic Treatment and Modalities Summary: foam banded pull aparts, foam stand ups/march, step ups foam, ball toss    Time-based treatments/modalities:  Physical Therapy Timed Treatment Charges  Therapeutic exercise minutes (CPT 31017): 40 minutes  ASSESSMENT: pt with better overall endurance today; needing less time for rest breaks. Cont with skilled PT care as able to progress endurance and strength as she is still having a hard time " standing >5-10 min at a given time.    PLAN/RECOMMENDATIONS:   Plan for treatment: therapy treatment to continue next visit.   Planned interventions for next visit: continue with current treatment.

## 2023-06-07 ENCOUNTER — PHYSICAL THERAPY (OUTPATIENT)
Dept: PHYSICAL THERAPY | Facility: REHABILITATION | Age: 88
End: 2023-06-07
Attending: PHYSICAL MEDICINE & REHABILITATION
Payer: MEDICARE

## 2023-06-07 DIAGNOSIS — R53.81 PHYSICAL DECONDITIONING: ICD-10-CM

## 2023-06-07 DIAGNOSIS — Z78.9 IMPAIRED MOBILITY AND ADLS: ICD-10-CM

## 2023-06-07 DIAGNOSIS — Z74.09 IMPAIRED MOBILITY AND ADLS: ICD-10-CM

## 2023-06-07 DIAGNOSIS — M48.061 FORAMINAL STENOSIS OF LUMBAR REGION: ICD-10-CM

## 2023-06-07 DIAGNOSIS — R53.1 GENERAL WEAKNESS: ICD-10-CM

## 2023-06-07 PROCEDURE — 97110 THERAPEUTIC EXERCISES: CPT

## 2023-06-12 ENCOUNTER — TELEPHONE (OUTPATIENT)
Dept: CARDIOLOGY | Facility: MEDICAL CENTER | Age: 88
End: 2023-06-12
Payer: MEDICARE

## 2023-06-12 DIAGNOSIS — I48.0 PAF (PAROXYSMAL ATRIAL FIBRILLATION) (HCC): ICD-10-CM

## 2023-06-12 NOTE — TELEPHONE ENCOUNTER
MEDICATION REFILL : AL    Caller: Krystle Tavarez    Medication Name and Dosage:     ELIQUIS 5MG    Please call your pharmacy and have them send us a refill request or speak to a live representative, RX number may have changed.    Medication amount left: NONE    Preferred Pharmacy:     [facility] - [place], [place] - [facility]    Other questions (Topic): NONE    Callback Number (Will only call for issues): [phone] (home)

## 2023-06-21 ENCOUNTER — OFFICE VISIT (OUTPATIENT)
Dept: MEDICAL GROUP | Facility: MEDICAL CENTER | Age: 88
End: 2023-06-21
Payer: MEDICARE

## 2023-06-21 VITALS
BODY MASS INDEX: 25 KG/M2 | HEIGHT: 63 IN | TEMPERATURE: 96.7 F | WEIGHT: 141.09 LBS | SYSTOLIC BLOOD PRESSURE: 112 MMHG | HEART RATE: 67 BPM | OXYGEN SATURATION: 95 % | DIASTOLIC BLOOD PRESSURE: 62 MMHG

## 2023-06-21 DIAGNOSIS — N89.8 VAGINAL DRYNESS: ICD-10-CM

## 2023-06-21 DIAGNOSIS — I73.9 PVD (PERIPHERAL VASCULAR DISEASE) (HCC): ICD-10-CM

## 2023-06-21 DIAGNOSIS — R23.9 SKIN CHANGE: ICD-10-CM

## 2023-06-21 DIAGNOSIS — I10 ESSENTIAL HYPERTENSION, BENIGN: ICD-10-CM

## 2023-06-21 DIAGNOSIS — R30.0 DYSURIA: ICD-10-CM

## 2023-06-21 PROCEDURE — 99214 OFFICE O/P EST MOD 30 MIN: CPT | Performed by: NURSE PRACTITIONER

## 2023-06-21 PROCEDURE — 3074F SYST BP LT 130 MM HG: CPT | Performed by: NURSE PRACTITIONER

## 2023-06-21 PROCEDURE — 3078F DIAST BP <80 MM HG: CPT | Performed by: NURSE PRACTITIONER

## 2023-06-21 RX ORDER — CLOTRIMAZOLE AND BETAMETHASONE DIPROPIONATE 10; .64 MG/G; MG/G
1 CREAM TOPICAL 2 TIMES DAILY
Qty: 45 G | Refills: 0 | Status: SHIPPED | OUTPATIENT
Start: 2023-06-21 | End: 2023-07-18

## 2023-06-21 RX ORDER — CARVEDILOL 3.12 MG/1
3.12 TABLET ORAL 2 TIMES DAILY WITH MEALS
Qty: 180 TABLET | Refills: 1 | Status: ON HOLD | OUTPATIENT
Start: 2023-06-21 | End: 2023-07-24

## 2023-06-21 ASSESSMENT — FIBROSIS 4 INDEX: FIB4 SCORE: 1.53

## 2023-06-21 NOTE — PROGRESS NOTES
Chief Complaint   Patient presents with    Bleeding/Bruising     L Leg, painful, Bilat LE Edema    Dysuria     Sx Fail To Improve       Subjective:     HPI:     Krystle Tavarez is a 89 y.o. female here to discuss the following:    Patient presents today with her daughter who assist with HPI.    Patient reports having dysuria and her symptoms fail to improve.  Recently seen for similar symptoms and treated with Macrobid.  No pelvic pressure, fevers or flank pain.  She is endorse some constipation and is using a stool softener.  Denies any vaginal itching, discharge.  Does notice possible small felt water in urine as she does use pads and depends.  She is trying to stay hydrated.    Last urine culture was actually negative    05/18/23 10:26   Significant Indicator NEG   Site -   Source UR       She also noted a bruise on the inside of her left ankle.  No trauma.  Noticed this past Thursday.  No trauma.  Appears to be reducing in size per family.  She does have chronic BLE edema, normally her left lower extremity is more edematous than the right lower extremity.  It is somewhat painful especially with pressure.    ROS: : see above        Current Outpatient Medications:     clotrimazole-betamethasone (LOTRISONE) 1-0.05 % Cream, Apply 1 Application topically 2 times a day., Disp: 45 g, Rfl: 0    carvedilol (COREG) 3.125 MG Tab, Take 1 Tablet by mouth 2 times a day with meals., Disp: 180 Tablet, Rfl: 1    apixaban (ELIQUIS) 5mg Tab, Take 1 Tablet by mouth 2 times a day., Disp: 180 Tablet, Rfl: 0    bumetanide (BUMEX) 0.5 MG Tab, TAKE 2 TABLETS BY MOUTH ONCE DAILY AS NEEDED FOR EDEMA, IF GAINED MORE THAN 3 POUNDS IN A DAY OR 5 POUNDS IN A WEEK TAKE 2 TABLETS TWICE DAILY, Disp: 180 Tablet, Rfl: 3    atorvastatin (LIPITOR) 40 MG Tab, TAKE 1 TABLET EVERY EVENING, Disp: 90 Tablet, Rfl: 3    losartan (COZAAR) 100 MG Tab, TAKE 1 TABLET DAILY, Disp: 90 Tablet, Rfl: 1    ketoconazole (NIZORAL) 2 % shampoo, APPLY 5 TO 10ML TO WET  "SCALP, LATHER, LEAVE ON 3 TO 5 MINUTES AND RINSE. APPLY TWICE WEEKLY FOR 2 TO 4 WEEKS, Disp: 120 mL, Rfl: 6    nystatin (MYCOSTATIN) powder, Apply 1 g topically 2 times a day., Disp: 30 g, Rfl: 0    Cyanocobalamin (B-12) 1000 MCG Tab, Take 2 tablets  (2,000mcg) by mouth every day., Disp: 60 Tablet, Rfl: 0    lidocaine (LIDODERM) 5 % Patch, Place 1 Patch on the skin every 24 hours., Disp: 15 Patch, Rfl: 0    docusate sodium (COLACE) 100 MG Cap, Take 100 mg by mouth every day., Disp: , Rfl:     Ascorbic Acid (VITAMIN C) 500 MG Cap, Take 1 Capsule by mouth 2 Times a Day., Disp: , Rfl:     Allergies   Allergen Reactions    Sulfa Drugs Nausea     Nausea     Gabapentin      Altered mental status    Tramadol      Altered mentation    Tylenol With Codeine #3 [Acetaminophen-Codeine] Unspecified     Dizziness, hallucinations       Objective:     Vitals: /62 (BP Location: Left arm, Patient Position: Sitting, BP Cuff Size: Adult)   Pulse 67   Temp 35.9 °C (96.7 °F) (Temporal)   Ht 1.6 m (5' 3\")   Wt 64 kg (141 lb 1.5 oz)   LMP  (LMP Unknown)   SpO2 95%   BMI 24.99 kg/m²    General: Alert, pleasant, NAD  HEENT: Normocephalic.  Neck supple.   Respiratory: no distress, no audible wheezing, RR -WNL  Skin: Warm, dry, no rashes.  Extremities: BLE edema, left greater than right.  Left lower extremity medial aspect with proximately 3 to 4 cm annular slightly raised hyperpigmented skin, somewhat palpable as if superficial venous grouping.  Neurological: No tremors  Psych:  Affect/mood is normal, judgement is good, memory is intact, grooming is appropriate.    Assessment/Plan:      1. Dysuria  Acute, recurrent.  Recent culture was actually negative.  Have discussed possible differentials including postmenopausal vaginal atrophy, dehydration, incontinence.  Recommend good perineal care, changing pads frequently, bladder training, increase hydration.  We will check a point-of-care urinalysis prior to starting any antibiotics " and confirm infection.    - POCT Urinalysis    2. Skin change  Acute problem noticed on her left lower extremity however possibly clustered varicosities that are somewhat inflamed.  Recommend ice, topical Voltaren, light compression, elevation and monitor for now.  Monitor for any signs and symptoms of infection as she may be susceptible to venous ulcers.    3. Vaginal dryness  Secondary to postmenopausal.  Trial of Lotrisone.  - clotrimazole-betamethasone (LOTRISONE) 1-0.05 % Cream; Apply 1 Application topically 2 times a day.  Dispense: 45 g; Refill: 0    4. PVD (peripheral vascular disease) (HCC)  Chronic.  LLE> RLE, recommend elevation, compression stockings.  Monitor.    5. Essential hypertension, benign  Chronic, stable.  Requesting refills on carvedilol.  - carvedilol (COREG) 3.125 MG Tab; Take 1 Tablet by mouth 2 times a day with meals.  Dispense: 180 Tablet; Refill: 1      No follow-ups on file.    {I have placed the above orders and discussed them with an approved delegating provider. The MA is performing the below orders under the direction of Dr. Yazmin YORK

## 2023-06-22 ENCOUNTER — PHYSICAL THERAPY (OUTPATIENT)
Dept: PHYSICAL THERAPY | Facility: REHABILITATION | Age: 88
End: 2023-06-22
Attending: PHYSICAL MEDICINE & REHABILITATION
Payer: MEDICARE

## 2023-06-22 DIAGNOSIS — Z74.09 IMPAIRED MOBILITY AND ADLS: ICD-10-CM

## 2023-06-22 DIAGNOSIS — R53.81 PHYSICAL DECONDITIONING: ICD-10-CM

## 2023-06-22 DIAGNOSIS — R53.1 GENERAL WEAKNESS: ICD-10-CM

## 2023-06-22 DIAGNOSIS — Z78.9 IMPAIRED MOBILITY AND ADLS: ICD-10-CM

## 2023-06-22 DIAGNOSIS — M48.061 FORAMINAL STENOSIS OF LUMBAR REGION: ICD-10-CM

## 2023-06-22 PROCEDURE — 97110 THERAPEUTIC EXERCISES: CPT

## 2023-06-22 NOTE — OP THERAPY DAILY TREATMENT
"  Outpatient Physical Therapy  DAILY TREATMENT     [facility] Outpatient Physical Therapy [place] [address] [facility], [address]  [place] [address]  Phone:  [phone]    Date: 06/22/2023  Patient: Krystle Tavarez  YOB: 1933  MRN: [id number]   Time Calculation  Start time: 1115  Stop time: 1200 Time Calculation (min): 45 minutes   Chief Complaint: No chief complaint on file.  Visit #: 27    SUBJECTIVE: Pt indicates that she has a thrombosis. The PCP wants to keep an eye on it. It has gotten better since but still hurts to move. No precautions per pt and daughter present with her today.     OBJECTIVE:   NT below   L/S extension** (local); better after stretching  Slump (-) today  Calf stretch negative today    3 minute walk test 168 ft today 6/22/23  30 sec STS 6 reps    SPPb 7/12        Therapeutic Exercises (CPT 77403):     1. UPOC 6/10/23    2. sciatic n gliders, 2x2' seated, not today    3. STS, with ball 5# 2x10, forward, arms crossed 24\" chair; not today    4. seated, LAQ, marching, hip ER PTB x20ea, leg press PTB 3x20, hip add 3x20    5. calf stretch, x1', not today    8. standing heel/toe raises, not today    9. step ups, x20 ea, on foam    11. Nu step, x2', x5' L0 mod fatigue, not today    13. DKTC with swiss ball, x10, not today    14. pririformis stretch, x30\", not today    15. LTR, x10, not today    16. supine bridge, x10, not today    17. seated rows pink band, 2x10, not today      Therapeutic Exercise Summary: [id number] gait; pt limited by fatigue mostly or slight LBP (got better as pt progressed)        Therapeutic Treatments and Modalities:     1. Neuromuscular Re-education (CPT 35267), see below, none today    Therapeutic Treatment and Modalities Summary: foam banded pull aparts, foam stand ups/march, step ups foam, ball toss    Time-based treatments/modalities:  Physical Therapy Timed Treatment Charges  Therapeutic exercise minutes (CPT 33372): 45 minutes  ASSESSMENT: PT finds pt still has a hard " time walking >3 min at one go, with some fatigue with gait that is limiting her ADLs, community access. She is making more volume of walking possible with breaks and this PT finds some small progress overall in regards to her overall session tolerance, improving three minute walk test. While she hasn't made significant gains she can cont to benefit from skilled PT care to limit amelioration of current gait capacity, decrease her fall risk, and improve her functional strength for transfers and safety here. Our plan is to transition to an [facility] with occasional visits every other week.     PLAN/RECOMMENDATIONS:   Plan for treatment: therapy treatment to continue next visit.   Planned interventions for next visit: continue with current treatment.

## 2023-06-22 NOTE — OP THERAPY PROGRESS SUMMARY
Outpatient Physical Therapy  PROGRESS SUMMARY NOTE      [facility] [place]  [address]  [place] [address]  Phone:  [phone]    Date of Visit: 06/22/2023    Patient: Krystle Tavarez  YOB: 1933  MRN: [id number]     Referring Provider: Papi Prdahan M.D.  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 Impaired mobility and ADLs  Z74.09    Z78.9   2. Physical deconditioning  R53.81   3. General weakness  R53.1   4. Foraminal stenosis of lumbar region  M48.061       Rehab Potential: fair    Progress Report Period: 5/10-6/22/23    Functional Assessment Used          Objective Findings and Assessment:   Patient progression towards goals: ASSESSMENT: PT finds pt still has a hard time walking >3 min at one go, with some fatigue with gait that is limiting her ADLs, community access. She is making more volume of walking possible with breaks and this PT finds some small progress overall in regards to her overall session tolerance, improving three minute walk test. While she hasn't made significant gains she can cont to benefit from skilled PT care to limit amelioration of current gait capacity, decrease her fall risk, and improve her functional strength for transfers and safety here. Our plan is to transition to an [facility] with occasional visits every other week    Objective findings and assessment details: See daily note 6/22/23    Goals:   Short Term Goals:   -pt meets MCID for 5 rep STS in order to improve her ability to safely transfer with a lower fall risk (MET)  -pt is able to stand for 2-3 min to improve ability to do ADLs with moderate pain at most that calms once  seated (MET)  -pt is able to walk for 2-3 min spurts with FWW with low fall risk to improve home ambulation (MET)                   Short term goal time span:  2-4 weeks      Long Term Goals:         -pt meets MCID for 5 rep STS in order to improve her ability to safely transfer with a lower fall risk; she can also stand from a normal chair without hands to improve fxl strength and dec fall risk with transfers (50% MET)     -pt is able to stand for 5-10 min to improve ability to do ADLs with moderate pain at most that calms once seated (90% MET)     -pt is able to walk for 5+ min spurts with FWW with low fall risk to improve home ambulation and community access (80% MET)     -pt meets  6MWT of at least 300 feet in this timeframe to improve goal of community ambulation (60% MET)     -pt has negative slump and painful measures of RLE with neural stretch to improve her ability to walk with less pain (75% MET)     Long term goal time span:  6-8 weeks    Plan:   Planned therapy interventions:  E Stim Attended (CPT 13250), E Stim Unattended (CPT 84373), Neuromuscular Re-education (CPT 93785), Manual Therapy (CPT 87429), Hot or Cold Pack Therapy (CPT 34844), Therapeutic Activities (CPT 22051) and Therapeutic Exercise (CPT 77091)  Frequency:  1x week  Duration in weeks:  8  Duration in visits:  8      Referring provider co-signature:  I have reviewed this plan of care and my co-signature certifies the need for services.     Certification Period: 06/22/2023 to 08/24/23    Physician Signature: ________________________________ Date: ______________

## 2023-06-28 NOTE — OP THERAPY DAILY TREATMENT
"  Outpatient Physical Therapy  DAILY TREATMENT     [facility] Outpatient Physical Therapy [place] [address] [facility], [address]  [place] [address]  Phone:  [phone]    Date: 06/29/2023  Patient: Krystle Tavarez  YOB: 1933  MRN: [id number]   Time Calculation  Start time: 0130  Stop time: 0215 Time Calculation (min): 45 minutes   Chief Complaint: No chief complaint on file.  Visit #: 28    SUBJECTIVE: Pt with reports of LBP today.     OBJECTIVE:   NT below   L/S extension** (local); better after stretching  Slump (-) today  Calf stretch negative today    3 minute walk test 168 ft today 6/22/23  30 sec STS 6 reps    SPPb 7/12        Therapeutic Exercises (CPT 36212):     1. UPOC 7/22/23    2. sciatic n gliders, 2x2' seated, not today    3. STS, with ball 5# 2x10    4. seated, LAQ, marching, hip ER PTB x20ea, leg press PTB 3x20, hip add 3x20    5. calf stretch, x1', not today    8. standing heel/toe raises, not today    9. step ups, x20 ea, not today    11. Nu step, , x5' L0 mod fatigue    13. DKTC with swiss ball, x10, not today    14. pririformis stretch, x30\", not today    15. LTR, x10, not today    16. supine bridge, x10, not today    17. seated rows pink band, 2x10, not today      Therapeutic Exercise Summary: [id number] gait; pt limited by fatigue mostly or slight LBP (got better as pt progressed)        Therapeutic Treatments and Modalities:     1. Neuromuscular Re-education (CPT 84577), see below, none today    Therapeutic Treatment and Modalities Summary: foam banded pull aparts, foam stand ups/march, step ups foam, ball toss    Time-based treatments/modalities:  Physical Therapy Timed Treatment Charges  Therapeutic exercise minutes (CPT 50511): 45 minutes  ASSESSMENT: pt cont to be limited in gait for various factors such as endurance or pain in her back or arms; she is still making slight progress overall here and will cont to attempt progressions at distance as able.    PLAN/RECOMMENDATIONS:   Plan for " treatment: therapy treatment to continue next visit.   Planned interventions for next visit: continue with current treatment.

## 2023-06-29 ENCOUNTER — PHYSICAL THERAPY (OUTPATIENT)
Dept: PHYSICAL THERAPY | Facility: REHABILITATION | Age: 88
End: 2023-06-29
Attending: PHYSICAL MEDICINE & REHABILITATION
Payer: MEDICARE

## 2023-06-29 DIAGNOSIS — R53.81 PHYSICAL DECONDITIONING: ICD-10-CM

## 2023-06-29 DIAGNOSIS — R53.1 GENERAL WEAKNESS: ICD-10-CM

## 2023-06-29 DIAGNOSIS — M48.061 FORAMINAL STENOSIS OF LUMBAR REGION: ICD-10-CM

## 2023-06-29 DIAGNOSIS — Z74.09 IMPAIRED MOBILITY AND ADLS: ICD-10-CM

## 2023-06-29 DIAGNOSIS — Z78.9 IMPAIRED MOBILITY AND ADLS: ICD-10-CM

## 2023-06-29 PROCEDURE — 97110 THERAPEUTIC EXERCISES: CPT

## 2023-07-05 ENCOUNTER — PATIENT OUTREACH (OUTPATIENT)
Dept: HEALTH INFORMATION MANAGEMENT | Facility: OTHER | Age: 88
End: 2023-07-05
Payer: MEDICARE

## 2023-07-05 NOTE — PROGRESS NOTES
CHW Abhay tried calling the pt for the Monthly follow up call. Pt did not answer and this CHW left a VM requesting a return call. 7/5  CHW Abhay tried calling the pt for the Monthly follow up call. Pt did not answer and this CHW left a VM requesting a return call. 7/6  Community Health Worker Follow-Up    Reason for outreach: CHW Called the pt to do the monthly check in with the pt.    CHW Interventions: CHW discussed releasing t5he pt from [facility]. Pt did not want that to happen and asked this CHW to please leave her on because she likes someone checking in with her because she doesn't have a lot of people that does that.    Specific Resources Provided:  Housing/Shelter: n/a  Transportation: n/a  Food: n/a  Financial: n/a  Social Supports: n/a  Other: n/a    Plan: CHW will follow up in one month

## 2023-07-12 NOTE — OP THERAPY DAILY TREATMENT
"  Outpatient Physical Therapy  DAILY TREATMENT     [facility] Outpatient Physical Therapy [place] [address] [facility], [address]  [place] [address]  Phone:  [phone]    Date: 07/13/2023  Patient: Krystle Tavarez  YOB: 1933  MRN: [id number]   Time Calculation  Start time: 0215  Stop time: 0300 Time Calculation (min): 45 minutes   Chief Complaint: No chief complaint on file.  Visit #: 29    SUBJECTIVE: Pt reports her back is stiff and heels are burning. Podiatrist she indicates didn't have anything to say about her foot pain. She is walking some but notes her family cont to stress to have her walk more.     OBJECTIVE:   NT below   L/S extension** (local); better after stretching  Slump (-) today  Calf stretch negative today    3 minute walk test 168 ft today 6/22/23  30 sec STS 6 reps    SPPb 7/12        Therapeutic Exercises (CPT 33225):     1. UPOC 7/22/23    2. sciatic n gliders, 2x2' seated, not today    3. STS, with ball 5# 2x10, not today    4. seated, LAQ, marching, hip ER PTB x20ea, leg press PTB 3x20, hip add 3x20    5. calf stretch, x1', not today    8. standing heel/toe raises, not today    9. step ups, 2x10    11. Nu step, , x5' L0 mod fatigue, not today    13. ball flexion seated, 2x10, BSB    14. pririformis stretch, x30\", not today    15. LTR, x10, not today    16. supine bridge, x10, not today    17. seated rows purple band, 2x20      Therapeutic Exercise Summary: [id number] gait; pt limited by fatigue mostly or slight LBP (got better as pt progressed)        Therapeutic Treatments and Modalities:     1. Neuromuscular Re-education (CPT 19265), see below, none today    Therapeutic Treatment and Modalities Summary: foam banded pull aparts, foam stand ups/march, step ups foam, ball toss    Time-based treatments/modalities:  Physical Therapy Timed Treatment Charges  Therapeutic exercise minutes (CPT 50742): 40 minutes  ASSESSMENT: pt limited by endurance today; we added paraspinal strengthening today " which she tolerates some but has issues due to R shoulder discomfort so we modified to a row. Cont to encourage this along with functional exercises.    PLAN/RECOMMENDATIONS:   Plan for treatment: therapy treatment to continue next visit.   Planned interventions for next visit: continue with current treatment.

## 2023-07-13 ENCOUNTER — PHYSICAL THERAPY (OUTPATIENT)
Dept: PHYSICAL THERAPY | Facility: REHABILITATION | Age: 88
End: 2023-07-13
Attending: PHYSICAL MEDICINE & REHABILITATION
Payer: MEDICARE

## 2023-07-13 DIAGNOSIS — Z74.09 IMPAIRED MOBILITY AND ADLS: ICD-10-CM

## 2023-07-13 DIAGNOSIS — R53.81 PHYSICAL DECONDITIONING: ICD-10-CM

## 2023-07-13 DIAGNOSIS — M48.061 FORAMINAL STENOSIS OF LUMBAR REGION: ICD-10-CM

## 2023-07-13 DIAGNOSIS — R53.1 GENERAL WEAKNESS: ICD-10-CM

## 2023-07-13 DIAGNOSIS — Z78.9 IMPAIRED MOBILITY AND ADLS: ICD-10-CM

## 2023-07-13 PROCEDURE — 97110 THERAPEUTIC EXERCISES: CPT

## 2023-07-18 ENCOUNTER — APPOINTMENT (OUTPATIENT)
Dept: RADIOLOGY | Facility: MEDICAL CENTER | Age: 88
DRG: 871 | End: 2023-07-18
Attending: EMERGENCY MEDICINE
Payer: MEDICARE

## 2023-07-18 ENCOUNTER — HOSPITAL ENCOUNTER (INPATIENT)
Facility: MEDICAL CENTER | Age: 88
LOS: 7 days | DRG: 871 | End: 2023-07-25
Attending: EMERGENCY MEDICINE | Admitting: HOSPITALIST
Payer: MEDICARE

## 2023-07-18 ENCOUNTER — NON-PROVIDER VISIT (OUTPATIENT)
Dept: CARDIOLOGY | Facility: MEDICAL CENTER | Age: 88
End: 2023-07-18

## 2023-07-18 DIAGNOSIS — A41.9 SEPSIS WITH ACUTE HYPOXIC RESPIRATORY FAILURE WITHOUT SEPTIC SHOCK, DUE TO UNSPECIFIED ORGANISM (HCC): ICD-10-CM

## 2023-07-18 DIAGNOSIS — I10 ESSENTIAL HYPERTENSION, BENIGN: Chronic | ICD-10-CM

## 2023-07-18 DIAGNOSIS — J96.01 SEPSIS WITH ACUTE HYPOXIC RESPIRATORY FAILURE WITHOUT SEPTIC SHOCK, DUE TO UNSPECIFIED ORGANISM (HCC): ICD-10-CM

## 2023-07-18 DIAGNOSIS — R65.20 SEPSIS WITH ACUTE HYPOXIC RESPIRATORY FAILURE WITHOUT SEPTIC SHOCK, DUE TO UNSPECIFIED ORGANISM (HCC): ICD-10-CM

## 2023-07-18 DIAGNOSIS — Z66 DNR (DO NOT RESUSCITATE): Chronic | ICD-10-CM

## 2023-07-18 DIAGNOSIS — E11.9 TYPE 2 DIABETES MELLITUS WITHOUT COMPLICATION, WITHOUT LONG-TERM CURRENT USE OF INSULIN (HCC): Chronic | ICD-10-CM

## 2023-07-18 DIAGNOSIS — N30.00 ACUTE CYSTITIS WITHOUT HEMATURIA: ICD-10-CM

## 2023-07-18 PROBLEM — R10.9 AP (ABDOMINAL PAIN): Status: ACTIVE | Noted: 2023-07-18

## 2023-07-18 PROBLEM — E86.0 DEHYDRATION: Status: ACTIVE | Noted: 2023-07-18

## 2023-07-18 LAB
ALBUMIN SERPL BCP-MCNC: 3.6 G/DL (ref 3.2–4.9)
ALBUMIN/GLOB SERPL: 1.1 G/DL
ALP SERPL-CCNC: 80 U/L (ref 30–99)
ALT SERPL-CCNC: 9 U/L (ref 2–50)
ANION GAP SERPL CALC-SCNC: 9 MMOL/L (ref 7–16)
APPEARANCE UR: ABNORMAL
AST SERPL-CCNC: 11 U/L (ref 12–45)
BACTERIA #/AREA URNS HPF: NEGATIVE /HPF
BASOPHILS # BLD AUTO: 0.2 % (ref 0–1.8)
BASOPHILS # BLD: 0.03 K/UL (ref 0–0.12)
BILIRUB SERPL-MCNC: 0.7 MG/DL (ref 0.1–1.5)
BILIRUB UR QL STRIP.AUTO: NEGATIVE
BUN SERPL-MCNC: 22 MG/DL (ref 8–22)
CALCIUM ALBUM COR SERPL-MCNC: 9.4 MG/DL (ref 8.5–10.5)
CALCIUM SERPL-MCNC: 9.1 MG/DL (ref 8.5–10.5)
CHLORIDE SERPL-SCNC: 105 MMOL/L (ref 96–112)
CO2 SERPL-SCNC: 25 MMOL/L (ref 20–33)
COLOR UR: ABNORMAL
CREAT SERPL-MCNC: 0.99 MG/DL (ref 0.5–1.4)
EKG IMPRESSION: NORMAL
EOSINOPHIL # BLD AUTO: 0.08 K/UL (ref 0–0.51)
EOSINOPHIL NFR BLD: 0.5 % (ref 0–6.9)
EPI CELLS #/AREA URNS HPF: NEGATIVE /HPF
ERYTHROCYTE [DISTWIDTH] IN BLOOD BY AUTOMATED COUNT: 51.7 FL (ref 35.9–50)
GFR SERPLBLD CREATININE-BSD FMLA CKD-EPI: 54 ML/MIN/1.73 M 2
GLOBULIN SER CALC-MCNC: 3.2 G/DL (ref 1.9–3.5)
GLUCOSE SERPL-MCNC: 172 MG/DL (ref 65–99)
GLUCOSE UR STRIP.AUTO-MCNC: NEGATIVE MG/DL
HCT VFR BLD AUTO: 38.4 % (ref 37–47)
HGB BLD-MCNC: 12.3 G/DL (ref 12–16)
HYALINE CASTS #/AREA URNS LPF: ABNORMAL /LPF
IMM GRANULOCYTES # BLD AUTO: 0.1 K/UL (ref 0–0.11)
IMM GRANULOCYTES NFR BLD AUTO: 0.6 % (ref 0–0.9)
KETONES UR STRIP.AUTO-MCNC: ABNORMAL MG/DL
LACTATE SERPL-SCNC: 1.4 MMOL/L (ref 0.5–2)
LEUKOCYTE ESTERASE UR QL STRIP.AUTO: ABNORMAL
LIPASE SERPL-CCNC: 61 U/L (ref 11–82)
LYMPHOCYTES # BLD AUTO: 1.31 K/UL (ref 1–4.8)
LYMPHOCYTES NFR BLD: 7.6 % (ref 22–41)
MCH RBC QN AUTO: 32 PG (ref 27–33)
MCHC RBC AUTO-ENTMCNC: 32 G/DL (ref 32.2–35.5)
MCV RBC AUTO: 100 FL (ref 81.4–97.8)
MICRO URNS: ABNORMAL
MONOCYTES # BLD AUTO: 0.64 K/UL (ref 0–0.85)
MONOCYTES NFR BLD AUTO: 3.7 % (ref 0–13.4)
NEUTROPHILS # BLD AUTO: 15.16 K/UL (ref 1.82–7.42)
NEUTROPHILS NFR BLD: 87.4 % (ref 44–72)
NITRITE UR QL STRIP.AUTO: POSITIVE
NRBC # BLD AUTO: 0 K/UL
NRBC BLD-RTO: 0 /100 WBC (ref 0–0.2)
PH UR STRIP.AUTO: 6 [PH] (ref 5–8)
PLATELET # BLD AUTO: 241 K/UL (ref 164–446)
PMV BLD AUTO: 9.1 FL (ref 9–12.9)
POTASSIUM SERPL-SCNC: 4.6 MMOL/L (ref 3.6–5.5)
PROT SERPL-MCNC: 6.8 G/DL (ref 6–8.2)
PROT UR QL STRIP: 30 MG/DL
RBC # BLD AUTO: 3.84 M/UL (ref 4.2–5.4)
RBC # URNS HPF: ABNORMAL /HPF
RBC UR QL AUTO: ABNORMAL
SODIUM SERPL-SCNC: 139 MMOL/L (ref 135–145)
SP GR UR STRIP.AUTO: 1.01
TROPONIN T SERPL-MCNC: 17 NG/L (ref 6–19)
UROBILINOGEN UR STRIP.AUTO-MCNC: 0.2 MG/DL
WBC # BLD AUTO: 17.3 K/UL (ref 4.8–10.8)
WBC #/AREA URNS HPF: ABNORMAL /HPF

## 2023-07-18 PROCEDURE — 36415 COLL VENOUS BLD VENIPUNCTURE: CPT

## 2023-07-18 PROCEDURE — 87040 BLOOD CULTURE FOR BACTERIA: CPT | Mod: 91

## 2023-07-18 PROCEDURE — 87086 URINE CULTURE/COLONY COUNT: CPT

## 2023-07-18 PROCEDURE — 700105 HCHG RX REV CODE 258: Performed by: EMERGENCY MEDICINE

## 2023-07-18 PROCEDURE — 700101 HCHG RX REV CODE 250: Performed by: EMERGENCY MEDICINE

## 2023-07-18 PROCEDURE — 99223 1ST HOSP IP/OBS HIGH 75: CPT | Mod: AI | Performed by: HOSPITALIST

## 2023-07-18 PROCEDURE — 93294 REM INTERROG EVL PM/LDLS PM: CPT | Performed by: INTERNAL MEDICINE

## 2023-07-18 PROCEDURE — 700111 HCHG RX REV CODE 636 W/ 250 OVERRIDE (IP): Performed by: EMERGENCY MEDICINE

## 2023-07-18 PROCEDURE — 84484 ASSAY OF TROPONIN QUANT: CPT

## 2023-07-18 PROCEDURE — 93005 ELECTROCARDIOGRAM TRACING: CPT | Performed by: EMERGENCY MEDICINE

## 2023-07-18 PROCEDURE — 700102 HCHG RX REV CODE 250 W/ 637 OVERRIDE(OP): Performed by: HOSPITALIST

## 2023-07-18 PROCEDURE — 71260 CT THORAX DX C+: CPT

## 2023-07-18 PROCEDURE — 83605 ASSAY OF LACTIC ACID: CPT

## 2023-07-18 PROCEDURE — 700117 HCHG RX CONTRAST REV CODE 255: Performed by: EMERGENCY MEDICINE

## 2023-07-18 PROCEDURE — 83690 ASSAY OF LIPASE: CPT

## 2023-07-18 PROCEDURE — 99285 EMERGENCY DEPT VISIT HI MDM: CPT

## 2023-07-18 PROCEDURE — 71045 X-RAY EXAM CHEST 1 VIEW: CPT

## 2023-07-18 PROCEDURE — 96374 THER/PROPH/DIAG INJ IV PUSH: CPT

## 2023-07-18 PROCEDURE — 770001 HCHG ROOM/CARE - MED/SURG/GYN PRIV*

## 2023-07-18 PROCEDURE — A9270 NON-COVERED ITEM OR SERVICE: HCPCS | Performed by: HOSPITALIST

## 2023-07-18 PROCEDURE — 85025 COMPLETE CBC W/AUTO DIFF WBC: CPT

## 2023-07-18 PROCEDURE — 80053 COMPREHEN METABOLIC PANEL: CPT

## 2023-07-18 PROCEDURE — 81001 URINALYSIS AUTO W/SCOPE: CPT

## 2023-07-18 RX ORDER — SODIUM CHLORIDE 9 MG/ML
1000 INJECTION, SOLUTION INTRAVENOUS ONCE
Status: COMPLETED | OUTPATIENT
Start: 2023-07-18 | End: 2023-07-18

## 2023-07-18 RX ORDER — PHENAZOPYRIDINE HYDROCHLORIDE 100 MG/1
100 TABLET, FILM COATED ORAL 3 TIMES DAILY PRN
Status: DISCONTINUED | OUTPATIENT
Start: 2023-07-18 | End: 2023-07-25 | Stop reason: HOSPADM

## 2023-07-18 RX ORDER — CARVEDILOL 3.12 MG/1
3.12 TABLET ORAL 2 TIMES DAILY WITH MEALS
Status: DISCONTINUED | OUTPATIENT
Start: 2023-07-18 | End: 2023-07-24

## 2023-07-18 RX ORDER — AMOXICILLIN 250 MG
2 CAPSULE ORAL 2 TIMES DAILY
Status: DISCONTINUED | OUTPATIENT
Start: 2023-07-18 | End: 2023-07-25 | Stop reason: HOSPADM

## 2023-07-18 RX ORDER — BUMETANIDE 0.5 MG/1
0.5 TABLET ORAL
COMMUNITY
End: 2023-08-31 | Stop reason: SDUPTHER

## 2023-07-18 RX ORDER — KETOCONAZOLE 20 MG/ML
1 SHAMPOO TOPICAL
COMMUNITY
End: 2023-09-27 | Stop reason: SDUPTHER

## 2023-07-18 RX ORDER — OMEPRAZOLE 20 MG/1
20 CAPSULE, DELAYED RELEASE ORAL DAILY
Status: DISCONTINUED | OUTPATIENT
Start: 2023-07-19 | End: 2023-07-25 | Stop reason: HOSPADM

## 2023-07-18 RX ORDER — CLOTRIMAZOLE AND BETAMETHASONE DIPROPIONATE 10; .64 MG/G; MG/G
1 CREAM TOPICAL 2 TIMES DAILY PRN
COMMUNITY
End: 2023-10-17

## 2023-07-18 RX ORDER — NITROFURANTOIN 25; 75 MG/1; MG/1
100 CAPSULE ORAL 2 TIMES DAILY
Status: ON HOLD | COMMUNITY
Start: 2023-07-17 | End: 2023-07-24

## 2023-07-18 RX ORDER — SENNOSIDES 8.6 MG
1300 CAPSULE ORAL 2 TIMES DAILY
COMMUNITY

## 2023-07-18 RX ORDER — LOSARTAN POTASSIUM 50 MG/1
100 TABLET ORAL DAILY
Status: DISCONTINUED | OUTPATIENT
Start: 2023-07-19 | End: 2023-07-22

## 2023-07-18 RX ORDER — PHENAZOPYRIDINE HYDROCHLORIDE 100 MG/1
100 TABLET, FILM COATED ORAL 3 TIMES DAILY PRN
COMMUNITY
End: 2023-10-17

## 2023-07-18 RX ORDER — POLYETHYLENE GLYCOL 3350 17 G/17G
1 POWDER, FOR SOLUTION ORAL
Status: DISCONTINUED | OUTPATIENT
Start: 2023-07-18 | End: 2023-07-25 | Stop reason: HOSPADM

## 2023-07-18 RX ORDER — ACETAMINOPHEN 500 MG
500 TABLET ORAL EVERY 6 HOURS PRN
Status: DISCONTINUED | OUTPATIENT
Start: 2023-07-18 | End: 2023-07-19

## 2023-07-18 RX ORDER — BISACODYL 10 MG
10 SUPPOSITORY, RECTAL RECTAL
Status: DISCONTINUED | OUTPATIENT
Start: 2023-07-18 | End: 2023-07-25 | Stop reason: HOSPADM

## 2023-07-18 RX ORDER — ATORVASTATIN CALCIUM 40 MG/1
40 TABLET, FILM COATED ORAL EVERY EVENING
Status: DISCONTINUED | OUTPATIENT
Start: 2023-07-18 | End: 2023-07-25 | Stop reason: HOSPADM

## 2023-07-18 RX ADMIN — IOHEXOL 100 ML: 350 INJECTION, SOLUTION INTRAVENOUS at 14:53

## 2023-07-18 RX ADMIN — ATORVASTATIN CALCIUM 40 MG: 40 TABLET, FILM COATED ORAL at 19:25

## 2023-07-18 RX ADMIN — SENNOSIDES AND DOCUSATE SODIUM 2 TABLET: 50; 8.6 TABLET ORAL at 19:19

## 2023-07-18 RX ADMIN — PHENAZOPYRIDINE 100 MG: 200 TABLET ORAL at 19:19

## 2023-07-18 RX ADMIN — CARVEDILOL 3.12 MG: 3.12 TABLET, FILM COATED ORAL at 19:25

## 2023-07-18 RX ADMIN — CEFTRIAXONE SODIUM 1000 MG: 10 INJECTION, POWDER, FOR SOLUTION INTRAVENOUS at 16:21

## 2023-07-18 RX ADMIN — APIXABAN 5 MG: 5 TABLET, FILM COATED ORAL at 19:18

## 2023-07-18 RX ADMIN — SODIUM CHLORIDE 1000 ML: 9 INJECTION, SOLUTION INTRAVENOUS at 14:46

## 2023-07-18 ASSESSMENT — ENCOUNTER SYMPTOMS
FLANK PAIN: 0
GASTROINTESTINAL NEGATIVE: 1
ABDOMINAL PAIN: 0
TINGLING: 0
BLURRED VISION: 0
HEADACHES: 0
DIZZINESS: 0
SORE THROAT: 0
BRUISES/BLEEDS EASILY: 0
TREMORS: 0
RESPIRATORY NEGATIVE: 1
SHORTNESS OF BREATH: 0
FEVER: 0
VOMITING: 0
WEIGHT LOSS: 0
CHILLS: 0
MYALGIAS: 0
COUGH: 0
PALPITATIONS: 0
CARDIOVASCULAR NEGATIVE: 1
PSYCHIATRIC NEGATIVE: 1
DIAPHORESIS: 0
FOCAL WEAKNESS: 0
WEAKNESS: 1
BACK PAIN: 1
EYES NEGATIVE: 1
DEPRESSION: 0
NAUSEA: 0

## 2023-07-18 ASSESSMENT — LIFESTYLE VARIABLES: SUBSTANCE_ABUSE: 0

## 2023-07-18 ASSESSMENT — CHA2DS2 SCORE
HYPERTENSION: YES
CHF OR LEFT VENTRICULAR DYSFUNCTION: NO
DIABETES: YES
SEX: FEMALE
AGE 65 TO 74: NO
AGE 75 OR GREATER: YES
CHA2DS2 VASC SCORE: 8
PRIOR STROKE OR TIA OR THROMBOEMBOLISM: YES
VASCULAR DISEASE: YES

## 2023-07-18 ASSESSMENT — PAIN DESCRIPTION - PAIN TYPE
TYPE: ACUTE PAIN
TYPE: ACUTE PAIN

## 2023-07-18 ASSESSMENT — FIBROSIS 4 INDEX
FIB4 SCORE: 1.35
FIB4 SCORE: 1.53

## 2023-07-18 NOTE — H&P
"Hospital Medicine History & Physical Note    Date of Service  7/18/2023    Primary Care Physician  JAYLEN Castelan    Consultants    Code Status  DNAR, I OK    Chief Complaint  Chief Complaint   Patient presents with    Abdominal Pain     X 2 hours    Low Back Pain     X 2 hours       History of Presenting Illness  Krystle Tavarez is a 89 year old female who presented to [facility] on 7/18/2023 with weakness and dysuria. Patient has a history of dm, gerd, htn, tia, pacer, chronic back pain and recurrent uti. She reports that she has failed multiple recent outpatient antibiotics for uti's and was seen by urology in their clinic yesterday. They prescribed her macrobid. She lives with her daughter and reports that when she woke up today she was too weak to ambulate on her own, she cried out for her daughter, who then brought her to the ER where she was found to have evidence of a uti, wbc of 17.3 and global weakness. She also reported \"severe gerd\" this am that resolved on it's own, she has a history of gerd. She denies flank pain or hematuria but does have ongoing dysuria and chronic lower back pain (at baseline), no fevers or chills, no focal weakness, no chest pain, no dizziness. She is dehydrated on exam. ROS otherwise negative.     Code status was discussed and she reports she would like to be a dnr/ dni    I discussed the plan of care with patient, her daughter and erp    Review of Systems  Review of Systems   Constitutional:  Positive for malaise/fatigue. Negative for chills, diaphoresis, fever and weight loss.   HENT: Negative.  Negative for sore throat.    Eyes: Negative.  Negative for blurred vision.   Respiratory: Negative.  Negative for cough and shortness of breath.    Cardiovascular: Negative.  Negative for chest pain, palpitations and leg swelling.   Gastrointestinal: Negative.  Negative for abdominal pain, nausea and vomiting.   Genitourinary:  Positive for frequency. Negative for dysuria, " flank pain and hematuria.   Musculoskeletal:  Positive for back pain. Negative for myalgias.   Skin: Negative.  Negative for itching and rash.   Neurological:  Positive for weakness. Negative for dizziness, tingling, tremors, focal weakness and headaches.   Endo/Heme/Allergies: Negative.  Does not bruise/bleed easily.   Psychiatric/Behavioral: Negative.  Negative for depression, substance abuse and suicidal ideas.    All other systems reviewed and are negative.      Past Medical History   has a past medical history of Arthritis, Breath shortness, Cataract, Dental disorder, Diabetes (HCC), Heart burn, Hemorrhagic disorder (HCC), High cholesterol, Hyperlipidemia, Hypertension, Pacemaker (2015), Pain (2020), Pre-diabetes, TIA (transient ischemic attack), and Urinary incontinence.    Surgical History   has a past surgical history that includes hip replacement, total (Right, 2009); knee replacement, total (Right, 2004); bunionectomy (Left); basal cell excision; cataract extraction with iol (Bilateral, 2003); pacemaker insertion (2015); cholecystectomy (2002); pr laminotomy,lumbar disk,1 intrsp (N/A, 2/25/2020); and foraminotomy (N/A, 2/25/2020).     Family History  family history includes Diabetes in her mother; Heart Attack (age of onset: 74) in her father; No Known Problems in her maternal grandfather, maternal grandmother, paternal grandfather, and paternal grandmother; Stroke in her mother.   Family history reviewed with patient. There is no family history that is pertinent to the chief complaint.     Social History   reports that she has never smoked. She has never used smokeless tobacco. She reports that she does not currently use alcohol. She reports that she does not use drugs.she lives with her daughter     Allergies  Allergies   Allergen Reactions    Gabapentin Unspecified     Altered mental status    Sulfa Drugs Nausea     Nausea     Tramadol Unspecified     Altered mentation    Tylenol With Codeine #3  [Acetaminophen-Codeine] Unspecified     Dizziness, hallucinations       Medications  Prior to Admission Medications   Prescriptions Last Dose Informant Patient Reported? Taking?   Ascorbic Acid (VITAMIN C PO) 7/17/2023 at AM Family Member Yes No   Sig: Take 1 Tablet by mouth every day.   Cyanocobalamin (VITAMIN B-12 PO) 7/17/2023 at AM Family Member Yes Yes   Sig: Take 1 Tablet by mouth every day.   acetaminophen (TYLENOL 8 HOUR) 650 MG CR tablet 7/17/2023 at PM Family Member Yes Yes   Sig: Take 1,300 mg by mouth 2 times a day.   apixaban (ELIQUIS) 5mg Tab 7/17/2023 at PM Family Member No No   Sig: Take 1 Tablet by mouth 2 times a day.   atorvastatin (LIPITOR) 40 MG Tab 7/17/2023 at PM Family Member No No   Sig: TAKE 1 TABLET EVERY EVENING   Patient taking differently: Take 40 mg by mouth every evening.   bumetanide (BUMEX) 0.5 MG Tab FEW DAYS AGO at UNK Family Member Yes Yes   Sig: Take 0.5 mg by mouth 1 time a day as needed. Indications: Edema   carvedilol (COREG) 3.125 MG Tab 7/17/2023 at PM Family Member No No   Sig: Take 1 Tablet by mouth 2 times a day with meals.   clotrimazole-betamethasone (LOTRISONE) 1-0.05 % Cream FEW DAYS AGO at K Family Member Yes Yes   Sig: Apply 1 Application topically 2 times a day as needed.   docusate sodium (COLACE) 100 MG Cap 7/17/2023 at PM Family Member Yes No   Sig: Take 100 mg by mouth 1 time a day as needed for Constipation.   ketoconazole (NIZORAL) 2 % shampoo 7/14/2023 at K Family Member Yes Yes   Sig: Apply 1 mL topically every Friday.   losartan (COZAAR) 100 MG Tab 7/17/2023 at PM Family Member No No   Sig: TAKE 1 TABLET DAILY   Patient taking differently: Take 100 mg by mouth every day.   nitrofurantoin (MACROBID) 100 MG Cap 7/17/2023 at PM Family Member Yes Yes   Sig: Take 100 mg by mouth 2 times a day. 7 day course   phenazopyridine (PYRIDIUM) 100 MG Tab 7/17/2023 at PM Family Member Yes Yes   Sig: Take 100 mg by mouth 3 times a day as needed for Mild Pain. 3 day  supply      Facility-Administered Medications: None       Physical Exam  Temp:  [36.3 °C (97.4 °F)] 36.3 °C (97.4 °F)  Pulse:  [97] 97  Resp:  [17] 17  BP: (133)/(70) 133/70  SpO2:  [93 %] 93 %  Blood Pressure : 133/70   Temperature: 36.3 °C (97.4 °F)   Pulse: 97   Respiration: 17   Pulse Oximetry: 93 %       Physical Exam  Vitals and nursing note reviewed. Exam conducted with a chaperone present.   Constitutional:       General: She is not in acute distress.     Appearance: Normal appearance. She is not diaphoretic.   HENT:      Head: Normocephalic.      Nose: Nose normal.      Mouth/Throat:      Mouth: Mucous membranes are moist.   Eyes:      Pupils: Pupils are equal, round, and reactive to light.   Cardiovascular:      Rate and Rhythm: Normal rate and regular rhythm.      Pulses: Normal pulses.      Heart sounds: Normal heart sounds.   Pulmonary:      Effort: Pulmonary effort is normal.      Breath sounds: Normal breath sounds.   Abdominal:      General: Abdomen is flat. Bowel sounds are normal. There is no distension.      Palpations: Abdomen is soft.      Tenderness: There is no abdominal tenderness. There is no right CVA tenderness, left CVA tenderness or guarding.   Musculoskeletal:         General: No swelling or deformity. Normal range of motion.   Skin:     General: Skin is warm and dry.      Capillary Refill: Capillary refill takes less than 2 seconds.   Neurological:      General: No focal deficit present.      Mental Status: She is alert and oriented to person, place, and time.      Cranial Nerves: No cranial nerve deficit.      Motor: No weakness.   Psychiatric:         Mood and Affect: Mood normal.         Behavior: Behavior normal.         Laboratory:  Recent Labs     07/18/23  1228   WBC 17.3*   RBC 3.84*   HEMOGLOBIN 12.3   HEMATOCRIT 38.4   .0*   MCH 32.0   MCHC 32.0*   RDW 51.7*   PLATELETCT 241   MPV 9.1     Recent Labs     07/18/23  1228   SODIUM 139   POTASSIUM 4.6   CHLORIDE 105   CO2  25   GLUCOSE 172*   BUN 22   CREATININE 0.99   CALCIUM 9.1     Recent Labs     07/18/23  1228   ALTSGPT 9   ASTSGOT 11*   ALKPHOSPHAT 80   TBILIRUBIN 0.7   LIPASE 61   GLUCOSE 172*         No results for input(s): NTPROBNP in the last 72 hours.      Recent Labs     07/18/23  1228   TROPONINT 17       Imaging:  CT-CHEST,ABDOMEN,PELVIS WITH   Final Result      1.  Bladder wall thickening consistent with cystitis.   2.  Normal appendix.   3.  No bowel obstruction or perforation   4.  Biliary dilation, likely chronic.  Prior cholecystectomy.   5.  Mild bilateral dependent atelectasis.   6.  Severe degenerative change of both shoulders with RIGHT shoulder joint effusion and joint bodies.         DX-CHEST-PORTABLE (1 VIEW)   Final Result      1.  Hypoinflation with bibasilar atelectasis.  Developing LEFT lung base pneumonia is difficult to exclude.   2.  Mildly prominent interstitium, likely chronic   3.  No pneumothorax.   4.  LEFT chest pacemaker present.          X-Ray:  I have personally reviewed the images and compared with prior images.  Bilateral atelectasis    Assessment/Plan:  Justification for Admission Status  I anticipate this patient will require at least two midnights for appropriate medical management, necessitating inpatient admission because need for culture results as she has failed outpatient abx    Patient will need a Med/Surg bed on EMERGENCY service .  The need is secondary to iv abx.    * Sepsis (HCC)- (present on admission)  Assessment & Plan  This is Sepsis Present on admission  SIRS criteria identified on my evaluation include: Leukocytosis, with WBC greater than 12,000  Source is urine with mild acute renal insufficiency   Sepsis protocol initiated  Fluid resuscitation ordered per protocol  Crystalloid Fluid Administration: Fluid resuscitation ordered per standard protocol - 30 mL/kg per current or ideal body weight  IV antibiotics as appropriate for source of sepsis  Reassessment: I have  "reassessed the patient's hemodynamic status  Cultures pending  Iv ceftriaxone          AP (abdominal pain)  Assessment & Plan  Described as \"gerd\", h/o same  Resolved, no pain on exam  Denies chest pain  No acute ekg changes, trop negative, will repeat trop- consider further cardiac workup if sx recur  Continue omeprazole  following    Dehydration  Assessment & Plan  Will hold bumex, re eval in am  Following  Likely related to uti    Acute respiratory failure with hypoxia (HCC)- (present on admission)  Assessment & Plan  Possibly due to atelectasis, in ER noted to require 4L, on my exam stable on room air  Following  IS recommended    PAF (paroxysmal atrial fibrillation) (HCC)- (present on admission)  Assessment & Plan  H/o, continue coreg and eliquis  Currently in sr  following    Type 2 diabetes mellitus without complication, without long-term current use of insulin ([facility])- (present on admission)  Assessment & Plan  Diabetic diet  following    Foraminal stenosis of lumbar region- (present on admission)  Assessment & Plan  Chronic with chronic pain, at baseline  Supportive care    Cardiac pacemaker in situ- (present on admission)  Assessment & Plan  noted        VTE prophylaxis: eliquis  "

## 2023-07-18 NOTE — ED TRIAGE NOTES
"Chief Complaint   Patient presents with    Abdominal Pain     X 2 hours    Low Back Pain     X 2 hours   [facility] to [facility] with above complains, As per patient she was diagnosed with UTI and started antibiotic yesterday, the pain worsen today she call EMS. As per EMS patient was hypoxic on seen SPO2 84% on room air, they place on 4 liters O2. Denies any SOB or chest pain. AAO X4 respirations even and unlabored.    Pt on monitor and in gown.   Medications given en route:none    /70   Pulse 97   Resp 17   Ht 1.646 m (5' 4.8\")   Wt 65 kg (143 lb 4.8 oz)   LMP  (LMP Unknown)   SpO2 93%   BMI 23.99 kg/m²       "

## 2023-07-18 NOTE — ED PROVIDER NOTES
"ED Provider Note    CHIEF COMPLAINT  Chief Complaint   Patient presents with    Abdominal Pain     X 2 hours    Low Back Pain     X 2 hours       EXTERNAL RECORDS REVIEWED  Outpatient family medicine office visit 5/18/2023, UTI recurrent    HPI/ROS    Krystle Tavarez is a 89 y.o. female who presents for evaluation of chest pain, upper and lower back pain, abdominal pain.  She also has been very weak.  This all has been going on for the past 12 hours or so, she states in the middle the night she had an epigastric pain that radiated into her chest, she reports it felt like \"very bad heartburn.\"  The pain was so intense she was unable to get out of bed.  That pain is fully resolved.  Now she has a pain in the right mid abdomen, status post both cholecystectomy and appendectomy.  Additionally she has lower back pain and yesterday was diagnosed with UTI at her urologist, put on antibiotics, took 2 doses so far.  No vomiting or diarrhea.  She also has pain between her shoulder blades.  Although at this point her biggest concern is not having coffee.  Denies fever.  States that she has had many UTIs in the past 8 weeks.    PAST MEDICAL HISTORY   has a past medical history of Arthritis, Breath shortness, Cataract, Dental disorder, Diabetes (HCC), Heart burn, Hemorrhagic disorder (HCC), High cholesterol, Hyperlipidemia, Hypertension, Pacemaker (2015), Pain (2020), Pre-diabetes, TIA (transient ischemic attack), and Urinary incontinence.    SURGICAL HISTORY   has a past surgical history that includes hip replacement, total (Right, 2009); knee replacement, total (Right, 2004); bunionectomy (Left); basal cell excision; cataract extraction with iol (Bilateral, 2003); pacemaker insertion (2015); cholecystectomy (2002); laminotomy,lumbar disk,1 intrsp (N/A, 2/25/2020); and foraminotomy (N/A, 2/25/2020).    FAMILY HISTORY  Family History   Problem Relation Age of Onset    Diabetes Mother     Stroke Mother     Heart Attack Father 74 " "   No Known Problems Maternal Grandmother     No Known Problems Maternal Grandfather     No Known Problems Paternal Grandmother     No Known Problems Paternal Grandfather        SOCIAL HISTORY  Social History     Tobacco Use    Smoking status: Never    Smokeless tobacco: Never   Vaping Use    Vaping Use: Never used   Substance and Sexual Activity    Alcohol use: Not Currently    Drug use: No    Sexual activity: Not Currently     Partners: Male       CURRENT MEDICATIONS  Home Medications    **Home medications have not yet been reviewed for this encounter**         ALLERGIES  Allergies   Allergen Reactions    Sulfa Drugs Nausea     Nausea     Gabapentin      Altered mental status    Tramadol      Altered mentation    Tylenol With Codeine #3 [Acetaminophen-Codeine] Unspecified     Dizziness, hallucinations       PHYSICAL EXAM  VITAL SIGNS: /70   Pulse 97   Resp 17   Ht 1.646 m (5' 4.8\")   Wt 65 kg (143 lb 4.8 oz)   LMP  (LMP Unknown)   SpO2 93%   BMI 23.99 kg/m²    Constitutional: Elderly and frail, awake, alert, very pleasant  HENT: Normocephalic, no obvious evidence of acute trauma.  Dry mucous membranes  Eyes: No scleral icterus. Normal conjunctiva   Thorax & Lungs: Normal nonlabored respirations.  Bibasilar crackles. Upon cardiac ascultation I appreciate a regular heart rhythm and a normal rate.   Abdomen: The abdomen is not visibly distended.  Upon palpation she has rather severe right mid abdominal tenderness with localized guarding.  No rebound tenderness.  Skin: The exposed portions of skin reveal no obvious rash or other abnormalities.  Extremities/Musculoskeletal: No obvious sign of acute trauma. No asymmetric calf tenderness or edema.   Neurologic: Alert & oriented. No focal deficits observed.      DIAGNOSTIC STUDIES / PROCEDURES    LABS  Results for orders placed or performed during the hospital encounter of 07/18/23   LACTIC ACID   Result Value Ref Range    Lactic Acid 1.4 0.5 - 2.0 mmol/L "   CBC WITH DIFFERENTIAL   Result Value Ref Range    WBC 17.3 (H) 4.8 - 10.8 K/uL    RBC 3.84 (L) 4.20 - 5.40 M/uL    Hemoglobin 12.3 12.0 - 16.0 g/dL    Hematocrit 38.4 37.0 - 47.0 %    .0 (H) 81.4 - 97.8 fL    MCH 32.0 27.0 - 33.0 pg    MCHC 32.0 (L) 32.2 - 35.5 g/dL    RDW 51.7 (H) 35.9 - 50.0 fL    Platelet Count 241 164 - 446 K/uL    MPV 9.1 9.0 - 12.9 fL    Neutrophils-Polys 87.40 (H) 44.00 - 72.00 %    Lymphocytes 7.60 (L) 22.00 - 41.00 %    Monocytes 3.70 0.00 - 13.40 %    Eosinophils 0.50 0.00 - 6.90 %    Basophils 0.20 0.00 - 1.80 %    Immature Granulocytes 0.60 0.00 - 0.90 %    Nucleated RBC 0.00 0.00 - 0.20 /100 WBC    Neutrophils (Absolute) 15.16 (H) 1.82 - 7.42 K/uL    Lymphs (Absolute) 1.31 1.00 - 4.80 K/uL    Monos (Absolute) 0.64 0.00 - 0.85 K/uL    Eos (Absolute) 0.08 0.00 - 0.51 K/uL    Baso (Absolute) 0.03 0.00 - 0.12 K/uL    Immature Granulocytes (abs) 0.10 0.00 - 0.11 K/uL    NRBC (Absolute) 0.00 K/uL   COMP METABOLIC PANEL   Result Value Ref Range    Sodium 139 135 - 145 mmol/L    Potassium 4.6 3.6 - 5.5 mmol/L    Chloride 105 96 - 112 mmol/L    Co2 25 20 - 33 mmol/L    Anion Gap 9.0 7.0 - 16.0    Glucose 172 (H) 65 - 99 mg/dL    Bun 22 8 - 22 mg/dL    Creatinine 0.99 0.50 - 1.40 mg/dL    Calcium 9.1 8.5 - 10.5 mg/dL    AST(SGOT) 11 (L) 12 - 45 U/L    ALT(SGPT) 9 2 - 50 U/L    Alkaline Phosphatase 80 30 - 99 U/L    Total Bilirubin 0.7 0.1 - 1.5 mg/dL    Albumin 3.6 3.2 - 4.9 g/dL    Total Protein 6.8 6.0 - 8.2 g/dL    Globulin 3.2 1.9 - 3.5 g/dL    A-G Ratio 1.1 g/dL   URINALYSIS    Specimen: Urine   Result Value Ref Range    Color DK Yellow     Character Cloudy (A)     Specific Gravity 1.015 <1.035    Ph 6.0 5.0 - 8.0    Glucose Negative Negative mg/dL    Ketones Trace (A) Negative mg/dL    Protein 30 (A) Negative mg/dL    Bilirubin Negative Negative    Urobilinogen, Urine 0.2 Negative    Nitrite Positive (A) Negative    Leukocyte Esterase Large (A) Negative    Occult Blood Moderate (A)  Negative    Micro Urine Req Microscopic    URINE MICROSCOPIC (W/UA)   Result Value Ref Range    WBC Packed (A) /hpf    -150 (A) /hpf    Bacteria Negative None /hpf    Epithelial Cells Negative /hpf    Hyaline Cast 3-5 (A) /lpf   CORRECTED CALCIUM   Result Value Ref Range    Correct Calcium 9.4 8.5 - 10.5 mg/dL   ESTIMATED GFR   Result Value Ref Range    GFR (CKD-EPI) 54 (A) >60 mL/min/1.73 m 2   LIPASE   Result Value Ref Range    Lipase 61 11 - 82 U/L   TROPONIN   Result Value Ref Range    Troponin T 17 6 - 19 ng/L   EKG   Result Value Ref Range    Report       [facility] Emergency Dept[facility]    Test Date:  2023  Pt Name:    VAN SEWELL                 Department: ER  MRN:        [id number]                      Room:       [facility]  Gender:     Female                       Technician: [id number]  :        [id number]                   Requested By:ER TRIAGE PROTOCOL  Order #:    [id number]                    Reading MD: CLIFF GRIFFITHS MD    Measurements  Intervals                                Axis  Rate:       95                           P:          40  WY:         179                          QRS:        76  QRSD:       135                          T:          9  QT:         389  QTc:        489    Interpretive Statements  I independently interpreted this EKG  Sinus rhythm  IVCD, consider atypical LBBB  Compared to ECG 2022 11:26:54  No significant changes  Electronically Signed On 2023 13:05:40 PDT by CLIFF GRIFFITHS MD          RADIOLOGY  I have independently interpreted the diagnostic imaging associated with this visit and am waiting the final reading from the radiologist.   My preliminary interpretation is as follows: Chest x-ray: No pneumothorax or obvious infiltrate  Radiologist interpretation:   CT-CHEST,ABDOMEN,PELVIS WITH   Final Result      1.  Bladder wall thickening consistent with cystitis.   2.  Normal appendix.   3.  No bowel obstruction or perforation   4.   Biliary dilation, likely chronic.  Prior cholecystectomy.   5.  Mild bilateral dependent atelectasis.   6.  Severe degenerative change of both shoulders with RIGHT shoulder joint effusion and joint bodies.         DX-CHEST-PORTABLE (1 VIEW)   Final Result      1.  Hypoinflation with bibasilar atelectasis.  Developing LEFT lung base pneumonia is difficult to exclude.   2.  Mildly prominent interstitium, likely chronic   3.  No pneumothorax.   4.  LEFT chest pacemaker present.            COURSE & MEDICAL DECISION MAKING    ED Observation Status? No    INITIAL ASSESSMENT, COURSE AND PLAN  Care Narrative: This is a an 89-year-old female with the following issues:  -Resolved epigastric/chest pain  -Right-sided abdominal pain with reproducible tenderness  -Low back pain with diagnosis of UTI yesterday  -Upper back pain  -New hypoxia, requiring significant amount of supplemental oxygen    She has been experiencing symptoms over the past 24 hours, maybe even just 12 hours.  On exam she is reproducible and significant right-sided abdominal tenderness.  She has bibasilar crackles on pulmonary auscultation, her initial chest x-ray does not reveal any obvious pneumothorax or infiltrate.  At this point full work-up will be initiated including CT scan of the chest abdomen pelvis.  A septic work-up as he does have a known UTI with back pain and she comes in with a heart rate greater than 90.  Will receive IV fluids as she appears dehydrated.  She will be reevaluated and watched very closely as I do suspect she has had a high chance of acute decompensation requiring the highest level preparedness for emergent interventions  Differential includes: Sepsis, pyelonephritis, pneumonia, pneumothorax, ACS, dehydration, electrolyte abnormalities    Reveals a significant leukocytosis 17,000.  Troponin negative.  UTI is detected on urinalysis and microscopy.  The CT scan reveals some bladder wall thickening consistent with cystitis otherwise  really no other acute findings.  At this point, there is no clear etiology of her hypoxic respiratory failure.  The patient is septic given her heart rate and leukocytosis and is receiving broad-spectrum antibiotics for her urinary source.  She is admitted to the hospital in guarded condition.  Discussed the case with Dr. Domínguez, admitting hospitalist.    CRITICAL CARE  The very real possibilty of a deterioration of this patient's condition required the highest level of my preparedness for sudden, emergent intervention.  I provided critical care services, which included medication orders, frequent reevaluations of the patient's condition and response to treatment, ordering and reviewing test results, and discussing the case with various consultants.  The critical care time associated with the care of the patient was 39 minutes. Review chart for interventions. This time is exclusive of any other billable procedures.       FINAL DIAGNOSIS  1. Sepsis with acute hypoxic respiratory failure without septic shock, due to unspecified organism (HCC)    2. Acute cystitis without hematuria           Electronically signed by: Kenneth Bardales M.D., 7/18/2023 12:51 PM

## 2023-07-19 ENCOUNTER — APPOINTMENT (OUTPATIENT)
Dept: PHYSICAL THERAPY | Facility: REHABILITATION | Age: 88
End: 2023-07-19
Attending: PHYSICAL MEDICINE & REHABILITATION
Payer: MEDICARE

## 2023-07-19 PROBLEM — N30.00 ACUTE CYSTITIS: Status: ACTIVE | Noted: 2023-07-19

## 2023-07-19 PROBLEM — D72.829 LEUKOCYTOSIS: Status: ACTIVE | Noted: 2023-07-19

## 2023-07-19 LAB
ANION GAP SERPL CALC-SCNC: 9 MMOL/L (ref 7–16)
BUN SERPL-MCNC: 17 MG/DL (ref 8–22)
CALCIUM SERPL-MCNC: 9.1 MG/DL (ref 8.5–10.5)
CHLORIDE SERPL-SCNC: 107 MMOL/L (ref 96–112)
CO2 SERPL-SCNC: 24 MMOL/L (ref 20–33)
CREAT SERPL-MCNC: 0.84 MG/DL (ref 0.5–1.4)
ERYTHROCYTE [DISTWIDTH] IN BLOOD BY AUTOMATED COUNT: 53.4 FL (ref 35.9–50)
GFR SERPLBLD CREATININE-BSD FMLA CKD-EPI: 66 ML/MIN/1.73 M 2
GLUCOSE SERPL-MCNC: 159 MG/DL (ref 65–99)
HCT VFR BLD AUTO: 34.9 % (ref 37–47)
HGB BLD-MCNC: 11 G/DL (ref 12–16)
MCH RBC QN AUTO: 32 PG (ref 27–33)
MCHC RBC AUTO-ENTMCNC: 31.5 G/DL (ref 32.2–35.5)
MCV RBC AUTO: 101.5 FL (ref 81.4–97.8)
PLATELET # BLD AUTO: 204 K/UL (ref 164–446)
PMV BLD AUTO: 9 FL (ref 9–12.9)
POTASSIUM SERPL-SCNC: 4.7 MMOL/L (ref 3.6–5.5)
RBC # BLD AUTO: 3.44 M/UL (ref 4.2–5.4)
SODIUM SERPL-SCNC: 140 MMOL/L (ref 135–145)
WBC # BLD AUTO: 16.2 K/UL (ref 4.8–10.8)

## 2023-07-19 PROCEDURE — A9270 NON-COVERED ITEM OR SERVICE: HCPCS | Performed by: STUDENT IN AN ORGANIZED HEALTH CARE EDUCATION/TRAINING PROGRAM

## 2023-07-19 PROCEDURE — 700111 HCHG RX REV CODE 636 W/ 250 OVERRIDE (IP): Performed by: HOSPITALIST

## 2023-07-19 PROCEDURE — 85027 COMPLETE CBC AUTOMATED: CPT

## 2023-07-19 PROCEDURE — 700102 HCHG RX REV CODE 250 W/ 637 OVERRIDE(OP): Performed by: HOSPITALIST

## 2023-07-19 PROCEDURE — 36415 COLL VENOUS BLD VENIPUNCTURE: CPT

## 2023-07-19 PROCEDURE — A9270 NON-COVERED ITEM OR SERVICE: HCPCS | Performed by: HOSPITALIST

## 2023-07-19 PROCEDURE — 99232 SBSQ HOSP IP/OBS MODERATE 35: CPT | Performed by: STUDENT IN AN ORGANIZED HEALTH CARE EDUCATION/TRAINING PROGRAM

## 2023-07-19 PROCEDURE — 700102 HCHG RX REV CODE 250 W/ 637 OVERRIDE(OP): Performed by: STUDENT IN AN ORGANIZED HEALTH CARE EDUCATION/TRAINING PROGRAM

## 2023-07-19 PROCEDURE — A9270 NON-COVERED ITEM OR SERVICE: HCPCS

## 2023-07-19 PROCEDURE — 770006 HCHG ROOM/CARE - MED/SURG/GYN SEMI*

## 2023-07-19 PROCEDURE — 700102 HCHG RX REV CODE 250 W/ 637 OVERRIDE(OP)

## 2023-07-19 PROCEDURE — 80048 BASIC METABOLIC PNL TOTAL CA: CPT

## 2023-07-19 RX ORDER — ACETAMINOPHEN 500 MG
1000 TABLET ORAL 3 TIMES DAILY
Status: DISCONTINUED | OUTPATIENT
Start: 2023-07-19 | End: 2023-07-25 | Stop reason: HOSPADM

## 2023-07-19 RX ADMIN — CARVEDILOL 3.12 MG: 3.12 TABLET, FILM COATED ORAL at 17:30

## 2023-07-19 RX ADMIN — APIXABAN 5 MG: 5 TABLET, FILM COATED ORAL at 17:30

## 2023-07-19 RX ADMIN — SENNOSIDES AND DOCUSATE SODIUM 2 TABLET: 50; 8.6 TABLET ORAL at 17:31

## 2023-07-19 RX ADMIN — ATORVASTATIN CALCIUM 40 MG: 40 TABLET, FILM COATED ORAL at 17:31

## 2023-07-19 RX ADMIN — APIXABAN 5 MG: 5 TABLET, FILM COATED ORAL at 05:31

## 2023-07-19 RX ADMIN — OMEPRAZOLE 20 MG: 20 CAPSULE, DELAYED RELEASE ORAL at 05:30

## 2023-07-19 RX ADMIN — CARVEDILOL 3.12 MG: 3.12 TABLET, FILM COATED ORAL at 10:48

## 2023-07-19 RX ADMIN — SENNOSIDES AND DOCUSATE SODIUM 2 TABLET: 50; 8.6 TABLET ORAL at 05:31

## 2023-07-19 RX ADMIN — ACETAMINOPHEN 1000 MG: 500 TABLET, FILM COATED ORAL at 21:07

## 2023-07-19 RX ADMIN — ACETAMINOPHEN 1000 MG: 500 TABLET, FILM COATED ORAL at 16:15

## 2023-07-19 RX ADMIN — ACETAMINOPHEN 500 MG: 500 TABLET, FILM COATED ORAL at 00:26

## 2023-07-19 RX ADMIN — CEFTRIAXONE SODIUM 1000 MG: 10 INJECTION, POWDER, FOR SOLUTION INTRAVENOUS at 17:07

## 2023-07-19 ASSESSMENT — COGNITIVE AND FUNCTIONAL STATUS - GENERAL
SUGGESTED CMS G CODE MODIFIER DAILY ACTIVITY: CK
DAILY ACTIVITIY SCORE: 16
MOVING FROM LYING ON BACK TO SITTING ON SIDE OF FLAT BED: UNABLE
MOVING TO AND FROM BED TO CHAIR: UNABLE
TOILETING: A LOT
SUGGESTED CMS G CODE MODIFIER MOBILITY: CL
TURNING FROM BACK TO SIDE WHILE IN FLAT BAD: A LITTLE
WALKING IN HOSPITAL ROOM: A LOT
CLIMB 3 TO 5 STEPS WITH RAILING: A LOT
DRESSING REGULAR LOWER BODY CLOTHING: A LOT
HELP NEEDED FOR BATHING: A LOT
DRESSING REGULAR UPPER BODY CLOTHING: A LOT
STANDING UP FROM CHAIR USING ARMS: A LOT
MOBILITY SCORE: 11

## 2023-07-19 ASSESSMENT — ENCOUNTER SYMPTOMS
SORE THROAT: 1
PSYCHIATRIC NEGATIVE: 1
WEAKNESS: 1
CARDIOVASCULAR NEGATIVE: 1
EYES NEGATIVE: 1
GASTROINTESTINAL NEGATIVE: 1
COUGH: 1
MUSCULOSKELETAL NEGATIVE: 1
SHORTNESS OF BREATH: 1

## 2023-07-19 NOTE — ED NOTES
Checked on bed, connected to monitor,  with unlabored respirations. Gurney in low position, side rail up for pt safety. Call light within reach. Will continue to monitor

## 2023-07-19 NOTE — CARDIAC REMOTE MONITOR - SCAN
Device transmission reviewed. Device demonstrated appropriate function.       Electronically Signed by: Alexis Correa M.D.    7/28/2023  4:22 PM

## 2023-07-19 NOTE — PROGRESS NOTES
"[facility] Medicine Daily Progress Note    Date of Service  7/19/2023    Chief Complaint  Krystle Tavarez is a 89 y.o. female admitted 7/18/2023 with     Hospital Course  89 year old female who presented to [facility] on 7/18/2023 with weakness and dysuria. Patient has a history of dm, gerd, htn, tia, pacer, chronic back pain and recurrent uti. She reports that she has failed multiple recent outpatient antibiotics for uti's and was seen by urology in their clinic yesterday. They prescribed her macrobid. She lives with her daughter and reports that when she woke up today she was too weak to ambulate on her own, she cried out for her daughter, who then brought her to the ER where she was found to have evidence of a uti, wbc of 17.3 and global weakness. She also reported \"severe gerd\" this am that resolved on it's own, she has a history of gerd. She denies flank pain or hematuria but does have ongoing dysuria and chronic lower back pain (at baseline), no fevers or chills, no focal weakness, no chest pain, no dizziness. She is dehydrated on exam. ROS otherwise negative.     Interval Problem Update  Patient was evaluated bedside  Reporting marked improvement with overall symptoms and strength  Stable vitals  On 2 L nasal cannula  Leukocytosis trending down  7/18 blood cultures with no growth to date  7/18 urine cultures with no growth to date  Continue IV Rocephin  PT/OT to evaluate  Labs on a.m.    I have discussed this patient's plan of care and discharge plan at IDT rounds today with Case Management, Nursing, Nursing leadership, and other members of the IDT team.    Consultants/Specialty  None    Code Status  DNAR, I OK    Disposition  Not medically cleared  Disposition to be determined    I have placed the appropriate orders for post-discharge needs.    Review of Systems  Review of Systems   Constitutional:  Positive for malaise/fatigue.   HENT:  Positive for sore throat.    Eyes: Negative.    Respiratory:  Positive for " cough and shortness of breath.    Cardiovascular: Negative.    Gastrointestinal: Negative.    Genitourinary:  Positive for dysuria.   Musculoskeletal: Negative.    Skin: Negative.    Neurological:  Positive for weakness.   Endo/Heme/Allergies: Negative.    Psychiatric/Behavioral: Negative.          Physical Exam  Temp:  [36.1 °C (96.9 °F)-36.4 °C (97.5 °F)] 36.4 °C (97.5 °F)  Pulse:  [72-92] 77  Resp:  [16-21] 16  BP: (106-183)/(45-94) 143/45  SpO2:  [82 %-98 %] 97 %    Physical Exam  Constitutional:       Appearance: Normal appearance. She is ill-appearing.   HENT:      Head: Normocephalic and atraumatic.      Mouth/Throat:      Mouth: Mucous membranes are moist.   Eyes:      Extraocular Movements: Extraocular movements intact.      Pupils: Pupils are equal, round, and reactive to light.   Cardiovascular:      Rate and Rhythm: Normal rate and regular rhythm.      Pulses: Normal pulses.      Heart sounds: Normal heart sounds.   Pulmonary:      Effort: Pulmonary effort is normal.      Breath sounds: Normal breath sounds.   Abdominal:      General: Bowel sounds are normal. There is no distension.      Palpations: Abdomen is soft.      Tenderness: There is abdominal tenderness.   Musculoskeletal:         General: No swelling. Normal range of motion.      Cervical back: Normal range of motion and neck supple.   Skin:     General: Skin is warm.      Coloration: Skin is not jaundiced.   Neurological:      General: No focal deficit present.      Mental Status: She is alert and oriented to person, place, and time. Mental status is at baseline.      Cranial Nerves: No cranial nerve deficit.   Psychiatric:         Mood and Affect: Mood normal.         Behavior: Behavior normal.         Thought Content: Thought content normal.         Judgment: Judgment normal.         Fluids    Intake/Output Summary (Last 24 hours) at 7/19/2023 1407  Last data filed at 7/18/2023 1540  Gross per 24 hour   Intake 1000 ml   Output --   Net 1000  "ml       Laboratory  Recent Labs     07/18/23  1228 07/19/23  0028   WBC 17.3* 16.2*   RBC 3.84* 3.44*   HEMOGLOBIN 12.3 11.0*   HEMATOCRIT 38.4 34.9*   .0* 101.5*   MCH 32.0 32.0   MCHC 32.0* 31.5*   RDW 51.7* 53.4*   PLATELETCT 241 204   MPV 9.1 9.0     Recent Labs     07/18/23  1228 07/19/23  0028   SODIUM 139 140   POTASSIUM 4.6 4.7   CHLORIDE 105 107   CO2 25 24   GLUCOSE 172* 159*   BUN 22 17   CREATININE 0.99 0.84   CALCIUM 9.1 9.1                   Imaging  CT-CHEST,ABDOMEN,PELVIS WITH   Final Result      1.  Bladder wall thickening consistent with cystitis.   2.  Normal appendix.   3.  No bowel obstruction or perforation   4.  Biliary dilation, likely chronic.  Prior cholecystectomy.   5.  Mild bilateral dependent atelectasis.   6.  Severe degenerative change of both shoulders with RIGHT shoulder joint effusion and joint bodies.         DX-CHEST-PORTABLE (1 VIEW)   Final Result      1.  Hypoinflation with bibasilar atelectasis.  Developing LEFT lung base pneumonia is difficult to exclude.   2.  Mildly prominent interstitium, likely chronic   3.  No pneumothorax.   4.  LEFT chest pacemaker present.           Assessment/Plan  * Sepsis (HCC)- (present on admission)  Assessment & Plan  This is Sepsis Present on admission  SIRS criteria identified on my evaluation include: Leukocytosis, with WBC greater than 12,000  Source is urine with mild acute renal insufficiency   Sepsis protocol initiated  Fluid resuscitation ordered per protocol  Crystalloid Fluid Administration: Fluid resuscitation ordered per standard protocol - 30 mL/kg per current or ideal body weight  IV antibiotics as appropriate for source of sepsis  Reassessment: I have reassessed the patient's hemodynamic status  Cultures pending  Iv ceftriaxone    AP (abdominal pain)- (present on admission)  Assessment & Plan  Described as \"gerd\", h/o same  Resolved, no pain on exam  Denies chest pain  No acute ekg changes, trop negative, will repeat " trop- consider further cardiac workup if sx recur  Continue omeprazole  following    Acute cystitis  Assessment & Plan  History of Klebsiella acute cystitis  Complicated by sepsis  Continue IV antibiotics  Follow cultures    Dehydration- (present on admission)  Assessment & Plan  Will hold bumex, re eval in am  Following  Likely related to uti    Acute respiratory failure with hypoxia (HCC)- (present on admission)  Assessment & Plan  Possibly due to atelectasis  Improving  IS recommended    Leukocytosis- (present on admission)  Assessment & Plan  Secondary to sepsis  IV antibiotics  Trending down  Labs on a.m.    PAF (paroxysmal atrial fibrillation) (HCC)- (present on admission)  Assessment & Plan  H/o, continue coreg and eliquis  Currently in sr  following    Type 2 diabetes mellitus without complication, without long-term current use of insulin (HCC)- (present on admission)  Assessment & Plan  Diabetic diet  following    Foraminal stenosis of lumbar region- (present on admission)  Assessment & Plan  Chronic with chronic pain, at baseline  Supportive care    Cardiac pacemaker in situ- (present on admission)  Assessment & Plan  noted         VTE prophylaxis: therapeutic anticoagulation with Eliquis    I have performed a physical exam and reviewed and updated ROS and Plan today (7/19/2023). In review of yesterday's note (7/18/2023), there are no changes except as documented above.

## 2023-07-19 NOTE — DISCHARGE PLANNING
Case Management Discharge Planning    Admission Date: 7/18/2023  GMLOS: 5  ALOS: 1    6-Clicks ADL Score: 16  6-Clicks Mobility Score: 11  PT and/or OT Eval ordered: Yes  Post-acute Referrals Ordered: No  Post-acute Choice Obtained: No  Has referral(s) been sent to post-acute provider:  No      Anticipated Discharge Dispo: Discharge Disposition: Discharged to home/self care (01)  Discharge Address: [address] [place] [address] [address] [place] [address]    DME Needed: No    Action(s) Taken: DC Assessment Complete (See below)    LSW met with pt at bedside to complete DC assessment. Pt A&Ox4 and able to verify the information on the face sheet. Pt lives with her daughter and son-in-law in a two-story house that has two steps to enter. Prior to this hospitalization pt needed help at home with some ADLs and most IADLs such as cooking and cleaning. Pt states that her daughter and son-in-law help her at home. Pt states she also has life alert for emergencies. Pt uses a walker at baseline. Pt states that she has had HH in the past when she lived in [place] and she currently goes to out-patient PT at a [facility]. Pt reported that her family is a good support for her. Pt denies any SA or MH concerns. Pt does have a POLST. Pt family will transport pt home.     Escalations Completed: None    Medically Clear: No    Next Steps: LSW will continue to assist with discharge as needed.      Barriers to Discharge: Medical clearance    Is the patient up for discharge tomorrow: No      Care Transition Team Assessment    Information Source  Orientation Level: Oriented X4  Information Given By: Patient  Who is responsible for making decisions for patient? : Patient    Readmission Evaluation  Is this a readmission?: No    Elopement Risk  Legal Hold: No  Ambulatory or Self Mobile in Wheelchair: No-Not an Elopement Risk    Interdisciplinary Discharge Planning  Lives with - Patient's Self Care Capacity: Adult Children  Support Systems: Children  Housing  / Facility: 2 Story House    Discharge Preparedness  What is your plan after discharge?: Home with help  What are your discharge supports?: Child, Other (comment) (Son-in-law)  Prior Functional Level: Ambulatory, Needs Assist with Activities of Daily Living, Needs Assist with Medication Management, Uses Walker  Difficulity with ADLs: Walking  Difficulty with ADLs Comment: Uses Walker  Difficulity with IADLs: Laundry, Shopping, Cooking  Difficulity with IADL Comments: Recieves help at home    Functional Assesment  Prior Functional Level: Ambulatory, Needs Assist with Activities of Daily Living, Needs Assist with Medication Management, Uses Walker    Finances  Financial Barriers to Discharge: No  Prescription Coverage: Yes    Vision / Hearing Impairment  Vision Impairment : Yes  Right Eye Vision: Impaired  Left Eye Vision: Impaired  Hearing Impairment : No      Advance Directive  Advance Directive?: POLST    Domestic Abuse  Have you ever been the victim of abuse or violence?: No  Physical Abuse or Sexual Abuse: No  Verbal Abuse or Emotional Abuse: No  Possible Abuse/Neglect Reported to:: Not Applicable    Psychological Assessment  History of Substance Abuse: None  History of Psychiatric Problems: No  Non-compliant with Treatment: No  Newly Diagnosed Illness: No    Discharge Risks or Barriers  Discharge risks or barriers?: No    Anticipated Discharge Information  Discharge Disposition: Discharged to home/self care (01)  Discharge Address: [address] [place] [address] [address] [address]

## 2023-07-19 NOTE — PROGRESS NOTES
4 Eyes Skin Assessment Completed by NAMAN Arizmendi and Ritika FLORENCE.    Head WDL  Ears WDL  Nose WDL  Mouth WDL  Neck WDL  Breast/Chest WDL  Shoulder Blades WDL  Spine WDL  (R) Arm/Elbow/Hand WDL  (L) Arm/Elbow/Hand WDL  Abdomen WDL  Groin WDL  Scrotum/Coccyx/Buttocks Redness , Blanching  (R) Leg Edema  (L) Leg Edema  (R) Heel/Foot/Toe WDL  (L) Heel/Foot/Toe WDL          Devices In Places N/A        Interventions In Place N/A    Possible Skin Injury No    Pictures Uploaded Into Epic N/A  Wound Consult Placed N/A  RN Wound Prevention Protocol Ordered No

## 2023-07-19 NOTE — CARE PLAN
The patient is Stable - Low risk of patient condition declining or worsening    Shift Goals  Clinical Goals: PT/OT eval in the morning  Patient Goals: getting d/c    Progress made toward(s) clinical / shift goals:  Patient denied significant dysuria and pain after 12 hours. Pt slept through most of the night. Pending PT/OT eval in the morning     Patient is not progressing towards the following goals:

## 2023-07-19 NOTE — ASSESSMENT & PLAN NOTE
History of Klebsiella acute cystitis  Complicated by sepsis on admission  Completed antibiotics  Resolved

## 2023-07-20 LAB
ALBUMIN SERPL BCP-MCNC: 3 G/DL (ref 3.2–4.9)
ALBUMIN/GLOB SERPL: 1 G/DL
ALP SERPL-CCNC: 69 U/L (ref 30–99)
ALT SERPL-CCNC: 12 U/L (ref 2–50)
ANION GAP SERPL CALC-SCNC: 7 MMOL/L (ref 7–16)
AST SERPL-CCNC: 14 U/L (ref 12–45)
BACTERIA UR CULT: NORMAL
BASOPHILS # BLD AUTO: 0.3 % (ref 0–1.8)
BASOPHILS # BLD: 0.02 K/UL (ref 0–0.12)
BILIRUB SERPL-MCNC: 0.4 MG/DL (ref 0.1–1.5)
BUN SERPL-MCNC: 18 MG/DL (ref 8–22)
CALCIUM ALBUM COR SERPL-MCNC: 9.9 MG/DL (ref 8.5–10.5)
CALCIUM SERPL-MCNC: 9.1 MG/DL (ref 8.5–10.5)
CHLORIDE SERPL-SCNC: 103 MMOL/L (ref 96–112)
CO2 SERPL-SCNC: 29 MMOL/L (ref 20–33)
CREAT SERPL-MCNC: 1.21 MG/DL (ref 0.5–1.4)
EOSINOPHIL # BLD AUTO: 0.23 K/UL (ref 0–0.51)
EOSINOPHIL NFR BLD: 3.2 % (ref 0–6.9)
ERYTHROCYTE [DISTWIDTH] IN BLOOD BY AUTOMATED COUNT: 49.6 FL (ref 35.9–50)
EST. AVERAGE GLUCOSE BLD GHB EST-MCNC: 177 MG/DL
GFR SERPLBLD CREATININE-BSD FMLA CKD-EPI: 43 ML/MIN/1.73 M 2
GLOBULIN SER CALC-MCNC: 3.1 G/DL (ref 1.9–3.5)
GLUCOSE SERPL-MCNC: 170 MG/DL (ref 65–99)
HBA1C MFR BLD: 7.8 % (ref 4–5.6)
HCT VFR BLD AUTO: 32.6 % (ref 37–47)
HGB BLD-MCNC: 10.7 G/DL (ref 12–16)
IMM GRANULOCYTES # BLD AUTO: 0.03 K/UL (ref 0–0.11)
IMM GRANULOCYTES NFR BLD AUTO: 0.4 % (ref 0–0.9)
LYMPHOCYTES # BLD AUTO: 2.02 K/UL (ref 1–4.8)
LYMPHOCYTES NFR BLD: 28.1 % (ref 22–41)
MAGNESIUM SERPL-MCNC: 2 MG/DL (ref 1.5–2.5)
MCH RBC QN AUTO: 32.2 PG (ref 27–33)
MCHC RBC AUTO-ENTMCNC: 32.8 G/DL (ref 32.2–35.5)
MCV RBC AUTO: 98.2 FL (ref 81.4–97.8)
MONOCYTES # BLD AUTO: 0.49 K/UL (ref 0–0.85)
MONOCYTES NFR BLD AUTO: 6.8 % (ref 0–13.4)
NEUTROPHILS # BLD AUTO: 4.4 K/UL (ref 1.82–7.42)
NEUTROPHILS NFR BLD: 61.2 % (ref 44–72)
NRBC # BLD AUTO: 0 K/UL
NRBC BLD-RTO: 0 /100 WBC (ref 0–0.2)
PLATELET # BLD AUTO: 209 K/UL (ref 164–446)
PMV BLD AUTO: 9.2 FL (ref 9–12.9)
POTASSIUM SERPL-SCNC: 4.3 MMOL/L (ref 3.6–5.5)
PROT SERPL-MCNC: 6.1 G/DL (ref 6–8.2)
RBC # BLD AUTO: 3.32 M/UL (ref 4.2–5.4)
SIGNIFICANT IND 70042: NORMAL
SITE SITE: NORMAL
SODIUM SERPL-SCNC: 139 MMOL/L (ref 135–145)
SOURCE SOURCE: NORMAL
WBC # BLD AUTO: 7.2 K/UL (ref 4.8–10.8)

## 2023-07-20 PROCEDURE — 99232 SBSQ HOSP IP/OBS MODERATE 35: CPT | Performed by: STUDENT IN AN ORGANIZED HEALTH CARE EDUCATION/TRAINING PROGRAM

## 2023-07-20 PROCEDURE — 97163 PT EVAL HIGH COMPLEX 45 MIN: CPT

## 2023-07-20 PROCEDURE — A9270 NON-COVERED ITEM OR SERVICE: HCPCS | Performed by: HOSPITALIST

## 2023-07-20 PROCEDURE — 83735 ASSAY OF MAGNESIUM: CPT

## 2023-07-20 PROCEDURE — 36415 COLL VENOUS BLD VENIPUNCTURE: CPT

## 2023-07-20 PROCEDURE — A9270 NON-COVERED ITEM OR SERVICE: HCPCS | Performed by: STUDENT IN AN ORGANIZED HEALTH CARE EDUCATION/TRAINING PROGRAM

## 2023-07-20 PROCEDURE — 97166 OT EVAL MOD COMPLEX 45 MIN: CPT

## 2023-07-20 PROCEDURE — 700102 HCHG RX REV CODE 250 W/ 637 OVERRIDE(OP): Performed by: HOSPITALIST

## 2023-07-20 PROCEDURE — 97535 SELF CARE MNGMENT TRAINING: CPT

## 2023-07-20 PROCEDURE — 700102 HCHG RX REV CODE 250 W/ 637 OVERRIDE(OP): Performed by: STUDENT IN AN ORGANIZED HEALTH CARE EDUCATION/TRAINING PROGRAM

## 2023-07-20 PROCEDURE — 700105 HCHG RX REV CODE 258: Mod: JZ | Performed by: STUDENT IN AN ORGANIZED HEALTH CARE EDUCATION/TRAINING PROGRAM

## 2023-07-20 PROCEDURE — 770006 HCHG ROOM/CARE - MED/SURG/GYN SEMI*

## 2023-07-20 PROCEDURE — 80053 COMPREHEN METABOLIC PANEL: CPT

## 2023-07-20 PROCEDURE — 83036 HEMOGLOBIN GLYCOSYLATED A1C: CPT

## 2023-07-20 PROCEDURE — 85025 COMPLETE CBC W/AUTO DIFF WBC: CPT

## 2023-07-20 RX ORDER — SODIUM CHLORIDE, SODIUM LACTATE, POTASSIUM CHLORIDE, AND CALCIUM CHLORIDE .6; .31; .03; .02 G/100ML; G/100ML; G/100ML; G/100ML
500 INJECTION, SOLUTION INTRAVENOUS ONCE
Status: COMPLETED | OUTPATIENT
Start: 2023-07-20 | End: 2023-07-20

## 2023-07-20 RX ORDER — SULFAMETHOXAZOLE AND TRIMETHOPRIM 400; 80 MG/1; MG/1
1 TABLET ORAL EVERY 12 HOURS
Status: COMPLETED | OUTPATIENT
Start: 2023-07-20 | End: 2023-07-21

## 2023-07-20 RX ADMIN — CARVEDILOL 3.12 MG: 3.12 TABLET, FILM COATED ORAL at 09:32

## 2023-07-20 RX ADMIN — ACETAMINOPHEN 1000 MG: 500 TABLET, FILM COATED ORAL at 21:24

## 2023-07-20 RX ADMIN — ATORVASTATIN CALCIUM 40 MG: 40 TABLET, FILM COATED ORAL at 17:55

## 2023-07-20 RX ADMIN — APIXABAN 5 MG: 5 TABLET, FILM COATED ORAL at 05:36

## 2023-07-20 RX ADMIN — SENNOSIDES AND DOCUSATE SODIUM 2 TABLET: 50; 8.6 TABLET ORAL at 17:55

## 2023-07-20 RX ADMIN — ACETAMINOPHEN 1000 MG: 500 TABLET, FILM COATED ORAL at 09:32

## 2023-07-20 RX ADMIN — OMEPRAZOLE 20 MG: 20 CAPSULE, DELAYED RELEASE ORAL at 05:36

## 2023-07-20 RX ADMIN — ACETAMINOPHEN 1000 MG: 500 TABLET, FILM COATED ORAL at 15:34

## 2023-07-20 RX ADMIN — CARVEDILOL 3.12 MG: 3.12 TABLET, FILM COATED ORAL at 17:54

## 2023-07-20 RX ADMIN — LOSARTAN POTASSIUM 100 MG: 50 TABLET, FILM COATED ORAL at 05:36

## 2023-07-20 RX ADMIN — APIXABAN 5 MG: 5 TABLET, FILM COATED ORAL at 17:55

## 2023-07-20 RX ADMIN — SENNOSIDES AND DOCUSATE SODIUM 2 TABLET: 50; 8.6 TABLET ORAL at 05:36

## 2023-07-20 RX ADMIN — SODIUM CHLORIDE, POTASSIUM CHLORIDE, SODIUM LACTATE AND CALCIUM CHLORIDE: 600; 310; 30; 20 INJECTION, SOLUTION INTRAVENOUS at 14:07

## 2023-07-20 RX ADMIN — SULFAMETHOXAZOLE AND TRIMETHOPRIM 1 TABLET: 400; 80 TABLET ORAL at 17:55

## 2023-07-20 ASSESSMENT — ENCOUNTER SYMPTOMS
SORE THROAT: 1
COUGH: 1
GASTROINTESTINAL NEGATIVE: 1
EYES NEGATIVE: 1
SHORTNESS OF BREATH: 1
CARDIOVASCULAR NEGATIVE: 1
WEAKNESS: 1
MUSCULOSKELETAL NEGATIVE: 1
PSYCHIATRIC NEGATIVE: 1

## 2023-07-20 ASSESSMENT — COGNITIVE AND FUNCTIONAL STATUS - GENERAL
DRESSING REGULAR UPPER BODY CLOTHING: A LITTLE
WALKING IN HOSPITAL ROOM: A LITTLE
HELP NEEDED FOR BATHING: A LOT
PERSONAL GROOMING: A LITTLE
CLIMB 3 TO 5 STEPS WITH RAILING: A LITTLE
MOBILITY SCORE: 15
MOVING FROM LYING ON BACK TO SITTING ON SIDE OF FLAT BED: UNABLE
DAILY ACTIVITIY SCORE: 17
TOILETING: A LITTLE
DRESSING REGULAR LOWER BODY CLOTHING: A LOT
SUGGESTED CMS G CODE MODIFIER MOBILITY: CK
STANDING UP FROM CHAIR USING ARMS: A LITTLE
MOVING TO AND FROM BED TO CHAIR: UNABLE
SUGGESTED CMS G CODE MODIFIER DAILY ACTIVITY: CK

## 2023-07-20 ASSESSMENT — PAIN DESCRIPTION - PAIN TYPE
TYPE: ACUTE PAIN
TYPE: ACUTE PAIN

## 2023-07-20 ASSESSMENT — GAIT ASSESSMENTS
GAIT LEVEL OF ASSIST: STANDBY ASSIST
ASSISTIVE DEVICE: 4 WHEEL WALKER
DISTANCE (FEET): 10

## 2023-07-20 ASSESSMENT — ACTIVITIES OF DAILY LIVING (ADL): TOILETING: INDEPENDENT

## 2023-07-20 NOTE — FACE TO FACE
Face to Face Supporting Documentation - Home Health    The encounter with this patient was in whole or in part the primary reason for home health admission.    Date of encounter:   Patient:                    MRN:                       YOB: 2023  Krystle Tavarez  [id number]  7/26/1933     Home health to see patient for:  Skilled Nursing care for assessment, interventions & education, Physical Therapy evaluation and treatment, and Occupational therapy evaluation and treatment    Skilled need for:  New Onset Medical Diagnosis acute cystitis    Skilled nursing interventions to include:  Comment: PT/OT    Homebound status evidenced by:  Needs the assistance of another person in order to leave the home. Leaving home requires a considerable and taxing effort. There is a normal inability to leave the home.    Community Physician to provide follow up care: JAYLEN Castelan     Optional Interventions? No      I certify the face to face encounter for this home health care referral meets the CMS requirements and the encounter/clinical assessment with the patient was, in whole, or in part, for the medical condition(s) listed above, which is the primary reason for home health care. Based on my clinical findings: the service(s) are medically necessary, support the need for home health care, and the homebound criteria are met.  I certify that this patient has had a face to face encounter by myself.  Mohan Lou M.D. - NPI: [id number]

## 2023-07-20 NOTE — THERAPY
Physical Therapy   Initial Evaluation     Patient Name: Krystle Tavarez  Age:  89 y.o., Sex:  female  Medical Record #: [id number]  Today's Date: 7/20/2023     Precautions  Precautions: Fall Risk  Comments: hypotensive    Assessment  Pt presents with impaired activity tolerance, dynamic balance, gait and endurance associated with chief complaint of inability to get out of bed, found to have UTI resulting in sepsis in setting of progressing debility. Pt is most limited by hypotension on toilet, symptomatic with heavy arms, 80/43 requiring push back to bed, good return of arousal in supine 120/48. Discussed behavioral interventions to reduce likelihood of syncopal/hypotensive related falls with pt/son in law including PO intake of protein/water and high head of bed today; do not anticipate pt is far from her functional baseline given chart review, however will need vitals improved to tolerate mobility to return home, therefore will need one to two more acute PT sessions prior to dc from hospital Will follow.     Plan    Physical Therapy Initial Treatment Plan   Treatment Plan : Bed Mobility, Equipment, Manual Therapy, Gait Training, Neuro Re-Education / Balance, Self Care / Home Evaluation, Stair Training, Therapeutic Activities, Therapeutic Exercise  Treatment Frequency: 4 Times per Week  Duration: Until Therapy Goals Met    DC Equipment Recommendations: None  Discharge Recommendations: Recommend home health for continued physical therapy services (one to two more acute PT sessions prior to returning home)        Abridged Subjective/Objective     07/20/23 1025   Prior Living Situation   Prior Services Home With Outpatient Therapy  (outpatient PT)   Housing / Facility 2 Story House   Steps Into Home 2   Steps In Home   (grab bars)   Bathroom Set up Walk In Shower;Shower Chair;Grab Bars   Equipment Owned Front-Wheel Walker   Lives with - Patient's Self Care Capacity Adult Children   Comments son in law present and can be  available 24/7; son in law can assist up/down entry stairs   Prior Level of Functional Mobility   Bed Mobility Independent   Transfer Status Independent   Ambulation Independent   Ambulation Distance household distances   Assistive Devices Used Front-Wheel Walker  ('4ww' but does not have a seat/brakes)   Cognition    Cognition / Consciousness X   Level of Consciousness Alert   Safety Awareness Impaired   New Learning Impaired   Initiation Impaired   Comments pleasant and cooperative; son in law present as well, discussed BP monitoring and protein/water in diet to reduce low BP   Passive ROM Lower Body   Passive ROM Lower Body WDL   Strength Lower Body   Lower Body Strength  X   Gross Strength Generalized Weakness, Equal Bilaterally   Sensation Lower Body   Lower Extremity Sensation   WDL   Comments denies t/n   Balance Assessment   Sitting Balance (Static) Fair   Sitting Balance (Dynamic) Fair   Standing Balance (Static) Fair -   Standing Balance (Dynamic) Poor +   Weight Shift Sitting Fair   Weight Shift Standing Fair   Comments B UE support in sitting/standing; no loss of balance but required physical assist for transfer <>4ww   Bed Mobility    Supine to Sit Standby Assist   Sit to Supine Minimal Assist  (for LE management)   Gait Analysis   Gait Level Of Assist Standby Assist   Assistive Device 4 Wheel Walker   Distance (Feet) 10   # of Times Distance was Traveled 1   Weight Bearing Status wbat   Vision Deficits Impacting Mobility no reports   Comments distance limited by hypotension needing push back to bed   Functional Mobility   Sit to Stand Standby Assist  (from bed height; needing min A from toilet and back to bed)   Bed, Chair, Wheelchair Transfer Standby Assist  (with toilet)   Patient / Family Goals    Patient / Family Goal #1 to improve activity tolerance   Short Term Goals    Short Term Goal # 1 Pt will perform supine<>sit from flat HOB/no railing with supervision within 6 visits to ensure independent  mobility at home.   Short Term Goal # 2 Pt will perform sit<>stand with FWW and supervision within 6 visits to ensure independent mobility at home.   Short Term Goal # 3 Pt will ambulate x 150ft wiht FWW and supervision within 6 vistis to ensure independent mobility at home.   Short Term Goal # 4 Pt will ascend/descend 2 steps with bilateral UE support and min A within 6 visits to ensure entry of home.   Education Group   Role of Physical Therapist Patient Response Patient;Acceptance;Explanation;Demonstration;Verbal Demonstration;Action Demonstration   Use of Assistive Device Patient Response Patient;Acceptance;Explanation;Demonstration;Verbal Demonstration;Reinforcement Needed;Action Demonstration   Additional Comments proteint/water and upright sitting today

## 2023-07-20 NOTE — THERAPY
"Occupational Therapy   Initial Evaluation     Patient Name: Krystle Tavarez  Age:  89 y.o., Sex:  female  Medical Record #: [id number]  Today's Date: 7/20/2023    Precautions: Fall Risk  Comments: hypotensive durig session    Assessment  Patient is 89 y.o. female admitted for increased weakness and dysuria. PMHx: DM, GERD, HTN, TIA, PPM, chronic back pain and chronic UTI. Pt reports being independent w/ADL's and has assist for IADL's from family.   This admission pt is dx w/sepsis, acute cystitis, dehydration, and acute respiratory failure w/hypoxia. Today pt started session well able to get EOB w/SBA, and imitated LB dressing as well as ambulation to use bathroom. Pt had severe urinary incontinence, and upon reaching the toilet pt had c/o heaviness and fatigue' vitals checked pt's was low 84/50's, facilitated max A txf to 4ww seated and safely returned to EOB. PT present to assist w/max A txf for safety. BP checked in sitting again 80/40. RN aware BTB recovered to 110/60. Functional participation limited by symptomatic hypotension, anticipate w/stabilizing BP pt can return home w/HH will continue to assess.      Plan  Occupational Therapy Initial Treatment Plan   Treatment Interventions: Self Care / Activities of Daily Living, Community Re-Integration, Manual Therapy Techniques, Neuro Re-Education / Balance, Therapeutic Exercises, Therapeutic Activity  Treatment Frequency: 4 Times per Week  Duration: Until Therapy Goals Met    DC Equipment Recommendations: Unable to determine at this time  Discharge Recommendations: Recommend home health for continued occupational therapy services (once BP stabilizes)     Subjective  \"So can I go home\"      Objective     07/20/23 1021   Charge Group   OT Evaluation OT Evaluation Mod   OT Self Care / ADL (Units) 1   Total Time Spent   OT Time Spent Yes   OT Self Care / ADL (Minutes) 15   OT Evaluation (Minutes) 19   OT Total Time Spent (Calculated) 34   Initial Contact Note    Initial " Contact Note Order Received and Verified, Occupational Therapy Evaluation in Progress with Full Report to Follow.   Prior Living Situation   Prior Services None   Housing / Facility 2 Story House   Steps Into Home 2   Steps In Home   (stays on 1st level)   Bathroom Set up Walk In Shower;Shower Chair;Grab Bars   Equipment Owned Front-Wheel Walker   Lives with - Patient's Self Care Capacity Adult Children   Comments Pts Son-in law present and reports assisting w/stairs and IADL's   Prior Level of ADL Function   Self Feeding Independent   Grooming / Hygiene Independent   Bathing Independent   Dressing Independent   Toileting Independent   Prior Level of IADL Function   Medication Management Requires Assist   Laundry Requires Assist   Kitchen Mobility Requires Assist   Finances Requires Assist   Home Management Requires Assist   Shopping Requires Assist   Prior Level Of Mobility Supervision With Device in Community   History of Falls   History of Falls Yes   Precautions   Precautions Fall Risk   Comments hypotensive durig session   Vitals   Patient BP Position Sitting   Blood Pressure  (!) 80/43   Pain 0 - 10 Group   Location Back   Location Orientation Lower   Therapist Pain Assessment During Activity;Nurse Notified  (not rated)   Cognition    Cognition / Consciousness X   Level of Consciousness Alert   Comments AOx4 some decreased safety awareness appeared to be related to low BP   Passive ROM Upper Body   Passive ROM Upper Body WDL   Active ROM Upper Body   Active ROM Upper Body  WDL   Strength Upper Body   Upper Body Strength  X   Gross Strength Generalized Weakness, Equal Bilaterally.    Coordination Upper Body   Coordination WDL   Balance Assessment   Sitting Balance (Static) Fair   Sitting Balance (Dynamic) Fair   Standing Balance (Static) Fair -   Standing Balance (Dynamic) Poor +   Weight Shift Sitting Fair   Weight Shift Standing Poor   Comments w/4ww   Bed Mobility    Supine to Sit Standby Assist   Sit to  Supine Moderate Assist   Comments increased assist d/t low BP   ADL Assessment   Grooming Contact Guard Assist;Seated   Upper Body Dressing Minimal Assist   Lower Body Dressing Minimal Assist   Toileting Moderate Assist   Comments donned 1 sock w/o assist needed assist for LLE as well as for threading underwear   How much help from another person does the patient currently need...   6 Clicks Daily Activity Score 17   Functional Mobility   Sit to Stand Contact Guard Assist   Bed, Chair, Wheelchair Transfer Contact Guard Assist   Toilet Transfers Maximal Assist   Mobility walking in room to toilet low BP required increased assist   Comments limited by onset of hypotension w/activity   Activity Tolerance   Comments limited by symtomatic low BP   Patient / Family Goals   Patient / Family Goal #1 to go home   Short Term Goals   Short Term Goal # 1 pt will complete toilet txf w/SBA   Short Term Goal # 2 pt will complete FB dressing w/SBA   Short Term Goal # 3 pt will complete grooming standing at sink w/spv   Education Group   Role of Occupational Therapist Patient Response Patient;Acceptance;Explanation;Demonstration   Occupational Therapy Initial Treatment Plan    Treatment Interventions Self Care / Activities of Daily Living;Community Re-Integration;Manual Therapy Techniques;Neuro Re-Education / Balance;Therapeutic Exercises;Therapeutic Activity   Treatment Frequency 4 Times per Week   Duration Until Therapy Goals Met   Problem List   Problem List Decreased Active Daily Living Skills;Decreased Functional Mobility;Decreased Activity Tolerance;Safety Awareness Deficits / Cognition;Impaired Postural Control / Balance   Anticipated Discharge Equipment and Recommendations   DC Equipment Recommendations Unable to determine at this time   Discharge Recommendations Recommend home health for continued occupational therapy services  (once BP stabilizes)   Interdisciplinary Plan of Care Collaboration   IDT Collaboration with   Nursing;Physical Therapist   Patient Position at End of Therapy In Bed;Tray Table within Reach;Call Light within Reach;Phone within Reach;Bed Alarm On   Collaboration Comments RN aware of OT eval and pts efforts   Session Information   Date / Session Number  7/20 #1 (1/4, 7/26)

## 2023-07-20 NOTE — PROGRESS NOTES
Communication with attending Dr Grissom, notified of pt having orthostatic blood pressure, pt with dizziness and weakness with standing. Notified of bp, map, pulse and discussed pt adl in room. States will review and adjust orders as needed. New orders for bolus.

## 2023-07-20 NOTE — PROGRESS NOTES
"[facility] Medicine Daily Progress Note    Date of Service  7/20/2023    Chief Complaint  Krystle Tavarez is a 89 y.o. female admitted 7/18/2023 with     Hospital Course  89 year old female who presented to [facility] on 7/18/2023 with weakness and dysuria. Patient has a history of dm, gerd, htn, tia, pacer, chronic back pain and recurrent uti. She reports that she has failed multiple recent outpatient antibiotics for uti's and was seen by urology in their clinic yesterday. They prescribed her macrobid. She lives with her daughter and reports that when she woke up today she was too weak to ambulate on her own, she cried out for her daughter, who then brought her to the ER where she was found to have evidence of a uti, wbc of 17.3 and global weakness. She also reported \"severe gerd\" this am that resolved on it's own, she has a history of gerd. She denies flank pain or hematuria but does have ongoing dysuria and chronic lower back pain (at baseline), no fevers or chills, no focal weakness, no chest pain, no dizziness. She is dehydrated on exam. ROS otherwise negative.     Interval Problem Update  Patient was evaluated bedside  Patient no acute distress this morning and reports improvement with dysuria  On 1L nasal cannula  No leukocytosis today  7/18 blood cultures with no growth to date  7/18 urine cultures with no growth to date  Continue IV Rocephin  PT/OT recommending home health  Face-to-face and order placed  Labs on a.m.    I have discussed this patient's plan of care and discharge plan at IDT rounds today with Case Management, Nursing, Nursing leadership, and other members of the IDT team.    Consultants/Specialty  None    Code Status  DNAR, I OK    Disposition  Not medically cleared  Disposition to be determined    I have placed the appropriate orders for post-discharge needs.    Review of Systems  Review of Systems   Constitutional:  Positive for malaise/fatigue.   HENT:  Positive for sore throat.    Eyes: " Negative.    Respiratory:  Positive for cough and shortness of breath.    Cardiovascular: Negative.    Gastrointestinal: Negative.    Genitourinary:  Positive for dysuria.   Musculoskeletal: Negative.    Skin: Negative.    Neurological:  Positive for weakness.   Endo/Heme/Allergies: Negative.    Psychiatric/Behavioral: Negative.          Physical Exam  Temp:  [36.9 °C (98.5 °F)-37.6 °C (99.7 °F)] 37.1 °C (98.8 °F)  Pulse:  [73-80] 73  Resp:  [17-20] 20  BP: ()/(43-76) 80/43  SpO2:  [92 %-97 %] 93 %    Physical Exam  Constitutional:       Appearance: Normal appearance. She is ill-appearing.   HENT:      Head: Normocephalic and atraumatic.      Mouth/Throat:      Mouth: Mucous membranes are moist.   Eyes:      Extraocular Movements: Extraocular movements intact.      Pupils: Pupils are equal, round, and reactive to light.   Cardiovascular:      Rate and Rhythm: Normal rate and regular rhythm.      Pulses: Normal pulses.      Heart sounds: Normal heart sounds.   Pulmonary:      Effort: Pulmonary effort is normal.      Breath sounds: Normal breath sounds.   Abdominal:      General: Bowel sounds are normal. There is no distension.      Palpations: Abdomen is soft.      Tenderness: There is abdominal tenderness.   Musculoskeletal:         General: No swelling. Normal range of motion.      Cervical back: Normal range of motion and neck supple.   Skin:     General: Skin is warm.      Coloration: Skin is not jaundiced.   Neurological:      General: No focal deficit present.      Mental Status: She is alert and oriented to person, place, and time. Mental status is at baseline.      Cranial Nerves: No cranial nerve deficit.   Psychiatric:         Mood and Affect: Mood normal.         Behavior: Behavior normal.         Thought Content: Thought content normal.         Judgment: Judgment normal.         Fluids    Intake/Output Summary (Last 24 hours) at 7/20/2023 1314  Last data filed at 7/19/2023 1400  Gross per 24 hour    Intake 240 ml   Output --   Net 240 ml       Laboratory  Recent Labs     07/18/23  1228 07/19/23  0028 07/20/23  0306   WBC 17.3* 16.2* 7.2   RBC 3.84* 3.44* 3.32*   HEMOGLOBIN 12.3 11.0* 10.7*   HEMATOCRIT 38.4 34.9* 32.6*   .0* 101.5* 98.2*   MCH 32.0 32.0 32.2   MCHC 32.0* 31.5* 32.8   RDW 51.7* 53.4* 49.6   PLATELETCT 241 204 209   MPV 9.1 9.0 9.2     Recent Labs     07/18/23  1228 07/19/23  0028 07/20/23  0306   SODIUM 139 140 139   POTASSIUM 4.6 4.7 4.3   CHLORIDE 105 107 103   CO2 25 24 29   GLUCOSE 172* 159* 170*   BUN 22 17 18   CREATININE 0.99 0.84 1.21   CALCIUM 9.1 9.1 9.1                   Imaging  CT-CHEST,ABDOMEN,PELVIS WITH   Final Result      1.  Bladder wall thickening consistent with cystitis.   2.  Normal appendix.   3.  No bowel obstruction or perforation   4.  Biliary dilation, likely chronic.  Prior cholecystectomy.   5.  Mild bilateral dependent atelectasis.   6.  Severe degenerative change of both shoulders with RIGHT shoulder joint effusion and joint bodies.         DX-CHEST-PORTABLE (1 VIEW)   Final Result      1.  Hypoinflation with bibasilar atelectasis.  Developing LEFT lung base pneumonia is difficult to exclude.   2.  Mildly prominent interstitium, likely chronic   3.  No pneumothorax.   4.  LEFT chest pacemaker present.           Assessment/Plan  * Sepsis (HCC)- (present on admission)  Assessment & Plan  This is Sepsis Present on admission  SIRS criteria identified on my evaluation include: Leukocytosis, with WBC greater than 12,000  Source is urine with mild acute renal insufficiency   Sepsis protocol initiated  Fluid resuscitation ordered per protocol  Crystalloid Fluid Administration: Fluid resuscitation ordered per standard protocol - 30 mL/kg per current or ideal body weight  IV antibiotics as appropriate for source of sepsis  Reassessment: I have reassessed the patient's hemodynamic status  Cultures pending  Iv ceftriaxone    AP (abdominal pain)- (present on  "admission)  Assessment & Plan  Described as \"gerd\", h/o same  Resolved, no pain on exam  Denies chest pain  No acute ekg changes, trop negative, will repeat trop- consider further cardiac workup if sx recur  Continue omeprazole  following    Acute cystitis  Assessment & Plan  History of Klebsiella acute cystitis  Complicated by sepsis  Continue IV antibiotics  Follow cultures    Dehydration- (present on admission)  Assessment & Plan  Will hold bumex, re eval in am  Following  Likely related to uti    Acute respiratory failure with hypoxia (HCC)- (present on admission)  Assessment & Plan  Possibly due to atelectasis  Improving  IS recommended    Leukocytosis- (present on admission)  Assessment & Plan  Secondary to sepsis  IV antibiotics  Trending down  Labs on a.m.    PAF (paroxysmal atrial fibrillation) (HCC)- (present on admission)  Assessment & Plan  H/o, continue coreg and eliquis  Currently in sr  following    Type 2 diabetes mellitus without complication, without long-term current use of insulin (HCC)- (present on admission)  Assessment & Plan  Diabetic diet  following    Foraminal stenosis of lumbar region- (present on admission)  Assessment & Plan  Chronic with chronic pain, at baseline  Supportive care    Cardiac pacemaker in situ- (present on admission)  Assessment & Plan  noted         VTE prophylaxis: therapeutic anticoagulation with Eliquis    I have performed a physical exam and reviewed and updated ROS and Plan today (7/20/2023). In review of yesterday's note (7/19/2023), there are no changes except as documented above.        "

## 2023-07-21 ENCOUNTER — APPOINTMENT (OUTPATIENT)
Dept: CARDIOLOGY | Facility: MEDICAL CENTER | Age: 88
DRG: 871 | End: 2023-07-21
Attending: STUDENT IN AN ORGANIZED HEALTH CARE EDUCATION/TRAINING PROGRAM
Payer: MEDICARE

## 2023-07-21 ENCOUNTER — PATIENT OUTREACH (OUTPATIENT)
Dept: HEALTH INFORMATION MANAGEMENT | Facility: OTHER | Age: 88
End: 2023-07-21
Payer: MEDICARE

## 2023-07-21 LAB
ANION GAP SERPL CALC-SCNC: 8 MMOL/L (ref 7–16)
BASOPHILS # BLD AUTO: 0.4 % (ref 0–1.8)
BASOPHILS # BLD: 0.02 K/UL (ref 0–0.12)
BUN SERPL-MCNC: 21 MG/DL (ref 8–22)
CALCIUM SERPL-MCNC: 9.2 MG/DL (ref 8.5–10.5)
CHLORIDE SERPL-SCNC: 102 MMOL/L (ref 96–112)
CO2 SERPL-SCNC: 27 MMOL/L (ref 20–33)
CREAT SERPL-MCNC: 1.07 MG/DL (ref 0.5–1.4)
EOSINOPHIL # BLD AUTO: 0.18 K/UL (ref 0–0.51)
EOSINOPHIL NFR BLD: 3.2 % (ref 0–6.9)
ERYTHROCYTE [DISTWIDTH] IN BLOOD BY AUTOMATED COUNT: 51.2 FL (ref 35.9–50)
GFR SERPLBLD CREATININE-BSD FMLA CKD-EPI: 49 ML/MIN/1.73 M 2
GLUCOSE SERPL-MCNC: 235 MG/DL (ref 65–99)
HCT VFR BLD AUTO: 37.6 % (ref 37–47)
HGB BLD-MCNC: 11.9 G/DL (ref 12–16)
IMM GRANULOCYTES # BLD AUTO: 0.02 K/UL (ref 0–0.11)
IMM GRANULOCYTES NFR BLD AUTO: 0.4 % (ref 0–0.9)
LV EJECT FRACT MOD 2C 99903: 48.59
LV EJECT FRACT MOD 4C 99902: 87.71
LV EJECT FRACT MOD BP 99901: 75.16
LYMPHOCYTES # BLD AUTO: 1.82 K/UL (ref 1–4.8)
LYMPHOCYTES NFR BLD: 31.9 % (ref 22–41)
MCH RBC QN AUTO: 31.8 PG (ref 27–33)
MCHC RBC AUTO-ENTMCNC: 31.6 G/DL (ref 32.2–35.5)
MCV RBC AUTO: 100.5 FL (ref 81.4–97.8)
MONOCYTES # BLD AUTO: 0.3 K/UL (ref 0–0.85)
MONOCYTES NFR BLD AUTO: 5.3 % (ref 0–13.4)
NEUTROPHILS # BLD AUTO: 3.37 K/UL (ref 1.82–7.42)
NEUTROPHILS NFR BLD: 58.8 % (ref 44–72)
NRBC # BLD AUTO: 0 K/UL
NRBC BLD-RTO: 0 /100 WBC (ref 0–0.2)
PLATELET # BLD AUTO: 231 K/UL (ref 164–446)
PMV BLD AUTO: 9.2 FL (ref 9–12.9)
POTASSIUM SERPL-SCNC: 4.7 MMOL/L (ref 3.6–5.5)
RBC # BLD AUTO: 3.74 M/UL (ref 4.2–5.4)
SODIUM SERPL-SCNC: 137 MMOL/L (ref 135–145)
WBC # BLD AUTO: 5.7 K/UL (ref 4.8–10.8)

## 2023-07-21 PROCEDURE — 700102 HCHG RX REV CODE 250 W/ 637 OVERRIDE(OP): Performed by: STUDENT IN AN ORGANIZED HEALTH CARE EDUCATION/TRAINING PROGRAM

## 2023-07-21 PROCEDURE — 36415 COLL VENOUS BLD VENIPUNCTURE: CPT

## 2023-07-21 PROCEDURE — 80048 BASIC METABOLIC PNL TOTAL CA: CPT

## 2023-07-21 PROCEDURE — A9270 NON-COVERED ITEM OR SERVICE: HCPCS | Performed by: HOSPITALIST

## 2023-07-21 PROCEDURE — 85025 COMPLETE CBC W/AUTO DIFF WBC: CPT

## 2023-07-21 PROCEDURE — 93306 TTE W/DOPPLER COMPLETE: CPT | Mod: 26 | Performed by: STUDENT IN AN ORGANIZED HEALTH CARE EDUCATION/TRAINING PROGRAM

## 2023-07-21 PROCEDURE — 770006 HCHG ROOM/CARE - MED/SURG/GYN SEMI*

## 2023-07-21 PROCEDURE — A9270 NON-COVERED ITEM OR SERVICE: HCPCS | Performed by: STUDENT IN AN ORGANIZED HEALTH CARE EDUCATION/TRAINING PROGRAM

## 2023-07-21 PROCEDURE — 99232 SBSQ HOSP IP/OBS MODERATE 35: CPT | Performed by: STUDENT IN AN ORGANIZED HEALTH CARE EDUCATION/TRAINING PROGRAM

## 2023-07-21 PROCEDURE — 700102 HCHG RX REV CODE 250 W/ 637 OVERRIDE(OP): Performed by: HOSPITALIST

## 2023-07-21 PROCEDURE — 93306 TTE W/DOPPLER COMPLETE: CPT

## 2023-07-21 RX ADMIN — LOSARTAN POTASSIUM 100 MG: 50 TABLET, FILM COATED ORAL at 05:59

## 2023-07-21 RX ADMIN — APIXABAN 5 MG: 5 TABLET, FILM COATED ORAL at 16:35

## 2023-07-21 RX ADMIN — ACETAMINOPHEN 1000 MG: 500 TABLET, FILM COATED ORAL at 09:39

## 2023-07-21 RX ADMIN — ATORVASTATIN CALCIUM 40 MG: 40 TABLET, FILM COATED ORAL at 16:35

## 2023-07-21 RX ADMIN — OMEPRAZOLE 20 MG: 20 CAPSULE, DELAYED RELEASE ORAL at 05:59

## 2023-07-21 RX ADMIN — CARVEDILOL 3.12 MG: 3.12 TABLET, FILM COATED ORAL at 17:12

## 2023-07-21 RX ADMIN — SENNOSIDES AND DOCUSATE SODIUM 2 TABLET: 50; 8.6 TABLET ORAL at 16:34

## 2023-07-21 RX ADMIN — ACETAMINOPHEN 1000 MG: 500 TABLET, FILM COATED ORAL at 20:32

## 2023-07-21 RX ADMIN — SULFAMETHOXAZOLE AND TRIMETHOPRIM 1 TABLET: 400; 80 TABLET ORAL at 05:59

## 2023-07-21 RX ADMIN — APIXABAN 5 MG: 5 TABLET, FILM COATED ORAL at 05:59

## 2023-07-21 ASSESSMENT — ENCOUNTER SYMPTOMS
MUSCULOSKELETAL NEGATIVE: 1
COUGH: 1
WEAKNESS: 1
CARDIOVASCULAR NEGATIVE: 1
EYES NEGATIVE: 1
GASTROINTESTINAL NEGATIVE: 1
SORE THROAT: 1
SHORTNESS OF BREATH: 1
PSYCHIATRIC NEGATIVE: 1

## 2023-07-21 ASSESSMENT — PATIENT HEALTH QUESTIONNAIRE - PHQ9
1. LITTLE INTEREST OR PLEASURE IN DOING THINGS: NOT AT ALL
SUM OF ALL RESPONSES TO PHQ9 QUESTIONS 1 AND 2: 0
2. FEELING DOWN, DEPRESSED, IRRITABLE, OR HOPELESS: NOT AT ALL

## 2023-07-21 NOTE — PROGRESS NOTES
Community Health Worker Follow-Up    Reason for outreach: CHW Regina and CHW Abhay followed up with pt bedside     CHW Interventions: Pt states that she is going well, waiting to go home. Pt requested Personal Care Number.    Specific Resources Provided:  Housing/Shelter: n/a  Transportation: n/a  Food: n/a  Financial: n/a  Social Supports: n/a  Other: n/a    Plan: CHW provided contact infromation to pt and encouraged to call with any questions.  CHW will continue to follow at this time.

## 2023-07-21 NOTE — PROGRESS NOTES
"Hospital Medicine Daily Progress Note    Date of Service  7/21/2023    Chief Complaint  Krystle Tavarez is a 89 y.o. female admitted 7/18/2023 with     Hospital Course  89 year old female who presented to [facility] on 7/18/2023 with weakness and dysuria. Patient has a history of dm, gerd, htn, tia, pacer, chronic back pain and recurrent uti. She reports that she has failed multiple recent outpatient antibiotics for uti's and was seen by urology in their clinic yesterday. They prescribed her macrobid. She lives with her daughter and reports that when she woke up today she was too weak to ambulate on her own, she cried out for her daughter, who then brought her to the ER where she was found to have evidence of a uti, wbc of 17.3 and global weakness. She also reported \"severe gerd\" this am that resolved on it's own, she has a history of gerd. She denies flank pain or hematuria but does have ongoing dysuria and chronic lower back pain (at baseline), no fevers or chills, no focal weakness, no chest pain, no dizziness. She is dehydrated on exam. ROS otherwise negative.     Interval Problem Update  Patient was evaluated bedside  Lightheaded on standing  On 1L nasal cannula  Patient with orthostatic changes and lightheadedness  Wide pulse pressure  7/21 echocardiogram showing ejection fraction 70%, mild LVH, grade 1 diastolic dysfunction, mild to moderate aortic insufficiency, moderate mitral annular calcification and RVSP 40 all of which have not changed from 5/2/2022 echocardiogram    No leukocytosis today  7/18 blood cultures with no growth to date  7/18 urine cultures with no growth to date  Continue antibiotics  PT/OT recommending home health  Face-to-face and order placed  Labs on a.m.    I have discussed this patient's plan of care and discharge plan at IDT rounds today with Case Management, Nursing, Nursing leadership, and other members of the IDT team.    Consultants/Specialty  None    Code Status  DNAR, I " OK    Disposition  Not medically cleared  Disposition to be determined    I have placed the appropriate orders for post-discharge needs.    Review of Systems  Review of Systems   Constitutional:  Positive for malaise/fatigue.   HENT:  Positive for sore throat.    Eyes: Negative.    Respiratory:  Positive for cough and shortness of breath.    Cardiovascular: Negative.    Gastrointestinal: Negative.    Genitourinary:  Positive for dysuria.   Musculoskeletal: Negative.    Skin: Negative.    Neurological:  Positive for weakness.   Endo/Heme/Allergies: Negative.    Psychiatric/Behavioral: Negative.          Physical Exam  Temp:  [36.6 °C (97.8 °F)-37.3 °C (99.2 °F)] 37.3 °C (99.2 °F)  Pulse:  [73-80] 73  Resp:  [18-19] 18  BP: (126-148)/(46-79) 148/68  SpO2:  [88 %-92 %] 88 %    Physical Exam  Constitutional:       Appearance: Normal appearance. She is ill-appearing.   HENT:      Head: Normocephalic and atraumatic.      Mouth/Throat:      Mouth: Mucous membranes are moist.   Eyes:      Extraocular Movements: Extraocular movements intact.      Pupils: Pupils are equal, round, and reactive to light.   Cardiovascular:      Rate and Rhythm: Normal rate and regular rhythm.      Pulses: Normal pulses.      Heart sounds: Normal heart sounds.   Pulmonary:      Effort: Pulmonary effort is normal.      Breath sounds: Normal breath sounds.   Abdominal:      General: Bowel sounds are normal. There is no distension.      Palpations: Abdomen is soft.      Tenderness: There is abdominal tenderness.   Musculoskeletal:         General: No swelling. Normal range of motion.      Cervical back: Normal range of motion and neck supple.   Skin:     General: Skin is warm.      Coloration: Skin is not jaundiced.   Neurological:      General: No focal deficit present.      Mental Status: She is alert and oriented to person, place, and time. Mental status is at baseline.      Cranial Nerves: No cranial nerve deficit.   Psychiatric:         Mood  and Affect: Mood normal.         Behavior: Behavior normal.         Thought Content: Thought content normal.         Judgment: Judgment normal.         Fluids    Intake/Output Summary (Last 24 hours) at 7/21/2023 1626  Last data filed at 7/21/2023 1500  Gross per 24 hour   Intake 360 ml   Output --   Net 360 ml       Laboratory  Recent Labs     07/19/23  0028 07/20/23  0306 07/21/23  1101   WBC 16.2* 7.2 5.7   RBC 3.44* 3.32* 3.74*   HEMOGLOBIN 11.0* 10.7* 11.9*   HEMATOCRIT 34.9* 32.6* 37.6   .5* 98.2* 100.5*   MCH 32.0 32.2 31.8   MCHC 31.5* 32.8 31.6*   RDW 53.4* 49.6 51.2*   PLATELETCT 204 209 231   MPV 9.0 9.2 9.2     Recent Labs     07/19/23  0028 07/20/23  0306 07/21/23  1101   SODIUM 140 139 137   POTASSIUM 4.7 4.3 4.7   CHLORIDE 107 103 102   CO2 24 29 27   GLUCOSE 159* 170* 235*   BUN 17 18 21   CREATININE 0.84 1.21 1.07   CALCIUM 9.1 9.1 9.2                   Imaging  EC-ECHOCARDIOGRAM COMPLETE W/O CONT   Final Result      CT-CHEST,ABDOMEN,PELVIS WITH   Final Result      1.  Bladder wall thickening consistent with cystitis.   2.  Normal appendix.   3.  No bowel obstruction or perforation   4.  Biliary dilation, likely chronic.  Prior cholecystectomy.   5.  Mild bilateral dependent atelectasis.   6.  Severe degenerative change of both shoulders with RIGHT shoulder joint effusion and joint bodies.         DX-CHEST-PORTABLE (1 VIEW)   Final Result      1.  Hypoinflation with bibasilar atelectasis.  Developing LEFT lung base pneumonia is difficult to exclude.   2.  Mildly prominent interstitium, likely chronic   3.  No pneumothorax.   4.  LEFT chest pacemaker present.           Assessment/Plan  * Sepsis (HCC)- (present on admission)  Assessment & Plan  This is Sepsis Present on admission  SIRS criteria identified on my evaluation include: Leukocytosis, with WBC greater than 12,000  Source is urine with mild acute renal insufficiency   Sepsis protocol initiated  Fluid resuscitation ordered per  "protocol  Crystalloid Fluid Administration: Fluid resuscitation ordered per standard protocol - 30 mL/kg per current or ideal body weight  IV antibiotics as appropriate for source of sepsis  Reassessment: I have reassessed the patient's hemodynamic status  Cultures pending  Iv ceftriaxone    AP (abdominal pain)- (present on admission)  Assessment & Plan  Described as \"gerd\", h/o same  Resolved, no pain on exam  Denies chest pain  No acute ekg changes, trop negative, will repeat trop- consider further cardiac workup if sx recur  Continue omeprazole  following    Acute cystitis  Assessment & Plan  History of Klebsiella acute cystitis  Complicated by sepsis  Continue IV antibiotics  Follow cultures    Dehydration- (present on admission)  Assessment & Plan  Will hold bumex, re eval in am  Following  Likely related to uti    Acute respiratory failure with hypoxia (HCC)- (present on admission)  Assessment & Plan  Possibly due to atelectasis  Improving  IS recommended    Leukocytosis- (present on admission)  Assessment & Plan  Secondary to sepsis  IV antibiotics  Trending down  Labs on a.m.    PAF (paroxysmal atrial fibrillation) (HCC)- (present on admission)  Assessment & Plan  H/o, continue coreg and eliquis  Currently in sr  following    Type 2 diabetes mellitus without complication, without long-term current use of insulin (HCC)- (present on admission)  Assessment & Plan  Diabetic diet  following    Foraminal stenosis of lumbar region- (present on admission)  Assessment & Plan  Chronic with chronic pain, at baseline  Supportive care    Cardiac pacemaker in situ- (present on admission)  Assessment & Plan  noted         VTE prophylaxis: therapeutic anticoagulation with Eliquis    I have performed a physical exam and reviewed and updated ROS and Plan today (7/21/2023). In review of yesterday's note (7/20/2023), there are no changes except as documented above.        "

## 2023-07-21 NOTE — PROGRESS NOTES
Bedside report received. Assumed care of patient this PM. Assessment completed      Patient is A&O x 4, pt gets confused and requires a little reorienting, pt calls for assistance appropriately  Reports 3 /10 pain, scheduled tylenol administered.  Pt is on 1L oxygen, baseline is r/a.   Mobility SBA with FWW, generalized weakness Bed alarm in on, high fall risk.  Voiding +  Flatus +        Plan of care reviewed with the patient. Bed is locked and in the lowest position. Call light is within reach. Patient encouraged to voice needs and concerns, all needs met at this time. Hourly rounding in place.     At start of shift pt was anxious and reporting feelings of claustrophobia. RN and pt discussed ways to overcome the feeling. RN placed 1L of O2 on pt as she stated she was feeling like she couldn't breathe. After O2 placed on pt, she reports feeling less SOB and less claustrophobic

## 2023-07-21 NOTE — CARE PLAN
The patient is Stable - Low risk of patient condition declining or worsening    Shift Goals  Clinical Goals: titrate oxygen and incentive spirometer  Patient Goals: go home  Family Goals: ambulates and uses incentive spirometer    Progress made toward(s) clinical / shift goals:        Problem: Knowledge Deficit - Standard  Goal: Patient and family/care givers will demonstrate understanding of plan of care, disease process/condition, diagnostic tests and medications  Outcome: Progressing     Problem: Skin Integrity  Goal: Skin integrity is maintained or improved  Outcome: Progressing  Note: Pt able to turn from side to side independently but does not, pt with need of pillow support to maintain side positioning, able to move independently, need of one person to assist with support, pt turns with cue, makes larger position changes, incontinent, need of prompt cheryl care with each incont episode and routinely with cheryl care      Problem: Pain - Standard  Goal: Alleviation of pain or a reduction in pain to the patient’s comfort goal  Outcome: Progressing

## 2023-07-21 NOTE — DISCHARGE PLANNING
Received Choice form at 1254  Agency/Facility Name: [facility]  Referral sent per Choice form @ 9904    @0557  [facility]  Outcome: Referral accepted per epic response.

## 2023-07-21 NOTE — CARE PLAN
The patient is Stable - Low risk of patient condition declining or worsening    Shift Goals  Clinical Goals: Maintain skin integ, monitor O2 sat and give suppplemental O2 as needed, encourage IS, monitor vitals  Patient Goals: rest, relax  Family Goals: ambulates and uses incentive spirometer    Progress made toward(s) clinical / shift goals:      Problem: Knowledge Deficit - Standard  Goal: Patient and family/care givers will demonstrate understanding of plan of care, disease process/condition, diagnostic tests and medications  Outcome: Progressing   Pt educated on discharge plan and q2 turns, and maintaining skin integ. Pt asks questions about his care.   Problem: Skin Integrity  Goal: Skin integrity is maintained or improved  Outcome: Progressing   Pt at risk for skin breakdown. Waffle in place, q2 hour turns, purewick in place. Pt checked for dryness.   Problem: Respiratory  Goal: Patient will achieve/maintain optimum respiratory ventilation and gas exchange  Outcome: Progressing   Pt weaned to r/a during day shift and provided minimal O2 supplementation during night shift to maintain SPO2 > 92%    Patient is not progressing towards the following goals:

## 2023-07-22 PROBLEM — R42 LIGHTHEADEDNESS: Status: ACTIVE | Noted: 2023-07-22

## 2023-07-22 LAB
ANION GAP SERPL CALC-SCNC: 10 MMOL/L (ref 7–16)
BASOPHILS # BLD AUTO: 0.2 % (ref 0–1.8)
BASOPHILS # BLD: 0.01 K/UL (ref 0–0.12)
BUN SERPL-MCNC: 20 MG/DL (ref 8–22)
CALCIUM SERPL-MCNC: 9.2 MG/DL (ref 8.5–10.5)
CHLORIDE SERPL-SCNC: 100 MMOL/L (ref 96–112)
CO2 SERPL-SCNC: 27 MMOL/L (ref 20–33)
CREAT SERPL-MCNC: 0.88 MG/DL (ref 0.5–1.4)
EOSINOPHIL # BLD AUTO: 0 K/UL (ref 0–0.51)
EOSINOPHIL NFR BLD: 0 % (ref 0–6.9)
ERYTHROCYTE [DISTWIDTH] IN BLOOD BY AUTOMATED COUNT: 48.9 FL (ref 35.9–50)
GFR SERPLBLD CREATININE-BSD FMLA CKD-EPI: 62 ML/MIN/1.73 M 2
GLUCOSE BLD STRIP.AUTO-MCNC: 199 MG/DL (ref 65–99)
GLUCOSE SERPL-MCNC: 238 MG/DL (ref 65–99)
HCT VFR BLD AUTO: 36 % (ref 37–47)
HGB BLD-MCNC: 11.9 G/DL (ref 12–16)
IMM GRANULOCYTES # BLD AUTO: 0.06 K/UL (ref 0–0.11)
IMM GRANULOCYTES NFR BLD AUTO: 1 % (ref 0–0.9)
LYMPHOCYTES # BLD AUTO: 0.52 K/UL (ref 1–4.8)
LYMPHOCYTES NFR BLD: 8.4 % (ref 22–41)
MCH RBC QN AUTO: 31.9 PG (ref 27–33)
MCHC RBC AUTO-ENTMCNC: 33.1 G/DL (ref 32.2–35.5)
MCV RBC AUTO: 96.5 FL (ref 81.4–97.8)
MONOCYTES # BLD AUTO: 0.25 K/UL (ref 0–0.85)
MONOCYTES NFR BLD AUTO: 4 % (ref 0–13.4)
NEUTROPHILS # BLD AUTO: 5.36 K/UL (ref 1.82–7.42)
NEUTROPHILS NFR BLD: 86.4 % (ref 44–72)
NRBC # BLD AUTO: 0 K/UL
NRBC BLD-RTO: 0 /100 WBC (ref 0–0.2)
PLATELET # BLD AUTO: 225 K/UL (ref 164–446)
PMV BLD AUTO: 9.2 FL (ref 9–12.9)
POTASSIUM SERPL-SCNC: 4.5 MMOL/L (ref 3.6–5.5)
RBC # BLD AUTO: 3.73 M/UL (ref 4.2–5.4)
SODIUM SERPL-SCNC: 137 MMOL/L (ref 135–145)
WBC # BLD AUTO: 6.2 K/UL (ref 4.8–10.8)

## 2023-07-22 PROCEDURE — A9270 NON-COVERED ITEM OR SERVICE: HCPCS | Performed by: STUDENT IN AN ORGANIZED HEALTH CARE EDUCATION/TRAINING PROGRAM

## 2023-07-22 PROCEDURE — 36415 COLL VENOUS BLD VENIPUNCTURE: CPT

## 2023-07-22 PROCEDURE — 80048 BASIC METABOLIC PNL TOTAL CA: CPT

## 2023-07-22 PROCEDURE — 99232 SBSQ HOSP IP/OBS MODERATE 35: CPT | Performed by: STUDENT IN AN ORGANIZED HEALTH CARE EDUCATION/TRAINING PROGRAM

## 2023-07-22 PROCEDURE — 700102 HCHG RX REV CODE 250 W/ 637 OVERRIDE(OP): Performed by: STUDENT IN AN ORGANIZED HEALTH CARE EDUCATION/TRAINING PROGRAM

## 2023-07-22 PROCEDURE — 85025 COMPLETE CBC W/AUTO DIFF WBC: CPT

## 2023-07-22 PROCEDURE — 770006 HCHG ROOM/CARE - MED/SURG/GYN SEMI*

## 2023-07-22 PROCEDURE — A9270 NON-COVERED ITEM OR SERVICE: HCPCS | Performed by: HOSPITALIST

## 2023-07-22 PROCEDURE — 700102 HCHG RX REV CODE 250 W/ 637 OVERRIDE(OP): Performed by: HOSPITALIST

## 2023-07-22 PROCEDURE — 97530 THERAPEUTIC ACTIVITIES: CPT

## 2023-07-22 PROCEDURE — 82962 GLUCOSE BLOOD TEST: CPT

## 2023-07-22 RX ORDER — DEXTROSE MONOHYDRATE 25 G/50ML
25 INJECTION, SOLUTION INTRAVENOUS
Status: DISCONTINUED | OUTPATIENT
Start: 2023-07-22 | End: 2023-07-25 | Stop reason: HOSPADM

## 2023-07-22 RX ORDER — LOSARTAN POTASSIUM 50 MG/1
50 TABLET ORAL DAILY
Status: DISCONTINUED | OUTPATIENT
Start: 2023-07-23 | End: 2023-07-25 | Stop reason: HOSPADM

## 2023-07-22 RX ADMIN — ACETAMINOPHEN 1000 MG: 500 TABLET, FILM COATED ORAL at 08:33

## 2023-07-22 RX ADMIN — OMEPRAZOLE 20 MG: 20 CAPSULE, DELAYED RELEASE ORAL at 05:21

## 2023-07-22 RX ADMIN — CARVEDILOL 3.12 MG: 3.12 TABLET, FILM COATED ORAL at 08:33

## 2023-07-22 RX ADMIN — SENNOSIDES AND DOCUSATE SODIUM 2 TABLET: 50; 8.6 TABLET ORAL at 05:21

## 2023-07-22 RX ADMIN — APIXABAN 5 MG: 5 TABLET, FILM COATED ORAL at 17:45

## 2023-07-22 RX ADMIN — ACETAMINOPHEN 1000 MG: 500 TABLET, FILM COATED ORAL at 15:37

## 2023-07-22 RX ADMIN — INSULIN GLARGINE-YFGN 10 UNITS: 100 INJECTION, SOLUTION SUBCUTANEOUS at 18:33

## 2023-07-22 RX ADMIN — MAGNESIUM HYDROXIDE 30 ML: 400 SUSPENSION ORAL at 01:48

## 2023-07-22 RX ADMIN — ATORVASTATIN CALCIUM 40 MG: 40 TABLET, FILM COATED ORAL at 17:45

## 2023-07-22 RX ADMIN — APIXABAN 5 MG: 5 TABLET, FILM COATED ORAL at 05:21

## 2023-07-22 ASSESSMENT — COGNITIVE AND FUNCTIONAL STATUS - GENERAL
WALKING IN HOSPITAL ROOM: A LITTLE
CLIMB 3 TO 5 STEPS WITH RAILING: A LITTLE
MOBILITY SCORE: 18
TURNING FROM BACK TO SIDE WHILE IN FLAT BAD: A LITTLE
SUGGESTED CMS G CODE MODIFIER MOBILITY: CK
STANDING UP FROM CHAIR USING ARMS: A LITTLE
MOVING TO AND FROM BED TO CHAIR: A LITTLE
MOVING FROM LYING ON BACK TO SITTING ON SIDE OF FLAT BED: A LITTLE

## 2023-07-22 ASSESSMENT — ENCOUNTER SYMPTOMS
SORE THROAT: 1
MUSCULOSKELETAL NEGATIVE: 1
WEAKNESS: 1
GASTROINTESTINAL NEGATIVE: 1
CARDIOVASCULAR NEGATIVE: 1
COUGH: 1
SHORTNESS OF BREATH: 1
EYES NEGATIVE: 1
PSYCHIATRIC NEGATIVE: 1

## 2023-07-22 ASSESSMENT — GAIT ASSESSMENTS
ASSISTIVE DEVICE: FRONT WHEEL WALKER
DEVIATION: DECREASED HEEL STRIKE;DECREASED TOE OFF
DISTANCE (FEET): 8
GAIT LEVEL OF ASSIST: CONTACT GUARD ASSIST

## 2023-07-22 NOTE — ASSESSMENT & PLAN NOTE
Secondary to not being able to tolerate carvedilol  Discontinue carvedilol  Continue losartan  Start low-dose metoprolol  Monitor

## 2023-07-22 NOTE — THERAPY
"Physical Therapy   Daily Treatment     Patient Name: Krystle Tavarez  Age:  89 y.o., Sex:  female  Medical Record #: [id number]  Today's Date: 7/22/2023     Precautions  Precautions: (P) Fall Risk  Comments: (P) hypotensive    Assessment    Per RN, pt was max A x2 this morning following dose of Coreg. Pt appears to be feeling better this afternoon, though still limited by significant hypotension which causes pt \"slight lightheadedness.\" Pt was able to perform bed mobility, transfers, and amb a short distance at bedside with FWW and CGA to SBA. She was not able to maintain adequate blood pressure to stay in seated position, and required assist back to supine. Pt will continue to benefit from skilled PT to address deficits with activity tolerance and gait. Pt is not safe for D/C home due to hypotension, so PT recommends further PT in the post-acute setting prior to D/C home.    Plan    Treatment Plan Status: (P) Continue Current Treatment Plan  Type of Treatment: Bed Mobility, Equipment, Manual Therapy, Gait Training, Neuro Re-Education / Balance, Self Care / Home Evaluation, Stair Training, Therapeutic Activities, Therapeutic Exercise  Treatment Frequency: 4 Times per Week  Treatment Duration: Until Therapy Goals Met    DC Equipment Recommendations: (P) None  Discharge Recommendations: (P) Recommend post-acute placement for additional physical therapy services prior to discharge home (until pt able to safely demonstrate stairs without hypotension)      Subjective    Pt stating she feels much better this afternoon compared to this morning, and is attributing it to passing BM.     Objective       07/22/23 1501   Precautions   Precautions Fall Risk   Comments hypotensive   Vitals   Patient BP Position Standing 1 minute   Blood Pressure  (!) 77/43   Vitals Comments BP 82/42 sitting, 77/43 standing, 75/39 sitting after standing, 104/52 supine at end of session   Pain 0 - 10 Group   Therapist Pain Assessment 0;Post Activity " Pain Same as Prior to Activity   Cognition    Cognition / Consciousness X   Safety Awareness Impaired   New Learning Impaired   Balance   Sitting Balance (Static) Fair   Sitting Balance (Dynamic) Fair   Standing Balance (Static) Fair   Standing Balance (Dynamic) Fair -   Weight Shift Sitting Fair   Weight Shift Standing Fair   Skilled Intervention Compensatory Strategies;Postural Facilitation;Sequencing   Comments with FWW   Bed Mobility    Supine to Sit Standby Assist   Sit to Supine Minimal Assist  (for LEs)   Scooting Standby Assist   Skilled Intervention Verbal Cuing   Gait Analysis   Gait Level Of Assist Contact Guard Assist   Assistive Device Front Wheel Walker   Distance (Feet) 8   # of Times Distance was Traveled 1   Deviation Decreased Heel Strike;Decreased Toe Off  (forward flexed)   # of Stairs Climbed 0   Weight Bearing Status no restriction   Skilled Intervention Compensatory Strategies;Verbal Cuing   Comments amb at bedside   Functional Mobility   Sit to Stand Standby Assist   Bed, Chair, Wheelchair Transfer Standby Assist   Transfer Method Stand Step   Mobility supine->sit EOB->stand->chair->amb at bedside->chair->sit EOB->supine   Skilled Intervention Compensatory Strategies;Verbal Cuing;Sequencing;Postural Facilitation   Comments cues for upright posture   How much difficulty does the patient currently have...   Turning over in bed (including adjusting bedclothes, sheets and blankets)? 3   Sitting down on and standing up from a chair with arms (e.g., wheelchair, bedside commode, etc.) 3   Moving from lying on back to sitting on the side of the bed? 3   How much help from another person does the patient currently need...   Moving to and from a bed to a chair (including a wheelchair)? 3   Need to walk in a hospital room? 3   Climbing 3-5 steps with a railing? 3   6 clicks Mobility Score 18   Activity Tolerance   Sitting in Chair 5 min approx   Sitting Edge of Bed 2x3-5 min approx   Standing 3x20-30  sec, and 1x2 min approx   Comments standing and sitting limited by BP and fatigue   Patient / Family Goals    Patient / Family Goal #1 to improve activity tolerance   Goal #1 Outcome Goal not met   Short Term Goals    Short Term Goal # 1 Pt will perform supine<>sit from flat HOB/no railing with supervision within 6 visits to ensure independent mobility at home.   Goal Outcome # 1 Progressing as expected   Short Term Goal # 2 Pt will perform sit<>stand with FWW and supervision within 6 visits to ensure independent mobility at home.   Goal Outcome # 2 Progressing as expected   Short Term Goal # 3 Pt will ambulate x 150ft wiht FWW and supervision within 6 vistis to ensure independent mobility at home.   Goal Outcome # 3 Progressing slower than expected   Short Term Goal # 4 Pt will ascend/descend 2 steps with bilateral UE support and min A within 6 visits to ensure entry of home.   Goal Outcome # 4 Goal not met   Physical Therapy Treatment Plan   Physical Therapy Treatment Plan Continue Current Treatment Plan   Anticipated Discharge Equipment and Recommendations   DC Equipment Recommendations None   Discharge Recommendations Recommend post-acute placement for additional physical therapy services prior to discharge home  (until pt able to safely demonstrate stairs without hypotension)

## 2023-07-22 NOTE — PROGRESS NOTES
Received report, assumed pt care. Pt awake and anxious, call light within reach. Pt made aware RN will round back soon. No needs at this time.

## 2023-07-22 NOTE — PROGRESS NOTES
"[facility] Medicine Daily Progress Note    Date of Service  7/22/2023    Chief Complaint  Krystle Tavarez is a 89 y.o. female admitted 7/18/2023 with     Hospital Course  89 year old female who presented to [facility] on 7/18/2023 with weakness and dysuria. Patient has a history of dm, gerd, htn, tia, pacer, chronic back pain and recurrent uti. She reports that she has failed multiple recent outpatient antibiotics for uti's and was seen by urology in their clinic yesterday. They prescribed her macrobid. She lives with her daughter and reports that when she woke up today she was too weak to ambulate on her own, she cried out for her daughter, who then brought her to the ER where she was found to have evidence of a uti, wbc of 17.3 and global weakness. She also reported \"severe gerd\" this am that resolved on it's own, she has a history of gerd. She denies flank pain or hematuria but does have ongoing dysuria and chronic lower back pain (at baseline), no fevers or chills, no focal weakness, no chest pain, no dizziness. She is dehydrated on exam. ROS otherwise negative.     7/21 echocardiogram showing ejection fraction 70%, mild LVH, grade 1 diastolic dysfunction, mild to moderate aortic insufficiency, moderate mitral annular calcification and RVSP 40 all of which have not changed from 5/2/2022 echocardiogram    Interval Problem Update  Patient was evaluated at bedside  Patient with orthostatic changes and lightheadedness after BB therapy today  Wide pulse pressure  7/18 Blood cultures with no growth to date  7/18 Urine cultures with no growth to date  Continue antibiotics  Labs on a.m.    I have discussed this patient's plan of care and discharge plan at IDT rounds today with Case Management, Nursing, Nursing leadership, and other members of the IDT team.    Consultants/Specialty  None    Code Status  DNAR, I OK    Disposition  Not medically cleared  Disposition to be determined    I have placed the appropriate orders for " post-discharge needs.    Review of Systems  Review of Systems   Constitutional:  Positive for malaise/fatigue.   HENT:  Positive for sore throat.    Eyes: Negative.    Respiratory:  Positive for cough and shortness of breath.    Cardiovascular: Negative.    Gastrointestinal: Negative.    Genitourinary:  Positive for dysuria.   Musculoskeletal: Negative.    Skin: Negative.    Neurological:  Positive for weakness.   Endo/Heme/Allergies: Negative.    Psychiatric/Behavioral: Negative.          Physical Exam  Temp:  [36.7 °C (98 °F)-37.3 °C (99.2 °F)] 37.3 °C (99.2 °F)  Pulse:  [72-98] 76  Resp:  [17-18] 18  BP: ()/(42-76) 117/53  SpO2:  [86 %-93 %] 86 %    Physical Exam  Constitutional:       Appearance: Normal appearance. She is ill-appearing.   HENT:      Head: Normocephalic and atraumatic.      Mouth/Throat:      Mouth: Mucous membranes are moist.   Eyes:      Extraocular Movements: Extraocular movements intact.      Pupils: Pupils are equal, round, and reactive to light.   Cardiovascular:      Rate and Rhythm: Normal rate and regular rhythm.      Pulses: Normal pulses.      Heart sounds: Normal heart sounds.   Pulmonary:      Effort: Pulmonary effort is normal.      Breath sounds: Normal breath sounds.   Abdominal:      General: Bowel sounds are normal. There is no distension.      Palpations: Abdomen is soft.      Tenderness: There is abdominal tenderness.   Musculoskeletal:         General: No swelling. Normal range of motion.      Cervical back: Normal range of motion and neck supple.   Skin:     General: Skin is warm.      Coloration: Skin is not jaundiced.   Neurological:      General: No focal deficit present.      Mental Status: She is alert and oriented to person, place, and time. Mental status is at baseline.      Cranial Nerves: No cranial nerve deficit.   Psychiatric:         Mood and Affect: Mood normal.         Behavior: Behavior normal.         Thought Content: Thought content normal.          Judgment: Judgment normal.         Fluids    Intake/Output Summary (Last 24 hours) at 7/22/2023 1631  Last data filed at 7/22/2023 1501  Gross per 24 hour   Intake 60 ml   Output 525 ml   Net -465 ml       Laboratory  Recent Labs     07/20/23  0306 07/21/23  1101 07/22/23  0904   WBC 7.2 5.7 6.2   RBC 3.32* 3.74* 3.73*   HEMOGLOBIN 10.7* 11.9* 11.9*   HEMATOCRIT 32.6* 37.6 36.0*   MCV 98.2* 100.5* 96.5   MCH 32.2 31.8 31.9   MCHC 32.8 31.6* 33.1   RDW 49.6 51.2* 48.9   PLATELETCT 209 231 225   MPV 9.2 9.2 9.2     Recent Labs     07/20/23  0306 07/21/23  1101 07/22/23  0904   SODIUM 139 137 137   POTASSIUM 4.3 4.7 4.5   CHLORIDE 103 102 100   CO2 29 27 27   GLUCOSE 170* 235* 238*   BUN 18 21 20   CREATININE 1.21 1.07 0.88   CALCIUM 9.1 9.2 9.2                   Imaging  EC-ECHOCARDIOGRAM COMPLETE W/O CONT   Final Result      CT-CHEST,ABDOMEN,PELVIS WITH   Final Result      1.  Bladder wall thickening consistent with cystitis.   2.  Normal appendix.   3.  No bowel obstruction or perforation   4.  Biliary dilation, likely chronic.  Prior cholecystectomy.   5.  Mild bilateral dependent atelectasis.   6.  Severe degenerative change of both shoulders with RIGHT shoulder joint effusion and joint bodies.         DX-CHEST-PORTABLE (1 VIEW)   Final Result      1.  Hypoinflation with bibasilar atelectasis.  Developing LEFT lung base pneumonia is difficult to exclude.   2.  Mildly prominent interstitium, likely chronic   3.  No pneumothorax.   4.  LEFT chest pacemaker present.           Assessment/Plan  * Lightheadedness  Assessment & Plan  Secondary to orthostatic changes and wide pulse pressure  Carvedilol therapy seems to be contributing, continue holding  Monitor blood pressure  Repeat orthostatic vitals in a.m.    AP (abdominal pain)- (present on admission)  Assessment & Plan  Resolved  Continue omeprazole    Sepsis (HCC)- (present on admission)  Assessment & Plan  Resolved    Acute cystitis  Assessment & Plan  History of  Klebsiella acute cystitis  Complicated by sepsis  Continue IV antibiotics  Follow cultures    Dehydration- (present on admission)  Assessment & Plan  Continue holding bumex for now    Acute respiratory failure with hypoxia (HCC)- (present on admission)  Assessment & Plan  Possibly due to atelectasis  Improving  IS recommended    Leukocytosis- (present on admission)  Assessment & Plan  Resolved    PAF (paroxysmal atrial fibrillation) (HCC)- (present on admission)  Assessment & Plan  H/o, continue coreg and eliquis  Currently in sr  following    Type 2 diabetes mellitus without complication, without long-term current use of insulin (HCC)- (present on admission)  Assessment & Plan  Diabetic diet  Blood sugar trending up  Start Lantus  Frequent Accu-Cheks    Foraminal stenosis of lumbar region- (present on admission)  Assessment & Plan  Chronic with chronic pain, at baseline  Supportive care    Cardiac pacemaker in situ- (present on admission)  Assessment & Plan  noted         VTE prophylaxis: therapeutic anticoagulation with Eliquis    I have performed a physical exam and reviewed and updated ROS and Plan today (7/22/2023). In review of yesterday's note (7/21/2023), there are no changes except as documented above.

## 2023-07-22 NOTE — CARE PLAN
The patient is Stable - Low risk of patient condition declining or worsening    Shift Goals  Clinical Goals: monitor BP, monitor labs, PT/OT  Patient Goals: Rest  Family Goals: Stay UTD on POC    Progress made toward(s) clinical / shift goals:  Rest     Problem: Knowledge Deficit - Standard  Goal: Patient and family/care givers will demonstrate understanding of plan of care, disease process/condition, diagnostic tests and medications  Outcome: Progressing  Note: POC: monitor BP, monitor labs, PT/OT. Pt aware and has no additional questions at this time.      Problem: Pain - Standard  Goal: Alleviation of pain or a reduction in pain to the patient’s comfort goal  Outcome: Progressing  Note: Pt declines pain at this time, RN will continue to assess.        Patient is not progressing towards the following goals:

## 2023-07-23 LAB
ANION GAP SERPL CALC-SCNC: 8 MMOL/L (ref 7–16)
BACTERIA BLD CULT: NORMAL
BACTERIA BLD CULT: NORMAL
BASOPHILS # BLD AUTO: 0.4 % (ref 0–1.8)
BASOPHILS # BLD: 0.03 K/UL (ref 0–0.12)
BUN SERPL-MCNC: 23 MG/DL (ref 8–22)
CALCIUM SERPL-MCNC: 9 MG/DL (ref 8.5–10.5)
CHLORIDE SERPL-SCNC: 103 MMOL/L (ref 96–112)
CO2 SERPL-SCNC: 27 MMOL/L (ref 20–33)
CREAT SERPL-MCNC: 1.13 MG/DL (ref 0.5–1.4)
EOSINOPHIL # BLD AUTO: 0.05 K/UL (ref 0–0.51)
EOSINOPHIL NFR BLD: 0.7 % (ref 0–6.9)
ERYTHROCYTE [DISTWIDTH] IN BLOOD BY AUTOMATED COUNT: 50.7 FL (ref 35.9–50)
GFR SERPLBLD CREATININE-BSD FMLA CKD-EPI: 46 ML/MIN/1.73 M 2
GLUCOSE BLD STRIP.AUTO-MCNC: 179 MG/DL (ref 65–99)
GLUCOSE SERPL-MCNC: 143 MG/DL (ref 65–99)
HCT VFR BLD AUTO: 35 % (ref 37–47)
HGB BLD-MCNC: 11.2 G/DL (ref 12–16)
IMM GRANULOCYTES # BLD AUTO: 0.06 K/UL (ref 0–0.11)
IMM GRANULOCYTES NFR BLD AUTO: 0.9 % (ref 0–0.9)
LYMPHOCYTES # BLD AUTO: 2.26 K/UL (ref 1–4.8)
LYMPHOCYTES NFR BLD: 33.1 % (ref 22–41)
MCH RBC QN AUTO: 31.5 PG (ref 27–33)
MCHC RBC AUTO-ENTMCNC: 32 G/DL (ref 32.2–35.5)
MCV RBC AUTO: 98.3 FL (ref 81.4–97.8)
MONOCYTES # BLD AUTO: 0.7 K/UL (ref 0–0.85)
MONOCYTES NFR BLD AUTO: 10.3 % (ref 0–13.4)
NEUTROPHILS # BLD AUTO: 3.72 K/UL (ref 1.82–7.42)
NEUTROPHILS NFR BLD: 54.6 % (ref 44–72)
NRBC # BLD AUTO: 0 K/UL
NRBC BLD-RTO: 0 /100 WBC (ref 0–0.2)
PLATELET # BLD AUTO: 214 K/UL (ref 164–446)
PMV BLD AUTO: 9.8 FL (ref 9–12.9)
POTASSIUM SERPL-SCNC: 4.7 MMOL/L (ref 3.6–5.5)
RBC # BLD AUTO: 3.56 M/UL (ref 4.2–5.4)
SIGNIFICANT IND 70042: NORMAL
SIGNIFICANT IND 70042: NORMAL
SITE SITE: NORMAL
SITE SITE: NORMAL
SODIUM SERPL-SCNC: 138 MMOL/L (ref 135–145)
SOURCE SOURCE: NORMAL
SOURCE SOURCE: NORMAL
WBC # BLD AUTO: 6.8 K/UL (ref 4.8–10.8)

## 2023-07-23 PROCEDURE — 80048 BASIC METABOLIC PNL TOTAL CA: CPT

## 2023-07-23 PROCEDURE — 85025 COMPLETE CBC W/AUTO DIFF WBC: CPT

## 2023-07-23 PROCEDURE — A9270 NON-COVERED ITEM OR SERVICE: HCPCS | Performed by: HOSPITALIST

## 2023-07-23 PROCEDURE — 36415 COLL VENOUS BLD VENIPUNCTURE: CPT

## 2023-07-23 PROCEDURE — 700102 HCHG RX REV CODE 250 W/ 637 OVERRIDE(OP): Performed by: STUDENT IN AN ORGANIZED HEALTH CARE EDUCATION/TRAINING PROGRAM

## 2023-07-23 PROCEDURE — 99232 SBSQ HOSP IP/OBS MODERATE 35: CPT | Performed by: STUDENT IN AN ORGANIZED HEALTH CARE EDUCATION/TRAINING PROGRAM

## 2023-07-23 PROCEDURE — A9270 NON-COVERED ITEM OR SERVICE: HCPCS | Performed by: STUDENT IN AN ORGANIZED HEALTH CARE EDUCATION/TRAINING PROGRAM

## 2023-07-23 PROCEDURE — 700102 HCHG RX REV CODE 250 W/ 637 OVERRIDE(OP): Performed by: HOSPITALIST

## 2023-07-23 PROCEDURE — 770006 HCHG ROOM/CARE - MED/SURG/GYN SEMI*

## 2023-07-23 PROCEDURE — 82962 GLUCOSE BLOOD TEST: CPT

## 2023-07-23 RX ADMIN — LOSARTAN POTASSIUM 50 MG: 50 TABLET, FILM COATED ORAL at 10:24

## 2023-07-23 RX ADMIN — ATORVASTATIN CALCIUM 40 MG: 40 TABLET, FILM COATED ORAL at 17:45

## 2023-07-23 RX ADMIN — SENNOSIDES AND DOCUSATE SODIUM 2 TABLET: 50; 8.6 TABLET ORAL at 05:00

## 2023-07-23 RX ADMIN — OMEPRAZOLE 20 MG: 20 CAPSULE, DELAYED RELEASE ORAL at 05:00

## 2023-07-23 RX ADMIN — INSULIN GLARGINE-YFGN 10 UNITS: 100 INJECTION, SOLUTION SUBCUTANEOUS at 15:14

## 2023-07-23 RX ADMIN — APIXABAN 5 MG: 5 TABLET, FILM COATED ORAL at 05:01

## 2023-07-23 RX ADMIN — APIXABAN 5 MG: 5 TABLET, FILM COATED ORAL at 17:45

## 2023-07-23 ASSESSMENT — ENCOUNTER SYMPTOMS
CARDIOVASCULAR NEGATIVE: 1
WEAKNESS: 1
SORE THROAT: 1
GASTROINTESTINAL NEGATIVE: 1
COUGH: 1
EYES NEGATIVE: 1
MUSCULOSKELETAL NEGATIVE: 1
PSYCHIATRIC NEGATIVE: 1
SHORTNESS OF BREATH: 1

## 2023-07-23 ASSESSMENT — PAIN DESCRIPTION - PAIN TYPE
TYPE: ACUTE PAIN
TYPE: ACUTE PAIN

## 2023-07-23 NOTE — CARE PLAN
Problem: Knowledge Deficit - Standard  Goal: Patient and family/care givers will demonstrate understanding of plan of care, disease process/condition, diagnostic tests and medications  Outcome: Progressing     Problem: Skin Integrity  Goal: Skin integrity is maintained or improved  Outcome: Progressing     Problem: Pain - Standard  Goal: Alleviation of pain or a reduction in pain to the patient’s comfort goal  Outcome: Progressing     Problem: Hemodynamics  Goal: Patient's hemodynamics, fluid balance and neurologic status will be stable or improve  Outcome: Progressing     Problem: Respiratory  Goal: Patient will achieve/maintain optimum respiratory ventilation and gas exchange  Outcome: Progressing   The patient is Stable - Low risk of patient condition declining or worsening    Shift Goals  Clinical Goals: monitor BP % O2 Sat, O2 walking eval in AM  Patient Goals: rest  Family Goals: safety    Progress made toward(s) clinical / shift goals:  POC discussed with the pt and family and verbalized understanding, bed alarm is on, call bell and personal belongings within reach, given enough rest and sleep.

## 2023-07-23 NOTE — PROGRESS NOTES
Assumed care of patient. Bedside report received from night shift RN. Patient updated on plan of care. Patient instructed to call for assistance before getting out of bed. Patient verbalized understanding. Call light is within reach and fall precautions are in place. Bed alarm is broken; strip alarm in place. All needs met at this time. Hourly rounding in place.

## 2023-07-23 NOTE — PROGRESS NOTES
"Hospital Medicine Daily Progress Note    Date of Service  7/23/2023    Chief Complaint  89 y.o. female admitted 7/18/2023 with Sepsis    Hospital Course  89 year old female who presented to [facility] on 7/18/2023 with weakness and dysuria. Patient has a history of dm, gerd, htn, tia, pacer, chronic back pain and recurrent uti. She reports that she has failed multiple recent outpatient antibiotics for uti's and was seen by urology in their clinic yesterday. They prescribed her macrobid. She lives with her daughter and reports that when she woke up today she was too weak to ambulate on her own, she cried out for her daughter, who then brought her to the ER where she was found to have evidence of a uti, wbc of 17.3 and global weakness. She also reported \"severe gerd\" this am that resolved on it's own, she has a history of gerd. She denies flank pain or hematuria but does have ongoing dysuria and chronic lower back pain (at baseline), no fevers or chills, no focal weakness, no chest pain, no dizziness. She is dehydrated on exam. ROS otherwise negative.     7/21 echocardiogram showing ejection fraction 70%, mild LVH, grade 1 diastolic dysfunction, mild to moderate aortic insufficiency, moderate mitral annular calcification and RVSP 40 all of which have not changed from 5/2/2022 echocardiogram    Interval Problem Update  Patient was evaluated at bedside  Lightheadedness better today  Continue holding beta-blocker therapy  7/18 Blood cultures with no growth to date  7/18 Urine cultures with no growth to date  PT recommending postacute care, SNF referral placed  Labs on a.m.    I have discussed this patient's plan of care and discharge plan at IDT rounds today with Case Management, Nursing, Nursing leadership, and other members of the IDT team.    Consultants/Specialty  None    Code Status  DNAR, I OK    Disposition  Not medically cleared  Disposition to be determined    I have placed the appropriate orders for post-discharge " needs.    Review of Systems  Review of Systems   Constitutional:  Positive for malaise/fatigue.   HENT:  Positive for sore throat.    Eyes: Negative.    Respiratory:  Positive for cough and shortness of breath.    Cardiovascular: Negative.    Gastrointestinal: Negative.    Genitourinary:  Positive for dysuria.   Musculoskeletal: Negative.    Skin: Negative.    Neurological:  Positive for weakness.   Endo/Heme/Allergies: Negative.    Psychiatric/Behavioral: Negative.          Physical Exam  Temp:  [36.2 °C (97.2 °F)-37.3 °C (99.2 °F)] 36.9 °C (98.4 °F)  Pulse:  [73-76] 73  Resp:  [17-18] 18  BP: ()/(43-68) 133/68  SpO2:  [86 %-97 %] 94 %    Physical Exam  Constitutional:       Appearance: Normal appearance. She is ill-appearing.   HENT:      Head: Normocephalic and atraumatic.      Mouth/Throat:      Mouth: Mucous membranes are moist.   Eyes:      Extraocular Movements: Extraocular movements intact.      Pupils: Pupils are equal, round, and reactive to light.   Cardiovascular:      Rate and Rhythm: Normal rate and regular rhythm.      Pulses: Normal pulses.      Heart sounds: Normal heart sounds.   Pulmonary:      Effort: Pulmonary effort is normal.      Breath sounds: Normal breath sounds.   Abdominal:      General: Bowel sounds are normal. There is no distension.      Palpations: Abdomen is soft.      Tenderness: There is abdominal tenderness.   Musculoskeletal:         General: No swelling. Normal range of motion.      Cervical back: Normal range of motion and neck supple.   Skin:     General: Skin is warm.      Coloration: Skin is not jaundiced.   Neurological:      General: No focal deficit present.      Mental Status: She is alert and oriented to person, place, and time. Mental status is at baseline.      Cranial Nerves: No cranial nerve deficit.   Psychiatric:         Mood and Affect: Mood normal.         Behavior: Behavior normal.         Thought Content: Thought content normal.         Judgment:  Judgment normal.         Fluids    Intake/Output Summary (Last 24 hours) at 7/23/2023 1309  Last data filed at 7/23/2023 1100  Gross per 24 hour   Intake 420 ml   Output 300 ml   Net 120 ml       Laboratory  Recent Labs     07/21/23  1101 07/22/23  0904 07/23/23  0322   WBC 5.7 6.2 6.8   RBC 3.74* 3.73* 3.56*   HEMOGLOBIN 11.9* 11.9* 11.2*   HEMATOCRIT 37.6 36.0* 35.0*   .5* 96.5 98.3*   MCH 31.8 31.9 31.5   MCHC 31.6* 33.1 32.0*   RDW 51.2* 48.9 50.7*   PLATELETCT 231 225 214   MPV 9.2 9.2 9.8     Recent Labs     07/21/23  1101 07/22/23  0904 07/23/23  0322   SODIUM 137 137 138   POTASSIUM 4.7 4.5 4.7   CHLORIDE 102 100 103   CO2 27 27 27   GLUCOSE 235* 238* 143*   BUN 21 20 23*   CREATININE 1.07 0.88 1.13   CALCIUM 9.2 9.2 9.0                   Imaging  EC-ECHOCARDIOGRAM COMPLETE W/O CONT   Final Result      CT-CHEST,ABDOMEN,PELVIS WITH   Final Result      1.  Bladder wall thickening consistent with cystitis.   2.  Normal appendix.   3.  No bowel obstruction or perforation   4.  Biliary dilation, likely chronic.  Prior cholecystectomy.   5.  Mild bilateral dependent atelectasis.   6.  Severe degenerative change of both shoulders with RIGHT shoulder joint effusion and joint bodies.         DX-CHEST-PORTABLE (1 VIEW)   Final Result      1.  Hypoinflation with bibasilar atelectasis.  Developing LEFT lung base pneumonia is difficult to exclude.   2.  Mildly prominent interstitium, likely chronic   3.  No pneumothorax.   4.  LEFT chest pacemaker present.           Assessment/Plan  * Lightheadedness  Assessment & Plan  Secondary to orthostatic changes and wide pulse pressure  Carvedilol therapy seems to be contributing, continue holding  Monitor blood pressure  Repeat orthostatic vitals in a.m.    AP (abdominal pain)- (present on admission)  Assessment & Plan  Resolved  Continue omeprazole    Sepsis (HCC)- (present on admission)  Assessment & Plan  Resolved    Acute cystitis  Assessment & Plan  History of  Klebsiella acute cystitis  Complicated by sepsis  Continue IV antibiotics  Follow cultures    Dehydration- (present on admission)  Assessment & Plan  Continue holding bumex for now    Acute respiratory failure with hypoxia (HCC)- (present on admission)  Assessment & Plan  Possibly due to atelectasis  Improving  IS recommended    Leukocytosis- (present on admission)  Assessment & Plan  Resolved    PAF (paroxysmal atrial fibrillation) (HCC)- (present on admission)  Assessment & Plan  H/o, continue coreg and eliquis  Currently in sr  following    Type 2 diabetes mellitus without complication, without long-term current use of insulin (HCC)- (present on admission)  Assessment & Plan  Diabetic diet  Blood sugar trending up  Start Lantus  Frequent Accu-Cheks    Foraminal stenosis of lumbar region- (present on admission)  Assessment & Plan  Chronic with chronic pain, at baseline  Supportive care    Cardiac pacemaker in situ- (present on admission)  Assessment & Plan  noted         VTE prophylaxis: therapeutic anticoagulation with Eliquis    I have performed a physical exam and reviewed and updated ROS and Plan today (7/23/2023). In review of yesterday's note (7/22/2023), there are no changes except as documented above.

## 2023-07-24 ENCOUNTER — PATIENT OUTREACH (OUTPATIENT)
Dept: SCHEDULING | Facility: IMAGING CENTER | Age: 88
End: 2023-07-24
Payer: MEDICARE

## 2023-07-24 LAB
ALBUMIN SERPL BCP-MCNC: 3 G/DL (ref 3.2–4.9)
ALBUMIN/GLOB SERPL: 0.9 G/DL
ALP SERPL-CCNC: 60 U/L (ref 30–99)
ALT SERPL-CCNC: 13 U/L (ref 2–50)
ANION GAP SERPL CALC-SCNC: 7 MMOL/L (ref 7–16)
AST SERPL-CCNC: 19 U/L (ref 12–45)
BILIRUB SERPL-MCNC: 0.3 MG/DL (ref 0.1–1.5)
BUN SERPL-MCNC: 25 MG/DL (ref 8–22)
CALCIUM ALBUM COR SERPL-MCNC: 9.7 MG/DL (ref 8.5–10.5)
CALCIUM SERPL-MCNC: 8.9 MG/DL (ref 8.5–10.5)
CHLORIDE SERPL-SCNC: 100 MMOL/L (ref 96–112)
CO2 SERPL-SCNC: 28 MMOL/L (ref 20–33)
CREAT SERPL-MCNC: 0.77 MG/DL (ref 0.5–1.4)
GFR SERPLBLD CREATININE-BSD FMLA CKD-EPI: 73 ML/MIN/1.73 M 2
GLOBULIN SER CALC-MCNC: 3.3 G/DL (ref 1.9–3.5)
GLUCOSE BLD STRIP.AUTO-MCNC: 165 MG/DL (ref 65–99)
GLUCOSE SERPL-MCNC: 142 MG/DL (ref 65–99)
POTASSIUM SERPL-SCNC: 4.7 MMOL/L (ref 3.6–5.5)
PROT SERPL-MCNC: 6.3 G/DL (ref 6–8.2)
SODIUM SERPL-SCNC: 135 MMOL/L (ref 135–145)

## 2023-07-24 PROCEDURE — A9270 NON-COVERED ITEM OR SERVICE: HCPCS | Performed by: STUDENT IN AN ORGANIZED HEALTH CARE EDUCATION/TRAINING PROGRAM

## 2023-07-24 PROCEDURE — 82962 GLUCOSE BLOOD TEST: CPT

## 2023-07-24 PROCEDURE — A9270 NON-COVERED ITEM OR SERVICE: HCPCS | Performed by: HOSPITALIST

## 2023-07-24 PROCEDURE — 770006 HCHG ROOM/CARE - MED/SURG/GYN SEMI*

## 2023-07-24 PROCEDURE — 99232 SBSQ HOSP IP/OBS MODERATE 35: CPT | Performed by: STUDENT IN AN ORGANIZED HEALTH CARE EDUCATION/TRAINING PROGRAM

## 2023-07-24 PROCEDURE — 700102 HCHG RX REV CODE 250 W/ 637 OVERRIDE(OP): Performed by: HOSPITALIST

## 2023-07-24 PROCEDURE — 97535 SELF CARE MNGMENT TRAINING: CPT

## 2023-07-24 PROCEDURE — 80053 COMPREHEN METABOLIC PANEL: CPT

## 2023-07-24 PROCEDURE — 36415 COLL VENOUS BLD VENIPUNCTURE: CPT

## 2023-07-24 PROCEDURE — 700102 HCHG RX REV CODE 250 W/ 637 OVERRIDE(OP): Performed by: STUDENT IN AN ORGANIZED HEALTH CARE EDUCATION/TRAINING PROGRAM

## 2023-07-24 RX ORDER — LOSARTAN POTASSIUM 50 MG/1
50 TABLET ORAL DAILY
Qty: 30 TABLET | Refills: 0 | Status: SHIPPED | OUTPATIENT
Start: 2023-07-25 | End: 2024-02-07 | Stop reason: SDUPTHER

## 2023-07-24 RX ORDER — OMEPRAZOLE 20 MG/1
20 CAPSULE, DELAYED RELEASE ORAL DAILY
Qty: 30 CAPSULE | Refills: 0 | Status: SHIPPED | OUTPATIENT
Start: 2023-07-25 | End: 2023-10-17

## 2023-07-24 RX ORDER — METOPROLOL SUCCINATE 25 MG/1
12.5 TABLET, EXTENDED RELEASE ORAL
Status: DISCONTINUED | OUTPATIENT
Start: 2023-07-24 | End: 2023-07-25 | Stop reason: HOSPADM

## 2023-07-24 RX ADMIN — ATORVASTATIN CALCIUM 40 MG: 40 TABLET, FILM COATED ORAL at 17:26

## 2023-07-24 RX ADMIN — OMEPRAZOLE 20 MG: 20 CAPSULE, DELAYED RELEASE ORAL at 04:15

## 2023-07-24 RX ADMIN — APIXABAN 5 MG: 5 TABLET, FILM COATED ORAL at 17:26

## 2023-07-24 RX ADMIN — APIXABAN 5 MG: 5 TABLET, FILM COATED ORAL at 04:17

## 2023-07-24 RX ADMIN — LOSARTAN POTASSIUM 50 MG: 50 TABLET, FILM COATED ORAL at 04:17

## 2023-07-24 RX ADMIN — ACETAMINOPHEN 1000 MG: 500 TABLET, FILM COATED ORAL at 08:57

## 2023-07-24 RX ADMIN — METOPROLOL SUCCINATE 12.5 MG: 25 TABLET, EXTENDED RELEASE ORAL at 17:26

## 2023-07-24 RX ADMIN — INSULIN GLARGINE-YFGN 10 UNITS: 100 INJECTION, SOLUTION SUBCUTANEOUS at 14:45

## 2023-07-24 RX ADMIN — ACETAMINOPHEN 1000 MG: 500 TABLET, FILM COATED ORAL at 14:47

## 2023-07-24 ASSESSMENT — ENCOUNTER SYMPTOMS
CARDIOVASCULAR NEGATIVE: 1
COUGH: 1
SORE THROAT: 1
SHORTNESS OF BREATH: 1
PSYCHIATRIC NEGATIVE: 1
GASTROINTESTINAL NEGATIVE: 1
MUSCULOSKELETAL NEGATIVE: 1
EYES NEGATIVE: 1
WEAKNESS: 1

## 2023-07-24 ASSESSMENT — COGNITIVE AND FUNCTIONAL STATUS - GENERAL
DAILY ACTIVITIY SCORE: 21
DRESSING REGULAR LOWER BODY CLOTHING: A LITTLE
TOILETING: A LITTLE
HELP NEEDED FOR BATHING: A LITTLE
SUGGESTED CMS G CODE MODIFIER DAILY ACTIVITY: CJ

## 2023-07-24 ASSESSMENT — PAIN DESCRIPTION - PAIN TYPE
TYPE: ACUTE PAIN

## 2023-07-24 NOTE — DISCHARGE PLANNING
Received Choice form at 1205  Agency/Facility Name: [facility]  Referral sent per Choice form @ 2126       @[phone]  Agency/Facility Name: [facility]  Outcome: DPA left a voice message for Nicole at [facility].  Awaiting a call back.

## 2023-07-24 NOTE — CARE PLAN
The patient is Stable - Low risk of patient condition declining or worsening    Shift Goals  Clinical Goals: Will be able to wean off of supplemental O2  Patient Goals: Patient will be able to rest and sleep  Family Goals: Safety    Progress made toward(s) clinical / shift goals:  Maintained on 2Lpm via nasal cannula saturating 91%, desaturates on Room air. Able to rest and sleep during the shift. No significant events during the night.      Problem: Respiratory  Goal: Patient will achieve/maintain optimum respiratory ventilation and gas exchange  Outcome: Progressing     Problem: Pain - Standard  Goal: Alleviation of pain or a reduction in pain to the patient’s comfort goal  Outcome: Progressing     Problem: Skin Integrity  Goal: Skin integrity is maintained or improved  Outcome: Progressing     Problem: Fall Risk  Goal: Patient will remain free from falls  Outcome: Progressing     Problem: Knowledge Deficit - Standard  Goal: Patient and family/care givers will demonstrate understanding of plan of care, disease process/condition, diagnostic tests and medications  Outcome: Progressing

## 2023-07-24 NOTE — CARE PLAN
The patient is Stable - Low risk of patient condition declining or worsening    Shift Goals  Clinical Goals: Patient will get up to the chair for at least one meal  Patient Goals: Patient will rest and remain up to date on plan of care  Family Goals: Patient will be rehabbed at a SNF or post acute therapy hospital berfore returning home    Progress made toward(s) clinical / shift goals:  Patient was able to sit in the chair for lunch, with a two person assist to and from the bed. PT is recommending SNF for the patient and patient's daughter updated with this information.      Problem: Knowledge Deficit - Standard  Goal: Patient and family/care givers will demonstrate understanding of plan of care, disease process/condition, diagnostic tests and medications  Outcome: Progressing     Problem: Skin Integrity  Goal: Skin integrity is maintained or improved  Outcome: Progressing     Problem: Pain - Standard  Goal: Alleviation of pain or a reduction in pain to the patient’s comfort goal  Outcome: Progressing     Problem: Respiratory  Goal: Patient will achieve/maintain optimum respiratory ventilation and gas exchange  Outcome: Not Progressing     Problem: Fall Risk  Goal: Patient will remain free from falls  Outcome: Progressing       Patient is not progressing towards the following goals: Patient's baseline is room air. She is still requiring 2L via NC. Patient instructed on IS use. Patient verbalized understanding and demonstrated good effort.      Problem: Respiratory  Goal: Patient will achieve/maintain optimum respiratory ventilation and gas exchange  Outcome: Not Progressing   Patient instructed on IS use.

## 2023-07-24 NOTE — PROGRESS NOTES
Hospital Medicine Daily Progress Note    Date of Service  7/24/2023    Chief Complaint  89 y.o. female admitted 7/18/2023 with dysuria    Hospital Course  89 year old female with a past medical history of diabetes, hypertension, GERD, chronic back pain, HFpEF, pacer, TIA, and recurrent urinary tract infection was admitted on 7/18/2023 for sepsis secondary to urinary tract infection and acute hypoxic respiratory failure secondary to atelectasis.  She completed a Rocephin antibiotic regimen while inpatient and her leukocytosis as well as her overall symptoms improved.  Urine and blood cultures with no growth to date.  Hospital course was complicated by multiple episodes of orthostatic changes and lightheadedness for which patient went repeat echocardiogram on 7/21/2023 which is consistent with history of HFpEF, ejection fraction 70%, grade 1 diastolic failure and RVSP 40.  Patient had marked improvement with orthostatic changes and lightheadedness once carvedilol was held.  Patient now presenting with fluid overload and oxygen was titrated down to 2 L nasal cannula.  Patient requiring postacute care as per physical therapy evaluation.    Interval Problem Update  Patient was evaluated at bedside  Lightheadedness resolved  DC carvedilol  Start low-dose metoprolol  Continue ARB  PT recommending postacute care, SNF referral placed  Labs on a.m.    Patient is medically clear  Pending SNF placement    I have discussed this patient's plan of care and discharge plan at IDT rounds today with Case Management, Nursing, Nursing leadership, and other members of the IDT team.    Consultants/Specialty  None    Code Status  DNAR, I OK    Disposition  Medically cleared  Disposition to be determined    I have placed the appropriate orders for post-discharge needs.    Review of Systems  Review of Systems   Constitutional:  Positive for malaise/fatigue.   HENT:  Positive for sore throat.    Eyes: Negative.    Respiratory:  Positive for  cough and shortness of breath.    Cardiovascular: Negative.    Gastrointestinal: Negative.    Genitourinary:  Positive for dysuria.   Musculoskeletal: Negative.    Skin: Negative.    Neurological:  Positive for weakness.   Endo/Heme/Allergies: Negative.    Psychiatric/Behavioral: Negative.          Physical Exam  Temp:  [36.5 °C (97.7 °F)-36.8 °C (98.3 °F)] 36.8 °C (98.3 °F)  Pulse:  [67-69] 69  Resp:  [14-18] 14  BP: (122-145)/(47-73) 129/56  SpO2:  [92 %-94 %] 92 %    Physical Exam  Constitutional:       Appearance: Normal appearance. She is ill-appearing.   HENT:      Head: Normocephalic and atraumatic.      Mouth/Throat:      Mouth: Mucous membranes are moist.   Eyes:      Extraocular Movements: Extraocular movements intact.      Pupils: Pupils are equal, round, and reactive to light.   Cardiovascular:      Rate and Rhythm: Normal rate and regular rhythm.      Pulses: Normal pulses.      Heart sounds: Normal heart sounds.   Pulmonary:      Effort: Pulmonary effort is normal.      Breath sounds: Normal breath sounds.   Abdominal:      General: Bowel sounds are normal. There is no distension.      Palpations: Abdomen is soft.      Tenderness: There is abdominal tenderness.   Musculoskeletal:         General: No swelling. Normal range of motion.      Cervical back: Normal range of motion and neck supple.   Skin:     General: Skin is warm.      Coloration: Skin is not jaundiced.   Neurological:      General: No focal deficit present.      Mental Status: She is alert and oriented to person, place, and time. Mental status is at baseline.      Cranial Nerves: No cranial nerve deficit.   Psychiatric:         Mood and Affect: Mood normal.         Behavior: Behavior normal.         Thought Content: Thought content normal.         Judgment: Judgment normal.         Fluids    Intake/Output Summary (Last 24 hours) at 7/24/2023 1541  Last data filed at 7/24/2023 0900  Gross per 24 hour   Intake 480 ml   Output 1150 ml   Net  -670 ml       Laboratory  Recent Labs     07/22/23  0904 07/23/23  0322   WBC 6.2 6.8   RBC 3.73* 3.56*   HEMOGLOBIN 11.9* 11.2*   HEMATOCRIT 36.0* 35.0*   MCV 96.5 98.3*   MCH 31.9 31.5   MCHC 33.1 32.0*   RDW 48.9 50.7*   PLATELETCT 225 214   MPV 9.2 9.8     Recent Labs     07/22/23  0904 07/23/23  0322 07/24/23  0740   SODIUM 137 138 135   POTASSIUM 4.5 4.7 4.7   CHLORIDE 100 103 100   CO2 27 27 28   GLUCOSE 238* 143* 142*   BUN 20 23* 25*   CREATININE 0.88 1.13 0.77   CALCIUM 9.2 9.0 8.9                   Imaging  EC-ECHOCARDIOGRAM COMPLETE W/O CONT   Final Result      CT-CHEST,ABDOMEN,PELVIS WITH   Final Result      1.  Bladder wall thickening consistent with cystitis.   2.  Normal appendix.   3.  No bowel obstruction or perforation   4.  Biliary dilation, likely chronic.  Prior cholecystectomy.   5.  Mild bilateral dependent atelectasis.   6.  Severe degenerative change of both shoulders with RIGHT shoulder joint effusion and joint bodies.         DX-CHEST-PORTABLE (1 VIEW)   Final Result      1.  Hypoinflation with bibasilar atelectasis.  Developing LEFT lung base pneumonia is difficult to exclude.   2.  Mildly prominent interstitium, likely chronic   3.  No pneumothorax.   4.  LEFT chest pacemaker present.           Assessment/Plan  * Lightheadedness  Assessment & Plan  Secondary to not being able to tolerate carvedilol  Discontinue carvedilol  Continue losartan  Start low-dose metoprolol  Monitor    Acute respiratory failure with hypoxia (HCC)- (present on admission)  Assessment & Plan  Possibly due to atelectasis  Improving  IS recommended    Acute cystitis- (present on admission)  Assessment & Plan  History of Klebsiella acute cystitis  Complicated by sepsis on admission  Completed antibiotics  Resolved    Leukocytosis- (present on admission)  Assessment & Plan  Resolved    AP (abdominal pain)- (present on admission)  Assessment & Plan  Resolved  Continue omeprazole    Dehydration- (present on  admission)  Assessment & Plan  Continue holding bumex for now  Resolved    Sepsis (HCC)- (present on admission)  Assessment & Plan  Resolved    PAF (paroxysmal atrial fibrillation) (HCC)- (present on admission)  Assessment & Plan  Continue eliquis  Currently in sr  following    Type 2 diabetes mellitus without complication, without long-term current use of insulin (HCC)- (present on admission)  Assessment & Plan  Diabetic diet  Blood sugar trending up  Start Lantus  Frequent Accu-Cheks    Foraminal stenosis of lumbar region- (present on admission)  Assessment & Plan  Chronic with chronic pain, at baseline  Supportive care    Cardiac pacemaker in situ- (present on admission)  Assessment & Plan  noted         VTE prophylaxis: therapeutic anticoagulation with Eliquis    I have performed a physical exam and reviewed and updated ROS and Plan today (7/24/2023). In review of yesterday's note (7/23/2023), there are no changes except as documented above.

## 2023-07-24 NOTE — PROGRESS NOTES
A&Ox4, intermittent confusion, voiding with use of purewick, heals and elbows blanching, swallowing without issue, denies pain.  Updated on plan of care.

## 2023-07-25 VITALS
RESPIRATION RATE: 15 BRPM | TEMPERATURE: 97.8 F | BODY MASS INDEX: 26.52 KG/M2 | DIASTOLIC BLOOD PRESSURE: 67 MMHG | SYSTOLIC BLOOD PRESSURE: 133 MMHG | HEART RATE: 69 BPM | OXYGEN SATURATION: 94 % | HEIGHT: 65 IN | WEIGHT: 159.17 LBS

## 2023-07-25 LAB — GLUCOSE BLD STRIP.AUTO-MCNC: 118 MG/DL (ref 65–99)

## 2023-07-25 PROCEDURE — 82962 GLUCOSE BLOOD TEST: CPT

## 2023-07-25 PROCEDURE — 700102 HCHG RX REV CODE 250 W/ 637 OVERRIDE(OP): Performed by: HOSPITALIST

## 2023-07-25 PROCEDURE — A9270 NON-COVERED ITEM OR SERVICE: HCPCS | Performed by: HOSPITALIST

## 2023-07-25 PROCEDURE — 700102 HCHG RX REV CODE 250 W/ 637 OVERRIDE(OP): Performed by: STUDENT IN AN ORGANIZED HEALTH CARE EDUCATION/TRAINING PROGRAM

## 2023-07-25 PROCEDURE — A9270 NON-COVERED ITEM OR SERVICE: HCPCS | Performed by: STUDENT IN AN ORGANIZED HEALTH CARE EDUCATION/TRAINING PROGRAM

## 2023-07-25 PROCEDURE — 99239 HOSP IP/OBS DSCHRG MGMT >30: CPT | Performed by: INTERNAL MEDICINE

## 2023-07-25 RX ORDER — METOPROLOL SUCCINATE 25 MG/1
12.5 TABLET, EXTENDED RELEASE ORAL DAILY
Qty: 30 TABLET | Status: SHIPPED
Start: 2023-07-26 | End: 2023-09-11 | Stop reason: SDUPTHER

## 2023-07-25 RX ADMIN — LOSARTAN POTASSIUM 50 MG: 50 TABLET, FILM COATED ORAL at 04:25

## 2023-07-25 RX ADMIN — OMEPRAZOLE 20 MG: 20 CAPSULE, DELAYED RELEASE ORAL at 04:25

## 2023-07-25 RX ADMIN — APIXABAN 5 MG: 5 TABLET, FILM COATED ORAL at 04:26

## 2023-07-25 RX ADMIN — METOPROLOL SUCCINATE 12.5 MG: 25 TABLET, EXTENDED RELEASE ORAL at 04:25

## 2023-07-25 NOTE — PROGRESS NOTES
Krystle is alert and oriented, titrated to room air. Denied pain. Q2 turns performed while in bed. Purewick in place for intermittent incontinence. Pt compliant with incentive spirometer use. Tolerating PO intake. Vitals stable, hourly rounding performed. DC education provided. Report called to RN at SNF.

## 2023-07-25 NOTE — DISCHARGE SUMMARY
"Discharge Summary    CHIEF COMPLAINT ON ADMISSION  Chief Complaint   Patient presents with    Abdominal Pain     X 2 hours    Low Back Pain     X 2 hours       Reason for Admission  ems    Admission Date  7/18/2023     CODE STATUS  DNAR, I OK    HPI & HOSPITAL COURSE  89 year old female with a past medical history of diabetes, hypertension, GERD, chronic back pain, HFpEF, pacer, TIA, and recurrent urinary tract infection was admitted on 7/18/2023 for sepsis secondary to urinary tract infection and acute hypoxic respiratory failure secondary to atelectasis.  She completed a Rocephin antibiotic regimen while inpatient and her leukocytosis as well as her overall symptoms improved.  Urine and blood cultures with no growth to date.  Hospital course was complicated by multiple episodes of orthostatic changes and lightheadedness for which patient went repeat echocardiogram on 7/21/2023 which is consistent with history of HFpEF, ejection fraction 70%, grade 1 diastolic failure and RVSP 40.  Patient had marked improvement with orthostatic changes and lightheadedness once carvedilol was held.  Patient now presenting with fluid overload and oxygen was titrated down to 2 L nasal cannula.  Patient requiring postacute care as per physical therapy evaluation.    Patient improved and remained stable.  She is now down to 1 L nasal cannula. She is medically stable discharge to SNF now. Her coreg was replaced with metoprolol XL with good effect.     Therefore, she is discharged in good and stable condition to skilled nursing facility.    The patient met 2-midnight criteria for an inpatient stay at the time of discharge.      FOLLOW UP ITEMS POST DISCHARGE  F/U with her PCP in 1-2 weeks for ongoing blood pressure checks    DISCHARGE DIAGNOSES  Principal Problem:    Lightheadedness (POA: Unknown)  Active Problems:    Cardiac pacemaker in situ (Chronic) (POA: Yes)      Overview: \"[employer] Pacemaker\"    Foraminal stenosis of lumbar region " (Chronic) (POA: Yes)    Type 2 diabetes mellitus without complication, without long-term current use of insulin (HCC) (Chronic) (POA: Yes)    PAF (paroxysmal atrial fibrillation) (HCC) (Chronic) (POA: Yes)    Acute respiratory failure with hypoxia (HCC) (POA: Yes)    Sepsis (HCC) (POA: Yes)    Dehydration (POA: Yes)    AP (abdominal pain) (POA: Yes)    Leukocytosis (POA: Yes)    Acute cystitis (POA: Yes)  Resolved Problems:    * No resolved hospital problems. *      FOLLOW UP  Future Appointments   Date Time Provider Department Center   9/14/2023  2:15 PM PACER CHECK-[facility] B 2 [facility] None   9/14/2023  2:40 PM Scott Garcia M.D. [facility] None     Joana Hawk A.P.R.NEri  [address] [address]  [phone]    Follow up  Please call your primary care provider to schedule a hospital follow up. Thank you.      MEDICATIONS ON DISCHARGE     Medication List        START taking these medications        Instructions   insulin GLARGINE 100 UNIT/ML Soln  Commonly known as: Lantus,Semglee   Inject 10 Units under the skin 1/2 hour after lunch.  Dose: 10 Units     metFORMIN 500 MG Tabs  Commonly known as: Glucophage   Take 1 Tablet by mouth 2 times a day with meals.  Dose: 500 mg     metoprolol SR 25 MG Tb24  Start taking on: July 26, 2023  Commonly known as: Toprol XL   Take 0.5 Tablets by mouth every day.  Dose: 12.5 mg     omeprazole 20 MG delayed-release capsule  Commonly known as: PriLOSEC   Take 1 Capsule by mouth every day.  Dose: 20 mg            CHANGE how you take these medications        Instructions   losartan 50 MG Tabs  What changed:   medication strength  how much to take  Commonly known as: Cozaar   Take 1 Tablet by mouth every day.  Dose: 50 mg            CONTINUE taking these medications        Instructions   apixaban 5mg Tabs  Commonly known as: Eliquis   Take 1 Tablet by mouth 2 times a day.  Dose: 5 mg     atorvastatin 40 MG Tabs  Commonly known as: Lipitor   TAKE 1 TABLET EVERY  EVENING     bumetanide 0.5 MG Tabs  Commonly known as: Bumex   Take 0.5 mg by mouth 1 time a day as needed. Indications: Edema  Dose: 0.5 mg     clotrimazole-betamethasone 1-0.05 % Crea  Commonly known as: Lotrisone   Apply 1 Application topically 2 times a day as needed.  Dose: 1 Application     docusate sodium 100 MG Caps  Commonly known as: Colace   Take 100 mg by mouth 1 time a day as needed for Constipation.  Dose: 100 mg     ketoconazole 2 % shampoo  Commonly known as: Nizoral   Apply 1 mL topically every Friday.  Dose: 1 mL     phenazopyridine 100 MG Tabs  Commonly known as: Pyridium   Take 100 mg by mouth 3 times a day as needed for Mild Pain. 3 day supply  Dose: 100 mg     Tylenol 8 Hour 650 MG CR tablet  Generic drug: acetaminophen   Take 1,300 mg by mouth 2 times a day.  Dose: 1,300 mg     VITAMIN B-12 PO   Take 1 Tablet by mouth every day.  Dose: 1 Tablet     VITAMIN C PO   Take 1 Tablet by mouth every day.  Dose: 1 Tablet            STOP taking these medications      carvedilol 3.125 MG Tabs  Commonly known as: Coreg     nitrofurantoin 100 MG Caps  Commonly known as: Macrobid              Allergies  Allergies   Allergen Reactions    Codeine Unspecified     Patient reported hallucination and dizziness. Specific to tylenol with codeine      Gabapentin Unspecified     Altered mental status    Sulfa Drugs Nausea     Nausea     Tramadol Unspecified     Altered mentation       DIET  Orders Placed This Encounter   Procedures    Diet Order Diet: Consistent CHO (Diabetic); Fluid modifications: (optional): 1500 ml Fluid Restriction     Standing Status:   Standing     Number of Occurrences:   1     Order Specific Question:   Diet:     Answer:   Consistent CHO (Diabetic) [4]     Order Specific Question:   Fluid modifications: (optional)     Answer:   1500 ml Fluid Restriction [9]       ACTIVITY  As tolerated and directed by skilled nursing.  Weight bearing as tolerated    LINES, DRAINS, AND WOUNDS  This is an  automated list. Peripheral IVs will be removed prior to discharge.  Peripheral IV 07/18/23 20 G Anterior;Right Forearm (Active)   Site Assessment Clean;Dry;Intact 07/25/23 0737   Dressing Type Transparent 07/25/23 0737   Line Status Saline locked;Flushed 07/25/23 0737   Dressing Status Clean;Dry;Intact 07/25/23 0737   Dressing Intervention N/A 07/24/23 0900   Dressing Change Due 07/22/23 07/22/23 2037   Date Primary Tubing Changed 07/20/23 07/20/23 2120   Date IV Connector(s) Changed 07/18/23 07/22/23 0833   Infiltration Grading ([facility], [facility]) 0 07/25/23 0737   Phlebitis Scale ([facility] Only) 0 07/25/23 0737     External Urinary Catheter (Female Wick) (Active)   Collection Container Suction container 07/24/23 0900   Suction Level (Female Wick Catheter) 40 mmHg 07/24/23 0900   Output (mL) 150 mL 07/24/23 0739      Wound 03/04/20 Incision Back Midline;Lower (Active)       Wound 06/01/21 (Active)       Wound 06/01/21 (Active)       Peripheral IV 07/18/23 20 G Anterior;Right Forearm (Active)   Site Assessment Clean;Dry;Intact 07/25/23 0737   Dressing Type Transparent 07/25/23 0737   Line Status Saline locked;Flushed 07/25/23 0737   Dressing Status Clean;Dry;Intact 07/25/23 0737   Dressing Intervention N/A 07/24/23 0900   Dressing Change Due 07/22/23 07/22/23 2037   Date Primary Tubing Changed 07/20/23 07/20/23 2120   Date IV Connector(s) Changed 07/18/23 07/22/23 0833   Infiltration Grading ([facility]) 0 07/25/23 0737   Phlebitis Scale ([facility] Only) 0 07/25/23 0737               MENTAL STATUS ON TRANSFER      A&OX4       CONSULTATIONS  NONE    PROCEDURES  EC-ECHOCARDIOGRAM COMPLETE W/O CONT   Final Result      CT-CHEST,ABDOMEN,PELVIS WITH   Final Result      1.  Bladder wall thickening consistent with cystitis.   2.  Normal appendix.   3.  No bowel obstruction or perforation   4.  Biliary dilation, likely chronic.  Prior cholecystectomy.   5.  Mild bilateral dependent atelectasis.   6.  Severe degenerative change of  both shoulders with RIGHT shoulder joint effusion and joint bodies.         DX-CHEST-PORTABLE (1 VIEW)   Final Result      1.  Hypoinflation with bibasilar atelectasis.  Developing LEFT lung base pneumonia is difficult to exclude.   2.  Mildly prominent interstitium, likely chronic   3.  No pneumothorax.   4.  LEFT chest pacemaker present.            LABORATORY  Lab Results   Component Value Date    SODIUM 135 07/24/2023    POTASSIUM 4.7 07/24/2023    CHLORIDE 100 07/24/2023    CO2 28 07/24/2023    GLUCOSE 142 (H) 07/24/2023    BUN 25 (H) 07/24/2023    CREATININE 0.77 07/24/2023        Lab Results   Component Value Date    WBC 6.8 07/23/2023    HEMOGLOBIN 11.2 (L) 07/23/2023    HEMATOCRIT 35.0 (L) 07/23/2023    PLATELETCT 214 07/23/2023        Total time of the discharge process exceeds 34 minutes.

## 2023-07-25 NOTE — DISCHARGE PLANNING
Medical Social Work  PC from Nancy at [facility], have accepted patient and can take her today at 2:00, will provide transport.     SW informed patient/attending

## 2023-07-25 NOTE — DISCHARGE PLANNING
Agency/Facility Name: [facility]  Outcome: ARACELI left a voice message for Nicole in admissions.  Awaiting a call back.    DREW Mauricio notified via Teams.

## 2023-07-27 ENCOUNTER — PATIENT OUTREACH (OUTPATIENT)
Dept: HEALTH INFORMATION MANAGEMENT | Facility: OTHER | Age: 88
End: 2023-07-27
Payer: MEDICARE

## 2023-07-27 NOTE — PROGRESS NOTES
Community Health Worker Follow-Up    Reason for outreach: CHW called the pt daughter to discuss re enrolling the pt in the CCM program.     CHW Interventions: CHW spoke with the Ana M the daughter (HIPAA approved) and discussed the program. Ana M is very happy with this and will contact the CCM Nurse or this CHW when the pt is out of the SNF. Ana M stated she was very happy the we reached out and would like Anastasia once again on the care team.    Specific Resources Provided:  Housing/Shelter: n/a  Transportation: n/a  Food: n/a  Financial: n/a  Social Supports: n/a  Other: [facility] number     Plan: Ana M stated she would call when the pt get out of the SNF. CHW will follow up in 1 month if we have not heard from the Ana M yet. CHW encouraged the daughter to call with any questions or concerns.

## 2023-08-23 ENCOUNTER — PATIENT OUTREACH (OUTPATIENT)
Dept: HEALTH INFORMATION MANAGEMENT | Facility: OTHER | Age: 88
End: 2023-08-23
Payer: MEDICARE

## 2023-08-23 NOTE — PROGRESS NOTES
Community Health Worker Follow-Up    Reason for outreach: CHW called the pt daughter Ana M and discussed the CCM program.   CHW Interventions: CHW discussed putting the pt back on CCM with Anastasia the CCM nurse. Daughter discussed the pt being released from there SNF on 8/30.    Specific Resources Provided:  Housing/Shelter: n/a  Transportation: n/a  Food: n/a  Financial: n/a  Social Supports: n/a  Other: [facility] number     Plan: Pt is for enrollment on 9/7 @2pm with the CCM nurse Anastasia.

## 2023-08-30 DIAGNOSIS — I48.0 PAF (PAROXYSMAL ATRIAL FIBRILLATION) (HCC): ICD-10-CM

## 2023-08-31 ENCOUNTER — OFFICE VISIT (OUTPATIENT)
Dept: CARDIOLOGY | Facility: MEDICAL CENTER | Age: 88
End: 2023-08-31
Attending: INTERNAL MEDICINE
Payer: MEDICARE

## 2023-08-31 ENCOUNTER — NON-PROVIDER VISIT (OUTPATIENT)
Dept: CARDIOLOGY | Facility: MEDICAL CENTER | Age: 88
End: 2023-08-31

## 2023-08-31 VITALS
RESPIRATION RATE: 16 BRPM | DIASTOLIC BLOOD PRESSURE: 70 MMHG | WEIGHT: 135 LBS | OXYGEN SATURATION: 93 % | HEIGHT: 64 IN | HEART RATE: 70 BPM | BODY MASS INDEX: 23.05 KG/M2 | SYSTOLIC BLOOD PRESSURE: 116 MMHG

## 2023-08-31 DIAGNOSIS — I50.32 CHRONIC HEART FAILURE WITH PRESERVED EJECTION FRACTION (HFPEF) (HCC): Primary | ICD-10-CM

## 2023-08-31 DIAGNOSIS — I48.0 PAF (PAROXYSMAL ATRIAL FIBRILLATION) (HCC): ICD-10-CM

## 2023-08-31 DIAGNOSIS — I44.2 AV BLOCK, 3RD DEGREE (HCC): ICD-10-CM

## 2023-08-31 DIAGNOSIS — Z95.0 CARDIAC PACEMAKER IN SITU: Chronic | ICD-10-CM

## 2023-08-31 DIAGNOSIS — I10 ESSENTIAL HYPERTENSION, BENIGN: ICD-10-CM

## 2023-08-31 DIAGNOSIS — Z79.01 CHRONIC ANTICOAGULATION: ICD-10-CM

## 2023-08-31 DIAGNOSIS — Z95.0 CARDIAC PACEMAKER IN SITU: ICD-10-CM

## 2023-08-31 DIAGNOSIS — R60.0 BILATERAL LOWER EXTREMITY EDEMA: ICD-10-CM

## 2023-08-31 PROCEDURE — 99213 OFFICE O/P EST LOW 20 MIN: CPT | Performed by: INTERNAL MEDICINE

## 2023-08-31 PROCEDURE — 3078F DIAST BP <80 MM HG: CPT | Performed by: INTERNAL MEDICINE

## 2023-08-31 PROCEDURE — 99214 OFFICE O/P EST MOD 30 MIN: CPT | Performed by: INTERNAL MEDICINE

## 2023-08-31 PROCEDURE — 93280 PM DEVICE PROGR EVAL DUAL: CPT | Performed by: INTERNAL MEDICINE

## 2023-08-31 PROCEDURE — 3074F SYST BP LT 130 MM HG: CPT | Performed by: INTERNAL MEDICINE

## 2023-08-31 RX ORDER — BUMETANIDE 0.5 MG/1
0.5 TABLET ORAL DAILY
Qty: 90 TABLET | Refills: 3 | Status: SHIPPED | OUTPATIENT
Start: 2023-08-31

## 2023-08-31 ASSESSMENT — FIBROSIS 4 INDEX: FIB4 SCORE: 2.22

## 2023-08-31 NOTE — PROGRESS NOTES
CARDIOLOGY established PATIENT:    PCP: JAYLEN Castelan    1. Chronic heart failure with preserved ejection fraction (HFpEF) ([facility])    2. Bilateral lower extremity edema    3. PAF (paroxysmal atrial fibrillation) ([facility])    4. Cardiac pacemaker in situ    5. Essential hypertension, benign    6. Chronic anticoagulation        Krystle Tavarez is here for follow-up for chronic HFpEF, paroxysmal atrial fibrillation on chronic anticoagulation and hypertension.  Followed in device clinic.    Chief Complaint   Patient presents with    Atrial Fibrillation     F/V Dx:  PAF (paroxysmal atrial fibrillation) ([facility])    Hypertension    Dyslipidemia       History:     Krystle Tavarez is a 89 y.o. female with history of paroxysmal atrial fibrillation on chronic anticoagulation, hypertension, dyslipidemia, pulmonary hypertension, moderate aortic insufficiency, HFpEF, advanced AV block with permanent pacemaker since 2015 ([employer], MRI non conditional, 100% , 2.5 yrs left on battery as of 2/2023, <1% mode switches), TIA, type 2 diabetes and chronic lower back pain is here for follow-up. LQK1OS2-ADMs score of 6 (history of TIA).    Clinic visit 3/2023: No medication changes, advised her to keep track of her blood pressure and weights to help manage her Bumex, and utilize compression socks.    She was hospitalized 7/2023: Sepsis due to UTI and had hypoxic respiratory failure due to atelectasis.  Her carvedilol was discontinued due to orthostatic concerns.  Her carvedilol was replaced with metoprolol succinate 12.5 mg.  She was discharged to SNF.    At SNF she has been getting Bumex 0.5mg daily with stable weights. Still with MICHAELA, stable.     Denies chest pain, shortness of breath, lightheadedness, dizziness, syncope or falls.  Continues to have lower back pain concerns hence the need for the wheelchair.  Tries her best to stay active.  Returned home with her daughter from SNF yesterday.    On Apixaban:no bleeding  "concerns. Wt > 60kg.    Reviewed labs 2023.    TTE 22:  Compared to the images of the prior study 2021, there has been no   significant change.   Normal left ventricular systolic function.  The left ventricular ejection fraction is visually estimated to be 60%.  Grade II diastolic dysfunction.  Normal right ventricular systolic function.  Moderate aortic insufficiency.  Mild tricuspid regurgitation.  Right ventricular systolic pressure is estimated to be 45 mmHg.     Father  of a heart attack at 74, mother  at 76 from complications of diabetes and stroke.    Functional status:    Not very active      PE:  /70 (BP Location: Left arm, Patient Position: Sitting, BP Cuff Size: Adult)   Pulse 70   Resp 16   Ht 1.626 m (5' 4\")   Wt 61.2 kg (135 lb)   LMP  (LMP Unknown)   SpO2 93%   BMI 23.17 kg/m²     Gen: well  HEENT: Symmetric face. Anicteric sclerae. Moist mucus membranes  NECK: No JVD.   CARDIAC: Regular, Normal S1, S2, No murmur  VASCULATURE: carotids are normal bilaterally without bruit  RESP: Clear to auscultation bilaterally   EXT: 2+ around her ankles predominantly, no clubbing or cyanosis  SKIN: Warm and dry  NEURO: No gross deficits  PSYCH: Appropriate affect, participates in conversation    The ASCVD Risk score (Jesika DK, et al., 2019) failed to calculate.    Past Medical History:   Diagnosis Date    Arthritis     knees, hips    Breath shortness     with exertion     Cataract     bilat IOL     Dental disorder     full upper, partial lower     Diabetes (HCC)     pre diabetic    Heart burn     Hemorrhagic disorder (HCC)     on Eliquis    High cholesterol     diet controlled     Hyperlipidemia     Hypertension     Pacemaker     Pain     lower back, right leg    Pre-diabetes     TIA (transient ischemic attack)     on Eliquis    Urinary incontinence      Past Surgical History:   Procedure Laterality Date    PB LAMINOTOMY,LUMBAR DISK,1 INTRSP N/A 2020    Procedure: " LAMINECTOMY, SPINE, LUMBAR, WITH DISCECTOMY- L5-S1, MEDIAL FACETECTOMY;  Surgeon: Latrell Kimble M.D.;  Location: SURGERY [facility];  Service: Neurosurgery    FORAMINOTOMY N/A 2/25/2020    Procedure: FORAMINOTOMY, SPINE;  Surgeon: Latrell Kimble M.D.;  Location: SURGERY [facility];  Service: Neurosurgery    PACEMAKER INSERTION  2015    HIP REPLACEMENT, TOTAL Right 2009    KNEE REPLACEMENT, TOTAL Right 2004    CATARACT EXTRACTION WITH IOL Bilateral 2003    CHOLECYSTECTOMY  2002    BASAL CELL EXCISION      nose and hand    BUNIONECTOMY Left      Allergies   Allergen Reactions    Codeine Unspecified     Patient reported hallucination and dizziness. Specific to tylenol with codeine      Gabapentin Unspecified     Altered mental status    Sulfa Drugs Nausea     Nausea     Tramadol Unspecified     Altered mentation     Outpatient Encounter Medications as of 8/31/2023   Medication Sig Dispense Refill    bumetanide (BUMEX) 0.5 MG Tab Take 1 Tablet by mouth every day. Indications: Edema 90 Tablet 3    metoprolol SR (TOPROL XL) 25 MG TABLET SR 24 HR Take 0.5 Tablets by mouth every day. 30 Tablet     losartan (COZAAR) 50 MG Tab Take 1 Tablet by mouth every day. 30 Tablet 0    insulin GLARGINE (LANTUS,SEMGLEE) 100 UNIT/ML Solution Inject 10 Units under the skin 1/2 hour after lunch. 10 mL 0    omeprazole (PRILOSEC) 20 MG delayed-release capsule Take 1 Capsule by mouth every day. 30 Capsule 0    metFORMIN (GLUCOPHAGE) 500 MG Tab Take 1 Tablet by mouth 2 times a day with meals. 60 Tablet 0    clotrimazole-betamethasone (LOTRISONE) 1-0.05 % Cream Apply 1 Application topically 2 times a day as needed.      Cyanocobalamin (VITAMIN B-12 PO) Take 1 Tablet by mouth every day.      ketoconazole (NIZORAL) 2 % shampoo Apply 1 mL topically every Friday.      acetaminophen (TYLENOL 8 HOUR) 650 MG CR tablet Take 1,300 mg by mouth 2 times a day.      apixaban (ELIQUIS) 5mg Tab Take 1 Tablet by mouth 2 times a day. 180 Tablet 0     atorvastatin (LIPITOR) 40 MG Tab TAKE 1 TABLET EVERY EVENING (Patient taking differently: Take 40 mg by mouth every evening.) 90 Tablet 3    docusate sodium (COLACE) 100 MG Cap Take 100 mg by mouth 1 time a day as needed for Constipation.      Ascorbic Acid (VITAMIN C PO) Take 1 Tablet by mouth every day.      phenazopyridine (PYRIDIUM) 100 MG Tab Take 100 mg by mouth 3 times a day as needed for Mild Pain. 3 day supply (Patient not taking: Reported on 8/31/2023)      [DISCONTINUED] bumetanide (BUMEX) 0.5 MG Tab Take 0.5 mg by mouth 1 time a day as needed. Indications: Edema       No facility-administered encounter medications on file as of 8/31/2023.     Social History     Socioeconomic History    Marital status:      Spouse name: Not on file    Number of children: Not on file    Years of education: Not on file    Highest education level: Not on file   Occupational History    Not on file   Tobacco Use    Smoking status: Never    Smokeless tobacco: Never   Vaping Use    Vaping Use: Never used   Substance and Sexual Activity    Alcohol use: Not Currently    Drug use: No    Sexual activity: Not Currently     Partners: Male   Other Topics Concern    Not on file   Social History Narrative    Not on file     Social Determinants of Health     Financial Resource Strain: Not on file   Food Insecurity: Not on file   Transportation Needs: No Transportation Needs (5/19/2022)    PRAPARE - Transportation     Lack of Transportation (Medical): No     Lack of Transportation (Non-Medical): No   Physical Activity: Inactive (5/19/2022)    Exercise Vital Sign     Days of Exercise per Week: 0 days     Minutes of Exercise per Session: 10 min   Stress: No Stress Concern Present (5/19/2022)    [employer] [place] [employer] - Occupational Stress Questionnaire     Feeling of Stress : Only a little   Social Connections: Not on file   Intimate Partner Violence: Not on file   Housing Stability: Unknown (5/19/2022)    Housing  "Stability Vital Sign     Unable to Pay for Housing in the Last Year: No     Number of Places Lived in the Last Year: Not on file     Unstable Housing in the Last Year: No     Family History   Problem Relation Age of Onset    Diabetes Mother     Stroke Mother     Heart Attack Father 74    No Known Problems Maternal Grandmother     No Known Problems Maternal Grandfather     No Known Problems Paternal Grandmother     No Known Problems Paternal Grandfather          Studies    Lab Results   Component Value Date/Time    TSHULTRASEN 0.590 05/02/2022 1317      No results found for: \"FREET4\"   Lab Results   Component Value Date/Time    HBA1C 7.8 (H) 07/20/2023 03:06 AM     Lab Results   Component Value Date/Time    CHOLSTRLTOT 135 03/03/2023 09:41 AM    LDL 62 03/03/2023 09:41 AM    HDL 49 03/03/2023 09:41 AM    TRIGLYCERIDE 120 03/03/2023 09:41 AM       Lab Results   Component Value Date/Time    SODIUM 135 07/24/2023 07:40 AM    POTASSIUM 4.7 07/24/2023 07:40 AM    CHLORIDE 100 07/24/2023 07:40 AM    CO2 28 07/24/2023 07:40 AM    GLUCOSE 142 (H) 07/24/2023 07:40 AM    BUN 25 (H) 07/24/2023 07:40 AM    CREATININE 0.77 07/24/2023 07:40 AM     Lab Results   Component Value Date/Time    ALKPHOSPHAT 60 07/24/2023 07:40 AM    ASTSGOT 19 07/24/2023 07:40 AM    ALTSGPT 13 07/24/2023 07:40 AM    TBILIRUBIN 0.3 07/24/2023 07:40 AM        Echocardiogram:  No results found for this or any previous visit.      Assessment and Recommendations:    Problem List Items Addressed This Visit       Cardiac pacemaker in situ (Chronic)    PAF (paroxysmal atrial fibrillation) (HCC) (Chronic)    Relevant Medications    bumetanide (BUMEX) 0.5 MG Tab    Essential hypertension, benign (Chronic)    Relevant Medications    bumetanide (BUMEX) 0.5 MG Tab     Other Visit Diagnoses       Chronic heart failure with preserved ejection fraction (HFpEF) (HCC)    -  Primary    Relevant Medications    bumetanide (BUMEX) 0.5 MG Tab    Bilateral lower extremity " edema        Relevant Medications    bumetanide (BUMEX) 0.5 MG Tab    Chronic anticoagulation              Ms. Tavarez continues to be stable from chronic HFpEF, paroxysmal atrial fibrillation standpoint; with no medication changes today.  Blood pressure normal.    Reminded her to keep track of her weight at least twice a week and if she gains more than 5 pounds a week, to take 0.5 mg of the Bumex twice a day and notify us.    Regarding her chronic stable lower extremity edema, given her lack of activity, I advised her to do her best to find compression socks that works best for her along with daily leg exercises to help mobilize the fluid.  More details provided in the AVS.  Also reminded her on the importance of elevating her legs while sitting down and to limit salt intake.  On exam, she appears to be euvolemic from an intravascular standpoint.    Recommend paying close attention to her weight on follow-up visits given that once she is consistently <= 60 kg, we will have to adjust her apixaban to 2.5 mg twice daily instead of 5 mg twice daily.    Recommend at least biannual CBC and CMP.    Thank you for the opportunity to be involved in Krystle Tavarez 's care; and please reach out with any questions or concerns.    Return in about 6 months (around 2/29/2024).    Scott Garcia MD, MPH [facility]  Interventional Cardiologist  [facility]   of Clinical Internal Medicine - [facility]    ~ Portions of this note were completed using voice recognition software (Dragon Naturally speaking software) . Occasional transcription errors may have escaped proof reading. I have made every reasonable attempt to correct obvious errors, but I expect that there are errors of grammar and possibly content that I did not discover before finalizing the note. ~

## 2023-08-31 NOTE — CARDIAC REMOTE MONITOR - SCAN
Device transmission reviewed. Device demonstrated appropriate function.       Electronically Signed by: Diego Irvin M.D.    9/5/2023  12:42 PM

## 2023-08-31 NOTE — PATIENT INSTRUCTIONS
[email or web address]    Try 10-20mmHg compression level, knee high socks.    If you gain more than 5 lbs of rossi in a week, take extra water pill daily and let us know    Try to lena the incentive spirometer at least 10 times an hour when awake

## 2023-09-01 RX ORDER — APIXABAN 5 MG/1
5 TABLET, FILM COATED ORAL 2 TIMES DAILY
Qty: 180 TABLET | Refills: 3 | Status: SHIPPED | OUTPATIENT
Start: 2023-09-01 | End: 2023-09-01

## 2023-09-01 NOTE — TELEPHONE ENCOUNTER
Re: apixaban. Reviewed last OV notes. Once pt >60kg, reduce dose to 2.5mg BID; 5mg BID refilled at this time.

## 2023-09-05 ENCOUNTER — TELEPHONE (OUTPATIENT)
Dept: PHYSICAL THERAPY | Facility: REHABILITATION | Age: 88
End: 2023-09-05
Payer: MEDICARE

## 2023-09-05 PROCEDURE — 93280 PM DEVICE PROGR EVAL DUAL: CPT | Mod: 26 | Performed by: INTERNAL MEDICINE

## 2023-09-05 NOTE — OP THERAPY DISCHARGE SUMMARY
Outpatient Physical Therapy  DISCHARGE SUMMARY NOTE      [facility] [address], [address]  [place] [address]  Phone:  [phone]    Date of Visit: 09/05/2023    Patient: Krystle Tavarez  YOB: 1933  MRN: [id number]     Referring Provider: Kenn ESPINAL   Referring Diagnosis No admission diagnoses are documented for this encounter.         Functional Assessment Used        Your patient is being discharged from Physical Therapy with the following comments:   Patient has failed to schedule or reschedule follow-up visits    Comments:  Patient has failed to schedule follow up visits; no contact since last visit and lapse in care >30 days at this time. Due to company policy patient will be discharged and in need of a new referral should skilled PT care be needed. Recommend follow up with PCP for ongoing issues.         Orville Mora, PT    Date: 9/5/2023

## 2023-09-07 ENCOUNTER — PATIENT OUTREACH (OUTPATIENT)
Dept: HEALTH INFORMATION MANAGEMENT | Facility: OTHER | Age: 88
End: 2023-09-07
Payer: MEDICARE

## 2023-09-07 DIAGNOSIS — E11.9 TYPE 2 DIABETES MELLITUS WITHOUT COMPLICATION, WITHOUT LONG-TERM CURRENT USE OF INSULIN (HCC): ICD-10-CM

## 2023-09-07 DIAGNOSIS — I10 ESSENTIAL HYPERTENSION, BENIGN: Chronic | ICD-10-CM

## 2023-09-07 PROCEDURE — 99490 CHRNC CARE MGMT STAFF 1ST 20: CPT | Performed by: NURSE PRACTITIONER

## 2023-09-07 SDOH — ECONOMIC STABILITY: FOOD INSECURITY: WITHIN THE PAST 12 MONTHS, YOU WORRIED THAT YOUR FOOD WOULD RUN OUT BEFORE YOU GOT MONEY TO BUY MORE.: NEVER TRUE

## 2023-09-07 SDOH — HEALTH STABILITY: PHYSICAL HEALTH: ON AVERAGE, HOW MANY MINUTES DO YOU ENGAGE IN EXERCISE AT THIS LEVEL?: 0 MIN

## 2023-09-07 SDOH — ECONOMIC STABILITY: INCOME INSECURITY: IN THE LAST 12 MONTHS, WAS THERE A TIME WHEN YOU WERE NOT ABLE TO PAY THE MORTGAGE OR RENT ON TIME?: NO

## 2023-09-07 SDOH — HEALTH STABILITY: PHYSICAL HEALTH: ON AVERAGE, HOW MANY DAYS PER WEEK DO YOU ENGAGE IN MODERATE TO STRENUOUS EXERCISE (LIKE A BRISK WALK)?: 0 DAYS

## 2023-09-07 SDOH — ECONOMIC STABILITY: HOUSING INSECURITY: IN THE LAST 12 MONTHS, HOW MANY PLACES HAVE YOU LIVED?: 1

## 2023-09-07 SDOH — ECONOMIC STABILITY: FOOD INSECURITY: WITHIN THE PAST 12 MONTHS, THE FOOD YOU BOUGHT JUST DIDN'T LAST AND YOU DIDN'T HAVE MONEY TO GET MORE.: NEVER TRUE

## 2023-09-07 ASSESSMENT — SOCIAL DETERMINANTS OF HEALTH (SDOH)
HOW OFTEN DO YOU GET TOGETHER WITH FRIENDS OR RELATIVES?: MORE THAN THREE TIMES A WEEK
IN THE PAST 12 MONTHS, HAS THE ELECTRIC, GAS, OIL, OR WATER COMPANY THREATENED TO SHUT OFF SERVICE IN YOUR HOME?: NO
WITHIN THE LAST YEAR, HAVE YOU BEEN AFRAID OF YOUR PARTNER OR EX-PARTNER?: NO
WITHIN THE LAST YEAR, HAVE YOU BEEN KICKED, HIT, SLAPPED, OR OTHERWISE PHYSICALLY HURT BY YOUR PARTNER OR EX-PARTNER?: NO
IN A TYPICAL WEEK, HOW MANY TIMES DO YOU TALK ON THE PHONE WITH FAMILY, FRIENDS, OR NEIGHBORS?: MORE THAN THREE TIMES A WEEK
HOW OFTEN DO YOU ATTENT MEETINGS OF THE CLUB OR ORGANIZATION YOU BELONG TO?: MORE THAN 4 TIMES PER YEAR
WITHIN THE LAST YEAR, HAVE YOU BEEN HUMILIATED OR EMOTIONALLY ABUSED IN OTHER WAYS BY YOUR PARTNER OR EX-PARTNER?: NO
WITHIN THE LAST YEAR, HAVE TO BEEN RAPED OR FORCED TO HAVE ANY KIND OF SEXUAL ACTIVITY BY YOUR PARTNER OR EX-PARTNER?: NO
HOW OFTEN DO YOU ATTEND CHURCH OR RELIGIOUS SERVICES?: NEVER
DO YOU BELONG TO ANY CLUBS OR ORGANIZATIONS SUCH AS CHURCH GROUPS UNIONS, FRATERNAL OR ATHLETIC GROUPS, OR SCHOOL GROUPS?: YES
HOW HARD IS IT FOR YOU TO PAY FOR THE VERY BASICS LIKE FOOD, HOUSING, MEDICAL CARE, AND HEATING?: NOT HARD AT ALL

## 2023-09-07 ASSESSMENT — LIFESTYLE VARIABLES
HOW OFTEN DO YOU HAVE A DRINK CONTAINING ALCOHOL: NEVER
SKIP TO QUESTIONS 9-10: 1
HOW OFTEN DO YOU HAVE SIX OR MORE DRINKS ON ONE OCCASION: NEVER
AUDIT-C TOTAL SCORE: 0
HOW MANY STANDARD DRINKS CONTAINING ALCOHOL DO YOU HAVE ON A TYPICAL DAY: PATIENT DOES NOT DRINK

## 2023-09-07 ASSESSMENT — PATIENT HEALTH QUESTIONNAIRE - PHQ9: CLINICAL INTERPRETATION OF PHQ2 SCORE: 0

## 2023-09-07 NOTE — PROGRESS NOTES
INITIAL CARE MANAGEMENT CARE PLAN/ASSESSMENT     Krystle is a 89-year-old female that meets all criteria to qualify for the CCM program at current moment.  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  She has verbalized her full name and  as well as given verbal consent to enroll in the CCM program.  Patient has a support system that consists of her daughter Lynnette brown.  Patient lives in a private residence with her daughter and son-in-law that does consist of stairs.  However, Krystle states she does not use the stairs in the home.  Patient does currently have home services (see PT/OT/SN) coming to her home from [facility].  Patient does not have spiritual believes that may affect care given to patient.  Patient does have an advanced directive.  Family is aware of her wishes and it is on her chart.  Patient does have medical equipment at home consisting of her walker she uses consistently.  Patient does not have communication barriers.  Patient does have reliable transportation since her daughter takes her to and from medical appointments.  Patient states she is eating a regular diet at home, but is aware of what she eats due to her diabetes.  Patient is taking her medication how she should and when she should.  Initially patient wanted her goal to be to walk without her walker and go shopping without any assistive device.  This RN suggested we work on increasing her strength/improving her balance and see how far that gets use.    Medication Self-Management Goals:     Reviewed medications listed below with patient.      Current Outpatient Medications:     apixaban (ELIQUIS) 5mg Tab, Take 1 Tablet by mouth 2 times a day., Disp: 180 Tablet, Rfl: 0    bumetanide (BUMEX) 0.5 MG Tab, Take 1 Tablet by mouth every day. Indications: Edema, Disp: 90 Tablet, Rfl: 3    metoprolol SR (TOPROL XL) 25 MG TABLET SR 24 HR, Take 0.5 Tablets by mouth every day., Disp: 30 Tablet, Rfl:      losartan (COZAAR) 50 MG Tab, Take 1 Tablet by mouth every day., Disp: 30 Tablet, Rfl: 0    omeprazole (PRILOSEC) 20 MG delayed-release capsule, Take 1 Capsule by mouth every day., Disp: 30 Capsule, Rfl: 0    metFORMIN (GLUCOPHAGE) 500 MG Tab, Take 1 Tablet by mouth 2 times a day with meals., Disp: 60 Tablet, Rfl: 0    Cyanocobalamin (VITAMIN B-12 PO), Take 1 Tablet by mouth every day., Disp: , Rfl:     acetaminophen (TYLENOL 8 HOUR) 650 MG CR tablet, Take 1,300 mg by mouth 2 times a day., Disp: , Rfl:     phenazopyridine (PYRIDIUM) 100 MG Tab, Take 100 mg by mouth 3 times a day as needed for Mild Pain. 3 day supply, Disp: , Rfl:     atorvastatin (LIPITOR) 40 MG Tab, TAKE 1 TABLET EVERY EVENING (Patient taking differently: Take 40 mg by mouth every evening.), Disp: 90 Tablet, Rfl: 3    docusate sodium (COLACE) 100 MG Cap, Take 100 mg by mouth 1 time a day as needed for Constipation., Disp: , Rfl:     Ascorbic Acid (VITAMIN C PO), Take 1 Tablet by mouth every day., Disp: , Rfl:     insulin GLARGINE (LANTUS,SEMGLEE) 100 UNIT/ML Solution, Inject 10 Units under the skin 1/2 hour after lunch. (Patient not taking: Reported on 9/7/2023), Disp: 10 mL, Rfl: 0    clotrimazole-betamethasone (LOTRISONE) 1-0.05 % Cream, Apply 1 Application topically 2 times a day as needed. (Patient not taking: Reported on 9/7/2023), Disp: , Rfl:     ketoconazole (NIZORAL) 2 % shampoo, Apply 1 mL topically every Friday. (Patient not taking: Reported on 9/7/2023), Disp: , Rfl:          Goal: Continue current medication regimen       Physical/Functional/Environmental Status:     Activities of Daily Living:  Bathing: independent   Dressing: independent  Grooming: independent  Mouth Care: independent  Toileting: independent  Climbing a Flight of Stairs: total dependence    Independent Activities of Daily Living:  Shopping: needs assistance  Cooking: needs assistance  Managing Medications: needs assistance  Using the phone and looking up numbers:  independent  Driving or using public transportation: total dependence  Managing Finances: needs assistance        5/19/2022     3:09 PM 9/7/2023     2:02 PM   STEADI Fall Risk   STEADI Risk for Falling Score 6 7   One or more falls in the last year No No   Advised to use a cane or walker to get around safely Yes Yes   Feels unsteady when walking No Yes   Steadies self on furniture while walking at home No No   Worried about falling Yes Yes   Needs to push with hands when rising from a chair Yes Yes   Has trouble stepping up onto a curb / using stairs Yes Yes   Often has to rush to the toilet No No   Has lost some feeling in feet No No   Takes medicine that makes him/her feel lightheaded or more tired than usual No Yes   Takes medicine to sleep or improve mood No No   Often feels sad or depressed Yes No         Goal: Patient would like to increase her strength and improve her balance.    Social Determinants of Health       Financial Status:      Financial Resource Strain: Low Risk  (9/7/2023)    Overall Financial Resource Strain (CARDIA)     Difficulty of Paying Living Expenses: Not hard at all         Referred to CHW/SW:  NA       Transportation Status:      Transportation Needs: No Transportation Needs (9/7/2023)    PRAPARE - Transportation     Lack of Transportation (Medical): No     Lack of Transportation (Non-Medical): No        Referred to CHW/SW:  NA      Food Insecurity:      Food Insecurity: No Food Insecurity (9/7/2023)    Hunger Vital Sign     Worried About Running Out of Food in the Last Year: Never true     Ran Out of Food in the Last Year: Never true        Referred to CHW/SW:  NA       Housing Status:     Housing Stability: Low Risk  (9/7/2023)    Housing Stability Vital Sign     Unable to Pay for Housing in the Last Year: No     Number of Places Lived in the Last Year: 1     Unstable Housing in the Last Year: No        Referred to CHW/SW:  NA      Social Connections:     Social Connections: Moderately  Isolated (9/7/2023)    Social Connection and Isolation Panel [NHANES]     Frequency of Communication with Friends and Family: More than three times a week     Frequency of Social Gatherings with Friends and Family: More than three times a week     Attends Quaker Services: Never     Active Member of Clubs or Organizations: Yes     Attends Club or Organization Meetings: More than 4 times per year     Marital Status:         Referred to CHW/SW:  NA      Mental/Behavioral/Psychosocial Status:        7/21/2023     8:00 AM 7/23/2023     9:00 PM 9/7/2023     2:01 PM   Depression Screen (PHQ-2/PHQ-9)   PHQ-2 Total Score 0 0    PHQ-2 Total Score   0       Interpretation of PHQ-9 Total Score   Score Severity   1-4 No Depression   5-9 Mild Depression   10-14 Moderate Depression   15-19 Moderately Severe Depression   20-27 Severe Depression       Goal: N/A      Chronic Care Management Care Plan      Goal: Patient would like to increase her strength and improve her balance  Barriers: Weakness, recent hospitalization/skilled nursing stay, balance issues  Interventions: Patient is currently working with PT to improve her balance, this RN encouraged patient to follow their plan of care and ensure she does their home exercises in between sessions to reach her goal, we will reassess her strength and balance when she is discharged from physical therapy, prevent falls by discussing fall risk and fall safety    Start Date: 09/07/2023  End Date:      Goal: Patient would like to go to the grocery store without an assistive device  Barriers: This RN unsure if patient will be able to ambulate without an assistive device, weakness, impaired balance  Interventions: Encourage patient to work with physical therapy, if her goal is to go to the grocery store have her use the assistive devices like scooters there and bring her walker to occasionally walk around if okayed by physical therapy    Start Date: 09/7/2023  End Date:       Discussion: Goals, barriers, and interventions discussed with patient    Goals: Created     Next Scheduled patient outreach: 10/09/2023 @1300

## 2023-09-11 ENCOUNTER — OFFICE VISIT (OUTPATIENT)
Dept: MEDICAL GROUP | Facility: MEDICAL CENTER | Age: 88
End: 2023-09-11
Payer: MEDICARE

## 2023-09-11 VITALS
RESPIRATION RATE: 16 BRPM | WEIGHT: 143 LBS | SYSTOLIC BLOOD PRESSURE: 110 MMHG | HEART RATE: 71 BPM | OXYGEN SATURATION: 92 % | HEIGHT: 64 IN | TEMPERATURE: 97.5 F | BODY MASS INDEX: 24.41 KG/M2 | DIASTOLIC BLOOD PRESSURE: 60 MMHG

## 2023-09-11 DIAGNOSIS — E78.5 DYSLIPIDEMIA: Chronic | ICD-10-CM

## 2023-09-11 DIAGNOSIS — E11.9 TYPE 2 DIABETES MELLITUS WITHOUT COMPLICATION, WITHOUT LONG-TERM CURRENT USE OF INSULIN (HCC): ICD-10-CM

## 2023-09-11 DIAGNOSIS — Z74.09 IMPAIRED MOBILITY AND ACTIVITIES OF DAILY LIVING: ICD-10-CM

## 2023-09-11 DIAGNOSIS — R65.20 SEPSIS WITH ACUTE ORGAN DYSFUNCTION, DUE TO UNSPECIFIED ORGANISM, UNSPECIFIED ORGAN DYSFUNCTION TYPE, UNSPECIFIED WHETHER SEPTIC SHOCK PRESENT (HCC): ICD-10-CM

## 2023-09-11 DIAGNOSIS — A41.9 SEPSIS WITH ACUTE ORGAN DYSFUNCTION, DUE TO UNSPECIFIED ORGANISM, UNSPECIFIED ORGAN DYSFUNCTION TYPE, UNSPECIFIED WHETHER SEPTIC SHOCK PRESENT (HCC): ICD-10-CM

## 2023-09-11 DIAGNOSIS — Z78.9 IMPAIRED MOBILITY AND ACTIVITIES OF DAILY LIVING: ICD-10-CM

## 2023-09-11 DIAGNOSIS — I50.32 CHRONIC HEART FAILURE WITH PRESERVED EJECTION FRACTION (HFPEF) (HCC): ICD-10-CM

## 2023-09-11 DIAGNOSIS — Z09 HOSPITAL DISCHARGE FOLLOW-UP: ICD-10-CM

## 2023-09-11 DIAGNOSIS — R60.0 BILATERAL LOWER EXTREMITY EDEMA: ICD-10-CM

## 2023-09-11 DIAGNOSIS — I10 ESSENTIAL HYPERTENSION, BENIGN: Chronic | ICD-10-CM

## 2023-09-11 DIAGNOSIS — J96.01 ACUTE RESPIRATORY FAILURE WITH HYPOXIA (HCC): ICD-10-CM

## 2023-09-11 DIAGNOSIS — N30.00 ACUTE CYSTITIS WITHOUT HEMATURIA: ICD-10-CM

## 2023-09-11 PROCEDURE — 3078F DIAST BP <80 MM HG: CPT | Performed by: NURSE PRACTITIONER

## 2023-09-11 PROCEDURE — 99214 OFFICE O/P EST MOD 30 MIN: CPT | Performed by: NURSE PRACTITIONER

## 2023-09-11 PROCEDURE — 3074F SYST BP LT 130 MM HG: CPT | Performed by: NURSE PRACTITIONER

## 2023-09-11 RX ORDER — NITROFURANTOIN MACROCRYSTALS 50 MG/1
CAPSULE ORAL
COMMUNITY
Start: 2023-09-08 | End: 2023-10-17

## 2023-09-11 RX ORDER — METOPROLOL SUCCINATE 25 MG/1
12.5 TABLET, EXTENDED RELEASE ORAL DAILY
Qty: 15 TABLET | Refills: 0 | Status: SHIPPED | OUTPATIENT
Start: 2023-09-11 | End: 2023-09-11 | Stop reason: SDUPTHER

## 2023-09-11 RX ORDER — METOPROLOL SUCCINATE 25 MG/1
12.5 TABLET, EXTENDED RELEASE ORAL DAILY
Qty: 45 TABLET | Refills: 3 | Status: SHIPPED | OUTPATIENT
Start: 2023-09-11 | End: 2023-10-09

## 2023-09-11 ASSESSMENT — FIBROSIS 4 INDEX: FIB4 SCORE: 2.22

## 2023-09-11 NOTE — PROGRESS NOTES
Chief Complaint   Patient presents with    Hospital Follow-up     UTI, SEPSIS, went to skill nursing for 3 weeks        Subjective:     HPI:     Krystle Tavarez is a 89 y.o. female here to discuss the following:    Accompanied by her daughter today    Presents today for hospital follow up.   She was hospitalized 7/2023: Sepsis due to UTI and had hypoxic respiratory failure due to atelectasis.  Her carvedilol was discontinued due to orthostatic concerns.  Her carvedilol was replaced with metoprolol succinate 12.5 mg.  She was discharged to SNF.  Discharged from SNF on the 31st.     At SNF she has been getting Bumex 0.5mg daily with stable weights. Still with MICHAELA, stable.     Physical therapy and Occupational Therapy will be starting tomorrow.  She reports that she is eager to start the therapies.  Not on any oxygen at this time.    ROS: : see above        Current Outpatient Medications:     diclofenac sodium (VOLTAREN) 1 % Gel, Apply  topically 4 times a day as needed., Disp: , Rfl:     bumetanide (BUMEX) 0.5 MG Tab, Take 1 Tablet by mouth every day. Indications: Edema, Disp: 90 Tablet, Rfl: 3    losartan (COZAAR) 50 MG Tab, Take 1 Tablet by mouth every day., Disp: 30 Tablet, Rfl: 0    Cyanocobalamin (VITAMIN B-12 PO), Take 1 Tablet by mouth every day., Disp: , Rfl:     acetaminophen (TYLENOL 8 HOUR) 650 MG CR tablet, Take 1,300 mg by mouth 2 times a day., Disp: , Rfl:     atorvastatin (LIPITOR) 40 MG Tab, TAKE 1 TABLET EVERY EVENING (Patient taking differently: Take 40 mg by mouth every evening.), Disp: 90 Tablet, Rfl: 3    docusate sodium (COLACE) 100 MG Cap, Take 100 mg by mouth 1 time a day as needed for Constipation., Disp: , Rfl:     Ascorbic Acid (VITAMIN C PO), Take 1 Tablet by mouth every day., Disp: , Rfl:     ELIQUIS 5 MG Tab, TAKE 1 TABLET TWICE A DAY, Disp: 180 Tablet, Rfl: 3    triamcinolone acetonide (KENALOG) 0.1 % Cream, AAA, chest and back, twice a day for 2 weeks at a time, Disp: 45 g, Rfl: 1     "hydrocortisone 1 % Cream, Apply 1 Application topically 2 times a day., Disp: 14 g, Rfl: 0    metoprolol SR (TOPROL XL) 25 MG TABLET SR 24 HR, TAKE 1/2 TABLET BY MOUTH EVERY DAY, Disp: 45 Tablet, Rfl: 3    ketoconazole (NIZORAL) 2 % shampoo, Apply 1 mL topically every Friday., Disp: 120 mL, Rfl: 2    Allergies   Allergen Reactions    Codeine Unspecified     Patient reported hallucination and dizziness. Specific to tylenol with codeine      Gabapentin Unspecified     Altered mental status    Sulfa Drugs Nausea     Nausea   Other reaction(s): Not available    Tramadol Unspecified     Altered mentation       Objective:     Vitals: /60   Pulse 71   Temp 36.4 °C (97.5 °F) (Temporal)   Resp 16   Ht 1.626 m (5' 4\")   Wt 64.9 kg (143 lb) Comment: per pt  LMP  (LMP Unknown)   SpO2 92%   BMI 24.55 kg/m²    General: Alert, pleasant, NAD  HEENT: Normocephalic.  Neck supple.   Respiratory: no distress, no audible wheezing, RR -WNL  Skin: Warm, dry, no rashes.  Extremities: No leg edema. No discoloration  Neurological: No tremors  Psych:  Affect/mood is normal, judgement is good, memory is intact, grooming is appropriate.    Assessment/Plan:      1. Hospital discharge follow-up  Chart reviewed    2. Sepsis with acute organ dysfunction, due to unspecified organism, unspecified organ dysfunction type, unspecified whether septic shock present (HCC)  Resolved.    3. Acute cystitis without hematuria  Resolved.    4. Acute respiratory failure with hypoxia (HCC)  Resolved.    5. Impaired mobility and activities of daily living  Chronic.  Pending PT and OT.    6. Chronic heart failure with preserved ejection fraction (HFpEF) (HCC)  Chronic, stable.  Now on metoprolol.  Followed by by cardiology.    7. Essential hypertension, benign  Chronic.  Blood pressures have been stable.  - Comp Metabolic Panel; Future  - MICROALBUMIN CREAT RATIO URINE; Future    8. Dyslipidemia  Stable on atorvastatin.  Due for update lipid panel.  - " Lipid Profile; Future  - Comp Metabolic Panel; Future    9. Type 2 diabetes mellitus without complication, without long-term current use of insulin (HCC)  Chronic, stable.  Due for microalbumin.  - MICROALBUMIN CREAT RATIO URINE; Future  - HEMOGLOBIN A1C; Future  - HEMOGLOBIN A1C; Future    10. Bilateral lower extremity edema  Chronic.  Continue to encourage compression stockings or compression wraps.    Other orders  - diclofenac sodium (VOLTAREN) 1 % Gel; Apply  topically 4 times a day as needed.      Return in about 6 months (around 3/11/2024).    {I have placed the above orders and discussed them with an approved delegating provider. The MA is performing the below orders under the direction of Dr. Yazmin YORK

## 2023-09-28 RX ORDER — KETOCONAZOLE 20 MG/ML
1 SHAMPOO TOPICAL
Qty: 120 ML | Refills: 2 | Status: SHIPPED | OUTPATIENT
Start: 2023-09-29

## 2023-10-09 ENCOUNTER — PATIENT OUTREACH (OUTPATIENT)
Dept: HEALTH INFORMATION MANAGEMENT | Facility: OTHER | Age: 88
End: 2023-10-09
Payer: MEDICARE

## 2023-10-09 DIAGNOSIS — I10 ESSENTIAL HYPERTENSION, BENIGN: Chronic | ICD-10-CM

## 2023-10-09 DIAGNOSIS — I10 ESSENTIAL HYPERTENSION, BENIGN: ICD-10-CM

## 2023-10-09 DIAGNOSIS — E11.9 TYPE 2 DIABETES MELLITUS WITHOUT COMPLICATION, WITHOUT LONG-TERM CURRENT USE OF INSULIN (HCC): ICD-10-CM

## 2023-10-09 PROCEDURE — 99999 PR NO CHARGE: CPT | Performed by: NURSE PRACTITIONER

## 2023-10-09 RX ORDER — METOPROLOL SUCCINATE 25 MG/1
12.5 TABLET, EXTENDED RELEASE ORAL
Qty: 45 TABLET | Refills: 3 | Status: SHIPPED | OUTPATIENT
Start: 2023-10-09

## 2023-10-09 NOTE — PROGRESS NOTES
Assessment    Reach out to patient for monthly [facility] follow-up call.  Per patient, she is not doing her best today.  She states she hurts all over more so in her back, hips, and legs.  Per patient she states her body is very in tune with the weather and since the weather is getting colder and stormy her pain has increased.  Per patient, she has been working with Chaya with [facility]'s physical therapy.  The last appointment was last Thursday and they will be out to see her tomorrow.  This RN encouraged patient to continue to do her PT exercises at home as that might help the arthritis pain.  Patient states nothing else helps her pain.  This RN recommended heat or ice to try and decrease the discomfort until physical therapy is out to see her tomorrow.    Patient's last A1c in July was 7.8%.  This RN and patient discussed being more aware of a diabetic diet and ensuring she is doing some form of exercise daily as long as she is stable on her feet.  Per patient she is no longer taking the metformin or Semglee as her PCP took her off both of those.  This RN and patient discussed that could be the cause of the increased A1c and we will see what her new A1c will be in the new year when she is due for her next lab draw.    Education    A1c/diabetic diet/at home interventions for arthritis pain    Plan of Care and Goals    Patient would like to increase her strength and improve her balance    Patient would like to go to the grocery store without an assistive device    Barriers:    Patient's A1c increased to 7.8% when previously 6.9%    Progress:    Progressing    Next outreach: 11/02/2023 @ 1600

## 2023-10-13 ENCOUNTER — HOSPITAL ENCOUNTER (EMERGENCY)
Facility: MEDICAL CENTER | Age: 88
End: 2023-10-13
Attending: EMERGENCY MEDICINE
Payer: MEDICARE

## 2023-10-13 ENCOUNTER — APPOINTMENT (OUTPATIENT)
Dept: RADIOLOGY | Facility: MEDICAL CENTER | Age: 88
End: 2023-10-13
Attending: EMERGENCY MEDICINE
Payer: MEDICARE

## 2023-10-13 VITALS
HEIGHT: 63 IN | OXYGEN SATURATION: 92 % | DIASTOLIC BLOOD PRESSURE: 72 MMHG | BODY MASS INDEX: 25.69 KG/M2 | WEIGHT: 145 LBS | TEMPERATURE: 98 F | HEART RATE: 64 BPM | RESPIRATION RATE: 16 BRPM | SYSTOLIC BLOOD PRESSURE: 155 MMHG
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
## 2023-10-13 DIAGNOSIS — S00.03XA CONTUSION OF SCALP, INITIAL ENCOUNTER: ICD-10-CM

## 2023-10-13 DIAGNOSIS — S09.90XA CLOSED HEAD INJURY, INITIAL ENCOUNTER: ICD-10-CM

## 2023-10-13 DIAGNOSIS — Z79.01 ANTICOAGULATED BY ANTICOAGULATION TREATMENT: ICD-10-CM

## 2023-10-13 PROCEDURE — 70450 CT HEAD/BRAIN W/O DYE: CPT

## 2023-10-13 PROCEDURE — 99284 EMERGENCY DEPT VISIT MOD MDM: CPT | Mod: 25

## 2023-10-13 ASSESSMENT — FIBROSIS 4 INDEX: FIB4 SCORE: 2.22

## 2023-10-13 NOTE — ED TRIAGE NOTES
"Chief Complaint   Patient presents with    T-5000 GLF     Patient reports she was walking with her walker when she fell backwards, hitting her head. -LOC +thinners. Patient has small abrasion to back of head     89 yo female bib EMS for above.     Patient activated as TBI and evaluated at charge desk by ERP. Patient to CT then Red 12 atkins    BP (!) 170/91   Pulse 87   Temp 36.4 °C (97.5 °F) (Temporal)   Resp 16   Ht 1.6 m (5' 3\")   Wt 65.8 kg (145 lb)   LMP  (LMP Unknown)   SpO2 95%   BMI 25.69 kg/m²     "

## 2023-10-13 NOTE — ED PROVIDER NOTES
ED Provider Note    CHIEF COMPLAINT  Chief Complaint   Patient presents with    T-5000 GLF     Patient reports she was walking with her walker when she fell backwards, hitting her head. -LOC +thinners. Patient has small abrasion to back of head       EXTERNAL RECORDS REVIEWED  Patient's last encounter was a hospital follow-up she was seen in the family medicine clinic for known history of heart failure, dyslipidemia, hypertension and diabetes.  Recently admitted for UTI and sepsis.    HPI/ROS  LIMITATION TO HISTORY   None  OUTSIDE HISTORIAN(S):  EMS report taken upon patient arrival.  Patient was headed to an appointment.  She was accompanied by family when she lifted her walker fell backward.  No events in route.  Vital signs stable.  Patient awake and alert.    Krystle Tavarez is a 89 y.o. female who presents to the emergency department with EMS.  She triggers TBI.  She is on Eliquis.  She was able to ambulate after her fall.  She did sustain a contusion to the back of her head.  She typically walks with a walker.  She has no other complaints.  No neck pain.  No extremity injuries.    PAST MEDICAL HISTORY   has a past medical history of Arthritis, Breath shortness, Cataract, Dental disorder, Diabetes (HCC), Heart burn, Hemorrhagic disorder (HCC), High cholesterol, Hyperlipidemia, Hypertension, Pacemaker (2015), Pain (2020), Pre-diabetes, TIA (transient ischemic attack), and Urinary incontinence.    SURGICAL HISTORY   has a past surgical history that includes hip replacement, total (Right, 2009); knee replacement, total (Right, 2004); bunionectomy (Left); basal cell excision; cataract extraction with iol (Bilateral, 2003); pacemaker insertion (2015); cholecystectomy (2002); laminotomy,lumbar disk,1 intrsp (N/A, 2/25/2020); and foraminotomy (N/A, 2/25/2020).    FAMILY HISTORY  Family History   Problem Relation Age of Onset    Diabetes Mother     Stroke Mother     Heart Attack Father 74    No Known Problems Maternal  "Grandmother     No Known Problems Maternal Grandfather     No Known Problems Paternal Grandmother     No Known Problems Paternal Grandfather        SOCIAL HISTORY  Social History     Tobacco Use    Smoking status: Never    Smokeless tobacco: Never   Vaping Use    Vaping Use: Never used   Substance and Sexual Activity    Alcohol use: Not Currently    Drug use: No    Sexual activity: Not Currently     Partners: Male       CURRENT MEDICATIONS  Home Medications       Reviewed by Karina Ngo R.N. (Registered Nurse) on 10/13/23 at 1358  Med List Status: <None>     Medication Last Dose Status   acetaminophen (TYLENOL 8 HOUR) 650 MG CR tablet  Active   apixaban (ELIQUIS) 5mg Tab  Active   Ascorbic Acid (VITAMIN C PO)  Active   atorvastatin (LIPITOR) 40 MG Tab  Active   bumetanide (BUMEX) 0.5 MG Tab  Active   clotrimazole-betamethasone (LOTRISONE) 1-0.05 % Cream  Active   Cyanocobalamin (VITAMIN B-12 PO)  Active   diclofenac sodium (VOLTAREN) 1 % Gel  Active   docusate sodium (COLACE) 100 MG Cap  Active   insulin GLARGINE (LANTUS,SEMGLEE) 100 UNIT/ML Solution  Active   ketoconazole (NIZORAL) 2 % shampoo  Active   losartan (COZAAR) 50 MG Tab  Active   metFORMIN (GLUCOPHAGE) 500 MG Tab  Active   metoprolol SR (TOPROL XL) 25 MG TABLET SR 24 HR  Active   nitrofurantoin (MACRODANTIN) 50 MG Cap  Active   omeprazole (PRILOSEC) 20 MG delayed-release capsule  Active   phenazopyridine (PYRIDIUM) 100 MG Tab  Active                    ALLERGIES  Allergies   Allergen Reactions    Codeine Unspecified     Patient reported hallucination and dizziness. Specific to tylenol with codeine      Gabapentin Unspecified     Altered mental status    Sulfa Drugs Nausea     Nausea   Other reaction(s): Not available    Tramadol Unspecified     Altered mentation       PHYSICAL EXAM  VITAL SIGNS: BP (!) 155/72   Pulse 64   Temp 36.7 °C (98 °F) (Oral)   Resp 16   Ht 1.6 m (5' 3\")   Wt 65.8 kg (145 lb)   LMP  (LMP Unknown)   SpO2 92%   BMI 25.69 " kg/m²    Vitals reviewed.  Constitutional: Patient is oriented to person, place, and time. Appears well-developed and well-nourished. No distress.    Head: Normocephalic.  Swelling, contusion to left posterior occipital parietal scalp no palpable skull fractures.  Mouth/Throat: Oropharynx is clear and moist.  Eyes: Conjunctivae are normal. Pupils are equal, round and reactive to light.  Neck: Normal range of motion. Neck supple.  No midline tenderness or step-offs.  Cardiovascular: Normal rate, regular rhythm and normal heart sounds.  Pulmonary/Chest: Effort normal and breath sounds normal. No respiratory distress, no wheezes, rhonchi, or rales.  Abdominal: Soft. Bowel sounds are normal. There is no tenderness,.  Musculoskeletal: No edema and no tenderness.  No rotation or shortening  Neurological: No cranial nerve deficits. Normal motor and sensory exam. No focal deficits.   Skin: Skin is warm and dry. No erythema. No pallor.   Psychiatric: Patient has a normal mood and affect.     DIAGNOSTIC STUDIES / PROCEDURES      RADIOLOGY  I have independently interpreted the diagnostic imaging associated with this visit and am waiting the final reading from the radiologist.   My preliminary interpretation is as follows: no ICH  Radiologist interpretation:   CT-HEAD W/O   Final Result      1.  Chronic ischemic changes.   2.  No acute intracranial abnormality.               COURSE & MEDICAL DECISION MAKING    ED Observation Status? No; Patient does not meet criteria for ED Observation.     INITIAL ASSESSMENT, COURSE AND PLAN  Care Narrative:   This is a very pleasant 89-year-old female.  She presents after a ground-level fall.  She is overall asymptomatic.  She has some tenderness to her posterior scalp where she has a hematoma.  She reports no other injuries.  No LOC.  She is on Eliquis.  She is taken emergently for CT imaging.    3:02 PM patient is reevaluated at the bedside.  I have evaluated her scalp hematoma.  There is  no need for suture or staple repair.  This is a small abrasion.  I have informed her of her CT findings which show no evidence of skull fracture or intracranial hemorrhage.  She typically walks with a walker.  We will have her ambulate around the department and if she does well anticipate discharge to home.    Patient did well ambulating in the department.  She is eager to go home and I think this is a reasonable disposition.  Friend at the bedside to take her home.  She is given strict return precautions.  She is discharged home in stable condition.      DISPOSITION AND DISCUSSIONS  I have discussed management of the patient with the following physicians and FAVIO's: None    Discussion of management with other QHP or appropriate source(s): None    Escalation of care considered, and ultimately not performed:IV fluids, blood analysis, and acute inpatient care management, however at this time, the patient is most appropriate for outpatient management    Barriers to care at this time, including but not limited to: None.     Decision tools and prescription drugs considered including, but not limited to: Pain Medications patient declines.  Risk of opioid medication versus low benefit, not indicated in this situation .    FINAL DIAGNOSIS  1. Fall from ground level    2. Contusion of scalp, initial encounter    3. Closed head injury, initial encounter    4. Anticoagulated by anticoagulation treatment           Electronically signed by: Crystal Stuart D.O., 10/13/2023 3:02 PM

## 2023-10-13 NOTE — ED NOTES
Pt and daughter state understanding of dc instructions. Pt assisted to br in wc then wheeled out by staff.

## 2023-10-17 ENCOUNTER — OFFICE VISIT (OUTPATIENT)
Dept: MEDICAL GROUP | Facility: MEDICAL CENTER | Age: 88
End: 2023-10-17
Payer: MEDICARE

## 2023-10-17 ENCOUNTER — NON-PROVIDER VISIT (OUTPATIENT)
Dept: CARDIOLOGY | Facility: MEDICAL CENTER | Age: 88
End: 2023-10-17

## 2023-10-17 VITALS
OXYGEN SATURATION: 94 % | BODY MASS INDEX: 25.69 KG/M2 | DIASTOLIC BLOOD PRESSURE: 62 MMHG | HEIGHT: 63 IN | TEMPERATURE: 97.8 F | SYSTOLIC BLOOD PRESSURE: 104 MMHG | WEIGHT: 145 LBS | HEART RATE: 72 BPM

## 2023-10-17 DIAGNOSIS — Z23 NEED FOR VACCINATION: ICD-10-CM

## 2023-10-17 DIAGNOSIS — L57.0 ACTINIC KERATOSIS: ICD-10-CM

## 2023-10-17 DIAGNOSIS — R21 RASH: ICD-10-CM

## 2023-10-17 DIAGNOSIS — S50.12XD TRAUMATIC HEMATOMA OF LEFT FOREARM, SUBSEQUENT ENCOUNTER: ICD-10-CM
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
## 2023-10-17 PROCEDURE — 3078F DIAST BP <80 MM HG: CPT

## 2023-10-17 PROCEDURE — G0008 ADMIN INFLUENZA VIRUS VAC: HCPCS

## 2023-10-17 PROCEDURE — 90662 IIV NO PRSV INCREASED AG IM: CPT

## 2023-10-17 PROCEDURE — 99214 OFFICE O/P EST MOD 30 MIN: CPT | Mod: 25

## 2023-10-17 PROCEDURE — 3074F SYST BP LT 130 MM HG: CPT

## 2023-10-17 PROCEDURE — 93294 REM INTERROG EVL PM/LDLS PM: CPT | Performed by: INTERNAL MEDICINE

## 2023-10-17 RX ORDER — BENZOCAINE/MENTHOL 6 MG-10 MG
1 LOZENGE MUCOUS MEMBRANE 2 TIMES DAILY
Qty: 14 G | Refills: 0 | Status: SHIPPED | OUTPATIENT
Start: 2023-10-17

## 2023-10-17 ASSESSMENT — ENCOUNTER SYMPTOMS
SHORTNESS OF BREATH: 0
PALPITATIONS: 0
COUGH: 0
ORTHOPNEA: 0
FEVER: 0
CHILLS: 0

## 2023-10-17 ASSESSMENT — FIBROSIS 4 INDEX: FIB4 SCORE: 2.22

## 2023-10-17 NOTE — PROGRESS NOTES
Subjective:     CC: Rash (On back and face x 5 days.) and Fall (Pt fell on Friday of last  week hit her head, was checked out at ER but daughter wants reassurance. Left Elbow bruising.)      HPI:   Krystle is a 89 y.o. female who presents today for:     Fall/hematoma left forearm: patient had a fall on 10/13/2023.  She was leaving her house to go to her hair appointment, and fell backwards hitting her head on the curio cabinet next to the door and the floor.  She was seen in the emergency room after the fall. CT head showed no acute fracture, or bleed.  Patient is on Eliquis.  Since the fall, she has been doing well.  She does have a hematoma on her left elbow, and this is slowly been getting better.  It is still painful from her fall.    Rash:  she has a rash on her face and on her back.  This has been present for the last 5 days.  She describes it as dry, and itchy.    Actinic keratosis: Patient has several large actinic keratoses on her back.  She is established with Dr. Alicia dermatology, she plans to call and schedule an appointment with her for follow-up.    Allergies: Codeine, Gabapentin, Sulfa drugs, and Tramadol     Medications:   Current Outpatient Medications:     hydrocortisone 1 % Cream, Apply 1 Application topically 2 times a day., Disp: 14 g, Rfl: 0    metoprolol SR (TOPROL XL) 25 MG TABLET SR 24 HR, TAKE 1/2 TABLET BY MOUTH EVERY DAY, Disp: 45 Tablet, Rfl: 3    ketoconazole (NIZORAL) 2 % shampoo, Apply 1 mL topically every Friday., Disp: 120 mL, Rfl: 2    diclofenac sodium (VOLTAREN) 1 % Gel, Apply  topically 4 times a day as needed., Disp: , Rfl:     apixaban (ELIQUIS) 5mg Tab, Take 1 Tablet by mouth 2 times a day., Disp: 180 Tablet, Rfl: 0    bumetanide (BUMEX) 0.5 MG Tab, Take 1 Tablet by mouth every day. Indications: Edema, Disp: 90 Tablet, Rfl: 3    losartan (COZAAR) 50 MG Tab, Take 1 Tablet by mouth every day., Disp: 30 Tablet, Rfl: 0    Cyanocobalamin (VITAMIN B-12 PO), Take 1 Tablet by mouth  "every day., Disp: , Rfl:     acetaminophen (TYLENOL 8 HOUR) 650 MG CR tablet, Take 1,300 mg by mouth 2 times a day., Disp: , Rfl:     atorvastatin (LIPITOR) 40 MG Tab, TAKE 1 TABLET EVERY EVENING (Patient taking differently: Take 40 mg by mouth every evening.), Disp: 90 Tablet, Rfl: 3    docusate sodium (COLACE) 100 MG Cap, Take 100 mg by mouth 1 time a day as needed for Constipation., Disp: , Rfl:     Ascorbic Acid (VITAMIN C PO), Take 1 Tablet by mouth every day., Disp: , Rfl:     nitrofurantoin (MACRODANTIN) 50 MG Cap, , Disp: , Rfl:     insulin GLARGINE (LANTUS,SEMGLEE) 100 UNIT/ML Solution, Inject 10 Units under the skin 1/2 hour after lunch. (Patient not taking: Reported on 9/7/2023), Disp: 10 mL, Rfl: 0    omeprazole (PRILOSEC) 20 MG delayed-release capsule, Take 1 Capsule by mouth every day. (Patient not taking: Reported on 10/17/2023), Disp: 30 Capsule, Rfl: 0    metFORMIN (GLUCOPHAGE) 500 MG Tab, Take 1 Tablet by mouth 2 times a day with meals. (Patient not taking: Reported on 9/11/2023), Disp: 60 Tablet, Rfl: 0    clotrimazole-betamethasone (LOTRISONE) 1-0.05 % Cream, Apply 1 Application topically 2 times a day as needed. (Patient not taking: Reported on 9/7/2023), Disp: , Rfl:     phenazopyridine (PYRIDIUM) 100 MG Tab, Take 100 mg by mouth 3 times a day as needed for Mild Pain. 3 day supply (Patient not taking: Reported on 10/17/2023), Disp: , Rfl:       ROS:  Review of Systems   Constitutional:  Negative for chills and fever.   Respiratory:  Negative for cough and shortness of breath.    Cardiovascular:  Negative for chest pain, palpitations, orthopnea and leg swelling.       Objective:     Exam:  /62   Pulse 72   Temp 36.6 °C (97.8 °F) (Temporal)   Ht 1.6 m (5' 3\")   Wt 65.8 kg (145 lb)   LMP  (LMP Unknown)   SpO2 94%   BMI 25.69 kg/m²  Body mass index is 25.69 kg/m².    Physical Exam  Constitutional:       Appearance: Normal appearance.   Eyes:      Pupils: Pupils are equal, round, and " reactive to light.   Cardiovascular:      Rate and Rhythm: Normal rate and regular rhythm.      Pulses: Normal pulses.      Heart sounds: Normal heart sounds.   Pulmonary:      Effort: Pulmonary effort is normal.      Breath sounds: Normal breath sounds.   Abdominal:      General: Bowel sounds are normal.      Palpations: Abdomen is soft.   Neurological:      Mental Status: She is alert and oriented to person, place, and time.   Psychiatric:         Mood and Affect: Mood normal.         Behavior: Behavior normal.           Assessment & Plan:     Krystle zaragoza 89 y.o. female with the following -     1. Ground-level fall  2. Traumatic hematoma of left forearm, subsequent encounter  Acute, uncomplicated  Patient still having some left elbow pain from her fall.  She does have a hematoma on her left arm.  Her daughter states that it is slowly getting better.  Patient is on Eliquis, we discussed this could take several weeks for this to resolve.  She does have 2 small lacerations on her head.  No signs of infection.  She did not require stitches when she was at the emergency room for this.    3. Rash  Acute, uncomplicated  We discussed using hydrocortisone for 4 to 5 days for the spots on her back.  Patient does dress warmly, and wraps up in a blanket.  I suspect this is most likely heat rash.  We also discussed using Aquaphor for the rash on her face in addition to the hydrocortisone to help with irritation.  - hydrocortisone 1 % Cream; Apply 1 Application topically 2 times a day.  Dispense: 14 g; Refill: 0    4. Actinic keratosis  Chronic, stable  -Follow-up with dermatology    5. Need for vaccination  - INFLUENZA VACCINE, HIGH DOSE (65+ ONLY)        Anticipatory guidance included the following: Patient counseled about skin care, diet, supplements, smoking, drugs/alcohol use, safe sex and exercise.     Return if symptoms worsen or fail to improve.    Please note that this dictation was created using voice recognition software.  I have made every reasonable attempt to correct obvious errors, but I expect that there are errors of grammar and possibly content that I did not discover before finalizing the note.

## 2023-10-17 NOTE — CARDIAC REMOTE MONITOR - SCAN
Device transmission reviewed. Device demonstrated appropriate function. Atrial flutter noted with overall low burden.      Electronically Signed by: Daniel Duron M.D.    10/23/2023  2:48 PM

## 2023-10-24 ENCOUNTER — OFFICE VISIT (OUTPATIENT)
Dept: DERMATOLOGY | Facility: IMAGING CENTER | Age: 88
End: 2023-10-24
Payer: MEDICARE

## 2023-10-24 DIAGNOSIS — L30.9 DERMATITIS: ICD-10-CM

## 2023-10-24 PROCEDURE — 99213 OFFICE O/P EST LOW 20 MIN: CPT | Performed by: NURSE PRACTITIONER

## 2023-10-24 RX ORDER — TRIAMCINOLONE ACETONIDE 1 MG/G
CREAM TOPICAL
Qty: 45 G | Refills: 1 | Status: SHIPPED | OUTPATIENT
Start: 2023-10-24

## 2023-10-24 NOTE — PROGRESS NOTES
DERMATOLOGY NOTE  FOLLOW UP VISIT       Chief complaint: Skin Lesion          HPI/location: lesions on trunk poss Sks   Time present: 1 week   Painful lesion: No  Itching lesion: Yes  Enlarging lesion: No  Anything make it better or worse?        Hx of red rash, itchy, under breasts. Lessened with nystatin powder     History of skin cancer: BCC nose , SCC temple  Mohs done on all three areas 01/2021  History of biopsies:Yes, Details: wart removal   History of blistering/severe sunburns:No  Family history of skin cancer:No  Family history of atypical moles:No       Allergies   Allergen Reactions    Codeine Unspecified     Patient reported hallucination and dizziness. Specific to tylenol with codeine      Gabapentin Unspecified     Altered mental status    Sulfa Drugs Nausea     Nausea   Other reaction(s): Not available    Tramadol Unspecified     Altered mentation        MEDICATIONS:  Medications relevant to specialty reviewed.     REVIEW OF SYSTEMS:   Positive for skin (see HPI)  Negative for fevers and chills       EXAM:  LMP  (LMP Unknown)   Constitutional: Well-developed, well-nourished, and in no distress.     A focused skin exam was performed including the affected areas of the upper chest and back. Notable findings on exam today listed below and/or in assessment/plan.     Several erythematous papules in varying stages of healing to upper chest and back    IMPRESSION / PLAN:    1. Dermatitis, unspecified  cause--allergic contact dermatitis favored diagnosis  Stressed importance of emollient use  Rx below  Follow up for 2 no improvement  - triamcinolone acetonide (KENALOG) 0.1 % Cream; AAA, chest and back, twice a day for 2 weeks at a time  Dispense: 45 g; Refill: 1    Patient verbalized understanding and agrees with plan regarding the above          Please note that this dictation was created using voice recognition software. I have made every reasonable attempt to correct obvious errors, but I expect that  there are errors of grammar and possibly content that I did not discover before finalizing the note.      Return to clinic in: Return for PRN. and as needed for any new or changing skin lesions.

## 2023-11-06 ENCOUNTER — PATIENT OUTREACH (OUTPATIENT)
Dept: HEALTH INFORMATION MANAGEMENT | Facility: OTHER | Age: 88
End: 2023-11-06
Payer: MEDICARE

## 2023-11-06 DIAGNOSIS — I50.32 CHRONIC HEART FAILURE WITH PRESERVED EJECTION FRACTION (HFPEF) (HCC): ICD-10-CM

## 2023-11-06 DIAGNOSIS — I10 ESSENTIAL HYPERTENSION, BENIGN: ICD-10-CM

## 2023-11-06 DIAGNOSIS — E11.9 TYPE 2 DIABETES MELLITUS WITHOUT COMPLICATION, WITHOUT LONG-TERM CURRENT USE OF INSULIN (HCC): ICD-10-CM

## 2023-11-06 PROCEDURE — 99490 CHRNC CARE MGMT STAFF 1ST 20: CPT | Performed by: NURSE PRACTITIONER

## 2023-11-06 NOTE — PROGRESS NOTES
Assessment    Reached out to patient for monthly [facility] follow up call. Per patient, she is feeling okay.  She was recently seen in the emergency department for ground-level fall.  Patient states she was walking out the door on her way to her hair appointment when her walker caught the threshold and she fell backwards striking her head and the back of her body on her curio cabinet.  Patient was worked up as a TBI and all imaging came back reassuring.  Patient did state it aggravated her back and left her with a few abrasions and bruises.  Patient states on top of this event the weather is also bothering her arthritis, so she is not getting around too quickly these days.  Patient states she is working with physical therapy and they encouraged her to keep exercising and use her muscle relaxer as needed.  This RN and patient spoke about fall risk and fall safety including now being aware of the thresholds between rooms and the outdoors, listening to her body to make sure we are not overdoing it, but also ensuring to keep her body moving to prevent any decline in mobility.  Patient was then seen by her dermatologist for a rash.  She was prescribed Kenalog cream and states that her rash has been healing.  This RN encouraged patient to reach back out to her dermatologist if it does not heal fully.  Patient denies any increase shortness of breath or major swelling of her extremities.  She states she attempts to avoid salt is much as possible, however it is hard when others are cooking for her.  This RN and patient discussed speaking with whomever is cooking and asking to not add salt to the main meal so they can salt her food once portions are given out.  This RN reviewed with patient the heart failure resource booklet that recommend staying below 140 mg of sodium per serving.  Patient states she tries to use compression socks, but they are hard to get on.  This RN to see if [facility] pharmacy is still a good  resource for measurement for compression socks.  Patient had no other questions or needs at the end of phone call.    Education    Sodium restriction    Plan of Care and Goals    Patient would like to increase her strength and improve her balance     Patient would like to go to the grocery store without an assistive device    Barriers:    Recent fall resulting in injury and cold weather flaring her arthritis    Progress:    Progressing    Next outreach: 12/05/2023 @ 1600

## 2023-11-13 DIAGNOSIS — I48.0 PAF (PAROXYSMAL ATRIAL FIBRILLATION) (HCC): ICD-10-CM

## 2023-11-14 RX ORDER — APIXABAN 5 MG/1
5 TABLET, FILM COATED ORAL 2 TIMES DAILY
Qty: 180 TABLET | Refills: 3 | Status: SHIPPED | OUTPATIENT
Start: 2023-11-14

## 2023-12-05 ENCOUNTER — PATIENT OUTREACH (OUTPATIENT)
Dept: HEALTH INFORMATION MANAGEMENT | Facility: OTHER | Age: 88
End: 2023-12-05
Payer: MEDICARE

## 2023-12-05 DIAGNOSIS — I10 ESSENTIAL HYPERTENSION, BENIGN: ICD-10-CM

## 2023-12-05 DIAGNOSIS — E11.9 TYPE 2 DIABETES MELLITUS WITHOUT COMPLICATION, WITHOUT LONG-TERM CURRENT USE OF INSULIN (HCC): ICD-10-CM

## 2023-12-05 PROCEDURE — 99999 PR NO CHARGE: CPT | Performed by: NURSE PRACTITIONER

## 2023-12-05 NOTE — PROGRESS NOTES
12/05/2023:     1542: Attempt x1 to reach patient for monthly [facility] follow up call. No answer. LVM with contact information.     12/11/2023:     1113: Attempt x2 to reach patient for monthly [facility] follow up call. No answer. M with contact information.      1528:     Assessment    Reached out to patient for monthly [facility] follow-up call.  Per patient, she has been doing okay.  Patient states the cold weather is quite bothersome for the arthritis in her knees, ankles, and back.  Patient states she does have a heating pad that minimally helps.  This RN recommended maybe a heated blanket for full body relief.  Patient denies any recent falls since her last.  Patient states she utilizes her walker consistently, is extra careful with mobility, and for the most part is steady on her feet.  Patient states she is not monitoring her blood pressures at home but does have a blood pressure monitor.  This RN recommended monitoring blood pressure at least 3 times a week to ensure we are keeping it controlled.  Patient denies any increase shortness of breath or swelling from her baseline.  This RN let patient know Velcro compression socks are a great option for her to utilize since it is hard to get the other type of compression socks on her feet.  Patient did say that her son has a device that helps put on those kinds of socks and if he can find it he will be sending it to her.  This RN encouraged patient to utilize whichever is easier.  Patient had no other questions or needs at the end of phone call.    Education    Compression socks    Plan of Care and Goals    Patient would like to increase her strength and improve her balance     Patient would like to go to the grocery store without an assistive device    Quarterly assessment completed.  Patient continues to benefit from personal care management services.    Barriers:    Cold weather bothering her arthritis    Progress:    Baseline    Next outreach: 01/16/2024 @  1300

## 2024-01-16 ENCOUNTER — NON-PROVIDER VISIT (OUTPATIENT)
Dept: CARDIOLOGY | Facility: MEDICAL CENTER | Age: 89
End: 2024-01-16
Payer: MEDICARE

## 2024-01-16 ENCOUNTER — PATIENT OUTREACH (OUTPATIENT)
Dept: HEALTH INFORMATION MANAGEMENT | Facility: OTHER | Age: 89
End: 2024-01-16
Payer: MEDICARE

## 2024-01-16 DIAGNOSIS — I10 ESSENTIAL HYPERTENSION, BENIGN: ICD-10-CM

## 2024-01-16 DIAGNOSIS — I50.32 CHRONIC HEART FAILURE WITH PRESERVED EJECTION FRACTION (HFPEF) (HCC): ICD-10-CM

## 2024-01-16 DIAGNOSIS — E11.9 TYPE 2 DIABETES MELLITUS WITHOUT COMPLICATION, WITHOUT LONG-TERM CURRENT USE OF INSULIN (HCC): ICD-10-CM

## 2024-01-16 PROCEDURE — 99490 CHRNC CARE MGMT STAFF 1ST 20: CPT | Performed by: NURSE PRACTITIONER

## 2024-01-16 PROCEDURE — 93294 REM INTERROG EVL PM/LDLS PM: CPT | Performed by: INTERNAL MEDICINE

## 2024-01-16 NOTE — PROGRESS NOTES
Assessment    Reached out to patient for monthly [facility] follow up call. Per patient, she is doing well.  Patient states she is still experiencing pain related to the cold weather and has been utilizing her heating pad.  Patient states that she is steady on her feet when using her walker but with a cane she is feeling weaker.  Patient states physical therapy signed off on her about 2 weeks ago and did leave her exercises to complete to maintain her strength.  This RN encouraged patient to continue completing those exercises and to let us know if she continues to weaken.  Patient states she is currently fighting off a UTI and states she is doing everything she can to do so, however, she has not had her urine tested nor is she on antibiotics.  This RN explained the importance of testing to confirm diagnosis and receive proper treatment since UTIs do not normally spontaneously resolve.  Patient states her symptoms include burning with urination, urgency, and frequency.  Patient states she has been keeping herself well-hydrated and drinking cranberry juice.  This RN did schedule patient for an appointment on January 19 for evaluation as that is the time requested by patient's daughter.  In the meantime, patient and daughter educated to monitor symptoms and if patient symptoms were to increase or she were to become weak/confused patient needs to be seen immediately.    Patient denies any increase shortness of breath or swelling from baseline.  Patient states she does get edema in her feet so this RN reviewed interventions such as elevating her feet when sitting, utilizing Velcro compression stockings that we discussed during one of her last phone calls, and not sitting/standing for too long of a period.  Patient states she is compliant with all of her medications and has no questions about them at this time. Patient had no other questions or needs at the end of phone call.    Education    UTI treatment    Plan of Care and  Goals    Patient would like to increase her strength and improve her balance     Patient would like to go to the grocery store without an assistive device     Quarterly assessment completed.  Patient continues to benefit from personal care management services.    Barriers:    Patient currently may have a UTI and has not seek treatment cannot    Progress:    Not progressing    Next outreach: 1 month

## 2024-01-16 NOTE — CARDIAC REMOTE MONITOR - SCAN
Device transmission reviewed. Device demonstrated appropriate function.       Electronically Signed by: Diego Irvin M.D.    1/16/2024  1:48 PM

## 2024-01-18 PROBLEM — I73.9 PERIPHERAL VASCULAR DISEASE, UNSPECIFIED (HCC): Status: ACTIVE | Noted: 2024-01-18

## 2024-01-18 PROBLEM — I70.0 ATHEROSCLEROSIS OF AORTA (HCC): Status: ACTIVE | Noted: 2024-01-18

## 2024-01-19 ENCOUNTER — HOSPITAL ENCOUNTER (OUTPATIENT)
Facility: MEDICAL CENTER | Age: 89
End: 2024-01-19
Attending: NURSE PRACTITIONER
Payer: MEDICARE

## 2024-01-19 ENCOUNTER — OFFICE VISIT (OUTPATIENT)
Dept: MEDICAL GROUP | Facility: MEDICAL CENTER | Age: 89
End: 2024-01-19
Payer: MEDICARE

## 2024-01-19 VITALS
BODY MASS INDEX: 25.69 KG/M2 | OXYGEN SATURATION: 93 % | SYSTOLIC BLOOD PRESSURE: 114 MMHG | DIASTOLIC BLOOD PRESSURE: 70 MMHG | HEIGHT: 63 IN | HEART RATE: 72 BPM | WEIGHT: 145 LBS | TEMPERATURE: 98.6 F | RESPIRATION RATE: 16 BRPM

## 2024-01-19 DIAGNOSIS — N89.8 VAGINAL DRYNESS: ICD-10-CM

## 2024-01-19 DIAGNOSIS — R30.0 DYSURIA: ICD-10-CM

## 2024-01-19 DIAGNOSIS — I73.9 PERIPHERAL VASCULAR DISEASE, UNSPECIFIED (HCC): Chronic | ICD-10-CM

## 2024-01-19 DIAGNOSIS — I70.0 ATHEROSCLEROSIS OF AORTA (HCC): Chronic | ICD-10-CM

## 2024-01-19 DIAGNOSIS — R32 URINARY INCONTINENCE, UNSPECIFIED TYPE: ICD-10-CM

## 2024-01-19 PROBLEM — J96.01 ACUTE RESPIRATORY FAILURE WITH HYPOXIA (HCC): Status: RESOLVED | Noted: 2022-05-01 | Resolved: 2024-01-19

## 2024-01-19 PROBLEM — R42 LIGHTHEADEDNESS: Status: RESOLVED | Noted: 2023-07-22 | Resolved: 2024-01-19

## 2024-01-19 PROBLEM — I50.32 CHRONIC HEART FAILURE WITH PRESERVED EJECTION FRACTION (HFPEF) (HCC): Chronic | Status: ACTIVE | Noted: 2023-08-31

## 2024-01-19 PROBLEM — N30.00 ACUTE CYSTITIS: Status: RESOLVED | Noted: 2023-07-19 | Resolved: 2024-01-19

## 2024-01-19 PROBLEM — E86.0 DEHYDRATION: Status: RESOLVED | Noted: 2023-07-18 | Resolved: 2024-01-19

## 2024-01-19 PROBLEM — R10.9 AP (ABDOMINAL PAIN): Status: RESOLVED | Noted: 2023-07-18 | Resolved: 2024-01-19

## 2024-01-19 PROBLEM — D72.829 LEUKOCYTOSIS: Status: RESOLVED | Noted: 2023-07-19 | Resolved: 2024-01-19

## 2024-01-19 PROBLEM — A41.9 SEPSIS (HCC): Status: RESOLVED | Noted: 2023-07-18 | Resolved: 2024-01-19

## 2024-01-19 LAB
APPEARANCE UR: NORMAL
BILIRUB UR STRIP-MCNC: NORMAL MG/DL
COLOR UR AUTO: NORMAL
GLUCOSE UR STRIP.AUTO-MCNC: NORMAL MG/DL
KETONES UR STRIP.AUTO-MCNC: NORMAL MG/DL
LEUKOCYTE ESTERASE UR QL STRIP.AUTO: NORMAL
NITRITE UR QL STRIP.AUTO: NORMAL
PH UR STRIP.AUTO: 5.5 [PH] (ref 5–8)
PROT UR QL STRIP: NORMAL MG/DL
RBC UR QL AUTO: NORMAL
SP GR UR STRIP.AUTO: 1.01
UROBILINOGEN UR STRIP-MCNC: 0.2 MG/DL

## 2024-01-19 PROCEDURE — 3078F DIAST BP <80 MM HG: CPT | Performed by: NURSE PRACTITIONER

## 2024-01-19 PROCEDURE — 3074F SYST BP LT 130 MM HG: CPT | Performed by: NURSE PRACTITIONER

## 2024-01-19 PROCEDURE — 99214 OFFICE O/P EST MOD 30 MIN: CPT | Performed by: NURSE PRACTITIONER

## 2024-01-19 PROCEDURE — 81002 URINALYSIS NONAUTO W/O SCOPE: CPT | Performed by: NURSE PRACTITIONER

## 2024-01-19 PROCEDURE — 87086 URINE CULTURE/COLONY COUNT: CPT

## 2024-01-19 ASSESSMENT — FIBROSIS 4 INDEX: FIB4 SCORE: 2.22

## 2024-01-19 ASSESSMENT — PATIENT HEALTH QUESTIONNAIRE - PHQ9: CLINICAL INTERPRETATION OF PHQ2 SCORE: 0

## 2024-01-19 NOTE — PROGRESS NOTES
Subjective:     HPI:     Krystle Tavarez is a 89 y.o. female  presents to discuss:     Patient presents today with burning with urination that started about a week ago.  No fevers, pelvic pain.  She does have urinary leakage and does use a pad.  Daughter states that she could change her pads a bit more frequently.  She has had this problem in the past.  Most recent urine culture about 6 months ago was negative.  Trying to stay hydrated and drinking cranberry juice.      ROS: : see above      Current Outpatient Medications:     ELIQUIS 5 MG Tab, TAKE 1 TABLET TWICE A DAY, Disp: 180 Tablet, Rfl: 3    triamcinolone acetonide (KENALOG) 0.1 % Cream, AAA, chest and back, twice a day for 2 weeks at a time, Disp: 45 g, Rfl: 1    hydrocortisone 1 % Cream, Apply 1 Application topically 2 times a day., Disp: 14 g, Rfl: 0    metoprolol SR (TOPROL XL) 25 MG TABLET SR 24 HR, TAKE 1/2 TABLET BY MOUTH EVERY DAY, Disp: 45 Tablet, Rfl: 3    ketoconazole (NIZORAL) 2 % shampoo, Apply 1 mL topically every Friday., Disp: 120 mL, Rfl: 2    diclofenac sodium (VOLTAREN) 1 % Gel, Apply  topically 4 times a day as needed., Disp: , Rfl:     bumetanide (BUMEX) 0.5 MG Tab, Take 1 Tablet by mouth every day. Indications: Edema, Disp: 90 Tablet, Rfl: 3    losartan (COZAAR) 50 MG Tab, Take 1 Tablet by mouth every day., Disp: 30 Tablet, Rfl: 0    Cyanocobalamin (VITAMIN B-12 PO), Take 1 Tablet by mouth every day., Disp: , Rfl:     acetaminophen (TYLENOL 8 HOUR) 650 MG CR tablet, Take 1,300 mg by mouth 2 times a day., Disp: , Rfl:     atorvastatin (LIPITOR) 40 MG Tab, TAKE 1 TABLET EVERY EVENING (Patient taking differently: Take 40 mg by mouth every evening.), Disp: 90 Tablet, Rfl: 3    docusate sodium (COLACE) 100 MG Cap, Take 100 mg by mouth 1 time a day as needed for Constipation., Disp: , Rfl:     Ascorbic Acid (VITAMIN C PO), Take 1 Tablet by mouth every day., Disp: , Rfl:     Allergies   Allergen Reactions    Codeine Unspecified     Patient  "reported hallucination and dizziness. Specific to tylenol with codeine      Gabapentin Unspecified     Altered mental status    Sulfa Drugs Nausea     Nausea   Other reaction(s): Not available    Tramadol Unspecified     Altered mentation       Objective:     Vitals: /70   Pulse 72   Temp 37 °C (98.6 °F)   Resp 16   Ht 1.6 m (5' 3\")   Wt 65.8 kg (145 lb)   LMP  (LMP Unknown)   SpO2 93%   BMI 25.69 kg/m²    General: Alert, pleasant, NAD  HEENT: Normocephalic.  Neck supple.   Respiratory: no distress, no audible wheezing, RR -WNL  Skin: Warm, dry, no rashes.  Extremities: No leg edema. No discoloration  Neurological: No tremors  Psych:  Affect/mood is normal, judgement is good, memory is intact, grooming is appropriate.    Point-of-care urinalysis negative for blood, negative for protein, negative for nitrates negative for leukocytes.    Assessment/Plan:      1. Dysuria  Point-of-care urinalysis negative for any acute findings suggestive of acute infection.  Recommend push fluids, change pad more frequently, trial of previously prescribed clotrimazole/betamethasone and possibly use Desitin as a barrier protectant.  We will still send the urine for culture.  - POCT Urinalysis  - URINE CULTURE(NEW); Future    2. Vaginal dryness  3. Urinary incontinence, unspecified type      HCC Gap Form    Diagnosis to address: I70.0 - Atherosclerosis of aorta (HCC)  Assessment and plan: Chronic, stable. Continue with current defined treatment plan: Continue atorvastatin.. Follow-up at least annually.  Diagnosis: I73.9 - Peripheral vascular disease, unspecified (HCC)  Assessment and plan: Chronic, ongoing problem-difficult to manage.  Recommend compression wraps, Tubigrip, elevation of legs, encourage activity as tolerated.  Monitor.  Last edited 01/19/24 13:15 PST by Joana Hawk, A.P.R.N.         Return if symptoms worsen or fail to improve.    {I have placed the above orders and discussed them with an " approved delegating provider. The MA is performing the below orders under the direction of Dr. Tray YORK

## 2024-01-21 LAB
BACTERIA UR CULT: NORMAL
SIGNIFICANT IND 70042: NORMAL
SITE SITE: NORMAL
SOURCE SOURCE: NORMAL

## 2024-02-05 ENCOUNTER — PATIENT OUTREACH (OUTPATIENT)
Dept: HEALTH INFORMATION MANAGEMENT | Facility: OTHER | Age: 89
End: 2024-02-05
Payer: MEDICARE

## 2024-02-05 DIAGNOSIS — I10 ESSENTIAL HYPERTENSION, BENIGN: Chronic | ICD-10-CM

## 2024-02-05 DIAGNOSIS — E11.9 TYPE 2 DIABETES MELLITUS WITHOUT COMPLICATION, WITHOUT LONG-TERM CURRENT USE OF INSULIN (HCC): ICD-10-CM

## 2024-02-05 DIAGNOSIS — I50.32 CHRONIC HEART FAILURE WITH PRESERVED EJECTION FRACTION (HFPEF) (HCC): ICD-10-CM

## 2024-02-05 PROCEDURE — 99999 PR NO CHARGE: CPT | Performed by: NURSE PRACTITIONER

## 2024-02-05 NOTE — PROGRESS NOTES
Assessment    Reached out to patient for monthly [facility] follow up call. Per patient, she is doing well.  Patient states she is still experiencing back pain.  This RN encouraged patient to utilize her heating pad and movement as tolerated.  Patient does utilize Tylenol over-the-counter and states it is minimally helpful.  Patient denies any recent falls and is steady on her feet with walker.  Patient states she is performing her physical therapy exercises daily to improve strength.  Patient states she is still experiencing pain and burning with urination, however, the burning does decrease when she drinks a good amount of fluid.  Patient is using Desitin cream with relief.    Patient does deny any increase shortness of breath or swelling from baseline.  Patient states she usually monitors her weight once a week, but this RN encouraged patient to monitor daily if able.  If unable to do so, at least every other day.  Patient is taking her Bumex and all other medication as prescribed.  We have discussed heart failure in the past, but today patient stated she had no idea she had that diagnosis.  This RN provided patient with a general explanation of what heart failure is and what type of heart failure she has.  This RN did encourage patient to have a more in-depth conversation with her cardiologist on Wednesday.  Patient has not monitoring blood pressure at home, but has not had any issues with elevated blood pressure at past appointments.  Patient denies any signs or symptoms of hyper/hypoglycemia.  Patient states she is only eating sugar in moderation.  Last A1c was 7.8%.  Patient does have fasting lab orders including A1c and lipid panel.  This RN did remind patient she needs to complete those and follow-up with her PCP in March.  Patient and daughter did not want to schedule at this time.    Education    CHF, lab orders     Plan of Care and Goals    Patient would like to increase her strength and improve her balance      Patient would like to go to the grocery store without an assistive device    Barriers:    Back pain and urinay symptoms     Progress:    Not progressing     Next outreach: 1 month

## 2024-02-07 ENCOUNTER — OFFICE VISIT (OUTPATIENT)
Dept: CARDIOLOGY | Facility: MEDICAL CENTER | Age: 89
End: 2024-02-07
Attending: INTERNAL MEDICINE
Payer: MEDICARE

## 2024-02-07 ENCOUNTER — NON-PROVIDER VISIT (OUTPATIENT)
Dept: CARDIOLOGY | Facility: MEDICAL CENTER | Age: 89
End: 2024-02-07
Attending: NURSE PRACTITIONER
Payer: MEDICARE

## 2024-02-07 VITALS
SYSTOLIC BLOOD PRESSURE: 112 MMHG | RESPIRATION RATE: 10 BRPM | BODY MASS INDEX: 25.69 KG/M2 | HEART RATE: 66 BPM | HEIGHT: 63 IN | DIASTOLIC BLOOD PRESSURE: 60 MMHG | WEIGHT: 145 LBS | OXYGEN SATURATION: 90 %

## 2024-02-07 DIAGNOSIS — Z95.0 CARDIAC PACEMAKER IN SITU: ICD-10-CM

## 2024-02-07 DIAGNOSIS — I50.32 CHRONIC HEART FAILURE WITH PRESERVED EJECTION FRACTION (HFPEF) (HCC): Primary | ICD-10-CM

## 2024-02-07 DIAGNOSIS — I10 ESSENTIAL HYPERTENSION, BENIGN: ICD-10-CM

## 2024-02-07 DIAGNOSIS — Z95.0 CARDIAC PACEMAKER IN SITU: Chronic | ICD-10-CM

## 2024-02-07 DIAGNOSIS — N30.00 ACUTE CYSTITIS WITHOUT HEMATURIA: ICD-10-CM

## 2024-02-07 DIAGNOSIS — Z79.01 CHRONIC ANTICOAGULATION: ICD-10-CM

## 2024-02-07 DIAGNOSIS — I44.2 CHB (COMPLETE HEART BLOCK) (HCC): ICD-10-CM

## 2024-02-07 DIAGNOSIS — I89.0 LYMPHEDEMA: ICD-10-CM

## 2024-02-07 DIAGNOSIS — I48.0 PAF (PAROXYSMAL ATRIAL FIBRILLATION) (HCC): ICD-10-CM

## 2024-02-07 PROCEDURE — 93288 INTERROG EVL PM/LDLS PM IP: CPT | Performed by: NURSE PRACTITIONER

## 2024-02-07 PROCEDURE — 3074F SYST BP LT 130 MM HG: CPT | Performed by: INTERNAL MEDICINE

## 2024-02-07 PROCEDURE — 99213 OFFICE O/P EST LOW 20 MIN: CPT | Performed by: INTERNAL MEDICINE

## 2024-02-07 PROCEDURE — 93288 INTERROG EVL PM/LDLS PM IP: CPT | Mod: 26 | Performed by: NURSE PRACTITIONER

## 2024-02-07 PROCEDURE — 3078F DIAST BP <80 MM HG: CPT | Performed by: INTERNAL MEDICINE

## 2024-02-07 PROCEDURE — 99214 OFFICE O/P EST MOD 30 MIN: CPT | Performed by: INTERNAL MEDICINE

## 2024-02-07 RX ORDER — LOSARTAN POTASSIUM 50 MG/1
50 TABLET ORAL DAILY
Qty: 90 TABLET | Refills: 1 | Status: SHIPPED | OUTPATIENT
Start: 2024-02-07

## 2024-02-07 ASSESSMENT — FIBROSIS 4 INDEX: FIB4 SCORE: 2.22

## 2024-02-07 NOTE — PROGRESS NOTES
CARDIOLOGY established PATIENT:    PCP: JAYLEN Castelan    1. Chronic heart failure with preserved ejection fraction (HFpEF) (HCC)    2. Cardiac pacemaker in situ    3. Essential hypertension, benign    4. Chronic anticoagulation    5. PAF (paroxysmal atrial fibrillation) (HCC)    6. Lymphedema    7. Acute cystitis without hematuria        Krystle Tavarez is here for follow-up of chronic HFpEF, paroxysmal A-fib, hypertension and chronic anticoagulation.  Also followed in our device clinic    Chief Complaint   Patient presents with    Peripheral Edema     F/v DX: Bilateral lower extremity edema    Other     F/v DX: Chronic anticoagulation       History:     Krystle Tavarez is a 89 y.o. female with history of paroxysmal atrial fibrillation on chronic anticoagulation, hypertension, dyslipidemia, pulmonary hypertension, moderate aortic insufficiency, HFpEF, advanced AV block with permanent pacemaker since 2015 ([facility], MRI non conditional, 100% , 2.5 yrs left on battery as of 2/2023, <1% mode switches), TIA, type 2 diabetes and chronic lower back pain is here for follow-up. [id number] score of 6 (history of TIA).     Clinic visit 3/2023: No medication changes, advised her to keep track of her blood pressure and weights to help manage her Bumex, and utilize compression socks.     She was hospitalized 7/2023: Sepsis due to UTI and had hypoxic respiratory failure due to atelectasis.  Her carvedilol was discontinued due to orthostatic concerns.  Her carvedilol was replaced with metoprolol succinate 12.5 mg.  She was discharged to SNF.    Clinic 8/2023: Advised her to wear compression socks and to take more Bumex if she gains more than 5 pounds in a week; and encouraged her to utilize incentive spirometer use.    Fell 10/2023: No head bleed on CT imaging.    Take Bumex 0.5 mg daily.  Stable weights.  Has not needed twice daily dosing thus far.  Checks her weights almost every morning.      Denies  "chest pain, shortness of breath, lightheadedness, dizziness, syncope. Fall in 10/2023 was not related to syncope or presyncope. continues to have lower back pain concerns hence the need for the wheelchair.  Continues to have stable bilateral lower extremity edema.  Tried some compression socks which are very difficult to put on.  She continues to be frustrated from her chronic back and lower extremity pains limiting her ability to perform activities such as shopping and going up to 10     On Apixaban:no bleeding concerns. Wt > 60kg.     Reviewed labs 2023.     TTE 22:  Compared to the images of the prior study 2021, there has been no   significant change.   Normal left ventricular systolic function.  The left ventricular ejection fraction is visually estimated to be 60%.  Grade II diastolic dysfunction.  Normal right ventricular systolic function.  Moderate aortic insufficiency.  Mild tricuspid regurgitation.  Right ventricular systolic pressure is estimated to be 45 mmHg.     Father  of a heart attack at 74, mother  at 76 from complications of diabetes and stroke.    PE:  /60 (BP Location: Left arm, Patient Position: Sitting, BP Cuff Size: Adult)   Pulse 66   Resp (!) 10   Ht 1.6 m (5' 3\")   Wt 65.8 kg (145 lb)   LMP  (LMP Unknown)   SpO2 90%   BMI 25.69 kg/m²     Gen: no acute distress, no conversational dyspnea.  HEENT: Symmetric face.  NECK: No JVD.   CARDIAC: Regular, Normal S1, S2, No murmur  VASCULATURE: carotids are normal bilaterally without bruit  RESP: Clear to auscultation bilaterally   EXT: 4+ lower extremity edema, no clubbing or cyanosis (chronic and stable)  SKIN: Warm and dry  NEURO: No gross deficits  PSYCH: Appropriate affect, participates in conversation    The ASCVD Risk score ([place] DK, et al., 2019) failed to calculate.    Past Medical History:   Diagnosis Date    Arthritis     knees, hips    Breath shortness     with exertion     Cataract     bilat IOL     " Dental disorder     full upper, partial lower     Diabetes (HCC)     pre diabetic    Heart burn     Hemorrhagic disorder (HCC)     on Eliquis    High cholesterol     diet controlled     Hyperlipidemia     Hypertension     Pacemaker 2015    Pain 2020    lower back, right leg    Pre-diabetes     TIA (transient ischemic attack)     on Eliquis    Urinary incontinence      Past Surgical History:   Procedure Laterality Date    PB LAMINOTOMY,LUMBAR DISK,1 INTRSP N/A 2/25/2020    Procedure: LAMINECTOMY, SPINE, LUMBAR, WITH DISCECTOMY- L5-S1, MEDIAL FACETECTOMY;  Surgeon: Latrell Kimble M.D.;  Location: SURGERY [facility];  Service: Neurosurgery    FORAMINOTOMY N/A 2/25/2020    Procedure: FORAMINOTOMY, SPINE;  Surgeon: Latrell Kimble M.D.;  Location: SURGERY [facility];  Service: Neurosurgery    PACEMAKER INSERTION  2015    HIP REPLACEMENT, TOTAL Right 2009    KNEE REPLACEMENT, TOTAL Right 2004    CATARACT EXTRACTION WITH IOL Bilateral 2003    CHOLECYSTECTOMY  2002    BASAL CELL EXCISION      nose and hand    BUNIONECTOMY Left      Allergies   Allergen Reactions    Codeine Unspecified     Patient reported hallucination and dizziness. Specific to tylenol with codeine      Gabapentin Unspecified     Altered mental status    Sulfa Drugs Nausea     Nausea   Other reaction(s): Not available    Tramadol Unspecified     Altered mentation     Outpatient Encounter Medications as of 2/7/2024   Medication Sig Dispense Refill    losartan (COZAAR) 50 MG Tab Take 1 Tablet by mouth every day. 90 Tablet 1    ELIQUIS 5 MG Tab TAKE 1 TABLET TWICE A  Tablet 3    triamcinolone acetonide (KENALOG) 0.1 % Cream AAA, chest and back, twice a day for 2 weeks at a time 45 g 1    hydrocortisone 1 % Cream Apply 1 Application topically 2 times a day. 14 g 0    metoprolol SR (TOPROL XL) 25 MG TABLET SR 24 HR TAKE 1/2 TABLET BY MOUTH EVERY DAY 45 Tablet 3    ketoconazole (NIZORAL) 2 % shampoo Apply 1 mL topically every Friday. 120 mL 2     diclofenac sodium (VOLTAREN) 1 % Gel Apply  topically 4 times a day as needed.      bumetanide (BUMEX) 0.5 MG Tab Take 1 Tablet by mouth every day. Indications: Edema 90 Tablet 3    Cyanocobalamin (VITAMIN B-12 PO) Take 1 Tablet by mouth every day.      acetaminophen (TYLENOL 8 HOUR) 650 MG CR tablet Take 1,300 mg by mouth 2 times a day.      docusate sodium (COLACE) 100 MG Cap Take 100 mg by mouth 1 time a day as needed for Constipation.      Ascorbic Acid (VITAMIN C PO) Take 1 Tablet by mouth every day.      [DISCONTINUED] losartan (COZAAR) 50 MG Tab Take 1 Tablet by mouth every day. 30 Tablet 0    [DISCONTINUED] atorvastatin (LIPITOR) 40 MG Tab TAKE 1 TABLET EVERY EVENING (Patient taking differently: Take 40 mg by mouth every evening.) 90 Tablet 3     No facility-administered encounter medications on file as of 2/7/2024.     Social History     Socioeconomic History    Marital status:      Spouse name: Not on file    Number of children: Not on file    Years of education: Not on file    Highest education level: Not on file   Occupational History    Not on file   Tobacco Use    Smoking status: Never    Smokeless tobacco: Never   Vaping Use    Vaping Use: Never used   Substance and Sexual Activity    Alcohol use: Not Currently    Drug use: No    Sexual activity: Not Currently     Partners: Male   Other Topics Concern    Not on file   Social History Narrative    Not on file     Social Determinants of Health     Financial Resource Strain: Low Risk  (9/7/2023)    Overall Financial Resource Strain (CARDIA)     Difficulty of Paying Living Expenses: Not hard at all   Food Insecurity: No Food Insecurity (9/7/2023)    Hunger Vital Sign     Worried About Running Out of Food in the Last Year: Never true     Ran Out of Food in the Last Year: Never true   Transportation Needs: No Transportation Needs (9/7/2023)    PRAPARE - Transportation     Lack of Transportation (Medical): No     Lack of Transportation (Non-Medical):  "No   Physical Activity: Inactive (9/7/2023)    Exercise Vital Sign     Days of Exercise per Week: 0 days     Minutes of Exercise per Session: 0 min   Stress: No Stress Concern Present (9/7/2023)    [employer] [place] [employer] - Occupational Stress Questionnaire     Feeling of Stress : Not at all   Social Connections: Moderately Isolated (9/7/2023)    Social Connection and Isolation Panel [NHANES]     Frequency of Communication with Friends and Family: More than three times a week     Frequency of Social Gatherings with Friends and Family: More than three times a week     Attends Judaism Services: Never     Active Member of Clubs or Organizations: Yes     Attends Club or Organization Meetings: More than 4 times per year     Marital Status:    Intimate Partner Violence: Not At Risk (9/7/2023)    Humiliation, Afraid, Rape, and Kick questionnaire     Fear of Current or Ex-Partner: No     Emotionally Abused: No     Physically Abused: No     Sexually Abused: No   Housing Stability: Low Risk  (9/7/2023)    Housing Stability Vital Sign     Unable to Pay for Housing in the Last Year: No     Number of Places Lived in the Last Year: 1     Unstable Housing in the Last Year: No     Family History   Problem Relation Age of Onset    Diabetes Mother     Stroke Mother     Heart Attack Father 74    No Known Problems Maternal Grandmother     No Known Problems Maternal Grandfather     No Known Problems Paternal Grandmother     No Known Problems Paternal Grandfather          Studies    Lab Results   Component Value Date/Time    TSHULTRASEN 0.590 05/02/2022 1317      No results found for: \"FREET4\"   Lab Results   Component Value Date/Time    HBA1C 7.8 (H) 07/20/2023 03:06 AM     Lab Results   Component Value Date/Time    CHOLSTRLTOT 135 03/03/2023 09:41 AM    LDL 62 03/03/2023 09:41 AM    HDL 49 03/03/2023 09:41 AM    TRIGLYCERIDE 120 03/03/2023 09:41 AM       Lab Results   Component Value Date/Time    SODIUM 135 " 07/24/2023 07:40 AM    POTASSIUM 4.7 07/24/2023 07:40 AM    CHLORIDE 100 07/24/2023 07:40 AM    CO2 28 07/24/2023 07:40 AM    GLUCOSE 142 (H) 07/24/2023 07:40 AM    BUN 25 (H) 07/24/2023 07:40 AM    CREATININE 0.77 07/24/2023 07:40 AM     Lab Results   Component Value Date/Time    ALKPHOSPHAT 60 07/24/2023 07:40 AM    ASTSGOT 19 07/24/2023 07:40 AM    ALTSGPT 13 07/24/2023 07:40 AM    TBILIRUBIN 0.3 07/24/2023 07:40 AM        Echocardiogram:  No results found for this or any previous visit.      Assessment and Recommendations:    Problem List Items Addressed This Visit       Cardiac pacemaker in situ (Chronic)    PAF (paroxysmal atrial fibrillation) ([facility]) (Chronic)    Relevant Medications    losartan (COZAAR) 50 MG Tab    Essential hypertension, benign (Chronic)    Relevant Medications    losartan (COZAAR) 50 MG Tab    Chronic heart failure with preserved ejection fraction (HFpEF) ([facility]) - Primary (Chronic)    Relevant Medications    losartan (COZAAR) 50 MG Tab    Chronic anticoagulation     Other Visit Diagnoses       Lymphedema        Relevant Orders    REFERRAL TO LYMPhEDEMA-PHYSICAL THERAPY    Acute cystitis without hematuria              Ms. Tavarez continues to be stable from chronic HFpEF, paroxysmal atrial fibrillation standpoint, normal BP.     Reminded her to keep track of her weight at least twice a week and if she gains more than 5 pounds a week, to take 0.5 mg of the Bumex twice a day and notify us.     Regarding her ongoing chronic stable lower extremity edema /lymphedema, which might be contributing to her ankle pains and neuropathy, I placed a referral to lymphedema/physical therapy and advised her to try different compression socks (shorter than knee high or zipper socks). She will also explore options at the [facility].    Given her chronic joint / muscle aches, advised her to stop her high intensity atorvastatin especially with her history about prior statin intolerance; and at this advanced  age.     Recommend ongoing close attention to her weight on follow-up visits given that once she is consistently <= 60 kg, we will have to adjust her apixaban to 2.5 mg twice daily instead of 5 mg twice daily; along with close monitoring of her creatinine clearance.     Recommend at least biannual CBC and CMP.  Can be arranged by PCP.     Thank you for the opportunity to be involved in Krystle Tavarez 's care; and please reach out with any questions or concerns.    Return in about 6 months (around 8/7/2024).    Scott Garcia MD, MPH [facility]  Interventional Cardiologist  [facility]   of Clinical Internal Medicine - [facility]    ~ Portions of this note were completed using voice recognition software (Dragon Naturally speaking software) . Occasional transcription errors may have escaped proof reading. I have made every reasonable attempt to correct obvious errors, but I expect that there are errors of grammar and possibly content that I did not discover before finalizing the note. ~

## 2024-03-04 ENCOUNTER — PATIENT OUTREACH (OUTPATIENT)
Dept: HEALTH INFORMATION MANAGEMENT | Facility: OTHER | Age: 89
End: 2024-03-04
Payer: MEDICARE

## 2024-03-04 DIAGNOSIS — I10 ESSENTIAL HYPERTENSION, BENIGN: ICD-10-CM

## 2024-03-04 DIAGNOSIS — I50.32 CHRONIC HEART FAILURE WITH PRESERVED EJECTION FRACTION (HFPEF) (HCC): ICD-10-CM

## 2024-03-04 DIAGNOSIS — E11.9 TYPE 2 DIABETES MELLITUS WITHOUT COMPLICATION, WITHOUT LONG-TERM CURRENT USE OF INSULIN (HCC): ICD-10-CM

## 2024-03-04 NOTE — PROGRESS NOTES
Assessment    Reached out to patient for monthly [facility] follow up call. Per patient, she states she is doing well.  Patient is still experiencing her arthritis pain exacerbated by cold weather.  Patient was recently seen by her cardiologist and was instructed to continue to monitor her weight and received a referral to lymphedema physical therapy.  Patient does have a appointment coming up with them on March 20.  Patient denies any questions or concerns related to her diabetes.  We did discuss the importance of lowering her A1c and doing her best to adhere to a diabetic diet.  We discussed exercising as tolerated if steady on her feet.  Patient aware of complications that can arise from diabetes.  Patient states she is checking her blood pressure 2-3 times per week and it normally results in the 120s over 170s.  Patient does have baseline swelling in her legs, but has not noticed an increase or shortness of breath.  Patient has been monitoring her weight daily and states it is stable around 147.  Patient informed that she is coming due for a follow-up appointment with her PCP and she needs to complete the lab work that was ordered.  Patient aware the labs are fasting.  Patient had no other questions or needs at the end of phone call.    Education    Diabetes education    Plan of Care and Goals    Patient would like to increase her strength and improve her balance     Patient would like to go to the grocery store without an assistive device    Barriers:    Pain d/t cold weather     Progress:    Baseline     Next outreach: 1 month

## 2024-03-20 ENCOUNTER — PHYSICAL THERAPY (OUTPATIENT)
Dept: PHYSICAL THERAPY | Facility: REHABILITATION | Age: 89
End: 2024-03-20
Attending: INTERNAL MEDICINE
Payer: MEDICARE

## 2024-03-20 DIAGNOSIS — I89.0 LYMPHEDEMA: ICD-10-CM

## 2024-03-20 DIAGNOSIS — Z74.09 IMPAIRED MOBILITY AND ADLS: ICD-10-CM

## 2024-03-20 DIAGNOSIS — Z78.9 IMPAIRED MOBILITY AND ADLS: ICD-10-CM

## 2024-03-20 PROCEDURE — 97163 PT EVAL HIGH COMPLEX 45 MIN: CPT

## 2024-03-20 NOTE — OP THERAPY EVALUATION
Outpatient Physical Therapy  LYMPHEDEMA THERAPY INITIAL EVALUATION    [facility] [address]  [address]  [address]  [place] [address]  Phone:  [phone]  Fax:  [phone]    Date of Evaluation: 03/20/2024    Patient: Krystle Tavarez  YOB: 1933  MRN: [id number]     Referring Provider: Scott Garcia M.D.  [address]  [address]  [place] [address]   Referring Diagnosis Lymphedema [I89.0]     Time Calculation    Start time: 1547  Stop time: 1634 Time Calculation (min): 47 minutes             Chief Complaint: Ankle Swelling, Leg Swelling, and Difficulty Walking    Visit Diagnoses     ICD-10-CM   1. Lymphedema  I89.0   2. Impaired mobility and ADLs  Z74.09    Z78.9       Subjective:   History of Present Illness:     Mechanism of injury:  Krystle reports her ankles and feet hurt and burn. They are quite puffy. Does seem as though it is full around her knees a little bit, moreso on the R. Does reduce over night, but not to normal. Elevation also helps, but not to normal. Does take a diuretic, but unsure if that is affecting her fee/ankles too. Does have compression, but both she and her daughter struggle to don the garments.. Sounds as though the socks are knee-high medical grade, unsure of brand. Thinks she has been swollen since last summer. Seemed to dissipate in the winter, but then returned once the heat returned. Today's swelling is so-so, neither good nor bad. From a mobility standpoint, has really only been able to stand and transfer, no gait.       Past Medical History:   Diagnosis Date    Arthritis     knees, hips    Breath shortness     with exertion     Cataract     bilat IOL     Dental disorder     full upper, partial lower     Diabetes (HCC)     pre diabetic    Heart burn     Hemorrhagic disorder (HCC)     on Eliquis    High cholesterol     diet controlled     Hyperlipidemia     Hypertension     Pacemaker 2015    Pain 2020    lower back, right leg    Pre-diabetes      TIA (transient ischemic attack)     on Eliquis    Urinary incontinence      Past Surgical History:   Procedure Laterality Date    PB LAMINOTOMY,LUMBAR DISK,1 INTRSP N/A 2/25/2020    Procedure: LAMINECTOMY, SPINE, LUMBAR, WITH DISCECTOMY- L5-S1, MEDIAL FACETECTOMY;  Surgeon: Latrell Kimble M.D.;  Location: SURGERY [facility];  Service: Neurosurgery    FORAMINOTOMY N/A 2/25/2020    Procedure: FORAMINOTOMY, SPINE;  Surgeon: Latrell Kimble M.D.;  Location: SURGERY [facility];  Service: Neurosurgery    PACEMAKER INSERTION  2015    HIP REPLACEMENT, TOTAL Right 2009    KNEE REPLACEMENT, TOTAL Right 2004    CATARACT EXTRACTION WITH IOL Bilateral 2003    CHOLECYSTECTOMY  2002    BASAL CELL EXCISION      nose and hand    BUNIONECTOMY Left      Social History     Tobacco Use    Smoking status: Never    Smokeless tobacco: Never   Substance Use Topics    Alcohol use: Not Currently     Family and Occupational History     Socioeconomic History    Marital status:      Spouse name: Not on file    Number of children: Not on file    Years of education: Not on file    Highest education level: Not on file   Occupational History    Not on file       Lymphedema Objective    Left Lower Extremity Circumferential Measurements  Waist: cm  Hip: cm  Scrotum: cm  Ground/Upper Thigh: cm  Mid Thigh: cm  Knee: 34.5 cm  Upper Calf: 33 cm  Mid Calf: 26.7 cm  Ankle: 28 cm  Heel to Foot: 20.3 cm  Total: 142.5 cm    Right Lower Extremity Circumferential Measurements  Waist: cm  Hip: cm  Scrotum: cm  Ground/Upper Thigh: cm  Mid Thigh: cm  Knee: 35 cm  Upper Calf: 34.6 cm  Mid Calf: 25.1 cm  Ankle: 27.4 (low ankle, over fullest part) cm  Heel to Foot: 19.2 cm  Total: 141.3 cm          Therapeutic Treatments and Modalities:     Therapeutic Treatment and Modalities Summary: Educated patient on pathogenesis of lymphedema.     Discussed the need for external compression and educated pt regarding compression garment options.  Patient was educated  the optimal use of garment (all day, every day, especially during higher risk activities as discussed, remove at night).  Patient verbalized understanding to all education today.     Discussed mobility; pt reporting she is becoming less mobile. Encouraged increasing her mobility. Provided HEP of LE ankle pumps, LAQ, marching. Instructed her on increasing her standing time at a counter, with daughter's supervision.    Time-based treatments/modalities:           Assessment and Plan:   Functional Impairments: lacks appropriate home exercise program and swelling    Assessment details:  Krystle is a 89yo F who reports a hx of distal B LE swelling that she feels like came on over the past year in summer. The swelling seemed to improve with cooler weather, but now, does not reduce to normal nor is it responsive to diuretics. It is possible that Krystle has developed lymphedema due to chronic edema. She has been educated on pathogenesis of lymphedema and importance of compression. In addition, Krystle could benefit from intervention to improve her mobility and therefore the muscle pump to allow for fluid removal from her LE. She is agreeable to the plan and will try her HEP as well.   Prognosis: good      Goals:   Short Term Goals:  1. Pt will achieve a total limb circumference reduction of 3cm in order to decrease risk for infection and progression of disease process.  2. Pt will consistently perform exercises with compression on to facilitate muscle pump of fluid from affected extremity.  3. Krystle will be able to perform x5 sit to stands in less than 1min to improve her LE strength and reduce her fall risk.   4. Krystle will be able to walk 10' with her FWW at [facility] to improve her LE strengthen and muscle pump.      Short term goal time span:  2-4 weeks    Long Term Goals:  1. Pt will have a reduction in total limb circumference of 6cm in order to decrease risk for infection and progression of disease process.   2. Patient will be  independent with maintenance phase management of lymphedema including: compression garments, skin care education, risk reduction strategies, manual lymphatic drainage, and therapeutic exercise.   3. Krystle will be able to perform sit to stand x5 in less than 30sec to improve her LE strength and reduce her fall risk.   4. Krystle will be able to walk 50' with FWW at [facility] to improve her ability to walk inside her home.   Long term goal time span:  4-6 weeks    Plan:  Therapy options:  Physical therapy treatment to continue  Planned therapy interventions:  Caregiver education, compression stocking, decongestive exercises, home exercise program, intermittent compression, manual lymph drainage, myofascial release techniques, orthotic measurements/fitting, patient education, postural exercises, range of motion exercises, referral for compression garment & instructions for don/doffing, self-care/training (CPT 96867), sequential compression pump, short stretch bandages, skin/wound care, soft tissue manual techniques (CPT 79558), strengthening exercises, Velcro wraps and gait training (CPT 19761)  Planned education:  Functional anatomy and physiology of the lymphatic system, pathophysiology of lymphedema, lymphedema exercise, lymphedema precautions, compression bandaging, proper skin care/nutrition, self massage, infection prevention, scar tissue management, activity guidelines, dietary guidelines, skin care guidelines, home pump use, bandage removal and long term self-management of lymphedema  Frequency:  2x week  Duration in weeks:  8  Discussed with:  Patient and family      Functional Assessment Used    LLIS    Referring provider co-signature:  I have reviewed this plan of care and my co-signature certifies the need for services.    Certification Period: 03/20/2024 to  05/15/24    Physician Signature: ________________________________ Date: ______________

## 2024-04-01 ENCOUNTER — PHYSICAL THERAPY (OUTPATIENT)
Dept: PHYSICAL THERAPY | Facility: REHABILITATION | Age: 89
End: 2024-04-01
Attending: INTERNAL MEDICINE
Payer: MEDICARE

## 2024-04-01 DIAGNOSIS — I89.0 LYMPHEDEMA: ICD-10-CM

## 2024-04-01 DIAGNOSIS — R53.81 PHYSICAL DECONDITIONING: ICD-10-CM

## 2024-04-01 DIAGNOSIS — Z78.9 IMPAIRED MOBILITY AND ADLS: ICD-10-CM

## 2024-04-01 DIAGNOSIS — R53.1 GENERAL WEAKNESS: ICD-10-CM

## 2024-04-01 DIAGNOSIS — Z74.09 IMPAIRED MOBILITY AND ADLS: ICD-10-CM

## 2024-04-01 PROCEDURE — 97116 GAIT TRAINING THERAPY: CPT

## 2024-04-01 PROCEDURE — 97535 SELF CARE MNGMENT TRAINING: CPT

## 2024-04-01 NOTE — OP THERAPY DAILY TREATMENT
"  Outpatient Physical Therapy  LYMPHEDEMA THERAPY DAILY TREATMENT     [facility] Physical Therapy [address].  [address]  [place] [address]  Phone:  [phone]  Fax:  [phone]    Date: 04/01/2024    Patient: Krystle Tavarez  YOB: 1933  MRN: [id number]     Time Calculation    Start time: 1502  Stop time: 1545 Time Calculation (min): 43 minutes         Chief Complaint: No chief complaint on file.    Visit #: 2    Subjective:   History of Present Illness:     Mechanism of injury:  Krystle reports her legs burn when she wears her compression. No skin irritation.       Lymphedema Objective    Left Lower Extremity Circumferential Measurements  Waist: cm  Hip: cm  Scrotum: cm  Ground/Upper Thigh: cm  Mid Thigh: cm  Knee: 34.5 cm  Upper Calf: 33 cm  Mid Calf: 26.7 cm  Ankle: 28 cm  Heel to Foot: 20.3 cm  Total: 142.5 cm     Right Lower Extremity Circumferential Measurements  Waist: cm  Hip: cm  Scrotum: cm  Ground/Upper Thigh: cm  Mid Thigh: cm  Knee: 35 cm  Upper Calf: 34.6 cm  Mid Calf: 25.1 cm  Ankle: 27.4 (low ankle, over fullest part) cm  Heel to Foot: 19.2 cm  Total: 141.3 cm       Therapeutic Treatments and Modalities:     1. Self Care ADL Training (CPT 38893), Reviewed how to don/doff compression to her B LE. Pt's daughter able to don/doff for pt.    2. Gait Training (CPT 32668)    Therapeutic Treatment and Modalities Summary:   -pre-gait training with STS followed by marching in // bars. Pt only required SBA. No LOB.   -gait training with FWW: cues for more upright posture, which pt can achieve but is unable to sustain.   -gait x150' followed by brief seated rest. Pt reporting legs felt \"tired\" but good with compression on.     Time-based treatments/modalities:    Physical Therapy Timed Treatment Charges  Functional training, self care minutes (CPT 43947): 18 minutes  Gait training minutes (CPT 08061): 25 minutes      Assessment and Plan:   Assessment details:  Krystle actually " demonstrates ability to perform brief community distance gait with FWW but does seem most limited by fatigue. Encouraged her and daughter to bring in 4WW to begin gait training with this walker to allow for increased independence and availability of a location for rests. She has agreed to trial compression during the day to evaluate its effectiveness as well.     Plan:  Therapy options:  Physical therapy treatment to continue  Discussed with:  Patient

## 2024-04-02 ENCOUNTER — PATIENT OUTREACH (OUTPATIENT)
Dept: HEALTH INFORMATION MANAGEMENT | Facility: OTHER | Age: 89
End: 2024-04-02
Payer: MEDICARE

## 2024-04-02 DIAGNOSIS — E11.9 TYPE 2 DIABETES MELLITUS WITHOUT COMPLICATION, WITHOUT LONG-TERM CURRENT USE OF INSULIN (HCC): Chronic | ICD-10-CM

## 2024-04-02 DIAGNOSIS — I10 ESSENTIAL HYPERTENSION, BENIGN: Chronic | ICD-10-CM

## 2024-04-04 ENCOUNTER — APPOINTMENT (OUTPATIENT)
Dept: PHYSICAL THERAPY | Facility: REHABILITATION | Age: 89
End: 2024-04-04
Attending: INTERNAL MEDICINE
Payer: MEDICARE

## 2024-04-04 NOTE — PROGRESS NOTES
Assessment    Reached out to patient for monthly CCM follow up call. Per patient, she states she is doing okay.  Patient states the persistent cold weather has been really bothersome for her and she is in quite a bit of pain.  Patient has recently been working with the lymphedema physical therapy and was given stockings that have been helpful for her feet/legs.  Patient does have additional physical therapy appointment scheduled.  Patient reminded that she does have overdue diabetic items that need to be completed.  Patient and daughter state they will be completing her lab work next week and they are aware it is fasting.  Patient then scheduled for diabetic follow-up with her PCP on April 15.  Patient and daughter aware she needs to be in office 15 minutes early for check-in.  Patient had no other questions or concerns about her diabetes or blood pressure.    Education    Diabetic follow-up items    Plan of Care and Goals    Patient would like to increase her strength and improve her balance     Patient would like to go to the grocery store without an assistive device    Barriers:    Overdue for diabetic follow-up items    Progress:    Baseline     Next outreach: 1 month

## 2024-04-08 ENCOUNTER — APPOINTMENT (OUTPATIENT)
Dept: PHYSICAL THERAPY | Facility: REHABILITATION | Age: 89
End: 2024-04-08
Attending: INTERNAL MEDICINE
Payer: MEDICARE

## 2024-04-08 NOTE — OP THERAPY DAILY TREATMENT
Outpatient Physical Therapy  LYMPHEDEMA THERAPY DAILY TREATMENT     [facility] Physical Therapy [address]  [address]  [place] [address]  Phone:  [phone]  Fax:  [phone]    Date: 04/08/2024    Patient: Krystle Tavarez  YOB: 1933  MRN: [id number]     Time Calculation                   Chief Complaint: No chief complaint on file.    Visit #: 3    Subjective    Lymphedema Objective      Left Lower Extremity Circumferential Measurements EVAL  Waist: cm  Hip: cm  Scrotum: cm  Ground/Upper Thigh: cm  Mid Thigh: cm  Knee: 34.5 cm  Upper Calf: 33 cm  Mid Calf: 26.7 cm  Ankle: 28 cm  Heel to Foot: 20.3 cm  Total: 142.5 cm     Right Lower Extremity Circumferential Measurements EVAL  Waist: cm  Hip: cm  Scrotum: cm  Ground/Upper Thigh: cm  Mid Thigh: cm  Knee: 35 cm  Upper Calf: 34.6 cm  Mid Calf: 25.1 cm  Ankle: 27.4 (low ankle, over fullest part) cm  Heel to Foot: 19.2 cm  Total: 141.3 cm  Exercises/Treatment  Time-based treatments/modalities:           LYMPHEDEMA ASSESSMENT AND PLAN

## 2024-04-11 ENCOUNTER — PHYSICAL THERAPY (OUTPATIENT)
Dept: PHYSICAL THERAPY | Facility: REHABILITATION | Age: 89
End: 2024-04-11
Attending: INTERNAL MEDICINE
Payer: MEDICARE

## 2024-04-11 DIAGNOSIS — R53.81 PHYSICAL DECONDITIONING: ICD-10-CM

## 2024-04-11 DIAGNOSIS — Z78.9 IMPAIRED MOBILITY AND ADLS: ICD-10-CM

## 2024-04-11 DIAGNOSIS — Z74.09 IMPAIRED MOBILITY AND ADLS: ICD-10-CM

## 2024-04-11 DIAGNOSIS — R53.1 GENERAL WEAKNESS: ICD-10-CM

## 2024-04-11 DIAGNOSIS — I89.0 LYMPHEDEMA: ICD-10-CM

## 2024-04-11 PROCEDURE — 97110 THERAPEUTIC EXERCISES: CPT

## 2024-04-11 NOTE — OP THERAPY DAILY TREATMENT
Outpatient Physical Therapy  LYMPHEDEMA THERAPY DAILY TREATMENT     [facility] Physical [address].  [address]  [place] [address]  Phone:  [phone]  Fax:  [phone]    Date: 04/11/2024    Patient: Krystle Tavarez  YOB: 1933  MRN: [id number]     Time Calculation    Start time: 1637  Stop time: 1720 Time Calculation (min): 43 minutes         Chief Complaint: Weakness, Shoulder Problem, and Neck Problem    Visit #: 3    Subjective:   History of Present Illness:     Mechanism of injury:  Saturday, her shoulder started hurting, and by Saturday night, could not get comfortable. (The R shoulder). Has therefore been walking less because her spine hurts too.       Lymphedema Objective    Left Lower Extremity Circumferential Measurements  Waist: cm  Hip: cm  Scrotum: cm  Ground/Upper Thigh: cm  Mid Thigh: cm  Knee: 34.6 cm  Upper Calf: 33.5 cm  Mid Calf: 24.6 cm  Ankle: 23.7 cm  Heel to Foot: 19.7 cm  Total: 136.1 cm This is for RIGHT; ENTRY ERROR    Right Lower Extremity Circumferential Measurements  Waist: cm  Hip: cm  Scrotum: cm  Ground/Upper Thigh: cm  Mid Thigh: cm  Knee: 34.6 cm  Upper Calf: 33.4 cm  Mid Calf: 25.6 cm  Ankle: 25.7 cm  Heel to Foot: 20.5 cm  Total: 139.8 cm ENTRY ERROR: this is for LEFT        Left Lower Extremity Circumferential Measurements  Waist: cm  Hip: cm  Scrotum: cm  Ground/Upper Thigh: cm  Mid Thigh: cm  Knee: 34.5 cm  Upper Calf: 33 cm  Mid Calf: 26.7 cm  Ankle: 28 cm  Heel to Foot: 20.3 cm  Total: 142.5 cm     Right Lower Extremity Circumferential Measurements  Waist: cm  Hip: cm  Scrotum: cm  Ground/Upper Thigh: cm  Mid Thigh: cm  Knee: 35 cm  Upper Calf: 34.6 cm  Mid Calf: 25.1 cm  Ankle: 27.4 (low ankle, over fullest part) cm  Heel to Foot: 19.2 cm  Total: 141.3 cm    Therapeutic Exercises (CPT 68428):       Therapeutic Exercise Summary: 4/11:  -seated postural exercises: forward trunk lean with chest elevation/spinal extension. Cued to hold  for up to 30sec. HEP: to perform hourly during commercial breaks when watching TV  -neck stretches all x10 B: rotation, sidebend, extension, flexion, chin tuck, all added to HEP with handout  -posterior shoulder rolls, added to HEP with handout  -scapular retraction with rowing motion x10, added pink TB, x10      Time-based treatments/modalities:    Physical Therapy Timed Treatment Charges  Therapeutic exercise minutes (CPT 62505): 43 minutes      Assessment and Plan:   Assessment details:  Krystle's walking has been somewhat limited by her R shoulder discomfort and today's focus was on her posture and cervical spine which may be contributing to her pain. Her B LE have reduced by 3-5cm each with use of velcro compression. She has not had further discomfort while wearing. Krystle will benefit from further intervention to improve her general functional mobility and safety while walking with 4WW to enable her to participate in activities she enjoys, such as shopping at a store.     Plan:  Therapy options:  Physical therapy treatment to continue  Discussed with:  Patient and family

## 2024-04-12 ENCOUNTER — HOSPITAL ENCOUNTER (OUTPATIENT)
Dept: LAB | Facility: MEDICAL CENTER | Age: 89
End: 2024-04-12
Attending: NURSE PRACTITIONER
Payer: MEDICARE

## 2024-04-12 DIAGNOSIS — E11.9 TYPE 2 DIABETES MELLITUS WITHOUT COMPLICATION, WITHOUT LONG-TERM CURRENT USE OF INSULIN (HCC): ICD-10-CM

## 2024-04-12 DIAGNOSIS — E78.5 DYSLIPIDEMIA: Chronic | ICD-10-CM

## 2024-04-12 DIAGNOSIS — I10 ESSENTIAL HYPERTENSION, BENIGN: Chronic | ICD-10-CM

## 2024-04-12 LAB
CREAT UR-MCNC: 81.3 MG/DL
EST. AVERAGE GLUCOSE BLD GHB EST-MCNC: 148 MG/DL
HBA1C MFR BLD: 6.8 % (ref 4–5.6)
MICROALBUMIN UR-MCNC: 1.4 MG/DL
MICROALBUMIN/CREAT UR: 17 MG/G (ref 0–30)

## 2024-04-12 PROCEDURE — 82570 ASSAY OF URINE CREATININE: CPT

## 2024-04-12 PROCEDURE — 82043 UR ALBUMIN QUANTITATIVE: CPT

## 2024-04-12 PROCEDURE — 80053 COMPREHEN METABOLIC PANEL: CPT

## 2024-04-12 PROCEDURE — 83036 HEMOGLOBIN GLYCOSYLATED A1C: CPT | Mod: GA

## 2024-04-12 PROCEDURE — 80061 LIPID PANEL: CPT

## 2024-04-12 PROCEDURE — 36415 COLL VENOUS BLD VENIPUNCTURE: CPT | Mod: GA

## 2024-04-12 NOTE — OP THERAPY PROGRESS SUMMARY
Outpatient Physical Therapy  PROGRESS SUMMARY NOTE      [facility] Physical Therapy [facility] [address]  [address]  [address]  [place] [address]  Phone:  [phone]  Fax:  [phone]    Date of Visit: 04/11/2024    Patient: Krystle Tavarez  YOB: 1933  MRN: [id number]     Referring Provider: Scott Garcia M.D.  [address]  [address]  [place] [address]   Referring Diagnosis Lymphedema, not elsewhere classified [I89.0]     Visit Diagnoses     ICD-10-CM   1. Lymphedema  I89.0   2. Impaired mobility and ADLs  Z74.09    Z78.9   3. Physical deconditioning  R53.81   4. General weakness  R53.1       Rehab Potential: fair    Progress Report Period: 2/20/24-4/11/24    Functional Assessment Used          Objective Findings and Assessment:   Patient progression towards goals: Krystle has been seen for three visits of physical therapy to address her B LE swelling and difficulty with mobility. Krystle's walking has been somewhat limited by her R shoulder discomfort and today's focus was on her posture and cervical spine which may be contributing to her pain. Her B LE have reduced by 3-5cm each with use of velcro compression. She has not had further discomfort while wearing. Krystle will benefit from further intervention to improve her general functional mobility and safety while walking with 4WW to enable her to participate in activities she enjoys, such as shopping at a store.       Objective findings and assessment details:   Right Lower Extremity Circumferential Measurements 4/11 PROGRESS  Knee: 34.6 cm  Upper Calf: 33.5 cm  Mid Calf: 24.6 cm  Ankle: 23.7 cm  Heel to Foot: 19.7 cm  Total: 136.1 cm      Left Lower Extremity Circumferential Measurements 4/11 PROGRESS  Knee: 34.6 cm  Upper Calf: 33.4 cm  Mid Calf: 25.6 cm  Ankle: 25.7 cm  Heel to Foot: 20.5 cm  Total: 139.8 cm         Left Lower Extremity Circumferential Measurements EVAL  Knee: 34.5 cm  Upper Calf: 33 cm  Mid Calf: 26.7 cm  Ankle: 28 cm  Heel to  Foot: 20.3 cm  Total: 142.5 cm     Right Lower Extremity Circumferential Measurements EVAL  Knee: 35 cm  Upper Calf: 34.6 cm  Mid Calf: 25.1 cm  Ankle: 27.4 (low ankle, over fullest part) cm  Heel to Foot: 19.2 cm  Total: 141.3 cm      Goals:   Short Term Goals:   1. Pt will achieve a total limb circumference reduction of 3cm in order to decrease risk for infection and progression of disease process. Goal Met  2. Pt will consistently perform exercises with compression on to facilitate muscle pump of fluid from affected extremity. Goal Partially Met  3. Krystle will be able to perform x5 sit to stands in less than 1min to improve her LE strength and reduce her fall risk. Goal Met  4. Krystle will be able to walk 10' with her FWW at SBA to improve her LE strengthen and muscle pump. Goal Met    5. New Goal: Krystle will be able to walk 200' with her FWW at SBA to improve her LE strengthen and muscle pump.     Short term goal time span:  2-4 weeks      Long Term Goals:    1. Pt will have a reduction in total limb circumference of 6cm in order to decrease risk for infection and progression of disease process. Goal Not Met  2. Patient will be independent with maintenance phase management of lymphedema including: compression garments, skin care education, risk reduction strategies, manual lymphatic drainage, and therapeutic exercise. Goal Not Met  3. Krystle will be able to perform sit to stand x5 in less than 30sec to improve her LE strength and reduce her fall risk. Goal Not Met  4. Krystle will be able to walk 500' with 4WW at SBA to improve her ability to walk inside a store. Goal Not Met (modified)    Long term goal time span:  4-6 weeks    Plan:   Planned therapy interventions:  Functional Training, Self Care (CPT 93380), Manual Therapy (CPT 41076), Gait Training (CPT 02316), Neuromuscular Re-education (CPT 92956), Therapeutic Activities (CPT 82175) and Therapeutic Exercise (CPT 22737)  Frequency:  2x week  Duration in weeks:   8      Referring provider co-signature:  I have reviewed this plan of care and my co-signature certifies the need for services.     Certification Period: 04/11/2024 to 06/06/24    Physician Signature: ________________________________ Date: ______________

## 2024-04-13 LAB
ALBUMIN SERPL BCP-MCNC: 3.9 G/DL (ref 3.2–4.9)
ALBUMIN/GLOB SERPL: 1.3 G/DL
ALP SERPL-CCNC: 70 U/L (ref 30–99)
ALT SERPL-CCNC: 15 U/L (ref 2–50)
ANION GAP SERPL CALC-SCNC: 14 MMOL/L (ref 7–16)
AST SERPL-CCNC: 16 U/L (ref 12–45)
BILIRUB SERPL-MCNC: 0.6 MG/DL (ref 0.1–1.5)
BUN SERPL-MCNC: 28 MG/DL (ref 8–22)
CALCIUM ALBUM COR SERPL-MCNC: 9.7 MG/DL (ref 8.5–10.5)
CALCIUM SERPL-MCNC: 9.6 MG/DL (ref 8.5–10.5)
CHLORIDE SERPL-SCNC: 109 MMOL/L (ref 96–112)
CHOLEST SERPL-MCNC: 195 MG/DL (ref 100–199)
CO2 SERPL-SCNC: 22 MMOL/L (ref 20–33)
CREAT SERPL-MCNC: 0.86 MG/DL (ref 0.5–1.4)
FASTING STATUS PATIENT QL REPORTED: NORMAL
GFR SERPLBLD CREATININE-BSD FMLA CKD-EPI: 64 ML/MIN/1.73 M 2
GLOBULIN SER CALC-MCNC: 2.9 G/DL (ref 1.9–3.5)
GLUCOSE SERPL-MCNC: 142 MG/DL (ref 65–99)
HDLC SERPL-MCNC: 44 MG/DL
LDLC SERPL CALC-MCNC: 121 MG/DL
POTASSIUM SERPL-SCNC: 5.2 MMOL/L (ref 3.6–5.5)
PROT SERPL-MCNC: 6.8 G/DL (ref 6–8.2)
SODIUM SERPL-SCNC: 145 MMOL/L (ref 135–145)
TRIGL SERPL-MCNC: 149 MG/DL (ref 0–149)

## 2024-04-15 ENCOUNTER — OFFICE VISIT (OUTPATIENT)
Dept: MEDICAL GROUP | Facility: MEDICAL CENTER | Age: 89
End: 2024-04-15
Payer: MEDICARE

## 2024-04-15 VITALS
SYSTOLIC BLOOD PRESSURE: 124 MMHG | HEART RATE: 68 BPM | HEIGHT: 63 IN | OXYGEN SATURATION: 93 % | TEMPERATURE: 97.6 F | RESPIRATION RATE: 16 BRPM | WEIGHT: 141 LBS | BODY MASS INDEX: 24.98 KG/M2 | DIASTOLIC BLOOD PRESSURE: 72 MMHG

## 2024-04-15 DIAGNOSIS — E11.42 TYPE 2 DIABETES MELLITUS WITH DIABETIC POLYNEUROPATHY, WITHOUT LONG-TERM CURRENT USE OF INSULIN (HCC): ICD-10-CM

## 2024-04-15 DIAGNOSIS — N95.2 POSTMENOPAUSAL ATROPHIC VAGINITIS: ICD-10-CM

## 2024-04-15 DIAGNOSIS — K59.00 CONSTIPATION, UNSPECIFIED CONSTIPATION TYPE: ICD-10-CM

## 2024-04-15 DIAGNOSIS — I73.9 PERIPHERAL VASCULAR DISEASE, UNSPECIFIED (HCC): Chronic | ICD-10-CM

## 2024-04-15 PROCEDURE — 3074F SYST BP LT 130 MM HG: CPT | Performed by: NURSE PRACTITIONER

## 2024-04-15 PROCEDURE — 3078F DIAST BP <80 MM HG: CPT | Performed by: NURSE PRACTITIONER

## 2024-04-15 PROCEDURE — 99214 OFFICE O/P EST MOD 30 MIN: CPT | Performed by: NURSE PRACTITIONER

## 2024-04-15 RX ORDER — ESTRADIOL 0.1 MG/G
CREAM VAGINAL
Qty: 43 G | Refills: 3 | Status: SHIPPED | OUTPATIENT
Start: 2024-04-15

## 2024-04-15 ASSESSMENT — FIBROSIS 4 INDEX: FIB4 SCORE: 1.74

## 2024-04-15 NOTE — PROGRESS NOTES
Subjective:     HPI:     Krystle Tavarez is a 89 y.o. female presents to discuss:   Chief Complaint   Patient presents with    Diabetes Follow-up       Daughter accompanies patient today.  Due for monofilament.      ROS: : see above      Current Outpatient Medications:     estradiol (ESTRACE) 0.1 MG/GM vaginal cream, Apply pea sized amount to vaginal area nightly, Disp: 43 g, Rfl: 3    losartan (COZAAR) 50 MG Tab, Take 1 Tablet by mouth every day., Disp: 90 Tablet, Rfl: 1    ELIQUIS 5 MG Tab, TAKE 1 TABLET TWICE A DAY, Disp: 180 Tablet, Rfl: 3    triamcinolone acetonide (KENALOG) 0.1 % Cream, AAA, chest and back, twice a day for 2 weeks at a time, Disp: 45 g, Rfl: 1    hydrocortisone 1 % Cream, Apply 1 Application topically 2 times a day., Disp: 14 g, Rfl: 0    metoprolol SR (TOPROL XL) 25 MG TABLET SR 24 HR, TAKE 1/2 TABLET BY MOUTH EVERY DAY, Disp: 45 Tablet, Rfl: 3    ketoconazole (NIZORAL) 2 % shampoo, Apply 1 mL topically every Friday., Disp: 120 mL, Rfl: 2    diclofenac sodium (VOLTAREN) 1 % Gel, Apply  topically 4 times a day as needed., Disp: , Rfl:     bumetanide (BUMEX) 0.5 MG Tab, Take 1 Tablet by mouth every day. Indications: Edema, Disp: 90 Tablet, Rfl: 3    Cyanocobalamin (VITAMIN B-12 PO), Take 1 Tablet by mouth every day., Disp: , Rfl:     acetaminophen (TYLENOL 8 HOUR) 650 MG CR tablet, Take 1,300 mg by mouth 2 times a day., Disp: , Rfl:     docusate sodium (COLACE) 100 MG Cap, Take 100 mg by mouth 1 time a day as needed for Constipation., Disp: , Rfl:     Ascorbic Acid (VITAMIN C PO), Take 1 Tablet by mouth every day., Disp: , Rfl:     Allergies   Allergen Reactions    Codeine Unspecified     Patient reported hallucination and dizziness. Specific to tylenol with codeine      Gabapentin Unspecified     Altered mental status    Sulfa Drugs Nausea     Nausea   Other reaction(s): Not available    Tramadol Unspecified     Altered mentation       Objective:     Vitals: /72   Pulse 68   Temp 36.4  "°C (97.6 °F)   Resp 16   Ht 1.6 m (5' 3\")   Wt 64 kg (141 lb)   LMP  (LMP Unknown)   SpO2 93%   BMI 24.98 kg/m²    General: Alert, pleasant, NAD  HEENT: Normocephalic.  Neck supple.   Respiratory: no distress, no audible wheezing, RR -WNL  Skin: Warm, dry, no rashes.  Extremities: Lower extremity edema, left foot edema.  Neurological: No tremors  Psych:  Affect/mood is normal, judgement is good, memory is intact, grooming is appropriate.    Assessment/Plan:      1. Type 2 diabetes mellitus with diabetic polyneuropathy, without long-term current use of insulin (HCC)  Chronic.  Most recent A1c stable.  Monofilament completed with impaired sensation.  - Diabetic Monofilament LE Exam    2. Peripheral vascular disease, unspecified (HCC)  Chronic.  Has been finding the compressions and the wrap compressions helpful for this.    3. Constipation, unspecified constipation type  Recommend adequately hydrating.  May use MiraLAX daily until regular BM.    4. Postmenopausal atrophic vaginitis  Ongoing problem.  Have discussed symptoms mimicking acute UTI although previous urine cultures were negative.  Recommend trial of topical estrogen to vulvovaginal area.  - estradiol (ESTRACE) 0.1 MG/GM vaginal cream; Apply pea sized amount to vaginal area nightly  Dispense: 43 g; Refill: 3         Return in about 6 months (around 10/15/2024).    {I have placed the above orders and discussed them with an approved delegating provider. The MA is performing the below orders under the direction of Dr. Ajit MCQUEEN.    "

## 2024-04-16 ENCOUNTER — NON-PROVIDER VISIT (OUTPATIENT)
Dept: CARDIOLOGY | Facility: MEDICAL CENTER | Age: 89
End: 2024-04-16

## 2024-04-16 ENCOUNTER — PHYSICAL THERAPY (OUTPATIENT)
Dept: PHYSICAL THERAPY | Facility: REHABILITATION | Age: 89
End: 2024-04-16
Attending: INTERNAL MEDICINE
Payer: MEDICARE

## 2024-04-16 DIAGNOSIS — R53.1 GENERAL WEAKNESS: ICD-10-CM

## 2024-04-16 DIAGNOSIS — I89.0 LYMPHEDEMA: ICD-10-CM

## 2024-04-16 DIAGNOSIS — R53.81 PHYSICAL DECONDITIONING: ICD-10-CM

## 2024-04-16 DIAGNOSIS — Z78.9 IMPAIRED MOBILITY AND ADLS: ICD-10-CM

## 2024-04-16 DIAGNOSIS — Z74.09 IMPAIRED MOBILITY AND ADLS: ICD-10-CM

## 2024-04-16 PROCEDURE — 97116 GAIT TRAINING THERAPY: CPT

## 2024-04-16 PROCEDURE — 97110 THERAPEUTIC EXERCISES: CPT

## 2024-04-16 PROCEDURE — 93294 REM INTERROG EVL PM/LDLS PM: CPT | Performed by: INTERNAL MEDICINE

## 2024-04-16 NOTE — OP THERAPY DAILY TREATMENT
Outpatient Physical Therapy  LYMPHEDEMA THERAPY DAILY TREATMENT     [facility] Physical Therapy [address]  [address]  [place] [address]  Phone:  [phone]  Fax:  [phone]    Date: 04/16/2024    Patient: Krystle Tavarez  YOB: 1933  MRN: [id number]     Time Calculation    Start time: 1116  Stop time: 1159 Time Calculation (min): 43 minutes         Chief Complaint: Difficulty Walking and Weakness    Visit #: 4    Subjective:   History of Present Illness:     Mechanism of injury:  Doing well. Brought 4WW to pracice.       Lymphedema Objective    Right Lower Extremity Circumferential Measurements 4/11 PROGRESS  Knee: 34.6 cm  Upper Calf: 33.5 cm  Mid Calf: 24.6 cm  Ankle: 23.7 cm  Heel to Foot: 19.7 cm  Total: 136.1 cm      Left Lower Extremity Circumferential Measurements 4/11 PROGRESS  Knee: 34.6 cm  Upper Calf: 33.4 cm  Mid Calf: 25.6 cm  Ankle: 25.7 cm  Heel to Foot: 20.5 cm  Total: 139.8 cm         Left Lower Extremity Circumferential Measurements EVAL  Knee: 34.5 cm  Upper Calf: 33 cm  Mid Calf: 26.7 cm  Ankle: 28 cm  Heel to Foot: 20.3 cm  Total: 142.5 cm     Right Lower Extremity Circumferential Measurements EVAL  Knee: 35 cm  Upper Calf: 34.6 cm  Mid Calf: 25.1 cm  Ankle: 27.4 (low ankle, over fullest part) cm  Heel to Foot: 19.2 cm  Total: 141.3 cm    Therapeutic Exercises (CPT 32343):       Therapeutic Exercise Summary: 4/16: added ankle pumps to HEP. Pt was performing what she thought were ankle pumps but really they appeared to be flutter kicks. Encouraged engagement at the ankle joint itself with emphasis on lifting toes to achieve true DF.     Therapeutic Treatments and Modalities:     1. Gait Training (CPT 64821)    Therapeutic Treatment and Modalities Summary: Gait training with 4WW:  -1x71' with seated rest. Pt able to turn within walker to sit  -2x77' with seated rest in between. Cues during sit to stand to initiate stand, then perform stepping within walker to  turn around. SBA.     Time-based treatments/modalities:    Physical Therapy Timed Treatment Charges  Gait training minutes (CPT 47185): 33 minutes  Therapeutic exercise minutes (CPT 13445): 10 minutes      Assessment and Plan:   Assessment details:  Krystle is not quite yet able to ambulate community distances with her 4WW but does have excellent judgement regarding appropriate distance she can tolerate. Her use of 4WW is appropriate as well and will require further education on brake usage. Her goals are related to community distance gait and she will benefit from further intervention to ensure safety to achieve these goals.     Plan:  Therapy options:  Physical therapy treatment to continue  Discussed with:  Patient

## 2024-04-18 ENCOUNTER — PHYSICAL THERAPY (OUTPATIENT)
Dept: PHYSICAL THERAPY | Facility: REHABILITATION | Age: 89
End: 2024-04-18
Attending: INTERNAL MEDICINE
Payer: MEDICARE

## 2024-04-18 DIAGNOSIS — Z78.9 IMPAIRED MOBILITY AND ADLS: ICD-10-CM

## 2024-04-18 DIAGNOSIS — Z74.09 IMPAIRED MOBILITY AND ADLS: ICD-10-CM

## 2024-04-18 DIAGNOSIS — R53.1 GENERAL WEAKNESS: ICD-10-CM

## 2024-04-18 DIAGNOSIS — I89.0 LYMPHEDEMA: ICD-10-CM

## 2024-04-18 DIAGNOSIS — R53.81 PHYSICAL DECONDITIONING: ICD-10-CM

## 2024-04-18 PROCEDURE — 97110 THERAPEUTIC EXERCISES: CPT

## 2024-04-18 NOTE — OP THERAPY DAILY TREATMENT
Outpatient Physical Therapy  LYMPHEDEMA THERAPY DAILY TREATMENT     [facility] Physical Therapy [address]  [address]  [place] [address]  Phone:  [phone]  Fax:  [phone]    Date: 2024    Patient: Krystle Tavarez  YOB: 1933  MRN: [id number]     Time Calculation    Start time: 1546  Stop time: 1630 Time Calculation (min): 44 minutes         Chief Complaint: Difficulty Walking and Leg Swelling    Visit #: 5    Subjective:   History of Present Illness:     Mechanism of injury:  Did not practice with her walking. Unsure why. Just didn't.       Lymphedema Objective    Right Lower Extremity Circumferential Measurements  PROGRESS  Knee: 34.6 cm  Upper Calf: 33.5 cm  Mid Calf: 24.6 cm  Ankle: 23.7 cm  Heel to Foot: 19.7 cm  Total: 136.1 cm      Left Lower Extremity Circumferential Measurements  PROGRESS  Knee: 34.6 cm  Upper Calf: 33.4 cm  Mid Calf: 25.6 cm  Ankle: 25.7 cm  Heel to Foot: 20.5 cm  Total: 139.8 cm         Left Lower Extremity Circumferential Measurements EVAL  Knee: 34.5 cm  Upper Calf: 33 cm  Mid Calf: 26.7 cm  Ankle: 28 cm  Heel to Foot: 20.3 cm  Total: 142.5 cm     Right Lower Extremity Circumferential Measurements EVAL  Knee: 35 cm  Upper Calf: 34.6 cm  Mid Calf: 25.1 cm  Ankle: 27.4 (low ankle, over fullest part) cm  Heel to Foot: 19.2 cm  Total: 141.3 cm    Therapeutic Treatments and Modalities:     Therapeutic Treatment and Modalities Summary: Circuit Trainin. X20 of the following: marching, lunges, hip abduction, calf raises all holding 4WW. Followed by x60' of gait with 4WW. Seated rest between each exercise set.  2. X20: marching, lunges, calf raises. Due to time, unable to complete full second circuit.     Time-based treatments/modalities:    Physical Therapy Timed Treatment Charges  Therapeutic exercise minutes (CPT 46089): 44 minutes      Assessment and Plan:   Assessment details:  Initiated general circuit training for Krystle which has  been shown to improve strength and tolerance to basic activity. Will have Krystle complete circuit again next session and see if she can increase total number of bouts.     Plan:  Therapy options:  Physical therapy treatment to continue  Discussed with:  Patient

## 2024-04-22 ENCOUNTER — PHYSICAL THERAPY (OUTPATIENT)
Dept: PHYSICAL THERAPY | Facility: REHABILITATION | Age: 89
End: 2024-04-22
Attending: INTERNAL MEDICINE
Payer: MEDICARE

## 2024-04-22 DIAGNOSIS — Z78.9 IMPAIRED MOBILITY AND ADLS: ICD-10-CM

## 2024-04-22 DIAGNOSIS — Z74.09 IMPAIRED MOBILITY AND ADLS: ICD-10-CM

## 2024-04-22 DIAGNOSIS — R53.81 PHYSICAL DECONDITIONING: ICD-10-CM

## 2024-04-22 DIAGNOSIS — I89.0 LYMPHEDEMA: ICD-10-CM

## 2024-04-22 DIAGNOSIS — R53.1 GENERAL WEAKNESS: ICD-10-CM

## 2024-04-22 PROCEDURE — 97110 THERAPEUTIC EXERCISES: CPT

## 2024-04-22 NOTE — OP THERAPY DAILY TREATMENT
Outpatient Physical Therapy  LYMPHEDEMA THERAPY DAILY TREATMENT     [facility] Physical [address]  [address]  [place] [address]  Phone:  [phone]  Fax:  [phone]    Date: 2024    Patient: Krystle Tavarez  YOB: 1933  MRN: [id number]     Time Calculation    Start time: 1117  Stop time: 1204 Time Calculation (min): 47 minutes         Chief Complaint: Difficulty Walking, Weakness, and Leg Swelling    Visit #: 6    Subjective:   History of Present Illness:     Mechanism of injury:  Feeling tired today. Body feeling not as good as last week, unsure why.       Lymphedema Objective    Right Lower Extremity Circumferential Measurements  PROGRESS  Knee: 34.6 cm  Upper Calf: 33.5 cm  Mid Calf: 24.6 cm  Ankle: 23.7 cm  Heel to Foot: 19.7 cm  Total: 136.1 cm      Left Lower Extremity Circumferential Measurements  PROGRESS  Knee: 34.6 cm  Upper Calf: 33.4 cm  Mid Calf: 25.6 cm  Ankle: 25.7 cm  Heel to Foot: 20.5 cm  Total: 139.8 cm         Left Lower Extremity Circumferential Measurements EVAL  Knee: 34.5 cm  Upper Calf: 33 cm  Mid Calf: 26.7 cm  Ankle: 28 cm  Heel to Foot: 20.3 cm  Total: 142.5 cm     Right Lower Extremity Circumferential Measurements EVAL  Knee: 35 cm  Upper Calf: 34.6 cm  Mid Calf: 25.1 cm  Ankle: 27.4 (low ankle, over fullest part) cm  Heel to Foot: 19.2 cm  Total: 141.3 cm    Therapeutic Exercises (CPT 40620):       Therapeutic Exercise Summary: Circuit Trainin. X20 of the following: marching, lunges, hip abduction, calf raises all holding 4WW. Followed by 2x80' of gait with 4WW. Seated rest between each exercise set.  2. X20: marching, calf raises. Due to time, unable to complete full second circuit. Pt required longer rest breaks.     Therapeutic Treatments and Modalities:     Therapeutic Treatment and Modalities Summary:      Time-based treatments/modalities:    Physical Therapy Timed Treatment Charges  Therapeutic exercise minutes (CPT  37636): 47 minutes      Assessment and Plan:   Assessment details:  Despite feeling more tired today, Krystle was able to increase her gait distance to 2x80' with her 4WW. She will benefit from further circuit training to improve her tolerance to activity and gait.     Plan:  Therapy options:  Physical therapy treatment to continue  Discussed with:  Patient and family

## 2024-04-24 ENCOUNTER — APPOINTMENT (OUTPATIENT)
Dept: PHYSICAL THERAPY | Facility: REHABILITATION | Age: 89
End: 2024-04-24
Attending: INTERNAL MEDICINE
Payer: MEDICARE

## 2024-04-24 NOTE — OP THERAPY DAILY TREATMENT
Outpatient Physical Therapy  LYMPHEDEMA THERAPY DAILY TREATMENT     [facility] [address].  [address]  [place] [address]  Phone:  [phone]  Fax:  [phone]    Date: 04/24/2024    Patient: Krystle Tavarez  YOB: 1933  MRN: [id number]     Time Calculation                   Chief Complaint: No chief complaint on file.    Visit #: 7    Subjective    Lymphedema Objective     Right Lower Extremity Circumferential Measurements 4/11 PROGRESS  Knee: 34.6 cm  Upper Calf: 33.5 cm  Mid Calf: 24.6 cm  Ankle: 23.7 cm  Heel to Foot: 19.7 cm  Total: 136.1 cm      Left Lower Extremity Circumferential Measurements 4/11 PROGRESS  Knee: 34.6 cm  Upper Calf: 33.4 cm  Mid Calf: 25.6 cm  Ankle: 25.7 cm  Heel to Foot: 20.5 cm  Total: 139.8 cm         Left Lower Extremity Circumferential Measurements EVAL  Knee: 34.5 cm  Upper Calf: 33 cm  Mid Calf: 26.7 cm  Ankle: 28 cm  Heel to Foot: 20.3 cm  Total: 142.5 cm     Right Lower Extremity Circumferential Measurements EVAL  Knee: 35 cm  Upper Calf: 34.6 cm  Mid Calf: 25.1 cm  Ankle: 27.4 (low ankle, over fullest part) cm  Heel to Foot: 19.2 cm  Total: 141.3 cm  Exercises/Treatment  Time-based treatments/modalities:           LYMPHEDEMA ASSESSMENT AND PLAN

## 2024-05-02 ENCOUNTER — HOSPITAL ENCOUNTER (EMERGENCY)
Facility: MEDICAL CENTER | Age: 89
DRG: 551 | End: 2024-05-02
Attending: EMERGENCY MEDICINE
Payer: MEDICARE

## 2024-05-02 ENCOUNTER — APPOINTMENT (OUTPATIENT)
Dept: RADIOLOGY | Facility: MEDICAL CENTER | Age: 89
DRG: 551 | End: 2024-05-02
Attending: EMERGENCY MEDICINE
Payer: MEDICARE

## 2024-05-02 VITALS
TEMPERATURE: 97.8 F | OXYGEN SATURATION: 97 % | DIASTOLIC BLOOD PRESSURE: 72 MMHG | WEIGHT: 146 LBS | HEART RATE: 70 BPM | BODY MASS INDEX: 25.87 KG/M2 | RESPIRATION RATE: 20 BRPM | SYSTOLIC BLOOD PRESSURE: 164 MMHG | HEIGHT: 63 IN
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
## 2024-05-02 DIAGNOSIS — M79.604 RIGHT LEG PAIN: ICD-10-CM

## 2024-05-02 LAB
ALBUMIN SERPL BCP-MCNC: 3.9 G/DL (ref 3.2–4.9)
ALBUMIN/GLOB SERPL: 1.3 G/DL
ALP SERPL-CCNC: 81 U/L (ref 30–99)
ALT SERPL-CCNC: 11 U/L (ref 2–50)
ANION GAP SERPL CALC-SCNC: 9 MMOL/L (ref 7–16)
APPEARANCE UR: CLEAR
AST SERPL-CCNC: 14 U/L (ref 12–45)
BASOPHILS # BLD AUTO: 0.3 % (ref 0–1.8)
BASOPHILS # BLD: 0.02 K/UL (ref 0–0.12)
BILIRUB SERPL-MCNC: 1 MG/DL (ref 0.1–1.5)
BILIRUB UR QL STRIP.AUTO: NEGATIVE
BUN SERPL-MCNC: 26 MG/DL (ref 8–22)
CALCIUM ALBUM COR SERPL-MCNC: 9.7 MG/DL (ref 8.5–10.5)
CALCIUM SERPL-MCNC: 9.6 MG/DL (ref 8.5–10.5)
CHLORIDE SERPL-SCNC: 105 MMOL/L (ref 96–112)
CO2 SERPL-SCNC: 27 MMOL/L (ref 20–33)
COLOR UR: YELLOW
CREAT SERPL-MCNC: 0.8 MG/DL (ref 0.5–1.4)
EOSINOPHIL # BLD AUTO: 0.11 K/UL (ref 0–0.51)
EOSINOPHIL NFR BLD: 1.8 % (ref 0–6.9)
ERYTHROCYTE [DISTWIDTH] IN BLOOD BY AUTOMATED COUNT: 47.4 FL (ref 35.9–50)
GFR SERPLBLD CREATININE-BSD FMLA CKD-EPI: 70 ML/MIN/1.73 M 2
GLOBULIN SER CALC-MCNC: 3.1 G/DL (ref 1.9–3.5)
GLUCOSE SERPL-MCNC: 149 MG/DL (ref 65–99)
GLUCOSE UR STRIP.AUTO-MCNC: NEGATIVE MG/DL
HCT VFR BLD AUTO: 39.9 % (ref 37–47)
HGB BLD-MCNC: 13.8 G/DL (ref 12–16)
IMM GRANULOCYTES # BLD AUTO: 0.02 K/UL (ref 0–0.11)
IMM GRANULOCYTES NFR BLD AUTO: 0.3 % (ref 0–0.9)
KETONES UR STRIP.AUTO-MCNC: NEGATIVE MG/DL
LACTATE SERPL-SCNC: 1.1 MMOL/L (ref 0.5–2)
LEUKOCYTE ESTERASE UR QL STRIP.AUTO: NEGATIVE
LYMPHOCYTES # BLD AUTO: 1.98 K/UL (ref 1–4.8)
LYMPHOCYTES NFR BLD: 32.4 % (ref 22–41)
MCH RBC QN AUTO: 33.2 PG (ref 27–33)
MCHC RBC AUTO-ENTMCNC: 34.6 G/DL (ref 32.2–35.5)
MCV RBC AUTO: 95.9 FL (ref 81.4–97.8)
MICRO URNS: NORMAL
MONOCYTES # BLD AUTO: 0.45 K/UL (ref 0–0.85)
MONOCYTES NFR BLD AUTO: 7.4 % (ref 0–13.4)
NEUTROPHILS # BLD AUTO: 3.54 K/UL (ref 1.82–7.42)
NEUTROPHILS NFR BLD: 57.8 % (ref 44–72)
NITRITE UR QL STRIP.AUTO: NEGATIVE
NRBC # BLD AUTO: 0 K/UL
NRBC BLD-RTO: 0 /100 WBC (ref 0–0.2)
PH UR STRIP.AUTO: 6.5 [PH] (ref 5–8)
PLATELET # BLD AUTO: 192 K/UL (ref 164–446)
PMV BLD AUTO: 9.8 FL (ref 9–12.9)
POTASSIUM SERPL-SCNC: 4.8 MMOL/L (ref 3.6–5.5)
PROT SERPL-MCNC: 7 G/DL (ref 6–8.2)
PROT UR QL STRIP: NEGATIVE MG/DL
RBC # BLD AUTO: 4.16 M/UL (ref 4.2–5.4)
RBC UR QL AUTO: NEGATIVE
SODIUM SERPL-SCNC: 141 MMOL/L (ref 135–145)
SP GR UR STRIP.AUTO: 1.02
UROBILINOGEN UR STRIP.AUTO-MCNC: 0.2 MG/DL
WBC # BLD AUTO: 6.1 K/UL (ref 4.8–10.8)

## 2024-05-02 ASSESSMENT — PAIN DESCRIPTION - PAIN TYPE: TYPE: ACUTE PAIN

## 2024-05-02 ASSESSMENT — FIBROSIS 4 INDEX: FIB4 SCORE: 1.74

## 2024-05-02 NOTE — ED TRIAGE NOTES
Chief Complaint   Patient presents with    GLF     BIB EMS  from home. Patient states he was using the bathroom last night and had a fall. No head strike. +blood thinners. Patient endorses pain to right hip, knee, ankle and low back. EMS reporst that the patient was able to sand and move from her bed to the gurney.      Patient AOX4  On 2L NC. Normally not on O2. RA trial at 89%  Patient shows good stregth and ROM to RT hip, RT knee, and RT ankle.   Previous replacement on RT Hip and RT knee.

## 2024-05-02 NOTE — ED NOTES
Pt discharged, all appropriate hospital equipment removed (IV, monitor, pulse ox, etc.). Pt left unit via wheelchair with family to ED lobby for transport home. Personal belongings with pt when leaving unit. Pt given discharge instructions prior to leaving unit including where to  prescriptions and when to follow-up; verbalizes understanding. Pt informed to return to ED if symptoms worsen/return or altered status develop. Copy of discharge instructions signed and turned into DC basket and copy sent with pt.

## 2024-05-02 NOTE — ED PROVIDER NOTES
"ED Provider Note  CHIEF COMPLAINT  Chief Complaint   Patient presents with    GLF     BIB EMS  from home. Patient states he was using the bathroom last night and had a fall. No head strike. +blood thinners. Patient endorses pain to right hip, knee, ankle and low back. EMS reporst that the patient was able to sand and move from her bed to the gurney.        HPI  Krystle Tavarez is a 89 y.o. female who presents for evaluation of a ground-level fall which apparently happened sometime last night after getting off the toilet.  Patient notes she normally walks with a walker due to chronic weakness and mid back pain which is not new.  She states she thinks she \"lost my balance\" and fell onto the right side of her buttock and hip.  She notes right lower extremity pain from the ankle to the hip and also notes that her hands feel \"tight.\"  Patient was originally seen after the fall last night by EMS but she was able to stand, ambulate to the bed with her walker and lie down without apparent distress.  She was not transported at that time but apparently she woke up this morning with worsening pain and could not walk.  Patient notes she did not hit her head or lose consciousness and has no bruising.  She is on anticoagulation for atrial fibrillation.  EXTERNAL RECORDS REVIEWED  Reviewed last office visit on the 15th of this month for diabetes follow-up.  Patient noted to have as medical history of cardiac pacemaker, hypertension, TIA, weakness of both lower extremities, type 2 diabetes, paroxysmal atrial fibrillation, impaired mobility and difficulty with ADLs, lumbar disc degeneration and chronic heart failure with preserved ejection fraction  ROS  Constitutional: No fevers or chills  Skin: No rashes  HEENT: No sore throat, or runny nose  Neck: No neck pain  Chest: No pain or rashes  Pulm: No shortness of breath, cough, wheezing, stridor, or pain with inspiration/expiration  Gastrointestinal: No nausea, vomiting, diarrhea, " constipation, bloating, melena, hematochezia or abdominal pain.  Genitourinary: No dysuria or hematuria  Musculoskeletal: Diffuse right lower extremity pain.  Neurologic: No sensory or focal motor changes to extremities. No confusion or disorientation.  Heme: No bleeding or bruising problems.   Immuno: Bruises and bleeds easily due to Eliquis use        LIMITATION TO HISTORY   None  OUTSIDE HISTORIAN(S):  Patient's daughter who presents with patient and helps relate history.        PAST FAM HISTORY  Family History   Problem Relation Age of Onset    Diabetes Mother     Stroke Mother     Heart Attack Father 74    No Known Problems Maternal Grandmother     No Known Problems Maternal Grandfather     No Known Problems Paternal Grandmother     No Known Problems Paternal Grandfather        PAST MEDICAL HISTORY   has a past medical history of Arthritis, Breath shortness, Cataract, Dental disorder, Diabetes (HCC), Heart burn, Hemorrhagic disorder (HCC), High cholesterol, Hyperlipidemia, Hypertension, Pacemaker (2015), Pain (2020), Pre-diabetes, TIA (transient ischemic attack), and Urinary incontinence.    SOCIAL HISTORY  Social History     Tobacco Use    Smoking status: Never    Smokeless tobacco: Never   Vaping Use    Vaping Use: Never used   Substance and Sexual Activity    Alcohol use: Not Currently    Drug use: No    Sexual activity: Not Currently     Partners: Male       SURGICAL HISTORY   has a past surgical history that includes hip replacement, total (Right, 2009); knee replacement, total (Right, 2004); bunionectomy (Left); basal cell excision; cataract extraction with iol (Bilateral, 2003); pacemaker insertion (2015); cholecystectomy (2002); laminotomy,lumbar disk,1 intrsp (N/A, 2/25/2020); and foraminotomy (N/A, 2/25/2020).    CURRENT MEDICATIONS  Home Medications    **Home medications have not yet been reviewed for this encounter**          ALLERGIES  Allergies   Allergen Reactions    Codeine Unspecified      "Patient reported hallucination and dizziness. Specific to tylenol with codeine      Gabapentin Unspecified     Altered mental status    Sulfa Drugs Nausea     Nausea   Other reaction(s): Not available    Tramadol Unspecified     Altered mentation       PHYSICAL EXAM  VITAL SIGNS: BP (!) 164/72   Pulse 70   Temp 36.6 °C (97.8 °F) (Temporal)   Resp 20   Ht 1.6 m (5' 3\")   Wt 66.2 kg (146 lb)   LMP  (LMP Unknown)   SpO2 97%   BMI 25.86 kg/m²    Gen: Alert in no apparent distress.  HEENT: No signs of trauma, Bilateral external ears normal, Nose normal. Conjunctiva normal, Non-icteric.   Neck:  No tenderness, Supple, No masses  Lymphatic: No cervical lymphadenopathy noted.   Cardiovascular: Regular rate and rhythm, no murmurs.  Capillary refill less than 3 seconds to all extremities, 2+ distal pulses.  Thorax & Lungs: Normal breath sounds, No respiratory distress, No wheezing bilateral chest rise  Abdomen: Bowel sounds normal, Soft, No tenderness, No masses, No pulsatile masses. No Guarding or rebound  Skin: Warm, Dry, No erythema, No rash noted to exposed areas.   Back:   Extremities: Intact distal pulses and capillary refill to all extremities.  Patient able to  with 5 out of 5 strength to upper extremities.  She is able to range upper extremities without distress or limitation.  She has point tenderness to the entirety of her right lower extremity from the ankle to the hip.  There are no obvious areas of ecchymosis, deformity, or edema.  Patient is able to elevate the right lower extremity off the bed at the hip and bend the knee without apparent distress.  She is also able to range the ankle without distress.  Neurologic: Alert , no facial droop, grossly normal coordination and strength  Psychiatric: Affect pleasant    INITIAL IMPRESSION  Patient arrives for evaluation after ground-level fall last night in which she was able to walk afterwards but apparently got worse overnight.  She has no obvious " injuries or tense muscle compartments and is actually able to move her right lower extremity and her left lower extremity quite well.  Despite this she flinches and states it hurts anywhere she gets touched on her right lower extremity all the way to the hip.  She also notes her hands feel tight.  Her upper extremity exam was very reassuring otherwise and I did not feel imaging of anything above the waist was necessary.  I did consider CT imaging of the head simply because she fell and is on Eliquis but with no headache or focal neurologic symptoms I feel this would be of low utility.  After discussion with the patient and her daughter, we will also evaluate for possible medical issues that might be causing her to feel more weak today including a urinary tract infection.  Notable the patient has no localizing signs or symptoms to suggest a significant medical issue but both the daughter and the patient feel this is a good idea.  We will get x-rays of the right lower extremity as well as the AP pelvis.  If these are reassuring and the labs are relatively normal, I feel we can road test the patient and see if she can ambulate with her walker.  If she cannot we will need to consider advanced imaging to determine if she has an occult fracture.    ED observation? No    LABS  Results for orders placed or performed during the hospital encounter of 05/02/24   CBC WITH DIFFERENTIAL   Result Value Ref Range    WBC 6.1 4.8 - 10.8 K/uL    RBC 4.16 (L) 4.20 - 5.40 M/uL    Hemoglobin 13.8 12.0 - 16.0 g/dL    Hematocrit 39.9 37.0 - 47.0 %    MCV 95.9 81.4 - 97.8 fL    MCH 33.2 (H) 27.0 - 33.0 pg    MCHC 34.6 32.2 - 35.5 g/dL    RDW 47.4 35.9 - 50.0 fL    Platelet Count 192 164 - 446 K/uL    MPV 9.8 9.0 - 12.9 fL    Neutrophils-Polys 57.80 44.00 - 72.00 %    Lymphocytes 32.40 22.00 - 41.00 %    Monocytes 7.40 0.00 - 13.40 %    Eosinophils 1.80 0.00 - 6.90 %    Basophils 0.30 0.00 - 1.80 %    Immature Granulocytes 0.30 0.00 - 0.90 %     Nucleated RBC 0.00 0.00 - 0.20 /100 WBC    Neutrophils (Absolute) 3.54 1.82 - 7.42 K/uL    Lymphs (Absolute) 1.98 1.00 - 4.80 K/uL    Monos (Absolute) 0.45 0.00 - 0.85 K/uL    Eos (Absolute) 0.11 0.00 - 0.51 K/uL    Baso (Absolute) 0.02 0.00 - 0.12 K/uL    Immature Granulocytes (abs) 0.02 0.00 - 0.11 K/uL    NRBC (Absolute) 0.00 K/uL   COMP METABOLIC PANEL   Result Value Ref Range    Sodium 141 135 - 145 mmol/L    Potassium 4.8 3.6 - 5.5 mmol/L    Chloride 105 96 - 112 mmol/L    Co2 27 20 - 33 mmol/L    Anion Gap 9.0 7.0 - 16.0    Glucose 149 (H) 65 - 99 mg/dL    Bun 26 (H) 8 - 22 mg/dL    Creatinine 0.80 0.50 - 1.40 mg/dL    Calcium 9.6 8.5 - 10.5 mg/dL    Correct Calcium 9.7 8.5 - 10.5 mg/dL    AST(SGOT) 14 12 - 45 U/L    ALT(SGPT) 11 2 - 50 U/L    Alkaline Phosphatase 81 30 - 99 U/L    Total Bilirubin 1.0 0.1 - 1.5 mg/dL    Albumin 3.9 3.2 - 4.9 g/dL    Total Protein 7.0 6.0 - 8.2 g/dL    Globulin 3.1 1.9 - 3.5 g/dL    A-G Ratio 1.3 g/dL   LACTIC ACID   Result Value Ref Range    Lactic Acid 1.1 0.5 - 2.0 mmol/L   URINALYSIS (UA)    Specimen: Urine   Result Value Ref Range    Color Yellow     Character Clear     Specific Gravity 1.016 <1.035    Ph 6.5 5.0 - 8.0    Glucose Negative Negative mg/dL    Ketones Negative Negative mg/dL    Protein Negative Negative mg/dL    Bilirubin Negative Negative    Urobilinogen, Urine 0.2 Negative    Nitrite Negative Negative    Leukocyte Esterase Negative Negative    Occult Blood Negative Negative    Micro Urine Req see below    ESTIMATED GFR   Result Value Ref Range    GFR (CKD-EPI) 70 >60 mL/min/1.73 m 2     I have independently interpreted the diagnostic imaging associated with this visit and am waiting the final reading from the radiologist.   My preliminary interpretation is a follows: Diagnostic x-ray with 2 view right tib-fib: No fractures or dislocations  Diagnostic x-ray right femur 2 view: No fractures or dislocations  No view AP pelvis: No fractures or  dislocations  RADIOLOGY  DX-TIBIA AND FIBULA RIGHT   Final Result      1.  No evidence of fracture or dislocation.      2.  Osteopenia.      DX-PELVIS-1 OR 2 VIEWS   Final Result         1.  Osteopenia.      2.  No evidence of pelvic or right hip fracture.      DX-FEMUR-2+ RIGHT   Final Result      1.  Osteopenia.      2.  Previous total right hip and knee replacements.      3.  No evidence of periprosthetic fracture and/or dislocation.            COURSE & MEDICAL DECISION MAKING  Pertinent Labs & Imaging studies reviewed. (See chart for details)  Patient's labs and imaging were entirely reassuring and she was able to stand, and ambulate with a walker at her baseline.  Her daughter, who was present throughout her stay, states understanding that it seems very unlikely that the patient has an occult fracture if she can ambulate without distress or pain.  They are comfortable plan for discharge with watchful waiting and will return if symptoms worsen or change in any way.    I have discussed management of the patient with the following physicians and FAVIO's: None    Escalation of care considered, and ultimately not performed: CT or MRI of the right lower extremity but felt to be unnecessary as PE which was able to ambulate without difficulty or pain    Barriers to care at this time, including but not limited to: None.     Decision tools and Rx drugs considered including, but not limited to : None    Discussion of management with other QHP or appropriate source(s): None     The patient will return for worsening symptoms and is stable at the time of discharge. The patient verbalizes understanding and will comply.    FINAL IMPRESSION  1. Fall from ground level    2. Right leg pain        Electronically signed by: Dex Wei M.D., 5/2/2024 11:07 AM

## 2024-05-03 ENCOUNTER — HOSPITAL ENCOUNTER (INPATIENT)
Facility: MEDICAL CENTER | Age: 89
LOS: 4 days | DRG: 551 | End: 2024-05-07
Attending: STUDENT IN AN ORGANIZED HEALTH CARE EDUCATION/TRAINING PROGRAM | Admitting: STUDENT IN AN ORGANIZED HEALTH CARE EDUCATION/TRAINING PROGRAM
Payer: MEDICARE

## 2024-05-03 ENCOUNTER — APPOINTMENT (OUTPATIENT)
Dept: RADIOLOGY | Facility: MEDICAL CENTER | Age: 89
DRG: 551 | End: 2024-05-03
Attending: STUDENT IN AN ORGANIZED HEALTH CARE EDUCATION/TRAINING PROGRAM
Payer: MEDICARE

## 2024-05-03 DIAGNOSIS — D64.9 ANEMIA, UNSPECIFIED TYPE: ICD-10-CM

## 2024-05-03 DIAGNOSIS — Z74.09 IMPAIRED MOBILITY AND ADLS: Chronic | ICD-10-CM

## 2024-05-03 DIAGNOSIS — K83.8 COMMON BILE DUCT DILATION: ICD-10-CM

## 2024-05-03 DIAGNOSIS — M48.56XS COMPRESSION FRACTURE OF LUMBAR VERTEBRAE, NON-TRAUMATIC, SEQUELA: ICD-10-CM

## 2024-05-03 DIAGNOSIS — S22.21XA CLOSED FRACTURE OF MANUBRIUM, INITIAL ENCOUNTER: ICD-10-CM

## 2024-05-03 DIAGNOSIS — M48.061 FORAMINAL STENOSIS OF LUMBAR REGION: Chronic | ICD-10-CM

## 2024-05-03 DIAGNOSIS — Z86.73 HISTORY OF TIA (TRANSIENT ISCHEMIC ATTACK): Chronic | ICD-10-CM

## 2024-05-03 DIAGNOSIS — K59.00 CONSTIPATION, UNSPECIFIED CONSTIPATION TYPE: ICD-10-CM

## 2024-05-03 DIAGNOSIS — N28.1 BENIGN CYST OF RIGHT KIDNEY: ICD-10-CM

## 2024-05-03 DIAGNOSIS — I50.32 CHRONIC HEART FAILURE WITH PRESERVED EJECTION FRACTION (HFPEF) (HCC): Chronic | ICD-10-CM

## 2024-05-03 DIAGNOSIS — Z79.01 CHRONIC ANTICOAGULATION: ICD-10-CM

## 2024-05-03 DIAGNOSIS — Z95.0 CARDIAC PACEMAKER IN SITU: Chronic | ICD-10-CM

## 2024-05-03 DIAGNOSIS — S12.690A CLOSED FRACTURE OF SEVENTH CERVICAL VERTEBRA WITHOUT SPINAL CORD INJURY, INITIAL ENCOUNTER (HCC): ICD-10-CM
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
## 2024-05-03 DIAGNOSIS — S09.90XA CLOSED HEAD INJURY, INITIAL ENCOUNTER: ICD-10-CM

## 2024-05-03 DIAGNOSIS — I48.0 PAF (PAROXYSMAL ATRIAL FIBRILLATION) (HCC): Chronic | ICD-10-CM

## 2024-05-03 DIAGNOSIS — I73.9 PERIPHERAL VASCULAR DISEASE, UNSPECIFIED (HCC): Chronic | ICD-10-CM

## 2024-05-03 DIAGNOSIS — I10 PRIMARY HYPERTENSION: ICD-10-CM

## 2024-05-03 DIAGNOSIS — I10 ESSENTIAL HYPERTENSION, BENIGN: Chronic | ICD-10-CM

## 2024-05-03 DIAGNOSIS — Z71.89 ADVANCED CARE PLANNING/COUNSELING DISCUSSION: ICD-10-CM

## 2024-05-03 DIAGNOSIS — Z66 DNR (DO NOT RESUSCITATE): Chronic | ICD-10-CM

## 2024-05-03 DIAGNOSIS — H35.00 RETINOPATHY: ICD-10-CM

## 2024-05-03 DIAGNOSIS — E11.9 TYPE 2 DIABETES MELLITUS WITHOUT COMPLICATION, WITHOUT LONG-TERM CURRENT USE OF INSULIN (HCC): Chronic | ICD-10-CM

## 2024-05-03 DIAGNOSIS — R29.898 WEAKNESS OF BOTH LOWER EXTREMITIES: Chronic | ICD-10-CM

## 2024-05-03 DIAGNOSIS — R29.6 FREQUENT FALLS: ICD-10-CM

## 2024-05-03 DIAGNOSIS — E78.5 DYSLIPIDEMIA: Chronic | ICD-10-CM

## 2024-05-03 DIAGNOSIS — R60.0 BILATERAL LOWER EXTREMITY EDEMA: ICD-10-CM

## 2024-05-03 DIAGNOSIS — M47.816 LUMBAR SPONDYLOSIS: ICD-10-CM

## 2024-05-03 DIAGNOSIS — M51.36 DEGENERATION OF LUMBAR INTERVERTEBRAL DISC: ICD-10-CM

## 2024-05-03 DIAGNOSIS — Z78.9 POLST (PHYSICIAN ORDERS FOR LIFE-SUSTAINING TREATMENT): Chronic | ICD-10-CM

## 2024-05-03 DIAGNOSIS — S12.9XXA: ICD-10-CM

## 2024-05-03 DIAGNOSIS — T14.90XA TRAUMA: ICD-10-CM

## 2024-05-03 DIAGNOSIS — I70.0 ATHEROSCLEROSIS OF AORTA (HCC): Chronic | ICD-10-CM

## 2024-05-03 DIAGNOSIS — S22.23XA CLOSED STERNAL MANUBRIAL DISSOCIATION, INITIAL ENCOUNTER: ICD-10-CM

## 2024-05-03 DIAGNOSIS — Z78.9 IMPAIRED MOBILITY AND ADLS: Chronic | ICD-10-CM

## 2024-05-03 DIAGNOSIS — S12.9XXA CERVICAL COMPRESSION FRACTURE, INITIAL ENCOUNTER (HCC): ICD-10-CM
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
## 2024-05-03 LAB
ALBUMIN SERPL BCP-MCNC: 4 G/DL (ref 3.2–4.9)
ALBUMIN/GLOB SERPL: 1.4 G/DL
ALP SERPL-CCNC: 76 U/L (ref 30–99)
ALT SERPL-CCNC: 13 U/L (ref 2–50)
ANION GAP SERPL CALC-SCNC: 11 MMOL/L (ref 7–16)
APTT PPP: 35 SEC (ref 24.7–36)
AST SERPL-CCNC: 19 U/L (ref 12–45)
BASOPHILS # BLD AUTO: 0.3 % (ref 0–1.8)
BASOPHILS # BLD: 0.03 K/UL (ref 0–0.12)
BILIRUB SERPL-MCNC: 0.6 MG/DL (ref 0.1–1.5)
BUN SERPL-MCNC: 33 MG/DL (ref 8–22)
CALCIUM ALBUM COR SERPL-MCNC: 9.2 MG/DL (ref 8.5–10.5)
CALCIUM SERPL-MCNC: 9.2 MG/DL (ref 8.5–10.5)
CHLORIDE SERPL-SCNC: 104 MMOL/L (ref 96–112)
CO2 SERPL-SCNC: 25 MMOL/L (ref 20–33)
CREAT SERPL-MCNC: 1.05 MG/DL (ref 0.5–1.4)
EOSINOPHIL # BLD AUTO: 0.13 K/UL (ref 0–0.51)
EOSINOPHIL NFR BLD: 1.3 % (ref 0–6.9)
ERYTHROCYTE [DISTWIDTH] IN BLOOD BY AUTOMATED COUNT: 48.6 FL (ref 35.9–50)
GFR SERPLBLD CREATININE-BSD FMLA CKD-EPI: 50 ML/MIN/1.73 M 2
GLOBULIN SER CALC-MCNC: 2.8 G/DL (ref 1.9–3.5)
GLUCOSE SERPL-MCNC: 171 MG/DL (ref 65–99)
HCT VFR BLD AUTO: 37.2 % (ref 37–47)
HGB BLD-MCNC: 11.9 G/DL (ref 12–16)
IMM GRANULOCYTES # BLD AUTO: 0.1 K/UL (ref 0–0.11)
IMM GRANULOCYTES NFR BLD AUTO: 1 % (ref 0–0.9)
INR PPP: 1.19 (ref 0.87–1.13)
LYMPHOCYTES # BLD AUTO: 2.41 K/UL (ref 1–4.8)
LYMPHOCYTES NFR BLD: 24.9 % (ref 22–41)
MCH RBC QN AUTO: 31.6 PG (ref 27–33)
MCHC RBC AUTO-ENTMCNC: 32 G/DL (ref 32.2–35.5)
MCV RBC AUTO: 98.7 FL (ref 81.4–97.8)
MONOCYTES # BLD AUTO: 0.55 K/UL (ref 0–0.85)
MONOCYTES NFR BLD AUTO: 5.7 % (ref 0–13.4)
NEUTROPHILS # BLD AUTO: 6.47 K/UL (ref 1.82–7.42)
NEUTROPHILS NFR BLD: 66.8 % (ref 44–72)
NRBC # BLD AUTO: 0 K/UL
NRBC BLD-RTO: 0 /100 WBC (ref 0–0.2)
PLATELET # BLD AUTO: 175 K/UL (ref 164–446)
PMV BLD AUTO: 10 FL (ref 9–12.9)
POTASSIUM SERPL-SCNC: 4.6 MMOL/L (ref 3.6–5.5)
PROT SERPL-MCNC: 6.8 G/DL (ref 6–8.2)
PROTHROMBIN TIME: 15.2 SEC (ref 12–14.6)
RBC # BLD AUTO: 3.77 M/UL (ref 4.2–5.4)
SODIUM SERPL-SCNC: 140 MMOL/L (ref 135–145)
TROPONIN T SERPL-MCNC: 29 NG/L (ref 6–19)
WBC # BLD AUTO: 9.7 K/UL (ref 4.8–10.8)

## 2024-05-03 PROCEDURE — 99223 1ST HOSP IP/OBS HIGH 75: CPT | Mod: 25,AI | Performed by: STUDENT IN AN ORGANIZED HEALTH CARE EDUCATION/TRAINING PROGRAM

## 2024-05-03 PROCEDURE — 99497 ADVNCD CARE PLAN 30 MIN: CPT | Performed by: STUDENT IN AN ORGANIZED HEALTH CARE EDUCATION/TRAINING PROGRAM

## 2024-05-03 RX ORDER — MORPHINE SULFATE 4 MG/ML
4 INJECTION INTRAVENOUS EVERY 4 HOURS PRN
Status: DISCONTINUED | OUTPATIENT
Start: 2024-05-03 | End: 2024-05-04

## 2024-05-03 RX ORDER — OXYCODONE HYDROCHLORIDE 5 MG/1
5 TABLET ORAL EVERY 4 HOURS PRN
Status: DISCONTINUED | OUTPATIENT
Start: 2024-05-03 | End: 2024-05-07 | Stop reason: HOSPADM

## 2024-05-03 RX ORDER — BUMETANIDE 0.5 MG/1
1 TABLET ORAL DAILY
Status: DISCONTINUED | OUTPATIENT
Start: 2024-05-04 | End: 2024-05-07 | Stop reason: HOSPADM

## 2024-05-03 RX ORDER — BUMETANIDE 0.5 MG/1
1 TABLET ORAL
COMMUNITY

## 2024-05-03 RX ORDER — METOPROLOL SUCCINATE 25 MG/1
12.5 TABLET, EXTENDED RELEASE ORAL DAILY
Status: DISCONTINUED | OUTPATIENT
Start: 2024-05-04 | End: 2024-05-07 | Stop reason: HOSPADM

## 2024-05-03 RX ORDER — LOSARTAN POTASSIUM 50 MG/1
100 TABLET ORAL DAILY
Status: DISCONTINUED | OUTPATIENT
Start: 2024-05-04 | End: 2024-05-07 | Stop reason: HOSPADM

## 2024-05-03 RX ORDER — LOSARTAN POTASSIUM 100 MG/1
100 TABLET ORAL DAILY
COMMUNITY

## 2024-05-03 RX ORDER — ACETAMINOPHEN 325 MG/1
650 TABLET ORAL EVERY 4 HOURS PRN
Status: DISCONTINUED | OUTPATIENT
Start: 2024-05-03 | End: 2024-05-07 | Stop reason: HOSPADM

## 2024-05-03 RX ADMIN — FENTANYL CITRATE 25 MCG: 50 INJECTION, SOLUTION INTRAMUSCULAR; INTRAVENOUS at 19:18

## 2024-05-03 RX ADMIN — APIXABAN 2.5 MG: 5 TABLET, FILM COATED ORAL at 22:18

## 2024-05-03 RX ADMIN — OXYCODONE 5 MG: 5 TABLET ORAL at 22:18

## 2024-05-03 ASSESSMENT — ENCOUNTER SYMPTOMS
HEMOPTYSIS: 0
BACK PAIN: 0
FEVER: 0
FALLS: 1
EYE DISCHARGE: 0
ORTHOPNEA: 0
PHOTOPHOBIA: 0
CHILLS: 0
ABDOMINAL PAIN: 0
NERVOUS/ANXIOUS: 0
INSOMNIA: 0
SPUTUM PRODUCTION: 0
EYE PAIN: 0
NAUSEA: 0
SINUS PAIN: 0
NECK PAIN: 1
PALPITATIONS: 0
VOMITING: 0
MYALGIAS: 0
DIZZINESS: 0
HEADACHES: 0
HALLUCINATIONS: 0
SHORTNESS OF BREATH: 0
DIARRHEA: 0

## 2024-05-03 ASSESSMENT — FIBROSIS 4 INDEX: FIB4 SCORE: 1.98

## 2024-05-03 ASSESSMENT — CHA2DS2 SCORE
SEX: FEMALE
HYPERTENSION: YES
AGE 75 OR GREATER: YES
CHF OR LEFT VENTRICULAR DYSFUNCTION: YES
AGE 65 TO 74: NO
VASCULAR DISEASE: NO
PRIOR STROKE OR TIA OR THROMBOEMBOLISM: YES
CHA2DS2 VASC SCORE: 8
DIABETES: YES

## 2024-05-03 ASSESSMENT — LIFESTYLE VARIABLES
TOTAL SCORE: 0
EVER HAD A DRINK FIRST THING IN THE MORNING TO STEADY YOUR NERVES TO GET RID OF A HANGOVER: NO
HAVE YOU EVER FELT YOU SHOULD CUT DOWN ON YOUR DRINKING: NO
CONSUMPTION TOTAL: INCOMPLETE
HAVE PEOPLE ANNOYED YOU BY CRITICIZING YOUR DRINKING: NO
EVER FELT BAD OR GUILTY ABOUT YOUR DRINKING: NO
TOTAL SCORE: 0
DOES PATIENT WANT TO STOP DRINKING: CANNOT ASSESS
SUBSTANCE_ABUSE: 0
TOTAL SCORE: 0

## 2024-05-03 ASSESSMENT — PATIENT HEALTH QUESTIONNAIRE - PHQ9
8. MOVING OR SPEAKING SO SLOWLY THAT OTHER PEOPLE COULD HAVE NOTICED. OR THE OPPOSITE, BEING SO FIGETY OR RESTLESS THAT YOU HAVE BEEN MOVING AROUND A LOT MORE THAN USUAL: NOT AT ALL
9. THOUGHTS THAT YOU WOULD BE BETTER OFF DEAD, OR OF HURTING YOURSELF: NOT AT ALL
5. POOR APPETITE OR OVEREATING: NOT AT ALL
SUM OF ALL RESPONSES TO PHQ QUESTIONS 1-9: 3
7. TROUBLE CONCENTRATING ON THINGS, SUCH AS READING THE NEWSPAPER OR WATCHING TELEVISION: NOT AT ALL
4. FEELING TIRED OR HAVING LITTLE ENERGY: SEVERAL DAYS
SUM OF ALL RESPONSES TO PHQ9 QUESTIONS 1 AND 2: 1
1. LITTLE INTEREST OR PLEASURE IN DOING THINGS: NOT AT ALL
2. FEELING DOWN, DEPRESSED, IRRITABLE, OR HOPELESS: SEVERAL DAYS
3. TROUBLE FALLING OR STAYING ASLEEP OR SLEEPING TOO MUCH: SEVERAL DAYS
6. FEELING BAD ABOUT YOURSELF - OR THAT YOU ARE A FAILURE OR HAVE LET YOURSELF OR YOUR FAMILY DOWN: NOT AL ALL

## 2024-05-03 ASSESSMENT — PAIN DESCRIPTION - PAIN TYPE: TYPE: ACUTE PAIN

## 2024-05-04 LAB
ALBUMIN SERPL BCP-MCNC: 3.6 G/DL (ref 3.2–4.9)
ALBUMIN/GLOB SERPL: 1.2 G/DL
ALP SERPL-CCNC: 75 U/L (ref 30–99)
ALT SERPL-CCNC: 16 U/L (ref 2–50)
ANION GAP SERPL CALC-SCNC: 7 MMOL/L (ref 7–16)
AST SERPL-CCNC: 19 U/L (ref 12–45)
BILIRUB SERPL-MCNC: 0.7 MG/DL (ref 0.1–1.5)
BUN SERPL-MCNC: 29 MG/DL (ref 8–22)
CALCIUM ALBUM COR SERPL-MCNC: 9.7 MG/DL (ref 8.5–10.5)
CALCIUM SERPL-MCNC: 9.4 MG/DL (ref 8.5–10.5)
CHLORIDE SERPL-SCNC: 104 MMOL/L (ref 96–112)
CO2 SERPL-SCNC: 28 MMOL/L (ref 20–33)
CREAT SERPL-MCNC: 0.73 MG/DL (ref 0.5–1.4)
EKG IMPRESSION: NORMAL
ERYTHROCYTE [DISTWIDTH] IN BLOOD BY AUTOMATED COUNT: 47.5 FL (ref 35.9–50)
GFR SERPLBLD CREATININE-BSD FMLA CKD-EPI: 78 ML/MIN/1.73 M 2
GLOBULIN SER CALC-MCNC: 2.9 G/DL (ref 1.9–3.5)
GLUCOSE SERPL-MCNC: 180 MG/DL (ref 65–99)
HCT VFR BLD AUTO: 35.4 % (ref 37–47)
HGB BLD-MCNC: 11.8 G/DL (ref 12–16)
MCH RBC QN AUTO: 32.5 PG (ref 27–33)
MCHC RBC AUTO-ENTMCNC: 33.3 G/DL (ref 32.2–35.5)
MCV RBC AUTO: 97.5 FL (ref 81.4–97.8)
PLATELET # BLD AUTO: 152 K/UL (ref 164–446)
PMV BLD AUTO: 9.7 FL (ref 9–12.9)
POTASSIUM SERPL-SCNC: 5.4 MMOL/L (ref 3.6–5.5)
PROT SERPL-MCNC: 6.5 G/DL (ref 6–8.2)
RBC # BLD AUTO: 3.63 M/UL (ref 4.2–5.4)
SODIUM SERPL-SCNC: 139 MMOL/L (ref 135–145)
WBC # BLD AUTO: 7.2 K/UL (ref 4.8–10.8)

## 2024-05-04 PROCEDURE — 99233 SBSQ HOSP IP/OBS HIGH 50: CPT | Performed by: STUDENT IN AN ORGANIZED HEALTH CARE EDUCATION/TRAINING PROGRAM

## 2024-05-04 PROCEDURE — 99232 SBSQ HOSP IP/OBS MODERATE 35: CPT | Performed by: PHYSICIAN ASSISTANT

## 2024-05-04 PROCEDURE — 93010 ELECTROCARDIOGRAM REPORT: CPT | Performed by: INTERNAL MEDICINE

## 2024-05-04 RX ORDER — LIDOCAINE 4 G/G
1 PATCH TOPICAL EVERY 24 HOURS
Status: DISCONTINUED | OUTPATIENT
Start: 2024-05-04 | End: 2024-05-07 | Stop reason: HOSPADM

## 2024-05-04 RX ORDER — MORPHINE SULFATE 4 MG/ML
1 INJECTION INTRAVENOUS EVERY 4 HOURS PRN
Status: DISCONTINUED | OUTPATIENT
Start: 2024-05-04 | End: 2024-05-07 | Stop reason: HOSPADM

## 2024-05-04 RX ADMIN — BUMETANIDE 1 MG: 0.5 TABLET ORAL at 05:23

## 2024-05-04 RX ADMIN — LOSARTAN POTASSIUM 100 MG: 50 TABLET, FILM COATED ORAL at 05:20

## 2024-05-04 RX ADMIN — LIDOCAINE 1 PATCH: 4 PATCH TOPICAL at 15:34

## 2024-05-04 RX ADMIN — ACETAMINOPHEN 650 MG: 325 TABLET, FILM COATED ORAL at 05:27

## 2024-05-04 RX ADMIN — METOPROLOL SUCCINATE 12.5 MG: 25 TABLET, EXTENDED RELEASE ORAL at 05:19

## 2024-05-04 RX ADMIN — APIXABAN 2.5 MG: 5 TABLET, FILM COATED ORAL at 18:22

## 2024-05-04 RX ADMIN — MORPHINE SULFATE 1 MG: 4 INJECTION INTRAVENOUS at 13:37

## 2024-05-04 RX ADMIN — APIXABAN 2.5 MG: 5 TABLET, FILM COATED ORAL at 05:22

## 2024-05-04 ASSESSMENT — COGNITIVE AND FUNCTIONAL STATUS - GENERAL
HELP NEEDED FOR BATHING: A LOT
DRESSING REGULAR UPPER BODY CLOTHING: A LITTLE
PERSONAL GROOMING: A LITTLE
TOILETING: A LOT
DRESSING REGULAR UPPER BODY CLOTHING: TOTAL
SUGGESTED CMS G CODE MODIFIER DAILY ACTIVITY: CK
CLIMB 3 TO 5 STEPS WITH RAILING: A LOT
WALKING IN HOSPITAL ROOM: A LOT
DAILY ACTIVITIY SCORE: 11
HELP NEEDED FOR BATHING: A LOT
DRESSING REGULAR LOWER BODY CLOTHING: TOTAL
MOBILITY SCORE: 11
SUGGESTED CMS G CODE MODIFIER MOBILITY: CL
DRESSING REGULAR LOWER BODY CLOTHING: TOTAL
SUGGESTED CMS G CODE MODIFIER DAILY ACTIVITY: CL
TURNING FROM BACK TO SIDE WHILE IN FLAT BAD: A LOT
TOILETING: TOTAL
DAILY ACTIVITIY SCORE: 14
MOVING TO AND FROM BED TO CHAIR: A LOT
STANDING UP FROM CHAIR USING ARMS: A LOT
MOVING FROM LYING ON BACK TO SITTING ON SIDE OF FLAT BED: TOTAL
PERSONAL GROOMING: A LOT
EATING MEALS: A LITTLE

## 2024-05-04 ASSESSMENT — ENCOUNTER SYMPTOMS
NAUSEA: 0
TINGLING: 1
BLURRED VISION: 0
FALLS: 1
CHILLS: 0
DOUBLE VISION: 0
NECK PAIN: 1
SHORTNESS OF BREATH: 0
WEAKNESS: 0
COUGH: 0
ABDOMINAL PAIN: 0
FEVER: 0
FOCAL WEAKNESS: 0
SENSORY CHANGE: 0
SPEECH CHANGE: 0
MYALGIAS: 1
VOMITING: 0
DIARRHEA: 0

## 2024-05-04 ASSESSMENT — LIFESTYLE VARIABLES
ON A TYPICAL DAY WHEN YOU DRINK ALCOHOL HOW MANY DRINKS DO YOU HAVE: 0
ALCOHOL_USE: NO
EVER HAD A DRINK FIRST THING IN THE MORNING TO STEADY YOUR NERVES TO GET RID OF A HANGOVER: NO
CONSUMPTION TOTAL: NEGATIVE
EVER FELT BAD OR GUILTY ABOUT YOUR DRINKING: NO
HOW MANY TIMES IN THE PAST YEAR HAVE YOU HAD 5 OR MORE DRINKS IN A DAY: 0
TOTAL SCORE: 0
AVERAGE NUMBER OF DAYS PER WEEK YOU HAVE A DRINK CONTAINING ALCOHOL: 0
HAVE PEOPLE ANNOYED YOU BY CRITICIZING YOUR DRINKING: NO
HAVE YOU EVER FELT YOU SHOULD CUT DOWN ON YOUR DRINKING: NO
TOTAL SCORE: 0
TOTAL SCORE: 0

## 2024-05-04 ASSESSMENT — GAIT ASSESSMENTS: GAIT LEVEL OF ASSIST: UNABLE TO PARTICIPATE

## 2024-05-04 ASSESSMENT — FIBROSIS 4 INDEX
FIB4 SCORE: 3.12
FIB4 SCORE: 2.71

## 2024-05-04 ASSESSMENT — PAIN DESCRIPTION - PAIN TYPE
TYPE: ACUTE PAIN

## 2024-05-04 ASSESSMENT — ACTIVITIES OF DAILY LIVING (ADL): TOILETING: INDEPENDENT

## 2024-05-04 NOTE — PROGRESS NOTES
Brown GODINEZ cervical collar applied to pt with Adv. EDTT assistance. Collar fits well and patient is tolerating this DME well at this time.     Contact traction for any questions or concerns regarding this brace.

## 2024-05-04 NOTE — PROGRESS NOTES
Aspen cervical collar applied to pt. Collar fits well and patient is tolerating this DME well at this time.     Contact traction for any questions or concerns regarding this brace.

## 2024-05-04 NOTE — THERAPY
Physical Therapy   Initial Evaluation     Patient Name: Krystle Tavarez  Age:  89 y.o., Sex:  female  Medical Record #: [id number]  Today's Date: 5/4/2024     Precautions  Precautions: Spinal / Back Precautions ;Cervical Collar    Comments: c collar at all times    Assessment  Patient is 89 y.o. female with GLF: C7 vertebral body fracture, Left-sided facet fracture at the C7 level with fracture coursing through the base of the left-sided superior articular facet.  Neurosurgery consulted -- no surgical intervention, Recommending Cervical spine collar for 6-weeks. Also with Sternal manubrial fracture, non surgical. Today, pt is most limited by pain at sternum with movement OOB as collar presses on sternum. Nsg to order Aspen collar to see if more tolerable. Pt followed cues for log roll and OOB, but limited endurance by pain. Daughter reports that she feels afraid that she can no longer care for pt at home. See details below.  .      Plan    Physical Therapy Initial Treatment Plan   Treatment Plan : Bed Mobility, Gait Training, Neuro Re-Education / Balance, Therapeutic Activities, Therapeutic Exercise, Stair Training  Treatment Frequency: 4 Times per Week  Duration: Until Therapy Goals Met    DC Equipment Recommendations: Front-Wheel Walker (needs more education re unsafe to use 4WW with no brakes.)  Discharge Recommendations: Recommend post-acute placement for additional physical therapy services prior to discharge home          Objective       05/04/24 1105   Charge Group   PT Evaluation PT Evaluation High   Total Time Spent   PT Total Time Yes   PT Evaluation Time Spent (Mins) 30   PT Total Time Spent (Calculated) 30   Initial Contact Note    Initial Contact Note Order Received and Verified, Physical Therapy Evaluation in Progress with Full Report to Follow.   Precautions   Precautions Spinal / Back Precautions ;Cervical Collar     Comments c collar at all times   Vitals   O2 (LPM) 2  (needed increase to 3L when flat  in bed at end of therapy to maintain pulse ox in 90s, nsg updated)   O2 Delivery Device Nasal Cannula   Pain 0 - 10 Group   Therapist Pain Assessment During Activity;Nurse Notified  (sternum painful with sitting up, brace pressing, RN ordered Aspen to try instead.)   Prior Living Situation   Prior Services Intermittent Physical Support for ADL Per Family   Housing / Facility 2 Story House  (pt lives first floor only)   Steps Into Home 2   Equipment Owned 4-Wheel Walker  (with no brakes per family. Daughter denies pt having problems with this. Ed done re potentially slippery.)   Lives with - Patient's Self Care Capacity Adult Children  (daughter and KEIKO, usually someone home to help)   Comments daughter reports that she cannot pick the pt up, worried that she cannot care for the pt anymore at home.   Prior Level of Functional Mobility   Bed Mobility Independent  (had bedrail on her bed at home.)   Transfer Status Independent   Ambulation Independent   Ambulation Distance household   Assistive Devices Used 4-Wheel Walker   Stairs Required Assist   Comments Daughter assists with shower and does all the cooking, grocery shopping. Per daughter, pt sits in her recliner all day.   History of Falls   History of Falls Yes   Date of Last Fall   (cause of admit, 2 falls in 3 days per dghter)   Cognition    Cognition / Consciousness WDL   Level of Consciousness Alert   Comments following cues, distracted by pain at sternum.   Active ROM Lower Body    Active ROM Lower Body  WDL   Strength Lower Body   Lower Body Strength  X   Gross Strength Generalized Weakness, Equal Bilaterally   Balance Assessment   Sitting Balance (Static) Fair -   Sitting Balance (Dynamic) Fair -   Standing Balance (Static) Poor +   Standing Balance (Dynamic) Poor +   Weight Shift Sitting Poor   Weight Shift Standing Poor   Bed Mobility    Supine to Sit Minimal Assist   Sit to Supine Minimal Assist   Scooting Minimal Assist   Rolling Moderate Assist to Lt.    Comments sitting EOB is most painful as brace presses on sternum. Pt is most comfortable with flat in bed, needed increase of O2 to 3L with flat in bed to maintain pulse ox in 90s   Gait Analysis   Gait Level Of Assist Unable to Participate   Comments pt reports that sternal pain is too much, feels like she might faint. Nsg to order new neck brace is current brace is pressing on sternum.   Functional Mobility   Sit to Stand Minimal Assist   Comments pain relief with standing, but fragile, poor endurance for standing.   Activity Tolerance   Sitting Edge of Bed 2 min   Standing 2 min   Short Term Goals    Short Term Goal # 1 Pt will perform bed mobility with flat bed, use of rail with supervision in 6 visits.   Short Term Goal # 2 Pt will transfer with cg assist in 6 vistis to improve functional indep.   Short Term Goal # 3 Pt will ambulate x 100 feet using FWW with cg assist in 6 visits to improve functional indep.   Short Term Goal # 4 Pt will negotiate 2 stairs with cg assist in 6 visits to access home.   Education Group   Education Provided Cervical Precautions   Cervical Precautions Patient Response Patient;Family;Acceptance;Handout;Explanation;Verbal Demonstration;Reinforcement Needed   Physical Therapy Initial Treatment Plan    Treatment Plan  Bed Mobility;Gait Training;Neuro Re-Education / Balance;Therapeutic Activities;Therapeutic Exercise;Stair Training   Treatment Frequency 4 Times per Week   Duration Until Therapy Goals Met   Problem List    Problems Pain;Impaired Bed Mobility;Impaired Transfers;Impaired Ambulation;Functional Strength Deficit;Functional ROM Deficit;Impaired Balance;Decreased Activity Tolerance   Anticipated Discharge Equipment and Recommendations   DC Equipment Recommendations Front-Wheel Walker  (needs more education re unsafe to use 4WW with no brakes.)   Discharge Recommendations Recommend post-acute placement for additional physical therapy services prior to discharge home    Interdisciplinary Plan of Care Collaboration   IDT Collaboration with  Nursing   Patient Position at End of Therapy In Bed;Call Light within Reach;Tray Table within Reach;Phone within Reach;Other (Comments);Bed Alarm On   Collaboration Comments nsg updated   Session Information   Date / Session Number  5/4-1 (1/4, 5/10)

## 2024-05-04 NOTE — PROGRESS NOTES
2100H- hand off report given by NAMAN Stewart from ED.  2120H- Patient arrived at the unit per carley accompanied by transport tech.patient AAOx4, transferred to bed, hooked to O2 at 2 lpm, vs taken, see flowsheet.Patient orient to room and used of call light for assistance, fall prevention in placed.Will continue to monitor patient.

## 2024-05-04 NOTE — ED NOTES
Bedside report received from off going RN: Hoa, assumed care of patient.  POC discussed with patient. Call light within reach, all needs addressed at this time.       Fall risk interventions in place: Move the patient closer to the nurse's station, Patient's personal possessions are with in their safe reach, Place fall risk sign on patient's door, and Keep floor surfaces clean and dry (all applicable per [facility] Fall risk assessment)   Continuous monitoring: Cardiac Leads, Pulse Ox, or Blood Pressure  IVF/IV medications: Not Applicable   Oxygen: How many liters 3L and Traced the line to wall oxygen  Bedside sitter: Not Applicable   Isolation: Not Applicable

## 2024-05-04 NOTE — CONSULTS
[facility] [place] Neurosurgery  Referring MD:    Reason for referral:  C7 fracture  Called 8 pm returned call 805 pm discussed imaging plan  Seen [id number]    Author: Aashish Washington M.D. Date & Time created: 5/4/2024  10:11 AM     Interval History   89 year old female who fell at home yesterday.  She has neck and sternal pain.  She is in a [place] J collar   She denies arm/leg numbness, weakness, tingling, pain    ROS per     Physical Exam  Cervical collar around her face above her mouth almost blocking her eyes  Pupils 3 mm midline  Face symmetric  Bilateral  bicep IP DF PF 5/5  Sensation intact touch x 4 extremities  No Hoffmans no clonus    Patient Active Problem List    Diagnosis Date Noted    Closed fracture of manubrium 05/03/2024    Cervical compression fracture, initial encounter ([facility]) 05/03/2024    Frequent falls 05/03/2024    Chronic anticoagulation 02/07/2024    History of TIA (transient ischemic attack) 08/13/2019    Dyslipidemia 07/09/2019    Bilateral lower extremity edema 07/09/2019    Trauma 05/03/2024    PAF (paroxysmal atrial fibrillation) ([facility]) 02/05/2020    Type 2 diabetes mellitus without complication, without long-term current use of insulin ([facility]) 01/16/2020    Retinopathy 08/13/2019    Weakness of both lower extremities 08/13/2019    Compression fracture of lumbar vertebrae, non-traumatic, sequela 08/13/2019    Cardiac pacemaker in situ 07/09/2019    Essential hypertension, benign 07/09/2019    Fracture of cervical spine without lesion of spinal cord, initial encounter ([facility]) 05/03/2024    Falls 05/03/2024    Sternal manubrial dissociation, closed fracture 05/03/2024    Advanced care planning/counseling discussion 05/03/2024    Atherosclerosis of aorta ([facility]) 01/18/2024    Peripheral vascular disease, unspecified ([facility]) 01/18/2024    Chronic heart failure with preserved ejection fraction (HFpEF) ([facility]) 08/31/2023    POLST (Physician Orders for Life-Sustaining Treatment) 04/03/2023    DNR (do not  resuscitate) 2021    Benign cyst of right kidney 2021    Common bile duct dilation 2021    Impaired mobility and ADLs 2020    Anemia 2020    Foraminal stenosis of lumbar region 2019    Lumbar spondylosis 2019    Degeneration of lumbar intervertebral disc 2019         Temp (24hrs), Av.4 °C (97.6 °F), Min:36.3 °C (97.3 °F), Max:36.6 °C (97.9 °F)    Pulse: 73, Respiration: 14, Blood Pressure : 134/75, Weight: 66.2 kg (146 lb), Pulse Oximetry: 95 %, O2 (LPM): 2     Date 24 0700 - 24 0659   Shift 2301-7214 7879-5927 9550-6596 24 Hour Total   INTAKE   P.O. 120   120     P.O. 120   120   Shift Total 120   120   OUTPUT   Shift Total          120         Intake/Output Summary (Last 24 hours) at 2024 1011  Last data filed at 2024 0900  Gross per 24 hour   Intake 120 ml   Output --   Net 120 ml             bumetanide  1 mg      apixaban  2.5 mg      losartan  100 mg      metoprolol SR  12.5 mg      acetaminophen  650 mg      oxyCODONE immediate-release  5 mg      morphine injection  4 mg         Recent Labs     24  11524  0558   WBC 6.1 9.7 7.2   RBC 4.16* 3.77* 3.63*   HEMOGLOBIN 13.8 11.9* 11.8*   HEMATOCRIT 39.9 37.2 35.4*   MCV 95.9 98.7* 97.5   MCH 33.2* 31.6 32.5   PLATELETCT 192 175 152*     Recent Labs     24  11524  0558   SODIUM 141 140 139   POTASSIUM 4.8 4.6 5.4   CHLORIDE 105 104 104   CO2 27 25 28   GLUCOSE 149* 171* 180*   BUN 26* 33* 29*   CREATININE 0.80 1.05 0.73   CALCIUM 9.6 9.2 9.4     Recent Labs     24   APTT 35.0   INR 1.19*     5/3/2024 6:28 PM     HISTORY/REASON FOR EXAM: Polytrauma, blunt; s/p fall - patient on anticoagulation; Neck pain from ground level fall        TECHNIQUE/EXAM DESCRIPTION:  CT cervical spine without contrast, with reconstructions.     The study was performed on a helical multidetector CT scanner. Thin-section helical scanning was  performed from the skull base through T1. Sagittal and coronal multiplanar reconstructions were generated from the axial images.     Low dose optimization technique was utilized for this CT exam including automated exposure control and adjustment of the mA and/or kV according to patient size.     COMPARISON:  None.     FINDINGS:  There is decreased bony mineralization of the cervical spine.  Alignment in the cervical spine is normal. There is mild wedge type compression of the C7 vertebral body. There is a nondisplaced fracture of the left C7 transverse process. Additionally, there is a fracture coursing through the base of the left-sided   superior articular facet of C7. The craniovertebral junction appears intact.     The prevertebral and paraspinous soft tissues are unremarkable.     There is moderate disc space narrowing at C5-6 level mild disc space narrowing at the C4-5 level with mild marginal osteophytosis.     The superior mediastinum and lung apices in the field of view are unremarkable.     IMPRESSION:     1.  Mild wedge type compression fracture of the C7 vertebral body of indeterminate age likely acute.     2.  Left-sided facet fracture at the C7 level with fracture coursing through the base of the left-sided superior articular facet.     3.  Osteopenia.     4.  Moderate osteoarthritic change at the C5-6 level       AP: 89 year year old female with mild C7 compression fracture, left superior articular facet C7 fracture with good alignment  Recommend rigid cervical collar  Collar was over her face, not on her neck or under her chin.  Collar replaced and discussion undertaken on correct way to wear collar.  She has sternal pain where the collar presses on her chest; this can be padded.  Aspen collar may be better fit.  She can follow up in my clinic 2-4 weeks with ap lateral cervical spine xrays  Wear collar all times, can lie down on bed to change wet pads to dry ones  Neurosurgery will sign off

## 2024-05-04 NOTE — ED NOTES
Medication history reviewed with patients family at bedside.   Med rec is complete  Allergies reviewed.     Patient has not had any outpatient antibiotics in the last 30 days.     Anticoagulants: Eliquis 2.5mg- last dose  5/3/24 am      Harry Albright

## 2024-05-04 NOTE — ED NOTES
Pt transported off unit with transport tech. Pt awake and breathing with even, unlabored breaths on 2L. All belongings with Pt.

## 2024-05-04 NOTE — ASSESSMENT & PLAN NOTE
Patient with known history of HFpEF.  Continue home dose metoprolol, losartan, Bumex.  She is not currently in a heart failure exacerbation

## 2024-05-04 NOTE — H&P
Hospital Medicine History & Physical Note    Date of Service  5/3/2024    Primary Care Physician  RICHAR Castelan.    Consultants  neurosurgery and trauma surgery    Specialist Names: Dr. Summers    Code Status  DNAR/DNI    Chief Complaint  Chief Complaint   Patient presents with    GLF     Pt was ambulating at home today when she lost her balance and had a mechanical GLF. Pt fell backwards and hit the back of her head. Pt takes eliquis. Pt placed in a c-collar by EMS prior to arrival.        History of Presenting Illness  Krystle Tavarez is a 89 y.o. female with possible history of hypertension, have Paff, atrial fibrillation on anticoagulation who presented 5/3/2024 with mechanical ground-level fall.  Patient states that she was at the bathroom earlier today, and she went to pull up her pants.  She states that she also lost balance and fell over.  She states that she did hit her head but denies any loss of consciousness.  She does endorse chest/sternal pain and neck pain as well.  Imaging was obtained while in the emergency department.  CT head negative for any intracranial hemorrhage.  CT C-spine showed a mild wedge type compression fracture of the C7 vertebral body.  There is also left sided facet fracture of the C7 level fracture coursing through the base of the left-sided superior articular facet.  On CT chest she has a sternal manubrial fracture as well.  Neurosurgery consulted, and recommended no surgical intervention.  Recommended c-collar placement.  Trauma surgery has also been consulted as well.  Patient be admitted for pain control and management along with PT and OT evaluation    I discussed the plan of care with patient and family.    Review of Systems  Review of Systems   Constitutional:  Negative for chills and fever.   HENT:  Negative for congestion, ear discharge, ear pain, nosebleeds, sinus pain and tinnitus.    Eyes:  Negative for photophobia, pain and discharge.   Respiratory:   Negative for hemoptysis, sputum production and shortness of breath.    Cardiovascular:  Negative for chest pain, palpitations and orthopnea.   Gastrointestinal:  Negative for abdominal pain, diarrhea, nausea and vomiting.   Genitourinary:  Negative for frequency, hematuria and urgency.   Musculoskeletal:  Positive for falls, joint pain and neck pain. Negative for back pain and myalgias.   Neurological:  Negative for dizziness and headaches.   Psychiatric/Behavioral:  Negative for hallucinations, substance abuse and suicidal ideas. The patient is not nervous/anxious and does not have insomnia.        Past Medical History   has a past medical history of Arthritis, Breath shortness, Cataract, Dental disorder, Diabetes (HCC), Heart burn, Hemorrhagic disorder (HCC), High cholesterol, Hyperlipidemia, Hypertension, Pacemaker (2015), Pain (2020), Pre-diabetes, TIA (transient ischemic attack), and Urinary incontinence.    Surgical History   has a past surgical history that includes hip replacement, total (Right, 2009); knee replacement, total (Right, 2004); bunionectomy (Left); basal cell excision; cataract extraction with iol (Bilateral, 2003); pacemaker insertion (2015); cholecystectomy (2002); pr laminotomy,lumbar disk,1 intrsp (N/A, 2/25/2020); and foraminotomy (N/A, 2/25/2020).     Family History  family history includes Diabetes in her mother; Heart Attack (age of onset: 74) in her father; No Known Problems in her maternal grandfather, maternal grandmother, paternal grandfather, and paternal grandmother; Stroke in her mother.   Family history reviewed with patient. There is no family history that is pertinent to the chief complaint.     Social History   reports that she has never smoked. She has never used smokeless tobacco. She reports that she does not currently use alcohol. She reports that she does not use drugs.    Allergies  Allergies   Allergen Reactions    Codeine Unspecified     Patient reported hallucination  and dizziness. Specific to tylenol with codeine      Gabapentin Unspecified     Altered mental status    Sulfa Drugs Nausea     Nausea   Other reaction(s): Not available    Tramadol Unspecified     Altered mentation       Medications  Prior to Admission Medications   Prescriptions Last Dose Informant Patient Reported? Taking?   Ascorbic Acid (VITAMIN C PO)  Family Member Yes No   Sig: Take 1 Tablet by mouth every day.   Cyanocobalamin (VITAMIN B-12 PO)  Family Member Yes No   Sig: Take 1 Tablet by mouth every day.   ELIQUIS 5 MG Tab   No No   Sig: TAKE 1 TABLET TWICE A DAY   acetaminophen (TYLENOL 8 HOUR) 650 MG CR tablet  Family Member Yes No   Sig: Take 1,300 mg by mouth 2 times a day.   bumetanide (BUMEX) 0.5 MG Tab   No No   Sig: Take 1 Tablet by mouth every day. Indications: Edema   diclofenac sodium (VOLTAREN) 1 % Gel   Yes No   Sig: Apply  topically 4 times a day as needed.   docusate sodium (COLACE) 100 MG Cap  Family Member Yes No   Sig: Take 100 mg by mouth 1 time a day as needed for Constipation.   estradiol (ESTRACE) 0.1 MG/GM vaginal cream   No No   Sig: Apply pea sized amount to vaginal area nightly   hydrocortisone 1 % Cream   No No   Sig: Apply 1 Application topically 2 times a day.   ketoconazole (NIZORAL) 2 % shampoo   No No   Sig: Apply 1 mL topically every Friday.   losartan (COZAAR) 50 MG Tab   No No   Sig: Take 1 Tablet by mouth every day.   metoprolol SR (TOPROL XL) 25 MG TABLET SR 24 HR   No No   Sig: TAKE 1/2 TABLET BY MOUTH EVERY DAY   triamcinolone acetonide (KENALOG) 0.1 % Cream   No No   Sig: AAA, chest and back, twice a day for 2 weeks at a time      Facility-Administered Medications: None       Physical Exam  Temp:  [36.6 °C (97.8 °F)] 36.6 °C (97.8 °F)  Pulse:  [75] 75  Resp:  [16] 16  BP: (190)/(91) 190/91  SpO2:  [96 %] 96 %  Blood Pressure : (!) 190/91   Temperature: 36.6 °C (97.8 °F)   Pulse: 75   Respiration: 16   Pulse Oximetry: 96 %       Physical Exam  Constitutional:        General: She is not in acute distress.     Appearance: Normal appearance. She is normal weight. She is not ill-appearing, toxic-appearing or diaphoretic.   HENT:      Head: Normocephalic and atraumatic.      Nose: Nose normal.      Mouth/Throat:      Mouth: Mucous membranes are moist.   Eyes:      Extraocular Movements: Extraocular movements intact.      Pupils: Pupils are equal, round, and reactive to light.   Cardiovascular:      Rate and Rhythm: Normal rate and regular rhythm.      Pulses: Normal pulses.      Heart sounds: Normal heart sounds. No murmur heard.     No friction rub. No gallop.   Pulmonary:      Effort: Pulmonary effort is normal. No respiratory distress.      Breath sounds: No stridor. No wheezing, rhonchi or rales.   Chest:      Chest wall: No tenderness.   Abdominal:      General: Abdomen is flat. There is no distension.      Palpations: Abdomen is soft. There is no mass.      Tenderness: There is no abdominal tenderness. There is no guarding or rebound.      Hernia: No hernia is present.   Musculoskeletal:         General: Tenderness and signs of injury present. No swelling or deformity.      Right lower leg: No edema.      Left lower leg: No edema.      Comments: Cervical spine tender to palpation. Sternum tenderness to palpation.      Skin:     General: Skin is warm and dry.      Capillary Refill: Capillary refill takes less than 2 seconds.      Coloration: Skin is not jaundiced or pale.      Findings: No bruising, erythema, lesion or rash.   Neurological:      General: No focal deficit present.      Mental Status: She is alert. Mental status is at baseline. She is disoriented.      Cranial Nerves: No cranial nerve deficit.      Sensory: No sensory deficit.      Motor: No weakness.      Coordination: Coordination normal.   Psychiatric:         Mood and Affect: Mood normal.         Behavior: Behavior normal.         Laboratory:  Recent Labs     05/02/24  1150 05/03/24  1858   WBC 6.1 9.7   RBC  "4.16* 3.77*   HEMOGLOBIN 13.8 11.9*   HEMATOCRIT 39.9 37.2   MCV 95.9 98.7*   MCH 33.2* 31.6   MCHC 34.6 32.0*   RDW 47.4 48.6   PLATELETCT 192 175   MPV 9.8 10.0     Recent Labs     05/02/24  1150 05/03/24  1858   SODIUM 141 140   POTASSIUM 4.8 4.6   CHLORIDE 105 104   CO2 27 25   GLUCOSE 149* 171*   BUN 26* 33*   CREATININE 0.80 1.05   CALCIUM 9.6 9.2     Recent Labs     05/02/24  1150 05/03/24  1858   ALTSGPT 11 13   ASTSGOT 14 19   ALKPHOSPHAT 81 76   TBILIRUBIN 1.0 0.6   GLUCOSE 149* 171*     Recent Labs     05/03/24  1858   APTT 35.0   INR 1.19*     No results for input(s): \"NTPROBNP\" in the last 72 hours.      Recent Labs     05/03/24  1858   TROPONINT 29*       Imaging:  CT-CHEST (THORAX) W/O   Final Result      1.  Sternal manubrial fracture      2.  No other acute finding      3.  Mild atelectasis      4.  Spinal degenerative change and there has been prior T12 vertebral augmentation      Fleischner Society pulmonary nodule recommendations:   Not Applicable         CT-CSPINE WITHOUT PLUS RECONS   Final Result      1.  Mild wedge type compression fracture of the C7 vertebral body of indeterminate age likely acute.      2.  Left-sided facet fracture at the C7 level with fracture coursing through the base of the left-sided superior articular facet.      3.  Osteopenia.      4.  Moderate osteoarthritic change at the C5-6 level      5.  These findings were discussed with JOHNNY TOLENTINO at  7:00 PM 5/3/2024      CT-HEAD W/O   Final Result      1.  Cerebral atrophy.      2.  White matter lucencies most consistent with small vessel ischemic change versus demyelination or gliosis.      3.  Otherwise, Head CT without contrast with no acute findings. No evidence of acute cerebral infarction, hemorrhage or mass lesion.             X-Ray:  I have personally reviewed the images and compared with prior images.  EKG:  I have personally reviewed the images and compared with prior " images.    Assessment/Plan:  Justification for Admission Status  I anticipate this patient will require at least two midnights for appropriate medical management, necessitating inpatient admission because multiple falls resulting in C-spine fracture and manubrium fracture.  She will be admitted for pain control.  PT OT evaluation    Patient will need a Med/Surg bed on MEDICAL service .  The need is secondary to multiple fractures.    * Fracture of cervical spine without lesion of spinal cord, initial encounter ([facility])- (present on admission)  Assessment & Plan  Mild wedge type compression fracture of the C7 vertebral body of indeterminate age likely acute. Left-sided facet fracture at the C7 level with fracture coursing through the base of the left-sided superior articular facet.  Neurosurgery consulted -- no surgical intervention  Recommending Cervical spine collar for 6-weeks  Pain control with both IV and p.o. narcotics.  Patient will need close hemodynamic monitoring    Sternal manubrial dissociation, closed fracture  Assessment & Plan  Sternal manubrial fracture   Trauma surgery consulted.  No surgical intervention at this time  Formal consult pending  Pain control with IV morphine and oral oxycodone.  Patient will therefore need close hemodynamic monitoring  PT and OT evaluation    Falls  Assessment & Plan  PT and OT evaluation     Chronic heart failure with preserved ejection fraction (HFpEF) ([facility])- (present on admission)  Assessment & Plan  Patient with known history of HFpEF.  Continue home dose metoprolol, losartan, Bumex.  She is not currently in a heart failure exacerbation    PAF (paroxysmal atrial fibrillation) ([facility])- (present on admission)  Assessment & Plan  Continue with home dose metoprolol and apixaban    Essential hypertension, benign- (present on admission)  Assessment & Plan  Continue with home dose losartan, metoprolol, Bumex    Advanced care planning/counseling discussion  Assessment & Plan  I  spent 17 minutes at bedside while in the emergency department with patient discussing work-up, results, diagnosis, prognosis.  I talked to patient about her chronic problems including heart failure, atrial fibrillation, and having multiple falls while on anticoagulation.  CODE STATUS discussed with patient and wants to be DNR/DNI          VTE prophylaxis: therapeutic anticoagulation with apixiban

## 2024-05-04 NOTE — PROGRESS NOTES
Trauma / Surgical Daily Progress Note    Date of Service  5/4/2024    Chief Complaint  89 y.o. female admitted 5/3/2024 with C7 fracture and manubrium fracture after GLF.    Interval Events  Tertiary exam complete - no new injuries identified.  Pain marginally controlled.  Labs stable.  Respiratory status stable, 4L o2.    - Lidocaine patch for sternum pain  - Aggressive pulmonary hygiene  - PT/OT  - Trauma surgery will continue to follow    Review of Systems  Review of Systems   Constitutional:  Negative for chills and fever.   Eyes:  Negative for blurred vision and double vision.   Respiratory:  Negative for cough and shortness of breath.    Gastrointestinal:  Negative for abdominal pain, diarrhea and vomiting.   Genitourinary: Negative.    Musculoskeletal:  Positive for myalgias and neck pain.   Neurological:  Positive for tingling. Negative for sensory change and focal weakness.        Vital Signs  Temp:  [36.3 °C (97.3 °F)-36.6 °C (97.9 °F)] 36.3 °C (97.3 °F)  Pulse:  [67-77] 73  Resp:  [13-17] 14  BP: (132-190)/(74-91) 134/75  SpO2:  [93 %-97 %] 95 %    Physical Exam  Physical Exam  Vitals and nursing note reviewed.   Constitutional:       General: She is awake. She is not in acute distress.     Appearance: Normal appearance. She is not ill-appearing.      Interventions: Nasal cannula in place.   HENT:      Head: Normocephalic.      Right Ear: External ear normal.      Left Ear: External ear normal.      Nose: Nose normal.      Mouth/Throat:      Mouth: Mucous membranes are moist.   Eyes:      General: No scleral icterus.        Right eye: No discharge.         Left eye: No discharge.   Cardiovascular:      Rate and Rhythm: Normal rate and regular rhythm.      Pulses: Normal pulses.   Pulmonary:      Effort: Pulmonary effort is normal. No respiratory distress.      Breath sounds: Normal breath sounds.   Chest:      Chest wall: No deformity or tenderness.   Abdominal:      General: There is no distension.       Palpations: Abdomen is soft.      Tenderness: There is no abdominal tenderness.   Musculoskeletal:      Cervical back: Neck supple. Signs of trauma present.      Comments: Moves all extremities without limitation   Skin:     General: Skin is warm and dry.      Capillary Refill: Capillary refill takes less than 2 seconds.   Neurological:      Mental Status: She is alert and oriented to person, place, and time.      GCS: GCS eye subscore is 4. GCS verbal subscore is 5. GCS motor subscore is 6.      Sensory: Sensation is intact.      Motor: Motor function is intact.   Psychiatric:         Attention and Perception: Attention normal.         Mood and Affect: Mood normal.         Behavior: Behavior is cooperative.         Laboratory  Recent Results (from the past 24 hour(s))   CBC WITH DIFFERENTIAL    Collection Time: 05/03/24  6:58 PM   Result Value Ref Range    WBC 9.7 4.8 - 10.8 K/uL    RBC 3.77 (L) 4.20 - 5.40 M/uL    Hemoglobin 11.9 (L) 12.0 - 16.0 g/dL    Hematocrit 37.2 37.0 - 47.0 %    MCV 98.7 (H) 81.4 - 97.8 fL    MCH 31.6 27.0 - 33.0 pg    MCHC 32.0 (L) 32.2 - 35.5 g/dL    RDW 48.6 35.9 - 50.0 fL    Platelet Count 175 164 - 446 K/uL    MPV 10.0 9.0 - 12.9 fL    Neutrophils-Polys 66.80 44.00 - 72.00 %    Lymphocytes 24.90 22.00 - 41.00 %    Monocytes 5.70 0.00 - 13.40 %    Eosinophils 1.30 0.00 - 6.90 %    Basophils 0.30 0.00 - 1.80 %    Immature Granulocytes 1.00 (H) 0.00 - 0.90 %    Nucleated RBC 0.00 0.00 - 0.20 /100 WBC    Neutrophils (Absolute) 6.47 1.82 - 7.42 K/uL    Lymphs (Absolute) 2.41 1.00 - 4.80 K/uL    Monos (Absolute) 0.55 0.00 - 0.85 K/uL    Eos (Absolute) 0.13 0.00 - 0.51 K/uL    Baso (Absolute) 0.03 0.00 - 0.12 K/uL    Immature Granulocytes (abs) 0.10 0.00 - 0.11 K/uL    NRBC (Absolute) 0.00 K/uL   COMP METABOLIC PANEL    Collection Time: 05/03/24  6:58 PM   Result Value Ref Range    Sodium 140 135 - 145 mmol/L    Potassium 4.6 3.6 - 5.5 mmol/L    Chloride 104 96 - 112 mmol/L    Co2 25 20 - 33  mmol/L    Anion Gap 11.0 7.0 - 16.0    Glucose 171 (H) 65 - 99 mg/dL    Bun 33 (H) 8 - 22 mg/dL    Creatinine 1.05 0.50 - 1.40 mg/dL    Calcium 9.2 8.5 - 10.5 mg/dL    Correct Calcium 9.2 8.5 - 10.5 mg/dL    AST(SGOT) 19 12 - 45 U/L    ALT(SGPT) 13 2 - 50 U/L    Alkaline Phosphatase 76 30 - 99 U/L    Total Bilirubin 0.6 0.1 - 1.5 mg/dL    Albumin 4.0 3.2 - 4.9 g/dL    Total Protein 6.8 6.0 - 8.2 g/dL    Globulin 2.8 1.9 - 3.5 g/dL    A-G Ratio 1.4 g/dL   TROPONIN    Collection Time: 24  6:58 PM   Result Value Ref Range    Troponin T 29 (H) 6 - 19 ng/L   PROTHROMBIN TIME (INR)    Collection Time: 24  6:58 PM   Result Value Ref Range    PT 15.2 (H) 12.0 - 14.6 sec    INR 1.19 (H) 0.87 - 1.13   APTT    Collection Time: 24  6:58 PM   Result Value Ref Range    APTT 35.0 24.7 - 36.0 sec   ESTIMATED GFR    Collection Time: 24  6:58 PM   Result Value Ref Range    GFR (CKD-EPI) 50 (A) >60 mL/min/1.73 m 2   EKG (Now)    Collection Time: 24  9:57 PM   Result Value Ref Range    Report       [facility]    Test Date:  2024  Pt Name:    VAN SEWELL                 Department: ER  MRN:        [id number]                      Room:       T328  Gender:     Female                       Technician: JAMAL  :        [id number]                   Requested By:JOHNNY TOLENTINO  Order #:    [id number]                    Kay MD:    Measurements  Intervals                                Axis  Rate:       77                           P:          58  VA:         203                          QRS:        92  QRSD:       143                          T:          -69  QT:         393  QTc:        445    Interpretive Statements  Sinus rhythm  Consider right atrial enlargement  Nonspecific intraventricular conduction delay  Probable anteroseptal infarct, recent  Lateral leads are also involved  Artifact in lead(s) II,III,aVR,aVF,V1,V3,V4,V5,V6  Compared to ECG 2023 11:59:03  Myocardial infarct  finding now present     CBC without Differential    Collection Time: 05/04/24  5:58 AM   Result Value Ref Range    WBC 7.2 4.8 - 10.8 K/uL    RBC 3.63 (L) 4.20 - 5.40 M/uL    Hemoglobin 11.8 (L) 12.0 - 16.0 g/dL    Hematocrit 35.4 (L) 37.0 - 47.0 %    MCV 97.5 81.4 - 97.8 fL    MCH 32.5 27.0 - 33.0 pg    MCHC 33.3 32.2 - 35.5 g/dL    RDW 47.5 35.9 - 50.0 fL    Platelet Count 152 (L) 164 - 446 K/uL    MPV 9.7 9.0 - 12.9 fL   Comp Metabolic Panel (CMP)    Collection Time: 05/04/24  5:58 AM   Result Value Ref Range    Sodium 139 135 - 145 mmol/L    Potassium 5.4 3.6 - 5.5 mmol/L    Chloride 104 96 - 112 mmol/L    Co2 28 20 - 33 mmol/L    Anion Gap 7.0 7.0 - 16.0    Glucose 180 (H) 65 - 99 mg/dL    Bun 29 (H) 8 - 22 mg/dL    Creatinine 0.73 0.50 - 1.40 mg/dL    Calcium 9.4 8.5 - 10.5 mg/dL    Correct Calcium 9.7 8.5 - 10.5 mg/dL    AST(SGOT) 19 12 - 45 U/L    ALT(SGPT) 16 2 - 50 U/L    Alkaline Phosphatase 75 30 - 99 U/L    Total Bilirubin 0.7 0.1 - 1.5 mg/dL    Albumin 3.6 3.2 - 4.9 g/dL    Total Protein 6.5 6.0 - 8.2 g/dL    Globulin 2.9 1.9 - 3.5 g/dL    A-G Ratio 1.2 g/dL   ESTIMATED GFR    Collection Time: 05/04/24  5:58 AM   Result Value Ref Range    GFR (CKD-EPI) 78 >60 mL/min/1.73 m 2       Fluids  No intake or output data in the 24 hours ending 05/04/24 6507    Core Measures & Quality Metrics  Labs reviewed, Radiology images reviewed and Medications reviewed  Carranza catheter: No Carranza      DVT: Apixiban.  DVT prophylaxis - mechanical: SCDs      Assessed for rehab: Patient was assess for and/or received rehabilitation services during this hospitalization    RAP Score Total: 10    CAGE Results: negative Blood Alcohol>0.08: not completed       Assessment/Plan  Cervical compression fracture, initial encounter (HCC)- (present on admission)  Assessment & Plan  Mild wedge type compression fracture of the C7 vertebral body; left-sided C7 facet fracture.  Non-operative management.   Cervical immobilization.  Aashish  MD Felix, PhD. Neurosurgeon. [facility].    Closed fracture of manubrium- (present on admission)  Assessment & Plan  Sternal manubrial fracture.  Hemodynamically stable.   EKG SR.  Continuous cardiac monitoring.    Frequent falls- (present on admission)  Assessment & Plan  Seen in ED multiple times for falls.   PT/OT.    Chronic anticoagulation- (present on admission)  Assessment & Plan  Daily Eliquis.   Anticoagulation held on admission.     Trauma- (present on admission)  Assessment & Plan  Ground level fall.  Non Trauma Activation.  Liborio Summers MD. Trauma Surgery.    PAF (paroxysmal atrial fibrillation) (HCC)- (present on admission)  Assessment & Plan  Chronic condition treated with metoprolol.  Systemic anticoagulation held on admission.    Type 2 diabetes mellitus without complication, without long-term current use of insulin (HCC)- (present on admission)  Assessment & Plan  Hgb A1c (4/12) 6.8.   SSI.    Essential hypertension, benign- (present on admission)  Assessment & Plan  Chronic condition treated with losartan, bumex and metoprolol.    Mental status adequate for full examination?: Yes    Spine cleared (radiologically and/or clinically): Yes    All current laboratory studies/radiology exams reviewed: Yes    Medications reconciliation has been reviewed: Yes    Completed Consultations:  Neurosurgery  Trauma surgery     Pending Consultations:  None at this time.    Newly identified diagnoses, injuries and/or co-morbidities:  None at this time.    PDI:  Not completed.    Discussed patient condition with Family, RN, Patient, and trauma surgery.  Dr. Summers.

## 2024-05-04 NOTE — CARE PLAN
The patient is Stable - Low risk of patient condition declining or worsening    Shift Goals  Clinical Goals: pain control, safety  Patient Goals: pain control, rest  Family Goals: comfort    Progress made toward(s) clinical / shift goals:    Problem: Knowledge Deficit - Standard  Goal: Patient and family/care givers will demonstrate understanding of plan of care, disease process/condition, diagnostic tests and medications  Outcome: Progressing     Problem: Pain - Standard  Goal: Alleviation of pain or a reduction in pain to the patient’s comfort goal  Outcome: Progressing     Problem: Fall Risk  Goal: Patient will remain free from falls  Outcome: Progressing  Note: Safety precautions are in place including bed locked and in lowest position, upper bed rails up, bed alarm on, call light within reach, treaded socks on, tray table and personal belongings within reach.       Patient is not progressing towards the following goals:

## 2024-05-04 NOTE — ASSESSMENT & PLAN NOTE
Sternal manubrial fracture.  Hemodynamically stable.   EKG SR.  Analgesia, aggressive pulmonary hygiene.

## 2024-05-04 NOTE — THERAPY
"Occupational Therapy   Initial Evaluation     Patient Name: Krystle Tavarez  Age:  89 y.o., Sex:  female  Medical Record #: [id number]  Today's Date: 5/4/2024     Precautions  Precautions: Spinal / Back Precautions , Cervical Collar    Comments: c collar at all times    Assessment  Patient is 89 y.o. female with GLF: C7 vertebral body fracture, Left-sided facet fracture at the C7 level with fracture coursing through the base of the left-sided superior articular facet.  Neurosurgery consulted -- no surgical intervention, Recommending Cervical spine collar for 6-weeks. Also with Sternal manubrial fracture, non surgical.    Pt seen for OT eval. Pt mostly limited due to pain at manubrium due to C-collar pressing on area. Pt would benefit from alternative collar possibly aspen. Educ for precautions, utilizing log roll technique. Participated in STS with Laury using FWW. Required assist for majority of ADLs. Pt will continue to benefit from inpt OT, recommend post acute placement upon dc. Per daughter planning on eventually moving pt into an prison.      Plan    Occupational Therapy Initial Treatment Plan   Treatment Interventions: (P) Self Care / Activities of Daily Living, Therapeutic Activity, Therapeutic Exercises, Adaptive Equipment  Treatment Frequency: (P) 4 Times per Week  Duration: (P) Until Therapy Goals Met    DC Equipment Recommendations: (P) Unable to determine at this time  Discharge Recommendations: (P) Recommend post-acute placement for additional occupational therapy services prior to discharge home     Subjective    \"It hurts when I sit up\"      Objective       05/04/24 1042   Prior Living Situation   Prior Services Intermittent Physical Support for ADL Per Family   Housing / Facility 2 Story House  (pt on 1st floor)   Steps Into Home 2   Bathroom Set up Walk In Shower;Grab Bars;Shower Chair   Equipment Owned 4-Wheel Walker;Tub / Shower Seat;Grab Bar(s) In Tub / Shower;Raised Toilet Seat With Arms  (does not " have breaks on 4WW)   Lives with - Patient's Self Care Capacity Adult Children   Comments daughter is with pt at home most, intermittently alone during day.   Prior Level of ADL Function   Self Feeding Independent   Grooming / Hygiene Independent   Bathing Requires Assist  (supervision to Laury)   Dressing Independent   Toileting Independent   Prior Level of IADL Function   Medication Management Requires Assist   Laundry Requires Assist   Kitchen Mobility Independent   Finances Requires Assist   Home Management Requires Assist   Shopping Requires Assist   Prior Level Of Mobility Supervision With Device in Community   Driving / Transportation Relatives / Others Provide Transportation   History of Falls   History of Falls Yes   Date of Last Fall   (multiple falls in the bathroom, 2 falls in 3 days per daughter)   Precautions   Precautions Fall Risk;Spinal / Back Precautions ;Cervical Collar     Comments c-collar at all times   Vitals   Pulse Oximetry 94 %   O2 (LPM) 2   O2 Delivery Device Nasal Cannula   Vitals Comments required 3 L of O2 with HOB flat   Pain   Intervention Repositioned   Pain 0 - 10 Group   Location Back;Neck;Shoulder   Location Orientation Right;Left;Upper;Lower   Therapist Pain Assessment During Activity;Post Activity Pain Same as Prior to Activity;Nurse Notified  (increased pain at sternum from c-collar when sitting up)   Cognition    Cognition / Consciousness WDL   Level of Consciousness Alert   Comments pleasant and cooperative, required redirection and preoccupied by pain in sternum with mobility   Passive ROM Upper Body   Passive ROM Upper Body WDL   Active ROM Upper Body   Active ROM Upper Body  WDL   Dominant Hand Right   Strength Upper Body   Comments unable to formally assess, using functionally   Sensation Upper Body   Upper Extremity Sensation  Not Tested   Upper Body Muscle Tone   Upper Body Muscle Tone  WDL   Neurological Concerns   Neurological Concerns No   Coordination Upper Body    Coordination WDL   Balance Assessment   Sitting Balance (Static) Fair -   Sitting Balance (Dynamic) Fair -   Standing Balance (Static) Poor +   Standing Balance (Dynamic) Poor +   Weight Shift Sitting Poor   Weight Shift Standing Poor   Bed Mobility    Supine to Sit Minimal Assist   Sit to Supine Minimal Assist   Scooting Minimal Assist   Rolling Moderate Assist to Lt.   Comments sitting EOB painful due to c-collar pressing on sternum manubrium fx. comfortable flat in bed.   ADL Assessment   Grooming Minimal Assist   Upper Body Dressing Minimal Assist   Lower Body Dressing Maximal Assist   Toileting Total Assist   Functional Mobility   Sit to Stand Minimal Assist   Transfer Method Stand Step   Mobility supine>EOB> STS   Comments w/FWW   Visual Perception   Visual Perception  WDL   Edema / Skin Assessment   Edema / Skin  WDL   Activity Tolerance   Sitting Edge of Bed 2 min   Standing 2 min   Comments limited due to pain   Patient / Family Goals   Patient / Family Goal #1 to get better   Short Term Goals   Short Term Goal # 1 Pt will complete functional transfers supervised   Short Term Goal # 2 Pt will complete LB dressing with A/E as needed SBA   Short Term Goal # 3 Pt will complete standing grooming tasks SBA   Short Term Goal # 4 Pt will complete toileting SBA   Education Group   Education Provided Spinal Precautions;Brace Wear and Care;Role of Occupational Therapist;Activities of Daily Living   Role of Occupational Therapist Patient Response Patient;Family;Acceptance;Explanation;Demonstration;Action Demonstration;Verbal Demonstration   Spinal Precautions Patient Response Patient;Family;Acceptance;Explanation;Demonstration;Verbal Demonstration;Action Demonstration   Brace Wear & Care Patient Response Patient;Family;Acceptance;Explanation;Demonstration;Action Demonstration;Verbal Demonstration   ADL Patient Response Patient;Family;Acceptance;Explanation;Demonstration;Verbal Demonstration;Action Demonstration    Additional Comments educ on obtaining a different brace   Occupational Therapy Initial Treatment Plan    Treatment Interventions Self Care / Activities of Daily Living;Therapeutic Activity;Therapeutic Exercises;Adaptive Equipment   Treatment Frequency 4 Times per Week   Duration Until Therapy Goals Met   Problem List   Problem List Decreased Active Daily Living Skills;Decreased Functional Mobility;Decreased Activity Tolerance;Safety Awareness Deficits / Cognition   Anticipated Discharge Equipment and Recommendations   DC Equipment Recommendations Unable to determine at this time   Discharge Recommendations Recommend post-acute placement for additional occupational therapy services prior to discharge home   Interdisciplinary Plan of Care Collaboration   IDT Collaboration with  Nursing;Physical Therapist;Family / Caregiver   Patient Position at End of Therapy In Bed;Bed Alarm On;Call Light within Reach;Tray Table within Reach;Family / Friend in Room   Collaboration Comments RN updated   Session Information   Date / Session Number  5/4, #1 (1/4, 5/10)

## 2024-05-04 NOTE — ASSESSMENT & PLAN NOTE
Sternal manubrial fracture   Trauma surgery consulted.  No surgical intervention at this time  Pain control with IV morphine and oral oxycodone.  Patient will therefore need close hemodynamic monitoring  PT and OT evaluation

## 2024-05-04 NOTE — ASSESSMENT & PLAN NOTE
Mild wedge type compression fracture of the C7 vertebral body of indeterminate age likely acute. Left-sided facet fracture at the C7 level with fracture coursing through the base of the left-sided superior articular facet.    Neurosurgery Dr. Washington evaluated recommended rigid cervical collar with no neurosurgical intervention recommended to follow-up as outpatient in 2 weeks

## 2024-05-04 NOTE — CONSULTS
TRAUMA HISTORY AND PHYSICAL    CHIEF COMPLAINT: Fall related trauma    HISTORY OF PRESENT ILLNESS: The patient is a 89  year old  who fell today and suffered a flexion injury.  No LOC. CT shows a C7 fracture.  No gross neurologic deficit.  She also has a fractured manubrium.  Increasing frail spending more time in bed.  She also fell several days ago.  DNR/DNI.  Cervical fracture will be managed non operatively.  Analgesia and respiratory care for the manubrial fracture.  Overall given co morbidities best managed on the medical service with trauma to follow.  Family counseled.      PAST MEDICAL HISTORY:  has a past medical history of Arthritis, Breath shortness, Cataract, Dental disorder, Diabetes (HCC), Heart burn, Hemorrhagic disorder (HCC), High cholesterol, Hyperlipidemia, Hypertension, Pacemaker (2015), Pain (2020), Pre-diabetes, TIA (transient ischemic attack), and Urinary incontinence.     PAST SURGICAL HISTORY:  has a past surgical history that includes hip replacement, total (Right, 2009); knee replacement, total (Right, 2004); bunionectomy (Left); basal cell excision; cataract extraction with iol (Bilateral, 2003); pacemaker insertion (2015); cholecystectomy (2002); laminotomy,lumbar disk,1 intrsp (N/A, 2/25/2020); and foraminotomy (N/A, 2/25/2020).     ALLERGIES:   Allergies   Allergen Reactions    Codeine Unspecified     Patient reported hallucination and dizziness. Specific to tylenol with codeine      Gabapentin Unspecified     Altered mental status    Sulfa Drugs Nausea     Nausea   Other reaction(s): Not available    Tramadol Unspecified     Altered mentation        CURRENT MEDICATIONS:   Home Medications       Reviewed by Paulie Broussard EMT-Basic (Acute Care Technician - Basic) on 05/03/24 at 2139  Med List Status: Complete     Medication Last Dose Status   acetaminophen (TYLENOL 8 HOUR) 650 MG CR tablet 5/3/2024 Active   apixaban (ELIQUIS) 2.5mg Tab 5/3/2024 Active   bumetanide (BUMEX) 0.5 MG  "Tab 5/3/2024 Active   Cyanocobalamin (VITAMIN B-12 PO) 5/3/2024 Active   docusate sodium (COLACE) 100 MG Cap 5/3/2024 Active   losartan (COZAAR) 100 MG Tab 5/3/2024 Active   metoprolol SR (TOPROL XL) 25 MG TABLET SR 24 HR 5/3/2024 Active                    FAMILY HISTORY:   Family History   Problem Relation Age of Onset    Diabetes Mother     Stroke Mother     Heart Attack Father 74    No Known Problems Maternal Grandmother     No Known Problems Maternal Grandfather     No Known Problems Paternal Grandmother     No Known Problems Paternal Grandfather         SOCIAL HISTORY:   Social History     Tobacco Use    Smoking status: Never    Smokeless tobacco: Never   Vaping Use    Vaping Use: Never used   Substance and Sexual Activity    Alcohol use: Not Currently    Drug use: No    Sexual activity: Not Currently     Partners: Male       REVIEW OF SYSTEMS: Comprehensive review of systems is negative with the   exception of the aforementioned HPI, PMH, and PSH elements in accordance with CMS guidelines.     PHYSICAL EXAMINATION:     GENERAL: No distress  VITAL SIGNS: BP (!) 161/74   Pulse 77   Temp 36.4 °C (97.5 °F) (Temporal)   Resp 14   Ht 1.6 m (5' 3\")   Wt 66.2 kg (146 lb)   SpO2 93%   HEAD AND NECK: Pupils:  Equal and Reactive    Facial:  Symmetrical.    NECK: No JVD. Trachea midline. Cervical tenderness is  present  CHEST: Breath sounds are equal. sternal tenderness.  No  lateral rib tenderness.  CARDIOVASCULAR: Regular rhythm  ABDOMEN: Soft, no tenderness guarding or peritoneal findings. Obese  PELVIS: Stable.  EXTREMITIES: Examination of the upper and lower extremities : No gross long bone or joint deformity.    BACK: No midline stepoffs.  No Tenderness  NEUROLOGIC: Link Coma Score is 15  .  No gross motor deficit.     LABORATORY VALUES:   Recent Labs     05/02/24  1150 05/03/24  1858   WBC 6.1 9.7   RBC 4.16* 3.77*   HEMOGLOBIN 13.8 11.9*   HEMATOCRIT 39.9 37.2   MCV 95.9 98.7*   MCH 33.2* 31.6   MCHC 34.6 " 32.0*   RDW 47.4 48.6   PLATELETCT 192 175   MPV 9.8 10.0     Recent Labs     05/02/24  1150 05/03/24  1858   SODIUM 141 140   POTASSIUM 4.8 4.6   CHLORIDE 105 104   CO2 27 25   GLUCOSE 149* 171*   BUN 26* 33*   CREATININE 0.80 1.05   CALCIUM 9.6 9.2     Recent Labs     05/02/24  1150 05/03/24  1858   ASTSGOT 14 19   ALTSGPT 11 13   TBILIRUBIN 1.0 0.6   ALKPHOSPHAT 81 76   GLOBULIN 3.1 2.8   INR  --  1.19*     Recent Labs     05/03/24  1858   APTT 35.0   INR 1.19*        IMAGING:   CT-CHEST (THORAX) W/O   Final Result      1.  Sternal manubrial fracture      2.  No other acute finding      3.  Mild atelectasis      4.  Spinal degenerative change and there has been prior T12 vertebral augmentation      Fleischner Society pulmonary nodule recommendations:   Not Applicable         CT-CSPINE WITHOUT PLUS RECONS   Final Result      1.  Mild wedge type compression fracture of the C7 vertebral body of indeterminate age likely acute.      2.  Left-sided facet fracture at the C7 level with fracture coursing through the base of the left-sided superior articular facet.      3.  Osteopenia.      4.  Moderate osteoarthritic change at the C5-6 level      5.  These findings were discussed with JOHNNY TOLENTINO at  7:00 PM 5/3/2024      CT-HEAD W/O   Final Result      1.  Cerebral atrophy.      2.  White matter lucencies most consistent with small vessel ischemic change versus demyelination or gliosis.      3.  Otherwise, Head CT without contrast with no acute findings. No evidence of acute cerebral infarction, hemorrhage or mass lesion.             IMPRESSION AND PLAN:  Trauma  Ground level fall.  Non Trauma Activation.  Liborio Summers MD. Trauma Surgery.    Closed fracture of manubrium  Sternal manubrial fracture.  Hemodynamically stable.   EKG pending.  Continuous cardiac monitoring.    Cervical compression fracture, initial encounter ([facility])  Mild wedge type compression fracture of the C7 vertebral body; left-sided C7 facet  fracture.  Definitive plan pending.   Cervical immobilization.  Aashish Washington MD, PhD. Neurosurgeon. [facility].    Chronic anticoagulation  Daily Eliquis.   Anticoagulation held on admission.     Essential hypertension, benign  Chronic condition treated with losartan, bumex and metoprolol.    PAF (paroxysmal atrial fibrillation) ([facility])  Chronic condition treated with metoprolol.  Systemic anticoagulation held on admission.    Type 2 diabetes mellitus without complication, without long-term current use of insulin ([facility])  Hgb A1c (4/12) 6.8.   SSI.    Frequent falls  Seen in ED multiple times for falls.   PT/OT.    Falls  PT and OT evaluation     Fracture of cervical spine without lesion of spinal cord, initial encounter ([facility])  Mild wedge type compression fracture of the C7 vertebral body of indeterminate age likely acute. Left-sided facet fracture at the C7 level with fracture coursing through the base of the left-sided superior articular facet.  Neurosurgery consulted -- no surgical intervention  Recommending Cervical spine collar for 6-weeks  Pain control with both IV and p.o. narcotics.  Patient will need close hemodynamic monitoring    Sternal manubrial dissociation, closed fracture  Sternal manubrial fracture   Trauma surgery consulted.  No surgical intervention at this time  Formal consult pending  Pain control with IV morphine and oral oxycodone.  Patient will therefore need close hemodynamic monitoring  PT and OT evaluation    PAF (paroxysmal atrial fibrillation) ([facility])  Continue with home dose metoprolol and apixaban    Essential hypertension, benign  Continue with home dose losartan, metoprolol, Bumex    Chronic heart failure with preserved ejection fraction (HFpEF) ([facility])  Patient with known history of HFpEF.  Continue home dose metoprolol, losartan, Bumex.  She is not currently in a heart failure exacerbation    Advanced care planning/counseling discussion  I spent 17 minutes at bedside  while in the emergency department with patient discussing work-up, results, diagnosis, prognosis.  I talked to patient about her chronic problems including heart failure, atrial fibrillation, and having multiple falls while on anticoagulation.  CODE STATUS discussed with patient and wants to be DNR/DNI        ____________________________________   Liborio Summers MD, FACS      DD: 5/3/2024   DT: 7:34 PM

## 2024-05-04 NOTE — PROGRESS NOTES
4 Eyes Skin Assessment Completed by NAMAN Schmitt and NAMAN Valentin.    Head WDL  Ears WDL  Nose WDL  Mouth WDL  Neck WDL  Breast/Chest Scab, moles  Shoulder Blades WDL  Spine WDL, moles  (R) Arm/Elbow/Hand Redness and Blanching  (L) Arm/Elbow/Hand Redness and Blanching  Abdomen WDL  Groin WDL  Scrotum/Coccyx/Buttocks Redness, Blanching, and Discoloration  (R) Leg WDL  (L) Leg WDL  (R) Heel/Foot/Toe Redness, Blanching, and Scab  (L) Heel/Foot/Toe Redness and Blanching          Devices In Places Blood Pressure Cuff, Pulse Ox, and SCD's      Interventions In Place NC W/Ear Foams,pillows, [place] J cervical collar    Possible Skin Injury No    Pictures Uploaded Into Epic N/A  Wound Consult Placed N/A  RN Wound Prevention Protocol Ordered No

## 2024-05-04 NOTE — PROGRESS NOTES
"This note is intended for the purposes of medical student education and feedback only.   Please refer to the documentation by this patient's assigned medical practitioner for details of care and plans.    Medical Student Progress Note  Note Author: Khari Ojeda  Date: 5/4/2024    Date of Admission: 5/3/2024    ID/CC: Krystle Tavarez is a 89 y.o. female who was admitted on 5/3/2024 after having a ground level fall at home. Patient hit the back of her head and is on blood thinners. There was no LOC    Hospital course  Patient complained of anterior rib pain, chest pain and was found to have an occipital hematoma by EMS. Imaging showed a C7, manubrium and rib fractures with no neurologic deficits. Neurosurgery was consulted and recommended C-collar placement with no surgical intervention    SUBJECTIVE  Today patient reports that she is overall uncomfortable in the C-collar. She reports some moderate chest and neck pain that is somewhat controlled by her medications    OBJECTIVE   Physical Exam:  BP (!) 143/74   Pulse 76   Temp 36.5 °C (97.7 °F) (Temporal)   Resp 14   Ht 1.6 m (5' 2.99\")   Wt 66.2 kg (146 lb)   SpO2 95%     Intake/Output Summary (Last 24 hours) at 5/4/2024 1232  Last data filed at 5/4/2024 0900  Gross per 24 hour   Intake 120 ml   Output --   Net 120 ml       General: Well developed, well nourished female, appears stated age, laying in bed in moderate distress  HEENT: Normocephalic, EOM grossly intact, PERRLA. Positive for neck pain on palpation  Cardio: Normal S1 and S2. Regular rate and rhythm. No murmurs, rubs, or gallops.  Pulmonary: Lungs are clear to auscultation bilaterally. No wheezes, rales, or rhonchi.  Abdomen: Normoactive bowel sounds. Abdomen is soft and nondistended. No masses. No tenderness to palpation in all four quadrants.   MSK: Normal ROM. Extremities well-perfused. Capillary refill <2 seconds. No LE edema.  Neuro: A&Ox3. CN II-XII grossly intact. Strength and sensation " intact throughout.   Skin: No rash, lesions, or skin ulcers. No jaundice, ecchymoses, or petechiae.   Psych: Appropriate mood and affect.    Lab Results:  Recent Labs     05/02/24 1150 05/03/24 1858 05/04/24  0558   WBC 6.1 9.7 7.2   RBC 4.16* 3.77* 3.63*   HEMOGLOBIN 13.8 11.9* 11.8*   HEMATOCRIT 39.9 37.2 35.4*   MCV 95.9 98.7* 97.5   MCH 33.2* 31.6 32.5   RDW 47.4 48.6 47.5   PLATELETCT 192 175 152*   MPV 9.8 10.0 9.7   NEUTSPOLYS 57.80 66.80  --    LYMPHOCYTES 32.40 24.90  --    MONOCYTES 7.40 5.70  --    EOSINOPHILS 1.80 1.30  --    BASOPHILS 0.30 0.30  --      Recent Labs     05/02/24 1150 05/03/24 1858 05/04/24  0558   SODIUM 141 140 139   POTASSIUM 4.8 4.6 5.4   CHLORIDE 105 104 104   CO2 27 25 28   BUN 26* 33* 29*   CREATININE 0.80 1.05 0.73   CALCIUM 9.6 9.2 9.4   ALBUMIN 3.9 4.0 3.6     Estimated GFR/CRCL = Estimated Creatinine Clearance: 46.8 mL/min (by C-G formula based on SCr of 0.73 mg/dL).  Recent Labs     05/02/24 1150 05/03/24 1858 05/04/24  0558   GLUCOSE 149* 171* 180*     Recent Labs     05/02/24 1150 05/03/24 1858 05/04/24  0558   ASTSGOT 14 19 19   ALTSGPT 11 13 16   TBILIRUBIN 1.0 0.6 0.7   ALKPHOSPHAT 81 76 75   GLOBULIN 3.1 2.8 2.9   INR  --  1.19*  --              Recent Labs     05/03/24 1858   INR 1.19*   APTT 35.0       Microbiology Results:  Results       Procedure Component Value Units Date/Time    URINALYSIS CULTURE, IF INDICATED [[id number]]     Order Status: Sent Specimen: Urine, Clean Catch             Imaging Results:  CT-CHEST (THORAX) W/O   Final Result      1.  Sternal manubrial fracture      2.  No other acute finding      3.  Mild atelectasis      4.  Spinal degenerative change and there has been prior T12 vertebral augmentation      Fleischner Society pulmonary nodule recommendations:   Not Applicable         CT-CSPINE WITHOUT PLUS RECONS   Final Result      1.  Mild wedge type compression fracture of the C7 vertebral body of indeterminate age likely acute.      2.   Left-sided facet fracture at the C7 level with fracture coursing through the base of the left-sided superior articular facet.      3.  Osteopenia.      4.  Moderate osteoarthritic change at the C5-6 level      5.  These findings were discussed with JOHNNY TOLENTINO at  7:00 PM 5/3/2024      CT-HEAD W/O   Final Result      1.  Cerebral atrophy.      2.  White matter lucencies most consistent with small vessel ischemic change versus demyelination or gliosis.      3.  Otherwise, Head CT without contrast with no acute findings. No evidence of acute cerebral infarction, hemorrhage or mass lesion.             EKG  Results for orders placed or performed during the hospital encounter of 24   EKG (Now)   Result Value Ref Range    Report       [facility]    Test Date:  2024  Pt Name:    VAN SEWELL                 Department: ER  MRN:        [id number]                      Room:       [facility]  Gender:     Female                       Technician: JAMAL  :        [id number]                   Requested By:JOHNNY TOLENTINO  Order #:    [id number]                    Reading MD:    Measurements  Intervals                                Axis  Rate:       77                           P:          58  MO:         203                          QRS:        92  QRSD:       143                          T:          -69  QT:         393  QTc:        445    Interpretive Statements  Sinus rhythm  Consider right atrial enlargement  Nonspecific intraventricular conduction delay  Probable anteroseptal infarct, recent  Lateral leads are also involved  Artifact in lead(s) II,III,aVR,aVF,V1,V3,V4,V5,V6  Compared to ECG 2023 11:59:03  Myocardial infarct finding now present         Current Medications    Current Facility-Administered Medications:     morphine 4 MG/ML injection 1 mg, 1 mg, Intravenous, Q4HRS PRN, Simon Jade M.D.    bumetanide (Bumex) tablet 1 mg, 1 mg, Oral, DAILY, Jt Vasquez M.D., 1 mg at 24  0523    apixaban (Eliquis) tablet 2.5 mg, 2.5 mg, Oral, BID, Jt Vasquez M.D., 2.5 mg at 05/04/24 0522    losartan (Cozaar) tablet 100 mg, 100 mg, Oral, DAILY, Jt Vasquez M.D., 100 mg at 05/04/24 0520    metoprolol SR (Toprol XL) tablet 12.5 mg, 12.5 mg, Oral, DAILY, Jt Vasquez M.D., 12.5 mg at 05/04/24 0519    acetaminophen (Tylenol) tablet 650 mg, 650 mg, Oral, Q4HRS PRN, Jt Vasquez M.D., 650 mg at 05/04/24 0527    oxyCODONE immediate-release (Roxicodone) tablet 5 mg, 5 mg, Oral, Q4HRS PRN, Jt Vasquez M.D., 5 mg at 05/03/24 2218    ASSESSMENT/PLAN  Krystle Tavarez is a 89 y.o. female who is currently admitted due to cervical, manubrium and rib fractures after a ground level fall    Plan  Cervical fracture  CT showed C7 compression fracture  Neurosurgery recommended cervical collar placement with no surgical intervention  Outpatient follow up in two weeks as per Neurosurgery  Manubrium fracture  No surgical intervention as per Trauma  Pain control with morphine, lidocaine and oxycodone  Will continue to monitor  Rib fractures  Pain control with morphine, lidocaine and oxycodone  Encourage use of inspiratory spirometer  Continue use of supplemental oxygen    DVT prophylaxis: Apixaban  Code status: DNR/DNI

## 2024-05-04 NOTE — ED PROVIDER NOTES
ED Provider Note    CHIEF COMPLAINT  Chief Complaint   Patient presents with    GLF     Pt was ambulating at home today when she lost her balance and had a mechanical GLF. Pt fell backwards and hit the back of her head. Pt takes eliquis. Pt placed in a c-collar by EMS prior to arrival.        EXTERNAL RECORDS REVIEWED  Outpatient Notes outpatient notes with physical therapy in April for lymphedema and deconditioning and general weakness    HPI/ROS  LIMITATION TO HISTORY   Select: : None  OUTSIDE HISTORIAN(S):  Family daughter, KEIKO and EMS VSS stable in route    Krystle Tavarez is a 89 y.o. female who presents after ground-level fall striking the back of her head.  The patient is on Eliquis and notes compliance.  EMS found hematoma to the occipital region.  Patient is alert and oriented GCS 15.  She is also complaining of anterior rib pain from the fall but no trouble breathing.  Patient denies any syncope or palpitations.  Family who arrives later reports she had a fall yesterday and was evaluated here as well.  She has been globally deconditioned over the past several weeks to months.    PAST MEDICAL HISTORY   has a past medical history of Arthritis, Breath shortness, Cataract, Dental disorder, Diabetes (HCC), Heart burn, Hemorrhagic disorder (HCC), High cholesterol, Hyperlipidemia, Hypertension, Pacemaker (2015), Pain (2020), Pre-diabetes, TIA (transient ischemic attack), and Urinary incontinence.    SURGICAL HISTORY   has a past surgical history that includes hip replacement, total (Right, 2009); knee replacement, total (Right, 2004); bunionectomy (Left); basal cell excision; cataract extraction with iol (Bilateral, 2003); pacemaker insertion (2015); cholecystectomy (2002); laminotomy,lumbar disk,1 intrsp (N/A, 2/25/2020); and foraminotomy (N/A, 2/25/2020).    FAMILY HISTORY  Family History   Problem Relation Age of Onset    Diabetes Mother     Stroke Mother     Heart Attack Father 74    No Known Problems  "Maternal Grandmother     No Known Problems Maternal Grandfather     No Known Problems Paternal Grandmother     No Known Problems Paternal Grandfather        SOCIAL HISTORY  Social History     Tobacco Use    Smoking status: Never    Smokeless tobacco: Never   Vaping Use    Vaping Use: Never used   Substance and Sexual Activity    Alcohol use: Not Currently    Drug use: No    Sexual activity: Not Currently     Partners: Male       CURRENT MEDICATIONS  Home Medications       Reviewed by Harry Albright (Pharmacy Tech) on 05/03/24 at 2027  Med List Status: Complete     Medication Last Dose Status   acetaminophen (TYLENOL 8 HOUR) 650 MG CR tablet 5/3/2024 Active   apixaban (ELIQUIS) 2.5mg Tab 5/3/2024 Active   bumetanide (BUMEX) 0.5 MG Tab 5/3/2024 Active   Cyanocobalamin (VITAMIN B-12 PO) 5/3/2024 Active   docusate sodium (COLACE) 100 MG Cap 5/3/2024 Active   losartan (COZAAR) 100 MG Tab 5/3/2024 Active   metoprolol SR (TOPROL XL) 25 MG TABLET SR 24 HR 5/3/2024 Active                    ALLERGIES  Allergies   Allergen Reactions    Codeine Unspecified     Patient reported hallucination and dizziness. Specific to tylenol with codeine      Gabapentin Unspecified     Altered mental status    Sulfa Drugs Nausea     Nausea   Other reaction(s): Not available    Tramadol Unspecified     Altered mentation       PHYSICAL EXAM  VITAL SIGNS: BP (!) 184/74   Pulse 70   Temp 36.6 °C (97.8 °F) (Temporal)   Resp 15   Ht 1.6 m (5' 3\")   Wt 66.2 kg (146 lb)   LMP  (LMP Unknown)   SpO2 95%   BMI 25.86 kg/m²    Constitutional: Awake and alert. No acute distress.  Head: NCAT.  HEENT: Normal Conjunctiva. PERRLA.  Neck: Grossly normal range of motion. Airway midline.  With midline cervical tenderness. In cervical collar.  Cardiovascular: Normal heart rate, Normal rhythm.  Radial pulses 2+ bilaterally  Thorax & Lungs: No respiratory distress. Clear to Auscultation bilaterally.  Abdomen: Normal inspection. Nontender. " Nondistended  Skin: No obvious rash.  Back: No midline thoracic or lumbar tenderness, No CVA tenderness.   Musculoskeletal: No obvious deformity. Moves all extremities Well.  Neurologic: A&Ox3.   strength 5 out of 5 bilaterally, sensation grossly intact.  Able to move toes.  5 out of 5 dorsi and plantarflexion.  Psychiatric: Mood and affect are appropriate for situation.    EKG/LABS  Results for orders placed or performed during the hospital encounter of 05/03/24   CBC WITH DIFFERENTIAL   Result Value Ref Range    WBC 9.7 4.8 - 10.8 K/uL    RBC 3.77 (L) 4.20 - 5.40 M/uL    Hemoglobin 11.9 (L) 12.0 - 16.0 g/dL    Hematocrit 37.2 37.0 - 47.0 %    MCV 98.7 (H) 81.4 - 97.8 fL    MCH 31.6 27.0 - 33.0 pg    MCHC 32.0 (L) 32.2 - 35.5 g/dL    RDW 48.6 35.9 - 50.0 fL    Platelet Count 175 164 - 446 K/uL    MPV 10.0 9.0 - 12.9 fL    Neutrophils-Polys 66.80 44.00 - 72.00 %    Lymphocytes 24.90 22.00 - 41.00 %    Monocytes 5.70 0.00 - 13.40 %    Eosinophils 1.30 0.00 - 6.90 %    Basophils 0.30 0.00 - 1.80 %    Immature Granulocytes 1.00 (H) 0.00 - 0.90 %    Nucleated RBC 0.00 0.00 - 0.20 /100 WBC    Neutrophils (Absolute) 6.47 1.82 - 7.42 K/uL    Lymphs (Absolute) 2.41 1.00 - 4.80 K/uL    Monos (Absolute) 0.55 0.00 - 0.85 K/uL    Eos (Absolute) 0.13 0.00 - 0.51 K/uL    Baso (Absolute) 0.03 0.00 - 0.12 K/uL    Immature Granulocytes (abs) 0.10 0.00 - 0.11 K/uL    NRBC (Absolute) 0.00 K/uL   COMP METABOLIC PANEL   Result Value Ref Range    Sodium 140 135 - 145 mmol/L    Potassium 4.6 3.6 - 5.5 mmol/L    Chloride 104 96 - 112 mmol/L    Co2 25 20 - 33 mmol/L    Anion Gap 11.0 7.0 - 16.0    Glucose 171 (H) 65 - 99 mg/dL    Bun 33 (H) 8 - 22 mg/dL    Creatinine 1.05 0.50 - 1.40 mg/dL    Calcium 9.2 8.5 - 10.5 mg/dL    Correct Calcium 9.2 8.5 - 10.5 mg/dL    AST(SGOT) 19 12 - 45 U/L    ALT(SGPT) 13 2 - 50 U/L    Alkaline Phosphatase 76 30 - 99 U/L    Total Bilirubin 0.6 0.1 - 1.5 mg/dL    Albumin 4.0 3.2 - 4.9 g/dL    Total Protein  6.8 6.0 - 8.2 g/dL    Globulin 2.8 1.9 - 3.5 g/dL    A-G Ratio 1.4 g/dL   TROPONIN   Result Value Ref Range    Troponin T 29 (H) 6 - 19 ng/L   PROTHROMBIN TIME (INR)   Result Value Ref Range    PT 15.2 (H) 12.0 - 14.6 sec    INR 1.19 (H) 0.87 - 1.13   APTT   Result Value Ref Range    APTT 35.0 24.7 - 36.0 sec   ESTIMATED GFR   Result Value Ref Range    GFR (CKD-EPI) 50 (A) >60 mL/min/1.73 m 2     RADIOLOGY/PROCEDURES   I have independently interpreted the diagnostic imaging associated with this visit and am waiting the final reading from the radiologist.   My preliminary interpretation is as follows: no ICH    Radiologist interpretation:  CT-CHEST (THORAX) W/O   Final Result      1.  Sternal manubrial fracture      2.  No other acute finding      3.  Mild atelectasis      4.  Spinal degenerative change and there has been prior T12 vertebral augmentation      Fleischner Society pulmonary nodule recommendations:   Not Applicable         CT-CSPINE WITHOUT PLUS RECONS   Final Result      1.  Mild wedge type compression fracture of the C7 vertebral body of indeterminate age likely acute.      2.  Left-sided facet fracture at the C7 level with fracture coursing through the base of the left-sided superior articular facet.      3.  Osteopenia.      4.  Moderate osteoarthritic change at the C5-6 level      5.  These findings were discussed with JOHNNY TOLENTINO at  7:00 PM 5/3/2024      CT-HEAD W/O   Final Result      1.  Cerebral atrophy.      2.  White matter lucencies most consistent with small vessel ischemic change versus demyelination or gliosis.      3.  Otherwise, Head CT without contrast with no acute findings. No evidence of acute cerebral infarction, hemorrhage or mass lesion.           COURSE & MEDICAL DECISION MAKING    ASSESSMENT, COURSE AND PLAN  Care Narrative:   89-year-old female DNR/DNI on Eliquis presents for ground-level fall at home   Afebrile and hypertensive  On exam she is GCS 15, neuro intact,  airway intact and clear lung sounds bilaterally.  She is taken from charge nurse station to CT scan due to TBI protocol, age, anticoagulation  25 mcg fentanyl given  CTs reveal no intracranial hemorrhage.  She does have a C7 facet fracture.  She does have finding of manubrial fracture on CT chest  Pain is reasonably controlled on reassessment, no respiratory distress.  Dr. Washington for neurosurgery recommends c-collar for 6 weeks, nonoperative management given age, DNR status, characterization of fracture.  Will consult trauma given manubrial fracture.  I do feel patient is best managed with admission and will see if trauma would like to be primary.  Patient will be switched to [facility] collar  Dr. Summers recommends hospitalist admission for pain control, PT OT and management of her chronic comorbid diseases.  Discussed with patient and family recommendation for admission.  They are agreeable with this plan.    Labs with macrocytic anemia, no other acute derangements  Hospitalist will graciously accept patient for admission.    Hypertension counseling:   I spent a total of 5 minutes counseling patient on blood pressure control and management.  We discussed medication management with PCP.  I recommended keeping a blood pressure journal, taking the blood pressure once in the morning once evening after resting for 5 minutes.  Presented in general to PCP and discussing medication management or adjustment.  Discussed hypertensive emergencies and what signs and symptoms to be monitoring for and to return to the ED for.  Patient verbalized understanding and was receptive of counseling.      ADDITIONAL PROBLEMS MANAGED    C7 facet fracture  Manubrial fracture  Ground-level fall with closed head injury  On anticoagulation for paroxysmal A-fib  HFpEF  Hypertension    DISPOSITION AND DISCUSSIONS  I have discussed management of the patient with the following physicians and FAVIO's:    Radiology - finding of C7 fx  Dr. Washington - 6  weeks cervical collar  Dr. Summers - Trauma to consult for recommendations  Hospitalist - kindly accepts admission    Discussion of management with other QHP or appropriate source(s): Radiologist critical finding      Barriers to care at this time, including but not limited to: Patient lacks transportation .     Decision tools and prescription drugs considered including, but not limited to: NEXUS criteriaunable to clear cspine due to midline tenderness .    FINAL DIAGNOSIS  1. Closed fracture of seventh cervical vertebra without spinal cord injury, initial encounter ([facility])    2. Closed fracture of manubrium, initial encounter    3. Closed head injury, initial encounter    4. Primary hypertension           Electronically signed by: Ce Palafox D.O., 5/3/2024 6:28 PM

## 2024-05-04 NOTE — ED TRIAGE NOTES
"Chief Complaint   Patient presents with    GLF     Pt was ambulating at home today when she lost her balance and had a mechanical GLF. Pt fell backwards and hit the back of her head. Pt takes eliquis. Pt placed in a c-collar by EMS prior to arrival.        Pt BIBA from home with the above complaint. PIV established by EMS, pt given 50mcg fent given for pain. +thinners, +head strike, -loc. Pt is gcs 15, fsbg 211.    BP (!) 190/91   Pulse 75   Temp 36.6 °C (97.8 °F) (Temporal)   Resp 16   Ht 1.6 m (5' 3\")   Wt 66.2 kg (146 lb)   LMP  (LMP Unknown)   SpO2 96%   BMI 25.86 kg/m²       "

## 2024-05-04 NOTE — PROGRESS NOTES
Hospital Medicine Daily Progress Note    Date of Service  5/4/2024    Chief Complaint  Krystle Tavarez is a 89 y.o. female admitted 5/3/2024 with compression fracture of C7 after fall    Hospital Course   89 y.o. female with possible history of hypertension, have Paff, atrial fibrillation on anticoagulation who presented 5/3/2024 with mechanical ground-level fall. CT head negative for any intracranial hemorrhage. CT C-spine showed a mild wedge type compression fracture of the C7 .  Trauma surgery evaluated.  Neurosurgery Dr. Washington evaluated recommended rigid cervical collar with no neurosurgical intervention recommended to follow-up as outpatient in 2 weeks        Interval Problem Update  5/4/2024  Seen and examined at bedside  Vital remained stable  Pain well-controlled  Labs reviewed normal white count, hemoglobin 11.8, BMP with sodium 139 potassium 5.4, stable renal function.  Imaging reviewed  Continue Eliquis, Bumex, losartan, metoprolol  Requiring IV narcotics for management, monitor for toxicity  Case discussed with trauma surgery, neurosurgery  PT/OT evaluation  Likely need placement,  assisting    I have discussed this patient's plan of care and discharge plan at IDT rounds today with Case Management, Nursing, Nursing leadership, and other members of the IDT team.    Consultants/Specialty  neurosurgery and trauma surgery    Code Status  DNAR/DNI    Disposition  The patient is not medically cleared for discharge to home or a post-acute facility.  Anticipate discharge to: skilled nursing facility    I have placed the appropriate orders for post-discharge needs.    Review of Systems  Review of Systems   Gastrointestinal:  Negative for nausea and vomiting.   Musculoskeletal:  Positive for falls and joint pain.   Neurological:  Negative for sensory change, speech change, focal weakness and weakness.        Physical Exam  Temp:  [36.3 °C (97.3 °F)-36.6 °C (97.9 °F)] 36.5 °C (97.7 °F)  Pulse:   [67-77] 76  Resp:  [13-17] 14  BP: (132-190)/(74-91) 143/74  SpO2:  [93 %-97 %] 95 %    Physical Exam  Constitutional:       Appearance: Normal appearance.   HENT:      Head:      Comments: On c-collar  Cardiovascular:      Rate and Rhythm: Normal rate.      Pulses: Normal pulses.      Heart sounds: Normal heart sounds.   Pulmonary:      Effort: Pulmonary effort is normal. No respiratory distress.      Breath sounds: Normal breath sounds.   Abdominal:      General: Abdomen is flat.   Musculoskeletal:      Right lower leg: No edema.      Left lower leg: No edema.   Skin:     General: Skin is warm.   Neurological:      General: No focal deficit present.      Mental Status: She is alert and oriented to person, place, and time.      Comments: Motor strength 5 out of 5 all extremity, sensation intact         Fluids    Intake/Output Summary (Last 24 hours) at 5/4/2024 1129  Last data filed at 5/4/2024 0900  Gross per 24 hour   Intake 120 ml   Output --   Net 120 ml       Laboratory  Recent Labs     05/02/24  1150 05/03/24 1858 05/04/24  0558   WBC 6.1 9.7 7.2   RBC 4.16* 3.77* 3.63*   HEMOGLOBIN 13.8 11.9* 11.8*   HEMATOCRIT 39.9 37.2 35.4*   MCV 95.9 98.7* 97.5   MCH 33.2* 31.6 32.5   MCHC 34.6 32.0* 33.3   RDW 47.4 48.6 47.5   PLATELETCT 192 175 152*   MPV 9.8 10.0 9.7     Recent Labs     05/02/24  1150 05/03/24 1858 05/04/24  0558   SODIUM 141 140 139   POTASSIUM 4.8 4.6 5.4   CHLORIDE 105 104 104   CO2 27 25 28   GLUCOSE 149* 171* 180*   BUN 26* 33* 29*   CREATININE 0.80 1.05 0.73   CALCIUM 9.6 9.2 9.4     Recent Labs     05/03/24 1858   APTT 35.0   INR 1.19*               Imaging  CT-CHEST (THORAX) W/O   Final Result      1.  Sternal manubrial fracture      2.  No other acute finding      3.  Mild atelectasis      4.  Spinal degenerative change and there has been prior T12 vertebral augmentation      Fleischner Society pulmonary nodule recommendations:   Not Applicable         CT-CSPINE WITHOUT PLUS RECONS   Final  Result      1.  Mild wedge type compression fracture of the C7 vertebral body of indeterminate age likely acute.      2.  Left-sided facet fracture at the C7 level with fracture coursing through the base of the left-sided superior articular facet.      3.  Osteopenia.      4.  Moderate osteoarthritic change at the C5-6 level      5.  These findings were discussed with JOHNNY TOLENTINO at  7:00 PM 5/3/2024      CT-HEAD W/O   Final Result      1.  Cerebral atrophy.      2.  White matter lucencies most consistent with small vessel ischemic change versus demyelination or gliosis.      3.  Otherwise, Head CT without contrast with no acute findings. No evidence of acute cerebral infarction, hemorrhage or mass lesion.              Assessment/Plan  * Fracture of cervical spine without lesion of spinal cord, initial encounter (HCC)- (present on admission)  Assessment & Plan  Mild wedge type compression fracture of the C7 vertebral body of indeterminate age likely acute. Left-sided facet fracture at the C7 level with fracture coursing through the base of the left-sided superior articular facet.    Neurosurgery Dr. Washington evaluated recommended rigid cervical collar with no neurosurgical intervention recommended to follow-up as outpatient in 2 weeks    Advanced care planning/counseling discussion  Assessment & Plan  I spent 17 minutes at bedside while in the emergency department with patient discussing work-up, results, diagnosis, prognosis.  I talked to patient about her chronic problems including heart failure, atrial fibrillation, and having multiple falls while on anticoagulation.  CODE STATUS discussed with patient and wants to be DNR/DNI      Sternal manubrial dissociation, closed fracture  Assessment & Plan  Sternal manubrial fracture   Trauma surgery consulted.  No surgical intervention at this time  Pain control with IV morphine and oral oxycodone.  Patient will therefore need close hemodynamic monitoring  PT and  OT evaluation    Falls  Assessment & Plan  PT and OT evaluation     Chronic heart failure with preserved ejection fraction (HFpEF) ([facility])- (present on admission)  Assessment & Plan  Patient with known history of HFpEF.  Continue home dose metoprolol, losartan, Bumex.  She is not currently in a heart failure exacerbation    PAF (paroxysmal atrial fibrillation) ([facility])- (present on admission)  Assessment & Plan  Continue with home dose metoprolol and apixaban    Essential hypertension, benign- (present on admission)  Assessment & Plan  Continue with home dose losartan, metoprolol, Bumex         VTE prophylaxis:     I have performed a physical exam and reviewed and updated ROS and Plan today (5/4/2024). In review of yesterday's note (5/3/2024), there are no changes except as documented above.         Greater than 51 minutes spent preparing to see patient (e.g. review of tests) obtaining and/or reviewing separately obtained history. Performing a medically appropriate examination and/ evaluation.  Counseling and educating the patient/family/caregiver.  Ordering medications, tests, or procedures.  Referring and communicating with other health care professionals.  Documenting clinical information in EPIC.  Independently interpreting results and communicating results to patient/family/caregiver.  Care coordination.

## 2024-05-05 PROCEDURE — 99232 SBSQ HOSP IP/OBS MODERATE 35: CPT | Performed by: STUDENT IN AN ORGANIZED HEALTH CARE EDUCATION/TRAINING PROGRAM

## 2024-05-05 PROCEDURE — 99232 SBSQ HOSP IP/OBS MODERATE 35: CPT | Performed by: PHYSICIAN ASSISTANT

## 2024-05-05 RX ADMIN — ACETAMINOPHEN 650 MG: 325 TABLET, FILM COATED ORAL at 15:47

## 2024-05-05 RX ADMIN — BUMETANIDE 1 MG: 0.5 TABLET ORAL at 05:44

## 2024-05-05 RX ADMIN — METOPROLOL SUCCINATE 12.5 MG: 25 TABLET, EXTENDED RELEASE ORAL at 05:47

## 2024-05-05 RX ADMIN — APIXABAN 2.5 MG: 5 TABLET, FILM COATED ORAL at 05:38

## 2024-05-05 RX ADMIN — LOSARTAN POTASSIUM 100 MG: 50 TABLET, FILM COATED ORAL at 05:41

## 2024-05-05 RX ADMIN — APIXABAN 2.5 MG: 5 TABLET, FILM COATED ORAL at 17:53

## 2024-05-05 RX ADMIN — ACETAMINOPHEN 650 MG: 325 TABLET, FILM COATED ORAL at 00:40

## 2024-05-05 RX ADMIN — LIDOCAINE 1 PATCH: 4 PATCH TOPICAL at 15:43

## 2024-05-05 ASSESSMENT — ENCOUNTER SYMPTOMS
SHORTNESS OF BREATH: 0
TINGLING: 1
NECK PAIN: 1
WEAKNESS: 0
ABDOMINAL PAIN: 0
SENSORY CHANGE: 0
FEVER: 0
CHILLS: 0
DIARRHEA: 0
DOUBLE VISION: 0
NAUSEA: 0
FALLS: 1
BLURRED VISION: 0
SPEECH CHANGE: 0
VOMITING: 0
FOCAL WEAKNESS: 0
MYALGIAS: 1
COUGH: 0

## 2024-05-05 ASSESSMENT — PAIN SCALES - PAIN ASSESSMENT IN ADVANCED DEMENTIA (PAINAD)
BODYLANGUAGE: RELAXED
BREATHING: NORMAL
FACIALEXPRESSION: FACIAL GRIMACING
NEGVOCALIZATION: OCCASIONAL MOAN/GROAN, LOW SPEECH, NEGATIVE/DISAPPROVING QUALITY
CONSOLABILITY: NO NEED TO CONSOLE
TOTALSCORE: 3

## 2024-05-05 ASSESSMENT — PAIN DESCRIPTION - PAIN TYPE
TYPE: ACUTE PAIN

## 2024-05-05 NOTE — DISCHARGE PLANNING
Called [facility], said pt needs one more night to meet three night stay. Said she can accept pt tomorrow at noon.     1305  RN CM let me know that pt has chose [facility], [facility] is aware.

## 2024-05-05 NOTE — PROGRESS NOTES
Hospital Medicine Daily Progress Note    Date of Service  5/5/2024    Chief Complaint  Krystle Tavarez is a 89 y.o. female admitted 5/3/2024 with compression fracture of C7 after fall    Hospital Course   89 y.o. female with possible history of hypertension, have Paff, atrial fibrillation on anticoagulation who presented 5/3/2024 with mechanical ground-level fall. CT head negative for any intracranial hemorrhage. CT C-spine showed a mild wedge type compression fracture of the C7 .  Trauma surgery evaluated.  Neurosurgery Dr. Washington evaluated recommended rigid cervical collar with no neurosurgical intervention recommended to follow-up as outpatient in 2 weeks        Interval Problem Update  5/5/2024  Seen and examined at bedside  Vital remained stable  Continue 3 L oxygen saturating 90%  Labs are normal white count, hemoglobin 11.4, chemistry sodium 139, glucose 180    Requiring IV narcotics for pain management, monitor for toxicity while on IV narcotics  Continue to wean of oxygen, incentive telemetry  PT/OT recommending postacute placement   assisting with discharge plan  Discussed with general surgery DONALD oneill     I have discussed this patient's plan of care and discharge plan at IDT rounds today with Case Management, Nursing, Nursing leadership, and other members of the IDT team.    Consultants/Specialty  neurosurgery and trauma surgery    Code Status  DNAR/DNI    Disposition  The patient is not medically cleared for discharge to home or a post-acute facility.  Anticipate discharge to: skilled nursing facility    I have placed the appropriate orders for post-discharge needs.    Review of Systems  Review of Systems   Gastrointestinal:  Negative for nausea and vomiting.   Musculoskeletal:  Positive for falls and joint pain.   Neurological:  Negative for sensory change, speech change, focal weakness and weakness.        Physical Exam  Temp:  [36.7 °C (98.1 °F)-37 °C (98.6 °F)] 36.7 °C (98.1 °F)  Pulse:   [78-87] 87  Resp:  [16-18] 16  BP: (108-148)/(55-94) 148/69  SpO2:  [92 %-97 %] 94 %    Physical Exam  Constitutional:       Appearance: Normal appearance.   HENT:      Head:      Comments: On c-collar  Cardiovascular:      Rate and Rhythm: Normal rate.      Pulses: Normal pulses.      Heart sounds: Normal heart sounds.   Pulmonary:      Effort: Pulmonary effort is normal. No respiratory distress.      Breath sounds: Normal breath sounds.   Abdominal:      General: Abdomen is flat.   Musculoskeletal:      Right lower leg: No edema.      Left lower leg: No edema.   Skin:     General: Skin is warm.   Neurological:      General: No focal deficit present.      Mental Status: She is alert and oriented to person, place, and time.      Comments: Motor strength 5 out of 5 all extremity, sensation intact         Fluids  No intake or output data in the 24 hours ending 05/05/24 1539      Laboratory  Recent Labs     05/03/24  1858 05/04/24  0558   WBC 9.7 7.2   RBC 3.77* 3.63*   HEMOGLOBIN 11.9* 11.8*   HEMATOCRIT 37.2 35.4*   MCV 98.7* 97.5   MCH 31.6 32.5   MCHC 32.0* 33.3   RDW 48.6 47.5   PLATELETCT 175 152*   MPV 10.0 9.7     Recent Labs     05/03/24  1858 05/04/24  0558   SODIUM 140 139   POTASSIUM 4.6 5.4   CHLORIDE 104 104   CO2 25 28   GLUCOSE 171* 180*   BUN 33* 29*   CREATININE 1.05 0.73   CALCIUM 9.2 9.4     Recent Labs     05/03/24  1858   APTT 35.0   INR 1.19*               Imaging  CT-CHEST (THORAX) W/O   Final Result      1.  Sternal manubrial fracture      2.  No other acute finding      3.  Mild atelectasis      4.  Spinal degenerative change and there has been prior T12 vertebral augmentation      Fleischner Society pulmonary nodule recommendations:   Not Applicable         CT-CSPINE WITHOUT PLUS RECONS   Final Result      1.  Mild wedge type compression fracture of the C7 vertebral body of indeterminate age likely acute.      2.  Left-sided facet fracture at the C7 level with fracture coursing through the  base of the left-sided superior articular facet.      3.  Osteopenia.      4.  Moderate osteoarthritic change at the C5-6 level      5.  These findings were discussed with JOHNNY TOLENTINO at  7:00 PM 5/3/2024      CT-HEAD W/O   Final Result      1.  Cerebral atrophy.      2.  White matter lucencies most consistent with small vessel ischemic change versus demyelination or gliosis.      3.  Otherwise, Head CT without contrast with no acute findings. No evidence of acute cerebral infarction, hemorrhage or mass lesion.              Assessment/Plan  * Fracture of cervical spine without lesion of spinal cord, initial encounter ([facility])- (present on admission)  Assessment & Plan  Mild wedge type compression fracture of the C7 vertebral body of indeterminate age likely acute. Left-sided facet fracture at the C7 level with fracture coursing through the base of the left-sided superior articular facet.    Neurosurgery Dr. Washington evaluated recommended rigid cervical collar with no neurosurgical intervention recommended to follow-up as outpatient in 2 weeks    Advanced care planning/counseling discussion  Assessment & Plan   DNR/DNI      Sternal manubrial dissociation, closed fracture  Assessment & Plan  Sternal manubrial fracture   Trauma surgery consulted.  No surgical intervention at this time  Pain control with IV morphine and oral oxycodone.  Patient will therefore need close hemodynamic monitoring  PT and OT evaluation    Falls  Assessment & Plan  PT and OT recommending placement   assisting    Chronic heart failure with preserved ejection fraction (HFpEF) ([facility])- (present on admission)  Assessment & Plan  Patient with known history of HFpEF.  Continue home dose metoprolol, losartan, Bumex.  She is not currently in a heart failure exacerbation    PAF (paroxysmal atrial fibrillation) ([facility])- (present on admission)  Assessment & Plan  Continue with home dose metoprolol and apixaban    Essential hypertension,  benign- (present on admission)  Assessment & Plan  Continue with home dose losartan, metoprolol, Bumex         VTE prophylaxis:     I have performed a physical exam and reviewed and updated ROS and Plan today (5/5/2024). In review of yesterday's note (5/4/2024), there are no changes except as documented above.

## 2024-05-05 NOTE — CARE PLAN
The patient is Stable - Low risk of patient condition declining or worsening    Shift Goals  Clinical Goals: IS; mobility; orientation  Patient Goals: comfort; rest  Family Goals: not present    Progress made toward(s) clinical / shift goals:    Problem: Knowledge Deficit - Standard  Goal: Patient and family/care givers will demonstrate understanding of plan of care, disease process/condition, diagnostic tests and medications  Outcome: Progressing     Problem: Pain - Standard  Goal: Alleviation of pain or a reduction in pain to the patient’s comfort goal  Outcome: Progressing     Problem: Fall Risk  Goal: Patient will remain free from falls  Outcome: Progressing       Patient is not progressing towards the following goals:

## 2024-05-05 NOTE — PROGRESS NOTES
Trauma / Surgical Daily Progress Note    Date of Service  5/5/2024    Chief Complaint  89 y.o. female admitted 5/3/2024 with C7 fracture and manubrium fracture after GLF.    Interval Events  No acute overnight events.  Pain control marginal.  Respiratory status stable, still on 3 L oxygen,  mL.  Therapy notes reviewed.    - Aggressive pulmonary hygiene  - Up to yolanda for meals  - Patient would be ready to discharge when respiratory status improves and pain is adequately controlled  - Trauma surgery will sign off for now, please reconsult or call as needed    Review of Systems  Review of Systems   Constitutional:  Negative for chills and fever.   Eyes:  Negative for blurred vision and double vision.   Respiratory:  Negative for cough and shortness of breath.    Gastrointestinal:  Negative for abdominal pain, diarrhea and vomiting.   Genitourinary: Negative.    Musculoskeletal:  Positive for myalgias and neck pain.   Neurological:  Positive for tingling. Negative for sensory change and focal weakness.        Vital Signs  Temp:  [36.4 °C (97.5 °F)-37 °C (98.6 °F)] 36.7 °C (98.1 °F)  Pulse:  [76-87] 87  Resp:  [16-18] 16  BP: (108-148)/(55-94) 148/69  SpO2:  [92 %-97 %] 94 %    Physical Exam  Physical Exam  Vitals and nursing note reviewed.   Constitutional:       General: She is awake. She is not in acute distress.     Appearance: Normal appearance. She is not ill-appearing.      Interventions: Nasal cannula in place.   HENT:      Head: Normocephalic.      Right Ear: External ear normal.      Left Ear: External ear normal.      Nose: Nose normal.      Mouth/Throat:      Mouth: Mucous membranes are moist.   Eyes:      General: No scleral icterus.        Right eye: No discharge.         Left eye: No discharge.   Cardiovascular:      Rate and Rhythm: Normal rate and regular rhythm.      Pulses: Normal pulses.   Pulmonary:      Effort: Pulmonary effort is normal. No respiratory distress.      Breath sounds: Normal  breath sounds.   Chest:      Chest wall: No deformity or tenderness.   Abdominal:      General: There is no distension.      Palpations: Abdomen is soft.      Tenderness: There is no abdominal tenderness.   Musculoskeletal:      Cervical back: Neck supple. Signs of trauma present.      Comments: Moves all extremities without limitation   Skin:     General: Skin is warm and dry.      Capillary Refill: Capillary refill takes less than 2 seconds.   Neurological:      Mental Status: She is alert and oriented to person, place, and time.      GCS: GCS eye subscore is 4. GCS verbal subscore is 5. GCS motor subscore is 6.      Sensory: Sensation is intact.      Motor: Motor function is intact.   Psychiatric:         Attention and Perception: Attention normal.         Mood and Affect: Mood normal.         Behavior: Behavior is cooperative.         Laboratory  No results found for this or any previous visit (from the past 24 hour(s)).      Fluids  No intake or output data in the 24 hours ending 05/05/24 1218      Core Measures & Quality Metrics  Labs reviewed, Radiology images reviewed and Medications reviewed  Carranza catheter: No Carranza      DVT: Apixiban.  DVT prophylaxis - mechanical: SCDs      Assessed for rehab: Patient was assess for and/or received rehabilitation services during this hospitalization    RAP Score Total: 10    CAGE Results: negative Blood Alcohol>0.08: not completed       Assessment/Plan  Cervical compression fracture, initial encounter (HCC)- (present on admission)  Assessment & Plan  Mild wedge type compression fracture of the C7 vertebral body; left-sided C7 facet fracture.  Non-operative management.   Cervical immobilization.  Aashish Washington MD, PhD. Neurosurgeon. [facility].    Closed fracture of manubrium- (present on admission)  Assessment & Plan  Sternal manubrial fracture.  Hemodynamically stable.   EKG SR.  Analgesia, aggressive pulmonary hygiene.    Frequent falls- (present on  admission)  Assessment & Plan  Seen in ED multiple times for falls.   PT/OT.    Chronic anticoagulation- (present on admission)  Assessment & Plan  Daily Eliquis.   5/4 Resumed by primary team.     Trauma- (present on admission)  Assessment & Plan  Ground level fall.  Non Trauma Activation.  Liborio Summers MD. Trauma Surgery.    PAF (paroxysmal atrial fibrillation) (HCC)- (present on admission)  Assessment & Plan  Chronic condition treated with metoprolol.  Systemic anticoagulation held on admission.    Type 2 diabetes mellitus without complication, without long-term current use of insulin (HCC)- (present on admission)  Assessment & Plan  Hgb A1c (4/12) 6.8.   SSI.    Essential hypertension, benign- (present on admission)  Assessment & Plan  Chronic condition treated with losartan, bumex and metoprolol.      Discussed patient condition with Family, RN, Patient, and trauma surgery.  Dr. Summers.

## 2024-05-05 NOTE — CARE PLAN
The patient is Stable - Low risk of patient condition declining or worsening    Shift Goals  Clinical Goals: safety; pain control; mobility  Patient Goals: comfort with movement  Family Goals: dc plan    Progress made toward(s) clinical / shift goals:    Problem: Knowledge Deficit - Standard  Goal: Patient and family/care givers will demonstrate understanding of plan of care, disease process/condition, diagnostic tests and medications  Outcome: Progressing     Problem: Pain - Standard  Goal: Alleviation of pain or a reduction in pain to the patient’s comfort goal  Outcome: Progressing     Problem: Fall Risk  Goal: Patient will remain free from falls  Outcome: Progressing       Patient is not progressing towards the following goals:

## 2024-05-05 NOTE — CARE PLAN
The patient is Stable - Low risk of patient condition declining or worsening    Shift Goals  Clinical Goals: safety, pain control, mobility  Patient Goals: comfort with movement  Family Goals: comfort    Progress made toward(s) clinical / shift goals:    Problem: Knowledge Deficit - Standard  Goal: Patient and family/care givers will demonstrate understanding of plan of care, disease process/condition, diagnostic tests and medications  Outcome: Progressing     Problem: Pain - Standard  Goal: Alleviation of pain or a reduction in pain to the patient’s comfort goal  Outcome: Progressing     Problem: Fall Risk  Goal: Patient will remain free from falls  Outcome: Progressing       Patient is not progressing towards the following goals:

## 2024-05-06 ENCOUNTER — HOSPITAL ENCOUNTER (INPATIENT)
Facility: REHABILITATION | Age: 89
End: 2024-05-06
Attending: PHYSICAL MEDICINE & REHABILITATION | Admitting: PHYSICAL MEDICINE & REHABILITATION
Payer: MEDICARE

## 2024-05-06 ENCOUNTER — PATIENT OUTREACH (OUTPATIENT)
Dept: HEALTH INFORMATION MANAGEMENT | Facility: OTHER | Age: 89
End: 2024-05-06
Payer: MEDICARE

## 2024-05-06 DIAGNOSIS — I10 ESSENTIAL HYPERTENSION, BENIGN: ICD-10-CM

## 2024-05-06 PROCEDURE — 99239 HOSP IP/OBS DSCHRG MGMT >30: CPT | Performed by: STUDENT IN AN ORGANIZED HEALTH CARE EDUCATION/TRAINING PROGRAM

## 2024-05-06 PROCEDURE — 99223 1ST HOSP IP/OBS HIGH 75: CPT | Performed by: PHYSICAL MEDICINE & REHABILITATION

## 2024-05-06 RX ADMIN — METOPROLOL SUCCINATE 12.5 MG: 25 TABLET, EXTENDED RELEASE ORAL at 04:56

## 2024-05-06 RX ADMIN — OXYCODONE 5 MG: 5 TABLET ORAL at 23:58

## 2024-05-06 RX ADMIN — LIDOCAINE 1 PATCH: 4 PATCH TOPICAL at 15:32

## 2024-05-06 RX ADMIN — LOSARTAN POTASSIUM 100 MG: 50 TABLET, FILM COATED ORAL at 04:51

## 2024-05-06 RX ADMIN — APIXABAN 2.5 MG: 5 TABLET, FILM COATED ORAL at 04:54

## 2024-05-06 RX ADMIN — APIXABAN 2.5 MG: 5 TABLET, FILM COATED ORAL at 17:57

## 2024-05-06 RX ADMIN — BUMETANIDE 1 MG: 0.5 TABLET ORAL at 04:52

## 2024-05-06 ASSESSMENT — COGNITIVE AND FUNCTIONAL STATUS - GENERAL
CLIMB 3 TO 5 STEPS WITH RAILING: TOTAL
HELP NEEDED FOR BATHING: A LOT
DRESSING REGULAR UPPER BODY CLOTHING: A LOT
SUGGESTED CMS G CODE MODIFIER DAILY ACTIVITY: CL
STANDING UP FROM CHAIR USING ARMS: A LITTLE
MOBILITY SCORE: 14
WALKING IN HOSPITAL ROOM: A LOT
SUGGESTED CMS G CODE MODIFIER MOBILITY: CL
TOILETING: A LOT
MOVING FROM LYING ON BACK TO SITTING ON SIDE OF FLAT BED: A LITTLE
DRESSING REGULAR LOWER BODY CLOTHING: TOTAL
PERSONAL GROOMING: A LITTLE
MOVING TO AND FROM BED TO CHAIR: A LOT
DAILY ACTIVITIY SCORE: 13
EATING MEALS: A LITTLE
TURNING FROM BACK TO SIDE WHILE IN FLAT BAD: A LITTLE

## 2024-05-06 ASSESSMENT — GAIT ASSESSMENTS: GAIT LEVEL OF ASSIST: UNABLE TO PARTICIPATE

## 2024-05-06 ASSESSMENT — PAIN DESCRIPTION - PAIN TYPE: TYPE: ACUTE PAIN

## 2024-05-06 NOTE — DISCHARGE PLANNING
[facility] Transitional Care Coordination    Referral from: Dr Jade  Insurance Provider on Facesheet: Medicare/  Potential Rehab Diagnosis: Ortho    Chart review indicates patient may have on going medical management and may have therapy needs to possibly meet inpatient rehab facility criteria with the goal of returning to community.    D/C support: Daughter, KEIKO - per notes daughter worried she cannot care for pt anymore at home.     Physiatry consultation forwarded per protocol.     Physiatry to consult, TCC will follow.     Thank you for the referral.

## 2024-05-06 NOTE — DISCHARGE SUMMARY
"Discharge Summary    CHIEF COMPLAINT ON ADMISSION  Chief Complaint   Patient presents with    GLF     Pt was ambulating at home today when she lost her balance and had a mechanical GLF. Pt fell backwards and hit the back of her head. Pt takes eliquis. Pt placed in a c-collar by EMS prior to arrival.        Reason for Admission  TBI     Admission Date  5/3/2024    CODE STATUS  DNAR/DNI    HPI & HOSPITAL COURSE    89 y.o. female with possible history of hypertension, have Paff, atrial fibrillation on anticoagulation who presented 5/3/2024 with mechanical ground-level fall. CT head negative for any intracranial hemorrhage. CT C-spine showed a mild wedge type compression fracture of the C7 .  Trauma surgery evaluated.  Neurosurgery Dr. Washington evaluated recommended rigid cervical collar with no neurosurgical intervention recommended to follow-up as outpatient in 2 weeks.  PT/OT recommending postacute placement.  Patient accepted to SNF      Seen and examined the bedside the day of discharge.  Her vitals been stable.  Denies any discomfort.  Pain well-controlled.  Labs unremarkable    Patient will be discharged with close follow up with PCP, neurosurgery.Discharge plan was discussed with patient in details .  Patient agreed with discharge plan  and  all questions answered.         Therefore, she is discharged in good and stable condition to skilled nursing facility.    The patient met 2-midnight criteria for an inpatient stay at the time of discharge.    Discharge Date  5/6/2024      DISCHARGE DIAGNOSES  Principal Problem:    Fracture of cervical spine without lesion of spinal cord, initial encounter (HCC) (POA: Yes)  Active Problems:    Cardiac pacemaker in situ (Chronic) (POA: Yes)      Overview: \"[employer] Pacemaker\"    Essential hypertension, benign (Chronic) (POA: Yes)    Type 2 diabetes mellitus without complication, without long-term current use of insulin (HCC) (Chronic) (POA: Yes)    PAF (paroxysmal atrial " fibrillation) (HCC) (Chronic) (POA: Yes)    Chronic heart failure with preserved ejection fraction (HFpEF) (HCC) (Chronic) (POA: Yes)    Chronic anticoagulation (POA: Yes)    Trauma (POA: Yes)    Closed fracture of manubrium (POA: Yes)    Cervical compression fracture, initial encounter (HCC) (POA: Yes)    Frequent falls (POA: Yes)    Falls (POA: Unknown)    Sternal manubrial dissociation, closed fracture (POA: Unknown)    Advanced care planning/counseling discussion (POA: Unknown)  Resolved Problems:    * No resolved hospital problems. *      FOLLOW UP  Future Appointments   Date Time Provider Department Center   8/28/2024  1:00 PM PACER CHECK-[facility] None   8/28/2024  1:20 PM Scott Garcia M.D. [facility] None     [facility] [address] [address]  [phone]        Aashish Washington M.D.  [address] [facility] [address]  [phone]    Follow up in 1 week(s)        MEDICATIONS ON DISCHARGE     Medication List        CHANGE how you take these medications        Instructions   metoprolol SR 25 MG Tb24  What changed: additional instructions  Commonly known as: Toprol XL   TAKE 1/2 TABLET BY MOUTH EVERY DAY  Dose: 12.5 mg            CONTINUE taking these medications        Instructions   bumetanide 0.5 MG Tabs  Commonly known as: Bumex   Take 1 mg by mouth 1 time a day as needed (edema). 2 tablets= 1mg  Dose: 1 mg     docusate sodium 100 MG Caps  Commonly known as: Colace   Take 100 mg by mouth 1 time a day as needed for Constipation.  Dose: 100 mg     Eliquis 2.5mg Tabs  Generic drug: apixaban   Take 2.5 mg by mouth 2 times a day.  Dose: 2.5 mg     losartan 100 MG Tabs  Commonly known as: Cozaar   Take 100 mg by mouth every day.  Dose: 100 mg     Tylenol 8 Hour 650 MG CR tablet  Generic drug: acetaminophen   Take 1,300 mg by mouth 2 times a day. 2 tablets= 1300mg  Dose: 1,300 mg     VITAMIN B-12 PO   Take 1 Tablet by mouth every day.  Dose: 1 Tablet               Allergies  Allergies   Allergen Reactions    Codeine Unspecified     Patient reported hallucination and dizziness. Specific to tylenol with codeine      Gabapentin Unspecified     Altered mental status    Sulfa Drugs Nausea     Nausea   Other reaction(s): Not available    Tramadol Unspecified     Altered mentation       DIET  Orders Placed This Encounter   Procedures    Diet Order Diet: Cardiac     Standing Status:   Standing     Number of Occurrences:   1     Order Specific Question:   Diet:     Answer:   Cardiac [6]       ACTIVITY  As tolerated.  Weight bearing as tolerated    CONSULTATIONS  NEUROSURGERY     PROCEDURES  CT-CHEST (THORAX) W/O   Final Result      1.  Sternal manubrial fracture      2.  No other acute finding      3.  Mild atelectasis      4.  Spinal degenerative change and there has been prior T12 vertebral augmentation      Fleischner Society pulmonary nodule recommendations:   Not Applicable         CT-CSPINE WITHOUT PLUS RECONS   Final Result      1.  Mild wedge type compression fracture of the C7 vertebral body of indeterminate age likely acute.      2.  Left-sided facet fracture at the C7 level with fracture coursing through the base of the left-sided superior articular facet.      3.  Osteopenia.      4.  Moderate osteoarthritic change at the C5-6 level      5.  These findings were discussed with JOHNNY TOLENTINO at  7:00 PM 5/3/2024      CT-HEAD W/O   Final Result      1.  Cerebral atrophy.      2.  White matter lucencies most consistent with small vessel ischemic change versus demyelination or gliosis.      3.  Otherwise, Head CT without contrast with no acute findings. No evidence of acute cerebral infarction, hemorrhage or mass lesion.               LABORATORY  Lab Results   Component Value Date    SODIUM 139 05/04/2024    POTASSIUM 5.4 05/04/2024    CHLORIDE 104 05/04/2024    CO2 28 05/04/2024    GLUCOSE 180 (H) 05/04/2024    BUN 29 (H) 05/04/2024    CREATININE 0.73 05/04/2024         Lab Results   Component Value Date    WBC 7.2 05/04/2024    HEMOGLOBIN 11.8 (L) 05/04/2024    HEMATOCRIT 35.4 (L) 05/04/2024    PLATELETCT 152 (L) 05/04/2024        Total time of the discharge process exceeds 36 minutes.

## 2024-05-06 NOTE — THERAPY
Physical Therapy   Daily Treatment     Patient Name: Krystle Tavarez  Age:  89 y.o., Sex:  female  Medical Record #: [id number]  Today's Date: 5/6/2024     Precautions  Precautions: (P) Cervical Collar  ;Spinal / Back Precautions   Comments: (P) cervical collar at all times    Assessment    Pt presents with somnolence, poor activity tolerance, and weakness impairing her mobility and safety.  She required min/modA to complete all mobility tasks and required frequent rest breaks.  She reports pain in the sternum from the collar pushing against it, noted to have kyphotic posture.  Pt educated on posture to decrease pain.  PT will continue to follow while in house.  Recommend post acute PT services.    Plan    Treatment Plan Status: (P) Continue Current Treatment Plan  Type of Treatment: Bed Mobility, Gait Training, Neuro Re-Education / Balance, Therapeutic Activities, Therapeutic Exercise, Stair Training  Treatment Frequency: 4 Times per Week  Treatment Duration: Until Therapy Goals Met    DC Equipment Recommendations: (P) Front-Wheel Walker  Discharge Recommendations: (P) Recommend post-acute placement for additional physical therapy services prior to discharge home         Objective       05/06/24 0916   Precautions   Precautions Cervical Collar  ;Spinal / Back Precautions    Comments cervical collar at all times   Vitals   O2 (LPM) 4   O2 Delivery Device Oxymask   Pain 0 - 10 Group   Therapist Pain Assessment During Activity;5  (sternum, relived with improved posture)   Cognition    Level of Consciousness Responds to voice  (lethargic)   Comments somnolent but easily arousable for short periods of time   Neuro-Muscular Treatments   Comments rows, seated posture with VCs, TCs for sustained upright posture without posterior lean- modA to maintain   Balance   Sitting Balance (Static) Poor +   Sitting Balance (Dynamic) Poor -   Standing Balance (Static) Poor +   Standing Balance (Dynamic) Poor +   Weight Shift Sitting  Poor   Weight Shift Standing Poor   Skilled Intervention Postural Facilitation;Tactile Cuing;Verbal Cuing   Comments with FWW in standing   Bed Mobility    Supine to Sit Minimal Assist   Sit to Supine Minimal Assist   Scooting Moderate Assist   Rolling Minimal Assist to Rt.   Skilled Intervention Facilitation;Sequencing;Verbal Cuing;Tactile Cuing   Gait Analysis   Gait Level Of Assist Unable to Participate   Functional Mobility   Sit to Stand Minimal Assist   Bed, Chair, Wheelchair Transfer Moderate Assist   Transfer Method Stand Step   Mobility with FWW   Skilled Intervention Facilitation;Postural Facilitation;Sequencing;Tactile Cuing;Verbal Cuing   Comments pt fatigued 1/2 way through the step pivot transfer with VCs not to sit down and maxA from PT to complete turn and get safely in to the chair   Activity Tolerance   Sitting in Chair post session   Short Term Goals    Short Term Goal # 1 Pt will perform bed mobility with flat bed, use of rail with supervision in 6 visits.   Goal Outcome # 1 Progressing slower than expected   Short Term Goal # 2 Pt will transfer with cg assist in 6 vistis to improve functional indep.   Goal Outcome # 2 Progressing slower than expected   Short Term Goal # 3 Pt will ambulate x 100 feet using FWW with cg assist in 6 visits to improve functional indep.   Goal Outcome # 3 Progressing slower than expected   Short Term Goal # 4 Pt will negotiate 2 stairs with cg assist in 6 visits to access home.   Goal Outcome # 4 Goal not met   Education Group   Additional Comments Improtance of posture to decrease pain in the sternum from the cervical collar- information acknowledged, needs reinforcement   Physical Therapy Treatment Plan   Physical Therapy Treatment Plan Continue Current Treatment Plan   Anticipated Discharge Equipment and Recommendations   DC Equipment Recommendations Front-Wheel Walker   Discharge Recommendations Recommend post-acute placement for additional physical therapy  services prior to discharge home   Interdisciplinary Plan of Care Collaboration   IDT Collaboration with  Nursing   Patient Position at End of Therapy Seated;Chair Alarm On;Call Light within Reach;Tray Table within Reach   Collaboration Comments RN updated   Session Information   Date / Session Number  5/6 -2 (2/4, 5/10)

## 2024-05-06 NOTE — THERAPY
Occupational Therapy  Daily Treatment     Patient Name: Krystle Tavarez  Age:  89 y.o., Sex:  female  Medical Record #: [id number]  Today's Date: 5/6/2024     Precautions: (P) Fall Risk, Spinal / Back Precautions , Cervical Collar    Comments: (P) AAT    Assessment    Pt pleasant/agreeable. She is primarily limited by impaired posture, weakness, poor endurance, delayed responses. She required max A with STS from chair 2/2 fatigue, and min A with seated grooming tasks. Provided training to pt and daughter on seated exercises to maintaining BUE strength/ROM. Will continue to follow for skilled OT.    Plan    Treatment Plan Status: (P) Continue Current Treatment Plan  Type of Treatment: Self Care / Activities of Daily Living, Therapeutic Activity, Therapeutic Exercises, Adaptive Equipment  Treatment Frequency: 4 Times per Week  Treatment Duration: Until Therapy Goals Met    DC Equipment Recommendations: (P) Unable to determine at this time  Discharge Recommendations: (P) Recommend post-acute placement for additional occupational therapy services prior to discharge home     05/06/24 1103   Precautions   Precautions Fall Risk;Spinal / Back Precautions ;Cervical Collar     Comments AAT   Pain 0 - 10 Group   Therapist Pain Assessment   (pain in sternum)   Cognition    Cognition / Consciousness X   Level of Consciousness Alert   Safety Awareness Impaired   New Learning Impaired   Attention Impaired   Comments Pt agreeable/pleasant, delayed responses   Sitting Upper Body Exercises   Sitting Upper Body Exercises Yes   Comments training to pt and daughter on functional ROM exercises, following spine precautions 3x/day   Balance   Sitting Balance (Static) Poor +   Sitting Balance (Dynamic) Poor -   Standing Balance (Static) Poor +   Skilled Intervention Verbal Cuing   Bed Mobility    Comments up in chair   Activities of Daily Living   Grooming Minimal Assist  (with denture care, seated, daughter provided supplies)   Upper Body  Dressing Moderate Assist  (brace adjustment)   Lower Body Dressing Total Assist   Skilled Intervention Verbal Cuing;Tactile Cuing   How much help from another person does the patient currently need...   6 Clicks Daily Activity Score 13   Functional Mobility   Sit to Stand Maximal Assist   Skilled Intervention Verbal Cuing;Tactile Cuing;Facilitation   Comments 2/2 fatigue   Patient / Family Goals   Patient / Family Goal #1 to get better   Short Term Goals   Short Term Goal # 1 Pt will complete functional transfers supervised   Goal Outcome # 1 Progressing slower than expected   Short Term Goal # 2 Pt will complete LB dressing with A/E as needed SBA   Goal Outcome # 2 Progressing slower than expected   Short Term Goal # 3 Pt will complete standing grooming tasks SBA   Goal Outcome # 3 Progressing slower than expected   Short Term Goal # 4 Pt will complete toileting SBA   Goal Outcome # 4 Goal not met   Education Group   Role of Occupational Therapist Patient Response Patient;Family;Acceptance;Explanation;Demonstration;Action Demonstration;Verbal Demonstration   Occupational Therapy Treatment Plan    O.T. Treatment Plan Continue Current Treatment Plan   Anticipated Discharge Equipment and Recommendations   DC Equipment Recommendations Unable to determine at this time   Discharge Recommendations Recommend post-acute placement for additional occupational therapy services prior to discharge home   Interdisciplinary Plan of Care Collaboration   IDT Collaboration with  Nursing   Patient Position at End of Therapy Seated;Chair Alarm On;Call Light within Reach;Tray Table within Reach;Phone within Reach   Collaboration Comments RN aware   Session Information   Date / Session Number  5/6 2 (2/4, 5/10)

## 2024-05-06 NOTE — CARE PLAN
The patient is Stable - Low risk of patient condition declining or worsening    Shift Goals  Clinical Goals: Wean oxygen; mobility; BM  Patient Goals: comfort; dc plan  Family Goals: dc plan    Progress made toward(s) clinical / shift goals:    Problem: Knowledge Deficit - Standard  Goal: Patient and family/care givers will demonstrate understanding of plan of care, disease process/condition, diagnostic tests and medications  Outcome: Progressing     Problem: Pain - Standard  Goal: Alleviation of pain or a reduction in pain to the patient’s comfort goal  Outcome: Progressing     Problem: Fall Risk  Goal: Patient will remain free from falls  Outcome: Progressing       Patient is not progressing towards the following goals:

## 2024-05-06 NOTE — DISCHARGE PLANNING
Received Appeal request from [facility]. Chart notes have been faxed to [facility] as per request. Faxed to [phone] as per request.

## 2024-05-06 NOTE — DISCHARGE PLANNING
Patient deferring SNF choice to children.   RN CM attempted to call patient's family as their first choice[facility] not have bed available until tomorrow.   Pending call back from children.   1326: Writer spoke to patient's daughter, Jackelyn, at bedside and educated on accepting facilities/IMM process. Patient and family wishing to appeal discharge. IDT notified.   DC order and summary placed by provider.   RN CM educated patient's daughter on appeal and stated to call [facility] as soon as possible.     Case Management Discharge Planning    Admission Date: 5/3/2024  GMLOS: 2.9  ALOS: 3    6-Clicks ADL Score: 14  6-Clicks Mobility Score: 14  PT and/or OT Eval ordered: Yes  Post-acute Referrals Ordered: Yes  Post-acute Choice Obtained: Yes  Has referral(s) been sent to post-acute provider:  Yes      Anticipated Discharge Dispo: Discharge Disposition: D/T to SNF with Medicare cert in anticipation of skilled care (03)    DME Needed: No    Action(s) Taken: Updated Provider/Nurse on Discharge Plan, Acceptance Received, and Family Conference    Escalations Completed: Pending Discharge Destination    Medically Clear: Yes    Next Steps: Pending communication with patient's children regarding IMM/DC choice, deferred by patient.     Barriers to Discharge: Pending Placement    Is the patient up for discharge tomorrow: Yes    Is transport arranged for discharge disposition: Yes

## 2024-05-06 NOTE — CONSULTS
Physical Medicine and Rehabilitation Consultation              Date of initial consultation: 5/6/2024  Consulting provider: Simon Jade M.D.   Reason for consultation: assess for acute inpatient rehab appropriateness  LOS: 3 Day(s)    Chief complaint: C7 fracture, sternal fracture    HPI: The patient is a 89 y.o. right hand dominant female with a past medical history of hypertension, hyperlipidemia, pacemaker, atrial fibrillation on Eliquis;  who presented on 5/3/2024  6:23 PM after ground-level fall with positive head strike.  Patient reports she lost her balance in the bathroom while pulling up her pants.  CT head negative for acute intracranial abnormality, CT C-spine found mild wedge type compression fracture at C7 with left-sided facet fracture at C7, CT chest found sternal manubrial fracture.  Patient was seen by neurosurgery who recommended conservative management with c-collar.  Patient's daughter is concerned that she cannot care for the patient at home anymore.    The patient currently reports overall doing okay.  She endorses chest pain, shortness of breath.  Currently on 4 L nasal cannula oxygen.  [facility] in the past, for this admission her choice is [facility] skilled [facility].  Apparently they are unable to accommodate her until Wednesday due to bed availability issues.  [facility] #[facility] choices [facility].  I had a discussion with the patient and patient's daughter about placement following rehab and their plan is to enter assisted living.  Patient's daughter reports that her brother is coming into town on Thursday and they will not have a final decision until at least Friday.    ROS  Pertinent positives are mentioned in the HPI, all others reviewed and are negative.    Social Hx:  2 SH  2 FAHEEM  With: Daughter and son-in-law    THERAPY:  Restrictions: Fall risk, cervical collar at all times, spinal/back precautions  PT: Functional mobility   5/4: Min  assist sit to stand  5/6: Unable to participate in gait, min assist sit to stand    OT: ADLs  5/4: Max assist lower body dressing, total assist toileting    SLP:   None    IMAGING:  CT chest 5/3/2024  1.  Sternal manubrial fracture     2.  No other acute finding     3.  Mild atelectasis     4.  Spinal degenerative change and there has been prior T12 vertebral augmentation    CT C-spine 5/3/2024  1.  Mild wedge type compression fracture of the C7 vertebral body of indeterminate age likely acute.     2.  Left-sided facet fracture at the C7 level with fracture coursing through the base of the left-sided superior articular facet.     3.  Osteopenia.     4.  Moderate osteoarthritic change at the C5-6 level    PROCEDURES:  None    PMH:  Past Medical History:   Diagnosis Date    Arthritis     knees, hips    Breath shortness     with exertion     Cataract     bilat IOL     Dental disorder     full upper, partial lower     Diabetes (HCC)     pre diabetic    Heart burn     Hemorrhagic disorder (HCC)     on Eliquis    High cholesterol     diet controlled     Hyperlipidemia     Hypertension     Pacemaker 2015    Pain 2020    lower back, right leg    Pre-diabetes     TIA (transient ischemic attack)     on Eliquis    Urinary incontinence        PSH:  Past Surgical History:   Procedure Laterality Date    PB LAMINOTOMY,LUMBAR DISK,1 INTRSP N/A 2/25/2020    Procedure: LAMINECTOMY, SPINE, LUMBAR, WITH DISCECTOMY- L5-S1, MEDIAL FACETECTOMY;  Surgeon: Latrell Kimble M.D.;  Location: SURGERY [facility];  Service: Neurosurgery    FORAMINOTOMY N/A 2/25/2020    Procedure: FORAMINOTOMY, SPINE;  Surgeon: Latrell Kimble M.D.;  Location: SURGERY [facility];  Service: Neurosurgery    PACEMAKER INSERTION  2015    HIP REPLACEMENT, TOTAL Right 2009    KNEE REPLACEMENT, TOTAL Right 2004    CATARACT EXTRACTION WITH IOL Bilateral 2003    CHOLECYSTECTOMY  2002    BASAL CELL EXCISION      nose and hand    BUNIONECTOMY Left        FHX:  Family  "History   Problem Relation Age of Onset    Diabetes Mother     Stroke Mother     Heart Attack Father 74    No Known Problems Maternal Grandmother     No Known Problems Maternal Grandfather     No Known Problems Paternal Grandmother     No Known Problems Paternal Grandfather        Medications:  Current Facility-Administered Medications   Medication Dose    morphine 4 MG/ML injection 1 mg  1 mg    lidocaine (Asperflex) 4 % patch 1 Patch  1 Patch    bumetanide (Bumex) tablet 1 mg  1 mg    apixaban (Eliquis) tablet 2.5 mg  2.5 mg    losartan (Cozaar) tablet 100 mg  100 mg    metoprolol SR (Toprol XL) tablet 12.5 mg  12.5 mg    acetaminophen (Tylenol) tablet 650 mg  650 mg    oxyCODONE immediate-release (Roxicodone) tablet 5 mg  5 mg       Allergies:  Allergies   Allergen Reactions    Codeine Unspecified     Patient reported hallucination and dizziness. Specific to tylenol with codeine      Gabapentin Unspecified     Altered mental status    Sulfa Drugs Nausea     Nausea   Other reaction(s): Not available    Tramadol Unspecified     Altered mentation         Physical Exam:  Vitals: /50   Pulse 82   Temp 37.1 °C (98.8 °F) (Temporal)   Resp 16   Ht 1.6 m (5' 2.99\")   Wt 66.2 kg (146 lb)   SpO2 98%   Gen: NAD  Head: NC/AT  Eyes/ Nose/ Mouth: PERRLA, moist mucous membranes  Cardio: RRR, good distal perfusion, warm extremities  Pulm: normal respiratory effort, no cyanosis   Abd: Soft NTND, negative borborygmi   Ext: No peripheral edema. No calf tenderness. No clubbing.    Mental status:  A&Ox4 (person, place, date, situation) answers questions appropriately follows commands    Motor:      Upper Extremity  Myotome R L   Shoulder flexion C5 4/5 4/5   Elbow flexion C5 4/5 4/5   Wrist extension C6 4/5 4/5   Elbow extension C7 4/5 4/5   Finger flexion C8 4/5 4/5   Finger abduction T1 4/5 4/5     Lower Extremity Myotome R L   Hip flexion L2 3/5 3/5   Knee extension L3 4/5 4/5   Ankle dorsiflexion L4 4/5 4/5   Toe " extension L5 4/5 4/5   Ankle plantarflexion S1 4/5 4/5     Sensory:   intact to light touch through out    Labs: Reviewed and significant for   Recent Labs     05/03/24 1858 05/04/24  0558   RBC 3.77* 3.63*   HEMOGLOBIN 11.9* 11.8*   HEMATOCRIT 37.2 35.4*   PLATELETCT 175 152*   PROTHROMBTM 15.2*  --    APTT 35.0  --    INR 1.19*  --      Recent Labs     05/03/24 1858 05/04/24  0558   SODIUM 140 139   POTASSIUM 4.6 5.4   CHLORIDE 104 104   CO2 25 28   GLUCOSE 171* 180*   BUN 33* 29*   CREATININE 1.05 0.73   CALCIUM 9.2 9.4     No results found for this or any previous visit (from the past 24 hour(s)).      ASSESSMENT:  Patient is a 89 y.o. female admitted with C7 fracture and sternal fracture after ground-level fall     Rehabilitation: Impaired ADLs and mobility  Barriers to transfer include: Insurance authorization, TCCs to verify disposition, medical clearance and bed availability. All cases are subject to administrative review.     [facility] Code / Diagnosis to Support: 0008.9 - Orthopaedic Disorders: Other Orthopaedic    Disposition recommendations:  -Patient's choice is [facility].  Her second choice is [facility].  For patient to be considered for admission at [facility] we need to know that she has been accepted at an assisted living facility.  Patient's son will be coming into town on Thursday to help her daughter pick and residential, therefore she would not be a candidate for IPR until at least Friday.  Functionally, patient needs to be able to demonstrate that she can stand up and walk a few steps to qualify for IPR.  -Anticipate placement at [facility] or other SNF prior to when [facility] would be able to take her, therefore signing off.  Please reach out or reconsult if further evaluation or planning is needed    Medical Complexity:    Ground-level fall  -Secondary to loss of balance in the bathroom  -No head injury  -WBAT  -Continue PT OT while in-house  -Patient will need SNF for IPR  placement followed by admission to assisted living facility    C7 fracture  -CT evidence of wedge type compression fracture at C7, left-sided facet fracture at C7 with fracture coursing through the base of the left superior articular facet  -Nonoperative management  -C-collar at all times  -Pain control with low-dose oxycodone    Acute hypoxemic respiratory failure  -Currently requiring 4 L nasal cannula oxygen  -Patient is not on oxygen at home  -Pulling 500-750 on her incentive spirometer  -Encourage IS use    Hypertension  -BP consistently above 140 systolic  -Losartan 100 mg daily    Atrial fibrillation  -Eliquis 2.5 mg twice daily  -Metoprolol 12.5 mg daily    DVT PPX: Eliquis      Thank you for allowing us to participate in the care of this patient.     Patient was seen for >80 minutes on unit/floor of which > 50% of time was spent on counseling and coordination of care regarding the above, including prognosis, risk reduction, benefits of treatment, and options for next stage of care.    Gorge Roach, DO   Physical Medicine and Rehabilitation     Please note that this dictation was created using voice recognition software. I have made every reasonable attempt to correct obvious errors, but there may be errors of grammar and possibly content that I did not discover before finalizing the note.

## 2024-05-06 NOTE — PROGRESS NOTES
Patient currently admitted to the hospital.  Will defer CCM phone call to next month.  Will reach out to patient after discharge from hospital or skilled nursing facility.

## 2024-05-06 NOTE — DISCHARGE PLANNING
Patient choice [facility], plan shelter after. Son coming Thursday to help patient pick out shelter and start process. TCC will no longer follow, please reach out if dc disposition changes [phone].

## 2024-05-07 VITALS
BODY MASS INDEX: 25.87 KG/M2 | DIASTOLIC BLOOD PRESSURE: 58 MMHG | RESPIRATION RATE: 15 BRPM | WEIGHT: 146 LBS | OXYGEN SATURATION: 94 % | HEART RATE: 72 BPM | SYSTOLIC BLOOD PRESSURE: 128 MMHG | TEMPERATURE: 97.5 F | HEIGHT: 63 IN

## 2024-05-07 PROBLEM — T14.90XA TRAUMA: Status: RESOLVED | Noted: 2024-05-03 | Resolved: 2024-05-07

## 2024-05-07 PROCEDURE — 99239 HOSP IP/OBS DSCHRG MGMT >30: CPT | Performed by: INTERNAL MEDICINE

## 2024-05-07 RX ORDER — POLYETHYLENE GLYCOL 3350 17 G/17G
1 POWDER, FOR SOLUTION ORAL
Status: DISCONTINUED | OUTPATIENT
Start: 2024-05-07 | End: 2024-05-07 | Stop reason: HOSPADM

## 2024-05-07 RX ORDER — AMOXICILLIN 250 MG
2 CAPSULE ORAL NIGHTLY PRN
Status: DISCONTINUED | OUTPATIENT
Start: 2024-05-07 | End: 2024-05-07 | Stop reason: HOSPADM

## 2024-05-07 RX ORDER — OXYCODONE HYDROCHLORIDE 5 MG/1
5 TABLET ORAL EVERY 6 HOURS PRN
Qty: 20 TABLET | Refills: 0 | Status: SHIPPED
Start: 2024-05-07 | End: 2024-05-07

## 2024-05-07 RX ORDER — AMOXICILLIN 250 MG
2 CAPSULE ORAL DAILY
Qty: 30 TABLET | Refills: 0 | Status: SHIPPED
Start: 2024-05-07

## 2024-05-07 RX ORDER — POLYETHYLENE GLYCOL 3350 17 G/17G
17 POWDER, FOR SOLUTION ORAL
Status: SHIPPED
Start: 2024-05-07

## 2024-05-07 RX ORDER — OXYCODONE HYDROCHLORIDE 5 MG/1
5 TABLET ORAL EVERY 6 HOURS PRN
Qty: 20 TABLET | Refills: 0 | Status: SHIPPED | OUTPATIENT
Start: 2024-05-07 | End: 2024-05-12

## 2024-05-07 RX ORDER — POLYETHYLENE GLYCOL 3350 17 G/17G
1 POWDER, FOR SOLUTION ORAL DAILY
Status: DISCONTINUED | OUTPATIENT
Start: 2024-05-07 | End: 2024-05-07 | Stop reason: HOSPADM

## 2024-05-07 RX ADMIN — BUMETANIDE 1 MG: 0.5 TABLET ORAL at 06:01

## 2024-05-07 RX ADMIN — METOPROLOL SUCCINATE 12.5 MG: 25 TABLET, EXTENDED RELEASE ORAL at 06:01

## 2024-05-07 RX ADMIN — OXYCODONE 5 MG: 5 TABLET ORAL at 11:27

## 2024-05-07 RX ADMIN — LOSARTAN POTASSIUM 100 MG: 50 TABLET, FILM COATED ORAL at 06:01

## 2024-05-07 RX ADMIN — POLYETHYLENE GLYCOL 3350 1 PACKET: 17 POWDER, FOR SOLUTION ORAL at 09:18

## 2024-05-07 RX ADMIN — APIXABAN 2.5 MG: 5 TABLET, FILM COATED ORAL at 06:01

## 2024-05-07 ASSESSMENT — PAIN DESCRIPTION - PAIN TYPE
TYPE: ACUTE PAIN
TYPE: ACUTE PAIN

## 2024-05-07 NOTE — PROGRESS NOTES
Virtual Nurse rounding complete.    Round Needs: Other: patient reports pain, requesting pain medication and Notified of Patient Needs: Primary RN

## 2024-05-07 NOTE — PROGRESS NOTES
Assumed care of patient and received report from Maria Isabel FLORENCE. Assessment completed.Pt A&Ox 2. Respirations are even and unlabored on 4L oxymask. Pt denies pain at this time. Medical pt, call light and belongings are within reach. POC updated. Pt educated on room and call light, pt verbalized understanding. Needs met.

## 2024-05-07 NOTE — DISCHARGE PLANNING
Patient medically cleared for post-acute placement.   Patient and family agreeable for patient to discharge to [facility], which is patient's first SNF choice.   COBRA signed by physician and patient's daughter, Jackelyn, per patient's request.   DC order placed by provider.   DC packet complete with face sheet x2, DC summary, 72 hrs of chart notes and hard script for narcotics.   Transportation scheduled for 1200 [facility] .   Bedside RN provided with number for RN report and given DC packet.   Family and patient no longer wishing to appeal discharge, notified daughter to call [facility] requesting they cancel appeal.   Anticipating no further needs from case management.     Case Management Discharge Planning    Admission Date: 5/3/2024  GMLOS: 2.9  ALOS: 4    6-Clicks ADL Score: 13  6-Clicks Mobility Score: 14  PT and/or OT Eval ordered: Yes  Post-acute Referrals Ordered: Yes  Post-acute Choice Obtained: Yes  Has referral(s) been sent to post-acute provider:  Yes      Anticipated Discharge Dispo: Discharge Disposition: D/T to SNF with Medicare cert in anticipation of skilled care (03)    DME Needed: No    Action(s) Taken: Updated Provider/Nurse on Discharge Plan, Patient Conference, Transport Arranged , and Family Conference    Escalations Completed: Pending Discharge Destination    Medically Clear: Yes    Next Steps: Anticipating no further needs from case management.     Barriers to Discharge: None    Is the patient up for discharge tomorrow: Today via [facility] at 1200

## 2024-05-07 NOTE — CARE PLAN
The patient is Stable - Low risk of patient condition declining or worsening    Shift Goals  Clinical Goals: BM, mobility, pain control  Patient Goals: comfort  Family Goals: updated on POC      Problem: Knowledge Deficit - Standard  Goal: Patient and family/care givers will demonstrate understanding of plan of care, disease process/condition, diagnostic tests and medications  Outcome: Progressing     Problem: Pain - Standard  Goal: Alleviation of pain or a reduction in pain to the patient’s comfort goal  Outcome: Progressing     Problem: Fall Risk  Goal: Patient will remain free from falls  Outcome: Progressing

## 2024-05-07 NOTE — DISCHARGE SUMMARY
"Discharge Summary    CHIEF COMPLAINT ON ADMISSION  Chief Complaint   Patient presents with    GLF     Pt was ambulating at home today when she lost her balance and had a mechanical GLF. Pt fell backwards and hit the back of her head. Pt takes eliquis. Pt placed in a c-collar by EMS prior to arrival.        Reason for Admission  TBI     Admission Date  5/3/2024    CODE STATUS  DNAR/DNI    HPI & HOSPITAL COURSE  89 y.o. female with possible history of hypertension, HLD, atrial fibrillation on anticoagulation, pacemaker placement who presented 5/3/2024 after a mechanical ground-level fall with positive head strike. CT head negative for any intracranial hemorrhage. CT C-spine showed a mild wedge type compression fracture of the C7 . CT Chest found a sternal manubrial fracture. Trauma surgery evaluated the patient with no surgical intervention recommended.  Neurosurgery Dr. Washington evaluated the patient and recommended rigid cervical collar with no neurosurgical intervention and recommended to follow-up as outpatient in 2 weeks.  PT/OT recommending postacute placement.  Patient will be dsicharged to SNF  She was noted to be constipated for which she was started on senna and miralax. Her pain was controlled with tylenol and oxycodone.      Seen and examined the bedside the day of discharge.  Her vitals been stable.  Denies any discomfort.  Pain well-controlled.  Labs unremarkable     Patient will be discharged with close follow up with PCP and neurosurgery.     Therefore, she is discharged in good and stable condition to skilled nursing facility.    The patient recovered much more quickly than anticipated on admission.    Discharge Date  5/7/24    FOLLOW UP ITEMS POST DISCHARGE  PCP and NSx    DISCHARGE DIAGNOSES  Principal Problem:    Fracture of cervical spine without lesion of spinal cord, initial encounter ([facility]) (POA: Yes)  Active Problems:    Cardiac pacemaker in situ (Chronic) (POA: Yes)      Overview: \"[employer] " "Pacemaker\"    Essential hypertension, benign (Chronic) (POA: Yes)    Type 2 diabetes mellitus without complication, without long-term current use of insulin (HCC) (Chronic) (POA: Yes)    PAF (paroxysmal atrial fibrillation) ([facility]) (Chronic) (POA: Yes)    Chronic heart failure with preserved ejection fraction (HFpEF) ([facility]) (Chronic) (POA: Yes)    Chronic anticoagulation (POA: Yes)    Closed fracture of manubrium (POA: Yes)    Cervical compression fracture, initial encounter ([facility]) (POA: Yes)    Frequent falls (POA: Yes)    Falls (POA: Unknown)    Sternal manubrial dissociation, closed fracture (POA: Unknown)    Advanced care planning/counseling discussion (POA: Unknown)  Resolved Problems:    Trauma (POA: Yes)      FOLLOW UP  Future Appointments   Date Time Provider Department Center   8/28/2024  1:00 PM PACER CHECK-CAM B [facility] None   8/28/2024  1:20 PM Scott Garcia M.D. [facility] None     [facility] [address] [address]  [phone]        Aashish Washington M.D.  [address] [facility] [id number] [phone]    Follow up in 1 week(s)      Joana Hawk A.P.R.NEri  [address] [facility] [address]  [facility] [id number] [phone]    Follow up  Please call your primary care provider to schedule a hospital follow up.   Thank you      MEDICATIONS ON DISCHARGE     Medication List        START taking these medications        Instructions   oxyCODONE immediate-release 5 MG Tabs  Commonly known as: Roxicodone   Take 1 Tablet by mouth every 6 hours as needed for Severe Pain for up to 5 days.  Dose: 5 mg     polyethylene glycol/lytes Pack  Commonly known as: Miralax   Take 1 Packet by mouth 1 time a day as needed (constipation).  Dose: 17 g     senna-docusate 8.6-50 MG Tabs  Commonly known as: Pericolace Or Senokot S   Take 2 Tablets by mouth every day.  Dose: 2 Tablet            CHANGE how you take these medications        Instructions   metoprolol SR 25 MG Tb24  What changed: " additional instructions  Commonly known as: Toprol XL   TAKE 1/2 TABLET BY MOUTH EVERY DAY  Dose: 12.5 mg            CONTINUE taking these medications        Instructions   bumetanide 0.5 MG Tabs  Commonly known as: Bumex   Take 1 mg by mouth 1 time a day as needed (edema). 2 tablets= 1mg  Dose: 1 mg     docusate sodium 100 MG Caps  Commonly known as: Colace   Take 100 mg by mouth 1 time a day as needed for Constipation.  Dose: 100 mg     Eliquis 2.5mg Tabs  Generic drug: apixaban   Take 2.5 mg by mouth 2 times a day.  Dose: 2.5 mg     losartan 100 MG Tabs  Commonly known as: Cozaar   Take 100 mg by mouth every day.  Dose: 100 mg     Tylenol 8 Hour 650 MG CR tablet  Generic drug: acetaminophen   Take 1,300 mg by mouth 2 times a day. 2 tablets= 1300mg  Dose: 1,300 mg     VITAMIN B-12 PO   Take 1 Tablet by mouth every day.  Dose: 1 Tablet              Allergies  Allergies   Allergen Reactions    Codeine Unspecified     Patient reported hallucination and dizziness. Specific to tylenol with codeine      Gabapentin Unspecified     Altered mental status    Sulfa Drugs Nausea     Nausea   Other reaction(s): Not available    Tramadol Unspecified     Altered mentation       DIET  Orders Placed This Encounter   Procedures    Diet Order Diet: Cardiac     Standing Status:   Standing     Number of Occurrences:   1     Order Specific Question:   Diet:     Answer:   Cardiac [6]       ACTIVITY  As tolerated.  Weight bearing as tolerated    CONSULTATIONS  Neurosurgery Dr Washington  Trauma Surgery Dr Summers  PROCEDURES  none    LABORATORY  Lab Results   Component Value Date    SODIUM 139 05/04/2024    POTASSIUM 5.4 05/04/2024    CHLORIDE 104 05/04/2024    CO2 28 05/04/2024    GLUCOSE 180 (H) 05/04/2024    BUN 29 (H) 05/04/2024    CREATININE 0.73 05/04/2024        Lab Results   Component Value Date    WBC 7.2 05/04/2024    HEMOGLOBIN 11.8 (L) 05/04/2024    HEMATOCRIT 35.4 (L) 05/04/2024    PLATELETCT 152 (L) 05/04/2024        Total time  of the discharge process exceeds 35 minutes.

## 2024-05-07 NOTE — DOCUMENTATION QUERY
[facility]                                                                       Query Response Note      PATIENT:               VAN SEWELL  ACCT #:                  [id number]  MRN:                     [id number]  :                      1933  ADMIT DATE:       5/3/2024 6:23 PM  DISCH DATE:        2024 12:51 PM  RESPONDING  PROVIDER #:        [id number]           QUERY TEXT:    Acute hypoxemic respiratory failure is documented in the / PMR consult note. Patient requiring 4L O2, however no documentation of respiratory distress/sustained shortness of breath.  Per review of records, this diagnosis is also not found in H&P or previous hospital medicine/surgery progress notes.    Please clarify if you agree with documented dx of Acute hypoxemic respiratory failure.    The patient's clinical indicators include:  Clinical Findings:  5/3   -ED RN note:  unlabored breaths on 2L  -CT chest: mild atelectasis  -SpO2 97% on room air    5/5 HM progress note: Respiratory status stable, still on 3 L oxygen,  mL.  -SpO2 92% on 3L    5/5 HM progress note: Continue 3 L oxygen saturating 90%     PMR: Acute hypoxemic respiratory failure  -Currently requiring 4 L nasal cannula oxygen  -Patient is not on oxygen at home      Risk Factors: Sternal fracture, advanced age, chronic HFpEF  Treatments: O2 support up to 4L, Analgesia, aggressive pulmonary hygiene, incentive spirometer      Contact me with any questions.    Thank you for your time and attention,  Azalea Lopez RN, CALDERON [email or web address]  Connect via email, Voalte or messenger.  Options provided:   -- Acute hypoxemic respiratory failure does not exist   -- Other explanation, (please specify the other explanation)   -- Unable to determine      Query created by: Azalea Lopez on 2024 10:25 AM    RESPONSE TEXT:    Other explanation- Acute hypoxemic respiratory failure exists           Electronically signed by:  COURTNEY GALLAGHER MD 5/7/2024 4:33 PM

## 2024-05-07 NOTE — PROGRESS NOTES
Discharge paperwork discussed with patient and family at bedside. All questions answered. Follow up appointment discussed. [facility] [facility]. Patient and family has all personal belongings.

## 2024-05-07 NOTE — DISCHARGE PLANNING
DC Transport Scheduled    Transport Company Scheduled:  [employer]      Scheduled Date: 5/7/2024  Scheduled Time: 1200    Destination: [facility]    Notified care team of scheduled transport via Voalte.     If there are any changes needed to the DC transportation scheduled, please contact [facility] at ext. [phone] between the hours of 0937-6738 Mon-Fri. If outside those hours, contact the ED Case Manager at ext. [phone].

## 2024-06-06 ENCOUNTER — PATIENT OUTREACH (OUTPATIENT)
Dept: HEALTH INFORMATION MANAGEMENT | Facility: OTHER | Age: 89
End: 2024-06-06
Payer: MEDICARE

## 2024-06-06 DIAGNOSIS — I10 ESSENTIAL HYPERTENSION, BENIGN: ICD-10-CM

## 2024-06-12 NOTE — PROGRESS NOTES
This RN spoke with patient's daughter Jackelyn.  Per Jackelyn, patient was here at [facility] then was sent to [facility] for 3 weeks and then she was permanently placed in a group home for 24-hour care.  According to Jackelyn, the group home does do nursing services and medication management.  Patient does have a [place] [facility]  meet with her weekly, as well as, physical therapy and Occupational Therapy.  Patient will be discharged from the [facility] program at this time.  Jackelyn informed that she can reach out to this RN for any questions they may have.

## 2024-06-13 ENCOUNTER — OFFICE VISIT (OUTPATIENT)
Dept: MEDICAL GROUP | Facility: MEDICAL CENTER | Age: 89
End: 2024-06-13
Payer: MEDICARE

## 2024-06-13 VITALS
BODY MASS INDEX: 25.86 KG/M2 | HEART RATE: 68 BPM | HEIGHT: 63 IN | SYSTOLIC BLOOD PRESSURE: 122 MMHG | TEMPERATURE: 97.6 F | OXYGEN SATURATION: 92 % | RESPIRATION RATE: 16 BRPM | DIASTOLIC BLOOD PRESSURE: 72 MMHG

## 2024-06-13 DIAGNOSIS — S12.9XXA CERVICAL COMPRESSION FRACTURE, INITIAL ENCOUNTER (HCC): ICD-10-CM

## 2024-06-13 DIAGNOSIS — S22.23XA CLOSED STERNAL MANUBRIAL DISSOCIATION, INITIAL ENCOUNTER: ICD-10-CM
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
## 2024-06-13 DIAGNOSIS — S12.9XXA: ICD-10-CM

## 2024-06-13 PROCEDURE — 3078F DIAST BP <80 MM HG: CPT | Performed by: NURSE PRACTITIONER

## 2024-06-13 PROCEDURE — 3074F SYST BP LT 130 MM HG: CPT | Performed by: NURSE PRACTITIONER

## 2024-06-13 PROCEDURE — 99213 OFFICE O/P EST LOW 20 MIN: CPT | Performed by: NURSE PRACTITIONER

## 2024-06-13 RX ORDER — MULTIVIT-MIN/IRON/FOLIC ACID/K 18-600-40
1000 CAPSULE ORAL DAILY
Qty: 90 TABLET | Refills: 3 | Status: SHIPPED | OUTPATIENT
Start: 2024-06-13 | End: 2024-06-13 | Stop reason: SDUPTHER

## 2024-06-13 RX ORDER — BUMETANIDE 0.5 MG/1
0.5 TABLET ORAL DAILY
Qty: 90 TABLET | Refills: 3 | Status: SHIPPED | OUTPATIENT
Start: 2024-06-13 | End: 2024-06-13 | Stop reason: SDUPTHER

## 2024-06-13 RX ORDER — BUMETANIDE 0.5 MG/1
0.5 TABLET ORAL DAILY
Qty: 90 TABLET | Refills: 3 | Status: SHIPPED | OUTPATIENT
Start: 2024-06-13 | End: 2024-09-26

## 2024-06-13 RX ORDER — LANOLIN ALCOHOL/MO/W.PET/CERES
1000 CREAM (GRAM) TOPICAL DAILY
Qty: 90 TABLET | Refills: 3 | Status: SHIPPED | OUTPATIENT
Start: 2024-06-13 | End: 2024-06-13 | Stop reason: SDUPTHER

## 2024-06-13 RX ORDER — ASCORBIC ACID 500 MG
500 TABLET ORAL DAILY
Qty: 90 TABLET | Refills: 3 | Status: SHIPPED | OUTPATIENT
Start: 2024-06-13 | End: 2024-06-27 | Stop reason: SDUPTHER

## 2024-06-13 RX ORDER — MULTIVIT-MIN/IRON/FOLIC ACID/K 18-600-40
1000 CAPSULE ORAL DAILY
Qty: 90 TABLET | Refills: 3 | Status: SHIPPED | OUTPATIENT
Start: 2024-06-13 | End: 2024-06-27 | Stop reason: SDUPTHER

## 2024-06-13 RX ORDER — LOSARTAN POTASSIUM 100 MG/1
100 TABLET ORAL DAILY
Qty: 90 TABLET | Refills: 3 | Status: SHIPPED | OUTPATIENT
Start: 2024-06-13

## 2024-06-13 RX ORDER — LANOLIN ALCOHOL/MO/W.PET/CERES
1000 CREAM (GRAM) TOPICAL DAILY
Qty: 90 TABLET | Refills: 3 | Status: SHIPPED | OUTPATIENT
Start: 2024-06-13 | End: 2024-06-27 | Stop reason: SDUPTHER

## 2024-06-13 RX ORDER — SENNOSIDES 8.6 MG
1300 CAPSULE ORAL 2 TIMES DAILY
Qty: 180 TABLET | Refills: 3 | Status: SHIPPED | OUTPATIENT
Start: 2024-06-13

## 2024-06-13 RX ORDER — ASCORBIC ACID 500 MG
500 TABLET ORAL DAILY
Qty: 90 TABLET | Refills: 3 | Status: SHIPPED | OUTPATIENT
Start: 2024-06-13 | End: 2024-06-13 | Stop reason: SDUPTHER

## 2024-06-13 RX ORDER — SENNOSIDES 8.6 MG
1300 CAPSULE ORAL 2 TIMES DAILY
Qty: 180 TABLET | Refills: 3 | Status: SHIPPED | OUTPATIENT
Start: 2024-06-13 | End: 2024-06-13 | Stop reason: SDUPTHER

## 2024-06-13 NOTE — PROGRESS NOTES
Subjective:     HPI:     Krystle Tavarez is a 89 y.o. female presents to discuss:   Chief Complaint   Patient presents with    Follow-Up     Had a fall at home May 1st.     May 3rd-fell at home -lost her b alance-fell agains [facility]  Fractures her sternum and neck.   Went to [facility].   Admitted for 4 days.   Stayed with [facility].      24 hour care, [facility].      Has a med list.   Needs a rx for even OTC medications      2 tylenol in am and 2 in pm.     Need rx tylenol       C-collar  Followed by Dr. Washington  Next wednestady for evalution follow up             ROS: : see above      Current Outpatient Medications:     losartan (COZAAR) 100 MG Tab, Take 1 Tablet by mouth every day., Disp: 90 Tablet, Rfl: 3    bumetanide (BUMEX) 0.5 MG Tab, Take 1 Tablet by mouth every day., Disp: 90 Tablet, Rfl: 3    Cyanocobalamin (VITAMIN B-12) 1000 MCG Tab, Take 1 Tablet by mouth every day., Disp: 90 Tablet, Rfl: 3    ascorbic acid (ASCORBIC ACID) 500 MG Tab, Take 1 Tablet by mouth every day., Disp: 90 Tablet, Rfl: 3    Vitamin D, Cholecalciferol, (CHOLECALCIFEROL) 25 MCG (1000 UT) Tab, Take 1 Tablet by mouth every day., Disp: 90 Tablet, Rfl: 3    acetaminophen (TYLENOL 8 HOUR) 650 MG CR tablet, Take 2 Tablets by mouth 2 times a day., Disp: 180 Tablet, Rfl: 3    polyethylene glycol/lytes (MIRALAX) Pack, Take 1 Packet by mouth 1 time a day as needed (constipation)., Disp: , Rfl:     senna-docusate (PERICOLACE OR SENOKOT S) 8.6-50 MG Tab, Take 2 Tablets by mouth every day., Disp: 30 Tablet, Rfl: 0    apixaban (ELIQUIS) 2.5mg Tab, Take 2.5 mg by mouth 2 times a day., Disp: , Rfl:     metoprolol SR (TOPROL XL) 25 MG TABLET SR 24 HR, TAKE 1/2 TABLET BY MOUTH EVERY DAY, Disp: 45 Tablet, Rfl: 3    docusate sodium (COLACE) 100 MG Cap, Take 100 mg by mouth 1 time a day as needed for Constipation., Disp: , Rfl:     Allergies   Allergen Reactions    Codeine Unspecified     Patient reported  "hallucination and dizziness. Specific to tylenol with codeine      Gabapentin Unspecified     Altered mental status    Sulfa Drugs Nausea     Nausea   Other reaction(s): Not available    Tramadol Unspecified     Altered mentation       Objective:     Vitals: /72   Pulse 68   Temp 36.4 °C (97.6 °F)   Resp 16   Ht 1.6 m (5' 3\")   LMP  (LMP Unknown)   SpO2 92%   BMI 25.86 kg/m²      General: Alert, pleasant, NAD  HEENT: Normocephalic.  Neck supple.   Respiratory: no distress, no audible wheezing, RR -WNL  Skin: Warm, dry, no rashes.  Extremities: No leg edema. No discoloration  Neurological: No tremors  Psych:  Affect/mood is normal, judgement is good, memory is intact, grooming is appropriate.    Assessment/Plan:      There are no diagnoses linked to this encounter.             No follow-ups on file.    {I have placed the above orders and discussed them with an approved delegating provider. The MA is performing the below orders under the direction of Dr. Fong}      Joana MCQUEEN.    " day.  Dispense: 90 Tablet; Refill: 3  - acetaminophen (TYLENOL 8 HOUR) 650 MG CR tablet; Take 2 Tablets by mouth 2 times a day.  Dispense: 180 Tablet; Refill: 3  - bumetanide (BUMEX) 0.5 MG Tab; Take 1 Tablet by mouth every day.  Dispense: 90 Tablet; Refill: 3         Return in about 3 months (around 9/13/2024).    {I have placed the above orders and discussed them with an approved delegating provider. The MA is performing the below orders under the direction of Dr. Ajit YORK

## 2024-06-27 DIAGNOSIS — K59.00 CONSTIPATION, UNSPECIFIED CONSTIPATION TYPE: ICD-10-CM

## 2024-06-28 RX ORDER — AMOXICILLIN 250 MG
2 CAPSULE ORAL DAILY
Qty: 90 TABLET | Refills: 3 | Status: SHIPPED | OUTPATIENT
Start: 2024-06-28

## 2024-06-28 RX ORDER — LANOLIN ALCOHOL/MO/W.PET/CERES
1000 CREAM (GRAM) TOPICAL DAILY
Qty: 90 TABLET | Refills: 3 | Status: SHIPPED | OUTPATIENT
Start: 2024-06-28

## 2024-06-28 RX ORDER — ASCORBIC ACID 500 MG
500 TABLET ORAL DAILY
Qty: 90 TABLET | Refills: 3 | Status: SHIPPED | OUTPATIENT
Start: 2024-06-28

## 2024-06-28 RX ORDER — MULTIVIT-MIN/IRON/FOLIC ACID/K 18-600-40
1000 CAPSULE ORAL DAILY
Qty: 90 TABLET | Refills: 3 | Status: SHIPPED | OUTPATIENT
Start: 2024-06-28

## 2024-07-09 RX ORDER — APIXABAN 2.5 MG/1
2.5 TABLET, FILM COATED ORAL 2 TIMES DAILY
Qty: 180 TABLET | Refills: 3 | Status: SHIPPED | OUTPATIENT
Start: 2024-07-09

## 2024-08-15 ENCOUNTER — HOSPITAL ENCOUNTER (OUTPATIENT)
Facility: MEDICAL CENTER | Age: 89
End: 2024-08-15
Attending: STUDENT IN AN ORGANIZED HEALTH CARE EDUCATION/TRAINING PROGRAM
Payer: MEDICARE

## 2024-08-15 PROCEDURE — 87086 URINE CULTURE/COLONY COUNT: CPT

## 2024-08-15 PROCEDURE — 87186 SC STD MICRODIL/AGAR DIL: CPT

## 2024-08-15 PROCEDURE — 87077 CULTURE AEROBIC IDENTIFY: CPT

## 2024-08-19 DIAGNOSIS — I10 ESSENTIAL HYPERTENSION, BENIGN: Chronic | ICD-10-CM

## 2024-08-19 RX ORDER — METOPROLOL SUCCINATE 25 MG/1
12.5 TABLET, EXTENDED RELEASE ORAL DAILY
Qty: 45 TABLET | Refills: 3 | Status: SHIPPED | OUTPATIENT
Start: 2024-08-19

## 2024-08-19 NOTE — TELEPHONE ENCOUNTER
Received request via: Pharmacy    Was the patient seen in the last year in this department? Yes    Does the patient have an active prescription (recently filled or refills available) for medication(s) requested? No    [facility]    Does the patient have nursing home Plus and need 100-day supply? (This applies to ALL medications) Patient does not have SCP

## 2024-08-24 ENCOUNTER — HOSPITAL ENCOUNTER (EMERGENCY)
Facility: MEDICAL CENTER | Age: 89
End: 2024-08-24
Attending: EMERGENCY MEDICINE
Payer: MEDICARE

## 2024-08-24 VITALS
DIASTOLIC BLOOD PRESSURE: 64 MMHG | HEART RATE: 70 BPM | OXYGEN SATURATION: 90 % | SYSTOLIC BLOOD PRESSURE: 131 MMHG | BODY MASS INDEX: 26.58 KG/M2 | WEIGHT: 150 LBS | HEIGHT: 63 IN | TEMPERATURE: 97.1 F | RESPIRATION RATE: 18 BRPM

## 2024-08-24 DIAGNOSIS — L03.116 CELLULITIS OF LEFT LOWER EXTREMITY: ICD-10-CM

## 2024-08-24 DIAGNOSIS — G62.89 OTHER POLYNEUROPATHY: ICD-10-CM

## 2024-08-24 PROCEDURE — A9270 NON-COVERED ITEM OR SERVICE: HCPCS | Performed by: EMERGENCY MEDICINE

## 2024-08-24 PROCEDURE — 99284 EMERGENCY DEPT VISIT MOD MDM: CPT

## 2024-08-24 PROCEDURE — 700102 HCHG RX REV CODE 250 W/ 637 OVERRIDE(OP): Performed by: EMERGENCY MEDICINE

## 2024-08-24 RX ORDER — DULOXETIN HYDROCHLORIDE 20 MG/1
20 CAPSULE, DELAYED RELEASE ORAL ONCE
Status: COMPLETED | OUTPATIENT
Start: 2024-08-24 | End: 2024-08-24

## 2024-08-24 RX ORDER — CEPHALEXIN 500 MG/1
500 CAPSULE ORAL ONCE
Status: COMPLETED | OUTPATIENT
Start: 2024-08-24 | End: 2024-08-24

## 2024-08-24 RX ORDER — CEPHALEXIN 500 MG/1
500 CAPSULE ORAL 4 TIMES DAILY
Qty: 28 CAPSULE | Refills: 0 | Status: ACTIVE | OUTPATIENT
Start: 2024-08-24 | End: 2024-08-31

## 2024-08-24 RX ORDER — DULOXETIN HYDROCHLORIDE 20 MG/1
20 CAPSULE, DELAYED RELEASE ORAL DAILY
Qty: 30 CAPSULE | Refills: 0 | Status: SHIPPED | OUTPATIENT
Start: 2024-08-24

## 2024-08-24 RX ADMIN — DULOXETINE HYDROCHLORIDE 20 MG: 20 CAPSULE, DELAYED RELEASE ORAL at 11:42

## 2024-08-24 RX ADMIN — CEPHALEXIN 500 MG: 500 CAPSULE ORAL at 11:11

## 2024-08-24 ASSESSMENT — FIBROSIS 4 INDEX: FIB4 SCORE: 2.84

## 2024-08-24 NOTE — ED NOTES
Patient given discharge instructions and education. Verbalizes understanding. Given chance to ask questions. Patient educated on signs and symptoms to returned to ER, Educated patient they can return for any concerning symptoms. Two prescriptions sent to pharmacy on file. Education given to patient about medications. Patient states they have their belongings. Denies additional needs at this time. Reports pain is well controlled. Patient monitored every hour and PRN for safety and comfort. Patient transported out of department via personal wheelchair.

## 2024-08-24 NOTE — ED TRIAGE NOTES
"Chief Complaint   Patient presents with    Foot Pain     Px c/o bilateral burning foot pain. Lives in group home, dressing to L foot was changed yesterday    Low Back Pain       89 yo female to triage for above complaint. Px in WC and ccollar from recent fall - denies current complication from neck fx. Px also report being on medication for UTI    Pt is alert and oriented, speaking in full sentences, follows commands and responds appropriately to questions.     Patient placed back in lobby and educated on triage process. Asked to inform RN of any changes.    /65   Pulse 75   Temp 36.4 °C (97.5 °F) (Temporal)   Resp 20   Ht 1.6 m (5' 3\")   Wt 68 kg (150 lb)   LMP  (LMP Unknown)   SpO2 92%   BMI 26.57 kg/m²     "

## 2024-08-24 NOTE — ED PROVIDER NOTES
"ER Provider Note    Scribed for Dr. Diego Kirkland M.D. by Didier Skelton. 8/24/2024  10:32 AM    Primary Care Provider: JAYLEN Castelan    CHIEF COMPLAINT  Chief Complaint   Patient presents with    Foot Pain     Px c/o bilateral burning foot pain. Lives in group home, dressing to L foot was changed yesterday    Low Back Pain       EXTERNAL RECORDS REVIEWED  Inpatient Notes The patient was seen on 5/3/24 for cervical spine fracture and was sent to SNF.       HPI/ROS  LIMITATION TO HISTORY   Select: : None    OUTSIDE HISTORIAN(S):  Family was present at bedside and contributed to the patient history.     Krystle Tavarez is a 89 y.o. female who presents to the ED for foot pain. The patient arrives today experiencing foot pain, describing it as a \"burning sensation\" for 2-3 weeks. She reports her pain came on gradually, localizing her pain on the bottom of her feet and ankles. She recently had a UTI which she was treated with antibiotics for 7 days. She is still experiencing dysuria with associated back pain.  Concern of symptoms the patient presented to the ED for further evaluation.The patient was seen on 5/3/24 for cervical spine fracture and has a c-collar placed at this time. The patient is pre-diabetic.     PAST MEDICAL HISTORY  Past Medical History:   Diagnosis Date    Arthritis     knees, hips    Breath shortness     with exertion     Cataract     bilat IOL     Dental disorder     full upper, partial lower     Diabetes (HCC)     pre diabetic    Heart burn     Hemorrhagic disorder (HCC)     on Eliquis    High cholesterol     diet controlled     Hyperlipidemia     Hypertension     Pacemaker 2015    Pain 2020    lower back, right leg    Pre-diabetes     TIA (transient ischemic attack)     on Eliquis    Urinary incontinence        SURGICAL HISTORY  Past Surgical History:   Procedure Laterality Date    PB LAMINOTOMY,LUMBAR DISK,1 INTRSP N/A 2/25/2020    Procedure: LAMINECTOMY, SPINE, LUMBAR, WITH " DISCECTOMY- L5-S1, MEDIAL FACETECTOMY;  Surgeon: Latrell Kimble M.D.;  Location: SURGERY [facility];  Service: Neurosurgery    FORAMINOTOMY N/A 2/25/2020    Procedure: FORAMINOTOMY, SPINE;  Surgeon: Latrell Kimble M.D.;  Location: SURGERY [facility];  Service: Neurosurgery    PACEMAKER INSERTION  2015    HIP REPLACEMENT, TOTAL Right 2009    KNEE REPLACEMENT, TOTAL Right 2004    CATARACT EXTRACTION WITH IOL Bilateral 2003    CHOLECYSTECTOMY  2002    BASAL CELL EXCISION      nose and hand    BUNIONECTOMY Left        FAMILY HISTORY  Family History   Problem Relation Age of Onset    Diabetes Mother     Stroke Mother     Heart Attack Father 74    No Known Problems Maternal Grandmother     No Known Problems Maternal Grandfather     No Known Problems Paternal Grandmother     No Known Problems Paternal Grandfather        SOCIAL HISTORY   reports that she has never smoked. She has never used smokeless tobacco. She reports that she does not currently use alcohol. She reports that she does not use drugs.    CURRENT MEDICATIONS  Previous Medications    ACETAMINOPHEN (TYLENOL 8 HOUR) 650 MG CR TABLET    Take 2 Tablets by mouth 2 times a day.    APIXABAN (ELIQUIS) 2.5MG TAB    Take 1 Tablet by mouth 2 times a day.    ASCORBIC ACID (ASCORBIC ACID) 500 MG TAB    Take 1 Tablet by mouth every day.    BUMETANIDE (BUMEX) 0.5 MG TAB    Take 1 Tablet by mouth every day.    CYANOCOBALAMIN (VITAMIN B-12) 1000 MCG TAB    Take 1 Tablet by mouth every day.    DOCUSATE SODIUM (COLACE) 100 MG CAP    Take 100 mg by mouth 1 time a day as needed for Constipation.    LOSARTAN (COZAAR) 100 MG TAB    Take 1 Tablet by mouth every day.    METOPROLOL SR (TOPROL XL) 25 MG TABLET SR 24 HR    TAKE ONE-HALF (1/2) TABLET DAILY    POLYETHYLENE GLYCOL/LYTES (MIRALAX) PACK    Take 1 Packet by mouth 1 time a day as needed (constipation).    SENNA-DOCUSATE (PERICOLACE OR SENOKOT S) 8.6-50 MG TAB    Take 2 Tablets by mouth every day.    VITAMIN D,  "CHOLECALCIFEROL, (CHOLECALCIFEROL) 25 MCG (1000 UT) TAB    Take 1 Tablet by mouth every day.       ALLERGIES  Codeine, Gabapentin, Sulfa drugs, and Tramadol    PHYSICAL EXAM  /65   Pulse 75   Temp 36.4 °C (97.5 °F) (Temporal)   Resp 20   Ht 1.6 m (5' 3\")   Wt 68 kg (150 lb)   LMP  (LMP Unknown)   SpO2 92%   BMI 26.57 kg/m²   Constitutional: Alert in no apparent distress.  HENT: No signs of trauma, Bilateral external ears normal, Nose normal.   Eyes: Pupils are equal and reactive, Conjunctiva normal, Non-icteric.   Neck: Normal range of motion, No tenderness, Supple,   Cardiovascular: Regular rate and rhythm, no murmurs.   Thorax & Lungs: Normal breath sounds, No respiratory distress, No wheezing, No chest tenderness.   Abdomen: Bowel sounds normal, Soft, No tenderness,   Skin: Warm, Dry, wound to the left lateral aspect of the heel with surrounding redness, No rash.  Patient feels burning sensation when touching the bottom of her feet.  Back: No bony tenderness, No CVA tenderness, no midline tenderness.    Extremities: No edema, No tenderness, No cyanosis, no tenderness, normal pulses, 5 out of 5 strength in bilateral lower extremities at hip, knee as well as ankle.  Capillary refill nml    Psychiatric: Affect normal     COURSE & MEDICAL DECISION MAKING    ED Observation Status? No; Patient does not meet criteria for ED Observation.     INITIAL ASSESSMENT AND PLAN  Care Narrative:       10:32 AM - Patient seen and evaluated at bedside. Exam shows wound to the left lateral aspect of the heel with surrounding redness. I suspect this is peripheral neuropathy. Discussed plan of care, including treating both her UTI and Foot pain with antibiotics. Will further consult with pharmacy of antibiotics due to the patient being allergic to Gabapentin. Patient agrees to plan of care.     11:21 AM- Patient was reevaluated at bedside. The patient will be discharged with Cymbalta 20 mg, Keflex 500 mg and should return " if symptoms worsen or if new symptoms arise. The patient understands and agrees to plan.          ADDITIONAL PROBLEM LIST AND DISPOSITION  Patient with symptoms of peripheral neuropathy.  There is also what appears to be some mild cellulitis.  At this time we will start the patient on a low-dose medication for this.  In addition the patient does have what appears to be mild cellulitis.  Will redress the patient's wound of the left heel and discharged home with antibiotic.               DISPOSITION AND DISCUSSIONS  I have discussed management of the patient with the following physicians and FAVIO's: None    Discussion of management with other QHP or appropriate source(s): Pharmacy      Escalation of care considered, and ultimately not performed: acute inpatient care management, however at this time, the patient is most appropriate for outpatient management.    Barriers to care at this time, including but not limited to:  None .     Decision tools and prescription drugs considered including, but not limited to: Cymbalta 20 mg, Keflex 500 mg .    The patient will return for new or worsening symptoms and is stable at the time of discharge.      DISPOSITION:  Patient will be discharged home in stable condition.    FOLLOW UP:  Joana Hawk A.P.R.NEri  [address] [place] [id number]  [phone]    In 2 days        OUTPATIENT MEDICATIONS:  New Prescriptions    CEPHALEXIN (KEFLEX) 500 MG CAP    Take 1 Capsule by mouth 4 times a day for 7 days.    DULOXETINE (CYMBALTA) 20 MG CAP DR PARTICLES    Take 1 Capsule by mouth every day.        FINAL IMPRESSION   1. Other polyneuropathy    2. Cellulitis of left lower extremity      Didier CRAIN), am scribing for, and in the presence of, Diego Kirkland M.D..    Electronically signed by: Didier Wu), 8/24/2024    Diego CRAIN M.D. personally performed the services described in this documentation, as scribed by Didier Skelton in my presence,  and it is both accurate and complete.    The note accurately reflects work and decisions made by me.  Diego Kirkland M.D.  8/24/2024  4:39 PM

## 2024-08-28 ENCOUNTER — NON-PROVIDER VISIT (OUTPATIENT)
Dept: CARDIOLOGY | Facility: MEDICAL CENTER | Age: 89
End: 2024-08-28
Attending: NURSE PRACTITIONER
Payer: MEDICARE

## 2024-08-28 ENCOUNTER — APPOINTMENT (OUTPATIENT)
Dept: CARDIOLOGY | Facility: MEDICAL CENTER | Age: 89
End: 2024-08-28
Attending: INTERNAL MEDICINE
Payer: MEDICARE

## 2024-08-28 ENCOUNTER — NON-PROVIDER VISIT (OUTPATIENT)
Dept: CARDIOLOGY | Facility: MEDICAL CENTER | Age: 89
End: 2024-08-28

## 2024-08-28 DIAGNOSIS — I48.0 PAF (PAROXYSMAL ATRIAL FIBRILLATION) (HCC): Chronic | ICD-10-CM

## 2024-08-28 DIAGNOSIS — Z95.0 CARDIAC PACEMAKER IN SITU: Chronic | ICD-10-CM

## 2024-08-28 PROCEDURE — 93280 PM DEVICE PROGR EVAL DUAL: CPT | Performed by: INTERNAL MEDICINE

## 2024-08-29 ENCOUNTER — APPOINTMENT (OUTPATIENT)
Dept: RADIOLOGY | Facility: MEDICAL CENTER | Age: 89
End: 2024-08-29
Attending: EMERGENCY MEDICINE
Payer: MEDICARE

## 2024-08-29 ENCOUNTER — HOSPITAL ENCOUNTER (EMERGENCY)
Facility: MEDICAL CENTER | Age: 89
End: 2024-08-29
Attending: EMERGENCY MEDICINE
Payer: MEDICARE

## 2024-08-29 VITALS
SYSTOLIC BLOOD PRESSURE: 184 MMHG | WEIGHT: 150 LBS | TEMPERATURE: 97 F | RESPIRATION RATE: 14 BRPM | HEART RATE: 84 BPM | OXYGEN SATURATION: 91 % | BODY MASS INDEX: 25.61 KG/M2 | DIASTOLIC BLOOD PRESSURE: 62 MMHG | HEIGHT: 64 IN

## 2024-08-29 DIAGNOSIS — Z51.89 ENCOUNTER FOR WOUND RE-CHECK: ICD-10-CM

## 2024-08-29 DIAGNOSIS — S91.302A OPEN WOUND OF LEFT FOOT EXCLUDING ONE OR MORE TOES, INITIAL ENCOUNTER: ICD-10-CM

## 2024-08-29 DIAGNOSIS — N39.0 ACUTE UTI: Primary | ICD-10-CM

## 2024-08-29 LAB
ALBUMIN SERPL BCP-MCNC: 3.8 G/DL (ref 3.2–4.9)
ALBUMIN/GLOB SERPL: 1.1 G/DL
ALP SERPL-CCNC: 81 U/L (ref 30–99)
ALT SERPL-CCNC: 8 U/L (ref 2–50)
ANION GAP SERPL CALC-SCNC: 15 MMOL/L (ref 7–16)
APPEARANCE UR: ABNORMAL
AST SERPL-CCNC: 26 U/L (ref 12–45)
BACTERIA #/AREA URNS HPF: ABNORMAL /HPF
BASOPHILS # BLD AUTO: 0.4 % (ref 0–1.8)
BASOPHILS # BLD: 0.04 K/UL (ref 0–0.12)
BILIRUB SERPL-MCNC: 0.4 MG/DL (ref 0.1–1.5)
BILIRUB UR QL STRIP.AUTO: NEGATIVE
BUN SERPL-MCNC: 43 MG/DL (ref 8–22)
CALCIUM ALBUM COR SERPL-MCNC: 10 MG/DL (ref 8.5–10.5)
CALCIUM SERPL-MCNC: 9.8 MG/DL (ref 8.5–10.5)
CHLORIDE SERPL-SCNC: 101 MMOL/L (ref 96–112)
CO2 SERPL-SCNC: 22 MMOL/L (ref 20–33)
COLOR UR: YELLOW
CREAT SERPL-MCNC: 1.3 MG/DL (ref 0.5–1.4)
CRP SERPL HS-MCNC: 0.86 MG/DL (ref 0–0.75)
EOSINOPHIL # BLD AUTO: 0.13 K/UL (ref 0–0.51)
EOSINOPHIL NFR BLD: 1.1 % (ref 0–6.9)
EPI CELLS #/AREA URNS HPF: ABNORMAL /HPF
ERYTHROCYTE [DISTWIDTH] IN BLOOD BY AUTOMATED COUNT: 49.6 FL (ref 35.9–50)
ERYTHROCYTE [SEDIMENTATION RATE] IN BLOOD BY WESTERGREN METHOD: 70 MM/HOUR (ref 0–25)
GFR SERPLBLD CREATININE-BSD FMLA CKD-EPI: 39 ML/MIN/1.73 M 2
GLOBULIN SER CALC-MCNC: 3.6 G/DL (ref 1.9–3.5)
GLUCOSE SERPL-MCNC: 153 MG/DL (ref 65–99)
GLUCOSE UR STRIP.AUTO-MCNC: NEGATIVE MG/DL
HCT VFR BLD AUTO: 38.4 % (ref 37–47)
HGB BLD-MCNC: 12.8 G/DL (ref 12–16)
HYALINE CASTS #/AREA URNS LPF: ABNORMAL /LPF
IMM GRANULOCYTES # BLD AUTO: 0.05 K/UL (ref 0–0.11)
IMM GRANULOCYTES NFR BLD AUTO: 0.4 % (ref 0–0.9)
KETONES UR STRIP.AUTO-MCNC: NEGATIVE MG/DL
LACTATE SERPL-SCNC: 2 MMOL/L (ref 0.5–2)
LEUKOCYTE ESTERASE UR QL STRIP.AUTO: ABNORMAL
LYMPHOCYTES # BLD AUTO: 3.68 K/UL (ref 1–4.8)
LYMPHOCYTES NFR BLD: 32.5 % (ref 22–41)
MCH RBC QN AUTO: 31.8 PG (ref 27–33)
MCHC RBC AUTO-ENTMCNC: 33.3 G/DL (ref 32.2–35.5)
MCV RBC AUTO: 95.5 FL (ref 81.4–97.8)
MICRO URNS: ABNORMAL
MONOCYTES # BLD AUTO: 0.82 K/UL (ref 0–0.85)
MONOCYTES NFR BLD AUTO: 7.3 % (ref 0–13.4)
NEUTROPHILS # BLD AUTO: 6.59 K/UL (ref 1.82–7.42)
NEUTROPHILS NFR BLD: 58.3 % (ref 44–72)
NITRITE UR QL STRIP.AUTO: NEGATIVE
NRBC # BLD AUTO: 0 K/UL
NRBC BLD-RTO: 0 /100 WBC (ref 0–0.2)
PH UR STRIP.AUTO: 6 [PH] (ref 5–8)
PLATELET # BLD AUTO: 294 K/UL (ref 164–446)
PMV BLD AUTO: 9.5 FL (ref 9–12.9)
POTASSIUM SERPL-SCNC: 4.9 MMOL/L (ref 3.6–5.5)
PROT SERPL-MCNC: 7.4 G/DL (ref 6–8.2)
PROT UR QL STRIP: 100 MG/DL
RBC # BLD AUTO: 4.02 M/UL (ref 4.2–5.4)
RBC # URNS HPF: ABNORMAL /HPF
RBC UR QL AUTO: ABNORMAL
SODIUM SERPL-SCNC: 138 MMOL/L (ref 135–145)
SP GR UR STRIP.AUTO: 1.01
UROBILINOGEN UR STRIP.AUTO-MCNC: 0.2 MG/DL
WBC # BLD AUTO: 11.3 K/UL (ref 4.8–10.8)
WBC #/AREA URNS HPF: ABNORMAL /HPF

## 2024-08-29 PROCEDURE — 73630 X-RAY EXAM OF FOOT: CPT | Mod: LT

## 2024-08-29 PROCEDURE — 80053 COMPREHEN METABOLIC PANEL: CPT

## 2024-08-29 PROCEDURE — 99285 EMERGENCY DEPT VISIT HI MDM: CPT

## 2024-08-29 PROCEDURE — 700102 HCHG RX REV CODE 250 W/ 637 OVERRIDE(OP): Performed by: EMERGENCY MEDICINE

## 2024-08-29 PROCEDURE — 36415 COLL VENOUS BLD VENIPUNCTURE: CPT

## 2024-08-29 PROCEDURE — 700105 HCHG RX REV CODE 258: Performed by: EMERGENCY MEDICINE

## 2024-08-29 PROCEDURE — 85652 RBC SED RATE AUTOMATED: CPT

## 2024-08-29 PROCEDURE — 83605 ASSAY OF LACTIC ACID: CPT

## 2024-08-29 PROCEDURE — 86140 C-REACTIVE PROTEIN: CPT

## 2024-08-29 PROCEDURE — 81001 URINALYSIS AUTO W/SCOPE: CPT

## 2024-08-29 PROCEDURE — A9270 NON-COVERED ITEM OR SERVICE: HCPCS | Performed by: EMERGENCY MEDICINE

## 2024-08-29 PROCEDURE — 85025 COMPLETE CBC W/AUTO DIFF WBC: CPT

## 2024-08-29 PROCEDURE — 87086 URINE CULTURE/COLONY COUNT: CPT

## 2024-08-29 RX ORDER — CIPROFLOXACIN 250 MG/1
250 TABLET, FILM COATED ORAL 2 TIMES DAILY
Qty: 14 TABLET | Refills: 0 | Status: ACTIVE | OUTPATIENT
Start: 2024-08-29 | End: 2024-09-05

## 2024-08-29 RX ORDER — SODIUM CHLORIDE, SODIUM LACTATE, POTASSIUM CHLORIDE, CALCIUM CHLORIDE 600; 310; 30; 20 MG/100ML; MG/100ML; MG/100ML; MG/100ML
1000 INJECTION, SOLUTION INTRAVENOUS ONCE
Status: COMPLETED | OUTPATIENT
Start: 2024-08-29 | End: 2024-08-29

## 2024-08-29 RX ORDER — CIPROFLOXACIN 500 MG/1
500 TABLET, FILM COATED ORAL ONCE
Status: COMPLETED | OUTPATIENT
Start: 2024-08-29 | End: 2024-08-29

## 2024-08-29 RX ORDER — CEFTRIAXONE 2 G/1
2000 INJECTION, POWDER, FOR SOLUTION INTRAMUSCULAR; INTRAVENOUS ONCE
Status: DISCONTINUED | OUTPATIENT
Start: 2024-08-29 | End: 2024-08-29

## 2024-08-29 RX ADMIN — CIPROFLOXACIN HYDROCHLORIDE 500 MG: 500 TABLET, FILM COATED ORAL at 18:27

## 2024-08-29 RX ADMIN — SODIUM CHLORIDE, POTASSIUM CHLORIDE, SODIUM LACTATE AND CALCIUM CHLORIDE 1000 ML: 600; 310; 30; 20 INJECTION, SOLUTION INTRAVENOUS at 20:20

## 2024-08-29 ASSESSMENT — FIBROSIS 4 INDEX: FIB4 SCORE: 2.85

## 2024-08-29 NOTE — ED TRIAGE NOTES
Chief Complaint   Patient presents with    Foot Pain     Left foot pain.     Wound Check     Patient seen by her home nurse today at her group home, told her wound looked more infected and EMS was called. Seen also on 8/24 for foot pain. Patient on Keflex currently for infection.     UTI     Patient was on abx for UTI switched on 8/24 to Keflex. Reports burn with urination.

## 2024-08-29 NOTE — ED PROVIDER NOTES
ER Provider Note    Scribed for Sreekanth Cabezas M.d. by Gerard Zacarias. 8/29/2024  4:05 PM    Primary Care Provider: JAYLEN Castelan    CHIEF COMPLAINT   Chief Complaint   Patient presents with    Foot Pain     Left foot pain.     Wound Check     Patient seen by her home nurse today at her group home, told her wound looked more infected and EMS was called. Seen also on 8/24 for foot pain. Patient on Keflex currently for infection.     UTI     Patient was on abx for UTI switched on 8/24 to Keflex. Reports burn with urination.          HPI/ROS  LIMITATION TO HISTORY   Select: : None  OUTSIDE HISTORIAN(S):  Family member was present and provided all history regarding the patient's recent hospitalization and how she obtained her pressure wounds.    Krystle Tavarez is a 89 y.o. female who presents to the ED complaining of a left foot wound onset prior to arrival. The patient's family member reports the patient was admitted here on May 3rd of this year for 4 days for a cervical spine fracture secondary to a ground level fall. She ws ten placed in advanced care for 3 weeks. During that time, the patient was having a lot of neck pain and wasn't moving much, so she developed pressure sores on both of her feet. She states the patient's wound on her left foot was worse. She is routinely seen by staff at her group home, who instructed her to present to the ED for imaging and labs to evaluate the wound. The patient denies any nausea, vomiting, fever, new back pain, or any other complaints.     The patient also presents for evaluation of a UTI. She was started on Keflex for this on 8/24/24. Patient reports dysuria.     PAST MEDICAL HISTORY  Past Medical History:   Diagnosis Date    Arthritis     knees, hips    Breath shortness     with exertion     Cataract     bilat IOL     Dental disorder     full upper, partial lower     Diabetes (HCC)     pre diabetic    Heart burn     Hemorrhagic disorder (HCC)     on  Eliquis    High cholesterol     diet controlled     Hyperlipidemia     Hypertension     Pacemaker 2015    Pain 2020    lower back, right leg    Pre-diabetes     TIA (transient ischemic attack)     on Eliquis    Urinary incontinence        SURGICAL HISTORY  Past Surgical History:   Procedure Laterality Date    PB LAMINOTOMY,LUMBAR DISK,1 INTRSP N/A 2/25/2020    Procedure: LAMINECTOMY, SPINE, LUMBAR, WITH DISCECTOMY- L5-S1, MEDIAL FACETECTOMY;  Surgeon: Latrell Kimble M.D.;  Location: SURGERY [facility];  Service: Neurosurgery    FORAMINOTOMY N/A 2/25/2020    Procedure: FORAMINOTOMY, SPINE;  Surgeon: Latrell Kimble M.D.;  Location: SURGERY [facility];  Service: Neurosurgery    PACEMAKER INSERTION  2015    HIP REPLACEMENT, TOTAL Right 2009    KNEE REPLACEMENT, TOTAL Right 2004    CATARACT EXTRACTION WITH IOL Bilateral 2003    CHOLECYSTECTOMY  2002    BASAL CELL EXCISION      nose and hand    BUNIONECTOMY Left        FAMILY HISTORY  Family History   Problem Relation Age of Onset    Diabetes Mother     Stroke Mother     Heart Attack Father 74    No Known Problems Maternal Grandmother     No Known Problems Maternal Grandfather     No Known Problems Paternal Grandmother     No Known Problems Paternal Grandfather        SOCIAL HISTORY   reports that she has never smoked. She has never used smokeless tobacco. She reports that she does not currently use alcohol. She reports that she does not use drugs.    CURRENT MEDICATIONS  Previous Medications    ACETAMINOPHEN (TYLENOL 8 HOUR) 650 MG CR TABLET    Take 2 Tablets by mouth 2 times a day.    APIXABAN (ELIQUIS) 2.5MG TAB    Take 1 Tablet by mouth 2 times a day.    ASCORBIC ACID (ASCORBIC ACID) 500 MG TAB    Take 1 Tablet by mouth every day.    BUMETANIDE (BUMEX) 0.5 MG TAB    Take 1 Tablet by mouth every day.    CEPHALEXIN (KEFLEX) 500 MG CAP    Take 1 Capsule by mouth 4 times a day for 7 days.    CYANOCOBALAMIN (VITAMIN B-12) 1000 MCG TAB    Take 1 Tablet by mouth every  day.    DOCUSATE SODIUM (COLACE) 100 MG CAP    Take 100 mg by mouth 1 time a day as needed for Constipation.    DULOXETINE (CYMBALTA) 20 MG CAP DR PARTICLES    Take 1 Capsule by mouth every day.    LOSARTAN (COZAAR) 100 MG TAB    Take 1 Tablet by mouth every day.    METOPROLOL SR (TOPROL XL) 25 MG TABLET SR 24 HR    TAKE ONE-HALF (1/2) TABLET DAILY    POLYETHYLENE GLYCOL/LYTES (MIRALAX) PACK    Take 1 Packet by mouth 1 time a day as needed (constipation).    SENNA-DOCUSATE (PERICOLACE OR SENOKOT S) 8.6-50 MG TAB    Take 2 Tablets by mouth every day.    VITAMIN D, CHOLECALCIFEROL, (CHOLECALCIFEROL) 25 MCG (1000 UT) TAB    Take 1 Tablet by mouth every day.       ALLERGIES  Codeine, Gabapentin, Sulfa drugs, and Tramadol    PHYSICAL EXAM  /53   Pulse 65   Temp 36.1 °C (97 °F) (Temporal)   Resp 18   LMP  (LMP Unknown)   SpO2 92%   Constitutional: Awake alert nontoxic no acute distress, chronically ill-appearing.  HENT: Normocephalic, Atraumatic, Bilateral external ears normal,  Eyes: PERRL, EOMI, Conjunctiva normal, No discharge.   Neck: In an [place] collar, not ranged  Cardiovascular: Normal heart rate, Normal rhythm, No murmurs, No rubs, No gallops.   Thorax & Lungs: Normal breath sounds, No respiratory distress  Abdomen:oft, No tenderness  Skin: Warm, Dry, No erythema, No rash.   Back: No tenderness, No CVA tenderness.   Musculoskeletal: Good range of motion in all major joints.  Left foot has a ulceration on the lateral aspect overlying the lateral aspect of the calcaneus just over a centimeter in diameter.  Will be the purulence at the base.  No cellulitis.  Bone is not exposed.  Normal neurovascular examination.  Neurologic: Alert, No focal deficits noted.   Psychiatric: Affect normal     DIAGNOSTIC STUDIES    LABS  Results for orders placed or performed during the hospital encounter of 08/29/24   Urinalysis, Culture if Indicated    Specimen: Urine, Clean Catch   Result Value Ref Range    Color Yellow      Character Turbid (A)     Specific Gravity 1.014 <1.035    Ph 6.0 5.0 - 8.0    Glucose Negative Negative mg/dL    Ketones Negative Negative mg/dL    Protein 100 (A) Negative mg/dL    Bilirubin Negative Negative    Urobilinogen, Urine 0.2 Negative    Nitrite Negative Negative    Leukocyte Esterase Large (A) Negative    Occult Blood Large (A) Negative    Micro Urine Req Microscopic    CBC WITH DIFFERENTIAL   Result Value Ref Range    WBC 11.3 (H) 4.8 - 10.8 K/uL    RBC 4.02 (L) 4.20 - 5.40 M/uL    Hemoglobin 12.8 12.0 - 16.0 g/dL    Hematocrit 38.4 37.0 - 47.0 %    MCV 95.5 81.4 - 97.8 fL    MCH 31.8 27.0 - 33.0 pg    MCHC 33.3 32.2 - 35.5 g/dL    RDW 49.6 35.9 - 50.0 fL    Platelet Count 294 164 - 446 K/uL    MPV 9.5 9.0 - 12.9 fL    Neutrophils-Polys 58.30 44.00 - 72.00 %    Lymphocytes 32.50 22.00 - 41.00 %    Monocytes 7.30 0.00 - 13.40 %    Eosinophils 1.10 0.00 - 6.90 %    Basophils 0.40 0.00 - 1.80 %    Immature Granulocytes 0.40 0.00 - 0.90 %    Nucleated RBC 0.00 0.00 - 0.20 /100 WBC    Neutrophils (Absolute) 6.59 1.82 - 7.42 K/uL    Lymphs (Absolute) 3.68 1.00 - 4.80 K/uL    Monos (Absolute) 0.82 0.00 - 0.85 K/uL    Eos (Absolute) 0.13 0.00 - 0.51 K/uL    Baso (Absolute) 0.04 0.00 - 0.12 K/uL    Immature Granulocytes (abs) 0.05 0.00 - 0.11 K/uL    NRBC (Absolute) 0.00 K/uL   COMP METABOLIC PANEL   Result Value Ref Range    Sodium 138 135 - 145 mmol/L    Potassium 4.9 3.6 - 5.5 mmol/L    Chloride 101 96 - 112 mmol/L    Co2 22 20 - 33 mmol/L    Anion Gap 15.0 7.0 - 16.0    Glucose 153 (H) 65 - 99 mg/dL    Bun 43 (H) 8 - 22 mg/dL    Creatinine 1.30 0.50 - 1.40 mg/dL    Calcium 9.8 8.5 - 10.5 mg/dL    Correct Calcium 10.0 8.5 - 10.5 mg/dL    AST(SGOT) 26 12 - 45 U/L    ALT(SGPT) 8 2 - 50 U/L    Alkaline Phosphatase 81 30 - 99 U/L    Total Bilirubin 0.4 0.1 - 1.5 mg/dL    Albumin 3.8 3.2 - 4.9 g/dL    Total Protein 7.4 6.0 - 8.2 g/dL    Globulin 3.6 (H) 1.9 - 3.5 g/dL    A-G Ratio 1.1 g/dL   LACTIC ACID   Result  Value Ref Range    Lactic Acid 2.0 0.5 - 2.0 mmol/L   CRP QUANTITIVE (NON-CARDIAC)   Result Value Ref Range    Stat C-Reactive Protein 0.86 (H) 0.00 - 0.75 mg/dL   Sed Rate   Result Value Ref Range    Sed Rate Westergren 70 (H) 0 - 25 mm/hour   ESTIMATED GFR   Result Value Ref Range    GFR (CKD-EPI) 39 (A) >60 mL/min/1.73 m 2   URINE MICROSCOPIC (W/UA)   Result Value Ref Range    WBC Packed (A) /hpf    RBC 2-5 (A) /hpf    Bacteria Few (A) None /hpf    Epithelial Cells Few /hpf    Hyaline Cast 0-2 /lpf        RADIOLOGY/PROCEDURES   The attending emergency physician has independently interpreted the diagnostic imaging associated with this visit and am waiting the final reading from the radiologist.       Radiologist interpretation:  DX-FOOT-COMPLETE 3+ LEFT   Final Result      1. Lateral left heel soft tissue defect with no underlying bony abnormality.   2. Decreased bone mineralization.   3. Polyarticular osteoarthritis and atherosclerosis.          COURSE & MEDICAL DECISION MAKING     ASSESSMENT, COURSE AND PLAN  Care Narrative:       4:08 PM - Patient seen and examined at bedside. The patient is a 89 year old female who presents to the ED for evaluation of a pressure wound to her left foot. Patient also notes dysuria secondary to a recent UTI despite taking Keflex for 5 days. Discussed plan of care, including getting labs and imaging to monitor her symptoms. Patient agrees to the plan of care. Ordered for DX-Foot Complete 3+ left, CBC w/ diff, CMP, lactic acid, CRP quantitive (non-cardiac), sed rate, and UA w/ culture if indicated to evaluate her symptoms. Differential diagnoses include but not limited to:   Healing foot ulceration, cellulitis, underlying osteomyelitis were all considered.    Also considered recurrent or continued UTI which is not being treated by the current antibiotic selection.    The patient's workup with labs and imaging.  X-ray shows a soft tissue defect but no abnormal bone mineralization.   No evidence of osteomyelitis at this time.  Metabolic panel is unremarkable.  CBC is mildly becau dysuia CRP mildly elevated.  Sed rate is pending.    Mild the patient continue her oral antibiotics that will cover her UTI we will have her continue to do wet-to-dry dressings as taught by the nursing staff.  Will recheck in a few days if not improving sooner if worse.    The patient does have an ongoing UTI.  She had a positive culture about 2 weeks ago.  On Keflex not improving clinically.  She is not septic or toxic appearing.  She still has a fairly significant UTI should be given dose of Rocephin.  Will discuss antibiotic section with the pharmacy.    Pharmacy recommends Cipro.  First dose given here in the ED.  Will have her keep on the Keflex for her foot wound.    The patient will be taught how to use wet-to-dry dressings and this will be advised.  The plan will be orthopedic follow-up to make sure is no underlying osteomyelitis I do not see any significant evidence of this now.  Patient is a mild leukocytosis and mildly elevated CRP and a mildly elevated sed rate.  This was to be watched closely.    The plan will be antibiotics, wet-to-dry dressings and follow-up and return precautions.  Patient is agreeable to plan.  Prior to discharge patient is noted to have a decreased GFR likely secondary to ENEDELIA.  Her vital signs have been reassuring.  She does not appear to be septic or toxic.  Waiting for small fluid bolus and treated with Cipro 250 twice daily.  Patient's questions were answered.  She is agreeable with the plan.  Discharged in good condition.              ADDITIONAL PROBLEM LIST  Multiple antibiotic allergies.    DISPOSITION AND DISCUSSIONS  I have discussed management of the patient with the following physicians and FAVIO's: None    Discussion of management with other QHP or appropriate source(s): Pharmacy, Dre and Baljit    Escalation of care considered, and ultimately not performed: Considered  hospitalization but I think outpatient management is appropriate..      FINAL DIAGNOSIS  1. Encounter for wound re-check    2. Acute UTI    3. Open wound of left foot excluding one or more toes, initial encounter    4.  Dehydration.     IGerard (Nicolasa), am scribing for, and in the presence of, Sreekanth Cabezas M.D..    Electronically signed by: Gerard Zacarias (Nicolasa), 8/29/2024    ISreekanth M.D. personally performed the services described in this documentation, as scribed by Gerard Zacarias in my presence, and it is both accurate and complete.      Addendum: Prior to discharge I was prescribing her Cipro and I discussed this with the pharmacist Baljit who wants me to put her on 250 twice daily because of her GFR.  Looking through her history of GFR is acutely decreased likely from dehydration so I will give her a liter of fluid prior to discharge.  Oral fluids are now adequate alone and therefore should be given IV fluids    Make sure she can tolerate oral fluids before discharge.  She is not septic or toxic appearing.  She has a reassuring presentation she is hypertensive.  She is documented hypoxemia while here but her lungs are clear she is not coughing or having shortness of breath.  The patient's been laying in bed slumped over and her oxygen was low.  We boosted her up and she is immediately in the low to mid 90s.  During her last visit here in the ER she was satting in the 90s.  I think this is likely normal for her she is persistently in the 90s now.  She be discharged after IV fluids.    The note accurately reflects work and decisions made by me.  Sreekanth Cabezas M.D.  8/29/2024  6:14 PM

## 2024-08-30 NOTE — ED NOTES
The pt stood at bedside with assistance to transfer onto bedside commode. The pt is alert and oriented. No indicators of pain. Urine sent to lab.

## 2024-08-30 NOTE — DISCHARGE INSTRUCTIONS
Keep wound clean and dry.  Change dressing daily.  Return to the ED for more pain, swelling, redness or drainage.  Return to the ER in 2 days for wound check or follow-up with your doctor and follow-up with orthopedics patient is no underlying bone infection or does not improve.  Take Cipro as prescribed for UTI and should also continue the Keflex.

## 2024-08-30 NOTE — ED NOTES
"Pt discharged home with caregiver. The pt is alert and oriented, calm and cooperative, talks with clear coherent speech, ambulates with a steady gait, moves extremities to dress self. The pt denies pain. Vitals stable on the monitor, ERP states ok to discharge. IV discontinued and gauze placed, pt in possession of belongings. Pt provided discharge education and information pertaining to medications and follow up appointments. Pt received copy of discharge instructions and verbalized understanding. BP (!) 184/62   Pulse 84   Temp 36.1 °C (97 °F) (Temporal)   Resp 14   Ht 1.626 m (5' 4\")   Wt 68 kg (150 lb)   LMP  (LMP Unknown)   SpO2 91%   BMI 25.75 kg/m²     "

## 2024-08-30 NOTE — ED NOTES
The pt is alert and oriented. The pt denies pain. ERP at bedside reviewing results and plan of care. Vitals stable

## 2024-08-30 NOTE — ED NOTES
The pt is alert and oriented. The pt reports foot pain. The pt wheeled to room and transfers onto [facility]. The pt hooked up tot he monitor. Vitals stable

## 2024-08-31 LAB
BACTERIA UR CULT: NORMAL
SIGNIFICANT IND 70042: NORMAL
SITE SITE: NORMAL
SOURCE SOURCE: NORMAL

## 2024-09-13 ENCOUNTER — OFFICE VISIT (OUTPATIENT)
Dept: MEDICAL GROUP | Facility: MEDICAL CENTER | Age: 89
End: 2024-09-13
Payer: MEDICARE

## 2024-09-13 VITALS
HEIGHT: 64 IN | RESPIRATION RATE: 16 BRPM | WEIGHT: 140 LBS | TEMPERATURE: 97 F | BODY MASS INDEX: 23.9 KG/M2 | SYSTOLIC BLOOD PRESSURE: 120 MMHG | OXYGEN SATURATION: 93 % | HEART RATE: 72 BPM | DIASTOLIC BLOOD PRESSURE: 74 MMHG

## 2024-09-13 DIAGNOSIS — L29.9 SCALP ITCH: ICD-10-CM

## 2024-09-13 DIAGNOSIS — L89.629 PRESSURE INJURY OF SKIN OF LEFT HEEL, UNSPECIFIED INJURY STAGE: ICD-10-CM

## 2024-09-13 DIAGNOSIS — Z74.09 IMPAIRED MOBILITY AND ADLS: Chronic | ICD-10-CM

## 2024-09-13 DIAGNOSIS — I48.0 PAF (PAROXYSMAL ATRIAL FIBRILLATION) (HCC): Chronic | ICD-10-CM

## 2024-09-13 DIAGNOSIS — G62.89 OTHER POLYNEUROPATHY: ICD-10-CM

## 2024-09-13 DIAGNOSIS — Z78.9 IMPAIRED MOBILITY AND ADLS: Chronic | ICD-10-CM

## 2024-09-13 DIAGNOSIS — N39.0 ACUTE UTI: ICD-10-CM

## 2024-09-13 PROCEDURE — 99214 OFFICE O/P EST MOD 30 MIN: CPT | Performed by: NURSE PRACTITIONER

## 2024-09-13 PROCEDURE — 3078F DIAST BP <80 MM HG: CPT | Performed by: NURSE PRACTITIONER

## 2024-09-13 PROCEDURE — 3074F SYST BP LT 130 MM HG: CPT | Performed by: NURSE PRACTITIONER

## 2024-09-13 RX ORDER — DULOXETIN HYDROCHLORIDE 20 MG/1
20 CAPSULE, DELAYED RELEASE ORAL DAILY
Qty: 90 CAPSULE | Refills: 3 | Status: SHIPPED | OUTPATIENT
Start: 2024-09-13

## 2024-09-13 RX ORDER — KETOCONAZOLE 20 MG/ML
1 SHAMPOO TOPICAL
Qty: 120 ML | Refills: 6 | Status: SHIPPED | OUTPATIENT
Start: 2024-09-13

## 2024-09-13 ASSESSMENT — FIBROSIS 4 INDEX: FIB4 SCORE: 2.85

## 2024-09-13 NOTE — PROGRESS NOTES
Health Maintenance Due   Topic Date Due   • Hepatitis B Vaccine (1 of 3 - 3-dose series) Never done   • Colorectal Cancer Screen-  Never done   • Influenza Vaccine (1) 09/01/2023   • COVID-19 Vaccine (3 - 2023-24 season) 09/01/2023       Patient is due for topics as listed above but is not proceeding with Immunization(s) COVID-19, Hep B and Influenza and Colorectal Cancer Screening: Colonoscopy at this time.   Subjective:     Krystle Tavarez is a 89 y.o. female presents to discuss:     Verbal consent was acquired by the patient to use [employer] ambient listening note generation during this visit Yes     History of Present Illness  The patient presents for a follow-up visit. She is accompanied by her daughter.    Patient currently residing in a group home.  Presents with her daughter today.  Recently evaluated at the emergency room for wound check, increasing foot pain.  She has a pressure sore on her left foot.  She was in advanced care for several weeks at that time and subsequently developed a pressure wound.  Presented to the ER for worsening pain.  Concern for possible osteomyelitis.  At this time patient reports her pain has improved.  Denies any fevers or generalized feeling of unwell.  Continues to have wound care twice weekly.  Per daughter wound is improving.  Imaging was completed in the emergency room and did not show any initial signs of osteomyelitis.      She also was diagnosed with acute UTI.  In which she was prescribed Cipro.  She was encouraged to increase her hydration.    She needs to renew her Eliquis needs this medication sent to [facility] mail order.  She was also started on duloxetine and would like to continue.  Did not tolerate gabapentin in the past.  She would like to continue this and needs a refill.    ROS: : see above      Current Outpatient Medications:     apixaban (ELIQUIS) 2.5mg Tab, Take 1 Tablet by mouth 2 times a day., Disp: 180 Tablet, Rfl: 3    DULoxetine (CYMBALTA) 20 MG Cap DR Particles, Take 1 Capsule by mouth every day., Disp: 90 Capsule, Rfl: 3    ketoconazole (NIZORAL) 2 % shampoo, Apply 1 mL topically every Friday., Disp: 120 mL, Rfl: 6    metoprolol SR (TOPROL XL) 25 MG TABLET SR 24 HR, TAKE ONE-HALF (1/2) TABLET DAILY, Disp: 45 Tablet, Rfl: 3    Vitamin D, Cholecalciferol, (CHOLECALCIFEROL) 25 MCG (1000 UT) Tab, Take 1 Tablet by mouth every day., Disp: 90 Tablet,  "Rfl: 3    senna-docusate (PERICOLACE OR SENOKOT S) 8.6-50 MG Tab, Take 2 Tablets by mouth every day., Disp: 90 Tablet, Rfl: 3    Cyanocobalamin (VITAMIN B-12) 1000 MCG Tab, Take 1 Tablet by mouth every day., Disp: 90 Tablet, Rfl: 3    ascorbic acid (ASCORBIC ACID) 500 MG Tab, Take 1 Tablet by mouth every day., Disp: 90 Tablet, Rfl: 3    losartan (COZAAR) 100 MG Tab, Take 1 Tablet by mouth every day., Disp: 90 Tablet, Rfl: 3    acetaminophen (TYLENOL 8 HOUR) 650 MG CR tablet, Take 2 Tablets by mouth 2 times a day., Disp: 180 Tablet, Rfl: 3    bumetanide (BUMEX) 0.5 MG Tab, Take 1 Tablet by mouth every day., Disp: 90 Tablet, Rfl: 3    polyethylene glycol/lytes (MIRALAX) Pack, Take 1 Packet by mouth 1 time a day as needed (constipation)., Disp: , Rfl:     docusate sodium (COLACE) 100 MG Cap, Take 100 mg by mouth 1 time a day as needed for Constipation., Disp: , Rfl:     Allergies   Allergen Reactions    Codeine Unspecified     Patient reported hallucination and dizziness. Specific to tylenol with codeine      Gabapentin Unspecified     Altered mental status    Sulfa Drugs Nausea     Nausea   Other reaction(s): Not available    Tramadol Unspecified     Altered mentation       Objective:     Vitals: /74   Pulse 72   Temp 36.1 °C (97 °F)   Resp 16   Ht 1.626 m (5' 4\")   Wt 63.5 kg (140 lb) Comment: pt stated  LMP  (LMP Unknown)   SpO2 93%   BMI 24.03 kg/m²    General: Alert, pleasant, NAD, in wheelchair, c-collar in place.  HEENT: Normocephalic.  Neck supple.   Respiratory: no distress, no audible wheezing, RR -WNL  Skin: Warm, dry, no rashes.  Extremities: No leg edema. No discoloration  Neurological: No tremors  Psych:  Affect/mood is normal, judgement is good, memory is intact, grooming is appropriate.      Assessment/Plan:      Assessment & Plan  Lab orders for an electrolyte panel, kidney function test, and blood sugar level have been placed. These tests will help monitor the patient's sodium levels and " blood sugar      1. Pressure injury of skin of left heel, unspecified injury stage  Ongoing-improving with home wound care dressing changes.  Patient currently residing at group home. Recent wound care note reviewed and patient was started on Santyl has been ordered for enzymatic debridement.  At this time there is no underlying bony involvement as per recent imaging. The patient is advised to monitor the wound site closely and report any signs of infection such as fever or increased pain. If there are any changes in the wound status, a referral to renal orthopedics will be utilized-if there is any concerns for osteomyelitis.   - CBC WITH DIFFERENTIAL; Future  - Sed Rate; Future    2. Acute UTI  Acute.  The patient was treated with antibiotics for a UTI, which coincided with an elevated inflammatory marker. A recheck of the inflammatory marker will be conducted in 1 to 2 weeks to ensure resolution.  Push fluids.    3. Other polyneuropathy  Chronic.  Started on duloxetine after recent ER visit.  History of intolerance to gabapentin.  Thus far she is tolerating the low dose of duloxetine 20 mg.  We will continue with current regimen.  Refills provided.  - DULoxetine (CYMBALTA) 20 MG Cap DR Particles; Take 1 Capsule by mouth every day.  Dispense: 90 Capsule; Refill: 3    4. PAF (paroxysmal atrial fibrillation) (HCC)  Chronic.  Asymptomatic at this time.  Requesting refills of her Eliquis sent over to mail order.  - apixaban (ELIQUIS) 2.5mg Tab; Take 1 Tablet by mouth 2 times a day.  Dispense: 180 Tablet; Refill: 3    5. Impaired mobility and ADLs  High fall risk.  Continue walker.  Using wheelchair at this time.    6. Scalp itch  Stable-refill provided on ketoconazole shampoo.  - ketoconazole (NIZORAL) 2 % shampoo; Apply 1 mL topically every Friday.  Dispense: 120 mL; Refill: 6       No follow-ups on file.      Joana YORK

## 2024-09-24 ENCOUNTER — TELEPHONE (OUTPATIENT)
Dept: CARDIOLOGY | Facility: MEDICAL CENTER | Age: 89
End: 2024-09-24
Payer: MEDICARE

## 2024-09-24 NOTE — TELEPHONE ENCOUNTER
Message via [employer]--patient is opting out of remote monitoring--will continue with in office checks at this time.

## 2024-09-26 ENCOUNTER — OFFICE VISIT (OUTPATIENT)
Dept: CARDIOLOGY | Facility: MEDICAL CENTER | Age: 89
End: 2024-09-26
Attending: INTERNAL MEDICINE
Payer: MEDICARE

## 2024-09-26 VITALS
DIASTOLIC BLOOD PRESSURE: 38 MMHG | WEIGHT: 145 LBS | HEART RATE: 68 BPM | HEIGHT: 64 IN | OXYGEN SATURATION: 94 % | SYSTOLIC BLOOD PRESSURE: 114 MMHG | BODY MASS INDEX: 24.75 KG/M2

## 2024-09-26 DIAGNOSIS — I48.0 PAF (PAROXYSMAL ATRIAL FIBRILLATION) (HCC): ICD-10-CM

## 2024-09-26 DIAGNOSIS — N18.30 STAGE 3 CHRONIC KIDNEY DISEASE, UNSPECIFIED WHETHER STAGE 3A OR 3B CKD: ICD-10-CM

## 2024-09-26 DIAGNOSIS — I89.0 LYMPHEDEMA: ICD-10-CM

## 2024-09-26 DIAGNOSIS — Z79.01 CHRONIC ANTICOAGULATION: ICD-10-CM

## 2024-09-26 DIAGNOSIS — Z95.0 CARDIAC PACEMAKER IN SITU: ICD-10-CM

## 2024-09-26 DIAGNOSIS — I50.32 CHRONIC HEART FAILURE WITH PRESERVED EJECTION FRACTION (HFPEF) (HCC): Primary | ICD-10-CM

## 2024-09-26 PROCEDURE — 99213 OFFICE O/P EST LOW 20 MIN: CPT | Performed by: INTERNAL MEDICINE

## 2024-09-26 RX ORDER — NITROFURANTOIN 25; 75 MG/1; MG/1
CAPSULE ORAL
COMMUNITY
Start: 2024-08-19 | End: 2024-09-26

## 2024-09-26 RX ORDER — CALCIUM CARBONATE 500 MG/1
500 TABLET, CHEWABLE ORAL PRN
COMMUNITY

## 2024-09-26 RX ORDER — CEFUROXIME AXETIL 500 MG/1
TABLET ORAL
COMMUNITY
Start: 2024-07-08 | End: 2024-09-26

## 2024-09-26 RX ORDER — BUMETANIDE 0.5 MG/1
0.5 TABLET ORAL
Qty: 45 TABLET | Refills: 3
Start: 2024-09-26

## 2024-09-26 RX ORDER — BISACODYL 5 MG
5 TABLET, DELAYED RELEASE (ENTERIC COATED) ORAL
COMMUNITY

## 2024-09-26 ASSESSMENT — FIBROSIS 4 INDEX: FIB4 SCORE: 2.85

## 2024-09-26 NOTE — PROGRESS NOTES
CARDIOLOGY established PATIENT:    PCP: JAYLEN Castelan    1. Chronic heart failure with preserved ejection fraction (HFpEF) ([facility])    2. Cardiac pacemaker in situ    3. PAF (paroxysmal atrial fibrillation) (HCC)    4. Lymphedema    5. Chronic anticoagulation    6. Stage 3 chronic kidney disease, unspecified whether stage 3a or 3b CKD        Krystle Tavarez is here for follow-up chronic HFpEF, paroxysmal A-fib, hypertension and chronic anticoagulation.  Also followed by device clinic    Chief Complaint   Patient presents with    Atrial Fibrillation     Follow up       History:     Krystle Tavarez is a 89 y.o. female with history of paroxysmal atrial fibrillation on chronic anticoagulation, hypertension, dyslipidemia, pulmonary hypertension, moderate aortic insufficiency, HFpEF, advanced AV block with permanent pacemaker since 2015 ([facility], MRI non conditional), TIA, type 2 diabetes and chronic lower back pain is here for follow-up. [id number] score of 6 (history of TIA).     Clinic visit 3/2023: No medication changes, advised her to keep track of her blood pressure and weights to help manage her Bumex, and utilize compression socks.     She was hospitalized 7/2023: Sepsis due to UTI and had hypoxic respiratory failure due to atelectasis.  Her carvedilol was discontinued due to orthostatic concerns.  Her carvedilol was replaced with metoprolol succinate 12.5 mg.  She was discharged to SNF.     Clinic 8/2023: Advised her to wear compression socks and to take more Bumex if she gains more than 5 pounds in a week; and encouraged her to utilize incentive spirometer use.     Fell 10/2023: No head bleed on CT imaging.    Clinic 2/2024: Reminded her to keep track of her daily weights to adjust Bumex accordingly.  Placed referral to lymphedema/physical therapy.  Given muscle aches and joint aches, stopped statin.    Hospital 5/3-5/7/24: fell, C spine C7 fracture.    ER 8/2024: feet pain and burning.  "Antibiotics for cellulitis.    Has been living at a group home since earlier this summer due to needing more close care and assistance.  Daughter visits her every day.    She has overall been feeling well from a cardiac standpoint without any chest pain or shortness of breath.  Lower extremity edema has been improved.  Denies any syncope.  Still wearing c-collar until being cleared by neurosurgery, planning to see them next week.    After reviewing her medication list, she has been on apixaban 2.5 mg twice daily which was changed at the group home.    Cr 1.3 and BUN 43 with eGFR 30's 2024    Device check 2024:  1.3 yrs on battery  normal device function   > 99%  AP 2.5%    TTE 2023: Personally reviewed  No significant changes compared to previous study  Normal left ventricular systolic function. The left ventricular ejection fraction is visually estimated to be 70%.   Mild concentric left ventricular hypertrophy. Grade I diastolic dysfunction.  The right ventricle is normal in size and systolic function.  Mild to moderate aortic insufficiency.  Moderate mitral annular calcification without significant stenosis or   regurgitation.   Mild tricuspid regurgitation.  Estimated right ventricular systolic   pressure is 40 mmHg     TTE 22: Personally reviewed  Compared to the images of the prior study 2021, there has been no   significant change.   Normal left ventricular systolic function.  The left ventricular ejection fraction is visually estimated to be 60%.  Grade II diastolic dysfunction.  Normal right ventricular systolic function.  Moderate aortic insufficiency.  Mild tricuspid regurgitation.  Right ventricular systolic pressure is estimated to be 45 mmHg.     Father  of a heart attack at 74, mother  at 76 from complications of diabetes and stroke.          PE:  BP (!) 114/38 (BP Location: Left arm, Patient Position: Sitting)   Pulse 68   Ht 1.626 m (5' 4\")   Wt 65.8 kg (145 lb)   LMP  " (LMP Unknown)   SpO2 94%   BMI 24.89 kg/m²     Gen: no acute distress frail  HEENT: Symmetric face.  NECK: No JVD.   CARDIAC: Regular, Normal S1, S2, +systolic murmur faint murmur  VASCULATURE: carotids are normal bilaterally without bruit  RESP: Clear to auscultation bilaterally   EXT: trace edema. L foot wrapped.  SKIN: Warm and dry  NEURO: No gross deficits  PSYCH: Appropriate affect, participates in conversation    The ASCVD Risk score (Jesika DK, et al., 2019) failed to calculate.    Past Medical History:   Diagnosis Date    Arthritis     knees, hips    Breath shortness     with exertion     Cataract     bilat IOL     Dental disorder     full upper, partial lower     Diabetes (HCC)     pre diabetic    Heart burn     Hemorrhagic disorder (HCC)     on Eliquis    High cholesterol     diet controlled     Hyperlipidemia     Hypertension     Pacemaker 2015    Pain 2020    lower back, right leg    Pre-diabetes     TIA (transient ischemic attack)     on Eliquis    Urinary incontinence      Past Surgical History:   Procedure Laterality Date    PB LAMINOTOMY,LUMBAR DISK,1 INTRSP N/A 2/25/2020    Procedure: LAMINECTOMY, SPINE, LUMBAR, WITH DISCECTOMY- L5-S1, MEDIAL FACETECTOMY;  Surgeon: Latrell Kimble M.D.;  Location: SURGERY [facility];  Service: Neurosurgery    FORAMINOTOMY N/A 2/25/2020    Procedure: FORAMINOTOMY, SPINE;  Surgeon: Latrell Kimble M.D.;  Location: SURGERY [facility];  Service: Neurosurgery    PACEMAKER INSERTION  2015    HIP REPLACEMENT, TOTAL Right 2009    KNEE REPLACEMENT, TOTAL Right 2004    CATARACT EXTRACTION WITH IOL Bilateral 2003    CHOLECYSTECTOMY  2002    BASAL CELL EXCISION      nose and hand    BUNIONECTOMY Left      Allergies   Allergen Reactions    Codeine Unspecified     Patient reported hallucination and dizziness. Specific to tylenol with codeine      Gabapentin Unspecified     Altered mental status    Sulfa Drugs Nausea     Nausea   Other reaction(s): Not available    Tramadol  Unspecified     Altered mentation     Outpatient Encounter Medications as of 9/26/2024   Medication Sig Dispense Refill    calcium carbonate (TUMS) 500 MG Chew Tab Chew 500 mg as needed.      bisacodyl EC (DULCOLAX) 5 MG Tablet Delayed Response Take 5 mg by mouth 1 time a day as needed for Constipation.      Zinc Oxide (BALMEX EX) Apply  topically as needed.      Alum Hydroxide-Mag Carbonate (GAVISCON PO) Take  by mouth 2 times a day as needed.      bumetanide (BUMEX) 0.5 MG Tab Take 1 Tablet by mouth every 48 hours. 45 Tablet 3    apixaban (ELIQUIS) 2.5mg Tab Take 1 Tablet by mouth 2 times a day. 180 Tablet 3    DULoxetine (CYMBALTA) 20 MG Cap DR Particles Take 1 Capsule by mouth every day. 90 Capsule 3    ketoconazole (NIZORAL) 2 % shampoo Apply 1 mL topically every Friday. 120 mL 6    metoprolol SR (TOPROL XL) 25 MG TABLET SR 24 HR TAKE ONE-HALF (1/2) TABLET DAILY 45 Tablet 3    Vitamin D, Cholecalciferol, (CHOLECALCIFEROL) 25 MCG (1000 UT) Tab Take 1 Tablet by mouth every day. 90 Tablet 3    Cyanocobalamin (VITAMIN B-12) 1000 MCG Tab Take 1 Tablet by mouth every day. 90 Tablet 3    ascorbic acid (ASCORBIC ACID) 500 MG Tab Take 1 Tablet by mouth every day. 90 Tablet 3    losartan (COZAAR) 100 MG Tab Take 1 Tablet by mouth every day. 90 Tablet 3    acetaminophen (TYLENOL 8 HOUR) 650 MG CR tablet Take 2 Tablets by mouth 2 times a day. 180 Tablet 3    docusate sodium (COLACE) 100 MG Cap Take 100 mg by mouth 1 time a day as needed for Constipation.      [DISCONTINUED] cefUROXime (CEFTIN) 500 MG Tab TAKE 1 TABLET BY MOUTH TWICE DAILY AS DIRECTED (Patient not taking: Reported on 9/26/2024)      [DISCONTINUED] nitrofurantoin (MACROBID) 100 MG Cap TAKE 1 CAPSULE BY MOUTH EVERY 12 HOURS FOR 7 DAYS (Patient not taking: Reported on 9/26/2024)      [DISCONTINUED] senna-docusate (PERICOLACE OR SENOKOT S) 8.6-50 MG Tab Take 2 Tablets by mouth every day. (Patient not taking: Reported on 9/26/2024) 90 Tablet 3    [DISCONTINUED]  bumetanide (BUMEX) 0.5 MG Tab Take 1 Tablet by mouth every day. 90 Tablet 3    [DISCONTINUED] polyethylene glycol/lytes (MIRALAX) Pack Take 1 Packet by mouth 1 time a day as needed (constipation). (Patient not taking: Reported on 9/26/2024)       No facility-administered encounter medications on file as of 9/26/2024.     Social History     Socioeconomic History    Marital status:      Spouse name: Not on file    Number of children: Not on file    Years of education: Not on file    Highest education level: Not on file   Occupational History    Not on file   Tobacco Use    Smoking status: Never    Smokeless tobacco: Never   Vaping Use    Vaping status: Never Used   Substance and Sexual Activity    Alcohol use: Not Currently    Drug use: No    Sexual activity: Not Currently     Partners: Male   Other Topics Concern    Not on file   Social History Narrative    Not on file     Social Determinants of Health     Financial Resource Strain: Low Risk  (9/7/2023)    Overall Financial Resource Strain (CARDIA)     Difficulty of Paying Living Expenses: Not hard at all   Food Insecurity: No Food Insecurity (9/7/2023)    Hunger Vital Sign     Worried About Running Out of Food in the Last Year: Never true     Ran Out of Food in the Last Year: Never true   Transportation Needs: No Transportation Needs (9/7/2023)    PRAPARE - Transportation     Lack of Transportation (Medical): No     Lack of Transportation (Non-Medical): No   Physical Activity: Inactive (9/7/2023)    Exercise Vital Sign     Days of Exercise per Week: 0 days     Minutes of Exercise per Session: 0 min   Stress: No Stress Concern Present (9/7/2023)    [employer] - Occupational Stress Questionnaire     Feeling of Stress : Not at all   Social Connections: Moderately Isolated (9/7/2023)    Social Connection and Isolation Panel [NHANES]     Frequency of Communication with Friends and Family: More than three times a week     Frequency of  "Social Gatherings with Friends and Family: More than three times a week     Attends Christianity Services: Never     Active Member of Clubs or Organizations: Yes     Attends Club or Organization Meetings: More than 4 times per year     Marital Status:    Intimate Partner Violence: Not At Risk (9/7/2023)    Humiliation, Afraid, Rape, and Kick questionnaire     Fear of Current or Ex-Partner: No     Emotionally Abused: No     Physically Abused: No     Sexually Abused: No   Housing Stability: Low Risk  (9/7/2023)    Housing Stability Vital Sign     Unable to Pay for Housing in the Last Year: No     Number of Places Lived in the Last Year: 1     Unstable Housing in the Last Year: No     Family History   Problem Relation Age of Onset    Diabetes Mother     Stroke Mother     Heart Attack Father 74    No Known Problems Maternal Grandmother     No Known Problems Maternal Grandfather     No Known Problems Paternal Grandmother     No Known Problems Paternal Grandfather          Studies    Lab Results   Component Value Date/Time    TSHULTRASEN 0.590 05/02/2022 1317      No results found for: \"FREET4\"   Lab Results   Component Value Date/Time    HBA1C 6.8 (H) 04/12/2024 09:43 AM     Lab Results   Component Value Date/Time    CHOLSTRLTOT 195 04/12/2024 09:43 AM     (H) 04/12/2024 09:43 AM    HDL 44 04/12/2024 09:43 AM    TRIGLYCERIDE 149 04/12/2024 09:43 AM       Lab Results   Component Value Date/Time    SODIUM 138 08/29/2024 04:52 PM    POTASSIUM 4.9 08/29/2024 04:52 PM    CHLORIDE 101 08/29/2024 04:52 PM    CO2 22 08/29/2024 04:52 PM    GLUCOSE 153 (H) 08/29/2024 04:52 PM    BUN 43 (H) 08/29/2024 04:52 PM    CREATININE 1.30 08/29/2024 04:52 PM     Lab Results   Component Value Date/Time    ALKPHOSPHAT 81 08/29/2024 04:52 PM    ASTSGOT 26 08/29/2024 04:52 PM    ALTSGPT 8 08/29/2024 04:52 PM    TBILIRUBIN 0.4 08/29/2024 04:52 PM        Echocardiogram:  No results found for this or any previous visit.        Assessment " and Recommendations:    Problem List Items Addressed This Visit          Cardiology Medicine Problems    Chronic heart failure with preserved ejection fraction (HFpEF) (HCC) - Primary (Chronic)    Relevant Medications    bumetanide (BUMEX) 0.5 MG Tab    PAF (paroxysmal atrial fibrillation) (HCC) (Chronic)    Relevant Medications    bumetanide (BUMEX) 0.5 MG Tab    Chronic anticoagulation       Other    Cardiac pacemaker in situ (Chronic)    Lymphedema     Other Visit Diagnoses       Stage 3 chronic kidney disease, unspecified whether stage 3a or 3b CKD        Relevant Orders    Basic Metabolic Panel          Ms. Tavarez is overall doing well from cardiac standpoint, well compensated from chronic HFpEF.    Given recent rise in Cr and decline in eGFR, advised her to take the Bumex 0.5mg every other day instead of daily and plan to recheck a BMP end of next week for follow-up.  Also advised her to keep track of her weekly weights and resume taking Bumex daily if she gains at least 5 pounds a week and to notify us.    Recommend at least biannual CBC and CMP given on antihypertensive regimen and anticoagulation which are high risk medications.    On lower dose apixaban, followed at the group home.  No bleeding concerns.    Thank you for the opportunity to be involved in Krystle Tavarez 's  complex cardiovascular care; and please reach out with any questions or concerns.     () Today's E/M visit is associated with medical care services that serve as the continuing focal point for all needed health care services and/or with medical care services that  are part of ongoing cardiac care related to a patient's single, serious condition, or a complex condition: This includes  furnishing services to patients on an ongoing basis that result in care that is personalized  to the patient. The services result in a comprehensive, longitudinal, and continuous  relationship with the patient and involve delivery of team-based care  that is accessible, coordinated with other practitioners and providers, and integrated with the broader health  care landscape.     Return in about 6 months (around 3/26/2025).    Scott Garcia MD, MPH [facility]  Interventional Cardiologist  [facility]   of Clinical Internal Medicine - [facility] Axel SCHULTZ    ~ Portions of this note were completed using voice recognition software (Dragon Naturally speaking software) . Occasional transcription errors may have escaped proof reading. I have made every reasonable attempt to correct obvious errors, but I expect that there are errors of grammar and possibly content that I did not discover before finalizing the note. ~

## 2024-09-27 ENCOUNTER — TELEPHONE (OUTPATIENT)
Dept: CARDIOLOGY | Facility: MEDICAL CENTER | Age: 89
End: 2024-09-27
Payer: MEDICARE

## 2024-09-27 NOTE — TELEPHONE ENCOUNTER
Called Ana M back, she advised that they received a letter from [employer] that it was disconnected. Patient has moved to a care home and they are not continuing with home monitoring at this time. I advised that we will discontinue [employer] on our end and she can either bring the monitor in at the next appointment or they can take to an e-recycling location, such as [employer]. Ana M understood, had no further questions and was appreciative of call.        Updated [employer] portal to only in-office checks.

## 2024-09-27 NOTE — TELEPHONE ENCOUNTER
AL    Caller: Ana M - daughter    Topic/issue: Patients daughter reached out because the patient has moved to a nursing facility and no longer needs her home monitoring device. She is inquiring about how to return it.     Please advise.     Callback Number: [phone]    Thank you,     Siena DOUGLAS

## 2024-10-08 ENCOUNTER — APPOINTMENT (OUTPATIENT)
Dept: RADIOLOGY | Facility: MEDICAL CENTER | Age: 89
End: 2024-10-08
Attending: EMERGENCY MEDICINE
Payer: MEDICARE

## 2024-10-08 ENCOUNTER — HOSPITAL ENCOUNTER (EMERGENCY)
Facility: MEDICAL CENTER | Age: 89
End: 2024-10-08
Attending: EMERGENCY MEDICINE
Payer: MEDICARE

## 2024-10-08 VITALS
RESPIRATION RATE: 18 BRPM | HEIGHT: 64 IN | BODY MASS INDEX: 24.84 KG/M2 | TEMPERATURE: 98.6 F | HEART RATE: 78 BPM | WEIGHT: 145.5 LBS | DIASTOLIC BLOOD PRESSURE: 84 MMHG | OXYGEN SATURATION: 93 % | SYSTOLIC BLOOD PRESSURE: 183 MMHG

## 2024-10-08 DIAGNOSIS — N30.90 CYSTITIS: ICD-10-CM

## 2024-10-08 DIAGNOSIS — R31.0 GROSS HEMATURIA: ICD-10-CM

## 2024-10-08 LAB
ALBUMIN SERPL BCP-MCNC: 3.5 G/DL (ref 3.2–4.9)
ALBUMIN/GLOB SERPL: 0.9 G/DL
ALP SERPL-CCNC: 85 U/L (ref 30–99)
ALT SERPL-CCNC: 9 U/L (ref 2–50)
ANION GAP SERPL CALC-SCNC: 14 MMOL/L (ref 7–16)
APPEARANCE UR: CLEAR
APTT PPP: 33.3 SEC (ref 24.7–36)
AST SERPL-CCNC: 21 U/L (ref 12–45)
BACTERIA #/AREA URNS HPF: NEGATIVE /HPF
BASOPHILS # BLD AUTO: 0.4 % (ref 0–1.8)
BASOPHILS # BLD: 0.03 K/UL (ref 0–0.12)
BILIRUB SERPL-MCNC: 0.5 MG/DL (ref 0.1–1.5)
BILIRUB UR QL STRIP.AUTO: NEGATIVE
BUN SERPL-MCNC: 22 MG/DL (ref 8–22)
CALCIUM ALBUM COR SERPL-MCNC: 10.5 MG/DL (ref 8.5–10.5)
CALCIUM SERPL-MCNC: 10.1 MG/DL (ref 8.5–10.5)
CHLORIDE SERPL-SCNC: 101 MMOL/L (ref 96–112)
CO2 SERPL-SCNC: 23 MMOL/L (ref 20–33)
COLOR UR: YELLOW
CREAT SERPL-MCNC: 0.87 MG/DL (ref 0.5–1.4)
EOSINOPHIL # BLD AUTO: 0.15 K/UL (ref 0–0.51)
EOSINOPHIL NFR BLD: 2.1 % (ref 0–6.9)
EPI CELLS #/AREA URNS HPF: ABNORMAL /HPF
ERYTHROCYTE [DISTWIDTH] IN BLOOD BY AUTOMATED COUNT: 49.7 FL (ref 35.9–50)
GFR SERPLBLD CREATININE-BSD FMLA CKD-EPI: 63 ML/MIN/1.73 M 2
GLOBULIN SER CALC-MCNC: 3.7 G/DL (ref 1.9–3.5)
GLUCOSE SERPL-MCNC: 155 MG/DL (ref 65–99)
GLUCOSE UR STRIP.AUTO-MCNC: NEGATIVE MG/DL
HCT VFR BLD AUTO: 41.5 % (ref 37–47)
HGB BLD-MCNC: 13.4 G/DL (ref 12–16)
HYALINE CASTS #/AREA URNS LPF: ABNORMAL /LPF
IMM GRANULOCYTES # BLD AUTO: 0.04 K/UL (ref 0–0.11)
IMM GRANULOCYTES NFR BLD AUTO: 0.6 % (ref 0–0.9)
INR PPP: 1.03 (ref 0.87–1.13)
KETONES UR STRIP.AUTO-MCNC: NEGATIVE MG/DL
LEUKOCYTE ESTERASE UR QL STRIP.AUTO: ABNORMAL
LYMPHOCYTES # BLD AUTO: 3.42 K/UL (ref 1–4.8)
LYMPHOCYTES NFR BLD: 47.9 % (ref 22–41)
MCH RBC QN AUTO: 30.4 PG (ref 27–33)
MCHC RBC AUTO-ENTMCNC: 32.3 G/DL (ref 32.2–35.5)
MCV RBC AUTO: 94.1 FL (ref 81.4–97.8)
MICRO URNS: ABNORMAL
MONOCYTES # BLD AUTO: 0.54 K/UL (ref 0–0.85)
MONOCYTES NFR BLD AUTO: 7.6 % (ref 0–13.4)
NEUTROPHILS # BLD AUTO: 2.96 K/UL (ref 1.82–7.42)
NEUTROPHILS NFR BLD: 41.4 % (ref 44–72)
NITRITE UR QL STRIP.AUTO: NEGATIVE
NRBC # BLD AUTO: 0 K/UL
NRBC BLD-RTO: 0 /100 WBC (ref 0–0.2)
PH UR STRIP.AUTO: 6 [PH] (ref 5–8)
PLATELET # BLD AUTO: 273 K/UL (ref 164–446)
PMV BLD AUTO: 9.3 FL (ref 9–12.9)
POTASSIUM SERPL-SCNC: 4.6 MMOL/L (ref 3.6–5.5)
PROT SERPL-MCNC: 7.2 G/DL (ref 6–8.2)
PROT UR QL STRIP: 30 MG/DL
PROTHROMBIN TIME: 13.5 SEC (ref 12–14.6)
RBC # BLD AUTO: 4.41 M/UL (ref 4.2–5.4)
RBC # URNS HPF: >150 /HPF
RBC UR QL AUTO: ABNORMAL
SODIUM SERPL-SCNC: 138 MMOL/L (ref 135–145)
SP GR UR STRIP.AUTO: 1.01
UROBILINOGEN UR STRIP.AUTO-MCNC: 0.2 MG/DL
WBC # BLD AUTO: 7.1 K/UL (ref 4.8–10.8)
WBC #/AREA URNS HPF: ABNORMAL /HPF

## 2024-10-08 PROCEDURE — 87186 SC STD MICRODIL/AGAR DIL: CPT

## 2024-10-08 PROCEDURE — 85025 COMPLETE CBC W/AUTO DIFF WBC: CPT

## 2024-10-08 PROCEDURE — 87086 URINE CULTURE/COLONY COUNT: CPT

## 2024-10-08 PROCEDURE — 80053 COMPREHEN METABOLIC PANEL: CPT

## 2024-10-08 PROCEDURE — 700117 HCHG RX CONTRAST REV CODE 255: Performed by: EMERGENCY MEDICINE

## 2024-10-08 PROCEDURE — 74178 CT ABD&PLV WO CNTR FLWD CNTR: CPT

## 2024-10-08 PROCEDURE — 87077 CULTURE AEROBIC IDENTIFY: CPT | Mod: 91

## 2024-10-08 PROCEDURE — 85730 THROMBOPLASTIN TIME PARTIAL: CPT

## 2024-10-08 PROCEDURE — 81001 URINALYSIS AUTO W/SCOPE: CPT

## 2024-10-08 PROCEDURE — 85610 PROTHROMBIN TIME: CPT

## 2024-10-08 PROCEDURE — 99284 EMERGENCY DEPT VISIT MOD MDM: CPT

## 2024-10-08 PROCEDURE — 36415 COLL VENOUS BLD VENIPUNCTURE: CPT

## 2024-10-08 RX ORDER — CEPHALEXIN 500 MG/1
500 CAPSULE ORAL 4 TIMES DAILY
Qty: 12 CAPSULE | Refills: 0 | Status: ACTIVE | OUTPATIENT
Start: 2024-10-08 | End: 2024-10-11

## 2024-10-08 RX ADMIN — IOHEXOL 100 ML: 350 INJECTION, SOLUTION INTRAVENOUS at 21:59

## 2024-10-08 ASSESSMENT — FIBROSIS 4 INDEX: FIB4 SCORE: 2.85

## 2024-11-11 ENCOUNTER — APPOINTMENT (OUTPATIENT)
Dept: RADIOLOGY | Facility: MEDICAL CENTER | Age: 89
DRG: 086 | End: 2024-11-11
Attending: EMERGENCY MEDICINE
Payer: MEDICARE

## 2024-11-11 ENCOUNTER — APPOINTMENT (OUTPATIENT)
Dept: RADIOLOGY | Facility: MEDICAL CENTER | Age: 89
DRG: 086 | End: 2024-11-11
Attending: NURSE PRACTITIONER
Payer: MEDICARE

## 2024-11-11 ENCOUNTER — HOSPITAL ENCOUNTER (INPATIENT)
Facility: MEDICAL CENTER | Age: 89
LOS: 2 days | DRG: 086 | End: 2024-11-14
Attending: EMERGENCY MEDICINE | Admitting: SURGERY
Payer: MEDICARE

## 2024-11-11 DIAGNOSIS — S01.01XA LACERATION OF SCALP, INITIAL ENCOUNTER: ICD-10-CM

## 2024-11-11 DIAGNOSIS — Z79.01 CHRONIC ANTICOAGULATION: ICD-10-CM

## 2024-11-11 DIAGNOSIS — I62.9 INTRACRANIAL HEMORRHAGE (HCC): ICD-10-CM
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
## 2024-11-11 DIAGNOSIS — S12.600A CLOSED DISPLACED FRACTURE OF SEVENTH CERVICAL VERTEBRA, UNSPECIFIED FRACTURE MORPHOLOGY, INITIAL ENCOUNTER (HCC): ICD-10-CM

## 2024-11-11 DIAGNOSIS — S06.5X0A TRAUMATIC SUBDURAL HEMORRHAGE WITHOUT LOSS OF CONSCIOUSNESS, INITIAL ENCOUNTER (HCC): ICD-10-CM

## 2024-11-11 DIAGNOSIS — T14.90XA TRAUMA: ICD-10-CM

## 2024-11-11 DIAGNOSIS — N30.01 ACUTE CYSTITIS WITH HEMATURIA: ICD-10-CM

## 2024-11-11 DIAGNOSIS — I48.0 PAF (PAROXYSMAL ATRIAL FIBRILLATION) (HCC): Chronic | ICD-10-CM

## 2024-11-11 DIAGNOSIS — S91.302A NON HEALING LEFT HEEL WOUND: ICD-10-CM

## 2024-11-11 DIAGNOSIS — S01.01XA SCALP LACERATION, INITIAL ENCOUNTER: ICD-10-CM

## 2024-11-11 DIAGNOSIS — S09.90XA CLOSED HEAD INJURY, INITIAL ENCOUNTER: ICD-10-CM

## 2024-11-11 DIAGNOSIS — R29.6 FALLS: ICD-10-CM

## 2024-11-11 PROBLEM — S06.5XAA TRAUMATIC SUBDURAL HEMATOMA (HCC): Status: ACTIVE | Noted: 2024-11-11

## 2024-11-11 PROBLEM — Z53.09 CONTRAINDICATION TO ANTICOAGULATION THERAPY: Status: ACTIVE | Noted: 2024-11-11

## 2024-11-11 PROBLEM — I48.91 ATRIAL FIBRILLATION (HCC): Status: ACTIVE | Noted: 2020-02-05

## 2024-11-11 LAB
ANION GAP SERPL CALC-SCNC: 10 MMOL/L (ref 7–16)
APTT PPP: 32.3 SEC (ref 24.7–36)
BASOPHILS # BLD AUTO: 0.4 % (ref 0–1.8)
BASOPHILS # BLD: 0.03 K/UL (ref 0–0.12)
BUN SERPL-MCNC: 27 MG/DL (ref 8–22)
CALCIUM SERPL-MCNC: 9.5 MG/DL (ref 8.5–10.5)
CFT BLD TEG: 5.7 MIN (ref 4.6–9.1)
CFT P HPASE BLD TEG: 5.6 MIN (ref 4.3–8.3)
CHLORIDE SERPL-SCNC: 105 MMOL/L (ref 96–112)
CLOT ANGLE BLD TEG: 79.2 DEGREES (ref 63–78)
CO2 SERPL-SCNC: 24 MMOL/L (ref 20–33)
CREAT SERPL-MCNC: 1.03 MG/DL (ref 0.5–1.4)
CT.EXTRINSIC BLD ROTEM: 0.8 MIN (ref 0.8–2.1)
EOSINOPHIL # BLD AUTO: 0.13 K/UL (ref 0–0.51)
EOSINOPHIL NFR BLD: 1.6 % (ref 0–6.9)
ERYTHROCYTE [DISTWIDTH] IN BLOOD BY AUTOMATED COUNT: 50.8 FL (ref 35.9–50)
GFR SERPLBLD CREATININE-BSD FMLA CKD-EPI: 51 ML/MIN/1.73 M 2
GLUCOSE BLD STRIP.AUTO-MCNC: 134 MG/DL (ref 65–99)
GLUCOSE SERPL-MCNC: 169 MG/DL (ref 65–99)
HCT VFR BLD AUTO: 35.3 % (ref 37–47)
HGB BLD-MCNC: 11.6 G/DL (ref 12–16)
IMM GRANULOCYTES # BLD AUTO: 0.03 K/UL (ref 0–0.11)
IMM GRANULOCYTES NFR BLD AUTO: 0.4 % (ref 0–0.9)
INR PPP: 1.12 (ref 0.87–1.13)
LYMPHOCYTES # BLD AUTO: 2.41 K/UL (ref 1–4.8)
LYMPHOCYTES NFR BLD: 30 % (ref 22–41)
MCF BLD TEG: 68.3 MM (ref 52–69)
MCF.PLATELET INHIB BLD ROTEM: 34.3 MM (ref 15–32)
MCH RBC QN AUTO: 31.6 PG (ref 27–33)
MCHC RBC AUTO-ENTMCNC: 32.9 G/DL (ref 32.2–35.5)
MCV RBC AUTO: 96.2 FL (ref 81.4–97.8)
MONOCYTES # BLD AUTO: 0.72 K/UL (ref 0–0.85)
MONOCYTES NFR BLD AUTO: 9 % (ref 0–13.4)
NEUTROPHILS # BLD AUTO: 4.7 K/UL (ref 1.82–7.42)
NEUTROPHILS NFR BLD: 58.6 % (ref 44–72)
NRBC # BLD AUTO: 0 K/UL
NRBC BLD-RTO: 0 /100 WBC (ref 0–0.2)
PA AA BLD-ACNC: 2.5 % (ref 0–11)
PA ADP BLD-ACNC: 5.9 % (ref 0–17)
PLATELET # BLD AUTO: 297 K/UL (ref 164–446)
PMV BLD AUTO: 9.2 FL (ref 9–12.9)
POTASSIUM SERPL-SCNC: 4.5 MMOL/L (ref 3.6–5.5)
PROTHROMBIN TIME: 14.4 SEC (ref 12–14.6)
RBC # BLD AUTO: 3.67 M/UL (ref 4.2–5.4)
SODIUM SERPL-SCNC: 139 MMOL/L (ref 135–145)
TEG ALGORITHM TGALG: ABNORMAL
WBC # BLD AUTO: 8 K/UL (ref 4.8–10.8)

## 2024-11-11 PROCEDURE — 82962 GLUCOSE BLOOD TEST: CPT

## 2024-11-11 PROCEDURE — 96365 THER/PROPH/DIAG IV INF INIT: CPT

## 2024-11-11 PROCEDURE — 70450 CT HEAD/BRAIN W/O DYE: CPT

## 2024-11-11 PROCEDURE — 0HQ0XZZ REPAIR SCALP SKIN, EXTERNAL APPROACH: ICD-10-PCS | Performed by: NEUROLOGICAL SURGERY

## 2024-11-11 PROCEDURE — G0378 HOSPITAL OBSERVATION PER HR: HCPCS

## 2024-11-11 PROCEDURE — 700111 HCHG RX REV CODE 636 W/ 250 OVERRIDE (IP): Mod: JZ,JG | Performed by: EMERGENCY MEDICINE

## 2024-11-11 PROCEDURE — 99285 EMERGENCY DEPT VISIT HI MDM: CPT

## 2024-11-11 PROCEDURE — 305308 HCHG STAPLER,SKIN,DISP.

## 2024-11-11 PROCEDURE — 96375 TX/PRO/DX INJ NEW DRUG ADDON: CPT

## 2024-11-11 PROCEDURE — 85347 COAGULATION TIME ACTIVATED: CPT

## 2024-11-11 PROCEDURE — 700102 HCHG RX REV CODE 250 W/ 637 OVERRIDE(OP): Performed by: NURSE PRACTITIONER

## 2024-11-11 PROCEDURE — 85576 BLOOD PLATELET AGGREGATION: CPT | Mod: 91

## 2024-11-11 PROCEDURE — 305948 HCHG GREEN TRAUMA ACT PRE-NOTIFY NO CC

## 2024-11-11 PROCEDURE — 36415 COLL VENOUS BLD VENIPUNCTURE: CPT

## 2024-11-11 PROCEDURE — 304217 HCHG IRRIGATION SYSTEM

## 2024-11-11 PROCEDURE — 700101 HCHG RX REV CODE 250: Performed by: EMERGENCY MEDICINE

## 2024-11-11 PROCEDURE — 72125 CT NECK SPINE W/O DYE: CPT

## 2024-11-11 PROCEDURE — 85384 FIBRINOGEN ACTIVITY: CPT

## 2024-11-11 PROCEDURE — 51798 US URINE CAPACITY MEASURE: CPT

## 2024-11-11 PROCEDURE — 770022 HCHG ROOM/CARE - ICU (200)

## 2024-11-11 PROCEDURE — A9270 NON-COVERED ITEM OR SERVICE: HCPCS | Performed by: NURSE PRACTITIONER

## 2024-11-11 PROCEDURE — 85025 COMPLETE CBC W/AUTO DIFF WBC: CPT

## 2024-11-11 PROCEDURE — 85730 THROMBOPLASTIN TIME PARTIAL: CPT

## 2024-11-11 PROCEDURE — 85610 PROTHROMBIN TIME: CPT

## 2024-11-11 PROCEDURE — 304999 HCHG REPAIR-SIMPLE/INTERMED LEVEL 1

## 2024-11-11 PROCEDURE — 80048 BASIC METABOLIC PNL TOTAL CA: CPT

## 2024-11-11 PROCEDURE — 99222 1ST HOSP IP/OBS MODERATE 55: CPT | Performed by: SURGERY

## 2024-11-11 RX ORDER — SENNOSIDES 8.6 MG
650 CAPSULE ORAL EVERY 6 HOURS PRN
COMMUNITY

## 2024-11-11 RX ORDER — LABETALOL HYDROCHLORIDE 5 MG/ML
10 INJECTION, SOLUTION INTRAVENOUS ONCE
Status: COMPLETED | OUTPATIENT
Start: 2024-11-11 | End: 2024-11-11

## 2024-11-11 RX ORDER — HYDROMORPHONE HYDROCHLORIDE 1 MG/ML
0.25 INJECTION, SOLUTION INTRAMUSCULAR; INTRAVENOUS; SUBCUTANEOUS
Status: DISCONTINUED | OUTPATIENT
Start: 2024-11-11 | End: 2024-11-14 | Stop reason: HOSPADM

## 2024-11-11 RX ORDER — ACETAMINOPHEN 325 MG/1
650 TABLET ORAL EVERY 6 HOURS PRN
Status: DISCONTINUED | OUTPATIENT
Start: 2024-11-16 | End: 2024-11-14 | Stop reason: HOSPADM

## 2024-11-11 RX ORDER — ONDANSETRON 2 MG/ML
4 INJECTION INTRAMUSCULAR; INTRAVENOUS ONCE
Status: COMPLETED | OUTPATIENT
Start: 2024-11-11 | End: 2024-11-11

## 2024-11-11 RX ORDER — LEVETIRACETAM 500 MG/1
500 TABLET ORAL 2 TIMES DAILY
Status: DISCONTINUED | OUTPATIENT
Start: 2024-11-11 | End: 2024-11-14 | Stop reason: HOSPADM

## 2024-11-11 RX ORDER — OXYCODONE HYDROCHLORIDE 5 MG/1
5 TABLET ORAL
Status: DISCONTINUED | OUTPATIENT
Start: 2024-11-11 | End: 2024-11-14 | Stop reason: HOSPADM

## 2024-11-11 RX ORDER — BUMETANIDE 0.5 MG/1
0.5 TABLET ORAL
Status: DISCONTINUED | OUTPATIENT
Start: 2024-11-12 | End: 2024-11-14 | Stop reason: HOSPADM

## 2024-11-11 RX ORDER — DEXTROSE MONOHYDRATE 25 G/50ML
25 INJECTION, SOLUTION INTRAVENOUS
Status: DISCONTINUED | OUTPATIENT
Start: 2024-11-11 | End: 2024-11-12

## 2024-11-11 RX ORDER — IPRATROPIUM BROMIDE AND ALBUTEROL SULFATE 2.5; .5 MG/3ML; MG/3ML
3 SOLUTION RESPIRATORY (INHALATION)
Status: DISCONTINUED | OUTPATIENT
Start: 2024-11-11 | End: 2024-11-14 | Stop reason: HOSPADM

## 2024-11-11 RX ORDER — AMOXICILLIN 250 MG
1 CAPSULE ORAL NIGHTLY
Status: DISCONTINUED | OUTPATIENT
Start: 2024-11-11 | End: 2024-11-14 | Stop reason: HOSPADM

## 2024-11-11 RX ORDER — POLYETHYLENE GLYCOL 3350 17 G/17G
1 POWDER, FOR SOLUTION ORAL 2 TIMES DAILY
Status: DISCONTINUED | OUTPATIENT
Start: 2024-11-11 | End: 2024-11-14 | Stop reason: HOSPADM

## 2024-11-11 RX ORDER — INSULIN LISPRO 100 [IU]/ML
1-6 INJECTION, SOLUTION INTRAVENOUS; SUBCUTANEOUS EVERY 6 HOURS
Status: DISCONTINUED | OUTPATIENT
Start: 2024-11-12 | End: 2024-11-12

## 2024-11-11 RX ORDER — DULOXETIN HYDROCHLORIDE 20 MG/1
20 CAPSULE, DELAYED RELEASE ORAL DAILY
Status: DISCONTINUED | OUTPATIENT
Start: 2024-11-12 | End: 2024-11-14 | Stop reason: HOSPADM

## 2024-11-11 RX ORDER — LEVETIRACETAM 500 MG/5ML
500 INJECTION, SOLUTION, CONCENTRATE INTRAVENOUS EVERY 12 HOURS
Status: DISCONTINUED | OUTPATIENT
Start: 2024-11-11 | End: 2024-11-11

## 2024-11-11 RX ORDER — DOCUSATE SODIUM 100 MG/1
100 CAPSULE, LIQUID FILLED ORAL 2 TIMES DAILY
Status: DISCONTINUED | OUTPATIENT
Start: 2024-11-11 | End: 2024-11-14 | Stop reason: HOSPADM

## 2024-11-11 RX ORDER — BISACODYL 10 MG
10 SUPPOSITORY, RECTAL RECTAL
Status: DISCONTINUED | OUTPATIENT
Start: 2024-11-11 | End: 2024-11-14 | Stop reason: HOSPADM

## 2024-11-11 RX ORDER — OXYCODONE HYDROCHLORIDE 5 MG/1
2.5 TABLET ORAL
Status: DISCONTINUED | OUTPATIENT
Start: 2024-11-11 | End: 2024-11-14 | Stop reason: HOSPADM

## 2024-11-11 RX ORDER — ACETAMINOPHEN 325 MG/1
650 TABLET ORAL EVERY 6 HOURS
Status: DISCONTINUED | OUTPATIENT
Start: 2024-11-11 | End: 2024-11-14 | Stop reason: HOSPADM

## 2024-11-11 RX ORDER — SODIUM PHOSPHATE,MONO-DIBASIC 19G-7G/118
1 ENEMA (ML) RECTAL
Status: DISCONTINUED | OUTPATIENT
Start: 2024-11-11 | End: 2024-11-14 | Stop reason: HOSPADM

## 2024-11-11 RX ORDER — ONDANSETRON 2 MG/ML
4 INJECTION INTRAMUSCULAR; INTRAVENOUS EVERY 4 HOURS PRN
Status: DISCONTINUED | OUTPATIENT
Start: 2024-11-11 | End: 2024-11-14 | Stop reason: HOSPADM

## 2024-11-11 RX ORDER — MORPHINE SULFATE 4 MG/ML
4 INJECTION INTRAVENOUS ONCE
Status: COMPLETED | OUTPATIENT
Start: 2024-11-11 | End: 2024-11-11

## 2024-11-11 RX ORDER — METOPROLOL SUCCINATE 25 MG/1
12.5 TABLET, EXTENDED RELEASE ORAL DAILY
Status: DISCONTINUED | OUTPATIENT
Start: 2024-11-11 | End: 2024-11-14 | Stop reason: HOSPADM

## 2024-11-11 RX ORDER — ONDANSETRON 4 MG/1
4 TABLET, ORALLY DISINTEGRATING ORAL EVERY 4 HOURS PRN
Status: DISCONTINUED | OUTPATIENT
Start: 2024-11-11 | End: 2024-11-14 | Stop reason: HOSPADM

## 2024-11-11 RX ORDER — AMOXICILLIN 250 MG
1 CAPSULE ORAL
Status: DISCONTINUED | OUTPATIENT
Start: 2024-11-11 | End: 2024-11-14 | Stop reason: HOSPADM

## 2024-11-11 RX ORDER — LIDOCAINE HYDROCHLORIDE AND EPINEPHRINE BITARTRATE 20; .01 MG/ML; MG/ML
20 INJECTION, SOLUTION SUBCUTANEOUS ONCE
Status: COMPLETED | OUTPATIENT
Start: 2024-11-11 | End: 2024-11-11

## 2024-11-11 RX ORDER — HYDRALAZINE HYDROCHLORIDE 20 MG/ML
10 INJECTION INTRAMUSCULAR; INTRAVENOUS EVERY 6 HOURS PRN
Status: DISCONTINUED | OUTPATIENT
Start: 2024-11-11 | End: 2024-11-14 | Stop reason: HOSPADM

## 2024-11-11 RX ORDER — LOSARTAN POTASSIUM 50 MG/1
100 TABLET ORAL DAILY
Status: DISCONTINUED | OUTPATIENT
Start: 2024-11-11 | End: 2024-11-14 | Stop reason: HOSPADM

## 2024-11-11 RX ADMIN — PROTHROMBIN, COAGULATION FACTOR VII HUMAN, COAGULATION FACTOR IX HUMAN, COAGULATION FACTOR X HUMAN, PROTEIN C, PROTEIN S HUMAN, AND WATER 3500 UNITS: KIT at 09:11

## 2024-11-11 RX ADMIN — METOPROLOL SUCCINATE 12.5 MG: 25 TABLET, EXTENDED RELEASE ORAL at 18:32

## 2024-11-11 RX ADMIN — LEVETIRACETAM 500 MG: 500 TABLET, FILM COATED ORAL at 18:32

## 2024-11-11 RX ADMIN — MORPHINE SULFATE 4 MG: 4 INJECTION INTRAVENOUS at 09:24

## 2024-11-11 RX ADMIN — ONDANSETRON 4 MG: 2 INJECTION INTRAMUSCULAR; INTRAVENOUS at 09:24

## 2024-11-11 RX ADMIN — DOCUSATE SODIUM 100 MG: 100 CAPSULE, LIQUID FILLED ORAL at 18:08

## 2024-11-11 RX ADMIN — LOSARTAN POTASSIUM 100 MG: 50 TABLET, FILM COATED ORAL at 18:08

## 2024-11-11 RX ADMIN — LIDOCAINE HYDROCHLORIDE,EPINEPHRINE BITARTRATE 20 ML: 20; .01 INJECTION, SOLUTION INFILTRATION; PERINEURAL at 09:30

## 2024-11-11 RX ADMIN — LABETALOL HYDROCHLORIDE 10 MG: 5 INJECTION INTRAVENOUS at 08:56

## 2024-11-11 RX ADMIN — ACETAMINOPHEN 650 MG: 325 TABLET ORAL at 15:06

## 2024-11-11 RX ADMIN — ACETAMINOPHEN 650 MG: 325 TABLET ORAL at 18:08

## 2024-11-11 ASSESSMENT — PAIN DESCRIPTION - PAIN TYPE
TYPE: ACUTE PAIN

## 2024-11-11 ASSESSMENT — COPD QUESTIONNAIRES
COPD SCREENING SCORE: 5
DO YOU EVER COUGH UP ANY MUCUS OR PHLEGM?: NO/ONLY WITH OCCASIONAL COLDS OR INFECTIONS
HAVE YOU SMOKED AT LEAST 100 CIGARETTES IN YOUR ENTIRE LIFE: YES
DURING THE PAST 4 WEEKS HOW MUCH DID YOU FEEL SHORT OF BREATH: NONE/LITTLE OF THE TIME

## 2024-11-11 ASSESSMENT — FIBROSIS 4 INDEX: FIB4 SCORE: 2.333333333333333333

## 2024-11-11 NOTE — ED TRIAGE NOTES
Pt BIB EMS for   Chief Complaint   Patient presents with    T-5000 Head Injury     Pt BIB EMS from home for TBI, patient was in bathroom fell and possibly hit counter with back of head - laceration to back of head. Pt is on eliquis. Normally A&O x4, currently A&Ox1. EMS reports orientation decreased in route.     Pt was placed in c-collar by EMS, midline neck pain, hx of cervical spine facture.     On 2L, RA baseline.

## 2024-11-11 NOTE — CONSULTS
DATE OF CONSULTATION:  11/11/2024     REFERRING PHYSICIAN:   Xavier Mora M.D.     CONSULTING PHYSICIAN:  Jose Ballard M.D.     REASON FOR CONSULTATION:  I have been asked by Dr. Mora to see the patient in surgical consultation for evaluation of injuries sustained in a fall.    TIME CONSULTED: 08:24.  TIME RESPONDED: 08:40.    TRIAGE CATEGORY: The patient was triaged as a T-5000 Activation. Preliminary evaluation was conducted by the emergency department physician. See Trauma Narrator for full details.    HISTORY OF PRESENT ILLNESS: The patient is a 89 year-old elderly woman who was injured in a fall. The patient suffered a mechanical ground-level fall. She had no loss of consciousness. The patient is chronically anticoagulated with apixaban (Eliquis). Her last dose of medication was administered yesterday. Her anticoagulation was promptly reversed with Balfaxar in the Emergency Department. She complains of pain in the posterior scalp.    PAST MEDICAL HISTORY:  has a past medical history of Arthritis, Breath shortness, Cataract, Dental disorder, Diabetes (HCC), Heart burn, Hemorrhagic disorder (HCC), High cholesterol, Hyperlipidemia, Hypertension, Pacemaker (2015), Pain (2020), Pre-diabetes, TIA (transient ischemic attack), and Urinary incontinence.    PAST SURGICAL HISTORY:  has a past surgical history that includes hip replacement, total (Right, 2009); knee replacement, total (Right, 2004); bunionectomy (Left); basal cell excision; cataract extraction with iol (Bilateral, 2003); pacemaker insertion (2015); cholecystectomy (2002); pr laminotomy,lumbar disk,1 intrsp (N/A, 2/25/2020); and foraminotomy (N/A, 2/25/2020).    ALLERGIES:   Allergies   Allergen Reactions    Codeine Unspecified     Patient reported hallucination and dizziness. Specific to tylenol with codeine      Gabapentin Unspecified     Altered mental status    Sulfa Drugs Nausea     Nausea   Other reaction(s): Not available    Tramadol  Unspecified     Altered mentation     CURRENT MEDICATIONS:   Home Medications       Reviewed by Harry Jewell (Pharmacy Tech) on 11/11/24 at 0918  Med List Status: Complete     Medication Last Dose Status   acetaminophen (TYLENOL 8 HOUR) 650 MG CR tablet 11/10/2024 Active   acetaminophen (TYLENOL 8 HOUR) 650 MG CR tablet 11/11/2024 Active   Alum Hydroxide-Mag Carbonate (GAVISCON PO) Unknown Active   apixaban (ELIQUIS) 2.5mg Tab 11/10/2024 Active   ascorbic acid (ASCORBIC ACID) 500 MG Tab 11/10/2024 Active   bisacodyl EC (DULCOLAX) 5 MG Tablet Delayed Response Unknown Active   bumetanide (BUMEX) 0.5 MG Tab 11/9/2024 Active   calcium carbonate (TUMS) 500 MG Chew Tab Unknown Active   Cyanocobalamin (VITAMIN B-12) 1000 MCG Tab 11/10/2024 Active   docusate sodium (COLACE) 100 MG Cap 11/10/2024 Active   DULoxetine (CYMBALTA) 20 MG Cap DR Particles 11/10/2024 Active   ketoconazole (NIZORAL) 2 % shampoo Not Taking Active   losartan (COZAAR) 100 MG Tab 11/10/2024 Active   metoprolol SR (TOPROL XL) 25 MG TABLET SR 24 HR 11/10/2024 Active   Vitamin D, Cholecalciferol, (CHOLECALCIFEROL) 25 MCG (1000 UT) Tab 11/10/2024 Active   Zinc Oxide (BALMEX EX) Unknown Active                  Audit from Redirected Encounters    **Home medications have not yet been reviewed for this encounter**     FAMILY HISTORY: family history includes Diabetes in her mother; Heart Attack (age of onset: 74) in her father; No Known Problems in her maternal grandfather, maternal grandmother, paternal grandfather, and paternal grandmother; Stroke in her mother.    SOCIAL HISTORY:  reports that she has never smoked. She has never used smokeless tobacco. She reports that she does not currently use alcohol. She reports that she does not use drugs.    REVIEW OF SYSTEMS: Comprehensive review of systems is negative with the exception of the aforementioned HPI, PMH, and PSH bullets in accordance with CMS guidelines.    PHYSICAL EXAMINATION:      Vital  "Signs: /69   Pulse 66   Temp 36.3 °C (97.4 °F)   Resp 20   Ht 1.626 m (5' 4\")   Wt 66 kg (145 lb 8.1 oz)   SpO2 96%   Physical Exam  Vitals and nursing note reviewed.   Constitutional:       General: She is sleeping.      Appearance: She is underweight.      Interventions: Cervical collar and nasal cannula in place.   HENT:      Right Ear: Tympanic membrane normal.      Left Ear: Tympanic membrane normal.      Mouth/Throat:      Mouth: Mucous membranes are dry.      Pharynx: Oropharynx is clear.   Eyes:      Extraocular Movements: Extraocular movements intact.      Conjunctiva/sclera: Conjunctivae normal.      Pupils: Pupils are equal, round, and reactive to light.   Cardiovascular:      Rate and Rhythm: Normal rate.      Pulses: Normal pulses.   Pulmonary:      Effort: Pulmonary effort is normal. No respiratory distress.      Breath sounds: Normal breath sounds.   Chest:      Chest wall: No tenderness.   Abdominal:      General: There is no distension.      Palpations: Abdomen is soft.      Tenderness: There is no abdominal tenderness. There is no guarding.   Musculoskeletal:         General: No swelling or deformity. Normal range of motion.   Skin:     General: Skin is warm and dry.      Capillary Refill: Capillary refill takes 2 to 3 seconds.   Neurological:      General: No focal deficit present.      Mental Status: She is oriented to person, place, and time and easily aroused.      Cranial Nerves: No cranial nerve deficit.      Sensory: No sensory deficit.      Motor: No weakness.      Deep Tendon Reflexes: Reflexes normal.   Psychiatric:         Mood and Affect: Mood normal.         Behavior: Behavior normal.     LABORATORY VALUES:   Recent Labs     11/11/24  0834   WBC 8.0   RBC 3.67*   HEMOGLOBIN 11.6*   HEMATOCRIT 35.3*   MCV 96.2   MCH 31.6   MCHC 32.9   RDW 50.8*   PLATELETCT 297   MPV 9.2     Recent Labs     11/11/24  0834   SODIUM 139   POTASSIUM 4.5   CHLORIDE 105   CO2 24   GLUCOSE 169* "   BUN 27*   CREATININE 1.03   CALCIUM 9.5     Recent Labs     11/11/24  0834   APTT 32.3   INR 1.12      IMAGING:   CT-CSPINE WITHOUT PLUS RECONS   Final Result      1.  Redemonstrated C7 vertebral body fracture with increased loss of height anteriorly approaching 75% and slightly increased retropulsion measuring approximately 3 mm. Acute on chronic injury is not excluded. If there is strong clinical suspicion for    acute injury to this region, MRI should be obtained for further evaluation.   2.  Multilevel degenerative changes of cervical spine as described above.   3.  Atherosclerosis.      CT-HEAD W/O   Final Result      1.  Possible tiny hyperattenuating focus adjacent left parietal cortex which could represent trace subarachnoid or subdural hemorrhage.   2.  Moderate microangiopathic ischemic change versus demyelination or gliosis.   3.  Mild diffuse cerebral substance loss.      Based solely on CT findings, the brain injury guideline category is mBIG 1.      SDH < 4mm   IPH < 4mm   SAH < 3 sulci and < 1mm      The original BIG retrospective analysis found radiographic progression in 0% of BIG 1 patients and 2.6% BIG 2.      Findings were conveyed to Dr. CHIDI GALLARDO on 11/11/2024 8:19 AM.         MR-CERVICAL SPINE-W/O    (Results Pending)   CT-HEAD W/O    (Results Pending)     ASSESSMENT AND PLAN:   Closed head injury without loss of consciousness  Ground level fall with head strike.  Admission CT imaging demonstrated a tiny left parietal cortex focus of possible hemorrhage.  Repeat interval CT imaging of the brain. Anticoagulation reversed.    Chronic anticoagulation  Chronically anticoagulated with apixaban (Eliquis) for history of paroxysmal atrial fibrillation.  Reversed with prothrombin complex concentrate (Balfaxar) in the Emergency Department.    Fracture of seventh cervical vertebra (HCC)  Ground level fall in May 2024 with known C7 fracture.  MRI incompatible pacemaker.  Non-operative management.    Cervical immobilization.  Aashish Washington MD, PhD. Neurosurgeon. [facility].    Contraindication to anticoagulation therapy  VTE prophylaxis initially contraindicated secondary to elevated bleeding risk.  11/12 Trauma surveillance venous duplex ultrasonography ordered.    Scalp laceration, initial encounter  Posterior scalp laceration.  Repaired in the Emergency Department.    Trauma  Mechanical ground level fall.  T-5000 Activation.  Jose Ballard MD. Trauma Surgery.    DISPOSITION:  Trauma Outpatient Observation    The patient has acute impairment of one or more vital organ systems and an increased probability of imminent or life-threatening deterioration in condition. Provided high complexity decision making to assess, manipulate, and support vital system functions to treat vital organ system failure and/or to prevent further life-threatening deterioration of the patient's condition. Requires hospital observation.         ____________________________________     Jose Ballard M.D.    DD: 11/11/2024  8:46 AM

## 2024-11-11 NOTE — ED NOTES
Med rec is complete per MAR from facility  Allergies reviewed.    Has patient had any outside antibiotics in the last 30 days? N    Any Anticoagulants (rivaroxaban, apixaban, edoxaban, dabigatran, warfarin, enoxaparin) taken in the last 14 days? N

## 2024-11-11 NOTE — ASSESSMENT & PLAN NOTE
Chronically anticoagulated with apixaban (Eliquis) for history of paroxysmal atrial fibrillation.  Reversed with prothrombin complex concentrate (Balfaxar) in the Emergency Department.  Hold for 6 weeks or until follow up with neurosurgery

## 2024-11-11 NOTE — PROGRESS NOTES
Repeat head CT reviewed, subdural hematoma slightly increased  TEG reviewed, no significant abnormalities    Plan transfer to ICU  Every 2 hour neuro checks  Keppra initiated  Repeat CT in am

## 2024-11-11 NOTE — ED PROVIDER NOTES
ER Provider Note    Scribed for Xavier Mora M.D. by Leonardo Zacarias. 11/11/2024   8:09 AM    Primary Care Provider: JAYLEN Castelan    CHIEF COMPLAINT  Chief Complaint   Patient presents with    T-5000 Head Injury     Pt BIB EMS from home for TBI, patient was in bathroom fell and possibly hit counter with back of head - laceration to back of head. Pt is on eliquis. Normally A&O x4, currently A&Ox1. EMS reports orientation decreased in route.     Pt was placed in c-collar by EMS, midline neck pain, hx of cervical spine facture.     On 2L, RA baseline.     EXTERNAL RECORDS REVIEWED  EMS run sheet patient sustained a head laceration.  Had some deterioration and route.    HPI/ROS  LIMITATION TO HISTORY   Select: : None  OUTSIDE HISTORIAN(S):  EMS acted as the historians for this encounter.     Krystle Tavarez is a 89 y.o. female who presents to the ED as a code TBI for evaluation after a ground level fall onset prior to arrival today. Per EMS, patient is on Eliquis daily, and she had a mechanical ground level fall today that resulted in a posterior scalp laceration. Patient had  a GCS of 15 for EMS, but they report that her GCS is deteriorating and she is becoming more confused.    PAST MEDICAL HISTORY  Past Medical History:   Diagnosis Date    Arthritis     knees, hips    Breath shortness     with exertion     Cataract     bilat IOL     Dental disorder     full upper, partial lower     Diabetes (HCC)     pre diabetic    Heart burn     Hemorrhagic disorder (HCC)     on Eliquis    High cholesterol     diet controlled     Hyperlipidemia     Hypertension     Pacemaker 2015    Pain 2020    lower back, right leg    Pre-diabetes     TIA (transient ischemic attack)     on Eliquis    Urinary incontinence        SURGICAL HISTORY  Past Surgical History:   Procedure Laterality Date    PB LAMINOTOMY,LUMBAR DISK,1 INTRSP N/A 2/25/2020    Procedure: LAMINECTOMY, SPINE, LUMBAR, WITH DISCECTOMY- L5-S1, MEDIAL  FACETECTOMY;  Surgeon: Latrell Kimble M.D.;  Location: SURGERY [facility];  Service: Neurosurgery    FORAMINOTOMY N/A 2/25/2020    Procedure: FORAMINOTOMY, SPINE;  Surgeon: Latrell Kimble M.D.;  Location: SURGERY [facility];  Service: Neurosurgery    PACEMAKER INSERTION  2015    HIP REPLACEMENT, TOTAL Right 2009    KNEE REPLACEMENT, TOTAL Right 2004    CATARACT EXTRACTION WITH IOL Bilateral 2003    CHOLECYSTECTOMY  2002    BASAL CELL EXCISION      nose and hand    BUNIONECTOMY Left        FAMILY HISTORY  Family History   Problem Relation Age of Onset    Diabetes Mother     Stroke Mother     Heart Attack Father 74    No Known Problems Maternal Grandmother     No Known Problems Maternal Grandfather     No Known Problems Paternal Grandmother     No Known Problems Paternal Grandfather        SOCIAL HISTORY   reports that she has never smoked. She has never used smokeless tobacco. She reports that she does not currently use alcohol. She reports that she does not use drugs.    CURRENT MEDICATIONS  Previous Medications    ACETAMINOPHEN (TYLENOL 8 HOUR) 650 MG CR TABLET    Take 2 Tablets by mouth 2 times a day.    ACETAMINOPHEN (TYLENOL 8 HOUR) 650 MG CR TABLET    Take 650 mg by mouth every 6 hours as needed for Fever (>100.5).    ALUM HYDROXIDE-MAG CARBONATE (GAVISCON PO)    Take 1 Tablet by mouth 2 times a day as needed.    APIXABAN (ELIQUIS) 2.5MG TAB    Take 1 Tablet by mouth 2 times a day.    ASCORBIC ACID (ASCORBIC ACID) 500 MG TAB    Take 1 Tablet by mouth every day.    BISACODYL EC (DULCOLAX) 5 MG TABLET DELAYED RESPONSE    Take 5 mg by mouth 1 time a day as needed for Constipation.    BUMETANIDE (BUMEX) 0.5 MG TAB    Take 1 Tablet by mouth every 48 hours.    CALCIUM CARBONATE (TUMS) 500 MG CHEW TAB    Chew 500 mg as needed.    CYANOCOBALAMIN (VITAMIN B-12) 1000 MCG TAB    Take 1 Tablet by mouth every day.    DOCUSATE SODIUM (COLACE) 100 MG CAP    Take 100 mg by mouth every day.    DULOXETINE (CYMBALTA) 20 MG  "CAP DR PARTICLES    Take 1 Capsule by mouth every day.    KETOCONAZOLE (NIZORAL) 2 % SHAMPOO    Apply 1 mL topically every Friday.    LOSARTAN (COZAAR) 100 MG TAB    Take 1 Tablet by mouth every day.    METOPROLOL SR (TOPROL XL) 25 MG TABLET SR 24 HR    TAKE ONE-HALF (1/2) TABLET DAILY    VITAMIN D, CHOLECALCIFEROL, (CHOLECALCIFEROL) 25 MCG (1000 UT) TAB    Take 1 Tablet by mouth every day.    ZINC OXIDE (BALMEX EX)    Apply 1 Application topically as needed.       ALLERGIES  Allergies   Allergen Reactions    Codeine Unspecified     Patient reported hallucination and dizziness. Specific to tylenol with codeine      Gabapentin Unspecified     Altered mental status    Sulfa Drugs Nausea     Nausea   Other reaction(s): Not available    Tramadol Unspecified     Altered mentation        PHYSICAL EXAM  BP (!) 191/77   Pulse 72   Resp 19   Ht 1.626 m (5' 4\")   Wt 66 kg (145 lb 8.1 oz)   LMP  (LMP Unknown)   SpO2 96%   BMI 24.98 kg/m²    Nursing note and vitals reviewed.  Constitutional: Well-developed and well-nourished. No distress.   HENT: Head is normocephalic and curvilinear scalp laceration to the posterior aspect of the scalp. Oropharynx is clear and moist without exudate or erythema.   Eyes: Pupils are equal, round, and reactive to light. Conjunctiva are normal.   Cardiovascular: Normal rate and regular rhythm. No murmur heard. Normal radial pulses.  Pulmonary/Chest: Breath sounds normal. No wheezes or rales.   Abdominal: Soft and non-tender. No distention    Musculoskeletal: Extremities exhibit normal range of motion without edema or tenderness.   Neurological: Awake, alert and oriented to person, place, and time. No focal deficits noted.  Skin: Skin is warm and dry. No rash.   Psychiatric: Normal mood and affect. Appropriate for clinical situation    DIAGNOSTIC STUDIES    Labs:   Results for orders placed or performed during the hospital encounter of 11/11/24   CBC WITH DIFFERENTIAL    Collection Time: " 11/11/24  8:34 AM   Result Value Ref Range    WBC 8.0 4.8 - 10.8 K/uL    RBC 3.67 (L) 4.20 - 5.40 M/uL    Hemoglobin 11.6 (L) 12.0 - 16.0 g/dL    Hematocrit 35.3 (L) 37.0 - 47.0 %    MCV 96.2 81.4 - 97.8 fL    MCH 31.6 27.0 - 33.0 pg    MCHC 32.9 32.2 - 35.5 g/dL    RDW 50.8 (H) 35.9 - 50.0 fL    Platelet Count 297 164 - 446 K/uL    MPV 9.2 9.0 - 12.9 fL    Neutrophils-Polys 58.60 44.00 - 72.00 %    Lymphocytes 30.00 22.00 - 41.00 %    Monocytes 9.00 0.00 - 13.40 %    Eosinophils 1.60 0.00 - 6.90 %    Basophils 0.40 0.00 - 1.80 %    Immature Granulocytes 0.40 0.00 - 0.90 %    Nucleated RBC 0.00 0.00 - 0.20 /100 WBC    Neutrophils (Absolute) 4.70 1.82 - 7.42 K/uL    Lymphs (Absolute) 2.41 1.00 - 4.80 K/uL    Monos (Absolute) 0.72 0.00 - 0.85 K/uL    Eos (Absolute) 0.13 0.00 - 0.51 K/uL    Baso (Absolute) 0.03 0.00 - 0.12 K/uL    Immature Granulocytes (abs) 0.03 0.00 - 0.11 K/uL    NRBC (Absolute) 0.00 K/uL   BASIC METABOLIC PANEL    Collection Time: 11/11/24  8:34 AM   Result Value Ref Range    Sodium 139 135 - 145 mmol/L    Potassium 4.5 3.6 - 5.5 mmol/L    Chloride 105 96 - 112 mmol/L    Co2 24 20 - 33 mmol/L    Glucose 169 (H) 65 - 99 mg/dL    Bun 27 (H) 8 - 22 mg/dL    Creatinine 1.03 0.50 - 1.40 mg/dL    Calcium 9.5 8.5 - 10.5 mg/dL    Anion Gap 10.0 7.0 - 16.0   APTT    Collection Time: 11/11/24  8:34 AM   Result Value Ref Range    APTT 32.3 24.7 - 36.0 sec   PROTHROMBIN TIME (INR)    Collection Time: 11/11/24  8:34 AM   Result Value Ref Range    PT 14.4 12.0 - 14.6 sec    INR 1.12 0.87 - 1.13   ESTIMATED GFR    Collection Time: 11/11/24  8:34 AM   Result Value Ref Range    GFR (CKD-EPI) 51 (A) >60 mL/min/1.73 m 2   PLATELET MAPPING WITH BASIC TEG    Collection Time: 11/11/24  8:40 AM   Result Value Ref Range    Reaction Time Initial-R 5.7 4.6 - 9.1 min    React Time Initial Hep 5.6 4.3 - 8.3 min    Clot Kinetics-K 0.8 0.8 - 2.1 min    Clot Angle-Angle 79.2 (H) 63.0 - 78.0 degrees    Maximum Clot Strength-MA  68.3 52.0 - 69.0 mm    TEG Functional Fibrinogen(MA) 34.3 (H) 15.0 - 32.0 mm    % Inhibition ADP 5.9 0.0 - 17.0 %    % Inhibition AA 2.5 0.0 - 11.0 %    TEG Algorithm Link Algorithm      *Note: Due to a large number of results and/or encounters for the requested time period, some results have not been displayed. A complete set of results can be found in Results Review.     Radiology:   This attending emergency physician has independently interpreted the diagnostic imaging associated with this visit and is awaiting the final reading from the radiologist.   Preliminary interpretation is a follows: CT head shows small intracranial hemorrhage frontal.  CT cervical spine shows C7 fracture.    Radiologist interpretation:   CT-CSPINE WITHOUT PLUS RECONS   Final Result      1.  Redemonstrated C7 vertebral body fracture with increased loss of height anteriorly approaching 75% and slightly increased retropulsion measuring approximately 3 mm. Acute on chronic injury is not excluded. If there is strong clinical suspicion for    acute injury to this region, MRI should be obtained for further evaluation.   2.  Multilevel degenerative changes of cervical spine as described above.   3.  Atherosclerosis.      CT-HEAD W/O   Final Result      1.  Possible tiny hyperattenuating focus adjacent left parietal cortex which could represent trace subarachnoid or subdural hemorrhage.   2.  Moderate microangiopathic ischemic change versus demyelination or gliosis.   3.  Mild diffuse cerebral substance loss.      Based solely on CT findings, the brain injury guideline category is mBIG 1.      SDH < 4mm   IPH < 4mm   SAH < 3 sulci and < 1mm      The original BIG retrospective analysis found radiographic progression in 0% of BIG 1 patients and 2.6% BIG 2.      Findings were conveyed to Dr. CHIDI GALLARDO on 11/11/2024 8:19 AM.         MR-CERVICAL SPINE-W/O    (Results Pending)   CT-HEAD W/O    (Results Pending)      Laceration Repair Procedure  Note    Indication: Laceration    Procedure: The patient was placed in the appropriate position and anesthesia around the laceration was obtained by infiltration using 2% Lidocaine with epinephrine. The wound was minimally contaminated .The area was then cleansed with betadine and draped in a sterile fashion. The laceration was closed with staples. There were no additional lacerations requiring repair. The wound area was then dressed with a sterile dressing.      Total repaired wound length: 11 cm.     Other Items: Staple count: 18    The patient tolerated the procedure well.    Complications: None    INITIAL ASSESSMENT AND PLAN    8:09 AM - Patient was evaluated at bedside. Ordered for Prothrombin time, APTT, BMP, CBC with differential, CT-C spine without plus recons, and CT-Head to evaluate. The patient will be medicated with Labetalol 10 mg, Zofran 4 mg, and Morphine 4 mg for her symptoms. Patient verbalizes understanding and support with my plan of care.  Patient will undergo evaluation for traumatic injury.       8:35 AM - I discussed the patient's case and the above findings with Dr. Washington (Neurology) who will consult and states that patient needs her anticoagulation reversed. She is never to be on anticoagulation or antiplatelets again.      8:45 AM - I discussed the patient's case and the above findings with Dr. Campbell (trauma) who will consult.     11:47 AM - I discussed the patient's case and the above findings with Dr. Ballard (Trauma) who agrees to admit the patient.        CRITICAL CARE  The very real possibilty of a deterioration of this patient's condition required the highest level of my preparedness for sudden, emergent intervention.  I provided critical care services, which included medication orders, frequent reevaluations of the patient's condition and response to treatment, ordering and reviewing test results, and discussing the case with various consultants.  The critical care time associated with  the care of the patient was 35 minutes. Review chart for interventions. This time is exclusive of any other billable procedures.        The patient was treated with Zofran for nausea.  Placed on a cardiac monitor to monitor for any arrhythmia associated with Zofran and QT prolongation.     The patient was treated with IV narcotics for pain control.  Placed on cardiac and blood pressure monitor to monitor for associated hypotension.  Also placed on pulse oximetry to monitor for hypoxia associated with medication administration/side effect.      DISPOSITION AND DISCUSSIONS    I have discussed management of the patient with the following physicians and FAVIO's:  Dr. Washington (Neuro surgery) and Dr. Campbell (trauma).    Discussion of management with other [facility] or appropriate source(s): None     DISPOSITION:  Patient will be hospitalized by Dr. Ballard in critical condition.    FINAL DIAGNOSIS  1. Closed head injury, initial encounter    2. Fall, initial encounter    3. Intracranial hemorrhage (HCC)    4. Chronic anticoagulation    5. Closed displaced fracture of seventh cervical vertebra, unspecified fracture morphology, initial encounter (HCC)    6. Laceration of scalp, initial encounter         Leonardo CRAIN (Nicolasa), am scribing for, and in the presence of, Xavier Mora M.D..    Electronically signed by: Leonardo Zacarias (Alineibterry), 11/11/2024    IXavier M.D. personally performed the services described in this documentation, as scribed by Leonardo Zacarias in my presence, and it is both accurate and complete.      The note accurately reflects work and decisions made by me.  Xavier Mora M.D.  11/11/2024  1:35 PM

## 2024-11-11 NOTE — ED NOTES
Pt laying comfortable on gurney. Pt is in C-Collar. Monitors in place. Daughter at bedside. Labs drawn and sent. Trauma RN upgraded pt to a Trauma Green.

## 2024-11-11 NOTE — ASSESSMENT & PLAN NOTE
VTE prophylaxis initially contraindicated secondary to elevated bleeding risk.  11/12 Trauma surveillance venous duplex ultrasonography ordered.

## 2024-11-11 NOTE — ASSESSMENT & PLAN NOTE
Ground level fall with head strike.  Admission CT imaging demonstrated a tiny left parietal cortex focus of possible hemorrhage.  Anticoagulation reversed  Repeat interval CT imaging of the brain with slight increase in hemorrhage  Transfer to ICU for serial neurologic exams   Repeat interval CT stable.

## 2024-11-11 NOTE — DISCHARGE PLANNING
Patient oriented to self only so assessment was completed by patient's adult daughter/NOK, Ana M, via phone.   Patient has been living at [facility]: [address] [address] (Phone: [phone]) for the last four months after being [facility] May/June [facility]24.  Staff at the  was assisting with ADLs and IADLs.   She owns WC, FWW, bedside commode and hand-railing in room.   Patient is serviced by [facility] 2x/week.   She also receives wound care from her provider weekly.   She has good support at the group home as well as from her family (daughter, grandchildren).   Patient's daughter reports patient is not on home oxygen but that her oxygen is frequently low and she is educated to continue taking deep breaths. Daughter also thinks it may be time for patient to remain in WC at baseline.   Patient has POLST at bedside and is DNAR/DNI.   Her PCP is Joana Hawk MD.   Insured by Medicare A/B and  for Life.  No mental health or substance misuse concerns.   Pending medical clearance and plan for IDT regarding best recommendation for discharge disposition. Currently patient is admitted under observation status.     Case Management Discharge Planning    Admission Date: 11/11/2024  GMLOS:    ALOS: 0    6-Clicks ADL Score:    6-Clicks Mobility Score:    PT and/or OT Eval ordered: Yes  Post-acute Referrals Ordered: No  Post-acute Choice Obtained: No  Has referral(s) been sent to post-acute provider:  No      Anticipated Discharge Dispo: Discharge Disposition: D/T to home under A care in anticipation of covered skilled care ()  Discharge Address: [address] [address]  Discharge Contact Phone Number: [phone]    DME Needed: No    Action(s) Taken: Updated Provider/Nurse on Discharge Plan, DC Assessment Complete (See below), and Family Conference    Escalations Completed: None    Medically Clear: No    Next Steps: Send referrals as  recommended by IDT.     Barriers to Discharge: Medical clearance and Pending Procedures    Is the patient up for discharge tomorrow: Unknown    Care Transition Team Assessment    Information Source  Orientation Level: Oriented to person, Disoriented to place, Disoriented to time, Disoriented to situation  Information Given By: Relative  Informant's Name: Ana M  Who is responsible for making decisions for patient? : Legal next of kin  Name(s) of Primary Decision Maker: Ana M Ochoa: [phone]    Readmission Evaluation  Is this a readmission?: No    Elopement Risk  Legal Hold: No  Ambulatory or Self Mobile in Wheelchair: No-Not an Elopement Risk    Interdisciplinary Discharge Planning  Does Admitting Nurse Feel This Could be a Complex Discharge?: No  Primary Care Physician: Joana Hawk    Discharge Preparedness  What is your plan after discharge?: Uncertain - pending medical team collaboration  What are your discharge supports?: Child, Spouse, Other (comment) (Group Home Staff)  Prior Functional Level: Ambulatory, Needs Assist with Activities of Daily Living, Needs Assist with Medication Management, Uses Walker, Uses Wheelchair  Difficulity with ADLs: Bathing, Dressing, Toileting, Walking  Difficulty with ADLs Comment: Assistance from  staff  Difficulity with IADLs: Cooking, Driving, Laundry, Shopping, Managing medication, Keeping track of finances  Difficulity with IADL Comments: Assistance from  staff and family.    Functional Assesment  Prior Functional Level: Ambulatory, Needs Assist with Activities of Daily Living, Needs Assist with Medication Management, Uses Walker, Uses Wheelchair    Finances  Financial Barriers to Discharge: No  Prescription Coverage: Yes    Values / Beliefs / Concerns  Values / Beliefs Concerns : No    Advance Directive  Advance Directive?: POLST    Domestic Abuse  Have you ever been the victim of abuse or violence?: No    Psychological Assessment  History of Substance Abuse:  None  History of Psychiatric Problems: No  Non-compliant with Treatment: No  Newly Diagnosed Illness: Yes    Discharge Risks or Barriers  Discharge risks or barriers?: Complex medical needs  Patient risk factors: Cognitive / sensory / physical deficit, Complex medical needs, Vulnerable adult    Anticipated Discharge Information  Discharge Disposition: D/T to home under A care in anticipation of covered skilled care (06)  Discharge Address: [address] [place] [address] [address] [address]  Discharge Contact Phone Number: [phone]

## 2024-11-11 NOTE — CONSULTS
[facility] Neurosurgery  Referring MD:  Dr. Rowan  Called 0830 call returned images reviewed plan discussed 0835  Reason for referral:  traumatic SAH, C7 fracture    Author: Aashish Washington M.D. Date & Time created: 11/11/2024  11:07 AM     Interval History   89 year old female admitted for fall.  She has known C7 fracture treated in collar for many months.  She is on Eliquis.    ROS per h/p    Physical Exam  Awake alert interactive  Bilateral  bicep IP DF 5/5 no pronator drift  Sensation intact touch x 4 extremities  No Hoffmans    Patient Active Problem List    Diagnosis Date Noted    Closed head injury without loss of consciousness 11/11/2024    Closed fracture of manubrium 05/03/2024    Cervical compression fracture, initial encounter ([facility]) 05/03/2024    Fracture of seventh cervical vertebra ([facility]) 05/03/2024    Chronic anticoagulation 02/07/2024    Contraindication to anticoagulation therapy 11/11/2024    Frequent falls 05/03/2024    History of TIA (transient ischemic attack) 08/13/2019    Dyslipidemia 07/09/2019    Bilateral lower extremity edema 07/09/2019    Scalp laceration, initial encounter 11/11/2024    Trauma 05/03/2024    PAF (paroxysmal atrial fibrillation) ([facility]) 02/05/2020    Type 2 diabetes mellitus without complication, without long-term current use of insulin ([facility]) 01/16/2020    Retinopathy 08/13/2019    Weakness of both lower extremities 08/13/2019    Compression fracture of lumbar vertebrae, non-traumatic, sequela 08/13/2019    Cardiac pacemaker in situ 07/09/2019    Essential hypertension, benign 07/09/2019    Lymphedema 09/26/2024    Falls 05/03/2024    Sternal manubrial dissociation, closed fracture 05/03/2024    Advanced care planning/counseling discussion 05/03/2024    Atherosclerosis of aorta ([facility]) 01/18/2024    Peripheral vascular disease, unspecified ([facility]) 01/18/2024    Chronic heart failure with preserved ejection fraction (HFpEF) ([facility]) 08/31/2023    POLST (Physician Orders for  Life-Sustaining Treatment) 2023    DNR (do not resuscitate) 2021    Benign cyst of right kidney 2021    Common bile duct dilation 2021    Impaired mobility and ADLs 2020    Anemia 2020    Foraminal stenosis of lumbar region 2019    Lumbar spondylosis 2019    Degeneration of lumbar intervertebral disc 2019       Temp (24hrs), Av.3 °C (97.4 °F), Min:36.3 °C (97.4 °F), Max:36.3 °C (97.4 °F)    Pulse: 68, Respiration: 19, Blood Pressure : 121/56, Weight: 66 kg (145 lb 8.1 oz), Pulse Oximetry: 93 %, O2 (LPM): 2     Date 24 0700 - 24 0659   Shift 9944-2632 4499-1183 2408-6608 24 Hour Total   INTAKE   I.V. 0   0     Pre-Hospital Volume 0   0     Trauma Resuscitation Volume 0   0   Blood 0   0     PRBC Total Volume (Non-Barcoded) 0   0     FFP Total Volume (Non-Barcoded) 0   0     Platelets Total Volume (Non-Barcoded) 0   0     Cryoprecipitate (Pooled) Total Volume (Non-Barcoded) 0   0   Shift Total 0   0   OUTPUT   Other 0   0     Pre-Hospital Output 0   0     Trauma Resuscitation Output 0   0   Blood 0   0     Est. Blood Loss 0   0   Shift Total 0   0   NET 0   0         Intake/Output Summary (Last 24 hours) at 2024 1107  Last data filed at 2024 0840  Gross per 24 hour   Intake 0 ml   Output 0 ml   Net 0 ml             lidocaine-epinephrine  20 mL         Recent Labs     24  0834   WBC 8.0   RBC 3.67*   HEMOGLOBIN 11.6*   HEMATOCRIT 35.3*   MCV 96.2   MCH 31.6   PLATELETCT 297     Recent Labs     24  0834   SODIUM 139   POTASSIUM 4.5   CHLORIDE 105   CO2 24   GLUCOSE 169*   BUN 27*   CREATININE 1.03   CALCIUM 9.5     Recent Labs     24  0834   APTT 32.3   INR 1.12        2024 8:05 AM     HISTORY/REASON FOR EXAM:  Ground level fall + anticoagulants or bleeding disorder; Ground level fall + anticoagulants or bleeding disorder. S09.90-  Head Injury, unspecified        TECHNIQUE/EXAM DESCRIPTION AND NUMBER OF VIEWS:  CT  of the head without contrast.     The study was performed on a GE CT scanner. Contiguous 5 mm axial sections were obtained from the skull base through the vertex.     Up to date radiation dose reduction adjustments have been utilized to meet ALARA standards for radiation dose reduction.     COMPARISON:  5/3/2024     FINDINGS:  There is possible tiny hyperattenuating focus adjacent to the left parietal cortex which could represent trace subarachnoid or subdural hemorrhage. There is no significant associated mass effect or midline shift. There is a right parietal scalp injury.   The calvariae and skull base are unremarkable. There are no extraaxial fluid collections. There is a pattern of cerebral atrophy manifest as enlargement of sulcal markings and ventricular prominence.  The ventricular system and basal cisterns are   otherwise unremarkable. There are areas of hypodensity in the white matter most consistent with small vessel ischemic change versus demyelination or gliosis.     The brainstem and posterior fossa structures are unremarkable.     The paranasal sinuses and mastoids in the field of view are unremarkable.     IMPRESSION:     1.  Possible tiny hyperattenuating focus adjacent left parietal cortex which could represent trace subarachnoid or subdural hemorrhage.  2.  Moderate microangiopathic ischemic change versus demyelination or gliosis.  3.  Mild diffuse cerebral substance loss.     11/11/2024 8:05 AM     HISTORY/REASON FOR EXAM: Polytrauma, blunt; s/p fall - patient on anticoagulation; Neck pain from ground level fall        TECHNIQUE/EXAM DESCRIPTION:  CT cervical spine without contrast, with reconstructions.     The study was performed on a helical multidetector CT scanner. Thin-section helical scanning was performed from the skull base through T1. Sagittal and coronal multiplanar reconstructions were generated from the axial images.     Low dose optimization technique was utilized for this CT exam  "including automated exposure control and adjustment of the mA and/or kV according to patient size.     COMPARISON:  5/3/2024.     FINDINGS:  C7 vertebral body fractures again noted with increased anterior loss of height now approaching 75%. 3 mm, slightly increased when compared with the previous exam. Alignment in the cervical spine is normal. The craniovertebral junction appears intact.     There are arterial atherosclerotic calcifications.     There are scattered age-related degenerative changes of the spine with intervertebral disc space narrowing, endplate spurring and facet arthropathy. The superior mediastinum and lung apices in the field of view are unremarkable.     IMPRESSION:     1.  Redemonstrated C7 vertebral body fracture with increased loss of height anteriorly approaching 75% and slightly increased retropulsion measuring approximately 3 mm. Acute on chronic injury is not excluded. If there is strong clinical suspicion for   acute injury to this region, MRI should be obtained for further evaluation.  2.  Multilevel degenerative changes of cervical spine as described above.  3.  Atherosclerosis.    AP:  89 year old female with fall.  Head CT with \"possible\" tiny extraaxial blood with no mass effect; I do not appreciate this on personal review of head CT.  Follow up head CT; if no progression no further follow up.  The C7 compression fracture appears grossly stable compared to 9/2024.  Recommend mri cervical spine: in no signal change in vertebra then no collar, otherwise continue collar and follow  up in 6 weeks in my clinic.    "

## 2024-11-12 ENCOUNTER — APPOINTMENT (OUTPATIENT)
Dept: RADIOLOGY | Facility: MEDICAL CENTER | Age: 89
DRG: 086 | End: 2024-11-12
Attending: NURSE PRACTITIONER
Payer: MEDICARE

## 2024-11-12 PROBLEM — R54 ADVANCED AGE: Status: ACTIVE | Noted: 2024-11-12

## 2024-11-12 PROBLEM — I27.20 PULMONARY HYPERTENSION (HCC): Status: ACTIVE | Noted: 2024-11-12

## 2024-11-12 PROBLEM — I48.0 PAROXYSMAL ATRIAL FIBRILLATION (HCC): Status: ACTIVE | Noted: 2024-11-12

## 2024-11-12 PROBLEM — N18.30 CKD (CHRONIC KIDNEY DISEASE) STAGE 3, GFR 30-59 ML/MIN: Status: ACTIVE | Noted: 2024-11-12

## 2024-11-12 PROBLEM — E11.29 TYPE 2 DIABETES MELLITUS WITH KIDNEY COMPLICATION, WITHOUT LONG-TERM CURRENT USE OF INSULIN (HCC): Status: ACTIVE | Noted: 2020-01-16

## 2024-11-12 LAB
ANION GAP SERPL CALC-SCNC: 8 MMOL/L (ref 7–16)
BUN SERPL-MCNC: 25 MG/DL (ref 8–22)
CALCIUM SERPL-MCNC: 9.4 MG/DL (ref 8.5–10.5)
CHLORIDE SERPL-SCNC: 104 MMOL/L (ref 96–112)
CO2 SERPL-SCNC: 25 MMOL/L (ref 20–33)
CREAT SERPL-MCNC: 0.97 MG/DL (ref 0.5–1.4)
ERYTHROCYTE [DISTWIDTH] IN BLOOD BY AUTOMATED COUNT: 50.7 FL (ref 35.9–50)
GFR SERPLBLD CREATININE-BSD FMLA CKD-EPI: 55 ML/MIN/1.73 M 2
GLUCOSE BLD STRIP.AUTO-MCNC: 129 MG/DL (ref 65–99)
GLUCOSE BLD STRIP.AUTO-MCNC: 143 MG/DL (ref 65–99)
GLUCOSE BLD STRIP.AUTO-MCNC: 144 MG/DL (ref 65–99)
GLUCOSE BLD STRIP.AUTO-MCNC: 96 MG/DL (ref 65–99)
GLUCOSE SERPL-MCNC: 152 MG/DL (ref 65–99)
HCT VFR BLD AUTO: 32.9 % (ref 37–47)
HGB BLD-MCNC: 10.7 G/DL (ref 12–16)
MAGNESIUM SERPL-MCNC: 2.1 MG/DL (ref 1.5–2.5)
MCH RBC QN AUTO: 31.7 PG (ref 27–33)
MCHC RBC AUTO-ENTMCNC: 32.5 G/DL (ref 32.2–35.5)
MCV RBC AUTO: 97.3 FL (ref 81.4–97.8)
PHOSPHATE SERPL-MCNC: 3.6 MG/DL (ref 2.5–4.5)
PLATELET # BLD AUTO: 269 K/UL (ref 164–446)
PMV BLD AUTO: 8.8 FL (ref 9–12.9)
POTASSIUM SERPL-SCNC: 4.6 MMOL/L (ref 3.6–5.5)
RBC # BLD AUTO: 3.38 M/UL (ref 4.2–5.4)
SODIUM SERPL-SCNC: 137 MMOL/L (ref 135–145)
WBC # BLD AUTO: 8.3 K/UL (ref 4.8–10.8)

## 2024-11-12 PROCEDURE — 700102 HCHG RX REV CODE 250 W/ 637 OVERRIDE(OP)

## 2024-11-12 PROCEDURE — 770001 HCHG ROOM/CARE - MED/SURG/GYN PRIV*

## 2024-11-12 PROCEDURE — 96372 THER/PROPH/DIAG INJ SC/IM: CPT

## 2024-11-12 PROCEDURE — 70450 CT HEAD/BRAIN W/O DYE: CPT

## 2024-11-12 PROCEDURE — 700102 HCHG RX REV CODE 250 W/ 637 OVERRIDE(OP): Performed by: NURSE PRACTITIONER

## 2024-11-12 PROCEDURE — A9270 NON-COVERED ITEM OR SERVICE: HCPCS | Performed by: NURSE PRACTITIONER

## 2024-11-12 PROCEDURE — 84100 ASSAY OF PHOSPHORUS: CPT

## 2024-11-12 PROCEDURE — 97163 PT EVAL HIGH COMPLEX 45 MIN: CPT

## 2024-11-12 PROCEDURE — 96375 TX/PRO/DX INJ NEW DRUG ADDON: CPT

## 2024-11-12 PROCEDURE — 80048 BASIC METABOLIC PNL TOTAL CA: CPT

## 2024-11-12 PROCEDURE — 700111 HCHG RX REV CODE 636 W/ 250 OVERRIDE (IP): Mod: JZ | Performed by: NURSE PRACTITIONER

## 2024-11-12 PROCEDURE — 85027 COMPLETE CBC AUTOMATED: CPT

## 2024-11-12 PROCEDURE — 83735 ASSAY OF MAGNESIUM: CPT

## 2024-11-12 PROCEDURE — 82962 GLUCOSE BLOOD TEST: CPT | Mod: 91

## 2024-11-12 PROCEDURE — 99232 SBSQ HOSP IP/OBS MODERATE 35: CPT | Performed by: REGISTERED NURSE

## 2024-11-12 PROCEDURE — A9270 NON-COVERED ITEM OR SERVICE: HCPCS

## 2024-11-12 PROCEDURE — 99222 1ST HOSP IP/OBS MODERATE 55: CPT | Performed by: FAMILY MEDICINE

## 2024-11-12 PROCEDURE — 97597 DBRDMT OPN WND 1ST 20 CM/<: CPT

## 2024-11-12 PROCEDURE — 97167 OT EVAL HIGH COMPLEX 60 MIN: CPT

## 2024-11-12 RX ORDER — DEXTROSE MONOHYDRATE 25 G/50ML
25 INJECTION, SOLUTION INTRAVENOUS
Status: DISCONTINUED | OUTPATIENT
Start: 2024-11-12 | End: 2024-11-14 | Stop reason: HOSPADM

## 2024-11-12 RX ORDER — INSULIN LISPRO 100 [IU]/ML
1-6 INJECTION, SOLUTION INTRAVENOUS; SUBCUTANEOUS
Status: DISCONTINUED | OUTPATIENT
Start: 2024-11-12 | End: 2024-11-14 | Stop reason: HOSPADM

## 2024-11-12 RX ADMIN — DOCUSATE SODIUM 100 MG: 100 CAPSULE, LIQUID FILLED ORAL at 17:03

## 2024-11-12 RX ADMIN — POLYETHYLENE GLYCOL 3350 1 PACKET: 17 POWDER, FOR SOLUTION ORAL at 06:31

## 2024-11-12 RX ADMIN — ACETAMINOPHEN 650 MG: 325 TABLET ORAL at 12:12

## 2024-11-12 RX ADMIN — ACETAMINOPHEN 650 MG: 325 TABLET ORAL at 17:03

## 2024-11-12 RX ADMIN — INSULIN LISPRO 1 UNITS: 100 INJECTION, SOLUTION INTRAVENOUS; SUBCUTANEOUS at 06:49

## 2024-11-12 RX ADMIN — DULOXETINE HYDROCHLORIDE 20 MG: 20 CAPSULE, DELAYED RELEASE ORAL at 06:32

## 2024-11-12 RX ADMIN — ACETAMINOPHEN 650 MG: 325 TABLET ORAL at 06:00

## 2024-11-12 RX ADMIN — LEVETIRACETAM 500 MG: 500 TABLET, FILM COATED ORAL at 06:32

## 2024-11-12 RX ADMIN — SENNOSIDES AND DOCUSATE SODIUM 1 TABLET: 50; 8.6 TABLET ORAL at 20:40

## 2024-11-12 RX ADMIN — LEVETIRACETAM 500 MG: 500 TABLET, FILM COATED ORAL at 17:03

## 2024-11-12 RX ADMIN — DOCUSATE SODIUM 100 MG: 100 CAPSULE, LIQUID FILLED ORAL at 06:31

## 2024-11-12 RX ADMIN — HYDRALAZINE HYDROCHLORIDE 10 MG: 20 INJECTION, SOLUTION INTRAMUSCULAR; INTRAVENOUS at 00:53

## 2024-11-12 RX ADMIN — BUMETANIDE 0.5 MG: 0.5 TABLET ORAL at 06:32

## 2024-11-12 ASSESSMENT — COGNITIVE AND FUNCTIONAL STATUS - GENERAL
DAILY ACTIVITIY SCORE: 14
WALKING IN HOSPITAL ROOM: TOTAL
TOILETING: A LOT
MOBILITY SCORE: 10
SUGGESTED CMS G CODE MODIFIER MOBILITY: CL
DRESSING REGULAR LOWER BODY CLOTHING: A LOT
MOVING TO AND FROM BED TO CHAIR: A LOT
CLIMB 3 TO 5 STEPS WITH RAILING: TOTAL
DRESSING REGULAR UPPER BODY CLOTHING: A LOT
MOVING FROM LYING ON BACK TO SITTING ON SIDE OF FLAT BED: A LOT
PERSONAL GROOMING: A LITTLE
STANDING UP FROM CHAIR USING ARMS: A LOT
EATING MEALS: A LITTLE
HELP NEEDED FOR BATHING: A LOT
SUGGESTED CMS G CODE MODIFIER DAILY ACTIVITY: CK
TURNING FROM BACK TO SIDE WHILE IN FLAT BAD: A LOT

## 2024-11-12 ASSESSMENT — ENCOUNTER SYMPTOMS
NEUROLOGICAL NEGATIVE: 1
CONSTITUTIONAL NEGATIVE: 1
DIARRHEA: 0
FEVER: 0
RESPIRATORY NEGATIVE: 1
MUSCULOSKELETAL NEGATIVE: 1
SENSORY CHANGE: 0
DIAPHORESIS: 0
VOMITING: 0
SHORTNESS OF BREATH: 0
WHEEZING: 0
NAUSEA: 0
BLURRED VISION: 0
DOUBLE VISION: 0
NECK PAIN: 1
WEAKNESS: 1
MYALGIAS: 0
CARDIOVASCULAR NEGATIVE: 1
HEARTBURN: 0
BACK PAIN: 0
HEADACHES: 0
NERVOUS/ANXIOUS: 1
SPEECH CHANGE: 0
EYES NEGATIVE: 1
ABDOMINAL PAIN: 0
FLANK PAIN: 0
PALPITATIONS: 0
DIZZINESS: 0
SORE THROAT: 0
FOCAL WEAKNESS: 0
GASTROINTESTINAL NEGATIVE: 1
CHILLS: 0
PSYCHIATRIC NEGATIVE: 1
COUGH: 0

## 2024-11-12 ASSESSMENT — PAIN DESCRIPTION - PAIN TYPE
TYPE: ACUTE PAIN

## 2024-11-12 ASSESSMENT — GAIT ASSESSMENTS: GAIT LEVEL OF ASSIST: UNABLE TO PARTICIPATE

## 2024-11-12 ASSESSMENT — ACTIVITIES OF DAILY LIVING (ADL): TOILETING: REQUIRES ASSIST

## 2024-11-12 NOTE — ASSESSMENT & PLAN NOTE
Chronic condition treated with metoprolol and Eliquis  Systemic anticoagulation held on admission.  Metoprolol resumed on admission.

## 2024-11-12 NOTE — PROGRESS NOTES
Neurosurgery Progress Note    Subjective:  No acute events overnight  Repeat head CT grossly stable  Wearing rigid collar    Exam:  Awake alert interactive  \pupils 3 mm midline conjugate gaze reactive  Bilateral  bicep IP DF PF 5/5  Sensation intact touch 4 extremities  No Hoffmans    BP  Min: 94/45  Max: 217/91  Pulse  Av  Min: 63  Max: 95  Resp  Av.9  Min: 12  Max: 25  Temp  Av.3 °C (97.3 °F)  Min: 36 °C (96.8 °F)  Max: 36.6 °C (97.8 °F)  SpO2  Av.2 %  Min: 92 %  Max: 100 %    No data recorded    Recent Labs     24  0834 24  0422   WBC 8.0 8.3   RBC 3.67* 3.38*   HEMOGLOBIN 11.6* 10.7*   HEMATOCRIT 35.3* 32.9*   MCV 96.2 97.3   MCH 31.6 31.7   MCHC 32.9 32.5   RDW 50.8* 50.7*   PLATELETCT 297 269   MPV 9.2 8.8*     Recent Labs     24  0834 24  0422   SODIUM 139 137   POTASSIUM 4.5 4.6   CHLORIDE 105 104   CO2 24 25   GLUCOSE 169* 152*   BUN 27* 25*   CREATININE 1.03 0.97   CALCIUM 9.5 9.4     Recent Labs     24  0834   APTT 32.3   INR 1.12     Recent Labs     24  0840   REACTMIN 5.7   CLOTKINET 0.8   CLOTANGL 79.2*   MAXCLOTS 68.3   PRCINADP 5.9   PRCINAA 2.5       Intake/Output                         24 - 24 0659 24 - 24 0659      Total  Total                 Intake    P.O.  240  340 580  120  -- 120    P.O. 240 340 580 120 -- 120    I.V.  0  -- 0  --  -- --    Pre-Hospital Volume 0 -- 0 -- -- --    Trauma Resuscitation Volume 0 -- 0 -- -- --    Blood  0  -- 0  --  -- --    PRBC Total Volume (Non-Barcoded) 0 -- 0 -- -- --    FFP Total Volume (Non-Barcoded) 0 -- 0 -- -- --    Platelets Total Volume (Non-Barcoded) 0 -- 0 -- -- --    Cryoprecipitate (Pooled) Total Volume (Non-Barcoded) 0 -- 0 -- -- --    IV Piggyback  133.2  -- 133.2  --  -- --    Volume (mL) (prothrombin complex conc human (Kcentra) 3,500 Units in 140 mL infusion) 133.2 -- 133.2 -- -- --    Total Intake 373.2 340  713.2 120 -- 120       Output    Urine  --  400 400  --  -- --    Number of Times Incontinent of Urine 1 x 1 x 2 x -- -- --    Output (mL) (External Urinary Catheter (Female Wick)) -- 400 400 -- -- --    Other  0  -- 0  --  -- --    Pre-Hospital Output 0 -- 0 -- -- --    Trauma Resuscitation Output 0 -- 0 -- -- --    Blood  0  -- 0  --  -- --    Est. Blood Loss 0 -- 0 -- -- --    Total Output 0 400 400 -- -- --       Net I/O     373.2 -60 313.2 120 -- 120              Intake/Output Summary (Last 24 hours) at 11/12/2024 1038  Last data filed at 11/12/2024 0800  Gross per 24 hour   Intake 700 ml   Output 400 ml   Net 300 ml       $ Bladder Scan Results (mL): 214     insulin lispro  1-6 Units 4X/DAY ACHS    And    dextrose bolus  25 g Q15 MIN PRN    Respiratory Therapy Consult   Continuous RT    Pharmacy Consult Request  1 Each PHARMACY TO DOSE    ondansetron  4 mg Q4HRS PRN    ondansetron  4 mg Q4HRS PRN    docusate sodium  100 mg BID    senna-docusate  1 Tablet Nightly    senna-docusate  1 Tablet Q24HRS PRN    polyethylene glycol/lytes  1 Packet BID    magnesium hydroxide  30 mL DAILY AT 1800    bisacodyl  10 mg Q24HRS PRN    sodium phosphate  1 Each Once PRN    acetaminophen  650 mg Q6HRS    Followed by    [START ON 11/16/2024] acetaminophen  650 mg Q6HRS PRN    oxyCODONE immediate-release  2.5 mg Q3HRS PRN    Or    oxyCODONE immediate-release  5 mg Q3HRS PRN    Or    HYDROmorphone  0.25 mg Q3HRS PRN    ipratropium-albuterol  3 mL Q2HRS PRN (RT)    losartan  100 mg DAILY    metoprolol SR  12.5 mg DAILY    DULoxetine  20 mg DAILY    hydrALAZINE  10 mg Q6HRS PRN    levETIRAcetam  500 mg BID    bumetanide  0.5 mg Q48HRS       Assessment and Plan:  Hospital day #2 tiny left extraaxial blood, C7 fracture  POD #NA  Prophylactic anticoagulation: no         Start date/time: never     Brain Compression: No  Stable exam  Grossly stable left extraaxial blood collection  Anticoagulant, antiplatelet medication contraindicated  given falls, recent extraaxial blood  Follow up 6 weeks with cervical spine ct; wear collar all times  Neurosurgery will sign off.

## 2024-11-12 NOTE — PROGRESS NOTES
4 Eyes Skin Assessment Completed by NAMAN Siegel and NAMAN Ferguson.    Head Edema, bleeding, laceration with torie.  Ears WDL  Nose WDL  Mouth WDL  Neck WDL  Breast/Chest Scabs.  Shoulder Blades WDL  Spine WDL  (R) Arm/Elbow/Hand WDL  (L) Arm/Elbow/Hand WDL  Abdomen WDL  Groin WDL  Scrotum/Coccyx/Buttocks Redness, Blanching, and Discoloration  (R) Leg WDL  (L) Leg WDL  (R) Heel/Foot/Toe Redness, Blanching, and Boggy  (L) Heel/Foot/Toe Redness, Blanching, and Ulcer(s), open wound lateral to ankle.          Devices In Places Pulse Ox, Nasal Cannula, and Cervical Collar      Interventions In Place TAP System, Pillows, Q2 Turns, and Barrier Cream    Possible Skin Injury Yes    Pictures Uploaded Into Epic Yes  Wound Consult Placed Yes  RN Wound Prevention Protocol Ordered Yes

## 2024-11-12 NOTE — ASSESSMENT & PLAN NOTE
Chronic condition, unknown outpatient regimen.  Hgb A1c (4/2024) 6.8.  11/13 Hgb A1c 7.7.  No outpatient meds.  Sliding scale insulin initiated.  Follow up with PCP.

## 2024-11-12 NOTE — PROGRESS NOTES
1550 Report given to ICU RN, Narendra, all questions answered. Patient's daughter, Ana M, updated on plan for ICU transfer and plan of care. All questions answered.     1616 Patient transferred to ICU with ICU RN.

## 2024-11-12 NOTE — DISCHARGE PLANNING
"Pt's DPOA is her son Renard Tavarez AKA \"Baltazar\" ([phone]). Baltazar lives in [place] and will be flying into [place] on Monday.    "

## 2024-11-12 NOTE — THERAPY
Physical Therapy   Initial Evaluation     Patient Name: Krystle Tavarez  Age:  89 y.o., Sex:  female  Medical Record #: [id number]  Today's Date: 11/12/2024     Precautions  Precautions: Fall Risk;Cervical Collar  ;Spinal / Back Precautions   Comments: c/s precautions; miami j collar AAT; pacemaker    Assessment  Patient is 89 y.o. female with a diagnosis of subdural hemorrhage without loss of consciousness, posterior scalp laceration, and a C7 fracture after sustaining a GLF. Pt is being treated non-operatively but requires ICU level of care. PMH includes TIA treated with Eliquis, urinary incontinence, HTB, pacemaker, high cholesterol, diabetes, SOB with exertion and arthritis. PT and OT treated together due to pt complexity. Pt's daughter, Jackelyn, was able to answer questions about pt's PLOF and home set up as pt presents with increased cognitive deficits. Pt's daughter states within the last few days pt has become increasingly confused. PT provided pt education on c/s precautions and miami j wear/care. Pt was able to follow 1 step commands with increased time given. Pt presents with safety awareness, new learning, and attention impairments. Pt was able to perform log roll with mod assist and able to maintain EOB sitting with BUE support. Pt did perform STS 1x with FWW and demo'd bilateral knee buckling with pt stating increased weakness in both legs. PT was able to transfer from bed to chair using a stand pivot method requiring mod assist. Pt continues to present below baseline level of function and will benefit from continues acute therapy services.     Plan    Physical Therapy Initial Treatment Plan   Treatment Plan : Bed Mobility, Family / Caregiver Training, Gait Training, Neuro Re-Education / Balance, Self Care / Home Evaluation, Stair Training, Therapeutic Activities, Therapeutic Exercise, Equipment  Treatment Frequency: 3 Times per Week  Duration: Until Therapy Goals Met    DC Equipment Recommendations:  Unable to determine at this time  Discharge Recommendations: Recommend post-acute placement for additional physical therapy services prior to discharge home        11/12/24 1022    Services   Is patient using  services for this encounter? No   Initial Contact Note    Initial Contact Note Order Received and Verified, Physical Therapy Evaluation in Progress with Full Report to Follow.   Precautions   Precautions Fall Risk;Cervical Collar  ;Spinal / Back Precautions    Comments c/s precautions; miami j collar AAT; pacemaker   Vitals   O2 (LPM) 2   O2 Delivery Device Silicone Nasal Cannula   Vitals Comments VSS; BP monitored throughout session as pt stated symptopms of dizziness with activity; BP after activity 81/42; BP end of session at 88/46 in recliner chair with bilateral LE's elevated; RN aware and at bedside   Pain 0 - 10 Group   Therapist Pain Assessment Prior to Activity;Post Activity;During Activity;Nurse Notified;0   Prior Living Situation   Prior Services Continuous (24 Hour) Care Giving Per Service   Housing / Facility Group Home   Equipment Owned 4-Wheel Walker;Front-Wheel Walker;Wheelchair   Comments per chart pt lives at [facility] where staff assist with ADL's' such as bathing, dressing, and tolieting   Prior Level of Functional Mobility   Bed Mobility Required Assist   Transfer Status Required Assist   Ambulation Required Assist   Ambulation Distance Group Home ambulation   Assistive Devices Used 4-Wheel Walker   Wheelchair Required Assist   Stairs Required Assist   Comments pt's daughter Jackelyn states pt uses a 4WW at baseline and is able to ambulate with assistance at the group home; Additionally Jackelyn states she will wheel pt in w/c when she is transporting pt to appointmens or long distances   History of Falls   History of Falls Yes   Cognition    Cognition / Consciousness X   Orientation Level Not Oriented to Reason   Level of Consciousness Responds to voice    Ability To Follow Commands 1 Step   Safety Awareness Impaired   New Learning Impaired   Attention Impaired   Comments pt oriented to name and place; pt's daughter was able to provide information on  pt's PLOF and home set up; pt able to follow commands 1 step simple commads with increased time given   Strength Upper Body   Upper Body Strength  X   Comments defer to OT assessment, difficulty holding FWW on L side   Active ROM Lower Body    Active ROM Lower Body  WDL   Strength Lower Body   Lower Body Strength  X   Gross Strength Generalized Weakness, Equal Bilaterally   Comments pt able to lift bilateral LE against gravity in supine; during activity pt stated increase weakness in both legs   Sensation Lower Body   Lower Extremity Sensation   X   Comments pt stated decrease sensation in LLE>RLE   Coordination Lower Body    Coordination Lower Body  Not Tested   Other Treatments   Other Treatments Provided pt/family education on c/s precautions and miami j collar wear/care   Balance Assessment   Sitting Balance (Static) Fair -   Sitting Balance (Dynamic) Poor +   Standing Balance (Static) Poor   Standing Balance (Dynamic) Poor -   Weight Shift Sitting Fair   Weight Shift Standing Poor   Comments increased thoracic kyphosis in sitting and standing; able to support self in sitting with BUE support; pt able to stand for 1-3 seconds with FWW   Bed Mobility    Supine to Sit Moderate Assist   Scooting Contact Guard Assist   Rolling Contact Guard Assist   Comments log roll   Gait Analysis   Gait Level Of Assist Unable to Participate   Comments buckling noted in standing, deferred walking   Functional Mobility   Sit to Stand Moderate Assist   Bed, Chair, Wheelchair Transfer Moderate Assist   Transfer Method Stand Pivot   Mobility EOB>STS x 2> stand pivot transfer from bed to chair   Comments pt attempted STS x1 with FWW with pt demoing bilteral knee buckling and pt stated increased weakness in both her legs but able to  achieve full standing position for 1-3 seconds; pt performed stand pivot transfer from bed to recliner chair with 1p assist   ICU Target Mobility Level   ICU Mobility - Targeted Level Level 3B   6 Clicks Assessment - How much HELP from from another person do you currently need... (If the patient hasn't done an activity recently, how much help from another person do you think he/she would need if he/she tried?)   Turning from your back to your side while in a flat bed without using bedrails? 2   Moving from lying on your back to sitting on the side of a flat bed without using bedrails? 2   Moving to and from a bed to a chair (including a wheelchair)? 2   Standing up from a chair using your arms (e.g., wheelchair, or bedside chair)? 2   Walking in hospital room? 1   Climbing 3-5 steps with a railing? 1   6 clicks Mobility Score 10   Short Term Goals    Short Term Goal # 1 pt will be able to perform supine <> sit with SBA in 6 tx sessions in order to increase level of independence   Short Term Goal # 2 pt will be able to perform stand pivot transfer with FWW and SBA from bed <> recliner chair in 6 tx sessions in order to increase level of independence   Short Term Goal # 3 pt will be able to ambulate 50 ft with FWW and CGA in 6 tx sessison in order to increase level of idependence   Education Group   Education Provided Role of Physical Therapist;Cervical Precautions;Brace Wear and Care   Cervical Precautions Patient Response Patient;Family;Verbal Demonstration   Role of Physical Therapist Patient Response Patient;Family;Acceptance;Explanation;Verbal Demonstration   Brace Wear & Care Patient Response Family;Patient;Explanation;Verbal Demonstration   Physical Therapy Initial Treatment Plan    Treatment Plan  Bed Mobility;Family / Caregiver Training;Gait Training;Neuro Re-Education / Balance;Self Care / Home Evaluation;Stair Training;Therapeutic Activities;Therapeutic Exercise;Equipment   Treatment Frequency 3 Times per  Week   Duration Until Therapy Goals Met   Problem List    Problems Impaired Bed Mobility;Impaired Transfers;Impaired Ambulation;Impaired Balance;Impaired Coordination;Decreased Activity Tolerance;Safety Awareness Deficits / Cognition;Limited Knowledge of Post-Op Precautions;Motor Planning / Sequencing   Anticipated Discharge Equipment and Recommendations   DC Equipment Recommendations Unable to determine at this time   Discharge Recommendations Recommend post-acute placement for additional physical therapy services prior to discharge home   Interdisciplinary Plan of Care Collaboration   IDT Collaboration with  Nursing;Occupational Therapist;Family / Caregiver   Patient Position at End of Therapy Seated;Chair Alarm On;Call Light within Reach;Tray Table within Reach;Family / Friend in Room   Collaboration Comments RN updated   Session Information   Date / Session Number  11/12-1(1/3,11/18)

## 2024-11-12 NOTE — PROGRESS NOTES
[facility] cervical collar delivered and fit to patient. Patient is tolerating collar well at this time.

## 2024-11-12 NOTE — ASSESSMENT & PLAN NOTE
Chronic condition treated with bumetanide q48 hours.  Resumed maintenance medication on admission.

## 2024-11-12 NOTE — CARE PLAN
The patient is Watcher - Medium risk of patient condition declining or worsening    Shift Goals  Clinical Goals: Q2 hour neuro, repeat CT Midnight, safety  Patient Goals: Rest  Family Goals: Pt wellness    Progress made toward(s) clinical / shift goals:    Problem: Pain - Standard  Goal: Alleviation of pain or a reduction in pain to the patient’s comfort goal  Outcome: Progressing     Problem: Knowledge Deficit - Standard  Goal: Patient and family/care givers will demonstrate understanding of plan of care, disease process/condition, diagnostic tests and medications  Outcome: Progressing     Problem: Fall Risk  Goal: Patient will remain free from falls  Outcome: Progressing

## 2024-11-12 NOTE — PROGRESS NOTES
Geriatric Medicine Consultation  Date of Service 11/12/2024    Referring Physician  Jose Ballard M.D.    Consulting Physician  Sha Crane M.D.    Reason for Consultation  Fall, advanced age    History of Presenting Illness  89 y.o. female who presented 11/11/2024 with fall resulting in traumatic subdural hematoma, C7 fracture.  Trauma surgery admitted the patient to ICU, neurosurgery was consulted on the case.  Patient on anticoagulation with Eliquis for history of paroxysmal atrial fibrillation.  Anticoagulation was reversed.  Patient apparently lives in a group home, requires assistance with ADLs and IADLs.  She also has a POLST in place, with healthcare financial POA.  Patient is a poor historian.    Review of Systems  Review of Systems   Constitutional:  Positive for malaise/fatigue. Negative for chills, diaphoresis and fever.   HENT:  Negative for congestion, hearing loss and sore throat.    Eyes:  Negative for blurred vision.   Respiratory:  Negative for cough, shortness of breath and wheezing.    Cardiovascular:  Negative for chest pain, palpitations and leg swelling.   Gastrointestinal:  Negative for abdominal pain, diarrhea, heartburn, nausea and vomiting.   Genitourinary:  Negative for dysuria, flank pain and hematuria.   Musculoskeletal:  Positive for neck pain. Negative for back pain, joint pain and myalgias.   Skin:  Negative for rash.   Neurological:  Positive for weakness. Negative for dizziness, sensory change, speech change, focal weakness and headaches.   Psychiatric/Behavioral:  The patient is nervous/anxious.        Past Medical History   has a past medical history of Arthritis, Breath shortness, Cataract, Dental disorder, Diabetes (HCC), Heart burn, Hemorrhagic disorder (HCC), High cholesterol, Hyperlipidemia, Hypertension, Pacemaker (2015), Pain (2020), Pre-diabetes, TIA (transient ischemic attack), and Urinary incontinence.    Surgical History   has a past surgical history that  includes hip replacement, total (Right, 2009); knee replacement, total (Right, 2004); bunionectomy (Left); basal cell excision; cataract extraction with iol (Bilateral, 2003); pacemaker insertion (2015); cholecystectomy (2002); pr laminotomy,lumbar disk,1 intrsp (N/A, 2/25/2020); and foraminotomy (N/A, 2/25/2020).    Family History  family history includes Diabetes in her mother; Heart Attack (age of onset: 74) in her father; No Known Problems in her maternal grandfather, maternal grandmother, paternal grandfather, and paternal grandmother; Stroke in her mother.    Social History   reports that she has never smoked. She has never used smokeless tobacco. She reports that she does not currently use alcohol. She reports that she does not use drugs.    Living situation - Group home  Marital status -   Primary caregiver - group home  Transportation - group home  POLST                   Yes  Health care POA   Yes   Financial POA       Yes     Medications  Prior to Admission Medications   Prescriptions Last Dose Informant Patient Reported? Taking?   Alum Hydroxide-Mag Carbonate (GAVISCON PO) Unknown MAR from Other Facility Yes No   Sig: Take 1 Tablet by mouth 2 times a day as needed.   Cyanocobalamin (VITAMIN B-12) 1000 MCG Tab 11/10/2024 at  8:00 AM MAR from Other Facility No Yes   Sig: Take 1 Tablet by mouth every day.   DULoxetine (CYMBALTA) 20 MG Cap DR Particles 11/10/2024 at  8:00 AM MAR from Other Facility No Yes   Sig: Take 1 Capsule by mouth every day.   Vitamin D, Cholecalciferol, (CHOLECALCIFEROL) 25 MCG (1000 UT) Tab 11/10/2024 at  8:00 AM MAR from Other Facility No Yes   Sig: Take 1 Tablet by mouth every day.   Zinc Oxide (BALMEX EX) Unknown MAR from Other Facility Yes No   Sig: Apply 1 Application topically as needed.   acetaminophen (TYLENOL 8 HOUR) 650 MG CR tablet 11/10/2024 at  6:00 PM MAR from Other Facility No Yes   Sig: Take 2 Tablets by mouth 2 times a day.   acetaminophen (TYLENOL 8 HOUR) 650 MG  CR tablet 11/11/2024 Morning MAR from Other Facility Yes Yes   Sig: Take 650 mg by mouth every 6 hours as needed for Fever (>100.5).   apixaban (ELIQUIS) 2.5mg Tab 11/10/2024 at  6:00 PM MAR from Other Facility No Yes   Sig: Take 1 Tablet by mouth 2 times a day.   ascorbic acid (ASCORBIC ACID) 500 MG Tab 11/10/2024 at  8:00 AM MAR from Other Facility No Yes   Sig: Take 1 Tablet by mouth every day.   bisacodyl EC (DULCOLAX) 5 MG Tablet Delayed Response Unknown MAR from Other Facility Yes No   Sig: Take 5 mg by mouth 1 time a day as needed for Constipation.   bumetanide (BUMEX) 0.5 MG Tab 11/9/2024 at  8:00 AM MAR from Other Facility No Yes   Sig: Take 1 Tablet by mouth every 48 hours.   calcium carbonate (TUMS) 500 MG Chew Tab Unknown MAR from Other Facility Yes No   Sig: Chew 500 mg as needed.   docusate sodium (COLACE) 100 MG Cap 11/10/2024 at  8:00 AM MAR from Other Facility Yes Yes   Sig: Take 100 mg by mouth every day.   ketoconazole (NIZORAL) 2 % shampoo Not Taking MAR from Other Facility No No   Sig: Apply 1 mL topically every Friday.   Patient not taking: Reported on 11/11/2024   losartan (COZAAR) 100 MG Tab 11/10/2024 at  8:00 AM MAR from Other Facility No Yes   Sig: Take 1 Tablet by mouth every day.   metoprolol SR (TOPROL XL) 25 MG TABLET SR 24 HR 11/10/2024 at  8:00 AM MAR from Other Facility No Yes   Sig: TAKE ONE-HALF (1/2) TABLET DAILY      Facility-Administered Medications: None       Allergies  Allergies   Allergen Reactions    Codeine Unspecified     Patient reported hallucination and dizziness. Specific to tylenol with codeine      Gabapentin Unspecified     Altered mental status    Sulfa Drugs Nausea     Nausea   Other reaction(s): Not available    Tramadol Unspecified     Altered mentation       Geriatric Screening    ADL assistance              Yes  IADL assistance             Yes  Cognitive Impairment    Yes suspected  Mood disorder                No   Polypharmacy                Yes  Vision  impairment         No   Hearing impairment      No   Fall                                  Yes  Assistive device            No  ?  Urinary incontinence    No   Nutrition issues            No   Food Insecurity            No   Pressure ulcer              No     Physical Exam  Temp:  [36 °C (96.8 °F)-36.6 °C (97.8 °F)] 36.1 °C (97 °F)  Pulse:  [63-95] 74  Resp:  [11-25] 11  BP: ()/(44-91) 108/53  SpO2:  [92 %-100 %] 96 %  Physical Exam  Vitals and nursing note reviewed.   HENT:      Head: Normocephalic.      Comments: Scalp laceration with staples     Nose: No congestion.      Mouth/Throat:      Mouth: Mucous membranes are moist.   Eyes:      Conjunctiva/sclera: Conjunctivae normal.   Neck:      Comments: collar  Cardiovascular:      Rate and Rhythm: Normal rate and regular rhythm.      Heart sounds: Murmur heard.   Abdominal:      General: There is no distension.      Tenderness: There is no abdominal tenderness. There is no guarding or rebound.   Musculoskeletal:      Right lower leg: Edema present.      Left lower leg: Edema present.   Skin:     General: Skin is warm and dry.   Neurological:      General: No focal deficit present.      Mental Status: She is alert.      Cranial Nerves: No cranial nerve deficit.      Comments: Oriented to person   Psychiatric:         Mood and Affect: Mood is anxious.         Speech: Speech is delayed and tangential.         Cognition and Memory: She exhibits impaired recent memory.           CAM  Acute onset and fluctuating course   No   Inattention                                         No   Disorganized thinking                        No   Altered level of consciousness          No       Laboratory  Recent Labs     11/11/24  0834 11/12/24 0422   WBC 8.0 8.3   RBC 3.67* 3.38*   HEMOGLOBIN 11.6* 10.7*   HEMATOCRIT 35.3* 32.9*   MCV 96.2 97.3   MCH 31.6 31.7   MCHC 32.9 32.5   RDW 50.8* 50.7*   PLATELETCT 297 269   MPV 9.2 8.8*     Recent Labs     11/11/24  0834 11/12/24 0422    SODIUM 139 137   POTASSIUM 4.5 4.6   CHLORIDE 105 104   CO2 24 25   GLUCOSE 169* 152*   BUN 27* 25*   CREATININE 1.03 0.97   CALCIUM 9.5 9.4     Recent Labs     11/11/24  0834   APTT 32.3   INR 1.12               Imaging  CT-HEAD W/O   Final Result         1.  Small left parieto-occipital subdural hematoma, overall stable since prior study.   2.  Nonspecific white matter changes, commonly associated with small vessel ischemic disease.  Associated mild cerebral atrophy is noted.   3.  Atherosclerosis.               CT-HEAD W/O   Final Result      1.  Slightly increased acute left parietal subdural hemorrhage now measuring 3 mm in greatest thickness.   2.  Moderate microangiopathic ischemic change versus demyelination or gliosis.   3.  Mild diffuse cerebral substance loss.                  CT-CSPINE WITHOUT PLUS RECONS   Final Result      1.  Redemonstrated C7 vertebral body fracture with increased loss of height anteriorly approaching 75% and slightly increased retropulsion measuring approximately 3 mm. Acute on chronic injury is not excluded. If there is strong clinical suspicion for    acute injury to this region, MRI should be obtained for further evaluation.   2.  Multilevel degenerative changes of cervical spine as described above.   3.  Atherosclerosis.      CT-HEAD W/O   Final Result      1.  Possible tiny hyperattenuating focus adjacent left parietal cortex which could represent trace subarachnoid or subdural hemorrhage.   2.  Moderate microangiopathic ischemic change versus demyelination or gliosis.   3.  Mild diffuse cerebral substance loss.      Based solely on CT findings, the brain injury guideline category is mBIG 1.      SDH < 4mm   IPH < 4mm   SAH < 3 sulci and < 1mm      The original BIG retrospective analysis found radiographic progression in 0% of BIG 1 patients and 2.6% BIG 2.      Findings were conveyed to Dr. CHIDI GALLARDO on 11/11/2024 8:19 AM.             Assessment/Plan  * Trauma-  (present on admission)  Assessment & Plan  Trauma surgery-primary service    Traumatic subdural hemorrhage without loss of consciousness (HCC)- (present on admission)  Assessment & Plan  Neurochecks  Keppra    Fracture of seventh cervical vertebra (HCC)- (present on admission)  Assessment & Plan  Collar  Neurochecks  Pain control    Frequent falls- (present on admission)  Assessment & Plan  PT and OT  Check vitamin B12, TSH, vitamin D    Advanced age- (present on admission)  Assessment & Plan  POLST in place    Pulmonary hypertension (HCC)- (present on admission)  Assessment & Plan  Bumex  Monitor volume status    CKD (chronic kidney disease) stage 3, GFR 30-59 ml/min- (present on admission)  Assessment & Plan  Follow bmp  Check PTH    Paroxysmal atrial fibrillation (HCC)- (present on admission)  Assessment & Plan  Toprol  Holding Eliquis    Scalp laceration, initial encounter- (present on admission)  Assessment & Plan  Wound care    Chronic heart failure with preserved ejection fraction (HCC)- (present on admission)  Assessment & Plan  Toprol, losartan, Bumex  Monitor volume status    Type 2 diabetes mellitus with kidney complication, without long-term current use of insulin (HCC)- (present on admission)  Assessment & Plan  Check hemoglobin A1c  SSI    Primary hypertension- (present on admission)  Assessment & Plan  Toprol, losartan    Cardiac pacemaker in situ- (present on admission)  Assessment & Plan  Telemetry monitoring

## 2024-11-12 NOTE — CARE PLAN
The patient is Stable - Low risk of patient condition declining or worsening    Shift Goals  Clinical Goals: stable neuro, increase oral intake, mobility  Patient Goals: eat  Family Goals: updates    Progress made toward(s) clinical / shift goals:    Problem: Pain - Standard  Goal: Alleviation of pain or a reduction in pain to the patient’s comfort goal  Outcome: Progressing     Problem: Skin Integrity  Goal: Skin integrity is maintained or improved  Outcome: Progressing       Patient is not progressing towards the following goals:

## 2024-11-12 NOTE — THERAPY
Occupational Therapy   Initial Evaluation     Patient Name: Krystle Tavarez  Age:  89 y.o., Sex:  female  Medical Record #: [id number]  Today's Date: 11/12/2024     Precautions  Precautions: Fall Risk, Spinal / Back Precautions , Cervical Collar    Comments: c-collar AAT; + orthostatic    Assessment  Patient is 89 y.o. female admitted after slip and fall in the shower at her Group home with L extraaxial blood collection which is stable and C7 fx. The C7 fx is being treated non-op. Earlier this year she also had a C7 fx in May 2024 and was treated non-op w/ a collar. She has a hx of TIA treated with Eliquis, urinary incontinence, HTB, pacemaker, high cholesterol, diabetes, SOB with exertion and arthritis.     Pt is currently limited by weakness, fatigue, impaired balance, impaired cognition, impaired safety awareness, pain, and impaired BUE function, which impacts ability to complete ADLs, ADL txfs, and functional mobility. She did have symptomatic orthostatic hypotension but was able to remain up in the recliner at the end of the session.       Plan    Occupational Therapy Initial Treatment Plan   Treatment Interventions: Self Care / Activities of Daily Living, Adaptive Equipment, Neuro Re-Education / Balance, Therapeutic Exercises, Cognitive Skill Development, Therapeutic Activity, Family / Caregiver Training  Treatment Frequency: 3 Times per Week  Duration: Until Therapy Goals Met    DC Equipment Recommendations: Unable to determine at this time  Discharge Recommendations: Recommend post-acute placement for additional occupational therapy services prior to discharge home (unless GH able to provide physical assist w/ ADLs/mobility)      Objective       11/12/24 1027   Prior Living Situation   Prior Services Continuous (24 Hour) Care Giving Per Service   Housing / Facility Group Home   Equipment Owned 4-Wheel Walker;Wheelchair;Front-Wheel Walker   Lives with - Patient's Self Care Capacity Attendant / Paid Care Giver  "  Comments info provided by dtr at bedside. SHe lives in a group home that provides supervision w/ all mobility, and physical assistance when needed for ADLs.   Prior Level of ADL Function   Self Feeding Independent   Grooming / Hygiene Independent   Bathing Requires Assist   Dressing Requires Assist   Toileting Requires Assist   Comments depending on the day needs more help than other days w/ bathing, dressing, and toileting.   Prior Level of IADL Function   Prior Level Of Mobility Supervision With Device in Community;Supervision With Device in Home   History of Falls   History of Falls Yes   Precautions   Precautions Fall Risk;Spinal / Back Precautions ;Cervical Collar     Comments c-collar AAT; + orthostatic   Vitals   Vitals Comments on 2L o2. SBP EOB was in 120s. After txf to chair pt reported feeling a little dizzy. BP checked and was 81/42. She was reclined back and it was rechecked to be 88/42. Pt stating less dizziness. RN present at end stating he was ok w/ this BP and remaining up in chair.   Pain 0 - 10 Group   Therapist Pain Assessment Nurse Notified;During Activity  (min c/o neck pain)   Cognition    Cognition / Consciousness X   Level of Consciousness Alert   Ability To Follow Commands 1 Step   Safety Awareness Impaired   New Learning Impaired   Attention Impaired   Comments very pleasant and cooperative. able to follow simple directions throughout but had difficulty w/ complex directions. poor memory and insight into deficits   Active ROM Upper Body   Active ROM Upper Body  X   Dominant Hand Left   Comments BUE grossly limited, pt had a difficult time following directions for formal ROM testing.   Strength Upper Body   Upper Body Strength  X   Comments L UE \"buckling\" and having difficult time time maintaining  on walker when standing. difficulty following formal testing. appears to have more proximal than distal weakness B UE   Balance Assessment   Sitting Balance (Static) Fair -   Sitting " Balance (Dynamic) Poor +   Standing Balance (Static) Poor   Standing Balance (Dynamic) Poor -   Weight Shift Sitting Fair   Weight Shift Standing Poor   Comments w/ fww initially, BLE appeared to be buckling. then w/ therapist assist to recliner   Bed Mobility    Supine to Sit Moderate Assist   Scooting Contact Guard Assist   Rolling Contact Guard Assist   Comments via log roll   ADL Assessment   Grooming Minimal Assist;Seated   Upper Body Dressing Maximal Assist  (gown change)   Lower Body Dressing Maximal Assist  (socks)   Toileting Maximal Assist  (incontinent of urine)   How much help from another person does the patient currently need...   Putting on and taking off regular lower body clothing? 2   Bathing (including washing, rinsing, and drying)? 2   Toileting, which includes using a toilet, bedpan, or urinal? 2   Putting on and taking off regular upper body clothing? 2   Taking care of personal grooming such as brushing teeth? 3   Eating meals? 3   6 Clicks Daily Activity Score 14   Functional Mobility   Sit to Stand Moderate Assist   Bed, Chair, Wheelchair Transfer Moderate Assist   Mobility EOB>STS>Pivot to recliner   Comments      Wears glasses w/ FWW for initial stand, then w/ therapist assist for txf   Short Term Goals   Short Term Goal # 1 pt will demo grooming seated w/ setup   Short Term Goal # 2 pt will demo ADL txfs w/ supv   Short Term Goal # 3 pt will demo toileting w/ supv   Patient seen for team evaluation with Physical Therapist for the following reason(s):  Patient required 2 person assistance for safety and to provide effective interventions. Each discipline assisted patient with appropriate and separate goals. Due to the medical complexity, the skill of both practitioners is needed to monitor vitals, patient status, and adjust the intervention to fit the patient's needs and goals.

## 2024-11-12 NOTE — DISCHARGE PLANNING
0945  Agency/Facility Name: [facility]   Spoke To: Anusha   Outcome: Anusha notified DPA that pt is currently in service with [facility].

## 2024-11-12 NOTE — WOUND TEAM
[facility]  Inpatient Services  Initial Wound and Skin Care Evaluation    Admission Date: 11/11/2024     Last order of IP CONSULT TO WOUND CARE was found on 11/11/2024 from Hospital Encounter on 11/11/2024     HPI, PMH, SH: Reviewed    Past Surgical History:   Procedure Laterality Date    PB LAMINOTOMY,LUMBAR DISK,1 INTRSP N/A 2/25/2020    Procedure: LAMINECTOMY, SPINE, LUMBAR, WITH DISCECTOMY- L5-S1, MEDIAL FACETECTOMY;  Surgeon: Latrell Kimble M.D.;  Location: SURGERY [facility];  Service: Neurosurgery    FORAMINOTOMY N/A 2/25/2020    Procedure: FORAMINOTOMY, SPINE;  Surgeon: Latrell Kimble M.D.;  Location: SURGERY [facility];  Service: Neurosurgery    PACEMAKER INSERTION  2015    HIP REPLACEMENT, TOTAL Right 2009    KNEE REPLACEMENT, TOTAL Right 2004    CATARACT EXTRACTION WITH IOL Bilateral 2003    CHOLECYSTECTOMY  2002    BASAL CELL EXCISION      nose and hand    BUNIONECTOMY Left      Social History     Tobacco Use    Smoking status: Never    Smokeless tobacco: Never   Substance Use Topics    Alcohol use: Not Currently     Chief Complaint   Patient presents with    T-5000 Head Injury     Pt BIB EMS from home for TBI, patient was in bathroom fell and possibly hit counter with back of head - laceration to back of head. Pt is on eliquis. Normally A&O x4, currently A&Ox1. EMS reports orientation decreased in route.     Pt was placed in c-collar by EMS, midline neck pain, hx of cervical spine facture.     On 2L, RA baseline.     Diagnosis: Trauma [T14.90XA]    Unit where seen by Wound Team: T932/01     WOUND CONSULT RELATED TO:  Left lateral heel     WOUND TEAM PLAN OF CARE - Frequency of Follow-up:   Nursing to follow dressing orders written for wound care. Contact wound team if area fails to progress, deteriorates or with any questions/concerns if something comes up before next scheduled follow up (See below as to whether wound is following and frequency of wound follow up)   Weekly - left  lateral heel  Not following, consult as needed  - sacrum and bilateral heels    WOUND HISTORY:   Left lateral heel pressure injury, unknown etiology.         WOUND ASSESSMENT/LDA  Wound 11/12/24 Pressure Injury Heel Lateral Left POA Stage 3 (Active)   Date First Assessed/Time First Assessed: 11/12/24 1115   Present on Original Admission: Yes  Primary Wound Type: Pressure Injury  Location: Heel  Wound Orientation: Lateral  Laterality: Left  Wound Description (Comments): POA Stage 3      Assessments 11/12/2024 11:00 AM   Wound Image      Site Assessment Red;Tan   Periwound Assessment Clean;Dry;Intact   Margins Defined edges   Closure Secondary intention   Drainage Amount Moderate   Drainage Description Serosanguineous   Treatments CSWD - Conservative Sharp Wound Debridement;Cleansed;Irrigation;Site care   Wound Cleansing Approved Wound Cleanser   Periwound Protectant Skin Protectant Wipes to Periwound   Dressing Status Clean;Dry;Intact   Dressing Changed New   Dressing Cleansing/Solutions Not Applicable   Dressing Options Collagen Dressing;Hydrofiber Silver;Silicone Adhesive Foam   Dressing Change/Treatment Frequency Every 48 hrs, and As Needed   NEXT Dressing Change/Treatment Date 11/14/24   NEXT Weekly Photo (Inpatient Only) 11/19/24   Wound Team Following Weekly   Pressure Injury Stage Stage 3   Wound Length (cm) 1 cm   Wound Width (cm) 1.2 cm   Wound Depth (cm) 0.7 cm   Wound Surface Area (cm^2) 1.2 cm^2   Wound Volume (cm^3) 0.84 cm^3   Post-Procedure Length (cm) 1 cm   Post-Procedure Width (cm) 1.2 cm   Post-Procedure Depth (cm) 1 cm   Post-Procedure Surface Area (cm^2) 1.2 cm^2   Post-Procedure Volume (cm^3) 1.2 cm^3   Shape circular   Wound Odor Mild   Pulses Left;1+;DP;PT   Right Foot Monofilament 10-point exam (Sensate) 0/10   Left Foot Monofilament 10-point exam (Sensate) 0/10   Exposed Structures Fascia   WOUND NURSE ONLY - Time Spent with Patient (mins) 30        Vascular:    ALLEN:   No results  found.    Lab Values:    Lab Results   Component Value Date/Time    WBC 8.3 11/12/2024 04:22 AM    RBC 3.38 (L) 11/12/2024 04:22 AM    HEMOGLOBIN 10.7 (L) 11/12/2024 04:22 AM    HEMATOCRIT 32.9 (L) 11/12/2024 04:22 AM    CREACTPROT 0.86 (H) 08/29/2024 04:52 PM    SEDRATEWES 70 (H) 08/29/2024 04:52 PM    HBA1C 6.8 (H) 04/12/2024 09:43 AM         Culture Results show:  No results found for this or any previous visit (from the past 720 hours).    Pain Level/Medicated:  None, Tolerated without pain medication       INTERVENTIONS BY WOUND TEAM:  Chart and images reviewed. Discussed with bedside RN. All areas of concern (based on picture review, LDA review and discussion with bedside RN) have been thoroughly assessed. Documentation of areas based on significant findings. This RN in to assess patient. Performed standard wound care which includes appropriate positioning, dressing removal and non-selective debridement. Pictures and measurements obtained weekly if/when required.    Wound:  LEFT LATERAL HEEL   Preparation for Dressing removal: Removed without difficulty  Cleansed/Non-selectively Debrided with:  Wound cleanser and Gauze  Non-Excisional Conservative Sharp debridement: Slough and Non-Viable/Devitalized tissue debrided away using curette, scissors, and forceps < 20cm2 debrided down to viable tissue.  Scant bleeding noted, controlled with manual pressure.  Yoko wound: Cleansed with Wound cleanser and Gauze, Prepped with No Sting  Primary Dressing:  collagen, Aquacel Ag hydrofiber  Secondary (Outer) Dressing: silicone adhesive foam     [facility] Discharge Planning  Number of Clinicians necessary to complete wound care: 1  Is patient requiring IV pain medications for dressing changes:  no   Length of time for dressing change 5 min. (This does not include chart review, pre-medication time, set up, clean up or time spent charting.)    Interdisciplinary consultation: Patient, Bedside RN (Aristides), N/A.  Pressure  injury and staging reviewed with N/A.    EVALUATION / RATIONALE FOR TREATMENT:     Date:  11/12/24  Wound Status:  Initial evaluation    Patient and patient's daughter states that patient has a pressure injury that was present at this admit.  Stage 3 to the left lateral heel. Magaly to provide collagen/cellulose matrix in order to provide matrix and distract MMPs, manage bioburden, absorb exudate, and maintain moist wound environment.  Aquacel Ag Hydrofiber applied to manage bioburden, absorb exudate, and maintain a moist wound environment without laterally wicking exudate therefore reducing cheryl-wound maceration           Goals: Steady decrease in wound area and depth weekly.    NURSING PLAN OF CARE ORDERS:  Dressing changes: See Dressing Care orders  Skin care: See Skin Care orders  RN Prevention Protocol    NUTRITION RECOMMENDATIONS   Wound Team Recommendations:  Protein supplements    DIET ORDERS (From admission to next 24h)       Start     Ordered    11/12/24 1100  Diet Order Diet: Consistent CHO (Diabetic)  START AT LUNCH        Question:  Diet:  Answer:  Consistent CHO (Diabetic)    11/12/24 0940                    PREVENTATIVE INTERVENTIONS:    Q shift Gabo - performed per nursing policy  Q shift pressure point assessments - performed per nursing policy    Surface/Positioning  ICU Low Airloss - Currently in Place  Reposition q 2 hours - Currently in Place  TAPs Turning system - Currently in Place    Offloading/Redistribution  Sacral offloading dressing (Silicone dressing) - Currently in Place  Float Heels off Bed with Pillows - Currently in Place           Respiratory  Silicone O2 tubing - Currently in Place  Gray Foam Ear protectors - Currently in Place    Containment/Moisture Prevention    Dri-renata pad - Currently in Place  Barrier paste - Currently in Place    Mobilization      Up to chair     Anticipated discharge plans:  TBD        Vac Discharge Needs:  Vac Discharge plan is purely a recommendation  from wound team and not a requirement for discharge unless otherwise stated by physician.  Not Applicable Pt not on a wound vac

## 2024-11-12 NOTE — PROGRESS NOTES
Temp 96.7F  64.1 kg    4 Eyes Skin Assessment Completed by Narendra, RN and Elsie RN.    Head Redness lac with staples  Ears L ear bruising limited view due to c-collar  Nose WDL  Mouth WDL  Neck limited view due to c-collar  Breast/Chest dry  Shoulder Blades WDL  Spine WDL  (R) Arm/Elbow/Hand boggy elbow, abrasion elbow  (L) Arm/Elbow/Hand boggy elbow  Abdomen WDL  Groin Redness and Rash  Scrotum/Coccyx/Buttocks Redness  (R) Leg WDL  (L) Leg redness elbow  (R) Heel/Foot/Toe Redness right medial metatarsal   (L) Heel/Foot/Toe Redness left 1st toe, lateral foot lac with dressing in place          Devices In Places ECG, Blood Pressure Cuff, Pulse Ox, SCD's, Nasal Cannula, and Cervical Collar      Interventions In Place Sacral Mepilex, TAP System, Pillows, Q2 Turns, and Low Air Loss Mattress    Possible Skin Injury Yes    Pictures Uploaded Into Epic Yes  Wound Consult Placed Yes  RN Wound Prevention Protocol Ordered Yes

## 2024-11-12 NOTE — PROGRESS NOTES
Trauma / Surgical Daily Progress Note    Date of Service  11/12/2024    Chief Complaint  89 y.o. female admitted 11/11/2024 with ICH after a GLF    Interval Events  Interval head CT stable.  Ok for Q 4 hour  neuro checks per neurosurgery.  No MRI for cervical fracture, will follow up in 6 weeks.  PT//OT/SLP evaluations pending.   Medically stable for transfer to [facility].  Geriatric consult placed.   SNF referral and  order placed.     Review of Systems  Review of Systems   Constitutional: Negative.    Eyes: Negative.  Negative for blurred vision and double vision.   Respiratory: Negative.     Cardiovascular: Negative.    Gastrointestinal: Negative.    Genitourinary: Negative.    Musculoskeletal: Negative.    Neurological: Negative.  Negative for dizziness and focal weakness.   Psychiatric/Behavioral: Negative.     All other systems reviewed and are negative.       Vital Signs  Temp:  [36 °C (96.8 °F)-36.6 °C (97.8 °F)] 36.1 °C (97 °F)  Pulse:  [63-95] 74  Resp:  [11-25] 11  BP: ()/(44-91) 108/53  SpO2:  [92 %-100 %] 96 %    Physical Exam  Physical Exam  Vitals and nursing note reviewed.   Constitutional:       General: She is not in acute distress.     Appearance: Normal appearance.      Interventions: Cervical collar and nasal cannula in place.   HENT:      Head: Normocephalic.      Comments: Posterior scalp laceration well approximated with staples.      Nose: Nose normal.      Mouth/Throat:      Mouth: Mucous membranes are moist.      Pharynx: Oropharynx is clear.   Eyes:      Extraocular Movements: Extraocular movements intact.      Conjunctiva/sclera: Conjunctivae normal.      Pupils: Pupils are equal, round, and reactive to light.   Cardiovascular:      Rate and Rhythm: Normal rate and regular rhythm.      Pulses: Normal pulses.   Pulmonary:      Effort: Pulmonary effort is normal. No respiratory distress.   Chest:      Chest wall: No deformity, swelling, tenderness or crepitus.   Abdominal:       General: Abdomen is flat.      Palpations: Abdomen is soft.      Tenderness: There is abdominal tenderness.   Musculoskeletal:         General: Normal range of motion.      Cervical back: Neck supple.      Comments: Equal strengths bilaterally, uppers and lowers.    Skin:     General: Skin is warm and dry.      Capillary Refill: Capillary refill takes less than 2 seconds.   Neurological:      General: No focal deficit present.      Mental Status: She is alert and oriented to person, place, and time. Mental status is at baseline.      GCS: GCS eye subscore is 3. GCS verbal subscore is 4. GCS motor subscore is 6.      Sensory: Sensation is intact.      Motor: Motor function is intact.   Psychiatric:         Mood and Affect: Mood normal.         Behavior: Behavior normal.         Laboratory  Recent Results (from the past 24 hours)   POCT glucose device results    Collection Time: 11/11/24  8:22 PM   Result Value Ref Range    POC Glucose, Blood 134 (H) 65 - 99 mg/dL   POCT glucose device results    Collection Time: 11/12/24 12:49 AM   Result Value Ref Range    POC Glucose, Blood 129 (H) 65 - 99 mg/dL   CBC WITHOUT DIFFERENTIAL    Collection Time: 11/12/24  4:22 AM   Result Value Ref Range    WBC 8.3 4.8 - 10.8 K/uL    RBC 3.38 (L) 4.20 - 5.40 M/uL    Hemoglobin 10.7 (L) 12.0 - 16.0 g/dL    Hematocrit 32.9 (L) 37.0 - 47.0 %    MCV 97.3 81.4 - 97.8 fL    MCH 31.7 27.0 - 33.0 pg    MCHC 32.5 32.2 - 35.5 g/dL    RDW 50.7 (H) 35.9 - 50.0 fL    Platelet Count 269 164 - 446 K/uL    MPV 8.8 (L) 9.0 - 12.9 fL   Basic Metabolic Panel    Collection Time: 11/12/24  4:22 AM   Result Value Ref Range    Sodium 137 135 - 145 mmol/L    Potassium 4.6 3.6 - 5.5 mmol/L    Chloride 104 96 - 112 mmol/L    Co2 25 20 - 33 mmol/L    Glucose 152 (H) 65 - 99 mg/dL    Bun 25 (H) 8 - 22 mg/dL    Creatinine 0.97 0.50 - 1.40 mg/dL    Calcium 9.4 8.5 - 10.5 mg/dL    Anion Gap 8.0 7.0 - 16.0   ESTIMATED GFR    Collection Time: 11/12/24  4:22 AM    Result Value Ref Range    GFR (CKD-EPI) 55 (A) >60 mL/min/1.73 m 2   MAGNESIUM    Collection Time: 11/12/24  4:22 AM   Result Value Ref Range    Magnesium 2.1 1.5 - 2.5 mg/dL   PHOSPHORUS    Collection Time: 11/12/24  4:22 AM   Result Value Ref Range    Phosphorus 3.6 2.5 - 4.5 mg/dL   POCT glucose device results    Collection Time: 11/12/24 11:33 AM   Result Value Ref Range    POC Glucose, Blood 144 (H) 65 - 99 mg/dL       Fluids    Intake/Output Summary (Last 24 hours) at 11/12/2024 1307  Last data filed at 11/12/2024 0800  Gross per 24 hour   Intake 700 ml   Output 400 ml   Net 300 ml       Core Measures & Quality Metrics  Radiology images reviewed, Labs reviewed and Medications reviewed  Carranza catheter: No Carranza      DVT Prophylaxis: Contraindicated - High bleeding risk  DVT prophylaxis - mechanical: Contra-indicated  Ulcer prophylaxis: Not indicated    Assessed for rehab: Patient was assess for and/or received rehabilitation services during this hospitalization    RAP Score Total: 7    CAGE Results: not completed Blood Alcohol>0.08: no       Assessment/Plan  * Trauma- (present on admission)  Assessment & Plan  Mechanical ground level fall.  T-5000 Activation.  Jose Ballard MD. Trauma Surgery.    Traumatic subdural hemorrhage without loss of consciousness (HCC)- (present on admission)  Assessment & Plan  Ground level fall with head strike.  Admission CT imaging demonstrated a tiny left parietal cortex focus of possible hemorrhage.  Anticoagulation reversed  Repeat interval CT imaging of the brain with slight increase in hemorrhage  Transfer to ICU for serial neurologic exams   Repeat interval CT stable.    Chronic anticoagulation- (present on admission)  Assessment & Plan  Chronically anticoagulated with apixaban (Eliquis) for history of paroxysmal atrial fibrillation.  Reversed with prothrombin complex concentrate (Balfaxar) in the Emergency Department.    Contraindication to anticoagulation therapy-  (present on admission)  Assessment & Plan  VTE prophylaxis initially contraindicated secondary to elevated bleeding risk.  11/12 Trauma surveillance venous duplex ultrasonography ordered.    Fracture of seventh cervical vertebra (HCC)- (present on admission)  Assessment & Plan  Ground level fall in May 2024 with known C7 fracture.  MRI incompatible pacemaker.  Non-operative management.   Cervical immobilization.  Aashish Washington MD, PhD. Neurosurgeon. [facility].    Type 2 diabetes mellitus, without long-term current use of insulin (HCC)- (present on admission)  Assessment & Plan  Chronic condition, unknown outpatient regimen.  Hgb A1c (4/2024) 6.8.  Sliding scale insulin.    Scalp laceration, initial encounter- (present on admission)  Assessment & Plan  Posterior scalp laceration.  Repaired in the Emergency Department.    Chronic heart failure with preserved ejection fraction (HCC)- (present on admission)  Assessment & Plan  Chronic condition treated with bumetanide q48 hours.  Resumed maintenance medication on admission.    Atrial fibrillation (HCC)- (present on admission)  Assessment & Plan  Chronic condition treated with metoprolol and Eliquis  Systemic anticoagulation held on admission.  Metoprolol resumed on admission.    Primary hypertension- (present on admission)  Assessment & Plan  Chronic condition treated with metoprolol and losartan  Resumed maintenance medication on admission.          Discussed patient condition with RN, Therapies, Pharmacy, Patient, and trauma surgery.

## 2024-11-12 NOTE — ASSESSMENT & PLAN NOTE
Chronic condition treated with metoprolol and losartan  Resumed maintenance medication on admission.

## 2024-11-13 LAB
25(OH)D3 SERPL-MCNC: 30 NG/ML (ref 30–100)
EST. AVERAGE GLUCOSE BLD GHB EST-MCNC: 174 MG/DL
GLUCOSE BLD STRIP.AUTO-MCNC: 145 MG/DL (ref 65–99)
GLUCOSE BLD STRIP.AUTO-MCNC: 170 MG/DL (ref 65–99)
GLUCOSE BLD STRIP.AUTO-MCNC: 171 MG/DL (ref 65–99)
GLUCOSE BLD STRIP.AUTO-MCNC: 174 MG/DL (ref 65–99)
HBA1C MFR BLD: 7.7 % (ref 4–5.6)
PTH-INTACT SERPL-MCNC: 46.1 PG/ML (ref 14–72)
TSH SERPL-ACNC: 1.01 UIU/ML (ref 0.35–5.5)
VIT B12 SERPL-MCNC: 2039 PG/ML (ref 211–911)

## 2024-11-13 PROCEDURE — 96105 ASSESSMENT OF APHASIA: CPT

## 2024-11-13 PROCEDURE — 84443 ASSAY THYROID STIM HORMONE: CPT

## 2024-11-13 PROCEDURE — 82962 GLUCOSE BLOOD TEST: CPT | Mod: 91

## 2024-11-13 PROCEDURE — 83970 ASSAY OF PARATHORMONE: CPT

## 2024-11-13 PROCEDURE — A9270 NON-COVERED ITEM OR SERVICE: HCPCS | Performed by: NURSE PRACTITIONER

## 2024-11-13 PROCEDURE — 770001 HCHG ROOM/CARE - MED/SURG/GYN PRIV*

## 2024-11-13 PROCEDURE — 82306 VITAMIN D 25 HYDROXY: CPT

## 2024-11-13 PROCEDURE — 82607 VITAMIN B-12: CPT

## 2024-11-13 PROCEDURE — 700102 HCHG RX REV CODE 250 W/ 637 OVERRIDE(OP): Performed by: NURSE PRACTITIONER

## 2024-11-13 PROCEDURE — 36415 COLL VENOUS BLD VENIPUNCTURE: CPT

## 2024-11-13 PROCEDURE — 83036 HEMOGLOBIN GLYCOSYLATED A1C: CPT

## 2024-11-13 PROCEDURE — 99233 SBSQ HOSP IP/OBS HIGH 50: CPT | Performed by: NURSE PRACTITIONER

## 2024-11-13 RX ORDER — LEVETIRACETAM 500 MG/1
500 TABLET ORAL 2 TIMES DAILY
Status: SHIPPED
Start: 2024-11-13 | End: 2024-11-18

## 2024-11-13 RX ORDER — POLYETHYLENE GLYCOL 3350 17 G/17G
17 POWDER, FOR SOLUTION ORAL 2 TIMES DAILY
Status: SHIPPED
Start: 2024-11-13

## 2024-11-13 RX ORDER — ONDANSETRON 4 MG/1
4 TABLET, ORALLY DISINTEGRATING ORAL EVERY 4 HOURS PRN
Status: SHIPPED
Start: 2024-11-13

## 2024-11-13 RX ADMIN — ACETAMINOPHEN 650 MG: 325 TABLET ORAL at 18:21

## 2024-11-13 RX ADMIN — INSULIN LISPRO 1 UNITS: 100 INJECTION, SOLUTION INTRAVENOUS; SUBCUTANEOUS at 18:18

## 2024-11-13 RX ADMIN — DULOXETINE HYDROCHLORIDE 20 MG: 20 CAPSULE, DELAYED RELEASE ORAL at 04:25

## 2024-11-13 RX ADMIN — LEVETIRACETAM 500 MG: 500 TABLET, FILM COATED ORAL at 04:25

## 2024-11-13 RX ADMIN — ACETAMINOPHEN 650 MG: 325 TABLET ORAL at 14:07

## 2024-11-13 RX ADMIN — SENNOSIDES AND DOCUSATE SODIUM 1 TABLET: 50; 8.6 TABLET ORAL at 20:52

## 2024-11-13 RX ADMIN — METOPROLOL SUCCINATE 12.5 MG: 25 TABLET, EXTENDED RELEASE ORAL at 04:25

## 2024-11-13 RX ADMIN — INSULIN LISPRO 1 UNITS: 100 INJECTION, SOLUTION INTRAVENOUS; SUBCUTANEOUS at 20:48

## 2024-11-13 RX ADMIN — LEVETIRACETAM 500 MG: 500 TABLET, FILM COATED ORAL at 18:21

## 2024-11-13 RX ADMIN — DOCUSATE SODIUM 100 MG: 100 CAPSULE, LIQUID FILLED ORAL at 18:21

## 2024-11-13 RX ADMIN — LOSARTAN POTASSIUM 100 MG: 50 TABLET, FILM COATED ORAL at 04:25

## 2024-11-13 RX ADMIN — ACETAMINOPHEN 650 MG: 325 TABLET ORAL at 04:25

## 2024-11-13 RX ADMIN — MAGNESIUM HYDROXIDE 30 ML: 1200 LIQUID ORAL at 18:21

## 2024-11-13 RX ADMIN — INSULIN LISPRO 1 UNITS: 100 INJECTION, SOLUTION INTRAVENOUS; SUBCUTANEOUS at 14:04

## 2024-11-13 RX ADMIN — DOCUSATE SODIUM 100 MG: 100 CAPSULE, LIQUID FILLED ORAL at 04:25

## 2024-11-13 ASSESSMENT — FIBROSIS 4 INDEX: FIB4 SCORE: 2.37

## 2024-11-13 ASSESSMENT — PAIN DESCRIPTION - PAIN TYPE: TYPE: ACUTE PAIN

## 2024-11-13 NOTE — DISCHARGE PLANNING
Case Management Discharge Planning    Admission Date: 11/11/2024  GMLOS: 3  ALOS: 1    6-Clicks ADL Score: 14  6-Clicks Mobility Score: 10  PT and/or OT Eval ordered: Yes  Post-acute Referrals Ordered: Yes  Post-acute Choice Obtained: Yes  Has referral(s) been sent to post-acute provider:  Yes      Anticipated Discharge Dispo: Discharge Disposition: D/T to home under A care in anticipation of covered skilled care (06)  Discharge Address: [address] [place] [address] Axel [place] [address]  Discharge Contact Phone Number: [phone]  SNF-[facility] accepted    DME Needed: none    Action(s) Taken: Met with KYLIE Gonsalves and she is agreeable for pt to go to SNF if needed.    CM called daughter, Ana M. She would like pt to go to [facility].     CM called Nicole at [facility] and pt has been accepted. Nicole set up a  tomorrow 11/14 at 12 noon.  [id number].  Escalations Completed: n/a    Medically Clear: yes to go to SNF    Next Steps: follow up with Nicole at [facility] tomorrow.    Barriers to Discharge: bed available at [facility] tomorrow.    Is the patient up for discharge tomorrow: yes

## 2024-11-13 NOTE — DISCHARGE INSTRUCTIONS
Per Dr. Washington: Anticoagulant, antiplatelet medication contraindicated given falls, recent extraaxial blood  Follow up 6 weeks with cervical spine ct; wear collar all times.

## 2024-11-13 NOTE — PROGRESS NOTES
Patient not wanting to do skin check and transfer vitals due to being uncomfortable. Patient educated and still not wanting them done.

## 2024-11-13 NOTE — CARE PLAN
Problem: Skin Integrity  Goal: Skin integrity is maintained or improved  Description: Target End Date:  Prior to discharge or change in level of care    Document interventions on Skin Risk/Gabo flowsheet groups and corresponding LDA    1.  Assess and monitor skin integrity, appearance and/or temperature  2.  Assess risk factors for impaired skin integrity and/or pressures ulcers  3.  Implement precautions to protect skin integrity in collaboration with interdisciplinary team  4.  Implement pressure ulcer prevention protocol if at risk for skin breakdown  5.  Confirm wound care consult if at risk for skin breakdown  6.  Ensure patient use of pressure relieving devices  (Low air loss bed, waffle overlay, heel protectors, ROHO cushion, etc)  Outcome: Progressing     Problem: Fall Risk  Goal: Patient will remain free from falls  Description: Target End Date:  Prior to discharge or change in level of care    Document interventions on the Concetta Capps Fall Risk Assessment    1.  Assess for fall risk factors  2.  Implement fall precautions  Outcome: Progressing   The patient is Stable - Low risk of patient condition declining or worsening    Shift Goals  Clinical Goals: Monitor neuro status , safety  Patient Goals: sleep  Family Goals: SAMANTHA    Progress made toward(s) clinical / shift goals:  Patient is alert and able to express her needs. She is confuse but easily redirectable. Assisted with her meals. Hourly rounds to ensure safety and comfort .     Patient is not progressing towards the following goals:

## 2024-11-13 NOTE — THERAPY
Speech Language Pathology   Communication Evaluation     Patient Name: Krystle Tavarez  AGE:  89 y.o., SEX:  female  Medical Record #: [id number]  Date of Service: 11/13/2024      History of Present Illness  Per admit notes-89 y.o. female who presented 11/11/2024 with fall resulting in traumatic subdural hematoma, C7 fracture.  Trauma surgery admitted the patient to ICU, neurosurgery was consulted on the case.  Patient on anticoagulation with Eliquis for history of paroxysmal atrial fibrillation.  Anticoagulation was reversed.  Patient apparently lives in a group home, requires assistance with ADLs and IADLs.  She also has a POLST in place, with healthcare financial POA.  Patient is a poor historian.    CT head-.  Small left parieto-occipital subdural hematoma, overall stable since prior study.  2.  Nonspecific white matter changes, commonly associated with small vessel ischemic disease.  Associated mild cerebral atrophy is noted.  3.  Atherosclerosis.     Exam Ended: 11/12/24 00:19        PMH-arthritis, cataract, pre-diabetes, pacemaker.No prior SLP at [facility]. See [facility] for additional information.     PMHx:      General Information  Vitals  O2 Delivery Device: Silicone Nasal Cannula  Level of Consciousness: Alert, Awake  Patient Behaviors: Flat Affect  Orientation: General place  Follows Directives: Yes      Prior Living Situation & Level of Function  Prior Services: Continuous (24 Hour) Care Giving Per Service  Comments: info provided by dtr at bedside. SHe lives in a group home that provides supervision w/ all mobility, and physical assistance when needed for ADLs.     Communication: WNL  Swallowing: WNL     ubjective  per dgtr not at her baseline      Communication Domain(s)  Expressive Language: Mild  Receptive Language: Mild  Cognitive-Linguistic: Moderate  Reading: WFL (for 1 inch bold print phrase to simple sent level for oral reading.)         Assessment  The patient was seen this date for a aphasia and parts  "of cognitive linguistic evaluation.    Bedside Western Aphasia Battery (WAB)  Bedside Aphasia Score: 92  Bedside Language Score: 73  Apraxia Score: 10  Bedside Aphasia Classification:  (possible mild anomia)    Pt initially with use of two non-words and family stating pt not at her baseline. WAB completed with possible mild anomia. Pt able to name common objects 9/10 accuracy. Pt accurate with spont language activity counting 1-10 without errors or delay. Pt orally reading 1 inch bold print at the phrase to sent level accurately. Pt with limited writing premorbidly r/t arthritis per her dgtr. Fair to poor legibility using Sharpie and dominant LUE. Pt stating city \"[place]\" but able to correct with cue. Pt not accurate with year of birth. When asked what brought her to the hospital, pt stated \"I fell at work.\"      Family noted pt not at her baseline. [facility] with preference for this as a d/c. RN informed. 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          Clinical Impressions  Mild anomia and moderate cognitive deficits for lower level tasks. Pt not at her baseline and will benefit from cont SLP during acute stay and post d/c.       NOTE: It is not within the scope of practice of Speech-Language Pathologists to determine patient capacity. Please defer to the physician or psych to complete this assessment.       Recommendations  Supervision Needs Upons Discharge: Direct assistance with IADLs (see below)  IADLs: Medication management, Financial management, Appointment management, Household chores, Cooking         SLP Treatment Plan  Treatment Plan: Cognitive Treatment, Speech-Language Treatment, Patient/Family/Caregiver Training  SLP Frequency: 3x Per Week  Estimated Duration: Until Therapy Goals Met      Anticipated Discharge Needs  Discharge Recommendations: Recommend post-acute placement for additional speech therapy " services prior to discharge home  Therapy Recommendations Upon DC: Cognitive-Linguistic Training, Patient / Family / Caregiver Education, Comprehension Training, Expression Training, Reading Training, Writing Training      Patient / Family Goals  Patient / Family Goal #1: Per family, pt not at her baseline.  Goal #1 Outcome: Goal not met  Short Term Goal # 1: Pt will use written information to state current temporal and spatial orientation information with 90% accuracy and min cues.  Goal Outcome # 1: Goal not met      Angie Yates, SLP

## 2024-11-13 NOTE — PROGRESS NOTES
Report called to NAMAN Sanchez. Pt transferred to S187/02 via Bed. On oxygen, . Medications sent with patient. Transporter as escort.

## 2024-11-13 NOTE — PROGRESS NOTES
Trauma / Surgical Daily Progress Note    Date of Service  11/13/2024    Chief Complaint  89 y.o. female admitted 11/11/2024 as a T 5000 - GLF - ICH, previous C 7 fx and scalp laceration    Interval Events  Pleasantly confused on exam  Tolerating diet   Therapies recommending post acute    -Medically cleared for postacute services -group home with home health versus skilled nursing facility    Review of Systems  Review of Systems   Unable to perform ROS: Mental acuity   Genitourinary:         Voiding       Vital Signs  Temp:  [35.8 °C (96.4 °F)-36.6 °C (97.9 °F)] 36.6 °C (97.9 °F)  Pulse:  [73-84] 83  Resp:  [11-18] 18  BP: ()/(45-72) 132/70  SpO2:  [95 %-99 %] 95 %    Physical Exam  Physical Exam  Vitals and nursing note reviewed.   Constitutional:       General: She is not in acute distress.     Appearance: Normal appearance. She is not ill-appearing.   HENT:      Head:      Comments: Posterior scalp laceration with staples     Right Ear: External ear normal.      Left Ear: External ear normal.      Nose: Nose normal.      Mouth/Throat:      Mouth: Mucous membranes are moist.      Pharynx: Oropharynx is clear.   Eyes:      General:         Right eye: No discharge.         Left eye: No discharge.      Extraocular Movements: Extraocular movements intact.   Neck:      Comments: Cervical collar in place  Pulmonary:      Effort: Pulmonary effort is normal. No respiratory distress.   Chest:      Chest wall: No tenderness.   Abdominal:      General: There is no distension.      Palpations: Abdomen is soft.      Tenderness: There is no abdominal tenderness.   Musculoskeletal:         General: No tenderness.      Cervical back: No muscular tenderness.   Skin:     General: Skin is warm.      Capillary Refill: Capillary refill takes less than 2 seconds.   Neurological:      General: No focal deficit present.      Mental Status: She is alert and oriented to person, place, and time.      Comments: Alert and oriented to  self  Thinks it is 1934  Thinks she is currently in a garage   Psychiatric:         Mood and Affect: Mood normal.         Behavior: Behavior normal.         Thought Content: Thought content normal.       Core Measures & Quality Metrics  Radiology images reviewed, Labs reviewed and Medications reviewed  Carranza catheter: No Carranza      DVT Prophylaxis: Contraindicated - High bleeding risk  DVT prophylaxis - mechanical: Contra-indicated  Ulcer prophylaxis: Not indicated    Assessed for rehab: Patient was assess for and/or received rehabilitation services during this hospitalization    RAP Score Total: 7    CAGE Results: not completed Blood Alcohol>0.08: no       Assessment/Plan  * Trauma- (present on admission)  Assessment & Plan  Mechanical ground level fall.  T-5000 Activation.  Jose Ballard MD. Trauma Surgery.    Traumatic subdural hemorrhage without loss of consciousness (HCC)- (present on admission)  Assessment & Plan  Ground level fall with head strike.  Admission CT imaging demonstrated a tiny left parietal cortex focus of possible hemorrhage.  Anticoagulation reversed  Repeat interval CT imaging of the brain with slight increase in hemorrhage  Transfer to ICU for serial neurologic exams   Repeat interval CT stable.    Contraindication to anticoagulation therapy- (present on admission)  Assessment & Plan  VTE prophylaxis initially contraindicated secondary to elevated bleeding risk.  11/12 Trauma surveillance venous duplex ultrasonography ordered.    Fracture of seventh cervical vertebra (HCC)- (present on admission)  Assessment & Plan  Ground level fall in May 2024 with known C7 fracture.  MRI incompatible pacemaker.  Non-operative management.   Cervical immobilization.  Aashish Washington MD, PhD. Neurosurgeon. [facility].    Chronic anticoagulation- (present on admission)  Assessment & Plan  Chronically anticoagulated with apixaban (Eliquis) for history of paroxysmal atrial  fibrillation.  Reversed with prothrombin complex concentrate (Balfaxar) in the Emergency Department.  Hold for 6 weeks or until follow up with neurosurgery     Type 2 diabetes mellitus with kidney complication, without long-term current use of insulin (HCC)- (present on admission)  Assessment & Plan  Chronic condition, unknown outpatient regimen.  Hgb A1c (4/2024) 6.8.  Sliding scale insulin.    Scalp laceration, initial encounter- (present on admission)  Assessment & Plan  Posterior scalp laceration.  Repaired in the Emergency Department.    Chronic heart failure with preserved ejection fraction (HCC)- (present on admission)  Assessment & Plan  Chronic condition treated with bumetanide q48 hours.  Resumed maintenance medication on admission.    Atrial fibrillation (HCC)- (present on admission)  Assessment & Plan  Chronic condition treated with metoprolol and Eliquis  Systemic anticoagulation held on admission.  Metoprolol resumed on admission.    Primary hypertension- (present on admission)  Assessment & Plan  Chronic condition treated with metoprolol and losartan  Resumed maintenance medication on admission.

## 2024-11-13 NOTE — DISCHARGE SUMMARY
"  Trauma Discharge Summary    DATE OF ADMISSION: 11/11/2024    DATE OF DISCHARGE: 11/14/2024    LENGTH OF STAY: 3 days    ATTENDING PHYSICIAN: Jose Ballard M.D.    CONSULTING PHYSICIAN:   Aashish Washington MD, Neurosurgery  2.   Sha Crane MD, Geriatrics    DISCHARGE DIAGNOSIS:  Principal Problem:    Trauma  Active Problems:    Traumatic subdural hemorrhage without loss of consciousness (HCC)    Type 2 diabetes mellitus with kidney complication, without long-term current use of insulin (HCC)    Chronic anticoagulation    Frequent falls    Fracture of seventh cervical vertebra (HCC)    Contraindication to anticoagulation therapy    Paroxysmal atrial fibrillation (HCC)    CKD (chronic kidney disease) stage 3, GFR 30-59 ml/min    Pulmonary hypertension (HCC)    Advanced age    Cardiac pacemaker in situ (Chronic)      Overview: \"[employer] Pacemaker\"    Primary hypertension    Atrial fibrillation (HCC)    Chronic heart failure with preserved ejection fraction (HCC)    Scalp laceration, initial encounter  Resolved Problems:    * No resolved hospital problems. *      PROCEDURES: none     HISTORY OF PRESENT ILLNESS: The patient is a 89 y.o. female who was reportedly injured in a GLF. He was transferred to [facility] in [place], [place].    HOSPITAL COURSE: The patient was triaged as a partial trauma activation. The patient was trauma intensive care unit where she remained overnight with serial neuroexams.  They remained unchanged.  Her follow-up CT was stable.  She was transferred to the [facility] where she has remained for her stay.  She has previous C7 fracture.  Dr. Washington was consulted for both her intracranial hemorrhage and her C7 fracture.  She is to remain in the collar.  She does have a scalp laceration which has staples in place.  These will need to be removed in roughly 5 days.  She is alert and oriented to herself only.  She is pleasantly confused.  Per report her previous baseline was GCS " 15.  She did have a cognitive evaluation on 1113 which noted she would benefit from continued SLP during her acute stay and postdischarge.  They did recommend direct assistance with activities of daily living.  She was additionally seen by PT and OT which recommended continued therapies.  She was up with 1 assist with a front wheel walker.  At this time she will be transferred to a skilled nursing facility in stable condition.  She will continue with therapies to return to her baseline.  She will be transferred on insulin sliding scale as hemoglobin A1c was 7.7%.  Does not appear she was on any outpatient medication regimen.  She will need to follow-up with primary with regards to continued management.  Again she will need to remain in the collar and follow-up with Dr. Washington in 6 weeks.  At this time it is recommended that she remain off her Eliquis and her prescribing provider should determine risks versus benefits given her recent and frequent falls.    Prior to transfer, pt was complaining of dysuria and frequency. Straight cath UA (+) for leukocytes and many bacteria. She does have a history of UTI in the past. She will be started on Keflex 4 times for 7 days.     HOSPITAL PROBLEM LIST:  * Trauma- (present on admission)  Assessment & Plan  Mechanical ground level fall.  T-5000 Activation.  Jose Ballard MD. Trauma Surgery.    Traumatic subdural hemorrhage without loss of consciousness (HCC)- (present on admission)  Assessment & Plan  Ground level fall with head strike.  Admission CT imaging demonstrated a tiny left parietal cortex focus of possible hemorrhage.  Anticoagulation reversed  Repeat interval CT imaging of the brain with slight increase in hemorrhage  Transfer to ICU for serial neurologic exams   Repeat interval CT stable.    Contraindication to anticoagulation therapy- (present on admission)  Assessment & Plan  VTE prophylaxis initially contraindicated secondary to elevated bleeding risk.  11/12  Trauma surveillance venous duplex ultrasonography ordered.    Fracture of seventh cervical vertebra (HCC)- (present on admission)  Assessment & Plan  Ground level fall in May 2024 with known C7 fracture.  MRI incompatible pacemaker.  Non-operative management.   Cervical immobilization.  Aashish Washington MD, PhD. Neurosurgeon. [facility].    Chronic anticoagulation- (present on admission)  Assessment & Plan  Chronically anticoagulated with apixaban (Eliquis) for history of paroxysmal atrial fibrillation.  Reversed with prothrombin complex concentrate (Balfaxar) in the Emergency Department.  Hold for 6 weeks or until follow up with neurosurgery     Type 2 diabetes mellitus with kidney complication, without long-term current use of insulin (HCC)- (present on admission)  Assessment & Plan  Chronic condition, unknown outpatient regimen.  Hgb A1c (4/2024) 6.8.  11/13 Hgb A1c 7.7.  No outpatient meds.  Sliding scale insulin initiated.  Follow up with PCP.    Acute cystitis with hematuria  Assessment & Plan  UA (+)  History of same  Keflex x 7 days    Scalp laceration, initial encounter- (present on admission)  Assessment & Plan  Posterior scalp laceration.  Repaired in the Emergency Department.    Chronic heart failure with preserved ejection fraction (HCC)- (present on admission)  Assessment & Plan  Chronic condition treated with bumetanide q48 hours.  Resumed maintenance medication on admission.    Atrial fibrillation (HCC)- (present on admission)  Assessment & Plan  Chronic condition treated with metoprolol and Eliquis  Systemic anticoagulation held on admission.  Metoprolol resumed on admission.    Primary hypertension- (present on admission)  Assessment & Plan  Chronic condition treated with metoprolol and losartan  Resumed maintenance medication on admission.          DISPOSITION: Discharged to skilled nursing facility on 11/14/2024.    DISCHARGE MEDICATIONS:  The patients controlled substance history  was reviewed and a controlled substance use informed consent (if applicable) was provided by [facility] and the patient has been prescribed.     Medication List        START taking these medications        Instructions   cephALEXin 500 MG Caps  Commonly known as: Keflex   Take 1 Capsule by mouth every 6 hours for 7 days.  Dose: 500 mg     insulin lispro 100 UNIT/ML Sopn injection PEN  Commonly known as: HumaLOG/AdmeLOG   subcutaneous injection: 1-6 Units 4 TIMES DAILY - BEFORE MEALS , NIGHTLY, Subcutaneous, For glucose: 70 - 150 mg/dL = 0 Units 151 - 200 mg/dL = 1 Units 201 - 250 mg/dL = 2 Units 251 - 300 mg/dL = 3 Units 301 - 350 mg/dL = 4 Units 351 - 400 mg/dL = 5 Units Over 400 mg/dL = 6 Units     levETIRAcetam 500 MG Tabs  Commonly known as: Keppra   Take 1 Tablet by mouth 2 times a day for 5 days.  Dose: 500 mg     ondansetron 4 MG Tbdp  Commonly known as: Zofran ODT   Take 1 Tablet by mouth every four hours as needed for Nausea/Vomiting (give PO if IV route is unavailable.).  Dose: 4 mg     polyethylene glycol/lytes Pack  Commonly known as: Miralax   Take 1 Packet by mouth 2 times a day.  Dose: 17 g            CHANGE how you take these medications        Instructions   apixaban 2.5mg Tabs  What changed:   how much to take  how to take this  when to take this  additional instructions  Commonly known as: Eliquis   Recommend follow up with prescriber to weigh risks vs benefits prior to resuming given her frequent falls.            CONTINUE taking these medications        Instructions   * Tylenol 8 Hour 650 MG CR tablet  Generic drug: acetaminophen   Take 650 mg by mouth every 6 hours as needed for Fever (>100.5).  Dose: 650 mg     * acetaminophen 650 MG CR tablet  Commonly known as: Tylenol 8 Hour   Doctor's comments: Please rush order-patient is in group home  Take 2 Tablets by mouth 2 times a day.  Dose: 1,300 mg     ascorbic acid 500 MG Tabs  Commonly known as: Ascorbic Acid   Doctor's  comments: Please rush order-patient is in group home  Take 1 Tablet by mouth every day.  Dose: 500 mg     BALMEX EX   Apply 1 Application topically as needed.  Dose: 1 Application     bisacodyl EC 5 MG Tbec   Take 5 mg by mouth 1 time a day as needed for Constipation.  Dose: 5 mg     bumetanide 0.5 MG Tabs  Commonly known as: Bumex   Doctor's comments: Please rus order-patient is in group home  Take 1 Tablet by mouth every 48 hours.  Dose: 0.5 mg     calcium carbonate 500 MG Chew  Commonly known as: Tums   Chew 500 mg as needed.  Dose: 500 mg     docusate sodium 100 MG Caps  Commonly known as: Colace   Take 100 mg by mouth every day.  Dose: 100 mg     DULoxetine 20 MG Cpep  Commonly known as: Cymbalta   Take 1 Capsule by mouth every day.  Dose: 20 mg     GAVISCON PO   Take 1 Tablet by mouth 2 times a day as needed.  Dose: 1 Tablet     losartan 100 MG Tabs  Commonly known as: Cozaar   Doctor's comments: Please rus order-patient is in group home  Take 1 Tablet by mouth every day.  Dose: 100 mg     metoprolol SR 25 MG Tb24  Commonly known as: Toprol XL   TAKE ONE-HALF (1/2) TABLET DAILY  Dose: 12.5 mg     vitamin B-12 1000 MCG Tabs   Doctor's comments: Please rus order-patient is in group home  Take 1 Tablet by mouth every day.  Dose: 1,000 mcg     Vitamin D (Cholecalciferol) 25 MCG (1000 UT) Tabs  Commonly known as: Cholecalciferol   Doctor's comments: Please rus order-patient is in group home  Take 1 Tablet by mouth every day.  Dose: 1,000 Units           * This list has 2 medication(s) that are the same as other medications prescribed for you. Read the directions carefully, and ask your doctor or other care provider to review them with you.                ASK your doctor about these medications        Instructions   ketoconazole 2 % shampoo  Commonly known as: Nizoral   Apply 1 mL topically every Friday.  Dose: 1 mL              ACTIVITY:  As tolerated     WOUND CARE:  Staple removal in roughly 5  days    DIET:  Orders Placed This Encounter   Procedures    Diet Order Diet: Consistent CHO (Diabetic)     Standing Status:   Standing     Number of Occurrences:   1     Order Specific Question:   Diet:     Answer:   Consistent CHO (Diabetic) [4]       FOLLOW UP:  Aashish Washington M.D.  [address] [facility] [address] [phone]    Follow up in 6 week(s)      Joana Hawk A.P.R.NEri  [address] [facility] [address]  [facility] [address] [phone]    Follow up  follow up for further diagnosis and managment of diabetes - Hgb A1C 7.7      TIME SPENT ON DISCHARGE: 35 minutes      ____________________________________________  ROSENDA Gao    DD: 11/13/2024 2:12 PM

## 2024-11-13 NOTE — DISCHARGE PLANNING
Case Management Discharge Planning    Admission Date: 11/11/2024  GMLOS: 3  ALOS: 1    6-Clicks ADL Score: 14  6-Clicks Mobility Score: 10  PT and/or OT Eval ordered: Yes  Post-acute Referrals Ordered: Yes  Post-acute Choice Obtained: Yes  Has referral(s) been sent to post-acute provider:  Yes      Anticipated Discharge Dispo: Discharge Disposition: D/T to home under [facility] care in anticipation of covered skilled care (06)  Discharge Address: [address] [place] [id number]  Discharge Contact Phone Number: [phone]    DME Needed: none    Action(s) Taken: Received notification from KYLIE Gonsalves that pt's family would like Krystle to go to an SNF. [place] referral sent.     PASRR initiated but this went into manual review.    Escalations Completed: n/a    Medically Clear: yes to go to SNF    Next Steps: follow up with SNF facilities.    Barriers to Discharge: pending acceptance and pending PASRR approval    Is the patient up for discharge tomorrow: will try today.

## 2024-11-13 NOTE — PROGRESS NOTES
4 Eyes Skin Assessment Completed by NAMAN davis and NAMAN lau.    Head Blanching and Redness, staples back of the head, left cheek redness  Ears left and right ear tunnel   Nose WDL  Mouth WDL  Neck WDL  Breast/Chest Redness and Blanching  Shoulder Blades WDL  Spine Redness and Blanching  (R) Arm/Elbow/Hand WDL  (L) Arm/Elbow/Hand Abrasion  Abdomen WDL  Groin Redness, Blanching, and Excoriation  Scrotum/Coccyx/Buttocks Redness and Blanching  (R) Leg WDL  (L) Leg WDL  (R) Heel/Foot/Toe Scaly  (L) Heel/Foot/Toe Scaly          Devices In Places Blood Pressure Cuff, Pulse Ox, and Cervical Collar      Interventions In Place NC W/Ear Foams    Possible Skin Injury No    Pictures Uploaded Into Epic Yes  Wound Consult Placed N/A  RN Wound Prevention Protocol Ordered No

## 2024-11-14 VITALS
SYSTOLIC BLOOD PRESSURE: 142 MMHG | HEIGHT: 64 IN | OXYGEN SATURATION: 90 % | WEIGHT: 147.71 LBS | TEMPERATURE: 97.5 F | DIASTOLIC BLOOD PRESSURE: 62 MMHG | BODY MASS INDEX: 25.22 KG/M2 | HEART RATE: 79 BPM | RESPIRATION RATE: 18 BRPM

## 2024-11-14 PROBLEM — N30.01 ACUTE CYSTITIS WITH HEMATURIA: Status: ACTIVE | Noted: 2024-11-14

## 2024-11-14 LAB
APPEARANCE UR: ABNORMAL
BACTERIA #/AREA URNS HPF: ABNORMAL /HPF
BILIRUB UR QL STRIP.AUTO: NEGATIVE
CASTS URNS QL MICRO: ABNORMAL /LPF (ref 0–2)
COLOR UR: YELLOW
EPITHELIAL CELLS 1715: ABNORMAL /HPF (ref 0–5)
GLUCOSE BLD STRIP.AUTO-MCNC: 158 MG/DL (ref 65–99)
GLUCOSE BLD STRIP.AUTO-MCNC: 168 MG/DL (ref 65–99)
GLUCOSE UR STRIP.AUTO-MCNC: NEGATIVE MG/DL
KETONES UR STRIP.AUTO-MCNC: NEGATIVE MG/DL
LEUKOCYTE ESTERASE UR QL STRIP.AUTO: ABNORMAL
MICRO URNS: ABNORMAL
NITRITE UR QL STRIP.AUTO: NEGATIVE
PH UR STRIP.AUTO: 6 [PH] (ref 5–8)
PROT UR QL STRIP: 30 MG/DL
RBC # URNS HPF: ABNORMAL /HPF (ref 0–2)
RBC UR QL AUTO: ABNORMAL
SP GR UR STRIP.AUTO: 1.01
UROBILINOGEN UR STRIP.AUTO-MCNC: 0.2 EU/DL
WBC #/AREA URNS HPF: >100 /HPF

## 2024-11-14 PROCEDURE — 99239 HOSP IP/OBS DSCHRG MGMT >30: CPT | Performed by: NURSE PRACTITIONER

## 2024-11-14 PROCEDURE — A9270 NON-COVERED ITEM OR SERVICE: HCPCS | Performed by: NURSE PRACTITIONER

## 2024-11-14 PROCEDURE — A9270 NON-COVERED ITEM OR SERVICE: HCPCS

## 2024-11-14 PROCEDURE — 82962 GLUCOSE BLOOD TEST: CPT | Mod: 91

## 2024-11-14 PROCEDURE — 700102 HCHG RX REV CODE 250 W/ 637 OVERRIDE(OP): Performed by: NURSE PRACTITIONER

## 2024-11-14 PROCEDURE — 700102 HCHG RX REV CODE 250 W/ 637 OVERRIDE(OP)

## 2024-11-14 PROCEDURE — 81001 URINALYSIS AUTO W/SCOPE: CPT

## 2024-11-14 RX ORDER — CEPHALEXIN 500 MG/1
500 CAPSULE ORAL EVERY 6 HOURS
Status: ACTIVE | DISCHARGE
Start: 2024-11-14 | End: 2024-11-21

## 2024-11-14 RX ORDER — INSULIN LISPRO 100 [IU]/ML
INJECTION, SOLUTION INTRAVENOUS; SUBCUTANEOUS
Status: ACTIVE | DISCHARGE
Start: 2024-11-14

## 2024-11-14 RX ORDER — CEPHALEXIN 500 MG/1
500 CAPSULE ORAL EVERY 6 HOURS
Status: DISCONTINUED | OUTPATIENT
Start: 2024-11-14 | End: 2024-11-14 | Stop reason: HOSPADM

## 2024-11-14 RX ADMIN — LOSARTAN POTASSIUM 100 MG: 50 TABLET, FILM COATED ORAL at 04:46

## 2024-11-14 RX ADMIN — LEVETIRACETAM 500 MG: 500 TABLET, FILM COATED ORAL at 04:46

## 2024-11-14 RX ADMIN — OXYCODONE 5 MG: 5 TABLET ORAL at 13:19

## 2024-11-14 RX ADMIN — INSULIN LISPRO 1 UNITS: 100 INJECTION, SOLUTION INTRAVENOUS; SUBCUTANEOUS at 11:51

## 2024-11-14 RX ADMIN — ACETAMINOPHEN 650 MG: 325 TABLET ORAL at 04:42

## 2024-11-14 RX ADMIN — METOPROLOL SUCCINATE 12.5 MG: 25 TABLET, EXTENDED RELEASE ORAL at 04:43

## 2024-11-14 RX ADMIN — INSULIN LISPRO 1 UNITS: 100 INJECTION, SOLUTION INTRAVENOUS; SUBCUTANEOUS at 09:19

## 2024-11-14 RX ADMIN — CEPHALEXIN 500 MG: 500 CAPSULE ORAL at 13:19

## 2024-11-14 RX ADMIN — ACETAMINOPHEN 650 MG: 325 TABLET ORAL at 11:43

## 2024-11-14 RX ADMIN — DULOXETINE HYDROCHLORIDE 20 MG: 20 CAPSULE, DELAYED RELEASE ORAL at 04:48

## 2024-11-14 RX ADMIN — DOCUSATE SODIUM 100 MG: 100 CAPSULE, LIQUID FILLED ORAL at 04:43

## 2024-11-14 RX ADMIN — BUMETANIDE 0.5 MG: 0.5 TABLET ORAL at 04:43

## 2024-11-14 ASSESSMENT — PAIN DESCRIPTION - PAIN TYPE
TYPE: ACUTE PAIN

## 2024-11-14 NOTE — DISCHARGE PLANNING
Care Transition Team Final Discharge Disposition    Actual Discharge Information  Discharge Disposition: D/T to SNF with Medicare cert in anticipation of skilled care (03)    Case Management Discharge Planning    Admission Date: 11/11/2024  GMLOS: 3  ALOS: 2    6-Clicks ADL Score: 14  6-Clicks Mobility Score: 10  PT and/or OT Eval ordered: Yes  Post-acute Referrals Ordered: Yes  Post-acute Choice Obtained: Yes  Has referral(s) been sent to post-acute provider:  Yes      Anticipated Discharge Dispo: Discharge Disposition: D/T to SNF with Medicare cert in anticipation of skilled care (03)  Discharge Address: [address] [place] [address] [place] [address]  Discharge Contact Phone Number: [phone]    DME Needed: No    Action(s) Taken: OTHER    TC placed to pt's daughter Ana M who is aware that pt [facility].  IMM delivered, faxed to [facility] for processing.    Escalations Completed: None    Medically Clear: Yes    Barriers to Discharge: None    Is the patient up for discharge tomorrow: No

## 2024-11-14 NOTE — CARE PLAN
Problem: Skin Integrity  Goal: Skin integrity is maintained or improved  Description: Target End Date:  Prior to discharge or change in level of care    Document interventions on Skin Risk/Gabo flowsheet groups and corresponding LDA    1.  Assess and monitor skin integrity, appearance and/or temperature  2.  Assess risk factors for impaired skin integrity and/or pressures ulcers  3.  Implement precautions to protect skin integrity in collaboration with interdisciplinary team  4.  Implement pressure ulcer prevention protocol if at risk for skin breakdown  5.  Confirm wound care consult if at risk for skin breakdown  6.  Ensure patient use of pressure relieving devices  (Low air loss bed, waffle overlay, heel protectors, ROHO cushion, etc)  Outcome: Progressing     Problem: Fall Risk  Goal: Patient will remain free from falls  Description: Target End Date:  Prior to discharge or change in level of care    Document interventions on the Concetta Capps Fall Risk Assessment    1.  Assess for fall risk factors  2.  Implement fall precautions  Outcome: Progressing   The patient is Stable - Low risk of patient condition declining or worsening    Shift Goals  Clinical Goals: monitor neuro status , safety  Patient Goals: food,sleep  Family Goals: dinner    Progress made toward(s) clinical / shift goals:  Patient is A0 2-3 . Assisted with her dinner, consumed over 75% . Safety measures done . Turned every 2 hours. Hurly rounds for comfort and safety .     Patient is not progressing towards the following goals:

## 2024-11-14 NOTE — DISCHARGE PLANNING
1145 - LMSW notified by bedside RN that family suspects pt may have UTI, urine analysis has been ordered stat, awaiting results prior to transfer to [facility].      TC placed to [facility], LVM with Nicole requesting to place transport/transfer on hold.    Received TC from Nicole at [facility], confirmed [facility] can take pt if UA positive for UTI, will look for any prescription recommendations in DC summary addendum.  Trauma APRN notified and confirmed understanding.      1245 - Trauma APRN and beside RN aware that [facility] has rescheduled pt transport for 1430.

## 2024-11-14 NOTE — PROGRESS NOTES
Notified by nursing that family concerned she has UTI sx  She has a long history of UTI's by chart review  UA (+)   Will RX Keflex QID x 5 days   DC summary addended

## 2024-11-14 NOTE — PROGRESS NOTES
Gave report to Al, RN at [facility]. All questions answered. Also reported to APRN possible UTI. Straight cath pt and sent UA STAT to lab. Per APRN Marcial, hold off on transport until UTI results so pt can leave with Tx if necessary. DC coordinator and charge RN Yany notified.

## 2024-12-02 ENCOUNTER — TELEPHONE (OUTPATIENT)
Dept: HEALTH INFORMATION MANAGEMENT | Facility: OTHER | Age: 89
End: 2024-12-02
Payer: MEDICARE

## 2025-01-08 ENCOUNTER — HOSPITAL ENCOUNTER (INPATIENT)
Facility: MEDICAL CENTER | Age: OVER 89
LOS: 4 days | DRG: 689 | End: 2025-01-13
Attending: EMERGENCY MEDICINE | Admitting: STUDENT IN AN ORGANIZED HEALTH CARE EDUCATION/TRAINING PROGRAM
Payer: MEDICARE

## 2025-01-08 DIAGNOSIS — R29.898 WEAKNESS OF BOTH LOWER EXTREMITIES: Chronic | ICD-10-CM

## 2025-01-08 DIAGNOSIS — N30.01 ACUTE CYSTITIS WITH HEMATURIA: ICD-10-CM

## 2025-01-08 DIAGNOSIS — N39.0 ACUTE UTI: ICD-10-CM

## 2025-01-08 DIAGNOSIS — N17.9 AKI (ACUTE KIDNEY INJURY) (HCC): ICD-10-CM

## 2025-01-08 PROCEDURE — 99285 EMERGENCY DEPT VISIT HI MDM: CPT

## 2025-01-08 ASSESSMENT — FIBROSIS 4 INDEX: FIB4 SCORE: 2.37

## 2025-01-09 PROBLEM — N17.9 AKI (ACUTE KIDNEY INJURY) (HCC): Status: ACTIVE | Noted: 2025-01-09

## 2025-01-09 PROBLEM — E11.22 TYPE 2 DIABETES MELLITUS WITH STAGE 2 CHRONIC KIDNEY DISEASE, WITHOUT LONG-TERM CURRENT USE OF INSULIN (HCC): Status: ACTIVE | Noted: 2020-01-16

## 2025-01-09 PROBLEM — N30.00 ACUTE CYSTITIS WITHOUT HEMATURIA: Status: ACTIVE | Noted: 2024-11-14

## 2025-01-09 PROBLEM — N18.2 TYPE 2 DIABETES MELLITUS WITH STAGE 2 CHRONIC KIDNEY DISEASE, WITHOUT LONG-TERM CURRENT USE OF INSULIN (HCC): Status: ACTIVE | Noted: 2020-01-16

## 2025-01-09 LAB
ANION GAP SERPL CALC-SCNC: 13 MMOL/L (ref 7–16)
APPEARANCE UR: ABNORMAL
BACTERIA #/AREA URNS HPF: ABNORMAL /HPF
BASOPHILS # BLD AUTO: 0.4 % (ref 0–1.8)
BASOPHILS # BLD: 0.03 K/UL (ref 0–0.12)
BILIRUB UR QL STRIP.AUTO: NEGATIVE
BUN SERPL-MCNC: 32 MG/DL (ref 8–22)
CALCIUM SERPL-MCNC: 9.1 MG/DL (ref 8.5–10.5)
CASTS URNS QL MICRO: ABNORMAL /LPF (ref 0–2)
CHLORIDE SERPL-SCNC: 104 MMOL/L (ref 96–112)
CO2 SERPL-SCNC: 24 MMOL/L (ref 20–33)
COLOR UR: YELLOW
CREAT SERPL-MCNC: 1.22 MG/DL (ref 0.5–1.4)
EOSINOPHIL # BLD AUTO: 0.15 K/UL (ref 0–0.51)
EOSINOPHIL NFR BLD: 1.9 % (ref 0–6.9)
EPITHELIAL CELLS 1715: ABNORMAL /HPF (ref 0–5)
ERYTHROCYTE [DISTWIDTH] IN BLOOD BY AUTOMATED COUNT: 49.5 FL (ref 35.9–50)
GFR SERPLBLD CREATININE-BSD FMLA CKD-EPI: 42 ML/MIN/1.73 M 2
GLUCOSE BLD STRIP.AUTO-MCNC: 141 MG/DL (ref 65–99)
GLUCOSE BLD STRIP.AUTO-MCNC: 142 MG/DL (ref 65–99)
GLUCOSE BLD STRIP.AUTO-MCNC: 159 MG/DL (ref 65–99)
GLUCOSE BLD STRIP.AUTO-MCNC: 179 MG/DL (ref 65–99)
GLUCOSE SERPL-MCNC: 164 MG/DL (ref 65–99)
GLUCOSE UR STRIP.AUTO-MCNC: NEGATIVE MG/DL
HCT VFR BLD AUTO: 36.9 % (ref 37–47)
HGB BLD-MCNC: 11.6 G/DL (ref 12–16)
IMM GRANULOCYTES # BLD AUTO: 0.03 K/UL (ref 0–0.11)
IMM GRANULOCYTES NFR BLD AUTO: 0.4 % (ref 0–0.9)
KETONES UR STRIP.AUTO-MCNC: NEGATIVE MG/DL
LEUKOCYTE ESTERASE UR QL STRIP.AUTO: ABNORMAL
LYMPHOCYTES # BLD AUTO: 3.33 K/UL (ref 1–4.8)
LYMPHOCYTES NFR BLD: 41.1 % (ref 22–41)
MCH RBC QN AUTO: 30.2 PG (ref 27–33)
MCHC RBC AUTO-ENTMCNC: 31.4 G/DL (ref 32.2–35.5)
MCV RBC AUTO: 96.1 FL (ref 81.4–97.8)
MICRO URNS: ABNORMAL
MONOCYTES # BLD AUTO: 0.7 K/UL (ref 0–0.85)
MONOCYTES NFR BLD AUTO: 8.6 % (ref 0–13.4)
NEUTROPHILS # BLD AUTO: 3.86 K/UL (ref 1.82–7.42)
NEUTROPHILS NFR BLD: 47.6 % (ref 44–72)
NITRITE UR QL STRIP.AUTO: NEGATIVE
NRBC # BLD AUTO: 0 K/UL
NRBC BLD-RTO: 0 /100 WBC (ref 0–0.2)
PH UR STRIP.AUTO: 6 [PH] (ref 5–8)
PLATELET # BLD AUTO: 287 K/UL (ref 164–446)
PMV BLD AUTO: 9.8 FL (ref 9–12.9)
POTASSIUM SERPL-SCNC: 4.4 MMOL/L (ref 3.6–5.5)
PROT UR QL STRIP: 100 MG/DL
RBC # BLD AUTO: 3.84 M/UL (ref 4.2–5.4)
RBC # URNS HPF: ABNORMAL /HPF (ref 0–2)
RBC UR QL AUTO: ABNORMAL
SODIUM SERPL-SCNC: 141 MMOL/L (ref 135–145)
SP GR UR STRIP.AUTO: 1.01
UROBILINOGEN UR STRIP.AUTO-MCNC: 0.2 EU/DL
WBC # BLD AUTO: 8.1 K/UL (ref 4.8–10.8)
WBC #/AREA URNS HPF: >100 /HPF

## 2025-01-09 PROCEDURE — 80048 BASIC METABOLIC PNL TOTAL CA: CPT

## 2025-01-09 PROCEDURE — 700111 HCHG RX REV CODE 636 W/ 250 OVERRIDE (IP): Mod: JZ | Performed by: STUDENT IN AN ORGANIZED HEALTH CARE EDUCATION/TRAINING PROGRAM

## 2025-01-09 PROCEDURE — 87086 URINE CULTURE/COLONY COUNT: CPT

## 2025-01-09 PROCEDURE — 96365 THER/PROPH/DIAG IV INF INIT: CPT

## 2025-01-09 PROCEDURE — 770006 HCHG ROOM/CARE - MED/SURG/GYN SEMI*

## 2025-01-09 PROCEDURE — 87077 CULTURE AEROBIC IDENTIFY: CPT | Mod: 91

## 2025-01-09 PROCEDURE — 82962 GLUCOSE BLOOD TEST: CPT

## 2025-01-09 PROCEDURE — A9270 NON-COVERED ITEM OR SERVICE: HCPCS | Performed by: STUDENT IN AN ORGANIZED HEALTH CARE EDUCATION/TRAINING PROGRAM

## 2025-01-09 PROCEDURE — 87186 SC STD MICRODIL/AGAR DIL: CPT

## 2025-01-09 PROCEDURE — 700102 HCHG RX REV CODE 250 W/ 637 OVERRIDE(OP): Performed by: STUDENT IN AN ORGANIZED HEALTH CARE EDUCATION/TRAINING PROGRAM

## 2025-01-09 PROCEDURE — 81001 URINALYSIS AUTO W/SCOPE: CPT

## 2025-01-09 PROCEDURE — 85025 COMPLETE CBC W/AUTO DIFF WBC: CPT

## 2025-01-09 PROCEDURE — 700105 HCHG RX REV CODE 258: Performed by: STUDENT IN AN ORGANIZED HEALTH CARE EDUCATION/TRAINING PROGRAM

## 2025-01-09 PROCEDURE — 36415 COLL VENOUS BLD VENIPUNCTURE: CPT

## 2025-01-09 PROCEDURE — 99223 1ST HOSP IP/OBS HIGH 75: CPT | Performed by: STUDENT IN AN ORGANIZED HEALTH CARE EDUCATION/TRAINING PROGRAM

## 2025-01-09 PROCEDURE — 700105 HCHG RX REV CODE 258: Performed by: EMERGENCY MEDICINE

## 2025-01-09 PROCEDURE — 96366 THER/PROPH/DIAG IV INF ADDON: CPT

## 2025-01-09 RX ORDER — AMOXICILLIN 250 MG
2 CAPSULE ORAL
COMMUNITY

## 2025-01-09 RX ORDER — SODIUM CHLORIDE, SODIUM LACTATE, POTASSIUM CHLORIDE, CALCIUM CHLORIDE 600; 310; 30; 20 MG/100ML; MG/100ML; MG/100ML; MG/100ML
1000 INJECTION, SOLUTION INTRAVENOUS ONCE
Status: COMPLETED | OUTPATIENT
Start: 2025-01-09 | End: 2025-01-09

## 2025-01-09 RX ORDER — ACETAMINOPHEN 325 MG/1
650 TABLET ORAL EVERY 6 HOURS PRN
Status: DISCONTINUED | OUTPATIENT
Start: 2025-01-09 | End: 2025-01-13 | Stop reason: HOSPADM

## 2025-01-09 RX ORDER — LABETALOL HYDROCHLORIDE 5 MG/ML
10 INJECTION, SOLUTION INTRAVENOUS EVERY 4 HOURS PRN
Status: DISCONTINUED | OUTPATIENT
Start: 2025-01-09 | End: 2025-01-13 | Stop reason: HOSPADM

## 2025-01-09 RX ORDER — DULOXETIN HYDROCHLORIDE 20 MG/1
20 CAPSULE, DELAYED RELEASE ORAL DAILY
Status: DISCONTINUED | OUTPATIENT
Start: 2025-01-09 | End: 2025-01-13 | Stop reason: HOSPADM

## 2025-01-09 RX ORDER — ENOXAPARIN SODIUM 100 MG/ML
30 INJECTION SUBCUTANEOUS DAILY
Status: DISCONTINUED | OUTPATIENT
Start: 2025-01-09 | End: 2025-01-13 | Stop reason: HOSPADM

## 2025-01-09 RX ORDER — DEXTROSE MONOHYDRATE 25 G/50ML
25 INJECTION, SOLUTION INTRAVENOUS
Status: DISCONTINUED | OUTPATIENT
Start: 2025-01-09 | End: 2025-01-13 | Stop reason: HOSPADM

## 2025-01-09 RX ORDER — ENOXAPARIN SODIUM 100 MG/ML
40 INJECTION SUBCUTANEOUS DAILY
Status: DISCONTINUED | OUTPATIENT
Start: 2025-01-09 | End: 2025-01-09

## 2025-01-09 RX ORDER — METOPROLOL SUCCINATE 25 MG/1
12.5 TABLET, EXTENDED RELEASE ORAL DAILY
Status: DISCONTINUED | OUTPATIENT
Start: 2025-01-09 | End: 2025-01-13 | Stop reason: HOSPADM

## 2025-01-09 RX ORDER — INSULIN LISPRO 100 [IU]/ML
1-6 INJECTION, SOLUTION INTRAVENOUS; SUBCUTANEOUS
Status: DISCONTINUED | OUTPATIENT
Start: 2025-01-09 | End: 2025-01-13 | Stop reason: HOSPADM

## 2025-01-09 RX ORDER — CEFAZOLIN SODIUM 1 G/50ML
1 INJECTION, SOLUTION INTRAVENOUS ONCE
Status: DISCONTINUED | OUTPATIENT
Start: 2025-01-09 | End: 2025-01-09

## 2025-01-09 RX ADMIN — METOPROLOL SUCCINATE 12.5 MG: 25 TABLET, EXTENDED RELEASE ORAL at 07:38

## 2025-01-09 RX ADMIN — Medication 5 MG: at 04:55

## 2025-01-09 RX ADMIN — PIPERACILLIN AND TAZOBACTAM 3.38 G: 3; .375 INJECTION, POWDER, FOR SOLUTION INTRAVENOUS at 12:45

## 2025-01-09 RX ADMIN — SODIUM CHLORIDE, POTASSIUM CHLORIDE, SODIUM LACTATE AND CALCIUM CHLORIDE 1000 ML: 600; 310; 30; 20 INJECTION, SOLUTION INTRAVENOUS at 03:30

## 2025-01-09 RX ADMIN — LABETALOL HYDROCHLORIDE 10 MG: 5 INJECTION INTRAVENOUS at 20:30

## 2025-01-09 RX ADMIN — DULOXETINE HYDROCHLORIDE 20 MG: 20 CAPSULE, DELAYED RELEASE ORAL at 07:38

## 2025-01-09 RX ADMIN — PIPERACILLIN AND TAZOBACTAM 3.38 G: 3; .375 INJECTION, POWDER, FOR SOLUTION INTRAVENOUS at 21:00

## 2025-01-09 RX ADMIN — INSULIN LISPRO 1 UNITS: 100 INJECTION, SOLUTION INTRAVENOUS; SUBCUTANEOUS at 20:37

## 2025-01-09 RX ADMIN — PIPERACILLIN AND TAZOBACTAM 4.5 G: 4; .5 INJECTION, POWDER, FOR SOLUTION INTRAVENOUS at 03:57

## 2025-01-09 RX ADMIN — Medication 5 MG: at 20:50

## 2025-01-09 RX ADMIN — PIPERACILLIN AND TAZOBACTAM 3.38 G: 3; .375 INJECTION, POWDER, FOR SOLUTION INTRAVENOUS at 08:35

## 2025-01-09 ASSESSMENT — COGNITIVE AND FUNCTIONAL STATUS - GENERAL
WALKING IN HOSPITAL ROOM: A LOT
EATING MEALS: A LITTLE
TURNING FROM BACK TO SIDE WHILE IN FLAT BAD: A LITTLE
DAILY ACTIVITIY SCORE: 16
CLIMB 3 TO 5 STEPS WITH RAILING: TOTAL
MOBILITY SCORE: 13
SUGGESTED CMS G CODE MODIFIER DAILY ACTIVITY: CK
MOVING FROM LYING ON BACK TO SITTING ON SIDE OF FLAT BED: A LITTLE
HELP NEEDED FOR BATHING: A LOT
PERSONAL GROOMING: A LITTLE
MOVING TO AND FROM BED TO CHAIR: A LOT
TOILETING: A LOT
CLIMB 3 TO 5 STEPS WITH RAILING: TOTAL
PERSONAL GROOMING: A LITTLE
SUGGESTED CMS G CODE MODIFIER DAILY ACTIVITY: CK
SUGGESTED CMS G CODE MODIFIER MOBILITY: CL
SUGGESTED CMS G CODE MODIFIER MOBILITY: CL
DRESSING REGULAR LOWER BODY CLOTHING: A LITTLE
STANDING UP FROM CHAIR USING ARMS: A LOT
DAILY ACTIVITIY SCORE: 16
DRESSING REGULAR UPPER BODY CLOTHING: A LITTLE
MOVING TO AND FROM BED TO CHAIR: A LOT
TURNING FROM BACK TO SIDE WHILE IN FLAT BAD: A LITTLE
MOVING FROM LYING ON BACK TO SITTING ON SIDE OF FLAT BED: A LITTLE
DRESSING REGULAR LOWER BODY CLOTHING: A LITTLE
WALKING IN HOSPITAL ROOM: A LOT
DRESSING REGULAR UPPER BODY CLOTHING: A LITTLE
STANDING UP FROM CHAIR USING ARMS: A LOT
MOBILITY SCORE: 13
EATING MEALS: A LITTLE
TOILETING: A LOT
HELP NEEDED FOR BATHING: A LOT

## 2025-01-09 ASSESSMENT — ENCOUNTER SYMPTOMS
CHILLS: 0
ABDOMINAL PAIN: 0
FEVER: 0
SHORTNESS OF BREATH: 0
COUGH: 0
NAUSEA: 0
VOMITING: 0
DIARRHEA: 0

## 2025-01-09 ASSESSMENT — PAIN DESCRIPTION - PAIN TYPE
TYPE: ACUTE PAIN
TYPE: ACUTE PAIN

## 2025-01-09 NOTE — HOSPITAL COURSE
Krystle Tavarez is a 89 y.o. female who presented 1/8/2025 with altered mental status.  PMH of hypertension HFpEF, A-fib, AV block s/p pacemaker, diabetes.  She lives in a group home, over the past 3 days she has been getting more confused and agitated.  She is usually AO x 3, on presentation to the ED she is only oriented to self.  Patient has been complaining of dysuria for the past few days, denies any abdominal pain or fevers.     In the ED afebrile, hemodynamically stable.  Labs showed creatinine of 1.22, UA infectious appearing.

## 2025-01-09 NOTE — PROGRESS NOTES
I will be off duty and signed out of voalte at 3pm.  If you have any needs for this patient after 3pm, please reach out to the on call Charis ESPINAL.  Thank you!

## 2025-01-09 NOTE — H&P
Hospital Medicine History & Physical Note    Date of Service  1/9/2025    Primary Care Physician  RICHAR Castelan.    Code Status  DNAR/DNI    Chief Complaint  Chief Complaint   Patient presents with    Painful Urination     Pt BIB family from her group home. Group home states that for the past 3 days she has been more confused, and today has been very confused. Pt's daughter states that she's been forgetting names and thinking that people are at her house. Pt is Aox1 in triage, only oriented to person. Pt's daughter states she is normally fully aware of what's going on. Pt states that she feels like she has a UTI because it burns when she pees.     Mental Status Change       History of Presenting Illness  Krystle Tavarez is a 89 y.o. female who presented 1/8/2025 with altered mental status.  PMH of hypertension HFpEF, A-fib, AV block s/p pacemaker, diabetes.  She lives in a group home, over the past 3 days she has been getting more confused and agitated.  She is usually AO x 3, on presentation to the ED she is only oriented to self.  Patient has been complaining of dysuria for the past few days, denies any abdominal pain or fevers.    In the ED afebrile, hemodynamically stable.  Labs showed creatinine of 1.22, UA infectious appearing.    I discussed the plan of care with patient, bedside RN, and edp .    Review of Systems  Review of Systems   Constitutional:  Negative for chills and fever.   Respiratory:  Negative for cough and shortness of breath.    Cardiovascular:  Negative for chest pain.   Gastrointestinal:  Negative for abdominal pain, diarrhea, nausea and vomiting.   Genitourinary:  Positive for dysuria. Negative for urgency.       Past Medical History   has a past medical history of Arthritis, Breath shortness, Cataract, Dental disorder, Diabetes (HCC), Heart burn, Hemorrhagic disorder (HCC), High cholesterol, Hyperlipidemia, Hypertension, Pacemaker (2015), Pain (2020), Pre-diabetes, TIA  (transient ischemic attack), and Urinary incontinence.    Surgical History   has a past surgical history that includes hip replacement, total (Right, 2009); knee replacement, total (Right, 2004); bunionectomy (Left); basal cell excision; cataract extraction with iol (Bilateral, 2003); pacemaker insertion (2015); cholecystectomy (2002); pr laminotomy,lumbar disk,1 intrsp (N/A, 2/25/2020); and foraminotomy (N/A, 2/25/2020).     Family History  family history includes Diabetes in her mother; Heart Attack (age of onset: 74) in her father; No Known Problems in her maternal grandfather, maternal grandmother, paternal grandfather, and paternal grandmother; Stroke in her mother.   Family history reviewed with patient. There is no family history that is pertinent to the chief complaint.     Social History   reports that she has never smoked. She has never used smokeless tobacco. She reports that she does not currently use alcohol. She reports that she does not use drugs.    Allergies  Allergies   Allergen Reactions    Codeine Unspecified     Patient reported hallucination and dizziness. Specific to tylenol with codeine      Gabapentin Unspecified     Altered mental status    Sulfa Drugs Nausea     Nausea   Other reaction(s): Not available    Tramadol Unspecified     Altered mentation       Medications  Prior to Admission Medications   Prescriptions Last Dose Informant Patient Reported? Taking?   Alum Hydroxide-Mag Carbonate (GAVISCON PO)  MAR from Other Facility Yes No   Sig: Take 1 Tablet by mouth 2 times a day as needed.   Cyanocobalamin (VITAMIN B-12) 1000 MCG Tab  MAR from Other Facility No No   Sig: Take 1 Tablet by mouth every day.   DULoxetine (CYMBALTA) 20 MG Cap DR Particles  MAR from Other Facility No No   Sig: Take 1 Capsule by mouth every day.   Vitamin D, Cholecalciferol, (CHOLECALCIFEROL) 25 MCG (1000 UT) Tab  MAR from Other Facility No No   Sig: Take 1 Tablet by mouth every day.   Zinc Oxide (BALMEX EX)  MAR  from Other Facility Yes No   Sig: Apply 1 Application topically as needed.   acetaminophen (TYLENOL 8 HOUR) 650 MG CR tablet  MAR from Other Facility No No   Sig: Take 2 Tablets by mouth 2 times a day.   acetaminophen (TYLENOL 8 HOUR) 650 MG CR tablet  MAR from Other Facility Yes No   Sig: Take 650 mg by mouth every 6 hours as needed for Fever (>100.5).   apixaban (ELIQUIS) 2.5mg Tab   No No   Sig: Recommend follow up with prescriber to weigh risks vs benefits prior to resuming given her frequent falls.   ascorbic acid (ASCORBIC ACID) 500 MG Tab  MAR from Other Facility No No   Sig: Take 1 Tablet by mouth every day.   bisacodyl EC (DULCOLAX) 5 MG Tablet Delayed Response  MAR from Other Facility Yes No   Sig: Take 5 mg by mouth 1 time a day as needed for Constipation.   bumetanide (BUMEX) 0.5 MG Tab  MAR from Other Facility No No   Sig: Take 1 Tablet by mouth every 48 hours.   calcium carbonate (TUMS) 500 MG Chew Tab  MAR from Other Facility Yes No   Sig: Chew 500 mg as needed.   docusate sodium (COLACE) 100 MG Cap  MAR from Other Facility Yes No   Sig: Take 100 mg by mouth every day.   insulin lispro 100 UNIT/ML SC SOPN injection PEN   No No   Sig: subcutaneous injection: 1-6 Units 4 TIMES DAILY - BEFORE MEALS , NIGHTLY, Subcutaneous, For glucose: 70 - 150 mg/dL = 0 Units 151 - 200 mg/dL = 1 Units 201 - 250 mg/dL = 2 Units 251 - 300 mg/dL = 3 Units 301 - 350 mg/dL = 4 Units 351 - 400 mg/dL = 5 Units Over 400 mg/dL = 6 Units   ketoconazole (NIZORAL) 2 % shampoo  MAR from Other Facility No No   Sig: Apply 1 mL topically every Friday.   Patient not taking: Reported on 11/11/2024   losartan (COZAAR) 100 MG Tab  MAR from Other Facility No No   Sig: Take 1 Tablet by mouth every day.   metoprolol SR (TOPROL XL) 25 MG TABLET SR 24 HR  MAR from Other Facility No No   Sig: TAKE ONE-HALF (1/2) TABLET DAILY   ondansetron (ZOFRAN ODT) 4 MG TABLET DISPERSIBLE   No No   Sig: Take 1 Tablet by mouth every four hours as needed for  "Nausea/Vomiting (give PO if IV route is unavailable.).   polyethylene glycol/lytes (MIRALAX) Pack   No No   Sig: Take 1 Packet by mouth 2 times a day.      Facility-Administered Medications: None       Physical Exam  Temp:  [36.6 °C (97.8 °F)] 36.6 °C (97.8 °F)  Pulse:  [74-84] 82  Resp:  [16-19] 16  BP: (156-166)/(67-93) 156/67  SpO2:  [92 %-99 %] 92 %  Blood Pressure : (!) 165/91   Temperature: 36.6 °C (97.8 °F)   Pulse: 74   Respiration: 19   Pulse Oximetry: 94 %       Physical Exam  Constitutional:       Appearance: Normal appearance.   HENT:      Head: Normocephalic and atraumatic.      Mouth/Throat:      Mouth: Mucous membranes are moist.      Pharynx: No oropharyngeal exudate or posterior oropharyngeal erythema.   Cardiovascular:      Rate and Rhythm: Normal rate and regular rhythm.      Pulses: Normal pulses.      Heart sounds: Normal heart sounds. No murmur heard.  Pulmonary:      Effort: Pulmonary effort is normal. No respiratory distress.      Breath sounds: Normal breath sounds.   Musculoskeletal:         General: No swelling or tenderness. Normal range of motion.   Skin:     General: Skin is warm and dry.   Neurological:      General: No focal deficit present.      Mental Status: She is alert.      Comments: AOx1   Psychiatric:         Mood and Affect: Mood normal.         Laboratory:  Recent Labs     01/09/25 0038   WBC 8.1   RBC 3.84*   HEMOGLOBIN 11.6*   HEMATOCRIT 36.9*   MCV 96.1   MCH 30.2   MCHC 31.4*   RDW 49.5   PLATELETCT 287   MPV 9.8     Recent Labs     01/09/25 0038   SODIUM 141   POTASSIUM 4.4   CHLORIDE 104   CO2 24   GLUCOSE 164*   BUN 32*   CREATININE 1.22   CALCIUM 9.1     Recent Labs     01/09/25  0038   GLUCOSE 164*         No results for input(s): \"NTPROBNP\" in the last 72 hours.      No results for input(s): \"TROPONINT\" in the last 72 hours.    Imaging:  No orders to display     Assessment/Plan:  Justification for Admission Status  I anticipate this patient will require at least " two midnights for appropriate medical management, necessitating inpatient admission because UTI resulting in metabolic encephalopathy with previous multidrug-resistant organism needing IV antibiotics    * Acute cystitis with hematuria- (present on admission)  Assessment & Plan  Dysuria for several days but unknown timeline exactly due to altered mental status.  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  Per chart review once cultures came back antibiotics were just discontinued since her symptoms were improving  We will start on IV Zosyn, BMP ordered to continue monitoring kidney function while on Zosyn    ENEDELIA (acute kidney injury) (HCC)- (present on admission)  Assessment & Plan  Baseline creatinine 0.7-0.9, 1.22 on presentation  Secondary to decreased p.o. intake due to altered mental status as well as UTI  IV fluids received ED, encourage p.o. intake  BMP ordered continue monitor, avoid nephrotoxic agents    Delirium- (present on admission)  Assessment & Plan  AOx4 at baseline per family.  Secondary to acute infection.  Treat underlying cause  Melatonin ordered    Paroxysmal atrial fibrillation (HCC)- (present on admission)  Assessment & Plan  Continue home metoprolol, not on anticoagulation due to previous fall    Chronic heart failure with preserved ejection fraction (HCC)- (present on admission)  Assessment & Plan  Euvolemic at this time.  Continue to monitor volume status    Type 2 diabetes mellitus with stage 2 chronic kidney disease, without long-term current use of insulin (HCC)- (present on admission)  Assessment & Plan  Continue sliding scale while in the hospital    Primary hypertension- (present on admission)  Assessment & Plan  Continue metoprolol        VTE prophylaxis: therapeutic anticoagulation with apixaban

## 2025-01-09 NOTE — ED NOTES
Pt  to room via personal WC. Pt able to transfer to [facility] with one person assist. Pt placed on monitors. Chart up for ERP.

## 2025-01-09 NOTE — ED NOTES
Pt transported to receiving unit S601-1 with transport, all personal items and family/friends went taken with pt.

## 2025-01-09 NOTE — PROGRESS NOTES
4 Eyes Skin Assessment Completed by Olga LUTHER RN and Tressa TRINIDAD RN.    Head WDL  Ears WDL  Nose WDL  Mouth WDL  Neck WDL  Breast/Chest mole to chest  Shoulder Blades WDL  Spine random moles on back  (R) Arm/Elbow/Hand Discoloration, blanching  (L) Arm/Elbow/Hand Scab, red blanching  Abdomen WDL  Groin Redness  Scrotum/Coccyx/Buttocks Redness and Blanching  (R) Leg WDL  (L) Leg Scab to side of left lower leg  (R) Heel/Foot/Toe Redness and Blanching  (L) Heel/Foot/Toe Ulcer(s) dark wound to heel of left foot, wound to side of heel          Devices In Places PIV, purwick      Interventions In Place Heel Mepilex, sacral mepilex, elbow, pillows for offloading    Possible Skin Injury Yes    Pictures Uploaded Into Epic Yes  Wound Consult Placed Yes  RN Wound Prevention Protocol Ordered No

## 2025-01-09 NOTE — ED NOTES
Pt comfortable, no needs at current time, family at bedside   Initiate Treatment: permethrin 5 % topical cream \\nQuantity: 60.0 g  Days Supply: 30\\nSig: Apply to the body from the ears down at hs, let set 8-10 hrs, wash in the AM. Repeat again in 7 days.\\n\\nhydroxyzine HCl 10 mg tablet QHS\\nQuantity: 30.0 Tablet\\nSig: Take one pill at night 12 hours prn itch.  May cause drowsiness.\\n\\ndoxycycline hyclate 100 mg capsule \\nQuantity: 28.0 Capsule\\nSig: Take one capsule by mouth twice a day for 14 days\\n\\ntriamcinolone acetonide 0.1 % topical cream \\nQuantity: 454.0 g  Days Supply: 30\\nSig: Apply to the affected areas of the arms, neck, and abdomen bid two weeks on/ two weeks off. repeat prn itch Detail Level: Zone Render In Strict Bullet Format?: No

## 2025-01-09 NOTE — ED PROVIDER NOTES
ED Provider Note    CHIEF COMPLAINT  Chief Complaint   Patient presents with    Painful Urination     Pt BIB family from her group home. Group home states that for the past 3 days she has been more confused, and today has been very confused. Pt's daughter states that she's been forgetting names and thinking that people are at her house. Pt is Aox1 in triage, only oriented to person. Pt's daughter states she is normally fully aware of what's going on. Pt states that she feels like she has a UTI because it burns when she pees.     Mental Status Change       EXTERNAL RECORDS REVIEWED  Inpatient Notes trauma discharge summary reviewed 11/11/2024.    HPI/ROS  LIMITATION TO HISTORY   Select: : None  OUTSIDE HISTORIAN(S):  Family   at bedside providing additional history    Krystle Tavarez is a 89 y.o. female who presents To the emergency department for reported altered mental status.  Past medical history as document below.  Patient previously had prolonged residence in [facility] but moved to [facility] roughly 5 years ago after her  had passed away.  She lived with her daughter for period of time but then had a fall resulting in placement to local group home off of [address].  Things been going well there however the last couple days the staff felt that she was acting somewhat abnormal and therefore ultimately it was recommended that the patient be seen in the emergency department.  Patient has had some UTI symptoms to include burning and itching.     PAST MEDICAL HISTORY   has a past medical history of Arthritis, Breath shortness, Cataract, Dental disorder, Diabetes (HCC), Heart burn, Hemorrhagic disorder (HCC), High cholesterol, Hyperlipidemia, Hypertension, Pacemaker (2015), Pain (2020), Pre-diabetes, TIA (transient ischemic attack), and Urinary incontinence.    SURGICAL HISTORY   has a past surgical history that includes hip replacement, total (Right, 2009); knee replacement, total (Right,  2004); bunionectomy (Left); basal cell excision; cataract extraction with iol (Bilateral, 2003); pacemaker insertion (2015); cholecystectomy (2002); laminotomy,lumbar disk,1 intrsp (N/A, 2/25/2020); and foraminotomy (N/A, 2/25/2020).    FAMILY HISTORY  Family History   Problem Relation Age of Onset    Diabetes Mother     Stroke Mother     Heart Attack Father 74    No Known Problems Maternal Grandmother     No Known Problems Maternal Grandfather     No Known Problems Paternal Grandmother     No Known Problems Paternal Grandfather        SOCIAL HISTORY  Social History     Tobacco Use    Smoking status: Never    Smokeless tobacco: Never   Vaping Use    Vaping status: Never Used   Substance and Sexual Activity    Alcohol use: Not Currently    Drug use: No    Sexual activity: Not Currently     Partners: Male       CURRENT MEDICATIONS  Home Medications       Reviewed by Carolee Decker R.N. (Registered Nurse) on 01/08/25 at 2237  Med List Status: Not Addressed     Medication Last Dose Status   acetaminophen (TYLENOL 8 HOUR) 650 MG CR tablet  Active   acetaminophen (TYLENOL 8 HOUR) 650 MG CR tablet  Active   Alum Hydroxide-Mag Carbonate (GAVISCON PO)  Active   apixaban (ELIQUIS) 2.5mg Tab  Active   ascorbic acid (ASCORBIC ACID) 500 MG Tab  Active   bisacodyl EC (DULCOLAX) 5 MG Tablet Delayed Response  Active   bumetanide (BUMEX) 0.5 MG Tab  Active   calcium carbonate (TUMS) 500 MG Chew Tab  Active   Cyanocobalamin (VITAMIN B-12) 1000 MCG Tab  Active   docusate sodium (COLACE) 100 MG Cap  Active   DULoxetine (CYMBALTA) 20 MG Cap DR Particles  Active   insulin lispro 100 UNIT/ML SC SOPN injection PEN  Active   ketoconazole (NIZORAL) 2 % shampoo  Active   losartan (COZAAR) 100 MG Tab  Active   metoprolol SR (TOPROL XL) 25 MG TABLET SR 24 HR  Active   ondansetron (ZOFRAN ODT) 4 MG TABLET DISPERSIBLE  Active   polyethylene glycol/lytes (MIRALAX) Pack  Active   Vitamin D, Cholecalciferol, (CHOLECALCIFEROL) 25 MCG (1000 UT) Tab   "Active   Zinc Oxide (BALMEX EX)  Active                    ALLERGIES  Allergies   Allergen Reactions    Codeine Unspecified     Patient reported hallucination and dizziness. Specific to tylenol with codeine      Gabapentin Unspecified     Altered mental status    Sulfa Drugs Nausea     Nausea   Other reaction(s): Not available    Tramadol Unspecified     Altered mentation       PHYSICAL EXAM  VITAL SIGNS: BP (!) 165/91   Pulse 74   Temp 36.6 °C (97.8 °F) (Oral)   Resp 19   Ht 1.575 m (5' 2\")   Wt 62.6 kg (138 lb)   LMP  (LMP Unknown)   SpO2 94%   BMI 25.24 kg/m²          Pulse ox interpretation: I interpret this pulse ox as normal.  Constitutional: Alert in no apparent distress.  HENT: No signs of trauma, Bilateral external ears normal, Nose normal.   Eyes: Pupils are equal and reactive, Conjunctiva normal, Non-icteric.   Neck: Normal range of motion, No tenderness, Supple, No stridor.   Lymphatic: No lymphadenopathy noted.   Cardiovascular: Regular rate and rhythm, no murmurs.   Thorax & Lungs: Normal breath sounds, No respiratory distress  Abdomen: Bowel sounds normal, Soft, No tenderness  Skin: Warm, Dry  Musculoskeletal: Good range of motion in all major joints.  Neurologic: Alert , oriented to person place and time.  Normal motor function, Normal sensory function, No focal deficits noted.   Psychiatric: Affect normal, Judgment normal, Mood normal.         EKG/LABS  Results for orders placed or performed during the hospital encounter of 01/08/25   CBC WITH DIFFERENTIAL    Collection Time: 01/09/25 12:38 AM   Result Value Ref Range    WBC 8.1 4.8 - 10.8 K/uL    RBC 3.84 (L) 4.20 - 5.40 M/uL    Hemoglobin 11.6 (L) 12.0 - 16.0 g/dL    Hematocrit 36.9 (L) 37.0 - 47.0 %    MCV 96.1 81.4 - 97.8 fL    MCH 30.2 27.0 - 33.0 pg    MCHC 31.4 (L) 32.2 - 35.5 g/dL    RDW 49.5 35.9 - 50.0 fL    Platelet Count 287 164 - 446 K/uL    MPV 9.8 9.0 - 12.9 fL    Neutrophils-Polys 47.60 44.00 - 72.00 %    Lymphocytes 41.10 " (H) 22.00 - 41.00 %    Monocytes 8.60 0.00 - 13.40 %    Eosinophils 1.90 0.00 - 6.90 %    Basophils 0.40 0.00 - 1.80 %    Immature Granulocytes 0.40 0.00 - 0.90 %    Nucleated RBC 0.00 0.00 - 0.20 /100 WBC    Neutrophils (Absolute) 3.86 1.82 - 7.42 K/uL    Lymphs (Absolute) 3.33 1.00 - 4.80 K/uL    Monos (Absolute) 0.70 0.00 - 0.85 K/uL    Eos (Absolute) 0.15 0.00 - 0.51 K/uL    Baso (Absolute) 0.03 0.00 - 0.12 K/uL    Immature Granulocytes (abs) 0.03 0.00 - 0.11 K/uL    NRBC (Absolute) 0.00 K/uL   BASIC METABOLIC PANEL    Collection Time: 01/09/25 12:38 AM   Result Value Ref Range    Sodium 141 135 - 145 mmol/L    Potassium 4.4 3.6 - 5.5 mmol/L    Chloride 104 96 - 112 mmol/L    Co2 24 20 - 33 mmol/L    Glucose 164 (H) 65 - 99 mg/dL    Bun 32 (H) 8 - 22 mg/dL    Creatinine 1.22 0.50 - 1.40 mg/dL    Calcium 9.1 8.5 - 10.5 mg/dL    Anion Gap 13.0 7.0 - 16.0   ESTIMATED GFR    Collection Time: 01/09/25 12:38 AM   Result Value Ref Range    GFR (CKD-EPI) 42 (A) >60 mL/min/1.73 m 2   URINALYSIS    Collection Time: 01/09/25  2:08 AM    Specimen: Urine, Clean Catch   Result Value Ref Range    Color Yellow     Character Turbid (A)     Specific Gravity 1.015 <1.035    Ph 6.0 5.0 - 8.0    Glucose Negative Negative mg/dL    Ketones Negative Negative mg/dL    Protein 100 (A) Negative mg/dL    Bilirubin Negative Negative    Urobilinogen, Urine 0.2 <=1.0 EU/dL    Nitrite Negative Negative    Leukocyte Esterase Large (A) Negative    Occult Blood Moderate (A) Negative    Micro Urine Req Microscopic    URINE MICROSCOPIC (W/UA)    Collection Time: 01/09/25  2:08 AM   Result Value Ref Range    WBC >100 (A) /hpf    RBC 6-10 (A) 0 - 2 /hpf    Bacteria Few (A) None /hpf    Epithelial Cells 3-5 0 - 5 /hpf    Urine Casts 0-2 0 - 2 /lpf     *Note: Due to a large number of results and/or encounters for the requested time period, some results have not been displayed. A complete set of results can be found in Results Review.       I have  independently interpreted this EKG        COURSE & MEDICAL DECISION MAKING    ASSESSMENT, COURSE AND PLAN  Care Narrative: Patient presenting with reported altered mental status from baseline.  History of UTI.  Reporting UTI symptoms.  Will complete labs and urinalysis to start.    DISPOSITION AND DISCUSSIONS  I have discussed management of the patient with the following physicians and FAVIO's: Hospitalist    Discussion of management with other Q or appropriate source(s): Pharmacy for medication verification      Patient presenting for abnormal behavior and UTI symptomatology.  Labs correlate with acute cystitis.  Patient not overtly septic.  Patient provided with IV antibiotics and IV fluids especially given ENEDELIA as noted with slight prerenal azotemia.  Overall the patient's hemodynamics are stable.  Will be admitted to the hospital service at this time.    FINAL DIAGNOSIS  1. Acute UTI    2. ENEDELIA (acute kidney injury) (Formerly [facility])         Electronically signed by: Sreekanth Donohue M.D., 1/9/2025 12:17 AM

## 2025-01-09 NOTE — ED NOTES
Straight catheterization to obtain urine sample. Urine output was minimal due to pt having a saturated brief. ERP notified about sample not being enough to send to lab at this time. Pt provided with water.

## 2025-01-09 NOTE — ED NOTES
Medication history reviewed per patient's MAR from [facility].  Med rec is complete    Anticoagulants taken in the last 14 days? None listed on MAR    Jeniffer Christianson PhT

## 2025-01-09 NOTE — ASSESSMENT & PLAN NOTE
AOx4 at baseline per family.  Secondary to acute infection.  Treat underlying cause  Melatonin ordered

## 2025-01-09 NOTE — ED TRIAGE NOTES
"Chief Complaint   Patient presents with    Painful Urination     Pt BIB family from her group home. Group home states that for the past 3 days she has been more confused, and today has been very confused. Pt's daughter states that she's been forgetting names and thinking that people are at her house. Pt is Aox1 in triage, only oriented to person. Pt's daughter states she is normally fully aware of what's going on. Pt states that she feels like she has a UTI because it burns when she pees.     Mental Status Change     Pt is alert, Aox1, speaking in full sentences, follows commands but is confused in conversation. Resperations are even and unlabored.      Pt placed in lobby. Pt educated on triage process. Pt encouraged to alert staff for any changes.    BP (!) 166/93   Pulse 81   Temp 36.6 °C (97.8 °F) (Oral)   Resp 18   Ht 1.575 m (5' 2\")   Wt 62.6 kg (138 lb)   SpO2 99%      "

## 2025-01-09 NOTE — ASSESSMENT & PLAN NOTE
Dysuria for several days but unknown timeline exactly due to altered mental status.  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  Per chart review once cultures came back antibiotics were just discontinued since her symptoms were improving  We will start on IV Zosyn, BMP ordered to continue monitoring kidney function while on Zosyn

## 2025-01-09 NOTE — ED NOTES
Pt cleaned and new bedding and brief applied, bed alarm placed, pt connected to [facility] and education provided

## 2025-01-09 NOTE — ED NOTES
Pt is resting comfortably in [facility]. Family at bedside. Breathing unlabored/equal chest rise. Bed in the lowest position, side rails up, Call light within reach.

## 2025-01-09 NOTE — ASSESSMENT & PLAN NOTE
Baseline creatinine 0.7-0.9, 1.22 on presentation  Secondary to decreased p.o. intake due to altered mental status as well as UTI  IV fluids received ED, encourage p.o. intake  BMP ordered continue monitor, avoid nephrotoxic agents    1/10 creatinine slightly up  I restarted IV fluid  I ordered a.m. BMP to monitor  Renal dose medications  Avoid nephrotoxic agent

## 2025-01-09 NOTE — PROGRESS NOTES
[facility] Medicine Daily Progress Note    Date of Service  1/9/2025    Chief Complaint  Krystle Tavarez is a 89 y.o. female admitted 1/8/2025 with altered mental status.     Hospital Course  Krystle Tavarez is a 89 y.o. female who presented 1/8/2025 with altered mental status.  PMH of hypertension HFpEF, A-fib, AV block s/p pacemaker, diabetes.  She lives in a group home, over the past 3 days she has been getting more confused and agitated.  She is usually AO x 3, on presentation to the ED she is only oriented to self.  Patient has been complaining of dysuria for the past few days, denies any abdominal pain or fevers.     In the ED afebrile, hemodynamically stable.  Labs showed creatinine of 1.22, UA infectious appearing.    Interval Problem Update  1/9 afebrile, vitals are stable and on room air. Her mentation is starting to clear.  She does know that she is at [facility] still unsure of situation or time.     I have discussed this patient's plan of care and discharge plan at IDT rounds today with Case Management, Nursing, Nursing leadership, and other members of the IDT team.    Consultants/Specialty  none    Code Status  DNAR/DNI    Disposition  The patient is not medically cleared for discharge to home or a post-acute facility.  Anticipate discharge to: home with close outpatient follow-up    I have placed the appropriate orders for post-discharge needs.    Review of Systems  Review of Systems   Unable to perform ROS: Medical condition   All other systems reviewed and are negative.       Physical Exam  Temp:  [36.6 °C (97.8 °F)] 36.6 °C (97.8 °F)  Pulse:  [68-84] 68  Resp:  [16-19] 18  BP: (139-166)/(60-93) 139/60  SpO2:  [89 %-99 %] 92 %    Physical Exam  Vitals and nursing note reviewed.   Constitutional:       Appearance: Normal appearance. She is not ill-appearing.   HENT:      Head: Normocephalic and atraumatic.      Jaw: There is normal jaw occlusion.      Right Ear: Hearing normal.      Left Ear:  Hearing normal.      Nose: Nose normal.      Mouth/Throat:      Lips: Pink.      Mouth: Mucous membranes are moist.   Eyes:      Extraocular Movements: Extraocular movements intact.      Conjunctiva/sclera: Conjunctivae normal.      Pupils: Pupils are equal, round, and reactive to light.   Neck:      Vascular: No carotid bruit.   Cardiovascular:      Rate and Rhythm: Normal rate and regular rhythm.      Pulses: Normal pulses.      Heart sounds: Normal heart sounds, S1 normal and S2 normal.   Pulmonary:      Effort: Pulmonary effort is normal.      Breath sounds: Normal breath sounds and air entry. No stridor.   Musculoskeletal:      Cervical back: Normal range of motion and neck supple.      Right lower leg: No edema.      Left lower leg: No edema.   Skin:     General: Skin is warm and dry.      Capillary Refill: Capillary refill takes less than 2 seconds.   Neurological:      General: No focal deficit present.      Mental Status: She is alert and oriented to person, place, and time. Mental status is at baseline.      Sensory: Sensation is intact.      Motor: Motor function is intact.   Psychiatric:         Attention and Perception: Attention and perception normal.         Mood and Affect: Mood and affect normal.         Speech: Speech normal.         Behavior: Behavior normal. Behavior is cooperative.         Fluids  No intake or output data in the 24 hours ending 01/09/25 0725     Laboratory  Recent Labs     01/09/25  0038   WBC 8.1   RBC 3.84*   HEMOGLOBIN 11.6*   HEMATOCRIT 36.9*   MCV 96.1   MCH 30.2   MCHC 31.4*   RDW 49.5   PLATELETCT 287   MPV 9.8     Recent Labs     01/09/25  0038   SODIUM 141   POTASSIUM 4.4   CHLORIDE 104   CO2 24   GLUCOSE 164*   BUN 32*   CREATININE 1.22   CALCIUM 9.1                   Imaging  No orders to display        Assessment/Plan  * Acute cystitis with hematuria- (present on admission)  Assessment & Plan  Dysuria for several days but unknown timeline exactly due to altered  mental status.  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  Per chart review once cultures came back antibiotics were just discontinued since her symptoms were improving  We will start on IV Zosyn, BMP ordered to continue monitoring kidney function while on Zosyn    ENEDELIA (acute kidney injury) (HCC)- (present on admission)  Assessment & Plan  Baseline creatinine 0.7-0.9, 1.22 on presentation  Secondary to decreased p.o. intake due to altered mental status as well as UTI  IV fluids received ED, encourage p.o. intake  BMP ordered continue monitor, avoid nephrotoxic agents    Paroxysmal atrial fibrillation (HCC)- (present on admission)  Assessment & Plan  Continue home metoprolol, not on anticoagulation due to previous fall    Chronic heart failure with preserved ejection fraction (HCC)- (present on admission)  Assessment & Plan  Euvolemic at this time.  Continue to monitor volume status    Delirium- (present on admission)  Assessment & Plan  AOx4 at baseline per family.  Secondary to acute infection.  Treat underlying cause  Melatonin ordered    Type 2 diabetes mellitus with stage 2 chronic kidney disease, without long-term current use of insulin (HCC)- (present on admission)  Assessment & Plan  Continue sliding scale while in the hospital    Primary hypertension- (present on admission)  Assessment & Plan  Continue metoprolol         VTE prophylaxis:   SCDs/TEDs   enoxaparin ppx      I have performed a physical exam and reviewed and updated ROS and Plan today (1/9/2025). In review of yesterday's note (1/8/2025), there are no changes except as documented above.

## 2025-01-09 NOTE — ED NOTES
Bedside report received from prior RN. Pt alert. Resp normal/unlabored on RA. Bed side rails up/in low position. Pt updated to POC and all questions answered.     Family at bedside

## 2025-01-10 LAB
ANION GAP SERPL CALC-SCNC: 9 MMOL/L (ref 7–16)
BUN SERPL-MCNC: 26 MG/DL (ref 8–22)
CALCIUM SERPL-MCNC: 9 MG/DL (ref 8.5–10.5)
CHLORIDE SERPL-SCNC: 104 MMOL/L (ref 96–112)
CO2 SERPL-SCNC: 24 MMOL/L (ref 20–33)
CREAT SERPL-MCNC: 1.35 MG/DL (ref 0.5–1.4)
ERYTHROCYTE [DISTWIDTH] IN BLOOD BY AUTOMATED COUNT: 49.1 FL (ref 35.9–50)
GFR SERPLBLD CREATININE-BSD FMLA CKD-EPI: 37 ML/MIN/1.73 M 2
GLUCOSE BLD STRIP.AUTO-MCNC: 143 MG/DL (ref 65–99)
GLUCOSE BLD STRIP.AUTO-MCNC: 144 MG/DL (ref 65–99)
GLUCOSE BLD STRIP.AUTO-MCNC: 208 MG/DL (ref 65–99)
GLUCOSE SERPL-MCNC: 162 MG/DL (ref 65–99)
HCT VFR BLD AUTO: 34.6 % (ref 37–47)
HGB BLD-MCNC: 11.2 G/DL (ref 12–16)
MCH RBC QN AUTO: 30.9 PG (ref 27–33)
MCHC RBC AUTO-ENTMCNC: 32.4 G/DL (ref 32.2–35.5)
MCV RBC AUTO: 95.6 FL (ref 81.4–97.8)
PLATELET # BLD AUTO: 228 K/UL (ref 164–446)
PMV BLD AUTO: 9.2 FL (ref 9–12.9)
POTASSIUM SERPL-SCNC: 4.1 MMOL/L (ref 3.6–5.5)
RBC # BLD AUTO: 3.62 M/UL (ref 4.2–5.4)
SODIUM SERPL-SCNC: 137 MMOL/L (ref 135–145)
WBC # BLD AUTO: 6.2 K/UL (ref 4.8–10.8)

## 2025-01-10 PROCEDURE — 700105 HCHG RX REV CODE 258: Performed by: STUDENT IN AN ORGANIZED HEALTH CARE EDUCATION/TRAINING PROGRAM

## 2025-01-10 PROCEDURE — 700102 HCHG RX REV CODE 250 W/ 637 OVERRIDE(OP): Performed by: STUDENT IN AN ORGANIZED HEALTH CARE EDUCATION/TRAINING PROGRAM

## 2025-01-10 PROCEDURE — 99233 SBSQ HOSP IP/OBS HIGH 50: CPT | Performed by: STUDENT IN AN ORGANIZED HEALTH CARE EDUCATION/TRAINING PROGRAM

## 2025-01-10 PROCEDURE — A9270 NON-COVERED ITEM OR SERVICE: HCPCS | Performed by: STUDENT IN AN ORGANIZED HEALTH CARE EDUCATION/TRAINING PROGRAM

## 2025-01-10 PROCEDURE — 700111 HCHG RX REV CODE 636 W/ 250 OVERRIDE (IP): Performed by: STUDENT IN AN ORGANIZED HEALTH CARE EDUCATION/TRAINING PROGRAM

## 2025-01-10 PROCEDURE — 85027 COMPLETE CBC AUTOMATED: CPT

## 2025-01-10 PROCEDURE — 80048 BASIC METABOLIC PNL TOTAL CA: CPT

## 2025-01-10 PROCEDURE — 82962 GLUCOSE BLOOD TEST: CPT | Mod: 91

## 2025-01-10 PROCEDURE — 97161 PT EVAL LOW COMPLEX 20 MIN: CPT

## 2025-01-10 PROCEDURE — 36415 COLL VENOUS BLD VENIPUNCTURE: CPT

## 2025-01-10 PROCEDURE — 770006 HCHG ROOM/CARE - MED/SURG/GYN SEMI*

## 2025-01-10 RX ORDER — SODIUM CHLORIDE 9 MG/ML
INJECTION, SOLUTION INTRAVENOUS CONTINUOUS
Status: DISCONTINUED | OUTPATIENT
Start: 2025-01-10 | End: 2025-01-13

## 2025-01-10 RX ADMIN — INSULIN LISPRO 2 UNITS: 100 INJECTION, SOLUTION INTRAVENOUS; SUBCUTANEOUS at 18:05

## 2025-01-10 RX ADMIN — PIPERACILLIN AND TAZOBACTAM 3.38 G: 3; .375 INJECTION, POWDER, FOR SOLUTION INTRAVENOUS at 12:21

## 2025-01-10 RX ADMIN — DULOXETINE HYDROCHLORIDE 20 MG: 20 CAPSULE, DELAYED RELEASE ORAL at 05:14

## 2025-01-10 RX ADMIN — PIPERACILLIN AND TAZOBACTAM 3.38 G: 3; .375 INJECTION, POWDER, FOR SOLUTION INTRAVENOUS at 21:18

## 2025-01-10 RX ADMIN — SODIUM CHLORIDE: 9 INJECTION, SOLUTION INTRAVENOUS at 18:09

## 2025-01-10 RX ADMIN — ENOXAPARIN SODIUM 30 MG: 100 INJECTION SUBCUTANEOUS at 18:01

## 2025-01-10 RX ADMIN — Medication 5 MG: at 20:55

## 2025-01-10 RX ADMIN — METOPROLOL SUCCINATE 12.5 MG: 25 TABLET, EXTENDED RELEASE ORAL at 05:14

## 2025-01-10 RX ADMIN — PIPERACILLIN AND TAZOBACTAM 3.38 G: 3; .375 INJECTION, POWDER, FOR SOLUTION INTRAVENOUS at 05:16

## 2025-01-10 ASSESSMENT — PAIN DESCRIPTION - PAIN TYPE
TYPE: ACUTE PAIN
TYPE: ACUTE PAIN

## 2025-01-10 NOTE — WOUND TEAM
[facility]  Inpatient Services  Initial Wound and Skin Care Evaluation    Admission Date: 1/8/2025     Last order of IP CONSULT TO WOUND CARE was found on 1/9/2025 from Hospital Encounter on 1/8/2025     HPI, PMH, SH: Reviewed    Past Surgical History:   Procedure Laterality Date    PB LAMINOTOMY,LUMBAR DISK,1 INTRSP N/A 2/25/2020    Procedure: LAMINECTOMY, SPINE, LUMBAR, WITH DISCECTOMY- L5-S1, MEDIAL FACETECTOMY;  Surgeon: Latrell Kimble M.D.;  Location: SURGERY [facility];  Service: Neurosurgery    FORAMINOTOMY N/A 2/25/2020    Procedure: FORAMINOTOMY, SPINE;  Surgeon: Latrell Kimble M.D.;  Location: SURGERY [facility];  Service: Neurosurgery    PACEMAKER INSERTION  2015    HIP REPLACEMENT, TOTAL Right 2009    KNEE REPLACEMENT, TOTAL Right 2004    CATARACT EXTRACTION WITH IOL Bilateral 2003    CHOLECYSTECTOMY  2002    BASAL CELL EXCISION      nose and hand    BUNIONECTOMY Left      Social History     Tobacco Use    Smoking status: Never    Smokeless tobacco: Never   Substance Use Topics    Alcohol use: Not Currently     Chief Complaint   Patient presents with    Painful Urination     Pt BIB family from her group home. Group home states that for the past 3 days she has been more confused, and today has been very confused. Pt's daughter states that she's been forgetting names and thinking that people are at her house. Pt is Aox1 in triage, only oriented to person. Pt's daughter states she is normally fully aware of what's going on. Pt states that she feels like she has a UTI because it burns when she pees.     Mental Status Change     Diagnosis: ENEDELIA (acute kidney injury) (HCC) [N17.9]    Unit where seen by Wound Team: S601/01     WOUND CONSULT RELATED TO:  L heel    WOUND TEAM PLAN OF CARE - Frequency of Follow-up:   Nursing to follow dressing orders written for wound care. Contact wound team if area fails to progress, deteriorates or with any questions/concerns if something comes up before next  scheduled follow up (See below as to whether wound is following and frequency of wound follow up)   Not following, consult as needed  -      WOUND HISTORY:   Pt is a 89 you female who presented to the ED for AMS.  Admitted for cystitis, ENEDELIA.  Daughter bedside and states pt developed a Johann L heel about 6 months age,  Pt lives in a group home and spends most of her time in bed or in a recliner       WOUND ASSESSMENT/LDA          Wound 01/09/25 Pressure Injury Heel Left dark wound to left heel (Active)   Wound Image     01/10/25 1200   Site Assessment Dry 01/10/25 1200   Periwound Assessment [place] 01/10/25 1200   Margins Defined edges 01/10/25 0845   MEASUREMENTS POST HEEL 2.5X2.5XUTA  Lat HEEL 0.6x0.6xuta    Closure Open to air 01/10/25 0845   Drainage Amount None 01/10/25 1200   Wound Cleansing Not Applicable 01/10/25 0845   Dressing Status Other (Comment) 01/10/25 1200   Dressing Changed Changed 01/10/25 1200   Dressing Options Offloading Dressing - Heel 01/10/25 1200   Dressing Change/Treatment Frequency Every 72 hrs, and As Needed 01/10/25 1200   NEXT Dressing Change/Treatment Date 01/13/25 01/10/25 1200   NEXT Weekly Photo (Inpatient Only) 01/17/25 01/10/25 1200   Wound Team Following Other (comment) 01/10/25 1200   Wound Odor None 01/10/25 1200   WOUND NURSE ONLY - Time Spent with Patient (mins) 60 01/10/25 1200       Pt  has a crescent shape mole on her L lateral leg.  Pt's R heel is pink and blanching  Pt's sacrum is pink and blanching    Vascular:    ALLEN:   No results found.    Lab Values:    Lab Results   Component Value Date/Time    WBC 6.2 01/10/2025 02:54 AM    RBC 3.62 (L) 01/10/2025 02:54 AM    HEMOGLOBIN 11.2 (L) 01/10/2025 02:54 AM    HEMATOCRIT 34.6 (L) 01/10/2025 02:54 AM    CREACTPROT 0.86 (H) 08/29/2024 04:52 PM    SEDRATEWES 70 (H) 08/29/2024 04:52 PM    HBA1C 7.7 (H) 11/13/2024 05:45 AM         Culture Results show:  No results found for this or any previous visit (from the past 720  hours).    Pain Level/Medicated:  None, Tolerated without pain medication       INTERVENTIONS BY WOUND TEAM: . This RN in to assess patient. Performed standard wound care which includes appropriate positioning, dressing removal and non-selective debridement. Pictures and measurements obtained weekly if/when required.    Wound:  L heel  Preparation for Dressing removal: Removed without difficulty  Primary Dressing:  off loading foam    Advanced Wound Care Discharge Planning  Number of Clinicians necessary to complete wound care: 1  Is patient requiring IV pain medications for dressing changes:  No   Length of time for dressing change 60 min. (This does not include chart review, pre-medication time, set up, clean up or time spent charting.)    Interdisciplinary consultation: Patient, Bedside RN (), .     EVALUATION / RATIONALE FOR TREATMENT:     Date:  01/10/25  Wound Status:      Pt has a resolving stage 2 pressure injury on her L heel.  There is a sataelite lesion that is dry that was part of the original wound but there is now a skin bridge between it and the larger wound.  Expect both wound to resolve with off loading          Goals: Steady decrease in wound area  weekly.    NURSING PLAN OF CARE ORDERS:  Dressing changes: See Dressing Care orders    NUTRITION   PREVENTATIVE INTERVENTIONS:    Q shift Gabo - performed per nursing policy  Q shift pressure point assessments - performed per nursing policy    Surface/Positioning  Low Airloss - Currently in Place  TAPs Turning system - Ordered    Offloading/Redistribution  Heel offloading dressing (Silicone dressing) - Currently in Place  Heel float boots (Prevalon boot) - Ordered      Containment/Moisture Prevention    Purwick/Condom Cath - Currently in Place    Anticipated discharge plans:  Group Home    pt should continue to receive wound care for L heel wound    Vac Discharge Needs:  Vac Discharge plan is purely a recommendation from wound team and not a  requirement for discharge unless otherwise stated by physician.  Not Applicable Pt not on a wound vac

## 2025-01-10 NOTE — PROGRESS NOTES
4 Eyes Skin Assessment Completed by NAMAN Warren and NAMAN Nguyen.    Head WDL  Ears WDL  Nose WDL  Mouth WDL  Neck WDL  Breast/Chest WDL  Shoulder Blades WDL  Spine WDL  (R) Arm/Elbow/Hand Blanching  (L) Arm/Elbow/Hand Redness and Blanching  Abdomen WDL  Groin Redness  Scrotum/Coccyx/Buttocks Redness and Blanching  (R) Leg WDL  (L) Leg Scab  (R) Heel/Foot/Toe Redness and Blanching  (L) Heel/Foot/Toe left heel wound, wound to lateral foot          Devices In Places PIV, pure wick      Interventions In Place Heel Mepilex, Pillows, Q2 Turns, Low Air Loss Mattress, and Barrier Cream    Possible Skin Injury Yes    Pictures Uploaded Into Epic Yes  Wound Consult Placed wound consult ordered  RN Wound Prevention Protocol Ordered No    · Continue statin

## 2025-01-10 NOTE — DISCHARGE PLANNING
Case Management Discharge Planning    Admission Date: 1/8/2025  GMLOS: 3.8  ALOS: 1    6-Clicks ADL Score: 16  6-Clicks Mobility Score: 13  PT and/or OT Eval ordered: Yes  Post-acute Referrals Ordered: Yes  Post-acute Choice Obtained: No  Has referral(s) been sent to post-acute provider:  Yes      Anticipated Discharge Dispo: Discharge Disposition: Discharged to home/self care (01)    DME Needed: No    Action(s) Taken: LMSW left a VM for daughter Ana M to complete the assessment.     Escalations Completed: None    Medically Clear: No    Next Steps: LMSW to follow until DC.     Barriers to Discharge: Medical clearance    Is the patient up for discharge tomorrow: No

## 2025-01-10 NOTE — CARE PLAN
The patient is Stable - Low risk of patient condition declining or worsening    Shift Goals  Clinical Goals: Patient to remain free from falls and less pain related to UTI    Progress made toward(s) clinical / shift goals:  Patient continues on antibiotics for UTI. Bed is in low and locked position with bed alarm on. Patient uses call light appropriately. Low air loss, mepilex, moon boots, and barrier cream for maintenance of skin integrity. Seen by wound care team this shift.    Patient is not progressing towards the following goals:

## 2025-01-10 NOTE — CARE PLAN
Problem: Skin Integrity  Goal: Skin integrity is maintained or improved  Outcome: Progressing     Problem: Fall Risk  Goal: Patient will remain free from falls  Outcome: Progressing     The patient is Stable - Low risk of patient condition declining or worsening    Shift Goals  Clinical Goals: patient will remain free from injury, IV ABX    Progress made toward(s) clinical / shift goals:  fall precautions in place, patient cooperative with cares, tolerating IV ABX therapy    Patient is not progressing towards the following goals:

## 2025-01-11 LAB
ANION GAP SERPL CALC-SCNC: 10 MMOL/L (ref 7–16)
BUN SERPL-MCNC: 31 MG/DL (ref 8–22)
CALCIUM SERPL-MCNC: 8.8 MG/DL (ref 8.5–10.5)
CHLORIDE SERPL-SCNC: 105 MMOL/L (ref 96–112)
CO2 SERPL-SCNC: 25 MMOL/L (ref 20–33)
CREAT SERPL-MCNC: 1.29 MG/DL (ref 0.5–1.4)
ERYTHROCYTE [DISTWIDTH] IN BLOOD BY AUTOMATED COUNT: 49.5 FL (ref 35.9–50)
GFR SERPLBLD CREATININE-BSD FMLA CKD-EPI: 39 ML/MIN/1.73 M 2
GLUCOSE BLD STRIP.AUTO-MCNC: 145 MG/DL (ref 65–99)
GLUCOSE BLD STRIP.AUTO-MCNC: 146 MG/DL (ref 65–99)
GLUCOSE BLD STRIP.AUTO-MCNC: 164 MG/DL (ref 65–99)
GLUCOSE BLD STRIP.AUTO-MCNC: 169 MG/DL (ref 65–99)
GLUCOSE SERPL-MCNC: 152 MG/DL (ref 65–99)
HCT VFR BLD AUTO: 32.4 % (ref 37–47)
HGB BLD-MCNC: 10.4 G/DL (ref 12–16)
MAGNESIUM SERPL-MCNC: 2.1 MG/DL (ref 1.5–2.5)
MCH RBC QN AUTO: 30.6 PG (ref 27–33)
MCHC RBC AUTO-ENTMCNC: 32.1 G/DL (ref 32.2–35.5)
MCV RBC AUTO: 95.3 FL (ref 81.4–97.8)
PHOSPHATE SERPL-MCNC: 3.4 MG/DL (ref 2.5–4.5)
PLATELET # BLD AUTO: 239 K/UL (ref 164–446)
PMV BLD AUTO: 9.6 FL (ref 9–12.9)
POTASSIUM SERPL-SCNC: 4.2 MMOL/L (ref 3.6–5.5)
RBC # BLD AUTO: 3.4 M/UL (ref 4.2–5.4)
SODIUM SERPL-SCNC: 140 MMOL/L (ref 135–145)
WBC # BLD AUTO: 8.1 K/UL (ref 4.8–10.8)

## 2025-01-11 PROCEDURE — 83735 ASSAY OF MAGNESIUM: CPT

## 2025-01-11 PROCEDURE — 700111 HCHG RX REV CODE 636 W/ 250 OVERRIDE (IP): Performed by: STUDENT IN AN ORGANIZED HEALTH CARE EDUCATION/TRAINING PROGRAM

## 2025-01-11 PROCEDURE — 85027 COMPLETE CBC AUTOMATED: CPT

## 2025-01-11 PROCEDURE — 99497 ADVNCD CARE PLAN 30 MIN: CPT | Performed by: STUDENT IN AN ORGANIZED HEALTH CARE EDUCATION/TRAINING PROGRAM

## 2025-01-11 PROCEDURE — 36415 COLL VENOUS BLD VENIPUNCTURE: CPT

## 2025-01-11 PROCEDURE — 700102 HCHG RX REV CODE 250 W/ 637 OVERRIDE(OP): Performed by: STUDENT IN AN ORGANIZED HEALTH CARE EDUCATION/TRAINING PROGRAM

## 2025-01-11 PROCEDURE — A9270 NON-COVERED ITEM OR SERVICE: HCPCS | Performed by: STUDENT IN AN ORGANIZED HEALTH CARE EDUCATION/TRAINING PROGRAM

## 2025-01-11 PROCEDURE — 99233 SBSQ HOSP IP/OBS HIGH 50: CPT | Mod: 25 | Performed by: STUDENT IN AN ORGANIZED HEALTH CARE EDUCATION/TRAINING PROGRAM

## 2025-01-11 PROCEDURE — 80048 BASIC METABOLIC PNL TOTAL CA: CPT

## 2025-01-11 PROCEDURE — 84100 ASSAY OF PHOSPHORUS: CPT

## 2025-01-11 PROCEDURE — 700105 HCHG RX REV CODE 258: Performed by: STUDENT IN AN ORGANIZED HEALTH CARE EDUCATION/TRAINING PROGRAM

## 2025-01-11 PROCEDURE — 770006 HCHG ROOM/CARE - MED/SURG/GYN SEMI*

## 2025-01-11 PROCEDURE — 82962 GLUCOSE BLOOD TEST: CPT

## 2025-01-11 RX ADMIN — PIPERACILLIN AND TAZOBACTAM 3.38 G: 3; .375 INJECTION, POWDER, FOR SOLUTION INTRAVENOUS at 05:06

## 2025-01-11 RX ADMIN — PIPERACILLIN AND TAZOBACTAM 3.38 G: 3; .375 INJECTION, POWDER, FOR SOLUTION INTRAVENOUS at 21:44

## 2025-01-11 RX ADMIN — ENOXAPARIN SODIUM 30 MG: 100 INJECTION SUBCUTANEOUS at 16:52

## 2025-01-11 RX ADMIN — INSULIN LISPRO 1 UNITS: 100 INJECTION, SOLUTION INTRAVENOUS; SUBCUTANEOUS at 16:51

## 2025-01-11 RX ADMIN — DULOXETINE HYDROCHLORIDE 20 MG: 20 CAPSULE, DELAYED RELEASE ORAL at 05:00

## 2025-01-11 RX ADMIN — Medication 5 MG: at 21:30

## 2025-01-11 RX ADMIN — INSULIN LISPRO 1 UNITS: 100 INJECTION, SOLUTION INTRAVENOUS; SUBCUTANEOUS at 21:33

## 2025-01-11 RX ADMIN — PIPERACILLIN AND TAZOBACTAM 3.38 G: 3; .375 INJECTION, POWDER, FOR SOLUTION INTRAVENOUS at 12:19

## 2025-01-11 RX ADMIN — METOPROLOL SUCCINATE 12.5 MG: 25 TABLET, EXTENDED RELEASE ORAL at 05:01

## 2025-01-11 ASSESSMENT — PAIN DESCRIPTION - PAIN TYPE
TYPE: ACUTE PAIN
TYPE: ACUTE PAIN

## 2025-01-11 NOTE — PROGRESS NOTES
[facility] Medicine Daily Progress Note    Date of Service  1/10/2025    Chief Complaint  Krystle Tavarez is a 89 y.o. female admitted 1/8/2025 with altered mental status.     Hospital Course  Krystle Tavarez is a 89 y.o. female who presented 1/8/2025 with altered mental status.  PMH of hypertension HFpEF, A-fib, AV block s/p pacemaker, diabetes.  She lives in a group home, over the past 3 days she has been getting more confused and agitated.  She is usually AO x 3, on presentation to the ED she is only oriented to self.  Patient has been complaining of dysuria for the past few days, denies any abdominal pain or fevers.     In the ED afebrile, hemodynamically stable.  Labs showed creatinine of 1.22, UA infectious appearing.    Interval Problem Update  I have seen and examined the patient at bedside  I discussed and updated the care plan with the daughter at the bedside    Follow-up urine culture  Continue Zosyn  ENEDELIA -continue IV fluid    It appears urine culture was not ordered at the ER.  Will ask the lab to use the urine sample from yesterday for culture.     CRE 1.2>1.3     I have discussed this patient's plan of care and discharge plan at IDT rounds today with Case Management, Nursing, Nursing leadership, and other members of the IDT team.    Consultants/Specialty  none    Code Status  DNAR/DNI    Disposition  The patient is not medically cleared for discharge to home or a post-acute facility.      I have placed the appropriate orders for post-discharge needs.    Review of Systems  Review of Systems   Unable to perform ROS: Medical condition   All other systems reviewed and are negative.       Physical Exam  Temp:  [35.8 °C (96.5 °F)-36.4 °C (97.6 °F)] 35.8 °C (96.5 °F)  Pulse:  [71-82] 82  Resp:  [18] 18  BP: (106-186)/(58-97) 178/79  SpO2:  [92 %-95 %] 93 %    Physical Exam  Vitals and nursing note reviewed.   Constitutional:       Appearance: Normal appearance. She is not ill-appearing.   HENT:      Head:  Normocephalic and atraumatic.      Jaw: There is normal jaw occlusion.      Right Ear: Hearing normal.      Left Ear: Hearing normal.      Nose: Nose normal.      Mouth/Throat:      Lips: Pink.      Mouth: Mucous membranes are moist.   Eyes:      Extraocular Movements: Extraocular movements intact.      Conjunctiva/sclera: Conjunctivae normal.      Pupils: Pupils are equal, round, and reactive to light.   Neck:      Vascular: No carotid bruit.   Cardiovascular:      Rate and Rhythm: Normal rate and regular rhythm.      Pulses: Normal pulses.      Heart sounds: Normal heart sounds, S1 normal and S2 normal.   Pulmonary:      Effort: Pulmonary effort is normal.      Breath sounds: Normal breath sounds and air entry. No stridor.   Musculoskeletal:      Cervical back: Normal range of motion and neck supple.      Right lower leg: No edema.      Left lower leg: No edema.   Skin:     General: Skin is warm and dry.      Capillary Refill: Capillary refill takes less than 2 seconds.   Neurological:      General: No focal deficit present.      Mental Status: She is alert and oriented to person, place, and time. Mental status is at baseline.      Sensory: Sensation is intact.      Motor: Motor function is intact.   Psychiatric:         Attention and Perception: Attention and perception normal.         Mood and Affect: Mood and affect normal.         Speech: Speech normal.         Behavior: Behavior normal. Behavior is cooperative.         Fluids    Intake/Output Summary (Last 24 hours) at 1/10/2025 1658  Last data filed at 1/10/2025 0550  Gross per 24 hour   Intake 240 ml   Output 950 ml   Net -710 ml        Laboratory  Recent Labs     01/09/25  0038 01/10/25  0254   WBC 8.1 6.2   RBC 3.84* 3.62*   HEMOGLOBIN 11.6* 11.2*   HEMATOCRIT 36.9* 34.6*   MCV 96.1 95.6   MCH 30.2 30.9   MCHC 31.4* 32.4   RDW 49.5 49.1   PLATELETCT 287 228   MPV 9.8 9.2     Recent Labs     01/09/25  0038 01/10/25  0254   SODIUM 141 137   POTASSIUM 4.4  4.1   CHLORIDE 104 104   CO2 24 24   GLUCOSE 164* 162*   BUN 32* 26*   CREATININE 1.22 1.35   CALCIUM 9.1 9.0                   Imaging  No orders to display        Assessment/Plan  * Acute cystitis with hematuria- (present on admission)  Assessment & Plan  Dysuria for several days but unknown timeline exactly due to altered mental status.  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  Per chart review once cultures came back antibiotics were just discontinued since her symptoms were improving  We will start on IV Zosyn, BMP ordered to continue monitoring kidney function while on Zosyn    ENEDELIA (acute kidney injury) (HCC)- (present on admission)  Assessment & Plan  Baseline creatinine 0.7-0.9, 1.22 on presentation  Secondary to decreased p.o. intake due to altered mental status as well as UTI  IV fluids received ED, encourage p.o. intake  BMP ordered continue monitor, avoid nephrotoxic agents    1/10 creatinine slightly up  I restarted IV fluid  I ordered a.m. BMP to monitor  Renal dose medications  Avoid nephrotoxic agent    Paroxysmal atrial fibrillation (HCC)- (present on admission)  Assessment & Plan  Continue home metoprolol, not on anticoagulation due to previous fall    Chronic heart failure with preserved ejection fraction (HCC)- (present on admission)  Assessment & Plan  Euvolemic at this time.  Continue to monitor volume status    Delirium- (present on admission)  Assessment & Plan  AOx4 at baseline per family.  Secondary to acute infection.  Treat underlying cause  Melatonin ordered    Type 2 diabetes mellitus with stage 2 chronic kidney disease, without long-term current use of insulin (HCC)- (present on admission)  Assessment & Plan  Continue sliding scale while in the hospital    Primary hypertension- (present on admission)  Assessment & Plan  Continue metoprolol         VTE prophylaxis: Lovenox    I  have performed a physical exam and reviewed and updated ROS and Plan today (1/10/2025). In review of yesterday's note (1/9/2025), there are no changes except as documented above.    I spent greater than 53 minutes for chart review, obtaining history independently, performing medically appropriate examination,  documenting , ordering medications, tests, or procedures, referring and communicating with other health care professionals, Independently interpreting results and communicating results with patient/family/caregiver. More than 50% of time was spent in face-to-face clinical encounter.

## 2025-01-11 NOTE — ASSESSMENT & PLAN NOTE
Patient admitted with ENEDELIA, UTI history of multidrug resistance.  Age over 89.  I discussed the CODE STATUS with the daughter at the bedside.  CODE STATUS confirmed.  No resuscitation.  Continue DNR DNI.  ACP 16 minutes.

## 2025-01-11 NOTE — PROGRESS NOTES
4 Eyes Skin Assessment Completed by Olga LUTHER RN and Denise TIRADO RN.    Head WDL  Ears WDL  Nose WDL  Mouth WDL  Neck WDL  Breast/Chest chest area with moles  Shoulder Blades moles on skin  Spine moles  (R) Arm/Elbow/Hand Redness and Blanching  (L) Arm/Elbow/Hand Redness, Blanching, and Scab/mole  Abdomen WDL  Groin Redness  Scrotum/Coccyx/Buttocks Redness and Blanching, fissure opening  (R) Leg WDL  (L) Leg mole/scab to side of left lower leg  (R) Heel/Foot/Toe WDL, blanching  (L) Heel/Foot/Toe old pressure wound, dark, shiney          Devices In Places PIV, purewick      Interventions In Place Heel Mepilex, Heel Float Boots, TAP System, Pillows, Elbow Mepilex, Q2 Turns, and Low Air Loss Mattress    Possible Skin Injury Yes    Pictures Uploaded Into Epic Yes  Wound Consult Placed Yes, complete, wound team has seen  RN Wound Prevention Protocol Ordered Yes

## 2025-01-11 NOTE — DISCHARGE PLANNING
RNCM discussed pt during IDT rounds. CM discussed discharge plan with Dr. Hooker. Pt is not medically cleared today. Possible discharge home with HH tomorrow. CM contacted daughter and obtained Choice [facility], as these services were initiated in the past. MD placed order. CM messaged [facility] to send referral.

## 2025-01-11 NOTE — CARE PLAN
The patient is Stable - Low risk of patient condition declining or worsening    Shift Goals  Clinical Goals: patient to remain safe from injury during shift, reorient patient during shift  Patient Goals: rest  Family Goals: kia    Progress made toward(s) clinical / shift goals:  Skin integrity maintained with regular turns, pillows for off loading, meplilex for heels and elbows, wound care protocol in place  Patient with less moments of confusion this shift. Bed in the low and locked position, room is close to the nurses station, call light is within reach.  Patient is not progressing towards the following goals:

## 2025-01-11 NOTE — PROGRESS NOTES
[facility] Medicine Daily Progress Note    Date of Service  1/11/2025    Chief Complaint  Krystle Tavarez is a 89 y.o. female admitted 1/8/2025 with altered mental status.     Hospital Course  Krystle Tavarez is a 89 y.o. female who presented 1/8/2025 with altered mental status.  PMH of hypertension HFpEF, A-fib, AV block s/p pacemaker, diabetes.  She lives in a group home, over the past 3 days she has been getting more confused and agitated.  She is usually AO x 3, on presentation to the ED she is only oriented to self.  Patient has been complaining of dysuria for the past few days, denies any abdominal pain or fevers.     In the ED afebrile, hemodynamically stable.  Labs showed creatinine of 1.22, UA infectious appearing.    Interval Problem Update  I have seen and examined the patient at bedside  I discussed and updated the care plan with the daughter at the bedside    Continue Zosyn  Follow-up urine culture  ENEDELIA -continue IV fluid, creatinine 1.29  I ordered a.m. labs to monitor    I discussed with the daughter, the daughter prefers the patient to go back to group home with home health  Home health ordered    I have discussed this patient's plan of care and discharge plan at IDT rounds today with Case Management, Nursing, Nursing leadership, and other members of the IDT team.    Consultants/Specialty  none    Code Status  DNAR/DNI    Disposition  The patient is not medically cleared for discharge to home or a post-acute facility.      I have placed the appropriate orders for post-discharge needs.    Review of Systems  Review of Systems   Unable to perform ROS: Medical condition   All other systems reviewed and are negative.       Physical Exam  Temp:  [35.8 °C (96.5 °F)-36.9 °C (98.5 °F)] 36.9 °C (98.5 °F)  Pulse:  [73-86] 73  Resp:  [17-18] 17  BP: (131-178)/(52-79) 131/52  SpO2:  [90 %-93 %] 90 %    Physical Exam  Vitals and nursing note reviewed.   Constitutional:       Appearance: Normal appearance. She is  not ill-appearing.   HENT:      Head: Normocephalic and atraumatic.      Jaw: There is normal jaw occlusion.      Right Ear: Hearing normal.      Left Ear: Hearing normal.      Nose: Nose normal.      Mouth/Throat:      Lips: Pink.      Mouth: Mucous membranes are moist.   Eyes:      Extraocular Movements: Extraocular movements intact.      Conjunctiva/sclera: Conjunctivae normal.      Pupils: Pupils are equal, round, and reactive to light.   Neck:      Vascular: No carotid bruit.   Cardiovascular:      Rate and Rhythm: Normal rate and regular rhythm.      Pulses: Normal pulses.      Heart sounds: Normal heart sounds, S1 normal and S2 normal.   Pulmonary:      Effort: Pulmonary effort is normal.      Breath sounds: Normal breath sounds and air entry. No stridor.   Musculoskeletal:      Cervical back: Normal range of motion and neck supple.      Right lower leg: No edema.      Left lower leg: No edema.   Skin:     General: Skin is warm and dry.      Capillary Refill: Capillary refill takes less than 2 seconds.   Neurological:      General: No focal deficit present.      Mental Status: She is alert and oriented to person, place, and time. Mental status is at baseline.      Sensory: Sensation is intact.      Motor: Motor function is intact.   Psychiatric:         Attention and Perception: Attention and perception normal.         Mood and Affect: Mood and affect normal.         Speech: Speech normal.         Behavior: Behavior normal. Behavior is cooperative.         Fluids    Intake/Output Summary (Last 24 hours) at 1/11/2025 1527  Last data filed at 1/11/2025 0930  Gross per 24 hour   Intake 580 ml   Output --   Net 580 ml        Laboratory  Recent Labs     01/09/25  0038 01/10/25  0254 01/11/25  0026   WBC 8.1 6.2 8.1   RBC 3.84* 3.62* 3.40*   HEMOGLOBIN 11.6* 11.2* 10.4*   HEMATOCRIT 36.9* 34.6* 32.4*   MCV 96.1 95.6 95.3   MCH 30.2 30.9 30.6   MCHC 31.4* 32.4 32.1*   RDW 49.5 49.1 49.5   PLATELETCT 287 228 006    MPV 9.8 9.2 9.6     Recent Labs     01/09/25  0038 01/10/25  0254 01/11/25  0026   SODIUM 141 137 140   POTASSIUM 4.4 4.1 4.2   CHLORIDE 104 104 105   CO2 24 24 25   GLUCOSE 164* 162* 152*   BUN 32* 26* 31*   CREATININE 1.22 1.35 1.29   CALCIUM 9.1 9.0 8.8                   Imaging  No orders to display        Assessment/Plan  * Acute cystitis with hematuria- (present on admission)  Assessment & Plan  Dysuria for several days but unknown timeline exactly due to altered mental status.  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  Per chart review once cultures came back antibiotics were just discontinued since her symptoms were improving  We will start on IV Zosyn, BMP ordered to continue monitoring kidney function while on Zosyn    ENEDELIA (acute kidney injury) (HCC)- (present on admission)  Assessment & Plan  Baseline creatinine 0.7-0.9, 1.22 on presentation  Secondary to decreased p.o. intake due to altered mental status as well as UTI  IV fluids received ED, encourage p.o. intake  BMP ordered continue monitor, avoid nephrotoxic agents    1/10 creatinine slightly up  I restarted IV fluid  I ordered a.m. BMP to monitor  Renal dose medications  Avoid nephrotoxic agent    Paroxysmal atrial fibrillation (HCC)- (present on admission)  Assessment & Plan  Continue home metoprolol, not on anticoagulation due to previous fall    Chronic heart failure with preserved ejection fraction (HCC)- (present on admission)  Assessment & Plan  Euvolemic at this time.  Continue to monitor volume status    Delirium- (present on admission)  Assessment & Plan  AOx4 at baseline per family.  Secondary to acute infection.  Treat underlying cause  Melatonin ordered    Type 2 diabetes mellitus with stage 2 chronic kidney disease, without long-term current use of insulin (HCC)- (present on admission)  Assessment & Plan  Continue sliding scale  while in the hospital    Primary hypertension- (present on admission)  Assessment & Plan  Continue metoprolol    ACP (advance care planning)  Assessment & Plan  Patient admitted with ENEDELIA, UTI history of multidrug resistance.  Age over 89.  I discussed the CODE STATUS with the daughter at the bedside.  CODE STATUS confirmed.  No resuscitation.  Continue DNR DNI.  ACP 16 minutes.          VTE prophylaxis: Lovenox    I have performed a physical exam and reviewed and updated ROS and Plan today (1/11/2025). In review of yesterday's note (1/10/2025), there are no changes except as documented above.    I spent greater than 51 minutes for chart review, obtaining history independently, performing medically appropriate examination,  documenting , ordering medications, tests, or procedures, referring and communicating with other health care professionals, Independently interpreting results and communicating results with patient/family/caregiver. More than 50% of time was spent in face-to-face clinical encounter.   VTE Selection

## 2025-01-11 NOTE — CARE PLAN
Problem: Skin Integrity  Goal: Skin integrity is maintained or improved  Outcome: Progressing     Problem: Fall Risk  Goal: Patient will remain free from falls  Outcome: Progressing     The patient is Stable - Low risk of patient condition declining or worsening    Shift Goals  Clinical Goals: patient will remain free from injury, IV ABX therapy    Progress made toward(s) clinical / shift goals:  fall precautions in place. Tolerating IV ABX therapy.     Patient is not progressing towards the following goals:

## 2025-01-11 NOTE — PROGRESS NOTES
4 Eyes Skin Assessment Completed by Briana RN and Wendy RN.    Head WDL  Ears Redness and Blanching  Nose Redness and Blanching, indentation left & right side of bridge  Mouth WDL  Neck WDL  Breast/Chest skin tag, redness bilateral breastfolds  Shoulder Blades WDL  Spine WDL  (R) Arm/Elbow/Hand Redness and Blanching  (L) Arm/Elbow/Hand Redness and Blanching  Abdomen WDL  Groin Redness and Blanching  Scrotum/Coccyx/Buttocks Redness, Blanching, Moisture Fissure, and Discoloration  (R) Leg WDL  (L) Leg WDL  (R) Heel/Foot/Toe Redness and Blanching  (L) Heel/Foot/Toe heel wound, wound to lateral foot          Devices In Places PIV      Interventions In Place Heel Mepilex, Heel Float Boots, Q2 Turns, Low Air Loss Mattress, and Barrier Cream    Possible Skin Injury Yes    Pictures Uploaded Into Epic Yes  Wound Consult Placed wound consult completed  RN Wound Prevention Protocol Ordered Yes

## 2025-01-11 NOTE — DISCHARGE PLANNING
Received Choice Form at: 1621  Agency/Facility Name:  [facility]  Sent Referral per Choice Form at: 2148

## 2025-01-11 NOTE — PROGRESS NOTES
4 Eyes Skin Assessment Completed by Olga LUTHER RN and Nargis ORDOÑEZ RN.    Head WDL  Ears WDL  Nose WDL  Mouth WDL  Neck WDL  Breast/Chest scattered moles to chest area  Shoulder Blades scattered moles to shoulders and back  Spine scattered moles  (R) Arm/Elbow/Hand Redness and Blanching  (L) Arm/Elbow/Hand Redness, Blanching, and Scab  Abdomen WDL  Groin Redness  Scrotum/Coccyx/Buttocks Redness and Blanching, red fissure  (R) Leg WDL  (L) Leg mole to left of knee  (R) Heel/Foot/Toe WDL  (L) Heel/Foot/Toe darkened wound to heel and side of heel          Devices In Places PIV, purewick      Interventions In Place Heel Mepilex, Heel Float Boots, TAP System, Pillows, Q2 Turns, Low Air Loss Mattress, and Barrier Cream    Possible Skin Injury Yes    Pictures Uploaded Into Epic Yes  Wound Consult Placed Yes  RN Wound Prevention Protocol Ordered Yes

## 2025-01-12 LAB
ANION GAP SERPL CALC-SCNC: 8 MMOL/L (ref 7–16)
BACTERIA UR CULT: NORMAL
BUN SERPL-MCNC: 26 MG/DL (ref 8–22)
CALCIUM SERPL-MCNC: 8.7 MG/DL (ref 8.5–10.5)
CHLORIDE SERPL-SCNC: 108 MMOL/L (ref 96–112)
CO2 SERPL-SCNC: 23 MMOL/L (ref 20–33)
CREAT SERPL-MCNC: 1.23 MG/DL (ref 0.5–1.4)
ERYTHROCYTE [DISTWIDTH] IN BLOOD BY AUTOMATED COUNT: 51.4 FL (ref 35.9–50)
GFR SERPLBLD CREATININE-BSD FMLA CKD-EPI: 41 ML/MIN/1.73 M 2
GLUCOSE BLD STRIP.AUTO-MCNC: 118 MG/DL (ref 65–99)
GLUCOSE BLD STRIP.AUTO-MCNC: 171 MG/DL (ref 65–99)
GLUCOSE BLD STRIP.AUTO-MCNC: 189 MG/DL (ref 65–99)
GLUCOSE BLD STRIP.AUTO-MCNC: 196 MG/DL (ref 65–99)
GLUCOSE SERPL-MCNC: 104 MG/DL (ref 65–99)
HCT VFR BLD AUTO: 31.9 % (ref 37–47)
HGB BLD-MCNC: 10.5 G/DL (ref 12–16)
MCH RBC QN AUTO: 31.8 PG (ref 27–33)
MCHC RBC AUTO-ENTMCNC: 32.9 G/DL (ref 32.2–35.5)
MCV RBC AUTO: 96.7 FL (ref 81.4–97.8)
PLATELET # BLD AUTO: 224 K/UL (ref 164–446)
PMV BLD AUTO: 9.5 FL (ref 9–12.9)
POTASSIUM SERPL-SCNC: 4.1 MMOL/L (ref 3.6–5.5)
RBC # BLD AUTO: 3.3 M/UL (ref 4.2–5.4)
SIGNIFICANT IND 70042: NORMAL
SITE SITE: NORMAL
SODIUM SERPL-SCNC: 139 MMOL/L (ref 135–145)
SOURCE SOURCE: NORMAL
WBC # BLD AUTO: 8.6 K/UL (ref 4.8–10.8)

## 2025-01-12 PROCEDURE — 700102 HCHG RX REV CODE 250 W/ 637 OVERRIDE(OP): Performed by: STUDENT IN AN ORGANIZED HEALTH CARE EDUCATION/TRAINING PROGRAM

## 2025-01-12 PROCEDURE — 99233 SBSQ HOSP IP/OBS HIGH 50: CPT | Performed by: STUDENT IN AN ORGANIZED HEALTH CARE EDUCATION/TRAINING PROGRAM

## 2025-01-12 PROCEDURE — 700105 HCHG RX REV CODE 258: Performed by: STUDENT IN AN ORGANIZED HEALTH CARE EDUCATION/TRAINING PROGRAM

## 2025-01-12 PROCEDURE — 36415 COLL VENOUS BLD VENIPUNCTURE: CPT

## 2025-01-12 PROCEDURE — A9270 NON-COVERED ITEM OR SERVICE: HCPCS | Performed by: STUDENT IN AN ORGANIZED HEALTH CARE EDUCATION/TRAINING PROGRAM

## 2025-01-12 PROCEDURE — 82962 GLUCOSE BLOOD TEST: CPT | Mod: 91

## 2025-01-12 PROCEDURE — 770006 HCHG ROOM/CARE - MED/SURG/GYN SEMI*

## 2025-01-12 PROCEDURE — 85027 COMPLETE CBC AUTOMATED: CPT

## 2025-01-12 PROCEDURE — 700111 HCHG RX REV CODE 636 W/ 250 OVERRIDE (IP): Performed by: STUDENT IN AN ORGANIZED HEALTH CARE EDUCATION/TRAINING PROGRAM

## 2025-01-12 PROCEDURE — 80048 BASIC METABOLIC PNL TOTAL CA: CPT

## 2025-01-12 RX ORDER — POLYETHYLENE GLYCOL 3350 17 G/17G
1 POWDER, FOR SOLUTION ORAL DAILY
Status: DISCONTINUED | OUTPATIENT
Start: 2025-01-12 | End: 2025-01-13

## 2025-01-12 RX ORDER — AMOXICILLIN 250 MG
1 CAPSULE ORAL DAILY
Status: DISCONTINUED | OUTPATIENT
Start: 2025-01-12 | End: 2025-01-13

## 2025-01-12 RX ORDER — BISACODYL 10 MG
10 SUPPOSITORY, RECTAL RECTAL
Status: DISCONTINUED | OUTPATIENT
Start: 2025-01-12 | End: 2025-01-13

## 2025-01-12 RX ADMIN — DULOXETINE HYDROCHLORIDE 20 MG: 20 CAPSULE, DELAYED RELEASE ORAL at 04:28

## 2025-01-12 RX ADMIN — SENNOSIDES AND DOCUSATE SODIUM 1 TABLET: 50; 8.6 TABLET ORAL at 09:05

## 2025-01-12 RX ADMIN — INSULIN LISPRO 1 UNITS: 100 INJECTION, SOLUTION INTRAVENOUS; SUBCUTANEOUS at 12:29

## 2025-01-12 RX ADMIN — SODIUM CHLORIDE: 9 INJECTION, SOLUTION INTRAVENOUS at 04:27

## 2025-01-12 RX ADMIN — INSULIN LISPRO 1 UNITS: 100 INJECTION, SOLUTION INTRAVENOUS; SUBCUTANEOUS at 21:30

## 2025-01-12 RX ADMIN — PIPERACILLIN AND TAZOBACTAM 3.38 G: 3; .375 INJECTION, POWDER, FOR SOLUTION INTRAVENOUS at 04:28

## 2025-01-12 RX ADMIN — POLYETHYLENE GLYCOL 3350 1 PACKET: 17 POWDER, FOR SOLUTION ORAL at 09:05

## 2025-01-12 RX ADMIN — PIPERACILLIN AND TAZOBACTAM 3.38 G: 3; .375 INJECTION, POWDER, FOR SOLUTION INTRAVENOUS at 12:27

## 2025-01-12 RX ADMIN — Medication 5 MG: at 21:23

## 2025-01-12 RX ADMIN — PIPERACILLIN AND TAZOBACTAM 3.38 G: 3; .375 INJECTION, POWDER, FOR SOLUTION INTRAVENOUS at 21:23

## 2025-01-12 RX ADMIN — ENOXAPARIN SODIUM 30 MG: 100 INJECTION SUBCUTANEOUS at 17:09

## 2025-01-12 RX ADMIN — INSULIN LISPRO 1 UNITS: 100 INJECTION, SOLUTION INTRAVENOUS; SUBCUTANEOUS at 17:00

## 2025-01-12 RX ADMIN — METOPROLOL SUCCINATE 12.5 MG: 25 TABLET, EXTENDED RELEASE ORAL at 04:28

## 2025-01-12 ASSESSMENT — PAIN DESCRIPTION - PAIN TYPE
TYPE: ACUTE PAIN
TYPE: ACUTE PAIN

## 2025-01-12 NOTE — PROGRESS NOTES
4 Eyes Skin Assessment Completed by Jessica RN and NAMAN Velasquez.    Head WDL  Ears Redness and Blanching  Nose Redness and Blanching  Mouth WDL  Neck WDL  Breast/Chest Redness under the breast area; skin tags  Shoulder Blades WDL  Spine WDL  (R) Arm/Elbow/Hand Redness and Blanching  (L) Arm/Elbow/Hand Redness and Blanching  Abdomen WDL  Groin Redness and Blanching  Scrotum/Coccyx/Buttocks Redness, Blanching, Moisture Fissure, and Discoloration  (R) Leg WDL  (L) Leg WDL; skin tag  (R) Heel/Foot/Toe Redness and Blanching  (L) Heel/Foot/Toe Boggy; wound on heel and lateral foot          Devices In Places Blood Pressure Cuff; PIV      Interventions In Place Heel Mepilex, Q2 Turns, Low Air Loss Mattress, and Barrier Cream    Possible Skin Injury Yes    Pictures Uploaded Into Epic Yes  Wound Consult Placed N/A, wound team has been consulted  RN Wound Prevention Protocol Ordered Yes

## 2025-01-12 NOTE — CARE PLAN
The patient is Stable - Low risk of patient condition declining or worsening    Shift Goals  Clinical Goals: free from injury or falls, placement, IV abx, q2 turns  Patient Goals: comfort  Family Goals: kia    Axo3. VS WNL. Afebrile. Denies pain. Q2 turns  pillows used for positioning and support. Heel boots for offloading heels. IV ABX. GARRETT mattress in place. Due medications given as ordered. Bed low and locked. Left call light within reach. Needs met at this time.    Progress made toward(s) clinical / shift goals:      Problem: Knowledge Deficit - Standard  Goal: Patient and family/care givers will demonstrate understanding of plan of care, disease process/condition, diagnostic tests and medications  Description: Target End Date:  1-3 days or as soon as patient condition allows    Document in Patient Education    1.  Patient and family/caregiver oriented to unit, equipment, visitation policy and means for communicating concern  2.  Complete/review Learning Assessment  3.  Assess knowledge level of disease process/condition, treatment plan, diagnostic tests and medications  4.  Explain disease process/condition, treatment plan, diagnostic tests and medications  Outcome: Progressing     Problem: Skin Integrity  Goal: Skin integrity is maintained or improved  Description: Target End Date:  Prior to discharge or change in level of care    Document interventions on Skin Risk/Gabo flowsheet groups and corresponding LDA    1.  Assess and monitor skin integrity, appearance and/or temperature  2.  Assess risk factors for impaired skin integrity and/or pressures ulcers  3.  Implement precautions to protect skin integrity in collaboration with interdisciplinary team  4.  Implement pressure ulcer prevention protocol if at risk for skin breakdown  5.  Confirm wound care consult if at risk for skin breakdown  6.  Ensure patient use of pressure relieving devices  (Low air loss bed, waffle overlay, heel protectors, ROHO  cushion, etc)  Outcome: Progressing  Note: Q2 turns, pillows used for positioning and support, TAPS in place, heel boots in place for offloading     Problem: Fall Risk  Goal: Patient will remain free from falls  Description: Target End Date:  Prior to discharge or change in level of care    Document interventions on the Concetta Capps Fall Risk Assessment    1.  Assess for fall risk factors  2.  Implement fall precautions  Outcome: Progressing       Patient is not progressing towards the following goals:

## 2025-01-12 NOTE — PROGRESS NOTES
Received pt from night RN  Pt laying in bed awake and alert  Axo1 on room air  Bed low and locked  Repositioned to left side laying   Left call light within reach.

## 2025-01-12 NOTE — CARE PLAN
The patient is Stable - Low risk of patient condition declining or worsening    Shift Goals  Clinical Goals: Patient will remain free from fall or injuries throughout shift  Patient Goals: Sleep  Family Goals: kia    Progress made toward(s) clinical / shift goals:    Patient is alert and oriented to self, pleasantly confused, follows command. Patient denies pain or discomfort. Patient's bed frame alarm is on, bed in low position, hourly rounding done, call light within reach.    Patient is not progressing towards the following goals:      Problem: Knowledge Deficit - Standard  Goal: Patient and family/care givers will demonstrate understanding of plan of care, disease process/condition, diagnostic tests and medications  Description: Target End Date:  1-3 days or as soon as patient condition allows    Document in Patient Education    1.  Patient and family/caregiver oriented to unit, equipment, visitation policy and means for communicating concern  2.  Complete/review Learning Assessment  3.  Assess knowledge level of disease process/condition, treatment plan, diagnostic tests and medications  4.  Explain disease process/condition, treatment plan, diagnostic tests and medications  Outcome: Not Progressing

## 2025-01-12 NOTE — PROGRESS NOTES
4 Eyes Skin Assessment Completed by Juan RN and NAMAN Haynes.    Head WDL  Ears Redness and Blanching  Nose Redness and Blanching  Mouth WDL  Neck WDL  Breast/Chest Redness under breast  Shoulder Blades WDL  Spine WDL  (R) Arm/Elbow/Hand Redness and Blanching  (L) Arm/Elbow/Hand Redness and Blanching  Abdomen WDL  Groin Redness and Blanching  Scrotum/Coccyx/Buttocks Redness, Blanching, and Discoloration, moisture fissure  (R) Leg WDL  (L) Leg WDL  (R) Heel/Foot/Toe Redness and Blanching  (L) Heel/Foot/Toe Redness, Blanching, and Boggy, wound heel and lateral foot          Devices In Places PIV      Interventions In Place Heel Mepilex, TAP System, Pillows, Q2 Turns, Barrier Cream, Heels Loaded W/Pillows, and Pressure Redistribution Mattress    Possible Skin Injury Yes    Pictures Uploaded Into Epic Yes  Wound Consult Placed wound team consulted  RN Wound Prevention Protocol Ordered Yes

## 2025-01-12 NOTE — PROGRESS NOTES
[facility] Medicine Daily Progress Note    Date of Service  1/12/2025    Chief Complaint  Krystle Tavarez is a 89 y.o. female admitted 1/8/2025 with altered mental status.     Hospital Course  Krystle Tavarez is a 89 y.o. female who presented 1/8/2025 with altered mental status.  PMH of hypertension HFpEF, A-fib, AV block s/p pacemaker, diabetes.  She lives in a group home, over the past 3 days she has been getting more confused and agitated.  She is usually AO x 3, on presentation to the ED she is only oriented to self.  Patient has been complaining of dysuria for the past few days, denies any abdominal pain or fevers.     In the ED afebrile, hemodynamically stable.  Labs showed creatinine of 1.22, UA infectious appearing.    Interval Problem Update  I have seen and examined the patient at bedside  I discussed and updated the care plan with the daughter at the bedside    Continue Zosyn  Follow-up urine culture  ENEDELIA continue IV fluid -creatinine 1.23  I ordered a.m. labs to monitor    I discussed with the daughter, the daughter prefers the patient to go back to group home with home health  Home health ordered    I have discussed this patient's plan of care and discharge plan at IDT rounds today with Case Management, Nursing, Nursing leadership, and other members of the IDT team.    Consultants/Specialty  none    Code Status  DNAR/DNI    Disposition  The patient is not medically cleared for discharge to home or a post-acute facility.      I have placed the appropriate orders for post-discharge needs.    Review of Systems  Review of Systems   Unable to perform ROS: Medical condition   All other systems reviewed and are negative.       Physical Exam  Temp:  [36.8 °C (98.3 °F)-37 °C (98.6 °F)] 36.8 °C (98.3 °F)  Pulse:  [67-85] 67  Resp:  [17-18] 18  BP: (125-140)/(52-66) 140/58  SpO2:  [88 %-95 %] 88 %    Physical Exam  Vitals and nursing note reviewed.   Constitutional:       Appearance: Normal appearance. She is not  ill-appearing.   HENT:      Head: Normocephalic and atraumatic.      Jaw: There is normal jaw occlusion.      Right Ear: Hearing normal.      Left Ear: Hearing normal.      Nose: Nose normal.      Mouth/Throat:      Lips: Pink.      Mouth: Mucous membranes are moist.   Eyes:      Extraocular Movements: Extraocular movements intact.      Conjunctiva/sclera: Conjunctivae normal.      Pupils: Pupils are equal, round, and reactive to light.   Neck:      Vascular: No carotid bruit.   Cardiovascular:      Rate and Rhythm: Normal rate and regular rhythm.      Pulses: Normal pulses.      Heart sounds: Normal heart sounds, S1 normal and S2 normal.   Pulmonary:      Effort: Pulmonary effort is normal.      Breath sounds: Normal breath sounds and air entry. No stridor.   Musculoskeletal:      Cervical back: Normal range of motion and neck supple.      Right lower leg: No edema.      Left lower leg: No edema.   Skin:     General: Skin is warm and dry.      Capillary Refill: Capillary refill takes less than 2 seconds.   Neurological:      General: No focal deficit present.      Mental Status: She is alert and oriented to person, place, and time. Mental status is at baseline.      Sensory: Sensation is intact.      Motor: Motor function is intact.   Psychiatric:         Attention and Perception: Attention and perception normal.         Mood and Affect: Mood and affect normal.         Speech: Speech normal.         Behavior: Behavior normal. Behavior is cooperative.         Fluids    Intake/Output Summary (Last 24 hours) at 1/12/2025 1316  Last data filed at 1/12/2025 0900  Gross per 24 hour   Intake 720 ml   Output --   Net 720 ml        Laboratory  Recent Labs     01/10/25  0254 01/11/25  0026 01/12/25  0308   WBC 6.2 8.1 8.6   RBC 3.62* 3.40* 3.30*   HEMOGLOBIN 11.2* 10.4* 10.5*   HEMATOCRIT 34.6* 32.4* 31.9*   MCV 95.6 95.3 96.7   MCH 30.9 30.6 31.8   MCHC 32.4 32.1* 32.9   RDW 49.1 49.5 51.4*   PLATELETCT 228 239 224   MPV  9.2 9.6 9.5     Recent Labs     01/10/25  0254 01/11/25  0026 01/12/25  0308   SODIUM 137 140 139   POTASSIUM 4.1 4.2 4.1   CHLORIDE 104 105 108   CO2 24 25 23   GLUCOSE 162* 152* 104*   BUN 26* 31* 26*   CREATININE 1.35 1.29 1.23   CALCIUM 9.0 8.8 8.7                   Imaging  No orders to display        Assessment/Plan  * Acute cystitis with hematuria- (present on admission)  Assessment & Plan  Dysuria for several days but unknown timeline exactly due to altered mental status.  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  Per chart review once cultures came back antibiotics were just discontinued since her symptoms were improving  We will start on IV Zosyn, BMP ordered to continue monitoring kidney function while on Zosyn    ENEDELIA (acute kidney injury) (HCC)- (present on admission)  Assessment & Plan  Baseline creatinine 0.7-0.9, 1.22 on presentation  Secondary to decreased p.o. intake due to altered mental status as well as UTI  IV fluids received ED, encourage p.o. intake  BMP ordered continue monitor, avoid nephrotoxic agents    1/10 creatinine slightly up  I restarted IV fluid  I ordered a.m. BMP to monitor  Renal dose medications  Avoid nephrotoxic agent    Paroxysmal atrial fibrillation (HCC)- (present on admission)  Assessment & Plan  Continue home metoprolol, not on anticoagulation due to previous fall    Chronic heart failure with preserved ejection fraction (HCC)- (present on admission)  Assessment & Plan  Euvolemic at this time.  Continue to monitor volume status    Delirium- (present on admission)  Assessment & Plan  AOx4 at baseline per family.  Secondary to acute infection.  Treat underlying cause  Melatonin ordered    Constipation  Assessment & Plan  I ordered aggressive bowel management  Monitor    Type 2 diabetes mellitus with stage 2 chronic kidney disease, without long-term current use of  insulin (HCC)- (present on admission)  Assessment & Plan  Continue sliding scale while in the hospital    Primary hypertension- (present on admission)  Assessment & Plan  Continue metoprolol    ACP (advance care planning)  Assessment & Plan  Patient admitted with ENEDELIA, UTI history of multidrug resistance.  Age over 89.  I discussed the CODE STATUS with the daughter at the bedside.  CODE STATUS confirmed.  No resuscitation.  Continue DNR DNI.  ACP 16 minutes.          VTE prophylaxis: Lovenox    I have performed a physical exam and reviewed and updated ROS and Plan today (1/12/2025). In review of yesterday's note (1/11/2025), there are no changes except as documented above.    I spent greater than 51 minutes for chart review, obtaining history independently, performing medically appropriate examination,  documenting , ordering medications, tests, or procedures, referring and communicating with other health care professionals, Independently interpreting results and communicating results with patient/family/caregiver. More than 50% of time was spent in face-to-face clinical encounter.   VTE Selection

## 2025-01-13 VITALS
HEART RATE: 67 BPM | RESPIRATION RATE: 18 BRPM | OXYGEN SATURATION: 92 % | DIASTOLIC BLOOD PRESSURE: 61 MMHG | BODY MASS INDEX: 25.4 KG/M2 | HEIGHT: 62 IN | TEMPERATURE: 97.2 F | SYSTOLIC BLOOD PRESSURE: 164 MMHG | WEIGHT: 138 LBS

## 2025-01-13 LAB
ANION GAP SERPL CALC-SCNC: 8 MMOL/L (ref 7–16)
BUN SERPL-MCNC: 19 MG/DL (ref 8–22)
C DIFF DNA SPEC QL NAA+PROBE: NEGATIVE
C DIFF TOX A+B STL QL IA: NEGATIVE
C DIFF TOX GENS STL QL NAA+PROBE: NORMAL
CALCIUM SERPL-MCNC: 8.7 MG/DL (ref 8.5–10.5)
CHLORIDE SERPL-SCNC: 109 MMOL/L (ref 96–112)
CO2 SERPL-SCNC: 20 MMOL/L (ref 20–33)
CREAT SERPL-MCNC: 1.31 MG/DL (ref 0.5–1.4)
ERYTHROCYTE [DISTWIDTH] IN BLOOD BY AUTOMATED COUNT: 55.8 FL (ref 35.9–50)
GFR SERPLBLD CREATININE-BSD FMLA CKD-EPI: 38 ML/MIN/1.73 M 2
GLUCOSE BLD STRIP.AUTO-MCNC: 141 MG/DL (ref 65–99)
GLUCOSE BLD STRIP.AUTO-MCNC: 147 MG/DL (ref 65–99)
GLUCOSE SERPL-MCNC: 130 MG/DL (ref 65–99)
HCT VFR BLD AUTO: 35.8 % (ref 37–47)
HGB BLD-MCNC: 10.7 G/DL (ref 12–16)
MCH RBC QN AUTO: 31.2 PG (ref 27–33)
MCHC RBC AUTO-ENTMCNC: 29.9 G/DL (ref 32.2–35.5)
MCV RBC AUTO: 104.4 FL (ref 81.4–97.8)
PLATELET # BLD AUTO: 234 K/UL (ref 164–446)
PMV BLD AUTO: 9.5 FL (ref 9–12.9)
POTASSIUM SERPL-SCNC: 4.3 MMOL/L (ref 3.6–5.5)
RBC # BLD AUTO: 3.43 M/UL (ref 4.2–5.4)
SODIUM SERPL-SCNC: 137 MMOL/L (ref 135–145)
WBC # BLD AUTO: 8.5 K/UL (ref 4.8–10.8)

## 2025-01-13 PROCEDURE — 700111 HCHG RX REV CODE 636 W/ 250 OVERRIDE (IP): Mod: JZ | Performed by: STUDENT IN AN ORGANIZED HEALTH CARE EDUCATION/TRAINING PROGRAM

## 2025-01-13 PROCEDURE — A9270 NON-COVERED ITEM OR SERVICE: HCPCS | Performed by: STUDENT IN AN ORGANIZED HEALTH CARE EDUCATION/TRAINING PROGRAM

## 2025-01-13 PROCEDURE — 97162 PT EVAL MOD COMPLEX 30 MIN: CPT

## 2025-01-13 PROCEDURE — 700105 HCHG RX REV CODE 258: Performed by: STUDENT IN AN ORGANIZED HEALTH CARE EDUCATION/TRAINING PROGRAM

## 2025-01-13 PROCEDURE — 36415 COLL VENOUS BLD VENIPUNCTURE: CPT

## 2025-01-13 PROCEDURE — 87493 C DIFF AMPLIFIED PROBE: CPT

## 2025-01-13 PROCEDURE — 85027 COMPLETE CBC AUTOMATED: CPT

## 2025-01-13 PROCEDURE — 700102 HCHG RX REV CODE 250 W/ 637 OVERRIDE(OP): Performed by: STUDENT IN AN ORGANIZED HEALTH CARE EDUCATION/TRAINING PROGRAM

## 2025-01-13 PROCEDURE — 97530 THERAPEUTIC ACTIVITIES: CPT

## 2025-01-13 PROCEDURE — 82962 GLUCOSE BLOOD TEST: CPT

## 2025-01-13 PROCEDURE — 99239 HOSP IP/OBS DSCHRG MGMT >30: CPT | Performed by: STUDENT IN AN ORGANIZED HEALTH CARE EDUCATION/TRAINING PROGRAM

## 2025-01-13 PROCEDURE — 80048 BASIC METABOLIC PNL TOTAL CA: CPT

## 2025-01-13 PROCEDURE — 97602 WOUND(S) CARE NON-SELECTIVE: CPT

## 2025-01-13 PROCEDURE — 87324 CLOSTRIDIUM AG IA: CPT

## 2025-01-13 RX ORDER — LOSARTAN POTASSIUM 50 MG/1
100 TABLET ORAL DAILY
Status: DISCONTINUED | OUTPATIENT
Start: 2025-01-13 | End: 2025-01-13 | Stop reason: HOSPADM

## 2025-01-13 RX ADMIN — METOPROLOL SUCCINATE 12.5 MG: 25 TABLET, EXTENDED RELEASE ORAL at 04:30

## 2025-01-13 RX ADMIN — PIPERACILLIN AND TAZOBACTAM 3.38 G: 3; .375 INJECTION, POWDER, FOR SOLUTION INTRAVENOUS at 04:34

## 2025-01-13 RX ADMIN — LOSARTAN POTASSIUM 100 MG: 50 TABLET, FILM COATED ORAL at 10:25

## 2025-01-13 RX ADMIN — PIPERACILLIN AND TAZOBACTAM 3.38 G: 3; .375 INJECTION, POWDER, FOR SOLUTION INTRAVENOUS at 14:01

## 2025-01-13 RX ADMIN — DULOXETINE HYDROCHLORIDE 20 MG: 20 CAPSULE, DELAYED RELEASE ORAL at 04:30

## 2025-01-13 RX ADMIN — SODIUM CHLORIDE: 9 INJECTION, SOLUTION INTRAVENOUS at 03:35

## 2025-01-13 ASSESSMENT — COGNITIVE AND FUNCTIONAL STATUS - GENERAL
MOVING FROM LYING ON BACK TO SITTING ON SIDE OF FLAT BED: A LITTLE
STANDING UP FROM CHAIR USING ARMS: A LITTLE
MOVING TO AND FROM BED TO CHAIR: A LITTLE
MOBILITY SCORE: 17
TURNING FROM BACK TO SIDE WHILE IN FLAT BAD: A LITTLE
WALKING IN HOSPITAL ROOM: A LITTLE
CLIMB 3 TO 5 STEPS WITH RAILING: A LOT
SUGGESTED CMS G CODE MODIFIER MOBILITY: CK

## 2025-01-13 ASSESSMENT — GAIT ASSESSMENTS
DISTANCE (FEET): 120
ASSISTIVE DEVICE: FRONT WHEEL WALKER
DEVIATION: BRADYKINETIC;SHUFFLED GAIT
GAIT LEVEL OF ASSIST: MINIMAL ASSIST

## 2025-01-13 ASSESSMENT — PAIN DESCRIPTION - PAIN TYPE: TYPE: ACUTE PAIN

## 2025-01-13 NOTE — CARE PLAN
The patient is Stable - Low risk of patient condition declining or worsening    Shift Goals  Clinical Goals: Patient will remain free from fall and injuries throughout shift  Patient Goals: Sleep  Family Goals: kia    Progress made toward(s) clinical / shift goals:    Patient is alert and oriented x2-3, disoriented to time and place, pleasantly forgetful. Patient denies pain or discomfort. Patient's bed frame alarm is on, bed in low position, hourly rounding done, call light within reach.    Patient is not progressing towards the following goals:      Problem: Knowledge Deficit - Standard  Goal: Patient and family/care givers will demonstrate understanding of plan of care, disease process/condition, diagnostic tests and medications  Description: Target End Date:  1-3 days or as soon as patient condition allows    Document in Patient Education    1.  Patient and family/caregiver oriented to unit, equipment, visitation policy and means for communicating concern  2.  Complete/review Learning Assessment  3.  Assess knowledge level of disease process/condition, treatment plan, diagnostic tests and medications  4.  Explain disease process/condition, treatment plan, diagnostic tests and medications  Outcome: Not Progressing

## 2025-01-13 NOTE — DISCHARGE INSTRUCTIONS
- Follow up with primary care physician in 1 week.     - Please take the medications as instructed    - Go to the local Emergency Department if you have any worsening condition.

## 2025-01-13 NOTE — DISCHARGE PLANNING
note:  Per MD, medically clear for discharge.   CM called daughter Ana M, discussed IMM and she is agreeable with pt's discharge.     Ana M wants pt to go back to [facility].     CM talked to  owner and agreeable to accept pt back.     Ana M with take pt home to   [place] [id number]   [place]  [phone]

## 2025-01-13 NOTE — CARE PLAN
The patient is Stable - Low risk of patient condition declining or worsening    Shift Goals  Clinical Goals: free from injury or falls, IV abx, q2 turns, placement  Patient Goals: comfort and see daughter  Family Goals: kia    Axo2 oriented to person and situation. Re-oriented pt to time and place. VS WNL. Afebrile. Denies pain. Q2 turns. Bed low and locked. Hourly rounds done. IV abx given. Due medications given as ordered. Left call light and personal belongings within reach. Needs met at this time.    Progress made toward(s) clinical / shift goals:      Problem: Knowledge Deficit - Standard  Goal: Patient and family/care givers will demonstrate understanding of plan of care, disease process/condition, diagnostic tests and medications  Description: Target End Date:  1-3 days or as soon as patient condition allows    Document in Patient Education    1.  Patient and family/caregiver oriented to unit, equipment, visitation policy and means for communicating concern  2.  Complete/review Learning Assessment  3.  Assess knowledge level of disease process/condition, treatment plan, diagnostic tests and medications  4.  Explain disease process/condition, treatment plan, diagnostic tests and medications  Outcome: Progressing     Problem: Skin Integrity  Goal: Skin integrity is maintained or improved  Description: Target End Date:  Prior to discharge or change in level of care    Document interventions on Skin Risk/Gabo flowsheet groups and corresponding LDA    1.  Assess and monitor skin integrity, appearance and/or temperature  2.  Assess risk factors for impaired skin integrity and/or pressures ulcers  3.  Implement precautions to protect skin integrity in collaboration with interdisciplinary team  4.  Implement pressure ulcer prevention protocol if at risk for skin breakdown  5.  Confirm wound care consult if at risk for skin breakdown  6.  Ensure patient use of pressure relieving devices  (Low air loss bed, waffle  overlay, heel protectors, ROHO cushion, etc)  Outcome: Progressing  Note: Q2 turns with pillows, barrier cream, GARRETT matress in place.

## 2025-01-13 NOTE — PROGRESS NOTES
4 Eyes Skin Assessment Completed by NAMAN Lopez and NAMAN Velasquez.    Head WDL; skin tag on right lower part of patient's face  Ears Redness and Blanching  Nose Redness and Blanching; indentation on both nose bridges from wearing eyeglasses  Mouth WDL  Neck WDL  Breast/Chest Redness and Blanching  Shoulder Blades WDL  Spine WDL  (R) Arm/Elbow/Hand Redness and Blanching  (L) Arm/Elbow/Hand Redness and Blanching  Abdomen WDL  Groin Redness and Blanching; redness on perineal area  Scrotum/Coccyx/Buttocks Redness, Blanching, and Moisture Fissure; Discoloration  (R) Leg WDL  (L) Leg WDL  (R) Heel/Foot/Toe Redness, Blanching, and Boggy  (L) Heel/Foot/Toe Redness, Blanching, and Boggy; DTI wound on heel and lateral foot wound with yellow drainage          Devices In Places Blood Pressure Cuff; PIV;       Interventions In Place Heel Mepilex, Heel Float Boots, TAP System, Pillows, Q2 Turns, Low Air Loss Mattress, Barrier Cream, and Pressure Redistribution Mattress    Possible Skin Injury Yes    Pictures Uploaded Into Epic Yes  Wound Consult Placed Yes   RN Wound Prevention Protocol Ordered Yes

## 2025-01-13 NOTE — DISCHARGE PLANNING
Case Management Discharge Planning    Admission Date: 1/8/2025  GMLOS: 3.8  ALOS: 4    6-Clicks ADL Score: 16  6-Clicks Mobility Score: 13  PT and/or OT Eval ordered: {DAVI Yes No NA:20230}  Post-acute Referrals Ordered: {DAVI Yes No NA:20230}  Post-acute Choice Obtained: {DAVI Yes No NA:20230}  Has referral(s) been sent to post-acute provider:  {DAVI Yes No NA:20230}      Anticipated Discharge Dispo: Discharge Disposition: Discharged to home/self care ()  with [facility] and back to group home.     DME Needed: {DME Needed:91678}    Action(s) Taken: {DC Action:72928}    Escalations Completed: {DC Escalations:20150}    Medically Clear: {YES / NO:90905}    Next Steps: ***    Barriers to Discharge: {DC Barriers:99016}    Is the patient up for discharge tomorrow: {Is Patient Up for Discharge Tomorrow:42387}

## 2025-01-13 NOTE — DISCHARGE SUMMARY
Discharge Summary    CHIEF COMPLAINT ON ADMISSION  Chief Complaint   Patient presents with    Painful Urination     Pt BIB family from her group home. Group home states that for the past 3 days she has been more confused, and today has been very confused. Pt's daughter states that she's been forgetting names and thinking that people are at her house. Pt is Aox1 in triage, only oriented to person. Pt's daughter states she is normally fully aware of what's going on. Pt states that she feels like she has a UTI because it burns when she pees.     Mental Status Change       Reason for Admission  Altered Mental Status     Admission Date  1/8/2025    CODE STATUS  DNAR/DNI    HPI & HOSPITAL COURSE  Krystle Tavarez is a 89 y.o. female who presented 1/8/2025 with altered mental status.  PMH of hypertension HFpEF, A-fib, AV block s/p pacemaker, diabetes, who admitted with confusion and agitation.  Patient has been complaining of dysuria for the past few days, denies any abdominal pain or fevers.At ER,  UA infectious appearing. Urine cultures 10/2024 growing multidrug-resistant Providencia stuartii. She has been treated with iv zosyn. U/c this time showed Usual urogenital pam >100,000 cfu/mL including Providencia species. She completed 5 days of iv zosyn. Her symptoms improved. AMS back to her baseline.   She was noted mild ENEDELIA. creatinine 1.2-1.3. No changes with IVF. Likely her new baseline.  She was advised to stay hydration, avoid ibuprofen or other nephrotoxic agent.      I discussed with the daughter, the daughter prefers the patient to go back to group home with home health.  Home health arranged    Therefore, she is discharged in good and stable condition to home with organized home healthcare and close outpatient follow-up.    The patient met 2-midnight criteria for an inpatient stay at the time of discharge.    Discharge Date  1/13/2024    FOLLOW UP ITEMS POST DISCHARGE  - Follow up with primary care physician in 1  week.     - Please take the medications as instructed    - Go to the local Emergency Department if you have any worsening condition.       DISCHARGE DIAGNOSES  Principal Problem:    Acute cystitis with hematuria (POA: Yes)  Active Problems:    Primary hypertension (POA: Yes)    Type 2 diabetes mellitus with stage 2 chronic kidney disease, without long-term current use of insulin (HCC) (POA: Yes)    Constipation (POA: Unknown)    Delirium (POA: Yes)    Chronic heart failure with preserved ejection fraction (HCC) (POA: Yes)    Paroxysmal atrial fibrillation (HCC) (POA: Yes)    ENEDELIA (acute kidney injury) (HCC) (POA: Yes)    ACP (advance care planning) (POA: Unknown)  Resolved Problems:    * No resolved hospital problems. *      FOLLOW UP  Future Appointments   Date Time Provider Department Center   4/4/2025  1:15 PM JAYLEN Iraheta None   4/4/2025  2:40 PM FELIX Barney None     JAYLEN Castelan  [address] [address]  [phone]    Follow up in 1 week(s)  Please call your primary care provider to schedule, recent hospitalization follow up      MEDICATIONS ON DISCHARGE     Medication List        CONTINUE taking these medications        Instructions   acetaminophen 650 MG CR tablet  Commonly known as: Tylenol 8 Hour   Doctor's comments: Please rush order-patient is in group home  Take 2 Tablets by mouth 2 times a day.  Dose: 1,300 mg     ascorbic acid 500 MG Tabs  Commonly known as: Ascorbic Acid   Doctor's comments: Please rush order-patient is in group home  Take 1 Tablet by mouth every day.  Dose: 500 mg     bisacodyl EC 5 MG Tbec   Take 5 mg by mouth 1 time a day as needed for Constipation.  Dose: 5 mg     bumetanide 0.5 MG Tabs  Commonly known as: Bumex   Doctor's comments: Please rush order-patient is in group home  Take 1 Tablet by mouth every 48 hours.  Dose: 0.5 mg     calcium carbonate 500 MG Chew  Commonly known as: Tums   Chew 500 mg as  needed.  Dose: 500 mg     DULoxetine 20 MG Cpep  Commonly known as: Cymbalta   Take 1 Capsule by mouth every day.  Dose: 20 mg     GAVISCON PO   Take 1 Tablet by mouth 2 times a day as needed.  Dose: 1 Tablet     losartan 100 MG Tabs  Commonly known as: Cozaar   Doctor's comments: Please rush order-patient is in group home  Take 1 Tablet by mouth every day.  Dose: 100 mg     metoprolol SR 25 MG Tb24  Commonly known as: Toprol XL   TAKE ONE-HALF (1/2) TABLET DAILY  Dose: 12.5 mg     senna-docusate 8.6-50 MG Tabs  Commonly known as: Pericolace Or Senokot S   Take 2 Tablets by mouth at bedtime.  Dose: 2 Tablet     vitamin B-12 1000 MCG Tabs   Doctor's comments: Please rush order-patient is in group home  Take 1 Tablet by mouth every day.  Dose: 1,000 mcg     Vitamin D (Cholecalciferol) 25 MCG (1000 UT) Tabs  Commonly known as: Cholecalciferol   Doctor's comments: Please rush order-patient is in group home  Take 1 Tablet by mouth every day.  Dose: 1,000 Units              Allergies  Allergies   Allergen Reactions    Codeine Unspecified     Patient reported hallucination and dizziness. Specific to tylenol with codeine      Gabapentin Unspecified     Altered mental status    Sulfa Drugs Nausea     Nausea   Other reaction(s): Not available    Tramadol Unspecified     Altered mentation       DIET  Orders Placed This Encounter   Procedures    Diet Order Diet: Consistent CHO (Diabetic)     Standing Status:   Standing     Number of Occurrences:   1     Order Specific Question:   Diet:     Answer:   Consistent CHO (Diabetic) [4]       ACTIVITY  As tolerated.  Weight bearing as tolerated    CONSULTATIONS      PROCEDURES      LABORATORY  Lab Results   Component Value Date    SODIUM 137 01/13/2025    POTASSIUM 4.3 01/13/2025    CHLORIDE 109 01/13/2025    CO2 20 01/13/2025    GLUCOSE 130 (H) 01/13/2025    BUN 19 01/13/2025    CREATININE 1.31 01/13/2025        Lab Results   Component Value Date    WBC 8.5 01/13/2025    HEMOGLOBIN  10.7 (L) 01/13/2025    HEMATOCRIT 35.8 (L) 01/13/2025    PLATELETCT 234 01/13/2025        Total time of the discharge process exceeds 36 minutes.

## 2025-01-13 NOTE — WOUND TEAM
[facility]  Inpatient Services  Wound and Skin Care Follow-up    Admission Date: 1/8/2025     Last order of IP CONSULT TO WOUND CARE was found on 1/13/2025 from Hospital Encounter on 1/8/2025     HPI, PMH, SH: Reviewed    Past Surgical History:   Procedure Laterality Date    PB LAMINOTOMY,LUMBAR DISK,1 INTRSP N/A 2/25/2020    Procedure: LAMINECTOMY, SPINE, LUMBAR, WITH DISCECTOMY- L5-S1, MEDIAL FACETECTOMY;  Surgeon: Latrell Kimble M.D.;  Location: SURGERY [facility];  Service: Neurosurgery    FORAMINOTOMY N/A 2/25/2020    Procedure: FORAMINOTOMY, SPINE;  Surgeon: Latrell Kimble M.D.;  Location: SURGERY [facility];  Service: Neurosurgery    PACEMAKER INSERTION  2015    HIP REPLACEMENT, TOTAL Right 2009    KNEE REPLACEMENT, TOTAL Right 2004    CATARACT EXTRACTION WITH IOL Bilateral 2003    CHOLECYSTECTOMY  2002    BASAL CELL EXCISION      nose and hand    BUNIONECTOMY Left      Social History     Tobacco Use    Smoking status: Never    Smokeless tobacco: Never   Substance Use Topics    Alcohol use: Not Currently     Chief Complaint   Patient presents with    Painful Urination     Pt BIB family from her group home. Group home states that for the past 3 days she has been more confused, and today has been very confused. Pt's daughter states that she's been forgetting names and thinking that people are at her house. Pt is Aox1 in triage, only oriented to person. Pt's daughter states she is normally fully aware of what's going on. Pt states that she feels like she has a UTI because it burns when she pees.     Mental Status Change     Diagnosis: ENEDELIA (acute kidney injury) (HCC) [N17.9]    Unit where seen by Wound Team: S601/01     WOUND FOLLOW UP RELATED TO:  Left Lateral foot, Heel       WOUND TEAM PLAN OF CARE - Frequency of Follow-up:   Nursing to follow dressing orders written for wound care. Contact wound team if area fails to progress, deteriorates or with any questions/concerns if something comes  up before next scheduled follow up (See below as to whether wound is following and frequency of wound follow up)  Dressing changes by wound team:                   Not following, consult as needed  - Left Lateral Foot, Heel    WOUND HISTORY:       Pt is a 89 you female who presented to the ED for AMS.  Admitted for cystitis, ENEDELIA.  Daughter bedside and states pt developed a Johann L heel about 6 months age,  Pt lives in a group home and spends most of her time in bed or in a recliner.    Per patient and family, heel wounds have been ongoing for around 3+ months.        WOUND ASSESSMENT/LDA  Wound 01/09/25 Pressure Injury Heel Left dark wound to left heel (Active)   Date First Assessed/Time First Assessed: 01/09/25 1200   Present on Original Admission: Yes  Hand Hygiene Completed: Yes  Primary Wound Type: Pressure Injury  Location: Heel  Laterality: Left  Wound Description (Comments): dark wound to left heel      Assessments 1/13/2025 12:00 PM   Wound Image     Site Assessment Brown   Periwound Assessment Blistered   Margins Defined edges   Closure Open to air   Drainage Amount None   Treatments Cleansed;Nonselective debridement;Site care;Offloading   Offloading/DME Heel float boot   Wound Cleansing Approved Wound Cleanser   Periwound Protectant No-sting Skin Prep   Dressing Status Clean;Dry;Intact   Dressing Changed Changed   Dressing Cleansing/Solutions Not Applicable   Dressing Options Offloading Dressing - Heel   Dressing Change/Treatment Frequency Every 72 hrs, and As Needed   NEXT Dressing Change/Treatment Date 01/16/25   NEXT Weekly Photo (Inpatient Only) 01/20/25   Wound Team Following Not following   Wound Odor None       Wound 01/13/25 Foot Ventral;Lateral Left (Active)   Date First Assessed/Time First Assessed: 01/13/25 0440   Location: Foot  Wound Orientation: Ventral;Lateral  Laterality: Left      Assessments 1/13/2025 12:00 PM   Wound Image      Site Assessment Yellow;Drainage   Periwound Assessment  Pink;Crusted   Margins Attached edges;Defined edges   Closure Secondary intention   Drainage Amount Scant   Drainage Description Yellow   Treatments Cleansed;Nonselective debridement;Site care;Offloading   Offloading/DME Heel float boot   Wound Cleansing Normal Saline Irrigation   Periwound Protectant No-sting Skin Prep   Dressing Status Clean;Dry;Intact   Dressing Changed New   Dressing Cleansing/Solutions Not Applicable   Dressing Options Hydrofiber Silver;Offloading Dressing - Heel   Dressing Change/Treatment Frequency Every 48 hrs, and As Needed   NEXT Dressing Change/Treatment Date 01/15/25   NEXT Weekly Photo (Inpatient Only) 01/20/25   Wound Team Following Not following   Wound Length (cm) 0.5 cm   Wound Width (cm) 0.5 cm   Wound Depth (cm) 0.3 cm   Wound Surface Area (cm^2) 0.25 cm^2   Wound Volume (cm^3) 0.075 cm^3   Shape Circular   Wound Odor None   Exposed Structures None        Vascular:    ALLEN:   No results found.    Lab Values:    Lab Results   Component Value Date/Time    WBC 8.5 01/13/2025 02:56 AM    RBC 3.43 (L) 01/13/2025 02:56 AM    HEMOGLOBIN 10.7 (L) 01/13/2025 02:56 AM    HEMATOCRIT 35.8 (L) 01/13/2025 02:56 AM    CREACTPROT 0.86 (H) 08/29/2024 04:52 PM    SEDRATEWES 70 (H) 08/29/2024 04:52 PM    HBA1C 7.7 (H) 11/13/2024 05:45 AM         Culture Results show:  No results found for this or any previous visit (from the past 720 hours).    Pain Level/Medicated:  None, Tolerated without pain medication       INTERVENTIONS BY WOUND TEAM:  Chart and images reviewed. Discussed with bedside RN. All areas of concern (based on picture review, LDA review and discussion with bedside RN) have been thoroughly assessed. Documentation of areas based on significant findings. This RN in to assess patient. Performed standard wound care which includes appropriate positioning, dressing removal and non-selective debridement. Pictures and measurements obtained weekly if/when required.    Wound:  Left Lateral Foot    Preparation for Dressing removal: Removed without difficulty  Cleansed/Non-selectively Debrided with:  Normal Saline and Gauze  Cheryl wound: Cleansed with Normal Saline and Gauze, Prepped with No Sting  Primary Dressing:  Aquacel  Secondary (Outer) Dressing: Heel offloading dressing     Wound:  Heel  Preparation for Dressing removal: Removed without difficulty  Cleansed/Non-selectively Debrided with:  Normal Saline and Gauze  Cheryl wound: Cleansed with Normal Saline and Gauze, Prepped with No Sting  Primary Dressing:  Heel offloading dressing    [facility] Discharge Planning  Number of Clinicians necessary to complete wound care: 1  Is patient requiring IV pain medications for dressing changes:  No   Length of time for dressing change 15 min. (This does not include chart review, pre-medication time, set up, clean up or time spent charting.)    Interdisciplinary consultation: Patient, Bedside RN, N/A.  Pressure injury and staging reviewed with N/A.    EVALUATION / RATIONALE FOR TREATMENT:     Date:  01/13/25  Wound Status:  Wound progressing as expected    Left Heel is still evolving. Continue with offloading measures.   Left lateral foot wound has opened with minimal depth and scant amount of yellowish drainage. No odor or pain to the area upon assessment. Aquacel Ag Hydrofiber applied to manage bioburden, absorb exudate, and maintain a moist wound environment without laterally wicking exudate therefore reducing cheryl-wound maceration. Covered with heel offloading dressing.   Will place referral for outpatient wound clinic.       Date:  01/10/25  Wound Status:      Pt has a resolving stage 2 pressure injury on her L heel.  There is a sataelite lesion that is dry that was part of the original wound but there is now a skin bridge between it and the larger wound.  Expect both wound to resolve with off loading          Goals: Steady decrease in wound area and depth weekly.    NURSING PLAN OF CARE  ORDERS:  Dressing changes: See Dressing Care orders    NUTRITION RECOMMENDATIONS   Wound Team Recommendations:  N/A     DIET ORDERS (From admission to next 24h)       Start     Ordered    01/09/25 0337  Diet Order Diet: Consistent CHO (Diabetic)  ALL MEALS        Question:  Diet:  Answer:  Consistent CHO (Diabetic)    01/09/25 0344                    PREVENTATIVE INTERVENTIONS:   Q shift Gabo - performed per nursing policy  Q shift pressure point assessments - performed per nursing policy    Surface/Positioning  Low Airloss - Currently in Place  Reposition q 2 hours - Currently in Place  TAPs Turning system - Currently in Place    Offloading/Redistribution  Heel offloading dressing (Silicone dressing) - Currently in Place  Heel float boots (Prevalon boot) - Currently in Place    Anticipated discharge plans:  Group Home, , Outpatient wound clinic referral        Vac Discharge Needs:  Vac Discharge plan is purely a recommendation from wound team and not a requirement for discharge unless otherwise stated by physician.  Not Applicable Pt not on a wound vac

## 2025-01-13 NOTE — DISCHARGE INSTR - WOUND CARE
Left Lateral Foot: Remove old dressing. Cleanse wound with wound cleanser and gauze. Pat dry. Then cut a piece of aquacel ag (hydrofiber silver) to fit wound bed. Secure in place with a silicone adhesive foam. Change every 48hrs and as needed for dislodgement and or drainage saturation.

## 2025-01-14 ENCOUNTER — APPOINTMENT (OUTPATIENT)
Dept: MEDICAL GROUP | Facility: MEDICAL CENTER | Age: OVER 89
End: 2025-01-14
Payer: MEDICARE

## 2025-01-14 ENCOUNTER — PATIENT OUTREACH (OUTPATIENT)
Dept: MEDICAL GROUP | Facility: MEDICAL CENTER | Age: OVER 89
End: 2025-01-14

## 2025-01-14 NOTE — PROGRESS NOTES
Transitional Care Management  TCM Outreach Date and Time: Filed (1/14/2025  8:14 AM)    Discharge Questions  Actual Discharge Date: 01/13/25  Now that you are home, how are you feeling?: Good (spoke with daughter; went to group home, still has some confusion)  Did you receive any new prescriptions?: No  Do you have any questions about your current medications or new medications (Review Med Rec)?: No  Did you have any durable medical equipment ordered?: No  Do you have a follow up appointment scheduled with your PCP?: Yes  Appointment Date: 01/20/25  Appointment Time: 1300  Any issues or paperwork you wish to discuss with your PCP?: Yes (Please specify) (still confused)  Are you (patient) able to get to the appointment?: Yes  If Home Health was ordered, have they contacted you (Patient): No (Give phone number) ([facility] -[phone])  Did you have enough support after your last discharge?: Yes (daughter and group home)  Does this patient qualify for the CCM program?: No    Transitional Care  Number of attempts made to contact patient: 1  Current or previous attempts completed within two business days of discharge? : Yes  Provided education regarding treatment plan, medications, self-management, ADLs?: Yes (She was advised to stay hydration, avoid ibuprofen or other nephrotoxic agent.)  Has patient completed an Advanced Directive?: Yes  Is the patient's advanced directive on file?: Yes  Has the Care Manager's phone number provided?: Yes  Is there anything else I can help you with?: No    Discharge Summary  Chief Complaint: Painful Urination        Pt BIB family from her group home. Group home states that for the past 3 days she has been more confused, and today has been very confused. Pt's daughter states that she's been forgetting names and thinking that people are at her house. Pt is Aox1 in triage, only oriented to person. Pt's daughter states she is normally fully aware of what's going on. Pt states that she  feels like she has a UTI because it burns when she pees.     Mental Status Change  Admitting Diagnosis: Altered Mental Status  Discharge Diagnosis: Acute cystitis with hematuria

## 2025-01-14 NOTE — THERAPY
"Physical Therapy   Initial Evaluation     Patient Name: Krystle Tavarez  Age:  89 y.o., Sex:  female  Medical Record #: [id number]  Today's Date: 1/13/2025     Precautions  Precautions: Fall Risk    Assessment  Patient is a 89 y.o. female who was admitted with confusion and diagnosed with acute cystitis with hematuria. PMH significant for HFpEF, Afib, pacemaker, diabetes.    Pt received in bed and agreeable to PT evaluation. Pt seen prior to return to group home per RN and daughter request to be able to tell staff how she is mobilizing. Pt required min A overall to mobilize with a FWW and worked on progression of ambulation to baseline level during session. Pt was mildly unsteady with slight posterior lean with standing and ambulation. Discussed with daughter. Pt to return to group home. Recommend home health follow up. No further acute PT needs.    Plan    Physical Therapy Initial Treatment Plan   Duration: Evaluation only    DC Equipment Recommendations: None  Discharge Recommendations: Recommend home health for continued physical therapy services    Subjective    \"I'm ready to go home\"     Objective       01/13/25 1431   Precautions   Precautions Fall Risk   Vitals   O2 Delivery Device None - Room Air   Pain 0 - 10 Group   Pain Rating Scale (NPRS) 0   Prior Living Situation   Prior Services Intermittent Physical Support for ADL Per Service   Housing / Facility Group Home   Steps Into Home 0   Steps In Home 0   Equipment Owned 4-Wheel Walker   Lives with - Patient's Self Care Capacity Attendant / Paid Care Giver   Comments Daughter reports pt has assist as needed.   Prior Level of Functional Mobility   Comments Per daughter, pt able to complete mobility with typically standby assist.   History of Falls   History of Falls Yes   Date of Last Fall   (Hx falls per chart)   Cognition    Level of Consciousness Alert   Comments Very pleasant and cooperative   Passive ROM Lower Body   Passive ROM Lower Body WDL   Active ROM " Lower Body    Active ROM Lower Body  WDL   Strength Lower Body   Comments BLE grossly 4/5   Lower Body Muscle Tone   Lower Body Muscle Tone  WDL   Coordination Lower Body    Coordination Lower Body  WDL   Balance Assessment   Sitting Balance (Static) Fair -   Sitting Balance (Dynamic) Fair -   Standing Balance (Static) Fair -   Standing Balance (Dynamic) Fair -   Weight Shift Sitting Fair   Weight Shift Standing Fair   Comments FWW in standing   Bed Mobility    Supine to Sit Minimal Assist   Scooting Minimal Assist   Comments HOB elevated   Gait Analysis   Gait Level Of Assist Minimal Assist   Assistive Device Front Wheel Walker   Distance (Feet) 120   # of Times Distance was Traveled 1   Deviation Bradykinetic;Shuffled Gait   Comments Cues for proximity to the FWW, min A due to unsteadiness   Functional Mobility   Sit to Stand Contact Guard Assist   Bed, Chair, Wheelchair Transfer Minimal Assist   Transfer Method Stand Step   Comments Cues for hand placement on FWW with STS   6 Clicks Assessment - How much HELP from from another person do you currently need... (If the patient hasn't done an activity recently, how much help from another person do you think he/she would need if he/she tried?)   Turning from your back to your side while in a flat bed without using bedrails? 3   Moving from lying on your back to sitting on the side of a flat bed without using bedrails? 3   Moving to and from a bed to a chair (including a wheelchair)? 3   Standing up from a chair using your arms (e.g., wheelchair, or bedside chair)? 3   Walking in hospital room? 3   Climbing 3-5 steps with a railing? 2   6 clicks Mobility Score 17   Education Group   Education Provided Role of Physical Therapist   Role of Physical Therapist Patient Response Patient;Family;Acceptance;Explanation;Verbal Demonstration   Physical Therapy Initial Treatment Plan    Duration Evaluation only   Anticipated Discharge Equipment and Recommendations   DC Equipment  Recommendations None   Discharge Recommendations Recommend home health for continued physical therapy services   Interdisciplinary Plan of Care Collaboration   IDT Collaboration with  Nursing;Family / Caregiver   Patient Position at End of Therapy Seated;Call Light within Reach;Tray Table within Reach;Phone within Reach;Family / Friend in Room   Collaboration Comments RN updated   Session Information   Date / Session Number  1/13-eval only

## 2025-01-20 ENCOUNTER — OFFICE VISIT (OUTPATIENT)
Dept: MEDICAL GROUP | Facility: MEDICAL CENTER | Age: OVER 89
End: 2025-01-20
Payer: MEDICARE

## 2025-01-20 VITALS
DIASTOLIC BLOOD PRESSURE: 78 MMHG | SYSTOLIC BLOOD PRESSURE: 124 MMHG | BODY MASS INDEX: 25.95 KG/M2 | HEART RATE: 76 BPM | HEIGHT: 62 IN | OXYGEN SATURATION: 95 % | TEMPERATURE: 97.4 F | RESPIRATION RATE: 14 BRPM | WEIGHT: 141 LBS

## 2025-01-20 DIAGNOSIS — Z86.79 HISTORY OF SUBDURAL HEMATOMA: ICD-10-CM

## 2025-01-20 DIAGNOSIS — N17.9 AKI (ACUTE KIDNEY INJURY) (HCC): ICD-10-CM

## 2025-01-20 DIAGNOSIS — Z09 HOSPITAL DISCHARGE FOLLOW-UP: ICD-10-CM

## 2025-01-20 DIAGNOSIS — R41.82 ALTERED MENTAL STATUS, UNSPECIFIED ALTERED MENTAL STATUS TYPE: ICD-10-CM

## 2025-01-20 DIAGNOSIS — N39.0 CHRONIC UTI (URINARY TRACT INFECTION): ICD-10-CM

## 2025-01-20 DIAGNOSIS — Z78.9 IMPAIRED MOBILITY AND ADLS: ICD-10-CM

## 2025-01-20 DIAGNOSIS — Z91.81 HISTORY OF FALL: ICD-10-CM

## 2025-01-20 DIAGNOSIS — R53.1 GENERAL WEAKNESS: ICD-10-CM

## 2025-01-20 DIAGNOSIS — Z74.09 IMPAIRED MOBILITY AND ADLS: ICD-10-CM

## 2025-01-20 RX ORDER — SENNOSIDES 8.6 MG
1300 CAPSULE ORAL 2 TIMES DAILY
Qty: 180 TABLET | Refills: 3 | Status: SHIPPED
Start: 2025-01-20

## 2025-01-20 ASSESSMENT — FIBROSIS 4 INDEX: FIB4 SCORE: 2.72

## 2025-01-20 NOTE — PROGRESS NOTES
Subjective:     Krystle Tavarez is a 89 y.o. female who presents for Hospital Follow-up.    Transitional Care Management  TCM Outreach Date and Time: Filed (1/14/2025  8:14 AM)    Discharge Questions  Actual Discharge Date: 01/13/25  Now that you are home, how are you feeling?: Good (spoke with daughter; went to group home, still has some confusion)  Did you receive any new prescriptions?: No  Do you have any questions about your current medications or new medications (Review Med Rec)?: No  Did you have any durable medical equipment ordered?: No  Do you have a follow up appointment scheduled with your PCP?: Yes  Appointment Date: 01/20/25  Appointment Time: 1300  Any issues or paperwork you wish to discuss with your PCP?: Yes (Please specify) (still confused)  Are you (patient) able to get to the appointment?: Yes  If Home Health was ordered, have they contacted you (Patient): No (Give phone number) ([facility] -[phone])  Did you have enough support after your last discharge?: Yes (daughter and group home)  Does this patient qualify for the CCM program?: No    Transitional Care  Number of attempts made to contact patient: 1  Current or previous attempts completed within two business days of discharge? : Yes  Provided education regarding treatment plan, medications, self-management, ADLs?: Yes (She was advised to stay hydration, avoid ibuprofen or other nephrotoxic agent.)  Has patient completed an Advanced Directive?: Yes  Is the patient's advanced directive on file?: Yes  Has the Care Manager's phone number provided?: Yes  Is there anything else I can help you with?: No    Discharge Summary  Chief Complaint: Painful Urination        Pt BIB family from her group home. Group home states that for the past 3 days she has been more confused, and today has been very confused. Pt's daughter states that she's been forgetting names and thinking that people are at her house. Pt is Aox1 in triage, only oriented to  person. Pt's daughter states she is normally fully aware of what's going on. Pt states that she feels like she has a UTI because it burns when she pees.     Mental Status Change  Admitting Diagnosis: Altered Mental Status  Discharge Diagnosis: Acute cystitis with hematuria    Verbal consent was acquired by the patient to use [employer] ambient listening note generation during this visit Yes     HPI:   Recently hospitalized for altered mental status    History of Present Illness  The patient is a 89-year-old female who presents for evaluation of altered mental status, urinary tract infection, and back pain. She is accompanied by her daughter and her 2 sons      Admitted to ER January 8, 2025 with altered mental status.  U/c this time showed Usual urogenital pam >100,000 cfu/mL including Providencia species. She completed 5 days of iv zosyn     -She was checked for C. difficile however this was negative    She was admitted to the emergency room on 01/08/2025 due to an episode of altered mental status. Her daughter reports that she has been experiencing persistent confusion since her fall in November 2024, which required the placement of 18 staples in her head. A CT scan performed at that time revealed a minor brain bleed, which was subsequently reported as stable. The confusion improved after a few weeks. However, her mental state has not fully recovered, with recent changes noted following her return from the hospital on 01/08/2025. She is currently under consideration for hospice comfort care, with a recommendation for a repeat head CT to rule out any ongoing brain bleed.    Two weeks prior to her ER visit, she was treated at [facility] for a severe urinary tract infection (UTI), receiving a 5-day course of intravenous Zosyn.  She continues to experience dysuria.  She does have reduced mobility and it has been noted that she does not take in enough water and has incontinence of urine.  She denies any fever  or chills.  She denies any lower pelvic pain.  She had a bowel movement earlier today.    She typically manages her back pain with two doses of Tylenol 650 mg in the morning and at night. However, during her last hospital stay, the medication was prescribed as needed, and she has been unable to remember to take it regularly. She requests a prescription for Tylenol to be taken twice daily-to be written down and presented to the group home for administration.     Latest Reference Range & Units 10/08/24 20:27 11/11/24 08:34 11/12/24 04:22 01/09/25 00:38 01/10/25 02:54 01/11/25 00:26 01/12/25 03:08 01/13/25 02:56   Sodium 135 - 145 mmol/L 138 139 137 141 137 140 139 137   Potassium 3.6 - 5.5 mmol/L 4.6 4.5 4.6 4.4 4.1 4.2 4.1 4.3   Chloride 96 - 112 mmol/L 101 105 104 104 104 105 108 109   Co2 20 - 33 mmol/L 23 24 25 24 24 25 23 20   Anion Gap 7.0 - 16.0  14.0 10.0 8.0 13.0 9.0 10.0 8.0 8.0   Glucose 65 - 99 mg/dL 155 (H) 169 (H) 152 (H) 164 (H) 162 (H) 152 (H) 104 (H) 130 (H)   Bun 8 - 22 mg/dL 22 27 (H) 25 (H) 32 (H) 26 (H) 31 (H) 26 (H) 19   Creatinine 0.50 - 1.40 mg/dL 0.87 1.03 0.97 1.22 1.35 1.29 1.23 1.31       NEG    Source UR   Site -   Culture Result Usual urogenital pam >100,000 cfu/mL including Providencia  species  3 or more organisms isolated, culture of doubtful  significance, please recollect.       Current medicines (including reconciliation performed today)  Current Outpatient Medications   Medication Sig Dispense Refill    acetaminophen (TYLENOL 8 HOUR) 650 MG CR tablet Take 2 Tablets by mouth 2 times a day. Please offer Tylenol to patient for pain twice daily 180 Tablet 3    NON SPECIFIED Apply Calazime cream to perineal area daily after each bathroom use. 3 Each 6    NON SPECIFIED Please assist patient to bathroom every 2 hours during waking hours for bladder training to help reduce frequency of urinary tract infections/incontinence episodes. 1 Each 0    senna-docusate (PERICOLACE OR SENOKOT S)  "8.6-50 MG Tab Take 2 Tablets by mouth at bedtime.      calcium carbonate (TUMS) 500 MG Chew Tab Chew 500 mg as needed.      bisacodyl EC (DULCOLAX) 5 MG Tablet Delayed Response Take 5 mg by mouth 1 time a day as needed for Constipation.      Alum Hydroxide-Mag Carbonate (GAVISCON PO) Take 1 Tablet by mouth 2 times a day as needed.      bumetanide (BUMEX) 0.5 MG Tab Take 1 Tablet by mouth every 48 hours. 45 Tablet 3    DULoxetine (CYMBALTA) 20 MG Cap DR Particles Take 1 Capsule by mouth every day. 90 Capsule 3    metoprolol SR (TOPROL XL) 25 MG TABLET SR 24 HR TAKE ONE-HALF (1/2) TABLET DAILY 45 Tablet 3    Vitamin D, Cholecalciferol, (CHOLECALCIFEROL) 25 MCG (1000 UT) Tab Take 1 Tablet by mouth every day. 90 Tablet 3    Cyanocobalamin (VITAMIN B-12) 1000 MCG Tab Take 1 Tablet by mouth every day. 90 Tablet 3    ascorbic acid (ASCORBIC ACID) 500 MG Tab Take 1 Tablet by mouth every day. 90 Tablet 3    losartan (COZAAR) 100 MG Tab Take 1 Tablet by mouth every day. 90 Tablet 3     No current facility-administered medications for this visit.       Allergies:   Codeine, Gabapentin, Sulfa drugs, and Tramadol    Social History     Tobacco Use    Smoking status: Never    Smokeless tobacco: Never   Vaping Use    Vaping status: Never Used   Substance Use Topics    Alcohol use: Not Currently    Drug use: No       ROS:  See above    Objective:     Vitals:    01/20/25 1305   BP: 124/78   Pulse: 76   Resp: 14   Temp: 36.3 °C (97.4 °F)   TempSrc: Temporal   SpO2: 95%   Weight: 64 kg (141 lb)   Height: 1.575 m (5' 2\")     Body mass index is 25.79 kg/m².    Physical Exam:  Physical Exam  Constitutional:       Appearance: Normal appearance. She is normal weight.   HENT:      Head: Normocephalic and atraumatic.      Nose: Nose normal.   Eyes:      Extraocular Movements: Extraocular movements intact.      Pupils: Pupils are equal, round, and reactive to light.   Pulmonary:      Effort: Pulmonary effort is normal.   Skin:     General: " Skin is warm and dry.   Neurological:      Mental Status: She is disoriented and confused.      Gait: Gait abnormal.   Psychiatric:         Speech: Speech normal.         Behavior: Behavior is cooperative.         Cognition and Memory: Memory is impaired.          1. Hospital discharge follow-up    2. Altered mental status, unspecified altered mental status type  - URINE CULTURE(NEW); Future  - URINALYSIS; Future  - CT-HEAD W/O; Future    3. Chronic UTI (urinary tract infection)    4. ENEDELIA (acute kidney injury) ([facility])    5. History of fall  - CT-HEAD W/O; Future    6. History of subdural hematoma    7. General weakness    8. Impaired mobility and ADLs    Other orders  - acetaminophen (TYLENOL 8 HOUR) 650 MG CR tablet; Take 2 Tablets by mouth 2 times a day. Please offer Tylenol to patient for pain twice daily  Dispense: 180 Tablet; Refill: 3  - NON SPECIFIED; Apply Calazime cream to perineal area daily after each bathroom use.  Dispense: 3 Each; Refill: 6  - NON SPECIFIED; Please assist patient to bathroom every 2 hours during waking hours for bladder training to help reduce frequency of urinary tract infections/incontinence episodes.  Dispense: 1 Each; Refill: 0  - fosfomycin (MONUROL) 3 GM Pack; Take 3 g by mouth one time for 1 dose.  Dispense: 1 Each; Refill: 0     Assessment & Plan  1. Altered mental status - May be attributed to either her recent fall or her urinary tract infection (UTI), or potentially a combination of both. She exhibits some confusion, which could be a symptom of her UTI.  - CT scan of the head will be ordered to rule out any potential brain bleed  - Increase water intake  - Maintain regular bathroom visits every 2 hours for bladder training    2. Urinary tract infection (UTI) - Treated with IV Zosyn for 5 days during her last hospital visit. The urine culture showed three or more organisms, making it difficult to determine the appropriate antibiotic. She is resistant to quite a few antibiotics  and is not drinking enough water.  - Urine culture will be ordered to further investigate the cause of her UTI  - Consultation with an infectious disease pharmacist will be sought for additional input  - Increase water intake  - Maintain regular bathroom visits every 2 hours for bladder training  - Use a wet, warm washcloth to clean any urine residue from her skin    3. Back pain - Requires pain management.  - Prescription for Tylenol 650 mg, to be taken twice daily for pain management prescription written for daughter to present to staff at group home for administration to patient.       - Chart and discharge summary were reviewed.   - Hospitalization and results reviewed with patient.   - Medications reviewed including instructions regarding high risk medications, dosing and side effects.  - Recommended Services: Referral to Palliative Care/Hospice  - Advance directive/POLST on file?  Yes    Follow-up:No follow-ups on file.    Face-to-face transitional care management services with HIGH (today's visit is within days post discharge & LACE+ score 59+) medical decision complexity were provided.

## 2025-01-22 ENCOUNTER — HOSPITAL ENCOUNTER (OUTPATIENT)
Dept: RADIOLOGY | Facility: MEDICAL CENTER | Age: OVER 89
End: 2025-01-22
Attending: NURSE PRACTITIONER
Payer: MEDICARE

## 2025-01-22 ENCOUNTER — HOSPITAL ENCOUNTER (OUTPATIENT)
Facility: MEDICAL CENTER | Age: OVER 89
End: 2025-01-22
Attending: NURSE PRACTITIONER
Payer: MEDICARE

## 2025-01-22 DIAGNOSIS — R41.82 ALTERED MENTAL STATUS, UNSPECIFIED ALTERED MENTAL STATUS TYPE: ICD-10-CM

## 2025-01-22 DIAGNOSIS — Z91.81 HISTORY OF FALL: ICD-10-CM

## 2025-01-22 LAB
APPEARANCE UR: ABNORMAL
BACTERIA #/AREA URNS HPF: ABNORMAL /HPF
BILIRUB UR QL STRIP.AUTO: NEGATIVE
CASTS URNS QL MICRO: ABNORMAL /LPF (ref 0–2)
COLOR UR: YELLOW
EPITHELIAL CELLS 1715: ABNORMAL /HPF (ref 0–5)
GLUCOSE UR STRIP.AUTO-MCNC: NEGATIVE MG/DL
KETONES UR STRIP.AUTO-MCNC: NEGATIVE MG/DL
LEUKOCYTE ESTERASE UR QL STRIP.AUTO: ABNORMAL
MICRO URNS: ABNORMAL
MUCOUS THREADS URNS QL MICRO: PRESENT /HPF
NITRITE UR QL STRIP.AUTO: NEGATIVE
PH UR STRIP.AUTO: 6.5 [PH] (ref 5–8)
PROT UR QL STRIP: 300 MG/DL
RBC # URNS HPF: ABNORMAL /HPF (ref 0–2)
RBC UR QL AUTO: ABNORMAL
SP GR UR STRIP.AUTO: 1.01
UROBILINOGEN UR STRIP.AUTO-MCNC: 0.2 EU/DL
WBC #/AREA URNS HPF: >100 /HPF

## 2025-01-22 PROCEDURE — 87186 SC STD MICRODIL/AGAR DIL: CPT

## 2025-01-22 PROCEDURE — 87086 URINE CULTURE/COLONY COUNT: CPT

## 2025-01-22 PROCEDURE — 70450 CT HEAD/BRAIN W/O DYE: CPT

## 2025-01-22 PROCEDURE — 87077 CULTURE AEROBIC IDENTIFY: CPT

## 2025-01-22 PROCEDURE — 81001 URINALYSIS AUTO W/SCOPE: CPT

## 2025-01-24 RX ORDER — GRANULES FOR ORAL 3 G/1
3 POWDER ORAL ONCE
Qty: 1 EACH | Refills: 0 | Status: SHIPPED | OUTPATIENT
Start: 2025-01-24 | End: 2025-01-24

## 2025-01-25 LAB
BACTERIA UR CULT: ABNORMAL
BACTERIA UR CULT: ABNORMAL
SIGNIFICANT IND 70042: ABNORMAL
SITE SITE: ABNORMAL
SOURCE SOURCE: ABNORMAL

## (undated) DEVICE — KIT ROOM DECONTAMINATION

## (undated) DEVICE — SYRINGE SAFETY 10 ML 18 GA X 1 1/2 BLUNT LL (100/BX 4BX/CA)

## (undated) DEVICE — LACTATED RINGERS INJ 1000 ML - (14EA/CA 60CA/PF)

## (undated) DEVICE — STERI STRIP COMPOUND BENZOIN - TINCTURE 0.6ML WITH APPLICATOR (40EA/BX)

## (undated) DEVICE — GLOVE BIOGEL INDICATOR SZ 7SURGICAL PF LTX - (50/BX 4BX/CA)

## (undated) DEVICE — PROTECTOR ULNA NERVE - (36PR/CA)

## (undated) DEVICE — HEADREST PRONEVIEW LARGE - (10/CA)

## (undated) DEVICE — PACK NEURO - (2EA/CA)

## (undated) DEVICE — GLOVE BIOGEL ECLIPSE  PF LATEX SIZE 6.5 (50PR/BX)

## (undated) DEVICE — SPONGE GAUZESTER 4 X 4 4PLY - (128PK/CA)

## (undated) DEVICE — DRAPE LAPAROTOMY T SHEET - (12EA/CA)

## (undated) DEVICE — GLOVE BIOGEL SZ 6.5 SURGICAL PF LTX (50PR/BX 4BX/CA)

## (undated) DEVICE — GLOVE BIOGEL PI INDICATOR SZ 8.5 SURGICAL PF LF - (50PR/BX 4BX/CA)

## (undated) DEVICE — CHLORAPREP 26 ML APPLICATOR - ORANGE TINT(25/CA)

## (undated) DEVICE — DRAPE STRLE REG TOWEL 18X24 - (10/BX 4BX/CA)"

## (undated) DEVICE — SUCTION INSTRUMENT YANKAUER BULBOUS TIP W/O VENT (50EA/CA)

## (undated) DEVICE — SUTURE GENERAL

## (undated) DEVICE — SLEEVE, VASO, THIGH, MED

## (undated) DEVICE — GOWN ULTRA SURGICAL LARGE (32/CA)

## (undated) DEVICE — SEALER BIPOLAR 2.3 AQUAMANTYS

## (undated) DEVICE — ELECTRODE 850 FOAM ADHESIVE - HYDROGEL RADIOTRNSPRNT (50/PK)

## (undated) DEVICE — GOWN WARMING STANDARD FLEX - (30/CA)

## (undated) DEVICE — SET EXTENSION WITH 2 PORTS (48EA/CA) ***PART #2C8610 IS A SUBSTITUTE*****

## (undated) DEVICE — GLOVE BIOGEL PI INDICATOR SZ 7.0 SURGICAL PF LF - (50/BX 4BX/CA)

## (undated) DEVICE — SUTURE 2-0 VICRYL PLUS CT-1 - 8 X 18 INCH(12/BX)

## (undated) DEVICE — KIT ANESTHESIA W/CIRCUIT & 3/LT BAG W/FILTER (20EA/CA)

## (undated) DEVICE — LACTATED RINGERS INJ. 500 ML - (24EA/CA)

## (undated) DEVICE — SUTURE 3-0 VICRYL PLUS RB-1 - 8 X 18 INCH (12/BX)

## (undated) DEVICE — MIDAS LUBRICATOR DIFFUSER PACK (4EA/CA)

## (undated) DEVICE — LIGHT SOURCE MIS 12FT

## (undated) DEVICE — SYRINGE SAFETY 3 ML 18 GA X 1 1/2 BLUNT LL (100/BX 8BX/CA)

## (undated) DEVICE — TUBING CLEARLINK DUO-VENT - C-FLO (48EA/CA)

## (undated) DEVICE — DRAPE LARGE 3 QUARTER - (20/CA)

## (undated) DEVICE — CLOSURE WOUND 1/4 X 4 (STERI - STRIP) (50/BX 4BX/CA)

## (undated) DEVICE — SUTURE 1 VICRYL PLUS CTX - 8 X 18 INCH (12/BX)

## (undated) DEVICE — ELECTRODE DUAL RETURN W/ CORD - (50/PK)

## (undated) DEVICE — KIT SURGIFLO W/OUT THROMBIN - (6EA/CA)

## (undated) DEVICE — SODIUM CHL IRRIGATION 0.9% 1000ML (12EA/CA)

## (undated) DEVICE — SET LEADWIRE 5 LEAD BEDSIDE DISPOSABLE ECG (1SET OF 5/EA)

## (undated) DEVICE — CANISTER SUCTION 3000ML MECHANICAL FILTER AUTO SHUTOFF MEDI-VAC NONSTERILE LF DISP  (40EA/CA)

## (undated) DEVICE — ARMREST CRADLE FOAM - (2PR/PK 12PR/CA)

## (undated) DEVICE — GLOVE BIOGEL SZ 8.5 SURGICAL PF LTX - (50PR/BX 4BX/CA)

## (undated) DEVICE — TUBING C&T SET FLYING LEADS DRAIN TUBING (10EA/BX)

## (undated) DEVICE — NEPTUNE 4 PORT MANIFOLD - (20/PK)

## (undated) DEVICE — APPLICATOR COTTON TIP 6 IN - STERILE (2EA/PK 100PK/BX)

## (undated) DEVICE — NEEDLE NON-SAFETY HYPO 21 GA X 1 1/2 IN HYPO (100/BX)

## (undated) DEVICE — GLOVE SZ 6.5 BIOGEL PI MICRO - PF LF (50PR/BX)

## (undated) DEVICE — SENSOR SPO2 NEO LNCS ADHESIVE (20/BX) SEE USER NOTES

## (undated) DEVICE — KIT EVACUATER 3 SPRING PVC LF 1/8 DRAIN SIZE (10EA/CA)"

## (undated) DEVICE — TOOL DISSECT MATCH HEAD

## (undated) DEVICE — MASK ANESTHESIA ADULT  - (100/CA)

## (undated) DEVICE — NEEDLE SPINAL NON-SAFETY 18 GA X 3 IN (25EA/BX)